# Patient Record
Sex: FEMALE | Race: WHITE | NOT HISPANIC OR LATINO | Employment: OTHER | URBAN - METROPOLITAN AREA
[De-identification: names, ages, dates, MRNs, and addresses within clinical notes are randomized per-mention and may not be internally consistent; named-entity substitution may affect disease eponyms.]

---

## 2017-01-03 ENCOUNTER — OFFICE VISIT (OUTPATIENT)
Dept: PULMONOLOGY | Facility: CLINIC | Age: 67
DRG: 176 | End: 2017-01-03
Payer: MEDICARE

## 2017-01-03 VITALS
WEIGHT: 155.19 LBS | SYSTOLIC BLOOD PRESSURE: 124 MMHG | DIASTOLIC BLOOD PRESSURE: 62 MMHG | BODY MASS INDEX: 27.5 KG/M2 | RESPIRATION RATE: 14 BRPM | HEART RATE: 65 BPM | OXYGEN SATURATION: 95 % | HEIGHT: 63 IN

## 2017-01-03 DIAGNOSIS — J47.9 ACQUIRED BRONCHIECTASIS: Primary | ICD-10-CM

## 2017-01-03 DIAGNOSIS — B96.5 PSEUDOMONAS RESPIRATORY INFECTION: ICD-10-CM

## 2017-01-03 DIAGNOSIS — I50.22 CHRONIC SYSTOLIC HEART FAILURE: ICD-10-CM

## 2017-01-03 DIAGNOSIS — M51.37 DEGENERATION OF LUMBAR OR LUMBOSACRAL INTERVERTEBRAL DISC: ICD-10-CM

## 2017-01-03 DIAGNOSIS — J98.8 PSEUDOMONAS RESPIRATORY INFECTION: ICD-10-CM

## 2017-01-03 PROCEDURE — 1125F AMNT PAIN NOTED PAIN PRSNT: CPT | Mod: S$GLB,,, | Performed by: INTERNAL MEDICINE

## 2017-01-03 PROCEDURE — 1157F ADVNC CARE PLAN IN RCRD: CPT | Mod: S$GLB,,, | Performed by: INTERNAL MEDICINE

## 2017-01-03 PROCEDURE — 1160F RVW MEDS BY RX/DR IN RCRD: CPT | Mod: S$GLB,,, | Performed by: INTERNAL MEDICINE

## 2017-01-03 PROCEDURE — 3078F DIAST BP <80 MM HG: CPT | Mod: S$GLB,,, | Performed by: INTERNAL MEDICINE

## 2017-01-03 PROCEDURE — 1159F MED LIST DOCD IN RCRD: CPT | Mod: S$GLB,,, | Performed by: INTERNAL MEDICINE

## 2017-01-03 PROCEDURE — 99214 OFFICE O/P EST MOD 30 MIN: CPT | Mod: S$GLB,,, | Performed by: INTERNAL MEDICINE

## 2017-01-03 PROCEDURE — 99999 PR PBB SHADOW E&M-EST. PATIENT-LVL III: CPT | Mod: PBBFAC,,, | Performed by: INTERNAL MEDICINE

## 2017-01-03 PROCEDURE — 3074F SYST BP LT 130 MM HG: CPT | Mod: S$GLB,,, | Performed by: INTERNAL MEDICINE

## 2017-01-03 PROCEDURE — 99499 UNLISTED E&M SERVICE: CPT | Mod: S$GLB,,, | Performed by: INTERNAL MEDICINE

## 2017-01-04 PROCEDURE — 96374 THER/PROPH/DIAG INJ IV PUSH: CPT

## 2017-01-04 PROCEDURE — 99285 EMERGENCY DEPT VISIT HI MDM: CPT | Mod: 25

## 2017-01-04 PROCEDURE — 99281 EMR DPT VST MAYX REQ PHY/QHP: CPT

## 2017-01-04 PROCEDURE — 96361 HYDRATE IV INFUSION ADD-ON: CPT

## 2017-01-04 PROCEDURE — 96375 TX/PRO/DX INJ NEW DRUG ADDON: CPT

## 2017-01-04 NOTE — PROGRESS NOTES
Subjective:       Patient ID: Yasmin Asencio is a 66 y.o. female.    Chief Complaint: Bronchiectasis    HPI HISTORY OF PRESENT ILLNESS:  Mrs. Asencio is a 66-year-old former smoker who   comes for assessment of bronchiectasis in anticipation of upcoming surgical   procedures.  She is planning to have a procedure at the end of this week for   control of chronic pain from her lumbar spine (RFTC).  She also plans to have a   surgical procedure to correct a problem with her eyelids.  This will be   performed in the next two weeks and is hopefully going to correct a problem with   her peripheral vision.    Today, Mrs. Asencio reports that her respiratory symptoms have been stable in   recent months.  She continues to have a daily cough with sputum production.    This is variable in amount and color.  She has not had any recent hemoptysis.    She has not had to take antibiotics for respiratory symptoms since the mid part   of last year.    Following her visit here in September, Mrs. Asencio began treatment with Anoro.    She feels that this has been an acceptable substitute for the previous regimen   of Foradil and Spiriva.  She also continues to use Atrovent aerosol treatments   twice daily and hypertonic saline aerosols twice daily.    DATA:  I have reviewed the images from a chest x-ray done within the last   several weeks.  There do not appear to be any acute cardiovascular   abnormalities.  There are chronic fibrocystic abnormalities, which are most   prominent in the upper portion of the left lung.  This recent chest radiograph   does not appear changed in any significant way when compared to multiple   previous films done during the past year.    A recent electrocardiogram is reported to show normal sinus rhythm and is felt   to be a normal tracing.    A recent echocardiogram reports low normal left ventricular systolic function,   left ventricular diastolic dysfunction, and mild valvular  insufficiencies.      CB/HN  dd: 01/03/2017 20:32:43 (CST)  td: 01/04/2017 05:15:17 (CST)  Doc ID   #[id number]  Job ID #[id number]    CC:       Review of Systems   Constitutional: Negative for fever and fatigue.   HENT: Negative for postnasal drip, sinus pressure, voice change and congestion.    Respiratory: Positive for cough, sputum production and dyspnea on extertion. Negative for shortness of breath and wheezing.    Cardiovascular: Negative for chest pain and leg swelling.   Genitourinary: Negative for difficulty urinating.   Musculoskeletal: Positive for arthralgias and back pain.   Skin: Negative for rash.   Gastrointestinal: Negative for abdominal pain and acid reflux.   Neurological: Negative for dizziness and weakness.   Hematological: Negative for adenopathy.       Objective:      Physical Exam   Constitutional: She is oriented to person, place, and time. She appears well-developed and well-nourished.   HENT:   Head: Normocephalic.   Nose: Nose normal.   Mouth/Throat: No oropharyngeal exudate.   Neck: Normal range of motion. No JVD present. No tracheal deviation present. No thyromegaly present.   Cardiovascular: Normal rate, regular rhythm and normal heart sounds.    No murmur heard.  Pulmonary/Chest: Symmetric chest wall expansion. No stridor. She has no wheezes. She has no rhonchi. She has rales (rales and squeaks most pronounced anteriorly on L). She exhibits no tenderness.   Abdominal: Soft. There is no tenderness.   Musculoskeletal: She exhibits no edema.   Lymphadenopathy:     She has no cervical adenopathy.   Neurological: She is alert and oriented to person, place, and time.   Skin: Skin is warm and dry. No rash noted. No erythema. Nails show no clubbing.   Psychiatric: She has a normal mood and affect.   Vitals reviewed.    Personal Diagnostic Review    No flowsheet data found.      Assessment:       1. Acquired bronchiectasis    2. Pseudomonas respiratory infection    3. Chronic systolic heart failure     4. Degeneration of lumbar or lumbosacral intervertebral disc        Outpatient Encounter Prescriptions as of 1/3/2017   Medication Sig Dispense Refill    alprazolam (XANAX) 0.25 MG tablet Take 1 tablet (0.25 mg total) by mouth nightly as needed for Anxiety. 30 tablet 2    ANORO ELLIPTA 62.5-25 mcg/actuation DsDv Inhale 1 puff into the lungs once daily. 180 each 4    desonide (DESOWEN) 0.05 % lotion AAA face bid prn redness, scaling, or itching      desoximetasone (TOPICORT) 0.25 % cream Apply topically 2 (two) times daily.        escitalopram oxalate (LEXAPRO) 20 MG tablet TAKE ONE TABLET BY MOUTH ONCE DAILY 30 tablet 5    gabapentin (NEURONTIN) 300 MG capsule Take 1 capsule (300 mg total) by mouth 3 (three) times daily. 90 capsule 2    guaifenesin (MUCINEX) 600 mg 12 hr tablet Take 1,200 mg by mouth 2 (two) times daily.      ipratropium (ATROVENT) 0.02 % nebulizer solution Take 2.5 mLs (500 mcg total) by nebulization 4 (four) times daily. 225 mL 10    LACTOBACILLUS RHAMNOSUS GG (CULTURELLE ORAL) Take by mouth.      metoprolol tartrate (LOPRESSOR) 25 MG tablet TAKE ONE TABLET BY MOUTH TWICE DAILY 60 tablet 5    omeprazole (PRILOSEC) 20 MG capsule TAKE ONE CAPSULE BY MOUTH ONCE DAILY 90 capsule 0    primidone (MYSOLINE) 50 MG Tab TAKE ONE TABLET BY MOUTH ONCE DAILY IN THE MORNING, ONE AT NOON, AND TWO AT BEDTIME 120 tablet 11    SODIUM CHLORIDE FOR INHALATION (SODIUM CHLORIDE 3%) 3 % nebulizer solution USE ONE VIAL IN NEBULIZER TWICE DAILY 240 mL 3    topiramate (TOPAMAX) 25 MG tablet Take 1 tablet (25 mg total) by mouth 2 (two) times daily. 180 tablet 3    tramadol (ULTRAM) 50 mg tablet Take 50 mg by mouth every 6 (six) hours as needed for Pain.      VITAMIN D2 50,000 unit capsule TAKE ONE CAPSULE BY MOUTH ONCE A WEEK 4 capsule 5     No facility-administered encounter medications on file as of 1/3/2017.      Orders Placed This Encounter   Procedures    Spirometry without Bronchodilator      Standing Status:   Future     Standing Expiration Date:   9/4/2017    DLCO-Carbon Monoxide Diffusing Capacity     Standing Status:   Future     Standing Expiration Date:   9/4/2017     Plan:     Continue present respiratory medications (roles reviewed at today's visit).  Increase Atrovent Aerosol treatments to 3 x daily until after eye surgery.  Clear for anesthesia and surgery from pulmonary perspective.  Return visit 4 months with Spirometry and DLCO.

## 2017-01-04 NOTE — PATIENT INSTRUCTIONS
Continue present respiratory medications (roles reviewed at today's visit).  Increase Atrovent Aerosol treatments to 3 x daily until after eye surgery.  Clear for anesthesia and surgery from pulmonary perspective.  Return visit 4 months with Spirometry and DLCO.

## 2017-01-05 ENCOUNTER — HOSPITAL ENCOUNTER (INPATIENT)
Facility: HOSPITAL | Age: 67
LOS: 3 days | Discharge: HOME | DRG: 176 | End: 2017-01-08
Attending: EMERGENCY MEDICINE | Admitting: HOSPITALIST
Payer: MEDICARE

## 2017-01-05 DIAGNOSIS — I26.99 PULMONARY EMBOLISM WITHOUT ACUTE COR PULMONALE: ICD-10-CM

## 2017-01-05 DIAGNOSIS — J47.9 ACQUIRED BRONCHIECTASIS: ICD-10-CM

## 2017-01-05 DIAGNOSIS — R09.02 HYPOXIA: ICD-10-CM

## 2017-01-05 DIAGNOSIS — J44.9 CHRONIC OBSTRUCTIVE PULMONARY DISEASE, UNSPECIFIED COPD TYPE: ICD-10-CM

## 2017-01-05 DIAGNOSIS — F32.0 MILD MAJOR DEPRESSION: ICD-10-CM

## 2017-01-05 DIAGNOSIS — I12.9 BENIGN HYPERTENSION WITH CHRONIC KIDNEY DISEASE, STAGE III: ICD-10-CM

## 2017-01-05 DIAGNOSIS — I47.19 ECTOPIC ATRIAL TACHYCARDIA: ICD-10-CM

## 2017-01-05 DIAGNOSIS — N18.30 BENIGN HYPERTENSION WITH CHRONIC KIDNEY DISEASE, STAGE III: ICD-10-CM

## 2017-01-05 DIAGNOSIS — I10 ESSENTIAL HYPERTENSION: ICD-10-CM

## 2017-01-05 DIAGNOSIS — I26.99 OTHER ACUTE PULMONARY EMBOLISM WITHOUT ACUTE COR PULMONALE: Primary | ICD-10-CM

## 2017-01-05 DIAGNOSIS — K21.9 GASTROESOPHAGEAL REFLUX DISEASE, ESOPHAGITIS PRESENCE NOT SPECIFIED: Chronic | ICD-10-CM

## 2017-01-05 DIAGNOSIS — D63.8 ANEMIA OF CHRONIC DISEASE: Chronic | ICD-10-CM

## 2017-01-05 LAB
ALBUMIN SERPL BCP-MCNC: 3.9 G/DL
ALP SERPL-CCNC: 64 U/L
ALT SERPL W/O P-5'-P-CCNC: 15 U/L
ANION GAP SERPL CALC-SCNC: 12 MMOL/L
AST SERPL-CCNC: 17 U/L
BASOPHILS # BLD AUTO: 0.03 K/UL
BASOPHILS NFR BLD: 0.4 %
BILIRUB SERPL-MCNC: 0.2 MG/DL
BILIRUB UR QL STRIP: NEGATIVE
BNP SERPL-MCNC: 129 PG/ML
BUN SERPL-MCNC: 17 MG/DL
CALCIUM SERPL-MCNC: 9.4 MG/DL
CHLORIDE SERPL-SCNC: 103 MMOL/L
CLARITY UR: CLEAR
CO2 SERPL-SCNC: 23 MMOL/L
COLOR UR: ABNORMAL
CREAT SERPL-MCNC: 1 MG/DL
DIFFERENTIAL METHOD: ABNORMAL
EOSINOPHIL # BLD AUTO: 0.2 K/UL
EOSINOPHIL NFR BLD: 1.8 %
ERYTHROCYTE [DISTWIDTH] IN BLOOD BY AUTOMATED COUNT: 12.6 %
EST. GFR  (AFRICAN AMERICAN): >60 ML/MIN/1.73 M^2
EST. GFR  (NON AFRICAN AMERICAN): 58 ML/MIN/1.73 M^2
FLUAV AG SPEC QL IA: NEGATIVE
FLUBV AG SPEC QL IA: NEGATIVE
GLUCOSE SERPL-MCNC: 94 MG/DL
GLUCOSE UR QL STRIP: NEGATIVE
HCT VFR BLD AUTO: 36.2 %
HGB BLD-MCNC: 11.7 G/DL
HGB UR QL STRIP: NEGATIVE
INR PPP: 1
KETONES UR QL STRIP: NEGATIVE
LEUKOCYTE ESTERASE UR QL STRIP: ABNORMAL
LYMPHOCYTES # BLD AUTO: 2.7 K/UL
LYMPHOCYTES NFR BLD: 33.3 %
MAGNESIUM SERPL-MCNC: 2.2 MG/DL
MCH RBC QN AUTO: 29.9 PG
MCHC RBC AUTO-ENTMCNC: 32.3 %
MCV RBC AUTO: 93 FL
MICROSCOPIC COMMENT: NORMAL
MONOCYTES # BLD AUTO: 0.9 K/UL
MONOCYTES NFR BLD: 10.9 %
NEUTROPHILS # BLD AUTO: 4.4 K/UL
NEUTROPHILS NFR BLD: 53.5 %
NITRITE UR QL STRIP: NEGATIVE
PH UR STRIP: 6 [PH] (ref 5–8)
PLATELET # BLD AUTO: 255 K/UL
PMV BLD AUTO: 9.7 FL
POTASSIUM SERPL-SCNC: 4.2 MMOL/L
PROT SERPL-MCNC: 7.7 G/DL
PROT UR QL STRIP: NEGATIVE
PROTHROMBIN TIME: 10.4 SEC
RBC # BLD AUTO: 3.91 M/UL
SODIUM SERPL-SCNC: 138 MMOL/L
SP GR UR STRIP: 1.01 (ref 1–1.03)
SPECIMEN SOURCE: NORMAL
TROPONIN I SERPL DL<=0.01 NG/ML-MCNC: <0.006 NG/ML
URN SPEC COLLECT METH UR: ABNORMAL
UROBILINOGEN UR STRIP-ACNC: NEGATIVE EU/DL
WBC # BLD AUTO: 8.16 K/UL
WBC #/AREA URNS HPF: 1 /HPF (ref 0–5)

## 2017-01-05 PROCEDURE — 84484 ASSAY OF TROPONIN QUANT: CPT

## 2017-01-05 PROCEDURE — 27000221 HC OXYGEN, UP TO 24 HOURS

## 2017-01-05 PROCEDURE — 36415 COLL VENOUS BLD VENIPUNCTURE: CPT

## 2017-01-05 PROCEDURE — 80053 COMPREHEN METABOLIC PANEL: CPT

## 2017-01-05 PROCEDURE — 21400001 HC TELEMETRY ROOM

## 2017-01-05 PROCEDURE — 25000003 PHARM REV CODE 250: Performed by: EMERGENCY MEDICINE

## 2017-01-05 PROCEDURE — 63600175 PHARM REV CODE 636 W HCPCS: Performed by: EMERGENCY MEDICINE

## 2017-01-05 PROCEDURE — 94640 AIRWAY INHALATION TREATMENT: CPT

## 2017-01-05 PROCEDURE — 83880 ASSAY OF NATRIURETIC PEPTIDE: CPT

## 2017-01-05 PROCEDURE — 85610 PROTHROMBIN TIME: CPT

## 2017-01-05 PROCEDURE — 63600175 PHARM REV CODE 636 W HCPCS: Performed by: HOSPITALIST

## 2017-01-05 PROCEDURE — 25500020 PHARM REV CODE 255: Performed by: EMERGENCY MEDICINE

## 2017-01-05 PROCEDURE — 87400 INFLUENZA A/B EACH AG IA: CPT

## 2017-01-05 PROCEDURE — 83735 ASSAY OF MAGNESIUM: CPT

## 2017-01-05 PROCEDURE — 94761 N-INVAS EAR/PLS OXIMETRY MLT: CPT

## 2017-01-05 PROCEDURE — 85025 COMPLETE CBC W/AUTO DIFF WBC: CPT

## 2017-01-05 PROCEDURE — 81000 URINALYSIS NONAUTO W/SCOPE: CPT

## 2017-01-05 RX ORDER — IPRATROPIUM BROMIDE 0.5 MG/2.5ML
500 SOLUTION RESPIRATORY (INHALATION) 4 TIMES DAILY
Status: DISCONTINUED | OUTPATIENT
Start: 2017-01-05 | End: 2017-01-08 | Stop reason: HOSPADM

## 2017-01-05 RX ORDER — SODIUM CHLORIDE 9 MG/ML
INJECTION, SOLUTION INTRAVENOUS CONTINUOUS
Status: DISCONTINUED | OUTPATIENT
Start: 2017-01-05 | End: 2017-01-05

## 2017-01-05 RX ORDER — METHYLPREDNISOLONE SODIUM SUCCINATE 125 MG/2ML
125 INJECTION INTRAMUSCULAR; INTRAVENOUS
Status: COMPLETED | OUTPATIENT
Start: 2017-01-05 | End: 2017-01-05

## 2017-01-05 RX ORDER — ACETAMINOPHEN 325 MG/1
650 TABLET ORAL EVERY 8 HOURS PRN
Status: DISCONTINUED | OUTPATIENT
Start: 2017-01-05 | End: 2017-01-08 | Stop reason: HOSPADM

## 2017-01-05 RX ORDER — PANTOPRAZOLE SODIUM 40 MG/1
40 TABLET, DELAYED RELEASE ORAL DAILY
Status: DISCONTINUED | OUTPATIENT
Start: 2017-01-05 | End: 2017-01-08 | Stop reason: HOSPADM

## 2017-01-05 RX ORDER — GABAPENTIN 300 MG/1
300 CAPSULE ORAL 3 TIMES DAILY
Status: DISCONTINUED | OUTPATIENT
Start: 2017-01-05 | End: 2017-01-08 | Stop reason: HOSPADM

## 2017-01-05 RX ORDER — TOPIRAMATE 25 MG/1
25 TABLET ORAL 2 TIMES DAILY
Status: DISCONTINUED | OUTPATIENT
Start: 2017-01-05 | End: 2017-01-08 | Stop reason: HOSPADM

## 2017-01-05 RX ORDER — GUAIFENESIN 100 MG/5ML
100 SOLUTION ORAL EVERY 6 HOURS PRN
Status: DISCONTINUED | OUTPATIENT
Start: 2017-01-05 | End: 2017-01-08 | Stop reason: HOSPADM

## 2017-01-05 RX ORDER — MORPHINE SULFATE 10 MG/ML
4 INJECTION INTRAMUSCULAR; INTRAVENOUS; SUBCUTANEOUS EVERY 4 HOURS PRN
Status: DISCONTINUED | OUTPATIENT
Start: 2017-01-05 | End: 2017-01-08 | Stop reason: HOSPADM

## 2017-01-05 RX ORDER — ENOXAPARIN SODIUM 100 MG/ML
1 INJECTION SUBCUTANEOUS
Status: DISCONTINUED | OUTPATIENT
Start: 2017-01-05 | End: 2017-01-07

## 2017-01-05 RX ORDER — ONDANSETRON 2 MG/ML
8 INJECTION INTRAMUSCULAR; INTRAVENOUS
Status: COMPLETED | OUTPATIENT
Start: 2017-01-05 | End: 2017-01-05

## 2017-01-05 RX ORDER — OXYCODONE HYDROCHLORIDE 5 MG/1
5 TABLET ORAL EVERY 4 HOURS PRN
Status: DISCONTINUED | OUTPATIENT
Start: 2017-01-05 | End: 2017-01-08 | Stop reason: HOSPADM

## 2017-01-05 RX ORDER — LABETALOL HYDROCHLORIDE 5 MG/ML
10 INJECTION, SOLUTION INTRAVENOUS
Status: COMPLETED | OUTPATIENT
Start: 2017-01-05 | End: 2017-01-05

## 2017-01-05 RX ORDER — METOPROLOL TARTRATE 25 MG/1
25 TABLET, FILM COATED ORAL 2 TIMES DAILY
Status: DISCONTINUED | OUTPATIENT
Start: 2017-01-05 | End: 2017-01-08 | Stop reason: HOSPADM

## 2017-01-05 RX ORDER — IPRATROPIUM BROMIDE 0.5 MG/2.5ML
0.5 SOLUTION RESPIRATORY (INHALATION) ONCE
Status: COMPLETED | OUTPATIENT
Start: 2017-01-05 | End: 2017-01-05

## 2017-01-05 RX ORDER — ESCITALOPRAM OXALATE 10 MG/1
20 TABLET ORAL DAILY
Status: DISCONTINUED | OUTPATIENT
Start: 2017-01-05 | End: 2017-01-08 | Stop reason: HOSPADM

## 2017-01-05 RX ORDER — SODIUM CHLORIDE FOR INHALATION 3 %
4 VIAL, NEBULIZER (ML) INHALATION 2 TIMES DAILY
Status: DISCONTINUED | OUTPATIENT
Start: 2017-01-05 | End: 2017-01-08 | Stop reason: HOSPADM

## 2017-01-05 RX ADMIN — ESCITALOPRAM OXALATE 20 MG: 10 TABLET ORAL at 08:01

## 2017-01-05 RX ADMIN — SODIUM CHLORIDE SOLN NEBU 3% 4 ML: 3 NEBU SOLN at 08:01

## 2017-01-05 RX ADMIN — OXYCODONE HYDROCHLORIDE 5 MG: 5 TABLET ORAL at 04:01

## 2017-01-05 RX ADMIN — ACETAMINOPHEN 650 MG: 325 TABLET ORAL at 05:01

## 2017-01-05 RX ADMIN — SODIUM CHLORIDE: 0.9 INJECTION, SOLUTION INTRAVENOUS at 05:01

## 2017-01-05 RX ADMIN — SODIUM CHLORIDE 1000 ML: 0.9 INJECTION, SOLUTION INTRAVENOUS at 12:01

## 2017-01-05 RX ADMIN — TOPIRAMATE 25 MG: 25 TABLET, FILM COATED ORAL at 09:01

## 2017-01-05 RX ADMIN — METOPROLOL TARTRATE 25 MG: 25 TABLET ORAL at 09:01

## 2017-01-05 RX ADMIN — ACETAMINOPHEN 650 MG: 325 TABLET ORAL at 08:01

## 2017-01-05 RX ADMIN — TOPIRAMATE 25 MG: 25 TABLET, FILM COATED ORAL at 08:01

## 2017-01-05 RX ADMIN — IPRATROPIUM BROMIDE 500 MCG: 0.5 SOLUTION RESPIRATORY (INHALATION) at 06:01

## 2017-01-05 RX ADMIN — IPRATROPIUM BROMIDE 500 MCG: 0.5 SOLUTION RESPIRATORY (INHALATION) at 07:01

## 2017-01-05 RX ADMIN — GUAIFENESIN 100 MG: 100 SOLUTION ORAL at 05:01

## 2017-01-05 RX ADMIN — GABAPENTIN 300 MG: 300 CAPSULE ORAL at 01:01

## 2017-01-05 RX ADMIN — PANTOPRAZOLE SODIUM 40 MG: 40 TABLET, DELAYED RELEASE ORAL at 08:01

## 2017-01-05 RX ADMIN — GABAPENTIN 300 MG: 300 CAPSULE ORAL at 05:01

## 2017-01-05 RX ADMIN — GABAPENTIN 300 MG: 300 CAPSULE ORAL at 09:01

## 2017-01-05 RX ADMIN — SODIUM CHLORIDE SOLN NEBU 3% 4 ML: 3 NEBU SOLN at 07:01

## 2017-01-05 RX ADMIN — IPRATROPIUM BROMIDE 500 MCG: 0.5 SOLUTION RESPIRATORY (INHALATION) at 01:01

## 2017-01-05 RX ADMIN — METOPROLOL TARTRATE 25 MG: 25 TABLET ORAL at 08:01

## 2017-01-05 RX ADMIN — ENOXAPARIN SODIUM 70 MG: 80 INJECTION SUBCUTANEOUS at 01:01

## 2017-01-05 RX ADMIN — LABETALOL HYDROCHLORIDE 10 MG: 5 INJECTION, SOLUTION INTRAVENOUS at 02:01

## 2017-01-05 RX ADMIN — IOHEXOL 70 ML: 350 INJECTION, SOLUTION INTRAVENOUS at 02:01

## 2017-01-05 RX ADMIN — METHYLPREDNISOLONE SODIUM SUCCINATE 125 MG: 125 INJECTION, POWDER, FOR SOLUTION INTRAMUSCULAR; INTRAVENOUS at 02:01

## 2017-01-05 RX ADMIN — ONDANSETRON 8 MG: 2 INJECTION INTRAMUSCULAR; INTRAVENOUS at 12:01

## 2017-01-05 RX ADMIN — IPRATROPIUM BROMIDE 0.5 MG: 0.5 SOLUTION RESPIRATORY (INHALATION) at 02:01

## 2017-01-05 RX ADMIN — GUAIFENESIN 100 MG: 100 SOLUTION ORAL at 08:01

## 2017-01-05 NOTE — IP AVS SNAPSHOT
[place] [address] [place] [address]  Phone: [phone]           Patient Discharge Instructions     Our goal is to set you up for success. This packet includes information on your condition, medications, and your home care. It will help you to care for yourself so you don't get sicker and need to go back to the hospital.     Please ask your nurse if you have any questions.        There are many details to remember when preparing to leave the hospital. Here is what you will need to do:    1. Take your medicine. If you are prescribed medications, review your Medication List in the following pages. You may have new medications to  at the pharmacy and others that you'll need to stop taking. Review the instructions for how and when to take your medications. Talk with your doctor or nurses if you are unsure of what to do.     2. Go to your follow-up appointments. Specific follow-up information is listed in the following pages. Your may be contacted by a transition nurse or clinical provider about future appointments. Be sure we have all of the phone numbers to reach you, if needed. Please contact your provider's office if you are unable to make an appointment.     3. Watch for warning signs. Your doctor or nurse will give you detailed warning signs to watch for and when to call for assistance. These instructions may also include educational information about your condition. If you experience any of warning signs to your health, call your doctor.               [facility]  Unless otherwise directed by your provider, please contact [facility], our nurse care line that is available for 24/7 assistance.     [phone] (toll-free)    Registered nurses in the [facility] provide clinical advisement, health education, appointment booking, and other advisory services.                    ** Verify the list of medication(s) below is accurate and up to date.  Carry this with you in case of emergency. If your medications have changed, please notify your healthcare provider.             Medication List      START taking these medications        Additional Info    Begin Date AM Noon PM Bedtime    apixaban 5 mg Tab   Quantity:  60 tablet   Refills:  0   Dose:  5 mg    Last time this was given:  5 mg on 1/8/2017  8:47 AM   Instructions:  Take 1 tablet (5 mg total) by mouth 2 (two) times daily.                    01/08/16             CONTINUE taking these medications        Additional Info    Begin Date AM Noon PM Bedtime    alprazolam 0.25 MG tablet   Commonly known as:  XANAX   Quantity:  30 tablet   Refills:  2   Dose:  0.25 mg    Instructions:  Take 1 tablet (0.25 mg total) by mouth nightly as needed for Anxiety.                            ANORO ELLIPTA 62.5-25 mcg/actuation Dsdv   Quantity:  180 each   Refills:  4   Dose:  1 puff   Generic drug:  umeclidinium-vilanterol    Instructions:  Inhale 1 puff into the lungs once daily.            01/09/16                   CULTURELLE ORAL   Refills:  0    Instructions:  Take by mouth.            01/09/16                   desonide 0.05 % lotion   Commonly known as:  DESOWEN   Refills:  0    Instructions:  AAA face bid prn redness, scaling, or itching                            desoximetasone 0.25 % cream   Commonly known as:  TOPICORT   Refills:  0    Instructions:  Apply topically 2 (two) times daily.                    01/08/16           escitalopram oxalate 20 MG tablet   Commonly known as:  LEXAPRO   Quantity:  30 tablet   Refills:  5    Last time this was given:  20 mg on 1/8/2017  8:47 AM   Instructions:  TAKE ONE TABLET BY MOUTH ONCE DAILY            01/09/16                   gabapentin 300 MG capsule   Commonly known as:  NEURONTIN   Quantity:  90 capsule   Refills:  2   Dose:  300 mg    Last time this was given:  300 mg on 1/8/2017  5:11 AM   Instructions:  Take 1 capsule (300 mg total) by mouth 3 (three) times daily.                     01/08/16           guaifenesin 600 mg 12 hr tablet   Commonly known as:  MUCINEX   Refills:  0   Dose:  1200 mg    Instructions:  Take 1,200 mg by mouth 2 (two) times daily.                    01/08/16           ipratropium 0.02 % nebulizer solution   Commonly known as:  ATROVENT   Quantity:  225 mL   Refills:  10   Dose:  500 mcg    Last time this was given:  500 mcg on 1/8/2017  7:29 AM   Instructions:  Take 2.5 mLs (500 mcg total) by nebulization 4 (four) times daily.                    01/08/16           metoprolol tartrate 25 MG tablet   Commonly known as:  LOPRESSOR   Quantity:  60 tablet   Refills:  5    Last time this was given:  25 mg on 1/8/2017  8:47 AM   Instructions:  TAKE ONE TABLET BY MOUTH TWICE DAILY                    01/08/16           omeprazole 20 MG capsule   Commonly known as:  PRILOSEC   Quantity:  90 capsule   Refills:  0    Instructions:  TAKE ONE CAPSULE BY MOUTH ONCE DAILY            01/09/16                   primidone 50 MG Tab   Commonly known as:  MYSOLINE   Quantity:  120 tablet   Refills:  11    Instructions:  TAKE ONE TABLET BY MOUTH ONCE DAILY IN THE MORNING, ONE AT NOON, AND TWO AT BEDTIME                            sodium chloride 3% 3 % nebulizer solution   Quantity:  240 mL   Refills:  3    Last time this was given:  4 mLs on 1/7/2017  8:46 PM   Instructions:  USE ONE VIAL IN NEBULIZER TWICE DAILY                            topiramate 25 MG tablet   Commonly known as:  TOPAMAX   Quantity:  180 tablet   Refills:  3   Dose:  25 mg    Last time this was given:  25 mg on 1/8/2017  8:47 AM   Instructions:  Take 1 tablet (25 mg total) by mouth 2 (two) times daily.                    01/08/16           tramadol 50 mg tablet   Commonly known as:  ULTRAM   Refills:  0   Dose:  50 mg    Instructions:  Take 50 mg by mouth every 6 (six) hours as needed for Pain.                            VITAMIN D2 50,000 unit Cap   Quantity:  4 capsule   Refills:  5   Generic drug:   ergocalciferol    Instructions:  TAKE ONE CAPSULE BY MOUTH ONCE A WEEK                                 Where to Get Your Medications      You can get these medications from any pharmacy     Bring a paper prescription for each of these medications     apixaban 5 mg Tab                  Please bring to all follow up appointments:    1. A copy of your discharge instructions.  2. All medicines you are currently taking in their original bottles.  3. Identification and insurance card.    Please arrive 15 minutes ahead of scheduled appointment time.    Please call 24 hours in advance if you must reschedule your appointment and/or time.        Your Scheduled Appointments     Jan 17, 2017 11:30 AM CST   Established Patient Visit with MD Alexis Francis [facility] - Infectious Diseases ([facility] )    [address]  [place] [id number] [phone]            Feb 02, 2017 10:40 AM CST   Established Patient [facility] with REGAN [facility]    [address]  [place] [address] [phone]              Follow-up Information     Follow up with Urmila Luna MD In 1 week.    Specialties:  Internal Medicine, Pediatrics    Contact information:    [address] [facility] [address] [phone]          Follow up with PRECIOUS Zuñiga MD In 2 weeks.    Specialty:  Pulmonary Disease    Contact information:    [address] [facility] [address] [phone]          Schedule an appointment as soon as possible for a visit with [facility].    Specialty:  Priority Care    Contact information:    [address] [address] [phone]        Discharge Instructions     Future Orders    Activity as tolerated     Diet general     Comments:    Low Na.    Questions:    Total calories:      Fat restriction, if any:      Protein restriction, if any:      Na restriction, if any:      Fluid restriction:       "Additional restrictions:        Discharge References/Attachments     APIXABAN (ENGLISH)    EMBOLISM, PULMONARY (ENGLISH)    PULMONARY EMBOLISM, DISCHARGE INSTRUCTIONS FOR (ENGLISH)        Primary Diagnosis     Your primary diagnosis was:  Pulmonary Embolism Without Acute Cor Pulmonale      Admission Information     Date & Time Provider Department CSN    1/5/2017 12:03 AM Barbara Hernandez MD [facility] [id number]      Care Providers     Provider Role Specialty Primary office phone    Barbara Hernandez MD Attending Provider Hospitalist [phone]      Important Medicare Message          Most Recent Value    Important Message from Medicare Regarding Discharge Appeal Rights  Given to patient/caregiver, Explained to patient/caregiver, Signed/date by patient/caregiver yes 01/08/2017 0858      Your Vitals Were     BP Pulse Temp Resp Height Weight    123/58 (BP Location: Left arm, Patient Position: Lying, BP Method: Automatic) 73 98.4 °F (36.9 °C) (Oral) 20 5' 3" (1.6 m) 68.3 kg (150 lb 9.2 oz)    Last Period SpO2 BMI          (LMP Unknown) 93% 26.67 kg/m2        Recent Lab Values        2/26/2015                           9:15 AM           A1C 5.5                       Allergies as of 1/8/2017        Reactions    Bactrim [Sulfamethoxazole-trimethoprim] Rash    Was hospitalized for rash    Albuterol Other (See Comments)    tremors    Restasis [Cyclosporine] Itching      Advance Directives     An advance directive is a document which, in the event you are no longer able to make decisions for yourself, tells your healthcare team what kind of treatment you do or do not want to receive, or who you would like to make those decisions for you.  If you do not currently have an advance directive, [facility] encourages you to create one.  For more information call:  [phone]), [phone] ([phone]),  or log on to [email or web address].        Smoking Cessation     If you would like to quit " smoking:   You may be eligible for free services if you are a [place] resident and started smoking cigarettes before September 1, 1988.  Call the Smoking Cessation Trust (SCT) toll free at (537) 231-9933 or (824) 956-5508.   Call 1-800-QUIT-NOW if you do not meet the above criteria.            Language Assistance Services     ATTENTION: Language assistance services are available, free of charge. Please call [phone].      ATENCIÓN: Si habla español, tiene a santamaria disposición servicios gratuitos de asistencia lingüística. Llame al [phone].     CHÚ Ý: N?u b?n nói Ti?ng Vi?t, có các d?ch v? h? tr? ngôn ng? mi?n phí dành cho b?n. G?i s? [phone].        Heart Failure Education       Heart Failure: Being Active  You have a condition called heart failure. Being active doesnt mean that you have to wear yourself out. Even a little movement each day helps to strengthen your heart. If you cant get out to exercise, you can do simple stretching and strengthening exercises at home. These are good ways to keep you well-conditioned and prevent you and your heart from becoming excessively weak.    Ideas to get you started  · Add a little movement to things you do now. Walk to mail letters. Park your car at the far end of the parking lot and walk to the store. Walk up a flight of stairs instead of taking the elevator.  · Choose activities you enjoy. You might walk, swim, or ride an exercise bike. Things like gardening and washing the car count, too. Other possibilities include: washing dishes, walking the dog, walking around the mall, and doing aerobic activities with friends.  · Join a group exercise program at a [facility], a senior center, or a community center. Or look into a hospital cardiac rehabilitation program. Ask your doctor if you qualify.  Tips to keep you going  · Get up and get dressed each day. Go to a coffee shop and read a newspaper or go somewhere that you'll be in the presence of other  active people. Youll feel more like being active.  · Make a plan. Choose one or more activities that you enjoy and that you can easily do. Then plan to do at least one each day. You might write your plan on a calendar.  · Go with a friend or a group if you like company. This can help you feel supported and stay motivated, too.  · Plan social events that you enjoy. This will keep you mentally engaged as well as physically motivated to do things you find pleasure in.  For your safety  · Talk with your healthcare provider before starting an exercise program.  · Exercise indoors when its too hot or too cold outside, or when the air quality is poor. Try walking at a shopping mall.  · Wear socks and sturdy shoes to maintain your balance and prevent falls.  · Start slowly. Do a few minutes several times a day at first. Increase your time and speed little by little.  · Stop and rest whenever you feel tired or get short of breath.  · Dont push yourself on days when you dont feel well.  © 7353-0340 [employer]. [address] [place] [address]. All rights reserved. This information is not intended as a substitute for professional medical care. Always follow your healthcare professional's instructions.              Heart Failure: Evaluating Your Heart  You have a condition called heart failure. To evaluate your condition, your doctor will examine you, ask questions, and do some tests. Along with looking for signs of heart failure, the doctor looks for any other health problems that may have led to heart failure. The results of your evaluation will help your doctor form a treatment plan.  Health history and physical exam  Your visit will start with a health history. Tell the doctor about any symptoms youve noticed and about all medicines you take. Then youll have a physical exam. This includes listening to your heartbeat and breathing. Youll also be checked for swelling (edema) in your legs and  neck. When you have fluid buildup or fluid in the lungs, it may be called congestive heart failure.  Diagnosing heart failure     During an echocardiogram, sound waves bounce off the heart. These are converted into a picture on the screen.   The following may be done to help your doctor form a diagnosis:  · X-rays show the size and shape of your heart. These pictures can also show fluid in your lungs.  · An electrocardiogram (ECG or EKG) shows the pattern of your heartbeat. Small pads (electrodes) are placed on your chest, arms, and legs. Wires connect the pads to the ECG machine, which records your hearts electrical signals. This can give the doctor information about heart function.  · An echocardiogram uses ultrasound waves to show the structure and movement of your heart muscle. This shows how well the heart pumps. It also shows the thickness of the heart walls, and if the heart is enlarged. It is one of the most useful, non-invasive tests as it provides information about the heart's general function. This helps your doctor make treatment decisions.  · Lab tests evaluate small amounts of blood or urine for signs of problems. A BNP lab test can help diagnose and evaluate heart failure. BNP stands for B-type natriuretic peptide. The ventricles secrete more BNP when heart failure worsens. Lab tests can also provide information about metabolic dysfunction or heart dysfunction.  Your treatment plan  Based on the results of your evaluation and tests, your doctor will develop a treatment plan. This plan is designed to relieve some of your heart failure symptoms and help make you more comfortable. Your treatment plan may include:  · Medicine to help your heart work better and improve your quality of life  · Changes in what you eat and drink to help prevent fluid from backing up in your body  · Daily monitoring of your weight and heart failure symptoms to see how well your treatment plan is working  · Exercise to help  you stay healthy  · Help with quitting smoking  · Emotional and psychological support to help adjust to the changes  · Referrals to other specialists to make sure you are being treated comprehensively  © 0027-2098 [employer]. [address] [place] [address], [place] [address]. All rights reserved. This information is not intended as a substitute for professional medical care. Always follow your healthcare professional's instructions.              Heart Failure: Making Changes to Your Diet  You have a condition called heart failure. When you have heart failure, excess fluid is more likely to build up in your body because your heart isn't working well. This makes the heart work harder to pump blood. Fluid buildup causes symptoms such as shortness of breath and swelling (edema). This is often referred to as congestive heart failure or CHF. Controlling the amount of salt (sodium) you eat may help stop fluid from building up. Your doctor may also tell you to reduce the amount of fluid you drink.  Reading food labels    Your healthcare provider will tell you how much sodium you can eat each day. Read food labels to keep track. Keep in mind that certain foods are high in salt. These include canned, frozen, and processed foods. Check the amount of sodium in each serving. Watch out for high-sodium ingredients. These include MSG (monosodium glutamate), baking soda, and sodium phosphate.   Eating less salt  Give yourself time to get used to eating less salt. It may take a little while. Here are some tips to help:  · Take the saltshaker off the table. Replace it with salt-free herb mixes and spices.  · Eat fresh or plain frozen vegetables. These have much less salt than canned vegetables.  · Choose low-sodium snacks like sodium-free pretzels, crackers, or air-popped popcorn.  · Dont add salt to your food when youre cooking. Instead, season your foods with pepper, lemon, garlic, or onion.  · When you eat out, ask that  your food be cooked without added salt.  · Avoid eating fried foods as these often have a great deal of salt.  If youre told to limit fluids  You may need to limit how much fluid you have to help prevent swelling. This includes anything that is liquid at room temperature, such as ice cream and soup. If your doctor tells you to limit fluid, try these tips:  · Measure drinks in a measuring cup before you drink them. This will help you meet daily goals.  · Chill drinks to make them more refreshing.  · Suck on frozen lemon wedges to quench thirst.  · Only drink when youre thirsty.  · Chew sugarless gum or suck on hard candy to keep your mouth moist.  · Weigh yourself daily to know if your body's fluid content is rising.  My sodium goal  Your healthcare provider may give you a sodium goal to meet each day. This includes sodium found in food as well as salt that you add. My goal is to eat no more than ___________ mg of sodium per day.     When to call your doctor  Call your doctor right away if you have any symptoms of worsening heart failure. These can include:  · Sudden weight gain  · Increased swelling of your legs or ankles  · Trouble breathing when youre resting or at night  · Increase in the number of pillows you have to sleep on  · Chest pain, pressure, discomfort, or pain in the jaw, neck, or back   © 7629-3664 [employer]. [address] [place] [address], [place] [address]. All rights reserved. This information is not intended as a substitute for professional medical care. Always follow your healthcare professional's instructions.              Heart Failure: Medicines to Help Your Heart    You have a condition called heart failure (also known as congestive heart failure, or CHF). Your doctor will likely prescribe medicines for heart failure and any underlying health problems you have. Most heart failure patients take one or more types of medicinen. Your healthcare provider will work to find the  combination of medicines that works best for you.  Heart failure medicines  Here are the most common heart failure medicines:  · ACE inhibitors lower blood pressure and decrease strain on the heart. This makes it easier for the heart to pump. Angiotensin receptor blockers have similar effects. These are prescribed for some patients instead of ACE inhibitors.  · Beta-blockers relieve stress on the heart. They also improve symptoms. They may also improve the heart's pumping action over time.  · Diuretics (also called water pills) help rid your body of excess water. This can help rid your body of swelling (edema). Having less fluid to pump means your heart doesnt have to work as hard. Some diuretics make your body lose a mineral called potassium. Your doctor will tell you if you need to take supplements or eat more foods high in potassium.  · Digoxin helps your heart pump with more strength. This helps your heart pump more blood with each beat. So, more oxygen-rich blood travels to the rest of the body.  · Aldosterone antagonists help alter hormones and decrease strain on the heart.  · Hydralazine and nitrates are two separate medicines used together to treat heart failure. They may come in one combination pill. They lower blood pressure and decrease how hard the heart has to pump.  Medicines for related conditions  Controlling other heart problems helps keep heart failure under control, too. Depending on other heart problems you have, medicines may be prescribed to:  · Lower blood pressure (antihypertensives).  · Lower cholesterol levels (statins).  · Prevent blood clots (anticoagulants or aspirin).  · Keep the heartbeat steady (antiarrhythmics).  © 0938-1468 [employer]. [address] [place] [address], [place] [address]. All rights reserved. This information is not intended as a substitute for professional medical care. Always follow your healthcare professional's instructions.              Heart Failure:  Procedures That May Help    The heart is a muscle that pumps oxygen-rich blood to all parts of the body. When you have heart failure, the heart is not able to pump as well as it should. Blood and fluid may back up into the lungs (congestive heart failure), and some parts of the body dont get enough oxygen-rich blood to work normally. These problems lead to the symptoms of heart failure.     Certain procedures may help the heart pump better in some cases of heart failure. Some procedures are done to treat health problems that may have caused the heart failure such as coronary artery disease or heart rhythm problems. For more serious heart failure, other options are available.  Treating artery and valve problems  If you have coronary artery disease or valve disease, procedures may be done to improve blood flow. This helps the heart pump better, which can improve heart failure symptoms. First, your doctor may do a cardiac catheterization to help detect clogged blood vessels or valve damage. During this procedure, a  thin tube (catheter) in inserted into a blood vessel and guided to the heart. There a dye is injected and a special type of X-ray (angiogram) is taken of the blood vessels. Procedures to open a blocked artery or fix damaged valves can also be done using catheterization.  · Angioplasty uses a balloon-tipped instrument at the end of the catheter. The balloon is inflated to widen the narrowed artery. In many cases, a stent is expanded to further support the narrowed artery. A stent is a metal mesh tube.  · Valve surgery repairs or replacement of faulty valves can also be done during catheterization so blood can flow properly through the chambers of the heart.  Bypass surgery is another option to help treat blocked arteries. It uses a healthy blood vessel from elsewhere in the body. The healthy blood vessel is attached above and below the blocked area so that blood can flow around the blocked artery.  Treating  heart rhythm problems  A device may be placed in the chest to help a weak heart maintain a healthy, heartbeat so the heart can pump more effectively:  · Pacemaker. A pacemaker is an implanted device that regulates your heartbeat electronically. It monitors your heart's rhythm and generates a painless electric impulse that helps the heart beat in a regular rhythm. A pacemaker is programmed to meet your specific heart rhythm needs.  · Biventricular pacing/cardiac resynchronization therapy. A type of pacemaker that paces both pumping chambers of the heart at the same time to coordinate contractions and to improve the heart's function. Some people with heart failure are candidates for this therapy.  · Implantable cardioverter defibrillator. A device similar to a pacemaker that senses when the heart is beating too fast and delivers an electrical shock to convert the fast rhythm to a normal rhythm. This can be a life saving device.  In severe cases  In more serious cases of heart failure when other treatments no longer work, other options may include:  · Ventricular assist devices (VADs). These are mechanical devices used to take over the pumping function for one or both of the heart's ventricles, or pumping chambers. A VAD may be necessary when heart failure progresses to the point that medicines and other treatments no longer help. In some cases, a VAD may be used as a bridge to transplant.  · Heart transplant. This is replacing the diseased heart with a healthy one from a donor. This is an option for a few people who are very sick. A heart transplant is very serious and not an option for all patients. Your doctor can tell you more.  © 4699-1942 [employer]. [address] [place] [address], [place] [address]. All rights reserved. This information is not intended as a substitute for professional medical care. Always follow your healthcare professional's instructions.              Heart Failure: Tracking Your  Weight  You have a condition called heart failure. When you have heart failure, a sudden weight gain or a steady rise in weight is a warning sign that your body is retaining too much water and salt. This could mean your heart failure is getting worse. If left untreated, it can cause problems for your lungs and result in shortness of breath. Weighing yourself each day is the best way to know if youre retaining water. If your weight goes up quickly, call your doctor. You will be given instructions on how to get rid of the excess water. You will likely need medicines and to avoid salt. This will help your heart work better.  Call your doctor if you gain more than 2 pounds in 1 day, more than 5 pounds in 1 week, or whatever weight gain you were told to report by your doctor. This is often a sign of worsening heart failure and needs to be evaluated and treated. Your doctor will tell you what to do next.   Tips for weighing yourself    · Weigh yourself at the same time each morning, wearing the same clothes. Weigh yourself after urinating and before eating.  · Use the same scale each day. Make sure the numbers are easy to read. Put the scale on a flat, hard surface -- not on a rug or carpet.  · Do not stop weighing yourself. If you forget one day, weigh again the next morning.  How to use your weight chart  · Keep your weight chart near the scale. Write your weight on the chart as soon as you get off the scale.  · Fill in the month and the start date on the chart. Then write down your weight each day. Your chart will look like this:    · If you miss a day, leave the space blank. Weigh yourself the next day and write your weight in the next space.  · Take your weight chart with you when you go to see your doctor.  © 1048-9634 [employer]. [address] [place] [address], [place] [address]. All rights reserved. This information is not intended as a substitute for professional medical care. Always follow your  healthcare professional's instructions.              Heart Failure: Warning Signs of a Flare-Up  You have a condition called heart failure. Once you have heart failure, flare-ups can happen. Below are signs that can mean your heart failure is getting worse. If you notice any of these warning signs, call your healthcare provider.  Swelling    · Your feet, ankles, or lower legs get puffier.  · You notice skin changes on your lower legs.  · Your shoes feel too tight.  · Your clothes are tighter in the waist.  · You have trouble getting rings on or off your fingers.  Shortness of breath  · You have to breathe harder even when youre doing your normal activities or when youre resting.  · You are short of breath walking up stairs or even short distances.  · You wake up at night short of breath or coughing.  · You need to use more pillows or sit up to sleep.  · You wake up tired or restless.  Other warning signs  · You feel weaker, dizzy, or more tired.  · You have chest pain or changes in your heartbeat.  · You have a cough that wont go away.  · You cant remember things or dont feel like eating.  Tracking your weight  Gaining weight is often the first warning sign that heart failure is getting worse. Gaining even a few pounds can be a sign that your body is retaining excess water and salt. Weighing yourself each day in the morning after you urinate and before you eat, is the best way to know if you're retaining water. Get a scale that is easy to read and make sure you wear the same clothes and use the same scale every time you weigh. Your healthcare provider will show you how to track your weight. Call your doctor if you gain more than 2 pounds in 1 day, 5 pounds in 1 week, or whatever weight gain you were told to report by your doctor. This is often a sign of worsening heart failure and needs to be evaluated and treated before it compromises your breathing. Your doctor will tell you what to do next.    © 7242-4388  [employer]. [address] [place] [address], [place] [address]. All rights reserved. This information is not intended as a substitute for professional medical care. Always follow your healthcare professional's instructions.              Pneumonmia Discharge Instructions                Chronic Kindey Disease Education             Eliquis Informaiton       Apixaban Oral tablet  What is this medicine?  APIXABAN (a PIX a ban) is an anticoagulant (blood thinner). It is used to lower the chance of stroke in people with a medical condition called atrial fibrillation. It is also used to treat or prevent blood clots in the lungs or in the veins.  This medicine may be used for other purposes; ask your health care provider or pharmacist if you have questions.  What should I tell my health care provider before I take this medicine?  They need to know if you have any of these conditions:  · bleeding disorders  · bleeding in the brain  · blood in your stools (black or tarry stools) or if you have blood in your vomit  · history of stomach bleeding  · kidney disease  · liver disease  · mechanical heart valve  · an unusual or allergic reaction to apixaban, other medicines, foods, dyes, or preservatives  · pregnant or trying to get pregnant  · breast-feeding  How should I use this medicine?  Take this medicine by mouth with a glass of water. Follow the directions on the prescription label. You can take it with or without food. If it upsets your stomach, take it with food. Take your medicine at regular intervals. Do not take it more often than directed. Do not stop taking except on your doctor's advice. Stopping this medicine may increase your risk of a blot clot. Be sure to refill your prescription before you run out of medicine.  Talk to your pediatrician regarding the use of this medicine in children. Special care may be needed.  Overdosage: If you think you have taken too much of this medicine contact a poison control center  or emergency room at once.  NOTE: This medicine is only for you. Do not share this medicine with others.  What if I miss a dose?  If you miss a dose, take it as soon as you can. If it is almost time for your next dose, take only that dose. Do not take double or extra doses.  What may interact with this medicine?  This medicine may interact with the following:  · aspirin and aspirin-like medicines  · certain medicines for fungal infections like ketoconazole and itraconazole  · certain medicines for seizures like carbamazepine and phenytoin  · certain medicines that treat or prevent blood clots like warfarin, enoxaparin, and dalteparin  · clarithromycin  · NSAIDs, medicines for pain and inflammation, like ibuprofen or naproxen  · rifampin  · ritonavir  · Fawn's wort  This list may not describe all possible interactions. Give your health care provider a list of all the medicines, herbs, non-prescription drugs, or dietary supplements you use. Also tell them if you smoke, drink alcohol, or use illegal drugs. Some items may interact with your medicine.  What should I watch for while using this medicine?  Notify your doctor or health care professional and seek emergency treatment if you develop breathing problems; changes in vision; chest pain; severe, sudden headache; pain, swelling, warmth in the leg; trouble speaking; sudden numbness or weakness of the face, arm, or leg. These can be signs that your condition has gotten worse.  If you are going to have surgery, tell your doctor or health care professional that you are taking this medicine.  Tell your health care professional that you use this medicine before you have a spinal or epidural procedure. Sometimes people who take this medicine have bleeding problems around the spine when they have a spinal or epidural procedure. This bleeding is very rare. If you have a spinal or epidural procedure while on this medicine, call your health care professional immediately if  you have back pain, numbness or tingling (especially in your legs and feet), muscle weakness, paralysis, or loss of bladder or bowel control.  Avoid sports and activities that might cause injury while you are using this medicine. Severe falls or injuries can cause unseen bleeding. Be careful when using sharp tools or knives. Consider using an electric razor. Take special care brushing or flossing your teeth. Report any injuries, bruising, or red spots on the skin to your doctor or health care professional.  What side effects may I notice from receiving this medicine?  Side effects that you should report to your doctor or health care professional as soon as possible:  · allergic reactions like skin rash, itching or hives, swelling of the face, lips, or tongue  · signs and symptoms of bleeding such as bloody or black, tarry stools; red or dark-brown urine; spitting up blood or brown material that looks like coffee grounds; red spots on the skin; unusual bruising or bleeding from the eye, gums, or nose  This list may not describe all possible side effects. Call your doctor for medical advice about side effects. You may report side effects to FDA at 2-093-FDA-1259.  Where should I keep my medicine?  Keep out of the reach of children.  Store at room temperature between 20 and 25 degrees C (68 and 77 degrees F). Throw away any unused medicine after the expiration date.  NOTE: This sheet is a summary. It may not cover all possible information. If you have questions about this medicine, talk to your doctor, pharmacist, or health care provider.  NOTE:This sheet is a summary. It may not cover all possible information. If you have questions about this medicine, talk to your doctor, pharmacist, or health care provider. Copyright© 2016 [employer]               [facility] complies with applicable Federal civil rights laws and does not discriminate on the basis of race, color, national origin, age,  disability, or sex.

## 2017-01-05 NOTE — ED TRIAGE NOTES
Pt present to the ed with complaints of high blood pressure following back to back breathing tx at home. Pt has hx of COPD. Skin warm and dry, respirations even and unlabored.

## 2017-01-05 NOTE — IP AVS SNAPSHOT
[address] [address]  Phone: [phone]           I have received a copy of my After Visit Summary and discharge instructions from [facility].    INSTRUCTIONS RECEIVED AND UNDERSTOOD BY:                     Patient/Patient Representative: ________________________________________________________________     Date/Time: ________________________________________________________________                     Instructions Given By: ________________________________________________________________     Date/Time: ________________________________________________________________

## 2017-01-05 NOTE — PLAN OF CARE
Problem: Patient Care Overview  Goal: Plan of Care Review  Outcome: Ongoing (interventions implemented as appropriate)    01/05/17 1422   Coping/Psychosocial   Plan Of Care Reviewed With patient;spouse       Goal: Individualization & Mutuality  Outcome: Ongoing (interventions implemented as appropriate)    01/05/17 1422   Right Care Assessment   Number of comorbid conditions (as recorded on the chart) Chronic pulmonary disease;Deficiency anemias;Depression         Problem: VTE, DVT and PE (Adult)  Goal: Signs and Symptoms of Listed Potential Problems Will be Absent, Minimized or Managed (VTE, DVT and PE)  Signs and symptoms of listed potential problems will be absent, minimized or managed by discharge/transition of care (reference VTE, DVT and PE (Adult) CPG).   Outcome: Ongoing (interventions implemented as appropriate)    01/05/17 1422   VTE, DVT and PE   Problems Assessed (VTE, DVT, PE) all   Problems Present (VTE, DVT, PE) pulmonary embolism         Problem: Fall Risk (Adult)  Goal: Identify Related Risk Factors and Signs and Symptoms  Related risk factors and signs and symptoms are identified upon initiation of Human Response Clinical Practice Guideline (CPG)   Outcome: Ongoing (interventions implemented as appropriate)    01/05/17 1422   Fall Risk   Related Risk Factors (Fall Risk) depression/anxiety;impaired vision;environment unfamiliar   Signs and Symptoms (Fall Risk) presence of risk factors       Goal: Absence of Falls  Patient will demonstrate the desired outcomes by discharge/transition of care.   Outcome: Ongoing (interventions implemented as appropriate)    01/05/17 1422   Fall Risk (Adult)   Absence of Falls making progress toward outcome

## 2017-01-05 NOTE — ASSESSMENT & PLAN NOTE
Patient is at risk for pulmonary hemorrhagia,duo to history with embolization and treatment for pneumonia,it has been done long discussion with patient and family regarding risk with bleeding after starting anticoagulation,she has stable H/H in last few month with no sign of hemoptysis,will discus case with Pulmonology,patient and family want think at this time.

## 2017-01-05 NOTE — PLAN OF CARE
Problem: Patient Care Overview  Goal: Plan of Care Review  Outcome: Ongoing (interventions implemented as appropriate)    01/05/17 0748   Coping/Psychosocial   Plan Of Care Reviewed With patient         Problem: VTE, DVT and PE (Adult)  Goal: Signs and Symptoms of Listed Potential Problems Will be Absent, Minimized or Managed (VTE, DVT and PE)  Signs and symptoms of listed potential problems will be absent, minimized or managed by discharge/transition of care (reference VTE, DVT and PE (Adult) CPG).  Outcome: Ongoing (interventions implemented as appropriate)    01/05/17 0748   VTE, DVT and PE   Problems Assessed (VTE, DVT, PE) all   Problems Present (VTE, DVT, PE) pulmonary embolism

## 2017-01-05 NOTE — ED NOTES
Hourly rounding has been done on this patient. Fall precautions maintained. Side rails up x2, bed low and locked. Pt updated on plan of care and current status. Pt pain is assessed and is 4/10. Pt skin warm and dry. Respirations even and unlabored. Toileting has been offered. Call bell within reach for patient needs.

## 2017-01-05 NOTE — PLAN OF CARE
01/05/17 1504   Discharge Assessment   Assessment Type Discharge Planning Assessment   Confirmed/corrected address and phone number on facesheet? Yes   Assessment information obtained from? Patient   Type of Healthcare Directive Received Living will  (Bouchrasrikanth does not have a copy copy of will on file. )   Prior to hospitilization cognitive status: Alert/Oriented   Prior to hospitalization functional status: Independent   Current cognitive status: Alert/Oriented   Current Functional Status: Independent   Arrived From home or self-care   Lives With spouse   Able to Return to Prior Arrangements yes   Is patient able to care for self after discharge? Yes   How many people do you have in your home that can help with your care after discharge? 1   Who are your caregiver(s) and their phone number(s)? Juancarlos pt spouse [phone]   Patient's perception of discharge disposition home or selfcare   Readmission Within The Last 30 Days no previous admission in last 30 days   Patient currently being followed by outpatient case management? No   Patient currently receives home health services? No  (Per pt she last had HH in May. Per pt she had [facility] and would like to use them again if HH is needed. )   Does the patient currently use HME? Yes   Equipment Currently Used at Home oxygen   Do you have any problems affording any of your prescribed medications? No   Is the patient taking medications as prescribed? yes   Do you have any financial concerns preventing you from receiving the healthcare you need? No   Does the patient have transportation to healthcare appointments? Yes   Transportation Available family or friend will provide   On Dialysis? No   Does the patient receive services at the [facility]? No   Are there any open cases? No   Discharge Plan A Home with family   Discharge Plan B Home with family   Patient/Family In Agreement With Plan yes     Pt demographics has been verified. Per pt her  Juancarlos helps  her at home. Per pt, Juancarlos cooks, clean and sometimes helps he bathe. According to pt its not often  has to help her around the home. Pt would like to follow-up with [facility] at discharge. JORDAN explained the role of CM and informed pt CM will continue to follow through her hospital stay. JORDAN provided pt with SW contact information placing SW name and number on pt white board.       [facility] - [place] - [address] [facility].  [facility].  [place] [address]  Phone: [phone] Fax: [phone]

## 2017-01-05 NOTE — ED PROVIDER NOTES
"Encounter Date: 1/4/2017    SCRIBE #1 NOTE: I, Yury Ramirez, am scribing for, and in the presence of,  Aby Mendoza MD. I have scribed the following portions of the note - Other sections scribed: HPI, ROS.       History     Chief Complaint   Patient presents with    Hypertension     Patient stated was taking her home nebulizer breathing treatment with Atrovent at approximately 23:00 ,took x2 back to back. She stated was feeling her heart pounding and blood pressure rising . Stated this is the first time this has happen.     Review of patient's allergies indicates:   Allergen Reactions    Bactrim [sulfamethoxazole-trimethoprim] Rash     Was hospitalized for rash    Albuterol Other (See Comments)     tremors    Restasis [cyclosporine] Itching     HPI Comments: CC: Hypertension    HPI: 66 year old female with hypertension and COPD presents to the ED complaining of palpitations described as a "pounding" in her chest that started last night at approximately 11 PM after she had 2 back-to-back Atrovent nebulizer treatments. She also reports that she has elevated blood pressure since earlier tonight. S She states she has a history of a-fib that occurred about 1.5 years ago while she was having a procedure. She states she was sent home with a monitor for 3 days but it did not occur at any other time since then. She otherwise denies fever, chills, chest pain, shortness of breath, leg pain, leg swelling, abdominal pain, nausea, vomiting, and diarrhea at this time. Symptoms are acute and have improved since onset. No prior treatment.  No history of DVT or PE    The history is provided by the patient.     Past Medical History   Diagnosis Date    Acquired bronchiectasis      due to history of TB - followed by pulmonary, Dr. Zuñiga    Allergy     Amblyopia      rt eye per pt    Anemia of other chronic disease     Anxiety     Cataract     Clotting disorder     COPD (chronic obstructive pulmonary disease)     " COPD (chronic obstructive pulmonary disease)     Depression     Diverticulosis     Essential tremor     GERD (gastroesophageal reflux disease)     Hemangioma of liver     History of tuberculosis 1978    Hypertension     Mixed anxiety and depressive disorder     Psoriasis     S/P PICC central line placement Apr. 2016 - May 2016    Skin disease      Psoriasis    Spondylosis without myelopathy 8/23/2013    Thoracic aorta atherosclerosis      noted on CT scan of chest 1/3/2011    Tuberculosis     Vaginal delivery      x2    Vitamin D deficiency      Past Medical History Pertinent Negatives   Diagnosis Date Noted    Diabetic retinopathy 6/28/2012    Diabetic retinopathy 5/13/2014    Glaucoma 6/28/2012    Glaucoma 5/13/2014    Macular degeneration 6/28/2012    Retinal detachment 6/28/2012    Strabismus 6/28/2012    Uveitis 6/28/2012     Past Surgical History   Procedure Laterality Date    Gallbladder surgery  9/2011    Cataract extraction w/  intraocular lens implant  07/24/12     od dr spain    Cataract extraction w/  intraocular lens implant  08/07/12     left eye    Eye surgery       bilateral Cataract     Family History   Problem Relation Age of Onset    Hypertension Mother     Heart attack Mother     Cancer Father      liver and bladder    Hyperlipidemia Sister     Psoriasis Sister     Cancer Brother      liver    Stroke Maternal Uncle     Cancer Maternal Aunt      stomach    Lung cancer Sister     Heart attack Brother     Heart attack Son     Amblyopia Neg Hx     Blindness Neg Hx     Cataracts Neg Hx     Glaucoma Neg Hx     Macular degeneration Neg Hx     Retinal detachment Neg Hx     Strabismus Neg Hx     Thyroid disease Neg Hx     Melanoma Neg Hx     Lupus Neg Hx     Eczema Neg Hx     COPD Neg Hx      Social History   Substance Use Topics    Smoking status: Former Smoker     Packs/day: 2.00     Years: 50.00     Types: Cigarettes     Quit date: 5/27/2009     "Smokeless tobacco: Never Used    Alcohol use No     Review of Systems   Constitutional: Negative for chills and fever.   HENT: Negative for sore throat.    Eyes: Negative for visual disturbance.   Respiratory: Negative for shortness of breath.    Cardiovascular: Positive for palpitations ("pounding" in chest). Negative for chest pain.   Gastrointestinal: Negative for abdominal pain, diarrhea, nausea and vomiting.   Genitourinary: Negative for dysuria.   Musculoskeletal: Negative for back pain.   Skin: Negative for rash.   Neurological: Negative for headaches.        (+) Paresthesias       Physical Exam   Initial Vitals   BP Pulse Resp Temp SpO2   01/04/17 2333 01/04/17 2333 01/04/17 2333 01/04/17 2333 01/04/17 2333   182/88 73 16 97.6 °F (36.4 °C) 97 %     Physical Exam    Nursing note and vitals reviewed.  Constitutional: She appears well-developed and well-nourished. She is cooperative.  Non-toxic appearance. No distress.   Non-toxic appearing female   HENT:   Head: Normocephalic and atraumatic.   Mouth/Throat: Oropharynx is clear and moist.   Eyes: Conjunctivae and EOM are normal. Pupils are equal, round, and reactive to light.   Neck: Normal range of motion and full passive range of motion without pain. Neck supple. No thyromegaly present.   No JVD.   Cardiovascular: Normal rate, regular rhythm, normal heart sounds and normal pulses.   Pulmonary/Chest: Effort normal and breath sounds normal. No respiratory distress.   No wheezes.  Diminished breath sounds at bases, mild crackles   Abdominal: Soft. Normal appearance and bowel sounds are normal. She exhibits no distension and no mass. There is no tenderness.   Musculoskeletal: Normal range of motion.   No calf tenderness.   Neurological: She is alert and oriented to person, place, and time.   Cranial nerves II-XII intact. No facial asymmetry. No Pronator drift. No dysmetria. Normal strength to the upper and lower extremities symmetrically.   Skin: Skin is warm, " dry and intact. No rash noted.   Psychiatric: She has a normal mood and affect. Her speech is normal and behavior is normal. Judgment and thought content normal.         ED Course   Procedures  Labs Reviewed   CBC W/ AUTO DIFFERENTIAL   COMPREHENSIVE METABOLIC PANEL   MAGNESIUM   TROPONIN I   URINALYSIS     EKG Readings: (Independently Interpreted)   Normal sinus rhythm, rate 72, normal axis, good R-wave progression, no STEMI          Medical Decision Making:   History:   Old Medical Records: I decided to obtain old medical records.  Initial Assessment:   Urgent evaluation of 66-year-old female with COPD/bronchiectasis, chronic systolic heart failure, chronic tremors and slow speech followed by neurology, here after episode of palpitations while receiving her Atrovent treatment at home this evening.  Patient had a pulse ox monitor on, noted heart rate 130s.  Patient did not experience any dizziness lightheadedness or chest pain during that episode.  Patient did take her blood pressure and also noted that to be elevated.  Patient has not required antibiotics respiratory symptoms within the last 6 months.  Patient has follow with Dr. Braga in the past with an episode of atrial tachycardia/A. fib in 11/15, not captured on thirty-day monitor.  On exam patient is well-appearing, no evidence of tachycardia, no respiratory distress, no evidence of fluid overload, no JVD.  Blood pressure 194/96, doubt hypertensive emergency but will o plan to evaluate for end organ injury. We'll obtain labs to evaluate for metabolic disturbances causing possible acute dysrhythmia, evaluate for infection, and reassess.  Clinical Tests:   Lab Tests: Ordered and Reviewed  Radiological Study: Ordered and Reviewed  Medical Tests: Reviewed and Ordered  ED Management:  While in the emergency Department patient notes development of increased work of breathing, on exam mild crackles at the bases.  Given resting hypoxia 94%, which patient endorses  lower than usual, and reported tachycardic episode, we'll pursue alternative etiology and rule out PE with CTA, administer additional atropine breathing treatment, steroids and reassess.    No acute anemia, troponin negative, no UTI, influenza swab/bnp in process, awaiting CTA and reassessment after receiving labetalol.    3:26 AM  Patient found to have a right upper lobe PE on CTA.  Patient unable to receive anticoagulation given high risk for hemorrhage in light of history of hemoptysis requiring pulmonary embolization in the past.  I discussed the case with tex, agrees that given patient is currently hemodynamically stable though mildly hypoxic, we'll continue to monitor on continuous pulse ox w supplemental oxygen and admit for evaluation by pulmonology.            Scribe Attestation:   Scribe #1: I performed the above scribed service and the documentation accurately describes the services I performed. I attest to the accuracy of the note.    Attending Attestation:           Physician Attestation for Scribe:  Physician Attestation Statement for Scribe #1: I, Aby Mendoza MD, reviewed documentation, as scribed by Yury Ramirez in my presence, and it is both accurate and complete.                 ED Course     Clinical Impression:       Disposition:   Disposition: Discharged  Condition: Fair     1. Other acute pulmonary embolism without acute cor pulmonale    2. Hypoxia    3. Essential hypertension           Aby Mendoza MD  01/05/17 0327       Aby Mendoza MD  01/05/17 0335

## 2017-01-05 NOTE — CONSULTS
Consult Note  Pulmonology    Consult Requested By: Lashell Altamirano MD  Reason for Consult: Pulmonary emboli    SUBJECTIVE: Palpitation     History of Present Illness:  The patient experienced severe palpitations last evening which drove her to emergency department. A CAT scan was performed and this suggested the presence of pulmonary emboli On the right.  The patient has history of hemoptysis possibly a year ago for which she required embolization; she has extensive obstructive bronchiectatic and bullous changes in both  Lungs;There is concerned about anticoagulation because of this.    Review of patient's allergies indicates:   Allergen Reactions    Bactrim [sulfamethoxazole-trimethoprim] Rash     Was hospitalized for rash    Albuterol Other (See Comments)     tremors    Restasis [cyclosporine] Itching       Past Medical History   Diagnosis Date    Acquired bronchiectasis      due to history of TB - followed by pulmonary, Dr. Zuñiga    Allergy     Amblyopia      rt eye per pt    Anemia of other chronic disease     Anxiety     Cataract     Clotting disorder     COPD (chronic obstructive pulmonary disease)     COPD (chronic obstructive pulmonary disease)     Depression     Diverticulosis     Essential tremor     GERD (gastroesophageal reflux disease)     Hemangioma of liver     History of tuberculosis 1978    Hypertension     Mixed anxiety and depressive disorder     Psoriasis     S/P PICC central line placement Apr. 2016 - May 2016    Skin disease      Psoriasis    Spondylosis without myelopathy 8/23/2013    Thoracic aorta atherosclerosis      noted on CT scan of chest 1/3/2011    Tuberculosis     Vaginal delivery      x2    Vitamin D deficiency      Past Surgical History   Procedure Laterality Date    Gallbladder surgery  9/2011    Cataract extraction w/  intraocular lens implant  07/24/12     od dr spain    Cataract extraction w/  intraocular lens implant  08/07/12      left eye    Eye surgery       bilateral Cataract     Family History   Problem Relation Age of Onset    Hypertension Mother     Heart attack Mother     Cancer Father      liver and bladder    Hyperlipidemia Sister     Psoriasis Sister     Cancer Brother      liver    Stroke Maternal Uncle     Cancer Maternal Aunt      stomach    Lung cancer Sister     Heart attack Brother     Heart attack Son     Amblyopia Neg Hx     Blindness Neg Hx     Cataracts Neg Hx     Glaucoma Neg Hx     Macular degeneration Neg Hx     Retinal detachment Neg Hx     Strabismus Neg Hx     Thyroid disease Neg Hx     Melanoma Neg Hx     Lupus Neg Hx     Eczema Neg Hx     COPD Neg Hx           Review of Systems:  No headaches  No diploplia  No dysphagia  No chest pains   No nausea or vomiting   No arthralgia      OBJECTIVE:     Vital Signs (Most Recent)  Temp: 98.2 °F (36.8 °C) (01/05/17 1609)  Pulse: 81 (01/05/17 1609)  Resp: 17 (01/05/17 1609)  BP: 128/60 (01/05/17 1609)  SpO2: (!) 91 % (01/05/17 1609)    Vital Signs Range (Last 24H):  Temp:  [97.6 °F (36.4 °C)-98.3 °F (36.8 °C)]   Pulse:  [68-92]   Resp:  [16-22]   BP: (128-194)/()   SpO2:  [89 %-98 %]     Physical Exam:  General:Comfortable  Neck: no jugular venous distention, no adenopathy, no carotid bruit and thyroid: non-palpable  Lungs: bilateral rhonchis   Heart: regular rate and rhythm and no murmur  Abdomen: soft, non-tender non-distended; bowel sounds normal  Extremities: no cyanosis or edema, or clubbing  Neurologic:sedated,not responsive    Laboratory:  Recent Results (from the past 24 hour(s))   CBC auto differential    Collection Time: 01/05/17 12:44 AM   Result Value Ref Range    WBC 8.16 3.90 - 12.70 K/uL    RBC 3.91 (L) 4.00 - 5.40 M/uL    Hemoglobin 11.7 (L) 12.0 - 16.0 g/dL    Hematocrit 36.2 (L) 37.0 - 48.5 %    MCV 93 82 - 98 fL    MCH 29.9 27.0 - 31.0 pg    MCHC 32.3 32.0 - 36.0 %    RDW 12.6 11.5 - 14.5 %    Platelets 255 150 - 350 K/uL    MPV  9.7 9.2 - 12.9 fL    Gran # 4.4 1.8 - 7.7 K/uL    Lymph # 2.7 1.0 - 4.8 K/uL    Mono # 0.9 0.3 - 1.0 K/uL    Eos # 0.2 0.0 - 0.5 K/uL    Baso # 0.03 0.00 - 0.20 K/uL    Gran% 53.5 38.0 - 73.0 %    Lymph% 33.3 18.0 - 48.0 %    Mono% 10.9 4.0 - 15.0 %    Eosinophil% 1.8 0.0 - 8.0 %    Basophil% 0.4 0.0 - 1.9 %    Differential Method Automated    Comprehensive metabolic panel    Collection Time: 01/05/17 12:44 AM   Result Value Ref Range    Sodium 138 136 - 145 mmol/L    Potassium 4.2 3.5 - 5.1 mmol/L    Chloride 103 95 - 110 mmol/L    CO2 23 23 - 29 mmol/L    Glucose 94 70 - 110 mg/dL    BUN, Bld 17 8 - 23 mg/dL    Creatinine 1.0 0.5 - 1.4 mg/dL    Calcium 9.4 8.7 - 10.5 mg/dL    Total Protein 7.7 6.0 - 8.4 g/dL    Albumin 3.9 3.5 - 5.2 g/dL    Total Bilirubin 0.2 0.1 - 1.0 mg/dL    Alkaline Phosphatase 64 55 - 135 U/L    AST 17 10 - 40 U/L    ALT 15 10 - 44 U/L    Anion Gap 12 8 - 16 mmol/L    eGFR if African American >60 >60 mL/min/1.73 m^2    eGFR if non African American 58 (A) >60 mL/min/1.73 m^2   Magnesium    Collection Time: 01/05/17 12:44 AM   Result Value Ref Range    Magnesium 2.2 1.6 - 2.6 mg/dL   Troponin I    Collection Time: 01/05/17 12:44 AM   Result Value Ref Range    Troponin I <0.006 0.000 - 0.026 ng/mL   Urinalysis    Collection Time: 01/05/17 12:44 AM   Result Value Ref Range    Specimen UA Urine, Clean Catch     Color, UA Straw Yellow, Straw, Devora    Appearance, UA Clear Clear    pH, UA 6.0 5.0 - 8.0    Specific Gravity, UA 1.010 1.005 - 1.030    Protein, UA Negative Negative    Glucose, UA Negative Negative    Ketones, UA Negative Negative    Bilirubin (UA) Negative Negative    Occult Blood UA Negative Negative    Nitrite, UA Negative Negative    Urobilinogen, UA Negative <2.0 EU/dL    Leukocytes, UA 2+ (A) Negative   Urinalysis Microscopic    Collection Time: 01/05/17 12:44 AM   Result Value Ref Range    WBC, UA 1 0 - 5 /hpf    Microscopic Comment SEE COMMENT    Brain natriuretic peptide     Collection Time: 01/05/17 12:44 AM   Result Value Ref Range     (H) 0 - 99 pg/mL   Influenza antigen Nasopharyngeal Swab    Collection Time: 01/05/17  2:24 AM   Result Value Ref Range    Influenza A Ag, EIA Negative Negative    Influenza B Ag, EIA Negative Negative    Flu A & B Source Nasopharyngeal Swab    Protime-INR    Collection Time: 01/05/17  8:28 AM   Result Value Ref Range    Prothrombin Time 10.4 9.0 - 12.5 sec    INR 1.0 0.8 - 1.2     CBC:   Recent Labs  Lab 01/05/17  0044   WBC 8.16   RBC 3.91*   HGB 11.7*   HCT 36.2*      MCV 93   MCH 29.9   MCHC 32.3     BMP:   Recent Labs  Lab 01/05/17  0044   GLU 94      K 4.2      CO2 23   BUN 17   CREATININE 1.0   CALCIUM 9.4   MG 2.2     CMP:   Recent Labs  Lab 01/05/17  0044   GLU 94   CALCIUM 9.4   ALBUMIN 3.9   PROT 7.7      K 4.2   CO2 23      BUN 17   CREATININE 1.0   ALKPHOS 64   ALT 15   AST 17   BILITOT 0.2     LFTs:   Recent Labs  Lab 01/05/17  0044   ALT 15   AST 17   ALKPHOS 64   BILITOT 0.2   PROT 7.7   ALBUMIN 3.9     Coagulation:   Recent Labs  Lab 01/05/17  0828   INR 1.0         Diagnostic Results:    Filling defect in the pulmonary artery branch to the right upper lobe, consistent with pulmonary embolism.    Stable chronic interstitial changes and extensive bronchiectasis with upper lobe predominance.  Stable mosaic attenuation of the pulmonary parenchyma.    No evidence of DVT on ultrasound    ASSESSMENT/PLAN:     1. Other acute pulmonary embolism without acute cor pulmonale    2. Hypoxia    3. Essential hypertension    4. Pulmonary embolism without acute cor pulmonale          Planagree with  placing  her on oral anticoagulation which can be discontinued in a few months hopefully without  complication. If any bleeding would discontinue and follow clinically

## 2017-01-05 NOTE — PLAN OF CARE
Problem: Fall Risk (Adult)  Goal: Identify Related Risk Factors and Signs and Symptoms  Related risk factors and signs and symptoms are identified upon initiation of Human Response Clinical Practice Guideline (CPG)   Outcome: Ongoing (interventions implemented as appropriate)    01/05/17 0757   Fall Risk   Related Risk Factors (Fall Risk) environment unfamiliar;polypharmacy   Signs and Symptoms (Fall Risk) presence of risk factors

## 2017-01-05 NOTE — PROGRESS NOTES
01/05/17 0600   Vital Signs   Temp 98.3 °F (36.8 °C)   Temp src Oral   Pulse 78   Resp 20   SpO2 97 %   BP (!) 165/72     Received patient to floor per stretcher from ED. Patient's  at bedside. Patient awake,alert and oriented x4. Admit assessment initiated. Call light placed within patient's reach, telemetry monitoring initiated, HUSSEIN hose and BLE SCD's applied. Instructed patient to call for any assistance needed.

## 2017-01-05 NOTE — SUBJECTIVE & OBJECTIVE
Past Medical History   Diagnosis Date    Acquired bronchiectasis      due to history of TB - followed by pulmonary, Dr. Zuñiga    Allergy     Amblyopia      rt eye per pt    Anemia of other chronic disease     Anxiety     Cataract     Clotting disorder     COPD (chronic obstructive pulmonary disease)     COPD (chronic obstructive pulmonary disease)     Depression     Diverticulosis     Essential tremor     GERD (gastroesophageal reflux disease)     Hemangioma of liver     History of tuberculosis 1978    Hypertension     Mixed anxiety and depressive disorder     Psoriasis     S/P PICC central line placement Apr. 2016 - May 2016    Skin disease      Psoriasis    Spondylosis without myelopathy 8/23/2013    Thoracic aorta atherosclerosis      noted on CT scan of chest 1/3/2011    Tuberculosis     Vaginal delivery      x2    Vitamin D deficiency        Past Surgical History   Procedure Laterality Date    Gallbladder surgery  9/2011    Cataract extraction w/  intraocular lens implant  07/24/12     od dr spain    Cataract extraction w/  intraocular lens implant  08/07/12     left eye    Eye surgery       bilateral Cataract       Review of patient's allergies indicates:   Allergen Reactions    Bactrim [sulfamethoxazole-trimethoprim] Rash     Was hospitalized for rash    Albuterol Other (See Comments)     tremors    Restasis [cyclosporine] Itching       No current facility-administered medications on file prior to encounter.      Current Outpatient Prescriptions on File Prior to Encounter   Medication Sig    alprazolam (XANAX) 0.25 MG tablet Take 1 tablet (0.25 mg total) by mouth nightly as needed for Anxiety.    ANORO ELLIPTA 62.5-25 mcg/actuation DsDv Inhale 1 puff into the lungs once daily.    desoximetasone (TOPICORT) 0.25 % cream Apply topically 2 (two) times daily.      escitalopram oxalate (LEXAPRO) 20 MG tablet TAKE ONE TABLET BY MOUTH ONCE DAILY    gabapentin (NEURONTIN) 300  MG capsule Take 1 capsule (300 mg total) by mouth 3 (three) times daily.    guaifenesin (MUCINEX) 600 mg 12 hr tablet Take 1,200 mg by mouth 2 (two) times daily.    ipratropium (ATROVENT) 0.02 % nebulizer solution Take 2.5 mLs (500 mcg total) by nebulization 4 (four) times daily.    LACTOBACILLUS RHAMNOSUS GG (CULTURELLE ORAL) Take by mouth.    metoprolol tartrate (LOPRESSOR) 25 MG tablet TAKE ONE TABLET BY MOUTH TWICE DAILY    omeprazole (PRILOSEC) 20 MG capsule TAKE ONE CAPSULE BY MOUTH ONCE DAILY    primidone (MYSOLINE) 50 MG Tab TAKE ONE TABLET BY MOUTH ONCE DAILY IN THE MORNING, ONE AT NOON, AND TWO AT BEDTIME    SODIUM CHLORIDE FOR INHALATION (SODIUM CHLORIDE 3%) 3 % nebulizer solution USE ONE VIAL IN NEBULIZER TWICE DAILY    topiramate (TOPAMAX) 25 MG tablet Take 1 tablet (25 mg total) by mouth 2 (two) times daily.    VITAMIN D2 50,000 unit capsule TAKE ONE CAPSULE BY MOUTH ONCE A WEEK    desonide (DESOWEN) 0.05 % lotion AAA face bid prn redness, scaling, or itching    tramadol (ULTRAM) 50 mg tablet Take 50 mg by mouth every 6 (six) hours as needed for Pain.     Family History     Problem Relation (Age of Onset)    Cancer Father, Brother, Maternal Aunt    Heart attack Mother, Brother, Son    Hyperlipidemia Sister    Hypertension Mother    Lung cancer Sister    Psoriasis Sister    Stroke Maternal Uncle        Social History Main Topics    Smoking status: Former Smoker     Packs/day: 2.00     Years: 50.00     Types: Cigarettes     Quit date: 5/27/2009    Smokeless tobacco: Never Used    Alcohol use No    Drug use: No    Sexual activity: Yes     Partners: Male     Review of Systems   Constitutional: Negative for activity change and appetite change.   HENT: Negative for congestion and dental problem.    Eyes: Negative for discharge and itching.   Respiratory: Positive for shortness of breath. Negative for apnea and wheezing.    Cardiovascular: Negative for chest pain and leg swelling.    Gastrointestinal: Positive for abdominal pain. Negative for abdominal distention.   Endocrine: Negative for cold intolerance and heat intolerance.   Genitourinary: Negative for difficulty urinating and dyspareunia.   Musculoskeletal: Negative for arthralgias and back pain.   Allergic/Immunologic: Negative for environmental allergies and food allergies.   Neurological: Negative for dizziness.   Hematological: Negative for adenopathy. Does not bruise/bleed easily.   Psychiatric/Behavioral: Negative for agitation and behavioral problems.     Objective:     Vital Signs (Most Recent):  Temp: 98.3 °F (36.8 °C) (01/05/17 0738)  Pulse: 83 (01/05/17 0820)  Resp: 18 (01/05/17 0820)  BP: (!) 150/65 (01/05/17 0738)  SpO2: 98 % (01/05/17 0820) Vital Signs (24h Range):  Temp:  [97.6 °F (36.4 °C)-98.3 °F (36.8 °C)] 98.3 °F (36.8 °C)  Pulse:  [68-92] 83  Resp:  [16-22] 18  BP: (150-194)/() 150/65     Weight: 68.5 kg (151 lb)  Body mass index is 26.75 kg/(m^2).    Physical Exam   Constitutional: She is oriented to person, place, and time. No distress.   HENT:   Head: Normocephalic and atraumatic.   Eyes: EOM are normal. Pupils are equal, round, and reactive to light.   Neck: Normal range of motion. Neck supple.   Cardiovascular: Normal rate and regular rhythm.    Pulmonary/Chest: Breath sounds normal.   Abdominal: Soft. Bowel sounds are normal.   Musculoskeletal: Normal range of motion. She exhibits no edema or deformity.   Neurological: She is oriented to person, place, and time. No cranial nerve deficit. Coordination normal.   Skin: Skin is warm and dry. She is not diaphoretic.   Psychiatric: She has a normal mood and affect. Her behavior is normal.        Significant Labs:   BMP:   Recent Labs  Lab 01/05/17 0044   GLU 94      K 4.2      CO2 23   BUN 17   CREATININE 1.0   CALCIUM 9.4   MG 2.2     CBC:   Recent Labs  Lab 01/05/17 0044   WBC 8.16   HGB 11.7*   HCT 36.2*          Significant Imaging: CTA  chest reviewed.

## 2017-01-06 LAB
ALBUMIN SERPL BCP-MCNC: 3.4 G/DL
ALP SERPL-CCNC: 51 U/L
ALT SERPL W/O P-5'-P-CCNC: 22 U/L
ANION GAP SERPL CALC-SCNC: 7 MMOL/L
AST SERPL-CCNC: 35 U/L
BASOPHILS # BLD AUTO: 0.02 K/UL
BASOPHILS NFR BLD: 0.3 %
BILIRUB SERPL-MCNC: 0.4 MG/DL
BUN SERPL-MCNC: 17 MG/DL
CALCIUM SERPL-MCNC: 8.9 MG/DL
CHLORIDE SERPL-SCNC: 104 MMOL/L
CO2 SERPL-SCNC: 28 MMOL/L
CREAT SERPL-MCNC: 1 MG/DL
DIFFERENTIAL METHOD: ABNORMAL
EOSINOPHIL # BLD AUTO: 0.1 K/UL
EOSINOPHIL NFR BLD: 1.3 %
ERYTHROCYTE [DISTWIDTH] IN BLOOD BY AUTOMATED COUNT: 12.7 %
EST. GFR  (AFRICAN AMERICAN): >60 ML/MIN/1.73 M^2
EST. GFR  (NON AFRICAN AMERICAN): 58 ML/MIN/1.73 M^2
GLUCOSE SERPL-MCNC: 90 MG/DL
HCT VFR BLD AUTO: 33 %
HGB BLD-MCNC: 10.5 G/DL
LYMPHOCYTES # BLD AUTO: 2.4 K/UL
LYMPHOCYTES NFR BLD: 37.8 %
MCH RBC QN AUTO: 29.7 PG
MCHC RBC AUTO-ENTMCNC: 31.8 %
MCV RBC AUTO: 94 FL
MONOCYTES # BLD AUTO: 0.5 K/UL
MONOCYTES NFR BLD: 7.2 %
NEUTROPHILS # BLD AUTO: 3.3 K/UL
NEUTROPHILS NFR BLD: 53.2 %
PLATELET # BLD AUTO: 217 K/UL
PMV BLD AUTO: 9.8 FL
POTASSIUM SERPL-SCNC: 3.7 MMOL/L
PROT SERPL-MCNC: 6.4 G/DL
RBC # BLD AUTO: 3.53 M/UL
SODIUM SERPL-SCNC: 139 MMOL/L
WBC # BLD AUTO: 6.22 K/UL

## 2017-01-06 PROCEDURE — 25000003 PHARM REV CODE 250: Performed by: EMERGENCY MEDICINE

## 2017-01-06 PROCEDURE — 94761 N-INVAS EAR/PLS OXIMETRY MLT: CPT

## 2017-01-06 PROCEDURE — 63600175 PHARM REV CODE 636 W HCPCS: Performed by: EMERGENCY MEDICINE

## 2017-01-06 PROCEDURE — 63600175 PHARM REV CODE 636 W HCPCS: Performed by: HOSPITALIST

## 2017-01-06 PROCEDURE — 85025 COMPLETE CBC W/AUTO DIFF WBC: CPT

## 2017-01-06 PROCEDURE — 27000221 HC OXYGEN, UP TO 24 HOURS

## 2017-01-06 PROCEDURE — 25000003 PHARM REV CODE 250: Performed by: HOSPITALIST

## 2017-01-06 PROCEDURE — 36415 COLL VENOUS BLD VENIPUNCTURE: CPT

## 2017-01-06 PROCEDURE — 21400001 HC TELEMETRY ROOM

## 2017-01-06 PROCEDURE — 80053 COMPREHEN METABOLIC PANEL: CPT

## 2017-01-06 PROCEDURE — 94640 AIRWAY INHALATION TREATMENT: CPT

## 2017-01-06 RX ORDER — POLYETHYLENE GLYCOL 3350 17 G/17G
17 POWDER, FOR SOLUTION ORAL 2 TIMES DAILY
Status: DISCONTINUED | OUTPATIENT
Start: 2017-01-06 | End: 2017-01-08 | Stop reason: HOSPADM

## 2017-01-06 RX ORDER — DOCUSATE SODIUM 100 MG/1
100 CAPSULE, LIQUID FILLED ORAL 2 TIMES DAILY
Status: DISCONTINUED | OUTPATIENT
Start: 2017-01-06 | End: 2017-01-08 | Stop reason: HOSPADM

## 2017-01-06 RX ADMIN — DOCUSATE SODIUM 100 MG: 100 CAPSULE, LIQUID FILLED ORAL at 09:01

## 2017-01-06 RX ADMIN — GABAPENTIN 300 MG: 300 CAPSULE ORAL at 05:01

## 2017-01-06 RX ADMIN — PANTOPRAZOLE SODIUM 40 MG: 40 TABLET, DELAYED RELEASE ORAL at 10:01

## 2017-01-06 RX ADMIN — POLYETHYLENE GLYCOL 3350 17 G: 17 POWDER, FOR SOLUTION ORAL at 04:01

## 2017-01-06 RX ADMIN — OXYCODONE HYDROCHLORIDE 5 MG: 5 TABLET ORAL at 01:01

## 2017-01-06 RX ADMIN — OXYCODONE HYDROCHLORIDE 5 MG: 5 TABLET ORAL at 11:01

## 2017-01-06 RX ADMIN — TOPIRAMATE 25 MG: 25 TABLET, FILM COATED ORAL at 09:01

## 2017-01-06 RX ADMIN — IPRATROPIUM BROMIDE 500 MCG: 0.5 SOLUTION RESPIRATORY (INHALATION) at 08:01

## 2017-01-06 RX ADMIN — SODIUM CHLORIDE SOLN NEBU 3% 4 ML: 3 NEBU SOLN at 08:01

## 2017-01-06 RX ADMIN — ACETAMINOPHEN 650 MG: 325 TABLET ORAL at 10:01

## 2017-01-06 RX ADMIN — GABAPENTIN 300 MG: 300 CAPSULE ORAL at 09:01

## 2017-01-06 RX ADMIN — METOPROLOL TARTRATE 25 MG: 25 TABLET ORAL at 09:01

## 2017-01-06 RX ADMIN — ENOXAPARIN SODIUM 70 MG: 80 INJECTION SUBCUTANEOUS at 11:01

## 2017-01-06 RX ADMIN — POLYETHYLENE GLYCOL 3350 17 G: 17 POWDER, FOR SOLUTION ORAL at 09:01

## 2017-01-06 RX ADMIN — DOCUSATE SODIUM 100 MG: 100 CAPSULE, LIQUID FILLED ORAL at 04:01

## 2017-01-06 RX ADMIN — METOPROLOL TARTRATE 25 MG: 25 TABLET ORAL at 10:01

## 2017-01-06 RX ADMIN — IPRATROPIUM BROMIDE 500 MCG: 0.5 SOLUTION RESPIRATORY (INHALATION) at 01:01

## 2017-01-06 RX ADMIN — GABAPENTIN 300 MG: 300 CAPSULE ORAL at 12:01

## 2017-01-06 RX ADMIN — ENOXAPARIN SODIUM 70 MG: 80 INJECTION SUBCUTANEOUS at 01:01

## 2017-01-06 RX ADMIN — TOPIRAMATE 25 MG: 25 TABLET, FILM COATED ORAL at 10:01

## 2017-01-06 RX ADMIN — IPRATROPIUM BROMIDE 500 MCG: 0.5 SOLUTION RESPIRATORY (INHALATION) at 12:01

## 2017-01-06 RX ADMIN — ENOXAPARIN SODIUM 70 MG: 80 INJECTION SUBCUTANEOUS at 12:01

## 2017-01-06 RX ADMIN — ESCITALOPRAM OXALATE 20 MG: 10 TABLET ORAL at 10:01

## 2017-01-06 NOTE — SUBJECTIVE & OBJECTIVE
Past Medical History   Diagnosis Date    Acquired bronchiectasis      due to history of TB - followed by pulmonary, Dr. Zuñiga    Allergy     Amblyopia      rt eye per pt    Anemia of other chronic disease     Anxiety     Cataract     Clotting disorder     COPD (chronic obstructive pulmonary disease)     COPD (chronic obstructive pulmonary disease)     Depression     Diverticulosis     Essential tremor     GERD (gastroesophageal reflux disease)     Hemangioma of liver     History of tuberculosis 1978    Hypertension     Mixed anxiety and depressive disorder     Psoriasis     S/P PICC central line placement Apr. 2016 - May 2016    Skin disease      Psoriasis    Spondylosis without myelopathy 8/23/2013    Thoracic aorta atherosclerosis      noted on CT scan of chest 1/3/2011    Tuberculosis     Vaginal delivery      x2    Vitamin D deficiency        Past Surgical History   Procedure Laterality Date    Gallbladder surgery  9/2011    Cataract extraction w/  intraocular lens implant  07/24/12     od dr [place]ain    Cataract extraction w/  intraocular lens implant  08/07/12     left eye    Eye surgery       bilateral Cataract       Review of patient's allergies indicates:   Allergen Reactions    Bactrim [sulfamethoxazole-trimethoprim] Rash     Was hospitalized for rash    Albuterol Other (See Comments)     tremors    Restasis [cyclosporine] Itching       No current facility-administered medications on file prior to encounter.      Current Outpatient Prescriptions on File Prior to Encounter   Medication Sig    alprazolam (XANAX) 0.25 MG tablet Take 1 tablet (0.25 mg total) by mouth nightly as needed for Anxiety.    ANORO ELLIPTA 62.5-25 mcg/actuation DsDv Inhale 1 puff into the lungs once daily.    desoximetasone (TOPICORT) 0.25 % cream Apply topically 2 (two) times daily.      escitalopram oxalate (LEXAPRO) 20 MG tablet TAKE ONE TABLET BY MOUTH ONCE DAILY    gabapentin (NEURONTIN) 300  MG capsule Take 1 capsule (300 mg total) by mouth 3 (three) times daily.    guaifenesin (MUCINEX) 600 mg 12 hr tablet Take 1,200 mg by mouth 2 (two) times daily.    ipratropium (ATROVENT) 0.02 % nebulizer solution Take 2.5 mLs (500 mcg total) by nebulization 4 (four) times daily.    LACTOBACILLUS RHAMNOSUS GG (CULTURELLE ORAL) Take by mouth.    metoprolol tartrate (LOPRESSOR) 25 MG tablet TAKE ONE TABLET BY MOUTH TWICE DAILY    omeprazole (PRILOSEC) 20 MG capsule TAKE ONE CAPSULE BY MOUTH ONCE DAILY    primidone (MYSOLINE) 50 MG Tab TAKE ONE TABLET BY MOUTH ONCE DAILY IN THE MORNING, ONE AT NOON, AND TWO AT BEDTIME    SODIUM CHLORIDE FOR INHALATION (SODIUM CHLORIDE 3%) 3 % nebulizer solution USE ONE VIAL IN NEBULIZER TWICE DAILY    topiramate (TOPAMAX) 25 MG tablet Take 1 tablet (25 mg total) by mouth 2 (two) times daily.    VITAMIN D2 50,000 unit capsule TAKE ONE CAPSULE BY MOUTH ONCE A WEEK    desonide (DESOWEN) 0.05 % lotion AAA face bid prn redness, scaling, or itching    tramadol (ULTRAM) 50 mg tablet Take 50 mg by mouth every 6 (six) hours as needed for Pain.     Family History     Problem Relation (Age of Onset)    Cancer Father, Brother, Maternal Aunt    Heart attack Mother, Brother, Son    Hyperlipidemia Sister    Hypertension Mother    Lung cancer Sister    Psoriasis Sister    Stroke Maternal Uncle        Social History Main Topics    Smoking status: Former Smoker     Packs/day: 2.00     Years: 50.00     Types: Cigarettes     Quit date: 5/27/2009    Smokeless tobacco: Never Used    Alcohol use No    Drug use: No    Sexual activity: Yes     Partners: Male     Review of Systems   Constitutional: Negative for activity change and appetite change.   HENT: Negative for congestion and dental problem.    Eyes: Negative for discharge and itching.   Respiratory: Negative for apnea, shortness of breath and wheezing.    Cardiovascular: Negative for chest pain and leg swelling.   Gastrointestinal:  Negative for abdominal distention and abdominal pain.   Endocrine: Negative for cold intolerance and heat intolerance.   Genitourinary: Negative for difficulty urinating and dyspareunia.   Musculoskeletal: Negative for arthralgias and back pain.   Allergic/Immunologic: Negative for environmental allergies and food allergies.   Neurological: Negative for dizziness.   Hematological: Negative for adenopathy. Does not bruise/bleed easily.   Psychiatric/Behavioral: Negative for agitation and behavioral problems.     Objective:     Vital Signs (Most Recent):  Temp: 97.2 °F (36.2 °C) (01/06/17 0915)  Pulse: 64 (01/06/17 1257)  Resp: 16 (01/06/17 1257)  BP: (!) 116/58 (01/06/17 0915)  SpO2: 98 % (01/06/17 1257) Vital Signs (24h Range):  Temp:  [97.2 °F (36.2 °C)-98.3 °F (36.8 °C)] 97.2 °F (36.2 °C)  Pulse:  [64-99] 64  Resp:  [16-20] 16  BP: (102-174)/(53-72) 116/58     Weight: 68.5 kg (151 lb 0.2 oz)  Body mass index is 26.75 kg/(m^2).    Physical Exam   Constitutional: She is oriented to person, place, and time. No distress.   HENT:   Head: Normocephalic and atraumatic.   Eyes: EOM are normal. Pupils are equal, round, and reactive to light.   Neck: Normal range of motion. Neck supple.   Cardiovascular: Normal rate and regular rhythm.    Pulmonary/Chest: Breath sounds normal.   Abdominal: Soft. Bowel sounds are normal.   Musculoskeletal: Normal range of motion. She exhibits no edema or deformity.   Neurological: She is oriented to person, place, and time. No cranial nerve deficit. Coordination normal.   Skin: Skin is warm and dry. She is not diaphoretic.   Psychiatric: She has a normal mood and affect. Her behavior is normal.        Significant Labs:   BMP:     Recent Labs  Lab 01/05/17 0044 01/06/17  0459   GLU 94 90    139   K 4.2 3.7    104   CO2 23 28   BUN 17 17   CREATININE 1.0 1.0   CALCIUM 9.4 8.9   MG 2.2  --      CBC:     Recent Labs  Lab 01/05/17  0044 01/06/17  0459   WBC 8.16 6.22   HGB 11.7*  10.5*   HCT 36.2* 33.0*    217       Significant Imaging: CTA chest reviewed.

## 2017-01-06 NOTE — PLAN OF CARE
Problem: Patient Care Overview  Goal: Plan of Care Review  Outcome: Ongoing (interventions implemented as appropriate)    01/06/17 1738   Coping/Psychosocial   Plan Of Care Reviewed With patient       Goal: Individualization & Mutuality  Outcome: Ongoing (interventions implemented as appropriate)    01/06/17 1738   Right Care Assessment   Number of comorbid conditions (as recorded on the chart) Chronic pulmonary disease;Deficiency anemias         Problem: VTE, DVT and PE (Adult)  Goal: Signs and Symptoms of Listed Potential Problems Will be Absent, Minimized or Managed (VTE, DVT and PE)  Signs and symptoms of listed potential problems will be absent, minimized or managed by discharge/transition of care (reference VTE, DVT and PE (Adult) CPG).   Outcome: Ongoing (interventions implemented as appropriate)    01/06/17 1738   VTE, DVT and PE   Problems Assessed (VTE, DVT, PE) all   Problems Present (VTE, DVT, PE) pulmonary embolism         Problem: Fall Risk (Adult)  Goal: Identify Related Risk Factors and Signs and Symptoms  Related risk factors and signs and symptoms are identified upon initiation of Human Response Clinical Practice Guideline (CPG)   Outcome: Ongoing (interventions implemented as appropriate)    01/06/17 1738   Fall Risk   Related Risk Factors (Fall Risk) impaired vision;environment unfamiliar       Goal: Absence of Falls  Patient will demonstrate the desired outcomes by discharge/transition of care.   Outcome: Ongoing (interventions implemented as appropriate)    01/06/17 1738   Fall Risk (Adult)   Absence of Falls making progress toward outcome

## 2017-01-06 NOTE — ASSESSMENT & PLAN NOTE
Patient is at risk for pulmonary hemorrhagia,duo to history with embolization and treatment for pneumonia,it has been done long discussion with patient and family regarding risk with bleeding after starting anticoagulation,after lisa discussion with patient and pulmonology started patient o lovenox,patient will be monitored for hemoptysis.follow daily CBC.no DVT in ower leg.

## 2017-01-06 NOTE — NURSING
Evaluated general patient appearance/condition. Patient is sitting up in bed AAOx4. Family is present at bedside. Shift assessment initiated. Left forearm IV site and Right AC IV sites are clean, dry and intact. Plan of care for the evening reviewed with patient and family. No apparent distress noted at this time.

## 2017-01-07 LAB
ALBUMIN SERPL BCP-MCNC: 3.4 G/DL
ALP SERPL-CCNC: 59 U/L
ALT SERPL W/O P-5'-P-CCNC: 56 U/L
ANION GAP SERPL CALC-SCNC: 9 MMOL/L
AST SERPL-CCNC: 42 U/L
BASOPHILS # BLD AUTO: 0.01 K/UL
BASOPHILS NFR BLD: 0.2 %
BILIRUB SERPL-MCNC: 0.4 MG/DL
BUN SERPL-MCNC: 20 MG/DL
CALCIUM SERPL-MCNC: 8.7 MG/DL
CHLORIDE SERPL-SCNC: 105 MMOL/L
CO2 SERPL-SCNC: 26 MMOL/L
CREAT SERPL-MCNC: 0.9 MG/DL
DIFFERENTIAL METHOD: ABNORMAL
EOSINOPHIL # BLD AUTO: 0.1 K/UL
EOSINOPHIL NFR BLD: 1.7 %
ERYTHROCYTE [DISTWIDTH] IN BLOOD BY AUTOMATED COUNT: 12.8 %
EST. GFR  (AFRICAN AMERICAN): >60 ML/MIN/1.73 M^2
EST. GFR  (NON AFRICAN AMERICAN): >60 ML/MIN/1.73 M^2
GLUCOSE SERPL-MCNC: 82 MG/DL
HCT VFR BLD AUTO: 32.8 %
HGB BLD-MCNC: 10.6 G/DL
LYMPHOCYTES # BLD AUTO: 2.1 K/UL
LYMPHOCYTES NFR BLD: 40.3 %
MCH RBC QN AUTO: 30.6 PG
MCHC RBC AUTO-ENTMCNC: 32.3 %
MCV RBC AUTO: 95 FL
MONOCYTES # BLD AUTO: 0.5 K/UL
MONOCYTES NFR BLD: 9.9 %
NEUTROPHILS # BLD AUTO: 2.5 K/UL
NEUTROPHILS NFR BLD: 47.9 %
PLATELET # BLD AUTO: 185 K/UL
PMV BLD AUTO: 9.7 FL
POTASSIUM SERPL-SCNC: 4.1 MMOL/L
PROT SERPL-MCNC: 6.3 G/DL
RBC # BLD AUTO: 3.46 M/UL
SODIUM SERPL-SCNC: 140 MMOL/L
WBC # BLD AUTO: 5.23 K/UL

## 2017-01-07 PROCEDURE — 85025 COMPLETE CBC W/AUTO DIFF WBC: CPT

## 2017-01-07 PROCEDURE — 25000003 PHARM REV CODE 250: Performed by: EMERGENCY MEDICINE

## 2017-01-07 PROCEDURE — 21400001 HC TELEMETRY ROOM

## 2017-01-07 PROCEDURE — 36415 COLL VENOUS BLD VENIPUNCTURE: CPT

## 2017-01-07 PROCEDURE — 63600175 PHARM REV CODE 636 W HCPCS: Performed by: EMERGENCY MEDICINE

## 2017-01-07 PROCEDURE — 94640 AIRWAY INHALATION TREATMENT: CPT

## 2017-01-07 PROCEDURE — 25000003 PHARM REV CODE 250: Performed by: HOSPITALIST

## 2017-01-07 PROCEDURE — 80053 COMPREHEN METABOLIC PANEL: CPT

## 2017-01-07 RX ADMIN — IPRATROPIUM BROMIDE 500 MCG: 0.5 SOLUTION RESPIRATORY (INHALATION) at 10:01

## 2017-01-07 RX ADMIN — ACETAMINOPHEN 650 MG: 325 TABLET ORAL at 09:01

## 2017-01-07 RX ADMIN — TOPIRAMATE 25 MG: 25 TABLET, FILM COATED ORAL at 09:01

## 2017-01-07 RX ADMIN — GABAPENTIN 300 MG: 300 CAPSULE ORAL at 01:01

## 2017-01-07 RX ADMIN — GABAPENTIN 300 MG: 300 CAPSULE ORAL at 05:01

## 2017-01-07 RX ADMIN — IPRATROPIUM BROMIDE 500 MCG: 0.5 SOLUTION RESPIRATORY (INHALATION) at 12:01

## 2017-01-07 RX ADMIN — IPRATROPIUM BROMIDE 500 MCG: 0.5 SOLUTION RESPIRATORY (INHALATION) at 03:01

## 2017-01-07 RX ADMIN — POLYETHYLENE GLYCOL 3350 17 G: 17 POWDER, FOR SOLUTION ORAL at 09:01

## 2017-01-07 RX ADMIN — GABAPENTIN 300 MG: 300 CAPSULE ORAL at 09:01

## 2017-01-07 RX ADMIN — METOPROLOL TARTRATE 25 MG: 25 TABLET ORAL at 09:01

## 2017-01-07 RX ADMIN — SODIUM CHLORIDE SOLN NEBU 3% 4 ML: 3 NEBU SOLN at 09:01

## 2017-01-07 RX ADMIN — PANTOPRAZOLE SODIUM 40 MG: 40 TABLET, DELAYED RELEASE ORAL at 09:01

## 2017-01-07 RX ADMIN — APIXABAN 5 MG: 5 TABLET, FILM COATED ORAL at 09:01

## 2017-01-07 RX ADMIN — ESCITALOPRAM OXALATE 20 MG: 10 TABLET ORAL at 09:01

## 2017-01-07 RX ADMIN — IPRATROPIUM BROMIDE 500 MCG: 0.5 SOLUTION RESPIRATORY (INHALATION) at 08:01

## 2017-01-07 RX ADMIN — SODIUM CHLORIDE SOLN NEBU 3% 4 ML: 3 NEBU SOLN at 08:01

## 2017-01-07 RX ADMIN — DOCUSATE SODIUM 100 MG: 100 CAPSULE, LIQUID FILLED ORAL at 09:01

## 2017-01-07 RX ADMIN — OXYCODONE HYDROCHLORIDE 5 MG: 5 TABLET ORAL at 10:01

## 2017-01-07 NOTE — PLAN OF CARE
Problem: VTE, DVT and PE (Adult)  Goal: Signs and Symptoms of Listed Potential Problems Will be Absent, Minimized or Managed (VTE, DVT and PE)  Signs and symptoms of listed potential problems will be absent, minimized or managed by discharge/transition of care (reference VTE, DVT and PE (Adult) CPG).   Outcome: Ongoing (interventions implemented as appropriate)    01/07/17 0352   VTE, DVT and PE   Problems Assessed (VTE, DVT, PE) all   Problems Present (VTE, DVT, PE) pulmonary embolism         Problem: Fall Risk (Adult)  Goal: Identify Related Risk Factors and Signs and Symptoms  Related risk factors and signs and symptoms are identified upon initiation of Human Response Clinical Practice Guideline (CPG)   Outcome: Ongoing (interventions implemented as appropriate)    01/07/17 0352   Fall Risk   Related Risk Factors (Fall Risk) age-related changes;fatigue/slow reaction;environment unfamiliar   Signs and Symptoms (Fall Risk) presence of risk factors

## 2017-01-07 NOTE — PLAN OF CARE
Problem: VTE, DVT and PE (Adult)  Goal: Signs and Symptoms of Listed Potential Problems Will be Absent, Minimized or Managed (VTE, DVT and PE)  Signs and symptoms of listed potential problems will be absent, minimized or managed by discharge/transition of care (reference VTE, DVT and PE (Adult) CPG).   Outcome: Ongoing (interventions implemented as appropriate)    01/07/17 1601   VTE, DVT and PE   Problems Assessed (VTE, DVT, PE) all   Problems Present (VTE, DVT, PE) pulmonary embolism         Problem: Fall Risk (Adult)  Intervention: Reduce Risk/Promote Restraint Free Environment    01/07/17 1601   Safety Interventions   Environmental Safety Modification assistive device/personal items within reach;lighting adjusted       Intervention: Patient Rounds    01/07/17 1433   Safety Interventions   Patient Rounds bed in low position;bed wheels locked;call light in reach;clutter free environment maintained;ID band on;placement of personal items at bedside;toileting offered;visualized patient       Intervention: Safety Promotion/Fall Prevention    01/07/17 1433   Safety Interventions   Safety Promotion/Fall Prevention Fall Risk reviewed with patient/family;side rails raised x 2;nonskid shoes/slippers when out of bed;bed alarm set         Goal: Identify Related Risk Factors and Signs and Symptoms  Related risk factors and signs and symptoms are identified upon initiation of Human Response Clinical Practice Guideline (CPG)   Outcome: Ongoing (interventions implemented as appropriate)    01/07/17 1601   Fall Risk   Related Risk Factors (Fall Risk) age-related changes;environment unfamiliar   Signs and Symptoms (Fall Risk) presence of risk factors       Goal: Absence of Falls  Patient will demonstrate the desired outcomes by discharge/transition of care.   Outcome: Ongoing (interventions implemented as appropriate)    01/07/17 1601   Fall Risk (Adult)   Absence of Falls making progress toward outcome

## 2017-01-08 VITALS
TEMPERATURE: 98 F | BODY MASS INDEX: 26.68 KG/M2 | RESPIRATION RATE: 20 BRPM | WEIGHT: 150.56 LBS | DIASTOLIC BLOOD PRESSURE: 58 MMHG | HEART RATE: 74 BPM | HEIGHT: 63 IN | SYSTOLIC BLOOD PRESSURE: 123 MMHG | OXYGEN SATURATION: 96 %

## 2017-01-08 LAB
ALBUMIN SERPL BCP-MCNC: 3.4 G/DL
ALP SERPL-CCNC: 60 U/L
ALT SERPL W/O P-5'-P-CCNC: 48 U/L
ANION GAP SERPL CALC-SCNC: 8 MMOL/L
AST SERPL-CCNC: 27 U/L
BASOPHILS # BLD AUTO: 0.03 K/UL
BASOPHILS NFR BLD: 0.4 %
BILIRUB SERPL-MCNC: 0.4 MG/DL
BUN SERPL-MCNC: 18 MG/DL
CALCIUM SERPL-MCNC: 8.9 MG/DL
CHLORIDE SERPL-SCNC: 105 MMOL/L
CO2 SERPL-SCNC: 27 MMOL/L
CREAT SERPL-MCNC: 0.9 MG/DL
DIFFERENTIAL METHOD: ABNORMAL
EOSINOPHIL # BLD AUTO: 0.1 K/UL
EOSINOPHIL NFR BLD: 1.2 %
ERYTHROCYTE [DISTWIDTH] IN BLOOD BY AUTOMATED COUNT: 12.6 %
EST. GFR  (AFRICAN AMERICAN): >60 ML/MIN/1.73 M^2
EST. GFR  (NON AFRICAN AMERICAN): >60 ML/MIN/1.73 M^2
GLUCOSE SERPL-MCNC: 90 MG/DL
HCT VFR BLD AUTO: 34.1 %
HGB BLD-MCNC: 10.8 G/DL
LYMPHOCYTES # BLD AUTO: 2.1 K/UL
LYMPHOCYTES NFR BLD: 29.6 %
MCH RBC QN AUTO: 29.8 PG
MCHC RBC AUTO-ENTMCNC: 31.7 %
MCV RBC AUTO: 94 FL
MONOCYTES # BLD AUTO: 1 K/UL
MONOCYTES NFR BLD: 13.1 %
NEUTROPHILS # BLD AUTO: 4 K/UL
NEUTROPHILS NFR BLD: 55.7 %
PLATELET # BLD AUTO: 190 K/UL
PMV BLD AUTO: 9.7 FL
POTASSIUM SERPL-SCNC: 4.1 MMOL/L
PROT SERPL-MCNC: 6.4 G/DL
RBC # BLD AUTO: 3.62 M/UL
SODIUM SERPL-SCNC: 140 MMOL/L
WBC # BLD AUTO: 7.23 K/UL

## 2017-01-08 PROCEDURE — 94640 AIRWAY INHALATION TREATMENT: CPT

## 2017-01-08 PROCEDURE — 25000003 PHARM REV CODE 250: Performed by: EMERGENCY MEDICINE

## 2017-01-08 PROCEDURE — 94761 N-INVAS EAR/PLS OXIMETRY MLT: CPT

## 2017-01-08 PROCEDURE — 63600175 PHARM REV CODE 636 W HCPCS: Performed by: EMERGENCY MEDICINE

## 2017-01-08 PROCEDURE — 25000003 PHARM REV CODE 250: Performed by: HOSPITALIST

## 2017-01-08 PROCEDURE — 85025 COMPLETE CBC W/AUTO DIFF WBC: CPT

## 2017-01-08 PROCEDURE — 80053 COMPREHEN METABOLIC PANEL: CPT

## 2017-01-08 PROCEDURE — 36415 COLL VENOUS BLD VENIPUNCTURE: CPT

## 2017-01-08 RX ADMIN — SODIUM CHLORIDE SOLN NEBU 3% 4 ML: 3 NEBU SOLN at 10:01

## 2017-01-08 RX ADMIN — PANTOPRAZOLE SODIUM 40 MG: 40 TABLET, DELAYED RELEASE ORAL at 08:01

## 2017-01-08 RX ADMIN — GABAPENTIN 300 MG: 300 CAPSULE ORAL at 05:01

## 2017-01-08 RX ADMIN — ESCITALOPRAM OXALATE 20 MG: 10 TABLET ORAL at 08:01

## 2017-01-08 RX ADMIN — METOPROLOL TARTRATE 25 MG: 25 TABLET ORAL at 08:01

## 2017-01-08 RX ADMIN — TOPIRAMATE 25 MG: 25 TABLET, FILM COATED ORAL at 08:01

## 2017-01-08 RX ADMIN — APIXABAN 5 MG: 5 TABLET, FILM COATED ORAL at 08:01

## 2017-01-08 RX ADMIN — IPRATROPIUM BROMIDE 500 MCG: 0.5 SOLUTION RESPIRATORY (INHALATION) at 07:01

## 2017-01-08 NOTE — ASSESSMENT & PLAN NOTE
Due to history of TB, followed by pulmonary (Dr. Zuñiga) and on home oxygen as needed (uses it mostly at night). Continue monitor.

## 2017-01-08 NOTE — PLAN OF CARE
Problem: Patient Care Overview  Goal: Plan of Care Review  Outcome: Outcome(s) achieved Date Met:  01/08/17 01/08/17 0947   Coping/Psychosocial   Plan Of Care Reviewed With patient         Problem: Fall Risk (Adult)  Goal: Identify Related Risk Factors and Signs and Symptoms  Related risk factors and signs and symptoms are identified upon initiation of Human Response Clinical Practice Guideline (CPG)   Outcome: Outcome(s) achieved Date Met:  01/08/17 01/08/17 0947   Fall Risk   Related Risk Factors (Fall Risk) age-related changes;environment unfamiliar   Signs and Symptoms (Fall Risk) presence of risk factors       Goal: Absence of Falls  Patient will demonstrate the desired outcomes by discharge/transition of care.   Outcome: Outcome(s) achieved Date Met:  01/08/17 01/08/17 0947   Fall Risk (Adult)   Absence of Falls achieves outcome

## 2017-01-08 NOTE — PLAN OF CARE
01/08/17 0858   Medicare Message   Important Message from Medicare regarding Discharge Appeal Rights Given to patient/caregiver;Explained to patient/caregiver;Signed/date by patient/caregiver   Date IMM was signed 01/08/17   Time IMM was signed 0858

## 2017-01-08 NOTE — SUBJECTIVE & OBJECTIVE
Interval History: Pt denies SOB or hemoptysis but very anxious about medications.    Review of Systems   Constitutional: Negative for chills and fever.   Eyes: Negative for visual disturbance.   Respiratory: Negative for cough and shortness of breath.    Cardiovascular: Negative for chest pain.     Objective:     Vital Signs (Most Recent):  Temp: 97.5 °F (36.4 °C) (01/07/17 1751)  Pulse: 68 (01/07/17 1751)  Resp: 18 (01/07/17 1751)  BP: (!) 110/56 (01/07/17 1751)  SpO2: 95 % (01/07/17 1751) Vital Signs (24h Range):  Temp:  [97.5 °F (36.4 °C)-98.2 °F (36.8 °C)] 97.5 °F (36.4 °C)  Pulse:  [64-72] 68  Resp:  [18-20] 18  BP: (110-114)/(56-57) 110/56     Weight: 68.5 kg (151 lb 0.2 oz)  Body mass index is 26.75 kg/(m^2).    Intake/Output Summary (Last 24 hours) at 01/07/17 2037  Last data filed at 01/07/17 0600   Gross per 24 hour   Intake                0 ml   Output              750 ml   Net             -750 ml      Physical Exam   Constitutional: She is oriented to person, place, and time. She appears well-developed and well-nourished.   HENT:   Head: Normocephalic and atraumatic.   Eyes: EOM are normal. Pupils are equal, round, and reactive to light.   Neck: Normal range of motion. Neck supple.   Cardiovascular: Normal rate and regular rhythm.    Pulmonary/Chest: Effort normal and breath sounds normal.   Abdominal: Soft. Bowel sounds are normal.   Musculoskeletal: Normal range of motion.   Neurological: She is alert and oriented to person, place, and time.   Skin: Skin is warm and dry.   Psychiatric:   Anxious.       Significant Labs: All pertinent labs within the past 24 hours have been reviewed.    Significant Imaging: I have reviewed and interpreted all pertinent imaging results/findings within the past 24 hours.

## 2017-01-08 NOTE — ASSESSMENT & PLAN NOTE
Patient is at risk for pulmonary hemorrhage due to history of hemorrhage requiring embolization and treatment for pneumonia. Long discussion made with patient and family regarding risk with bleeding after starting anticoagulation. Pt started on lovenox and closely monitored for hemoptysis. No occurrence of hemoptysis, following with daily CBC. Again, long discussion made on oral anticoagulation and they declined coumadin and was agreeable to Eliquis, will start today and monitor overnight. U/S was negative for DVT.

## 2017-01-08 NOTE — NURSING
Evaluated general patient appearance/condition. Patient is sitting up in bed AAOx4. Significant other is present at bedside. Shift assessment initiated. Left forearm IV site and Right AC IV sites are clean, dry and intact. Plan of care for the evening reviewed with patient and family. No apparent distress noted at this time.

## 2017-01-08 NOTE — PLAN OF CARE
Problem: Patient Care Overview  Goal: Plan of Care Review  Outcome: Ongoing (interventions implemented as appropriate)    01/08/17 0420   Coping/Psychosocial   Plan Of Care Reviewed With patient   Patient remained free from falls/injury. Safety maintained; call light in reach, wheels locked and bed in low position.  VTE prohalaxis interventions maintained; SCD and HUSSEIN hose are on.   Patient started on oral anticoagulant 01/07/2017 with PM medications. Subcutaneous Lovenox was discontinued.         Problem: VTE, DVT and PE (Adult)  Goal: Signs and Symptoms of Listed Potential Problems Will be Absent, Minimized or Managed (VTE, DVT and PE)  Signs and symptoms of listed potential problems will be absent, minimized or managed by discharge/transition of care (reference VTE, DVT and PE (Adult) CPG).   Outcome: Ongoing (interventions implemented as appropriate)    01/08/17 0420   VTE, DVT and PE   Problems Assessed (VTE, DVT, PE) all   Problems Present (VTE, DVT, PE) pulmonary embolism         Problem: Fall Risk (Adult)  Goal: Identify Related Risk Factors and Signs and Symptoms  Related risk factors and signs and symptoms are identified upon initiation of Human Response Clinical Practice Guideline (CPG)   Outcome: Ongoing (interventions implemented as appropriate)    01/08/17 0420   Fall Risk   Related Risk Factors (Fall Risk) age-related changes;environment unfamiliar   Signs and Symptoms (Fall Risk) presence of risk factors

## 2017-01-08 NOTE — PROGRESS NOTES
"[facility]  Progress Note    Patient Name: Yasmin Asencio  MRN: [id number]  Patient Class: IP- Inpatient   Admission Date: 1/5/2017  Length of Stay: 2 days  Expected Discharge Date:   Attending Physician: Barbara Hernandez MD  Primary Care Provider: Urmila Luna MD        Subjective:     Principal Problem:Pulmonary embolism without acute cor pulmonale    HPI:   66 year old female with PMH of pulmonary hemorrhage,S/P embolization in 11/2015 and pseudomonas pneumonia in 2016,has been treated with IV Abx, hypertension and COPD,AOCD,atrial tachycardia,depression,chronic slurred speech,has been followed by neurology,GERD,presents complaining of palpitations described as a "pounding" in her chest that started last night at approximately 11 PM after she had 2 back-to-back Atrovent nebulizer treatments. She also reports that she has elevated blood pressure since earlier tonight. She states she has a history of a-fib that occurred about 1.5 years ago while she was having a procedure. She states she was sent home with a monitor for 3 days but it did not occur at any other time since then.she had unremarkable LHC on 6/2016 by , She otherwise denies fever, chills, chest pain, , leg pain, leg swelling, abdominal pain, nausea, vomiting, and diarrhea at this time.patient has PE in right pulmonary brunch,patient denies history of PE and DVT ,no family history of clot disorder or long time recent travel,hospitalization,duo to history of hemoptysis,patient has not been yet started on anticoagulation,pulmnology has been consulted,she denies hemoptysis sine embolization and treatment of pseudomonas  pneumonia in last 7 month,CTA chest show no sign of hemorrhagia and her CBC  show chronic stable AOCD.she is stable  at this time on NC   O 2.      Hospital Course:  65 y/o female with h/o pulmonary hemorrhage, s/p embolization in 11/2015 and pseudomonas pneumonia in 2016 who was admitted " with PE in right pulmonary brunch. Given her h/o pulmonary hemorrhage, patient was evaluated by pulmonary prior to initiation with anticoagulation. Pt without signs of hemorrhagia; and after discussion with Pulmonary, patient, and family and agreement was made to start lovenox and monitor patient in hospital. Pt has not had hemoptysis but did have slight drop in H/H. Repeat labs showed stabilization and Pulmonary was agreeable for patient to start oral anticoagulation. Treatment choices were discussed with family, and did not want coumadin and was agreeable to Eliquis. They understand that Eliquis does not have a reversal agent currently at this time. Pt was very nervous to start this medication.    Interval History: Pt denies SOB or hemoptysis but very anxious about medications.    Review of Systems   Constitutional: Negative for chills and fever.   Eyes: Negative for visual disturbance.   Respiratory: Negative for cough and shortness of breath.    Cardiovascular: Negative for chest pain.     Objective:     Vital Signs (Most Recent):  Temp: 97.5 °F (36.4 °C) (01/07/17 1751)  Pulse: 68 (01/07/17 1751)  Resp: 18 (01/07/17 1751)  BP: (!) 110/56 (01/07/17 1751)  SpO2: 95 % (01/07/17 1751) Vital Signs (24h Range):  Temp:  [97.5 °F (36.4 °C)-98.2 °F (36.8 °C)] 97.5 °F (36.4 °C)  Pulse:  [64-72] 68  Resp:  [18-20] 18  BP: (110-114)/(56-57) 110/56     Weight: 68.5 kg (151 lb 0.2 oz)  Body mass index is 26.75 kg/(m^2).    Intake/Output Summary (Last 24 hours) at 01/07/17 2037  Last data filed at 01/07/17 0600   Gross per 24 hour   Intake                0 ml   Output              750 ml   Net             -750 ml      Physical Exam   Constitutional: She is oriented to person, place, and time. She appears well-developed and well-nourished.   HENT:   Head: Normocephalic and atraumatic.   Eyes: EOM are normal. Pupils are equal, round, and reactive to light.   Neck: Normal range of motion. Neck supple.   Cardiovascular: Normal  rate and regular rhythm.    Pulmonary/Chest: Effort normal and breath sounds normal.   Abdominal: Soft. Bowel sounds are normal.   Musculoskeletal: Normal range of motion.   Neurological: She is alert and oriented to person, place, and time.   Skin: Skin is warm and dry.   Psychiatric:   Anxious.       Significant Labs: All pertinent labs within the past 24 hours have been reviewed.    Significant Imaging: I have reviewed and interpreted all pertinent imaging results/findings within the past 24 hours.    Assessment/Plan:      * Pulmonary embolism without acute cor pulmonale  Patient is at risk for pulmonary hemorrhage due to history of hemorrhage requiring embolization and treatment for pneumonia. Long discussion made with patient and family regarding risk with bleeding after starting anticoagulation. Pt started on lovenox and closely monitored for hemoptysis. No occurrence of hemoptysis, following with daily CBC. Again, long discussion made on oral anticoagulation and they declined coumadin and was agreeable to Eliquis, will start today and monitor overnight. U/S was negative for DVT.      Acquired bronchiectasis  Due to history of TB, followed by pulmonary (Dr. Zuñiga) and on home oxygen as needed (uses it mostly at night). Continue monitor.      COPD (chronic obstructive pulmonary disease)  Continue with Atrovent, stable.      Ectopic atrial tachycardia  Stable, monitor on telemetry.      Benign hypertension with chronic kidney disease, stage III  Continue metoprolol.      Anemia of chronic disease  Slight drop noted yesterday but H/H stable today, no reported bleeding, contnue monitor.      Mild major depression  Continue lexapro.      GERD (gastroesophageal reflux disease)  Continue with PPI.      VTE Risk Mitigation         Ordered     High Risk of VTE  Once      01/05/17 0456     Place sequential compression device  Until discontinued      01/05/17 0456     Place HUSSEIN hose  Until discontinued      01/05/17 0456      Reason for No Pharmacological VTE Prophylaxis  Once      01/05/17 0456          Barbara Hernandez MD  Department of Hospital Medicine   [facility]

## 2017-01-08 NOTE — PLAN OF CARE
01/08/17 0900   Final Note   Assessment Type Final Discharge Note   Discharge Disposition Home   Discharge planning education complete? Yes   What phone number can be called within the next 1-3 days to see how you are doing after discharge? [phone]   Hospital Follow Up  Appt(s) scheduled? No   Discharge plans and expectations educations in teach back method with documentation complete? Yes   Offered [facility] -- Bedside Delivery? n/a   Discharge/Hospital Encounter Summary to (non-[facility]) PCP n/a   Referral to Outpatient Case Management complete? n/a   Referral to / orders for Home Health Complete? n/a   30 day supply of medicines given at discharge, if documented non-compliance / non-adherence? n/a   Any social issues identified prior to discharge? n/a   Did you assess the readiness or willingness of the family or caregiver to support self management of care? n/a   Right Care Referral Info   Post Acute Recommendation No Care

## 2017-01-09 NOTE — ASSESSMENT & PLAN NOTE
Slight drop noted on admission, overall H/H remained stable with no reported bleeding, monitored.

## 2017-01-09 NOTE — ASSESSMENT & PLAN NOTE
Patient was at risk for pulmonary hemorrhage due to history of hemorrhage requiring embolization and treatment for pneumonia. Long discussion made with patient and family regarding risk of bleeding after starting anticoagulation. Pt was started on lovenox and closely monitored for hemoptysis. No occurrence of hemoptysis, followed closely with daily CBC. Again, long discussion made on oral anticoagulation and they declined coumadin and was agreeable to Eliquis. Pt was started on this medication and observed overnight without incident. U/S was negative for DVT. Pt to f/u with PCP and Pulmonary.

## 2017-01-09 NOTE — ASSESSMENT & PLAN NOTE
Due to history of TB, followed by pulmonary (Dr. Zuñiga) and on home oxygen as needed (uses it mostly at night). Respiratory status was closely monitored, and remained stable.

## 2017-01-09 NOTE — DISCHARGE SUMMARY
"[facility] Medicine  Discharge Summary      Patient Name: Yasmin Asencoi  MRN: [id number]  Admission Date: 1/5/2017  Hospital Length of Stay: 3 days  Discharge Date: 1/8/2017   Attending Physician: Barbara Hernandez MD  Discharging Provider: Barbara Hernandez MD  Primary Care Provider: Urmila Luna MD      HPI:    66 year old female with PMH of pulmonary hemorrhage,S/P embolization in 11/2015 and pseudomonas pneumonia in 2016,has been treated with IV Abx, hypertension and COPD,AOCD,atrial tachycardia,depression,chronic slurred speech,has been followed by neurology,GERD,presents complaining of palpitations described as a "pounding" in her chest that started last night at approximately 11 PM after she had 2 back-to-back Atrovent nebulizer treatments. She also reports that she has elevated blood pressure since earlier tonight. She states she has a history of a-fib that occurred about 1.5 years ago while she was having a procedure. She states she was sent home with a monitor for 3 days but it did not occur at any other time since then.she had unremarkable LHC on 6/2016 by , She otherwise denies fever, chills, chest pain, , leg pain, leg swelling, abdominal pain, nausea, vomiting, and diarrhea at this time.patient has PE in right pulmonary brunch,patient denies history of PE and DVT ,no family history of clot disorder or long time recent travel,hospitalization,duo to history of hemoptysis,patient has not been yet started on anticoagulation,pulmnology has been consulted,she denies hemoptysis sine embolization and treatment of pseudomonas  pneumonia in last 7 month,CTA chest show no sign of hemorrhagia and her CBC  show chronic stable AOCD.she is stable  at this time on NC   O 2.      * No surgery found *      Indwelling Lines/Drains at time of discharge:   Lines/Drains/Airways          No matching active lines, drains, or airways        Hospital Course:   67 y/o female with h/o " pulmonary hemorrhage, s/p embolization in 11/2015 and pseudomonas pneumonia in 2016 who was admitted with PE in the right pulmonary brunch. Given her h/o pulmonary hemorrhage, patient was evaluated by pulmonary prior to initiation with anticoagulation. Pt did not have any signs of hemorrhage; after discussion with Pulmonary, patient, and family were agreeable to start lovenox and monitor patient in the hospital. Pt did not have any hemoptysis after initiation of anticoagulation with lovenox and her H/H remained overall stable. Again, discussion with  Pulmonary, myself and family was made prior to conversion to switch over to oral anticoagulation. Treatment choices were discussed with patient and family, and did not want coumadin but was agreeable to Eliquis. They were made aware and understand that Eliquis does not have a reversal agent currently at this time, but wished to go forward with treatment. Pt was very nervous to start this medication, therefore, patient was converted to Eliquis and was observed overnight on this medication. Pt had no hemoptysis and H/H stable. Pt was discharge on Eliquis with respiratory status that was stable. Pt is O2 dependent due to her COPD and bronchiectasis, and she was at her baseline. Pt arranged for close f/u with PCP and Pulmonary.     * Pulmonary embolism without acute cor pulmonale  Patient was at risk for pulmonary hemorrhage due to history of hemorrhage requiring embolization and treatment for pneumonia. Long discussion made with patient and family regarding risk of bleeding after starting anticoagulation. Pt was started on lovenox and closely monitored for hemoptysis. No occurrence of hemoptysis, followed closely with daily CBC. Again, long discussion made on oral anticoagulation and they declined coumadin and was agreeable to Eliquis. Pt was started on this medication and observed overnight without incident. U/S was negative for DVT. Pt to f/u with PCP and  Pulmonary.    Acquired bronchiectasis  Due to history of TB, followed by pulmonary (Dr. Zuñiga) and on home oxygen as needed (uses it mostly at night). Respiratory status was closely monitored, and remained stable.    COPD (chronic obstructive pulmonary disease)  Continued with Atrovent, stable. O2 PRN at baseline.    Ectopic atrial tachycardia  Stable, monitored on telemetry.    Benign hypertension with chronic kidney disease, stage III  Continued metoprolol.    Anemia of chronic disease  Slight drop noted on admission, overall H/H remained stable with no reported bleeding, monitored.     Mild major depression  Continued lexapro.    GERD (gastroesophageal reflux disease)  Continued with PPI.      Consults:   Consults         Status Ordering Provider     Inpatient consult to Pulmonology  Once     Provider:  Sherif Renae MD    Completed RAFA HUANG          Significant Diagnostic Studies:   CTA of chest (1/5/17):  Filling defect in the pulmonary artery branch to the right upper lobe, consistent with pulmonary embolism.  Stable chronic interstitial changes and extensive bronchiectasis with upper lobe predominance.  Stable mosaic attenuation of the pulmonary parenchyma    U/S of LE (1/5/17):  No evidence of DVT in the lower extremities.      Pending Diagnostic Studies:     None        Final Active Diagnoses:    Diagnosis Date Noted POA    PRINCIPAL PROBLEM:  Pulmonary embolism without acute cor pulmonale [I26.99] 01/05/2017 Yes    Acquired bronchiectasis [J47.9]  Yes    COPD (chronic obstructive pulmonary disease) [J44.9] 01/05/2017 Yes    Ectopic atrial tachycardia [I47.1] 10/25/2012 Yes    Benign hypertension with chronic kidney disease, stage III [I12.9, N18.3] 11/18/2015 Yes    Anemia of chronic disease [D63.8]  Yes     Chronic    Mild major depression [F32.0] 02/11/2015 Yes    GERD (gastroesophageal reflux disease) [K21.9]  Yes     Chronic      Problems Resolved During this Admission:     Diagnosis Date Noted Date Resolved POA          Discharged Condition: good    Disposition: Home or Self Care    Follow Up:  Follow-up Information     Follow up with Urmila Luna MD In 1 week.    Specialties:  Internal Medicine, Pediatrics    Contact information:    [address] [place] [address] [phone]          Follow up with PRECIOUS Zuñiga MD In 2 weeks.    Specialty:  Pulmonary Disease    Contact information:    [address]  [facility] [address] [phone]          Schedule an appointment as soon as possible for a visit with [facility].    Specialty:  Priority Care    Contact information:    [address] [address] [phone]        Patient Instructions:     Diet general   Order Comments: Low Na.     Activity as tolerated       Medications:  Reconciled Home Medications:   Discharge Medication List as of 1/8/2017  9:54 AM      START taking these medications    Details   apixaban 5 mg Tab Take 1 tablet (5 mg total) by mouth 2 (two) times daily., Starting 1/8/2017, Until Discontinued, Print         CONTINUE these medications which have NOT CHANGED    Details   alprazolam (XANAX) 0.25 MG tablet Take 1 tablet (0.25 mg total) by mouth nightly as needed for Anxiety., Starting 10/3/2016, Until Discontinued, Print      ANORO ELLIPTA 62.5-25 mcg/actuation DsDv Inhale 1 puff into the lungs once daily., Starting 11/17/2016, Until Wed 2/15/17, Print      desoximetasone (TOPICORT) 0.25 % cream Apply topically 2 (two) times daily.  , Until Discontinued, Historical Med      escitalopram oxalate (LEXAPRO) 20 MG tablet TAKE ONE TABLET BY MOUTH ONCE DAILY, Normal      gabapentin (NEURONTIN) 300 MG capsule Take 1 capsule (300 mg total) by mouth 3 (three) times daily., Starting 12/21/2016, Until Thu 12/21/17, Normal      guaifenesin (MUCINEX) 600 mg 12 hr tablet Take 1,200 mg by mouth 2 (two) times daily., Until Discontinued, Historical Med       ipratropium (ATROVENT) 0.02 % nebulizer solution Take 2.5 mLs (500 mcg total) by nebulization 4 (four) times daily., Starting 2/3/2016, Until Discontinued, Normal      LACTOBACILLUS RHAMNOSUS GG (CULTURELLE ORAL) Take by mouth., Until Discontinued, Historical Med      metoprolol tartrate (LOPRESSOR) 25 MG tablet TAKE ONE TABLET BY MOUTH TWICE DAILY, Normal      omeprazole (PRILOSEC) 20 MG capsule TAKE ONE CAPSULE BY MOUTH ONCE DAILY, Normal      primidone (MYSOLINE) 50 MG Tab TAKE ONE TABLET BY MOUTH ONCE DAILY IN THE MORNING, ONE AT NOON, AND TWO AT BEDTIME, Normal      SODIUM CHLORIDE FOR INHALATION (SODIUM CHLORIDE 3%) 3 % nebulizer solution USE ONE VIAL IN NEBULIZER TWICE DAILY, Normal      topiramate (TOPAMAX) 25 MG tablet Take 1 tablet (25 mg total) by mouth 2 (two) times daily., Starting 9/12/2016, Until Discontinued, Normal      VITAMIN D2 50,000 unit capsule TAKE ONE CAPSULE BY MOUTH ONCE A WEEK, Normal      desonide (DESOWEN) 0.05 % lotion AAA face bid prn redness, scaling, or itching, Starting 5/24/2013, Until Thu 12/22/16, Historical Med      tramadol (ULTRAM) 50 mg tablet Take 50 mg by mouth every 6 (six) hours as needed for Pain., Until Discontinued, Historical Med           Time spent on the discharge of patient: 45 minutes    Barbara Hernandez MD  Department of Hospital Medicine  [facility]

## 2017-01-10 ENCOUNTER — PATIENT OUTREACH (OUTPATIENT)
Dept: ADMINISTRATIVE | Facility: CLINIC | Age: 67
End: 2017-01-10
Payer: MEDICARE

## 2017-01-10 ENCOUNTER — TELEPHONE (OUTPATIENT)
Dept: PRIMARY CARE CLINIC | Facility: CLINIC | Age: 67
End: 2017-01-10

## 2017-01-10 NOTE — PATIENT INSTRUCTIONS
Discharge Instructions for Pulmonary Embolism  A pulmonary embolism occurs when an embolus (clot) in the bloodstream travels through the heart and into the lungs. If the embolus becomes lodged in a blood vessel in the lungs, blood flow can be blocked. Symptoms can quickly develop and cause life-threatening heart and lung problems.  Home Care  Take your medications exactly as directed. Dont skip doses.  Ask your doctor about daily aspirin therapy.  Drink 6-8 glasses of water a day, unless directed otherwise.  Learn to take your own pulse. Keep a record of your results. Ask your doctor which readings mean that you need medical attention.  Lifestyle Changes  Avoid sitting, standing, or lying down for long periods without moving your legs and feet.  When traveling by car, stop to get out and move around at least once every three hours. On long airplane, train, or bus rides, get up and move around when possible. If you cant get up, wiggle your toes and tighten your calves to keep your blood moving.  Maintain a healthy weight. Get help to lose any extra pounds.  If you are a smoker, break the smoking habit. Enroll in a stop-smoking program to improve your chances of success.  Be active. Begin an exercise program. Ask your doctor how to get started. You can benefit from simple activities such as walking or gardening.  Follow-Up  Make a follow-up appointment as directed by our staff.    Call the healthcare provider right away if you have any of the following:  Chest pain (call 911)  Trouble breathing (call 911)  Coughing up blood (call 911)  Fainting (call 911)  Skin turns blue (call 911)  Dizziness  Rapid, pounding, or unusual heartbeat  Sweating more than usual  Unusual swelling or pain in your leg   © 3807-7156 [facility], [address] [place] [address], [place] [address]. All rights reserved. This information is not intended as a substitute for professional medical care. Always follow your healthcare professional's  instructions.

## 2017-01-10 NOTE — Clinical Note
Please forward this important TCC information to your provider in order to maximize the post discharge care delivery of this patient.  C3 nurse spoke with Yasmin Asencio  for a TCC post hospital discharge follow up call. The patient has a scheduled HOSFU appointment with AMA Gunn 1/11 @ 1400. Respectfully, Angelica Billingsley, RN  Care Coordination Center C3   [email or web address]     Please do not reply to this message, as this inbox is not routinely monitored.

## 2017-01-11 ENCOUNTER — OFFICE VISIT (OUTPATIENT)
Dept: PRIMARY CARE CLINIC | Facility: CLINIC | Age: 67
End: 2017-01-11
Payer: MEDICARE

## 2017-01-11 VITALS
DIASTOLIC BLOOD PRESSURE: 63 MMHG | SYSTOLIC BLOOD PRESSURE: 145 MMHG | WEIGHT: 153.25 LBS | HEIGHT: 63 IN | HEART RATE: 72 BPM | OXYGEN SATURATION: 99 % | BODY MASS INDEX: 27.15 KG/M2

## 2017-01-11 DIAGNOSIS — F41.8 MIXED ANXIETY AND DEPRESSIVE DISORDER: ICD-10-CM

## 2017-01-11 DIAGNOSIS — J44.9 CHRONIC OBSTRUCTIVE PULMONARY DISEASE, UNSPECIFIED COPD TYPE: ICD-10-CM

## 2017-01-11 DIAGNOSIS — I12.9 BENIGN HYPERTENSION WITH CHRONIC KIDNEY DISEASE, STAGE III: ICD-10-CM

## 2017-01-11 DIAGNOSIS — F32.0 MILD MAJOR DEPRESSION: ICD-10-CM

## 2017-01-11 DIAGNOSIS — J47.9 ACQUIRED BRONCHIECTASIS: ICD-10-CM

## 2017-01-11 DIAGNOSIS — N18.30 BENIGN HYPERTENSION WITH CHRONIC KIDNEY DISEASE, STAGE III: ICD-10-CM

## 2017-01-11 DIAGNOSIS — I26.92 ACUTE SADDLE PULMONARY EMBOLISM WITHOUT ACUTE COR PULMONALE: ICD-10-CM

## 2017-01-11 DIAGNOSIS — I47.19 ECTOPIC ATRIAL TACHYCARDIA: ICD-10-CM

## 2017-01-11 DIAGNOSIS — R53.81 DEBILITY: Primary | ICD-10-CM

## 2017-01-11 PROCEDURE — 99499 UNLISTED E&M SERVICE: CPT | Mod: S$GLB,,, | Performed by: NURSE PRACTITIONER

## 2017-01-11 PROCEDURE — 99999 PR PBB SHADOW E&M-EST. PATIENT-LVL IV: CPT | Mod: PBBFAC,,,

## 2017-01-11 NOTE — PROGRESS NOTES
[facility]    HOME HEALTH ORDERS  FACE TO FACE ENCOUNTER    Patient Name: Yasmin Asencio  YOB: 1950    PCP: Urmila Luna MD   PCP Address: [address] / [address]  PCP Phone Number: [phone]  PCP Fax: [phone]    Encounter Date: 01/11/2017    Admit to Home Health    Diagnoses:  There are no hospital problems to display for this patient.      Future Appointments  Date Time Provider Department Center   1/17/2017 11:30 AM Louie Yuan MD [facility] ID [facility]   1/17/2017 4:00 PM PRECIOUS Zuñiga MD [facility] PULMSVC [facility]   1/27/2017 1:30 PM Savita Beauchamp NP [facility] RADHIKA CAR [facility]   2/2/2017 10:40 AM CHARMAINE TateP [facility] PAINMGT [facility]   2/20/2017 12:30 PM JUVENCIO Noland II, PhD [facility] NEURPSY [facility]   2/22/2017 8:40 AM Urmila Luna MD [facility]           I have seen and examined this patient face to face today. My clinical findings that support the need for the home health skilled services and home bound status are the following:  Weakness/numbness causing balance and gait disturbance due to Weakness/Debility making it taxing to leave home.    Allergies:  Review of patient's allergies indicates:   Allergen Reactions    Bactrim [sulfamethoxazole-trimethoprim] Rash     Was hospitalized for rash    Albuterol Other (See Comments)     tremors    Restasis [cyclosporine] Itching       Diet: cardiac diet    Activities: activity as tolerated    Nursing:   Nursing: SN to complete comprehensive assessment including routine vital signs. Instruct on disease process and s/s of complications to report to MD. Review/verify medication list sent home with the patient at time of discharge  and instruct patient/caregiver as needed. Nursing to educate on new medication regimen (Eliquis) and monitor for signs of bleeding.     Notify MD if SBP > 160 or < 90; DBP > 90 or < 50; HR > 120 or < 50; Temp > 101;       CONSULTS:    Physical  Therapy to evaluate and treat. Evaluate for home safety and equipment needs; Establish/upgrade home exercise program. Perform / instruct on therapeutic exercises, gait training, transfer training, and Range of Motion.  Occupational Therapy to evaluate and treat. Evaluate home environment for safety and equipment needs. Perform/Instruct on transfers, ADL training, ROM, and therapeutic exercises.    MISCELLANEOUS CARE:  Home Oxygen:  Assess oxygen saturation via pulse oximeter as needed for increase in SOB.    WOUND CARE ORDERS  n/a      Medications: Review discharge medications with patient and family and provide education.      Current Outpatient Prescriptions on File Prior to Visit   Medication Sig Dispense Refill    alprazolam (XANAX) 0.25 MG tablet Take 1 tablet (0.25 mg total) by mouth nightly as needed for Anxiety. 30 tablet 2    ANORO ELLIPTA 62.5-25 mcg/actuation DsDv Inhale 1 puff into the lungs once daily. 180 each 4    apixaban 5 mg Tab Take 1 tablet (5 mg total) by mouth 2 (two) times daily. 60 tablet 0    desoximetasone (TOPICORT) 0.25 % cream Apply topically 2 (two) times daily.        escitalopram oxalate (LEXAPRO) 20 MG tablet TAKE ONE TABLET BY MOUTH ONCE DAILY 30 tablet 5    gabapentin (NEURONTIN) 300 MG capsule Take 1 capsule (300 mg total) by mouth 3 (three) times daily. 90 capsule 2    guaifenesin (MUCINEX) 600 mg 12 hr tablet Take 1,200 mg by mouth 2 (two) times daily.      ipratropium (ATROVENT) 0.02 % nebulizer solution Take 2.5 mLs (500 mcg total) by nebulization 4 (four) times daily. 225 mL 10    LACTOBACILLUS RHAMNOSUS GG (CULTURELLE ORAL) Take by mouth once daily.       metoprolol tartrate (LOPRESSOR) 25 MG tablet TAKE ONE TABLET BY MOUTH TWICE DAILY 60 tablet 5    omeprazole (PRILOSEC) 20 MG capsule TAKE ONE CAPSULE BY MOUTH ONCE DAILY 90 capsule 0    primidone (MYSOLINE) 50 MG Tab TAKE ONE TABLET BY MOUTH ONCE DAILY IN THE MORNING, ONE AT NOON, AND TWO AT BEDTIME 120 tablet  11    SODIUM CHLORIDE FOR INHALATION (SODIUM CHLORIDE 3%) 3 % nebulizer solution USE ONE VIAL IN NEBULIZER TWICE DAILY 240 mL 3    topiramate (TOPAMAX) 25 MG tablet Take 1 tablet (25 mg total) by mouth 2 (two) times daily. 180 tablet 3    VITAMIN D2 50,000 unit capsule TAKE ONE CAPSULE BY MOUTH ONCE A WEEK 4 capsule 5    desonide (DESOWEN) 0.05 % lotion AAA face bid prn redness, scaling, or itching      tramadol (ULTRAM) 50 mg tablet Take 50 mg by mouth every 6 (six) hours as needed for Pain.       No current facility-administered medications on file prior to visit.          I certify that this patient is confined to her home and needs intermittent skilled nursing care, physical therapy and occupational therapy.

## 2017-01-11 NOTE — MR AVS SNAPSHOT
[facility]  [address]  [address]  [place] [address]  Phone: [phone]  Fax: [phone]                  Yasmin Asencio   2017 2:00 PM   Office Visit    Description:  Female : 1950   Provider:  [facility]   Department:  [facility]           Reason for Visit     Follow-up           Diagnoses this Visit        Comments    Debility    -  Primary     Benign hypertension with chronic kidney disease, stage III         Ectopic atrial tachycardia         Acute saddle pulmonary embolism without acute cor pulmonale                To Do List           Future Appointments        Provider Department Dept Phone    2017 11:30 AM MD Alexis Francis [facility] - Infectious Diseases [phone]    2017 4:00 PM MD Alexis Gavin [facility] - Pulmonary Services [phone]    2017 1:30 PM Savita Beauchamp NP [facility] [phone]    2017 10:40 AM REGAN Tate [facility] - Pain Management [phone]    2017 8:40 AM Urmila Luna MD [facility] Family Medicine [phone]      Goals (5 Years of Data)     None      [facility]     [facility] Care [facility] - 24/7 Assistance  Registered nurses in the [facility] On Call Center provide clinical advisement, health education, appointment booking, and other advisory services.  Call for this free service at [phone].             Medications           Message regarding Medications     Verify the changes and/or additions to your medication regime listed below are the same as discussed with your clinician today.  If any of these changes or additions are incorrect, please notify your healthcare provider.             Verify that the below list of medications is an accurate representation of the medications you are currently taking.  If none reported, the list may be blank. If incorrect, please contact your healthcare provider. Carry this list with you  "in case of emergency.           Current Medications     alprazolam (XANAX) 0.25 MG tablet Take 1 tablet (0.25 mg total) by mouth nightly as needed for Anxiety.    ANORO ELLIPTA 62.5-25 mcg/actuation DsDv Inhale 1 puff into the lungs once daily.    apixaban 5 mg Tab Take 1 tablet (5 mg total) by mouth 2 (two) times daily.    desoximetasone (TOPICORT) 0.25 % cream Apply topically 2 (two) times daily.      escitalopram oxalate (LEXAPRO) 20 MG tablet TAKE ONE TABLET BY MOUTH ONCE DAILY    gabapentin (NEURONTIN) 300 MG capsule Take 1 capsule (300 mg total) by mouth 3 (three) times daily.    guaifenesin (MUCINEX) 600 mg 12 hr tablet Take 1,200 mg by mouth 2 (two) times daily.    ipratropium (ATROVENT) 0.02 % nebulizer solution Take 2.5 mLs (500 mcg total) by nebulization 4 (four) times daily.    LACTOBACILLUS RHAMNOSUS GG (CULTURELLE ORAL) Take by mouth once daily.     metoprolol tartrate (LOPRESSOR) 25 MG tablet TAKE ONE TABLET BY MOUTH TWICE DAILY    omeprazole (PRILOSEC) 20 MG capsule TAKE ONE CAPSULE BY MOUTH ONCE DAILY    primidone (MYSOLINE) 50 MG Tab TAKE ONE TABLET BY MOUTH ONCE DAILY IN THE MORNING, ONE AT NOON, AND TWO AT BEDTIME    SODIUM CHLORIDE FOR INHALATION (SODIUM CHLORIDE 3%) 3 % nebulizer solution USE ONE VIAL IN NEBULIZER TWICE DAILY    topiramate (TOPAMAX) 25 MG tablet Take 1 tablet (25 mg total) by mouth 2 (two) times daily.    VITAMIN D2 50,000 unit capsule TAKE ONE CAPSULE BY MOUTH ONCE A WEEK    desonide (DESOWEN) 0.05 % lotion AAA face bid prn redness, scaling, or itching    tramadol (ULTRAM) 50 mg tablet Take 50 mg by mouth every 6 (six) hours as needed for Pain.           Clinical Reference Information           Vital Signs - Last Recorded  Most recent update: 1/11/2017  2:00 PM by Zahida Marin MA    BP Pulse Ht Wt LMP SpO2    (!) 145/63 (BP Location: Right arm, Patient Position: Sitting, BP Method: Automatic) 72 5' 3" (1.6 m) 69.5 kg (153 lb 3.5 oz) (LMP Unknown) 99%    BMI             "    27.14 kg/m2          Blood Pressure          Most Recent Value    BP  (!)  145/63      Allergies as of 1/11/2017     Bactrim [Sulfamethoxazole-trimethoprim]    Albuterol    Restasis [Cyclosporine]      Immunizations Administered on Date of Encounter - 1/11/2017     None      Orders Placed During Today's Visit      Normal Orders This Visit    Ambulatory referral to Home Health     BATH/SHOWER CHAIR FOR HOME USE

## 2017-01-11 NOTE — PROGRESS NOTES
"[facility]  New Visit Progress Note   Recent Hospital Discharge          ATTENDING PHYSICIAN:  Dr. Faith Erazo  Current Provider:  SALONI Ruiz  PRESENTING HISTORY     Chief Complaint/Reason for Visit:  Follow up Hospital Discharge   Chief Complaint   Patient presents with    Follow-up     PCP: Urmila Luna MD    History of Present Illness:HPI:    66 year old female with PMH of pulmonary hemorrhage,S/P embolization in 11/2015 and pseudomonas pneumonia in 2016,has been treated with IV Abx, hypertension and COPD,AOCD,atrial tachycardia,depression,chronic slurred speech,has been followed by neurology,GERD,presents complaining of palpitations described as a "pounding" in her chest that started last night at approximately 11 PM after she had 2 back-to-back Atrovent nebulizer treatments. She also reports that she has elevated blood pressure since earlier tonight. She states she has a history of a-fib that occurred about 1.5 years ago while she was having a procedure. She states she was sent home with a monitor for 3 days but it did not occur at any other time since then.she had unremarkable LHC on 6/2016 by , She otherwise denies fever, chills, chest pain, , leg pain, leg swelling, abdominal pain, nausea, vomiting, and diarrhea at this time.patient has PE in right pulmonary brunch,patient denies history of PE and DVT ,no family history of clot disorder or long time recent travel,hospitalization,duo to history of hemoptysis,patient has not been yet started on anticoagulation,pulmnology has been consulted,she denies hemoptysis sine embolization and treatment of pseudomonas pneumonia in last 7 month,CTA chest show no sign of hemorrhagia and her CBC show chronic stable AOCD.she is stable at this time on NC   O 2.      Hospital Course:   65 y/o female with h/o pulmonary hemorrhage, s/p embolization in 11/2015 and pseudomonas pneumonia in 2016 who was admitted with PE in the right pulmonary brunch. " Given her h/o pulmonary hemorrhage, patient was evaluated by pulmonary prior to initiation with anticoagulation. Pt did not have any signs of hemorrhage; after discussion with Pulmonary, patient, and family were agreeable to start lovenox and monitor patient in the hospital. Pt did not have any hemoptysis after initiation of anticoagulation with lovenox and her H/H remained overall stable. Again, discussion with Pulmonary, myself and family was made prior to conversion to switch over to oral anticoagulation. Treatment choices were discussed with patient and family, and did not want coumadin but was agreeable to Eliquis. They were made aware and understand that Eliquis does not have a reversal agent currently at this time, but wished to go forward with treatment. Pt was very nervous to start this medication, therefore, patient was converted to Eliquis and was observed overnight on this medication. Pt had no hemoptysis and H/H stable. Pt was discharge on Eliquis with respiratory status that was stable. Pt is O2 dependent due to her COPD and bronchiectasis, and she was at her baseline. Pt arranged for close f/u with PCP and Pulmonary.          Today: Patient presents today for follow-up evaluation post hospitalization for Pulmonary Embolism, started on Eliquis.           Review of Systems:  Review of Systems   Constitutional: Negative for chills, fever and malaise/fatigue.   HENT: Negative for congestion.    Eyes: Negative for blurred vision.   Respiratory: Positive for wheezing. Negative for shortness of breath.    Cardiovascular: Negative for chest pain, palpitations and leg swelling.   Gastrointestinal: Negative for abdominal pain, nausea and vomiting.   Genitourinary: Negative for dysuria.   Musculoskeletal: Negative.    Neurological: Positive for tremors. Negative for dizziness, weakness and headaches.   Endo/Heme/Allergies: Negative.    Psychiatric/Behavioral: Negative for depression.         PAST HISTORY:      Past Medical History   Diagnosis Date    Acquired bronchiectasis      due to history of TB - followed by pulmonary, Dr. Zuñiga    Allergy     Amblyopia      rt eye per pt    Anemia of other chronic disease     Anxiety     Cataract     Clotting disorder     COPD (chronic obstructive pulmonary disease)     COPD (chronic obstructive pulmonary disease)     Depression     Diverticulosis     Essential tremor     GERD (gastroesophageal reflux disease)     Hemangioma of liver     History of tuberculosis 1978    Hypertension     Mixed anxiety and depressive disorder     Psoriasis     S/P PICC central line placement Apr. 2016 - May 2016    Skin disease      Psoriasis    Spondylosis without myelopathy 8/23/2013    Thoracic aorta atherosclerosis      noted on CT scan of chest 1/3/2011    Tuberculosis     Vaginal delivery      x2    Vitamin D deficiency        Past Surgical History   Procedure Laterality Date    Gallbladder surgery  9/2011    Cataract extraction w/  intraocular lens implant  07/24/12     od dr spain    Cataract extraction w/  intraocular lens implant  08/07/12     left eye    Eye surgery       bilateral Cataract       Family History   Problem Relation Age of Onset    Hypertension Mother     Heart attack Mother     Cancer Father      liver and bladder    Hyperlipidemia Sister     Psoriasis Sister     Cancer Brother      liver    Stroke Maternal Uncle     Cancer Maternal Aunt      stomach    Lung cancer Sister     Heart attack Brother     Heart attack Son     Amblyopia Neg Hx     Blindness Neg Hx     Cataracts Neg Hx     Glaucoma Neg Hx     Macular degeneration Neg Hx     Retinal detachment Neg Hx     Strabismus Neg Hx     Thyroid disease Neg Hx     Melanoma Neg Hx     Lupus Neg Hx     Eczema Neg Hx     COPD Neg Hx        Social History     Social History    Marital status:      Spouse name: N/A    Number of children: 2    Years of education:  N/A     Occupational History    housewife      Social History Main Topics    Smoking status: Former Smoker     Packs/day: 2.00     Years: 50.00     Types: Cigarettes     Quit date: 2009    Smokeless tobacco: Never Used    Alcohol use No    Drug use: No    Sexual activity: Yes     Partners: Male     Other Topics Concern    Are You Pregnant Or Think You May Be? No    Breast-Feeding No     Social History Narrative    One child  of a heart attack at age 35.       MEDICATIONS & ALLERGIES:     Current Outpatient Prescriptions on File Prior to Visit   Medication Sig Dispense Refill    alprazolam (XANAX) 0.25 MG tablet Take 1 tablet (0.25 mg total) by mouth nightly as needed for Anxiety. 30 tablet 2    ANORO ELLIPTA 62.5-25 mcg/actuation DsDv Inhale 1 puff into the lungs once daily. 180 each 4    apixaban 5 mg Tab Take 1 tablet (5 mg total) by mouth 2 (two) times daily. 60 tablet 0    desoximetasone (TOPICORT) 0.25 % cream Apply topically 2 (two) times daily.        escitalopram oxalate (LEXAPRO) 20 MG tablet TAKE ONE TABLET BY MOUTH ONCE DAILY 30 tablet 5    gabapentin (NEURONTIN) 300 MG capsule Take 1 capsule (300 mg total) by mouth 3 (three) times daily. 90 capsule 2    guaifenesin (MUCINEX) 600 mg 12 hr tablet Take 1,200 mg by mouth 2 (two) times daily.      ipratropium (ATROVENT) 0.02 % nebulizer solution Take 2.5 mLs (500 mcg total) by nebulization 4 (four) times daily. 225 mL 10    LACTOBACILLUS RHAMNOSUS GG (CULTURELLE ORAL) Take by mouth once daily.       metoprolol tartrate (LOPRESSOR) 25 MG tablet TAKE ONE TABLET BY MOUTH TWICE DAILY 60 tablet 5    omeprazole (PRILOSEC) 20 MG capsule TAKE ONE CAPSULE BY MOUTH ONCE DAILY 90 capsule 0    primidone (MYSOLINE) 50 MG Tab TAKE ONE TABLET BY MOUTH ONCE DAILY IN THE MORNING, ONE AT NOON, AND TWO AT BEDTIME 120 tablet 11    SODIUM CHLORIDE FOR INHALATION (SODIUM CHLORIDE 3%) 3 % nebulizer solution USE ONE VIAL IN NEBULIZER TWICE DAILY 240 mL  3    topiramate (TOPAMAX) 25 MG tablet Take 1 tablet (25 mg total) by mouth 2 (two) times daily. 180 tablet 3    VITAMIN D2 50,000 unit capsule TAKE ONE CAPSULE BY MOUTH ONCE A WEEK 4 capsule 5    desonide (DESOWEN) 0.05 % lotion AAA face bid prn redness, scaling, or itching      tramadol (ULTRAM) 50 mg tablet Take 50 mg by mouth every 6 (six) hours as needed for Pain.       No current facility-administered medications on file prior to visit.         Review of patient's allergies indicates:   Allergen Reactions    Bactrim [sulfamethoxazole-trimethoprim] Rash     Was hospitalized for rash    Albuterol Other (See Comments)     tremors    Restasis [cyclosporine] Itching       OBJECTIVE:     Vital Signs:  Vitals:    01/11/17 1358   BP: (!) 145/63   Pulse: 72     Wt Readings from Last 1 Encounters:   01/11/17 1358 69.5 kg (153 lb 3.5 oz)     Body mass index is 27.14 kg/(m^2).     Physical Exam:  Physical Exam   Constitutional: She is oriented to person, place, and time and well-developed, well-nourished, and in no distress. No distress.   HENT:   Head: Normocephalic and atraumatic.   Eyes: Conjunctivae and EOM are normal. Pupils are equal, round, and reactive to light.   Neck: Normal range of motion. Neck supple. No JVD present. No tracheal deviation present.   Cardiovascular: Regular rhythm.    No murmur heard.  Pulmonary/Chest: No respiratory distress. She has wheezes. She has no rales. She exhibits no tenderness.   Abdominal: Soft. Bowel sounds are normal.   Neurological: She is alert and oriented to person, place, and time.   Skin: Skin is warm and dry. She is not diaphoretic.           Laboratory  Lab Results   Component Value Date    WBC 7.23 01/08/2017    HGB 10.8 (L) 01/08/2017    HCT 34.1 (L) 01/08/2017    MCV 94 01/08/2017     01/08/2017     BMP  Lab Results   Component Value Date     01/08/2017    K 4.1 01/08/2017     01/08/2017    CO2 27 01/08/2017    BUN 18 01/08/2017    CREATININE  0.9 01/08/2017    CALCIUM 8.9 01/08/2017    ANIONGAP 8 01/08/2017    ESTGFRAFRICA >60 01/08/2017    EGFRNONAA >60 01/08/2017     Lab Results   Component Value Date    ALT 48 (H) 01/08/2017    AST 27 01/08/2017    ALKPHOS 60 01/08/2017    BILITOT 0.4 01/08/2017     Lab Results   Component Value Date    INR 1.0 01/05/2017    INR 0.9 12/22/2016    INR 1.0 06/14/2016     Lab Results   Component Value Date    HGBA1C 5.5 02/26/2015     No results for input(s): POCTGLUCOSE in the last 72 hours.    Diagnostic Results:  Imaging Results     None            TRANSITION OF CARE:     [facility] On [facility] Contact Note: 01/08/2017    Family and/or Caretaker present at visit?  Yes.  Diagnostic tests reviewed/disposition: No diagnosic tests pending after this hospitalization.  Disease/illness education: Pulmonary Embolism/Eduated on bleeding risk factors  Home health/community services discussion/referrals: Patient does not have home health established from hospital visit.  They do need home health.  If needed, we will set up home health for the patient.   Establishment or re-establishment of referral orders for community resources: No other necessary community resources.   Discussion with other health care providers: No discussion with other health care providers necessary.     Medications Reconciliation:   I have reconciled the patient's home medications and discharge medications with the patient/family. I have updated all changes.  Refer to After-Visit Medication List.    ASSESSMENT & PLAN:     HIGH RISK CONDITION(S):  Patient has a condition that poses threat to life and bodily function: Pulmonary Embolism      Pulmonary embolism without acute cor pulmonale  While inpatient:  Patient was at risk for pulmonary hemorrhage due to history of hemorrhage requiring embolization and treatment for pneumonia. Long discussion made with patient and family regarding risk of bleeding after starting anticoagulation. Pt was started on lovenox and  closely monitored for hemoptysis. No occurrence of hemoptysis, followed closely with daily CBC. Again, long discussion made on oral anticoagulation and they declined coumadin and was agreeable to Eliquis. Pt was started on this medication and observed overnight without incident. U/S was negative for DVT. Pt to f/u with PCP and Pulmonary.    Today in [facility]: Started on Eliquis 5 mg BID likely 2/2 Hx of pulmonary ablations.  She reports adherence to medication regimen. Discussed in details to monitor for signs of bleeding. She is stable       Debility  -     BATH/SHOWER CHAIR FOR HOME USE  -     Ambulatory referral to Home Health    Acquired bronchiectasis  Due to history of TB, followed by pulmonary (Dr. Zuñiga) and on home oxygen as needed (uses it mostly at night). Respiratory status was closely monitored, and remained stable.     COPD (chronic obstructive pulmonary disease)  Continued with Atrovent, stable. Home O2 PRN     Ectopic atrial tachycardia  Stable, monitored on telemetry.     Benign hypertension with chronic kidney disease, stage III  Continued metoprolol.     Mild major depression  Continued lexapro.     GERD (gastroesophageal reflux disease)  Continued with PPI.      Instructions for the patient:      Scheduled Follow-up :  Future Appointments  Date Time Provider Department Center   1/17/2017 11:30 AM Louie Yuan MD [facility] ID [facility]   1/17/2017 4:00 PM PRECIOUS Zuñiga MD [facility] PULMSVC [facility]   2/2/2017 10:40 AM REGAN Tate [facility]   2/20/2017 12:30 PM JUVENCIO Noland II, PhD [facility] NEURPSY [facility]       After Visit Medication List :     Medication List          This list is accurate as of: 1/11/17  3:17 PM.  Always use your most recent med list.                     alprazolam 0.25 MG tablet   Commonly known as:  XANAX   Take 1 tablet (0.25 mg total) by mouth nightly as needed for Anxiety.       ANORO ELLIPTA 62.5-25 mcg/actuation Dsdv   Generic drug:   umeclidinium-vilanterol   Inhale 1 puff into the lungs once daily.       apixaban 5 mg Tab   Take 1 tablet (5 mg total) by mouth 2 (two) times daily.       CULTURELLE ORAL       desonide 0.05 % lotion   Commonly known as:  DESOWEN       desoximetasone 0.25 % cream   Commonly known as:  TOPICORT       escitalopram oxalate 20 MG tablet   Commonly known as:  LEXAPRO   TAKE ONE TABLET BY MOUTH ONCE DAILY       gabapentin 300 MG capsule   Commonly known as:  NEURONTIN   Take 1 capsule (300 mg total) by mouth 3 (three) times daily.       guaifenesin 600 mg 12 hr tablet   Commonly known as:  MUCINEX       ipratropium 0.02 % nebulizer solution   Commonly known as:  ATROVENT   Take 2.5 mLs (500 mcg total) by nebulization 4 (four) times daily.       metoprolol tartrate 25 MG tablet   Commonly known as:  LOPRESSOR   TAKE ONE TABLET BY MOUTH TWICE DAILY       omeprazole 20 MG capsule   Commonly known as:  PRILOSEC   TAKE ONE CAPSULE BY MOUTH ONCE DAILY       primidone 50 MG Tab   Commonly known as:  MYSOLINE   TAKE ONE TABLET BY MOUTH ONCE DAILY IN THE MORNING, ONE AT NOON, AND TWO AT BEDTIME       sodium chloride 3% 3 % nebulizer solution   USE ONE VIAL IN NEBULIZER TWICE DAILY       topiramate 25 MG tablet   Commonly known as:  TOPAMAX   Take 1 tablet (25 mg total) by mouth 2 (two) times daily.       tramadol 50 mg tablet   Commonly known as:  ULTRAM       VITAMIN D2 50,000 unit Cap   Generic drug:  ergocalciferol   TAKE ONE CAPSULE BY MOUTH ONCE A WEEK                 CAMILO Ruiz, FNP-C CCRN  Nurse Practitioner - Hospitalist  Department of Hospital Medicine  [facility]  [phone]

## 2017-01-12 ENCOUNTER — TELEPHONE (OUTPATIENT)
Dept: FAMILY MEDICINE | Facility: CLINIC | Age: 67
End: 2017-01-12

## 2017-01-12 ENCOUNTER — DOCUMENTATION ONLY (OUTPATIENT)
Dept: PRIMARY CARE CLINIC | Facility: CLINIC | Age: 67
End: 2017-01-12

## 2017-01-12 NOTE — PROGRESS NOTES
"I called [facility] and spoke with Nora. I told her I had called [email or web address] [employer] and they had received referral/order for home health and shower chair  She stated " Thanks for the heads up, I will follow up on this patient."  "

## 2017-01-12 NOTE — TELEPHONE ENCOUNTER
----- Message from Angelica Billingsley RN sent at 1/10/2017  3:03 PM CST -----  Please forward this important TCC information to your provider in order to maximize the post discharge care delivery of this patient.    C3 nurse spoke with Yasmin Asencio  for a TCC post hospital discharge follow up call. The patient has a scheduled HOSFU appointment with [facility] GARRID[facility] CLon 1/11 @ 1400.  Respectfully,  Angelica Billingsley RN    [facility]    [email or web address]       Please do not reply to this message, as this inbox is not routinely monitored.

## 2017-01-16 ENCOUNTER — OFFICE VISIT (OUTPATIENT)
Dept: FAMILY MEDICINE | Facility: CLINIC | Age: 67
End: 2017-01-16
Payer: MEDICARE

## 2017-01-16 VITALS
WEIGHT: 153.44 LBS | DIASTOLIC BLOOD PRESSURE: 80 MMHG | OXYGEN SATURATION: 96 % | TEMPERATURE: 98 F | HEIGHT: 63 IN | BODY MASS INDEX: 27.19 KG/M2 | SYSTOLIC BLOOD PRESSURE: 162 MMHG | HEART RATE: 88 BPM

## 2017-01-16 DIAGNOSIS — D63.8 ANEMIA OF CHRONIC DISEASE: Chronic | ICD-10-CM

## 2017-01-16 DIAGNOSIS — G25.0 ESSENTIAL TREMOR: ICD-10-CM

## 2017-01-16 DIAGNOSIS — J32.9 SINUSITIS, UNSPECIFIED CHRONICITY, UNSPECIFIED LOCATION: Primary | ICD-10-CM

## 2017-01-16 DIAGNOSIS — N18.30 BENIGN HYPERTENSION WITH CHRONIC KIDNEY DISEASE, STAGE III: ICD-10-CM

## 2017-01-16 DIAGNOSIS — J44.9 CHRONIC OBSTRUCTIVE PULMONARY DISEASE, UNSPECIFIED COPD TYPE: ICD-10-CM

## 2017-01-16 DIAGNOSIS — J42 CHRONIC BRONCHITIS, UNSPECIFIED CHRONIC BRONCHITIS TYPE: ICD-10-CM

## 2017-01-16 DIAGNOSIS — J47.9 ACQUIRED BRONCHIECTASIS: ICD-10-CM

## 2017-01-16 DIAGNOSIS — K21.9 GASTROESOPHAGEAL REFLUX DISEASE, ESOPHAGITIS PRESENCE NOT SPECIFIED: Chronic | ICD-10-CM

## 2017-01-16 DIAGNOSIS — I12.9 BENIGN HYPERTENSION WITH CHRONIC KIDNEY DISEASE, STAGE III: ICD-10-CM

## 2017-01-16 DIAGNOSIS — F41.8 MIXED ANXIETY AND DEPRESSIVE DISORDER: ICD-10-CM

## 2017-01-16 DIAGNOSIS — I70.0 THORACIC AORTA ATHEROSCLEROSIS: ICD-10-CM

## 2017-01-16 PROCEDURE — 1160F RVW MEDS BY RX/DR IN RCRD: CPT | Mod: S$GLB,,, | Performed by: NURSE PRACTITIONER

## 2017-01-16 PROCEDURE — 1125F AMNT PAIN NOTED PAIN PRSNT: CPT | Mod: S$GLB,,, | Performed by: NURSE PRACTITIONER

## 2017-01-16 PROCEDURE — 3077F SYST BP >= 140 MM HG: CPT | Mod: S$GLB,,, | Performed by: NURSE PRACTITIONER

## 2017-01-16 PROCEDURE — 1157F ADVNC CARE PLAN IN RCRD: CPT | Mod: S$GLB,,, | Performed by: NURSE PRACTITIONER

## 2017-01-16 PROCEDURE — 1159F MED LIST DOCD IN RCRD: CPT | Mod: S$GLB,,, | Performed by: NURSE PRACTITIONER

## 2017-01-16 PROCEDURE — 3079F DIAST BP 80-89 MM HG: CPT | Mod: S$GLB,,, | Performed by: NURSE PRACTITIONER

## 2017-01-16 PROCEDURE — 99999 PR PBB SHADOW E&M-EST. PATIENT-LVL IV: CPT | Mod: PBBFAC,,, | Performed by: NURSE PRACTITIONER

## 2017-01-16 PROCEDURE — 99499 UNLISTED E&M SERVICE: CPT | Mod: S$GLB,,, | Performed by: NURSE PRACTITIONER

## 2017-01-16 PROCEDURE — 99214 OFFICE O/P EST MOD 30 MIN: CPT | Mod: S$GLB,,, | Performed by: NURSE PRACTITIONER

## 2017-01-16 RX ORDER — AMOXICILLIN 875 MG/1
875 TABLET, FILM COATED ORAL 2 TIMES DAILY
Qty: 20 TABLET | Refills: 0 | Status: SHIPPED | OUTPATIENT
Start: 2017-01-16 | End: 2017-01-26

## 2017-01-16 NOTE — PROGRESS NOTES
Subjective:       Patient ID: Yasmin Asencio is a 66 y.o. female.    Chief Complaint: Cough; Nasal Congestion; and Headache    HPI Comments: 66-year-old female presents to the clinic today with complaint of sinus congestion,  pressure to left side of face, slight cough, chronic wheezing, chronic shortness of breath, and headaches since yesterday.  She denies any fever, chills, ear pain, abdominal pain, nausea, or vomiting.  She denies any dizziness or blurred vision.  She denies any cardiac chest pain, heart palpitations or swelling to lower extremities.  She was just recently hospitalized for a blood clot in her lungs.  She is currently Liquids.  She has not been checking her blood pressure recently. She has not taken any decongestants.     Past Medical History   Diagnosis Date    Acquired bronchiectasis      due to history of TB - followed by pulmonary, Dr. Zuñiga    Allergy     Amblyopia      rt eye per pt    Anemia of other chronic disease     Anxiety     Cataract     Clotting disorder     COPD (chronic obstructive pulmonary disease)     COPD (chronic obstructive pulmonary disease)     Depression     Diverticulosis     Essential tremor     GERD (gastroesophageal reflux disease)     Hemangioma of liver     History of tuberculosis 1978    Hypertension     Mixed anxiety and depressive disorder     Psoriasis     S/P PICC central line placement Apr. 2016 - May 2016    Skin disease      Psoriasis    Spondylosis without myelopathy 8/23/2013    Thoracic aorta atherosclerosis      noted on CT scan of chest 1/3/2011    Tuberculosis     Vaginal delivery      x2    Vitamin D deficiency      Past Surgical History   Procedure Laterality Date    Gallbladder surgery  9/2011    Cataract extraction w/  intraocular lens implant  07/24/12     od dr spain    Cataract extraction w/  intraocular lens implant  08/07/12     left eye    Eye surgery       bilateral Cataract      reports that she quit  smoking about 7 years ago. Her smoking use included Cigarettes. She has a 100.00 pack-year smoking history. She has never used smokeless tobacco. She reports that she does not drink alcohol or use illicit drugs.  Review of Systems   Constitutional: Negative for chills and fever.   HENT: Positive for congestion and sinus pressure. Negative for ear discharge, ear pain, postnasal drip, rhinorrhea and sore throat.    Eyes: Negative for discharge and itching.   Respiratory: Positive for cough, shortness of breath and wheezing.    Cardiovascular: Negative for chest pain, palpitations and leg swelling.   Gastrointestinal: Negative for abdominal pain, diarrhea, nausea and vomiting.   Musculoskeletal: Negative for gait problem.   Neurological: Positive for headaches. Negative for dizziness and light-headedness.       Objective:      Physical Exam   Constitutional: She is oriented to person, place, and time. She appears well-developed and well-nourished. No distress.   HENT:   Head: Normocephalic and atraumatic.   Right Ear: External ear normal.   Left Ear: External ear normal.   Mouth/Throat: Oropharynx is clear and moist. No oropharyngeal exudate.   Both nares red and inflamed with tenderness over left maxillary sinuses    Eyes: Conjunctivae and EOM are normal. Pupils are equal, round, and reactive to light. Right eye exhibits no discharge. Left eye exhibits no discharge. No scleral icterus.   Neck: Normal range of motion. Neck supple.   Cardiovascular: Normal rate, regular rhythm and normal heart sounds.  Exam reveals no gallop and no friction rub.    No murmur heard.  Pulmonary/Chest: She has wheezes.   Few wheezes noted which is chronic for her    Abdominal: Soft. Bowel sounds are normal. There is no tenderness.   Musculoskeletal: Normal range of motion. She exhibits no edema.   Lymphadenopathy:     She has cervical adenopathy.   Neurological: She is alert and oriented to person, place, and time.   Skin: Skin is warm and  dry. She is not diaphoretic.   Psychiatric: She has a normal mood and affect.       Assessment:       1. Sinusitis, unspecified chronicity, unspecified location    2. Anemia of chronic disease    3. Gastroesophageal reflux disease, esophagitis presence not specified    4. Acquired bronchiectasis    5. Benign hypertension with chronic kidney disease, stage III    6. Chronic bronchitis, unspecified chronic bronchitis type    7. Chronic obstructive pulmonary disease, unspecified COPD type    8. Essential tremor    9. Mixed anxiety and depressive disorder    10. Thoracic aorta atherosclerosis        Plan:         Sinusitis, unspecified chronicity, unspecified location  -     amoxicillin (AMOXIL) 875 MG tablet; Take 1 tablet (875 mg total) by mouth 2 (two) times daily.  Dispense: 20 tablet; Refill: 0  - continue Mucinex     Anemia of chronic disease  - stable continue to monitor     Gastroesophageal reflux disease, esophagitis presence not specified  - The current medical regimen is effective;  continue present plan and medications.    Acquired bronchiectasis  - The current medical regimen is effective;  continue present plan and medications.  - followed by Dr. Zuñiga    Benign hypertension with chronic kidney disease, stage III  - The current medical regimen is effective;  continue present plan and medications.    Chronic bronchitis, unspecified chronic bronchitis type  - The current medical regimen is effective;  continue present plan and medications.    Chronic obstructive pulmonary disease, unspecified COPD type  - The current medical regimen is effective;  continue present plan and medications.  - followed by Dr. Zuñiga    Essential tremor  - followed by neurology Dr. Juarez    Mixed anxiety and depressive disorder  - The current medical regimen is effective;  continue present plan and medications.    Thoracic aorta atherosclerosis  - Stable / Asymptomatic is on blood pressure  lowering medications and monitor  cholesterol closely

## 2017-01-16 NOTE — MR AVS SNAPSHOT
[facility]  [address] [facility] [address]  [place] [address]  Phone: [phone]  Fax: [phone]                  Yasmin Asencio   2017 3:40 PM   Office Visit    Description:  Female : 1950   Provider:  SALONI Huynh   Department:  [facility]           Reason for Visit     Cough     Nasal Congestion     Headache           Diagnoses this Visit        Comments    Sinusitis, unspecified chronicity, unspecified location    -  Primary     Anemia of chronic disease         Gastroesophageal reflux disease, esophagitis presence not specified         Acquired bronchiectasis         Benign hypertension with chronic kidney disease, stage III         Chronic bronchitis, unspecified chronic bronchitis type         Chronic obstructive pulmonary disease, unspecified COPD type         Essential tremor         Mixed anxiety and depressive disorder         Thoracic aorta atherosclerosis                To Do List           Future Appointments        Provider Department Dept Phone    2017 11:30 AM MD Alexis Francis - Infectious Diseases [phone]    2017 4:00 PM MD Alexis Gavin [facility] - Pulmonary Services [phone]    2017 2:00 PM SALONI Huynh [facility] [phone]    2017 1:30 PM Savita Beauchamp NP [facility] [phone]    2017 8:40 AM Urmila Luna MD [facility] [phone]      Goals (5 Years of Data)     None       These Medications        Disp Refills Start End    amoxicillin (AMOXIL) 875 MG tablet 20 tablet 0 2017    Take 1 tablet (875 mg total) by mouth 2 (two) times daily. - Oral    Pharmacy: [id number] - [address] [address]. Ph #: [phone]         [facility] On Call     [facility] On Call Nurse Care Line -  Assistance  Registered nurses in the [facility] On Call Center provide clinical advisement,  health education, appointment booking, and other advisory services.  Call for this free service at [phone].             Medications           Message regarding Medications     Verify the changes and/or additions to your medication regime listed below are the same as discussed with your clinician today.  If any of these changes or additions are incorrect, please notify your healthcare provider.        START taking these NEW medications        Refills    amoxicillin (AMOXIL) 875 MG tablet 0    Sig: Take 1 tablet (875 mg total) by mouth 2 (two) times daily.    Class: Normal    Route: Oral           Verify that the below list of medications is an accurate representation of the medications you are currently taking.  If none reported, the list may be blank. If incorrect, please contact your healthcare provider. Carry this list with you in case of emergency.           Current Medications     alprazolam (XANAX) 0.25 MG tablet Take 1 tablet (0.25 mg total) by mouth nightly as needed for Anxiety.    ANORO ELLIPTA 62.5-25 mcg/actuation DsDv Inhale 1 puff into the lungs once daily.    apixaban 5 mg Tab Take 1 tablet (5 mg total) by mouth 2 (two) times daily.    desoximetasone (TOPICORT) 0.25 % cream Apply topically 2 (two) times daily.      escitalopram oxalate (LEXAPRO) 20 MG tablet TAKE ONE TABLET BY MOUTH ONCE DAILY    gabapentin (NEURONTIN) 300 MG capsule Take 1 capsule (300 mg total) by mouth 3 (three) times daily.    guaifenesin (MUCINEX) 600 mg 12 hr tablet Take 1,200 mg by mouth 2 (two) times daily.    ipratropium (ATROVENT) 0.02 % nebulizer solution Take 2.5 mLs (500 mcg total) by nebulization 4 (four) times daily.    LACTOBACILLUS RHAMNOSUS GG (CULTURELLE ORAL) Take by mouth once daily.     metoprolol tartrate (LOPRESSOR) 25 MG tablet TAKE ONE TABLET BY MOUTH TWICE DAILY    omeprazole (PRILOSEC) 20 MG capsule TAKE ONE CAPSULE BY MOUTH ONCE DAILY    primidone (MYSOLINE) 50 MG Tab TAKE ONE TABLET BY MOUTH ONCE  "DAILY IN THE MORNING, ONE AT NOON, AND TWO AT BEDTIME    SODIUM CHLORIDE FOR INHALATION (SODIUM CHLORIDE 3%) 3 % nebulizer solution USE ONE VIAL IN NEBULIZER TWICE DAILY    topiramate (TOPAMAX) 25 MG tablet Take 1 tablet (25 mg total) by mouth 2 (two) times daily.    tramadol (ULTRAM) 50 mg tablet Take 50 mg by mouth every 6 (six) hours as needed for Pain.    VITAMIN D2 50,000 unit capsule TAKE ONE CAPSULE BY MOUTH ONCE A WEEK    amoxicillin (AMOXIL) 875 MG tablet Take 1 tablet (875 mg total) by mouth 2 (two) times daily.    desonide (DESOWEN) 0.05 % lotion AAA face bid prn redness, scaling, or itching           Clinical Reference Information           Vital Signs - Last Recorded  Most recent update: 1/16/2017  3:50 PM by Pauly Nails MA    BP Pulse Temp Ht Wt LMP    (!) 162/80 (BP Location: Left arm, Patient Position: Sitting, BP Method: Manual) 88 98.1 °F (36.7 °C) (Oral) 5' 3" (1.6 m) 69.6 kg (153 lb 7 oz) (LMP Unknown)    SpO2 BMI             96% 27.18 kg/m2         Blood Pressure          Most Recent Value    BP  (!)  162/80      Allergies as of 1/16/2017     Bactrim [Sulfamethoxazole-trimethoprim]    Albuterol    Restasis [Cyclosporine]      Immunizations Administered on Date of Encounter - 1/16/2017     None      Instructions    Continue Mucinex BID  Follow up with me next week for a blood pressure check up          "

## 2017-01-17 ENCOUNTER — OFFICE VISIT (OUTPATIENT)
Dept: INFECTIOUS DISEASES | Facility: CLINIC | Age: 67
End: 2017-01-17
Payer: MEDICARE

## 2017-01-17 ENCOUNTER — OFFICE VISIT (OUTPATIENT)
Dept: PULMONOLOGY | Facility: CLINIC | Age: 67
End: 2017-01-17
Payer: MEDICARE

## 2017-01-17 VITALS
OXYGEN SATURATION: 97 % | BODY MASS INDEX: 27.34 KG/M2 | RESPIRATION RATE: 16 BRPM | DIASTOLIC BLOOD PRESSURE: 62 MMHG | HEART RATE: 86 BPM | WEIGHT: 154.31 LBS | SYSTOLIC BLOOD PRESSURE: 118 MMHG | HEIGHT: 63 IN

## 2017-01-17 VITALS
BODY MASS INDEX: 27.31 KG/M2 | HEART RATE: 66 BPM | TEMPERATURE: 98 F | DIASTOLIC BLOOD PRESSURE: 68 MMHG | HEIGHT: 63 IN | WEIGHT: 154.13 LBS | SYSTOLIC BLOOD PRESSURE: 123 MMHG

## 2017-01-17 DIAGNOSIS — J98.4 CAVITARY LUNG DISEASE: ICD-10-CM

## 2017-01-17 DIAGNOSIS — J47.9 BRONCHIECTASIS WITHOUT COMPLICATION: Primary | ICD-10-CM

## 2017-01-17 DIAGNOSIS — J98.8 PSEUDOMONAS RESPIRATORY INFECTION: ICD-10-CM

## 2017-01-17 DIAGNOSIS — B96.5 PSEUDOMONAS RESPIRATORY INFECTION: ICD-10-CM

## 2017-01-17 DIAGNOSIS — I26.99 OTHER PULMONARY EMBOLISM WITHOUT ACUTE COR PULMONALE, UNSPECIFIED CHRONICITY: Primary | ICD-10-CM

## 2017-01-17 DIAGNOSIS — J47.9 ACQUIRED BRONCHIECTASIS: ICD-10-CM

## 2017-01-17 DIAGNOSIS — M51.37 DEGENERATION OF LUMBAR OR LUMBOSACRAL INTERVERTEBRAL DISC: ICD-10-CM

## 2017-01-17 DIAGNOSIS — I50.22 CHRONIC SYSTOLIC HEART FAILURE: ICD-10-CM

## 2017-01-17 PROCEDURE — 1159F MED LIST DOCD IN RCRD: CPT | Mod: S$GLB,,, | Performed by: INTERNAL MEDICINE

## 2017-01-17 PROCEDURE — 3078F DIAST BP <80 MM HG: CPT | Mod: S$GLB,,, | Performed by: INTERNAL MEDICINE

## 2017-01-17 PROCEDURE — 3074F SYST BP LT 130 MM HG: CPT | Mod: S$GLB,,, | Performed by: INTERNAL MEDICINE

## 2017-01-17 PROCEDURE — 99499 UNLISTED E&M SERVICE: CPT | Mod: S$GLB,,, | Performed by: INTERNAL MEDICINE

## 2017-01-17 PROCEDURE — 99999 PR PBB SHADOW E&M-EST. PATIENT-LVL II: CPT | Mod: PBBFAC,,, | Performed by: INTERNAL MEDICINE

## 2017-01-17 PROCEDURE — 99999 PR PBB SHADOW E&M-EST. PATIENT-LVL III: CPT | Mod: PBBFAC,,, | Performed by: INTERNAL MEDICINE

## 2017-01-17 PROCEDURE — 1157F ADVNC CARE PLAN IN RCRD: CPT | Mod: S$GLB,,, | Performed by: INTERNAL MEDICINE

## 2017-01-17 PROCEDURE — 1160F RVW MEDS BY RX/DR IN RCRD: CPT | Mod: S$GLB,,, | Performed by: INTERNAL MEDICINE

## 2017-01-17 PROCEDURE — 99213 OFFICE O/P EST LOW 20 MIN: CPT | Mod: S$GLB,,, | Performed by: INTERNAL MEDICINE

## 2017-01-17 PROCEDURE — 1125F AMNT PAIN NOTED PAIN PRSNT: CPT | Mod: S$GLB,,, | Performed by: INTERNAL MEDICINE

## 2017-01-17 PROCEDURE — 99214 OFFICE O/P EST MOD 30 MIN: CPT | Mod: S$GLB,,, | Performed by: INTERNAL MEDICINE

## 2017-01-17 NOTE — PROGRESS NOTES
Subjective:      Patient ID: Yasmin Asencio is a 66 y.o. female.    Chief Complaint:No chief complaint on file.      History of Present Illness     Ms. Terrazas is a 65 y/o female with a history of severe bronchiectasis, cavitary lung disease, hemoptysis and respiratory colonization with pseudomonas.  She is here today for follow up.  She saw Dr. Zuñiga 1 week ago to get clearance for an eyelid surgery.  Subsequent to this, she had an episode of tachycardia (P=133) after a breathing treatment.  She was also still having shortness of breath and her BP was elevated.  She was taken to the hospital for evaluation.  Her evaluation revealed a pulmonary embolism.  She is now on a blood thinner (eloquis).  She had a few nose bleeds while in the hospital but nothing since then.  She had some sinus pressure for the past 2 days and was given amoxicillin by her doctor yesterday.  She hasn't had any hemoptysis.  She has some concerns about being on a blood thinner with her history of hemoptysis.    Review of Systems   Constitution: Positive for fever and malaise/fatigue. Negative for chills, decreased appetite, weakness, night sweats, weight gain and weight loss.   HENT: Positive for congestion. Negative for ear pain, headaches, hearing loss, hoarse voice, sore throat and tinnitus.    Eyes: Negative for blurred vision, redness and visual disturbance.   Cardiovascular: Negative for chest pain, leg swelling and palpitations.   Respiratory: Positive for cough, shortness of breath, sputum production and wheezing. Negative for hemoptysis.    Hematologic/Lymphatic: Negative for adenopathy. Does not bruise/bleed easily.   Skin: Negative for dry skin, itching, rash and suspicious lesions.   Musculoskeletal: Positive for back pain and neck pain. Negative for joint pain and myalgias.   Gastrointestinal: Negative for abdominal pain, constipation, diarrhea, heartburn, nausea and vomiting.   Genitourinary: Negative for dysuria, flank  pain, frequency, hematuria, hesitancy and urgency.   Neurological: Negative for dizziness, numbness and paresthesias.   Psychiatric/Behavioral: Negative for depression and memory loss. The patient does not have insomnia and is not nervous/anxious.      Objective:   Physical Exam   Constitutional: She is oriented to person, place, and time. She appears well-developed and well-nourished. No distress.   HENT:   Head: Normocephalic and atraumatic.   Right Ear: External ear normal.   Left Ear: External ear normal.   Nose: Nose normal.   Mouth/Throat: Oropharynx is clear and moist. No oropharyngeal exudate.   Eyes: Conjunctivae and EOM are normal. Pupils are equal, round, and reactive to light. Right eye exhibits no discharge. Left eye exhibits no discharge. No scleral icterus.   Neck: Normal range of motion. Neck supple. No JVD present. No tracheal deviation present. No thyromegaly present.   Cardiovascular: Normal rate, regular rhythm, normal heart sounds and intact distal pulses.  Exam reveals no gallop and no friction rub.    No murmur heard.  Pulmonary/Chest: Effort normal. No stridor. No respiratory distress. She has rales. She exhibits no tenderness.   Abdominal: Soft. Bowel sounds are normal. She exhibits no distension and no mass. There is no tenderness. There is no rebound and no guarding.   Musculoskeletal: Normal range of motion. She exhibits no edema or tenderness.   Lymphadenopathy:     She has no cervical adenopathy.   Neurological: She is alert and oriented to person, place, and time. She displays normal reflexes. No cranial nerve deficit. She exhibits normal muscle tone. Coordination normal.   Skin: Skin is warm. No rash noted. She is not diaphoretic. No erythema. No pallor.   Psychiatric: She has a normal mood and affect. Her behavior is normal. Judgment and thought content normal.   Nursing note and vitals reviewed.    Assessment:       1. Bronchiectasis without complication    2. Cavitary lung disease         Ms. Asencio is a 65 y/o with history of cavitary lung disease, bronchiectasis and prior history of hemoptysis.  She has been recently diagnosed with pulmonary embolism and is now on a blood thinner.  She has some concerns about how risk of bleeding while on the blood thinner.  I informed her to notify us if she has any episodes of hemoptysis.  She will see Dr. Zuñiga later today as well.  Plan:       Bronchiectasis without complication        - Patient is to notify us of any complications or hemoptysis.    Cavitary lung disease        - Patient is to notify us of any complications or hemoptysis.

## 2017-01-18 ENCOUNTER — DOCUMENTATION ONLY (OUTPATIENT)
Dept: PRIMARY CARE CLINIC | Facility: CLINIC | Age: 67
End: 2017-01-18

## 2017-01-18 ENCOUNTER — TELEPHONE (OUTPATIENT)
Dept: FAMILY MEDICINE | Facility: CLINIC | Age: 67
End: 2017-01-18

## 2017-01-18 NOTE — TELEPHONE ENCOUNTER
I called pt and notified her that Dr. Luna received a letter stating that her bathtub seat was denied from her insurance company and can buy one from a [employer] company.Thanks

## 2017-01-18 NOTE — PATIENT INSTRUCTIONS
Continue present respiratory medications (roles reviewed at today's visit).  Anticipate 3-6 months anticoagulation therapy, then Vascular Medicine   assessment regarding long term anti-coagulation.  Return visit 3 months.

## 2017-01-18 NOTE — PROGRESS NOTES
I received a call from Daniela, from [employer]. She informed me she needed home health orders signed by the MD as well as the last clinical MD note.   I refaxed home health orders and last clinical MD note to [phone].  3:40 PM I spoke with Daniela who stated she had received above documents.

## 2017-01-18 NOTE — PROGRESS NOTES
Subjective:       Patient ID: Yasmin Asencio is a 66 y.o. female.    Chief Complaint: Bronchiectasis    HPI HISTORY OF PRESENT ILLNESS:  Mrs. Asencio is a 66-year-old former smoker, who   returns to follow up the recent diagnosis of pulmonary embolism.  She explains   that on January 5, she developed an episode of palpitations, which prompted her   to go to the Emergency Department near her home.  Her evaluation there led to   the diagnosis of pulmonary emboli.  She had not experienced any chest pain,   hemoptysis, or fever leading up to this episode.  She had been engaged in her   usual activities in days leading up to this event.    Since that admission, she has been on treatment with Apixaban.  She is concerned   about having to take this anticoagulation therapy in view of her past episodes   of hemoptysis.    Mrs. Asencio does not know of any past history for thromboembolic disease.    She believes that her sister had problems with blood clots in association with   malignancy.  Otherwise, she does not know of any family history for coagulation   disorders.    Mrs. Asencio remains on a regimen of respiratory medicines that include Anoro   once daily, and Atrovent aerosol treatments every four to six hours as needed,   3% saline aerosol treatments twice daily, and Mucinex.  She has developed a   pressure like discomfort over her maxillary sinuses and yesterday began taking   amoxicillin for suspected acute sinusitis.      CB/HN  dd: 01/17/2017 20:17:41 (CST)  td: 01/18/2017 10:37:17 (CST)  Doc ID   #[id number]  Job ID #[id number]    CC:       Review of Systems   Constitutional: Negative for fever and fatigue.   HENT: Positive for sinus pressure and congestion. Negative for postnasal drip and voice change.    Respiratory: Positive for cough, sputum production and dyspnea on extertion. Negative for shortness of breath and wheezing.    Cardiovascular: Positive for palpitations. Negative for chest pain and leg  swelling.   Genitourinary: Negative for difficulty urinating.   Musculoskeletal: Positive for back pain. Negative for arthralgias.   Skin: Negative for rash.   Gastrointestinal: Negative for abdominal pain and acid reflux.   Neurological: Negative for dizziness and weakness.   Hematological: Negative for adenopathy.       Objective:      Physical Exam   Constitutional: She is oriented to person, place, and time. She appears well-developed and well-nourished.   HENT:   Head: Normocephalic.   Nose: Nose normal.   Mouth/Throat: No oropharyngeal exudate.   Neck: Normal range of motion. No JVD present. No tracheal deviation present. No thyromegaly present.   Cardiovascular: Normal rate, regular rhythm and normal heart sounds.    No murmur heard.  Pulmonary/Chest: Symmetric chest wall expansion. No stridor. She has no wheezes. She has no rhonchi. She has rales (coarse rales taj anterior on L). She exhibits no tenderness.   Abdominal: Soft. There is no tenderness.   Musculoskeletal: She exhibits no edema.   Lymphadenopathy:     She has no cervical adenopathy.   Neurological: She is alert and oriented to person, place, and time.   Skin: Skin is warm and dry. No rash noted. No erythema. Nails show no clubbing.   Psychiatric: She has a normal mood and affect.   Vitals reviewed.    Personal Diagnostic Review    No flowsheet data found.      Assessment:       1. Other pulmonary embolism without acute cor pulmonale, unspecified chronicity    2. Acquired bronchiectasis    3. Degeneration of lumbar or lumbosacral intervertebral disc    4. Pseudomonas respiratory infection    5. Chronic systolic heart failure        Outpatient Encounter Prescriptions as of 1/17/2017   Medication Sig Dispense Refill    alprazolam (XANAX) 0.25 MG tablet Take 1 tablet (0.25 mg total) by mouth nightly as needed for Anxiety. 30 tablet 2    amoxicillin (AMOXIL) 875 MG tablet Take 1 tablet (875 mg total) by mouth 2 (two) times daily. 20 tablet 0     ANORO ELLIPTA 62.5-25 mcg/actuation DsDv Inhale 1 puff into the lungs once daily. 180 each 4    apixaban 5 mg Tab Take 1 tablet (5 mg total) by mouth 2 (two) times daily. 60 tablet 0    desonide (DESOWEN) 0.05 % lotion AAA face bid prn redness, scaling, or itching      desoximetasone (TOPICORT) 0.25 % cream Apply topically 2 (two) times daily.        escitalopram oxalate (LEXAPRO) 20 MG tablet TAKE ONE TABLET BY MOUTH ONCE DAILY 30 tablet 5    gabapentin (NEURONTIN) 300 MG capsule Take 1 capsule (300 mg total) by mouth 3 (three) times daily. 90 capsule 2    guaifenesin (MUCINEX) 600 mg 12 hr tablet Take 1,200 mg by mouth 2 (two) times daily.      ipratropium (ATROVENT) 0.02 % nebulizer solution Take 2.5 mLs (500 mcg total) by nebulization 4 (four) times daily. 225 mL 10    LACTOBACILLUS RHAMNOSUS GG (CULTURELLE ORAL) Take by mouth once daily.       metoprolol tartrate (LOPRESSOR) 25 MG tablet TAKE ONE TABLET BY MOUTH TWICE DAILY 60 tablet 5    omeprazole (PRILOSEC) 20 MG capsule TAKE ONE CAPSULE BY MOUTH ONCE DAILY 90 capsule 0    primidone (MYSOLINE) 50 MG Tab TAKE ONE TABLET BY MOUTH ONCE DAILY IN THE MORNING, ONE AT NOON, AND TWO AT BEDTIME 120 tablet 11    SODIUM CHLORIDE FOR INHALATION (SODIUM CHLORIDE 3%) 3 % nebulizer solution USE ONE VIAL IN NEBULIZER TWICE DAILY 240 mL 3    topiramate (TOPAMAX) 25 MG tablet Take 1 tablet (25 mg total) by mouth 2 (two) times daily. 180 tablet 3    tramadol (ULTRAM) 50 mg tablet Take 50 mg by mouth every 6 (six) hours as needed for Pain.      VITAMIN D2 50,000 unit capsule TAKE ONE CAPSULE BY MOUTH ONCE A WEEK 4 capsule 5     No facility-administered encounter medications on file as of 1/17/2017.      No orders of the defined types were placed in this encounter.    Plan:     Continue present respiratory medications (roles reviewed at today's visit).  Anticipate 3-6 months anticoagulation therapy, then Vascular Medicine   assessment regarding long term  anti-coagulation.  Return visit 3 months.

## 2017-01-23 ENCOUNTER — OFFICE VISIT (OUTPATIENT)
Dept: FAMILY MEDICINE | Facility: CLINIC | Age: 67
End: 2017-01-23
Payer: MEDICARE

## 2017-01-23 ENCOUNTER — HOSPITAL ENCOUNTER (OUTPATIENT)
Dept: RADIOLOGY | Facility: HOSPITAL | Age: 67
Discharge: HOME | End: 2017-01-23
Attending: NURSE PRACTITIONER
Payer: MEDICARE

## 2017-01-23 VITALS
DIASTOLIC BLOOD PRESSURE: 64 MMHG | OXYGEN SATURATION: 90 % | HEIGHT: 63 IN | WEIGHT: 152.44 LBS | SYSTOLIC BLOOD PRESSURE: 135 MMHG | HEART RATE: 72 BPM | BODY MASS INDEX: 27.01 KG/M2 | TEMPERATURE: 98 F

## 2017-01-23 DIAGNOSIS — R06.02 SOB (SHORTNESS OF BREATH): ICD-10-CM

## 2017-01-23 DIAGNOSIS — J32.9 SINUSITIS, UNSPECIFIED CHRONICITY, UNSPECIFIED LOCATION: ICD-10-CM

## 2017-01-23 DIAGNOSIS — I12.9 BENIGN HYPERTENSION WITH CHRONIC KIDNEY DISEASE, STAGE III: ICD-10-CM

## 2017-01-23 DIAGNOSIS — R06.02 SOB (SHORTNESS OF BREATH): Primary | ICD-10-CM

## 2017-01-23 DIAGNOSIS — N18.30 BENIGN HYPERTENSION WITH CHRONIC KIDNEY DISEASE, STAGE III: ICD-10-CM

## 2017-01-23 PROCEDURE — 3078F DIAST BP <80 MM HG: CPT | Mod: S$GLB,,, | Performed by: NURSE PRACTITIONER

## 2017-01-23 PROCEDURE — 1157F ADVNC CARE PLAN IN RCRD: CPT | Mod: S$GLB,,, | Performed by: NURSE PRACTITIONER

## 2017-01-23 PROCEDURE — 1126F AMNT PAIN NOTED NONE PRSNT: CPT | Mod: S$GLB,,, | Performed by: NURSE PRACTITIONER

## 2017-01-23 PROCEDURE — 99214 OFFICE O/P EST MOD 30 MIN: CPT | Mod: S$GLB,,, | Performed by: NURSE PRACTITIONER

## 2017-01-23 PROCEDURE — 3075F SYST BP GE 130 - 139MM HG: CPT | Mod: S$GLB,,, | Performed by: NURSE PRACTITIONER

## 2017-01-23 PROCEDURE — 1160F RVW MEDS BY RX/DR IN RCRD: CPT | Mod: S$GLB,,, | Performed by: NURSE PRACTITIONER

## 2017-01-23 PROCEDURE — 99499 UNLISTED E&M SERVICE: CPT | Mod: S$GLB,,, | Performed by: NURSE PRACTITIONER

## 2017-01-23 PROCEDURE — 1159F MED LIST DOCD IN RCRD: CPT | Mod: S$GLB,,, | Performed by: NURSE PRACTITIONER

## 2017-01-23 PROCEDURE — 99999 PR PBB SHADOW E&M-EST. PATIENT-LVL IV: CPT | Mod: PBBFAC,,, | Performed by: NURSE PRACTITIONER

## 2017-01-23 PROCEDURE — 71020 XR CHEST PA AND LATERAL: CPT | Mod: 26,,, | Performed by: RADIOLOGY

## 2017-01-23 PROCEDURE — 71020 XR CHEST PA AND LATERAL: CPT | Mod: TC,PO

## 2017-01-26 ENCOUNTER — TELEPHONE (OUTPATIENT)
Dept: PRIMARY CARE CLINIC | Facility: CLINIC | Age: 67
End: 2017-01-26

## 2017-01-27 ENCOUNTER — OFFICE VISIT (OUTPATIENT)
Dept: PRIMARY CARE CLINIC | Facility: CLINIC | Age: 67
End: 2017-01-27
Payer: MEDICARE

## 2017-01-27 VITALS
WEIGHT: 152.56 LBS | BODY MASS INDEX: 27.03 KG/M2 | HEIGHT: 63 IN | HEART RATE: 68 BPM | OXYGEN SATURATION: 96 % | SYSTOLIC BLOOD PRESSURE: 140 MMHG | DIASTOLIC BLOOD PRESSURE: 64 MMHG

## 2017-01-27 DIAGNOSIS — J47.9 ACQUIRED BRONCHIECTASIS: ICD-10-CM

## 2017-01-27 DIAGNOSIS — I26.99 OTHER PULMONARY EMBOLISM WITHOUT ACUTE COR PULMONALE, UNSPECIFIED CHRONICITY: Primary | ICD-10-CM

## 2017-01-27 DIAGNOSIS — D63.8 ANEMIA OF CHRONIC DISEASE: Chronic | ICD-10-CM

## 2017-01-27 DIAGNOSIS — J43.9 PULMONARY EMPHYSEMA, UNSPECIFIED EMPHYSEMA TYPE: ICD-10-CM

## 2017-01-27 DIAGNOSIS — J96.11 CHRONIC RESPIRATORY FAILURE WITH HYPOXIA: ICD-10-CM

## 2017-01-27 DIAGNOSIS — K21.9 GASTROESOPHAGEAL REFLUX DISEASE WITHOUT ESOPHAGITIS: Chronic | ICD-10-CM

## 2017-01-27 DIAGNOSIS — G25.0 ESSENTIAL TREMOR: ICD-10-CM

## 2017-01-27 PROCEDURE — 99999 PR PBB SHADOW E&M-EST. PATIENT-LVL III: CPT | Mod: PBBFAC,,, | Performed by: NURSE PRACTITIONER

## 2017-01-27 PROCEDURE — 1160F RVW MEDS BY RX/DR IN RCRD: CPT | Mod: S$GLB,,, | Performed by: NURSE PRACTITIONER

## 2017-01-27 PROCEDURE — 3078F DIAST BP <80 MM HG: CPT | Mod: S$GLB,,, | Performed by: NURSE PRACTITIONER

## 2017-01-27 PROCEDURE — 1157F ADVNC CARE PLAN IN RCRD: CPT | Mod: S$GLB,,, | Performed by: NURSE PRACTITIONER

## 2017-01-27 PROCEDURE — 1125F AMNT PAIN NOTED PAIN PRSNT: CPT | Mod: S$GLB,,, | Performed by: NURSE PRACTITIONER

## 2017-01-27 PROCEDURE — 99499 UNLISTED E&M SERVICE: CPT | Mod: S$GLB,,, | Performed by: HOSPITALIST

## 2017-01-27 PROCEDURE — 1159F MED LIST DOCD IN RCRD: CPT | Mod: S$GLB,,, | Performed by: NURSE PRACTITIONER

## 2017-01-27 PROCEDURE — 3077F SYST BP >= 140 MM HG: CPT | Mod: S$GLB,,, | Performed by: NURSE PRACTITIONER

## 2017-01-27 PROCEDURE — 99213 OFFICE O/P EST LOW 20 MIN: CPT | Mod: S$GLB,,, | Performed by: NURSE PRACTITIONER

## 2017-01-27 NOTE — PROGRESS NOTES
"[facility]  Follow-up Visit Progress Note     PRESENTING HISTORY     PCP: Urmila Luna MD  Chief Complaint/Reason for Visit:  Follow up visit from recent visit.  Last Priority Clinic Visit: 1/11/2017    Chief Complaint   Patient presents with    Follow-up       History of Present Illness:  HPI:   66 year old female with PMH of pulmonary hemorrhage,S/P embolization in 11/2015 and pseudomonas pneumonia in 2016,has been treated with IV Abx, hypertension and COPD,AOCD,atrial tachycardia,depression,chronic slurred speech,has been followed by neurology,GERD,presents complaining of palpitations described as a "pounding" in her chest that started last night at approximately 11 PM after she had 2 back-to-back Atrovent nebulizer treatments. She also reports that she has elevated blood pressure since earlier tonight. She states she has a history of a-fib that occurred about 1.5 years ago while she was having a procedure. She states she was sent home with a monitor for 3 days but it did not occur at any other time since then.she had unremarkable LHC on 6/2016 by , She otherwise denies fever, chills, chest pain, leg pain, leg swelling, abdominal pain, nausea, vomiting, or diarrhea at this time.  Patient has PE in right pulmonary branch, patient denies history of PE and DVT ,no family history of clot disorder or long time recent travel,hospitalization,due to history of hemoptysis, patient has not been yet started on anticoagulation, pulmonology has been consulted, she denies hemoptysis since embolization and treatment of pseudomonas pneumonia in last 7 month, CTA chest shows no sign of hemorrhage and her CBC shows chronic stable anemia of chronic disease.  She remains on O2 via NC.        Hospital Course:   67 y/o female with h/o pulmonary hemorrhage, s/p embolization in 11/2015 and pseudomonas pneumonia in 2016 who was admitted with PE in the right pulmonary branch. Given her h/o pulmonary hemorrhage, patient " was evaluated by pulmonary prior to initiation with anticoagulation. Pt did not have any signs of hemorrhage; after discussion with Pulmonary, patient, and family were agreeable to start lovenox and monitor patient in the hospital. Pt did not have any hemoptysis after initiation of anticoagulation with lovenox and her H/H remained overall stable. Again, discussion with Pulmonary, myself and family was made prior to conversion to switch over to oral anticoagulation. Treatment choices were discussed with patient and family, and did not want coumadin but was agreeable to Eliquis. They were made aware and understand that Eliquis does not have a reversal agent currently at this time, but wished to go forward with treatment. Pt was very nervous to start this medication, therefore, patient was converted to Eliquis and was observed overnight on this medication. Pt had no hemoptysis and H/H stable. Pt was discharge on Eliquis with respiratory status that was stable. Pt is O2 dependent due to her COPD and bronchiectasis, and she was at her baseline. Pt arranged for close f/u with PCP and Pulmonary.     Today:  The patient has no complaints of hemoptysis and is tolerating the oral anticoagulant well.  She denies chest pain, but still has some complaints of SOB.    Review of Systems:  Constitutional: no fever or chills, negative for fatigue and malaise  Respiratory: positive for intermittent dyspnea  Cardiovascular: positive for chest pressure/discomfort  Gastrointestinal: no nausea or vomiting, no abdominal pain or change in bowel habits  Genitourinary: no hematuria or dysuria  Integument/Breast: no rash or pruritis  Allergy/Immunology: recent sinus infection  Neurological: positive for tremors  Behavioral/Psych: no auditory or visual hallucinations, positive for anxiety  Endocrine: no heat or cold intolerance    PAST HISTORY:     Past Medical History   Diagnosis Date    Acquired bronchiectasis      due to history of TB -  followed by pulmonary, Dr. Zuñiga    Allergy     Amblyopia      rt eye per pt    Anemia of other chronic disease     Anxiety     Cataract     Clotting disorder     COPD (chronic obstructive pulmonary disease)     COPD (chronic obstructive pulmonary disease)     Depression     Diverticulosis     Essential tremor     GERD (gastroesophageal reflux disease)     Hemangioma of liver     History of tuberculosis 1978    Hypertension     Mixed anxiety and depressive disorder     Psoriasis     S/P PICC central line placement Apr. 2016 - May 2016    Skin disease      Psoriasis    Spondylosis without myelopathy 8/23/2013    Thoracic aorta atherosclerosis      noted on CT scan of chest 1/3/2011    Tuberculosis     Vaginal delivery      x2    Vitamin D deficiency        Past Surgical History   Procedure Laterality Date    Gallbladder surgery  9/2011    Cataract extraction w/  intraocular lens implant  07/24/12     od dr spain    Cataract extraction w/  intraocular lens implant  08/07/12     left eye    Eye surgery       bilateral Cataract       Family History   Problem Relation Age of Onset    Hypertension Mother     Heart attack Mother     Cancer Father      liver and bladder    Hyperlipidemia Sister     Psoriasis Sister     Cancer Brother      liver    Stroke Maternal Uncle     Cancer Maternal Aunt      stomach    Lung cancer Sister     Heart attack Brother     Heart attack Son     Amblyopia Neg Hx     Blindness Neg Hx     Cataracts Neg Hx     Glaucoma Neg Hx     Macular degeneration Neg Hx     Retinal detachment Neg Hx     Strabismus Neg Hx     Thyroid disease Neg Hx     Melanoma Neg Hx     Lupus Neg Hx     Eczema Neg Hx     COPD Neg Hx        Social History     Social History    Marital status:      Spouse name: N/A    Number of children: 2    Years of education: N/A     Occupational History    housewife      Social History Main Topics    Smoking status:  Former Smoker     Packs/day: 2.00     Years: 50.00     Types: Cigarettes     Quit date: 2009    Smokeless tobacco: Never Used    Alcohol use No    Drug use: No    Sexual activity: Yes     Partners: Male     Other Topics Concern    Are You Pregnant Or Think You May Be? No    Breast-Feeding No     Social History Narrative    One child  of a heart attack at age 35.       MEDICATIONS & ALLERGIES:     Current Outpatient Prescriptions on File Prior to Visit   Medication Sig Dispense Refill    alprazolam (XANAX) 0.25 MG tablet Take 1 tablet (0.25 mg total) by mouth nightly as needed for Anxiety. 30 tablet 2    ANORO ELLIPTA 62.5-25 mcg/actuation DsDv Inhale 1 puff into the lungs once daily. 180 each 4    apixaban 5 mg Tab Take 1 tablet (5 mg total) by mouth 2 (two) times daily. 60 tablet 0    desoximetasone (TOPICORT) 0.25 % cream Apply topically 2 (two) times daily.        escitalopram oxalate (LEXAPRO) 20 MG tablet TAKE ONE TABLET BY MOUTH ONCE DAILY 30 tablet 5    gabapentin (NEURONTIN) 300 MG capsule Take 1 capsule (300 mg total) by mouth 3 (three) times daily. 90 capsule 2    guaifenesin (MUCINEX) 600 mg 12 hr tablet Take 1,200 mg by mouth 2 (two) times daily.      ipratropium (ATROVENT) 0.02 % nebulizer solution Take 2.5 mLs (500 mcg total) by nebulization 4 (four) times daily. 225 mL 10    LACTOBACILLUS RHAMNOSUS GG (CULTURELLE ORAL) Take by mouth once daily.       metoprolol tartrate (LOPRESSOR) 25 MG tablet TAKE ONE TABLET BY MOUTH TWICE DAILY 60 tablet 5    omeprazole (PRILOSEC) 20 MG capsule TAKE ONE CAPSULE BY MOUTH ONCE DAILY 90 capsule 0    primidone (MYSOLINE) 50 MG Tab TAKE ONE TABLET BY MOUTH ONCE DAILY IN THE MORNING, ONE AT NOON, AND TWO AT BEDTIME 120 tablet 11    SODIUM CHLORIDE FOR INHALATION (SODIUM CHLORIDE 3%) 3 % nebulizer solution USE ONE VIAL IN NEBULIZER TWICE DAILY 240 mL 3    topiramate (TOPAMAX) 25 MG tablet Take 1 tablet (25 mg total) by mouth 2 (two) times  daily. 180 tablet 3    tramadol (ULTRAM) 50 mg tablet Take 50 mg by mouth every 6 (six) hours as needed for Pain.      VITAMIN D2 50,000 unit capsule TAKE ONE CAPSULE BY MOUTH ONCE A WEEK 4 capsule 5    [] amoxicillin (AMOXIL) 875 MG tablet Take 1 tablet (875 mg total) by mouth 2 (two) times daily. 20 tablet 0    desonide (DESOWEN) 0.05 % lotion AAA face bid prn redness, scaling, or itching       No current facility-administered medications on file prior to visit.         Review of patient's allergies indicates:   Allergen Reactions    Bactrim [sulfamethoxazole-trimethoprim] Rash     Was hospitalized for rash    Albuterol Other (See Comments)     tremors    Restasis [cyclosporine] Itching       Medications Reconciliation:   I have reconciled the patient's home medications and discharge medications with the patient/family. I have updated all changes.  Refer to After-Visit Medication List.    OBJECTIVE:     Vital Signs:  Vitals:    17 1339   BP: (!) 140/64   Pulse: 68     Wt Readings from Last 1 Encounters:   17 1339 69.2 kg (152 lb 8.9 oz)     Body mass index is 27.02 kg/(m^2).     Physical Exam:  General: no distress, Chronically ill appearing female in NAD cooperative with exam  Eyes: conjunctivae/corneas clear. PERRL..  HENT: Head:normocephalic, atraumatic. Ears:not examined. Nose: Nares normal. Septum midline. Mucosa normal. No drainage or sinus tenderness., no discharge. Throat: no throat erythema.  Neck: supple, symmetrical, trachea midline, no JVD and thyroid not enlarged, symmetric, no tenderness/mass/nodules  Lungs:  clear to auscultation bilaterally and normal respiratory effort  Cardiovascular: Heart: regular rate and rhythm, S1, S2 normal, no murmur, click, rub or gallop. Chest Wall: no tenderness. Extremities: no cyanosis or edema, or clubbing. Pulses: 2+ and symmetric.  Abdomen/Rectal: Abdomen: soft, non-tender non-distented; bowel sounds normal; no masses,  no organomegaly.  Rectal: not examined  Skin: Skin color, texture, turgor normal. No rashes or lesions  Lymph Nodes: No cervical or supraclavicular adenopathy  Psych/Behavioral:  Normal. and Alert and oriented, appropriate affect.    Laboratory  Lab Results   Component Value Date    WBC 7.23 01/08/2017    HGB 10.8 (L) 01/08/2017    HCT 34.1 (L) 01/08/2017     01/08/2017    CHOL 231 (H) 01/07/2016    TRIG 219 (H) 01/07/2016    HDL 58 01/07/2016    ALT 48 (H) 01/08/2017    AST 27 01/08/2017     01/08/2017    K 4.1 01/08/2017     01/08/2017    CREATININE 0.9 01/08/2017    BUN 18 01/08/2017    CO2 27 01/08/2017    TSH 1.801 05/24/2016    INR 1.0 01/05/2017    HGBA1C 5.5 02/26/2015         Diagnostic Results:  None further      ASSESSMENT & PLAN:     HIGH RISK CONDITION(S): Acute pulmonary embolism    Other pulmonary embolism without acute cor pulmonale, unspecified chronicity  -Continue current anti-coagulation    Anemia of chronic disease  -H/H is stable    Gastroesophageal reflux disease without esophagitis  -Continue PPI    Essential tremor  -Stable    Pulmonary emphysema, unspecified emphysema type  -Continue supplemental oxygen at bedtime    Chronic respiratory failure with hypoxia  -Continue supplemental oxygen at bedtime    Acquired bronchiectasis  -Treatment as above    Instructions for the patient: As above      Scheduled Follow-up :  Future Appointments  Date Time Provider Department Center   2/20/2017 8:30 AM JUVENCIO Noland II, PhD [facility] NEURPSY [facility]   2/22/2017 8:40 AM Urmila Luna MD [facility] MED [place]       After Visit Medication List :     Medication List          This list is accurate as of: 1/27/17  2:09 PM.  Always use your most recent med list.                     alprazolam 0.25 MG tablet   Commonly known as:  XANAX   Take 1 tablet (0.25 mg total) by mouth nightly as needed for Anxiety.       ANORO ELLIPTA 62.5-25 mcg/actuation Dsdv   Generic drug:  umeclidinium-vilanterol   Inhale 1 puff  into the lungs once daily.       apixaban 5 mg Tab   Take 1 tablet (5 mg total) by mouth 2 (two) times daily.       CULTURELLE ORAL       desonide 0.05 % lotion   Commonly known as:  DESOWEN       desoximetasone 0.25 % cream   Commonly known as:  TOPICORT       escitalopram oxalate 20 MG tablet   Commonly known as:  LEXAPRO   TAKE ONE TABLET BY MOUTH ONCE DAILY       gabapentin 300 MG capsule   Commonly known as:  NEURONTIN   Take 1 capsule (300 mg total) by mouth 3 (three) times daily.       guaifenesin 600 mg 12 hr tablet   Commonly known as:  MUCINEX       ipratropium 0.02 % nebulizer solution   Commonly known as:  ATROVENT   Take 2.5 mLs (500 mcg total) by nebulization 4 (four) times daily.       metoprolol tartrate 25 MG tablet   Commonly known as:  LOPRESSOR   TAKE ONE TABLET BY MOUTH TWICE DAILY       omeprazole 20 MG capsule   Commonly known as:  PRILOSEC   TAKE ONE CAPSULE BY MOUTH ONCE DAILY       primidone 50 MG Tab   Commonly known as:  MYSOLINE   TAKE ONE TABLET BY MOUTH ONCE DAILY IN THE MORNING, ONE AT NOON, AND TWO AT BEDTIME       sodium chloride 3% 3 % nebulizer solution   USE ONE VIAL IN NEBULIZER TWICE DAILY       topiramate 25 MG tablet   Commonly known as:  TOPAMAX   Take 1 tablet (25 mg total) by mouth 2 (two) times daily.       tramadol 50 mg tablet   Commonly known as:  ULTRAM       VITAMIN D2 50,000 unit Cap   Generic drug:  ergocalciferol   TAKE ONE CAPSULE BY MOUTH ONCE A WEEK             Signing Physician:  Faith Erazo MD

## 2017-02-02 ENCOUNTER — PATIENT MESSAGE (OUTPATIENT)
Dept: NEUROLOGY | Facility: CLINIC | Age: 67
End: 2017-02-02

## 2017-02-06 PROCEDURE — 99496 TRANSJ CARE MGMT HIGH F2F 7D: CPT | Mod: S$GLB,,, | Performed by: NURSE PRACTITIONER

## 2017-02-06 RX ORDER — OMEPRAZOLE 20 MG/1
CAPSULE, DELAYED RELEASE ORAL
Qty: 90 CAPSULE | Refills: 0 | Status: SHIPPED | OUTPATIENT
Start: 2017-02-06 | End: 2017-05-05 | Stop reason: SDUPTHER

## 2017-02-07 ENCOUNTER — TELEPHONE (OUTPATIENT)
Dept: PULMONOLOGY | Facility: CLINIC | Age: 67
End: 2017-02-07

## 2017-02-08 ENCOUNTER — OFFICE VISIT (OUTPATIENT)
Dept: CARDIOLOGY | Facility: CLINIC | Age: 67
End: 2017-02-08
Payer: MEDICARE

## 2017-02-08 VITALS
OXYGEN SATURATION: 97 % | BODY MASS INDEX: 27.15 KG/M2 | WEIGHT: 153.25 LBS | HEIGHT: 63 IN | SYSTOLIC BLOOD PRESSURE: 178 MMHG | DIASTOLIC BLOOD PRESSURE: 78 MMHG | HEART RATE: 64 BPM

## 2017-02-08 DIAGNOSIS — I47.19 ECTOPIC ATRIAL TACHYCARDIA: Primary | ICD-10-CM

## 2017-02-08 DIAGNOSIS — I26.99 OTHER PULMONARY EMBOLISM WITHOUT ACUTE COR PULMONALE, UNSPECIFIED CHRONICITY: ICD-10-CM

## 2017-02-08 DIAGNOSIS — M48.062 SPINAL STENOSIS, LUMBAR REGION, WITH NEUROGENIC CLAUDICATION: ICD-10-CM

## 2017-02-08 PROCEDURE — 99999 PR PBB SHADOW E&M-EST. PATIENT-LVL III: CPT | Mod: PBBFAC,,, | Performed by: INTERNAL MEDICINE

## 2017-02-08 PROCEDURE — 3077F SYST BP >= 140 MM HG: CPT | Mod: S$GLB,,, | Performed by: INTERNAL MEDICINE

## 2017-02-08 PROCEDURE — 1160F RVW MEDS BY RX/DR IN RCRD: CPT | Mod: S$GLB,,, | Performed by: INTERNAL MEDICINE

## 2017-02-08 PROCEDURE — 99214 OFFICE O/P EST MOD 30 MIN: CPT | Mod: S$GLB,,, | Performed by: INTERNAL MEDICINE

## 2017-02-08 PROCEDURE — 1157F ADVNC CARE PLAN IN RCRD: CPT | Mod: S$GLB,,, | Performed by: INTERNAL MEDICINE

## 2017-02-08 PROCEDURE — 1159F MED LIST DOCD IN RCRD: CPT | Mod: S$GLB,,, | Performed by: INTERNAL MEDICINE

## 2017-02-08 PROCEDURE — 99499 UNLISTED E&M SERVICE: CPT | Mod: S$GLB,,, | Performed by: INTERNAL MEDICINE

## 2017-02-08 PROCEDURE — 1126F AMNT PAIN NOTED NONE PRSNT: CPT | Mod: S$GLB,,, | Performed by: INTERNAL MEDICINE

## 2017-02-08 PROCEDURE — 3078F DIAST BP <80 MM HG: CPT | Mod: S$GLB,,, | Performed by: INTERNAL MEDICINE

## 2017-02-08 NOTE — MR AVS SNAPSHOT
[facility] - Cardiology  [address] [place] [address]  Phone: [phone]                  Yasmin Asencio   2017 2:15 PM   Office Visit    Description:  Female : 1950   Provider:  Taurus Braga MD   Department:  [facility]           Reason for Visit     Tachycardia           Diagnoses this Visit        Comments    Ectopic atrial tachycardia    -  Primary     Spinal stenosis, lumbar region, with neurogenic claudication                To Do List           Future Appointments        Provider Department Dept Phone    2017 8:40 AM Urmila Luna MD [facility] - Family Medicine [phone]    2017 2:00 PM PRECIOUS Zuñiga MD [facility] - Pulmonary Services [phone]      Goals (5 Years of Data)     None      [facility]     [facility] On Call Nurse Care Line -  Assistance  Registered nurses in the [facility] provide clinical advisement, health education, appointment booking, and other advisory services.  Call for this free service at [phone].             Medications           Message regarding Medications     Verify the changes and/or additions to your medication regime listed below are the same as discussed with your clinician today.  If any of these changes or additions are incorrect, please notify your healthcare provider.             Verify that the below list of medications is an accurate representation of the medications you are currently taking.  If none reported, the list may be blank. If incorrect, please contact your healthcare provider. Carry this list with you in case of emergency.           Current Medications     alprazolam (XANAX) 0.25 MG tablet Take 1 tablet (0.25 mg total) by mouth nightly as needed for Anxiety.    ANORO ELLIPTA 62.5-25 mcg/actuation DsDv Inhale 1 puff into the lungs once daily.    apixaban 5 mg Tab Take 1 tablet (5 mg total) by mouth 2 (two) times daily.    desonide (DESOWEN) 0.05 % lotion AAA face  "bid prn redness, scaling, or itching    desoximetasone (TOPICORT) 0.25 % cream Apply topically 2 (two) times daily.      escitalopram oxalate (LEXAPRO) 20 MG tablet TAKE ONE TABLET BY MOUTH ONCE DAILY    gabapentin (NEURONTIN) 300 MG capsule Take 1 capsule (300 mg total) by mouth 3 (three) times daily.    guaifenesin (MUCINEX) 600 mg 12 hr tablet Take 1,200 mg by mouth 2 (two) times daily.    ipratropium (ATROVENT) 0.02 % nebulizer solution Take 2.5 mLs (500 mcg total) by nebulization 4 (four) times daily.    LACTOBACILLUS RHAMNOSUS GG (CULTURELLE ORAL) Take by mouth once daily.     metoprolol tartrate (LOPRESSOR) 25 MG tablet TAKE ONE TABLET BY MOUTH TWICE DAILY    omeprazole (PRILOSEC) 20 MG capsule TAKE ONE CAPSULE BY MOUTH ONCE DAILY    primidone (MYSOLINE) 50 MG Tab TAKE ONE TABLET BY MOUTH ONCE DAILY IN THE MORNING, ONE AT NOON, AND TWO AT BEDTIME    SODIUM CHLORIDE FOR INHALATION (SODIUM CHLORIDE 3%) 3 % nebulizer solution USE ONE VIAL IN NEBULIZER TWICE DAILY    topiramate (TOPAMAX) 25 MG tablet Take 1 tablet (25 mg total) by mouth 2 (two) times daily.    tramadol (ULTRAM) 50 mg tablet Take 50 mg by mouth every 6 (six) hours as needed for Pain.    VITAMIN D2 50,000 unit capsule TAKE ONE CAPSULE BY MOUTH ONCE A WEEK           Clinical Reference Information           Your Vitals Were     BP Pulse Height Weight Last Period SpO2    178/78 (BP Location: Right arm, Patient Position: Sitting, BP Method: Automatic) 64 5' 3" (1.6 m) 69.5 kg (153 lb 3.5 oz) (LMP Unknown) 97%    BMI                27.14 kg/m2          Blood Pressure          Most Recent Value    BP  (!)  178/78      Allergies as of 2/8/2017     Bactrim [Sulfamethoxazole-trimethoprim]    Albuterol    Restasis [Cyclosporine]      Immunizations Administered on Date of Encounter - 2/8/2017     None      Language Assistance Services     ATTENTION: Language assistance services are available, free of charge. Please call [phone].      ATENCIÓN: Si habla " español, tiene a santamaria disposición servicios gratuitos de asistencia lingüística. [place] al [phone].     SLOANE Ý: N?u b?n nói Ti?ng Vi?t, có các d?ch v? h? tr? ngôn ng? mi?n phí dành cho b?n. G?i s? [phone].         [facility] complies with applicable Federal civil rights laws and does not discriminate on the basis of race, color, national origin, age, disability, or sex.

## 2017-02-08 NOTE — PROGRESS NOTES
"Subjective:    Patient ID:  Yasmin Asencio is a 66 y.o. female who presents for follow-up of Tachycardia      HPI     Recently admitted   66 year old female with PMH of pulmonary hemorrhage,S/P embolization in 11/2015 and pseudomonas pneumonia in 2016,has been treated with IV Abx, hypertension and COPD,AOCD,atrial tachycardia,depression,chronic slurred speech,has been followed by neurology,GERD,presents complaining of palpitations described as a "pounding" in her chest that started last night at approximately 11 PM after she had 2 back-to-back Atrovent nebulizer treatments. She also reports that she has elevated blood pressure since earlier tonight. She states she has a history of a-fib that occurred about 1.5 years ago while she was having a procedure. She states she was sent home with a monitor for 3 days but it did not occur at any other time since then.she had unremarkable LHC on 6/2016 by , She otherwise denies fever, chills, chest pain, , leg pain, leg swelling, abdominal pain, nausea, vomiting, and diarrhea at this time.patient has PE in right pulmonary brunch,patient denies history of PE and DVT ,no family history of clot disorder or long time recent travel,hospitalization,duo to history of hemoptysis,patient has not been yet started on anticoagulation,pulmnology has been consulted,she denies hemoptysis sine embolization and treatment of pseudomonas pneumonia in last 7 month,CTA chest show no sign of hemorrhagia and her CBC show chronic stable AOCD.she is stable at this time on NC   O 2.    Hospital Course:   65 y/o female with h/o pulmonary hemorrhage, s/p embolization in 11/2015 and pseudomonas pneumonia in 2016 who was admitted with PE in the right pulmonary brunch. Given her h/o pulmonary hemorrhage, patient was evaluated by pulmonary prior to initiation with anticoagulation. Pt did not have any signs of hemorrhage; after discussion with Pulmonary, patient, and family were agreeable to " start lovenox and monitor patient in the hospital. Pt did not have any hemoptysis after initiation of anticoagulation with lovenox and her H/H remained overall stable. Again, discussion with Pulmonary, myself and family was made prior to conversion to switch over to oral anticoagulation. Treatment choices were discussed with patient and family, and did not want coumadin but was agreeable to Eliquis. They were made aware and understand that Eliquis does not have a reversal agent currently at this time, but wished to go forward with treatment. Pt was very nervous to start this medication, therefore, patient was converted to Eliquis and was observed overnight on this medication. Pt had no hemoptysis and H/H stable. Pt was discharge on Eliquis with respiratory status that was stable. Pt is O2 dependent due to her COPD and bronchiectasis, and she was at her baseline. Pt arranged for close f/u with PCP and Pulmonary.      * Pulmonary embolism without acute cor pulmonale  Patient was at risk for pulmonary hemorrhage due to history of hemorrhage requiring embolization and treatment for pneumonia. Long discussion made with patient and family regarding risk of bleeding after starting anticoagulation. Pt was started on lovenox and closely monitored for hemoptysis. No occurrence of hemoptysis, followed closely with daily CBC. Again, long discussion made on oral anticoagulation and they declined coumadin and was agreeable to Eliquis. Pt was started on this medication and observed overnight without incident. U/S was negative for DVT. Pt to f/u with PCP and Pulmonary.     Acquired bronchiectasis  Due to history of TB, followed by pulmonary (Dr. Zuñiga) and on home oxygen as needed (uses it mostly at night). Respiratory status was closely monitored, and remained stable.     COPD (chronic obstructive pulmonary disease)  Continued with Atrovent, stable. O2 PRN at baseline.     Ectopic atrial tachycardia  Stable, monitored on  telemetry.     Benign hypertension with chronic kidney disease, stage III  Continued metoprolol.     Anemia of chronic disease  Slight drop noted on admission, overall H/H remained stable with no reported bleeding, monitored.      Mild major depression  Continued lexapro.     GERD (gastroesophageal reflux disease)  Continued with PPI.            6/16/16 [facility] EDP 18, EF 55%, normal coronaries  Medical Rx    EF does not appear depressed - ? Recent echo result     Echo 11/29/16    1 - Low normal left ventricular systolic function (EF 50-55%).     2 - Eccentric hypertrophy.     3 - Left ventricular diastolic dysfunction.     4 - Mild left atrial enlargement.     5 - Mild mitral regurgitation.     6 - Trivial tricuspid regurgitation.     Still with almost daily episodes of tachycardia with HR 130s at rest  EKG today NSR 61 otherwise ok  Tolerating eliquis so far       Review of Systems   Constitution: Negative for decreased appetite.   HENT: Negative for ear discharge.    Eyes: Negative for blurred vision.   Respiratory: Negative for hemoptysis.    Endocrine: Negative for polyphagia.   Hematologic/Lymphatic: Negative for adenopathy.   Skin: Negative for color change.   Musculoskeletal: Negative for joint swelling.   Neurological: Negative for brief paralysis.   Psychiatric/Behavioral: Negative for hallucinations.        Objective:    Physical Exam   Constitutional: She is oriented to person, place, and time. She appears well-developed and well-nourished. No distress.   HENT:   Head: Normocephalic and atraumatic.   Nose: Nose normal.   Mouth/Throat: Oropharynx is clear and moist. Mucous membranes are not pale and not dry.   Oral mucosa appears normal- no pallor or cyanosis  No thyromegaly, masses or tenderness   Eyes: Conjunctivae are normal. Right eye exhibits no discharge. Left eye exhibits no discharge. No scleral icterus.   Eye lids normal   Neck: No JVD present. Carotid bruit is not present.   Cardiovascular: Normal  rate, regular rhythm and intact distal pulses.  PMI is not displaced.  Exam reveals no gallop, no S3, no S4 and no friction rub.    Murmur heard.  Pulmonary/Chest: Effort normal. No respiratory distress. She has no wheezes. She has rales (Throughout). She exhibits no tenderness and no deformity.   Abdominal: Soft. Normal aorta and bowel sounds are normal. She exhibits no distension, no abdominal bruit and no mass. There is no hepatosplenomegaly. There is no tenderness. There is no guarding.   No indication for stool study at this time.   Musculoskeletal: She exhibits no edema.   Normal gait  Normal ROM and strength in all 4 extremities  Back does not demonstrate kyphosis or scoliosis   Neurological: She is alert and oriented to person, place, and time.   Skin: Skin is warm and dry. No ecchymosis, no lesion and no petechiae noted. She is not diaphoretic. No cyanosis. No pallor. Nails show no clubbing.   Psychiatric: She has a normal mood and affect. Her behavior is normal.   Vitals reviewed.        Assessment:       1. Ectopic atrial tachycardia    2. Spinal stenosis, lumbar region, with neurogenic claudication    3. Other pulmonary embolism without acute cor pulmonale, unspecified chronicity         Plan:       Will better define tachycardia with a 48 hour holter. If PAF consider adding propafenone or sotalol. If atrial tachycardia consider referral to EP

## 2017-02-13 ENCOUNTER — HOSPITAL ENCOUNTER (OUTPATIENT)
Dept: CARDIOLOGY | Facility: HOSPITAL | Age: 67
Discharge: HOME | End: 2017-02-13
Attending: INTERNAL MEDICINE
Payer: MEDICARE

## 2017-02-13 DIAGNOSIS — M48.062 SPINAL STENOSIS, LUMBAR REGION, WITH NEUROGENIC CLAUDICATION: ICD-10-CM

## 2017-02-13 DIAGNOSIS — I47.19 ECTOPIC ATRIAL TACHYCARDIA: ICD-10-CM

## 2017-02-13 PROCEDURE — 93227 XTRNL ECG REC<48 HR R&I: CPT | Mod: ,,, | Performed by: INTERNAL MEDICINE

## 2017-02-13 PROCEDURE — 93226 XTRNL ECG REC<48 HR SCAN A/R: CPT

## 2017-02-20 ENCOUNTER — TELEPHONE (OUTPATIENT)
Dept: PULMONOLOGY | Facility: CLINIC | Age: 67
End: 2017-02-20

## 2017-02-20 DIAGNOSIS — I26.99 OTHER PULMONARY EMBOLISM WITHOUT ACUTE COR PULMONALE, UNSPECIFIED CHRONICITY: Primary | ICD-10-CM

## 2017-02-20 NOTE — TELEPHONE ENCOUNTER
----- Message from Etelvina Nair sent at 2/20/2017  9:12 AM CST -----  Contact: Patient: [phone]  Patient called and is asking to be called back.  Patient has some ?'s about how to take the Eloiquis medicine.  Patient stated that she takes 2 pills twice a day and the script says take 1 tablet once daily.  She is asking for a new script to be called in with the proper directions.    Patient can be reached at [phone].  Thanks

## 2017-02-21 ENCOUNTER — OFFICE VISIT (OUTPATIENT)
Dept: CARDIOLOGY | Facility: CLINIC | Age: 67
End: 2017-02-21
Payer: MEDICARE

## 2017-02-21 VITALS
OXYGEN SATURATION: 95 % | SYSTOLIC BLOOD PRESSURE: 150 MMHG | HEIGHT: 63 IN | WEIGHT: 150.81 LBS | HEART RATE: 73 BPM | DIASTOLIC BLOOD PRESSURE: 66 MMHG | BODY MASS INDEX: 26.72 KG/M2

## 2017-02-21 DIAGNOSIS — I47.19 ECTOPIC ATRIAL TACHYCARDIA: Primary | ICD-10-CM

## 2017-02-21 DIAGNOSIS — J96.11 CHRONIC RESPIRATORY FAILURE WITH HYPOXIA: ICD-10-CM

## 2017-02-21 DIAGNOSIS — J43.9 PULMONARY EMPHYSEMA, UNSPECIFIED EMPHYSEMA TYPE: ICD-10-CM

## 2017-02-21 DIAGNOSIS — I26.99 OTHER PULMONARY EMBOLISM WITHOUT ACUTE COR PULMONALE, UNSPECIFIED CHRONICITY: ICD-10-CM

## 2017-02-21 PROCEDURE — 1159F MED LIST DOCD IN RCRD: CPT | Mod: S$GLB,,, | Performed by: INTERNAL MEDICINE

## 2017-02-21 PROCEDURE — 1157F ADVNC CARE PLAN IN RCRD: CPT | Mod: S$GLB,,, | Performed by: INTERNAL MEDICINE

## 2017-02-21 PROCEDURE — 3077F SYST BP >= 140 MM HG: CPT | Mod: S$GLB,,, | Performed by: INTERNAL MEDICINE

## 2017-02-21 PROCEDURE — 99214 OFFICE O/P EST MOD 30 MIN: CPT | Mod: S$GLB,,, | Performed by: INTERNAL MEDICINE

## 2017-02-21 PROCEDURE — 99499 UNLISTED E&M SERVICE: CPT | Mod: S$GLB,,, | Performed by: INTERNAL MEDICINE

## 2017-02-21 PROCEDURE — 1160F RVW MEDS BY RX/DR IN RCRD: CPT | Mod: S$GLB,,, | Performed by: INTERNAL MEDICINE

## 2017-02-21 PROCEDURE — 1126F AMNT PAIN NOTED NONE PRSNT: CPT | Mod: S$GLB,,, | Performed by: INTERNAL MEDICINE

## 2017-02-21 PROCEDURE — 3078F DIAST BP <80 MM HG: CPT | Mod: S$GLB,,, | Performed by: INTERNAL MEDICINE

## 2017-02-21 PROCEDURE — 99999 PR PBB SHADOW E&M-EST. PATIENT-LVL III: CPT | Mod: PBBFAC,,, | Performed by: INTERNAL MEDICINE

## 2017-02-21 NOTE — MR AVS SNAPSHOT
[facility] - Cardiology  [address] [facility] [place] [address]  Phone: [phone]                  Yasmin Asencio   2017 2:00 PM   Office Visit    Description:  Female : 1950   Provider:  Taurus Braga MD   Department:  [facility] Cardiology           Reason for Visit     Follow-up     Results                To Do List           Future Appointments        Provider Department Dept Phone    2017 8:40 AM Urmila Luna MD [facility] Family Medicine [phone]    3/28/2017 8:00 AM Dyana Byers, PhD [facility] - Neurology [phone]    2017 2:00 PM PRECIOUS Zuñiga MD [facility] - Pulmonary Services [phone]      Goals (5 Years of Data)     None      [facility] On Call     [facility] On Call Nurse Care Line - / Assistance  Registered nurses in the [facility] On Call Center provide clinical advisement, health education, appointment booking, and other advisory services.  Call for this free service at [phone].             Medications           Message regarding Medications     Verify the changes and/or additions to your medication regime listed below are the same as discussed with your clinician today.  If any of these changes or additions are incorrect, please notify your healthcare provider.             Verify that the below list of medications is an accurate representation of the medications you are currently taking.  If none reported, the list may be blank. If incorrect, please contact your healthcare provider. Carry this list with you in case of emergency.           Current Medications     alprazolam (XANAX) 0.25 MG tablet Take 1 tablet (0.25 mg total) by mouth nightly as needed for Anxiety.    apixaban 5 mg Tab Take 1 tablet (5 mg total) by mouth 2 (two) times daily.    desoximetasone (TOPICORT) 0.25 % cream Apply topically 2 (two) times daily.      escitalopram oxalate (LEXAPRO) 20 MG tablet TAKE ONE TABLET BY MOUTH ONCE DAILY    gabapentin (NEURONTIN) 300 MG  "capsule Take 1 capsule (300 mg total) by mouth 3 (three) times daily.    guaifenesin (MUCINEX) 600 mg 12 hr tablet Take 1,200 mg by mouth 2 (two) times daily.    ipratropium (ATROVENT) 0.02 % nebulizer solution Take 2.5 mLs (500 mcg total) by nebulization 4 (four) times daily.    LACTOBACILLUS RHAMNOSUS GG (CULTURELLE ORAL) Take by mouth once daily.     metoprolol tartrate (LOPRESSOR) 25 MG tablet TAKE ONE TABLET BY MOUTH TWICE DAILY    omeprazole (PRILOSEC) 20 MG capsule TAKE ONE CAPSULE BY MOUTH ONCE DAILY    primidone (MYSOLINE) 50 MG Tab TAKE ONE TABLET BY MOUTH ONCE DAILY IN THE MORNING, ONE AT NOON, AND TWO AT BEDTIME    SODIUM CHLORIDE FOR INHALATION (SODIUM CHLORIDE 3%) 3 % nebulizer solution USE ONE VIAL IN NEBULIZER TWICE DAILY    topiramate (TOPAMAX) 25 MG tablet Take 1 tablet (25 mg total) by mouth 2 (two) times daily.    tramadol (ULTRAM) 50 mg tablet Take 50 mg by mouth every 6 (six) hours as needed for Pain.    VITAMIN D2 50,000 unit capsule TAKE ONE CAPSULE BY MOUTH ONCE A WEEK    desonide (DESOWEN) 0.05 % lotion AAA face bid prn redness, scaling, or itching           Clinical Reference Information           Your Vitals Were     BP Pulse Height Weight Last Period SpO2    150/66 73 5' 3" (1.6 m) 68.4 kg (150 lb 12.7 oz) (LMP Unknown) 95%    BMI                26.71 kg/m2          Blood Pressure          Most Recent Value    BP  (!)  150/66      Allergies as of 2/21/2017     Bactrim [Sulfamethoxazole-trimethoprim]    Albuterol    Restasis [Cyclosporine]      Immunizations Administered on Date of Encounter - 2/21/2017     None      Language Assistance Services     ATTENTION: Language assistance services are available, free of charge. Please call [phone].      ATENCIÓN: Si yas dubois, tiene a santamaria disposición servicios gratuitos de asistencia lingüística. Llame al [phone].     CHÚ Ý: N?u b?n nói Ti?ng Vi?t, có các d?ch v? h? tr? ngôn ng? mi?n phí dành cho b?n. G?i s? [phone].      "    [facility] complies with applicable Federal civil rights laws and does not discriminate on the basis of race, color, national origin, age, disability, or sex.

## 2017-02-21 NOTE — PROGRESS NOTES
"Subjective:    Patient ID:  Yasmin Asencio is a 66 y.o. female who presents for follow-up of Follow-up and Results      HPI     Recently admitted  66 year old female with PMH of pulmonary hemorrhage,S/P embolization in 11/2015 and pseudomonas pneumonia in 2016,has been treated with IV Abx, hypertension and COPD,AOCD,atrial tachycardia,depression,chronic slurred speech,has been followed by neurology,GERD,presents complaining of palpitations described as a "pounding" in her chest that started last night at approximately 11 PM after she had 2 back-to-back Atrovent nebulizer treatments. She also reports that she has elevated blood pressure since earlier tonight. She states she has a history of a-fib that occurred about 1.5 years ago while she was having a procedure. She states she was sent home with a monitor for 3 days but it did not occur at any other time since then.she had unremarkable LHC on 6/2016 by , She otherwise denies fever, chills, chest pain, , leg pain, leg swelling, abdominal pain, nausea, vomiting, and diarrhea at this time.patient has PE in right pulmonary brunch,patient denies history of PE and DVT ,no family history of clot disorder or long time recent travel,hospitalization,duo to history of hemoptysis,patient has not been yet started on anticoagulation,pulmnology has been consulted,she denies hemoptysis sine embolization and treatment of pseudomonas pneumonia in last 7 month,CTA chest show no sign of hemorrhagia and her CBC show chronic stable AOCD.she is stable at this time on NC   O 2.     Hospital Course:   67 y/o female with h/o pulmonary hemorrhage, s/p embolization in 11/2015 and pseudomonas pneumonia in 2016 who was admitted with PE in the right pulmonary brunch. Given her h/o pulmonary hemorrhage, patient was evaluated by pulmonary prior to initiation with anticoagulation. Pt did not have any signs of hemorrhage; after discussion with Pulmonary, patient, and family were " agreeable to start lovenox and monitor patient in the hospital. Pt did not have any hemoptysis after initiation of anticoagulation with lovenox and her H/H remained overall stable. Again, discussion with Pulmonary, myself and family was made prior to conversion to switch over to oral anticoagulation. Treatment choices were discussed with patient and family, and did not want coumadin but was agreeable to Eliquis. They were made aware and understand that Eliquis does not have a reversal agent currently at this time, but wished to go forward with treatment. Pt was very nervous to start this medication, therefore, patient was converted to Eliquis and was observed overnight on this medication. Pt had no hemoptysis and H/H stable. Pt was discharge on Eliquis with respiratory status that was stable. Pt is O2 dependent due to her COPD and bronchiectasis, and she was at her baseline. Pt arranged for close f/u with PCP and Pulmonary.       * Pulmonary embolism without acute cor pulmonale  Patient was at risk for pulmonary hemorrhage due to history of hemorrhage requiring embolization and treatment for pneumonia. Long discussion made with patient and family regarding risk of bleeding after starting anticoagulation. Pt was started on lovenox and closely monitored for hemoptysis. No occurrence of hemoptysis, followed closely with daily CBC. Again, long discussion made on oral anticoagulation and they declined coumadin and was agreeable to Eliquis. Pt was started on this medication and observed overnight without incident. U/S was negative for DVT. Pt to f/u with PCP and Pulmonary.      Acquired bronchiectasis  Due to history of TB, followed by pulmonary (Dr. Zuñiga) and on home oxygen as needed (uses it mostly at night). Respiratory status was closely monitored, and remained stable.      COPD (chronic obstructive pulmonary disease)  Continued with Atrovent, stable. O2 PRN at baseline.      Ectopic atrial tachycardia  Stable,  monitored on telemetry.      Benign hypertension with chronic kidney disease, stage III  Continued metoprolol.      Anemia of chronic disease  Slight drop noted on admission, overall H/H remained stable with no reported bleeding, monitored.       Mild major depression  Continued lexapro.      GERD (gastroesophageal reflux disease)  Continued with PPI.                6/16/16 [facility] EDP 18, EF 55%, normal coronaries  Medical Rx    EF does not appear depressed - ? Recent echo result      Echo 11/29/16    1 - Low normal left ventricular systolic function (EF 50-55%).     2 - Eccentric hypertrophy.     3 - Left ventricular diastolic dysfunction.     4 - Mild left atrial enlargement.     5 - Mild mitral regurgitation.     6 - Trivial tricuspid regurgitation.     Holter 2/13/17  1. Sinus rhythm with heart rates varying between 45 and 128 bpm with an average of 72 bpm.     VENTRICULAR ARRHYTHMIAS  1. There was a single PVC recorded.     2. There were no episodes of ventricular tachycardia.    SUPRA VENTRICULAR ARRHYTHMIAS  1. There were occasional PACs totalling 406 and averaging 16 per hour.     2. There were no episodes of sustained supraventricular tachycardia.    SINUS NODE FUNCTION  1. There was no evidence of high grade SA jennifer block.     AV CONDUCTION  1. There was no evidence of high grade AV block.     Palpitations have improved    Review of Systems   Constitution: Negative for decreased appetite.   HENT: Negative for ear discharge.    Eyes: Negative for blurred vision.   Respiratory: Negative for hemoptysis.    Endocrine: Negative for polyphagia.   Hematologic/Lymphatic: Negative for adenopathy.   Skin: Negative for color change.   Musculoskeletal: Negative for joint swelling.   Neurological: Negative for brief paralysis.   Psychiatric/Behavioral: Negative for hallucinations.        Objective:    Physical Exam   Constitutional: She is oriented to person, place, and time. She appears well-developed and  well-nourished. No distress.   HENT:   Head: Normocephalic and atraumatic.   Nose: Nose normal.   Mouth/Throat: Oropharynx is clear and moist. Mucous membranes are not pale and not dry.   Oral mucosa appears normal- no pallor or cyanosis  No thyromegaly, masses or tenderness   Eyes: Conjunctivae are normal. Right eye exhibits no discharge. Left eye exhibits no discharge. No scleral icterus.   Eye lids normal   Neck: No JVD present. Carotid bruit is not present.   Cardiovascular: Normal rate, regular rhythm and intact distal pulses.  PMI is not displaced.  Exam reveals no gallop, no S3, no S4 and no friction rub.    Murmur heard.  Pulmonary/Chest: Effort normal. No respiratory distress. She has no wheezes. She has rales (Throughout). She exhibits no tenderness and no deformity.   Abdominal: Soft. Normal aorta and bowel sounds are normal. She exhibits no distension, no abdominal bruit and no mass. There is no hepatosplenomegaly. There is no tenderness. There is no guarding.   No indication for stool study at this time.   Musculoskeletal: She exhibits no edema.   Normal gait  Normal ROM and strength in all 4 extremities  Back does not demonstrate kyphosis or scoliosis   Neurological: She is alert and oriented to person, place, and time.   Skin: Skin is warm and dry. No ecchymosis, no lesion and no petechiae noted. She is not diaphoretic. No cyanosis. No pallor. Nails show no clubbing.   Psychiatric: She has a normal mood and affect. Her behavior is normal.   Vitals reviewed.        Assessment:       1. Ectopic atrial tachycardia    2. Other pulmonary embolism without acute cor pulmonale, unspecified chronicity    3. Pulmonary emphysema, unspecified emphysema type    4. Chronic respiratory failure with hypoxia         Plan:       OV 3 months

## 2017-02-22 ENCOUNTER — OFFICE VISIT (OUTPATIENT)
Dept: FAMILY MEDICINE | Facility: CLINIC | Age: 67
End: 2017-02-22
Payer: MEDICARE

## 2017-02-22 ENCOUNTER — LAB VISIT (OUTPATIENT)
Dept: LAB | Facility: HOSPITAL | Age: 67
End: 2017-02-22
Attending: INTERNAL MEDICINE
Payer: MEDICARE

## 2017-02-22 VITALS
WEIGHT: 154.13 LBS | HEIGHT: 63 IN | DIASTOLIC BLOOD PRESSURE: 76 MMHG | TEMPERATURE: 98 F | OXYGEN SATURATION: 90 % | HEART RATE: 77 BPM | BODY MASS INDEX: 27.31 KG/M2 | SYSTOLIC BLOOD PRESSURE: 128 MMHG

## 2017-02-22 DIAGNOSIS — M46.1 SACROILIITIS: ICD-10-CM

## 2017-02-22 DIAGNOSIS — I26.99 OTHER PULMONARY EMBOLISM WITHOUT ACUTE COR PULMONALE, UNSPECIFIED CHRONICITY: ICD-10-CM

## 2017-02-22 DIAGNOSIS — E66.3 OVERWEIGHT (BMI 25.0-29.9): ICD-10-CM

## 2017-02-22 DIAGNOSIS — N18.30 BENIGN HYPERTENSION WITH CHRONIC KIDNEY DISEASE, STAGE III: ICD-10-CM

## 2017-02-22 DIAGNOSIS — I50.22 CHRONIC SYSTOLIC HEART FAILURE: ICD-10-CM

## 2017-02-22 DIAGNOSIS — J47.9 ACQUIRED BRONCHIECTASIS: ICD-10-CM

## 2017-02-22 DIAGNOSIS — Z83.3 FAMILY HISTORY OF DIABETES MELLITUS: ICD-10-CM

## 2017-02-22 DIAGNOSIS — N18.30 BENIGN HYPERTENSION WITH CHRONIC KIDNEY DISEASE, STAGE III: Primary | ICD-10-CM

## 2017-02-22 DIAGNOSIS — G25.0 ESSENTIAL TREMOR: ICD-10-CM

## 2017-02-22 DIAGNOSIS — J96.11 CHRONIC RESPIRATORY FAILURE WITH HYPOXIA: ICD-10-CM

## 2017-02-22 DIAGNOSIS — K21.9 GASTROESOPHAGEAL REFLUX DISEASE WITHOUT ESOPHAGITIS: Chronic | ICD-10-CM

## 2017-02-22 DIAGNOSIS — I12.9 BENIGN HYPERTENSION WITH CHRONIC KIDNEY DISEASE, STAGE III: Primary | ICD-10-CM

## 2017-02-22 DIAGNOSIS — I12.9 BENIGN HYPERTENSION WITH CHRONIC KIDNEY DISEASE, STAGE III: ICD-10-CM

## 2017-02-22 DIAGNOSIS — I47.19 ECTOPIC ATRIAL TACHYCARDIA: ICD-10-CM

## 2017-02-22 DIAGNOSIS — I70.0 THORACIC AORTA ATHEROSCLEROSIS: ICD-10-CM

## 2017-02-22 DIAGNOSIS — R21 RASH: ICD-10-CM

## 2017-02-22 DIAGNOSIS — Z12.31 ENCOUNTER FOR SCREENING MAMMOGRAM FOR BREAST CANCER: ICD-10-CM

## 2017-02-22 LAB
CHOLEST/HDLC SERPL: 4.3 {RATIO}
HDL/CHOLESTEROL RATIO: 23.1 %
HDLC SERPL-MCNC: 251 MG/DL
HDLC SERPL-MCNC: 58 MG/DL
LDLC SERPL CALC-MCNC: 136.8 MG/DL
NONHDLC SERPL-MCNC: 193 MG/DL
TRIGL SERPL-MCNC: 281 MG/DL

## 2017-02-22 PROCEDURE — 1157F ADVNC CARE PLAN IN RCRD: CPT | Mod: S$GLB,,, | Performed by: INTERNAL MEDICINE

## 2017-02-22 PROCEDURE — 36415 COLL VENOUS BLD VENIPUNCTURE: CPT | Mod: PO

## 2017-02-22 PROCEDURE — 99499 UNLISTED E&M SERVICE: CPT | Mod: S$GLB,,, | Performed by: INTERNAL MEDICINE

## 2017-02-22 PROCEDURE — 80061 LIPID PANEL: CPT

## 2017-02-22 PROCEDURE — 3074F SYST BP LT 130 MM HG: CPT | Mod: S$GLB,,, | Performed by: INTERNAL MEDICINE

## 2017-02-22 PROCEDURE — 99999 PR PBB SHADOW E&M-EST. PATIENT-LVL III: CPT | Mod: PBBFAC,,, | Performed by: INTERNAL MEDICINE

## 2017-02-22 PROCEDURE — 1126F AMNT PAIN NOTED NONE PRSNT: CPT | Mod: S$GLB,,, | Performed by: INTERNAL MEDICINE

## 2017-02-22 PROCEDURE — 1159F MED LIST DOCD IN RCRD: CPT | Mod: S$GLB,,, | Performed by: INTERNAL MEDICINE

## 2017-02-22 PROCEDURE — 3078F DIAST BP <80 MM HG: CPT | Mod: S$GLB,,, | Performed by: INTERNAL MEDICINE

## 2017-02-22 PROCEDURE — 83036 HEMOGLOBIN GLYCOSYLATED A1C: CPT

## 2017-02-22 PROCEDURE — 99215 OFFICE O/P EST HI 40 MIN: CPT | Mod: S$GLB,,, | Performed by: INTERNAL MEDICINE

## 2017-02-22 PROCEDURE — 1160F RVW MEDS BY RX/DR IN RCRD: CPT | Mod: S$GLB,,, | Performed by: INTERNAL MEDICINE

## 2017-02-22 RX ORDER — ATORVASTATIN CALCIUM 10 MG/1
10 TABLET, FILM COATED ORAL DAILY
Qty: 90 TABLET | Refills: 1 | Status: SHIPPED | OUTPATIENT
Start: 2017-02-22 | End: 2017-03-20

## 2017-02-22 RX ORDER — DESOXIMETASONE 2.5 MG/G
CREAM TOPICAL
Qty: 60 G | Refills: 2 | Status: SHIPPED | OUTPATIENT
Start: 2017-02-22 | End: 2019-03-26 | Stop reason: SDUPTHER

## 2017-02-22 RX ORDER — UMECLIDINIUM BROMIDE AND VILANTEROL TRIFENATATE 62.5; 25 UG/1; UG/1
POWDER RESPIRATORY (INHALATION)
COMMUNITY
Start: 2017-02-17 | End: 2017-09-13 | Stop reason: SDUPTHER

## 2017-02-22 NOTE — MR AVS SNAPSHOT
[facility]  [address]  [place] [address]  Phone: [phone]  Fax: [phone]                  Yasmin Asencio   2017 8:40 AM   Office Visit    Description:  Female : 1950   Provider:  Urmila Luna MD   Department:  [facility]           Reason for Visit     Anxiety     Depression     Follow-up           Diagnoses this Visit        Comments    Benign hypertension with chronic kidney disease, stage III    -  Primary     Ectopic atrial tachycardia         Chronic systolic heart failure         Acquired bronchiectasis         Chronic respiratory failure with hypoxia         Gastroesophageal reflux disease without esophagitis         Thoracic aorta atherosclerosis         Essential tremor         Sacroiliitis         Overweight (BMI 25.0-29.9)         Family history of diabetes mellitus         Rash         Encounter for screening mammogram for breast cancer                To Do List           Future Appointments        Provider Department Dept Phone    3/28/2017 8:00 AM Dyana Byers, PhD [facility] Neurology [phone]    2017 2:00 PM PRECIOUS Zuñiga MD [facility] - Pulmonary Services [phone]    2017 2:00 PM Taurus Braga MD [facility] - Cardiology [phone]      Goals (5 Years of Data)     None      Follow-Up and Disposition     Return in about 6 months (around 2017), or if symptoms worsen or fail to improve, for annual exam.       These Medications        Disp Refills Start End    desoximetasone (TOPICORT) 0.25 % cream 60 g 2 2017     Apply as directed bid prn    Pharmacy: [facility] ALINA DYKES [address]. Ph #: [phone]       atorvastatin (LIPITOR) 10 MG tablet 90 tablet 1 2017    Take 1 tablet (10 mg total) by mouth once daily. - Oral    Pharmacy: [facility] ALINA DYKES [address]. Ph #: [phone]         [facility]      [facility] - 24/7 Assistance  Registered nurses in the [facility] provide clinical advisement, health education, appointment booking, and other advisory services.  Call for this free service at [phone].             Medications           Message regarding Medications     Verify the changes and/or additions to your medication regime listed below are the same as discussed with your clinician today.  If any of these changes or additions are incorrect, please notify your healthcare provider.        START taking these NEW medications        Refills    atorvastatin (LIPITOR) 10 MG tablet 1    Sig: Take 1 tablet (10 mg total) by mouth once daily.    Class: Normal    Route: Oral      CHANGE how you are taking these medications     Start Taking Instead of    desoximetasone (TOPICORT) 0.25 % cream desoximetasone (TOPICORT) 0.25 % cream    Dosage:  Apply as directed bid prn Dosage:  Apply topically 2 (two) times daily.      Reason for Change:  Reorder       STOP taking these medications     LACTOBACILLUS RHAMNOSUS GG (CULTURELLE ORAL) Take by mouth once daily.            Verify that the below list of medications is an accurate representation of the medications you are currently taking.  If none reported, the list may be blank. If incorrect, please contact your healthcare provider. Carry this list with you in case of emergency.           Current Medications     alprazolam (XANAX) 0.25 MG tablet Take 1 tablet (0.25 mg total) by mouth nightly as needed for Anxiety.    ANORO ELLIPTA 62.5-25 mcg/actuation DsDv     apixaban 5 mg Tab Take 1 tablet (5 mg total) by mouth 2 (two) times daily.    desoximetasone (TOPICORT) 0.25 % cream Apply as directed bid prn    escitalopram oxalate (LEXAPRO) 20 MG tablet TAKE ONE TABLET BY MOUTH ONCE DAILY    gabapentin (NEURONTIN) 300 MG capsule Take 1 capsule (300 mg total) by mouth 3 (three) times daily.    guaifenesin (MUCINEX) 600 mg 12 hr tablet Take 1,200  "mg by mouth 2 (two) times daily.    ipratropium (ATROVENT) 0.02 % nebulizer solution Take 2.5 mLs (500 mcg total) by nebulization 4 (four) times daily.    metoprolol tartrate (LOPRESSOR) 25 MG tablet TAKE ONE TABLET BY MOUTH TWICE DAILY    omeprazole (PRILOSEC) 20 MG capsule TAKE ONE CAPSULE BY MOUTH ONCE DAILY    primidone (MYSOLINE) 50 MG Tab TAKE ONE TABLET BY MOUTH ONCE DAILY IN THE MORNING, ONE AT NOON, AND TWO AT BEDTIME    SODIUM CHLORIDE FOR INHALATION (SODIUM CHLORIDE 3%) 3 % nebulizer solution USE ONE VIAL IN NEBULIZER TWICE DAILY    topiramate (TOPAMAX) 25 MG tablet Take 1 tablet (25 mg total) by mouth 2 (two) times daily.    tramadol (ULTRAM) 50 mg tablet Take 50 mg by mouth every 6 (six) hours as needed for Pain.    VITAMIN D2 50,000 unit capsule TAKE ONE CAPSULE BY MOUTH ONCE A WEEK    atorvastatin (LIPITOR) 10 MG tablet Take 1 tablet (10 mg total) by mouth once daily.    desonide (DESOWEN) 0.05 % lotion AAA face bid prn redness, scaling, or itching           Clinical Reference Information           Your Vitals Were     BP Pulse Temp Height Weight Last Period    128/76 77 97.7 °F (36.5 °C) (Oral) 5' 3" (1.6 m) 69.9 kg (154 lb 1.6 oz) (LMP Unknown)    SpO2 BMI             90% 27.3 kg/m2         Blood Pressure          Most Recent Value    BP  128/76      Allergies as of 2/22/2017     Bactrim [Sulfamethoxazole-trimethoprim]    Albuterol    Restasis [Cyclosporine]      Immunizations Administered on Date of Encounter - 2/22/2017     None      Orders Placed During Today's Visit      Normal Orders This Visit    Mammo Digital Screening Bilat with CAD     Future Labs/Procedures Expected by Expires    Hemoglobin A1c  2/22/2017 2/22/2018    Lipid panel  2/22/2017 2/22/2018    Mammo Digital Screening Bilat with CAD  2/22/2017 4/24/2018      Language Assistance Services     ATTENTION: Language assistance services are available, free of charge. Please call [phone].      ATENCIÓN: Si radha bejarano " disposición servicios gratuitos de asistencia lingüística. Manuel al [phone].     SLOANE Ý: N?u b?n nói Ti?ng Vi?t, có các d?ch v? h? tr? ngôn ng? mi?n phí dành cho b?n. G?i s? [phone].         [facility] complies with applicable Federal civil rights laws and does not discriminate on the basis of race, color, national origin, age, disability, or sex.

## 2017-02-22 NOTE — PROGRESS NOTES
HISTORY OF PRESENT ILLNESS:  Yasmin Asencio is a 66 y.o. female who presents to the clinic today for Anxiety; Depression; and Follow-up  .  The patient presents to clinic today for follow-up of her hypertension, located by chronic kidney disease stage III, reflux, and anxiety/depression.  In January of this year CT scan showed a right-sided pulmonary embolism.  She is now on Eliquis.  She is seen cardiology for ectopic atrial tachycardia causing some palpitations.  She is on metoprolol.  She has acquired bronchiectasis with chronic respiratory failure on home oxygen.  She feels like her sputum has become a little bit thicker and darker green.  She has not yet contacted her pulmonologist for further recommendations.  She denies any significant problems with heartburn or reflux at this time.  She has a benign chronic stable tremor for which she sees neurology.  She has chronic low back and hip pain for which she sees pain management.  She was supposed to have nerve ablation at the time that she developed her pulmonary embolism.  She denies cardiac chest pain.  No abdominal pain or temperature intolerance.  No unexplained changes in her weight.  She denies any hemoptysis at this time.      PAST MEDICAL HISTORY:  Past Medical History   Diagnosis Date    Acquired bronchiectasis      due to history of TB - followed by pulmonary, Dr. Zuñiga    Allergy     Amblyopia      rt eye per pt    Anemia of other chronic disease     Anxiety     Cataract     Clotting disorder     COPD (chronic obstructive pulmonary disease)     COPD (chronic obstructive pulmonary disease)     Depression     Diverticulosis     Essential tremor     GERD (gastroesophageal reflux disease)     Hemangioma of liver     History of tuberculosis 1978    Hypertension     Mixed anxiety and depressive disorder     Psoriasis     S/P PICC central line placement Apr. 2016 - May 2016    Skin disease      Psoriasis    Spondylosis without  myelopathy 2013    Thoracic aorta atherosclerosis      noted on CT scan of chest 1/3/2011    Tuberculosis     Vaginal delivery      x2    Vitamin D deficiency        PAST SURGICAL HISTORY:  Past Surgical History   Procedure Laterality Date    Gallbladder surgery  2011    Cataract extraction w/  intraocular lens implant  12     od dr spain    Cataract extraction w/  intraocular lens implant  12     left eye    Eye surgery       bilateral Cataract       SOCIAL HISTORY:  Social History     Social History    Marital status:      Spouse name: N/A    Number of children: 2    Years of education: N/A     Occupational History    housewife      Social History Main Topics    Smoking status: Former Smoker     Packs/day: 2.00     Years: 50.00     Types: Cigarettes     Quit date: 2009    Smokeless tobacco: Never Used    Alcohol use No    Drug use: No    Sexual activity: Yes     Partners: Male     Other Topics Concern    Are You Pregnant Or Think You May Be? No    Breast-Feeding No     Social History Narrative    One child  of a heart attack at age 35.       FAMILY HISTORY:  Family History   Problem Relation Age of Onset    Hypertension Mother     Heart attack Mother     Cancer Father      liver and bladder    Hyperlipidemia Sister     Psoriasis Sister     Cancer Brother      liver    Stroke Maternal Uncle     Cancer Maternal Aunt      stomach    Lung cancer Sister     Heart attack Brother     Heart attack Son     Amblyopia Neg Hx     Blindness Neg Hx     Cataracts Neg Hx     Glaucoma Neg Hx     Macular degeneration Neg Hx     Retinal detachment Neg Hx     Strabismus Neg Hx     Thyroid disease Neg Hx     Melanoma Neg Hx     Lupus Neg Hx     Eczema Neg Hx     COPD Neg Hx        ALLERGIES AND MEDICATIONS: updated and reviewed.  Review of patient's allergies indicates:   Allergen Reactions    Bactrim [sulfamethoxazole-trimethoprim] Rash     Was  hospitalized for rash    Albuterol Other (See Comments)     tremors    Restasis [cyclosporine] Itching     Medication List with Changes/Refills   New Medications    ATORVASTATIN (LIPITOR) 10 MG TABLET    Take 1 tablet (10 mg total) by mouth once daily.   Current Medications    ALPRAZOLAM (XANAX) 0.25 MG TABLET    Take 1 tablet (0.25 mg total) by mouth nightly as needed for Anxiety.    ANORO ELLIPTA 62.5-25 MCG/ACTUATION DSDV        APIXABAN 5 MG TAB    Take 1 tablet (5 mg total) by mouth 2 (two) times daily.    DESONIDE (DESOWEN) 0.05 % LOTION    AAA face bid prn redness, scaling, or itching    ESCITALOPRAM OXALATE (LEXAPRO) 20 MG TABLET    TAKE ONE TABLET BY MOUTH ONCE DAILY    GABAPENTIN (NEURONTIN) 300 MG CAPSULE    Take 1 capsule (300 mg total) by mouth 3 (three) times daily.    GUAIFENESIN (MUCINEX) 600 MG 12 HR TABLET    Take 1,200 mg by mouth 2 (two) times daily.    IPRATROPIUM (ATROVENT) 0.02 % NEBULIZER SOLUTION    Take 2.5 mLs (500 mcg total) by nebulization 4 (four) times daily.    METOPROLOL TARTRATE (LOPRESSOR) 25 MG TABLET    TAKE ONE TABLET BY MOUTH TWICE DAILY    OMEPRAZOLE (PRILOSEC) 20 MG CAPSULE    TAKE ONE CAPSULE BY MOUTH ONCE DAILY    PRIMIDONE (MYSOLINE) 50 MG TAB    TAKE ONE TABLET BY MOUTH ONCE DAILY IN THE MORNING, ONE AT NOON, AND TWO AT BEDTIME    SODIUM CHLORIDE FOR INHALATION (SODIUM CHLORIDE 3%) 3 % NEBULIZER SOLUTION    USE ONE VIAL IN NEBULIZER TWICE DAILY    TOPIRAMATE (TOPAMAX) 25 MG TABLET    Take 1 tablet (25 mg total) by mouth 2 (two) times daily.    TRAMADOL (ULTRAM) 50 MG TABLET    Take 50 mg by mouth every 6 (six) hours as needed for Pain.    VITAMIN D2 50,000 UNIT CAPSULE    TAKE ONE CAPSULE BY MOUTH ONCE A WEEK   Changed and/or Refilled Medications    Modified Medication Previous Medication    DESOXIMETASONE (TOPICORT) 0.25 % CREAM desoximetasone (TOPICORT) 0.25 % cream       Apply as directed bid prn    Apply topically 2 (two) times daily.     Discontinued Medications     "LACTOBACILLUS RHAMNOSUS GG (CULTURELLE ORAL)    Take by mouth once daily.             REVIEW OF SYSTEMS:  General ROS: negative for - chills, fever or malaise  Psychological ROS: negative for - memory difficulties, obsessive thoughts or suicidal ideation  Ophthalmic ROS: negative for - blurry vision or eye pain  ENT ROS: negative for - epistaxis, oral lesions or sore throat  Allergy and Immunology ROS: negative for - hives  Hematological and Lymphatic ROS: negative for - swollen lymph nodes  Endocrine ROS: negative for - polydipsia/polyuria or temperature intolerance  Respiratory ROS: negative for - hemoptysis  Cardiovascular ROS: negative for - chest pain or orthopnea  Gastrointestinal ROS: no abdominal pain, change in bowel habits, or black or bloody stools  Dermatological ROS: negative for hair changes, mole changes and nail changes  Musculoskeletal ROS: negative for - gait disturbance, joint swelling or muscle pain  Neurological ROS: no TIA or stroke symptoms  Genito-Urinary ROS: no dysuria, trouble voiding, or hematuria      PHYSICAL EXAM:   Vitals:    02/22/17 0840   BP: 128/76   Pulse: 77   Temp: 97.7 °F (36.5 °C)     Weight: 69.9 kg (154 lb 1.6 oz)   Height: 5' 3" (160 cm)   Body mass index is 27.3 kg/(m^2).     General appearance - alert, well appearing, and in no distress and overweight  Mental status - alert, oriented to person, place, and time, normal mood, behavior, speech, dress, motor activity, and thought processes  Eyes - pupils equal and reactive, extraocular eye movements intact, sclera anicteric  Mouth - mucous membranes moist, pharynx normal without lesions  Neck - supple, no significant adenopathy, carotids upstroke normal bilaterally, no bruits, thyroid exam: thyroid is normal in size without nodules or tenderness  Lymphatics - no palpable lymphadenopathy  Chest - rhochi throughout - baseline, no wheezes  Heart - normal rate and regular rhythm, no gallops noted  Neurological - alert, oriented, " normal speech, no movement disorder noted, cranial nerves II through XII intact, mild tremor noted  Musculoskeletal - no muscular tenderness noted, Mild-Moderate osteoarthritic changes noted to both knee joints. No joint effusions noted.   Extremities - no pedal edema noted  Skin - normal coloration and turgor, no rashes, no suspicious skin lesions noted      ASSESSMENT AND PLAN:  1. Benign hypertension with chronic kidney disease, stage III  Discussed sodium restriction, maintaining ideal body weight and regular exercise program as physiologic means to achieve blood pressure control. The patient will strive towards this. The current medical regimen is effective;  continue present plan and medications. Recommended patient to check home readings to monitor and see me for followup as scheduled or sooner as needed. Patient was educated that both decongestant and anti-inflammatory medication may raise blood pressure. Stable kidney function. Observe. Patient counseled to avoid/minimize the use of anti-inflammatory  medication.   - Lipid panel; Future    2. Ectopic atrial tachycardia/3. Chronic systolic heart failure  Currently stable. Followed by cardiology.     4. Acquired bronchiectasis/5. Chronic respiratory failure with hypoxia  The patient reports some increase in sputum production and darkening color.  I advised her to contact her pulmonologist today for further recommendations of treatment.    6. Gastroesophageal reflux disease without esophagitis  Symptoms controlled. Reflux precautions discussed (eliminate tobacco if a smoker; minimize caffeine, chocolate and red/white peppermint intake; avoid heavy and spicy meals; don't lay down within 2-3 hours after eating). Medication as needed. Patient asked to take medication breaks, if possible - discussed chronic use can limit calcium absorption, increases the risk for intestinal infections, and can cause kidney damage.      7. Thoracic aorta atherosclerosis  Patient  with Atherosclerosis of the Aorta.  Stable/asymptomatic. I will start her on low dose lipitor.   - atorvastatin (LIPITOR) 10 MG tablet; Take 1 tablet (10 mg total) by mouth once daily.  Dispense: 90 tablet; Refill: 1    8. Essential tremor  Stable. Observe.     9. Sacroiliitis  Pain medication as needed.  She will follow-up with pain management at her earliest convenience.      10. Other pulmonary embolism without acute cor pulmonale, unspecified chronicity  She is currently on Eliquis.  Observe.    11. Overweight (BMI 25.0-29.9)  The patient is asked to make an attempt to improve diet and exercise patterns to aid in medical management of this problem.     12. Family history of diabetes mellitus  Asymptomatic. I will screen for diabetes as patient is on intermittent steroids.  - Hemoglobin A1c; Future    13. Rash  Stable. Medication as needed.   - desoximetasone (TOPICORT) 0.25 % cream; Apply as directed bid prn  Dispense: 60 g; Refill: 2    14. Encounter for screening mammogram for breast cancer    - Mammo Digital Screening Bilat with CAD; Future  - Mammo Digital Screening Bilat with CAD          Return in about 6 months (around 8/22/2017), or if symptoms worsen or fail to improve, for annual exam. or sooner as needed.

## 2017-02-23 LAB
ESTIMATED AVG GLUCOSE: 105 MG/DL
HBA1C MFR BLD HPLC: 5.3 %

## 2017-03-02 ENCOUNTER — HOSPITAL ENCOUNTER (OUTPATIENT)
Dept: RADIOLOGY | Facility: HOSPITAL | Age: 67
Discharge: HOME | End: 2017-03-02
Attending: INTERNAL MEDICINE
Payer: MEDICARE

## 2017-03-02 DIAGNOSIS — Z12.31 ENCOUNTER FOR SCREENING MAMMOGRAM FOR BREAST CANCER: ICD-10-CM

## 2017-03-02 PROCEDURE — 77067 SCR MAMMO BI INCL CAD: CPT | Mod: TC

## 2017-03-02 PROCEDURE — 77067 SCR MAMMO BI INCL CAD: CPT | Mod: 26,,, | Performed by: RADIOLOGY

## 2017-03-02 PROCEDURE — 77063 BREAST TOMOSYNTHESIS BI: CPT | Mod: 26,,, | Performed by: RADIOLOGY

## 2017-03-03 DIAGNOSIS — R04.2 HEMOPTYSIS: ICD-10-CM

## 2017-03-03 DIAGNOSIS — J43.9 PULMONARY EMPHYSEMA: ICD-10-CM

## 2017-03-03 RX ORDER — IPRATROPIUM BROMIDE 0.5 MG/2.5ML
SOLUTION RESPIRATORY (INHALATION)
Qty: 225 VIAL | Refills: 3 | Status: SHIPPED | OUTPATIENT
Start: 2017-03-03 | End: 2017-07-06 | Stop reason: SDUPTHER

## 2017-03-06 ENCOUNTER — TELEPHONE (OUTPATIENT)
Dept: PULMONOLOGY | Facility: CLINIC | Age: 67
End: 2017-03-06

## 2017-03-06 ENCOUNTER — TELEPHONE (OUTPATIENT)
Dept: FAMILY MEDICINE | Facility: CLINIC | Age: 67
End: 2017-03-06

## 2017-03-06 DIAGNOSIS — J47.9 ACQUIRED BRONCHIECTASIS: Primary | ICD-10-CM

## 2017-03-06 DIAGNOSIS — R04.2 HEMOPTYSIS: ICD-10-CM

## 2017-03-06 RX ORDER — AMOXICILLIN AND CLAVULANATE POTASSIUM 875; 125 MG/1; MG/1
1 TABLET, FILM COATED ORAL 2 TIMES DAILY
Qty: 20 TABLET | Refills: 1 | Status: SHIPPED | OUTPATIENT
Start: 2017-03-06 | End: 2017-03-16

## 2017-03-06 RX ORDER — PREDNISONE 10 MG/1
TABLET ORAL
Qty: 30 TABLET | Refills: 1 | Status: SHIPPED | OUTPATIENT
Start: 2017-03-06 | End: 2017-05-24 | Stop reason: ALTCHOICE

## 2017-03-06 NOTE — TELEPHONE ENCOUNTER
Pt called to report increased cough with dark sputum.  Yesterday, she also saw blood in sputum.  She is wheezing.  No fever or thoracic pain. To begin Augmentin and Prednisone.  Hold Eloquis if hemoptysis persists.

## 2017-03-06 NOTE — TELEPHONE ENCOUNTER
----- Message from Dorys Horan sent at 3/6/2017  8:36 AM CST -----  Contact: Self  Pt states she's been having muscles aches since taking Lipitor. Pt also wants to know if she needs to stop taking Culturelle.  Pt can be reached @ [phone].

## 2017-03-06 NOTE — TELEPHONE ENCOUNTER
Ok to stop the lipitor and see if symptoms resolve. Let me know in 2 weeks how she is doing.  I am not familiar with the culturelle.

## 2017-03-08 ENCOUNTER — TELEPHONE (OUTPATIENT)
Dept: CARDIOLOGY | Facility: CLINIC | Age: 67
End: 2017-03-08

## 2017-03-08 ENCOUNTER — NURSE TRIAGE (OUTPATIENT)
Dept: ADMINISTRATIVE | Facility: CLINIC | Age: 67
End: 2017-03-08

## 2017-03-08 ENCOUNTER — HOSPITAL ENCOUNTER (EMERGENCY)
Facility: HOSPITAL | Age: 67
Discharge: HOME | End: 2017-03-08
Attending: EMERGENCY MEDICINE
Payer: MEDICARE

## 2017-03-08 VITALS
BODY MASS INDEX: 26.58 KG/M2 | TEMPERATURE: 98 F | HEIGHT: 63 IN | HEART RATE: 74 BPM | RESPIRATION RATE: 18 BRPM | DIASTOLIC BLOOD PRESSURE: 66 MMHG | SYSTOLIC BLOOD PRESSURE: 148 MMHG | WEIGHT: 150 LBS | OXYGEN SATURATION: 93 %

## 2017-03-08 DIAGNOSIS — R00.0 TACHYCARDIA: ICD-10-CM

## 2017-03-08 DIAGNOSIS — N18.30 BENIGN HYPERTENSION WITH CHRONIC KIDNEY DISEASE, STAGE III: Chronic | ICD-10-CM

## 2017-03-08 DIAGNOSIS — I12.9 BENIGN HYPERTENSION WITH CHRONIC KIDNEY DISEASE, STAGE III: Chronic | ICD-10-CM

## 2017-03-08 DIAGNOSIS — R00.2 PALPITATIONS: Primary | ICD-10-CM

## 2017-03-08 DIAGNOSIS — I47.19 ECTOPIC ATRIAL TACHYCARDIA: Primary | ICD-10-CM

## 2017-03-08 LAB
ALBUMIN SERPL BCP-MCNC: 4.3 G/DL
ALP SERPL-CCNC: 67 U/L
ALT SERPL W/O P-5'-P-CCNC: 15 U/L
ANION GAP SERPL CALC-SCNC: 9 MMOL/L
AST SERPL-CCNC: 17 U/L
BASOPHILS # BLD AUTO: 0.01 K/UL
BASOPHILS NFR BLD: 0.1 %
BILIRUB SERPL-MCNC: 0.2 MG/DL
BNP SERPL-MCNC: 257 PG/ML
BUN SERPL-MCNC: 18 MG/DL
CALCIUM SERPL-MCNC: 9.8 MG/DL
CHLORIDE SERPL-SCNC: 106 MMOL/L
CO2 SERPL-SCNC: 25 MMOL/L
CREAT SERPL-MCNC: 1 MG/DL
DIFFERENTIAL METHOD: ABNORMAL
EOSINOPHIL # BLD AUTO: 0 K/UL
EOSINOPHIL NFR BLD: 0.1 %
ERYTHROCYTE [DISTWIDTH] IN BLOOD BY AUTOMATED COUNT: 12.7 %
EST. GFR  (AFRICAN AMERICAN): >60 ML/MIN/1.73 M^2
EST. GFR  (NON AFRICAN AMERICAN): 58 ML/MIN/1.73 M^2
GLUCOSE SERPL-MCNC: 121 MG/DL
HCT VFR BLD AUTO: 36.5 %
HGB BLD-MCNC: 11.6 G/DL
INR PPP: 1
LYMPHOCYTES # BLD AUTO: 1.1 K/UL
LYMPHOCYTES NFR BLD: 14.7 %
MAGNESIUM SERPL-MCNC: 2.1 MG/DL
MCH RBC QN AUTO: 29.8 PG
MCHC RBC AUTO-ENTMCNC: 31.8 %
MCV RBC AUTO: 94 FL
MONOCYTES # BLD AUTO: 0.2 K/UL
MONOCYTES NFR BLD: 2.7 %
NEUTROPHILS # BLD AUTO: 6.1 K/UL
NEUTROPHILS NFR BLD: 81.9 %
PLATELET # BLD AUTO: 298 K/UL
PMV BLD AUTO: 10.4 FL
POTASSIUM SERPL-SCNC: 4.1 MMOL/L
PROT SERPL-MCNC: 8.7 G/DL
PROTHROMBIN TIME: 10.4 SEC
RBC # BLD AUTO: 3.89 M/UL
SODIUM SERPL-SCNC: 140 MMOL/L
TROPONIN I SERPL DL<=0.01 NG/ML-MCNC: 0.01 NG/ML
TSH SERPL DL<=0.005 MIU/L-ACNC: 1.1 UIU/ML
WBC # BLD AUTO: 7.41 K/UL

## 2017-03-08 PROCEDURE — 83880 ASSAY OF NATRIURETIC PEPTIDE: CPT

## 2017-03-08 PROCEDURE — 80053 COMPREHEN METABOLIC PANEL: CPT

## 2017-03-08 PROCEDURE — 84443 ASSAY THYROID STIM HORMONE: CPT

## 2017-03-08 PROCEDURE — 85025 COMPLETE CBC W/AUTO DIFF WBC: CPT

## 2017-03-08 PROCEDURE — 99284 EMERGENCY DEPT VISIT MOD MDM: CPT

## 2017-03-08 PROCEDURE — 85610 PROTHROMBIN TIME: CPT

## 2017-03-08 PROCEDURE — 83735 ASSAY OF MAGNESIUM: CPT

## 2017-03-08 PROCEDURE — 84484 ASSAY OF TROPONIN QUANT: CPT

## 2017-03-08 PROCEDURE — 93005 ELECTROCARDIOGRAM TRACING: CPT

## 2017-03-08 RX ORDER — METOPROLOL TARTRATE 25 MG/1
50 TABLET, FILM COATED ORAL 2 TIMES DAILY
Qty: 60 TABLET | Refills: 0 | Status: SHIPPED | OUTPATIENT
Start: 2017-03-08 | End: 2017-04-10 | Stop reason: SDUPTHER

## 2017-03-08 NOTE — TELEPHONE ENCOUNTER
Spoke w/patient, informed of recommendation. Patient states she is at [facility] now waiting to be seen for elevated heart rate 170's. States she has contacted  office to inform them as well.

## 2017-03-08 NOTE — ED TRIAGE NOTES
Pt presents to ER with c/o palpitations.  Pt reports having an episode of palpitations that last about 30 minutes today.  Palpitations have been intermittent today.  Pt also reports SOB.  Wears home O2 2L at night due to COPD and pseud. In lungs.

## 2017-03-08 NOTE — ED AVS SNAPSHOT
[facility]  [place] [address]               Yasmin Asencio   3/8/2017  5:11 PM   ED    Description:  Female : 1950   Department:  [facility]           Your Care was Coordinated By:     Provider Role From To    Nathan Raimrez MD Attending Provider 17 [phone] --      Reason for Visit     Tachycardia           Diagnoses this Visit        Comments    Palpitations    -  Primary     Tachycardia         Benign hypertension with chronic kidney disease, stage III           ED Disposition     ED Disposition Condition Comment    Discharge             To Do List           Follow-up Information     Schedule an appointment as soon as possible for a visit with Taurus Braga MD.    Specialty:  Cardiology    Contact information:    [address] [facility] [address] [phone]         These Medications        Disp Refills Start End    metoprolol tartrate (LOPRESSOR) 25 MG tablet 60 tablet 0 3/8/2017     Take 2 tablets (50 mg total) by mouth 2 (two) times daily. - Oral    Pharmacy: [facility] ALINA DYKES [address]  #: [phone]         [facility]     [facility] -  Assistance  Registered nurses in the [facility] provide clinical advisement, health education, appointment booking, and other advisory services.  Call for this free service at [phone].             Medications           Message regarding Medications     Verify the changes and/or additions to your medication regime listed below are the same as discussed with your clinician today.  If any of these changes or additions are incorrect, please notify your healthcare provider.        CHANGE how you are taking these medications     Start Taking Instead of    metoprolol tartrate (LOPRESSOR) 25 MG tablet metoprolol tartrate (LOPRESSOR) 25 MG tablet    Dosage:  Take 2 tablets (50 mg total) by mouth 2  (two) times daily. Dosage:  TAKE ONE TABLET BY MOUTH TWICE DAILY           Verify that the below list of medications is an accurate representation of the medications you are currently taking.  If none reported, the list may be blank. If incorrect, please contact your healthcare provider. Carry this list with you in case of emergency.           Current Medications     alprazolam (XANAX) 0.25 MG tablet Take 1 tablet (0.25 mg total) by mouth nightly as needed for Anxiety.    amoxicillin-clavulanate 875-125mg (AUGMENTIN) 875-125 mg per tablet Take 1 tablet by mouth 2 (two) times daily.    ANORO ELLIPTA 62.5-25 mcg/actuation DsDv     apixaban 5 mg Tab Take 1 tablet (5 mg total) by mouth 2 (two) times daily.    desonide (DESOWEN) 0.05 % lotion AAA face bid prn redness, scaling, or itching    desoximetasone (TOPICORT) 0.25 % cream Apply as directed bid prn    escitalopram oxalate (LEXAPRO) 20 MG tablet TAKE ONE TABLET BY MOUTH ONCE DAILY    gabapentin (NEURONTIN) 300 MG capsule Take 1 capsule (300 mg total) by mouth 3 (three) times daily.    guaifenesin (MUCINEX) 600 mg 12 hr tablet Take 1,200 mg by mouth 2 (two) times daily.    ipratropium (ATROVENT) 0.02 % nebulizer solution USE ONE VIAL BY NEBULIZATION 4 TIMES DAILY    omeprazole (PRILOSEC) 20 MG capsule TAKE ONE CAPSULE BY MOUTH ONCE DAILY    predniSONE (DELTASONE) 10 MG tablet Take 3 tabs per day x 5 days, then 2 tabs per day x 5 days, then 1 tab per day x 5 days, then stop. Take with food    primidone (MYSOLINE) 50 MG Tab TAKE ONE TABLET BY MOUTH ONCE DAILY IN THE MORNING, ONE AT NOON, AND TWO AT BEDTIME    SODIUM CHLORIDE FOR INHALATION (SODIUM CHLORIDE 3%) 3 % nebulizer solution USE ONE VIAL IN NEBULIZER TWICE DAILY    topiramate (TOPAMAX) 25 MG tablet Take 1 tablet (25 mg total) by mouth 2 (two) times daily.    VITAMIN D2 50,000 unit capsule TAKE ONE CAPSULE BY MOUTH ONCE A WEEK    atorvastatin (LIPITOR) 10 MG tablet Take 1 tablet (10 mg total) by mouth once  "daily.    metoprolol tartrate (LOPRESSOR) 25 MG tablet Take 2 tablets (50 mg total) by mouth 2 (two) times daily.    tramadol (ULTRAM) 50 mg tablet Take 50 mg by mouth every 6 (six) hours as needed for Pain.           Clinical Reference Information           Your Vitals Were     BP Pulse Temp Resp Height Weight    148/66 74 98.4 °F (36.9 °C) 18 5' 3" (1.6 m) 68 kg (150 lb)    Last Period SpO2 BMI          (LMP Unknown) 93% 26.57 kg/m2        Allergies as of 3/8/2017        Reactions    Bactrim [Sulfamethoxazole-trimethoprim] Rash    Was hospitalized for rash    Albuterol Other (See Comments)    tremors    Restasis [Cyclosporine] Itching      Immunizations Administered on Date of Encounter - 3/8/2017     None      ED Micro, Lab, POCT     Start Ordered       Status Ordering Provider    03/08/17 1748 03/08/17 1747  TSH  STAT      Final result     03/08/17 1722 03/08/17 1721  CBC auto differential  STAT      Final result     03/08/17 1722 03/08/17 1721  Comprehensive metabolic panel  STAT      Final result     03/08/17 1722 03/08/17 1721  Troponin I  STAT      Final result     03/08/17 1722 03/08/17 1721  Magnesium  STAT      Final result     03/08/17 1722 03/08/17 1721  Brain natriuretic peptide  STAT      Final result     03/08/17 1722 03/08/17 1721  Protime-INR  STAT      Final result       ED Imaging Orders     Start Ordered       Status Ordering Provider    03/08/17 1722 03/08/17 1721  X-Ray Chest AP Portable  1 time imaging      Final result       Discharge References/Attachments     PALPITATIONS (ENGLISH)      Your Scheduled Appointments     Mar 13, 2017 11:00 AM CDT   30 Day Event Monitor with MONITOR, ARRHYTHMIA EVENT   [facility] ([facility] )    [address]  [facility] [id number] [phone]            Mar 28, 2017  8:00 AM CDT   New Patient with Dyana Byers, PhD   [facility]    [address]  [facility] [address]   [phone]            Apr 04, " 2017  1:15 PM CDT   Spirometry with Tracing with PULMONARY FUNCTION   [facility] ([facility] )    [address]  [place] [id number] [phone]            Apr 04, 2017  1:30 PM CDT   DLCO with PULMONARY FUNCTION   [facility] ([facility] )    [address]  [place] [id number] [phone]            Apr 04, 2017  2:00 PM CDT   Established Patient Visit with PRECIOUS Zuñiga MD   [facility] - Pulmonary Services ([facility] )    [address]  [place] [id number] [phone]               [facility] complies with applicable Federal civil rights laws and does not discriminate on the basis of race, color, national origin, age, disability, or sex.        Language Assistance Services     ATTENTION: Language assistance services are available, free of charge. Please call [phone].      ATENCIÓN: Si habla español, tiene a santamaria disposición servicios gratuitos de asistencia lingüística. Llame al [phone].     CHÚ Ý: N?u b?n nói Ti?ng Vi?t, có các d?ch v? h? tr? ngôn ng? mi?n phí dành cho b?n. G?i s? [phone].

## 2017-03-08 NOTE — TELEPHONE ENCOUNTER
Reason for Disposition   Already left for the hospital/clinic.    Protocols used: ST NO CONTACT OR DUPLICATE CONTACT CALL-A-AH    Patient is at the ED already. Her heart rate is 163. She was calling to make her pcp be aware. Please contact caller with any further care advice.

## 2017-03-08 NOTE — ED PROVIDER NOTES
"Encounter Date: 3/8/2017    SCRIBE #1 NOTE: I, Rocael Damon, am scribing for, and in the presence of,  Nathan Ramirez MD. I have scribed the following portions of the note - Other sections scribed: ROS, HPI.       History     Chief Complaint   Patient presents with    Tachycardia     States her HR is running from  on a home monitor     Review of patient's allergies indicates:   Allergen Reactions    Bactrim [sulfamethoxazole-trimethoprim] Rash     Was hospitalized for rash    Albuterol Other (See Comments)     tremors    Restasis [cyclosporine] Itching     HPI Comments: CC: Palpitations    HPI: This 66 y.o. F, who has a past medical history of Acquired bronchiectasis; Allergy; Amblyopia; Anemia of other chronic disease; Anxiety; Cataract; Clotting disorder; COPD (chronic obstructive pulmonary disease); COPD (chronic obstructive pulmonary disease); Depression; Diverticulosis; Essential tremor; GERD (gastroesophageal reflux disease); Hemangioma of liver; History of tuberculosis (1978); Hypertension; Mixed anxiety and depressive disorder; Psoriasis; S/P PICC central line placement (Apr. 2016 - May 2016); Skin disease; Spondylosis without myelopathy (8/23/2013); Thoracic aorta atherosclerosis; Tuberculosis; Vaginal delivery; and Vitamin D deficiency, presents to the ED for evaluation after a 32 minute episode of palpitations preceeded by a brief visual disturbance earlier today. Her heart rate was fluctuating 112 and 170. Visual disturbance is described as "seeing waves." She notes similar episodes for the past year that have been worsening. She is followed is by Dr. Braga and, per patient, Holter monitor two weeks ago was unremarkable. She is on Eliquis. She denies fever, nausea, vomiting, chest pain, shortness of breath and headache.    The history is provided by the patient.     Past Medical History:   Diagnosis Date    Acquired bronchiectasis     due to history of TB - followed by pulmonary, Dr. Zuñiga "    Allergy     Amblyopia     rt eye per pt    Anemia of other chronic disease     Anxiety     Cataract     Clotting disorder     COPD (chronic obstructive pulmonary disease)     COPD (chronic obstructive pulmonary disease)     Depression     Diverticulosis     Essential tremor     GERD (gastroesophageal reflux disease)     Hemangioma of liver     History of tuberculosis 1978    Hypertension     Mixed anxiety and depressive disorder     Psoriasis     S/P PICC central line placement Apr. 2016 - May 2016    Skin disease     Psoriasis    Spondylosis without myelopathy 8/23/2013    Thoracic aorta atherosclerosis     noted on CT scan of chest 1/3/2011    Tuberculosis     Vaginal delivery     x2    Vitamin D deficiency      Past Surgical History:   Procedure Laterality Date    CATARACT EXTRACTION W/  INTRAOCULAR LENS IMPLANT  07/24/12    od dr sp[place]    CATARACT EXTRACTION W/  INTRAOCULAR LENS IMPLANT  08/07/12    left eye    EYE SURGERY      bilateral Cataract    GALLBLADDER SURGERY  9/2011     Family History   Problem Relation Age of Onset    Hypertension Mother     Heart attack Mother     Cancer Father      liver and bladder    Hyperlipidemia Sister     Psoriasis Sister     Cancer Brother      liver    Stroke Maternal Uncle     Cancer Maternal Aunt      stomach    Lung cancer Sister     Heart attack Brother     Heart attack Son     Amblyopia Neg Hx     Blindness Neg Hx     Cataracts Neg Hx     Glaucoma Neg Hx     Macular degeneration Neg Hx     Retinal detachment Neg Hx     Strabismus Neg Hx     Thyroid disease Neg Hx     Melanoma Neg Hx     Lupus Neg Hx     Eczema Neg Hx     COPD Neg Hx      Social History   Substance Use Topics    Smoking status: Former Smoker     Packs/day: 2.00     Years: 50.00     Types: Cigarettes     Quit date: 5/27/2009    Smokeless tobacco: Never Used    Alcohol use No     Review of Systems   Constitutional: Negative for fever.   HENT:  Negative for sore throat.    Eyes: Positive for visual disturbance.   Respiratory: Negative for shortness of breath.    Cardiovascular: Positive for palpitations. Negative for chest pain.   Gastrointestinal: Negative for abdominal pain, nausea and vomiting.   Genitourinary: Negative for dysuria.   Musculoskeletal: Negative for back pain.   Skin: Negative for rash.   Neurological: Negative for headaches.       Physical Exam   Initial Vitals   BP Pulse Resp Temp SpO2   03/08/17 1506 03/08/17 1506 03/08/17 1506 03/08/17 1506 03/08/17 1506   113/53 92 18 98 °F (36.7 °C) 95 %     Physical Exam    Nursing note and vitals reviewed.  HENT:   Head: Atraumatic.   Eyes: Conjunctivae and EOM are normal.   Neck: Normal range of motion.   Cardiovascular: Regular rhythm. Exam reveals no gallop and no friction rub.    No murmur heard.  Rapid rate   Pulmonary/Chest: Breath sounds normal. No respiratory distress. She has no wheezes. She has no rales.   Abdominal: Soft. There is no tenderness.   Musculoskeletal: Normal range of motion. She exhibits no edema.   Neurological: She is alert and oriented to person, place, and time.   Psychiatric: She has a normal mood and affect.         ED Course   Procedures  Labs Reviewed   CBC W/ AUTO DIFFERENTIAL - Abnormal; Notable for the following:        Result Value    RBC 3.89 (*)     Hemoglobin 11.6 (*)     Hematocrit 36.5 (*)     MCHC 31.8 (*)     Mono # 0.2 (*)     Gran% 81.9 (*)     Lymph% 14.7 (*)     Mono% 2.7 (*)     All other components within normal limits   COMPREHENSIVE METABOLIC PANEL - Abnormal; Notable for the following:     Glucose 121 (*)     Total Protein 8.7 (*)     eGFR if non  58 (*)     All other components within normal limits   B-TYPE NATRIURETIC PEPTIDE - Abnormal; Notable for the following:      (*)     All other components within normal limits   TROPONIN I   MAGNESIUM   PROTIME-INR   TSH             Medical Decision Making:   Initial Assessment:    66-year-old female with a history of palpitations presents with increased heart rate earlier today.  Patient is been followed by cardiology including Holter monitor which was unrevealing.  On exam she is awake, alert and oriented.  Physical exam is grossly unremarkable.  Her vital signs are normal in triage.  During my exam, she reports feeling of palpitations.  Her heart rate did seem to be increased however she is not on the monitor at the time.  She was immediately placed on the monitor which time her heart rate was noted to be in the 80s.  Differential diagnosis includes paroxysmal A. fib, SVT, metabolic disturbance, thyroid disorder.  EKG reviewed and interpreted myself shows no evidence of acute ischemia.  There is normal sinus rhythm.  Chest x-ray reviewed and interpreted myself shows no evidence of pneumonia or pneumothorax.  Labs are grossly unremarkable.  Discussed case with on-call cardiology, Dr. Oliva.  He recommended increasing metoprolol and follow-up with cardiology.  No other interventions or workup necessary at this time.  Return instructions given.  Patient verbalized understanding and agreement with plan.            Scribe Attestation:   Scribe #1: I performed the above scribed service and the documentation accurately describes the services I performed. I attest to the accuracy of the note.    Attending Attestation:           Physician Attestation for Scribe:  Physician Attestation Statement for Scribe #1: I, Nathan Ramirez MD, reviewed documentation, as scribed by Rocael Damon in my presence, and it is both accurate and complete.                 ED Course     Clinical Impression:   The primary encounter diagnosis was Palpitations. Diagnoses of Tachycardia and Benign hypertension with chronic kidney disease, stage III were also pertinent to this visit.          Nathan Ramirez MD  03/08/17 2004

## 2017-03-09 ENCOUNTER — CLINICAL SUPPORT (OUTPATIENT)
Dept: ELECTROPHYSIOLOGY | Facility: CLINIC | Age: 67
End: 2017-03-09
Payer: MEDICARE

## 2017-03-09 ENCOUNTER — HOSPITAL ENCOUNTER (EMERGENCY)
Facility: HOSPITAL | Age: 67
Discharge: HOME | End: 2017-03-09
Attending: EMERGENCY MEDICINE
Payer: MEDICARE

## 2017-03-09 VITALS
BODY MASS INDEX: 26.58 KG/M2 | HEIGHT: 63 IN | HEART RATE: 81 BPM | OXYGEN SATURATION: 95 % | TEMPERATURE: 98 F | WEIGHT: 150 LBS | RESPIRATION RATE: 18 BRPM | DIASTOLIC BLOOD PRESSURE: 67 MMHG | SYSTOLIC BLOOD PRESSURE: 147 MMHG

## 2017-03-09 DIAGNOSIS — I47.19 ECTOPIC ATRIAL TACHYCARDIA: ICD-10-CM

## 2017-03-09 DIAGNOSIS — R00.2 PALPITATIONS: Primary | ICD-10-CM

## 2017-03-09 PROCEDURE — 99284 EMERGENCY DEPT VISIT MOD MDM: CPT | Mod: ,,, | Performed by: EMERGENCY MEDICINE

## 2017-03-09 PROCEDURE — 93005 ELECTROCARDIOGRAM TRACING: CPT

## 2017-03-09 PROCEDURE — 99283 EMERGENCY DEPT VISIT LOW MDM: CPT | Mod: 25

## 2017-03-09 PROCEDURE — 93010 ELECTROCARDIOGRAM REPORT: CPT | Mod: ,,, | Performed by: INTERNAL MEDICINE

## 2017-03-09 PROCEDURE — 93268 ECG RECORD/REVIEW: CPT | Mod: S$GLB,,, | Performed by: INTERNAL MEDICINE

## 2017-03-09 NOTE — ED NOTES
Pt in NAD, resp even and unlabored, AAOx4. Pt denies c/o about chest pain. Will continue to monitor.

## 2017-03-09 NOTE — PROVIDER PROGRESS NOTES - EMERGENCY DEPT.
Encounter Date: 3/9/2017    ED Physician Progress Notes       SCRIBE NOTE: I, Ryan Goncalves, am scribing for, and in the presence of,  Olu Gupta MD.  Physician Statement: I, Olu Gupta MD, personally performed the services described in this documentation as scribed by Ryan Goncalves in my presence, and it is both accurate and complete.      EKG - STEMI Decision  Initial Reading: No STEMI present.

## 2017-03-10 NOTE — ED PROVIDER NOTES
Encounter Date: 3/9/2017       History     Chief Complaint   Patient presents with    Tachycardia     patient states that she have a holter monitor on      Review of patient's allergies indicates:   Allergen Reactions    Bactrim [sulfamethoxazole-trimethoprim] Rash     Was hospitalized for rash    Albuterol Other (See Comments)     tremors    Restasis [cyclosporine] Itching     HPI Comments: 66y F presents with intermittent palpitations symptoms. She reports that this has been going on for some time. No known exacerbating or relieving factors. She was evaluated at outside ED yesterday for similar symptoms and is currently wearing a heart monitor for her cardiologist. Today she reports that her heart rate was 140 for 1 hour. She denies chest pain or shortness of breath. No lightheadedness. Symptoms resolved at this time    The history is provided by the patient.     Past Medical History:   Diagnosis Date    Acquired bronchiectasis     due to history of TB - followed by pulmonary, Dr. Zuñiga    Allergy     Amblyopia     rt eye per pt    Anemia of other chronic disease     Anxiety     Cataract     Clotting disorder     COPD (chronic obstructive pulmonary disease)     COPD (chronic obstructive pulmonary disease)     Depression     Diverticulosis     Essential tremor     GERD (gastroesophageal reflux disease)     Hemangioma of liver     History of tuberculosis 1978    Hypertension     Mixed anxiety and depressive disorder     Psoriasis     S/P PICC central line placement Apr. 2016 - May 2016    Skin disease     Psoriasis    Spondylosis without myelopathy 8/23/2013    Thoracic aorta atherosclerosis     noted on CT scan of chest 1/3/2011    Tuberculosis     Vaginal delivery     x2    Vitamin D deficiency      Past Surgical History:   Procedure Laterality Date    CATARACT EXTRACTION W/  INTRAOCULAR LENS IMPLANT  07/24/12    od dr spani    CATARACT EXTRACTION W/  INTRAOCULAR LENS IMPLANT   08/07/12    left eye    EYE SURGERY      bilateral Cataract    GALLBLADDER SURGERY  9/2011     Family History   Problem Relation Age of Onset    Hypertension Mother     Heart attack Mother     Cancer Father      liver and bladder    Hyperlipidemia Sister     Psoriasis Sister     Cancer Brother      liver    Stroke Maternal Uncle     Cancer Maternal Aunt      stomach    Lung cancer Sister     Heart attack Brother     Heart attack Son     Amblyopia Neg Hx     Blindness Neg Hx     Cataracts Neg Hx     Glaucoma Neg Hx     Macular degeneration Neg Hx     Retinal detachment Neg Hx     Strabismus Neg Hx     Thyroid disease Neg Hx     Melanoma Neg Hx     Lupus Neg Hx     Eczema Neg Hx     COPD Neg Hx      Social History   Substance Use Topics    Smoking status: Former Smoker     Packs/day: 2.00     Years: 50.00     Types: Cigarettes     Quit date: 5/27/2009    Smokeless tobacco: Never Used    Alcohol use No     Review of Systems   Constitutional: Negative for fever.   Respiratory: Negative for shortness of breath.    Cardiovascular: Positive for palpitations. Negative for chest pain.   All other systems reviewed and are negative.      Physical Exam   Initial Vitals   BP Pulse Resp Temp SpO2   03/09/17 1648 03/09/17 1648 03/09/17 1648 03/09/17 1648 03/09/17 1648   184/79 86 18 98.3 °F (36.8 °C) 95 %     Physical Exam    Vitals reviewed.  Constitutional: Vital signs are normal. She appears well-developed and well-nourished.   HENT:   Head: Normocephalic and atraumatic.   Mouth/Throat: Oropharynx is clear and moist.   Eyes: Conjunctivae and EOM are normal. Pupils are equal, round, and reactive to light.   Neck: Trachea normal and normal range of motion. Neck supple.   Cardiovascular: Normal rate, regular rhythm, normal heart sounds and normal pulses.   Pulmonary/Chest: Breath sounds normal. No respiratory distress.   Abdominal: Soft. Normal appearance and bowel sounds are normal. There is no  tenderness.   Musculoskeletal: Normal range of motion.   Neurological: She is alert and oriented to person, place, and time.   Skin: Skin is warm and dry.         ED Course   Procedures  Labs Reviewed - No data to display  EKG Readings: (Independently Interpreted)   Initial Reading: No STEMI. Rhythm: Normal Sinus Rhythm. Heart Rate: 80. Ectopy: No Ectopy. ST Segments: Normal ST Segments.          Medical Decision Making:   History:   Old Medical Records: I decided to obtain old medical records.  Old Records Summarized: records from previous admission(s) and records from clinic visits.  Initial Assessment:   Evaluation of palpitations  Differential Diagnosis:   Paroxysmal afib, dysthymia, anxiety   Independently Interpreted Test(s):   I have ordered and independently interpreted EKG Reading(s) - see prior notes  Clinical Tests:   Medical Tests: Reviewed and Ordered  ED Management:  Patient is noted to be in sinus rhythm with normal EKG and HR. Work up from [facility] reviewed, no abnormalities. Discussed with cardiology fellow, no indication for additional work up in the emergency department or admission. Patient should follow up with cardiologist tomorrow to have holter reviewed.                    ED Course     Clinical Impression:   The encounter diagnosis was Palpitations.    Disposition:   Disposition: Discharged  Condition: Stable       Ivan Galloway MD  03/09/17 4091

## 2017-03-10 NOTE — ED NOTES
Yesterday patient went to ER on [facility] after HR was in 170s for half hour. Dr. Braga is cardiologist and put patient on heart monitor today which patient is currently wearing. Today HR was in 140s at 330pm for 50 minutes.  Patient states she called office who encouraged her to come to ER. Denies CP. Patient reports some SOB, uses oxygen at home, 2L at night.

## 2017-03-10 NOTE — DISCHARGE INSTRUCTIONS
Understanding Heart Palpitations    Heart palpitations are a symptom. Its the feeling you have when your heartbeat seems to be racing, pounding, skipping, or fluttering. Heart palpitations are most often felt in the chest. Sometimes, they may also be felt in the neck.  What causes heart palpitations?  In most cases, heart palpitations are caused by:  · Stress or anxiety  · Exercise  · Pregnancy  · Some medicines  · Caffeine  · Nicotine  · Alcohol  · Illegal drugs, such as cocaine  · Health problems, such as anemia or overactive thyroid  In some cases, heart palpitations may be caused by a problem with the heart. Abnormal heart rhythms (arrhythmias) are the main concern. They may need to be managed by you and your healthcare provider or treated right away.  How are heart palpitations treated?  Treatments for heart palpitations depend on the cause. Options may include:  · Managing the things that trigger your heart palpitations. This could mean:  ¨ Learning ways to reduce stress and anxiety  ¨ Avoiding caffeine, nicotine, alcohol, or illegal drugs  ¨ Stopping the use of certain medicines, under your doctors guidance  · Medicines, procedures, or surgery to treat an arrhythmia or other health problem that is causing your symptoms  What are the complications of heart palpitations?  Complications of heart palpitations are rare unless they are caused by a problem such as an arrhythmia. In such cases, complications can include:  · Fainting  · Heart failure. This problem occurs when the heart is so weak it no longer pumps blood well.  · Blood clots and stroke  · Sudden cardiac arrest. This problem occurs when the heart suddenly stops beating.  When should I call my healthcare provider?  Call your healthcare provider right away if you have any of these:  · Fever of 100.4°F (38°C) or higher, or as directed  · Symptoms that dont get better with treatment, or symptoms that get worse  · New symptoms, such as chest pain,  shortness of breath, dizziness, or fainting   Date Last Reviewed: 5/1/2016  © 8800-1773 [employer] [address] [place] [address], [place] [address]. All rights reserved. This information is not intended as a substitute for professional medical care. Always follow your healthcare professional's instructions.

## 2017-03-15 ENCOUNTER — OFFICE VISIT (OUTPATIENT)
Dept: CARDIOLOGY | Facility: CLINIC | Age: 67
End: 2017-03-15
Payer: MEDICARE

## 2017-03-15 ENCOUNTER — TELEPHONE (OUTPATIENT)
Dept: PULMONOLOGY | Facility: CLINIC | Age: 67
End: 2017-03-15

## 2017-03-15 VITALS
HEIGHT: 63 IN | BODY MASS INDEX: 27.31 KG/M2 | SYSTOLIC BLOOD PRESSURE: 148 MMHG | WEIGHT: 154.13 LBS | DIASTOLIC BLOOD PRESSURE: 65 MMHG | OXYGEN SATURATION: 93 % | HEART RATE: 88 BPM

## 2017-03-15 DIAGNOSIS — R07.89 NON-CARDIAC CHEST PAIN: Primary | ICD-10-CM

## 2017-03-15 DIAGNOSIS — I47.19 ECTOPIC ATRIAL TACHYCARDIA: ICD-10-CM

## 2017-03-15 DIAGNOSIS — J43.9 PULMONARY EMPHYSEMA, UNSPECIFIED EMPHYSEMA TYPE: ICD-10-CM

## 2017-03-15 DIAGNOSIS — J47.9 ACQUIRED BRONCHIECTASIS: Primary | ICD-10-CM

## 2017-03-15 PROCEDURE — 1157F ADVNC CARE PLAN IN RCRD: CPT | Mod: S$GLB,,, | Performed by: INTERNAL MEDICINE

## 2017-03-15 PROCEDURE — 3078F DIAST BP <80 MM HG: CPT | Mod: S$GLB,,, | Performed by: INTERNAL MEDICINE

## 2017-03-15 PROCEDURE — 99999 PR PBB SHADOW E&M-EST. PATIENT-LVL III: CPT | Mod: PBBFAC,,, | Performed by: INTERNAL MEDICINE

## 2017-03-15 PROCEDURE — 99499 UNLISTED E&M SERVICE: CPT | Mod: S$GLB,,, | Performed by: INTERNAL MEDICINE

## 2017-03-15 PROCEDURE — 1126F AMNT PAIN NOTED NONE PRSNT: CPT | Mod: S$GLB,,, | Performed by: INTERNAL MEDICINE

## 2017-03-15 PROCEDURE — 1160F RVW MEDS BY RX/DR IN RCRD: CPT | Mod: S$GLB,,, | Performed by: INTERNAL MEDICINE

## 2017-03-15 PROCEDURE — 99214 OFFICE O/P EST MOD 30 MIN: CPT | Mod: S$GLB,,, | Performed by: INTERNAL MEDICINE

## 2017-03-15 PROCEDURE — 3077F SYST BP >= 140 MM HG: CPT | Mod: S$GLB,,, | Performed by: INTERNAL MEDICINE

## 2017-03-15 PROCEDURE — 1159F MED LIST DOCD IN RCRD: CPT | Mod: S$GLB,,, | Performed by: INTERNAL MEDICINE

## 2017-03-15 NOTE — PROGRESS NOTES
"Subjective:    Patient ID:  Yasmin Asencio is a 66 y.o. female who presents for follow-up of Hospital Follow Up      HPI     Recently admitted  66 year old female with PMH of pulmonary hemorrhage,S/P embolization in 11/2015 and pseudomonas pneumonia in 2016,has been treated with IV Abx, hypertension and COPD,AOCD,atrial tachycardia,depression,chronic slurred speech,has been followed by neurology,GERD,presents complaining of palpitations described as a "pounding" in her chest that started last night at approximately 11 PM after she had 2 back-to-back Atrovent nebulizer treatments. She also reports that she has elevated blood pressure since earlier tonight. She states she has a history of a-fib that occurred about 1.5 years ago while she was having a procedure. She states she was sent home with a monitor for 3 days but it did not occur at any other time since then.she had unremarkable LHC on 6/2016 by , She otherwise denies fever, chills, chest pain, , leg pain, leg swelling, abdominal pain, nausea, vomiting, and diarrhea at this time.patient has PE in right pulmonary brunch,patient denies history of PE and DVT ,no family history of clot disorder or long time recent travel,hospitalization,duo to history of hemoptysis,patient has not been yet started on anticoagulation,pulmnology has been consulted,she denies hemoptysis sine embolization and treatment of pseudomonas pneumonia in last 7 month,CTA chest show no sign of hemorrhagia and her CBC show chronic stable AOCD.she is stable at this time on NC   O 2.      Hospital Course:   65 y/o female with h/o pulmonary hemorrhage, s/p embolization in 11/2015 and pseudomonas pneumonia in 2016 who was admitted with PE in the right pulmonary brunch. Given her h/o pulmonary hemorrhage, patient was evaluated by pulmonary prior to initiation with anticoagulation. Pt did not have any signs of hemorrhage; after discussion with Pulmonary, patient, and family were " agreeable to start lovenox and monitor patient in the hospital. Pt did not have any hemoptysis after initiation of anticoagulation with lovenox and her H/H remained overall stable. Again, discussion with Pulmonary, myself and family was made prior to conversion to switch over to oral anticoagulation. Treatment choices were discussed with patient and family, and did not want coumadin but was agreeable to Eliquis. They were made aware and understand that Eliquis does not have a reversal agent currently at this time, but wished to go forward with treatment. Pt was very nervous to start this medication, therefore, patient was converted to Eliquis and was observed overnight on this medication. Pt had no hemoptysis and H/H stable. Pt was discharge on Eliquis with respiratory status that was stable. Pt is O2 dependent due to her COPD and bronchiectasis, and she was at her baseline. Pt arranged for close f/u with PCP and Pulmonary.       * Pulmonary embolism without acute cor pulmonale  Patient was at risk for pulmonary hemorrhage due to history of hemorrhage requiring embolization and treatment for pneumonia. Long discussion made with patient and family regarding risk of bleeding after starting anticoagulation. Pt was started on lovenox and closely monitored for hemoptysis. No occurrence of hemoptysis, followed closely with daily CBC. Again, long discussion made on oral anticoagulation and they declined coumadin and was agreeable to Eliquis. Pt was started on this medication and observed overnight without incident. U/S was negative for DVT. Pt to f/u with PCP and Pulmonary.      Acquired bronchiectasis  Due to history of TB, followed by pulmonary (Dr. Zuñiga) and on home oxygen as needed (uses it mostly at night). Respiratory status was closely monitored, and remained stable.      COPD (chronic obstructive pulmonary disease)  Continued with Atrovent, stable. O2 PRN at baseline.      Ectopic atrial tachycardia  Stable,  monitored on telemetry.      Benign hypertension with chronic kidney disease, stage III  Continued metoprolol.      Anemia of chronic disease  Slight drop noted on admission, overall H/H remained stable with no reported bleeding, monitored.       Mild major depression  Continued lexapro.      GERD (gastroesophageal reflux disease)  Continued with PPI.                  6/16/16 [facility] EDP 18, EF 55%, normal coronaries  Medical Rx    EF does not appear depressed - ? Recent echo result      Echo 11/29/16    1 - Low normal left ventricular systolic function (EF 50-55%).     2 - Eccentric hypertrophy.     3 - Left ventricular diastolic dysfunction.     4 - Mild left atrial enlargement.     5 - Mild mitral regurgitation.     6 - Trivial tricuspid regurgitation.      Holter 2/13/17  1. Sinus rhythm with heart rates varying between 45 and 128 bpm with an average of 72 bpm.     VENTRICULAR ARRHYTHMIAS  1. There was a single PVC recorded.     2. There were no episodes of ventricular tachycardia.    SUPRA VENTRICULAR ARRHYTHMIAS  1. There were occasional PACs totalling 406 and averaging 16 per hour.     2. There were no episodes of sustained supraventricular tachycardia.    SINUS NODE FUNCTION  1. There was no evidence of high grade SA jennifer block.     AV CONDUCTION  1. There was no evidence of high grade AV block.     Palpitations recurred - an event monitor was ordered - wearing it now    Went to the ER 3/8/17  HPI: This 66 y.o. F, who has a past medical history of Acquired bronchiectasis; Allergy; Amblyopia; Anemia of other chronic disease; Anxiety; Cataract; Clotting disorder; COPD (chronic obstructive pulmonary disease); COPD (chronic obstructive pulmonary disease); Depression; Diverticulosis; Essential tremor; GERD (gastroesophageal reflux disease); Hemangioma of liver; History of tuberculosis (1978); Hypertension; Mixed anxiety and depressive disorder; Psoriasis; S/P PICC central line placement (Apr. 2016 - May 2016); Skin  "disease; Spondylosis without myelopathy (8/23/2013); Thoracic aorta atherosclerosis; Tuberculosis; Vaginal delivery; and Vitamin D deficiency, presents to the ED for evaluation after a 32 minute episode of palpitations preceeded by a brief visual disturbance earlier today. Her heart rate was fluctuating 112 and 170. Visual disturbance is described as "seeing waves." She notes similar episodes for the past year that have been worsening. She is followed is by Dr. Braga and, per patient, Holter monitor two weeks ago was unremarkable. She is on Eliquis. She denies fever, nausea, vomiting, chest pain, shortness of breath and headache.    Still having frequent episodes of tachycardia 140s - can last several hours          Review of Systems   Constitution: Negative for decreased appetite.   HENT: Negative for ear discharge.    Eyes: Negative for blurred vision.   Respiratory: Negative for hemoptysis.    Endocrine: Negative for polyphagia.   Hematologic/Lymphatic: Negative for adenopathy.   Skin: Negative for color change.   Musculoskeletal: Negative for joint swelling.   Neurological: Negative for brief paralysis.   Psychiatric/Behavioral: Negative for hallucinations.        Objective:    Physical Exam   Constitutional: She is oriented to person, place, and time. She appears well-developed and well-nourished. No distress.   HENT:   Head: Normocephalic and atraumatic.   Nose: Nose normal.   Mouth/Throat: Oropharynx is clear and moist. Mucous membranes are not pale and not dry.   Oral mucosa appears normal- no pallor or cyanosis  No thyromegaly, masses or tenderness   Eyes: Conjunctivae are normal. Right eye exhibits no discharge. Left eye exhibits no discharge. No scleral icterus.   Eye lids normal   Neck: No JVD present. Carotid bruit is not present.   Cardiovascular: Normal rate, regular rhythm and intact distal pulses.  PMI is not displaced.  Exam reveals no gallop, no S3, no S4 and no friction rub.    Murmur " heard.  Pulmonary/Chest: Effort normal. No respiratory distress. She has no wheezes. She has rales (Throughout). She exhibits no tenderness and no deformity.   Abdominal: Soft. Normal aorta and bowel sounds are normal. She exhibits no distension, no abdominal bruit and no mass. There is no hepatosplenomegaly. There is no tenderness. There is no guarding.   No indication for stool study at this time.   Musculoskeletal: She exhibits no edema.   Normal gait  Normal ROM and strength in all 4 extremities  Back does not demonstrate kyphosis or scoliosis   Neurological: She is alert and oriented to person, place, and time.   Skin: Skin is warm and dry. No ecchymosis, no lesion and no petechiae noted. She is not diaphoretic. No cyanosis. No pallor. Nails show no clubbing.   Psychiatric: She has a normal mood and affect. Her behavior is normal.   Vitals reviewed.        Assessment:       1. Non-cardiac chest pain    2. Pulmonary emphysema, unspecified emphysema type    3. Ectopic atrial tachycardia         Plan:       Will refer to EP - Dr Ballesteros for evaluation of atrial tachycardia

## 2017-03-15 NOTE — MR AVS SNAPSHOT
[facility]  [address] [facility] [place] [address]  Phone: [phone]                  Yasmin Asencio   3/15/2017 3:15 PM   Office Visit    Description:  Female : 1950   Provider:  Taurus Braga MD   Department:  [facility]           Reason for Visit     Hospital Follow Up                To Do List           Future Appointments        Provider Department Dept Phone    3/28/2017 8:00 AM Dyana Byers, PhD [facility] Neurology [phone]    2017 1:15 PM PULMONARY FUNCTION [facility] Pulmonary Lab [phone]    2017 1:30 PM PULMONARY FUNCTION [facility] Pulmonary Lab [phone]    2017 2:00 PM PRECIOUS Zuñiga MD [facility] Pulmonary Services [phone]    2017 11:00 AM Taurus Braga MD [facility] [phone]      Goals (5 Years of Data)     None      [facility] On Call     [facility] -  Assistance  Registered nurses in the [facility] provide clinical advisement, health education, appointment booking, and other advisory services.  Call for this free service at [phone].             Medications           Message regarding Medications     Verify the changes and/or additions to your medication regime listed below are the same as discussed with your clinician today.  If any of these changes or additions are incorrect, please notify your healthcare provider.             Verify that the below list of medications is an accurate representation of the medications you are currently taking.  If none reported, the list may be blank. If incorrect, please contact your healthcare provider. Carry this list with you in case of emergency.           Current Medications     alprazolam (XANAX) 0.25 MG tablet Take 1 tablet (0.25 mg total) by mouth nightly as needed for Anxiety.    amoxicillin-clavulanate 875-125mg (AUGMENTIN) 875-125 mg per tablet Take 1 tablet by mouth 2 (two) times daily.    ANORO  "ELLIPTA 62.5-25 mcg/actuation DsDv     apixaban 5 mg Tab Take 1 tablet (5 mg total) by mouth 2 (two) times daily.    atorvastatin (LIPITOR) 10 MG tablet Take 1 tablet (10 mg total) by mouth once daily.    desoximetasone (TOPICORT) 0.25 % cream Apply as directed bid prn    escitalopram oxalate (LEXAPRO) 20 MG tablet TAKE ONE TABLET BY MOUTH ONCE DAILY    gabapentin (NEURONTIN) 300 MG capsule Take 1 capsule (300 mg total) by mouth 3 (three) times daily.    guaifenesin (MUCINEX) 600 mg 12 hr tablet Take 1,200 mg by mouth 2 (two) times daily.    ipratropium (ATROVENT) 0.02 % nebulizer solution USE ONE VIAL BY NEBULIZATION 4 TIMES DAILY    metoprolol tartrate (LOPRESSOR) 25 MG tablet Take 2 tablets (50 mg total) by mouth 2 (two) times daily.    omeprazole (PRILOSEC) 20 MG capsule TAKE ONE CAPSULE BY MOUTH ONCE DAILY    predniSONE (DELTASONE) 10 MG tablet Take 3 tabs per day x 5 days, then 2 tabs per day x 5 days, then 1 tab per day x 5 days, then stop. Take with food    primidone (MYSOLINE) 50 MG Tab TAKE ONE TABLET BY MOUTH ONCE DAILY IN THE MORNING, ONE AT NOON, AND TWO AT BEDTIME    SODIUM CHLORIDE FOR INHALATION (SODIUM CHLORIDE 3%) 3 % nebulizer solution USE ONE VIAL IN NEBULIZER TWICE DAILY    topiramate (TOPAMAX) 25 MG tablet Take 1 tablet (25 mg total) by mouth 2 (two) times daily.    tramadol (ULTRAM) 50 mg tablet Take 50 mg by mouth every 6 (six) hours as needed for Pain.    VITAMIN D2 50,000 unit capsule TAKE ONE CAPSULE BY MOUTH ONCE A WEEK    desonide (DESOWEN) 0.05 % lotion AAA face bid prn redness, scaling, or itching           Clinical Reference Information           Your Vitals Were     BP Pulse Height Weight Last Period SpO2    148/65 (BP Location: Left arm, Patient Position: Sitting, BP Method: Automatic) 88 5' 3" (1.6 m) 69.9 kg (154 lb 1.6 oz) (LMP Unknown) 93%    BMI                27.3 kg/m2          Blood Pressure          Most Recent Value    BP  (!)  148/65      Allergies as of 3/15/2017     " Bactrim [Sulfamethoxazole-trimethoprim]    Albuterol    Restasis [Cyclosporine]      Immunizations Administered on Date of Encounter - 3/15/2017     None      Language Assistance Services     ATTENTION: Language assistance services are available, free of charge. Please call [phone].      ATENCIÓN: Si habla silvino, tiene a santamaria disposición servicios gratuitos de asistencia lingüística. Llame al [phone].     CHÚ Ý: N?u b?n nói Ti?ng Vi?t, có các d?ch v? h? tr? ngôn ng? mi?n phí dành cho b?n. G?i s? [phone].         [facility] complies with applicable Federal civil rights laws and does not discriminate on the basis of race, color, national origin, age, disability, or sex.

## 2017-03-15 NOTE — TELEPHONE ENCOUNTER
Past resp cultures have shown Pseudomonas which is resistant to cipro.  Will repeat culture before making change or addition to current therapy.

## 2017-03-15 NOTE — TELEPHONE ENCOUNTER
----- Message from Etelvina DARIO Nair sent at 3/15/2017  9:56 AM CDT -----  Contact: Patient: [phone]  Patient called and stated that she is coughing up a lil blood.  Patient stated that she is taking an antibiotic and prednisone and it doesn't seem to be working.  Patient is asking if she needs to be seen by [facility] or her infectious disease doctor.  Please call patient at [phone].    Thanks

## 2017-03-16 ENCOUNTER — LAB VISIT (OUTPATIENT)
Dept: LAB | Facility: HOSPITAL | Age: 67
End: 2017-03-16
Attending: INTERNAL MEDICINE
Payer: MEDICARE

## 2017-03-16 DIAGNOSIS — J47.9 ACQUIRED BRONCHIECTASIS: ICD-10-CM

## 2017-03-16 DIAGNOSIS — I47.19 ATRIAL TACHYCARDIA: Primary | ICD-10-CM

## 2017-03-16 PROCEDURE — 87077 CULTURE AEROBIC IDENTIFY: CPT

## 2017-03-16 PROCEDURE — 87186 SC STD MICRODIL/AGAR DIL: CPT

## 2017-03-16 PROCEDURE — 87070 CULTURE OTHR SPECIMN AEROBIC: CPT

## 2017-03-16 PROCEDURE — 87205 SMEAR GRAM STAIN: CPT

## 2017-03-20 ENCOUNTER — TELEPHONE (OUTPATIENT)
Dept: CARDIOLOGY | Facility: CLINIC | Age: 67
End: 2017-03-20

## 2017-03-20 ENCOUNTER — TELEPHONE (OUTPATIENT)
Dept: ELECTROPHYSIOLOGY | Facility: CLINIC | Age: 67
End: 2017-03-20

## 2017-03-20 ENCOUNTER — INITIAL CONSULT (OUTPATIENT)
Dept: ELECTROPHYSIOLOGY | Facility: CLINIC | Age: 67
End: 2017-03-20
Payer: MEDICARE

## 2017-03-20 ENCOUNTER — ANTI-COAG VISIT (OUTPATIENT)
Dept: CARDIOLOGY | Facility: CLINIC | Age: 67
End: 2017-03-20

## 2017-03-20 ENCOUNTER — HOSPITAL ENCOUNTER (OUTPATIENT)
Dept: CARDIOLOGY | Facility: CLINIC | Age: 67
Discharge: HOME | End: 2017-03-20
Payer: MEDICARE

## 2017-03-20 VITALS
HEIGHT: 63 IN | HEART RATE: 64 BPM | SYSTOLIC BLOOD PRESSURE: 136 MMHG | WEIGHT: 154 LBS | DIASTOLIC BLOOD PRESSURE: 53 MMHG | BODY MASS INDEX: 27.29 KG/M2

## 2017-03-20 DIAGNOSIS — J96.11 CHRONIC RESPIRATORY FAILURE WITH HYPOXIA: ICD-10-CM

## 2017-03-20 DIAGNOSIS — I47.10 PSVT (PAROXYSMAL SUPRAVENTRICULAR TACHYCARDIA): ICD-10-CM

## 2017-03-20 DIAGNOSIS — R00.2 PALPITATIONS: ICD-10-CM

## 2017-03-20 DIAGNOSIS — J43.8 OTHER EMPHYSEMA: ICD-10-CM

## 2017-03-20 DIAGNOSIS — Z79.01 LONG TERM (CURRENT) USE OF ANTICOAGULANTS: ICD-10-CM

## 2017-03-20 DIAGNOSIS — J43.8 OTHER EMPHYSEMA: Primary | ICD-10-CM

## 2017-03-20 DIAGNOSIS — I47.19 ATRIAL TACHYCARDIA: ICD-10-CM

## 2017-03-20 PROCEDURE — 99999 PR PBB SHADOW E&M-EST. PATIENT-LVL III: CPT | Mod: PBBFAC,,, | Performed by: INTERNAL MEDICINE

## 2017-03-20 PROCEDURE — 3075F SYST BP GE 130 - 139MM HG: CPT | Mod: S$GLB,,, | Performed by: INTERNAL MEDICINE

## 2017-03-20 PROCEDURE — 93000 ELECTROCARDIOGRAM COMPLETE: CPT | Mod: S$GLB,,, | Performed by: INTERNAL MEDICINE

## 2017-03-20 PROCEDURE — 99499 UNLISTED E&M SERVICE: CPT | Mod: S$GLB,,, | Performed by: INTERNAL MEDICINE

## 2017-03-20 PROCEDURE — 99205 OFFICE O/P NEW HI 60 MIN: CPT | Mod: S$GLB,,, | Performed by: INTERNAL MEDICINE

## 2017-03-20 PROCEDURE — 1160F RVW MEDS BY RX/DR IN RCRD: CPT | Mod: S$GLB,,, | Performed by: INTERNAL MEDICINE

## 2017-03-20 PROCEDURE — 3078F DIAST BP <80 MM HG: CPT | Mod: S$GLB,,, | Performed by: INTERNAL MEDICINE

## 2017-03-20 PROCEDURE — 1157F ADVNC CARE PLAN IN RCRD: CPT | Mod: S$GLB,,, | Performed by: INTERNAL MEDICINE

## 2017-03-20 PROCEDURE — 1159F MED LIST DOCD IN RCRD: CPT | Mod: S$GLB,,, | Performed by: INTERNAL MEDICINE

## 2017-03-20 PROCEDURE — 1126F AMNT PAIN NOTED NONE PRSNT: CPT | Mod: S$GLB,,, | Performed by: INTERNAL MEDICINE

## 2017-03-20 RX ORDER — WARFARIN SODIUM 5 MG/1
TABLET ORAL
Qty: 30 TABLET | Refills: 11 | Status: SHIPPED | OUTPATIENT
Start: 2017-03-20 | End: 2017-04-06 | Stop reason: SDUPTHER

## 2017-03-20 NOTE — LETTER
March 20, 2017      Taurus Braga MD  [address]  [place] [address]           [facility] - Arrhythm[facility]  [address]  [place] [address]  Phone: [phone]  Fax: [phone]          Patient: Yasmin Asencio   MR Number: [id number]   YOB: 1950   Date of Visit: 3/20/2017       Dear Dr. Taurus Braga:    Thank you for referring Yasmin Asencio to me for evaluation. Attached you will find relevant portions of my assessment and plan of care.    If you have questions, please do not hesitate to call me. I look forward to following Yasmin Asencio along with you.    Sincerely,    Marlon Ballesteros MD    Enclosure  CC:  No Recipients    If you would like to receive this communication electronically, please contact [email or web address] or [phone] to request more information on [employer] access.    For providers and/or their staff who would like to refer a patient to [facility], please contact us through our one-stop-shop provider referral line, [facility], at [phone].    If you feel you have received this communication in error or would no longer like to receive these types of communications, please e-mail [email or web address]

## 2017-03-20 NOTE — PROGRESS NOTES
"66 y.o. F with PMHx of Acquired bronchiectasis; Allergy; Amblyopia; Anemia of other chronic disease; Anxiety; Cataract; Clotting disorder; COPD (chronic obstructive pulmonary disease); COPD (chronic obstructive pulmonary disease); Depression; Diverticulosis; Essential tremor; GERD (gastroesophageal reflux disease); Hemangioma of liver; History of tuberculosis (1978); Hypertension; Mixed anxiety and depressive disorder; Psoriasis; S/P PICC central line placement (Apr. 2016 - May 2016); Skin disease; Spondylosis without myelopathy (8/23/2013); Thoracic aorta atherosclerosis; Tuberculosis; Pulmonary Embolus; and Vitamin D deficiency, presents to the ED for evaluation after a 32 minute episode of palpitations preceeded by a brief visual disturbance earlier today. She is followed is by Dr. Braga.  She is on Eliquis. She reports starting the Eliquis for PE in 1/2017. She will be swithced to coumadin for EPS/RFA amd then switch back to eliquis post procedure.   She reports she as told by Dr Madsen office to start the coumadin but not yet to stop the eliquis. I will message them for details on the transition.     Per response from Dr Ballesteros, "I told her to continue eliquis x 3 days then just coumadin after that   thanks "  "

## 2017-03-20 NOTE — PROGRESS NOTES
"Subjective:    Patient ID:  Yasmin Asencio is a 66 y.o. female who presents for evaluation of Tachycardia      HPI   66 y.o. F  pulm hemorrhage 11/15, s/p coiling  pseudomonas PNA 2016, s/p extended Abx  COPD, bronchiectasis (home 02 frequently)  HTN  chronic slurred speech, thought due to essential tremor per neuro  hx "AF" destini-coiling procedure (no documentation)    Recent PE. Started on eliquis. DVT neg.  Has palpitations with HR >140 bpm at random intervals for past 10 years.  WIth palps, gets blurry vision, chest palps with radiation to the neck. Her "blood runs cold" and lasts for 20 mins.    cath 6/16 neg  echo 11/16 50-55% LVEF  Holter 2/17: SR . no SVT.    Event monitor (which I read today): regular NCT with -440 ms.     My interpretation of today's ECG is NST 64 bpm. Small P wave. No delta.    Review of Systems   Constitution: Negative. Negative for weakness and malaise/fatigue.   HENT: Negative.  Negative for ear pain and tinnitus.    Eyes: Negative for blurred vision.   Cardiovascular: Positive for dyspnea on exertion and palpitations. Negative for chest pain, near-syncope and syncope.   Respiratory: Negative.  Negative for shortness of breath.    Endocrine: Negative.  Negative for polyuria.   Hematologic/Lymphatic: Does not bruise/bleed easily.   Skin: Negative.  Negative for rash.   Musculoskeletal: Negative.  Negative for joint pain and muscle weakness.   Gastrointestinal: Negative.  Negative for abdominal pain and change in bowel habit.   Genitourinary: Negative for frequency.   Neurological: Positive for tremors. Negative for dizziness.   Psychiatric/Behavioral: Negative.  Negative for depression. The patient is not nervous/anxious.    Allergic/Immunologic: Negative for environmental allergies.        Objective:    Physical Exam   Constitutional: She is oriented to person, place, and time. Vital signs are normal. She appears well-developed and well-nourished. She is active and " cooperative.   HENT:   Head: Normocephalic and atraumatic.   Eyes: Conjunctivae and EOM are normal.   Neck: Normal range of motion. Carotid bruit is not present. No tracheal deviation and no edema present. No thyroid mass and no thyromegaly present.   Cardiovascular: Normal rate, regular rhythm, normal heart sounds, intact distal pulses and normal pulses.   No extrasystoles are present. PMI is not displaced.  Exam reveals no gallop and no friction rub.    No murmur heard.  Pulmonary/Chest: Effort normal. No respiratory distress. She has no wheezes. She has rhonchi. She has no rales.   Abdominal: Soft. Normal appearance. She exhibits no distension. There is no hepatosplenomegaly.   Musculoskeletal: Normal range of motion.   Neurological: She is alert and oriented to person, place, and time. Coordination normal.   Skin: Skin is warm and dry. No rash noted.   Psychiatric: She has a normal mood and affect. Her speech is normal and behavior is normal. Thought content normal. Cognition and memory are normal.   Nursing note and vitals reviewed.        Assessment:       1. Other emphysema    2. PSVT (paroxysmal supraventricular tachycardia)    3. Chronic respiratory failure with hypoxia    4. Palpitations         Plan:       Discussed with patient basic cardiac electrophysiology and in general possible mechanisms of SVT, including AVNRT, AVRT, AT, AFL.  Discussed options: nothing, meds, EPS/RFA.  She's most interested in full Rx with EPS and RFA as possible.    I spent about a half hour discussing the nature of EP study and ablation, including possible transseptal puncture. We discused risks and benefits at length. Our discussion included, but was not limited to the risk of death, infection, bleeding, stroke, MI, cardiac perforation, embolism, cardiac tamponade, skin burns, and other organic injury including the possibility for need for surgery or pacemaker implantation.  I discussed with patient risks, indications,  benefits, and alternatives of the planned procedure. All questions were answered. Patient understands and wishes to proceed.    Anesthesia support for MAC, given significant pulmonary disease.  Change to coumadin for that procedure. Will likely change back to eliquis on f/u.    She may also benefit from ILR longterm, as surveillance for arrhythmia. Discussed that with her today; she's open to the idea.

## 2017-03-20 NOTE — TELEPHONE ENCOUNTER
----- Message from Marlon Ballesteros MD sent at 3/20/2017 12:30 PM CDT -----  Tony,  Thanks for the referral.  I'll see if I can track down this SVT and give her some relief.  See you soon I hope  Bryan

## 2017-03-20 NOTE — TELEPHONE ENCOUNTER
----- Message from Vicki Mathias sent at 3/20/2017 11:11 AM CDT -----  Contact: michelle Saini/ [facility] pharmacy  Yeny called because the pt is already on Eliquis and they received a request to fill warfarin.  She wanted to make sure that Dr. Ballesteros was aware of this.  They can be reached @ [phone] ext. [phone].  Thanks

## 2017-03-20 NOTE — TELEPHONE ENCOUNTER
Called pt's pharmacy back and explained per clinic visit note from this AM pt will be switching to coumadin for EPS/RFA and then switch back to eliquis post procedure.

## 2017-03-22 ENCOUNTER — OFFICE VISIT (OUTPATIENT)
Dept: INFECTIOUS DISEASES | Facility: CLINIC | Age: 67
End: 2017-03-22
Payer: MEDICARE

## 2017-03-22 VITALS
HEIGHT: 63 IN | TEMPERATURE: 98 F | HEART RATE: 58 BPM | WEIGHT: 156.31 LBS | BODY MASS INDEX: 27.7 KG/M2 | SYSTOLIC BLOOD PRESSURE: 140 MMHG | DIASTOLIC BLOOD PRESSURE: 69 MMHG

## 2017-03-22 DIAGNOSIS — R04.2 HEMOPTYSIS: ICD-10-CM

## 2017-03-22 DIAGNOSIS — A49.8 PSEUDOMONAS AERUGINOSA INFECTION: ICD-10-CM

## 2017-03-22 DIAGNOSIS — J47.0 BRONCHIECTASIS WITH ACUTE LOWER RESPIRATORY INFECTION: Primary | ICD-10-CM

## 2017-03-22 PROCEDURE — 99499 UNLISTED E&M SERVICE: CPT | Mod: S$GLB,,, | Performed by: INTERNAL MEDICINE

## 2017-03-22 PROCEDURE — 1160F RVW MEDS BY RX/DR IN RCRD: CPT | Mod: S$GLB,,, | Performed by: INTERNAL MEDICINE

## 2017-03-22 PROCEDURE — 1159F MED LIST DOCD IN RCRD: CPT | Mod: S$GLB,,, | Performed by: INTERNAL MEDICINE

## 2017-03-22 PROCEDURE — 3078F DIAST BP <80 MM HG: CPT | Mod: S$GLB,,, | Performed by: INTERNAL MEDICINE

## 2017-03-22 PROCEDURE — 1126F AMNT PAIN NOTED NONE PRSNT: CPT | Mod: S$GLB,,, | Performed by: INTERNAL MEDICINE

## 2017-03-22 PROCEDURE — 1157F ADVNC CARE PLAN IN RCRD: CPT | Mod: S$GLB,,, | Performed by: INTERNAL MEDICINE

## 2017-03-22 PROCEDURE — 3077F SYST BP >= 140 MM HG: CPT | Mod: S$GLB,,, | Performed by: INTERNAL MEDICINE

## 2017-03-22 PROCEDURE — 99214 OFFICE O/P EST MOD 30 MIN: CPT | Mod: 25,S$GLB,, | Performed by: INTERNAL MEDICINE

## 2017-03-22 PROCEDURE — 99999 PR PBB SHADOW E&M-EST. PATIENT-LVL III: CPT | Mod: PBBFAC,,, | Performed by: INTERNAL MEDICINE

## 2017-03-22 NOTE — PROGRESS NOTES
Subjective:      Patient ID: Yasmin Asencio is a 66 y.o. female.    Chief Complaint:No chief complaint on file.      History of Present Illness    Ms. 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  She has had scarring and bronchiectasis as a result afterwards.  She has also had recurrent episodes of hemoptysis.  Recently she started coughing blood again.  Called Dr. Zuñiga office and was given a prescription for amoxicillin and prednisone.  She continued to have hemoptysis.  Sputum cultures were then performed and were positive for pseudomonas.  She is referred to my office for this.    Review of Systems   Constitution: Positive for malaise/fatigue. Negative for chills, decreased appetite, fever, weakness, night sweats, weight gain and weight loss.   HENT: Positive for headaches. Negative for congestion, ear pain, hearing loss, hoarse voice, sore throat and tinnitus.    Eyes: Negative for blurred vision, redness and visual disturbance.   Cardiovascular: Positive for palpitations. Negative for chest pain and leg swelling.   Respiratory: Positive for cough, hemoptysis, shortness of breath, sputum production and wheezing.    Hematologic/Lymphatic: Negative for adenopathy. Does not bruise/bleed easily.   Skin: Negative for dry skin, itching, rash and suspicious lesions.   Musculoskeletal: Positive for back pain. Negative for joint pain, myalgias and neck pain.   Gastrointestinal: Negative for abdominal pain, constipation, diarrhea, heartburn, nausea and vomiting.   Genitourinary: Negative for dysuria, flank pain, frequency, hematuria, hesitancy and urgency.   Neurological: Negative for dizziness, numbness and paresthesias.   Psychiatric/Behavioral: Negative for depression and memory loss. The patient is nervous/anxious. The patient does not have insomnia.      Objective:   Physical Exam   Constitutional: She is oriented to person, place, and time. She  appears well-developed and well-nourished. No distress.   HENT:   Head: Normocephalic and atraumatic.   Right Ear: External ear normal.   Left Ear: External ear normal.   Nose: Nose normal.   Mouth/Throat: Oropharynx is clear and moist. No oropharyngeal exudate.   Eyes: Conjunctivae and EOM are normal. Pupils are equal, round, and reactive to light. Right eye exhibits no discharge. Left eye exhibits no discharge. No scleral icterus.   Neck: Normal range of motion. Neck supple. No JVD present. No tracheal deviation present. No thyromegaly present.   Cardiovascular: Normal rate, regular rhythm, normal heart sounds and intact distal pulses.  Exam reveals no gallop and no friction rub.    No murmur heard.  Pulmonary/Chest: Effort normal. No stridor. No respiratory distress. She has rales. She exhibits no tenderness.   Abdominal: Soft. Bowel sounds are normal. She exhibits no distension and no mass. There is no tenderness. There is no rebound and no guarding.   Musculoskeletal: Normal range of motion. She exhibits no edema or tenderness.   Lymphadenopathy:     She has no cervical adenopathy.   Neurological: She is alert and oriented to person, place, and time. She displays normal reflexes. No cranial nerve deficit. She exhibits normal muscle tone. Coordination normal.   Skin: Skin is warm. No rash noted. She is not diaphoretic. No erythema. No pallor.   Psychiatric: She has a normal mood and affect. Her behavior is normal. Judgment and thought content normal.   Nursing note and vitals reviewed.    Assessment:       1. Bronchiectasis with acute lower respiratory infection    2. Pseudomonas aeruginosa infection    3. Hemoptysis        Ms. Asencio is a 67 y/o female with a history of bronchiectasis and recurrent episodes of hemoptysis.  He has recently started having hemoptysis again.  Respiratory cultures are positive for pseudomonas.  Plan is to start her on a 2 week course of IV cefepime and IV tobramycin.  Plan:        Bronchiectasis with acute lower respiratory infection  -     IR PICC Line Placement (xpd); Future; Expected date: 3/22/17    Pseudomonas aeruginosa infection  -     IR PICC Line Placement (xpd); Future; Expected date: 3/22/17    Hemoptysis  -     IR PICC Line Placement (xpd); Future; Expected date: 3/22/17

## 2017-03-23 ENCOUNTER — ANTI-COAG VISIT (OUTPATIENT)
Dept: CARDIOLOGY | Facility: CLINIC | Age: 67
End: 2017-03-23
Payer: MEDICARE

## 2017-03-23 DIAGNOSIS — R00.2 PALPITATIONS: ICD-10-CM

## 2017-03-23 DIAGNOSIS — Z79.01 LONG TERM (CURRENT) USE OF ANTICOAGULANTS: Primary | ICD-10-CM

## 2017-03-23 DIAGNOSIS — I47.10 PSVT (PAROXYSMAL SUPRAVENTRICULAR TACHYCARDIA): ICD-10-CM

## 2017-03-23 LAB — INR PPP: 1.3 (ref 2–3)

## 2017-03-23 PROCEDURE — 85610 PROTHROMBIN TIME: CPT | Mod: QW,S$GLB,,

## 2017-03-23 PROCEDURE — 99211 OFF/OP EST MAY X REQ PHY/QHP: CPT | Mod: 25,S$GLB,,

## 2017-03-23 NOTE — PROGRESS NOTES
Patient presents for new patient education.  Written and verbal instructions given to the patient on the importance of follow-up monitoring, compliance issues, dietary restrictions, and potential for adverse drug reactions and interactions. All questions were answered to her and her 's satisfaction and understanding.  She frequently eats green vegetables.  She will aim for high vit k foods 4x/week and moderate vit k foods 2x/week.  She denies EtOH, cranberry, or grapefruit.  She avoids NSAIDs and takes APAP when needed; reviewed why APAP preferred due to lower bleeding risk.  She has not taken Eliquis today per Dr. Ballesteros's instructions.  She is having a PICC line placed 3/24 and will be starting tobramycin and cefepime which should not affect INR.  She reports she finished prednisone ~ 3/20 and that she has intermittent diarrhea due to gallbladder removal.  As patient is within 3 months of PE, I advised that she resume Eliquis 3/24, after PICC line placement and continue through 3/26.  I asked her to stop Eliquis after 3/26 evening dose until INR available for review 3/27.  Begin to increase warfarin dose.  She reports having [facility] which I confirmed with Yumi.  Yumi anticipates 2 visits next week then likely once a week visit on Mon or Tue.  Patient advised to contact clinic with any changes, questions, or concerns.

## 2017-03-24 ENCOUNTER — TELEPHONE (OUTPATIENT)
Dept: ELECTROPHYSIOLOGY | Facility: CLINIC | Age: 67
End: 2017-03-24

## 2017-03-24 ENCOUNTER — PATIENT MESSAGE (OUTPATIENT)
Dept: ELECTROPHYSIOLOGY | Facility: CLINIC | Age: 67
End: 2017-03-24

## 2017-03-24 ENCOUNTER — HOSPITAL ENCOUNTER (OUTPATIENT)
Dept: INTERVENTIONAL RADIOLOGY/VASCULAR | Facility: HOSPITAL | Age: 67
Discharge: HOME | End: 2017-03-24
Attending: INTERNAL MEDICINE
Payer: MEDICARE

## 2017-03-24 VITALS
HEART RATE: 59 BPM | RESPIRATION RATE: 20 BRPM | OXYGEN SATURATION: 96 % | DIASTOLIC BLOOD PRESSURE: 72 MMHG | SYSTOLIC BLOOD PRESSURE: 173 MMHG

## 2017-03-24 DIAGNOSIS — I47.10 PSVT (PAROXYSMAL SUPRAVENTRICULAR TACHYCARDIA): Primary | ICD-10-CM

## 2017-03-24 DIAGNOSIS — R04.2 HEMOPTYSIS: ICD-10-CM

## 2017-03-24 DIAGNOSIS — J47.0 BRONCHIECTASIS WITH ACUTE LOWER RESPIRATORY INFECTION: ICD-10-CM

## 2017-03-24 DIAGNOSIS — A49.8 PSEUDOMONAS AERUGINOSA INFECTION: ICD-10-CM

## 2017-03-24 PROCEDURE — 36569 INSJ PICC 5 YR+ W/O IMAGING: CPT | Mod: LT,,, | Performed by: FAMILY MEDICINE

## 2017-03-24 PROCEDURE — 77001 FLUOROGUIDE FOR VEIN DEVICE: CPT | Mod: TC

## 2017-03-24 PROCEDURE — 76937 US GUIDE VASCULAR ACCESS: CPT | Mod: TC

## 2017-03-24 PROCEDURE — 76937 US GUIDE VASCULAR ACCESS: CPT | Mod: 26,,, | Performed by: FAMILY MEDICINE

## 2017-03-24 PROCEDURE — 77001 FLUOROGUIDE FOR VEIN DEVICE: CPT | Mod: 26,,, | Performed by: FAMILY MEDICINE

## 2017-03-24 NOTE — DISCHARGE INSTRUCTIONS
Interventional Radiology [phone]  Mon-Fri  8A-4P  24hr Radiology Resident on call [phone]

## 2017-03-24 NOTE — PROGRESS NOTES
Double Lumen PICC line placed in the LUE. PICC length of 42 cm to the SVC with the use of ultrasound and Xray guidance.  PICC line flushed and ready for use.

## 2017-03-24 NOTE — PROCEDURES
"Yasmin Asencio is a 66 y.o. female patient.    Pulse: (!) 59 (03/24/17 1211)  Resp: 20 (03/24/17 1211)  BP: (!) 173/72 (03/24/17 1211)  SpO2: 96 % (03/24/17 1211)       Central Line  Date/Time: 3/24/2017 12:51 PM  Performed by: CHERISE TODD  Supervising provider: Tomás Fleming MD  Consent Done: Yes  Time out: Immediately prior to procedure a "time out" was called to verify the correct patient, procedure, equipment, support staff and site/side marked as required.  Indications: med administration  Anesthesia: local infiltration    Anesthesia:  Anesthesia: local infiltration  Local Anesthetic: lidocaine 1% without epinephrine   Preparation: skin prepped with ChloraPrep  Location details: left basilic  Number of attempts: 1  Assessment: verified by fluoroscopy  Complications: none  Post-procedure: chlorhexidine patch,  sterile dressing applied and blood return through all ports          Cherise Todd  3/24/2017  "

## 2017-03-24 NOTE — TELEPHONE ENCOUNTER
ABLATION EDUCATION CHECKLIST    4/10/17 @ 1030 AM - Lab Work   Pre-procedure labs have been ordered for you @ [facility] ([facility])  Be sure to arrive at your scheduled time for this lab work!  You do not have to fast for this lab work!    4/13/17 @ 8 AM - Ablation (arrival time)   Report to Cardiology Waiting Room on 3rd floor of the Hospital    (Do not report to clinic)  Directions for Reporting to Cardiology Waiting Area in the Hospital  If you park in the Parking Garage:  Take elevators to the 2nd floor  Walk up ramp and turn right by Gold Elevators  Take elevator to the 3rd floor  Upon exiting the elevator, turn away from the clinic areas  Walk long faith around to front of hospital to area with windows overlooking [facility]  Check in at Reception Desk  OR  If family is dropping you off:  Have them drop you off at the front of the Hospital  (Near the ER, where all the flags are hung).  Take the E elevators to the 3rd floor.  Check in at the Reception Desk in the waiting room.    Do not eat or drink anything after: 12 mn on the night before your procedure    Medications:   HOLD metoprolol tartrate (Lopressor) three (3) days prior to your procedure. YOUR LAST DOSE: Sunday, April 9, 2017.   You may take your OTHER usual morning medications with a sip of water    You will be spending the night after your procedure  You will need someone to drive you home the day after your procedure.    Your pain during your procedure will be managed by the anesthesia team.     THE ABOVE INSTRUCTIONS WERE GIVEN TO THE PATIENT VERBALLY AND THEY VERBALIZED UNDERSTANDING.  THEY DO NOT REQUIRE ANY SPECIAL NEEDS AND DO NOT HAVE ANY LEARNING BARRIERS.    Any need to reschedule or cancel procedures, or any questions regarding your procedures should be addressed directly with the Arrhythmia Department Nurses at the following phone number: [phone]

## 2017-03-24 NOTE — PROGRESS NOTES
"Per JUVENCIO Sheth "SVT RFA on 4/13 with Dr. Ballesteros. We plan on continuing Coumadin destini-procedure. Please keep INR between 2-3. Let us know if you need further information! "  "

## 2017-03-24 NOTE — H&P
Radiology History & Physical      SUBJECTIVE:     Chief Complaint: Bronchiectasis with acute lower respiratory infection     History of Present Illness:  Yasmin Asencio is a 66 y.o. female who presents for PICC placement  Past Medical History:   Diagnosis Date    Acquired bronchiectasis     due to history of TB - followed by pulmonary, Dr. Zuñiga    Allergy     Amblyopia     rt eye per pt    Anemia of other chronic disease     Anxiety     Cataract     Clotting disorder     COPD (chronic obstructive pulmonary disease)     COPD (chronic obstructive pulmonary disease)     Depression     Diverticulosis     Essential tremor     GERD (gastroesophageal reflux disease)     Hemangioma of liver     History of tuberculosis 1978    Hypertension     Mixed anxiety and depressive disorder     Psoriasis     PSVT (paroxysmal supraventricular tachycardia)     S/P PICC central line placement Apr. 2016 - May 2016    Skin disease     Psoriasis    Spondylosis without myelopathy 8/23/2013    Supraventricular tachycardia     Thoracic aorta atherosclerosis     noted on CT scan of chest 1/3/2011    Tuberculosis     Vaginal delivery     x2    Vitamin D deficiency      Past Surgical History:   Procedure Laterality Date    CATARACT EXTRACTION W/  INTRAOCULAR LENS IMPLANT  07/24/12    od dr [place]    CATARACT EXTRACTION W/  INTRAOCULAR LENS IMPLANT  08/07/12    left eye    EYE SURGERY      bilateral Cataract    GALLBLADDER SURGERY  9/2011       Home Meds:   Prior to Admission medications    Medication Sig Start Date End Date Taking? Authorizing Provider   alprazolam (XANAX) 0.25 MG tablet Take 1 tablet (0.25 mg total) by mouth nightly as needed for Anxiety. 10/3/16   Urmila Luna MD   ANORO ELLIPTA 62.5-25 mcg/actuation DsDv  2/17/17   Historical Provider, MD   desonide (DESOWEN) 0.05 % lotion AAA face bid prn redness, scaling, or itching 5/24/13 3/8/17  Pretty Gonzales MD   desoximetasone  (TOPICORT) 0.25 % cream Apply as directed bid prn 2/22/17   Urmila Luna MD   escitalopram oxalate (LEXAPRO) 20 MG tablet TAKE ONE TABLET BY MOUTH ONCE DAILY 10/4/16   SALONI Huynh   gabapentin (NEURONTIN) 300 MG capsule Take 1 capsule (300 mg total) by mouth 3 (three) times daily. 12/21/16 12/21/17  Issac Meyers MD   guaifenesin (MUCINEX) 600 mg 12 hr tablet Take 1,200 mg by mouth 2 (two) times daily.    Historical Provider, MD   ipratropium (ATROVENT) 0.02 % nebulizer solution USE ONE VIAL BY NEBULIZATION 4 TIMES DAILY 3/3/17   PRECIOUS Zuñiga MD   metoprolol tartrate (LOPRESSOR) 25 MG tablet Take 2 tablets (50 mg total) by mouth 2 (two) times daily. 3/8/17   Nathan Ramirez MD   omeprazole (PRILOSEC) 20 MG capsule TAKE ONE CAPSULE BY MOUTH ONCE DAILY 2/6/17   SALONI Huynh   predniSONE (DELTASONE) 10 MG tablet Take 3 tabs per day x 5 days, then 2 tabs per day x 5 days, then 1 tab per day x 5 days, then stop. Take with food 3/6/17   PRECIOUS Zuñiga MD   primidone (MYSOLINE) 50 MG Tab TAKE ONE TABLET BY MOUTH ONCE DAILY IN THE MORNING, ONE AT NOON, AND TWO AT BEDTIME 9/13/16   Olu Juarez MD   SODIUM CHLORIDE FOR INHALATION (SODIUM CHLORIDE 3%) 3 % nebulizer solution USE ONE VIAL IN NEBULIZER TWICE DAILY 11/28/16   PRECIOUS Zuñiga MD   topiramate (TOPAMAX) 25 MG tablet Take 1 tablet (25 mg total) by mouth 2 (two) times daily. 9/12/16   Olu Juarez MD   tramadol (ULTRAM) 50 mg tablet Take 50 mg by mouth every 6 (six) hours as needed for Pain.    Historical Provider, MD   VITAMIN D2 50,000 unit capsule TAKE ONE CAPSULE BY MOUTH ONCE A WEEK 10/4/16   SALONI Huynh   warfarin (COUMADIN) 5 MG tablet Take daily as directed by coumadin clinic. 3/20/17   Marlon Ballesteros MD     Anticoagulants/Antiplatelets: Coumadin    Allergies:   Review of patient's allergies indicates:   Allergen Reactions    Bactrim [sulfamethoxazole-trimethoprim] Rash     Was  hospitalized for rash    Albuterol Other (See Comments)     tremors    Restasis [cyclosporine] Itching     Sedation History:  no adverse reactions    Review of Systems:   Hematological: no known coagulopathies  Respiratory: no shortness of breath  Cardiovascular: no chest pain  Gastrointestinal: no abdominal pain  Genito-Urinary: no dysuria  Musculoskeletal: negative  Neurological: no TIA or stroke symptoms         OBJECTIVE:     Vital Signs (Most Recent)  Pulse: (!) 59 (03/24/17 1211)  Resp: 20 (03/24/17 1211)  BP: (!) 173/72 (03/24/17 1211)  SpO2: 96 % (03/24/17 1211)    Physical Exam:  ASA: 2  Mallampati: n/a    General: no acute distress  Mental Status: alert and oriented to person, place and time  HEENT: normocephalic, atraumatic  Chest: unlabored breathing  Heart: regular heart rate  Abdomen: nondistended  Extremity: moves all extremities    Laboratory  Lab Results   Component Value Date    INR 1.3 03/23/2017       Lab Results   Component Value Date    WBC 7.41 03/08/2017    HGB 11.6 (L) 03/08/2017    HCT 36.5 (L) 03/08/2017    MCV 94 03/08/2017     03/08/2017      Lab Results   Component Value Date     (H) 03/08/2017     03/08/2017    K 4.1 03/08/2017     03/08/2017    CO2 25 03/08/2017    BUN 18 03/08/2017    CREATININE 1.0 03/08/2017    CALCIUM 9.8 03/08/2017    MG 2.1 03/08/2017    ALT 15 03/08/2017    AST 17 03/08/2017    ALBUMIN 4.3 03/08/2017    BILITOT 0.2 03/08/2017    BILIDIR 0.1 12/07/2009       ASSESSMENT/PLAN:     Sedation Plan: local  Patient will undergo PICC placement.    CAMILO Macias, FNP  Interventional Radiology  [phone] [employer]

## 2017-03-24 NOTE — IP AVS SNAPSHOT
[facility]  [address]  [facility] [address]  Phone: [phone]           Patient Discharge Instructions     Our goal is to set you up for success. This packet includes information on your condition, medications, and your home care. It will help you to care for yourself so you don't get sicker and need to go back to the hospital.     Please ask your nurse if you have any questions.        There are many details to remember when preparing to leave the hospital. Here is what you will need to do:    1. Take your medicine. If you are prescribed medications, review your Medication List in the following pages. You may have new medications to  at the pharmacy and others that you'll need to stop taking. Review the instructions for how and when to take your medications. Talk with your doctor or nurses if you are unsure of what to do.     2. Go to your follow-up appointments. Specific follow-up information is listed in the following pages. Your may be contacted by a transition nurse or clinical provider about future appointments. Be sure we have all of the phone numbers to reach you, if needed. Please contact your provider's office if you are unable to make an appointment.     3. Watch for warning signs. Your doctor or nurse will give you detailed warning signs to watch for and when to call for assistance. These instructions may also include educational information about your condition. If you experience any of warning signs to your health, call your doctor.               [facility]  Unless otherwise directed by your provider, please contact [facility], our nurse care line that is available for 24/7 assistance.     [phone] (toll-free)    Registered nurses in the [facility] provide clinical advisement, health education, appointment booking, and other advisory services.                    ** Verify the list of medication(s) below is accurate and up  to date. Carry this with you in case of emergency. If your medications have changed, please notify your healthcare provider.             Medication List      TAKE these medications        Additional Info                      alprazolam 0.25 MG tablet   Commonly known as:  XANAX   Quantity:  30 tablet   Refills:  2   Dose:  0.25 mg    Instructions:  Take 1 tablet (0.25 mg total) by mouth nightly as needed for Anxiety.     Begin Date    AM    Noon    PM    Bedtime       ANORO ELLIPTA 62.5-25 mcg/actuation Dsdv   Refills:  0   Generic drug:  umeclidinium-vilanterol      Begin Date    AM    Noon    PM    Bedtime       desonide 0.05 % lotion   Commonly known as:  DESOWEN   Refills:  0    Instructions:  AAA face bid prn redness, scaling, or itching     Begin Date    AM    Noon    PM    Bedtime       desoximetasone 0.25 % cream   Commonly known as:  TOPICORT   Quantity:  60 g   Refills:  2    Instructions:  Apply as directed bid prn     Begin Date    AM    Noon    PM    Bedtime       escitalopram oxalate 20 MG tablet   Commonly known as:  LEXAPRO   Quantity:  30 tablet   Refills:  5    Instructions:  TAKE ONE TABLET BY MOUTH ONCE DAILY     Begin Date    AM    Noon    PM    Bedtime       gabapentin 300 MG capsule   Commonly known as:  NEURONTIN   Quantity:  90 capsule   Refills:  2   Dose:  300 mg    Instructions:  Take 1 capsule (300 mg total) by mouth 3 (three) times daily.     Begin Date    AM    Noon    PM    Bedtime       guaifenesin 600 mg 12 hr tablet   Commonly known as:  MUCINEX   Refills:  0   Dose:  1200 mg    Instructions:  Take 1,200 mg by mouth 2 (two) times daily.     Begin Date    AM    Noon    PM    Bedtime       ipratropium 0.02 % nebulizer solution   Commonly known as:  ATROVENT   Quantity:  225 vial   Refills:  3    Instructions:  USE ONE VIAL BY NEBULIZATION 4 TIMES DAILY     Begin Date    AM    Noon    PM    Bedtime       metoprolol tartrate 25 MG tablet   Commonly known as:  LOPRESSOR   Quantity:  60  tablet   Refills:  0   Dose:  50 mg    Instructions:  Take 2 tablets (50 mg total) by mouth 2 (two) times daily.     Begin Date    AM    Noon    PM    Bedtime       omeprazole 20 MG capsule   Commonly known as:  PRILOSEC   Quantity:  90 capsule   Refills:  0    Instructions:  TAKE ONE CAPSULE BY MOUTH ONCE DAILY     Begin Date    AM    Noon    PM    Bedtime       predniSONE 10 MG tablet   Commonly known as:  DELTASONE   Quantity:  30 tablet   Refills:  1    Instructions:  Take 3 tabs per day x 5 days, then 2 tabs per day x 5 days, then 1 tab per day x 5 days, then stop. Take with food     Begin Date    AM    Noon    PM    Bedtime       primidone 50 MG Tab   Commonly known as:  MYSOLINE   Quantity:  120 tablet   Refills:  11    Instructions:  TAKE ONE TABLET BY MOUTH ONCE DAILY IN THE MORNING, ONE AT NOON, AND TWO AT BEDTIME     Begin Date    AM    Noon    PM    Bedtime       sodium chloride 3% 3 % nebulizer solution   Quantity:  240 mL   Refills:  3    Instructions:  USE ONE VIAL IN NEBULIZER TWICE DAILY     Begin Date    AM    Noon    PM    Bedtime       topiramate 25 MG tablet   Commonly known as:  TOPAMAX   Quantity:  180 tablet   Refills:  3   Dose:  25 mg    Instructions:  Take 1 tablet (25 mg total) by mouth 2 (two) times daily.     Begin Date    AM    Noon    PM    Bedtime       tramadol 50 mg tablet   Commonly known as:  ULTRAM   Refills:  0   Dose:  50 mg    Instructions:  Take 50 mg by mouth every 6 (six) hours as needed for Pain.     Begin Date    AM    Noon    PM    Bedtime       VITAMIN D2 50,000 unit Cap   Quantity:  4 capsule   Refills:  5   Generic drug:  ergocalciferol    Instructions:  TAKE ONE CAPSULE BY MOUTH ONCE A WEEK     Begin Date    AM    Noon    PM    Bedtime       warfarin 5 MG tablet   Commonly known as:  COUMADIN   Quantity:  30 tablet   Refills:  11    Instructions:  Take daily as directed by coumadin clinic.     Begin Date    AM    Noon    PM    Bedtime                  Please bring  to all follow up appointments:    1. A copy of your discharge instructions.  2. All medicines you are currently taking in their original bottles.  3. Identification and insurance card.    Please arrive 15 minutes ahead of scheduled appointment time.    Please call 24 hours in advance if you must reschedule your appointment and/or time.        Your Scheduled Appointments     Mar 28, 2017  8:00 AM CDT   [facility] Patient with Dyana Byers, PhD   [facility]    [address]  [facility] [id number] [phone]            Apr 04, 2017  1:15 PM CDT   Spirometry with Tracing with PULMONARY FUNCTION   [facility] - Pulmonary Lab ([facility] )    [address]  [place] [address] [phone]            Apr 04, 2017  1:30 PM CDT   DLCO with PULMONARY FUNCTION   [facility] Pulmonary Lab ([facility] )    [address]  [place] [address]   [phone]            Apr 04, 2017  2:00 PM CDT   Established Patient Visit with PRECIOUS Zuñiga MD   [facility] - Pulmonary Services ([facility] )    [address]  [place] [address] [phone]            Apr 10, 2017 10:30 AM CDT   Non-Fasting Lab with LAB, [facility] ([facility])    [address]  [place] [id number] [phone]              Your Future Surgeries/Procedures     Apr 13, 2017   Surgery with Marlon Ballesteros MD   [facility] ([facility])    [address] [facility] [address] [phone]                  Discharge Instructions       Interventional Radiology [phone]  Mon-Fri  8A-4P  24hr Radiology Resident on call [phone]      Discharge References/Attachments     PICC, PERIPHERALLY INSERTED CENTRAL CATHETER (ENGLISH)    CARING FOR YOUR PERIPHERALLY INSERTED CENTRAL CATHETER (PICC), DISCHARGE INSTRUCTIONS FOR  (ENGLISH)        Admission Information     Date & Time Provider Department CSN     3/24/2017 10:30 AM Louie Yuan MD [facility] [id number]      Care Providers     Provider Role Specialty Primary office phone    Louie Yuan MD Attending Provider Infectious Diseases [phone]      Your Vitals Were     BP Pulse Resp Last Period SpO2       173/72 (BP Location: Left arm, Patient Position: Lying, BP Method: Automatic) 59 20 (LMP Unknown) 96%       Recent Lab Values        2/26/2015 2/22/2017                        9:15 AM  9:40 AM          A1C 5.5 5.3          Comment for A1C at  9:40 AM on 2/22/2017:  According to ADA guidelines, hemoglobin A1C <7.0% represents  optimal control in non-pregnant diabetic patients.  Different  metrics may apply to specific populations.   Standards of Medical Care in Diabetes - 2016.  For the purpose of screening for the presence of diabetes:  <5.7%     Consistent with the absence of diabetes  5.7-6.4%  Consistent with increasing risk for diabetes   (prediabetes)  >or=6.5%  Consistent with diabetes  Currently no consensus exists for use of hemoglobin A1C  for diagnosis of diabetes for children.        Allergies as of 3/24/2017        Reactions    Bactrim [Sulfamethoxazole-trimethoprim] Rash    Was hospitalized for rash    Albuterol Other (See Comments)    tremors    Restasis [Cyclosporine] Itching      Advance Directives     An advance directive is a document which, in the event you are no longer able to make decisions for yourself, tells your healthcare team what kind of treatment you do or do not want to receive, or who you would like to make those decisions for you.  If you do not currently have an advance directive, [facility] encourages you to create one.  For more information call:  [phone]), [phone] ([phone]),  or log on to [email or web address].        Smoking Cessation     If you would like to quit smoking:   You may be eligible for free services if you are a [place] resident and started smoking  cigarettes before September 1, 1988.  Call the Smoking Cessation Trust (SCT) toll free at (167) 503-6451 or (982) 782-5704.   Call 1-800-QUIT-NOW if you do not meet the above criteria.            Language Assistance Services     ATTENTION: Language assistance services are available, free of charge. Please call [phone].      ATENCIÓN: Si habla silvino, tiene a santamaria disposición servicios gratuitos de asistencia lingüística. Llame al [phone].     CHÚ Ý: N?u b?n nói Ti?ng Vi?t, có các d?ch v? h? tr? ngôn ng? mi?n phí dành cho b?n. G?i s? [phone].        Heart Failure Education       Heart Failure: Being Active  You have a condition called heart failure. Being active doesnt mean that you have to wear yourself out. Even a little movement each day helps to strengthen your heart. If you cant get out to exercise, you can do simple stretching and strengthening exercises at home. These are good ways to keep you well-conditioned and prevent you and your heart from becoming excessively weak.    Ideas to get you started  · Add a little movement to things you do now. Walk to mail letters. Park your car at the far end of the parking lot and walk to the store. Walk up a flight of stairs instead of taking the elevator.  · Choose activities you enjoy. You might walk, swim, or ride an exercise bike. Things like gardening and washing the car count, too. Other possibilities include: washing dishes, walking the dog, walking around the mall, and doing aerobic activities with friends.  · Join a group exercise program at a [facility] or [facility], a senior center, or a community center. Or look into a hospital cardiac rehabilitation program. Ask your doctor if you qualify.  Tips to keep you going  · Get up and get dressed each day. Go to a coffee shop and read a newspaper or go somewhere that you'll be in the presence of other active people. Youll feel more like being active.  · Make a plan. Choose one or more activities that you  enjoy and that you can easily do. Then plan to do at least one each day. You might write your plan on a calendar.  · Go with a friend or a group if you like company. This can help you feel supported and stay motivated, too.  · Plan social events that you enjoy. This will keep you mentally engaged as well as physically motivated to do things you find pleasure in.  For your safety  · Talk with your healthcare provider before starting an exercise program.  · Exercise indoors when its too hot or too cold outside, or when the air quality is poor. Try walking at a shopping mall.  · Wear socks and sturdy shoes to maintain your balance and prevent falls.  · Start slowly. Do a few minutes several times a day at first. Increase your time and speed little by little.  · Stop and rest whenever you feel tired or get short of breath.  · Dont push yourself on days when you dont feel well.  Date Last Reviewed: 3/20/2016  © 3110-8988 [employer]. [address] [place] [address]. All rights reserved. This information is not intended as a substitute for professional medical care. Always follow your healthcare professional's instructions.              Heart Failure: Evaluating Your Heart  You have a condition called heart failure. To evaluate your condition, your doctor will examine you, ask questions, and do some tests. Along with looking for signs of heart failure, the doctor looks for any other health problems that may have led to heart failure. The results of your evaluation will help your doctor form a treatment plan.  Health history and physical exam  Your visit will start with a health history. Tell the doctor about any symptoms youve noticed and about all medicines you take. Then youll have a physical exam. This includes listening to your heartbeat and breathing. Youll also be checked for swelling (edema) in your legs and neck. When you have fluid buildup or fluid in the lungs, it may be called  congestive heart failure.  Diagnosing heart failure     During an echocardiogram, sound waves bounce off the heart. These are converted into a picture on the screen.   The following may be done to help your doctor form a diagnosis:  · X-rays show the size and shape of your heart. These pictures can also show fluid in your lungs.  · An electrocardiogram (ECG or EKG) shows the pattern of your heartbeat. Small pads (electrodes) are placed on your chest, arms, and legs. Wires connect the pads to the ECG machine, which records your hearts electrical signals. This can give the doctor information about heart function.  · An echocardiogram uses ultrasound waves to show the structure and movement of your heart muscle. This shows how well the heart pumps. It also shows the thickness of the heart walls, and if the heart is enlarged. It is one of the most useful, non-invasive tests as it provides information about the heart's general function. This helps your doctor make treatment decisions.  · Lab tests evaluate small amounts of blood or urine for signs of problems. A BNP lab test can help diagnose and evaluate heart failure. BNP stands for B-type natriuretic peptide. The ventricles secrete more BNP when heart failure worsens. Lab tests can also provide information about metabolic dysfunction or heart dysfunction.  Your treatment plan  Based on the results of your evaluation and tests, your doctor will develop a treatment plan. This plan is designed to relieve some of your heart failure symptoms and help make you more comfortable. Your treatment plan may include:  · Medicine to help your heart work better and improve your quality of life  · Changes in what you eat and drink to help prevent fluid from backing up in your body  · Daily monitoring of your weight and heart failure symptoms to see how well your treatment plan is working  · Exercise to help you stay healthy  · Help with quitting smoking  · Emotional and  psychological support to help adjust to the changes  · Referrals to other specialists to make sure you are being treated comprehensively  Date Last Reviewed: 3/21/2016  © 4749-3981 [employer]. [address] [place] [address], [place] [address]. All rights reserved. This information is not intended as a substitute for professional medical care. Always follow your healthcare professional's instructions.              Heart Failure: Making Changes to Your Diet  You have a condition called heart failure. When you have heart failure, excess fluid is more likely to build up in your body because your heart isn't working well. This makes the heart work harder to pump blood. Fluid buildup causes symptoms such as shortness of breath and swelling (edema). This is often referred to as congestive heart failure or CHF. Controlling the amount of salt (sodium) you eat may help stop fluid from building up. Your doctor may also tell you to reduce the amount of fluid you drink.  Reading food labels    Your healthcare provider will tell you how much sodium you can eat each day. Read food labels to keep track. Keep in mind that certain foods are high in salt. These include canned, frozen, and processed foods. Check the amount of sodium in each serving. Watch out for high-sodium ingredients. These include MSG (monosodium glutamate), baking soda, and sodium phosphate.   Eating less salt  Give yourself time to get used to eating less salt. It may take a little while. Here are some tips to help:  · Take the saltshaker off the table. Replace it with salt-free herb mixes and spices.  · Eat fresh or plain frozen vegetables. These have much less salt than canned vegetables.  · Choose low-sodium snacks like sodium-free pretzels, crackers, or air-popped popcorn.  · Dont add salt to your food when youre cooking. Instead, season your foods with pepper, lemon, garlic, or onion.  · When you eat out, ask that your food be cooked without added  salt.  · Avoid eating fried foods as these often have a great deal of salt.  If youre told to limit fluids  You may need to limit how much fluid you have to help prevent swelling. This includes anything that is liquid at room temperature, such as ice cream and soup. If your doctor tells you to limit fluid, try these tips:  · Measure drinks in a measuring cup before you drink them. This will help you meet daily goals.  · Chill drinks to make them more refreshing.  · Suck on frozen lemon wedges to quench thirst.  · Only drink when youre thirsty.  · Chew sugarless gum or suck on hard candy to keep your mouth moist.  · Weigh yourself daily to know if your body's fluid content is rising.  My sodium goal  Your healthcare provider may give you a sodium goal to meet each day. This includes sodium found in food as well as salt that you add. My goal is to eat no more than ___________ mg of sodium per day.     When to call your doctor  Call your doctor right away if you have any symptoms of worsening heart failure. These can include:  · Sudden weight gain  · Increased swelling of your legs or ankles  · Trouble breathing when youre resting or at night  · Increase in the number of pillows you have to sleep on  · Chest pain, pressure, discomfort, or pain in the jaw, neck, or back   Date Last Reviewed: 3/21/2016  © 0581-7912 "[employer]. [address] [place] [address], [place] [address]. All rights reserved. This information is not intended as a substitute for professional medical care. Always follow your healthcare professional's instructions.              Heart Failure: Medicines to Help Your Heart    You have a condition called heart failure (also known as congestive heart failure, or CHF). Your doctor will likely prescribe medicines for heart failure and any underlying health problems you have. Most heart failure patients take one or more types of medicinen. Your healthcare provider will work to find the combination  of medicines that works best for you.  Heart failure medicines  Here are the most common heart failure medicines:  · ACE inhibitors lower blood pressure and decrease strain on the heart. This makes it easier for the heart to pump. Angiotensin receptor blockers have similar effects. These are prescribed for some patients instead of ACE inhibitors.  · Beta-blockers relieve stress on the heart. They also improve symptoms. They may also improve the heart's pumping action over time.  · Diuretics (also called water pills) help rid your body of excess water. This can help rid your body of swelling (edema). Having less fluid to pump means your heart doesnt have to work as hard. Some diuretics make your body lose a mineral called potassium. Your doctor will tell you if you need to take supplements or eat more foods high in potassium.  · Digoxin helps your heart pump with more strength. This helps your heart pump more blood with each beat. So, more oxygen-rich blood travels to the rest of the body.  · Aldosterone antagonists help alter hormones and decrease strain on the heart.  · Hydralazine and nitrates are two separate medicines used together to treat heart failure. They may come in one combination pill. They lower blood pressure and decrease how hard the heart has to pump.  Medicines for related conditions  Controlling other heart problems helps keep heart failure under control, too. Depending on other heart problems you have, medicines may be prescribed to:  · Lower blood pressure (antihypertensives).  · Lower cholesterol levels (statins).  · Prevent blood clots (anticoagulants or aspirin).  · Keep the heartbeat steady (antiarrhythmics).  Date Last Reviewed: 3/5/2016  © 9409-5102 [employer]. [address] [place] [address], [place] [address]. All rights reserved. This information is not intended as a substitute for professional medical care. Always follow your healthcare professional's instructions.               Heart Failure: Procedures That May Help    The heart is a muscle that pumps oxygen-rich blood to all parts of the body. When you have heart failure, the heart is not able to pump as well as it should. Blood and fluid may back up into the lungs (congestive heart failure), and some parts of the body dont get enough oxygen-rich blood to work normally. These problems lead to the symptoms of heart failure.     Certain procedures may help the heart pump better in some cases of heart failure. Some procedures are done to treat health problems that may have caused the heart failure such as coronary artery disease or heart rhythm problems. For more serious heart failure, other options are available.  Treating artery and valve problems  If you have coronary artery disease or valve disease, procedures may be done to improve blood flow. This helps the heart pump better, which can improve heart failure symptoms. First, your doctor may do a cardiac catheterization to help detect clogged blood vessels or valve damage. During this procedure, a  thin tube (catheter) in inserted into a blood vessel and guided to the heart. There a dye is injected and a special type of X-ray (angiogram) is taken of the blood vessels. Procedures to open a blocked artery or fix damaged valves can also be done using catheterization.  · Angioplasty uses a balloon-tipped instrument at the end of the catheter. The balloon is inflated to widen the narrowed artery. In many cases, a stent is expanded to further support the narrowed artery. A stent is a metal mesh tube.  · Valve surgery repairs or replacement of faulty valves can also be done during catheterization so blood can flow properly through the chambers of the heart.  Bypass surgery is another option to help treat blocked arteries. It uses a healthy blood vessel from elsewhere in the body. The healthy blood vessel is attached above and below the blocked area so that blood can flow around the blocked  artery.  Treating heart rhythm problems  A device may be placed in the chest to help a weak heart maintain a healthy, heartbeat so the heart can pump more effectively:  · Pacemaker. A pacemaker is an implanted device that regulates your heartbeat electronically. It monitors your heart's rhythm and generates a painless electric impulse that helps the heart beat in a regular rhythm. A pacemaker is programmed to meet your specific heart rhythm needs.  · Biventricular pacing/cardiac resynchronization therapy. A type of pacemaker that paces both pumping chambers of the heart at the same time to coordinate contractions and to improve the heart's function. Some people with heart failure are candidates for this therapy.  · Implantable cardioverter defibrillator. A device similar to a pacemaker that senses when the heart is beating too fast and delivers an electrical shock to convert the fast rhythm to a normal rhythm. This can be a life saving device.  In severe cases  In more serious cases of heart failure when other treatments no longer work, other options may include:  · Ventricular assist devices (VADs). These are mechanical devices used to take over the pumping function for one or both of the heart's ventricles, or pumping chambers. A VAD may be necessary when heart failure progresses to the point that medicines and other treatments no longer help. In some cases, a VAD may be used as a bridge to transplant.  · Heart transplant. This is replacing the diseased heart with a healthy one from a donor. This is an option for a few people who are very sick. A heart transplant is very serious and not an option for all patients. Your doctor can tell you more.  Date Last Reviewed: 3/20/2016  © 9845-4297 [employer]. [address] [place] [address], [place] [address]. All rights reserved. This information is not intended as a substitute for professional medical care. Always follow your healthcare professional's  instructions.              Heart Failure: Tracking Your Weight  You have a condition called heart failure. When you have heart failure, a sudden weight gain or a steady rise in weight is a warning sign that your body is retaining too much water and salt. This could mean your heart failure is getting worse. If left untreated, it can cause problems for your lungs and result in shortness of breath. Weighing yourself each day is the best way to know if youre retaining water. If your weight goes up quickly, call your doctor. You will be given instructions on how to get rid of the excess water. You will likely need medicines and to avoid salt. This will help your heart work better.  Call your doctor if you gain more than 2 pounds in 1 day, more than 5 pounds in 1 week, or whatever weight gain you were told to report by your doctor. This is often a sign of worsening heart failure and needs to be evaluated and treated. Your doctor will tell you what to do next.   Tips for weighing yourself    · Weigh yourself at the same time each morning, wearing the same clothes. Weigh yourself after urinating and before eating.  · Use the same scale each day. Make sure the numbers are easy to read. Put the scale on a flat, hard surface -- not on a rug or carpet.  · Do not stop weighing yourself. If you forget one day, weigh again the next morning.  How to use your weight chart  · Keep your weight chart near the scale. Write your weight on the chart as soon as you get off the scale.  · Fill in the month and the start date on the chart. Then write down your weight each day. Your chart will look like this:    · If you miss a day, leave the space blank. Weigh yourself the next day and write your weight in the next space.  · Take your weight chart with you when you go to see your doctor.  Date Last Reviewed: 3/20/2016  © 7321-8423 [employer]. [address] [place] [address], [place] [address]. All rights reserved. This information is  not intended as a substitute for professional medical care. Always follow your healthcare professional's instructions.              Heart Failure: Warning Signs of a Flare-Up  You have a condition called heart failure. Once you have heart failure, flare-ups can happen. Below are signs that can mean your heart failure is getting worse. If you notice any of these warning signs, call your healthcare provider.  Swelling    · Your feet, ankles, or lower legs get puffier.  · You notice skin changes on your lower legs.  · Your shoes feel too tight.  · Your clothes are tighter in the waist.  · You have trouble getting rings on or off your fingers.  Shortness of breath  · You have to breathe harder even when youre doing your normal activities or when youre resting.  · You are short of breath walking up stairs or even short distances.  · You wake up at night short of breath or coughing.  · You need to use more pillows or sit up to sleep.  · You wake up tired or restless.  Other warning signs  · You feel weaker, dizzy, or more tired.  · You have chest pain or changes in your heartbeat.  · You have a cough that wont go away.  · You cant remember things or dont feel like eating.  Tracking your weight  Gaining weight is often the first warning sign that heart failure is getting worse. Gaining even a few pounds can be a sign that your body is retaining excess water and salt. Weighing yourself each day in the morning after you urinate and before you eat, is the best way to know if you're retaining water. Get a scale that is easy to read and make sure you wear the same clothes and use the same scale every time you weigh. Your healthcare provider will show you how to track your weight. Call your doctor if you gain more than 2 pounds in 1 day, 5 pounds in 1 week, or whatever weight gain you were told to report by your doctor. This is often a sign of worsening heart failure and needs to be evaluated and treated before it  compromises your breathing. Your doctor will tell you what to do next.    Date Last Reviewed: 3/15/2016  © 7823-3779 [employer]. [address] [place] [address], [place] [address]. All rights reserved. This information is not intended as a substitute for professional medical care. Always follow your healthcare professional's instructions.              Pneumonmia Discharge Instructions                Coumadin Discharge Instructions                         Chronic Kindey Disease Education              [facility] complies with applicable Federal civil rights laws and does not discriminate on the basis of race, color, national origin, age, disability, or sex.

## 2017-03-27 ENCOUNTER — TELEPHONE (OUTPATIENT)
Dept: NEUROLOGY | Facility: CLINIC | Age: 67
End: 2017-03-27

## 2017-03-27 ENCOUNTER — ANTI-COAG VISIT (OUTPATIENT)
Dept: CARDIOLOGY | Facility: CLINIC | Age: 67
End: 2017-03-27

## 2017-03-27 DIAGNOSIS — I47.10 PSVT (PAROXYSMAL SUPRAVENTRICULAR TACHYCARDIA): ICD-10-CM

## 2017-03-27 DIAGNOSIS — Z79.01 LONG TERM (CURRENT) USE OF ANTICOAGULANTS: ICD-10-CM

## 2017-03-27 DIAGNOSIS — R00.2 PALPITATIONS: ICD-10-CM

## 2017-03-27 LAB — INR PPP: 1.7

## 2017-03-27 NOTE — TELEPHONE ENCOUNTER
----- Message from Taisha Babb sent at 3/27/2017  9:25 AM CDT -----  Contact: Self  x  1st Request  _  2nd Request  _  3rd Request        Who: VITO PERKINS [[id number]]    Why: Pt states she has some questions prior to her appt on tomorrow at 03/28. Please call, thanks!    What Number to Call Back: [phone]    When to Expect a call back: (Before the end of the day)   -- if the call is after 12:00, the call back will be tomorrow.

## 2017-03-29 ENCOUNTER — TELEPHONE (OUTPATIENT)
Dept: INFECTIOUS DISEASES | Facility: CLINIC | Age: 67
End: 2017-03-29

## 2017-03-29 ENCOUNTER — PATIENT MESSAGE (OUTPATIENT)
Dept: ELECTROPHYSIOLOGY | Facility: CLINIC | Age: 67
End: 2017-03-29

## 2017-03-29 DIAGNOSIS — B37.31 CANDIDA VAGINITIS: Primary | ICD-10-CM

## 2017-03-29 RX ORDER — FLUCONAZOLE 200 MG/1
200 TABLET ORAL DAILY
Qty: 3 TABLET | Refills: 1 | Status: SHIPPED | OUTPATIENT
Start: 2017-03-29 | End: 2017-04-01

## 2017-03-29 NOTE — TELEPHONE ENCOUNTER
Back and legs are hurting more and she is having shortness of breath.  She feels symptoms started after IV antibiotics were initiated.  She is also reporting a possible yeast infection.    Assessment  1.  Yeast vaginitis  2.  Drug reaction    Plan  1.  Fluconazole for the vaginitis  2.  Will check labs.  May have to stop the antibiotic if she continues to feel bad.

## 2017-03-29 NOTE — TELEPHONE ENCOUNTER
----- Message from Leslye Canales MA sent at 3/29/2017  9:41 AM CDT -----  Patient reports multiple problems since getting her picc line placed. Fatigue, weakness, stated her sciatic never is bothering her and having difficulty walking. Pulse ox reading 95, wants to speak to you.

## 2017-03-31 NOTE — PROGRESS NOTES
HH called stating the INR from yesterday wasn't able to be processed at [facility]. She will take a new dose until follow-up Monday.

## 2017-04-01 DIAGNOSIS — J47.9 ACQUIRED BRONCHIECTASIS: ICD-10-CM

## 2017-04-03 ENCOUNTER — ANTI-COAG VISIT (OUTPATIENT)
Dept: CARDIOLOGY | Facility: CLINIC | Age: 67
End: 2017-04-03

## 2017-04-03 DIAGNOSIS — R00.2 PALPITATIONS: ICD-10-CM

## 2017-04-03 DIAGNOSIS — Z79.01 LONG TERM (CURRENT) USE OF ANTICOAGULANTS: Primary | ICD-10-CM

## 2017-04-03 DIAGNOSIS — I47.10 PSVT (PAROXYSMAL SUPRAVENTRICULAR TACHYCARDIA): ICD-10-CM

## 2017-04-03 LAB — INR PPP: 1.9

## 2017-04-03 RX ORDER — SODIUM CHLORIDE FOR INHALATION 3 %
VIAL, NEBULIZER (ML) INHALATION
Qty: 240 ML | Refills: 6 | Status: SHIPPED | OUTPATIENT
Start: 2017-04-03 | End: 2017-11-10 | Stop reason: SDUPTHER

## 2017-04-04 ENCOUNTER — OFFICE VISIT (OUTPATIENT)
Dept: PULMONOLOGY | Facility: CLINIC | Age: 67
End: 2017-04-04
Payer: MEDICARE

## 2017-04-04 ENCOUNTER — HOSPITAL ENCOUNTER (OUTPATIENT)
Dept: PULMONOLOGY | Facility: CLINIC | Age: 67
Discharge: HOME | End: 2017-04-04
Payer: MEDICARE

## 2017-04-04 ENCOUNTER — TELEPHONE (OUTPATIENT)
Dept: INFECTIOUS DISEASES | Facility: CLINIC | Age: 67
End: 2017-04-04

## 2017-04-04 VITALS
SYSTOLIC BLOOD PRESSURE: 140 MMHG | WEIGHT: 154.75 LBS | BODY MASS INDEX: 28.48 KG/M2 | OXYGEN SATURATION: 97 % | DIASTOLIC BLOOD PRESSURE: 70 MMHG | RESPIRATION RATE: 16 BRPM | HEART RATE: 74 BPM | HEIGHT: 62 IN

## 2017-04-04 DIAGNOSIS — R04.2 HEMOPTYSIS: ICD-10-CM

## 2017-04-04 DIAGNOSIS — J47.9 ACQUIRED BRONCHIECTASIS: ICD-10-CM

## 2017-04-04 DIAGNOSIS — B96.5 PSEUDOMONAS RESPIRATORY INFECTION: ICD-10-CM

## 2017-04-04 DIAGNOSIS — J98.8 PSEUDOMONAS RESPIRATORY INFECTION: ICD-10-CM

## 2017-04-04 DIAGNOSIS — I47.10 PSVT (PAROXYSMAL SUPRAVENTRICULAR TACHYCARDIA): ICD-10-CM

## 2017-04-04 DIAGNOSIS — J47.9 ACQUIRED BRONCHIECTASIS: Primary | ICD-10-CM

## 2017-04-04 DIAGNOSIS — I26.99 OTHER PULMONARY EMBOLISM WITHOUT ACUTE COR PULMONALE, UNSPECIFIED CHRONICITY: ICD-10-CM

## 2017-04-04 LAB
PRE FEV1 FVC: 74
PRE FEV1: 1.24
PRE FVC: 1.67
PREDICTED FEV1 FVC: 80
PREDICTED FEV1: 2.12
PREDICTED FVC: 2.69

## 2017-04-04 PROCEDURE — 3078F DIAST BP <80 MM HG: CPT | Mod: S$GLB,,, | Performed by: INTERNAL MEDICINE

## 2017-04-04 PROCEDURE — 3077F SYST BP >= 140 MM HG: CPT | Mod: S$GLB,,, | Performed by: INTERNAL MEDICINE

## 2017-04-04 PROCEDURE — 99499 UNLISTED E&M SERVICE: CPT | Mod: S$GLB,,, | Performed by: INTERNAL MEDICINE

## 2017-04-04 PROCEDURE — 1159F MED LIST DOCD IN RCRD: CPT | Mod: S$GLB,,, | Performed by: INTERNAL MEDICINE

## 2017-04-04 PROCEDURE — 1125F AMNT PAIN NOTED PAIN PRSNT: CPT | Mod: S$GLB,,, | Performed by: INTERNAL MEDICINE

## 2017-04-04 PROCEDURE — 94729 DIFFUSING CAPACITY: CPT | Mod: S$GLB,,, | Performed by: INTERNAL MEDICINE

## 2017-04-04 PROCEDURE — 1157F ADVNC CARE PLAN IN RCRD: CPT | Mod: S$GLB,,, | Performed by: INTERNAL MEDICINE

## 2017-04-04 PROCEDURE — 99214 OFFICE O/P EST MOD 30 MIN: CPT | Mod: 25,S$GLB,, | Performed by: INTERNAL MEDICINE

## 2017-04-04 PROCEDURE — 94010 BREATHING CAPACITY TEST: CPT | Mod: S$GLB,,, | Performed by: INTERNAL MEDICINE

## 2017-04-04 PROCEDURE — 99999 PR PBB SHADOW E&M-EST. PATIENT-LVL III: CPT | Mod: PBBFAC,,, | Performed by: INTERNAL MEDICINE

## 2017-04-04 PROCEDURE — 1160F RVW MEDS BY RX/DR IN RCRD: CPT | Mod: S$GLB,,, | Performed by: INTERNAL MEDICINE

## 2017-04-05 ENCOUNTER — OFFICE VISIT (OUTPATIENT)
Dept: OPTOMETRY | Facility: CLINIC | Age: 67
End: 2017-04-05
Payer: MEDICARE

## 2017-04-05 DIAGNOSIS — H52.03 HYPEROPIA WITH ASTIGMATISM, BILATERAL: ICD-10-CM

## 2017-04-05 DIAGNOSIS — H52.203 HYPEROPIA WITH ASTIGMATISM, BILATERAL: ICD-10-CM

## 2017-04-05 DIAGNOSIS — H18.529 ANTERIOR BASEMENT MEMBRANE DYSTROPHY: ICD-10-CM

## 2017-04-05 DIAGNOSIS — H16.223 KERATOCONJUNCTIVITIS SICCA, NOT SPECIFIED AS SJOGREN'S, BILATERAL: ICD-10-CM

## 2017-04-05 DIAGNOSIS — Z96.1 PSEUDOPHAKIA OF BOTH EYES: ICD-10-CM

## 2017-04-05 DIAGNOSIS — R51.9 HEADACHE AROUND THE EYES: Primary | ICD-10-CM

## 2017-04-05 DIAGNOSIS — Z13.5 GLAUCOMA SCREENING: ICD-10-CM

## 2017-04-05 PROCEDURE — 92015 DETERMINE REFRACTIVE STATE: CPT | Mod: S$GLB,,, | Performed by: OPTOMETRIST

## 2017-04-05 PROCEDURE — 99999 PR PBB SHADOW E&M-EST. PATIENT-LVL II: CPT | Mod: PBBFAC,,, | Performed by: OPTOMETRIST

## 2017-04-05 PROCEDURE — 92014 COMPRE OPH EXAM EST PT 1/>: CPT | Mod: S$GLB,,, | Performed by: OPTOMETRIST

## 2017-04-05 RX ORDER — APIXABAN 5 MG/1
TABLET, FILM COATED ORAL
COMMUNITY
Start: 2017-03-28 | End: 2017-04-11

## 2017-04-05 NOTE — TELEPHONE ENCOUNTER
Edi martinez/ [facility] [employer] advised to stop pt's iv abx on tomorrow 4/6/17 and remove picc line

## 2017-04-05 NOTE — PROGRESS NOTES
Subjective:       Patient ID: Yasmin Asencio is a 66 y.o. female.    Chief Complaint: Bronchiectasis and Hemoptysis    HPI HISTORY OF PRESENT ILLNESS:  Ms. Asencio is a 66-year-old former smoker, who   returns for an interval assessment of bronchiectasis.  She is currently undergoing a   2-week course of therapy with IV antibiotics directed at Pseudomonas.  This is   the predominant organism, which appeared in her recent sputum culture.  She has   completed approximately one week of that therapy.  She has not recognized any   major improvement in her thoracic symptoms so far.  She continues to feel some   discomfort in her chest on the left side.  Her description of this discomfort is   somewhat suggestive of a pleuritic pain.  She also has an ongoing cough and is   having a difficult time expectorating sputum.  This may be in part the result of   her recently stopping the use of hypertonic saline aerosols and her Acapella   device.  She has been reluctant to do these maneuvers due to recent   hemoptysis and soreness in her mouth.  Fortunately, the hemoptysis has subsided   significantly.  She has not had any recent fever.  She remains on Coumadin as   anticoagulation treatment due to a previous pulmonary embolism.  She also is   currently on anticoagulation in anticipation of having cardioversion for   treatment of supraventricular tachycardia.    DATA:  I have reviewed the images from a chest x-ray done 3-4 weeks ago.  This   is a portable film, which shows chronic scarring in the upper lung zone on the   left.  There are more subtle abnormalities within the upper lung zone on the   right.  Overall, this recent radiograph appears unchanged in comparison to a   previous film from January.    Pulmonary function studies done today show the forced vital capacity is 1.67 L,   which is 63% of predicted.  The FEV1 is 1.24 L, or 59% of predicted.  The ratio   of these values is 74%.  The diffusion capacity is 8.6.   This value will be   adjusted upward modestly due to her mild anemia.  When today's study results are   compared to a previous study from January 2016, there does not appear to have   been any significant interval change.  Current values are also probably not   significantly changed from the results seen in August 2014.      CB/HN  dd: 04/04/2017 19:50:36 (CDT)  td: 04/05/2017 11:19:04 (CDT)  Doc ID   #[id number]  Job ID #[id number]    CC:       Review of Systems   Constitutional: Positive for fatigue. Negative for fever.   HENT: Negative for postnasal drip, sinus pressure, voice change and congestion.    Respiratory: Positive for cough, hemoptysis, sputum production and chest tightness. Negative for shortness of breath, wheezing and dyspnea on extertion.    Cardiovascular: Positive for palpitations. Negative for chest pain and leg swelling.   Genitourinary: Negative for difficulty urinating.   Musculoskeletal: Negative for arthralgias and back pain.   Skin: Negative for rash.   Gastrointestinal: Negative for abdominal pain and acid reflux.   Neurological: Negative for dizziness and weakness.   Hematological: Negative for adenopathy.   Psychiatric/Behavioral: The patient is nervous/anxious.        Objective:      Physical Exam   Constitutional: She is oriented to person, place, and time. She appears well-developed and well-nourished.   HENT:   Head: Normocephalic.   Nose: Nose normal.   Mouth/Throat: No oropharyngeal exudate.   Neck: Normal range of motion. No JVD present. No tracheal deviation present. No thyromegaly present.   Cardiovascular: Normal rate, regular rhythm and normal heart sounds.    No murmur heard.  Pulmonary/Chest: Symmetric chest wall expansion. No stridor. She has no wheezes. She has no rhonchi. She has rales (coarse rales L anterior chest predominantly). She exhibits no tenderness.   Abdominal: Soft. There is no tenderness.   Musculoskeletal: She exhibits no edema.   Lymphadenopathy:     She has no  cervical adenopathy.   Neurological: She is alert and oriented to person, place, and time.   Skin: Skin is warm and dry. No rash noted. No erythema. Nails show no clubbing.   Psychiatric: She has a normal mood and affect.   Vitals reviewed.    Personal Diagnostic Review    No flowsheet data found.      Assessment:       1. Acquired bronchiectasis    2. Hemoptysis    3. Other pulmonary embolism without acute cor pulmonale, unspecified chronicity    4. Pseudomonas respiratory infection    5. PSVT (paroxysmal supraventricular tachycardia)        Outpatient Encounter Prescriptions as of 2017   Medication Sig Dispense Refill    alprazolam (XANAX) 0.25 MG tablet Take 1 tablet (0.25 mg total) by mouth nightly as needed for Anxiety. 30 tablet 2    [] ANORO ELLIPTA 62.5-25 mcg/actuation DsDv Inhale 1 puff into the lungs once daily. 180 each 4    ANORO ELLIPTA 62.5-25 mcg/actuation DsDv       desonide (DESOWEN) 0.05 % lotion AAA face bid prn redness, scaling, or itching      desoximetasone (TOPICORT) 0.25 % cream Apply as directed bid prn 60 g 2    escitalopram oxalate (LEXAPRO) 20 MG tablet TAKE ONE TABLET BY MOUTH ONCE DAILY 30 tablet 5    gabapentin (NEURONTIN) 300 MG capsule TAKE ONE CAPSULE BY MOUTH THREE TIMES DAILY 90 capsule 0    guaifenesin (MUCINEX) 600 mg 12 hr tablet Take 1,200 mg by mouth 2 (two) times daily.      ipratropium (ATROVENT) 0.02 % nebulizer solution USE ONE VIAL BY NEBULIZATION 4 TIMES DAILY 225 vial 3    metoprolol tartrate (LOPRESSOR) 25 MG tablet Take 2 tablets (50 mg total) by mouth 2 (two) times daily. 60 tablet 0    omeprazole (PRILOSEC) 20 MG capsule TAKE ONE CAPSULE BY MOUTH ONCE DAILY 90 capsule 0    predniSONE (DELTASONE) 10 MG tablet Take 3 tabs per day x 5 days, then 2 tabs per day x 5 days, then 1 tab per day x 5 days, then stop. Take with food 30 tablet 1    primidone (MYSOLINE) 50 MG Tab TAKE ONE TABLET BY MOUTH ONCE DAILY IN THE MORNING, ONE AT NOON, AND TWO AT  BEDTIME 120 tablet 11    SODIUM CHLORIDE FOR INHALATION (SODIUM CHLORIDE 3%) 3 % nebulizer solution USE ONE VIAL IN NEBULIZER TWICE DAILY 240 mL 6    topiramate (TOPAMAX) 25 MG tablet Take 1 tablet (25 mg total) by mouth 2 (two) times daily. 180 tablet 3    tramadol (ULTRAM) 50 mg tablet Take 50 mg by mouth every 6 (six) hours as needed for Pain.      VITAMIN D2 50,000 unit capsule TAKE ONE CAPSULE BY MOUTH ONCE A WEEK 4 capsule 5    warfarin (COUMADIN) 5 MG tablet Take daily as directed by coumadin clinic. 30 tablet 11    [DISCONTINUED] apixaban 5 mg Tab Take 1 tablet (5 mg total) by mouth 2 (two) times daily. 60 tablet 0    [DISCONTINUED] desoximetasone (TOPICORT) 0.25 % cream Apply topically 2 (two) times daily.        [DISCONTINUED] gabapentin (NEURONTIN) 300 MG capsule Take 1 capsule (300 mg total) by mouth 3 (three) times daily. 90 capsule 2    [DISCONTINUED] ipratropium (ATROVENT) 0.02 % nebulizer solution Take 2.5 mLs (500 mcg total) by nebulization 4 (four) times daily. 225 mL 10    [DISCONTINUED] LACTOBACILLUS RHAMNOSUS GG (CULTURELLE ORAL) Take by mouth once daily.       [DISCONTINUED] metoprolol tartrate (LOPRESSOR) 25 MG tablet TAKE ONE TABLET BY MOUTH TWICE DAILY 60 tablet 5    [DISCONTINUED] SODIUM CHLORIDE FOR INHALATION (SODIUM CHLORIDE 3%) 3 % nebulizer solution USE ONE VIAL IN NEBULIZER TWICE DAILY 240 mL 3     No facility-administered encounter medications on file as of 4/4/2017.      No orders of the defined types were placed in this encounter.    Plan:     Urged restarting Acapella.  Continue aerosol treatments.  Complete IV antibiotics as directed by ID.  Return as needed.

## 2017-04-05 NOTE — PATIENT INSTRUCTIONS
Urged restarting Acapella.  Continue aerosol treatments.  Complete IV antibiotics as directed by ID.  Return as needed.

## 2017-04-05 NOTE — MR AVS SNAPSHOT
[facility]  [address] [place] [address]  Phone: [phone]  Fax: [phone]                  Yasmin Asencio   2017 1:30 PM   Office Visit    Description:  Female : 1950   Provider:  Ad Domingo OD   Department:  [facility]           Reason for Visit     Blurred Vision           Diagnoses this Visit        Comments    Headache around the eyes    -  Primary     Anterior basement membrane dystrophy         Glaucoma screening         Pseudophakia of both eyes         Hyperopia with astigmatism, bilateral                To Do List           Future Appointments        Provider Department Dept Phone    4/10/2017 10:30 AM LAB, [facility] [phone]    2017 8:15 AM 3PREP, [address] [facility] [phone]    2017 11:00 AM Taurus Braga MD [facility] [phone]    2017 11:30 AM Dyana Byers, PhD [facility] [phone]    2017 2:00 PM Taurus Braga MD [facility] [phone]      Your Future Surgeries/Procedures     2017   Surgery with Marlon Ballesteros MD   [facility]    [address] [facility] [address] [phone]              Goals (5 Years of Data)     None      Follow-Up and Disposition     Return in about 1 year (around 2018).      [facility]     [facility] Care Line -  Assistance  Unless otherwise directed by your provider, please contact [facility], our nurse care line that is available for  assistance.     Registered nurses in the [facility] provide: appointment scheduling, clinical advisement, health education, and other advisory services.  Call: [phone] (toll free)               Medications           Message regarding Medications     Verify the changes and/or additions to your medication regime listed below are the same  as discussed with your clinician today.  If any of these changes or additions are incorrect, please notify your healthcare provider.             Verify that the below list of medications is an accurate representation of the medications you are currently taking.  If none reported, the list may be blank. If incorrect, please contact your healthcare provider. Carry this list with you in case of emergency.           Current Medications     alprazolam (XANAX) 0.25 MG tablet Take 1 tablet (0.25 mg total) by mouth nightly as needed for Anxiety.    ANORO ELLIPTA 62.5-25 mcg/actuation DsDv     desoximetasone (TOPICORT) 0.25 % cream Apply as directed bid prn    escitalopram oxalate (LEXAPRO) 20 MG tablet TAKE ONE TABLET BY MOUTH ONCE DAILY    gabapentin (NEURONTIN) 300 MG capsule TAKE ONE CAPSULE BY MOUTH THREE TIMES DAILY    guaifenesin (MUCINEX) 600 mg 12 hr tablet Take 1,200 mg by mouth 2 (two) times daily.    ipratropium (ATROVENT) 0.02 % nebulizer solution USE ONE VIAL BY NEBULIZATION 4 TIMES DAILY    metoprolol tartrate (LOPRESSOR) 25 MG tablet Take 2 tablets (50 mg total) by mouth 2 (two) times daily.    omeprazole (PRILOSEC) 20 MG capsule TAKE ONE CAPSULE BY MOUTH ONCE DAILY    predniSONE (DELTASONE) 10 MG tablet Take 3 tabs per day x 5 days, then 2 tabs per day x 5 days, then 1 tab per day x 5 days, then stop. Take with food    primidone (MYSOLINE) 50 MG Tab TAKE ONE TABLET BY MOUTH ONCE DAILY IN THE MORNING, ONE AT NOON, AND TWO AT BEDTIME    SODIUM CHLORIDE FOR INHALATION (SODIUM CHLORIDE 3%) 3 % nebulizer solution USE ONE VIAL IN NEBULIZER TWICE DAILY    topiramate (TOPAMAX) 25 MG tablet Take 1 tablet (25 mg total) by mouth 2 (two) times daily.    tramadol (ULTRAM) 50 mg tablet Take 50 mg by mouth every 6 (six) hours as needed for Pain.    VITAMIN D2 50,000 unit capsule TAKE ONE CAPSULE BY MOUTH ONCE A WEEK    warfarin (COUMADIN) 5 MG tablet Take daily as directed by coumadin clinic.    desonide (DESOWEN) 0.05 %  lotion AAA face bid prn redness, scaling, or itching    ELIQUIS 5 mg Tab            Clinical Reference Information           Your Vitals Were     Last Period                   (LMP Unknown)           Allergies as of 4/5/2017     Bactrim [Sulfamethoxazole-trimethoprim]    Albuterol    Restasis [Cyclosporine]      Immunizations Administered on Date of Encounter - 4/5/2017     None      Language Assistance Services     ATTENTION: Language assistance services are available, free of charge. Please call [phone].      ATENCIÓN: Si habla tajañol, tiene a santamaria disposición servicios gratuitos de asistencia lingüística. Llame al [phone].     CHÚ Ý: N?u b?n nói Ti?ng Vi?t, có các d?ch v? h? tr? ngôn ng? mi?n phí dành cho b?n. G?i s? [phone].         [facility] complies with applicable Federal civil rights laws and does not discriminate on the basis of race, color, national origin, age, disability, or sex.

## 2017-04-05 NOTE — TELEPHONE ENCOUNTER
----- Message from Erika Watkins MA sent at 4/5/2017 10:04 AM CDT -----  Per [employer] end date is tomorrow? Please call and advise.    Thanks

## 2017-04-06 RX ORDER — ESCITALOPRAM OXALATE 20 MG/1
20 TABLET ORAL DAILY
Qty: 30 TABLET | Refills: 4 | Status: SHIPPED | OUTPATIENT
Start: 2017-04-06 | End: 2017-09-02 | Stop reason: SDUPTHER

## 2017-04-06 RX ORDER — WARFARIN SODIUM 5 MG/1
TABLET ORAL
Qty: 40 TABLET | Refills: 2 | Status: SHIPPED | OUTPATIENT
Start: 2017-04-06 | End: 2017-05-03 | Stop reason: SDUPTHER

## 2017-04-07 ENCOUNTER — TELEPHONE (OUTPATIENT)
Dept: FAMILY MEDICINE | Facility: CLINIC | Age: 67
End: 2017-04-07

## 2017-04-07 ENCOUNTER — TELEPHONE (OUTPATIENT)
Dept: ELECTROPHYSIOLOGY | Facility: CLINIC | Age: 67
End: 2017-04-07

## 2017-04-07 NOTE — TELEPHONE ENCOUNTER
Pts pharmacy sent a fax over stating that the patient needs a new rx for her metoprolol 25mg #180 she takes 2 po bid it was changed when she was in the hospital Thanks

## 2017-04-07 NOTE — TELEPHONE ENCOUNTER
Nurse spoke to Ms Asencio's daughter regarding her mother having picc line still. Nurse advised that will not be a problem for our procedure next week.  ----- Message from Nohemy Prakash RN sent at 4/7/2017 11:29 AM CDT -----  Contact: garfield(Daughter)      ----- Message -----     From: Taya Jang     Sent: 4/7/2017  11:26 AM       To: [facility] Arrhythmia Rn Staff    Garfield(Daughter) called, states she will like to discuss pt's procedure.Ph [phone]. Thank you

## 2017-04-10 ENCOUNTER — LAB VISIT (OUTPATIENT)
Dept: LAB | Facility: HOSPITAL | Age: 67
End: 2017-04-10
Attending: INTERNAL MEDICINE
Payer: MEDICARE

## 2017-04-10 ENCOUNTER — ANTI-COAG VISIT (OUTPATIENT)
Dept: CARDIOLOGY | Facility: CLINIC | Age: 67
End: 2017-04-10

## 2017-04-10 DIAGNOSIS — R00.2 PALPITATIONS: ICD-10-CM

## 2017-04-10 DIAGNOSIS — N18.30 BENIGN HYPERTENSION WITH CHRONIC KIDNEY DISEASE, STAGE III: Chronic | ICD-10-CM

## 2017-04-10 DIAGNOSIS — I47.10 PSVT (PAROXYSMAL SUPRAVENTRICULAR TACHYCARDIA): ICD-10-CM

## 2017-04-10 DIAGNOSIS — I12.9 BENIGN HYPERTENSION WITH CHRONIC KIDNEY DISEASE, STAGE III: Chronic | ICD-10-CM

## 2017-04-10 DIAGNOSIS — Z79.01 LONG TERM (CURRENT) USE OF ANTICOAGULANTS: ICD-10-CM

## 2017-04-10 LAB
ANION GAP SERPL CALC-SCNC: 10 MMOL/L
APTT BLDCRRT: 30.1 SEC
BASOPHILS # BLD AUTO: 0.03 K/UL
BASOPHILS NFR BLD: 0.5 %
BUN SERPL-MCNC: 17 MG/DL
CALCIUM SERPL-MCNC: 9.1 MG/DL
CHLORIDE SERPL-SCNC: 104 MMOL/L
CO2 SERPL-SCNC: 26 MMOL/L
CREAT SERPL-MCNC: 1 MG/DL
DIFFERENTIAL METHOD: ABNORMAL
EOSINOPHIL # BLD AUTO: 0.2 K/UL
EOSINOPHIL NFR BLD: 2.4 %
ERYTHROCYTE [DISTWIDTH] IN BLOOD BY AUTOMATED COUNT: 12.9 %
EST. GFR  (AFRICAN AMERICAN): >60 ML/MIN/1.73 M^2
EST. GFR  (NON AFRICAN AMERICAN): 58.4 ML/MIN/1.73 M^2
GLUCOSE SERPL-MCNC: 87 MG/DL
HCT VFR BLD AUTO: 33.2 %
HGB BLD-MCNC: 10.8 G/DL
INR PPP: 1.7
INR PPP: 1.7
LYMPHOCYTES # BLD AUTO: 2 K/UL
LYMPHOCYTES NFR BLD: 29.8 %
MCH RBC QN AUTO: 30.1 PG
MCHC RBC AUTO-ENTMCNC: 32.5 %
MCV RBC AUTO: 93 FL
MONOCYTES # BLD AUTO: 0.6 K/UL
MONOCYTES NFR BLD: 9.3 %
NEUTROPHILS # BLD AUTO: 3.8 K/UL
NEUTROPHILS NFR BLD: 57.7 %
PLATELET # BLD AUTO: 212 K/UL
PMV BLD AUTO: 9.9 FL
POTASSIUM SERPL-SCNC: 3.9 MMOL/L
PROTHROMBIN TIME: 17.3 SEC
PROTHROMBIN TIME: 17.3 SEC
RBC # BLD AUTO: 3.59 M/UL
SODIUM SERPL-SCNC: 140 MMOL/L
WBC # BLD AUTO: 6.58 K/UL

## 2017-04-10 PROCEDURE — 85025 COMPLETE CBC W/AUTO DIFF WBC: CPT

## 2017-04-10 PROCEDURE — 85730 THROMBOPLASTIN TIME PARTIAL: CPT

## 2017-04-10 PROCEDURE — 36415 COLL VENOUS BLD VENIPUNCTURE: CPT | Mod: PO

## 2017-04-10 PROCEDURE — 80048 BASIC METABOLIC PNL TOTAL CA: CPT

## 2017-04-10 PROCEDURE — 85610 PROTHROMBIN TIME: CPT

## 2017-04-10 RX ORDER — METOPROLOL TARTRATE 50 MG/1
50 TABLET ORAL 2 TIMES DAILY
Qty: 180 TABLET | Refills: 3 | Status: ON HOLD | OUTPATIENT
Start: 2017-04-10 | End: 2017-07-25 | Stop reason: HOSPADM

## 2017-04-11 ENCOUNTER — TELEPHONE (OUTPATIENT)
Dept: ELECTROPHYSIOLOGY | Facility: CLINIC | Age: 67
End: 2017-04-11

## 2017-04-11 ENCOUNTER — TELEPHONE (OUTPATIENT)
Dept: FAMILY MEDICINE | Facility: CLINIC | Age: 67
End: 2017-04-11

## 2017-04-11 DIAGNOSIS — D64.9 ANEMIA, UNSPECIFIED TYPE: Primary | ICD-10-CM

## 2017-04-11 RX ORDER — PRIMIDONE 50 MG/1
TABLET ORAL
Qty: 120 TABLET | Refills: 11 | Status: SHIPPED | OUTPATIENT
Start: 2017-04-11 | End: 2017-05-17 | Stop reason: SDUPTHER

## 2017-04-11 NOTE — TELEPHONE ENCOUNTER
----- Message from Marlon Ballesteros MD sent at 4/11/2017  8:54 AM CDT -----  Contact: [facility]  the PICC isn't a problem... but if her respiratory status is a problem, that's a problem.    ----- Message -----     From: Yaa Walker RN     Sent: 4/11/2017   8:31 AM       To: Marlon Ballesteros MD    You're right, it's this Thursday. H/H looks decent. She is concerned about having RFA with PICC line in. I will try to discuss with her   ----- Message -----     From: Marlon Ballesteros MD     Sent: 4/10/2017   5:06 PM       To: Yaa Walker RN    ok thanks for the heads up  I'm not here next week though; check your dates.    ----- Message -----     From: Yaa Walekr RN     Sent: 4/10/2017   3:59 PM       To: Marlon Ballesteros MD    Pt scheduled for SVT RFA next week. Pt has wheezing, productive cough, still small amt yellow sputum. Just finished tobramycin and cefepime antibiotics. Keeping PICC one more week.  nurse said H/H low last Friday--awaiting fax on those results. Had our labs drawn today-will have results in system tomorrow. May need to reschedule RFA.     ----- Message -----     From: Nohemy Prakash RN     Sent: 4/10/2017   2:19 PM       To: Yaa Walker RN     fyi- pt scheduled for SVT RFA next week and looks like she is having issues.   ----- Message -----     From: Taya Jang     Sent: 4/10/2017   1:57 PM       To: [facility]    [facility] called, states pt has had a drop in hemoglobin. Pt is still having wheezing and congestion. He states pt has finished antibiotic. He will like to speak with you at ph [phone]. He states he is faxing lab work to [phone]. Thank you

## 2017-04-11 NOTE — TELEPHONE ENCOUNTER
Pt still c/os respiratory issues, and wishes to postpone RFA for now. Advised pt we can proceed with RFA while PICC line is in place, and to let us know when respiratory status improves. Pt verbalized understanding.

## 2017-04-11 NOTE — TELEPHONE ENCOUNTER
Spoke to chief Pharmacist with [employer] [facility], states patient was suppose to have ablation procedure but it was cancelled due to drop in CBC. Informed to repeat CBC STAT per PCP. States he will notify HH and inform to draw lab.states he wiil have lab result forwarded to office.

## 2017-04-12 ENCOUNTER — LAB VISIT (OUTPATIENT)
Dept: LAB | Facility: HOSPITAL | Age: 67
End: 2017-04-12
Attending: INTERNAL MEDICINE
Payer: MEDICARE

## 2017-04-12 ENCOUNTER — TELEPHONE (OUTPATIENT)
Dept: FAMILY MEDICINE | Facility: CLINIC | Age: 67
End: 2017-04-12

## 2017-04-12 DIAGNOSIS — D64.9 ANEMIA, UNSPECIFIED TYPE: ICD-10-CM

## 2017-04-12 LAB
BASOPHILS # BLD AUTO: 0.04 K/UL
BASOPHILS NFR BLD: 0.7 %
DIFFERENTIAL METHOD: ABNORMAL
EOSINOPHIL # BLD AUTO: 0.1 K/UL
EOSINOPHIL NFR BLD: 2.4 %
ERYTHROCYTE [DISTWIDTH] IN BLOOD BY AUTOMATED COUNT: 12.5 %
HCT VFR BLD AUTO: 33.2 %
HGB BLD-MCNC: 10.5 G/DL
LYMPHOCYTES # BLD AUTO: 2.1 K/UL
LYMPHOCYTES NFR BLD: 37.2 %
MCH RBC QN AUTO: 29.4 PG
MCHC RBC AUTO-ENTMCNC: 31.6 %
MCV RBC AUTO: 93 FL
MONOCYTES # BLD AUTO: 0.8 K/UL
MONOCYTES NFR BLD: 13.6 %
NEUTROPHILS # BLD AUTO: 2.6 K/UL
NEUTROPHILS NFR BLD: 46.1 %
PLATELET # BLD AUTO: 234 K/UL
PMV BLD AUTO: 10.4 FL
RBC # BLD AUTO: 3.57 M/UL
WBC # BLD AUTO: 5.73 K/UL

## 2017-04-12 PROCEDURE — 85025 COMPLETE CBC W/AUTO DIFF WBC: CPT | Mod: PO

## 2017-04-12 PROCEDURE — 36415 COLL VENOUS BLD VENIPUNCTURE: CPT | Mod: PO

## 2017-04-12 NOTE — TELEPHONE ENCOUNTER
Hct/blood count ok and stable from our previous results. Not sure why the other result that was sent to us was so low. She needs to call her pulmonologist regarding the hemoptysis.

## 2017-04-12 NOTE — TELEPHONE ENCOUNTER
Spoke w/patient, requesting advice. States she had the repeat CBC blood drawn today and about 2:30pm today she began coughing up dark red blood. No complaints of SOB, C/P. [patient states she called the coumadin clinic to inform them and was instructed to take 1/2 of the 5mg tab. States she has not taken anything yet until  informs her of her lab result. States she also has a call out to her cardiologist.please advise.

## 2017-04-12 NOTE — PROGRESS NOTES
Returned patient's phone call.  She reports coughing up small amounts of blood starting ~ 2:30 pm this afternoon (4/12).  She denies bleeding from any other source or other symptoms.  She has placed a call to ID.  She confirms ablation for 4/13 cancelled.  Advised pt to lower warfarin dose 4/12 and to continue to monitor.  Encouraged her to f/u with ID.  If she develops larger amounts of bleeding or other sx, I advised her to go to ER.  She verbalized understanding.

## 2017-04-12 NOTE — TELEPHONE ENCOUNTER
----- Message from Tatyana Ramirez sent at 4/12/2017  4:22 PM CDT -----  Contact: Self  Pt calling regarding lab appointment. Please call [phone].

## 2017-04-13 ENCOUNTER — TELEPHONE (OUTPATIENT)
Dept: PULMONOLOGY | Facility: CLINIC | Age: 67
End: 2017-04-13

## 2017-04-13 NOTE — TELEPHONE ENCOUNTER
----- Message from Taty Felix sent at 4/13/2017  8:05 AM CDT -----  Contact: Self [phone]  Pt is requesting a call at [phone]. Mrs. Asencio was coughing up blood on yesterday - today it is not as much.

## 2017-04-17 ENCOUNTER — TELEPHONE (OUTPATIENT)
Dept: INFECTIOUS DISEASES | Facility: CLINIC | Age: 67
End: 2017-04-17

## 2017-04-18 ENCOUNTER — ANTI-COAG VISIT (OUTPATIENT)
Dept: CARDIOLOGY | Facility: CLINIC | Age: 67
End: 2017-04-18

## 2017-04-18 DIAGNOSIS — I47.10 PSVT (PAROXYSMAL SUPRAVENTRICULAR TACHYCARDIA): ICD-10-CM

## 2017-04-18 DIAGNOSIS — Z79.01 LONG TERM (CURRENT) USE OF ANTICOAGULANTS: ICD-10-CM

## 2017-04-18 DIAGNOSIS — R00.2 PALPITATIONS: ICD-10-CM

## 2017-04-18 LAB — INR PPP: 1.7

## 2017-04-19 RX ORDER — ERGOCALCIFEROL 1.25 MG/1
50000 CAPSULE ORAL
Qty: 4 CAPSULE | Refills: 4 | Status: SHIPPED | OUTPATIENT
Start: 2017-04-19 | End: 2017-09-14 | Stop reason: SDUPTHER

## 2017-04-24 LAB — INR PPP: 1.6

## 2017-04-25 ENCOUNTER — ANTI-COAG VISIT (OUTPATIENT)
Dept: CARDIOLOGY | Facility: CLINIC | Age: 67
End: 2017-04-25

## 2017-04-25 ENCOUNTER — PATIENT MESSAGE (OUTPATIENT)
Dept: PAIN MEDICINE | Facility: CLINIC | Age: 67
End: 2017-04-25

## 2017-04-25 DIAGNOSIS — R00.2 PALPITATIONS: ICD-10-CM

## 2017-04-25 DIAGNOSIS — Z79.01 LONG TERM (CURRENT) USE OF ANTICOAGULANTS: ICD-10-CM

## 2017-04-25 DIAGNOSIS — I47.10 PSVT (PAROXYSMAL SUPRAVENTRICULAR TACHYCARDIA): ICD-10-CM

## 2017-05-01 ENCOUNTER — TELEPHONE (OUTPATIENT)
Dept: PULMONOLOGY | Facility: CLINIC | Age: 67
End: 2017-05-01

## 2017-05-01 ENCOUNTER — ANTI-COAG VISIT (OUTPATIENT)
Dept: CARDIOLOGY | Facility: CLINIC | Age: 67
End: 2017-05-01

## 2017-05-01 DIAGNOSIS — R00.2 PALPITATIONS: ICD-10-CM

## 2017-05-01 DIAGNOSIS — Z79.01 LONG TERM (CURRENT) USE OF ANTICOAGULANTS: ICD-10-CM

## 2017-05-01 DIAGNOSIS — I47.10 PSVT (PAROXYSMAL SUPRAVENTRICULAR TACHYCARDIA): ICD-10-CM

## 2017-05-01 LAB — INR PPP: 2.1

## 2017-05-01 NOTE — TELEPHONE ENCOUNTER
----- Message from Etelvina DARIO Joanie sent at 5/1/2017  9:13 AM CDT -----  Contact: [facility]: [phone]  Home health called and stated that she is hearing fluid throughout her entire lungs  and coughing up blood started on yesterday and pt has been SOB more than usual.  Home health can be reached at [phone].  Please call.    Thanks

## 2017-05-02 ENCOUNTER — OFFICE VISIT (OUTPATIENT)
Dept: NEUROLOGY | Facility: CLINIC | Age: 67
End: 2017-05-02
Payer: MEDICARE

## 2017-05-02 DIAGNOSIS — F33.0 MILD RECURRENT MAJOR DEPRESSION: ICD-10-CM

## 2017-05-02 DIAGNOSIS — F06.70 MILD NEUROCOGNITIVE DISORDER DUE TO MULTIPLE ETIOLOGIES: ICD-10-CM

## 2017-05-02 PROCEDURE — 96118 PR NEUROPSYCH TESTING BY PSYCH/PHYS: CPT | Mod: S$GLB,,, | Performed by: PSYCHIATRY & NEUROLOGY

## 2017-05-02 PROCEDURE — 99499 UNLISTED E&M SERVICE: CPT | Mod: S$GLB,,, | Performed by: PSYCHIATRY & NEUROLOGY

## 2017-05-02 PROCEDURE — 96119 PR NEUROPSYCH TESTING BY TECHNICIAN: CPT | Mod: 59,S$GLB,, | Performed by: PSYCHIATRY & NEUROLOGY

## 2017-05-02 PROCEDURE — 90791 PSYCH DIAGNOSTIC EVALUATION: CPT | Mod: S$GLB,,, | Performed by: PSYCHIATRY & NEUROLOGY

## 2017-05-02 NOTE — PROGRESS NOTES
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           Visual Reproduction Copy  Raw Cumulative Percentage       43 >75%    Clock Drawing    Raw Cumulative Percentage   Request    4/5 40.1%   Copy    5/5 100%   Sum    9/10 45.8%  ________________________________________________________________________  MOTOR             Grooved Pegboard Test   Raw  T Percentile     Dominant hand   89, 0 drops 40 16th    Nondominant hand  130, 3 drops 36L 8th   ________________________________________________________________________  MEMORY     WMS-IV     Raw AgeSS Percentile    Logical Memory I    31 10 50th     Logical Memory II  18 10 50th     Logical Memory Recog  19 - 51-75% (cumulative percentage)   Visual Reproduction I  24 6 9th     Visual Reproduction II  19 10 50th     Visual Reproduction Recog  3 - 17-25% (cumulative percentage)    HVLT-R    Raw T Percentile    Total (5,6,8)   19 41 18th      Delayed Recall   3 29 2nd    Retention %   37.5% 25 1st    Recognition Hits   10/12 - -   False Positives   1/12 - -   Discrimination Index  9 42 21st   ________________________________________________________________________  EMOTIONAL       Raw Score Interpretation  GDS     13  Mild  ________________________________________________________________________

## 2017-05-02 NOTE — LETTER
May 4, 2017      Julisa Hough MD  [address]  [facility] [address]           [facility]  [address]  [facility] [address]  Phone: [phone]  Fax: [phone]          Patient: Yasmin Asencio   MR Number: [id number]   YOB: 1950   Date of Visit: 5/2/2017       Dear Dr. Julisa Hough:    Thank you for referring Yasmin Asencio to me for evaluation. Attached you will find relevant portions of my assessment and plan of care.    If you have questions, please do not hesitate to call me. I look forward to following Yasmin Asencio along with you.    Sincerely,    Dyana Byers, PhD    Enclosure  CC:  No Recipients    If you would like to receive this communication electronically, please contact [email or web address] or [phone] to request more information on [facility] [employer] access.    For providers and/or their staff who would like to refer a patient to [facility], please contact us through our one-stop-shop provider referral line, [facility], at [phone].    If you feel you have received this communication in error or would no longer like to receive these types of communications, please e-mail [email or web address]

## 2017-05-03 ENCOUNTER — HOSPITAL ENCOUNTER (OUTPATIENT)
Dept: RADIOLOGY | Facility: HOSPITAL | Age: 67
Discharge: HOME | End: 2017-05-03
Attending: INTERNAL MEDICINE
Payer: MEDICARE

## 2017-05-03 ENCOUNTER — TELEPHONE (OUTPATIENT)
Dept: PULMONOLOGY | Facility: CLINIC | Age: 67
End: 2017-05-03

## 2017-05-03 DIAGNOSIS — R04.2 HEMOPTYSIS: ICD-10-CM

## 2017-05-03 DIAGNOSIS — Z87.09 H/O BRONCHIECTASIS: Primary | ICD-10-CM

## 2017-05-03 PROCEDURE — 71020 XR CHEST PA AND LATERAL: CPT | Mod: TC,PO

## 2017-05-03 PROCEDURE — 71020 XR CHEST PA AND LATERAL: CPT | Mod: 26,,, | Performed by: RADIOLOGY

## 2017-05-03 RX ORDER — WARFARIN SODIUM 5 MG/1
TABLET ORAL
Qty: 135 TABLET | Refills: 1 | Status: SHIPPED | OUTPATIENT
Start: 2017-05-03 | End: 2017-07-12 | Stop reason: SDUPTHER

## 2017-05-03 NOTE — TELEPHONE ENCOUNTER
----- Message from Taty Felix sent at 5/2/2017  8:58 AM CDT -----  Contact: Self [phone]  Pt is requesting a call at [phone]. pls see previous message.

## 2017-05-03 NOTE — TELEPHONE ENCOUNTER
CXR today looks unchanged.  I am asking her to f/u with ID to discuss whether there would be a benefit from additional antibiotic therapy.

## 2017-05-04 DIAGNOSIS — M54.40 ACUTE LEFT-SIDED LOW BACK PAIN WITH SCIATICA, SCIATICA LATERALITY UNSPECIFIED: ICD-10-CM

## 2017-05-05 ENCOUNTER — TELEPHONE (OUTPATIENT)
Dept: INFECTIOUS DISEASES | Facility: CLINIC | Age: 67
End: 2017-05-05

## 2017-05-05 DIAGNOSIS — J13 PNEUMONIA OF BOTH UPPER LOBES DUE TO STREPTOCOCCUS PNEUMONIAE: Primary | ICD-10-CM

## 2017-05-05 RX ORDER — GABAPENTIN 300 MG/1
300 CAPSULE ORAL 3 TIMES DAILY
Qty: 90 CAPSULE | Refills: 0 | Status: SHIPPED | OUTPATIENT
Start: 2017-05-05 | End: 2017-06-05 | Stop reason: SDUPTHER

## 2017-05-05 RX ORDER — OMEPRAZOLE 20 MG/1
20 CAPSULE, DELAYED RELEASE ORAL DAILY PRN
Qty: 90 CAPSULE | Refills: 0 | Status: SHIPPED | OUTPATIENT
Start: 2017-05-05 | End: 2017-08-21 | Stop reason: SDUPTHER

## 2017-05-05 RX ORDER — AZITHROMYCIN 250 MG/1
TABLET, FILM COATED ORAL
Qty: 6 TABLET | Refills: 0 | Status: SHIPPED | OUTPATIENT
Start: 2017-05-05 | End: 2017-05-17

## 2017-05-05 NOTE — PROGRESS NOTES
The pt will be starting a z-haile today.  This antibiotic does not always interact with warfarin.  She is currently doing weekly INRs for a RFA and I do not want her INR to drop.  Therefore, I am not recommending warfarin dosing changes at the current time and we will continue to monitor closely with an INR on Monday, as scheduled.

## 2017-05-05 NOTE — TELEPHONE ENCOUNTER
Ms. Asencio is a patient with a history of chronic cough and occasional hemoptysis.  Will plan to manage her with azithromycin for 5 days.  I will notify her coumadin clinic providers.

## 2017-05-08 LAB — INR PPP: 1.7

## 2017-05-09 ENCOUNTER — ANTI-COAG VISIT (OUTPATIENT)
Dept: CARDIOLOGY | Facility: CLINIC | Age: 67
End: 2017-05-09

## 2017-05-09 DIAGNOSIS — I47.10 PSVT (PAROXYSMAL SUPRAVENTRICULAR TACHYCARDIA): ICD-10-CM

## 2017-05-09 DIAGNOSIS — Z79.01 LONG TERM (CURRENT) USE OF ANTICOAGULANTS: ICD-10-CM

## 2017-05-09 DIAGNOSIS — R00.2 PALPITATIONS: ICD-10-CM

## 2017-05-09 NOTE — PROGRESS NOTES
Despite a boosted dose and starting a new antibiotic last week, the pt's INR has dropped.  I am increasing her weekly dose at this time.

## 2017-05-09 NOTE — PROGRESS NOTES
Verbal result taken from ___Cindy______. PT/INR ___20.6 / 1.7____ Date drawn___5/8/17_____ Hardcopy to be faxed.

## 2017-05-11 ENCOUNTER — DOCUMENTATION ONLY (OUTPATIENT)
Dept: NEUROLOGY | Facility: CLINIC | Age: 67
End: 2017-05-11

## 2017-05-11 NOTE — PROGRESS NOTES
NEUROPSYCHOLOGICAL EVALUATION FEEDBACK    Yasmin Asencio attended a feedback session today, accompanied by her .  We discussed the results of the neuropsychological evaluation.  I provided them with a copy of the evaluation report and gave time to discuss questions and concerns.    Dyana Byers, Ph.D., ABPP  Board Certified in Clinical Neuropsychology  [facility] - Department of Neurology

## 2017-05-12 ENCOUNTER — TELEPHONE (OUTPATIENT)
Dept: NEUROLOGY | Facility: CLINIC | Age: 67
End: 2017-05-12

## 2017-05-12 NOTE — TELEPHONE ENCOUNTER
----- Message from Julisa Huogh MD sent at 5/9/2017 12:34 PM CDT -----  Inge Parada,          Mrs. Asencio is in need of a follow up appointment with Dr. Juarez on his next available, can you please get her scheduled?     Thanks,    Dr. Hough

## 2017-05-15 ENCOUNTER — ANTI-COAG VISIT (OUTPATIENT)
Dept: CARDIOLOGY | Facility: CLINIC | Age: 67
End: 2017-05-15

## 2017-05-15 ENCOUNTER — TELEPHONE (OUTPATIENT)
Dept: INFECTIOUS DISEASES | Facility: CLINIC | Age: 67
End: 2017-05-15

## 2017-05-15 DIAGNOSIS — R00.2 PALPITATIONS: ICD-10-CM

## 2017-05-15 DIAGNOSIS — I47.10 PSVT (PAROXYSMAL SUPRAVENTRICULAR TACHYCARDIA): ICD-10-CM

## 2017-05-15 DIAGNOSIS — R05.9 COUGH: Primary | ICD-10-CM

## 2017-05-15 DIAGNOSIS — J47.9 BRONCHIECTASIS WITHOUT COMPLICATION: ICD-10-CM

## 2017-05-15 DIAGNOSIS — Z79.01 LONG TERM (CURRENT) USE OF ANTICOAGULANTS: ICD-10-CM

## 2017-05-15 LAB — INR PPP: 2.2

## 2017-05-15 NOTE — TELEPHONE ENCOUNTER
----- Message from Etelvina Nair sent at 5/15/2017  9:29 AM CDT -----  Contact: Caitlyn: [phone]  Caitlyn (home health nurse) is calling in regards to the pt.  Stated that the antibiotics that the pt was placed on is not helping and was advised to call in and let the staff know.      Fluid on lungs and pain on left lung as well.  Ciatlyn can be reached at [phone].  Please call.    Thanks

## 2017-05-17 ENCOUNTER — OFFICE VISIT (OUTPATIENT)
Dept: INFECTIOUS DISEASES | Facility: CLINIC | Age: 67
End: 2017-05-17
Payer: MEDICARE

## 2017-05-17 VITALS
TEMPERATURE: 98 F | BODY MASS INDEX: 27.65 KG/M2 | WEIGHT: 156.06 LBS | DIASTOLIC BLOOD PRESSURE: 71 MMHG | HEIGHT: 63 IN | SYSTOLIC BLOOD PRESSURE: 148 MMHG | HEART RATE: 77 BPM

## 2017-05-17 DIAGNOSIS — B96.5 PSEUDOMONAS RESPIRATORY INFECTION: Primary | ICD-10-CM

## 2017-05-17 DIAGNOSIS — J98.8 PSEUDOMONAS RESPIRATORY INFECTION: Primary | ICD-10-CM

## 2017-05-17 PROCEDURE — 3078F DIAST BP <80 MM HG: CPT | Mod: S$GLB,,, | Performed by: INTERNAL MEDICINE

## 2017-05-17 PROCEDURE — 3077F SYST BP >= 140 MM HG: CPT | Mod: S$GLB,,, | Performed by: INTERNAL MEDICINE

## 2017-05-17 PROCEDURE — 1125F AMNT PAIN NOTED PAIN PRSNT: CPT | Mod: S$GLB,,, | Performed by: INTERNAL MEDICINE

## 2017-05-17 PROCEDURE — 1157F ADVNC CARE PLAN IN RCRD: CPT | Mod: S$GLB,,, | Performed by: INTERNAL MEDICINE

## 2017-05-17 PROCEDURE — 99214 OFFICE O/P EST MOD 30 MIN: CPT | Mod: S$GLB,,, | Performed by: INTERNAL MEDICINE

## 2017-05-17 PROCEDURE — 99999 PR PBB SHADOW E&M-EST. PATIENT-LVL IV: CPT | Mod: PBBFAC,,, | Performed by: INTERNAL MEDICINE

## 2017-05-17 PROCEDURE — 99499 UNLISTED E&M SERVICE: CPT | Mod: S$GLB,,, | Performed by: INTERNAL MEDICINE

## 2017-05-17 PROCEDURE — 1160F RVW MEDS BY RX/DR IN RCRD: CPT | Mod: S$GLB,,, | Performed by: INTERNAL MEDICINE

## 2017-05-17 PROCEDURE — 1159F MED LIST DOCD IN RCRD: CPT | Mod: S$GLB,,, | Performed by: INTERNAL MEDICINE

## 2017-05-17 NOTE — PROGRESS NOTES
Infectious Diseases Clinic Note    Subjective:       Patient ID: Yasmin Asencio is a 66 y.o. female.    Chief Complaint: Bronchiectasis with acute lower respiratory infection    HPI    66 F h/o pAFB on coumadin, chronic NTM long ago c/b scarring and bronchiectasis has grown pseudomonas in past from sputum treated with IV cefepime/tobramycin for over 2 weeks in early April Was coughing up blood on and off 5/2017  and after taking short course of azithromycin no longer coughing up blood but continues to cough and bringing up yellowish greenish sputum, no fevers, mild SOB      Past Medical History:   Diagnosis Date    Acquired bronchiectasis     due to history of TB - followed by pulmonary, Dr. Zuñiga    Allergy     Amblyopia     rt eye per pt    Anemia of other chronic disease     Anxiety     Cataract     Clotting disorder     COPD (chronic obstructive pulmonary disease)     COPD (chronic obstructive pulmonary disease)     Depression     Diverticulosis     Essential tremor     GERD (gastroesophageal reflux disease)     Hemangioma of liver     History of tuberculosis 1978    Hypertension     Mixed anxiety and depressive disorder     Psoriasis     PSVT (paroxysmal supraventricular tachycardia)     S/P PICC central line placement Apr. 2016 - May 2016    Skin disease     Psoriasis    Spondylosis without myelopathy 8/23/2013    Supraventricular tachycardia     Thoracic aorta atherosclerosis     noted on CT scan of chest 1/3/2011    Tuberculosis     Vaginal delivery     x2    Vitamin D deficiency        Social History     Social History    Marital status:      Spouse name: N/A    Number of children: 2    Years of education: N/A     Occupational History    housewife      Social History Main Topics    Smoking status: Former Smoker     Packs/day: 2.00     Years: 50.00     Types: Cigarettes     Quit date: 5/27/2009    Smokeless tobacco: Never Used    Alcohol use No    Drug use: No     Sexual activity: Yes     Partners: Male     Other Topics Concern    Are You Pregnant Or Think You May Be? No    Breast-Feeding No     Social History Narrative    One child  of a heart attack at age 34.         Current Outpatient Prescriptions:     ANORO ELLIPTA 62.5-25 mcg/actuation DsDv, , Disp: , Rfl:     desoximetasone (TOPICORT) 0.25 % cream, Apply as directed bid prn, Disp: 60 g, Rfl: 2    ergocalciferol (VITAMIN D2) 50,000 unit Cap, Take 1 capsule (50,000 Units total) by mouth every 7 days., Disp: 4 capsule, Rfl: 4    escitalopram oxalate (LEXAPRO) 20 MG tablet, Take 1 tablet (20 mg total) by mouth once daily., Disp: 30 tablet, Rfl: 4    gabapentin (NEURONTIN) 300 MG capsule, Take 1 capsule (300 mg total) by mouth 3 (three) times daily., Disp: 90 capsule, Rfl: 0    guaifenesin (MUCINEX) 600 mg 12 hr tablet, Take 1,200 mg by mouth 2 (two) times daily., Disp: , Rfl:     ipratropium (ATROVENT) 0.02 % nebulizer solution, USE ONE VIAL BY NEBULIZATION 4 TIMES DAILY, Disp: 225 vial, Rfl: 3    metoprolol tartrate (LOPRESSOR) 50 MG tablet, Take 1 tablet (50 mg total) by mouth 2 (two) times daily. Do not take for two days prior to ablation procedure., Disp: 180 tablet, Rfl: 3    omeprazole (PRILOSEC) 20 MG capsule, Take 1 capsule (20 mg total) by mouth daily as needed (heartburn)., Disp: 90 capsule, Rfl: 0    predniSONE (DELTASONE) 10 MG tablet, Take 3 tabs per day x 5 days, then 2 tabs per day x 5 days, then 1 tab per day x 5 days, then stop. Take with food, Disp: 30 tablet, Rfl: 1    primidone (MYSOLINE) 50 MG Tab, TAKE ONE TABLET BY MOUTH ONCE DAILY IN THE MORNING, ONE AT NOON, AND TWO AT BEDTIME, Disp: 120 tablet, Rfl: 11    SODIUM CHLORIDE FOR INHALATION (SODIUM CHLORIDE 3%) 3 % nebulizer solution, USE ONE VIAL IN NEBULIZER TWICE DAILY, Disp: 240 mL, Rfl: 6    topiramate (TOPAMAX) 25 MG tablet, Take 1 tablet (25 mg total) by mouth 2 (two) times daily., Disp: 180 tablet, Rfl: 3    tramadol  (ULTRAM) 50 mg tablet, Take 50 mg by mouth every 6 (six) hours as needed for Pain., Disp: , Rfl:     warfarin (COUMADIN) 5 MG tablet, Take 1 & 1/2 tablets daily or as directed by Coumadin Clinic., Disp: 135 tablet, Rfl: 1    alprazolam (XANAX) 0.25 MG tablet, Take 1 tablet (0.25 mg total) by mouth nightly as needed for Anxiety., Disp: 30 tablet, Rfl: 2    desonide (DESOWEN) 0.05 % lotion, AAA face bid prn redness, scaling, or itching, Disp: , Rfl:     Review of Systems   Constitutional: Negative for activity change, chills and fever.   HENT: Negative for congestion, mouth sores, rhinorrhea, sinus pressure and sore throat.    Eyes: Negative for photophobia, pain and redness.   Respiratory: Positive for cough. Negative for chest tightness, shortness of breath and wheezing.    Cardiovascular: Negative for chest pain and leg swelling.   Gastrointestinal: Negative for abdominal distention, abdominal pain, diarrhea, nausea and vomiting.   Endocrine: Negative for polyuria.   Genitourinary: Negative for decreased urine volume, dysuria and flank pain.   Musculoskeletal: Negative for joint swelling and neck pain.   Skin: Negative for color change.   Allergic/Immunologic: Negative for food allergies.   Neurological: Negative for dizziness, weakness and headaches.   Hematological: Negative for adenopathy.   Psychiatric/Behavioral: Negative for agitation and confusion. The patient is not nervous/anxious.            Objective:      Vitals:    05/17/17 0902   BP: (!) 148/71   Pulse: 77   Temp: 97.8 °F (36.6 °C)     Physical Exam   Constitutional: She is oriented to person, place, and time. She appears well-developed and well-nourished.   HENT:   Head: Normocephalic and atraumatic.   Eyes: Pupils are equal, round, and reactive to light.   Neck: Normal range of motion. Neck supple.   Cardiovascular: Normal rate.    Pulmonary/Chest: Effort normal and breath sounds normal.   Abdominal: Soft. Bowel sounds are normal.    Musculoskeletal: She exhibits no edema or tenderness.   Neurological: She is alert and oriented to person, place, and time.   Skin: Skin is warm and dry.   Psychiatric: She has a normal mood and affect.           Assessment/Plan:       No diagnosis found.    66 F h/o pAFB on coumadin, chronic NTM long ago c/b scarring and bronchiectasis has grown pseudomonas in past from sputum treated with IV cefepime/tobramycin for over 2 weeks in early April Was coughing up blood on and off 5/2017  and after taking short course of azithromycin no longer coughing up blood but continues to cough and bringing up yellowish greenish sputum, no fevers, mild SOB  - check sputum culture today and will discuss with Dr. Yuan if patient needs further abx, but no acute indication for further abx therapy at this time

## 2017-05-19 ENCOUNTER — LAB VISIT (OUTPATIENT)
Dept: LAB | Facility: HOSPITAL | Age: 67
End: 2017-05-19
Attending: INTERNAL MEDICINE
Payer: MEDICARE

## 2017-05-19 DIAGNOSIS — J47.0 BRONCHIECTASIS WITH ACUTE LOWER RESPIRATORY INFECTION: ICD-10-CM

## 2017-05-19 PROCEDURE — 87186 SC STD MICRODIL/AGAR DIL: CPT

## 2017-05-19 PROCEDURE — 87205 SMEAR GRAM STAIN: CPT

## 2017-05-19 PROCEDURE — 87070 CULTURE OTHR SPECIMN AEROBIC: CPT

## 2017-05-19 PROCEDURE — 87077 CULTURE AEROBIC IDENTIFY: CPT

## 2017-05-22 NOTE — TELEPHONE ENCOUNTER
Called Ms. Dorcas.  She is still reporting some coughing and congestion.  Her breathing is like it has been.  Hasn't gotten any better.  She submitted a culture last week Friday.  Will follow up on results.

## 2017-05-23 LAB
BACTERIA SPEC AEROBE CULT: NORMAL
GRAM STN SPEC: NORMAL

## 2017-05-23 RX ORDER — BENZONATATE 100 MG/1
100 CAPSULE ORAL 3 TIMES DAILY PRN
Qty: 60 CAPSULE | Refills: 1 | Status: SHIPPED | OUTPATIENT
Start: 2017-05-23 | End: 2017-06-22

## 2017-05-23 NOTE — TELEPHONE ENCOUNTER
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  Unclear if this is a true pathogen.  Speaking to her more, her breathing is at her baseline.  She is having slightly more coughing than usual.    Plan  1.  No antibiotics for now.  2.  Symptomatic therapy with tessalon perles.

## 2017-05-24 ENCOUNTER — ANTI-COAG VISIT (OUTPATIENT)
Dept: CARDIOLOGY | Facility: CLINIC | Age: 67
End: 2017-05-24
Payer: MEDICARE

## 2017-05-24 DIAGNOSIS — R00.2 PALPITATIONS: ICD-10-CM

## 2017-05-24 DIAGNOSIS — I47.10 PSVT (PAROXYSMAL SUPRAVENTRICULAR TACHYCARDIA): ICD-10-CM

## 2017-05-24 DIAGNOSIS — Z79.01 LONG TERM (CURRENT) USE OF ANTICOAGULANTS: Primary | ICD-10-CM

## 2017-05-24 LAB — INR PPP: 2.4 (ref 2–3)

## 2017-05-24 PROCEDURE — 99211 OFF/OP EST MAY X REQ PHY/QHP: CPT | Mod: 25,S$GLB,,

## 2017-05-24 PROCEDURE — 85610 PROTHROMBIN TIME: CPT | Mod: QW,S$GLB,,

## 2017-05-24 NOTE — PROGRESS NOTES
Patient here for close follow up of recent low INRs and dose change. Furthermore, she is on weekly INR for procedure plans. Description box was full of old info. It had patient eating greens 4x/week. Patient denies and has not been eating any high or moderate vit K foods for at least a month. She reports RFA was cancelled due to her respiratory status which has not changed. She has completed antibiotics for infection. She is being monitored for Burkholderia infection. She denies any sign/symptoms of bleeding. INR is good, so we will continue on current dose and continue weekly INR for now. She inquired if she can resume Eliquis. She also inquired if she can have a procedure on her back that sounds like an ADRIAN. I explained that I would message Dr. Ballesteros of her inquiries. They will need to make a plan on how things will go because it does not seem her respiratory function is changing.       Per response on 5/29, Dr. Ballesteros will check into it. 6/8, I followed up and reviewed recent notes. Patient now has a PICC and has started IV abx. Respiratory function is still an issue and RFA is postponed for now. I have sent a follow up message to Dr. Ballesteros and copied Yaa to see if the patient can switch back to Eliquis for now per pt request.

## 2017-05-25 ENCOUNTER — OFFICE VISIT (OUTPATIENT)
Dept: FAMILY MEDICINE | Facility: CLINIC | Age: 67
End: 2017-05-25
Payer: MEDICARE

## 2017-05-25 VITALS
BODY MASS INDEX: 27.39 KG/M2 | HEART RATE: 72 BPM | DIASTOLIC BLOOD PRESSURE: 56 MMHG | HEIGHT: 63 IN | SYSTOLIC BLOOD PRESSURE: 106 MMHG | TEMPERATURE: 98 F | WEIGHT: 154.56 LBS | OXYGEN SATURATION: 93 %

## 2017-05-25 DIAGNOSIS — G89.4 CHRONIC PAIN SYNDROME: ICD-10-CM

## 2017-05-25 DIAGNOSIS — Z79.01 LONG TERM (CURRENT) USE OF ANTICOAGULANTS: ICD-10-CM

## 2017-05-25 DIAGNOSIS — J96.11 CHRONIC RESPIRATORY FAILURE WITH HYPOXIA: ICD-10-CM

## 2017-05-25 DIAGNOSIS — J02.9 SORE THROAT: Primary | ICD-10-CM

## 2017-05-25 DIAGNOSIS — J02.9 VIRAL PHARYNGITIS: ICD-10-CM

## 2017-05-25 DIAGNOSIS — N18.30 BENIGN HYPERTENSION WITH CHRONIC KIDNEY DISEASE, STAGE III: ICD-10-CM

## 2017-05-25 DIAGNOSIS — I12.9 BENIGN HYPERTENSION WITH CHRONIC KIDNEY DISEASE, STAGE III: ICD-10-CM

## 2017-05-25 DIAGNOSIS — J47.9 ACQUIRED BRONCHIECTASIS: ICD-10-CM

## 2017-05-25 DIAGNOSIS — J43.8 OTHER EMPHYSEMA: ICD-10-CM

## 2017-05-25 LAB — DEPRECATED S PYO AG THROAT QL EIA: NEGATIVE

## 2017-05-25 PROCEDURE — 99214 OFFICE O/P EST MOD 30 MIN: CPT | Mod: S$GLB,,, | Performed by: NURSE PRACTITIONER

## 2017-05-25 PROCEDURE — 87880 STREP A ASSAY W/OPTIC: CPT | Mod: PO

## 2017-05-25 PROCEDURE — 1159F MED LIST DOCD IN RCRD: CPT | Mod: S$GLB,,, | Performed by: NURSE PRACTITIONER

## 2017-05-25 PROCEDURE — 87081 CULTURE SCREEN ONLY: CPT

## 2017-05-25 PROCEDURE — 1125F AMNT PAIN NOTED PAIN PRSNT: CPT | Mod: S$GLB,,, | Performed by: NURSE PRACTITIONER

## 2017-05-25 PROCEDURE — 99999 PR PBB SHADOW E&M-EST. PATIENT-LVL V: CPT | Mod: PBBFAC,,, | Performed by: NURSE PRACTITIONER

## 2017-05-25 PROCEDURE — 99499 UNLISTED E&M SERVICE: CPT | Mod: S$GLB,,, | Performed by: NURSE PRACTITIONER

## 2017-05-25 PROCEDURE — 1157F ADVNC CARE PLAN IN RCRD: CPT | Mod: S$GLB,,, | Performed by: NURSE PRACTITIONER

## 2017-05-25 NOTE — PROGRESS NOTES
Subjective:       Patient ID: Yasmin Asencio is a 66 y.o. female.    Chief Complaint: Sore Throat (3 days); Otalgia (Bilateral); and Chest Congestion    66-year-old female presents to the clinic today with complaint of sore throat, bilateral ear pain, chest congestion, and coughing for the past two days.  She has chronic wheezing, and chronic shortness of breath.  She uses nebulizer treatment twice a day on a regular basis.  She uses oxygen on a regular basis.  She denies any fever, chills, abdominal pain, nausea, vomiting, or diarrhea.  She denies any cardiac chest pain, heart palpitations, or swelling to lower extremities.  She is followed by pulmonary and infectious disease.  She recently had a sputum culture that was positive for Burkholderia Cepacia which was only sensitive to 1 by mouth medication which was Bactrim.  However, she is ALLERGIC to sulfur.  At this time they decided to not treat her.  She is supposed to call infectious disease issues develops any coughing with blood.  She's been taking mucus and Tylenol.      Past Medical History:   Diagnosis Date    Acquired bronchiectasis     due to history of TB - followed by pulmonary, Dr. Zuñiga    Allergy     Amblyopia     rt eye per pt    Anemia of other chronic disease     Anxiety     Cataract     Clotting disorder     COPD (chronic obstructive pulmonary disease)     COPD (chronic obstructive pulmonary disease)     Depression     Diverticulosis     Essential tremor     GERD (gastroesophageal reflux disease)     Hemangioma of liver     History of tuberculosis 1978    Hypertension     Mixed anxiety and depressive disorder     Psoriasis     PSVT (paroxysmal supraventricular tachycardia)     S/P PICC central line placement Apr. 2016 - May 2016    Skin disease     Psoriasis    Spondylosis without myelopathy 8/23/2013    Supraventricular tachycardia     Thoracic aorta atherosclerosis     noted on CT scan of chest 1/3/2011     Tuberculosis     Vaginal delivery     x2    Vitamin D deficiency      Past Surgical History:   Procedure Laterality Date    CATARACT EXTRACTION W/  INTRAOCULAR LENS IMPLANT  07/24/12    od dr spain    CATARACT EXTRACTION W/  INTRAOCULAR LENS IMPLANT  08/07/12    left eye    EYE SURGERY      bilateral Cataract    GALLBLADDER SURGERY  9/2011      reports that she quit smoking about 8 years ago. Her smoking use included Cigarettes. She has a 100.00 pack-year smoking history. She has never used smokeless tobacco. She reports that she does not drink alcohol or use drugs.  Review of Systems   Constitutional: Negative for chills and fatigue.   HENT: Positive for congestion and sore throat. Negative for ear pain, postnasal drip, rhinorrhea and sinus pressure.    Respiratory: Positive for cough, shortness of breath and wheezing.    Cardiovascular: Negative for chest pain, palpitations and leg swelling.   Gastrointestinal: Negative for abdominal pain, diarrhea, nausea and vomiting.   Musculoskeletal: Negative for gait problem.   Neurological: Negative for dizziness, light-headedness and headaches.       Objective:      Physical Exam   Constitutional: She is oriented to person, place, and time. She appears well-developed and well-nourished. No distress.   HENT:   Head: Normocephalic and atraumatic.   Right Ear: External ear normal.   Left Ear: External ear normal.   Nose: Nose normal.   Very small amount of redness to her pharynx no exudate   Eyes: Conjunctivae and EOM are normal. Pupils are equal, round, and reactive to light. Right eye exhibits no discharge. Left eye exhibits no discharge. No scleral icterus.   Neck: Normal range of motion. Neck supple.   Cardiovascular: Normal rate, regular rhythm and normal heart sounds.  Exam reveals no gallop and no friction rub.    No murmur heard.  Pulmonary/Chest: Effort normal. No respiratory distress. She has wheezes. She has no rales.   Small amount of wheezing noted     Abdominal: Soft. Bowel sounds are normal. There is no tenderness.   Musculoskeletal: Normal range of motion. She exhibits no edema.   Lymphadenopathy:     She has cervical adenopathy.   Neurological: She is alert and oriented to person, place, and time.   Skin: Skin is warm. Rash: that he is not. She is not diaphoretic.   Psychiatric: She has a normal mood and affect.       Assessment:       1. Sore throat    2. Acquired bronchiectasis    3. Benign hypertension with chronic kidney disease, stage III    4. Chronic pain syndrome    5. Chronic respiratory failure with hypoxia    6. Other emphysema    7. Long term (current) use of anticoagulants        Plan:         Sore throat  -     Throat Screen, Rapid  - I spoke with the patient and explained that her strep screen was negative    Acquired bronchiectasis  - followed by pulmonary   - The current medical regimen is effective;  continue present plan and medications.    Benign hypertension with chronic kidney disease, stage III  - The current medical regimen is effective;  continue present plan and medications.    Chronic pain syndrome  - The current medical regimen is effective;  continue present plan and medications.    Chronic respiratory failure with hypoxia  - continue breathing treatments and oxygen     Other emphysema  - followed by pulmonary     Long term (current) use of anticoagulants  - on Coumadin     Viral pharyngitis  - rest  - tylenol as needed    Other orders  -     Strep A culture, throat    I stressed the importance of if her wheezing or SOB got worse she needed to call Infectious Disease.

## 2017-05-27 LAB — BACTERIA THROAT CULT: NORMAL

## 2017-05-30 ENCOUNTER — TELEPHONE (OUTPATIENT)
Dept: INFECTIOUS DISEASES | Facility: CLINIC | Age: 67
End: 2017-05-30

## 2017-05-30 DIAGNOSIS — J47.1 BRONCHIECTASIS WITH ACUTE EXACERBATION: Primary | ICD-10-CM

## 2017-05-30 LAB
BACTERIA SPEC AEROBE CULT: NORMAL
GRAM STN SPEC: NORMAL

## 2017-05-30 NOTE — TELEPHONE ENCOUNTER
----- Message from Erika Watkins MA sent at 5/30/2017  8:54 AM CDT -----  Contact: Yasmin    tel:   [phone]         ----- Message -----  From: Charlene Domingo  Sent: 5/30/2017   8:39 AM  To: Erika Watkins MA    Pt. Says she talked to  You recently,    Has been running fever all week, since Friday night , having congestion in chest, feels miserable.   Asking if you can see her today.    Pls call.

## 2017-05-30 NOTE — TELEPHONE ENCOUNTER
I spoke to Ms. Asencio by phone.  She continues to cough, and continues to complain of some chest discomfort on taking a deep breath.  She is also now complaining of a slight sore throat and low grade fevers.  Respiratory cultures have been positive for pseudomonas and burkhholderia cepacia.      Plan  1.  Place a picc line in.    2.  Start on ceftazidime/avibactam (avycaz) 2.5 grams IV q 8 hours for 2 weeks.    3.  Check cbc, cmp, once a week and fax to ID clinic at [phone]  4.  Arrange follow up in 2-3 weeks.

## 2017-05-31 ENCOUNTER — ANTI-COAG VISIT (OUTPATIENT)
Dept: CARDIOLOGY | Facility: CLINIC | Age: 67
End: 2017-05-31

## 2017-05-31 ENCOUNTER — LAB VISIT (OUTPATIENT)
Dept: LAB | Facility: HOSPITAL | Age: 67
End: 2017-05-31
Attending: INTERNAL MEDICINE
Payer: MEDICARE

## 2017-05-31 DIAGNOSIS — Z79.01 LONG TERM (CURRENT) USE OF ANTICOAGULANTS: ICD-10-CM

## 2017-05-31 DIAGNOSIS — I47.10 PSVT (PAROXYSMAL SUPRAVENTRICULAR TACHYCARDIA): ICD-10-CM

## 2017-05-31 DIAGNOSIS — R00.2 PALPITATIONS: ICD-10-CM

## 2017-05-31 LAB
INR PPP: 2.9
PROTHROMBIN TIME: 29.1 SEC

## 2017-05-31 PROCEDURE — 36415 COLL VENOUS BLD VENIPUNCTURE: CPT

## 2017-05-31 PROCEDURE — 85610 PROTHROMBIN TIME: CPT

## 2017-05-31 NOTE — PROGRESS NOTES
The pt will need to hold coumadin x 2 days for the placement of a PICC line on 6/2.  She is right at ~6 months post PE and I am not recommending lovenox while she holds x 2 days.  She will resume anti-coagulation promptly after her procedure on 6/2 - see calendar.

## 2017-06-02 ENCOUNTER — HOSPITAL ENCOUNTER (OUTPATIENT)
Dept: INTERVENTIONAL RADIOLOGY/VASCULAR | Facility: HOSPITAL | Age: 67
Discharge: HOME | End: 2017-06-02
Attending: INTERNAL MEDICINE
Payer: MEDICARE

## 2017-06-02 VITALS
SYSTOLIC BLOOD PRESSURE: 158 MMHG | DIASTOLIC BLOOD PRESSURE: 76 MMHG | HEART RATE: 65 BPM | OXYGEN SATURATION: 95 % | RESPIRATION RATE: 20 BRPM

## 2017-06-02 DIAGNOSIS — J47.1 BRONCHIECTASIS WITH ACUTE EXACERBATION: ICD-10-CM

## 2017-06-02 PROCEDURE — 76937 US GUIDE VASCULAR ACCESS: CPT | Mod: 26,,, | Performed by: RADIOLOGY

## 2017-06-02 PROCEDURE — 77001 FLUOROGUIDE FOR VEIN DEVICE: CPT | Mod: 26,,, | Performed by: RADIOLOGY

## 2017-06-02 PROCEDURE — 76937 US GUIDE VASCULAR ACCESS: CPT | Mod: TC

## 2017-06-02 PROCEDURE — 36569 INSJ PICC 5 YR+ W/O IMAGING: CPT | Mod: ,,, | Performed by: RADIOLOGY

## 2017-06-02 PROCEDURE — 77001 FLUOROGUIDE FOR VEIN DEVICE: CPT | Mod: TC

## 2017-06-02 NOTE — DISCHARGE INSTRUCTIONS
For scheduling: Call Maddi at [phone]    For questions or concerns call: RAJANI MON-FRI 8 AM- 5PM [phone]. Radiology resident on call [phone].    For immediate concerns that are not emergent, you may call our radiology clinic at: [phone]

## 2017-06-02 NOTE — H&P
Radiology History & Physical      SUBJECTIVE:     Chief Complaint: Bronchiectasis with acute exacerbation; need for long term antibiotics     History of Present Illness:  Yasmin Asencio is a 66 y.o. female who presents for PICC line placement   Past Medical History:   Diagnosis Date    Acquired bronchiectasis     due to history of TB - followed by pulmonary, Dr. Zuñiga    Allergy     Amblyopia     rt eye per pt    Anemia of other chronic disease     Anxiety     Cataract     Clotting disorder     COPD (chronic obstructive pulmonary disease)     COPD (chronic obstructive pulmonary disease)     Depression     Diverticulosis     Essential tremor     GERD (gastroesophageal reflux disease)     Hemangioma of liver     History of tuberculosis 1978    Hypertension     Mixed anxiety and depressive disorder     Psoriasis     PSVT (paroxysmal supraventricular tachycardia)     S/P PICC central line placement Apr. 2016 - May 2016    Skin disease     Psoriasis    Spondylosis without myelopathy 8/23/2013    Supraventricular tachycardia     Thoracic aorta atherosclerosis     noted on CT scan of chest 1/3/2011    Tuberculosis     Vaginal delivery     x2    Vitamin D deficiency      Past Surgical History:   Procedure Laterality Date    CATARACT EXTRACTION W/  INTRAOCULAR LENS IMPLANT  07/24/12    od dr [place]    CATARACT EXTRACTION W/  INTRAOCULAR LENS IMPLANT  08/07/12    left eye    EYE SURGERY      bilateral Cataract    GALLBLADDER SURGERY  9/2011       Home Meds:   Prior to Admission medications    Medication Sig Start Date End Date Taking? Authorizing Provider   alprazolam (XANAX) 0.25 MG tablet Take 1 tablet (0.25 mg total) by mouth nightly as needed for Anxiety. 10/3/16   Urmila Luna MD   ANORO ELLIPTA 62.5-25 mcg/actuation DsDv  2/17/17   Historical Provider, MD   benzonatate (TESSALON PERLES) 100 MG capsule Take 1 capsule (100 mg total) by mouth 3 (three) times daily as needed for  Cough. 5/23/17 6/22/17  Louie Yuan MD   desonide (DESOWEN) 0.05 % lotion AAA face bid prn redness, scaling, or itching 5/24/13 3/8/17  Pretty Gonzales MD   desoximetasone (TOPICORT) 0.25 % cream Apply as directed bid prn 2/22/17   Urmila Luna MD   ergocalciferol (VITAMIN D2) 50,000 unit Cap Take 1 capsule (50,000 Units total) by mouth every 7 days. 4/19/17   Urmila Luna MD   escitalopram oxalate (LEXAPRO) 20 MG tablet Take 1 tablet (20 mg total) by mouth once daily. 4/6/17   Urmila Luna MD   gabapentin (NEURONTIN) 300 MG capsule Take 1 capsule (300 mg total) by mouth 3 (three) times daily. 5/5/17   Cherise Jeffery, REGAN   guaifenesin (MUCINEX) 600 mg 12 hr tablet Take 1,200 mg by mouth 2 (two) times daily.    Coy Mckenna MD   ipratropium (ATROVENT) 0.02 % nebulizer solution USE ONE VIAL BY NEBULIZATION 4 TIMES DAILY 3/3/17   PRECIOUS Zuñiga MD   metoprolol tartrate (LOPRESSOR) 50 MG tablet Take 1 tablet (50 mg total) by mouth 2 (two) times daily. Do not take for two days prior to ablation procedure. 4/10/17   Marlon Ballesteros MD   omeprazole (PRILOSEC) 20 MG capsule Take 1 capsule (20 mg total) by mouth daily as needed (heartburn). 5/5/17   Urmila Luna MD   primidone (MYSOLINE) 50 MG Tab TAKE ONE TABLET BY MOUTH ONCE DAILY IN THE MORNING, ONE AT NOON, AND TWO AT BEDTIME 9/13/16   Olu Juarez MD   SODIUM CHLORIDE FOR INHALATION (SODIUM CHLORIDE 3%) 3 % nebulizer solution USE ONE VIAL IN NEBULIZER TWICE DAILY 4/3/17   PRECIOUS Zuñiga MD   topiramate (TOPAMAX) 25 MG tablet Take 1 tablet (25 mg total) by mouth 2 (two) times daily. 9/12/16   Olu Juarez MD   tramadol (ULTRAM) 50 mg tablet Take 50 mg by mouth every 6 (six) hours as needed for Pain.    Historical Provider, MD   warfarin (COUMADIN) 5 MG tablet Take 1 & 1/2 tablets daily or as directed by [facility]. 5/3/17   Marlon Ballesteros MD     Anticoagulants/Antiplatelets: Coumadin last dose 2 days  ago     Allergies:   Review of patient's allergies indicates:   Allergen Reactions    Bactrim [sulfamethoxazole-trimethoprim] Rash     Was hospitalized for rash    Albuterol Other (See Comments)     tremors    Restasis [cyclosporine] Itching     Sedation History:  no adverse reactions    Review of Systems:   Hematological: no known coagulopathies  Respiratory: no shortness of breath  Cardiovascular: no chest pain  Gastrointestinal: no abdominal pain  Genito-Urinary: no dysuria  Musculoskeletal: negative  Neurological: no TIA or stroke symptoms         OBJECTIVE:     Vital Signs (Most Recent)       Physical Exam:  ASA: 3  Mallampati: na    General: no acute distress  Mental Status: alert and oriented to person, place and time  HEENT: normocephalic, atraumatic  Chest: unlabored breathing  Heart: regular heart rate  Abdomen: nondistended  Extremity: moves all extremities    Laboratory  Lab Results   Component Value Date    INR 2.9 (H) 05/31/2017       Lab Results   Component Value Date    WBC 5.73 04/12/2017    HGB 10.5 (L) 04/12/2017    HCT 33.2 (L) 04/12/2017    MCV 93 04/12/2017     04/12/2017      Lab Results   Component Value Date    GLU 87 04/10/2017     04/10/2017    K 3.9 04/10/2017     04/10/2017    CO2 26 04/10/2017    BUN 17 04/10/2017    CREATININE 1.0 04/10/2017    CALCIUM 9.1 04/10/2017    MG 2.1 03/08/2017    ALT 15 03/08/2017    AST 17 03/08/2017    ALBUMIN 4.3 03/08/2017    BILITOT 0.2 03/08/2017    BILIDIR 0.1 12/07/2009       ASSESSMENT/PLAN:     Sedation Plan: local  Patient will undergo PICC line placement.    CAMILO Wesley, FNP  Interventional Radiology  [phone] [employer]

## 2017-06-02 NOTE — PROCEDURES
"Radiology Post-Procedure Note    Pre Op Diagnosis: bronchiectasis    Post Op Diagnosis: Same    Procedure: PICC placement    Procedure performed by: Amos Collazo MD and Tu Payne MD    Written Informed Consent Obtained: Yes    Specimen Removed: No    Estimated Blood Loss: Minimal    Findings:   Using realtime U/S guidance a 5 Fr PICC catheter was placed into the right Basilic Vein with tip of the catheter in the SVC.    PICC is ready for use.     Tu Payne MD ([facility])  Radiology PGY-3  [phone]      "

## 2017-06-02 NOTE — DISCHARGE SUMMARY
"Radiology Discharge Summary      Hospital Course: No complications    Admit Date: 6/2/2017  Discharge Date: 06/02/2017     Instructions Given to Patient: Yes  Diet: Resume prior diet  Activity: activity as tolerated    Description of Condition on Discharge: Stable  Vital Signs (Most Recent): Pulse: 65 (06/02/17 1134)  Resp: 20 (06/02/17 1134)  BP: (!) 158/76 (06/02/17 1134)  SpO2: 95 % (06/02/17 1134)    Discharge Disposition: Home    Discharge Diagnosis: bronchiectasis    Follow-up: with pulmonary medicine    Tu Payne MD ([facility])  Radiology PGY-3  [phone]    "

## 2017-06-02 NOTE — PROGRESS NOTES
PICC Line placement complete. Pt tolerated well. Discharge instructions and handouts provided. Pt verbalized understanding. Pt refused wheelchair, ambulated out of facility. Gait steady.

## 2017-06-05 ENCOUNTER — TELEPHONE (OUTPATIENT)
Dept: INFECTIOUS DISEASES | Facility: CLINIC | Age: 67
End: 2017-06-05

## 2017-06-05 DIAGNOSIS — M54.40 ACUTE LEFT-SIDED LOW BACK PAIN WITH SCIATICA, SCIATICA LATERALITY UNSPECIFIED: ICD-10-CM

## 2017-06-05 RX ORDER — GABAPENTIN 300 MG/1
300 CAPSULE ORAL 3 TIMES DAILY
Qty: 90 CAPSULE | Refills: 2 | Status: SHIPPED | OUTPATIENT
Start: 2017-06-05 | End: 2017-07-28 | Stop reason: SDUPTHER

## 2017-06-05 NOTE — PROGRESS NOTES
The pt was in [facility] on 6/2 for the placement of a PICC line.  We already provided the pt with coumadin instructions and home health has been faxed for an INR on 6/8.

## 2017-06-05 NOTE — TELEPHONE ENCOUNTER
----- Message from Erika Watkins MA sent at 6/5/2017 12:57 PM CDT -----  Contact: Caitlyn martinez/ [facility] tel:  [phone]       ----- Message -----  From: Charlene Domingo  Sent: 6/5/2017  12:07 PM  To: BRYANT Vega Dr[facility]s pt./ pt. Missed two doses of her iv meds. Because she had problems w/ the machine, but did not call.    She missed the 11pm and 7am dose.    7am dose was given at 10am today.   Caller says ;   Pt. Has been extremely nauseated and is taking Zofran and it is not helping.   Pharmacy:  [facility] in [place].     Pls call.

## 2017-06-05 NOTE — PROGRESS NOTES
Yumi/[facility] called 6/5/17 to inform us that patient was discharged from ([facility]) on 6/2/17. Home Health admitted patient on 6/3/17. Patient went home on warfarin (7.5mg daily ,[id number] Tues/Friday. Please advise.

## 2017-06-05 NOTE — TELEPHONE ENCOUNTER
Felt a lot of nausea over the weekend.  Feels better today.  Advised her to continue on the zofran which seems to be helping today.

## 2017-06-06 ENCOUNTER — TELEPHONE (OUTPATIENT)
Dept: ELECTROPHYSIOLOGY | Facility: CLINIC | Age: 67
End: 2017-06-06

## 2017-06-06 NOTE — TELEPHONE ENCOUNTER
Spoke with pt last week, and notified Dr. Ballesteros that pt has recurrence of respiratory infection, and is back on antibiotics. Will continue to hold off on scheduling ablation for now.

## 2017-06-06 NOTE — TELEPHONE ENCOUNTER
----- Message from Marlon Ballesteros MD sent at 5/29/2017  4:30 PM CDT -----  Regarding: FW: new plan  Yaa  What's the status of this now?  Bryan    ----- Message -----  From: Ena Felix, VidhiD  Sent: 5/24/2017   4:21 PM  To: Marlon Ballesteros MD  Subject: new plan                                         I saw Mrs. Asencio in clinic today for the first time. There are a few things that need to be cleared up.      Initially, she was going to have an RFA in April. It seems to have been cancelled due to her respiratory status. Her respiratory status does not seem to be changing. We still have her on weekly INR for RFA. Additionally, she was changed from Eliquis to Coumadin in prep for the RFA. She asked if she could resume Eliquis. She is also wanting to plan what sounds like a nerve ablation for her back. She is kind of in limbo with all of this. Can you let us or the patient know how to proceed? Thanks!!

## 2017-06-08 ENCOUNTER — DOCUMENTATION ONLY (OUTPATIENT)
Dept: INFECTIOUS DISEASES | Facility: CLINIC | Age: 67
End: 2017-06-08

## 2017-06-08 ENCOUNTER — ANTI-COAG VISIT (OUTPATIENT)
Dept: CARDIOLOGY | Facility: CLINIC | Age: 67
End: 2017-06-08

## 2017-06-08 DIAGNOSIS — Z79.01 LONG TERM (CURRENT) USE OF ANTICOAGULANTS: ICD-10-CM

## 2017-06-08 LAB — INR PPP: 1.8

## 2017-06-09 NOTE — PROGRESS NOTES
Savana with [facility] called to reschedule 6/14/17 lab draw to 6/13/17 due to patient having other labs needed on 6/13, appointment was rescheduled, new order was faxed to [facility]

## 2017-06-13 ENCOUNTER — ANTI-COAG VISIT (OUTPATIENT)
Dept: CARDIOLOGY | Facility: CLINIC | Age: 67
End: 2017-06-13

## 2017-06-13 DIAGNOSIS — Z79.01 LONG TERM (CURRENT) USE OF ANTICOAGULANTS: ICD-10-CM

## 2017-06-13 LAB — INR PPP: 1.8

## 2017-06-13 PROCEDURE — 93010 ELECTROCARDIOGRAM REPORT: CPT | Mod: S$GLB,,, | Performed by: INTERNAL MEDICINE

## 2017-06-13 PROCEDURE — 99284 EMERGENCY DEPT VISIT MOD MDM: CPT | Mod: 25

## 2017-06-13 PROCEDURE — 99285 EMERGENCY DEPT VISIT HI MDM: CPT | Mod: ,,, | Performed by: PHYSICIAN ASSISTANT

## 2017-06-13 PROCEDURE — 93005 ELECTROCARDIOGRAM TRACING: CPT

## 2017-06-14 ENCOUNTER — TELEPHONE (OUTPATIENT)
Dept: INFECTIOUS DISEASES | Facility: CLINIC | Age: 67
End: 2017-06-14

## 2017-06-14 ENCOUNTER — HOSPITAL ENCOUNTER (EMERGENCY)
Facility: HOSPITAL | Age: 67
Discharge: HOME | End: 2017-06-14
Attending: EMERGENCY MEDICINE | Admitting: EMERGENCY MEDICINE
Payer: MEDICARE

## 2017-06-14 VITALS
BODY MASS INDEX: 28.34 KG/M2 | WEIGHT: 160 LBS | HEART RATE: 75 BPM | RESPIRATION RATE: 20 BRPM | DIASTOLIC BLOOD PRESSURE: 67 MMHG | TEMPERATURE: 97 F | SYSTOLIC BLOOD PRESSURE: 149 MMHG | OXYGEN SATURATION: 96 %

## 2017-06-14 DIAGNOSIS — R00.2 PALPITATIONS: ICD-10-CM

## 2017-06-14 DIAGNOSIS — R00.0 TACHYCARDIA: ICD-10-CM

## 2017-06-14 LAB
ANION GAP SERPL CALC-SCNC: 9 MMOL/L
BASOPHILS # BLD AUTO: 0.03 K/UL
BASOPHILS NFR BLD: 0.5 %
BNP SERPL-MCNC: 283 PG/ML
BUN SERPL-MCNC: 13 MG/DL
CALCIUM SERPL-MCNC: 8.8 MG/DL
CHLORIDE SERPL-SCNC: 103 MMOL/L
CO2 SERPL-SCNC: 25 MMOL/L
CREAT SERPL-MCNC: 1 MG/DL
DIFFERENTIAL METHOD: ABNORMAL
EOSINOPHIL # BLD AUTO: 0.1 K/UL
EOSINOPHIL NFR BLD: 2 %
ERYTHROCYTE [DISTWIDTH] IN BLOOD BY AUTOMATED COUNT: 12.9 %
EST. GFR  (AFRICAN AMERICAN): >60 ML/MIN/1.73 M^2
EST. GFR  (NON AFRICAN AMERICAN): 58.4 ML/MIN/1.73 M^2
GLUCOSE SERPL-MCNC: 95 MG/DL
HCT VFR BLD AUTO: 31 %
HGB BLD-MCNC: 10 G/DL
LYMPHOCYTES # BLD AUTO: 2.3 K/UL
LYMPHOCYTES NFR BLD: 35.5 %
MCH RBC QN AUTO: 29.5 PG
MCHC RBC AUTO-ENTMCNC: 32.3 %
MCV RBC AUTO: 91 FL
MONOCYTES # BLD AUTO: 0.4 K/UL
MONOCYTES NFR BLD: 6.1 %
NEUTROPHILS # BLD AUTO: 3.7 K/UL
NEUTROPHILS NFR BLD: 55.7 %
PLATELET # BLD AUTO: 234 K/UL
PMV BLD AUTO: 9.5 FL
POTASSIUM SERPL-SCNC: 4 MMOL/L
RBC # BLD AUTO: 3.39 M/UL
SODIUM SERPL-SCNC: 137 MMOL/L
WBC # BLD AUTO: 6.54 K/UL

## 2017-06-14 PROCEDURE — 85025 COMPLETE CBC W/AUTO DIFF WBC: CPT

## 2017-06-14 PROCEDURE — 83880 ASSAY OF NATRIURETIC PEPTIDE: CPT

## 2017-06-14 PROCEDURE — 80048 BASIC METABOLIC PNL TOTAL CA: CPT

## 2017-06-14 NOTE — ED TRIAGE NOTES
"Pt c/o palpitations, and hr at home 130-145 around 2100 tonight that lasted about 30 min.  Pt reports she does get palpitations often but usually does not last this long and get that high. Last time pt reports she has similar symptoms she was dx with lung infection. Pt is currently receiving antibiotics thru her RUE PICC line. At this time pt c/o chest tightness, states, "my lungs are hurting, but they hurt on and off."   "

## 2017-06-14 NOTE — ED PROVIDER NOTES
Encounter Date: 6/13/2017    SCRIBE #1 NOTE: I, Naty Hoffman, am scribing for, and in the presence of,  Dr. Shankar. I have scribed the following portions of the note - the APC attestation and the EKG reading. Other sections scribed: imaging.       History     Chief Complaint   Patient presents with    Palpitations     Review of patient's allergies indicates:   Allergen Reactions    Bactrim [sulfamethoxazole-trimethoprim] Rash     Was hospitalized for rash    Albuterol Other (See Comments)     tremors    Restasis [cyclosporine] Itching     66-year-old female presents to the emergency department for evaluation of palpitations.  Patient has visited the ER multiple times for similar complaints in the recent history.  Patient currently being treated with IV antibiotics for a lung infection.  There is a plan to do an ablation for further control of her SVT and palpitations this is not going to be completed until she clears her infection.  She reports symptoms of palpitations were acute onset.  Heart rate was in the 140s at home but improved with Valsalva.  Symptoms lasted for approximately 30 minutes.  Patient was asymptomatic on arrival with normal heart rate.  Has no chest pain or shortness.  She denies any fever or chills.          Past Medical History:   Diagnosis Date    Acquired bronchiectasis     due to history of TB - followed by pulmonary, Dr. Zuñiga    Allergy     Amblyopia     rt eye per pt    Anemia of other chronic disease     Anxiety     Cataract     Clotting disorder     COPD (chronic obstructive pulmonary disease)     COPD (chronic obstructive pulmonary disease)     Depression     Diverticulosis     Essential tremor     GERD (gastroesophageal reflux disease)     Hemangioma of liver     History of tuberculosis 1978    Hypertension     Mixed anxiety and depressive disorder     Psoriasis     PSVT (paroxysmal supraventricular tachycardia)     Pulmonary embolism     S/P PICC central line  placement Apr. 2016 - May 2016    Skin disease     Psoriasis    Spondylosis without myelopathy 8/23/2013    Supraventricular tachycardia     Thoracic aorta atherosclerosis     noted on CT scan of chest 1/3/2011    Tuberculosis     Vaginal delivery     x2    Vitamin D deficiency      Past Surgical History:   Procedure Laterality Date    CATARACT EXTRACTION W/  INTRAOCULAR LENS IMPLANT  07/24/12    od dr [place]    CATARACT EXTRACTION W/  INTRAOCULAR LENS IMPLANT  08/07/12    left eye    EYE SURGERY      bilateral Cataract    GALLBLADDER SURGERY  9/2011     Family History   Problem Relation Age of Onset    Hypertension Mother     Heart attack Mother     Cancer Father      liver and bladder    Hyperlipidemia Sister     Psoriasis Sister     Cancer Brother      liver    Stroke Maternal Uncle     Cancer Maternal Aunt      stomach    Lung cancer Sister     Heart attack Brother     Heart attack Son     Amblyopia Neg Hx     Blindness Neg Hx     Cataracts Neg Hx     Glaucoma Neg Hx     Macular degeneration Neg Hx     Retinal detachment Neg Hx     Strabismus Neg Hx     Thyroid disease Neg Hx     Melanoma Neg Hx     Lupus Neg Hx     Eczema Neg Hx     COPD Neg Hx      Social History   Substance Use Topics    Smoking status: Former Smoker     Packs/day: 2.00     Years: 50.00     Types: Cigarettes     Quit date: 5/27/2009    Smokeless tobacco: Never Used    Alcohol use No     Review of Systems   Constitutional: Negative for fever.   HENT: Negative for sore throat.    Respiratory: Negative for shortness of breath.    Cardiovascular: Positive for palpitations. Negative for chest pain.   Gastrointestinal: Negative for nausea.   Genitourinary: Negative for dysuria.   Musculoskeletal: Negative for back pain.   Skin: Negative for rash.   Neurological: Negative for weakness.   Hematological: Does not bruise/bleed easily.       Physical Exam     Initial Vitals   BP Pulse Resp Temp SpO2   06/13/17  2214 06/13/17 2214 06/13/17 2214 06/13/17 2214 06/14/17 0121   (!) 170/90 80 18 98.2 °F (36.8 °C) 95 %     Physical Exam    Constitutional: Vital signs are normal. She appears well-developed and well-nourished.   HENT:   Head: Normocephalic and atraumatic.   Eyes: Conjunctivae are normal.   Cardiovascular: Normal rate and regular rhythm. Exam reveals no gallop and no friction rub.    No murmur heard.  Pulmonary/Chest: No respiratory distress. She has no wheezes. She has no rhonchi. She has no rales. She exhibits no tenderness.   Abdominal: Soft. Normal appearance, normal aorta and bowel sounds are normal.   Musculoskeletal: Normal range of motion.   Neurological: She is alert and oriented to person, place, and time.   Nonfocal neuro exam with no red flags   Skin: Skin is warm and intact.   Psychiatric: She has a normal mood and affect. Her speech is normal and behavior is normal. Cognition and memory are normal.         ED Course   Procedures  Labs Reviewed   CBC W/ AUTO DIFFERENTIAL - Abnormal; Notable for the following:        Result Value    RBC 3.39 (*)     Hemoglobin 10.0 (*)     Hematocrit 31.0 (*)     All other components within normal limits   BASIC METABOLIC PANEL - Abnormal; Notable for the following:     eGFR if non  58.4 (*)     All other components within normal limits   B-TYPE NATRIURETIC PEPTIDE - Abnormal; Notable for the following:      (*)     All other components within normal limits     EKG Readings: (Independently Interpreted)   Normal sinus rhythm. No ST elevations or depressions.          X-Rays:   Independently Interpreted Readings:   Chest X-Ray: No acute process.       Medical Decision Making:   History:   Old Medical Records: I decided to obtain old medical records.  Independently Interpreted Test(s):   I have ordered and independently interpreted X-rays - see prior notes.  I have ordered and independently interpreted EKG Reading(s) - see prior notes  Clinical Tests:    Lab Tests: Reviewed and Ordered  Radiological Study: Ordered  Medical Tests: Ordered  ED Management:  66-year-old female with palpitations.  Patient's evaluation in the ER has been unremarkable.  Her laboratory evaluation and imaging did not reveal any acute findings.  She did experience a 15 second run of SVT that resolved spontaneously. she has not expressed any chest pain or shortness of breath or numbness time.  The plan is for the patient undergone ablation following clearing of her lung infection.  Patient will be discharged home in stable condition and advised follow-up with her PCP.  Returns instructions provided.             Scribe Attestation:   Scribe #1: I performed the above scribed service and the documentation accurately describes the services I performed. I attest to the accuracy of the note.    Attending Attestation:     Physician Attestation Statement for NP/PA:   I discussed this assessment and plan of this patient with the NP/PA, but I did not personally examine the patient. The face to face encounter was performed by the NP/PA.    Other NP/PA Attestation Additions:      Medical Decision Making: SVT       Physician Attestation for Scribe:  Physician Attestation Statement for Scribe #1: I, Dr. Shankar, reviewed documentation, as scribed by Naty Hoffman in my presence, and it is both accurate and complete.                 ED Course     Clinical Impression:   Diagnoses of Palpitations and Tachycardia were pertinent to this visit.    Disposition:   Disposition: Discharged  Condition: Stable       Sami Lynch PA-C  06/14/17 0436       Darvin Shankar III, MD  06/21/17 0963

## 2017-06-14 NOTE — PROVIDER PROGRESS NOTES - EMERGENCY DEPT.
Encounter Date: 6/13/2017    ED Physician Progress Notes       SCRIBE NOTE: I, Naty Hoffman, am scribing for, and in the presence of,  Dr. Shankar.  Physician Statement: I, Dr. Shankar, personally performed the services described in this documentation as scribed by Naty Hoffman in my presence, and it is both accurate and complete.      EKG - STEMI Decision  Initial Reading: No STEMI present.

## 2017-06-14 NOTE — TELEPHONE ENCOUNTER
----- Message from Erika Watkins MA sent at 6/14/2017 10:30 AM CDT -----  Pt is ending on the 16th. Is that ok? She has a fu on 6/22.    Thanks

## 2017-06-14 NOTE — TELEPHONE ENCOUNTER
Spoke to Abel and advised that today is the stop date and also advised pt to stop and continue to flush until she is seen on 6/22. Pt said she missed a dose so she will continue until Sat.

## 2017-06-14 NOTE — TELEPHONE ENCOUNTER
Yes.  That is okay.  Leave the picc line in until she sees me.  She will need to continue flushing it.

## 2017-06-14 NOTE — MEDICAL/APP STUDENT
History     Chief Complaint   Patient presents with    Palpitations     HPI       Past Medical History:   Diagnosis Date    Acquired bronchiectasis     due to history of TB - followed by pulmonary, Dr. Zuñiga    Allergy     Amblyopia     rt eye per pt    Anemia of other chronic disease     Anxiety     Cataract     Clotting disorder     COPD (chronic obstructive pulmonary disease)     COPD (chronic obstructive pulmonary disease)     Depression     Diverticulosis     Essential tremor     GERD (gastroesophageal reflux disease)     Hemangioma of liver     History of tuberculosis 1978    Hypertension     Mixed anxiety and depressive disorder     Psoriasis     PSVT (paroxysmal supraventricular tachycardia)     S/P PICC central line placement Apr. 2016 - May 2016    Skin disease     Psoriasis    Spondylosis without myelopathy 8/23/2013    Supraventricular tachycardia     Thoracic aorta atherosclerosis     noted on CT scan of chest 1/3/2011    Tuberculosis     Vaginal delivery     x2    Vitamin D deficiency        Past Surgical History:   Procedure Laterality Date    CATARACT EXTRACTION W/  INTRAOCULAR LENS IMPLANT  07/24/12    od dr [place]    CATARACT EXTRACTION W/  INTRAOCULAR LENS IMPLANT  08/07/12    left eye    EYE SURGERY      bilateral Cataract    GALLBLADDER SURGERY  9/2011       Family History   Problem Relation Age of Onset    Hypertension Mother     Heart attack Mother     Cancer Father      liver and bladder    Hyperlipidemia Sister     Psoriasis Sister     Cancer Brother      liver    Stroke Maternal Uncle     Cancer Maternal Aunt      stomach    Lung cancer Sister     Heart attack Brother     Heart attack Son     Amblyopia Neg Hx     Blindness Neg Hx     Cataracts Neg Hx     Glaucoma Neg Hx     Macular degeneration Neg Hx     Retinal detachment Neg Hx     Strabismus Neg Hx     Thyroid disease Neg Hx     Melanoma Neg Hx     Lupus Neg Hx     Eczema Neg  Hx     COPD Neg Hx        Social History   Substance Use Topics    Smoking status: Former Smoker     Packs/day: 2.00     Years: 50.00     Types: Cigarettes     Quit date: 5/27/2009    Smokeless tobacco: Never Used    Alcohol use No       Review of Systems    Physical Exam     Vitals:    06/13/17 2214   BP: (!) 170/90   Pulse: 80   Resp: 18   Temp: 98.2 °F (36.8 °C)         Physical Exam    ED Course

## 2017-06-16 ENCOUNTER — ANTI-COAG VISIT (OUTPATIENT)
Dept: CARDIOLOGY | Facility: CLINIC | Age: 67
End: 2017-06-16

## 2017-06-16 DIAGNOSIS — Z79.01 LONG TERM (CURRENT) USE OF ANTICOAGULANTS: ICD-10-CM

## 2017-06-16 LAB — INR PPP: 1.7

## 2017-06-20 ENCOUNTER — ANTI-COAG VISIT (OUTPATIENT)
Dept: CARDIOLOGY | Facility: CLINIC | Age: 67
End: 2017-06-20

## 2017-06-20 ENCOUNTER — OFFICE VISIT (OUTPATIENT)
Dept: CARDIOLOGY | Facility: CLINIC | Age: 67
End: 2017-06-20
Payer: MEDICARE

## 2017-06-20 VITALS
SYSTOLIC BLOOD PRESSURE: 137 MMHG | HEART RATE: 85 BPM | HEIGHT: 63 IN | BODY MASS INDEX: 27.7 KG/M2 | WEIGHT: 156.31 LBS | DIASTOLIC BLOOD PRESSURE: 63 MMHG | OXYGEN SATURATION: 96 %

## 2017-06-20 DIAGNOSIS — R00.2 PALPITATIONS: ICD-10-CM

## 2017-06-20 DIAGNOSIS — R07.89 NON-CARDIAC CHEST PAIN: Primary | ICD-10-CM

## 2017-06-20 DIAGNOSIS — J43.8 OTHER EMPHYSEMA: ICD-10-CM

## 2017-06-20 DIAGNOSIS — I47.19 ECTOPIC ATRIAL TACHYCARDIA: ICD-10-CM

## 2017-06-20 DIAGNOSIS — Z79.01 LONG TERM (CURRENT) USE OF ANTICOAGULANTS: ICD-10-CM

## 2017-06-20 DIAGNOSIS — I47.10 PSVT (PAROXYSMAL SUPRAVENTRICULAR TACHYCARDIA): ICD-10-CM

## 2017-06-20 LAB — INR PPP: 1.6

## 2017-06-20 PROCEDURE — 1157F ADVNC CARE PLAN IN RCRD: CPT | Mod: S$GLB,,, | Performed by: INTERNAL MEDICINE

## 2017-06-20 PROCEDURE — 99499 UNLISTED E&M SERVICE: CPT | Mod: S$GLB,,, | Performed by: INTERNAL MEDICINE

## 2017-06-20 PROCEDURE — 1159F MED LIST DOCD IN RCRD: CPT | Mod: S$GLB,,, | Performed by: INTERNAL MEDICINE

## 2017-06-20 PROCEDURE — 99214 OFFICE O/P EST MOD 30 MIN: CPT | Mod: S$GLB,,, | Performed by: INTERNAL MEDICINE

## 2017-06-20 PROCEDURE — 99999 PR PBB SHADOW E&M-EST. PATIENT-LVL III: CPT | Mod: PBBFAC,,, | Performed by: INTERNAL MEDICINE

## 2017-06-20 NOTE — PROGRESS NOTES
"Subjective:    Patient ID:  Yasmin Asencio is a 66 y.o. female who presents for follow-up of Palpitations      HPI     PMH of pulmonary hemorrhage,S/P embolization in 11/2015 and pseudomonas pneumonia in 2016,has been treated with IV Abx, hypertension and COPD,AOCD,atrial tachycardia,depression,chronic slurred speech    6/16/16 [facility] EDP 18, EF 55%, normal coronaries  Medical Rx    EF does not appear depressed - ? Recent echo result     Echo 11/29/16    1 - Low normal left ventricular systolic function (EF 50-55%).     2 - Eccentric hypertrophy.     3 - Left ventricular diastolic dysfunction.     4 - Mild left atrial enlargement.     5 - Mild mitral regurgitation.     6 - Trivial tricuspid regurgitation.      Holter 2/13/17  1. Sinus rhythm with heart rates varying between 45 and 128 bpm with an average of 72 bpm.     VENTRICULAR ARRHYTHMIAS  1. There was a single PVC recorded.     2. There were no episodes of ventricular tachycardia.    SUPRA VENTRICULAR ARRHYTHMIAS  1. There were occasional PACs totalling 406 and averaging 16 per hour.     2. There were no episodes of sustained supraventricular tachycardia.    SINUS NODE FUNCTION  1. There was no evidence of high grade SA jennifer block.     AV CONDUCTION  1. There was no evidence of high grade AV block.     Saw Dr Ballesteros recently for f/u of SVT  66 y.o. F  pulm hemorrhage 11/15, s/p coiling  pseudomonas PNA 2016, s/p extended Abx  COPD, bronchiectasis (home 02 frequently)  HTN  chronic slurred speech, thought due to essential tremor per neuro  hx "AF" destini-coiling procedure (no documentation)     Recent PE. Started on eliquis. DVT neg.  Has palpitations with HR >140 bpm at random intervals for past 10 years.  WIth palps, gets blurry vision, chest palps with radiation to the neck. Her "blood runs cold" and lasts for 20 mins.     cath 6/16 neg  echo 11/16 50-55% LVEF  Holter 2/17: SR . no SVT.     Event monitor (which I read today): regular NCT with TCL " 400-440 ms.      My interpretation of today's ECG is NST 64 bpm. Small P wave. No delta.     She's most interested in full Rx with EPS and RFA as possible.    Has not yet had EPS/ablation - waiting for pulmonary status to stabilize       Review of Systems   Constitution: Negative for decreased appetite.   HENT: Negative for ear discharge.    Eyes: Negative for blurred vision.   Respiratory: Negative for hemoptysis.    Endocrine: Negative for polyphagia.   Hematologic/Lymphatic: Negative for adenopathy.   Skin: Negative for color change.   Musculoskeletal: Negative for joint swelling.   Neurological: Negative for brief paralysis.   Psychiatric/Behavioral: Negative for hallucinations.        Objective:    Physical Exam   Constitutional: She is oriented to person, place, and time. Vital signs are normal. She appears well-developed and well-nourished. She is active and cooperative.   HENT:   Head: Normocephalic and atraumatic.   Eyes: Conjunctivae and EOM are normal.   Neck: Normal range of motion. Carotid bruit is not present. No tracheal deviation and no edema present. No thyroid mass and no thyromegaly present.   Cardiovascular: Normal rate, regular rhythm, normal heart sounds, intact distal pulses and normal pulses.   No extrasystoles are present. PMI is not displaced.  Exam reveals no gallop and no friction rub.    No murmur heard.  Pulmonary/Chest: Effort normal. No respiratory distress. She has no wheezes. She has rhonchi. She has no rales.   Abdominal: Soft. Normal appearance. She exhibits no distension. There is no hepatosplenomegaly.   Musculoskeletal: Normal range of motion.   Neurological: She is alert and oriented to person, place, and time. Coordination normal.   Skin: Skin is warm and dry. No rash noted.   Psychiatric: She has a normal mood and affect. Her speech is normal and behavior is normal. Thought content normal. Cognition and memory are normal.   Nursing note and vitals reviewed.         Assessment:       1. Non-cardiac chest pain    2. Other emphysema    3. Ectopic atrial tachycardia    4. PSVT (paroxysmal supraventricular tachycardia)    5. Palpitations         Plan:       Continue Rx  OV 3 months

## 2017-06-21 ENCOUNTER — DOCUMENTATION ONLY (OUTPATIENT)
Dept: INFECTIOUS DISEASES | Facility: CLINIC | Age: 67
End: 2017-06-21

## 2017-06-21 NOTE — PROGRESS NOTES
67 y/o on ceftazidime/avibactam for bronchiectasis.  Labs reviewed and are attached below.

## 2017-06-22 ENCOUNTER — INFUSION (OUTPATIENT)
Dept: INFECTIOUS DISEASES | Facility: HOSPITAL | Age: 67
End: 2017-06-22
Attending: INTERNAL MEDICINE
Payer: MEDICARE

## 2017-06-22 ENCOUNTER — TELEPHONE (OUTPATIENT)
Dept: INFECTIOUS DISEASES | Facility: CLINIC | Age: 67
End: 2017-06-22

## 2017-06-22 ENCOUNTER — OFFICE VISIT (OUTPATIENT)
Dept: INFECTIOUS DISEASES | Facility: CLINIC | Age: 67
End: 2017-06-22
Payer: MEDICARE

## 2017-06-22 VITALS
BODY MASS INDEX: 27.29 KG/M2 | WEIGHT: 154 LBS | HEIGHT: 63 IN | SYSTOLIC BLOOD PRESSURE: 141 MMHG | DIASTOLIC BLOOD PRESSURE: 72 MMHG | HEART RATE: 72 BPM

## 2017-06-22 DIAGNOSIS — J98.4 CAVITARY LUNG DISEASE: ICD-10-CM

## 2017-06-22 DIAGNOSIS — J47.1 BRONCHIECTASIS WITH ACUTE EXACERBATION: Primary | ICD-10-CM

## 2017-06-22 PROCEDURE — 99999 PR PBB SHADOW E&M-EST. PATIENT-LVL III: CPT | Mod: PBBFAC,,, | Performed by: INTERNAL MEDICINE

## 2017-06-22 PROCEDURE — 99214 OFFICE O/P EST MOD 30 MIN: CPT | Mod: S$GLB,,, | Performed by: INTERNAL MEDICINE

## 2017-06-22 PROCEDURE — 1159F MED LIST DOCD IN RCRD: CPT | Mod: S$GLB,,, | Performed by: INTERNAL MEDICINE

## 2017-06-22 PROCEDURE — 99499 UNLISTED E&M SERVICE: CPT | Mod: S$GLB,,, | Performed by: INTERNAL MEDICINE

## 2017-06-22 PROCEDURE — 1125F AMNT PAIN NOTED PAIN PRSNT: CPT | Mod: S$GLB,,, | Performed by: INTERNAL MEDICINE

## 2017-06-22 PROCEDURE — 1157F ADVNC CARE PLAN IN RCRD: CPT | Mod: S$GLB,,, | Performed by: INTERNAL MEDICINE

## 2017-06-22 NOTE — PROGRESS NOTES
Subjective:      Patient ID: Yasmin Asencio is a 66 y.o. female.    Chief Complaint:Follow-up      History of Present Illness    Ms. Asencio is a 67 y/o female with a history of bronchiectasis, lung scarring with cavitary disease and hemoptysis.  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  She completed a 2 weeks course of ceftazidime/avibactam for the burkholderia and pseudomonas.  She presenting today for follow up.  She is still complaining of left lung pain.  She feels like pleurisy.  The pain is affected by her position.  Deep breaths affect the pain sometimes depending on her positioning.  She still feels like she is coughing a lot of congestion.    Review of Systems   Constitution: Positive for malaise/fatigue. Negative for chills, decreased appetite, fever, weakness, night sweats, weight gain and weight loss.   HENT: Negative for congestion, ear pain, headaches, hearing loss, hoarse voice, sore throat and tinnitus.    Eyes: Positive for blurred vision and visual disturbance. Negative for redness.   Cardiovascular: Positive for palpitations. Negative for chest pain and leg swelling.   Respiratory: Positive for cough, shortness of breath, sputum production and wheezing. Negative for hemoptysis.    Hematologic/Lymphatic: Negative for adenopathy. Bruises/bleeds easily.   Skin: Negative for dry skin, itching, rash and suspicious lesions.   Musculoskeletal: Positive for back pain, joint pain and neck pain. Negative for myalgias.   Gastrointestinal: Positive for diarrhea and nausea. Negative for abdominal pain, constipation, heartburn and vomiting.   Genitourinary: Negative for dysuria, flank pain, frequency, hematuria, hesitancy and urgency.   Neurological: Negative for dizziness, numbness and paresthesias.   Psychiatric/Behavioral: Positive for depression and memory loss. The patient has insomnia and is  nervous/anxious.      Objective:   Physical Exam   Constitutional: She is oriented to person, place, and time. She appears well-developed and well-nourished. No distress.   HENT:   Head: Normocephalic and atraumatic.   Right Ear: External ear normal.   Left Ear: External ear normal.   Nose: Nose normal.   Mouth/Throat: Oropharynx is clear and moist. No oropharyngeal exudate.   Eyes: Conjunctivae and EOM are normal. Pupils are equal, round, and reactive to light. Right eye exhibits no discharge. Left eye exhibits no discharge. No scleral icterus.   Neck: Normal range of motion. Neck supple. No JVD present. No tracheal deviation present. No thyromegaly present.   Cardiovascular: Normal rate, regular rhythm and intact distal pulses.  Exam reveals no gallop and no friction rub.    No murmur heard.  Pulmonary/Chest: Effort normal. No stridor. No respiratory distress. She has wheezes. She has no rales. She exhibits no tenderness.   Abdominal: Soft. Bowel sounds are normal. She exhibits no distension and no mass. There is no tenderness. There is no rebound and no guarding.   Musculoskeletal: Normal range of motion. She exhibits no edema or tenderness.   Lymphadenopathy:     She has no cervical adenopathy.   Neurological: She is alert and oriented to person, place, and time. She displays normal reflexes. No cranial nerve deficit. She exhibits normal muscle tone. Coordination normal.   Skin: Skin is warm. No rash noted. She is not diaphoretic. No erythema. No pallor.   Psychiatric: She has a normal mood and affect. Her behavior is normal. Judgment and thought content normal.   Nursing note and vitals reviewed.    Assessment:       1. Bronchiectasis with acute exacerbation    2. Cavitary lung disease        67 y/o with chronic bronchiectasis, severe lung scarring and colonization with pseudomonas.  Recent respiratory cultures also grew Burkholderia cepacia.  She just completed 2 weeks of IV ceftazidime/avibactam.  She reports  little to no improvement in her coughing or breathing.  She still sounds a bit tight on respiratory exam today.  I don't think further antibiotics will improve this problem.  I have asked her to contact Dr. Zuñiga (pulmunology) to see if there are other things that can be done to optimize her breathing.  Will discontinue her antibiotics today.    From my standpoint, there are no infectious contraindications to her proceeding with the planned ablation procedure for her arrhythmia.  Plan:       Bronchiectasis with acute exacerbation        - IV antibiotics completed.  Will remove picc line        - Patient advised to contact pulmonology clinic    Cavitary lung disease

## 2017-06-22 NOTE — TELEPHONE ENCOUNTER
Spoke to Darell at [facility] and advised that PICC was pulled already.

## 2017-06-23 ENCOUNTER — ANTI-COAG VISIT (OUTPATIENT)
Dept: CARDIOLOGY | Facility: CLINIC | Age: 67
End: 2017-06-23

## 2017-06-23 ENCOUNTER — OFFICE VISIT (OUTPATIENT)
Dept: ELECTROPHYSIOLOGY | Facility: CLINIC | Age: 67
End: 2017-06-23
Payer: MEDICARE

## 2017-06-23 ENCOUNTER — HOSPITAL ENCOUNTER (OUTPATIENT)
Dept: CARDIOLOGY | Facility: CLINIC | Age: 67
Discharge: HOME | End: 2017-06-23
Payer: MEDICARE

## 2017-06-23 VITALS
DIASTOLIC BLOOD PRESSURE: 61 MMHG | HEIGHT: 63 IN | WEIGHT: 152 LBS | BODY MASS INDEX: 26.93 KG/M2 | HEART RATE: 74 BPM | SYSTOLIC BLOOD PRESSURE: 140 MMHG

## 2017-06-23 DIAGNOSIS — I26.99 OTHER PULMONARY EMBOLISM WITHOUT ACUTE COR PULMONALE, UNSPECIFIED CHRONICITY: ICD-10-CM

## 2017-06-23 DIAGNOSIS — J43.8 OTHER EMPHYSEMA: Primary | ICD-10-CM

## 2017-06-23 DIAGNOSIS — I47.19 ATRIAL TACHYCARDIA: ICD-10-CM

## 2017-06-23 DIAGNOSIS — R00.2 PALPITATIONS: ICD-10-CM

## 2017-06-23 DIAGNOSIS — Z79.01 LONG TERM (CURRENT) USE OF ANTICOAGULANTS: ICD-10-CM

## 2017-06-23 DIAGNOSIS — I47.10 PSVT (PAROXYSMAL SUPRAVENTRICULAR TACHYCARDIA): ICD-10-CM

## 2017-06-23 DIAGNOSIS — I47.19 ECTOPIC ATRIAL TACHYCARDIA: ICD-10-CM

## 2017-06-23 PROCEDURE — 99499 UNLISTED E&M SERVICE: CPT | Mod: S$GLB,,, | Performed by: INTERNAL MEDICINE

## 2017-06-23 PROCEDURE — 99215 OFFICE O/P EST HI 40 MIN: CPT | Mod: S$GLB,,, | Performed by: INTERNAL MEDICINE

## 2017-06-23 PROCEDURE — 1159F MED LIST DOCD IN RCRD: CPT | Mod: S$GLB,,, | Performed by: INTERNAL MEDICINE

## 2017-06-23 PROCEDURE — 93000 ELECTROCARDIOGRAM COMPLETE: CPT | Mod: S$GLB,,, | Performed by: INTERNAL MEDICINE

## 2017-06-23 PROCEDURE — 1126F AMNT PAIN NOTED NONE PRSNT: CPT | Mod: S$GLB,,, | Performed by: INTERNAL MEDICINE

## 2017-06-23 PROCEDURE — 1157F ADVNC CARE PLAN IN RCRD: CPT | Mod: S$GLB,,, | Performed by: INTERNAL MEDICINE

## 2017-06-23 PROCEDURE — 99999 PR PBB SHADOW E&M-EST. PATIENT-LVL III: CPT | Mod: PBBFAC,,, | Performed by: INTERNAL MEDICINE

## 2017-06-23 NOTE — PROGRESS NOTES
"Subjective:    Patient ID:  Yasmin Asencio is a 66 y.o. female who presents for evaluation of PSVT      HPI   66 y.o. F  pulm hemorrhage 11/15, s/p coiling  pseudomonas PNA 2016, s/p extended Abx  COPD, bronchiectasis (home O2 frequently)  HTN on meds  chronic slurred speech, thought due to essential tremor per neuro  hx "AF" destini-lung-coiling procedure (no documentation)    Has palpitations with HR >140 bpm at random intervals for past 10 years. Recently about 1x/month.   WIth palps, gets blurry vision, chest palps with radiation to the neck. Her "blood runs cold" and lasts for 20-35 mins.    Has been "cleared" by ID re: further ABx rx. Looks well today.    cath 6/16 neg  echo 11/16 50-55% LVEF  Holter 2/17: SR . no SVT.  Event monitor (which I read today): regular NCT with -440 ms.     My interpretation of today's ECG is NST 64 bpm. Small P wave. No delta.    Review of Systems   Constitution: Negative. Negative for weakness and malaise/fatigue.   HENT: Negative.  Negative for ear pain and tinnitus.    Eyes: Negative for blurred vision.   Cardiovascular: Positive for dyspnea on exertion and palpitations. Negative for chest pain, near-syncope and syncope.   Respiratory: Negative.  Negative for shortness of breath.    Endocrine: Negative.  Negative for polyuria.   Hematologic/Lymphatic: Does not bruise/bleed easily.   Skin: Negative.  Negative for rash.   Musculoskeletal: Negative.  Negative for joint pain and muscle weakness.   Gastrointestinal: Negative.  Negative for abdominal pain and change in bowel habit.   Genitourinary: Negative for frequency.   Neurological: Positive for tremors. Negative for dizziness.   Psychiatric/Behavioral: Negative.  Negative for depression. The patient is not nervous/anxious.    Allergic/Immunologic: Negative for environmental allergies.        Objective:    Physical Exam   Constitutional: She is oriented to person, place, and time. Vital signs are normal. She " appears well-developed and well-nourished. She is active and cooperative.   HENT:   Head: Normocephalic and atraumatic.   Eyes: Conjunctivae and EOM are normal.   Neck: Normal range of motion. Carotid bruit is not present. No tracheal deviation and no edema present. No thyroid mass and no thyromegaly present.   Cardiovascular: Normal rate, regular rhythm, normal heart sounds, intact distal pulses and normal pulses.   No extrasystoles are present. PMI is not displaced.  Exam reveals no gallop and no friction rub.    No murmur heard.  Pulmonary/Chest: Effort normal. No respiratory distress. She has no wheezes. She has rhonchi in the right middle field, the right lower field, the left middle field and the left lower field. She has no rales.   Abdominal: Soft. Normal appearance. She exhibits no distension. There is no hepatosplenomegaly.   Musculoskeletal: Normal range of motion.   Neurological: She is alert and oriented to person, place, and time. Coordination normal.   Skin: Skin is warm and dry. No rash noted.   Psychiatric: She has a normal mood and affect. Her speech is normal and behavior is normal. Thought content normal. Cognition and memory are normal.   Nursing note and vitals reviewed.        Assessment:       1. Other emphysema    2. Other pulmonary embolism without acute cor pulmonale, unspecified chronicity    3. PSVT (paroxysmal supraventricular tachycardia)    4. Ectopic atrial tachycardia    5. Palpitations         Plan:       Discussed with patient basic cardiac electrophysiology and in general possible mechanisms of SVT, including AVNRT, AVRT, AT, AFL.  Discussed options: nothing, meds, EPS/RFA.  She's most interested in full Rx with EPS and RFA as possible.    I spent about a half hour discussing the nature of EP study and ablation, including possible transseptal puncture. We discused risks and benefits at length. Our discussion included, but was not limited to the risk of death, infection, bleeding,  stroke, MI, cardiac perforation, embolism, cardiac tamponade, skin burns, and other organic injury including the possibility for need for surgery or pacemaker implantation.  I discussed with patient risks, indications, benefits, and alternatives of the planned procedure. All questions were answered. Patient understands and wishes to proceed.    Anesthesia support for MAC, given significant pulmonary disease.  Continue coumadin for that procedure. Will likely change back to eliquis on f/u.    She may also benefit from ILR longterm, as surveillance for arrhythmia. Discussed that with her today; she's open to the idea.

## 2017-06-26 ENCOUNTER — PATIENT MESSAGE (OUTPATIENT)
Dept: ELECTROPHYSIOLOGY | Facility: CLINIC | Age: 67
End: 2017-06-26

## 2017-06-26 DIAGNOSIS — I47.10 PSVT (PAROXYSMAL SUPRAVENTRICULAR TACHYCARDIA): Primary | ICD-10-CM

## 2017-06-26 NOTE — PROGRESS NOTES
ABLATION EDUCATION CHECKLIST    7/20/17- Labs  PRE - PROCEDURE LABS HAVE BEEN ORDERED FOR YOU @ [facility]  BE SURE TO ARRIVE AT YOUR SCHEDULED TIME FOR THIS LAB WORK!  (YOU DO NOT HAVE TO FAST FOR THIS LABWORK!!!!)    7/24/17 @ 6:30 AM -Ablation  Report to Cardiology Waiting Room on 3rd floor of the Hospital    (Do not report to clinic)  Directions for Reporting to Cardiology Waiting Area in the Hospital  If you park in the Parking Garage:  Take elevators to the 2nd floor  Walk up ramp and turn right by Gold Elevators  Take elevator to the 3rd floor  Upon exiting the elevator, turn away from the clinic areas  Walk long faith around to front of hospital to area with windows overlooking [facility]  Check in at Reception Desk  OR  If family is dropping you off:  Have them drop you off at the front of the Hospital  (Near the ER, where all the flags are hung).  Take the E elevators to the 3rd floor.  Check in at the Reception Desk in the waiting room.    Do not eat or drink anything after: 12 mn on the night before your procedure    Medications:   HOLD your metoprolol tartrate (Lopressor) 3 days prior to your procedure. Last dose: Thursday, July 20,2017.  You may take your usual morning medications with a sip of water    You will be spending the night after your procedure  You will need someone to drive you home the day after your procedure.    Your pain during your procedure will be managed by the anesthesia team.     THE ABOVE INSTRUCTIONS WERE GIVEN TO THE PATIENT VERBALLY AND THEY VERBALIZED UNDERSTANDING.  THEY DO NOT REQUIRE ANY SPECIAL NEEDS AND DO NOT HAVE ANY LEARNING BARRIERS.    Any need to reschedule or cancel procedures, or any questions regarding your procedures should be addressed directly with the Arrhythmia Department Nurses at the following phone number: [phone]

## 2017-06-27 ENCOUNTER — ANTI-COAG VISIT (OUTPATIENT)
Dept: CARDIOLOGY | Facility: CLINIC | Age: 67
End: 2017-06-27

## 2017-06-27 DIAGNOSIS — Z79.01 LONG TERM (CURRENT) USE OF ANTICOAGULANTS: ICD-10-CM

## 2017-06-27 LAB — INR PPP: 2.4

## 2017-06-30 NOTE — PROGRESS NOTES
Spoke with maude from [facility] whom stated patient was confused about INR due date.  Patient rescheduled missed appointment from 6/29  to 7/5 at [facility].

## 2017-07-05 ENCOUNTER — LAB VISIT (OUTPATIENT)
Dept: LAB | Facility: HOSPITAL | Age: 67
End: 2017-07-05
Attending: INTERNAL MEDICINE
Payer: MEDICARE

## 2017-07-05 ENCOUNTER — ANTI-COAG VISIT (OUTPATIENT)
Dept: CARDIOLOGY | Facility: CLINIC | Age: 67
End: 2017-07-05

## 2017-07-05 DIAGNOSIS — Z79.01 LONG TERM (CURRENT) USE OF ANTICOAGULANTS: ICD-10-CM

## 2017-07-05 DIAGNOSIS — R00.2 PALPITATIONS: ICD-10-CM

## 2017-07-05 DIAGNOSIS — I47.10 PSVT (PAROXYSMAL SUPRAVENTRICULAR TACHYCARDIA): ICD-10-CM

## 2017-07-05 LAB
INR PPP: 2.6
PROTHROMBIN TIME: 26.4 SEC

## 2017-07-05 PROCEDURE — 36415 COLL VENOUS BLD VENIPUNCTURE: CPT | Mod: PO

## 2017-07-05 PROCEDURE — 85610 PROTHROMBIN TIME: CPT

## 2017-07-06 ENCOUNTER — OFFICE VISIT (OUTPATIENT)
Dept: PULMONOLOGY | Facility: CLINIC | Age: 67
End: 2017-07-06
Payer: MEDICARE

## 2017-07-06 VITALS
HEART RATE: 72 BPM | HEIGHT: 62 IN | OXYGEN SATURATION: 94 % | WEIGHT: 153 LBS | RESPIRATION RATE: 14 BRPM | SYSTOLIC BLOOD PRESSURE: 156 MMHG | DIASTOLIC BLOOD PRESSURE: 64 MMHG | BODY MASS INDEX: 28.16 KG/M2

## 2017-07-06 DIAGNOSIS — I26.99 OTHER PULMONARY EMBOLISM WITHOUT ACUTE COR PULMONALE, UNSPECIFIED CHRONICITY: ICD-10-CM

## 2017-07-06 DIAGNOSIS — B96.5 PSEUDOMONAS RESPIRATORY INFECTION: ICD-10-CM

## 2017-07-06 DIAGNOSIS — J98.8 PSEUDOMONAS RESPIRATORY INFECTION: ICD-10-CM

## 2017-07-06 DIAGNOSIS — J43.9 PULMONARY EMPHYSEMA: ICD-10-CM

## 2017-07-06 DIAGNOSIS — R04.2 HEMOPTYSIS: ICD-10-CM

## 2017-07-06 DIAGNOSIS — Z79.01 LONG TERM (CURRENT) USE OF ANTICOAGULANTS: ICD-10-CM

## 2017-07-06 DIAGNOSIS — J47.9 ACQUIRED BRONCHIECTASIS: Primary | ICD-10-CM

## 2017-07-06 DIAGNOSIS — I47.10 PSVT (PAROXYSMAL SUPRAVENTRICULAR TACHYCARDIA): ICD-10-CM

## 2017-07-06 PROCEDURE — 1159F MED LIST DOCD IN RCRD: CPT | Mod: S$GLB,,, | Performed by: INTERNAL MEDICINE

## 2017-07-06 PROCEDURE — 99499 UNLISTED E&M SERVICE: CPT | Mod: S$GLB,,, | Performed by: INTERNAL MEDICINE

## 2017-07-06 PROCEDURE — 1126F AMNT PAIN NOTED NONE PRSNT: CPT | Mod: S$GLB,,, | Performed by: INTERNAL MEDICINE

## 2017-07-06 PROCEDURE — 99214 OFFICE O/P EST MOD 30 MIN: CPT | Mod: S$GLB,,, | Performed by: INTERNAL MEDICINE

## 2017-07-06 PROCEDURE — 99999 PR PBB SHADOW E&M-EST. PATIENT-LVL III: CPT | Mod: PBBFAC,,, | Performed by: INTERNAL MEDICINE

## 2017-07-06 PROCEDURE — 1157F ADVNC CARE PLAN IN RCRD: CPT | Mod: S$GLB,,, | Performed by: INTERNAL MEDICINE

## 2017-07-06 RX ORDER — IPRATROPIUM BROMIDE 0.5 MG/2.5ML
SOLUTION RESPIRATORY (INHALATION)
Qty: 225 VIAL | Refills: 3 | Status: SHIPPED | OUTPATIENT
Start: 2017-07-06 | End: 2017-12-11 | Stop reason: SDUPTHER

## 2017-07-06 NOTE — PROGRESS NOTES
Subjective:       Patient ID: Yasmin Asencio is a 66 y.o. female.    Chief Complaint: Bronchiectasis    HPI Ms. Asencio is a 66-year-old former smoker who comes for an interval   assessment of bronchiectasis and other chronic medical problems.  She recently   completed a 2-week course of IV antibiotics for treatment of a chronic   Pseudomonas lung infection.  Despite this, she has continued to have daily   sputum production; however, her hemoptysis has subsided.  She remains on   long-term anticoagulation with Coumadin due to a previous pulmonary embolism and   also because of paroxysmal supraventricular tachycardia.  She anticipates   undergoing a cardiac ablation procedure for later this month to control her   dysrhythmias (see recent note from Dr. Ballesteros).    Ms. Asencio is currently using respiratory medications that include an Anoro   one dose daily.  She is doing aerosol treatments with Atrovent and hypertonic   saline followed by the use of an Acapella device to promote clearing bronchial   secretions.  She is not having any prominent sinus or upper GI symptoms.    DATA:  I have reviewed the images from the chest x-ray done last month.  The   cardiac silhouette is not enlarged.  There are fibrocystic abnormalities within   the left lung (upper zone predominance) and to a lesser extent within the right   lung.  There does not appear to be any change in these abnormalities when this   recent x-ray is compared to previous x-rays dating back to May of last year.    There is mild apical pleural thickening associated with the parenchymal   abnormalities.  There is no significant pleural effusion.    Finalizing this dictation was delayed by the recent disruption in transcription services.    For expediency, it is being signed without review.      CB/IN  dd: 07/16/2017 09:57:37 (CDT)  td: 07/17/2017 01:36:06 (CDT)  Doc ID   #[id number]  Job ID #[id number]    CC:       Review of Systems   Constitutional: Positive  for fatigue. Negative for fever.   HENT: Negative for postnasal drip, sinus pressure, voice change and congestion.    Respiratory: Positive for cough, sputum production and dyspnea on extertion. Negative for shortness of breath and wheezing.    Cardiovascular: Positive for chest pain. Negative for leg swelling.   Genitourinary: Negative for difficulty urinating.   Musculoskeletal: Negative for arthralgias and back pain.   Skin: Negative for rash.   Gastrointestinal: Negative for abdominal pain and acid reflux.   Neurological: Negative for dizziness and weakness.   Hematological: Negative for adenopathy.   Psychiatric/Behavioral: The patient is nervous/anxious.        Objective:      Physical Exam   Constitutional: She is oriented to person, place, and time. She appears well-developed and well-nourished.   HENT:   Head: Normocephalic.   Nose: Nose normal.   Mouth/Throat: No oropharyngeal exudate.   Neck: Normal range of motion. No JVD present. No tracheal deviation present. No thyromegaly present.   Cardiovascular: Normal rate, regular rhythm and normal heart sounds.    No murmur heard.  Pulmonary/Chest: Symmetric chest wall expansion. No stridor. She has no wheezes. She has no rhonchi. She has rales (insp squeaks and rales most prominent over anterior portion of chest on L). She exhibits no tenderness.   Abdominal: Soft. There is no tenderness.   Musculoskeletal: She exhibits no edema.   Lymphadenopathy:     She has no cervical adenopathy.   Neurological: She is alert and oriented to person, place, and time.   Skin: Skin is warm and dry. No rash noted. No erythema. Nails show no clubbing.   Psychiatric: She has a normal mood and affect.   Vitals reviewed.    Personal Diagnostic Review    No flowsheet data found.      Assessment:       1. Acquired bronchiectasis    2. Pseudomonas respiratory infection    3. PSVT (paroxysmal supraventricular tachycardia)    4. Other pulmonary embolism without acute cor pulmonale,  unspecified chronicity    5. Long term (current) use of anticoagulants        Outpatient Encounter Prescriptions as of 7/6/2017   Medication Sig Dispense Refill    alprazolam (XANAX) 0.25 MG tablet Take 1 tablet (0.25 mg total) by mouth nightly as needed for Anxiety. 30 tablet 2    ANORO ELLIPTA 62.5-25 mcg/actuation DsDv       desonide (DESOWEN) 0.05 % lotion AAA face bid prn redness, scaling, or itching      desoximetasone (TOPICORT) 0.25 % cream Apply as directed bid prn 60 g 2    ergocalciferol (VITAMIN D2) 50,000 unit Cap Take 1 capsule (50,000 Units total) by mouth every 7 days. 4 capsule 4    escitalopram oxalate (LEXAPRO) 20 MG tablet Take 1 tablet (20 mg total) by mouth once daily. 30 tablet 4    gabapentin (NEURONTIN) 300 MG capsule Take 1 capsule (300 mg total) by mouth 3 (three) times daily. 90 capsule 2    guaifenesin (MUCINEX) 600 mg 12 hr tablet Take 1,200 mg by mouth 2 (two) times daily.      ipratropium (ATROVENT) 0.02 % nebulizer solution USE ONE VIAL BY NEBULIZATION 4 TIMES DAILY 225 vial 3    metoprolol tartrate (LOPRESSOR) 50 MG tablet Take 1 tablet (50 mg total) by mouth 2 (two) times daily. Do not take for two days prior to ablation procedure. 180 tablet 3    omeprazole (PRILOSEC) 20 MG capsule Take 1 capsule (20 mg total) by mouth daily as needed (heartburn). 90 capsule 0    primidone (MYSOLINE) 50 MG Tab TAKE ONE TABLET BY MOUTH ONCE DAILY IN THE MORNING, ONE AT NOON, AND TWO AT BEDTIME 120 tablet 11    SODIUM CHLORIDE FOR INHALATION (SODIUM CHLORIDE 3%) 3 % nebulizer solution USE ONE VIAL IN NEBULIZER TWICE DAILY 240 mL 6    topiramate (TOPAMAX) 25 MG tablet Take 1 tablet (25 mg total) by mouth 2 (two) times daily. 180 tablet 3    tramadol (ULTRAM) 50 mg tablet Take 50 mg by mouth every 6 (six) hours as needed for Pain.      warfarin (COUMADIN) 5 MG tablet Take 1 & 1/2 tablets daily or as directed by Coumadin Clinic. 135 tablet 1     No facility-administered encounter  medications on file as of 7/6/2017.      No orders of the defined types were placed in this encounter.    Plan:     Increase aerosol treatments to 3 x daily in conjunction with Acapella/CPT and postural drainage to PEDRO (instructed).  Increase Mucinex dose to 1,200 mg twice daily.  Continue Anoro daily.  Return visit 3 months.    NOTE:  Patient clear for anesthesia and cardiology procedure from pulmonary perspective.

## 2017-07-12 ENCOUNTER — ANTI-COAG VISIT (OUTPATIENT)
Dept: CARDIOLOGY | Facility: CLINIC | Age: 67
End: 2017-07-12
Payer: MEDICARE

## 2017-07-12 DIAGNOSIS — I26.99 OTHER PULMONARY EMBOLISM WITHOUT ACUTE COR PULMONALE, UNSPECIFIED CHRONICITY: ICD-10-CM

## 2017-07-12 DIAGNOSIS — R00.2 PALPITATIONS: ICD-10-CM

## 2017-07-12 DIAGNOSIS — I47.10 PSVT (PAROXYSMAL SUPRAVENTRICULAR TACHYCARDIA): ICD-10-CM

## 2017-07-12 DIAGNOSIS — Z79.01 LONG TERM (CURRENT) USE OF ANTICOAGULANTS: Primary | ICD-10-CM

## 2017-07-12 LAB — INR PPP: 3 (ref 2–3)

## 2017-07-12 PROCEDURE — 85610 PROTHROMBIN TIME: CPT | Mod: QW,S$GLB,,

## 2017-07-12 RX ORDER — WARFARIN SODIUM 5 MG/1
10 TABLET ORAL DAILY
Qty: 60 TABLET | Refills: 3 | Status: SHIPPED | OUTPATIENT
Start: 2017-07-12 | End: 2017-08-25 | Stop reason: ALTCHOICE

## 2017-07-12 NOTE — PROGRESS NOTES
INR good. Patient denies changes. INR is increasing but we are also planning for ablation. Therefore, we will not make any dose changes today. Reevaluate dose next week with preop labs.

## 2017-07-14 ENCOUNTER — HOSPITAL ENCOUNTER (EMERGENCY)
Facility: HOSPITAL | Age: 67
Discharge: HOME | End: 2017-07-14
Attending: EMERGENCY MEDICINE
Payer: MEDICARE

## 2017-07-14 VITALS
RESPIRATION RATE: 16 BRPM | BODY MASS INDEX: 27.97 KG/M2 | TEMPERATURE: 99 F | DIASTOLIC BLOOD PRESSURE: 67 MMHG | HEIGHT: 62 IN | SYSTOLIC BLOOD PRESSURE: 149 MMHG | HEART RATE: 71 BPM | WEIGHT: 152 LBS | OXYGEN SATURATION: 100 %

## 2017-07-14 DIAGNOSIS — S60.10XA SUBUNGUAL HEMATOMA OF DIGIT OF HAND, INITIAL ENCOUNTER: Primary | ICD-10-CM

## 2017-07-14 PROCEDURE — 99283 EMERGENCY DEPT VISIT LOW MDM: CPT | Mod: 25

## 2017-07-14 PROCEDURE — 11740 EVACUATION SUBUNGUAL HMTMA: CPT | Mod: F7

## 2017-07-15 NOTE — ED PROVIDER NOTES
"Encounter Date: 7/14/2017    SCRIBE #1 NOTE: I, Tenzin Tinoco, am scribing for, and in the presence of,  Edmundo King PA-C. I have scribed the following portions of the note - Other sections scribed: HPI, ROS.       History     Chief Complaint   Patient presents with    Hand Pain     " I smashed my middle right finger in the car window 2 days ago." Pt reports concern because she is on coumadin. Pt can move finger but reports numbness to nail. Bruising to nail noted.      CC: Finger Pain    65 y/o female with acquired bronchiectasis, anxiety, COPD, diverticulitis, clotting disorder, GERD, HTN, PSVT, Spondylosis without myelopathy, Supraventricular tachycardia, Thoracic aorta atherosclerosis, and PMHx of TB presents to the ED c/o acute onset pain to distal aspect of third digit of R hand after it was smashed in a car window 2 days ago. The pain is moderate (4/10) and exacerbates with palpation. The patient is taking coumadin. The patient's PCP is Dr. Luna. The patient denies elbow pain, wrist pain, or fever. No attempted treatment reported. No alleviating factors or other symptoms reported. No radiculopathy or paresthesia.      The history is provided by the patient. No  was used.     Review of patient's allergies indicates:   Allergen Reactions    Bactrim [sulfamethoxazole-trimethoprim] Rash     Was hospitalized for rash    Albuterol Other (See Comments)     tremors    Restasis [cyclosporine] Itching     Past Medical History:   Diagnosis Date    Acquired bronchiectasis     due to history of TB - followed by pulmonary, Dr. Zuñiga    Allergy     Amblyopia     rt eye per pt    Anemia of other chronic disease     Anxiety     Cataract     Clotting disorder     COPD (chronic obstructive pulmonary disease)     COPD (chronic obstructive pulmonary disease)     Depression     Diverticulosis     Essential tremor     GERD (gastroesophageal reflux disease)     Hemangioma of liver  "    History of tuberculosis 1978    Hypertension     Mixed anxiety and depressive disorder     Psoriasis     PSVT (paroxysmal supraventricular tachycardia)     Pulmonary embolism     S/P PICC central line placement Apr. 2016 - May 2016    Skin disease     Psoriasis    Spondylosis without myelopathy 8/23/2013    Supraventricular tachycardia     Thoracic aorta atherosclerosis     noted on CT scan of chest 1/3/2011    Tuberculosis     Vaginal delivery     x2    Vitamin D deficiency      Past Surgical History:   Procedure Laterality Date    CATARACT EXTRACTION W/  INTRAOCULAR LENS IMPLANT  07/24/12    od dr [place]    CATARACT EXTRACTION W/  INTRAOCULAR LENS IMPLANT  08/07/12    left eye    EYE SURGERY      bilateral Cataract    GALLBLADDER SURGERY  9/2011     Family History   Problem Relation Age of Onset    Hypertension Mother     Heart attack Mother     Cancer Father      liver and bladder    Hyperlipidemia Sister     Psoriasis Sister     Cancer Brother      liver    Stroke Maternal Uncle     Cancer Maternal Aunt      stomach    Lung cancer Sister     Heart attack Brother     Heart attack Son     Amblyopia Neg Hx     Blindness Neg Hx     Cataracts Neg Hx     Glaucoma Neg Hx     Macular degeneration Neg Hx     Retinal detachment Neg Hx     Strabismus Neg Hx     Thyroid disease Neg Hx     Melanoma Neg Hx     Lupus Neg Hx     Eczema Neg Hx     COPD Neg Hx      Social History   Substance Use Topics    Smoking status: Former Smoker     Packs/day: 2.00     Years: 50.00     Types: Cigarettes     Quit date: 5/27/2009    Smokeless tobacco: Never Used    Alcohol use No     Review of Systems   Constitutional: Negative for chills and fever.   HENT: Negative for rhinorrhea.    Eyes: Negative for redness.   Respiratory: Negative for cough and shortness of breath.    Cardiovascular: Negative for chest pain.   Gastrointestinal: Negative for abdominal pain, diarrhea, nausea and vomiting.    Genitourinary: Negative for difficulty urinating and dysuria.   Musculoskeletal: Negative for arthralgias, back pain and myalgias.        (+) pain to distal aspect to third digit of R hand   Skin: Negative for rash.   Neurological: Negative for headaches.       Physical Exam     Initial Vitals [07/14/17 1817]   BP Pulse Resp Temp SpO2   (!) 176/75 72 16 98.4 °F (36.9 °C) 98 %      MAP       108.67         Physical Exam    Nursing note and vitals reviewed.  Constitutional: She appears well-developed and well-nourished. She is not diaphoretic. No distress.   HENT:   Head: Normocephalic and atraumatic.   Eyes: Conjunctivae and EOM are normal. Pupils are equal, round, and reactive to light.   Neck: Normal range of motion. Neck supple. No tracheal deviation present.   Pulmonary/Chest: No stridor.   Musculoskeletal:   R hand middle finger with subungual hematoma, no significant erythema or soft tissue swelling. TTP distal phalanx, with full ROM entirety of all digits. Cap refill WNL. No obvious bony abnormality.   Lymphadenopathy:     She has no cervical adenopathy.   Neurological: She is alert and oriented to person, place, and time.   Skin: Skin is warm and dry. Capillary refill takes less than 2 seconds.   Psychiatric: She has a normal mood and affect. Her behavior is normal. Judgment and thought content normal.         ED Course   I & D - Incision and Drainage  Date/Time: 7/15/2017 10:34 AM  Performed by: NAYELY GARAY  Authorized by: CINDI PICKENS   Type: subungual hematoma  Patient sedated: no  Incision type: electrocautery; single, superficial nail burrow.  Complexity: simple  Drainage: bloody  Drainage amount: scant  Wound treatment: wound left open  Patient tolerance: Patient tolerated the procedure well with no immediate complications        Labs Reviewed - No data to display          Medical Decision Making:   Initial Assessment:   67yo f with chief complaint R middle finger pain x 3  days.  Differential Diagnosis:   Subungual hematoma, paronychia, fracture  Clinical Tests:   Radiological Study: Ordered and Reviewed  ED Management:  Patient overall well-appearing, in no acute distress, afebrile, vitals within normal limits.    Pt does exhibit obvious subungual hematoma to affected digit. No significant bony abnormality. I do not suspect fracture, however will get xray to evaluate for possibility.     Pt recently had INR checked on , which was 3.0. This is elevated in comparison to her norm as pt is about to undergo atrial ablation. Pt denies any other injuries, displays no other areas of ecchymosis or blood loss, denies lightheadedness/dizziness. I do feel she is safe for hematoma drainage with electrocautery. She does understand and agree.    Pt tolerated hematoma drainage without significant complication. I've asked her to f/u with her PCP on Monday for reevaluation. Bleeding controlled. I do feel she is safe for d/c without further intervention.             Scribe Attestation:   Scribe #1: I performed the above scribed service and the documentation accurately describes the services I performed. I attest to the accuracy of the note.    Attending Attestation:     Physician Attestation Statement for NP/PA:   I discussed this assessment and plan of this patient with the NP/PA, but I did not personally examine the patient. The face to face encounter was performed by the NP/PA.    Other NP/PA Attestation Additions:      Medical Decision Makin y.o. female presents with subungual hematoma to right middle finger.  Subungual hematoma drained per procedure note.  She is on Coumadin.  INR 3 two days ago. Plan to refer to PCP. I have discussed this patient with mid-level provider and agree with physical exam, assessment and plan.m        Physician Attestation for Scribe:  Physician Attestation Statement for Scribe #1: I, Edmundo King PA-C, reviewed documentation, as scribed by Tenzin Tinoco  in my presence, and it is both accurate and complete.                 ED Course     Clinical Impression:   The encounter diagnosis was Subungual hematoma of digit of hand, initial encounter.    Disposition:   Disposition: Discharged  Condition: Stable                        Edmundo Ruiz PA-C  07/15/17 0080       Eva Zhu MD  07/20/17 1879

## 2017-07-15 NOTE — DISCHARGE INSTRUCTIONS
Follow-up with primary care physician early next week for reevaluation of finger.  Return to this ED if bleeding controlled, or if any other competitions occur.

## 2017-07-19 ENCOUNTER — OFFICE VISIT (OUTPATIENT)
Dept: FAMILY MEDICINE | Facility: CLINIC | Age: 67
End: 2017-07-19
Payer: MEDICARE

## 2017-07-19 VITALS
OXYGEN SATURATION: 97 % | WEIGHT: 152.75 LBS | DIASTOLIC BLOOD PRESSURE: 60 MMHG | HEART RATE: 68 BPM | BODY MASS INDEX: 28.11 KG/M2 | TEMPERATURE: 98 F | HEIGHT: 62 IN | SYSTOLIC BLOOD PRESSURE: 116 MMHG

## 2017-07-19 DIAGNOSIS — I70.0 THORACIC AORTA ATHEROSCLEROSIS: ICD-10-CM

## 2017-07-19 DIAGNOSIS — Z79.01 LONG TERM (CURRENT) USE OF ANTICOAGULANTS: ICD-10-CM

## 2017-07-19 DIAGNOSIS — S60.10XD SUBUNGUAL HEMATOMA OF DIGIT OF HAND, SUBSEQUENT ENCOUNTER: Primary | ICD-10-CM

## 2017-07-19 DIAGNOSIS — R20.0 NUMBNESS OF FINGER: ICD-10-CM

## 2017-07-19 DIAGNOSIS — N18.30 BENIGN HYPERTENSION WITH CHRONIC KIDNEY DISEASE, STAGE III: ICD-10-CM

## 2017-07-19 DIAGNOSIS — J47.9 ACQUIRED BRONCHIECTASIS: ICD-10-CM

## 2017-07-19 DIAGNOSIS — I12.9 BENIGN HYPERTENSION WITH CHRONIC KIDNEY DISEASE, STAGE III: ICD-10-CM

## 2017-07-19 PROCEDURE — 1159F MED LIST DOCD IN RCRD: CPT | Mod: S$GLB,,, | Performed by: NURSE PRACTITIONER

## 2017-07-19 PROCEDURE — 99214 OFFICE O/P EST MOD 30 MIN: CPT | Mod: S$GLB,,, | Performed by: NURSE PRACTITIONER

## 2017-07-19 PROCEDURE — 1157F ADVNC CARE PLAN IN RCRD: CPT | Mod: S$GLB,,, | Performed by: NURSE PRACTITIONER

## 2017-07-19 PROCEDURE — 1126F AMNT PAIN NOTED NONE PRSNT: CPT | Mod: S$GLB,,, | Performed by: NURSE PRACTITIONER

## 2017-07-19 PROCEDURE — 99499 UNLISTED E&M SERVICE: CPT | Mod: S$GLB,,, | Performed by: NURSE PRACTITIONER

## 2017-07-19 PROCEDURE — 99999 PR PBB SHADOW E&M-EST. PATIENT-LVL V: CPT | Mod: PBBFAC,,, | Performed by: NURSE PRACTITIONER

## 2017-07-19 NOTE — PATIENT INSTRUCTIONS
I explained to her that the numbness to her right middle finger was normal after smashing it in a car window. It should resolve on its own.

## 2017-07-19 NOTE — PROGRESS NOTES
Subjective:       Patient ID: Yasmin Asencio is a 66 y.o. female.    Chief Complaint: Finger Injury (Numbness  Right  1 week)    66-year-old female presents to the clinic today for ER follow-up.  She seen in the emergency room on 7/14/2017.  Her  accidentally rolled  the car window on her middle finger of her right hand.  She had a subungual hematoma which electrocautery superficial nail burrow was preformed.  She says that the pain is now resolved.  However, she is still having some numbness to the tip of her right middle finger.  She is scheduled to have ablation on 7/24/2017 for PSVT. She is up to date on her health maintenance.  She has chronic shortness of breath and wheezing due to bronchiectasis.  She wears oxygen at night.  She is followed closely by pulmonary Dr. Zuñiga.  She also has been seeing infectious disease due to chronic lung infections. Presently she is on Coumadin.       Past Medical History:   Diagnosis Date    Acquired bronchiectasis     due to history of TB - followed by pulmonary, Dr. Zuñiga    Allergy     Amblyopia     rt eye per pt    Anemia of other chronic disease     Anxiety     Cataract     Clotting disorder     COPD (chronic obstructive pulmonary disease)     COPD (chronic obstructive pulmonary disease)     Depression     Diverticulosis     Essential tremor     GERD (gastroesophageal reflux disease)     Hemangioma of liver     History of tuberculosis 1978    Hypertension     Mixed anxiety and depressive disorder     Psoriasis     PSVT (paroxysmal supraventricular tachycardia)     Pulmonary embolism     S/P PICC central line placement Apr. 2016 - May 2016    Skin disease     Psoriasis    Spondylosis without myelopathy 8/23/2013    Supraventricular tachycardia     Thoracic aorta atherosclerosis     noted on CT scan of chest 1/3/2011    Tuberculosis     Vaginal delivery     x2    Vitamin D deficiency      Past Surgical History:   Procedure Laterality  Date    CATARACT EXTRACTION W/  INTRAOCULAR LENS IMPLANT  07/24/12    od dr [place]    CATARACT EXTRACTION W/  INTRAOCULAR LENS IMPLANT  08/07/12    left eye    EYE SURGERY      bilateral Cataract    GALLBLADDER SURGERY  9/2011      reports that she quit smoking about 8 years ago. Her smoking use included Cigarettes. She has a 100.00 pack-year smoking history. She has never used smokeless tobacco. She reports that she does not drink alcohol or use drugs.  Review of Systems   Constitutional: Negative for activity change and unexpected weight change.   HENT: Negative for hearing loss, rhinorrhea and trouble swallowing.    Eyes: Negative for discharge and visual disturbance.   Respiratory: Positive for chest tightness and wheezing.    Cardiovascular: Positive for palpitations. Negative for chest pain.   Gastrointestinal: Negative for blood in stool, constipation, diarrhea and vomiting.   Endocrine: Negative for polydipsia and polyuria.   Genitourinary: Negative for difficulty urinating, dysuria, hematuria and menstrual problem.   Musculoskeletal: Negative for arthralgias, joint swelling and neck pain.   Neurological: Negative for weakness and headaches.   Psychiatric/Behavioral: Negative for confusion and dysphoric mood.       Objective:      Physical Exam   Constitutional: She is oriented to person, place, and time. She appears well-developed and well-nourished. No distress.   Eyes: Conjunctivae and EOM are normal. Pupils are equal, round, and reactive to light. Left eye exhibits no discharge. No scleral icterus.   Cardiovascular: Normal rate, regular rhythm and normal heart sounds.  Exam reveals no gallop and no friction rub.    No murmur heard.  Pulmonary/Chest: She has wheezes.   Slight wheezing scattered thur out all lung fields which is chronic for her    Abdominal: Soft. Bowel sounds are normal. There is no tenderness.   Musculoskeletal: Normal range of motion. She exhibits no edema.   Right middle finger  small amount of blood noted under nail bed finger non-tender to palpation bends finger without any difficulty    Neurological: She is alert and oriented to person, place, and time.   Skin: She is not diaphoretic.   Psychiatric: She has a normal mood and affect.       Assessment:       1. Subungual hematoma of digit of hand, subsequent encounter    2. Numbness of finger    3. Acquired bronchiectasis    4. Benign hypertension with chronic kidney disease, stage III    5. Long term (current) use of anticoagulants    6. Thoracic aorta atherosclerosis        Plan:         Subungual hematoma of digit of hand, subsequent encounter    Numbness of finger    Acquired bronchiectasis  - The current medical regimen is effective;  continue present plan and medications.    Benign hypertension with chronic kidney disease, stage III  - The current medical regimen is effective;  continue present plan and medications.    Long term (current) use of anticoagulants  - The current medical regimen is effective;  continue present plan and medications.    Thoracic aorta atherosclerosis  - Stable / Asymptomatic is on blood pressure lowering medications

## 2017-07-20 ENCOUNTER — ANTI-COAG VISIT (OUTPATIENT)
Dept: CARDIOLOGY | Facility: CLINIC | Age: 67
End: 2017-07-20

## 2017-07-20 ENCOUNTER — LAB VISIT (OUTPATIENT)
Dept: LAB | Facility: HOSPITAL | Age: 67
End: 2017-07-20
Attending: INTERNAL MEDICINE
Payer: MEDICARE

## 2017-07-20 DIAGNOSIS — Z79.01 LONG TERM (CURRENT) USE OF ANTICOAGULANTS: ICD-10-CM

## 2017-07-20 DIAGNOSIS — R00.2 PALPITATIONS: ICD-10-CM

## 2017-07-20 DIAGNOSIS — I47.10 PSVT (PAROXYSMAL SUPRAVENTRICULAR TACHYCARDIA): ICD-10-CM

## 2017-07-20 LAB
ANION GAP SERPL CALC-SCNC: 8 MMOL/L
APTT BLDCRRT: 43.7 SEC
BASOPHILS # BLD AUTO: 0.03 K/UL
BASOPHILS NFR BLD: 0.4 %
BUN SERPL-MCNC: 19 MG/DL
CALCIUM SERPL-MCNC: 9 MG/DL
CHLORIDE SERPL-SCNC: 102 MMOL/L
CO2 SERPL-SCNC: 25 MMOL/L
CREAT SERPL-MCNC: 1 MG/DL
DIFFERENTIAL METHOD: ABNORMAL
EOSINOPHIL # BLD AUTO: 0.1 K/UL
EOSINOPHIL NFR BLD: 1.5 %
ERYTHROCYTE [DISTWIDTH] IN BLOOD BY AUTOMATED COUNT: 12.9 %
EST. GFR  (AFRICAN AMERICAN): >60 ML/MIN/1.73 M^2
EST. GFR  (NON AFRICAN AMERICAN): 58.4 ML/MIN/1.73 M^2
GLUCOSE SERPL-MCNC: 111 MG/DL
HCT VFR BLD AUTO: 33.2 %
HGB BLD-MCNC: 10.4 G/DL
INR PPP: 3.1
LYMPHOCYTES # BLD AUTO: 2.4 K/UL
LYMPHOCYTES NFR BLD: 29.9 %
MCH RBC QN AUTO: 28.7 PG
MCHC RBC AUTO-ENTMCNC: 31.3 G/DL
MCV RBC AUTO: 92 FL
MONOCYTES # BLD AUTO: 0.9 K/UL
MONOCYTES NFR BLD: 11.7 %
NEUTROPHILS # BLD AUTO: 4.6 K/UL
NEUTROPHILS NFR BLD: 56.5 %
PLATELET # BLD AUTO: 271 K/UL
PMV BLD AUTO: 9.6 FL
POTASSIUM SERPL-SCNC: 3.9 MMOL/L
PROTHROMBIN TIME: 31 SEC
RBC # BLD AUTO: 3.63 M/UL
SODIUM SERPL-SCNC: 135 MMOL/L
WBC # BLD AUTO: 8.04 K/UL

## 2017-07-20 PROCEDURE — 85025 COMPLETE CBC W/AUTO DIFF WBC: CPT | Mod: PO

## 2017-07-20 PROCEDURE — 85730 THROMBOPLASTIN TIME PARTIAL: CPT

## 2017-07-20 PROCEDURE — 80048 BASIC METABOLIC PNL TOTAL CA: CPT

## 2017-07-20 PROCEDURE — 85610 PROTHROMBIN TIME: CPT

## 2017-07-20 PROCEDURE — 36415 COLL VENOUS BLD VENIPUNCTURE: CPT | Mod: PO

## 2017-07-20 NOTE — PROGRESS NOTES
The pt reports having two nosebleeds on 7/19.  I am lowering her warfarin dose for tonight, as her INR is slightly supratherapeutic.  However, as she is scheduled for an ablation next week, I do not want her INR to become subtherapeutic.

## 2017-07-21 NOTE — PROGRESS NOTES
Spoke with patient who reports nosebleed resolved within about 5 minutes and denies other signs/symptoms of bleeding.  She confirms taking reduced dose of warfarin last night as advised.  I do not recommend further adjustments at this time due to scheduled ablation 7/24.  Patient advised to call us with any further concerns today and on nosebleed precautions.  She verbalized understanding.

## 2017-07-24 ENCOUNTER — ANESTHESIA EVENT (OUTPATIENT)
Dept: MEDSURG UNIT | Facility: HOSPITAL | Age: 67
End: 2017-07-24
Payer: MEDICARE

## 2017-07-24 ENCOUNTER — HOSPITAL ENCOUNTER (OUTPATIENT)
Facility: HOSPITAL | Age: 67
Discharge: HOME | End: 2017-07-25
Attending: INTERNAL MEDICINE | Admitting: INTERNAL MEDICINE
Payer: MEDICARE

## 2017-07-24 ENCOUNTER — ANESTHESIA (OUTPATIENT)
Dept: MEDSURG UNIT | Facility: HOSPITAL | Age: 67
End: 2017-07-24
Payer: MEDICARE

## 2017-07-24 DIAGNOSIS — I47.10 PSVT (PAROXYSMAL SUPRAVENTRICULAR TACHYCARDIA): ICD-10-CM

## 2017-07-24 DIAGNOSIS — I47.10 SVT (SUPRAVENTRICULAR TACHYCARDIA): Primary | ICD-10-CM

## 2017-07-24 LAB
ABO + RH BLD: NORMAL
BLD GP AB SCN CELLS X3 SERPL QL: NORMAL
INR PPP: 2.3
PROTHROMBIN TIME: 23.3 SEC

## 2017-07-24 PROCEDURE — 86900 BLOOD TYPING SEROLOGIC ABO: CPT

## 2017-07-24 PROCEDURE — 63600175 PHARM REV CODE 636 W HCPCS: Performed by: NURSE ANESTHETIST, CERTIFIED REGISTERED

## 2017-07-24 PROCEDURE — 93005 ELECTROCARDIOGRAM TRACING: CPT

## 2017-07-24 PROCEDURE — 94640 AIRWAY INHALATION TREATMENT: CPT

## 2017-07-24 PROCEDURE — 25000003 PHARM REV CODE 250: Performed by: NURSE PRACTITIONER

## 2017-07-24 PROCEDURE — 25000003 PHARM REV CODE 250: Performed by: NURSE ANESTHETIST, CERTIFIED REGISTERED

## 2017-07-24 PROCEDURE — 93010 ELECTROCARDIOGRAM REPORT: CPT | Mod: ,,, | Performed by: INTERNAL MEDICINE

## 2017-07-24 PROCEDURE — A4216 STERILE WATER/SALINE, 10 ML: HCPCS | Performed by: NURSE ANESTHETIST, CERTIFIED REGISTERED

## 2017-07-24 PROCEDURE — 25000003 PHARM REV CODE 250

## 2017-07-24 PROCEDURE — 25000242 PHARM REV CODE 250 ALT 637 W/ HCPCS: Performed by: NURSE PRACTITIONER

## 2017-07-24 PROCEDURE — 37000009 HC ANESTHESIA EA ADD 15 MINS: Performed by: INTERNAL MEDICINE

## 2017-07-24 PROCEDURE — 93613 INTRACARDIAC EPHYS 3D MAPG: CPT | Mod: ,,, | Performed by: INTERNAL MEDICINE

## 2017-07-24 PROCEDURE — 93623 PRGRMD STIMJ&PACG IV RX NFS: CPT

## 2017-07-24 PROCEDURE — 93621 COMP EP EVL L PAC&REC C SINS: CPT | Mod: 26,,, | Performed by: INTERNAL MEDICINE

## 2017-07-24 PROCEDURE — 93653 COMPRE EP EVAL TX SVT: CPT | Mod: ,,, | Performed by: INTERNAL MEDICINE

## 2017-07-24 PROCEDURE — 93623 PRGRMD STIMJ&PACG IV RX NFS: CPT | Mod: 26,,, | Performed by: INTERNAL MEDICINE

## 2017-07-24 PROCEDURE — C1730 CATH, EP, 19 OR FEW ELECT: HCPCS

## 2017-07-24 PROCEDURE — D9220A PRA ANESTHESIA: Mod: CRNA,,, | Performed by: NURSE ANESTHETIST, CERTIFIED REGISTERED

## 2017-07-24 PROCEDURE — 86850 RBC ANTIBODY SCREEN: CPT

## 2017-07-24 PROCEDURE — 93010 ELECTROCARDIOGRAM REPORT: CPT | Mod: 76,,, | Performed by: INTERNAL MEDICINE

## 2017-07-24 PROCEDURE — D9220A PRA ANESTHESIA: Mod: ANES,,, | Performed by: ANESTHESIOLOGY

## 2017-07-24 PROCEDURE — 37000008 HC ANESTHESIA 1ST 15 MINUTES: Performed by: INTERNAL MEDICINE

## 2017-07-24 PROCEDURE — 85610 PROTHROMBIN TIME: CPT

## 2017-07-24 RX ORDER — SODIUM CHLORIDE FOR INHALATION 3 %
4 VIAL, NEBULIZER (ML) INHALATION 2 TIMES DAILY
Status: DISCONTINUED | OUTPATIENT
Start: 2017-07-24 | End: 2017-07-25 | Stop reason: HOSPADM

## 2017-07-24 RX ORDER — MIDAZOLAM HYDROCHLORIDE 1 MG/ML
INJECTION, SOLUTION INTRAMUSCULAR; INTRAVENOUS
Status: DISCONTINUED | OUTPATIENT
Start: 2017-07-24 | End: 2017-07-24

## 2017-07-24 RX ORDER — IPRATROPIUM BROMIDE 0.5 MG/2.5ML
0.5 SOLUTION RESPIRATORY (INHALATION) EVERY 4 HOURS
Status: DISCONTINUED | OUTPATIENT
Start: 2017-07-24 | End: 2017-07-25 | Stop reason: HOSPADM

## 2017-07-24 RX ORDER — PRIMIDONE 50 MG/1
50 TABLET ORAL DAILY
Status: DISCONTINUED | OUTPATIENT
Start: 2017-07-25 | End: 2017-07-25 | Stop reason: HOSPADM

## 2017-07-24 RX ORDER — VERAPAMIL HYDROCHLORIDE 180 MG/1
180 TABLET, FILM COATED, EXTENDED RELEASE ORAL DAILY
Status: DISCONTINUED | OUTPATIENT
Start: 2017-07-24 | End: 2017-07-25 | Stop reason: HOSPADM

## 2017-07-24 RX ORDER — TRAMADOL HYDROCHLORIDE 50 MG/1
50 TABLET ORAL EVERY 6 HOURS PRN
Status: DISCONTINUED | OUTPATIENT
Start: 2017-07-24 | End: 2017-07-25 | Stop reason: HOSPADM

## 2017-07-24 RX ORDER — DEXMEDETOMIDINE HYDROCHLORIDE 100 UG/ML
INJECTION, SOLUTION INTRAVENOUS
Status: DISCONTINUED | OUTPATIENT
Start: 2017-07-24 | End: 2017-07-24

## 2017-07-24 RX ORDER — GLYCOPYRROLATE 0.2 MG/ML
INJECTION INTRAMUSCULAR; INTRAVENOUS
Status: DISCONTINUED | OUTPATIENT
Start: 2017-07-24 | End: 2017-07-24

## 2017-07-24 RX ORDER — PROPOFOL 10 MG/ML
VIAL (ML) INTRAVENOUS
Status: DISCONTINUED | OUTPATIENT
Start: 2017-07-24 | End: 2017-07-24

## 2017-07-24 RX ORDER — PHENYLEPHRINE HYDROCHLORIDE 10 MG/ML
INJECTION INTRAVENOUS
Status: DISCONTINUED | OUTPATIENT
Start: 2017-07-24 | End: 2017-07-24

## 2017-07-24 RX ORDER — ESCITALOPRAM OXALATE 10 MG/1
20 TABLET ORAL DAILY
Status: DISCONTINUED | OUTPATIENT
Start: 2017-07-25 | End: 2017-07-25 | Stop reason: HOSPADM

## 2017-07-24 RX ORDER — PRIMIDONE 50 MG/1
100 TABLET ORAL NIGHTLY
Status: DISCONTINUED | OUTPATIENT
Start: 2017-07-24 | End: 2017-07-25 | Stop reason: HOSPADM

## 2017-07-24 RX ORDER — WARFARIN SODIUM 5 MG/1
10 TABLET ORAL DAILY
Status: DISCONTINUED | OUTPATIENT
Start: 2017-07-24 | End: 2017-07-25 | Stop reason: HOSPADM

## 2017-07-24 RX ORDER — GABAPENTIN 300 MG/1
300 CAPSULE ORAL 3 TIMES DAILY
Status: DISCONTINUED | OUTPATIENT
Start: 2017-07-24 | End: 2017-07-25 | Stop reason: HOSPADM

## 2017-07-24 RX ORDER — PANTOPRAZOLE SODIUM 40 MG/1
40 TABLET, DELAYED RELEASE ORAL DAILY
Status: DISCONTINUED | OUTPATIENT
Start: 2017-07-24 | End: 2017-07-25 | Stop reason: HOSPADM

## 2017-07-24 RX ORDER — TOPIRAMATE 25 MG/1
25 TABLET ORAL 2 TIMES DAILY
Status: DISCONTINUED | OUTPATIENT
Start: 2017-07-24 | End: 2017-07-25 | Stop reason: HOSPADM

## 2017-07-24 RX ORDER — SODIUM CHLORIDE 0.9 % (FLUSH) 0.9 %
3 SYRINGE (ML) INJECTION
Status: DISCONTINUED | OUTPATIENT
Start: 2017-07-24 | End: 2017-07-24 | Stop reason: HOSPADM

## 2017-07-24 RX ORDER — SODIUM CHLORIDE 9 MG/ML
INJECTION, SOLUTION INTRAVENOUS CONTINUOUS
Status: DISCONTINUED | OUTPATIENT
Start: 2017-07-24 | End: 2017-07-24

## 2017-07-24 RX ADMIN — PROPOFOL 30 MG: 10 INJECTION, EMULSION INTRAVENOUS at 09:07

## 2017-07-24 RX ADMIN — SODIUM CHLORIDE SOLN NEBU 3% 4 ML: 3 NEBU SOLN at 10:07

## 2017-07-24 RX ADMIN — GABAPENTIN 300 MG: 300 CAPSULE ORAL at 10:07

## 2017-07-24 RX ADMIN — TRAMADOL HYDROCHLORIDE 50 MG: 50 TABLET, COATED ORAL at 10:07

## 2017-07-24 RX ADMIN — DEXMEDETOMIDINE HYDROCHLORIDE 24 MCG: 100 INJECTION, SOLUTION, CONCENTRATE INTRAVENOUS at 09:07

## 2017-07-24 RX ADMIN — IPRATROPIUM BROMIDE 0.5 MG: 0.5 SOLUTION RESPIRATORY (INHALATION) at 10:07

## 2017-07-24 RX ADMIN — SODIUM CHLORIDE: 0.9 INJECTION, SOLUTION INTRAVENOUS at 09:07

## 2017-07-24 RX ADMIN — GLYCOPYRROLATE 0.1 MG: 0.2 INJECTION, SOLUTION INTRAMUSCULAR; INTRAVENOUS at 09:07

## 2017-07-24 RX ADMIN — GABAPENTIN 300 MG: 300 CAPSULE ORAL at 04:07

## 2017-07-24 RX ADMIN — PHENYLEPHRINE HYDROCHLORIDE 100 MCG: 10 INJECTION INTRAVENOUS at 11:07

## 2017-07-24 RX ADMIN — PROPOFOL 20 MG: 10 INJECTION, EMULSION INTRAVENOUS at 09:07

## 2017-07-24 RX ADMIN — DEXMEDETOMIDINE HYDROCHLORIDE 0.5 MCG: 100 INJECTION, SOLUTION, CONCENTRATE INTRAVENOUS at 09:07

## 2017-07-24 RX ADMIN — ISOPROTERENOL HYDROCHLORIDE 1 MCG/MIN: 0.2 INJECTION, SOLUTION INTRACARDIAC; INTRAMUSCULAR; INTRAVENOUS; SUBCUTANEOUS at 10:07

## 2017-07-24 RX ADMIN — PANTOPRAZOLE SODIUM 40 MG: 40 TABLET, DELAYED RELEASE ORAL at 01:07

## 2017-07-24 RX ADMIN — MIDAZOLAM 2 MG: 1 INJECTION INTRAMUSCULAR; INTRAVENOUS at 09:07

## 2017-07-24 RX ADMIN — PHENYLEPHRINE HYDROCHLORIDE 150 MCG: 10 INJECTION INTRAVENOUS at 11:07

## 2017-07-24 RX ADMIN — TOPIRAMATE 25 MG: 25 TABLET, FILM COATED ORAL at 10:07

## 2017-07-24 RX ADMIN — VERAPAMIL HYDROCHLORIDE 180 MG: 180 TABLET, FILM COATED, EXTENDED RELEASE ORAL at 04:07

## 2017-07-24 RX ADMIN — PHENYLEPHRINE HYDROCHLORIDE 150 MCG: 10 INJECTION INTRAVENOUS at 10:07

## 2017-07-24 RX ADMIN — WARFARIN SODIUM 10 MG: 10 TABLET ORAL at 04:07

## 2017-07-24 RX ADMIN — PRIMIDONE 100 MG: 50 TABLET ORAL at 10:07

## 2017-07-24 NOTE — ANESTHESIA RELEASE NOTE
"Anesthesia Release from PACU Note    Patient: Yasmin Asencio    Procedure(s) Performed: Procedure(s) (LRB):  ABLATION (N/A)    Anesthesia type: general    Post pain: Adequate analgesia    Post assessment: no apparent anesthetic complications    Last Vitals:   Visit Vitals  BP (!) 140/65   Pulse 76   Temp 36.4 °C (97.5 °F) (Oral)   Resp 18   Ht 5' 2.5" (1.588 m)   Wt 68.9 kg (152 lb)   LMP  (LMP Unknown)   SpO2 99%   Breastfeeding? No   BMI 27.36 kg/m²       Post vital signs: stable    Level of consciousness: awake    Nausea/Vomiting: no nausea/no vomiting    Complications: none    Airway Patency: patent    Respiratory: unassisted    Cardiovascular: stable and blood pressure at baseline    Hydration: euvolemic  "

## 2017-07-24 NOTE — ANESTHESIA PREPROCEDURE EVALUATION
07/24/2017  Yasmin sAencio is a 66 y.o., female.    Anesthesia Evaluation    I have reviewed the Patient Summary Reports.     I have reviewed the Medications.     Review of Systems  Anesthesia Hx:  History of prior surgery of interest to airway management or planning:  Denies Personal Hx of Anesthesia complications.   Social:  Former Smoker    Cardiovascular:   Hypertension Dysrhythmias    Pulmonary:   COPD    Renal/:   Chronic Renal Disease, CRI    Hepatic/GI:   GERD Liver Disease,    Musculoskeletal:   Arthritis   Spine Disorders: lumbar    Psych:   Psychiatric History anxiety          Physical Exam  General:  Well nourished    Airway/Jaw/Neck:  Airway Findings: Mouth Opening: Normal Tongue: Normal  General Airway Assessment: Adult  Mallampati: III  Improves to II with phonation.  TM Distance: Normal, at least 6 cm  Jaw/Neck Findings:  Neck ROM: Normal ROM      Dental:  Dental Findings: Upper Dentures, Lower Dentures   Chest/Lungs:  Chest/Lungs Findings: Normal Respiratory Rate     Heart/Vascular:  Heart Findings: Rate: Normal        Mental Status:  Mental Status Findings:  Alert and Oriented         Anesthesia Plan  Type of Anesthesia, risks & benefits discussed:  Anesthesia Type:  general  Patient's Preference: Moderate to deep sedation with Dexmedetomidine (for back pain)  Intra-op Monitoring Plan: standard ASA monitors  Intra-op Monitoring Plan Comments:   Post Op Pain Control Plan: IV/PO Opioids PRN  Post Op Pain Control Plan Comments:   Induction:   IV  Beta Blocker:  Patient is not currently on a Beta-Blocker (No further documentation required).       Informed Consent: Patient understands risks and agrees with Anesthesia plan.  Questions answered. Anesthesia consent signed with patient.  ASA Score: 4     Day of Surgery Review of History & Physical:    H&P update referred to the surgeon.      Anesthesia Plan Notes: Some hemoptysis yesterday, resolved now (mild on the spectrum of what she gets).  Breathing feels as good as it gets to patient today. I discussed with patient and  that she is very high risk based upon respiratory status.  Plan natural airway initially with dexmedetomidine sedation, may convert to ETT if needed for patient motion/cough.  Patient requesting armband (in memory of son who  10yrs ago) be kept on. I told her that if we had to remove,  Would tape to shoulder.          Ready For Surgery From Anesthesia Perspective.

## 2017-07-24 NOTE — ANESTHESIA POSTPROCEDURE EVALUATION
"Anesthesia Post Evaluation    Patient: Yasmin Asencio    Procedure(s) Performed: Procedure(s) (LRB):  ABLATION (N/A)    Final Anesthesia Type: general  Patient location during evaluation: PACU  Patient participation: Yes- Able to Participate  Level of consciousness: awake and alert  Post-procedure vital signs: reviewed and stable  Pain management: adequate  Airway patency: patent  PONV status at discharge: No PONV  Anesthetic complications: no      Cardiovascular status: blood pressure returned to baseline  Respiratory status: unassisted  Hydration status: euvolemic  Follow-up not needed.        Visit Vitals  BP (!) 140/65   Pulse 76   Temp 36.4 °C (97.5 °F) (Oral)   Resp 18   Ht 5' 2.5" (1.588 m)   Wt 68.9 kg (152 lb)   LMP  (LMP Unknown)   SpO2 99%   Breastfeeding? No   BMI 27.36 kg/m²       Pain/Agustín Score: Pain Assessment Performed: Yes (7/24/2017  3:30 PM)  Presence of Pain: denies (7/24/2017  3:30 PM)  Agustín Score: 10 (7/24/2017  2:30 PM)      "

## 2017-07-24 NOTE — H&P
"[facility]  Cardiology Electrophysiology  History and Physical     Patient Name: Yasmin Asencio  MRN: [id number]  Admission Date: 7/24/2017  Attending Provider: Marlon Ballesteros MD  Principal Problem: SVT (supraventricular tachycardia)    Patient information was obtained from patient     Subjective:     Chief Complaint:  Palpitations /SVT     HPI: 66 year old female, COPD, bronchiectasis (home O2 frequently), HTN, hx "AF", pulmonary  hemorrhage 11/15, s/p coiling, pseudomonas PNA 2016, s/p extended Abx ( currently on no Abx>followed by ID),  with symptoms of  Palpitations, associated with shortness of breath  for many years.   Metoprolol was held prior to procedure> last dose was on 7/20/17. Pt on coumadin >took 10 mg last night.     Past Medical History:   Diagnosis Date    Acquired bronchiectasis     due to history of TB - followed by pulmonary, Dr. Zuñiga    Allergy     Amblyopia     rt eye per pt    Anemia of other chronic disease     Anticoagulant long-term use     Anxiety     Cataract     Clotting disorder     COPD (chronic obstructive pulmonary disease)     COPD (chronic obstructive pulmonary disease)     Depression     Diverticulosis     Essential tremor     GERD (gastroesophageal reflux disease)     Hemangioma of liver     History of tuberculosis 1978    Hypertension     Mixed anxiety and depressive disorder     Psoriasis     PSVT (paroxysmal supraventricular tachycardia)     Pulmonary embolism     S/P PICC central line placement Apr. 2016 - May 2016    Skin disease     Psoriasis    Spondylosis without myelopathy 8/23/2013    Supraventricular tachycardia     Thoracic aorta atherosclerosis     noted on CT scan of chest 1/3/2011    Tuberculosis     Vaginal delivery     x2    Vitamin D deficiency        Past Surgical History:   Procedure Laterality Date    CATARACT EXTRACTION W/  INTRAOCULAR LENS IMPLANT  07/24/12    od dr [place]    CATARACT EXTRACTION " W/  INTRAOCULAR LENS IMPLANT  08/07/12    left eye    EYE SURGERY      bilateral Cataract    GALLBLADDER SURGERY  9/2011          Review of patient's allergies indicates:   Allergen Reactions    Bactrim [sulfamethoxazole-trimethoprim] Rash     Was hospitalized for rash    Albuterol Other (See Comments)     tremors    Restasis [cyclosporine] Itching        No current facility-administered medications on file prior to encounter.      Current Outpatient Prescriptions on File Prior to Encounter   Medication Sig Dispense Refill    ANORO ELLIPTA 62.5-25 mcg/actuation DsDv       desoximetasone (TOPICORT) 0.25 % cream Apply as directed bid prn 60 g 2    escitalopram oxalate (LEXAPRO) 20 MG tablet Take 1 tablet (20 mg total) by mouth once daily. 30 tablet 4    gabapentin (NEURONTIN) 300 MG capsule Take 1 capsule (300 mg total) by mouth 3 (three) times daily. 90 capsule 2    guaifenesin (MUCINEX) 600 mg 12 hr tablet Take 1,200 mg by mouth 2 (two) times daily.      omeprazole (PRILOSEC) 20 MG capsule Take 1 capsule (20 mg total) by mouth daily as needed (heartburn). 90 capsule 0    primidone (MYSOLINE) 50 MG Tab TAKE ONE TABLET BY MOUTH ONCE DAILY IN THE MORNING, ONE AT NOON, AND TWO AT BEDTIME 120 tablet 11    SODIUM CHLORIDE FOR INHALATION (SODIUM CHLORIDE 3%) 3 % nebulizer solution USE ONE VIAL IN NEBULIZER TWICE DAILY 240 mL 6    topiramate (TOPAMAX) 25 MG tablet Take 1 tablet (25 mg total) by mouth 2 (two) times daily. 180 tablet 3    alprazolam (XANAX) 0.25 MG tablet Take 1 tablet (0.25 mg total) by mouth nightly as needed for Anxiety. 30 tablet 2    desonide (DESOWEN) 0.05 % lotion AAA face bid prn redness, scaling, or itching      ergocalciferol (VITAMIN D2) 50,000 unit Cap Take 1 capsule (50,000 Units total) by mouth every 7 days. 4 capsule 4    metoprolol tartrate (LOPRESSOR) 50 MG tablet Take 1 tablet (50 mg total) by mouth 2 (two) times daily. Do not take for two days prior to ablation procedure.  180 tablet 3    tramadol (ULTRAM) 50 mg tablet Take 50 mg by mouth every 6 (six) hours as needed for Pain.         Family History   Problem Relation Age of Onset    Hypertension Mother     Heart attack Mother     Cancer Father      liver and bladder    Hyperlipidemia Sister     Psoriasis Sister     Cancer Brother      liver    Stroke Maternal Uncle     Cancer Maternal Aunt      stomach    Lung cancer Sister     Heart attack Brother     Heart attack Son     Amblyopia Neg Hx     Blindness Neg Hx     Cataracts Neg Hx     Glaucoma Neg Hx     Macular degeneration Neg Hx     Retinal detachment Neg Hx     Strabismus Neg Hx     Thyroid disease Neg Hx     Melanoma Neg Hx     Lupus Neg Hx     Eczema Neg Hx     COPD Neg Hx        Social History   Substance Use Topics    Smoking status: Former Smoker     Packs/day: 2.00     Years: 50.00     Types: Cigarettes     Quit date: 5/27/2009    Smokeless tobacco: Never Used    Alcohol use No         Review of Systems   Constitution: Negative for fevers/chills.   Positive for easily gets    weak and has malaise/fatigue.   HENT: Negative for ear pain and tinnitus.   Eyes: Negative for blurred vision.   Cardiovascular: Positive for palpitations. Negative for chest pain,  near-syncope, and syncope.   Respiratory:  Positive for shortness of breath on exertion   Endocrine:  Negative for polyuria.   Hematologic/Lymphatic: Negative for bruise/bleed easily.   Skin:  Negative for rash.   Musculoskeletal: Negative for joint pain and muscle weakness.   Extremity: Negative for swelling in the lower extremities.   Gastrointestinal:  Negative for abdominal pain and change in bowel habit.   Genitourinary: Negative for frequency.   Neurological:  Negative for dizziness.   Psychiatric/Behavioral:  Negative for depression, anxiety     Objective:     Temp: 98.5 °F (36.9 °C) (07/24/17 0702)  Pulse: 77 (07/24/17 0702)  Resp: 18 (07/24/17 0702)  BP: (!) 167/72 (07/24/17 0725)  SpO2:  95 % (17 0702)    Vital Signs (24h Range):  Temp:  [98.5 °F (36.9 °C)]   Pulse:  [77]   Resp:  [18]   BP: (167-179)/(72-78)   SpO2:  [95 %]     PE:   General: Well developed, well nourished, No distress on room air   HEENT: Head is normocephalic, atraumatic;  Lungs: Clear to auscultation bilaterally.  No wheezing. Normal respiratory effort.  Cardiovascular:  S1 S2 Normal rate, regular rhythm and normal heart sounds.    PMI is not displaced  Extremities: No LE edema. Pulses 2+ and symmetric.   Abdomen: Abdomen is soft, non-tender non-distended with normal bowel sounds.  Skin: Skin color, texture, turgor normal. No rashes.  Musculoskeletal: normal range of motion   Neurologic: Normal strength and tone. No focal numbness or weakness.   Psychiatric: normal mood and affect.  behavior is normal. Alert and oriented X 3.      Significant Labs:   Lab Results   Component Value Date    WBC 8.04 2017    HGB 10.4 (L) 2017    HCT 33.2 (L) 2017    MCV 92 2017     2017     BMP  Lab Results   Component Value Date     (L) 2017    K 3.9 2017     2017    CO2 25 2017    BUN 19 2017    CREATININE 1.0 2017    CALCIUM 9.0 2017    ANIONGAP 8 2017    ESTGFRAFRICA >60.0 2017    EGFRNONAA 58.4 (A) 2017      Lab Results   Component Value Date    INR 3.1 (H) 2017    INR 3.0 2017    INR 2.6 (H) 2017       Significant Imaging: reviewed     CONCLUSIONS     1 - Low normal left ventricular systolic function (EF 50-55%).     2 - Eccentric hypertrophy.     3 - Left ventricular diastolic dysfunction.     4 - Mild left atrial enlargement.     5 - Mild mitral regurgitation.     6 - Trivial tricuspid regurgitation.       This document has been electronically    SIGNED BY: Taurus Braga MD On: 2016 12:45    EK2017 NSR at 76 BPM     Assessment and Plan:     66 year old female, COPD, bronchiectasis (home  "O2 frequently), HTN, hx "AF", pulmonary  hemorrhage 11/15, s/p coiling, pseudomonas PNA 2016, s/p extended Abx,  with symptoms of  palpitations for many years.   Metoprolol was held prior to procedure> last dose was on 7/20/17     EPS/RFA of SVT  Sedation per anesthesia.    Prior to procedure, extensive discussion with patient regarding risks and benefits of  Ablation by staff MD Ballesteros, and patient  would like to proceed.  Risks of procedure include but are not limited to the risk of infection, bleeding, stroke, paralysis,  MI, death,  perforation requiring emergency draining or surgery, AV block, possibility for need for  pacemaker implantation.  The patient and spouse voices understanding and all questions have been answered. No further questions/concerns voiced at this time.      Signed:  MARSHA Watts-BC  Cardiology Electrophysiology  NP   [facility]    Attending: MD Lesli Mason     "

## 2017-07-24 NOTE — NURSING TRANSFER
Nursing Transfer Note      7/24/2017     Transfer To: 315    Transfer via stretcher    Transfer with cardiac monitoring and 2 liters nc    Transported by rn    Medicines sent: none    Chart send with patient: Yes    Notified: nurse    Patient reassessed at: see epic (date, time)

## 2017-07-24 NOTE — TRANSFER OF CARE
"Anesthesia Transfer of Care Note    Patient: Yasmin Asencio    Procedure(s) Performed: Procedure(s) (LRB):  ABLATION (N/A)    Patient location: PACU    Anesthesia Type: general    Transport from OR: Transported from OR on 6-10 L/min O2 by face mask with adequate spontaneous ventilation    Post pain: adequate analgesia    Post assessment: no apparent anesthetic complications    Post vital signs: stable    Level of consciousness: awake, alert and oriented    Nausea/Vomiting: no nausea/vomiting    Complications: none    Transfer of care protocol was followed      Last vitals:   Visit Vitals  BP (!) 167/72 (BP Location: Left arm, Patient Position: Lying, BP Method: Automatic)   Pulse 77   Temp 36.9 °C (98.5 °F) (Oral)   Resp 18   Ht 5' 2.5" (1.588 m)   Wt 68.9 kg (152 lb)   LMP  (LMP Unknown)   SpO2 95%   Breastfeeding? No   BMI 27.36 kg/m²     "

## 2017-07-24 NOTE — PLAN OF CARE
Received report from Jarett Canchola. Patient s/p ablation, AAOx3. VSS, no c/o pain or discomfort at this time, resp even and unlabored. Gauze/tegaderm dressing to meir groin is CDI. No active bleeding. No hematoma noted. Post procedure protocol reviewed with patient and patient's family. Understanding verbalized. Family members at bedside. Nurse call bell within reach. Will continue to monitor per post procedure protocol.

## 2017-07-24 NOTE — PROGRESS NOTES
Patient admitted to recovery see [facility] for complete assessment pacu bcg's maintained safety measures verified patient instructed on pain scale and patient verbalized understanding also called for patient's ekg and called patient's  and updated on patient location with the permission of patient.

## 2017-07-24 NOTE — PROGRESS NOTES
Pt is AAOx4 and complains of 3 out of 10 pain to chest that she states is chronic due to COPD.  She states that she coughed up blood tinged mucus yesterday; Dr. Perdomo with anesthesia aware.  Course crackles noted to bilateral lung bases posteriorly.  Pt states she wears oxygen at night.  Admit questions completed.   at bedside.  Will continue to monitor.

## 2017-07-25 VITALS
BODY MASS INDEX: 26.93 KG/M2 | HEIGHT: 63 IN | RESPIRATION RATE: 18 BRPM | SYSTOLIC BLOOD PRESSURE: 121 MMHG | OXYGEN SATURATION: 96 % | HEART RATE: 73 BPM | TEMPERATURE: 98 F | WEIGHT: 152 LBS | DIASTOLIC BLOOD PRESSURE: 59 MMHG

## 2017-07-25 PROCEDURE — 94640 AIRWAY INHALATION TREATMENT: CPT

## 2017-07-25 PROCEDURE — 25000003 PHARM REV CODE 250: Performed by: NURSE PRACTITIONER

## 2017-07-25 PROCEDURE — 25000242 PHARM REV CODE 250 ALT 637 W/ HCPCS: Performed by: NURSE PRACTITIONER

## 2017-07-25 RX ORDER — VERAPAMIL HYDROCHLORIDE 180 MG/1
180 TABLET, FILM COATED, EXTENDED RELEASE ORAL DAILY
Qty: 30 TABLET | Refills: 7 | Status: SHIPPED | OUTPATIENT
Start: 2017-07-25 | End: 2017-07-26

## 2017-07-25 RX ADMIN — GABAPENTIN 300 MG: 300 CAPSULE ORAL at 06:07

## 2017-07-25 RX ADMIN — ESCITALOPRAM OXALATE 20 MG: 10 TABLET ORAL at 09:07

## 2017-07-25 RX ADMIN — TOPIRAMATE 25 MG: 25 TABLET, FILM COATED ORAL at 09:07

## 2017-07-25 RX ADMIN — IPRATROPIUM BROMIDE 0.5 MG: 0.5 SOLUTION RESPIRATORY (INHALATION) at 04:07

## 2017-07-25 RX ADMIN — PRIMIDONE 50 MG: 50 TABLET ORAL at 06:07

## 2017-07-25 RX ADMIN — SODIUM CHLORIDE SOLN NEBU 3% 4 ML: 3 NEBU SOLN at 08:07

## 2017-07-25 RX ADMIN — PANTOPRAZOLE SODIUM 40 MG: 40 TABLET, DELAYED RELEASE ORAL at 09:07

## 2017-07-25 NOTE — PLAN OF CARE
Problem: Patient Care Overview  Goal: Plan of Care Review  Outcome: Outcome(s) achieved Date Met: 07/25/17  Plan of care reviewed with pt and understanding verbalized. Fall reduction measures in place, bed in lowest position, wheels locked, upper side rails raised. Cardiac rhythm being monitored. Pt denies pain or discomfort.

## 2017-07-25 NOTE — NURSING
Pt discharged home. VSS. AVS reviewed and understanding verbalized by patient. IV removed, tip intact. Cardiac monitor removed. Pt waiting for escort to arrive.

## 2017-07-25 NOTE — DISCHARGE SUMMARY
"[facility]  Cardiac Electrophysiology  Discharge Summary      Patient Name: Yasmin Asencio  MRN: [id number]  Admission Date: 7/24/2017  Hospital Length of Stay: 0 days  Discharge Date and Time:  07/25/2017 8:29 AM  Attending Physician: Marlon Ballesteros MD    Discharging Provider: Brigitte Moses NP  Primary Care Physician: Urmila Luna MD    HPI: 66 year old female, COPD, bronchiectasis (home O2 frequently), HTN, hx "AF", pulmonary  hemorrhage 11/15, s/p coiling, pseudomonas PNA 2016, s/p extended Abx ( currently on no Abx>followed by ID),  with symptoms of  Palpitations, associated with shortness of breath  for many years, presented for planned outpatient EPS/SVT RFA     Procedure(s) (LRB):  ABLATION (N/A)     Hospital Course: Pt underwent RFA SVT ( see procedure note for details),  tolerated procedure with no acute complications. Monitored overnight, Bilateral groins with no bleeding or hematoma > ambulating  with no issues . Pt to switch from metoprolol to long acting verapamil 180 mg daily. Pt to follow up with MD Lesli Mason  In 6  weeks.   Discharge plans/instructions discussed with patient and spouse who verbalized understanding  and agreement of plans of care.     Consults : anesthesia       Final Active Diagnoses:    Diagnosis Date Noted POA    PRINCIPAL PROBLEM:  SVT (supraventricular tachycardia) [I47.1] 07/24/2017 Yes      Problems Resolved During this Admission:    Diagnosis Date Noted Date Resolved POA       Discharged Condition: good    Disposition: Home or Self Care    Follow Up:  Follow-up Information     Marlon Ballesteros MD In 6 weeks.    Specialties:  Cardiology, Electrophysiology  Contact information:  [address] [place] [address] [id number] [phone]                 Patient Instructions:     Diet general     Lifting restrictions   Scheduling Instructions: No lifting more than 5- 10 pounds in 1 week     Call MD for:  temperature >100.4     Call MD for:  persistent " nausea and vomiting or diarrhea     Call MD for:  severe uncontrolled pain     Call MD for:  difficulty breathing or increased cough     Call MD for:  severe persistent headache     Call MD for:  worsening rash     Call MD for:  persistent dizziness, light-headedness, or visual disturbances     Call MD for:  increased confusion or weakness     Call MD for:   Scheduling Instructions: For any concerning medical symptoms     No dressing needed       Medications:  Reconciled Home Medications:   Current Discharge Medication List      START taking these medications    Details   verapamil (CALAN-SR) 180 MG CR tablet Take 1 tablet (180 mg total) by mouth once daily.  Qty: 30 tablet, Refills: 7         CONTINUE these medications which have NOT CHANGED    Details   ANORO ELLIPTA 62.5-25 mcg/actuation DsDv       desoximetasone (TOPICORT) 0.25 % cream Apply as directed bid prn  Qty: 60 g, Refills: 2    Associated Diagnoses: Rash      escitalopram oxalate (LEXAPRO) 20 MG tablet Take 1 tablet (20 mg total) by mouth once daily.  Qty: 30 tablet, Refills: 4      gabapentin (NEURONTIN) 300 MG capsule Take 1 capsule (300 mg total) by mouth 3 (three) times daily.  Qty: 90 capsule, Refills: 2    Associated Diagnoses: Acute left-sided low back pain with sciatica, sciatica laterality unspecified      guaifenesin (MUCINEX) 600 mg 12 hr tablet Take 1,200 mg by mouth 2 (two) times daily.      ipratropium (ATROVENT) 0.02 % nebulizer solution USE ONE VIAL IN NEBULIZER 4 TIMES DAILY  Qty: 225 vial, Refills: 3    Associated Diagnoses: Pulmonary emphysema; Hemoptysis      omeprazole (PRILOSEC) 20 MG capsule Take 1 capsule (20 mg total) by mouth daily as needed (heartburn).  Qty: 90 capsule, Refills: 0      primidone (MYSOLINE) 50 MG Tab TAKE ONE TABLET BY MOUTH ONCE DAILY IN THE MORNING, ONE AT NOON, AND TWO AT BEDTIME  Qty: 120 tablet, Refills: 11      SODIUM CHLORIDE FOR INHALATION (SODIUM CHLORIDE 3%) 3 % nebulizer solution USE ONE VIAL IN  NEBULIZER TWICE DAILY  Qty: 240 mL, Refills: 6    Associated Diagnoses: Acquired bronchiectasis      topiramate (TOPAMAX) 25 MG tablet Take 1 tablet (25 mg total) by mouth 2 (two) times daily.  Qty: 180 tablet, Refills: 3      warfarin (COUMADIN) 5 MG tablet Take 2 tablets (10 mg total) by mouth Daily. as directed by [facility].  Qty: 60 tablet, Refills: 3    Comments: Dose adjustment. Needs bigger quantity.  Associated Diagnoses: PSVT (paroxysmal supraventricular tachycardia); Long term (current) use of anticoagulants; Palpitations      alprazolam (XANAX) 0.25 MG tablet Take 1 tablet (0.25 mg total) by mouth nightly as needed for Anxiety.  Qty: 30 tablet, Refills: 2    Associated Diagnoses: Mixed anxiety and depressive disorder      desonide (DESOWEN) 0.05 % lotion AAA face bid prn redness, scaling, or itching      ergocalciferol (VITAMIN D2) 50,000 unit Cap Take 1 capsule (50,000 Units total) by mouth every 7 days.  Qty: 4 capsule, Refills: 4      tramadol (ULTRAM) 50 mg tablet Take 50 mg by mouth every 6 (six) hours as needed for Pain.         STOP taking these medications       metoprolol tartrate (LOPRESSOR) 50 MG tablet Comments:   Reason for Stopping:               MARSHA Watts-BC  Cardiology Electrophysiology  NP   [facility]    Attending: MD Marlon Ballesteros

## 2017-07-26 ENCOUNTER — ANTI-COAG VISIT (OUTPATIENT)
Dept: CARDIOLOGY | Facility: CLINIC | Age: 67
End: 2017-07-26

## 2017-07-26 DIAGNOSIS — I47.10 SVT (SUPRAVENTRICULAR TACHYCARDIA): Primary | ICD-10-CM

## 2017-07-26 DIAGNOSIS — Z79.01 LONG TERM (CURRENT) USE OF ANTICOAGULANTS: ICD-10-CM

## 2017-07-26 RX ORDER — VERAPAMIL HYDROCHLORIDE 120 MG/1
120 CAPSULE, EXTENDED RELEASE ORAL DAILY
Qty: 90 CAPSULE | Refills: 3 | Status: SHIPPED | OUTPATIENT
Start: 2017-07-26 | End: 2017-11-03 | Stop reason: SDUPTHER

## 2017-07-26 NOTE — PROGRESS NOTES
Admitted 7/24 [facility] regarding Ablation SVT (supraventricular tachycardia) , than discharged 7/25.  Patient has started  verapamil (CALAN-SR) 180 MG one tablet daily.

## 2017-07-26 NOTE — PROGRESS NOTES
The pt was recently admitted from 7/24 through 7/25 for planned outpatient EPS/SVT RFA.  See calendar for recent INRs and warfarin dosing.  She was discharged with verapamil.

## 2017-07-27 ENCOUNTER — TELEPHONE (OUTPATIENT)
Dept: ELECTROPHYSIOLOGY | Facility: CLINIC | Age: 67
End: 2017-07-27

## 2017-07-27 DIAGNOSIS — I47.10 SVT (SUPRAVENTRICULAR TACHYCARDIA): Primary | ICD-10-CM

## 2017-07-27 NOTE — TELEPHONE ENCOUNTER
Called Pt and updated her on Dr Ballesteros's recommendations. Advised the Pt that someone would be calling to schedule the test and follow up appt.Pt voiced understanding.

## 2017-07-27 NOTE — TELEPHONE ENCOUNTER
----- Message from Marlon Ballesteros MD sent at 7/27/2017 12:37 PM CDT -----  Contact: pt   no verapamil, and please bring her in for an ECG and to see either me or Joyce VELOZ    ----- Message -----  From: Marie Gaines RN  Sent: 7/27/2017  11:08 AM  To: Marlon Ballesteros MD     Pt called back this morning. She states she just doesn't feel right. She is extremely SOB with min exertion. She states I wasn't like that before the procedure. She states my whole body just shakes more than usual and I am so fatigued.  Her B/P is higher than yesterday at 141/54  After being off verapamil for 2 days now. But her HR is still 43. The pharmacy will have her verapamil 120 mg in today. Should she start her verapamil??  ----- Message -----  From: Vicki Mathias  Sent: 7/27/2017   9:50 AM  To: Marie Gaines RN    PT called because she had an ablation Monday and she was prescribed a new medication.  She tried to have it filled at the pharmacy but they did not have it and had to order it.  She also has been not feeling well.  She is complaining of being shaky and she has had a low heart rate (43).  She took her blood pressure and it was 141/54 also some shortness of breath even though her oxygen level is fine. She can be reached @ [phone].  Thanks!!

## 2017-07-28 ENCOUNTER — OFFICE VISIT (OUTPATIENT)
Dept: NEUROLOGY | Facility: CLINIC | Age: 67
End: 2017-07-28
Payer: MEDICARE

## 2017-07-28 VITALS
DIASTOLIC BLOOD PRESSURE: 68 MMHG | HEIGHT: 63 IN | WEIGHT: 154.13 LBS | BODY MASS INDEX: 27.31 KG/M2 | SYSTOLIC BLOOD PRESSURE: 124 MMHG | HEART RATE: 84 BPM

## 2017-07-28 DIAGNOSIS — G25.0 ESSENTIAL TREMOR: Primary | ICD-10-CM

## 2017-07-28 DIAGNOSIS — M54.40 ACUTE LEFT-SIDED LOW BACK PAIN WITH SCIATICA, SCIATICA LATERALITY UNSPECIFIED: ICD-10-CM

## 2017-07-28 DIAGNOSIS — G20.C PARKINSONISM, UNSPECIFIED PARKINSONISM TYPE: ICD-10-CM

## 2017-07-28 PROCEDURE — 1126F AMNT PAIN NOTED NONE PRSNT: CPT | Mod: S$GLB,,, | Performed by: PSYCHIATRY & NEUROLOGY

## 2017-07-28 PROCEDURE — 99999 PR PBB SHADOW E&M-EST. PATIENT-LVL III: CPT | Mod: PBBFAC,,, | Performed by: PSYCHIATRY & NEUROLOGY

## 2017-07-28 PROCEDURE — 1157F ADVNC CARE PLAN IN RCRD: CPT | Mod: S$GLB,,, | Performed by: PSYCHIATRY & NEUROLOGY

## 2017-07-28 PROCEDURE — 99214 OFFICE O/P EST MOD 30 MIN: CPT | Mod: S$GLB,,, | Performed by: PSYCHIATRY & NEUROLOGY

## 2017-07-28 PROCEDURE — 1159F MED LIST DOCD IN RCRD: CPT | Mod: S$GLB,,, | Performed by: PSYCHIATRY & NEUROLOGY

## 2017-07-28 RX ORDER — CARBIDOPA AND LEVODOPA 25; 100 MG/1; MG/1
1 TABLET ORAL 3 TIMES DAILY
Qty: 90 TABLET | Refills: 5 | Status: SHIPPED | OUTPATIENT
Start: 2017-07-28 | End: 2017-09-12 | Stop reason: SINTOL

## 2017-07-28 RX ORDER — GABAPENTIN 300 MG/1
300 CAPSULE ORAL 3 TIMES DAILY
Qty: 270 CAPSULE | Refills: 3 | Status: SHIPPED | OUTPATIENT
Start: 2017-07-28 | End: 2018-05-03 | Stop reason: SDUPTHER

## 2017-07-28 NOTE — LETTER
July 28, 2017      Urmila Luna MD  [address]  [place] [address]           [facility]  [address]  [place] [address]  Phone: [phone]  Fax: [phone]          Patient: Yasmin Asencio   MR Number: [id number]   YOB: 1950   Date of Visit: 7/28/2017       Dear Dr. Urmila Luna:    Thank you for referring Yasmin Asencio to me for evaluation. Attached you will find relevant portions of my assessment and plan of care.    If you have questions, please do not hesitate to call me. I look forward to following Yasmin Asencio along with you.    Sincerely,    Olu Juarez MD    Enclosure  CC:  No Recipients    If you would like to receive this communication electronically, please contact [email or web address] or [phone] to request more information on [employer] access.    For providers and/or their staff who would like to refer a patient to [facility], please contact us through our one-stop-shop provider referral line, [facility] [place], at [phone].    If you feel you have received this communication in error or would no longer like to receive these types of communications, please e-mail [email or web address]

## 2017-07-28 NOTE — PATIENT INSTRUCTIONS
Carbidopa/levodopa 25/100 titration    Week 1: Take 1/2 tab in AM and 1/2 tab at noon and 1/2 tab in PM    Week 2: Take 1 tab in AM and 1/2 tab at noon and 1/2 tab in PM    Week 3: Take 1 tab in AM and 1 tab at noon and 1/2 tab in PM    Week 4: Take 1 tab in AM and 1 tab at noon and 1 tab in PM    If adequately improved, can stop and maintain at any level of the titration.  Call me with an update anytime.    Olu Juarez MD, MPH  Division of Movement and Memory Disorders  [facility]

## 2017-07-28 NOTE — PROGRESS NOTES
"Name: Yasmin Asencio  MRN: [id number]   CSN: [id number]      Date: 2017    Referring physician:  Urmila Luna MD  [address] [facility]  [place] [address]    Chief Complaint / Interval History:  Tremor    Interval History:  - has limb tremor a little worse, some resting component of the R foot  - feels more internal tremor now, even when her hands are still  - thinks she doesn't need lexapro - reportedly for anxiety, which she denies now  - hands are a bit slower    From Dec 2016:  - Accompanied by  today.   - Overall she feels like she is doing okay  - Speech and memory may be getting worse   - Would like to know if she needs a functional evaluation   - still having trouble naming and word finding difficulties  - balance is good  - Tremor is well controlled   - Feels sleepy all the time    History of Present Illness (HPI):  65 you primary complaint of tremors with activity, gives examples of poor writing.  First developed over the last 2-3 years  Also says her speech is "drawn" out and can here the breaks in her voice.  Mother  at age 53 (cards), she had sister (50s) and brother ( 70s).  Father  at age 66.  Two brothers ( 61, 58 with tremors, two sisters 62 and 58)    Past Medical History: The patient  has a past medical history of Acquired bronchiectasis; Allergy; Amblyopia; Anemia of other chronic disease; Anticoagulant long-term use; Anxiety; Cataract; Clotting disorder; COPD (chronic obstructive pulmonary disease); COPD (chronic obstructive pulmonary disease); Depression; Diverticulosis; Essential tremor; GERD (gastroesophageal reflux disease); Hemangioma of liver; History of tuberculosis (); Hypertension; Mixed anxiety and depressive disorder; Psoriasis; PSVT (paroxysmal supraventricular tachycardia); Pulmonary embolism; S/P PICC central line placement (2016 - May 2016); Skin disease; Spondylosis without myelopathy (2013); Supraventricular tachycardia; Thoracic " aorta atherosclerosis; Tuberculosis; Vaginal delivery; and Vitamin D deficiency.    Social History: The patient  reports that she quit smoking about 8 years ago. Her smoking use included Cigarettes. She has a 100.00 pack-year smoking history. She has never used smokeless tobacco. She reports that she does not drink alcohol or use drugs.    Family History: Their family history includes Cancer in her brother, father, and maternal aunt; Heart attack in her brother, mother, and son; Hyperlipidemia in her sister; Hypertension in her mother; Lung cancer in her sister; Psoriasis in her sister; Stroke in her maternal uncle.    Allergies: Bactrim [sulfamethoxazole-trimethoprim]; Albuterol; and Restasis [cyclosporine]     Meds:   Current Outpatient Prescriptions on File Prior to Visit   Medication Sig Dispense Refill    alprazolam (XANAX) 0.25 MG tablet Take 1 tablet (0.25 mg total) by mouth nightly as needed for Anxiety. 30 tablet 2    ANORO ELLIPTA 62.5-25 mcg/actuation DsDv       desoximetasone (TOPICORT) 0.25 % cream Apply as directed bid prn 60 g 2    ergocalciferol (VITAMIN D2) 50,000 unit Cap Take 1 capsule (50,000 Units total) by mouth every 7 days. 4 capsule 4    escitalopram oxalate (LEXAPRO) 20 MG tablet Take 1 tablet (20 mg total) by mouth once daily. 30 tablet 4    gabapentin (NEURONTIN) 300 MG capsule Take 1 capsule (300 mg total) by mouth 3 (three) times daily. 90 capsule 2    guaifenesin (MUCINEX) 600 mg 12 hr tablet Take 1,200 mg by mouth 2 (two) times daily.      ipratropium (ATROVENT) 0.02 % nebulizer solution USE ONE VIAL IN NEBULIZER 4 TIMES DAILY 225 vial 3    omeprazole (PRILOSEC) 20 MG capsule Take 1 capsule (20 mg total) by mouth daily as needed (heartburn). 90 capsule 0    primidone (MYSOLINE) 50 MG Tab TAKE ONE TABLET BY MOUTH ONCE DAILY IN THE MORNING, ONE AT NOON, AND TWO AT BEDTIME 120 tablet 11    SODIUM CHLORIDE FOR INHALATION (SODIUM CHLORIDE 3%) 3 % nebulizer solution USE ONE VIAL  "IN NEBULIZER TWICE DAILY 240 mL 6    topiramate (TOPAMAX) 25 MG tablet Take 1 tablet (25 mg total) by mouth 2 (two) times daily. 180 tablet 3    tramadol (ULTRAM) 50 mg tablet Take 50 mg by mouth every 6 (six) hours as needed for Pain.      warfarin (COUMADIN) 5 MG tablet Take 2 tablets (10 mg total) by mouth Daily. as directed by Coumadin Clinic. 60 tablet 3    desonide (DESOWEN) 0.05 % lotion AAA face bid prn redness, scaling, or itching      verapamil (VERELAN) 120 MG C24P Take 1 capsule (120 mg total) by mouth once daily. 90 capsule 3     No current facility-administered medications on file prior to visit.       Primidone 50mg, one tab in the morning, one at noon and two at bedtime  Topamax 25mg TWICE DAILY  Gabapentin 300mg TID     Exam:  /68   Pulse 84   Ht 5' 2.5" (1.588 m)   Wt 69.9 kg (154 lb 1.6 oz)   LMP  (LMP Unknown)   BMI 27.74 kg/m²     * Specialized movement exam  No hypophonic speech.    No facial masking.   No cogwheel rigidity.     Mild-mod bradykinesia.   Slight High freq low amp tremor of hands with posture  and kinesis.  Has voice tremor too.     R>L postural hand tremor.   No tremor with rest intention.    No other dystonia, chorea, athetosis, myoclonus, or tics.   No motor impersistence.   Normal-based gait.   No shortened stride length.   Sl dim abnormal arm swing.     No postural instability.          Lab Results   Component Value Date    TSH 1.096 2017         Diagnoses:          1) Essential tremor affeciting hands, voice and jaw.  A bit worse.  Combined with more imbalance now.  2) Memory impairment is mild and stable - affirmed by NP testing.  3) Mild parkinsonism?  trialing ldopa to help tremor.  4) SSRI - wants to get off.     Medical Decision Makin) Carbidopa/levodopa 25/100 titration    Week 1: Take 1/2 tab in AM and 1/2 tab at noon and 1/2 tab in PM  Week 2: Take 1 tab in AM and 1/2 tab at noon and 1/2 tab in PM  Week 3: Take 1 tab in AM and 1 tab at noon " and 1/2 tab in PM  Week 4: Take 1 tab in AM and 1 tab at noon and 1 tab in PM    If adequately improved, can stop and maintain at any level of the titration.  Call me with an update anytime.    2) Gabapentin refill  3) Follow-up Kristi in 6 weeks..  Might consider lexapro wean at that time.      Olu Juarez MD, MPH  Division of Movement and Memory Disorders  [facility]

## 2017-08-01 ENCOUNTER — HOSPITAL ENCOUNTER (OUTPATIENT)
Dept: CARDIOLOGY | Facility: CLINIC | Age: 67
Discharge: HOME | End: 2017-08-01
Payer: MEDICARE

## 2017-08-01 ENCOUNTER — OFFICE VISIT (OUTPATIENT)
Dept: ELECTROPHYSIOLOGY | Facility: CLINIC | Age: 67
End: 2017-08-01
Payer: MEDICARE

## 2017-08-01 VITALS
HEART RATE: 74 BPM | HEIGHT: 62 IN | BODY MASS INDEX: 28.07 KG/M2 | WEIGHT: 152.56 LBS | DIASTOLIC BLOOD PRESSURE: 66 MMHG | SYSTOLIC BLOOD PRESSURE: 140 MMHG

## 2017-08-01 DIAGNOSIS — K21.9 GASTROESOPHAGEAL REFLUX DISEASE WITHOUT ESOPHAGITIS: Chronic | ICD-10-CM

## 2017-08-01 DIAGNOSIS — N18.30 BENIGN HYPERTENSION WITH CHRONIC KIDNEY DISEASE, STAGE III: ICD-10-CM

## 2017-08-01 DIAGNOSIS — Z86.79 STATUS POST CATHETER ABLATION OF SLOW PATHWAY: ICD-10-CM

## 2017-08-01 DIAGNOSIS — J43.8 OTHER EMPHYSEMA: ICD-10-CM

## 2017-08-01 DIAGNOSIS — Z98.890 STATUS POST CATHETER ABLATION OF SLOW PATHWAY: ICD-10-CM

## 2017-08-01 DIAGNOSIS — I12.9 BENIGN HYPERTENSION WITH CHRONIC KIDNEY DISEASE, STAGE III: ICD-10-CM

## 2017-08-01 DIAGNOSIS — I47.10 SVT (SUPRAVENTRICULAR TACHYCARDIA): ICD-10-CM

## 2017-08-01 DIAGNOSIS — I47.19 ATRIAL TACHYCARDIA: ICD-10-CM

## 2017-08-01 DIAGNOSIS — I47.10 PSVT (PAROXYSMAL SUPRAVENTRICULAR TACHYCARDIA): Primary | ICD-10-CM

## 2017-08-01 DIAGNOSIS — Z79.01 CURRENT USE OF LONG TERM ANTICOAGULATION: ICD-10-CM

## 2017-08-01 DIAGNOSIS — I50.22 CHRONIC SYSTOLIC HEART FAILURE: ICD-10-CM

## 2017-08-01 DIAGNOSIS — I47.19 ECTOPIC ATRIAL TACHYCARDIA: ICD-10-CM

## 2017-08-01 LAB
DIASTOLIC DYSFUNCTION: NO
ESTIMATED PA SYSTOLIC PRESSURE: 26.43
RETIRED EF AND QEF - SEE NOTES: 55 (ref 55–65)
TRICUSPID VALVE REGURGITATION: NORMAL

## 2017-08-01 PROCEDURE — 99999 PR PBB SHADOW E&M-EST. PATIENT-LVL III: CPT | Mod: PBBFAC,,, | Performed by: NURSE PRACTITIONER

## 2017-08-01 PROCEDURE — 1157F ADVNC CARE PLAN IN RCRD: CPT | Mod: S$GLB,,, | Performed by: NURSE PRACTITIONER

## 2017-08-01 PROCEDURE — 93306 TTE W/DOPPLER COMPLETE: CPT | Mod: S$GLB,,, | Performed by: INTERNAL MEDICINE

## 2017-08-01 PROCEDURE — 93000 ELECTROCARDIOGRAM COMPLETE: CPT | Mod: S$GLB,,, | Performed by: INTERNAL MEDICINE

## 2017-08-01 PROCEDURE — 99214 OFFICE O/P EST MOD 30 MIN: CPT | Mod: S$GLB,,, | Performed by: NURSE PRACTITIONER

## 2017-08-01 PROCEDURE — 99499 UNLISTED E&M SERVICE: CPT | Mod: S$GLB,,, | Performed by: NURSE PRACTITIONER

## 2017-08-01 PROCEDURE — 1125F AMNT PAIN NOTED PAIN PRSNT: CPT | Mod: S$GLB,,, | Performed by: NURSE PRACTITIONER

## 2017-08-01 PROCEDURE — 1159F MED LIST DOCD IN RCRD: CPT | Mod: S$GLB,,, | Performed by: NURSE PRACTITIONER

## 2017-08-01 NOTE — PROGRESS NOTES
"Subjective:    Patient ID:  Yasmin Asencio is a 66 y.o. female who presents for follow-up of pSVT.     Yasmin Asencio is a patient of Dr. Ballesteros.    HPI     Ms. Asencio is a 66 y.o. female with pSVT s/p recent slow-pathway RFA, HFpEF (EF 50-55%), HTN, COPD (frequent home O2 use), a hx of pulmonary hemorrhage s/p coiling, GERD, and CKD III. Ms. Asencio has chronic slurred speech, which is thought to be 2/2 an essential tremor (per Neurology). She has a hx of "AF" destini-lung-coiling procedure (no documentation). Ms. Asencio has a long-standing hx of intermittent palpitations (with HRs >140 bpm), dating back 10+ years, typically occurring 1 x/month. During episodes, Ms. Asencio has typically noted associated blurred vision and chest palpitations w/radiation to the neck; in the past, she has stated that her "blood runs cold," with the episode lasting 20-35 minutes in duration.    Ms. Asencio underwent a successful slow-pathway RFA (07/24/17); EPS revealed the clinical SVT and dual AV jennifer pathway physiology s/p successful elimination of the slow-pathway via ablation. A second tachycardia, most consistent with a focal AT (arising from somewhere near the coronary sinus os) was noted; not specifically mapped or ablated during the procedure 2/2 the fact that it was not repeatedly inducible.    Since the procedure, Ms. Asencio reports feeling well for the most part. Post-procedure, she had taken Verapamil 180 mg as prescribed, and noted her BP to be in the 100s/40s; she held the Verapamil (as directed) and an Echo was ordered. Since being off the CCB, Ms. Asencio reports experiencing occasional "flutters," lasing seconds in duration, occurring throughout the day; BPs 130s-150s/60s-70s. She denies typical chest pain, SOB/CH, dizziness, or syncope.     Recent cardiac studies:  LHC (06/2016) negative for evidence of ischemia.   Echo (11/29/16) revealed an EF of 50-55%.             (08/01/17) revealed " an EF of 55-60%, normal left ventricular diastolic function, normal right ventricular systolic function, and a PASP of 26 mmHg.   48-Hour Holter (02/13/17) revealed SR w/HRs varying between  BPM; no SVT.  Event monitor (03/09/17) revealed regular NCT (-440 ms).     I reviewed today's ECG which demonstrated NSR at 74 bpm; , QRS 78, and QTc 415.    Review of Systems   Constitution: Negative for diaphoresis and malaise/fatigue.   HENT: Negative for headaches and nosebleeds.    Eyes: Negative for double vision.   Cardiovascular: Positive for palpitations. Negative for chest pain, dyspnea on exertion, irregular heartbeat, near-syncope and syncope.   Respiratory: Negative for shortness of breath.    Skin: Negative.    Musculoskeletal: Positive for stiffness.   Gastrointestinal: Negative for abdominal pain, hematemesis and hematochezia.   Genitourinary: Negative for hematuria.   Neurological: Negative for dizziness and light-headedness.   Psychiatric/Behavioral: Negative for altered mental status.        Objective:    Physical Exam   Constitutional: She is oriented to person, place, and time. She appears well-developed and well-nourished.   HENT:   Head: Normocephalic and atraumatic.   Eyes: Pupils are equal, round, and reactive to light.   Cardiovascular: Normal rate, regular rhythm, normal heart sounds and intact distal pulses.    Pulmonary/Chest: Effort normal and breath sounds normal.   Neurological: She is alert and oriented to person, place, and time.   Skin: She is not diaphoretic.   Vitals reviewed.        Assessment:       1. PSVT (paroxysmal supraventricular tachycardia)    2. Ectopic atrial tachycardia    3. Status post catheter ablation of slow pathway    4. Chronic systolic heart failure    5. Benign hypertension with chronic kidney disease, stage III    6. Other emphysema    7. Gastroesophageal reflux disease without esophagitis    8. Current use of long term anticoagulation         Plan:        In summary, Ms. Asencio is a 66 y.o. female with pSVT s/p recent slow-pathway RFA, HFpEF (EF 50-55%), HTN, COPD, a hx of pulmonary hemorrhage s/p coiling, GERD, and CKD III. Ms. Asencio's recent Echo revealed normal heart structure and function.     Re-start Verapamil 120 mg PO QD (Rx already at pharmacy).   48-Hour Holter in 2 days to evaluate rate and rhythm.   Follow up in EP clinic in 1 week, sooner as needed.     Joyce Cronin, MN, APRN, FNP-C         Case discussed with Dr. Ballesteros who agrees with the aforementioned plan.

## 2017-08-02 ENCOUNTER — ANTI-COAG VISIT (OUTPATIENT)
Dept: CARDIOLOGY | Facility: CLINIC | Age: 67
End: 2017-08-02
Payer: MEDICARE

## 2017-08-02 DIAGNOSIS — Z79.01 CURRENT USE OF LONG TERM ANTICOAGULATION: ICD-10-CM

## 2017-08-02 LAB — INR PPP: 3.7 (ref 2–3)

## 2017-08-02 PROCEDURE — 99211 OFF/OP EST MAY X REQ PHY/QHP: CPT | Mod: 25,S$GLB,,

## 2017-08-02 PROCEDURE — 85610 PROTHROMBIN TIME: CPT | Mod: QW,S$GLB,,

## 2017-08-02 NOTE — PROGRESS NOTES
INR increased. Patient is now on verapamil and Sinemet. No interactions expected. She reports increased anxiety. No other changes. No signs or symptoms of bleeding. We will decrease dose and repeat INR next week.

## 2017-08-04 ENCOUNTER — CLINICAL SUPPORT (OUTPATIENT)
Dept: CARDIOLOGY | Facility: CLINIC | Age: 67
End: 2017-08-04
Payer: MEDICARE

## 2017-08-04 DIAGNOSIS — Z98.890 STATUS POST CATHETER ABLATION OF SLOW PATHWAY: ICD-10-CM

## 2017-08-04 DIAGNOSIS — I47.19 ECTOPIC ATRIAL TACHYCARDIA: ICD-10-CM

## 2017-08-04 DIAGNOSIS — I47.10 PSVT (PAROXYSMAL SUPRAVENTRICULAR TACHYCARDIA): ICD-10-CM

## 2017-08-04 DIAGNOSIS — Z86.79 STATUS POST CATHETER ABLATION OF SLOW PATHWAY: ICD-10-CM

## 2017-08-04 PROCEDURE — 93224 XTRNL ECG REC UP TO 48 HRS: CPT | Mod: S$GLB,,, | Performed by: INTERNAL MEDICINE

## 2017-08-04 NOTE — PROGRESS NOTES
Verified pt name and .  Pt signed consent stated that the monitor will be returned to 8th floor Cardiology at the [facility].  Pt prepped and EKG leads placed.  Pt to be on holter monitor for 48 hours.  Instructions, diary and extra EKG pads provided.

## 2017-08-09 ENCOUNTER — ANTI-COAG VISIT (OUTPATIENT)
Dept: CARDIOLOGY | Facility: CLINIC | Age: 67
End: 2017-08-09
Payer: MEDICARE

## 2017-08-09 DIAGNOSIS — Z79.01 CURRENT USE OF LONG TERM ANTICOAGULATION: ICD-10-CM

## 2017-08-09 LAB — INR PPP: 3.7 (ref 2–3)

## 2017-08-09 PROCEDURE — 85610 PROTHROMBIN TIME: CPT | Mod: QW,S$GLB,,

## 2017-08-09 PROCEDURE — 99211 OFF/OP EST MAY X REQ PHY/QHP: CPT | Mod: 25,S$GLB,,

## 2017-08-09 NOTE — PROGRESS NOTES
INR still trending high despite dose change. Patient denies significant changes. Only change is that she feels a little chest congestion starting. We will decrease dose further and repeat INR next week.

## 2017-08-16 ENCOUNTER — ANTI-COAG VISIT (OUTPATIENT)
Dept: CARDIOLOGY | Facility: CLINIC | Age: 67
End: 2017-08-16
Payer: MEDICARE

## 2017-08-16 DIAGNOSIS — Z79.01 CURRENT USE OF LONG TERM ANTICOAGULATION: ICD-10-CM

## 2017-08-16 LAB — INR PPP: 3.4 (ref 2–3)

## 2017-08-16 PROCEDURE — 99211 OFF/OP EST MAY X REQ PHY/QHP: CPT | Mod: 25,S$GLB,,

## 2017-08-16 PROCEDURE — 85610 PROTHROMBIN TIME: CPT | Mod: QW,S$GLB,,

## 2017-08-16 NOTE — PROGRESS NOTES
Patient here for close monitoring due to recent ablation and high INRs. INR still a little high despite dose change. Patient denies changes in meds, health, or diet. No signs or symptoms of bleeding. We will decrease dose further. Reevaluate next week. Patient is thinking she will able to switch to Eliquis soon and is seeing EP tomorrow. Patient advised to let us know if so.

## 2017-08-17 ENCOUNTER — OFFICE VISIT (OUTPATIENT)
Dept: ELECTROPHYSIOLOGY | Facility: CLINIC | Age: 67
End: 2017-08-17
Payer: MEDICARE

## 2017-08-17 ENCOUNTER — HOSPITAL ENCOUNTER (OUTPATIENT)
Dept: CARDIOLOGY | Facility: CLINIC | Age: 67
Discharge: HOME | End: 2017-08-17
Payer: MEDICARE

## 2017-08-17 VITALS
DIASTOLIC BLOOD PRESSURE: 78 MMHG | BODY MASS INDEX: 28.56 KG/M2 | HEIGHT: 62 IN | SYSTOLIC BLOOD PRESSURE: 120 MMHG | HEART RATE: 81 BPM | WEIGHT: 155.19 LBS

## 2017-08-17 DIAGNOSIS — Z86.79 STATUS POST CATHETER ABLATION OF SLOW PATHWAY: ICD-10-CM

## 2017-08-17 DIAGNOSIS — I70.0 THORACIC AORTA ATHEROSCLEROSIS: ICD-10-CM

## 2017-08-17 DIAGNOSIS — I47.10 PSVT (PAROXYSMAL SUPRAVENTRICULAR TACHYCARDIA): Primary | ICD-10-CM

## 2017-08-17 DIAGNOSIS — J43.8 OTHER EMPHYSEMA: ICD-10-CM

## 2017-08-17 DIAGNOSIS — I47.19 ECTOPIC ATRIAL TACHYCARDIA: ICD-10-CM

## 2017-08-17 DIAGNOSIS — I50.22 CHRONIC SYSTOLIC HEART FAILURE: ICD-10-CM

## 2017-08-17 DIAGNOSIS — R00.2 PALPITATIONS: ICD-10-CM

## 2017-08-17 DIAGNOSIS — I47.10 SVT (SUPRAVENTRICULAR TACHYCARDIA): ICD-10-CM

## 2017-08-17 DIAGNOSIS — Z79.01 CURRENT USE OF LONG TERM ANTICOAGULATION: ICD-10-CM

## 2017-08-17 DIAGNOSIS — I26.99 OTHER PULMONARY EMBOLISM WITHOUT ACUTE COR PULMONALE, UNSPECIFIED CHRONICITY: ICD-10-CM

## 2017-08-17 DIAGNOSIS — Z98.890 STATUS POST CATHETER ABLATION OF SLOW PATHWAY: ICD-10-CM

## 2017-08-17 PROCEDURE — 99214 OFFICE O/P EST MOD 30 MIN: CPT | Mod: S$GLB,,, | Performed by: NURSE PRACTITIONER

## 2017-08-17 PROCEDURE — 1159F MED LIST DOCD IN RCRD: CPT | Mod: S$GLB,,, | Performed by: NURSE PRACTITIONER

## 2017-08-17 PROCEDURE — 93000 ELECTROCARDIOGRAM COMPLETE: CPT | Mod: S$GLB,,, | Performed by: INTERNAL MEDICINE

## 2017-08-17 PROCEDURE — 3008F BODY MASS INDEX DOCD: CPT | Mod: S$GLB,,, | Performed by: NURSE PRACTITIONER

## 2017-08-17 PROCEDURE — 3074F SYST BP LT 130 MM HG: CPT | Mod: S$GLB,,, | Performed by: NURSE PRACTITIONER

## 2017-08-17 PROCEDURE — 1157F ADVNC CARE PLAN IN RCRD: CPT | Mod: S$GLB,,, | Performed by: NURSE PRACTITIONER

## 2017-08-17 PROCEDURE — 99999 PR PBB SHADOW E&M-EST. PATIENT-LVL III: CPT | Mod: PBBFAC,,, | Performed by: NURSE PRACTITIONER

## 2017-08-17 PROCEDURE — 1126F AMNT PAIN NOTED NONE PRSNT: CPT | Mod: S$GLB,,, | Performed by: NURSE PRACTITIONER

## 2017-08-17 PROCEDURE — 3078F DIAST BP <80 MM HG: CPT | Mod: S$GLB,,, | Performed by: NURSE PRACTITIONER

## 2017-08-17 PROCEDURE — 99499 UNLISTED E&M SERVICE: CPT | Mod: S$GLB,,, | Performed by: NURSE PRACTITIONER

## 2017-08-17 RX ORDER — ONDANSETRON 4 MG/1
4 TABLET, FILM COATED ORAL EVERY 8 HOURS PRN
COMMUNITY
End: 2017-10-26 | Stop reason: SDUPTHER

## 2017-08-17 NOTE — PROGRESS NOTES
"Subjective:    Patient ID:  Yasmin Asencio is a 67 y.o. female who presents for follow-up of pSVT.     Yasmin Asencio is a patient of Dr. Ballesteros.    HPI     Ms. Asencio is a 66 y.o. female with pSVT s/p recent slow-pathway RFA, HFpEF (EF 50-55%), HTN, COPD (frequent home O2 use), a hx of pulmonary hemorrhage s/p coiling, GERD, and CKD III. Ms. Asencio has chronic slurred speech, which is thought to be 2/2 an essential tremor (per Neurology). She has a hx of "AF" destini-lung-coiling procedure (no documentation). Ms. Asencio has a long-standing hx of intermittent palpitations (with HRs >140 bpm), dating back 10+ years, typically occurring 1 x/month. During episodes, Ms. Asencio has typically noted associated blurred vision and chest palpitations w/radiation to the neck; in the past, she has stated that her "blood runs cold," with the episode lasting 20-35 minutes in duration.    Ms. Asencio underwent a successful slow-pathway RFA (07/24/17); EPS revealed the clinical SVT and dual AV jennifer pathway physiology s/p successful elimination of the slow-pathway via ablation. A second tachycardia, most consistent with a focal AT (arising from somewhere near the coronary sinus os) was noted; not specifically mapped or ablated during the procedure 2/2 the fact that it was not repeatedly inducible.    Following the procedure, she had taken Verapamil 180 mg as prescribed, and noted her BP to be in the 100s/40s; she held the Verapamil (as directed) and an Echo was ordered. She was seen in clinic 08/01/17, and stated that off the CCB, she began to experience occasional "flutters," lasing seconds in duration, occurring throughout the day; BPs 130s-150s/60s-70s. Verapamil 120 mg PO QD was restarted, and a Holter was ordered.     Since her last office visit, ms. Asencio reports that her BPs and HRs have been well-controlled on Verapamil but she continues to experience intermittent "flutters" throughout the day, " "with episodes lasting, for the most part, only seconds in duration (she had 1 episode that lasted ~5 minutes; no recurrence of that duration since); she states that these are not really bothersome, and that they are "nothing like before" the RFA. She is wondering when she can switch back to Eliquis from warfarin.     Recent cardiac studies:  LHC (06/2016) negative for evidence of ischemia.   Echo (08/01/17) revealed an EF of 55-60%, normal left ventricular diastolic function, normal right ventricular systolic function, and a PASP of 26 mmHg.   48-Hour Holter (02/13/17) revealed SR w/HRs varying between  BPM; no SVT.                           (08/04/17) revealed SR w/HRs varying between  bpm; average 82 bpm. SVT x 2 noted (22 second duration [-380 ms], and 15                                           second duration [-400 ms] respectively). There was 1 episode of  "palpitations" corresponding with SR at 80 bpm.   Event monitor (03/09/17) revealed regular NCT (-440 ms).     I reviewed today's ECG which demonstrated SR at 81 bpm; , QRS 76, and QTc 401.    Review of Systems   Constitution: Negative for diaphoresis and malaise/fatigue.   HENT: Negative for headaches and nosebleeds.    Eyes: Negative for double vision.   Cardiovascular: Positive for palpitations. Negative for chest pain, dyspnea on exertion, irregular heartbeat, near-syncope and syncope.   Respiratory: Negative for shortness of breath.    Skin: Negative.    Musculoskeletal: Positive for stiffness.   Gastrointestinal: Negative for abdominal pain, hematemesis and hematochezia.   Genitourinary: Negative for hematuria.   Neurological: Negative for dizziness and light-headedness.   Psychiatric/Behavioral: Negative for altered mental status.        Objective:    Physical Exam   Constitutional: She is oriented to person, place, and time. She appears well-developed and well-nourished.   HENT:   Head: Normocephalic and " "atraumatic.   Eyes: Pupils are equal, round, and reactive to light.   Cardiovascular: Normal rate, regular rhythm, normal heart sounds and intact distal pulses.    Pulmonary/Chest: Effort normal and breath sounds normal.   Neurological: She is alert and oriented to person, place, and time.   Skin: She is not diaphoretic.   Vitals reviewed.        Assessment:       1. PSVT (paroxysmal supraventricular tachycardia)    2. Status post catheter ablation of slow pathway    3. Ectopic atrial tachycardia    4. Palpitations    5. Chronic systolic heart failure    6. Thoracic aorta atherosclerosis    7. Other emphysema    8. Other pulmonary embolism without acute cor pulmonale, unspecified chronicity    9. Current use of long term anticoagulation         Plan:       In summary, Ms. Asencio is a 67 y.o. female with pSVT s/p recent slow-pathway RFA, HFpEF (EF 50-55%), HTN, COPD, a hx of pulmonary hemorrhage s/p coiling, PE (on Eliquis; switched to coumadin for RFA) GERD, and CKD III. Ms. Asencio is doing well overall with only very short-lived, intermittent "flutters;" recent Holter revealed 2 short runs of non-sustained SVT (max episode duration 22 seconds) - symptom episode correlated with SR. She remains on Verapamil and warfarin.     Continue current medication regimen. Will discuss with Dr. Ballesteros switching back to Eliquis.   Follow up in EP clinic in 3 months with Dr. Ballesteros in 3 months (per pt request) sooner as needed.     Joyce Cronin, MN, APRN, FNP-C       (A copy of today's note was sent to Dr. Ballesteros.)                  "

## 2017-08-18 ENCOUNTER — TELEPHONE (OUTPATIENT)
Dept: ELECTROPHYSIOLOGY | Facility: CLINIC | Age: 67
End: 2017-08-18

## 2017-08-18 ENCOUNTER — PATIENT MESSAGE (OUTPATIENT)
Dept: ELECTROPHYSIOLOGY | Facility: CLINIC | Age: 67
End: 2017-08-18

## 2017-08-18 NOTE — TELEPHONE ENCOUNTER
"----- Message from Taya Jang sent at 8/18/2017  8:27 AM CDT -----  Contact: Pt called  Pt called, and is wondering if she may need a Holter monitor placed. She states her Hr went to 158 and stayed the same for a hour. She states her jaw begin to hurt and BP reading was 161/93. She states she is "shakey".Ph for pt is [phone].Thank you  "

## 2017-08-21 ENCOUNTER — TELEPHONE (OUTPATIENT)
Dept: ELECTROPHYSIOLOGY | Facility: CLINIC | Age: 67
End: 2017-08-21

## 2017-08-21 DIAGNOSIS — Z98.890 STATUS POST CATHETER ABLATION OF SLOW PATHWAY: ICD-10-CM

## 2017-08-21 DIAGNOSIS — Z86.79 STATUS POST CATHETER ABLATION OF SLOW PATHWAY: ICD-10-CM

## 2017-08-21 DIAGNOSIS — I47.10 PSVT (PAROXYSMAL SUPRAVENTRICULAR TACHYCARDIA): Primary | ICD-10-CM

## 2017-08-21 RX ORDER — OMEPRAZOLE 20 MG/1
CAPSULE, DELAYED RELEASE ORAL
Qty: 90 CAPSULE | Refills: 0 | Status: SHIPPED | OUTPATIENT
Start: 2017-08-21 | End: 2017-09-22 | Stop reason: SDUPTHER

## 2017-08-21 NOTE — TELEPHONE ENCOUNTER
"----- Message from Taya Jang sent at 8/18/2017  8:27 AM CDT -----  Contact: Pt called  Pt called, and is wondering if she may need a Holter monitor placed. She states her Hr went to 158 and stayed the same for a hour. She states her jaw begin to hurt and BP reading was 161/93. She states she is "shakey".Ph for pt is [phone].Thank you  "

## 2017-08-21 NOTE — TELEPHONE ENCOUNTER
"I returned Ms. Asencio's call and she reported experiencing another symptomatic episode of palpitations (believed by her to be SVT; elevated HR noted), lasting >1 hour (08/17/17), which kept her up that night. She also reported experiencing short-lived, intermittent "fluttering" in her chest. Since last Thursday's episode, she denies recurrence of a sustained episode; she states that her longer episodes occur ~1/week, if that. She is however, very concerned about these episodes.     30-Day Event Monitor to further evaluate occasional symptomatic, sustained palpitations and elevated HR.    Joyce Cronin, MN, APRN, FNP-C    "

## 2017-08-22 ENCOUNTER — CLINICAL SUPPORT (OUTPATIENT)
Dept: ELECTROPHYSIOLOGY | Facility: CLINIC | Age: 67
End: 2017-08-22
Payer: MEDICARE

## 2017-08-22 DIAGNOSIS — Z86.79 STATUS POST CATHETER ABLATION OF SLOW PATHWAY: ICD-10-CM

## 2017-08-22 DIAGNOSIS — Z98.890 STATUS POST CATHETER ABLATION OF SLOW PATHWAY: ICD-10-CM

## 2017-08-22 DIAGNOSIS — I47.10 PSVT (PAROXYSMAL SUPRAVENTRICULAR TACHYCARDIA): ICD-10-CM

## 2017-08-22 PROCEDURE — 93268 ECG RECORD/REVIEW: CPT | Mod: S$GLB,,, | Performed by: INTERNAL MEDICINE

## 2017-08-23 ENCOUNTER — ANTI-COAG VISIT (OUTPATIENT)
Dept: CARDIOLOGY | Facility: CLINIC | Age: 67
End: 2017-08-23
Payer: MEDICARE

## 2017-08-23 ENCOUNTER — TELEPHONE (OUTPATIENT)
Dept: ELECTROPHYSIOLOGY | Facility: CLINIC | Age: 67
End: 2017-08-23

## 2017-08-23 DIAGNOSIS — I26.99 OTHER PULMONARY EMBOLISM WITHOUT ACUTE COR PULMONALE, UNSPECIFIED CHRONICITY: ICD-10-CM

## 2017-08-23 DIAGNOSIS — Z79.01 CURRENT USE OF LONG TERM ANTICOAGULATION: Primary | ICD-10-CM

## 2017-08-23 LAB — INR PPP: 2.1 (ref 2–3)

## 2017-08-23 PROCEDURE — 85610 PROTHROMBIN TIME: CPT | Mod: QW,S$GLB,,

## 2017-08-23 PROCEDURE — 99211 OFF/OP EST MAY X REQ PHY/QHP: CPT | Mod: 25,S$GLB,,

## 2017-08-23 NOTE — TELEPHONE ENCOUNTER
----- Message from REGAN Corrales sent at 8/23/2017 10:26 AM CDT -----  Regarding: Switching OAC  Marie,     Can you please call Ms. Asencio and let her know that Dr. Ballesteros says that she can stop coumadin today.     - Please schedule her for an INR Monday (08/23/17).  - If her INR is greater than 2, she will stay off OAC and we will re-check an INR that Wednesday (08/23/17).   - She can start back on Eliquis 5 mg PO BID once her INR is less than 2.     Thank you!    CM   ----- Message -----  From: Marlon Ballesteros MD  Sent: 8/20/2017   9:56 AM  To: REGAN Corrales    Thanks  She could switch back to eliquis now; please arrange for that.  thanks  DPM    ----- Message -----  From: REGAN Corrales  Sent: 8/17/2017   5:00 PM  To: Marlon Ballesteros MD

## 2017-08-23 NOTE — PROGRESS NOTES
Patient here for close monitoring due to recent high INR. Patient reports arrhythmia episode and is now on a 30 day monitor. She is feeling fine today. She ate 4 small sping rolls today that had some cabbage in it. No other changes. She discussed switching back to Eliquis with EP but that has not been determined yet. She will continue on coumadin for now. We will adjust dose based on INR trend and to help with the cabbage she ate today. Repeat INR next week.

## 2017-08-23 NOTE — TELEPHONE ENCOUNTER
Called the Pt. Pt had INR checked today. INR 2.1.  Instructed Pt to stop coumadin today and have INR checked on Friday. If her INR is <2.0 she can start Eliquis 5mg BID. Pt voiced understanding. Coumadin Clinic notified.

## 2017-08-24 NOTE — PROGRESS NOTES
"Message sent per ZBIGNIEW Gaines on 8/23/17: "Pt will stop coumadin today. INR rechecked on Friday the 25th. If INR <2.0 begin Eliquis 5mg BID. Thanks."  "

## 2017-08-25 ENCOUNTER — ANTI-COAG VISIT (OUTPATIENT)
Dept: CARDIOLOGY | Facility: CLINIC | Age: 67
End: 2017-08-25

## 2017-08-25 ENCOUNTER — LAB VISIT (OUTPATIENT)
Dept: LAB | Facility: HOSPITAL | Age: 67
End: 2017-08-25
Attending: INTERNAL MEDICINE
Payer: MEDICARE

## 2017-08-25 DIAGNOSIS — I26.99 OTHER PULMONARY EMBOLISM WITHOUT ACUTE COR PULMONALE, UNSPECIFIED CHRONICITY: ICD-10-CM

## 2017-08-25 DIAGNOSIS — Z79.01 CURRENT USE OF LONG TERM ANTICOAGULATION: ICD-10-CM

## 2017-08-25 LAB
INR PPP: 1.1
PROTHROMBIN TIME: 11.9 SEC

## 2017-08-25 PROCEDURE — 36415 COLL VENOUS BLD VENIPUNCTURE: CPT | Mod: PO

## 2017-08-25 PROCEDURE — 85610 PROTHROMBIN TIME: CPT

## 2017-08-25 NOTE — PROGRESS NOTES
INR low. She stopped coumadin 2 days ago. She will start eliquis today. She had Rx at home and confirmed instructions to take it q12h. Will update medlist and d/c from clinic.

## 2017-08-28 ENCOUNTER — TELEPHONE (OUTPATIENT)
Dept: ELECTROPHYSIOLOGY | Facility: CLINIC | Age: 67
End: 2017-08-28

## 2017-08-28 NOTE — TELEPHONE ENCOUNTER
Received call from [facility]. Pt had , history of AT. [facility] unable to contact patient. Called pt and she is asymptomatic. She was unaware of elevated heart rate, states she was at the grocery store. Currently feeling fine. Requested pt send follow up transmission to make heart rate WNL.

## 2017-09-05 RX ORDER — ESCITALOPRAM OXALATE 20 MG/1
TABLET ORAL
Qty: 30 TABLET | Refills: 4 | Status: SHIPPED | OUTPATIENT
Start: 2017-09-05 | End: 2017-10-26

## 2017-09-12 ENCOUNTER — OFFICE VISIT (OUTPATIENT)
Dept: NEUROLOGY | Facility: CLINIC | Age: 67
End: 2017-09-12
Payer: MEDICARE

## 2017-09-12 VITALS
WEIGHT: 154.75 LBS | HEART RATE: 86 BPM | BODY MASS INDEX: 28.48 KG/M2 | SYSTOLIC BLOOD PRESSURE: 144 MMHG | DIASTOLIC BLOOD PRESSURE: 74 MMHG | HEIGHT: 62 IN

## 2017-09-12 DIAGNOSIS — G25.0 ESSENTIAL TREMOR: Primary | ICD-10-CM

## 2017-09-12 PROCEDURE — 1126F AMNT PAIN NOTED NONE PRSNT: CPT | Mod: S$GLB,,, | Performed by: NURSE PRACTITIONER

## 2017-09-12 PROCEDURE — 99499 UNLISTED E&M SERVICE: CPT | Mod: S$GLB,,, | Performed by: NURSE PRACTITIONER

## 2017-09-12 PROCEDURE — 1159F MED LIST DOCD IN RCRD: CPT | Mod: S$GLB,,, | Performed by: NURSE PRACTITIONER

## 2017-09-12 PROCEDURE — 99215 OFFICE O/P EST HI 40 MIN: CPT | Mod: S$GLB,,, | Performed by: NURSE PRACTITIONER

## 2017-09-12 PROCEDURE — 3008F BODY MASS INDEX DOCD: CPT | Mod: S$GLB,,, | Performed by: NURSE PRACTITIONER

## 2017-09-12 PROCEDURE — 99999 PR PBB SHADOW E&M-EST. PATIENT-LVL III: CPT | Mod: PBBFAC,,, | Performed by: NURSE PRACTITIONER

## 2017-09-12 PROCEDURE — 3077F SYST BP >= 140 MM HG: CPT | Mod: S$GLB,,, | Performed by: NURSE PRACTITIONER

## 2017-09-12 PROCEDURE — 1157F ADVNC CARE PLAN IN RCRD: CPT | Mod: S$GLB,,, | Performed by: NURSE PRACTITIONER

## 2017-09-12 PROCEDURE — 3078F DIAST BP <80 MM HG: CPT | Mod: S$GLB,,, | Performed by: NURSE PRACTITIONER

## 2017-09-12 NOTE — PATIENT INSTRUCTIONS
For the next 3 days, reduce carbidopa / levodopa to half tablet three times daily, then stop.    Next Monday, take 1/2 tablet of Lexapro for two weeks then stop.       Continue gabapentin, topiramate, and primidone without change.     Call for problems or questions.

## 2017-09-12 NOTE — PROGRESS NOTES
Name: Yasmin Asencio  MRN: [id number]   CSN: [id number]      Date: 9-12-17      Referring physician:  Olu Juarez MD  [address] [place] [address]    Subjective:      Chief Complaint: follow up since beginning cd/ld    History of Present Illness (HPI):    Yasmin Asencio is a 67 y.o.  female who presents today for a follow-up (new to me) evaluation of ET, mild memory impairment, and ? mild pdism and is accompanied by spouse.     She was last seen by [facility] 7-28-17. At that time, she was started on cd/ld to see if it would help tremor. She returns for evaluation. Since she started this, her head feels fuzzy and she is nauseated. She is now taking Zofran for nausea as a result. She thinks the nausea started as soon as she started cd/ld- even at half tabs. At the same time she started verapamil. She  the dose times and figured out that it was cd/ld. It has not helped tremor at all. She says it did not help voice tremor either. Currently taking cd/ld 25/100- 1 tab with meals (TID).     Recently has noted an occasion with diff swallowing lettuce. Just this week, she has noted trouble swallowing hand full of pills at a time.      Review of Systems   Constitutional: Positive for fatigue.   HENT: Positive for trouble swallowing. Negative for drooling.    Respiratory: Positive for shortness of breath.    Cardiovascular: Negative for leg swelling.   Gastrointestinal: Positive for diarrhea and nausea. Negative for constipation.   Genitourinary: Positive for urgency. Negative for frequency.   Musculoskeletal: Negative for gait problem.   Skin: Negative for rash.   Neurological: Positive for dizziness and tremors.       Past Medical History: The patient  has a past medical history of Acquired bronchiectasis; Allergy; Amblyopia; Anemia of other chronic disease; Anticoagulant long-term use; Anxiety; Cataract; Clotting disorder; COPD (chronic obstructive pulmonary disease); COPD (chronic obstructive  pulmonary disease); Depression; Diverticulosis; Essential tremor; GERD (gastroesophageal reflux disease); Hemangioma of liver; History of tuberculosis (1978); Hypertension; Mixed anxiety and depressive disorder; Psoriasis; PSVT (paroxysmal supraventricular tachycardia); Pulmonary embolism; S/P PICC central line placement (Apr. 2016 - May 2016); Skin disease; Spondylosis without myelopathy (8/23/2013); Supraventricular tachycardia; Thoracic aorta atherosclerosis; Tuberculosis; Vaginal delivery; and Vitamin D deficiency.    Social History: The patient  reports that she quit smoking about 8 years ago. Her smoking use included Cigarettes. She has a 100.00 pack-year smoking history. She has never used smokeless tobacco. She reports that she does not drink alcohol or use drugs.    Family History: Their family history includes Cancer in her brother, father, and maternal aunt; Heart attack in her brother, mother, and son; Hyperlipidemia in her sister; Hypertension in her mother; Lung cancer in her sister; Psoriasis in her sister; Stroke in her maternal uncle.    Allergies: Adhesive; Bactrim [sulfamethoxazole-trimethoprim]; Albuterol; and Restasis [cyclosporine]     Meds:   Current Outpatient Prescriptions on File Prior to Visit   Medication Sig Dispense Refill    alprazolam (XANAX) 0.25 MG tablet Take 1 tablet (0.25 mg total) by mouth nightly as needed for Anxiety. 30 tablet 2    ANORO ELLIPTA 62.5-25 mcg/actuation DsDv       apixaban 5 mg Tab Take 5 mg by mouth 2 (two) times daily.      ergocalciferol (VITAMIN D2) 50,000 unit Cap Take 1 capsule (50,000 Units total) by mouth every 7 days. 4 capsule 4    escitalopram oxalate (LEXAPRO) 20 MG tablet TAKE ONE TABLET BY MOUTH ONCE DAILY 30 tablet 4    gabapentin (NEURONTIN) 300 MG capsule Take 1 capsule (300 mg total) by mouth 3 (three) times daily. 270 capsule 3    guaifenesin (MUCINEX) 600 mg 12 hr tablet Take 1,200 mg by mouth 2 (two) times daily.      ipratropium  "(ATROVENT) 0.02 % nebulizer solution USE ONE VIAL IN NEBULIZER 4 TIMES DAILY 225 vial 3    omeprazole (PRILOSEC) 20 MG capsule TAKE ONE CAPSULE BY MOUTH ONCE DAILY AS NEEDED (HEARTBURN) 90 capsule 0    primidone (MYSOLINE) 50 MG Tab TAKE ONE TABLET BY MOUTH ONCE DAILY IN THE MORNING, ONE AT NOON, AND TWO AT BEDTIME 120 tablet 11    SODIUM CHLORIDE FOR INHALATION (SODIUM CHLORIDE 3%) 3 % nebulizer solution USE ONE VIAL IN NEBULIZER TWICE DAILY 240 mL 6    topiramate (TOPAMAX) 25 MG tablet Take 1 tablet (25 mg total) by mouth 2 (two) times daily. 180 tablet 3    verapamil (VERELAN) 120 MG C24P Take 1 capsule (120 mg total) by mouth once daily. 90 capsule 3    [DISCONTINUED] carbidopa-levodopa  mg (SINEMET)  mg per tablet Take 1 tablet by mouth 3 (three) times daily. 90 tablet 5    desoximetasone (TOPICORT) 0.25 % cream Apply as directed bid prn 60 g 2    ondansetron (ZOFRAN) 4 MG tablet Take 4 mg by mouth every 8 (eight) hours as needed for Nausea.      tramadol (ULTRAM) 50 mg tablet Take 50 mg by mouth every 6 (six) hours as needed for Pain.       No current facility-administered medications on file prior to visit.        Objective:     Physical Exam:    Vitals:    09/12/17 1321   BP: (!) 144/74   Pulse: 86   Weight: 70.2 kg (154 lb 12.2 oz)   Height: 5' 2" (1.575 m)     Body mass index is 28.31 kg/m².    Constitutional  Well-developed, well-nourished, appears stated age   Cardiovascular  Radial pulses 2+ and symmetric, no LE edema bilaterally     ..  * Specialized movement exam  No hypophonic speech.   Tremulous voice, easy to understand.      No facial masking.     No cogwheel rigidity.       With LH and L foot, slight bradykinesia with no decrementing movement. RH and R foot normal.      Very fine rest tremor in L 5th digit on diversion. WIth arms outstretched, no tremor RH but subtle in LH. When pulled to core, slt tremor RH and nearing mild LH. Near mild intention tremor BH.  No tremor with " kinesis.   Subtle jaw tremor, intermittent.     No other dystonia, chorea, athetosis, myoclonus, or tics.       No motor impersistence.   Normal-based gait. Little unsteady and will occ put arms out to steady.    No shortened stride length.             Laboratory Results:  Lab Visit on 08/25/2017   Component Date Value Ref Range Status    Prothrombin Time 08/25/2017 11.9  9.0 - 12.5 sec Final    INR 08/25/2017 1.1  0.8 - 1.2 Final   Anti-coag visit on 08/23/2017   Component Date Value Ref Range Status    INR 08/23/2017 2.1   Final   Anti-coag visit on 08/16/2017   Component Date Value Ref Range Status    INR 08/16/2017 3.4   Final   Anti-coag visit on 08/09/2017   Component Date Value Ref Range Status    INR 08/09/2017 3.7   Final   Anti-coag visit on 08/02/2017   Component Date Value Ref Range Status    INR 08/02/2017 3.7   Final   Hospital Outpatient Visit on 08/01/2017   Component Date Value Ref Range Status    EF 08/01/2017 55  55 - 65 Final    Diastolic Dysfunction 08/01/2017 No   Final    Est. PA Systolic Pressure 08/01/2017 26.43   Final    Tricuspid Valve Regurgitation 08/01/2017 MILD   Final   Admission on 07/24/2017, Discharged on 07/25/2017   Component Date Value Ref Range Status    Prothrombin Time 07/24/2017 23.3* 9.0 - 12.5 sec Final    INR 07/24/2017 2.3* 0.8 - 1.2 Final    Group & Rh 07/24/2017 AB POS   Final    Indirect Ezequiel 07/24/2017 NEG   Final   Lab Visit on 07/20/2017   Component Date Value Ref Range Status    Prothrombin Time 07/20/2017 31.0* 9.0 - 12.5 sec Final    INR 07/20/2017 3.1* 0.8 - 1.2 Final    aPTT 07/20/2017 43.7* 21.0 - 32.0 sec Final    Sodium 07/20/2017 135* 136 - 145 mmol/L Final    Potassium 07/20/2017 3.9  3.5 - 5.1 mmol/L Final    Chloride 07/20/2017 102  95 - 110 mmol/L Final    CO2 07/20/2017 25  23 - 29 mmol/L Final    Glucose 07/20/2017 111* 70 - 110 mg/dL Final    BUN, Bld 07/20/2017 19  8 - 23 mg/dL Final    Creatinine 07/20/2017 1.0  0.5 -  1.4 mg/dL Final    Calcium 07/20/2017 9.0  8.7 - 10.5 mg/dL Final    Anion Gap 07/20/2017 8  8 - 16 mmol/L Final    eGFR if African American 07/20/2017 >60.0  >60 mL/min/1.73 m^2 Final    eGFR if non African American 07/20/2017 58.4* >60 mL/min/1.73 m^2 Final    WBC 07/20/2017 8.04  3.90 - 12.70 K/uL Final    RBC 07/20/2017 3.63* 4.00 - 5.40 M/uL Final    Hemoglobin 07/20/2017 10.4* 12.0 - 16.0 g/dL Final    Hematocrit 07/20/2017 33.2* 37.0 - 48.5 % Final    MCV 07/20/2017 92  82 - 98 fL Final    MCH 07/20/2017 28.7  27.0 - 31.0 pg Final    MCHC 07/20/2017 31.3* 32.0 - 36.0 g/dL Final    RDW 07/20/2017 12.9  11.5 - 14.5 % Final    Platelets 07/20/2017 271  150 - 350 K/uL Final    MPV 07/20/2017 9.6  9.2 - 12.9 fL Final    Gran # 07/20/2017 4.6  1.8 - 7.7 K/uL Final    Lymph # 07/20/2017 2.4  1.0 - 4.8 K/uL Final    Mono # 07/20/2017 0.9  0.3 - 1.0 K/uL Final    Eos # 07/20/2017 0.1  0.0 - 0.5 K/uL Final    Baso # 07/20/2017 0.03  0.00 - 0.20 K/uL Final    Gran% 07/20/2017 56.5  38.0 - 73.0 % Final    Lymph% 07/20/2017 29.9  18.0 - 48.0 % Final    Mono% 07/20/2017 11.7  4.0 - 15.0 % Final    Eosinophil% 07/20/2017 1.5  0.0 - 8.0 % Final    Basophil% 07/20/2017 0.4  0.0 - 1.9 % Final    Differential Method 07/20/2017 Automated   Final   Anti-coag visit on 07/12/2017   Component Date Value Ref Range Status    INR 07/12/2017 3.0   Final   Lab Visit on 07/05/2017   Component Date Value Ref Range Status    Prothrombin Time 07/05/2017 26.4* 9.0 - 12.5 sec Final    INR 07/05/2017 2.6* 0.8 - 1.2 Final   There may be more visits with results that are not included.           Assessment and Plan     Essential tremor     -affecting hand, voice and jaw + imbalance    Memory Impairment- mild and stable, has had NP eval    ? Mild parkinsonism per [facility] last note (with mild-mod bradykinesia)   -bradykinesia noted today but mild LH only, does not decrement- not sure if due to L-dopa but not had in 6 hours  "           Medical Decision Making:    Describes nausea and "foggy" feeling in brain since staring cd/ld. Has been taking Zofran daily to combat nausea. Does not appreciate any benefit at all. Reduce to 1/2 tab TID for the next 3 days then stop.     She wants to wean off Lexapro. She started this many years ago after her son passed away. She does not believe it helps anymore and is not certain she still needs. Next Monday, she will reduce Lexapro to 10 mg daily for 2 weeks then stop.   Wonder if this is contributing to tremor at all.    She will continue gabapentin, primidone, and TPX. She prev tried increase TPX but caused diff with word find.   Does not recall increased primidone.  Not been on propranolol, has COPD.    She currently has an event monitor. Will be removed in 9 days.     Follow up 3 months.     I spent 45 minutes face-to-face with the patient and  with >50% of the time spent with counseling and education regarding:  - results of data, diagnosis, and recommendations stated above  - the prognosis of tremor  - risks and benefits of cd/ld  - importance of diet and exercise    Kristi Torres, BRIA, NP-C  Division of Movement and Memory Disorders  [facility]  [phone]      "

## 2017-09-12 NOTE — LETTER
September 12, 2017      Olu Juarez MD  [address] [address]           [facility]  [address]  [facility] [address]  Phone: [phone]  Fax: [phone]          Patient: Yasmin Asencio   MR Number: [id number]   YOB: 1950   Date of Visit: 9/12/2017       Dear Dr. Olu Juarez:    Thank you for referring Yasmin Asencio to me for evaluation. Attached you will find relevant portions of my assessment and plan of care.    If you have questions, please do not hesitate to call me. I look forward to following Yasmin Asencio along with you.    Sincerely,    Kristi Torres, HAYDEE    Enclosure  CC:  No Recipients    If you would like to receive this communication electronically, please contact [email or web address] or [phone] to request more information on [employer] Link access.    For providers and/or their staff who would like to refer a patient to [facility], please contact us through our one-stop-shop provider referral line, [facility], at [phone].    If you feel you have received this communication in error or would no longer like to receive these types of communications, please e-mail [email or web address]

## 2017-09-13 DIAGNOSIS — J47.9 BRONCHIECTASIS WITHOUT COMPLICATION: Primary | ICD-10-CM

## 2017-09-13 RX ORDER — UMECLIDINIUM BROMIDE AND VILANTEROL TRIFENATATE 62.5; 25 UG/1; UG/1
1 POWDER RESPIRATORY (INHALATION) DAILY
Qty: 180 EACH | Refills: 4 | Status: SHIPPED | OUTPATIENT
Start: 2017-09-13 | End: 2018-04-20 | Stop reason: SDUPTHER

## 2017-09-13 RX ORDER — UMECLIDINIUM BROMIDE AND VILANTEROL TRIFENATATE 62.5; 25 UG/1; UG/1
1 POWDER RESPIRATORY (INHALATION) DAILY
Qty: 60 EACH | Refills: 0 | Status: SHIPPED | OUTPATIENT
Start: 2017-09-13 | End: 2017-09-22 | Stop reason: SDUPTHER

## 2017-09-14 RX ORDER — ERGOCALCIFEROL 1.25 MG/1
CAPSULE ORAL
Qty: 4 CAPSULE | Refills: 4 | Status: SHIPPED | OUTPATIENT
Start: 2017-09-14 | End: 2018-02-08 | Stop reason: SDUPTHER

## 2017-09-14 RX ORDER — TOPIRAMATE 25 MG/1
TABLET ORAL
Qty: 180 TABLET | Refills: 3 | Status: SHIPPED | OUTPATIENT
Start: 2017-09-14 | End: 2018-01-12 | Stop reason: SINTOL

## 2017-09-21 RX ORDER — UMECLIDINIUM BROMIDE AND VILANTEROL TRIFENATATE 62.5; 25 UG/1; UG/1
POWDER RESPIRATORY (INHALATION)
Qty: 60 EACH | Refills: 12 | Status: SHIPPED | OUTPATIENT
Start: 2017-09-21 | End: 2017-09-22 | Stop reason: SDUPTHER

## 2017-09-22 ENCOUNTER — OFFICE VISIT (OUTPATIENT)
Dept: FAMILY MEDICINE | Facility: CLINIC | Age: 67
End: 2017-09-22
Payer: MEDICARE

## 2017-09-22 VITALS
HEIGHT: 62 IN | BODY MASS INDEX: 27.87 KG/M2 | OXYGEN SATURATION: 94 % | HEART RATE: 85 BPM | WEIGHT: 151.44 LBS | TEMPERATURE: 98 F | SYSTOLIC BLOOD PRESSURE: 126 MMHG | DIASTOLIC BLOOD PRESSURE: 80 MMHG

## 2017-09-22 DIAGNOSIS — Z00.00 ROUTINE MEDICAL EXAM: Primary | ICD-10-CM

## 2017-09-22 DIAGNOSIS — I47.19 ECTOPIC ATRIAL TACHYCARDIA: ICD-10-CM

## 2017-09-22 DIAGNOSIS — Z23 FLU VACCINE NEED: ICD-10-CM

## 2017-09-22 DIAGNOSIS — J43.8 OTHER EMPHYSEMA: ICD-10-CM

## 2017-09-22 DIAGNOSIS — K21.9 GASTROESOPHAGEAL REFLUX DISEASE WITHOUT ESOPHAGITIS: Chronic | ICD-10-CM

## 2017-09-22 DIAGNOSIS — E66.3 OVERWEIGHT (BMI 25.0-29.9): ICD-10-CM

## 2017-09-22 DIAGNOSIS — Z78.9 STATIN INTOLERANCE: ICD-10-CM

## 2017-09-22 DIAGNOSIS — N18.30 BENIGN HYPERTENSION WITH CHRONIC KIDNEY DISEASE, STAGE III: ICD-10-CM

## 2017-09-22 DIAGNOSIS — I12.9 BENIGN HYPERTENSION WITH CHRONIC KIDNEY DISEASE, STAGE III: ICD-10-CM

## 2017-09-22 DIAGNOSIS — I26.99 OTHER PULMONARY EMBOLISM WITHOUT ACUTE COR PULMONALE, UNSPECIFIED CHRONICITY: ICD-10-CM

## 2017-09-22 DIAGNOSIS — I50.22 CHRONIC SYSTOLIC HEART FAILURE: ICD-10-CM

## 2017-09-22 DIAGNOSIS — J47.9 ACQUIRED BRONCHIECTASIS: ICD-10-CM

## 2017-09-22 DIAGNOSIS — G25.0 ESSENTIAL TREMOR: ICD-10-CM

## 2017-09-22 DIAGNOSIS — I70.0 THORACIC AORTA ATHEROSCLEROSIS: ICD-10-CM

## 2017-09-22 DIAGNOSIS — R33.9 URINARY RETENTION: ICD-10-CM

## 2017-09-22 DIAGNOSIS — J96.11 CHRONIC RESPIRATORY FAILURE WITH HYPOXIA: ICD-10-CM

## 2017-09-22 DIAGNOSIS — F41.8 MIXED ANXIETY AND DEPRESSIVE DISORDER: ICD-10-CM

## 2017-09-22 DIAGNOSIS — M48.061 SPINAL STENOSIS OF LUMBAR REGION WITHOUT NEUROGENIC CLAUDICATION: ICD-10-CM

## 2017-09-22 PROCEDURE — 99397 PER PM REEVAL EST PAT 65+ YR: CPT | Mod: S$GLB,,, | Performed by: INTERNAL MEDICINE

## 2017-09-22 PROCEDURE — 99999 PR PBB SHADOW E&M-EST. PATIENT-LVL III: CPT | Mod: PBBFAC,,, | Performed by: INTERNAL MEDICINE

## 2017-09-22 PROCEDURE — 99499 UNLISTED E&M SERVICE: CPT | Mod: S$GLB,,, | Performed by: INTERNAL MEDICINE

## 2017-09-22 RX ORDER — ALPRAZOLAM 0.25 MG/1
0.25 TABLET ORAL NIGHTLY PRN
Qty: 30 TABLET | Refills: 0 | Status: SHIPPED | OUTPATIENT
Start: 2017-09-22 | End: 2019-08-21 | Stop reason: SDUPTHER

## 2017-09-22 RX ORDER — OMEPRAZOLE 20 MG/1
20 CAPSULE, DELAYED RELEASE ORAL DAILY PRN
Qty: 90 CAPSULE | Refills: 0 | Status: SHIPPED | OUTPATIENT
Start: 2017-09-22 | End: 2018-02-20 | Stop reason: SDUPTHER

## 2017-09-22 NOTE — Clinical Note
Dr. Zuñiga, our mutual patient saw me today and reports a 2 day hx of respiratory symptoms. She is coughing up thick, dark green mucous. No fever. Not sure which antibiotic we should treat her with. I would appreciate your input. Thanks! Urmila

## 2017-09-22 NOTE — PROGRESS NOTES
HISTORY OF PRESENT ILLNESS:  Yasmin Asencio is a 67 y.o. female who presents to the clinic today for a routine medical physical exam. Her last physical exam was approximately 1 years(s) ago.        PAST MEDICAL HISTORY:  Past Medical History:   Diagnosis Date    Acquired bronchiectasis     due to history of TB - followed by pulmonary, Dr. Zuñiga    Allergy     Amblyopia     rt eye per pt    Anemia of other chronic disease     Anticoagulant long-term use     Anxiety     Cataract     Clotting disorder     COPD (chronic obstructive pulmonary disease)     COPD (chronic obstructive pulmonary disease)     Depression     Diverticulosis     Essential tremor     GERD (gastroesophageal reflux disease)     Hemangioma of liver     History of tuberculosis 1978    Hypertension     Mixed anxiety and depressive disorder     Psoriasis     PSVT (paroxysmal supraventricular tachycardia)     Pulmonary embolism     S/P PICC central line placement Apr. 2016 - May 2016    Skin disease     Psoriasis    Spondylosis without myelopathy 8/23/2013    Supraventricular tachycardia     Thoracic aorta atherosclerosis     noted on CT scan of chest 1/3/2011    Tuberculosis     Vaginal delivery     x2    Vitamin D deficiency        PAST SURGICAL HISTORY:  Past Surgical History:   Procedure Laterality Date    CATARACT EXTRACTION W/  INTRAOCULAR LENS IMPLANT  07/24/12    od dr [place]    CATARACT EXTRACTION W/  INTRAOCULAR LENS IMPLANT  08/07/12    left eye    EYE SURGERY      bilateral Cataract    GALLBLADDER SURGERY  9/2011       SOCIAL HISTORY:  Social History     Social History    Marital status:      Spouse name: N/A    Number of children: 2    Years of education: N/A     Occupational History    housewife      Social History Main Topics    Smoking status: Former Smoker     Packs/day: 2.00     Years: 50.00     Types: Cigarettes     Quit date: 5/27/2009    Smokeless tobacco: Never Used    Alcohol  use No    Drug use: No    Sexual activity: Yes     Partners: Male     Other Topics Concern    Are You Pregnant Or Think You May Be? No    Breast-Feeding No     Social History Narrative    One child  of a heart attack at age 35.       FAMILY HISTORY:  Family History   Problem Relation Age of Onset    Hypertension Mother     Heart attack Mother     Cancer Father      liver and bladder    Hyperlipidemia Sister     Psoriasis Sister     Cancer Brother      liver    Stroke Maternal Uncle     Cancer Maternal Aunt      stomach    Lung cancer Sister     Heart attack Brother     Heart attack Son     Amblyopia Neg Hx     Blindness Neg Hx     Cataracts Neg Hx     Glaucoma Neg Hx     Macular degeneration Neg Hx     Retinal detachment Neg Hx     Strabismus Neg Hx     Thyroid disease Neg Hx     Melanoma Neg Hx     Lupus Neg Hx     Eczema Neg Hx     COPD Neg Hx        ALLERGIES AND MEDICATIONS: updated and reviewed.  Review of patient's allergies indicates:   Allergen Reactions    Adhesive Other (See Comments)     Tears up the skin and makes it itch    Bactrim [sulfamethoxazole-trimethoprim] Rash     Was hospitalized for rash    Lipitor [atorvastatin] Other (See Comments)     Muscle aches    Albuterol Other (See Comments)     tremors    Restasis [cyclosporine] Itching     Medication List with Changes/Refills   Current Medications    ANORO ELLIPTA 62.5-25 MCG/ACTUATION DSDV    Inhale 1 puff into the lungs once daily.    APIXABAN 5 MG TAB    Take 5 mg by mouth 2 (two) times daily.    DESOXIMETASONE (TOPICORT) 0.25 % CREAM    Apply as directed bid prn    ESCITALOPRAM OXALATE (LEXAPRO) 20 MG TABLET    TAKE ONE TABLET BY MOUTH ONCE DAILY    GABAPENTIN (NEURONTIN) 300 MG CAPSULE    Take 1 capsule (300 mg total) by mouth 3 (three) times daily.    GUAIFENESIN (MUCINEX) 600 MG 12 HR TABLET    Take 1,200 mg by mouth 2 (two) times daily.    IPRATROPIUM (ATROVENT) 0.02 % NEBULIZER SOLUTION    USE ONE VIAL IN  NEBULIZER 4 TIMES DAILY    ONDANSETRON (ZOFRAN) 4 MG TABLET    Take 4 mg by mouth every 8 (eight) hours as needed for Nausea.    PRIMIDONE (MYSOLINE) 50 MG TAB    TAKE ONE TABLET BY MOUTH ONCE DAILY IN THE MORNING, ONE AT NOON, AND TWO AT BEDTIME    SODIUM CHLORIDE FOR INHALATION (SODIUM CHLORIDE 3%) 3 % NEBULIZER SOLUTION    USE ONE VIAL IN NEBULIZER TWICE DAILY    TOPIRAMATE (TOPAMAX) 25 MG TABLET    TAKE ONE TABLET BY MOUTH TWICE DAILY    TRAMADOL (ULTRAM) 50 MG TABLET    Take 50 mg by mouth every 6 (six) hours as needed for Pain.    VERAPAMIL (VERELAN) 120 MG C24P    Take 1 capsule (120 mg total) by mouth once daily.    VITAMIN D2 50,000 UNIT CAPSULE    TAKE ONE CAPSULE BY MOUTH ONCE A WEEK   Changed and/or Refilled Medications    Modified Medication Previous Medication    ALPRAZOLAM (XANAX) 0.25 MG TABLET alprazolam (XANAX) 0.25 MG tablet       Take 1 tablet (0.25 mg total) by mouth nightly as needed for Anxiety.    Take 1 tablet (0.25 mg total) by mouth nightly as needed for Anxiety.    OMEPRAZOLE (PRILOSEC) 20 MG CAPSULE omeprazole (PRILOSEC) 20 MG capsule       Take 1 capsule (20 mg total) by mouth daily as needed.    TAKE ONE CAPSULE BY MOUTH ONCE DAILY AS NEEDED (HEARTBURN)   Discontinued Medications    ALPRAZOLAM (XANAX) 0.25 MG TABLET    Take 1 tablet (0.25 mg total) by mouth 3 (three) times daily as needed for Anxiety.    ANORO ELLIPTA 62.5-25 MCG/ACTUATION DSDV    Inhale 1 puff into the lungs once daily.    ANORO ELLIPTA 62.5-25 MCG/ACTUATION DSDV    INHALE ONE PUFF INTO LUNGS ONCE DAILY             SCREENING HISTORY:  Health Maintenance       Date Due Completion Date    Influenza Vaccine 08/01/2017 10/3/2016    Override on 10/5/2015: Done (done at [facility])    Override on 12/11/2013: Done    DEXA SCAN 02/26/2018 2/26/2015    TETANUS VACCINE 01/19/2019 1/19/2009    Override on 1/19/2009: Done    Mammogram 03/02/2019 3/2/2017    Lipid Panel 02/22/2022 2/22/2017    Colonoscopy 04/23/2023  "4/23/2013            REVIEW OF SYSTEMS:   The patient reports fair dietary habits.  The patient does not exercise regularly.  General ROS: positive for  - fatigue  negative for - chills, fever or malaise  Psychological ROS: negative for - anxiety, depression or suicidal ideation  Ophthalmic ROS: negative for - blurry vision or eye pain  ENT ROS: negative for - epistaxis, oral lesions or sore throat  Allergy and Immunology ROS: negative for - hives  Hematological and Lymphatic ROS: negative for - swollen lymph nodes  Endocrine ROS: negative for - polydipsia/polyuria or temperature intolerance  Respiratory ROS: positive for - cough, wheezing - worse in the last few days as she has a respiratory illness with mild nasal congestion  negative for - hemoptysis  Cardiovascular ROS: negative for - cardiac chest pain - does report some chest pain with coughing  Gastrointestinal ROS: no abdominal pain, change in bowel habits, or black or bloody stools  Genito-Urinary ROS: no dysuria or hematuria; positive for - intermittent feeling of incomplete bladder emptying and occasional stress/urge incontinence  Breast ROS: negative for breast lumps  Musculoskeletal ROS: positive for - chronic low back pain  negative for - gait disturbance or muscle pain  Neurological ROS: no TIA or stroke symptoms; neurology is weaning her off her Lexapro as she has been on it for a long time as it may be the cause of her tremor  Dermatological ROS: negative    Physical Examination:   Vitals:    09/22/17 1341   BP: 126/80   Pulse: 85   Temp: 98.3 °F (36.8 °C)     Weight: 68.7 kg (151 lb 7.3 oz)   Height: 5' 2.01" (157.5 cm)   Body mass index is 27.69 kg/m².    General appearance - alert, well appearing, and in no distress and overweight  Mental status - alert, oriented to person, place, and time, normal mood, behavior, speech, dress, motor activity, and thought processes  Eyes - pupils equal and reactive, extraocular eye movements intact, sclera " anicteric  Mouth - mucous membranes moist, pharynx normal without lesions  Neck - supple, no significant adenopathy, carotids upstroke normal bilaterally, no bruits, thyroid exam: thyroid is normal in size without nodules or tenderness  Lymphatics - no palpable lymphadenopathy  Chest - clear to auscultation, no wheezes, rales or rhonchi, symmetric air entry  Heart - normal rate and regular rhythm, no gallops noted  Abdomen - soft, nontender, nondistended, no masses or organomegaly  Breasts - not examined  Back exam - limited range of motion, mild pain with motion noted during exam  Neurological - alert, oriented, normal speech, no focal findings noted, cranial nerves II through XII intact; mild tremor  Musculoskeletal - no muscular tenderness noted, Mild-Moderate osteoarthritic changes noted to both knee joints. No joint effusions noted.   Extremities - no pedal edema noted  Skin - normal coloration and turgor, no rashes, no suspicious skin lesions noted      ASSESSMENT AND PLAN:  1. Routine medical exam  Counseled on age appropriate medical preventative services including age appropriate cancer screenings, age appropriate eye and dental exams, over all nutritional health, need for a consistent exercise regimen, and an over all push towards maintaining a vigorous and active lifestyle.  Counseled on age appropriate vaccines and discussed upcoming health care needs based on age/gender. Discussed good sleep hygiene and stress management.     2. Benign hypertension with chronic kidney disease, stage III/3. Chronic systolic heart failure/4. Ectopic atrial tachycardia  BP controlled - continue current regimen.  Currently seeing cardiology for her arrhythmia.  She just completed a monitor yesterday.  She will follow-up with cardiology for results in the near future.    5. Gastroesophageal reflux disease without esophagitis  Symptoms controlled. Reflux precautions discussed (eliminate tobacco if a smoker; minimize caffeine,  chocolate and red/white peppermint intake; avoid heavy and spicy meals; don't lay down within 2-3 hours after eating). Medication as needed. Patient asked to take medication breaks, if possible - discussed chronic use can limit calcium absorption, increases the risk for intestinal infections, and can cause kidney damage.    - omeprazole (PRILOSEC) 20 MG capsule; Take 1 capsule (20 mg total) by mouth daily as needed.  Dispense: 90 capsule; Refill: 0    6. Acquired bronchiectasis/7. Other emphysema/10. Chronic respiratory failure with hypoxia  The patient reports an acute worsening of her respiratory symptoms in the last 2 days.  She reports subjective fever last night.  I will contact her pulmonologist to see which antibiotics they would like us to use.    8. Thoracic aorta atherosclerosis/9. Statin intolerance  Patient with Atherosclerosis of the Aorta.  Stable/asymptomatic. Currently stable on lipid and b/p monitoring.  She cannot tolerate statin medication.    11. Other pulmonary embolism without acute cor pulmonale, unspecified chronicity  Continue eliquis.  She reports that she is eligible for the medication assistance program.  She will drop off paperwork for me to complete.    12. Essential tremor  Neurology is weaning her off her Lexapro.  They think this may be contributing to the tremor.  She will let me know she develops any problems with mood or anxiety.  Otherwise, she will follow-up with neurology as per their recommendations.    13. Spinal stenosis of lumbar region without neurogenic claudication  Stable. Medication as needed. Consider pain referral once cardiac workup/treatment completed.    14. Mixed anxiety and depressive disorder  Stable. Medication as needed.   - alprazolam (XANAX) 0.25 MG tablet; Take 1 tablet (0.25 mg total) by mouth nightly as needed for Anxiety.  Dispense: 30 tablet; Refill: 0    15. Urinary retention  The patient reports some mild problems with urinary retention.  She also  has some mild stress and urge incontinence.  I recommended an appointment with urology.  She states she will call me when she is ready    16. Overweight (BMI 25.0-29.9)  The patient is asked to make an attempt to improve diet and exercise patterns to aid in medical management of this problem.     17. Flu vaccine need  Patient will call back once she is feeling better.          Return in about 6 months (around 3/22/2018), or if symptoms worsen or fail to improve, for follow up chronic medical conditions.. or sooner as needed.

## 2017-09-27 ENCOUNTER — OFFICE VISIT (OUTPATIENT)
Dept: CARDIOLOGY | Facility: CLINIC | Age: 67
End: 2017-09-27
Payer: MEDICARE

## 2017-09-27 VITALS
SYSTOLIC BLOOD PRESSURE: 125 MMHG | OXYGEN SATURATION: 95 % | WEIGHT: 149.25 LBS | HEART RATE: 81 BPM | DIASTOLIC BLOOD PRESSURE: 60 MMHG | HEIGHT: 62 IN | BODY MASS INDEX: 27.47 KG/M2

## 2017-09-27 DIAGNOSIS — Z98.890 STATUS POST CATHETER ABLATION OF SLOW PATHWAY: ICD-10-CM

## 2017-09-27 DIAGNOSIS — N18.30 BENIGN HYPERTENSION WITH CHRONIC KIDNEY DISEASE, STAGE III: ICD-10-CM

## 2017-09-27 DIAGNOSIS — I12.9 BENIGN HYPERTENSION WITH CHRONIC KIDNEY DISEASE, STAGE III: ICD-10-CM

## 2017-09-27 DIAGNOSIS — Z86.79 STATUS POST CATHETER ABLATION OF SLOW PATHWAY: ICD-10-CM

## 2017-09-27 DIAGNOSIS — R07.89 NON-CARDIAC CHEST PAIN: ICD-10-CM

## 2017-09-27 DIAGNOSIS — J43.8 OTHER EMPHYSEMA: Primary | ICD-10-CM

## 2017-09-27 DIAGNOSIS — I47.10 PSVT (PAROXYSMAL SUPRAVENTRICULAR TACHYCARDIA): ICD-10-CM

## 2017-09-27 DIAGNOSIS — I47.19 ECTOPIC ATRIAL TACHYCARDIA: ICD-10-CM

## 2017-09-27 DIAGNOSIS — I26.99 OTHER PULMONARY EMBOLISM WITHOUT ACUTE COR PULMONALE, UNSPECIFIED CHRONICITY: ICD-10-CM

## 2017-09-27 PROCEDURE — 3074F SYST BP LT 130 MM HG: CPT | Mod: S$GLB,,, | Performed by: INTERNAL MEDICINE

## 2017-09-27 PROCEDURE — 99499 UNLISTED E&M SERVICE: CPT | Mod: S$GLB,,, | Performed by: INTERNAL MEDICINE

## 2017-09-27 PROCEDURE — 1126F AMNT PAIN NOTED NONE PRSNT: CPT | Mod: S$GLB,,, | Performed by: INTERNAL MEDICINE

## 2017-09-27 PROCEDURE — 3078F DIAST BP <80 MM HG: CPT | Mod: S$GLB,,, | Performed by: INTERNAL MEDICINE

## 2017-09-27 PROCEDURE — 99999 PR PBB SHADOW E&M-EST. PATIENT-LVL III: CPT | Mod: PBBFAC,,, | Performed by: INTERNAL MEDICINE

## 2017-09-27 PROCEDURE — 99214 OFFICE O/P EST MOD 30 MIN: CPT | Mod: S$GLB,,, | Performed by: INTERNAL MEDICINE

## 2017-09-27 PROCEDURE — 3008F BODY MASS INDEX DOCD: CPT | Mod: S$GLB,,, | Performed by: INTERNAL MEDICINE

## 2017-09-27 PROCEDURE — 1157F ADVNC CARE PLAN IN RCRD: CPT | Mod: S$GLB,,, | Performed by: INTERNAL MEDICINE

## 2017-09-27 PROCEDURE — 1159F MED LIST DOCD IN RCRD: CPT | Mod: S$GLB,,, | Performed by: INTERNAL MEDICINE

## 2017-09-27 NOTE — PROGRESS NOTES
"Subjective:    Patient ID:  Yasmin Asencio is a 67 y.o. female who presents for follow-up of Chest Pain      HPI        PMH of pulmonary hemorrhage,S/P embolization in 11/2015 and pseudomonas pneumonia in 2016,has been treated with IV Abx, hypertension and COPD,AOCD,atrial tachycardia,depression,chronic slurred speech    SVT ablation 7/24/17 6/16/16 [facility] EDP 18, EF 55%, normal coronaries  Medical Rx    EF does not appear depressed - ? Recent echo result     Echo 8/1/17    1 - Normal left ventricular systolic function (EF 55-60%).     2 - Normal left ventricular diastolic function.     3 - Normal right ventricular systolic function .     4 - The estimated PA systolic pressure is 26 mmHg.      Holter 2/13/17  1. Sinus rhythm with heart rates varying between 45 and 128 bpm with an average of 72 bpm.     VENTRICULAR ARRHYTHMIAS  1. There was a single PVC recorded.     2. There were no episodes of ventricular tachycardia.    SUPRA VENTRICULAR ARRHYTHMIAS  1. There were occasional PACs totalling 406 and averaging 16 per hour.     2. There were no episodes of sustained supraventricular tachycardia.    SINUS NODE FUNCTION  1. There was no evidence of high grade SA jennifer block.     AV CONDUCTION  1. There was no evidence of high grade AV block.      Saw EP - NP after ablation   In summary, Ms. Asencio is a 67 y.o. female with pSVT s/p recent slow-pathway RFA, HFpEF (EF 50-55%), HTN, COPD, a hx of pulmonary hemorrhage s/p coiling, PE (on Eliquis; switched to coumadin for RFA) GERD, and CKD III. Ms. Asencio is doing well overall with only very short-lived, intermittent "flutters;" recent Holter revealed 2 short runs of non-sustained SVT (max episode duration 22 seconds) - symptom episode correlated with SR. She remains on Verapamil and warfarin.      Continue current medication regimen. Will discuss with Dr. Ballesteros switching back to Eliquis.   Follow up in EP clinic in 3 months with Dr. Ballesteros in 3 months (per pt " request) sooner as needed.     An event monitor was recently done but results are not yet available    SOB worse recently with an URI  Denies CP  Still with episodes of heart racing and palpitations    Review of Systems   Constitution: Negative for decreased appetite.   HENT: Negative for ear discharge.    Eyes: Negative for blurred vision.   Respiratory: Negative for hemoptysis.    Endocrine: Negative for polyphagia.   Hematologic/Lymphatic: Negative for adenopathy.   Skin: Negative for color change.   Musculoskeletal: Negative for joint swelling.   Neurological: Negative for brief paralysis.   Psychiatric/Behavioral: Negative for hallucinations.        Objective:    Physical Exam   Constitutional: She is oriented to person, place, and time. She appears well-developed and well-nourished.   HENT:   Head: Normocephalic and atraumatic.   Eyes: Pupils are equal, round, and reactive to light.   Cardiovascular: Normal rate, regular rhythm, normal heart sounds and intact distal pulses.    Pulmonary/Chest: Effort normal and breath sounds normal.   Neurological: She is alert and oriented to person, place, and time.   Skin: She is not diaphoretic.   Vitals reviewed.        Assessment:       1. Other emphysema    2. PSVT (paroxysmal supraventricular tachycardia)    3. Ectopic atrial tachycardia    4. Status post catheter ablation of slow pathway    5. Benign hypertension with chronic kidney disease, stage III    6. Other pulmonary embolism without acute cor pulmonale, unspecified chronicity    7. Non-cardiac chest pain         Plan:       F/U with EP as planned  OV here 6 months

## 2017-09-28 ENCOUNTER — TELEPHONE (OUTPATIENT)
Dept: PULMONOLOGY | Facility: CLINIC | Age: 67
End: 2017-09-28

## 2017-09-28 DIAGNOSIS — J47.9 ACQUIRED BRONCHIECTASIS: Primary | ICD-10-CM

## 2017-09-29 ENCOUNTER — LAB VISIT (OUTPATIENT)
Dept: LAB | Facility: HOSPITAL | Age: 67
End: 2017-09-29
Attending: INTERNAL MEDICINE
Payer: MEDICARE

## 2017-09-29 DIAGNOSIS — J47.9 ACQUIRED BRONCHIECTASIS: ICD-10-CM

## 2017-09-29 PROCEDURE — 87077 CULTURE AEROBIC IDENTIFY: CPT

## 2017-09-29 PROCEDURE — 87205 SMEAR GRAM STAIN: CPT

## 2017-09-29 PROCEDURE — 87186 SC STD MICRODIL/AGAR DIL: CPT

## 2017-09-29 PROCEDURE — 87070 CULTURE OTHR SPECIMN AEROBIC: CPT

## 2017-09-29 NOTE — TELEPHONE ENCOUNTER
----- Message from Dorys Horan sent at 9/29/2017  8:49 AM CDT -----  Contact: [employer] [facility] Payment [employer]  States pt's application is on hold for assistance with the [employer] b/c info for Rx is missing.  Ghislaine can be reached @ [phone] and fax # [phone].

## 2017-10-04 ENCOUNTER — TELEPHONE (OUTPATIENT)
Dept: FAMILY MEDICINE | Facility: CLINIC | Age: 67
End: 2017-10-04

## 2017-10-04 ENCOUNTER — TELEPHONE (OUTPATIENT)
Dept: PULMONOLOGY | Facility: CLINIC | Age: 67
End: 2017-10-04

## 2017-10-04 DIAGNOSIS — J98.8 PSEUDOMONAS RESPIRATORY INFECTION: Primary | ICD-10-CM

## 2017-10-04 DIAGNOSIS — B96.5 PSEUDOMONAS RESPIRATORY INFECTION: Primary | ICD-10-CM

## 2017-10-04 LAB
BACTERIA SPEC AEROBE CULT: NORMAL
GRAM STN SPEC: NORMAL

## 2017-10-04 RX ORDER — AMOXICILLIN AND CLAVULANATE POTASSIUM 875; 125 MG/1; MG/1
1 TABLET, FILM COATED ORAL 2 TIMES DAILY
Qty: 20 TABLET | Refills: 1 | Status: SHIPPED | OUTPATIENT
Start: 2017-10-04 | End: 2017-10-14

## 2017-10-04 NOTE — TELEPHONE ENCOUNTER
----- Message from Cherise Sams sent at 10/4/2017  3:18 PM CDT -----  Contact: Dameon @ [facility] [employer] [phone] fax number [phone]  Please send the pt prescription for the eliquis Thanks !

## 2017-10-04 NOTE — TELEPHONE ENCOUNTER
Pt informed the recent sputum culture has grown Pseudomonas.  Treatment options appear limited except for IV agents.  She is still having a great deal of purulent sputum and malaise.  She will begin Augmentin until she has appt next week in [place].

## 2017-10-05 ENCOUNTER — TELEPHONE (OUTPATIENT)
Dept: FAMILY MEDICINE | Facility: CLINIC | Age: 67
End: 2017-10-05

## 2017-10-05 NOTE — TELEPHONE ENCOUNTER
----- Message from Tatyana Ramirez sent at 10/5/2017  2:55 PM CDT -----  Contact: cheryl Oliveira calling to let Doctor know that pt was approved for the ELIQUIS. Please call [phone]

## 2017-10-09 ENCOUNTER — OFFICE VISIT (OUTPATIENT)
Dept: INFECTIOUS DISEASES | Facility: CLINIC | Age: 67
End: 2017-10-09
Payer: MEDICARE

## 2017-10-09 VITALS
DIASTOLIC BLOOD PRESSURE: 72 MMHG | WEIGHT: 150.81 LBS | TEMPERATURE: 98 F | HEART RATE: 103 BPM | HEIGHT: 62 IN | SYSTOLIC BLOOD PRESSURE: 135 MMHG | BODY MASS INDEX: 27.75 KG/M2

## 2017-10-09 DIAGNOSIS — B96.5 PSEUDOMONAS RESPIRATORY INFECTION: ICD-10-CM

## 2017-10-09 DIAGNOSIS — J98.8 PSEUDOMONAS RESPIRATORY INFECTION: ICD-10-CM

## 2017-10-09 DIAGNOSIS — J47.1 BRONCHIECTASIS WITH ACUTE EXACERBATION: Primary | ICD-10-CM

## 2017-10-09 PROCEDURE — 99213 OFFICE O/P EST LOW 20 MIN: CPT | Mod: S$GLB,,, | Performed by: INTERNAL MEDICINE

## 2017-10-09 PROCEDURE — G0008 ADMIN INFLUENZA VIRUS VAC: HCPCS | Mod: S$GLB,,, | Performed by: INTERNAL MEDICINE

## 2017-10-09 PROCEDURE — 99999 PR PBB SHADOW E&M-EST. PATIENT-LVL III: CPT | Mod: PBBFAC,,, | Performed by: INTERNAL MEDICINE

## 2017-10-09 PROCEDURE — 99499 UNLISTED E&M SERVICE: CPT | Mod: S$GLB,,, | Performed by: INTERNAL MEDICINE

## 2017-10-09 PROCEDURE — 90662 IIV NO PRSV INCREASED AG IM: CPT | Mod: S$GLB,,, | Performed by: INTERNAL MEDICINE

## 2017-10-09 NOTE — PROGRESS NOTES
Pt received the high dose influenza vaccination. Pt tolerated injection well. Pt observed for 15 minutes. Pt left the unit in NAD.

## 2017-10-09 NOTE — PROGRESS NOTES
Subjective:      Patient ID: Yasmin Asencio is a 67 y.o. female.    Chief Complaint:Follow-up (3 month)      History of Present Illness  HPI     Follow-up    Additional comments: 3 month       Last edited by Jessy Jimenes MA on 10/9/2017  1:25 PM. (History)        Ms. Asencio is a 67 y/o female with a history of bronchiectasis, lung scarring with cavitary disease and hemoptysis.  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  She had a chest cold at the time that she saw her PCP last month.  She says that her lungs were hurting.  She was coughing and had sinus problems.  She wasn't coughing up any blood.  She didn't have any fevers.  She felt like her 'lungs hurt'.  She then saw Dr. Zuñiga and a sputum culture was performed that showed pseudomonas.  She was prescribed amoxillin and started it on October 4 at night.  She feels that she is better.  Her oxygen is doing 'good'.  She has been able to be active.    Review of Systems   Constitution: Positive for malaise/fatigue and night sweats. Negative for chills, decreased appetite, fever, weakness, weight gain and weight loss.   HENT: Negative for congestion, ear pain, hearing loss, hoarse voice, sore throat and tinnitus.    Eyes: Positive for blurred vision. Negative for redness and visual disturbance.   Cardiovascular: Positive for palpitations. Negative for chest pain and leg swelling.   Respiratory: Positive for cough, shortness of breath, sputum production and wheezing. Negative for hemoptysis.    Hematologic/Lymphatic: Negative for adenopathy. Bruises/bleeds easily.   Skin: Negative for dry skin, itching, rash and suspicious lesions.   Musculoskeletal: Positive for back pain and neck pain. Negative for joint pain and myalgias.   Gastrointestinal: Positive for diarrhea. Negative for abdominal pain, constipation, heartburn, nausea and vomiting.   Genitourinary: Positive for hesitancy. Negative for dysuria, flank pain, frequency, hematuria and  urgency.   Neurological: Positive for headaches. Negative for dizziness, numbness and paresthesias.   Psychiatric/Behavioral: Positive for memory loss. Negative for depression. The patient is nervous/anxious. The patient does not have insomnia.      Objective:   Physical Exam   Constitutional: She is oriented to person, place, and time. She appears well-developed and well-nourished. No distress.   HENT:   Head: Normocephalic and atraumatic.   Right Ear: External ear normal.   Left Ear: External ear normal.   Nose: Nose normal.   Mouth/Throat: Oropharynx is clear and moist. No oropharyngeal exudate.   Eyes: Conjunctivae and EOM are normal. Pupils are equal, round, and reactive to light. Right eye exhibits no discharge. Left eye exhibits no discharge. No scleral icterus.   Neck: Normal range of motion. Neck supple. No JVD present. No tracheal deviation present. No thyromegaly present.   Cardiovascular: Normal rate, regular rhythm and intact distal pulses.  Exam reveals no gallop and no friction rub.    No murmur heard.  Pulmonary/Chest: Effort normal. No stridor. No respiratory distress. She has wheezes. She has rales (Left lower lobe). She exhibits no tenderness.   Abdominal: Soft. Bowel sounds are normal. She exhibits no distension and no mass. There is no tenderness. There is no rebound and no guarding.   Musculoskeletal: Normal range of motion. She exhibits no edema or tenderness.   Lymphadenopathy:     She has no cervical adenopathy.   Neurological: She is alert and oriented to person, place, and time. She displays normal reflexes. No cranial nerve deficit. She exhibits normal muscle tone. Coordination normal.   Skin: Skin is warm. No rash noted. She is not diaphoretic. No erythema. No pallor.   Psychiatric: She has a normal mood and affect. Her behavior is normal. Judgment and thought content normal.   Nursing note and vitals reviewed.    Assessment:       1. Bronchiectasis with acute exacerbation    2.  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  She had a recent exacerbation of cough type symptoms and was given a course of amoxicillin.  She feels better today.  She looks better today than she did on her last visit with me.  No additional antibiotics are planned from my standpoint.  I will give her the flu shot.  She is to follow up as needed.  Plan:       Bronchiectasis with acute exacerbation  -     Influenza - High Dose (65+) (PF) (IM); Future; Expected date: 10/09/2017    Pseudomonas respiratory infection  -     Influenza - High Dose (65+) (PF) (IM); Future; Expected date: 10/09/2017

## 2017-10-20 ENCOUNTER — TELEPHONE (OUTPATIENT)
Dept: INFECTIOUS DISEASES | Facility: CLINIC | Age: 67
End: 2017-10-20

## 2017-10-20 DIAGNOSIS — J47.1 BRONCHIECTASIS WITH ACUTE EXACERBATION: Primary | ICD-10-CM

## 2017-10-20 RX ORDER — AMOXICILLIN AND CLAVULANATE POTASSIUM 500; 125 MG/1; MG/1
1 TABLET, FILM COATED ORAL 3 TIMES DAILY
Qty: 21 TABLET | Refills: 0 | Status: SHIPPED | OUTPATIENT
Start: 2017-10-20 | End: 2017-10-27

## 2017-10-20 RX ORDER — AZITHROMYCIN 250 MG/1
TABLET, FILM COATED ORAL
Qty: 6 TABLET | Refills: 0 | Status: SHIPPED | OUTPATIENT
Start: 2017-10-20 | End: 2017-11-03

## 2017-10-20 NOTE — TELEPHONE ENCOUNTER
Probable flair up of her bronchiectasis.  Her lungs are chronically colonized with a cipro resistant strain of pseudomonas.      Plan  1.  Will give 7 day course of azithromycin and augmentin to see if there is any improvement in her lung function.

## 2017-10-20 NOTE — TELEPHONE ENCOUNTER
----- Message from Juanita Alejandre MA sent at 10/20/2017  4:44 PM CDT -----  Contact: Ernestine    [phone]         ----- Message -----  From: Charlene Domingo  Sent: 10/20/2017  10:52 AM  To: Kwabena Palma Staff    Dr. Yuan's pt./    Pt.says you told her to call you if the medication is not helping ,   Pt.says she has called and left msgs.  And has not heard from you.   Asking if you would see her today or asap.    Pls call.

## 2017-10-23 ENCOUNTER — TELEPHONE (OUTPATIENT)
Dept: INFECTIOUS DISEASES | Facility: CLINIC | Age: 67
End: 2017-10-23

## 2017-10-23 NOTE — TELEPHONE ENCOUNTER
----- Message from Jessy Jimenes MA sent at 10/23/2017 11:36 AM CDT -----  Contact: Dr Yuan pt   Can this pt been seen Thursday during your administration hours?   ----- Message -----  From: Jesika Kaiser  Sent: 10/19/2017  10:07 AM  To: Jessy Jimenes MA    Pt needs a work in  -      Dr Yuan told pt if oral med didn't work to come back in    Pt finished med   Sat 10/14    Not feeling any better   May have to get Pic line     Please call with work in    [phone]    Thanks

## 2017-10-26 ENCOUNTER — OFFICE VISIT (OUTPATIENT)
Dept: INFECTIOUS DISEASES | Facility: CLINIC | Age: 67
End: 2017-10-26
Payer: MEDICARE

## 2017-10-26 ENCOUNTER — HOSPITAL ENCOUNTER (OUTPATIENT)
Dept: RADIOLOGY | Facility: HOSPITAL | Age: 67
Discharge: HOME | End: 2017-10-26
Attending: INTERNAL MEDICINE
Payer: MEDICARE

## 2017-10-26 VITALS
HEART RATE: 75 BPM | DIASTOLIC BLOOD PRESSURE: 63 MMHG | BODY MASS INDEX: 27.55 KG/M2 | HEIGHT: 62 IN | TEMPERATURE: 97 F | SYSTOLIC BLOOD PRESSURE: 136 MMHG | WEIGHT: 149.69 LBS

## 2017-10-26 DIAGNOSIS — J98.4 CAVITARY LUNG DISEASE: ICD-10-CM

## 2017-10-26 DIAGNOSIS — J47.1 BRONCHIECTASIS WITH ACUTE EXACERBATION: ICD-10-CM

## 2017-10-26 DIAGNOSIS — J47.1 BRONCHIECTASIS WITH ACUTE EXACERBATION: Primary | ICD-10-CM

## 2017-10-26 PROCEDURE — 99214 OFFICE O/P EST MOD 30 MIN: CPT | Mod: S$GLB,,, | Performed by: INTERNAL MEDICINE

## 2017-10-26 PROCEDURE — 71020 XR CHEST PA AND LATERAL: CPT | Mod: TC

## 2017-10-26 PROCEDURE — 99999 PR PBB SHADOW E&M-EST. PATIENT-LVL IV: CPT | Mod: PBBFAC,,, | Performed by: INTERNAL MEDICINE

## 2017-10-26 PROCEDURE — 71020 XR CHEST PA AND LATERAL: CPT | Mod: 26,,, | Performed by: RADIOLOGY

## 2017-10-26 PROCEDURE — 99499 UNLISTED E&M SERVICE: CPT | Mod: S$GLB,,, | Performed by: INTERNAL MEDICINE

## 2017-10-26 RX ORDER — ONDANSETRON 4 MG/1
4 TABLET, FILM COATED ORAL EVERY 8 HOURS PRN
Qty: 45 TABLET | Refills: 0 | Status: SHIPPED | OUTPATIENT
Start: 2017-10-26 | End: 2019-07-25

## 2017-10-26 RX ORDER — TRAMADOL HYDROCHLORIDE 50 MG/1
50 TABLET ORAL EVERY 6 HOURS PRN
Qty: 45 TABLET | Refills: 0 | Status: SHIPPED | OUTPATIENT
Start: 2017-10-26 | End: 2019-11-22

## 2017-10-26 NOTE — PROGRESS NOTES
Subjective:      Patient ID: Yasmin Asencio is a 67 y.o. female.    Chief Complaint:Follow-up    History of Present Illness    Her breathing is okay.  She feels that the mucus is not green and 'nasty' looking like it has been.  She feels like she has more sputum. She says the coughing is worse.  Her pulse oximetry has been okay.  Has not been using her pulse oximetry at night.  She has been having a lot of mucus.  It comes up more when she is laying down.  She feels that the phlegm makes her nauseated.    She feels like her 'lungs are hurting'.  She feels that this is worse.  She feels like it is down at the base of the lung.  She had some tramadols at home from before and it was helping.  Right now the pain is between a 4 to 5.    Review of Systems   Constitution: Positive for malaise/fatigue.   Eyes: Positive for blurred vision.   Cardiovascular: Positive for palpitations.   Respiratory: Positive for cough, shortness of breath, sputum production and wheezing.    Musculoskeletal: Positive for back pain, joint pain and neck pain.   Gastrointestinal: Positive for nausea.   Neurological: Positive for headaches.   Psychiatric/Behavioral: Positive for memory loss. The patient has insomnia and is nervous/anxious.      Objective:   Physical Exam   Constitutional: She is oriented to person, place, and time. She appears well-developed and well-nourished. No distress.   HENT:   Head: Normocephalic and atraumatic.   Right Ear: External ear normal.   Left Ear: External ear normal.   Nose: Nose normal.   Mouth/Throat: Oropharynx is clear and moist. No oropharyngeal exudate.   Eyes: Conjunctivae and EOM are normal. Pupils are equal, round, and reactive to light. Right eye exhibits no discharge. Left eye exhibits no discharge. No scleral icterus.   Neck: Normal range of motion. Neck supple. No JVD present. No tracheal deviation present. No thyromegaly present.   Cardiovascular: Normal rate, regular rhythm, normal heart  sounds and intact distal pulses.  Exam reveals no gallop and no friction rub.    No murmur heard.  Pulmonary/Chest: Effort normal. No stridor. No respiratory distress. She has wheezes. She has rales. She exhibits no tenderness.   Abdominal: Soft. Bowel sounds are normal. She exhibits no distension and no mass. There is no tenderness. There is no rebound and no guarding.   Musculoskeletal: Normal range of motion. She exhibits no edema or tenderness.   Lymphadenopathy:     She has no cervical adenopathy.   Neurological: She is alert and oriented to person, place, and time. She displays normal reflexes. No cranial nerve deficit. She exhibits normal muscle tone. Coordination normal.   Skin: Skin is warm. No rash noted. She is not diaphoretic. No erythema. No pallor.   Psychiatric: She has a normal mood and affect. Her behavior is normal. Judgment and thought content normal.   Nursing note and vitals reviewed.    Assessment:       1. Bronchiectasis with acute exacerbation :  66 y/o with chronic and bronchiectatic lung disease presenting with what appears to be an exacerbation.  In the past she has grown pseudomonas from respiratory cultures. I will order new respiratory cultures.  I will check labs.  She may need to be started on IV antibiotics again.    2. Cavitary lung disease : Plan as outlined above.         Plan:       Bronchiectasis with acute exacerbation  -     AFB Culture & Smear; Future; Expected date: 11/09/2017  -     Culture, Respiratory with Gram Stain; Future; Expected date: 11/09/2017  -     X-Ray Chest PA And Lateral; Future; Expected date: 10/26/2017  -     Comprehensive metabolic panel; Future; Expected date: 11/09/2017    Cavitary lung disease  -     AFB Culture & Smear; Future; Expected date: 11/09/2017  -     Culture, Respiratory with Gram Stain; Future; Expected date: 11/09/2017  -     X-Ray Chest PA And Lateral; Future; Expected date: 10/26/2017  -     Comprehensive metabolic panel; Future;  Expected date: 11/09/2017    Other orders  -     ondansetron (ZOFRAN) 4 MG tablet; Take 1 tablet (4 mg total) by mouth every 8 (eight) hours as needed for Nausea.  Dispense: 45 tablet; Refill: 0  -     traMADol (ULTRAM) 50 mg tablet; Take 1 tablet (50 mg total) by mouth every 6 (six) hours as needed for Pain.  Dispense: 45 tablet; Refill: 0

## 2017-10-27 ENCOUNTER — LAB VISIT (OUTPATIENT)
Dept: LAB | Facility: HOSPITAL | Age: 67
End: 2017-10-27
Attending: INTERNAL MEDICINE
Payer: MEDICARE

## 2017-10-27 DIAGNOSIS — J47.0 BRONCHIECTASIS WITH ACUTE LOWER RESPIRATORY INFECTION: ICD-10-CM

## 2017-10-27 PROCEDURE — 87116 MYCOBACTERIA CULTURE: CPT

## 2017-10-27 PROCEDURE — 87015 SPECIMEN INFECT AGNT CONCNTJ: CPT

## 2017-11-03 ENCOUNTER — OFFICE VISIT (OUTPATIENT)
Dept: ELECTROPHYSIOLOGY | Facility: CLINIC | Age: 67
End: 2017-11-03
Payer: MEDICARE

## 2017-11-03 ENCOUNTER — HOSPITAL ENCOUNTER (OUTPATIENT)
Dept: CARDIOLOGY | Facility: CLINIC | Age: 67
Discharge: HOME | End: 2017-11-03
Payer: MEDICARE

## 2017-11-03 VITALS
WEIGHT: 145 LBS | HEART RATE: 80 BPM | HEIGHT: 62 IN | SYSTOLIC BLOOD PRESSURE: 152 MMHG | DIASTOLIC BLOOD PRESSURE: 69 MMHG | BODY MASS INDEX: 26.68 KG/M2

## 2017-11-03 DIAGNOSIS — I12.9 BENIGN HYPERTENSION WITH CHRONIC KIDNEY DISEASE, STAGE III: ICD-10-CM

## 2017-11-03 DIAGNOSIS — N18.30 BENIGN HYPERTENSION WITH CHRONIC KIDNEY DISEASE, STAGE III: ICD-10-CM

## 2017-11-03 DIAGNOSIS — J43.8 OTHER EMPHYSEMA: Primary | ICD-10-CM

## 2017-11-03 DIAGNOSIS — I50.22 CHRONIC SYSTOLIC HEART FAILURE: ICD-10-CM

## 2017-11-03 DIAGNOSIS — I47.10 PSVT (PAROXYSMAL SUPRAVENTRICULAR TACHYCARDIA): ICD-10-CM

## 2017-11-03 DIAGNOSIS — E66.3 OVERWEIGHT (BMI 25.0-29.9): ICD-10-CM

## 2017-11-03 DIAGNOSIS — I47.10 SVT (SUPRAVENTRICULAR TACHYCARDIA): ICD-10-CM

## 2017-11-03 PROCEDURE — 93000 ELECTROCARDIOGRAM COMPLETE: CPT | Mod: S$GLB,,, | Performed by: INTERNAL MEDICINE

## 2017-11-03 PROCEDURE — 99999 PR PBB SHADOW E&M-EST. PATIENT-LVL IV: CPT | Mod: PBBFAC,,, | Performed by: INTERNAL MEDICINE

## 2017-11-03 PROCEDURE — 99499 UNLISTED E&M SERVICE: CPT | Mod: S$GLB,,, | Performed by: INTERNAL MEDICINE

## 2017-11-03 PROCEDURE — 99214 OFFICE O/P EST MOD 30 MIN: CPT | Mod: S$GLB,,, | Performed by: INTERNAL MEDICINE

## 2017-11-03 RX ORDER — VERAPAMIL HYDROCHLORIDE 180 MG/1
180 CAPSULE, EXTENDED RELEASE ORAL DAILY
Qty: 90 CAPSULE | Refills: 3 | Status: ON HOLD | OUTPATIENT
Start: 2017-11-03 | End: 2017-12-01 | Stop reason: HOSPADM

## 2017-11-03 NOTE — PROGRESS NOTES
"Subjective:    Patient ID:  Yasmin Asencio is a 67 y.o. female who presents for evaluation of PSVT      HPI   67 y.o. F  pulm hemorrhage 11/15, s/p coiling  pseudomonas PNA 2016, s/p extended Abx  COPD, bronchiectasis (home O2 frequently)  HTN on meds  chronic slurred speech, thought due to essential tremor per neuro  hx "AF" destini-lung-coiling procedure (no documentation)  pSVT (s/p AVRNT ablation; likely focal AT remains)    Had palpitations with HR >140 bpm at random intervals for past 10 years. Recently about 1x/month.   WIth palps, gets blurry vision, chest palps with radiation to the neck. Her "blood runs cold" and lasts for 20-35 mins.    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 We also saw a likely focal AT at that procedure also; not ablated due to not able to reinduce.  Since, feeling much better. Does get short palpitations. Event monitor showed short NSAT and one sustained SVT c/w AT. These episodes don't bother her nearly as much as the AVNRT did.    cath 6/16 neg  echo 11/16 50-55% LVEF  Holter 2/17: SR . no SVT.  Event monitor (which I read today): regular NCT with -440 ms.     My interpretation of today's ECG is NST 64 bpm. Small P wave. No delta.    Review of Systems   Constitution: Negative. Negative for weakness and malaise/fatigue.   HENT: Negative.  Negative for ear pain and tinnitus.    Eyes: Negative for blurred vision.   Cardiovascular: Positive for dyspnea on exertion and palpitations. Negative for chest pain, near-syncope and syncope.   Respiratory: Negative.  Negative for shortness of breath.    Endocrine: Negative.  Negative for polyuria.   Hematologic/Lymphatic: Does not bruise/bleed easily.   Skin: Negative.  Negative for rash.   Musculoskeletal: Negative.  Negative for joint pain and muscle weakness.   Gastrointestinal: Negative.  Negative for abdominal pain and change in bowel habit.   Genitourinary: Negative for frequency.   Neurological: Positive for tremors. Negative " for dizziness.   Psychiatric/Behavioral: Negative.  Negative for depression. The patient is not nervous/anxious.    Allergic/Immunologic: Negative for environmental allergies.        Objective:    Physical Exam   Constitutional: She is oriented to person, place, and time. Vital signs are normal. She appears well-developed and well-nourished. She is active and cooperative.   HENT:   Head: Normocephalic and atraumatic.   Eyes: Conjunctivae and EOM are normal.   Neck: Normal range of motion. Carotid bruit is not present. No tracheal deviation and no edema present. No thyroid mass and no thyromegaly present.   Cardiovascular: Normal rate, regular rhythm, normal heart sounds, intact distal pulses and normal pulses.   No extrasystoles are present. PMI is not displaced.  Exam reveals no gallop and no friction rub.    No murmur heard.  Pulmonary/Chest: Effort normal. No respiratory distress. She has no wheezes. She has rhonchi in the right middle field, the right lower field, the left middle field and the left lower field. She has no rales.   Abdominal: Soft. Normal appearance. She exhibits no distension. There is no hepatosplenomegaly.   Musculoskeletal: Normal range of motion.   Neurological: She is alert and oriented to person, place, and time. Coordination normal.   Skin: Skin is warm and dry. No rash noted.   Psychiatric: She has a normal mood and affect. Her speech is normal and behavior is normal. Thought content normal. Cognition and memory are normal.   Nursing note and vitals reviewed.        Assessment:       1. Other emphysema    2. SVT (supraventricular tachycardia)    3. Chronic systolic heart failure    4. PSVT (paroxysmal supraventricular tachycardia)    5. Benign hypertension with chronic kidney disease, stage III    6. Overweight (BMI 25.0-29.9)         Plan:       Likely having (mostly short) AT still. Much better than before; likely due to elimination of AVNRT.    Increase verapamil 120 -> 180. Pt will  take at midday due to chronic AM relative hypotension.    Return in 1 year with echo, or earlier prn.  I discussed with patient risks, indications, benefits, and alternatives of the planned procedure. All questions were answered. Patient understands and wishes to proceed.

## 2017-11-07 ENCOUNTER — TELEPHONE (OUTPATIENT)
Dept: INFECTIOUS DISEASES | Facility: CLINIC | Age: 67
End: 2017-11-07

## 2017-11-07 DIAGNOSIS — J47.1 BRONCHIECTASIS WITH ACUTE EXACERBATION: Primary | ICD-10-CM

## 2017-11-07 NOTE — TELEPHONE ENCOUNTER
----- Message from Taty Felix sent at 11/7/2017 10:17 AM CST -----  Contact: Self [phone]  PT called to check to the status of test results.

## 2017-11-07 NOTE — TELEPHONE ENCOUNTER
Spoke with Mrs. Asencio concerning her AFB culture. I explained to her that the culture is still in process and that she would be notified when the results are finalized. Pt expressed verbal understanding, but explained to me that she still has a lot of phlegm that is green colored. LINDSAY

## 2017-11-10 DIAGNOSIS — J47.9 ACQUIRED BRONCHIECTASIS: ICD-10-CM

## 2017-11-10 NOTE — TELEPHONE ENCOUNTER
The respiratory cultures are still saying in process which means it wasn't done by the lab.  I have ordered this again and spoke to Ms. Asencio.  Please call her and arrange for her to submit a sputum sample tomorrow.  Thanks.

## 2017-11-11 ENCOUNTER — PATIENT MESSAGE (OUTPATIENT)
Dept: INFECTIOUS DISEASES | Facility: CLINIC | Age: 67
End: 2017-11-11

## 2017-11-13 ENCOUNTER — TELEPHONE (OUTPATIENT)
Dept: INFECTIOUS DISEASES | Facility: CLINIC | Age: 67
End: 2017-11-13

## 2017-11-13 RX ORDER — SODIUM CHLORIDE FOR INHALATION 3 %
VIAL, NEBULIZER (ML) INHALATION
Qty: 240 ML | Refills: 6 | Status: SHIPPED | OUTPATIENT
Start: 2017-11-13 | End: 2018-08-16 | Stop reason: SDUPTHER

## 2017-11-13 NOTE — TELEPHONE ENCOUNTER
Called patient to inform her that we need her to do another sputum sample because of a lab error. Pt was told to  a sterile cup and orders in I.D. Pt expressed verbal understanding and said that her  will stop by today and pick everything up. LINDSAY

## 2017-11-13 NOTE — TELEPHONE ENCOUNTER
Spoke with patient and explained to her that she would have to redo her sputum culture because of lab error. Pt was told that she could  a sterile cup and orders from ID clinic. Pt expressed verbal understanding and stated that her  would stop by to collect everything. LINDSAY

## 2017-11-14 ENCOUNTER — LAB VISIT (OUTPATIENT)
Dept: LAB | Facility: HOSPITAL | Age: 67
End: 2017-11-14
Attending: INTERNAL MEDICINE
Payer: MEDICARE

## 2017-11-14 DIAGNOSIS — J47.1 BRONCHIECTASIS WITH ACUTE EXACERBATION: ICD-10-CM

## 2017-11-14 PROCEDURE — 87077 CULTURE AEROBIC IDENTIFY: CPT

## 2017-11-14 PROCEDURE — 87205 SMEAR GRAM STAIN: CPT

## 2017-11-14 PROCEDURE — 87186 SC STD MICRODIL/AGAR DIL: CPT

## 2017-11-14 PROCEDURE — 87070 CULTURE OTHR SPECIMN AEROBIC: CPT

## 2017-11-15 ENCOUNTER — OFFICE VISIT (OUTPATIENT)
Dept: INFECTIOUS DISEASES | Facility: CLINIC | Age: 67
End: 2017-11-15
Payer: MEDICARE

## 2017-11-15 VITALS
BODY MASS INDEX: 27.1 KG/M2 | TEMPERATURE: 99 F | SYSTOLIC BLOOD PRESSURE: 148 MMHG | DIASTOLIC BLOOD PRESSURE: 78 MMHG | HEIGHT: 62 IN | WEIGHT: 147.25 LBS | HEART RATE: 87 BPM

## 2017-11-15 DIAGNOSIS — J98.4 CAVITARY LUNG DISEASE: ICD-10-CM

## 2017-11-15 DIAGNOSIS — J47.1 BRONCHIECTASIS WITH ACUTE EXACERBATION: Primary | ICD-10-CM

## 2017-11-15 PROCEDURE — 99499 UNLISTED E&M SERVICE: CPT | Mod: S$GLB,,, | Performed by: INTERNAL MEDICINE

## 2017-11-15 PROCEDURE — 99215 OFFICE O/P EST HI 40 MIN: CPT | Mod: S$GLB,,, | Performed by: INTERNAL MEDICINE

## 2017-11-15 PROCEDURE — 99999 PR PBB SHADOW E&M-EST. PATIENT-LVL III: CPT | Mod: PBBFAC,,, | Performed by: INTERNAL MEDICINE

## 2017-11-15 NOTE — PROGRESS NOTES
Subjective:      Patient ID: Yasmin Asencio is a 67 y.o. female.    Chief Complaint:Follow-up    History of Present Illness    Ms. Asencio is a 67 y/o female with a history of bronchiectasis, lung scarring with cavitary disease and hemoptysis.  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 She has had numerous courses of IV antibiotics in the past.  She presents today with a productive cough greenish mucus production and no hemoptysis.  Feels generally ill.  She feels light headed.  She states that her lungs 'hurt'.  She has had some tachycardia and palpitations.  She follows up with Dr. Ballesteros for her heart palpitations.    Review of Systems   Constitution: Negative for chills, decreased appetite, fever, weakness, malaise/fatigue, night sweats, weight gain and weight loss.   HENT: Negative for congestion, ear pain, hearing loss, hoarse voice, sore throat and tinnitus.    Eyes: Negative for blurred vision, redness and visual disturbance.   Cardiovascular: Negative for chest pain, leg swelling and palpitations.   Respiratory: Negative for cough, hemoptysis, shortness of breath and sputum production.    Hematologic/Lymphatic: Negative for adenopathy. Does not bruise/bleed easily.   Skin: Negative for dry skin, itching, rash and suspicious lesions.   Musculoskeletal: Negative for back pain, joint pain, myalgias and neck pain.   Gastrointestinal: Negative for abdominal pain, constipation, diarrhea, heartburn, nausea and vomiting.   Genitourinary: Negative for dysuria, flank pain, frequency, hematuria, hesitancy and urgency.   Neurological: Negative for dizziness, headaches, numbness and paresthesias.   Psychiatric/Behavioral: Negative for depression and memory loss. The patient does not have insomnia and is not nervous/anxious.      Objective:   Physical Exam   Constitutional: She is oriented to person, place, and time. She appears well-developed and well-nourished. No distress.   HENT:   Head:  Normocephalic and atraumatic.   Right Ear: External ear normal.   Left Ear: External ear normal.   Nose: Nose normal.   Mouth/Throat: Oropharynx is clear and moist. No oropharyngeal exudate.   Eyes: Conjunctivae and EOM are normal. Pupils are equal, round, and reactive to light. Right eye exhibits no discharge. Left eye exhibits no discharge. No scleral icterus.   Neck: Normal range of motion. Neck supple. No JVD present. No tracheal deviation present. No thyromegaly present.   Cardiovascular: Normal rate, regular rhythm, normal heart sounds and intact distal pulses.  Exam reveals no gallop and no friction rub.    No murmur heard.  Pulmonary/Chest: Effort normal. No stridor. No respiratory distress. She has wheezes. She has rales. She exhibits no tenderness.   Abdominal: Soft. Bowel sounds are normal. She exhibits no distension and no mass. There is no tenderness. There is no rebound and no guarding.   Musculoskeletal: Normal range of motion. She exhibits no edema or tenderness.   Lymphadenopathy:     She has no cervical adenopathy.   Neurological: She is alert and oriented to person, place, and time. She displays normal reflexes. No cranial nerve deficit. She exhibits normal muscle tone. Coordination normal.   Skin: Skin is warm. No rash noted. She is not diaphoretic. No erythema. No pallor.   Psychiatric: She has a normal mood and affect. Her behavior is normal. Judgment and thought content normal.   Nursing note and vitals reviewed.    Assessment:       1. Bronchiectasis with acute exacerbation    2. Cavitary lung disease        68 y/o with bronchiectasis with infection with known history of pseudomonas infection.  Respiratory cultures were ordered and are pending.  I will order a picc line and plan to start IV cefepime and IV tobramycin.  Plan:       Bronchiectasis with acute exacerbation  -     IR PICC Line Placement (xpd); Future; Expected date: 11/15/2017    Cavitary lung disease  -     IR PICC Line Placement  (xpd); Future; Expected date: 11/15/2017

## 2017-11-20 ENCOUNTER — HOSPITAL ENCOUNTER (OUTPATIENT)
Dept: INTERVENTIONAL RADIOLOGY/VASCULAR | Facility: HOSPITAL | Age: 67
Discharge: HOME | End: 2017-11-20
Attending: INTERNAL MEDICINE
Payer: MEDICARE

## 2017-11-20 VITALS
DIASTOLIC BLOOD PRESSURE: 70 MMHG | HEART RATE: 81 BPM | SYSTOLIC BLOOD PRESSURE: 160 MMHG | RESPIRATION RATE: 18 BRPM | OXYGEN SATURATION: 100 %

## 2017-11-20 DIAGNOSIS — J47.1 BRONCHIECTASIS WITH ACUTE EXACERBATION: ICD-10-CM

## 2017-11-20 DIAGNOSIS — J98.4 CAVITARY LUNG DISEASE: ICD-10-CM

## 2017-11-20 LAB
BACTERIA SPEC AEROBE CULT: NORMAL
GRAM STN SPEC: NORMAL

## 2017-11-20 PROCEDURE — 76937 US GUIDE VASCULAR ACCESS: CPT | Mod: TC

## 2017-11-20 PROCEDURE — 77001 FLUOROGUIDE FOR VEIN DEVICE: CPT | Mod: TC

## 2017-11-20 PROCEDURE — 76937 US GUIDE VASCULAR ACCESS: CPT | Mod: 26,,, | Performed by: RADIOLOGY

## 2017-11-20 PROCEDURE — 77001 FLUOROGUIDE FOR VEIN DEVICE: CPT | Mod: 26,,, | Performed by: RADIOLOGY

## 2017-11-20 PROCEDURE — 36569 INSJ PICC 5 YR+ W/O IMAGING: CPT | Mod: LT,,, | Performed by: RADIOLOGY

## 2017-11-20 NOTE — DISCHARGE SUMMARY
Radiology Discharge Summary      Hospital Course: No complications    Admit Date: 11/20/2017  Discharge Date: 11/20/2017     Instructions Given to Patient: Yes  Diet: Resume prior diet  Activity: activity as tolerated    Description of Condition on Discharge: Stable  Vital Signs (Most Recent): Pulse: 81 (11/20/17 0910)  Resp: 18 (11/20/17 0910)  BP: (!) 160/70 (11/20/17 0910)  SpO2: 100 % (11/20/17 0910)    Discharge Disposition: Home    Discharge Diagnosis: Needs long term access     Follow-up: with Primary    .IPhong M.D. personally reviewed and agree with the above dictated note.

## 2017-11-20 NOTE — PROGRESS NOTES
Left upper arm PICC line placement completed, pt tolerated well. No apparent distress noted. Dressing applied CDI. Discharge instructions reviewed and acknowledged. Pt discharged via ambulation, steady gait observed and private vehicle.

## 2017-11-20 NOTE — DISCHARGE INSTRUCTIONS
[facility] [phone] (MON-FRI 8 AM- 5PM). Radiology Resident on call [phone].

## 2017-11-20 NOTE — PROCEDURES
Radiology Post Procedure Note:     Procedure: Left Brachial PICC Placement    (s): Margoth    Blood Loss: Minimal    Specimen: None    Findings:   Patient came to IR and had a left brachial 35 cm 5 F PICC placed with tip terminating at cavoatrial junction.     Line ready to use.     Phong ENNIS M.D. personally reviewed and agree with the above dictated note.

## 2017-11-22 ENCOUNTER — TELEPHONE (OUTPATIENT)
Dept: INFECTIOUS DISEASES | Facility: CLINIC | Age: 67
End: 2017-11-22

## 2017-11-22 NOTE — TELEPHONE ENCOUNTER
----- Message from Cleopatra James sent at 11/21/2017  8:32 AM CST -----  Contact: Vicki  [phone] Press [address]   [facility] /  Nurse calling to verify orders for on the pt  That was sent over , call back to verify

## 2017-11-29 ENCOUNTER — HOSPITAL ENCOUNTER (INPATIENT)
Facility: HOSPITAL | Age: 67
LOS: 2 days | Discharge: HOME | DRG: 191 | End: 2017-12-01
Attending: HOSPITALIST | Admitting: INTERNAL MEDICINE
Payer: MEDICARE

## 2017-11-29 ENCOUNTER — OFFICE VISIT (OUTPATIENT)
Dept: INFECTIOUS DISEASES | Facility: CLINIC | Age: 67
DRG: 191 | End: 2017-11-29
Payer: MEDICARE

## 2017-11-29 VITALS
TEMPERATURE: 98 F | HEART RATE: 89 BPM | BODY MASS INDEX: 27.05 KG/M2 | HEIGHT: 62 IN | SYSTOLIC BLOOD PRESSURE: 137 MMHG | DIASTOLIC BLOOD PRESSURE: 68 MMHG | WEIGHT: 147 LBS

## 2017-11-29 DIAGNOSIS — J47.9 BRONCHIECTASIS: ICD-10-CM

## 2017-11-29 DIAGNOSIS — J98.4 CAVITARY LUNG DISEASE: Primary | ICD-10-CM

## 2017-11-29 DIAGNOSIS — R07.9 CHEST PAIN: ICD-10-CM

## 2017-11-29 DIAGNOSIS — R06.03 RESPIRATORY DISTRESS: ICD-10-CM

## 2017-11-29 DIAGNOSIS — R06.00 DYSPNEA: Primary | ICD-10-CM

## 2017-11-29 LAB
ALBUMIN SERPL BCP-MCNC: 3.7 G/DL
ALP SERPL-CCNC: 80 U/L
ALT SERPL W/O P-5'-P-CCNC: 16 U/L
ANION GAP SERPL CALC-SCNC: 9 MMOL/L
AST SERPL-CCNC: 17 U/L
BASOPHILS # BLD AUTO: 0.03 K/UL
BASOPHILS NFR BLD: 0.4 %
BILIRUB SERPL-MCNC: 0.2 MG/DL
BUN SERPL-MCNC: 16 MG/DL
CALCIUM SERPL-MCNC: 9.2 MG/DL
CHLORIDE SERPL-SCNC: 105 MMOL/L
CO2 SERPL-SCNC: 25 MMOL/L
CREAT SERPL-MCNC: 1 MG/DL
DIFFERENTIAL METHOD: ABNORMAL
EOSINOPHIL # BLD AUTO: 0.1 K/UL
EOSINOPHIL NFR BLD: 1.8 %
ERYTHROCYTE [DISTWIDTH] IN BLOOD BY AUTOMATED COUNT: 13.2 %
EST. GFR  (AFRICAN AMERICAN): >60 ML/MIN/1.73 M^2
EST. GFR  (NON AFRICAN AMERICAN): 58 ML/MIN/1.73 M^2
GLUCOSE SERPL-MCNC: 70 MG/DL
HCT VFR BLD AUTO: 29.1 %
HGB BLD-MCNC: 9.5 G/DL
IMM GRANULOCYTES # BLD AUTO: 0.01 K/UL
IMM GRANULOCYTES NFR BLD AUTO: 0.1 %
LYMPHOCYTES # BLD AUTO: 1.7 K/UL
LYMPHOCYTES NFR BLD: 25 %
MAGNESIUM SERPL-MCNC: 1.7 MG/DL
MCH RBC QN AUTO: 28.6 PG
MCHC RBC AUTO-ENTMCNC: 32.6 G/DL
MCV RBC AUTO: 88 FL
MONOCYTES # BLD AUTO: 0.8 K/UL
MONOCYTES NFR BLD: 11.6 %
NEUTROPHILS # BLD AUTO: 4.2 K/UL
NEUTROPHILS NFR BLD: 61.1 %
NRBC BLD-RTO: 0 /100 WBC
PHOSPHATE SERPL-MCNC: 2.9 MG/DL
PLATELET # BLD AUTO: 201 K/UL
PMV BLD AUTO: 10.1 FL
POTASSIUM SERPL-SCNC: 3.4 MMOL/L
PROCALCITONIN SERPL IA-MCNC: 0.07 NG/ML
PROT SERPL-MCNC: 8 G/DL
RBC # BLD AUTO: 3.32 M/UL
SODIUM SERPL-SCNC: 139 MMOL/L
TROPONIN I SERPL DL<=0.01 NG/ML-MCNC: <0.006 NG/ML
WBC # BLD AUTO: 6.81 K/UL

## 2017-11-29 PROCEDURE — 83735 ASSAY OF MAGNESIUM: CPT

## 2017-11-29 PROCEDURE — 85025 COMPLETE CBC W/AUTO DIFF WBC: CPT

## 2017-11-29 PROCEDURE — 94761 N-INVAS EAR/PLS OXIMETRY MLT: CPT

## 2017-11-29 PROCEDURE — 25000003 PHARM REV CODE 250: Performed by: INTERNAL MEDICINE

## 2017-11-29 PROCEDURE — 25000242 PHARM REV CODE 250 ALT 637 W/ HCPCS: Performed by: INTERNAL MEDICINE

## 2017-11-29 PROCEDURE — 11000001 HC ACUTE MED/SURG PRIVATE ROOM

## 2017-11-29 PROCEDURE — 99223 1ST HOSP IP/OBS HIGH 75: CPT | Mod: AI,GC,, | Performed by: INTERNAL MEDICINE

## 2017-11-29 PROCEDURE — 84484 ASSAY OF TROPONIN QUANT: CPT

## 2017-11-29 PROCEDURE — 99215 OFFICE O/P EST HI 40 MIN: CPT | Mod: S$GLB,,, | Performed by: INTERNAL MEDICINE

## 2017-11-29 PROCEDURE — 25000003 PHARM REV CODE 250

## 2017-11-29 PROCEDURE — 36415 COLL VENOUS BLD VENIPUNCTURE: CPT

## 2017-11-29 PROCEDURE — 84100 ASSAY OF PHOSPHORUS: CPT

## 2017-11-29 PROCEDURE — 99499 UNLISTED E&M SERVICE: CPT | Mod: S$GLB,,, | Performed by: INTERNAL MEDICINE

## 2017-11-29 PROCEDURE — 93010 ELECTROCARDIOGRAM REPORT: CPT | Mod: ,,, | Performed by: INTERNAL MEDICINE

## 2017-11-29 PROCEDURE — 93005 ELECTROCARDIOGRAM TRACING: CPT

## 2017-11-29 PROCEDURE — 25000242 PHARM REV CODE 250 ALT 637 W/ HCPCS

## 2017-11-29 PROCEDURE — 80053 COMPREHEN METABOLIC PANEL: CPT

## 2017-11-29 PROCEDURE — 94640 AIRWAY INHALATION TREATMENT: CPT

## 2017-11-29 PROCEDURE — 87040 BLOOD CULTURE FOR BACTERIA: CPT | Mod: 59

## 2017-11-29 PROCEDURE — 84145 PROCALCITONIN (PCT): CPT

## 2017-11-29 PROCEDURE — 99999 PR PBB SHADOW E&M-EST. PATIENT-LVL III: CPT | Mod: PBBFAC,,, | Performed by: INTERNAL MEDICINE

## 2017-11-29 RX ORDER — IPRATROPIUM BROMIDE 0.5 MG/2.5ML
0.5 SOLUTION RESPIRATORY (INHALATION) 4 TIMES DAILY
Status: DISCONTINUED | OUTPATIENT
Start: 2017-11-29 | End: 2017-12-01 | Stop reason: HOSPADM

## 2017-11-29 RX ORDER — PRIMIDONE 50 MG/1
100 TABLET ORAL NIGHTLY
Status: DISCONTINUED | OUTPATIENT
Start: 2017-11-29 | End: 2017-12-01 | Stop reason: HOSPADM

## 2017-11-29 RX ORDER — PANTOPRAZOLE SODIUM 40 MG/1
40 TABLET, DELAYED RELEASE ORAL DAILY
Status: DISCONTINUED | OUTPATIENT
Start: 2017-11-30 | End: 2017-12-01 | Stop reason: HOSPADM

## 2017-11-29 RX ORDER — ONDANSETRON 4 MG/1
4 TABLET, FILM COATED ORAL EVERY 8 HOURS PRN
Status: DISCONTINUED | OUTPATIENT
Start: 2017-11-29 | End: 2017-12-01 | Stop reason: HOSPADM

## 2017-11-29 RX ORDER — GUAIFENESIN 600 MG/1
600 TABLET, EXTENDED RELEASE ORAL 2 TIMES DAILY
Status: DISCONTINUED | OUTPATIENT
Start: 2017-11-29 | End: 2017-12-01 | Stop reason: HOSPADM

## 2017-11-29 RX ORDER — ESCITALOPRAM OXALATE 10 MG/1
10 TABLET ORAL DAILY
Status: DISCONTINUED | OUTPATIENT
Start: 2017-11-30 | End: 2017-12-01 | Stop reason: HOSPADM

## 2017-11-29 RX ORDER — PRIMIDONE 50 MG/1
50 TABLET ORAL DAILY
Status: DISCONTINUED | OUTPATIENT
Start: 2017-11-30 | End: 2017-12-01 | Stop reason: HOSPADM

## 2017-11-29 RX ORDER — GABAPENTIN 300 MG/1
300 CAPSULE ORAL 3 TIMES DAILY
Status: DISCONTINUED | OUTPATIENT
Start: 2017-11-29 | End: 2017-12-01 | Stop reason: HOSPADM

## 2017-11-29 RX ORDER — SODIUM CHLORIDE FOR INHALATION 3 %
4 VIAL, NEBULIZER (ML) INHALATION 2 TIMES DAILY
Status: DISCONTINUED | OUTPATIENT
Start: 2017-11-29 | End: 2017-12-01 | Stop reason: HOSPADM

## 2017-11-29 RX ORDER — TOPIRAMATE 25 MG/1
25 TABLET ORAL 2 TIMES DAILY
Status: DISCONTINUED | OUTPATIENT
Start: 2017-11-29 | End: 2017-12-01 | Stop reason: HOSPADM

## 2017-11-29 RX ORDER — TRAMADOL HYDROCHLORIDE 50 MG/1
50 TABLET ORAL EVERY 6 HOURS PRN
Status: DISCONTINUED | OUTPATIENT
Start: 2017-11-29 | End: 2017-12-01 | Stop reason: HOSPADM

## 2017-11-29 RX ORDER — SODIUM CHLORIDE 0.9 % (FLUSH) 0.9 %
3 SYRINGE (ML) INJECTION
Status: DISCONTINUED | OUTPATIENT
Start: 2017-11-29 | End: 2017-12-01 | Stop reason: HOSPADM

## 2017-11-29 RX ORDER — IPRATROPIUM BROMIDE 0.5 MG/2.5ML
0.5 SOLUTION RESPIRATORY (INHALATION) 4 TIMES DAILY
Status: DISCONTINUED | OUTPATIENT
Start: 2017-11-30 | End: 2017-11-29

## 2017-11-29 RX ORDER — ALPRAZOLAM 0.25 MG/1
0.25 TABLET ORAL NIGHTLY PRN
Status: DISCONTINUED | OUTPATIENT
Start: 2017-11-29 | End: 2017-12-01 | Stop reason: HOSPADM

## 2017-11-29 RX ORDER — VERAPAMIL HYDROCHLORIDE 180 MG/1
180 TABLET, FILM COATED, EXTENDED RELEASE ORAL DAILY
Status: DISCONTINUED | OUTPATIENT
Start: 2017-11-30 | End: 2017-11-30

## 2017-11-29 RX ADMIN — ONDANSETRON 4 MG: 4 TABLET, FILM COATED ORAL at 09:11

## 2017-11-29 RX ADMIN — GUAIFENESIN 600 MG: 600 TABLET, EXTENDED RELEASE ORAL at 09:11

## 2017-11-29 RX ADMIN — PRIMIDONE 100 MG: 50 TABLET ORAL at 09:11

## 2017-11-29 RX ADMIN — TRAMADOL HYDROCHLORIDE 50 MG: 50 TABLET, COATED ORAL at 09:11

## 2017-11-29 RX ADMIN — TOPIRAMATE 25 MG: 25 TABLET, FILM COATED ORAL at 09:11

## 2017-11-29 RX ADMIN — APIXABAN 5 MG: 5 TABLET, FILM COATED ORAL at 09:11

## 2017-11-29 RX ADMIN — ALPRAZOLAM 0.25 MG: 0.25 TABLET ORAL at 09:11

## 2017-11-29 RX ADMIN — IPRATROPIUM BROMIDE 0.5 MG: 0.5 SOLUTION RESPIRATORY (INHALATION) at 09:11

## 2017-11-29 RX ADMIN — SODIUM CHLORIDE SOLN NEBU 3% 4 ML: 3 NEBU SOLN at 09:11

## 2017-11-29 RX ADMIN — GABAPENTIN 300 MG: 300 CAPSULE ORAL at 09:11

## 2017-11-29 NOTE — PROGRESS NOTES
Subjective:      Patient ID: Yasmin Asencio is a 67 y.o. female.    Chief Complaint:No chief complaint on file.    History of Present Illness    Ms. Asencio is a 66 y/o female with a history of bronchiectasis, cavitary lung disease, recurrent hemoptysis, pulmonary embolism in January in 2017.  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  She has had recurrent flares of her bronchiectasis requiring antibiotics.  Over the past 2 months, she has complained of her lungs hurting, increased congestion, coughing (no hemoptysis) and fatigue.  I initially tried to manage this with oral medications to address her symptoms.  She also received some oral antibiotics from Dr. Zuñiga office.  These did not help.  I obtained a respiratory culture that was positive for pseudomonas again.  On November 20, a picc line was placed and she was started on IV cefepime and IV tobramycin at home..  She has been on these IV medications since.  She presented to my clinic today without a schedule appointment because she continues to feel bad.  She is teary and emotional when talking about how she feels.  She feels the antibiotics are not working this time.   She continues to have symptoms and is now using her oxygen more.  She also reports orthopnea.    Review of Systems   Constitution: Positive for weakness. Negative for chills, decreased appetite, malaise/fatigue, night sweats, weight gain and weight loss.   HENT: Negative for congestion, ear pain, hearing loss, hoarse voice, sore throat and tinnitus.    Eyes: Negative for blurred vision, redness and visual disturbance.   Cardiovascular: Negative for chest pain, leg swelling and palpitations.   Respiratory: Negative for cough, hemoptysis, shortness of breath and sputum production.    Hematologic/Lymphatic: Negative for adenopathy. Does not bruise/bleed easily.   Skin: Negative for dry skin, itching, rash and suspicious lesions.   Musculoskeletal: Negative for back pain, joint  pain, myalgias and neck pain.   Gastrointestinal: Negative for abdominal pain, constipation, diarrhea, heartburn, nausea and vomiting.   Genitourinary: Negative for dysuria, flank pain, frequency, hematuria, hesitancy and urgency.   Neurological: Negative for dizziness, headaches, numbness and paresthesias.   Psychiatric/Behavioral: Negative for depression and memory loss. The patient does not have insomnia and is not nervous/anxious.      Objective:   Physical Exam   Constitutional: She is oriented to person, place, and time. She appears well-developed and well-nourished. No distress.   HENT:   Head: Normocephalic and atraumatic.   Right Ear: External ear normal.   Left Ear: External ear normal.   Nose: Nose normal.   Mouth/Throat: Oropharynx is clear and moist. No oropharyngeal exudate.   Eyes: Conjunctivae and EOM are normal. Pupils are equal, round, and reactive to light. Right eye exhibits no discharge. Left eye exhibits no discharge. No scleral icterus.   Neck: Normal range of motion. Neck supple. No JVD present. No tracheal deviation present. No thyromegaly present.   Cardiovascular: Normal rate, regular rhythm and intact distal pulses.  Exam reveals no gallop and no friction rub.    No murmur heard.  Pulmonary/Chest: Effort normal. No stridor. No respiratory distress. She has wheezes. She has rales. She exhibits tenderness.   Abdominal: Soft. Bowel sounds are normal. She exhibits no distension and no mass. There is no tenderness. There is no rebound and no guarding.   Musculoskeletal: Normal range of motion. She exhibits no edema or tenderness.   Lymphadenopathy:     She has no cervical adenopathy.   Neurological: She is alert and oriented to person, place, and time. She displays normal reflexes. No cranial nerve deficit. She exhibits normal muscle tone. Coordination normal.   Skin: Skin is warm. No rash noted. She is not diaphoretic. No erythema. No pallor.   Psychiatric: She has a normal mood and affect.  Her behavior is normal. Judgment and thought content normal.   Nursing note and vitals reviewed.    Assessment:       1. Cavitary lung disease    2. Respiratory distress        66 y/o with known history of cavitary lung disease, bronchiectasis, chronic colonization of her lungs with pseudomonas, pulmonary embolism for which she is on anticoagulant therapy presenting with respiratory decline, chest pains, palpitations and fatigue.  She is on day #10 of a planned 4 day course of combination IV cefepime and IV tobramycin.  She is unfortunately not feeling any better.      I am suspicious her symptoms may not be due to an infectious process.  I have suggested that she be admitted to the hospital.  She should have an trans thoracic echo to evaluate her heart function and her pulmonary artery pressures.  I would also recommend a CTA of chest to look for evidence of pulmonary embolism.  I would suggest her IV antibiotics be discontinued at this time and that we hold off on additional antibiotics.  Plan:       Cavitary lung disease        - Refer for inpatient admission    Respiratory distress        - Refer for inpatient admission

## 2017-11-29 NOTE — PLAN OF CARE
Direct Admit from Clinic Acceptance Note    Clinic Physician or Mid-Level provider/Clinic giving report: Dr. Louie Yuan from ID clinic    Accepting Physician for admission to hospital: Chaya Kumar     Date of acceptance: 11/29/2017     Reason for direct admission from clinic or home: Bronchiectasis    Report from Clinic Physician/Mid-Level Provider: 66 y/o female with a history of bronchiectasis, cavitary lung disease, recurrent hemoptysis, pulmonary embolism in January in 2017.  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  She has had recurrent flares of her bronchiectasis requiring antibiotics.  Over the past 2 months, she has complained of her lungs hurting, increased congestion, coughing (no hemoptysis) and fatigue.  I initially tried to manage this with oral medications to address her symptoms.  She also received some oral antibiotics from Dr. Zuñiga office.  These did not help.  I obtained a respiratory culture that was positive for pseudomonas again.  On November 20, a picc line was placed and she was started on IV cefepime and IV tobramycin at home..  She has been on these IV medications since.  She presented to my clinic today without a schedule appointment because she continues to feel bad.  She is teary and emotional when talking about how she feels.  She feels the antibiotics are not working this time.   She continues to have symptoms and is now using her oxygen more.  She also reports orthopnea. I am suspicious her symptoms may not be due to an infectious process.  I have suggested that she be admitted to the hospital.  She should have an trans thoracic echo to evaluate her heart function and her pulmonary artery pressures.  I would also recommend a CTA of chest to look for evidence of pulmonary embolism.  I would suggest her IV antibiotics be discontinued at this time and that we hold off on additional antibiotics. (per Dr Yuan)    To Do List upon arrival: Echo and CTA - workup of  SOB    Please call extension [phone] upon patient arrival to floor for Hospital Medicine admit team assignment and for additional admit orders for the patient.

## 2017-11-30 LAB
ALBUMIN SERPL BCP-MCNC: 3.2 G/DL
ALP SERPL-CCNC: 72 U/L
ALT SERPL W/O P-5'-P-CCNC: 16 U/L
ANION GAP SERPL CALC-SCNC: 7 MMOL/L
AST SERPL-CCNC: 17 U/L
BASOPHILS # BLD AUTO: 0.03 K/UL
BASOPHILS NFR BLD: 0.5 %
BILIRUB SERPL-MCNC: 0.1 MG/DL
BUN SERPL-MCNC: 18 MG/DL
CALCIUM SERPL-MCNC: 8.7 MG/DL
CHLORIDE SERPL-SCNC: 107 MMOL/L
CO2 SERPL-SCNC: 26 MMOL/L
CREAT SERPL-MCNC: 1 MG/DL
DIASTOLIC DYSFUNCTION: NO
DIFFERENTIAL METHOD: ABNORMAL
EOSINOPHIL # BLD AUTO: 0.1 K/UL
EOSINOPHIL NFR BLD: 2.2 %
ERYTHROCYTE [DISTWIDTH] IN BLOOD BY AUTOMATED COUNT: 13.1 %
EST. GFR  (AFRICAN AMERICAN): >60 ML/MIN/1.73 M^2
EST. GFR  (NON AFRICAN AMERICAN): 58 ML/MIN/1.73 M^2
GLUCOSE SERPL-MCNC: 89 MG/DL
HCT VFR BLD AUTO: 28.2 %
HGB BLD-MCNC: 9 G/DL
IMM GRANULOCYTES # BLD AUTO: 0.01 K/UL
IMM GRANULOCYTES NFR BLD AUTO: 0.2 %
LYMPHOCYTES # BLD AUTO: 1.7 K/UL
LYMPHOCYTES NFR BLD: 30.4 %
MAGNESIUM SERPL-MCNC: 1.7 MG/DL
MCH RBC QN AUTO: 28.3 PG
MCHC RBC AUTO-ENTMCNC: 31.9 G/DL
MCV RBC AUTO: 89 FL
MONOCYTES # BLD AUTO: 0.7 K/UL
MONOCYTES NFR BLD: 13.6 %
NEUTROPHILS # BLD AUTO: 2.9 K/UL
NEUTROPHILS NFR BLD: 53.1 %
NRBC BLD-RTO: 0 /100 WBC
PHOSPHATE SERPL-MCNC: 4 MG/DL
PLATELET # BLD AUTO: 185 K/UL
PMV BLD AUTO: 10.4 FL
POTASSIUM SERPL-SCNC: 3.7 MMOL/L
PROT SERPL-MCNC: 6.9 G/DL
RBC # BLD AUTO: 3.18 M/UL
RETIRED EF AND QEF - SEE NOTES: 55 (ref 55–65)
SODIUM SERPL-SCNC: 140 MMOL/L
WBC # BLD AUTO: 5.46 K/UL

## 2017-11-30 PROCEDURE — 94667 MNPJ CHEST WALL 1ST: CPT

## 2017-11-30 PROCEDURE — 80053 COMPREHEN METABOLIC PANEL: CPT

## 2017-11-30 PROCEDURE — 93306 TTE W/DOPPLER COMPLETE: CPT

## 2017-11-30 PROCEDURE — 97165 OT EVAL LOW COMPLEX 30 MIN: CPT

## 2017-11-30 PROCEDURE — 36415 COLL VENOUS BLD VENIPUNCTURE: CPT

## 2017-11-30 PROCEDURE — 99233 SBSQ HOSP IP/OBS HIGH 50: CPT | Mod: ,,, | Performed by: INTERNAL MEDICINE

## 2017-11-30 PROCEDURE — 84100 ASSAY OF PHOSPHORUS: CPT

## 2017-11-30 PROCEDURE — 25000003 PHARM REV CODE 250

## 2017-11-30 PROCEDURE — 97161 PT EVAL LOW COMPLEX 20 MIN: CPT

## 2017-11-30 PROCEDURE — 94761 N-INVAS EAR/PLS OXIMETRY MLT: CPT

## 2017-11-30 PROCEDURE — 85025 COMPLETE CBC W/AUTO DIFF WBC: CPT

## 2017-11-30 PROCEDURE — 25000242 PHARM REV CODE 250 ALT 637 W/ HCPCS

## 2017-11-30 PROCEDURE — 94640 AIRWAY INHALATION TREATMENT: CPT

## 2017-11-30 PROCEDURE — 83735 ASSAY OF MAGNESIUM: CPT

## 2017-11-30 PROCEDURE — 25000242 PHARM REV CODE 250 ALT 637 W/ HCPCS: Performed by: INTERNAL MEDICINE

## 2017-11-30 PROCEDURE — 93306 TTE W/DOPPLER COMPLETE: CPT | Mod: 26,,, | Performed by: INTERNAL MEDICINE

## 2017-11-30 PROCEDURE — 25000003 PHARM REV CODE 250: Performed by: INTERNAL MEDICINE

## 2017-11-30 PROCEDURE — 94668 MNPJ CHEST WALL SBSQ: CPT

## 2017-11-30 PROCEDURE — 11000001 HC ACUTE MED/SURG PRIVATE ROOM

## 2017-11-30 RX ORDER — VERAPAMIL HYDROCHLORIDE 120 MG/1
240 TABLET, FILM COATED, EXTENDED RELEASE ORAL DAILY
Status: DISCONTINUED | OUTPATIENT
Start: 2017-12-01 | End: 2017-12-01 | Stop reason: HOSPADM

## 2017-11-30 RX ORDER — ACETAMINOPHEN 500 MG
1000 TABLET ORAL DAILY
COMMUNITY
End: 2019-10-25 | Stop reason: SDUPTHER

## 2017-11-30 RX ADMIN — ONDANSETRON 4 MG: 4 TABLET, FILM COATED ORAL at 10:11

## 2017-11-30 RX ADMIN — SODIUM CHLORIDE SOLN NEBU 3% 4 ML: 3 NEBU SOLN at 08:11

## 2017-11-30 RX ADMIN — GABAPENTIN 300 MG: 300 CAPSULE ORAL at 02:11

## 2017-11-30 RX ADMIN — PANTOPRAZOLE SODIUM 40 MG: 40 TABLET, DELAYED RELEASE ORAL at 08:11

## 2017-11-30 RX ADMIN — GABAPENTIN 300 MG: 300 CAPSULE ORAL at 08:11

## 2017-11-30 RX ADMIN — VERAPAMIL HYDROCHLORIDE 180 MG: 180 TABLET, FILM COATED, EXTENDED RELEASE ORAL at 12:11

## 2017-11-30 RX ADMIN — IPRATROPIUM BROMIDE 0.5 MG: 0.5 SOLUTION RESPIRATORY (INHALATION) at 11:11

## 2017-11-30 RX ADMIN — SODIUM CHLORIDE SOLN NEBU 3% 4 ML: 3 NEBU SOLN at 09:11

## 2017-11-30 RX ADMIN — ALPRAZOLAM 0.25 MG: 0.25 TABLET ORAL at 11:11

## 2017-11-30 RX ADMIN — TRAMADOL HYDROCHLORIDE 50 MG: 50 TABLET, COATED ORAL at 11:11

## 2017-11-30 RX ADMIN — GUAIFENESIN 600 MG: 600 TABLET, EXTENDED RELEASE ORAL at 08:11

## 2017-11-30 RX ADMIN — PRIMIDONE 50 MG: 50 TABLET ORAL at 12:11

## 2017-11-30 RX ADMIN — APIXABAN 5 MG: 5 TABLET, FILM COATED ORAL at 08:11

## 2017-11-30 RX ADMIN — TOPIRAMATE 25 MG: 25 TABLET, FILM COATED ORAL at 08:11

## 2017-11-30 RX ADMIN — GABAPENTIN 300 MG: 300 CAPSULE ORAL at 05:11

## 2017-11-30 RX ADMIN — PRIMIDONE 100 MG: 50 TABLET ORAL at 08:11

## 2017-11-30 RX ADMIN — IPRATROPIUM BROMIDE 0.5 MG: 0.5 SOLUTION RESPIRATORY (INHALATION) at 09:11

## 2017-11-30 RX ADMIN — IPRATROPIUM BROMIDE 0.5 MG: 0.5 SOLUTION RESPIRATORY (INHALATION) at 06:11

## 2017-11-30 RX ADMIN — PRIMIDONE 50 MG: 50 TABLET ORAL at 08:11

## 2017-11-30 NOTE — ASSESSMENT & PLAN NOTE
"Exacerbation of chronic bronchiectasis/COPD vs HF exacerbation vs emotional distress vs PE  - Low suspicion for PE in this patient   - Though patient has history of PE, is currently on therapeutic anticoagulation with apixaban   - Patient is oxygenating well on room air (97%) reports no increased O2 needs at home   - Well's score 1.5  - Per patient, her symptoms are consistent with previous exacerbations; however they typically respond to abx faster   - ID feels that infection less likely to be contributing, will hold therapy for now  - CHF exacerbation   - Patient has history of HFrEF documented with echo 1 year ago showing EF of 30%; however most recent echo 4 months ago completely WNL   - Denies history of MI   - Though chest pain reported on admission, it is non-cardiac (not affected by exertion/rest, not substernal)   - Repeat echo with no new abnormalities  - Emotional distress/anxiety possibly playing a role in this patient's dyspnea   - Previously treated with lexapro for 10 years for anxiety and depression, which patient discontinued herself in the last couple of months to "see how I feel without it"   - Day of admission is 10 year anniversary of the death of her son, which patient brought up during exam and became tearful while discussin   - Restarted lexapro   - Episodes of dyspnea coincide with episodes of palpitations, which are captured on tele as v tach   - Patient recently evaluated by Dr. Ballesteros in EP for this (11/03), home verapamil increased from 120 to 180   - EP consulted to determine whether increase in verapamil dose or event monitor is warranted  "

## 2017-11-30 NOTE — SUBJECTIVE & OBJECTIVE
Past Medical History:   Diagnosis Date    Acquired bronchiectasis     due to history of TB - followed by pulmonary, Dr. Zuñiga    Allergy     Amblyopia     rt eye per pt    Anemia of other chronic disease     Anticoagulant long-term use     Anxiety     Cataract     Clotting disorder     COPD (chronic obstructive pulmonary disease)     COPD (chronic obstructive pulmonary disease)     Depression     Diverticulosis     Essential tremor     GERD (gastroesophageal reflux disease)     Hemangioma of liver     History of tuberculosis 1978    Hypertension     Mixed anxiety and depressive disorder     Psoriasis     PSVT (paroxysmal supraventricular tachycardia)     Pulmonary embolism     S/P PICC central line placement Apr. 2016 - May 2016    Skin disease     Psoriasis    Spondylosis without myelopathy 8/23/2013    Supraventricular tachycardia     Thoracic aorta atherosclerosis     noted on CT scan of chest 1/3/2011    Tuberculosis     Vaginal delivery     x2    Vitamin D deficiency        Past Surgical History:   Procedure Laterality Date    CATARACT EXTRACTION W/  INTRAOCULAR LENS IMPLANT  07/24/12    od dr [place]ain    CATARACT EXTRACTION W/  INTRAOCULAR LENS IMPLANT  08/07/12    left eye    EYE SURGERY      bilateral Cataract    GALLBLADDER SURGERY  9/2011       Review of patient's allergies indicates:   Allergen Reactions    Adhesive Other (See Comments)     Tears up the skin and makes it itch    Bactrim [sulfamethoxazole-trimethoprim] Rash     Was hospitalized for rash    Lipitor [atorvastatin] Other (See Comments)     Muscle aches    Albuterol Other (See Comments)     tremors    Restasis [cyclosporine] Itching       No current facility-administered medications on file prior to encounter.      Current Outpatient Prescriptions on File Prior to Encounter   Medication Sig    alprazolam (XANAX) 0.25 MG tablet Take 1 tablet (0.25 mg total) by mouth nightly as needed for Anxiety.     ANORO ELLIPTA 62.5-25 mcg/actuation DsDv Inhale 1 puff into the lungs once daily.    apixaban 5 mg Tab Take 1 tablet (5 mg total) by mouth 2 (two) times daily.    desoximetasone (TOPICORT) 0.25 % cream Apply as directed bid prn    gabapentin (NEURONTIN) 300 MG capsule Take 1 capsule (300 mg total) by mouth 3 (three) times daily.    guaifenesin (MUCINEX) 600 mg 12 hr tablet Take 1,200 mg by mouth 2 (two) times daily.    ipratropium (ATROVENT) 0.02 % nebulizer solution USE ONE VIAL IN NEBULIZER 4 TIMES DAILY    omeprazole (PRILOSEC) 20 MG capsule Take 1 capsule (20 mg total) by mouth daily as needed.    ondansetron (ZOFRAN) 4 MG tablet Take 1 tablet (4 mg total) by mouth every 8 (eight) hours as needed for Nausea.    primidone (MYSOLINE) 50 MG Tab TAKE ONE TABLET BY MOUTH ONCE DAILY IN THE MORNING, ONE AT NOON, AND TWO AT BEDTIME    sodium chloride 3% 3 % nebulizer solution USE ONE VIAL IN NEBULIZER TWICE DAILY    topiramate (TOPAMAX) 25 MG tablet TAKE ONE TABLET BY MOUTH TWICE DAILY    traMADol (ULTRAM) 50 mg tablet Take 1 tablet (50 mg total) by mouth every 6 (six) hours as needed for Pain.    verapamil (VERELAN) 180 MG C24P Take 1 capsule (180 mg total) by mouth once daily.    VITAMIN D2 50,000 unit capsule TAKE ONE CAPSULE BY MOUTH ONCE A WEEK     Family History     Problem Relation (Age of Onset)    Cancer Father, Brother, Maternal Aunt    Heart attack Mother, Brother, Son    Hyperlipidemia Sister    Hypertension Mother    Lung cancer Sister    Psoriasis Sister    Stroke Maternal Uncle        Social History Main Topics    Smoking status: Former Smoker     Packs/day: 2.00     Years: 50.00     Types: Cigarettes     Quit date: 5/27/2009    Smokeless tobacco: Never Used    Alcohol use No    Drug use: No    Sexual activity: Yes     Partners: Male     Review of Systems   Constitutional: Positive for activity change and appetite change. Negative for chills, diaphoresis and fatigue.   HENT: Positive for  nosebleeds. Negative for congestion, postnasal drip, rhinorrhea, sinus pain, sinus pressure, sore throat and trouble swallowing.    Eyes: Negative for visual disturbance.   Respiratory: Positive for cough, chest tightness and wheezing. Negative for shortness of breath.    Cardiovascular: Positive for chest pain and palpitations. Negative for leg swelling.   Gastrointestinal: Positive for nausea. Negative for abdominal distention, constipation, diarrhea and vomiting.   Genitourinary: Positive for difficulty urinating. Negative for dysuria.   Musculoskeletal: Negative for arthralgias and back pain.   Skin: Positive for rash.   Neurological: Negative for light-headedness and headaches.   Psychiatric/Behavioral: Positive for dysphoric mood. The patient is nervous/anxious.      Objective:     Vital Signs (Most Recent):  Temp: 98.1 °F (36.7 °C) (11/29/17 1727)  Pulse: 81 (11/29/17 1727)  Resp: 20 (11/29/17 1727)  BP: 132/62 (11/29/17 1727)  SpO2: 97 % (11/29/17 1727) Vital Signs (24h Range):  Temp:  [98.1 °F (36.7 °C)-98.3 °F (36.8 °C)] 98.1 °F (36.7 °C)  Pulse:  [81-89] 81  Resp:  [20] 20  SpO2:  [97 %] 97 %  BP: (132-137)/(62-68) 132/62     Weight: 66.6 kg (146 lb 13.2 oz)  Body mass index is 26.85 kg/m².    Physical Exam   Constitutional: No distress.   Thin, chronically ill appearing woman.   HENT:   Head: Normocephalic and atraumatic.   Mouth/Throat: Oropharynx is clear and moist. No oropharyngeal exudate.   Eyes: Conjunctivae are normal. No scleral icterus.   Neck: Normal range of motion. Neck supple. No JVD present.   Cardiovascular: Normal rate, regular rhythm, normal heart sounds and intact distal pulses.    Pulmonary/Chest: Effort normal. No respiratory distress.   Bilateral pleural rubs.  Mild expiratory wheezes greater at the bases.  Diministed breath sounds bilaterally, worse on right.   Abdominal: Soft. She exhibits no distension. There is no tenderness.   Musculoskeletal: Normal range of motion. She  exhibits no edema, tenderness or deformity.   LE equal in size, no erythema/edema/tenderness   Neurological: She is alert.   Skin: Skin is warm and dry.   Psychiatric:   Became tearful on exam. Today is the 10 year anniversary of her son's death. States her mood is depressed and she feels anxious.        Significant Labs: All pertinent labs within the past 24 hours have been reviewed.    Significant Imaging: None.

## 2017-11-30 NOTE — SUBJECTIVE & OBJECTIVE
Past Medical History:   Diagnosis Date    Acquired bronchiectasis     due to history of TB - followed by pulmonary, Dr. Zuñiga    Allergy     Amblyopia     rt eye per pt    Anemia of other chronic disease     Anticoagulant long-term use     Anxiety     Cataract     Clotting disorder     COPD (chronic obstructive pulmonary disease)     COPD (chronic obstructive pulmonary disease)     Depression     Diverticulosis     Essential tremor     GERD (gastroesophageal reflux disease)     Hemangioma of liver     History of tuberculosis 1978    Hypertension     Mixed anxiety and depressive disorder     Psoriasis     PSVT (paroxysmal supraventricular tachycardia)     Pulmonary embolism     S/P PICC central line placement Apr. 2016 - May 2016    Skin disease     Psoriasis    Spondylosis without myelopathy 8/23/2013    Supraventricular tachycardia     Thoracic aorta atherosclerosis     noted on CT scan of chest 1/3/2011    Tuberculosis     Vaginal delivery     x2    Vitamin D deficiency        Past Surgical History:   Procedure Laterality Date    CATARACT EXTRACTION W/  INTRAOCULAR LENS IMPLANT  07/24/12    od dr [place]    CATARACT EXTRACTION W/  INTRAOCULAR LENS IMPLANT  08/07/12    left eye    EYE SURGERY      bilateral Cataract    GALLBLADDER SURGERY  9/2011       Review of patient's allergies indicates:   Allergen Reactions    Adhesive Other (See Comments)     Tears up the skin and makes it itch    Bactrim [sulfamethoxazole-trimethoprim] Rash     Was hospitalized for rash    Lipitor [atorvastatin] Other (See Comments)     Muscle aches    Albuterol Other (See Comments)     tremors    Restasis [cyclosporine] Itching       No current facility-administered medications on file prior to encounter.      Current Outpatient Prescriptions on File Prior to Encounter   Medication Sig    alprazolam (XANAX) 0.25 MG tablet Take 1 tablet (0.25 mg total) by mouth nightly as needed for Anxiety.     ANORO ELLIPTA 62.5-25 mcg/actuation DsDv Inhale 1 puff into the lungs once daily.    apixaban 5 mg Tab Take 1 tablet (5 mg total) by mouth 2 (two) times daily.    desoximetasone (TOPICORT) 0.25 % cream Apply as directed bid prn (Patient taking differently: Apply as directed twice daily as needed for psoriasis)    gabapentin (NEURONTIN) 300 MG capsule Take 1 capsule (300 mg total) by mouth 3 (three) times daily.    guaifenesin (MUCINEX) 600 mg 12 hr tablet Take 1,200 mg by mouth 2 (two) times daily.    ipratropium (ATROVENT) 0.02 % nebulizer solution USE ONE VIAL IN NEBULIZER 4 TIMES DAILY    omeprazole (PRILOSEC) 20 MG capsule Take 1 capsule (20 mg total) by mouth daily as needed. (Patient taking differently: Take 20 mg by mouth once daily. )    ondansetron (ZOFRAN) 4 MG tablet Take 1 tablet (4 mg total) by mouth every 8 (eight) hours as needed for Nausea.    primidone (MYSOLINE) 50 MG Tab TAKE ONE TABLET BY MOUTH ONCE DAILY IN THE MORNING, ONE AT NOON, AND TWO AT BEDTIME    sodium chloride 3% 3 % nebulizer solution USE ONE VIAL IN NEBULIZER TWICE DAILY    topiramate (TOPAMAX) 25 MG tablet TAKE ONE TABLET BY MOUTH TWICE DAILY    traMADol (ULTRAM) 50 mg tablet Take 1 tablet (50 mg total) by mouth every 6 (six) hours as needed for Pain.    verapamil (VERELAN) 180 MG C24P Take 1 capsule (180 mg total) by mouth once daily.    VITAMIN D2 50,000 unit capsule TAKE ONE CAPSULE BY MOUTH ONCE A WEEK (Patient taking differently: TAKE ONE CAPSULE BY MOUTH EVERY FRI)     Family History     Problem Relation (Age of Onset)    Cancer Father, Brother, Maternal Aunt    Heart attack Mother, Brother, Son    Hyperlipidemia Sister    Hypertension Mother    Lung cancer Sister    Psoriasis Sister    Stroke Maternal Uncle        Social History Main Topics    Smoking status: Former Smoker     Packs/day: 2.00     Years: 50.00     Types: Cigarettes     Quit date: 5/27/2009    Smokeless tobacco: Never Used    Alcohol use No     Drug use: No    Sexual activity: Yes     Partners: Male     Review of Systems   Constitution: Negative for chills and fever.   HENT: Negative for ear pain.    Cardiovascular: Positive for chest pain (pleuritic ). Negative for leg swelling, palpitations and syncope.   Respiratory: Positive for cough and shortness of breath.    Gastrointestinal: Negative for abdominal pain.   Genitourinary: Negative for frequency.   Neurological: Positive for tremors. Negative for focal weakness and loss of balance.   Psychiatric/Behavioral: Negative for altered mental status.     Objective:     Vital Signs (Most Recent):  Temp: 97.9 °F (36.6 °C) (11/30/17 1500)  Pulse: 86 (11/30/17 1500)  Resp: 20 (11/30/17 1500)  BP: (!) 144/65 (11/30/17 1500)  SpO2: (!) 93 % (11/30/17 1500) Vital Signs (24h Range):  Temp:  [97.1 °F (36.2 °C)-97.9 °F (36.6 °C)] 97.9 °F (36.6 °C)  Pulse:  [71-90] 86  Resp:  [16-20] 20  SpO2:  [90 %-97 %] 93 %  BP: (110-146)/(54-65) 144/65     Weight: 66.6 kg (146 lb 13.2 oz)  Body mass index is 26.85 kg/m².    SpO2: (!) 93 %  O2 Device (Oxygen Therapy): room air    Physical Exam   Constitutional: She is oriented to person, place, and time. She appears well-developed and well-nourished.   HENT:   Head: Normocephalic and atraumatic.   Eyes: EOM are normal. Pupils are equal, round, and reactive to light.   Neck: Normal range of motion. Neck supple. No JVD present.   Cardiovascular: Normal rate, regular rhythm, normal heart sounds and intact distal pulses.    No murmur heard.  Pulmonary/Chest: Effort normal and breath sounds normal. She has no wheezes.   Abdominal: Soft. Bowel sounds are normal. There is no tenderness.   Neurological: She is alert and oriented to person, place, and time. She has normal reflexes.   Vitals reviewed.      Significant Labs:   EP:   Recent Labs  Lab 11/29/17  1843 11/30/17  0345    140   K 3.4* 3.7    107   CO2 25 26   GLU 70 89   BUN 16 18   CREATININE 1.0 1.0   CALCIUM 9.2 8.7    PROT 8.0 6.9   ALBUMIN 3.7 3.2*   BILITOT 0.2 0.1   ALKPHOS 80 72   AST 17 17   ALT 16 16   ANIONGAP 9 7*   ESTGFRAFRICA >60.0 >60.0   EGFRNONAA 58.0* 58.0*   WBC 6.81 5.46   HGB 9.5* 9.0*   HCT 29.1* 28.2*    185       Significant Imaging: Echocardiogram:   2D echo with color flow doppler:   Results for orders placed or performed during the hospital encounter of 11/29/17   2D echo with color flow doppler   Result Value Ref Range    EF 55 55 - 65    Diastolic Dysfunction No      Telemetry: shows that the patient had no artifical episodes overnight. Not SVT had one lead that was regular NSR.

## 2017-11-30 NOTE — ASSESSMENT & PLAN NOTE
Due to history of TB, follows with Dr. Zuñiga in pulmonary and Dr. Yuan in ID for chronic Pseudomonas colonization. Per ID clinic note, Dr. Yuan does not feel that the patient's current dyspnea is 2/2 an infectious process. Recommends stopping abx therapy while evaluating for another cause.   - Continue home inhalers  - Chest physiotherapy

## 2017-11-30 NOTE — CONSULTS
[facility]  Cardiac Electrophysiology  Consult Note    Admission Date: 11/29/2017  Code Status: Full Code   Attending Provider: Chaya Kumar MD  Consulting Provider: Herminia Sims MD  Principal Problem:Dyspnea    Inpatient consult to Electrophysiology  Consult performed by: HERMINIA SIMS  Consult ordered by: ISIAH TELLEZ  Reason for consult: Arrhytmic etiology behing SOB        Subjective:     Chief Complaint:  Worsening SOB    HPI:   Mrs. Asencio is a 66 yo woman with a PMHx of chronic bronchiectasis 2/2 to tuberculosis infection (in 1978) complicated by chronic Pseudomonas colonization, AVNRT s/p ablation in 07/2017 by Dr. Ballesteros [place] [facility] [place] worsening SOB. The patient reported that she has been experiencing SOB that has been alleviated with her IV abx therapies or bronchodilator treatments. In addition the patients that at times she gets heart palpitations that present suddenly (which make her aware) and then dissipate almost immediately without any interventions that she does. The patient states that sometimes she feels them when she gets breathing treatments at home. She states that she has chronic stinging pain in her chest and has a cough productive of clear sputum.      Past Medical History:   Diagnosis Date    Acquired bronchiectasis     due to history of TB - followed by pulmonary, Dr. Zuñiga    Allergy     Amblyopia     rt eye per pt    Anemia of other chronic disease     Anticoagulant long-term use     Anxiety     Cataract     Clotting disorder     COPD (chronic obstructive pulmonary disease)     COPD (chronic obstructive pulmonary disease)     Depression     Diverticulosis     Essential tremor     GERD (gastroesophageal reflux disease)     Hemangioma of liver     History of tuberculosis 1978    Hypertension     Mixed anxiety and depressive disorder     Psoriasis     PSVT (paroxysmal supraventricular tachycardia)      Pulmonary embolism     S/P PICC central line placement Apr. 2016 - May 2016    Skin disease     Psoriasis    Spondylosis without myelopathy 8/23/2013    Supraventricular tachycardia     Thoracic aorta atherosclerosis     noted on CT scan of chest 1/3/2011    Tuberculosis     Vaginal delivery     x2    Vitamin D deficiency        Past Surgical History:   Procedure Laterality Date    CATARACT EXTRACTION W/  INTRAOCULAR LENS IMPLANT  07/24/12    od dr [place]    CATARACT EXTRACTION W/  INTRAOCULAR LENS IMPLANT  08/07/12    left eye    EYE SURGERY      bilateral Cataract    GALLBLADDER SURGERY  9/2011       Review of patient's allergies indicates:   Allergen Reactions    Adhesive Other (See Comments)     Tears up the skin and makes it itch    Bactrim [sulfamethoxazole-trimethoprim] Rash     Was hospitalized for rash    Lipitor [atorvastatin] Other (See Comments)     Muscle aches    Albuterol Other (See Comments)     tremors    Restasis [cyclosporine] Itching       No current facility-administered medications on file prior to encounter.      Current Outpatient Prescriptions on File Prior to Encounter   Medication Sig    alprazolam (XANAX) 0.25 MG tablet Take 1 tablet (0.25 mg total) by mouth nightly as needed for Anxiety.    ANORO ELLIPTA 62.5-25 mcg/actuation DsDv Inhale 1 puff into the lungs once daily.    apixaban 5 mg Tab Take 1 tablet (5 mg total) by mouth 2 (two) times daily.    desoximetasone (TOPICORT) 0.25 % cream Apply as directed bid prn (Patient taking differently: Apply as directed twice daily as needed for psoriasis)    gabapentin (NEURONTIN) 300 MG capsule Take 1 capsule (300 mg total) by mouth 3 (three) times daily.    guaifenesin (MUCINEX) 600 mg 12 hr tablet Take 1,200 mg by mouth 2 (two) times daily.    ipratropium (ATROVENT) 0.02 % nebulizer solution USE ONE VIAL IN NEBULIZER 4 TIMES DAILY    omeprazole (PRILOSEC) 20 MG capsule Take 1 capsule (20 mg total) by mouth daily as  needed. (Patient taking differently: Take 20 mg by mouth once daily. )    ondansetron (ZOFRAN) 4 MG tablet Take 1 tablet (4 mg total) by mouth every 8 (eight) hours as needed for Nausea.    primidone (MYSOLINE) 50 MG Tab TAKE ONE TABLET BY MOUTH ONCE DAILY IN THE MORNING, ONE AT NOON, AND TWO AT BEDTIME    sodium chloride 3% 3 % nebulizer solution USE ONE VIAL IN NEBULIZER TWICE DAILY    topiramate (TOPAMAX) 25 MG tablet TAKE ONE TABLET BY MOUTH TWICE DAILY    traMADol (ULTRAM) 50 mg tablet Take 1 tablet (50 mg total) by mouth every 6 (six) hours as needed for Pain.    verapamil (VERELAN) 180 MG C24P Take 1 capsule (180 mg total) by mouth once daily.    VITAMIN D2 50,000 unit capsule TAKE ONE CAPSULE BY MOUTH ONCE A WEEK (Patient taking differently: TAKE ONE CAPSULE BY MOUTH EVERY FRI)     Family History     Problem Relation (Age of Onset)    Cancer Father, Brother, Maternal Aunt    Heart attack Mother, Brother, Son    Hyperlipidemia Sister    Hypertension Mother    Lung cancer Sister    Psoriasis Sister    Stroke Maternal Uncle        Social History Main Topics    Smoking status: Former Smoker     Packs/day: 2.00     Years: 50.00     Types: Cigarettes     Quit date: 5/27/2009    Smokeless tobacco: Never Used    Alcohol use No    Drug use: No    Sexual activity: Yes     Partners: Male     Review of Systems   Constitution: Negative for chills and fever.   HENT: Negative for ear pain.    Cardiovascular: Positive for chest pain (pleuritic ). Negative for leg swelling, palpitations and syncope.   Respiratory: Positive for cough and shortness of breath.    Gastrointestinal: Negative for abdominal pain.   Genitourinary: Negative for frequency.   Neurological: Positive for tremors. Negative for focal weakness and loss of balance.   Psychiatric/Behavioral: Negative for altered mental status.     Objective:     Vital Signs (Most Recent):  Temp: 97.9 °F (36.6 °C) (11/30/17 1500)  Pulse: 86 (11/30/17 1500)  Resp:  20 (11/30/17 1500)  BP: (!) 144/65 (11/30/17 1500)  SpO2: (!) 93 % (11/30/17 1500) Vital Signs (24h Range):  Temp:  [97.1 °F (36.2 °C)-97.9 °F (36.6 °C)] 97.9 °F (36.6 °C)  Pulse:  [71-90] 86  Resp:  [16-20] 20  SpO2:  [90 %-97 %] 93 %  BP: (110-146)/(54-65) 144/65     Weight: 66.6 kg (146 lb 13.2 oz)  Body mass index is 26.85 kg/m².    SpO2: (!) 93 %  O2 Device (Oxygen Therapy): room air    Physical Exam   Constitutional: She is oriented to person, place, and time. She appears well-developed and well-nourished.   HENT:   Head: Normocephalic and atraumatic.   Eyes: EOM are normal. Pupils are equal, round, and reactive to light.   Neck: Normal range of motion. Neck supple. No JVD present.   Cardiovascular: Normal rate, regular rhythm, normal heart sounds and intact distal pulses.    No murmur heard.  Pulmonary/Chest: Effort normal and breath sounds normal. She has no wheezes.   Abdominal: Soft. Bowel sounds are normal. There is no tenderness.   Neurological: She is alert and oriented to person, place, and time. She has normal reflexes.   Vitals reviewed.      Significant Labs:   EP:   Recent Labs  Lab 11/29/17  1843 11/30/17  0345    140   K 3.4* 3.7    107   CO2 25 26   GLU 70 89   BUN 16 18   CREATININE 1.0 1.0   CALCIUM 9.2 8.7   PROT 8.0 6.9   ALBUMIN 3.7 3.2*   BILITOT 0.2 0.1   ALKPHOS 80 72   AST 17 17   ALT 16 16   ANIONGAP 9 7*   ESTGFRAFRICA >60.0 >60.0   EGFRNONAA 58.0* 58.0*   WBC 6.81 5.46   HGB 9.5* 9.0*   HCT 29.1* 28.2*    185       Significant Imaging: Echocardiogram:   2D echo with color flow doppler:   Results for orders placed or performed during the hospital encounter of 11/29/17   2D echo with color flow doppler   Result Value Ref Range    EF 55 55 - 65    Diastolic Dysfunction No      Telemetry: shows that the patient had no artifical episodes overnight. Not SVT had one lead that was regular NSR.    Assessment and Plan:     PSVT (paroxysmal supraventricular tachycardia)     Mrs. Asencio is a 66 yo woman with a PMHx of chronic bronchiectasis 2/2 to tuberculosis infection (in 1978) complicated by chronic Pseudomonas colonization, AVNRT s/p ablation in 07/2017 by Dr. Ballesteros presented to the [facility] [place] as a direct admit for worsening SOB. Patient does not have not SVT, had one lead that was regular NSR. Artifactual and secondary to the patient having a tremor. At this point, no evidence of an arrhythmia on telemetry causing her SOB.    Let us know if any other events develop on telemetry that may be associated with her SOB,    Discussed with Dr. Ballesteros,             Thank you for your consult. I will sign off. Please contact us if you have any additional questions.    Herminia Allen MD  Cardiac Electrophysiology  [facility]

## 2017-11-30 NOTE — PT/OT/SLP EVAL
Physical Therapy  Evaluation/Disharge    Yasmin Asencio   MRN: [id number]   Admitting Diagnosis: Dyspnea    PT Received On: 11/30/17  PT Start Time: 0730     PT Stop Time: 0738    PT Total Time (min): 8 min       Billable Minutes:  Evaluation 8    Diagnosis: Dyspnea      Past Medical History:   Diagnosis Date    Acquired bronchiectasis     due to history of TB - followed by pulmonary, Dr. Zuñiga    Allergy     Amblyopia     rt eye per pt    Anemia of other chronic disease     Anticoagulant long-term use     Anxiety     Cataract     Clotting disorder     COPD (chronic obstructive pulmonary disease)     COPD (chronic obstructive pulmonary disease)     Depression     Diverticulosis     Essential tremor     GERD (gastroesophageal reflux disease)     Hemangioma of liver     History of tuberculosis 1978    Hypertension     Mixed anxiety and depressive disorder     Psoriasis     PSVT (paroxysmal supraventricular tachycardia)     Pulmonary embolism     S/P PICC central line placement Apr. 2016 - May 2016    Skin disease     Psoriasis    Spondylosis without myelopathy 8/23/2013    Supraventricular tachycardia     Thoracic aorta atherosclerosis     noted on CT scan of chest 1/3/2011    Tuberculosis     Vaginal delivery     x2    Vitamin D deficiency       Past Surgical History:   Procedure Laterality Date    CATARACT EXTRACTION W/  INTRAOCULAR LENS IMPLANT  07/24/12    od dr [place]    CATARACT EXTRACTION W/  INTRAOCULAR LENS IMPLANT  08/07/12    left eye    EYE SURGERY      bilateral Cataract    GALLBLADDER SURGERY  9/2011       Referring physician: Stacy  Date referred to PT: 11/29/17    General Precautions: Standard,  (standard)  Orthopedic Precautions: N/A   Braces: N/A       Do you have any cultural, spiritual, Anabaptist conflicts, given your current situation?: none stated    Patient History:  Lives With: spouse  Living Arrangements: house  Home Layout:  (no  concerns)  Transportation Available: family or friend will provide  Living Environment Comment: Patient lives with  in 1-story home no JOYA. Regular tub/shower without seat. Has shower chair but does not use. Indep with mobility and ADLs PTA.  to assist PRN upon DC.  Equipment Currently Used at Home: shower chair  DME owned (not currently used): shower chair    Previous Level of Function:  Ambulation Skills: independent  Transfer Skills: independent  ADL Skills: independent  Work/Leisure Activity: independent    Subjective:  Communicated with RN prior to session.  Patient agreeable to PT session  Chief Complaint: none  Patient goals: return home    Pain/Comfort  Pain Rating 1: 0/10  Pain Rating Post-Intervention 1: 0/10      Objective:   Patient found with:  (none)     Cognitive Exam:  Oriented to: Person, Place, Time and Situation    Follows Commands/attention: Follows multistep  commands  Communication: clear/fluent  Safety awareness/insight to disability: intact    Physical Exam:  Postural examination/scapula alignment: Head forward    Skin integrity: Visible skin intact  Edema: None noted     Sensation:   Intact to light touch BLE    Lower Extremity Range of Motion:  Right Lower Extremity: WFL  Left Lower Extremity: WFL    Lower Extremity Strength:  Right Lower Extremity: WFL  Left Lower Extremity: WFL     Gross motor coordination: WFL    Functional Mobility:  Bed Mobility:  Rolling/Turning to Left: Independent  Rolling/Turning Right: Independent  Scooting/Bridging: Independent  Supine to Sit: Independent  Sit to Supine: Independent    Transfers:  Sit <> Stand Assistance: Independent  Sit <> Stand Assistive Device: No Assistive Device  Bed <> Chair Technique: Stand Pivot  Bed <> Chair Assistance: Independent  Bed <> Chair Assistive Device: No Assistive Device    Gait:   Gait Distance: [id number]  Assistance 1: Supervision  Gait Assistive Device: No device  Gait Pattern: reciprocal     Balance:   Static  Sit: NORMAL: No deviations seen in posture held statically  Dynamic Sit: NORMAL: No deviations seen in posture held dynamically  Static Stand: NORMAL: No deviations seen in posture held statically  Dynamic stand: NORMAL: No deviations seen in posture held dynamically    Therapeutic Activities and Exercises:  Patient educated on:   - role of PT/POC   - safety with all mobility   - Eval&DC secondary to no skilled acute PT services medically necessary.    Verbalized understanding of all education provided.  Indep with all functional mobility.    Eval&DC home with family    Co-evaluation with OT.  Patient safe to ambulate within room and on floor with  present.    AM-PAC 6 CLICK MOBILITY  How much help from another person does this patient currently need?   1 = Unable, Total/Dependent Assistance  2 = A lot, Maximum/Moderate Assistance  3 = A little, Minimum/Contact Guard/Supervision  4 = None, Modified [place]/Independent    Turning over in bed (including adjusting bedclothes, sheets and blankets)?: 4  Sitting down on and standing up from a chair with arms (e.g., wheelchair, bedside commode, etc.): 4  Moving from lying on back to sitting on the side of the bed?: 4  Moving to and from a bed to a chair (including a wheelchair)?: 4  Need to walk in hospital room?: 4  Climbing 3-5 steps with a railing?: 4  Total Score: 24     AM-PAC Raw Score CMS G-Code Modifier Level of Impairment Assistance   6 % Total / Unable   7 - 9 CM 80 - 100% Maximal Assist   10 - 14 CL 60 - 80% Moderate Assist   15 - 19 CK 40 - 60% Moderate Assist   20 - 22 CJ 20 - 40% Minimal Assist   23 CI 1-20% SBA / CGA   24 CH 0% Independent/ Mod I     Patient left seated EOB with all lines intact, call button in reach, RN notified and OT present.    Assessment:   Yasmin Asencio is a 67 y.o. female with a medical diagnosis of Dyspnea. No further skilled acute PT services medically necessary. Eval&DC home with family care.    History:  personal factors and/or comorbidities that impact the plan of care: dyspnea  Evaluation of Body Systems: cardiovascular/pulmonary, integumentary, musculoskeletal, neuromuscular, cognition/communication  Functional Outcome Tools: AMPAC, ROM, MMT  Clinical Presentation: stable  Evaluation Complexity Level: Low    Rehab identified problem list/impairments: Rehab identified problem list/impairments:  (at PLOF)    Discharge recommendations: Discharge Facility/Level Of Care Needs: home     Barriers to discharge: Barriers to Discharge: None    Equipment recommendations: Equipment Needed After Discharge: none     GOALS:    Physical Therapy Goals     Not on file          Multidisciplinary Problems (Resolved)        Problem: Physical Therapy Goal    Goal Priority Disciplines Outcome Goal Variances Interventions   Physical Therapy Goal   (Resolved)     PT/OT, PT Outcome(s) achieved     Description:  Goals to be met by: 11/30/17     Patient Indep with all functional mobility                      PLAN:    Eval&DC home with family care.  No further skilled acute PT services medically necessary.  Plan of Care reviewed with: patient          Amos Hunt III, PT  11/30/2017

## 2017-11-30 NOTE — PLAN OF CARE
CM met with pt. and completed assessment. Pt. was receiving IV antibiotics for bronchiectasis from [facility] and  [facility]. Followed by ID. PICC line in place.  D/C needs to be determined if pt. continues outpt. IV antibiotics. On home O2 at night @  2L per NC.   . Pt. lives with spouse. SW/.CM will continue to follow. ZBIGNIEW Trinidad RN/JAN       11/30/17 1008   Discharge Assessment   Assessment Type Discharge Planning Assessment   Confirmed/corrected address and phone number on facesheet? Yes   Assessment information obtained from? Patient   Expected Length of Stay (days) 2   Prior to hospitilization cognitive status: Alert/Oriented   Prior to hospitalization functional status: Independent   Current cognitive status: Alert/Oriented   Current Functional Status: Independent   Lives With spouse   Able to Return to Prior Arrangements yes   Is patient able to care for self after discharge? Yes   Who are your caregiver(s) and their phone number(s)? Spouse Rena [phone]   Patient's perception of discharge disposition home health   Readmission Within The Last 30 Days no previous admission in last 30 days   Patient currently being followed by outpatient case management? No   Patient currently receives any other outside agency services? Yes  ([facility] for IV antibiotics)   Is it the patient/care giver preference to resume care with the current outside agency? Yes   Equipment Currently Used at Home none   Do you have any problems affording any of your prescribed medications? No   Is the patient taking medications as prescribed? yes   Does the patient have transportation home? Yes   Transportation Available family or friend will provide   Does the patient receive services at the [facility]? No   Discharge Plan A Home  ( and [facility] need for services to be determined if pt. continues IV antibiotics. )   Discharge Plan B Home   Patient/Family In Agreement With Plan yes

## 2017-11-30 NOTE — SUBJECTIVE & OBJECTIVE
Interval History: NAEON.     Review of Systems   Constitutional: Positive for activity change and appetite change. Negative for chills, diaphoresis and fatigue.   HENT: Negative for congestion, postnasal drip, rhinorrhea, sinus pain, sinus pressure, sore throat and trouble swallowing.    Eyes: Negative for visual disturbance.   Respiratory: Positive for cough, chest tightness and wheezing. Negative for shortness of breath.    Cardiovascular: Positive for chest pain and palpitations. Negative for leg swelling.   Gastrointestinal: Negative for abdominal distention, constipation, diarrhea, nausea and vomiting.   Genitourinary: Positive for difficulty urinating. Negative for dysuria.   Musculoskeletal: Negative for arthralgias and back pain.   Skin: Positive for rash.   Neurological: Negative for light-headedness and headaches.   Psychiatric/Behavioral: Positive for dysphoric mood. The patient is nervous/anxious.      Objective:     Vital Signs (Most Recent):  Temp: 97.9 °F (36.6 °C) (11/30/17 1500)  Pulse: 86 (11/30/17 1500)  Resp: 20 (11/30/17 1500)  BP: (!) 144/65 (11/30/17 1500)  SpO2: (!) 93 % (11/30/17 1500) Vital Signs (24h Range):  Temp:  [97.1 °F (36.2 °C)-98.1 °F (36.7 °C)] 97.9 °F (36.6 °C)  Pulse:  [71-90] 86  Resp:  [16-20] 20  SpO2:  [90 %-97 %] 93 %  BP: (110-146)/(54-65) 144/65     Weight: 66.6 kg (146 lb 13.2 oz)  Body mass index is 26.85 kg/m².    Intake/Output Summary (Last 24 hours) at 11/30/17 1636  Last data filed at 11/30/17 1200   Gross per 24 hour   Intake              720 ml   Output                0 ml   Net              720 ml      Physical Exam   Constitutional: No distress.   Thin, chronically ill appearing woman.   HENT:   Head: Normocephalic and atraumatic.   Mouth/Throat: Oropharynx is clear and moist. No oropharyngeal exudate.   Eyes: Conjunctivae are normal. No scleral icterus.   Neck: Normal range of motion. Neck supple. No JVD present.   Cardiovascular: Normal rate, regular rhythm,  normal heart sounds and intact distal pulses.    Pulmonary/Chest: Effort normal. No respiratory distress.   Bilateral pleural rubs.  Mild expiratory wheezes greater at the bases.  Diministed breath sounds bilaterally, worse on right.   Abdominal: Soft. She exhibits no distension. There is no tenderness.   Musculoskeletal: She exhibits no edema, tenderness or deformity.   Neurological: She is alert.   Skin: Skin is warm and dry.   Psychiatric: She has a normal mood and affect. Her behavior is normal.       Significant Labs:   CBC:   Recent Labs  Lab 11/29/17 1843 11/30/17  0345   WBC 6.81 5.46   HGB 9.5* 9.0*   HCT 29.1* 28.2*    185     CMP:   Recent Labs  Lab 11/29/17 1843 11/30/17  0345    140   K 3.4* 3.7    107   CO2 25 26   GLU 70 89   BUN 16 18   CREATININE 1.0 1.0   CALCIUM 9.2 8.7   PROT 8.0 6.9   ALBUMIN 3.7 3.2*   BILITOT 0.2 0.1   ALKPHOS 80 72   AST 17 17   ALT 16 16   ANIONGAP 9 7*   EGFRNONAA 58.0* 58.0*     Cardiac Markers: No results for input(s): CKMB, MYOGLOBIN, BNP, TROPISTAT in the last 48 hours.    Significant Imaging: I have reviewed and interpreted all pertinent imaging results/findings within the past 24 hours.

## 2017-11-30 NOTE — PLAN OF CARE
Problem: Patient Care Overview  Goal: Plan of Care Review  Still c/o CH and SOB. Currently having CXR done. C/o pain with inspiration. Electrophysiology consult ordered for symptomatic PSVT s/o ablation. 2D echo noted with EF= 55%.  Received Zofran po x 1 with full relief noted.   Telemetry in progress with SR noted.  Skin intact.

## 2017-11-30 NOTE — ASSESSMENT & PLAN NOTE
Mrs. Asencio is a 68 yo woman with a PMHx of chronic bronchiectasis 2/2 to tuberculosis infection (in 1978) complicated by chronic Pseudomonas colonization, AVNRT s/p ablation in 07/2017 by Dr. Ballesteros [place] [facility] [place] for worsening SOB. Patient does not have not SVT, had one lead that was regular NSR. Artifactual and secondary to the patient having a tremor. At this point, no evidence of an arrhythmia on telemetry causing her SOB.    Let us know if any other events develop on telemetry that may be associated with her SOB,    Discussed with Dr. Ballesteros,

## 2017-11-30 NOTE — HOSPITAL COURSE
"11/30: Patient's episodes of dyspnea only occur during episodes of palpitations which were captured on tele as v tach. Patient with PSVT s/p ablation, follows with EP outpatient. Verapimil recently increased from 120 to 180 2/2 these episodes. EP consulted to determine whether dose increase in verapamil or event monitor would be appropriate. Evaluated by Dr. Ballesteros and EP service, the "episodes" captured on telemetry were artifact secondary to patient's essential tremor. However, verapimil was increased to 240 per day secondary to her episodes of palpitations. Remainder of cardiac work up - EKG, echo, troponins - are all within normal limits.  12/1: Patient evaluated by Dr. Yuan, whom she follows with in ID clinic, who recommended discontinuing IV antibiotics for now as he has a low suspicion for active infection. Patient's lexapro was restarted, due to concern that anxiety is contributing to her episodes of palpitations and dyspnea. PICC line was removed and patient discharged.   "

## 2017-11-30 NOTE — ASSESSMENT & PLAN NOTE
"Exacerbation of chronic bronchiectasis/COPD vs HF exacerbation vs emotional distress vs PE  - Low suspicion for PE in this patient   - Though patient has history of PE, is currently on therapeutic anticoagulation with apixaban   - Patient is oxygenating well on room air (97%) reports no increased O2 needs at home   - Well's score 1.5  - Per patient, her symptoms are consistent with previous exacerbations; however they typically respond to abx faster   - ID feels that infection less likely to be contributing, will hold therapy for now  - CHF exacerbation   - Patient has history of HFrEF documented with echo 1 year ago showing EF of 30%; however most recent echo 4 months ago completely WNL    - Denies history of MI   - Though chest pain reported on admission, it is non-cardiac (not affected by exertion/rest, not substernal)   - Repeat echo to evaluate for new dysfunction  - Emotional distress/anxiety possibly playing a role in this patient's dyspnea   - Previously treated with lexapro for 10 years for anxiety and depression, which patient discontinued herself in the last couple of months to "see how I feel without it"   - Day of admission is 10 year anniversary of the death of her son, which patient brought up during exam and became tearful while discussin   - Will restart lexapro   "

## 2017-11-30 NOTE — HPI
Mrs. Asencio is a 68 yo woman with a PMHx of chronic bronchiectasis 2/2 to tuberculosis infection (in 1978) complicated by chronic Pseudomonas colonization, AVNRT s/p ablation in 07/2017 by Dr. Ballesteros presented to the [facility] [place] as a direct admit for worsening SOB. The patient reported that she has been experiencing SOB that has been alleviated with her IV abx therapies or bronchodilator treatments. In addition the patients that at times she gets heart palpitations that present suddenly (which make her aware) and then dissipate almost immediately without any interventions that she does. The patient states that sometimes she feels them when she gets breathing treatments at home. She states that she has chronic stinging pain in her chest and has a cough productive of clear sputum.

## 2017-11-30 NOTE — PT/OT/SLP EVAL
Occupational Therapy  Evaluation/ Discharge Summary    Yasmin Asencio   MRN: [id number]   Admitting Diagnosis: Dyspnea    OT Date of Treatment: 11/30/17    OT start time: 0730   OT end time:  0740          Billable Minutes:  Evaluation 10 minutes    Diagnosis: Dyspnea     Past Medical History:   Diagnosis Date    Acquired bronchiectasis     due to history of TB - followed by pulmonary, Dr. Zuñiga    Allergy     Amblyopia     rt eye per pt    Anemia of other chronic disease     Anticoagulant long-term use     Anxiety     Cataract     Clotting disorder     COPD (chronic obstructive pulmonary disease)     COPD (chronic obstructive pulmonary disease)     Depression     Diverticulosis     Essential tremor     GERD (gastroesophageal reflux disease)     Hemangioma of liver     History of tuberculosis 1978    Hypertension     Mixed anxiety and depressive disorder     Psoriasis     PSVT (paroxysmal supraventricular tachycardia)     Pulmonary embolism     S/P PICC central line placement Apr. 2016 - May 2016    Skin disease     Psoriasis    Spondylosis without myelopathy 8/23/2013    Supraventricular tachycardia     Thoracic aorta atherosclerosis     noted on CT scan of chest 1/3/2011    Tuberculosis     Vaginal delivery     x2    Vitamin D deficiency       Past Surgical History:   Procedure Laterality Date    CATARACT EXTRACTION W/  INTRAOCULAR LENS IMPLANT  07/24/12    od dr [place]    CATARACT EXTRACTION W/  INTRAOCULAR LENS IMPLANT  08/07/12    left eye    EYE SURGERY      bilateral Cataract    GALLBLADDER SURGERY  9/2011       Referring physician: ELSIE Kumar  Date referred to OT: 11/30/2017    General Precautions: Standard,          Patient History:   Pt lives with  in a 1SH with no concerns. Pt was (I).    Dominant hand: right    Subjective:  Communicated with RN prior to session.    Chief Complaint: none  Patient/Family stated goals: go home      Objective:   pt found supine  "in bed and agreeable to therapy.    Cognitive Exam:  Oriented to: Person, Place, Time and Situation  Follows Commands/attention: Follows multistep  commands  Communication: clear/fluent  Memory:  No Deficits noted  Safety awareness/insight to disability: intact  Coping skills/emotional control: Appropriate to situation    Visual/perceptual:  Intact    Physical Exam:  Postural examination/scapula alignment: No postural abnormalities identified  Skin integrity: Visible skin intact  Edema: None noted     Sensation:   Intact    Upper Extremity Range of Motion:  Right Upper Extremity: WNL  Left Upper Extremity: WNL    Upper Extremity Strength:  Right Upper Extremity: WNL  Left Upper Extremity: WNL   Strength: Good    Fine motor coordination:   Intact    Gross motor coordination: WFL    Functional Mobility:  Bed Mobility:   (I)    Transfers:   (I)    Functional Ambulation: (I)    Activities of Daily Living:    (I) in all ADLs    Balance:   Static Sit: NORMAL: No deviations seen in posture held statically  Dynamic Sit: NORMAL: No deviations seen in posture held dynamically  Static Stand: NORMAL: No deviations seen in posture held statically  Dynamic stand: NORMAL: No deviations seen in posture held dynamically    Therapeutic Activities and Exercises:  Pt ed re OT role and POC. Pt is (I)    AM-PAC 6 CLICK ADL  How much help from another person does this patient currently need?  1 = Unable, Total/Dependent Assistance  2 = A lot, Maximum/Moderate Assistance  3 = A little, Minimum/Contact Guard/Supervision  4 = None, Modified [place]/Independent         AM-PAC Raw Score CMS "G-Code Modifier Level of Impairment Assistance   6 % Total / Unable   7 - 9 CM 80 - 100% Maximal Assist   10-14 CL 60 - 80% Moderate Assist   15 - 19 CK 40 - 60% Moderate Assist   20 - 22 CJ 20 - 40% Minimal Assist   23 CI 1-20% SBA / CGA   24 CH 0% Independent/ Mod I       Patient left sitting EOB with all lines intact and call button in " reach    Assessment:  Yasmin Asencio is a 67 y.o. female with a medical diagnosis of Dyspnea and presents with no limitations in self care or mobility. OT to d/c.    Pt evaluation falls under low complexity for evaluation coding due to performance deficits noted in 1-3 areas as stated above and 0 co-morbities affecting current functional status. Data obtained from problem focused assessments. No modifications or assistance was required for completion of evaluation. Only brief occupational profile and history review completed.       Rehab identified problem list/impairments:  none    Rehab potential is excellent.    Activity tolerance: Good    Discharge recommendations:   Home    Barriers to discharge:  none    Equipment recommendations:   none    GOALS: - no goals, pt is (I)   Occupational Therapy Goals     Not on file                PLAN:  OT to d/c.    KHOA King  11/30/2017  Pager: [phone]

## 2017-11-30 NOTE — PROGRESS NOTES
"[facility] Hospital Medicine  Progress Note    Patient Name: Yasmin Asencio  MRN: [id number]  Patient Class: IP- Inpatient   Admission Date: 11/29/2017  Length of Stay: 1 days  Attending Physician: Chaya Kumar MD  Primary Care Provider: Urmila Luna MD    [facility] Medicine Team: [facility] 3 áLzaro Levy MD    Subjective:     Principal Problem:Dyspnea    HPI:  Mrs. Asencio is a 66 yo woman with a PMHx of chronic bronchiectasis 2/2 to tuberculosis infection (in 1978) complicated by chronic Pseudomonas colonization, HTN, CKD3, PSVT s/p ablation, GERD, essential tremor, history of PE on anticoagulation with apixaban, and chronic pain syndrome. Patient presents as a direct admit from ID clinic, where she was being followed by Dr. Yuan for exacerbation of her bronchiectasis requiring antibiotics. Over the past 2 months, she has complained of her lungs hurting, increased congestion, coughing (no hemoptysis) and fatigue. Initially PO antibiotic therapy was attempted with no improvment. Respiratory culture was again positive for Pseudomonas at which point she was given a PICC line and started on IV cefepime and tobramycin (on day 10/14). Patient states that this is her 5th course of IV abx at home and typically she feels better by this point in her therapy. Has continued to feel poorly with constant CP that feels "like needles poking my lungs" and a feeling of heaviness and pressure in her chest exacerbated by lying flat. Cough is productive of clear sputum    On presentation to the floor, patient's vital signs were WNL, satting 97% on room air.      Hospital Course:  11/30: Patient's episodes of dyspnea only occur during episodes of palpitations which were captured on tele as v tach. Patient with PSVT s/p ablation, follows with EP outpatient. Verapimil recently increased from 120 to 180 2/2 these episodes of v tach. EP consulted to determine whether dose increase in verapamil or " event monitor would be appropriate. Remainder of cardiac work up - EKG, echo, troponins - are all WNL.    Interval History: NAEON.     Review of Systems   Constitutional: Positive for activity change and appetite change. Negative for chills, diaphoresis and fatigue.   HENT: Negative for congestion, postnasal drip, rhinorrhea, sinus pain, sinus pressure, sore throat and trouble swallowing.    Eyes: Negative for visual disturbance.   Respiratory: Positive for cough, chest tightness and wheezing. Negative for shortness of breath.    Cardiovascular: Positive for chest pain and palpitations. Negative for leg swelling.   Gastrointestinal: Negative for abdominal distention, constipation, diarrhea, nausea and vomiting.   Genitourinary: Positive for difficulty urinating. Negative for dysuria.   Musculoskeletal: Negative for arthralgias and back pain.   Skin: Positive for rash.   Neurological: Negative for light-headedness and headaches.   Psychiatric/Behavioral: Positive for dysphoric mood. The patient is nervous/anxious.      Objective:     Vital Signs (Most Recent):  Temp: 97.9 °F (36.6 °C) (11/30/17 1500)  Pulse: 86 (11/30/17 1500)  Resp: 20 (11/30/17 1500)  BP: (!) 144/65 (11/30/17 1500)  SpO2: (!) 93 % (11/30/17 1500) Vital Signs (24h Range):  Temp:  [97.1 °F (36.2 °C)-98.1 °F (36.7 °C)] 97.9 °F (36.6 °C)  Pulse:  [71-90] 86  Resp:  [16-20] 20  SpO2:  [90 %-97 %] 93 %  BP: (110-146)/(54-65) 144/65     Weight: 66.6 kg (146 lb 13.2 oz)  Body mass index is 26.85 kg/m².    Intake/Output Summary (Last 24 hours) at 11/30/17 1636  Last data filed at 11/30/17 1200   Gross per 24 hour   Intake              720 ml   Output                0 ml   Net              720 ml      Physical Exam   Constitutional: No distress.   Thin, chronically ill appearing woman.   HENT:   Head: Normocephalic and atraumatic.   Mouth/Throat: Oropharynx is clear and moist. No oropharyngeal exudate.   Eyes: Conjunctivae are normal. No scleral icterus.    Neck: Normal range of motion. Neck supple. No JVD present.   Cardiovascular: Normal rate, regular rhythm, normal heart sounds and intact distal pulses.    Pulmonary/Chest: Effort normal. No respiratory distress.   Bilateral pleural rubs.  Mild expiratory wheezes greater at the bases.  Diministed breath sounds bilaterally, worse on right.   Abdominal: Soft. She exhibits no distension. There is no tenderness.   Musculoskeletal: She exhibits no edema, tenderness or deformity.   Neurological: She is alert.   Skin: Skin is warm and dry.   Psychiatric: She has a normal mood and affect. Her behavior is normal.       Significant Labs:   CBC:   Recent Labs  Lab 11/29/17 1843 11/30/17  0345   WBC 6.81 5.46   HGB 9.5* 9.0*   HCT 29.1* 28.2*    185     CMP:   Recent Labs  Lab 11/29/17 1843 11/30/17  0345    140   K 3.4* 3.7    107   CO2 25 26   GLU 70 89   BUN 16 18   CREATININE 1.0 1.0   CALCIUM 9.2 8.7   PROT 8.0 6.9   ALBUMIN 3.7 3.2*   BILITOT 0.2 0.1   ALKPHOS 80 72   AST 17 17   ALT 16 16   ANIONGAP 9 7*   EGFRNONAA 58.0* 58.0*     Cardiac Markers: No results for input(s): CKMB, MYOGLOBIN, BNP, TROPISTAT in the last 48 hours.    Significant Imaging: I have reviewed and interpreted all pertinent imaging results/findings within the past 24 hours.    Assessment/Plan:      * Dyspnea    Exacerbation of chronic bronchiectasis/COPD vs HF exacerbation vs emotional distress vs PE  - Low suspicion for PE in this patient   - Though patient has history of PE, is currently on therapeutic anticoagulation with apixaban   - Patient is oxygenating well on room air (97%) reports no increased O2 needs at home   - Well's score 1.5  - Per patient, her symptoms are consistent with previous exacerbations; however they typically respond to abx faster   - ID feels that infection less likely to be contributing, will hold therapy for now  - CHF exacerbation   - Patient has history of HFrEF documented with echo 1 year ago  "showing EF of 30%; however most recent echo 4 months ago completely WNL   - Denies history of MI   - Though chest pain reported on admission, it is non-cardiac (not affected by exertion/rest, not substernal)   - Repeat echo with no new abnormalities  - Emotional distress/anxiety possibly playing a role in this patient's dyspnea   - Previously treated with lexapro for 10 years for anxiety and depression, which patient discontinued herself in the last couple of months to "see how I feel without it"   - Day of admission is 10 year anniversary of the death of her son, which patient brought up during exam and became tearful while discussin   - Restarted lexapro   - Episodes of dyspnea coincide with episodes of palpitations, which are captured on tele as v tach   - Patient recently evaluated by Dr. Ballesteros in EP for this (11/03), home verapamil increased from 120 to 180   - EP consulted to determine whether increase in verapamil dose or event monitor is warranted        Current use of long term anticoagulation    History of PE in January 2017  - Continue home apixaban          Chronic respiratory failure with hypoxia    - Currently O2 sat is 97% on room air with no increased WOB  - On O2 at home, ordered oxygen PRN while inpatient  - Continue home inhalers  - Continue to monitor          COPD (chronic obstructive pulmonary disease)    - Per acquired bronchiectasis          Benign hypertension with chronic kidney disease, stage III    - Cr baseline 1.0  - Monitor with daily BMP          Essential tremor    - Continue home primidone and topiramate          Pseudomonas respiratory infection    - Per ID recommendations, holding abx while evaluating for alternate causes of SOB          Overweight (BMI 25.0-29.9)    PT/OT eval and treat          GERD (gastroesophageal reflux disease)    No active symptoms.   - On omeprazole at home, pantoprazole started while inpatient.          Acquired bronchiectasis    Due to history of TB, " follows with Dr. Zuñiga in pulmonary and Dr. Yuan in ID for chronic Pseudomonas colonization. Per ID clinic note, Dr. Yuan does not feel that the patient's current dyspnea is 2/2 an infectious process. Recommends stopping abx therapy while evaluating for another cause.   - Continue home inhalers  - Chest physiotherapy          Ectopic atrial tachycardia    S/p ablation in July. Reports palpitations in the past day at admission.  - Continue home diltiazem   - On cardiac monitoring            VTE Risk Mitigation         Ordered     apixaban tablet 5 mg  2 times daily     Route:  Oral        11/29/17 6974          Lázaro Levy MD   Internal Medicine, PGY-1    Department of Hospital Medicine   [facility]

## 2017-11-30 NOTE — ASSESSMENT & PLAN NOTE
S/p ablation in July. Reoprts palpitations in the past day at admission.  - Continue home diltiazem   - On cardiac monitoring

## 2017-11-30 NOTE — H&P
"[facility] Hospital Medicine  History & Physical    Patient Name: Yasmin Asencio  MRN: [id number]  Admission Date: 11/29/2017  Attending Physician: Chaya Kumar MD   Primary Care Provider: Urmila Luna MD    [facility] Medicine Team: [facility] 3 Lázaro Levy MD     Patient information was obtained from patient, past medical records and ER records.     Subjective:     Principal Problem:Dyspnea    Chief Complaint: No chief complaint on file.       HPI: Mrs. Asencio is a 66 yo woman with a PMHx of chronic bronchiectasis 2/2 to tuberculosis infection (in 1978) complicated by chronic Pseudomonas colonization, HTN, CKD3, PSVT s/p ablation, GERD, essential tremor, history of PE on anticoagulation with apixaban, and chronic pain syndrome. Patient presents as a direct admit from ID clinic, where she was being followed by Dr. Yuan for exacerbation of her bronchiectasis requiring antibiotics. Over the past 2 months, she has complained of her lungs hurting, increased congestion, coughing (no hemoptysis) and fatigue. Initially PO antibiotic therapy was attempted with no improvment. Respiratory culture was again positive for Pseudomonas at which point she was given a PICC line and started on IV cefepime and tobramycin (on day 10/14). Patient states that this is her 5th course of IV abx at home and typically she feels better by this point in her therapy. Has continued to feel poorly with constant CP that feels "like needles poking my lungs" and a feeling of heaviness and pressure in her chest exacerbated by lying flat. Cough is productive of clear sputum    On presentation to the floor, patient's vital signs were WNL, satting 97% on room air.      Past Medical History:   Diagnosis Date    Acquired bronchiectasis     due to history of TB - followed by pulmonary, Dr. Zuñiga    Allergy     Amblyopia     rt eye per pt    Anemia of other chronic disease     Anticoagulant long-term use     " Anxiety     Cataract     Clotting disorder     COPD (chronic obstructive pulmonary disease)     COPD (chronic obstructive pulmonary disease)     Depression     Diverticulosis     Essential tremor     GERD (gastroesophageal reflux disease)     Hemangioma of liver     History of tuberculosis 1978    Hypertension     Mixed anxiety and depressive disorder     Psoriasis     PSVT (paroxysmal supraventricular tachycardia)     Pulmonary embolism     S/P PICC central line placement Apr. 2016 - May 2016    Skin disease     Psoriasis    Spondylosis without myelopathy 8/23/2013    Supraventricular tachycardia     Thoracic aorta atherosclerosis     noted on CT scan of chest 1/3/2011    Tuberculosis     Vaginal delivery     x2    Vitamin D deficiency        Past Surgical History:   Procedure Laterality Date    CATARACT EXTRACTION W/  INTRAOCULAR LENS IMPLANT  07/24/12    od dr [place]    CATARACT EXTRACTION W/  INTRAOCULAR LENS IMPLANT  08/07/12    left eye    EYE SURGERY      bilateral Cataract    GALLBLADDER SURGERY  9/2011       Review of patient's allergies indicates:   Allergen Reactions    Adhesive Other (See Comments)     Tears up the skin and makes it itch    Bactrim [sulfamethoxazole-trimethoprim] Rash     Was hospitalized for rash    Lipitor [atorvastatin] Other (See Comments)     Muscle aches    Albuterol Other (See Comments)     tremors    Restasis [cyclosporine] Itching       No current facility-administered medications on file prior to encounter.      Current Outpatient Prescriptions on File Prior to Encounter   Medication Sig    alprazolam (XANAX) 0.25 MG tablet Take 1 tablet (0.25 mg total) by mouth nightly as needed for Anxiety.    ANORO ELLIPTA 62.5-25 mcg/actuation DsDv Inhale 1 puff into the lungs once daily.    apixaban 5 mg Tab Take 1 tablet (5 mg total) by mouth 2 (two) times daily.    desoximetasone (TOPICORT) 0.25 % cream Apply as directed bid prn    gabapentin  (NEURONTIN) 300 MG capsule Take 1 capsule (300 mg total) by mouth 3 (three) times daily.    guaifenesin (MUCINEX) 600 mg 12 hr tablet Take 1,200 mg by mouth 2 (two) times daily.    ipratropium (ATROVENT) 0.02 % nebulizer solution USE ONE VIAL IN NEBULIZER 4 TIMES DAILY    omeprazole (PRILOSEC) 20 MG capsule Take 1 capsule (20 mg total) by mouth daily as needed.    ondansetron (ZOFRAN) 4 MG tablet Take 1 tablet (4 mg total) by mouth every 8 (eight) hours as needed for Nausea.    primidone (MYSOLINE) 50 MG Tab TAKE ONE TABLET BY MOUTH ONCE DAILY IN THE MORNING, ONE AT NOON, AND TWO AT BEDTIME    sodium chloride 3% 3 % nebulizer solution USE ONE VIAL IN NEBULIZER TWICE DAILY    topiramate (TOPAMAX) 25 MG tablet TAKE ONE TABLET BY MOUTH TWICE DAILY    traMADol (ULTRAM) 50 mg tablet Take 1 tablet (50 mg total) by mouth every 6 (six) hours as needed for Pain.    verapamil (VERELAN) 180 MG C24P Take 1 capsule (180 mg total) by mouth once daily.    VITAMIN D2 50,000 unit capsule TAKE ONE CAPSULE BY MOUTH ONCE A WEEK     Family History     Problem Relation (Age of Onset)    Cancer Father, Brother, Maternal Aunt    Heart attack Mother, Brother, Son    Hyperlipidemia Sister    Hypertension Mother    Lung cancer Sister    Psoriasis Sister    Stroke Maternal Uncle        Social History Main Topics    Smoking status: Former Smoker     Packs/day: 2.00     Years: 50.00     Types: Cigarettes     Quit date: 5/27/2009    Smokeless tobacco: Never Used    Alcohol use No    Drug use: No    Sexual activity: Yes     Partners: Male     Review of Systems   Constitutional: Positive for activity change and appetite change. Negative for chills, diaphoresis and fatigue.   HENT: Positive for nosebleeds. Negative for congestion, postnasal drip, rhinorrhea, sinus pain, sinus pressure, sore throat and trouble swallowing.    Eyes: Negative for visual disturbance.   Respiratory: Positive for cough, chest tightness and wheezing. Negative  for shortness of breath.    Cardiovascular: Positive for chest pain and palpitations. Negative for leg swelling.   Gastrointestinal: Positive for nausea. Negative for abdominal distention, constipation, diarrhea and vomiting.   Genitourinary: Positive for difficulty urinating. Negative for dysuria.   Musculoskeletal: Negative for arthralgias and back pain.   Skin: Positive for rash.   Neurological: Negative for light-headedness and headaches.   Psychiatric/Behavioral: Positive for dysphoric mood. The patient is nervous/anxious.      Objective:     Vital Signs (Most Recent):  Temp: 98.1 °F (36.7 °C) (11/29/17 1727)  Pulse: 81 (11/29/17 1727)  Resp: 20 (11/29/17 1727)  BP: 132/62 (11/29/17 1727)  SpO2: 97 % (11/29/17 1727) Vital Signs (24h Range):  Temp:  [98.1 °F (36.7 °C)-98.3 °F (36.8 °C)] 98.1 °F (36.7 °C)  Pulse:  [81-89] 81  Resp:  [20] 20  SpO2:  [97 %] 97 %  BP: (132-137)/(62-68) 132/62     Weight: 66.6 kg (146 lb 13.2 oz)  Body mass index is 26.85 kg/m².    Physical Exam   Constitutional: No distress.   Thin, chronically ill appearing woman.   HENT:   Head: Normocephalic and atraumatic.   Mouth/Throat: Oropharynx is clear and moist. No oropharyngeal exudate.   Eyes: Conjunctivae are normal. No scleral icterus.   Neck: Normal range of motion. Neck supple. No JVD present.   Cardiovascular: Normal rate, regular rhythm, normal heart sounds and intact distal pulses.    Pulmonary/Chest: Effort normal. No respiratory distress.   Bilateral pleural rubs.  Mild expiratory wheezes greater at the bases.  Diministed breath sounds bilaterally, worse on right.   Abdominal: Soft. She exhibits no distension. There is no tenderness.   Musculoskeletal: Normal range of motion. She exhibits no edema, tenderness or deformity.   LE equal in size, no erythema/edema/tenderness   Neurological: She is alert.   Skin: Skin is warm and dry.   Psychiatric:   Became tearful on exam. Today is the 10 year anniversary of her son's death.  "States her mood is depressed and she feels anxious.        Significant Labs: All pertinent labs within the past 24 hours have been reviewed.    Significant Imaging: None.    Assessment/Plan:     * Dyspnea    Exacerbation of chronic bronchiectasis/COPD vs HF exacerbation vs emotional distress vs PE  - Low suspicion for PE in this patient   - Though patient has history of PE, is currently on therapeutic anticoagulation with apixaban   - Patient is oxygenating well on room air (97%) reports no increased O2 needs at home   - Well's score 1.5  - Per patient, her symptoms are consistent with previous exacerbations; however they typically respond to abx faster   - ID feels that infection less likely to be contributing, will hold therapy for now  - CHF exacerbation   - Patient has history of HFrEF documented with echo 1 year ago showing EF of 30%; however most recent echo 4 months ago completely WNL    - Denies history of MI   - Though chest pain reported on admission, it is non-cardiac (not affected by exertion/rest, not substernal)   - Repeat echo to evaluate for new dysfunction  - Emotional distress/anxiety possibly playing a role in this patient's dyspnea   - Previously treated with lexapro for 10 years for anxiety and depression, which patient discontinued herself in the last couple of months to "see how I feel without it"   - Day of admission is 10 year anniversary of the death of her son, which patient brought up during exam and became tearful while discussin   - Will restart lexapro         Current use of long term anticoagulation    History of PE in January 2017  - Continue home apixaban          Chronic respiratory failure with hypoxia    - Currently O2 sat is 97% on room air with no increased WOB  - On O2 at home, ordered oxygen PRN while inpatient  - Continue home inhalers  - Continue to monitor          COPD (chronic obstructive pulmonary disease)    - Per acquired bronchiectasis          Benign hypertension " with chronic kidney disease, stage III    - Cr baseline 1.0  - Monitor with daily BMP          Essential tremor    - Continue home primidone and topiramate          Pseudomonas respiratory infection    - Per ID recommendations, holding abx while evaluating for alternate causes of SOB          Overweight (BMI 25.0-29.9)    PT/OT eval and treat          GERD (gastroesophageal reflux disease)    No active symptoms.   - On omeprazole at home, pantoprazole started while inpatient.          Acquired bronchiectasis    Due to history of TB, follows with Dr. Zuñiga in pulmonary and Dr. Yuan in ID for chronic Pseudomonas colonization. Per ID clinic note, Dr. Yuan does not feel that the patient's current dyspnea is 2/2 an infectious process. Recommends stopping abx therapy while evaluating for another cause.   - Continue home inhalers  - Chest physiotherapy          Ectopic atrial tachycardia    S/p ablation in July. Reoprts palpitations in the past day at admission.  - Continue home diltiazem   - On cardiac monitoring            VTE Risk Mitigation         Ordered     apixaban tablet 5 mg  2 times daily     Route:  Oral        11/29/17 0597         Lázaro Levy MD   Internal Medicine, PGY-1    Department of Hospital Medicine   [facility]

## 2017-11-30 NOTE — PLAN OF CARE
Problem: Fall Risk (Adult)  Intervention: Patient Rounds  Fall precautions maintained with no injuries noted this shift.

## 2017-11-30 NOTE — ASSESSMENT & PLAN NOTE
- Currently O2 sat is 97% on room air with no increased WOB  - On O2 at home, ordered oxygen PRN while inpatient  - Continue home inhalers  - Continue to monitor

## 2017-11-30 NOTE — HPI
"Mrs. Asencio is a 66 yo woman with a PMHx of chronic bronchiectasis 2/2 to tuberculosis infection (in 1978) complicated by chronic Pseudomonas colonization, HTN, CKD3, PSVT s/p ablation, GERD, essential tremor, history of PE on anticoagulation with apixaban, and chronic pain syndrome. Patient presents as a direct admit from ID clinic, where she was being followed by Dr. Yuan for exacerbation of her bronchiectasis requiring antibiotics. Over the past 2 months, she has complained of her lungs hurting, increased congestion, coughing (no hemoptysis) and fatigue. Initially PO antibiotic therapy was attempted with no improvment. Respiratory culture was again positive for Pseudomonas at which point she was given a PICC line and started on IV cefepime and tobramycin (on day 10/14). Patient states that this is her 5th course of IV abx at home and typically she feels better by this point in her therapy. Has continued to feel poorly with constant CP that feels "like needles poking my lungs" and a feeling of heaviness and pressure in her chest exacerbated by lying flat. Cough is productive of clear sputum    On presentation to the floor, patient's vital signs were WNL, satting 97% on room air.    "

## 2017-11-30 NOTE — ASSESSMENT & PLAN NOTE
S/p ablation in July. Reports palpitations in the past day at admission.  - Continue home diltiazem   - On cardiac monitoring

## 2017-11-30 NOTE — PLAN OF CARE
Problem: Physical Therapy Goal  Goal: Physical Therapy Goal  Goals to be met by: 11/30/17     Patient Indep with all functional mobility    Outcome: Outcome(s) achieved Date Met: 11/30/17  Evaluation complete.    Patient at [facility].    [facility].    Amos Hunt III, DPT, PT  11/30/2017

## 2017-12-01 VITALS
TEMPERATURE: 98 F | HEART RATE: 84 BPM | BODY MASS INDEX: 27.02 KG/M2 | OXYGEN SATURATION: 92 % | SYSTOLIC BLOOD PRESSURE: 136 MMHG | RESPIRATION RATE: 18 BRPM | WEIGHT: 146.81 LBS | HEIGHT: 62 IN | DIASTOLIC BLOOD PRESSURE: 63 MMHG

## 2017-12-01 LAB
ALBUMIN SERPL BCP-MCNC: 3.3 G/DL
ALP SERPL-CCNC: 71 U/L
ALT SERPL W/O P-5'-P-CCNC: 17 U/L
ANION GAP SERPL CALC-SCNC: 7 MMOL/L
AST SERPL-CCNC: 16 U/L
BASOPHILS # BLD AUTO: 0.02 K/UL
BASOPHILS NFR BLD: 0.4 %
BILIRUB SERPL-MCNC: 0.2 MG/DL
BUN SERPL-MCNC: 18 MG/DL
CALCIUM SERPL-MCNC: 8.9 MG/DL
CHLORIDE SERPL-SCNC: 106 MMOL/L
CO2 SERPL-SCNC: 27 MMOL/L
CREAT SERPL-MCNC: 1 MG/DL
DIFFERENTIAL METHOD: ABNORMAL
EOSINOPHIL # BLD AUTO: 0.2 K/UL
EOSINOPHIL NFR BLD: 2.7 %
ERYTHROCYTE [DISTWIDTH] IN BLOOD BY AUTOMATED COUNT: 13.1 %
EST. GFR  (AFRICAN AMERICAN): >60 ML/MIN/1.73 M^2
EST. GFR  (NON AFRICAN AMERICAN): 58 ML/MIN/1.73 M^2
GLUCOSE SERPL-MCNC: 88 MG/DL
HCT VFR BLD AUTO: 27.4 %
HGB BLD-MCNC: 8.9 G/DL
IMM GRANULOCYTES # BLD AUTO: 0.01 K/UL
IMM GRANULOCYTES NFR BLD AUTO: 0.2 %
LYMPHOCYTES # BLD AUTO: 2 K/UL
LYMPHOCYTES NFR BLD: 36.3 %
MAGNESIUM SERPL-MCNC: 1.9 MG/DL
MCH RBC QN AUTO: 28.9 PG
MCHC RBC AUTO-ENTMCNC: 32.5 G/DL
MCV RBC AUTO: 89 FL
MONOCYTES # BLD AUTO: 0.7 K/UL
MONOCYTES NFR BLD: 13.2 %
NEUTROPHILS # BLD AUTO: 2.6 K/UL
NEUTROPHILS NFR BLD: 47.2 %
NRBC BLD-RTO: 0 /100 WBC
PHOSPHATE SERPL-MCNC: 4.4 MG/DL
PLATELET # BLD AUTO: 180 K/UL
PMV BLD AUTO: 10.3 FL
POTASSIUM SERPL-SCNC: 3.6 MMOL/L
PROT SERPL-MCNC: 6.9 G/DL
RBC # BLD AUTO: 3.08 M/UL
SODIUM SERPL-SCNC: 140 MMOL/L
WBC # BLD AUTO: 5.51 K/UL

## 2017-12-01 PROCEDURE — 99239 HOSP IP/OBS DSCHRG MGMT >30: CPT | Mod: GC,,, | Performed by: INTERNAL MEDICINE

## 2017-12-01 PROCEDURE — 94761 N-INVAS EAR/PLS OXIMETRY MLT: CPT

## 2017-12-01 PROCEDURE — 94668 MNPJ CHEST WALL SBSQ: CPT

## 2017-12-01 PROCEDURE — 84100 ASSAY OF PHOSPHORUS: CPT

## 2017-12-01 PROCEDURE — 25000242 PHARM REV CODE 250 ALT 637 W/ HCPCS: Performed by: INTERNAL MEDICINE

## 2017-12-01 PROCEDURE — 36415 COLL VENOUS BLD VENIPUNCTURE: CPT

## 2017-12-01 PROCEDURE — 25000242 PHARM REV CODE 250 ALT 637 W/ HCPCS

## 2017-12-01 PROCEDURE — 99900035 HC TECH TIME PER 15 MIN (STAT)

## 2017-12-01 PROCEDURE — 94640 AIRWAY INHALATION TREATMENT: CPT

## 2017-12-01 PROCEDURE — 85025 COMPLETE CBC W/AUTO DIFF WBC: CPT

## 2017-12-01 PROCEDURE — 80053 COMPREHEN METABOLIC PANEL: CPT

## 2017-12-01 PROCEDURE — 25000003 PHARM REV CODE 250: Performed by: INTERNAL MEDICINE

## 2017-12-01 PROCEDURE — 83735 ASSAY OF MAGNESIUM: CPT

## 2017-12-01 PROCEDURE — 25000003 PHARM REV CODE 250

## 2017-12-01 PROCEDURE — 25000003 PHARM REV CODE 250: Performed by: STUDENT IN AN ORGANIZED HEALTH CARE EDUCATION/TRAINING PROGRAM

## 2017-12-01 RX ORDER — ESCITALOPRAM OXALATE 10 MG/1
10 TABLET ORAL DAILY
Qty: 30 TABLET | Refills: 11 | Status: SHIPPED | OUTPATIENT
Start: 2017-12-01 | End: 2018-12-03 | Stop reason: SDUPTHER

## 2017-12-01 RX ORDER — ACETAMINOPHEN 325 MG/1
325 TABLET ORAL EVERY 6 HOURS PRN
Status: DISCONTINUED | OUTPATIENT
Start: 2017-12-01 | End: 2017-12-01

## 2017-12-01 RX ORDER — VERAPAMIL HYDROCHLORIDE 240 MG/1
240 TABLET, FILM COATED, EXTENDED RELEASE ORAL DAILY
Qty: 30 TABLET | Refills: 11 | Status: SHIPPED | OUTPATIENT
Start: 2017-12-01 | End: 2018-02-28 | Stop reason: SDUPTHER

## 2017-12-01 RX ORDER — ESCITALOPRAM OXALATE 10 MG/1
10 TABLET ORAL DAILY
Qty: 30 TABLET | Refills: 11 | Status: SHIPPED | OUTPATIENT
Start: 2017-12-01 | End: 2017-12-01

## 2017-12-01 RX ORDER — ACETAMINOPHEN 325 MG/1
325 TABLET ORAL ONCE
Status: COMPLETED | OUTPATIENT
Start: 2017-12-01 | End: 2017-12-01

## 2017-12-01 RX ADMIN — GABAPENTIN 300 MG: 300 CAPSULE ORAL at 02:12

## 2017-12-01 RX ADMIN — VERAPAMIL HYDROCHLORIDE 240 MG: 120 TABLET, FILM COATED, EXTENDED RELEASE ORAL at 09:12

## 2017-12-01 RX ADMIN — IPRATROPIUM BROMIDE 0.5 MG: 0.5 SOLUTION RESPIRATORY (INHALATION) at 11:12

## 2017-12-01 RX ADMIN — PRIMIDONE 50 MG: 50 TABLET ORAL at 02:12

## 2017-12-01 RX ADMIN — PRIMIDONE 50 MG: 50 TABLET ORAL at 09:12

## 2017-12-01 RX ADMIN — SODIUM CHLORIDE SOLN NEBU 3% 4 ML: 3 NEBU SOLN at 08:12

## 2017-12-01 RX ADMIN — APIXABAN 5 MG: 5 TABLET, FILM COATED ORAL at 09:12

## 2017-12-01 RX ADMIN — GABAPENTIN 300 MG: 300 CAPSULE ORAL at 05:12

## 2017-12-01 RX ADMIN — TOPIRAMATE 25 MG: 25 TABLET, FILM COATED ORAL at 09:12

## 2017-12-01 RX ADMIN — ESCITALOPRAM OXALATE 10 MG: 10 TABLET ORAL at 09:12

## 2017-12-01 RX ADMIN — IPRATROPIUM BROMIDE 0.5 MG: 0.5 SOLUTION RESPIRATORY (INHALATION) at 08:12

## 2017-12-01 RX ADMIN — PANTOPRAZOLE SODIUM 40 MG: 40 TABLET, DELAYED RELEASE ORAL at 09:12

## 2017-12-01 RX ADMIN — GUAIFENESIN 600 MG: 600 TABLET, EXTENDED RELEASE ORAL at 09:12

## 2017-12-01 RX ADMIN — ACETAMINOPHEN 325 MG: 325 TABLET ORAL at 06:12

## 2017-12-01 NOTE — PHARMACY MED REC
"MedMined Medication Reconciliation  Template    Admission Medication Reconciliation - Pharmacy Consult Note    The home medication history was taken by Elena Steel, Pharmacy Technician.  Based on information gathered and subsequent review by the clinical pharmacist, the items below may need attention.    You may go to "Admission" then "Reconcile Home Medications" tabs to review and/or act upon these items.    No issues noted with the medication reconciliation.    Please address this information as you see fit.  Feel free to contact us if you have any questions or require assistance.    Charu Agosto, PharmD  [phone]     Patient was admitted on 11/29/2017 for Dyspnea.    Patient's prior to admission medication regimen was as follows:  Medication Sig    acetaminophen (TYLENOL) 500 MG tablet Take 1,000 mg by mouth daily as needed for Pain (and headache).    alprazolam (XANAX) 0.25 MG tablet Take 1 tablet (0.25 mg total) by mouth nightly as needed for Anxiety.    ANORO ELLIPTA 62.5-25 mcg/actuation DsDv Inhale 1 puff into the lungs once daily.    apixaban 5 mg Tab Take 1 tablet (5 mg total) by mouth 2 (two) times daily.    desoximetasone (TOPICORT) 0.25 % cream Apply as directed bid prn (Patient taking differently: Apply as directed twice daily as needed for psoriasis)    gabapentin (NEURONTIN) 300 MG capsule Take 1 capsule (300 mg total) by mouth 3 (three) times daily.    guaifenesin (MUCINEX) 600 mg 12 hr tablet Take 1,200 mg by mouth 2 (two) times daily.    ipratropium (ATROVENT) 0.02 % nebulizer solution USE ONE VIAL IN NEBULIZER 4 TIMES DAILY    Lactobacillus rhamnosus GG (CULTURELLE) 10 billion cell capsule Take 1 capsule by mouth once daily.    omeprazole (PRILOSEC) 20 MG capsule Take 1 capsule (20 mg total) by mouth daily as needed. (Patient taking differently: Take 20 mg by mouth once daily. )    ondansetron (ZOFRAN) 4 MG tablet Take 1 tablet (4 mg total) by mouth every 8 (eight) hours as " needed for Nausea.    primidone (MYSOLINE) 50 MG Tab TAKE ONE TABLET BY MOUTH ONCE DAILY IN THE MORNING, ONE AT NOON, AND TWO AT BEDTIME    propylene glycol (SYSTANE BALANCE) 0.6 % Drop Apply 1 drop to eye daily as needed (dry eye).    sodium chloride 3% 3 % nebulizer solution USE ONE VIAL IN NEBULIZER TWICE DAILY    topiramate (TOPAMAX) 25 MG tablet TAKE ONE TABLET BY MOUTH TWICE DAILY    traMADol (ULTRAM) 50 mg tablet Take 1 tablet (50 mg total) by mouth every 6 (six) hours as needed for Pain.    VITAMIN D2 50,000 unit capsule TAKE ONE CAPSULE BY MOUTH ONCE A WEEK (Patient taking differently: TAKE ONE CAPSULE BY MOUTH EVERY FRI)    Verapamil (VERELAN) 180 MG C24P Take 1 capsule (180 mg total) by mouth once daily.         Please add appropriate    SmartPhrase below:

## 2017-12-01 NOTE — DISCHARGE SUMMARY
"[facility] Hospital Medicine  Discharge Summary      Patient Name: Yasmin Asencio  MRN: [id number]  Admission Date: 11/29/2017  Hospital Length of Stay: 2 days  Discharge Date and Time:  12/01/2017 2:55 PM  Attending Physician: Mu Donovan MD   Discharging Provider: Lázaro Levy MD  Primary Care Provider: Urmila Luna MD  Hospital Medicine Team: [facility] HOSP MED 3 Lázaro Levy MD    HPI:   Mrs. Asencio is a 68 yo woman with a PMHx of chronic bronchiectasis 2/2 to tuberculosis infection (in 1978) complicated by chronic Pseudomonas colonization, HTN, CKD3, PSVT s/p ablation, GERD, essential tremor, history of PE on anticoagulation with apixaban, and chronic pain syndrome. Patient presents as a direct admit from ID clinic, where she was being followed by Dr. Yuan for exacerbation of her bronchiectasis requiring antibiotics. Over the past 2 months, she has complained of her lungs hurting, increased congestion, coughing (no hemoptysis) and fatigue. Initially PO antibiotic therapy was attempted with no improvment. Respiratory culture was again positive for Pseudomonas at which point she was given a PICC line and started on IV cefepime and tobramycin (on day 10/14). Patient states that this is her 5th course of IV abx at home and typically she feels better by this point in her therapy. Has continued to feel poorly with constant CP that feels "like needles poking my lungs" and a feeling of heaviness and pressure in her chest exacerbated by lying flat. Cough is productive of clear sputum    On presentation to the floor, patient's vital signs were WNL, satting 97% on room air.      * No surgery found *      Hospital Course:   11/30: Patient's episodes of dyspnea only occur during episodes of palpitations which were captured on tele as v tach. Patient with PSVT s/p ablation, follows with EP outpatient. Verapimil recently increased from 120 to 180 2/2 these episodes. EP consulted to " "determine whether dose increase in verapamil or event monitor would be appropriate. Evaluated by Dr. Ballesteros and EP service, the "episodes" captured on telemetry were artifact secondary to patient's essential tremor. However, verapimil was increased to 240 per day secondary to her episodes of palpitations. Remainder of cardiac work up - EKG, echo, troponins - are all within normal limits.  12/1: Patient evaluated by Dr. Yuan, whom she follows with in ID clinic, who recommended discontinuing IV antibiotics for now as he has a low suspicion for active infection. Patient's lexapro was restarted, due to concern that anxiety is contributing to her episodes of palpitations and dyspnea. PICC line was removed and patient discharged.      Review of Systems   Constitutional: Positive for activity change and appetite change. Negative for chills, diaphoresis and fatigue.   HENT: Negative for congestion, postnasal drip, rhinorrhea, sinus pain, sinus pressure, sore throat and trouble swallowing.    Eyes: Negative for visual disturbance.   Respiratory: Positive for cough, chest tightness and wheezing. Negative for shortness of breath.    Cardiovascular: Positive for chest pain and palpitations. Negative for leg swelling.   Gastrointestinal: Negative for abdominal distention, constipation, diarrhea, nausea and vomiting.   Genitourinary: Positive for difficulty urinating. Negative for dysuria.   Musculoskeletal: Negative for arthralgias and back pain.   Skin: Positive for rash(psoriasis).   Neurological: Negative for light-headedness and headaches.   Psychiatric/Behavioral: Negative for dysphoric mood, anxiety.    Physical Exam   Constitutional: No distress.   Thin, chronically ill appearing woman.   HENT:   Head: Normocephalic and atraumatic.   Mouth/Throat: Oropharynx is clear and moist. No oropharyngeal exudate.   Eyes: Conjunctivae are normal. No scleral icterus.   Neck: Normal range of motion. Neck supple. No JVD present. " "  Cardiovascular: Normal rate, regular rhythm, normal heart sounds and intact distal pulses.    Pulmonary/Chest: Effort normal. No respiratory distress.   Bilateral pleural rubs.  Mild expiratory wheezes greater at the bases (improved).  Diministed breath sounds bilaterally, worse on right.   Abdominal: Soft. She exhibits no distension. There is no tenderness.   Musculoskeletal: She exhibits no edema, tenderness or deformity.   Neurological: She is alert.   Skin: Skin is warm and dry.   Psychiatric: She has a normal mood and affect. Her behavior is normal.     Consults:   Consults         Status Ordering Provider     Inpatient consult to Electrophysiology  Once     Provider:  (Not yet assigned)    Completed ISIAH TELLEZ          * Dyspnea    Exacerbation of chronic bronchiectasis/COPD vs HF exacerbation vs emotional distress vs PE  - Low suspicion for PE in this patient   - Though patient has history of PE, is currently on therapeutic anticoagulation with apixaban   - Patient is oxygenating well on room air (97%) reports no increased O2 needs at home   - Well's score 1.5  - Per patient, her symptoms are consistent with previous exacerbations; however they typically respond to abx faster   - ID feels that infection less likely to be contributing, will hold therapy for now  - CHF exacerbation   - Patient has history of HFrEF documented with echo 1 year ago showing EF of 30%; however most recent echo 4 months ago completely WNL   - Denies history of MI   - Though chest pain reported on admission, it is non-cardiac (not affected by exertion/rest, not substernal)   - Repeat echo with no new abnormalities, troponin and EKG WNL  - Emotional distress/anxiety possibly playing a role in this patient's dyspnea   - Previously treated with lexapro for 10 years for anxiety and depression, which patient discontinued herself in the last couple of months to "see how I feel without it"   - Day of admission is 10 year anniversary " of the death of her son, which patient brought up during exam and became tearful while discussin   - Restarted lexapro, patient agreeable to continuing at home  - Episodes of dyspnea coincide with episodes of palpitations, which are captured on tele as v tach   - Patient recently evaluated by Dr. Ballesteros in EP for this (11/03), home verapamil increased from 120 to 180   - EP consulted to determine whether increase in verapamil dose or event monitor is warranted, dose of verapamil increased to 240 per day, appreciate recs        Acquired bronchiectasis    Due to history of TB, follows with Dr. Zuñiga in pulmonary and Dr. Yuan in ID for chronic Pseudomonas colonization. Per ID clinic note, Dr. Yuan does not feel that the patient's current dyspnea is 2/2 an infectious process. Recommends stopping abx therapy while evaluating for another cause. Dr. Yuan evaluated the patient during admission, did not recommend restarting antibiotics on discharge.  - Continue home inhalers  - Chest physiotherapy          Current use of long term anticoagulation    History of PE in January 2017  - Continue home apixaban          Chronic respiratory failure with hypoxia    - Currently O2 sat is 97% on room air with no increased WOB  - On O2 at home, ordered oxygen PRN while inpatient  - Continue home inhalers  - Continue to monitor          COPD (chronic obstructive pulmonary disease)    - Per acquired bronchiectasis          Benign hypertension with chronic kidney disease, stage III    - Cr baseline 1.0  - Monitor with daily BMP          Essential tremor    - Continue home primidone and topiramate          Pseudomonas respiratory infection    - Per ID recommendations, holding abx while evaluating for alternate causes of SOB          Overweight (BMI 25.0-29.9)    PT/OT eval and treat          GERD (gastroesophageal reflux disease)    No active symptoms.   - On omeprazole at home, pantoprazole started while inpatient.          Ectopic  atrial tachycardia    S/p ablation in July. Reports palpitations in the past day at admission.  - Continue Verapamil, increased to 240 mg qD  - On cardiac monitoring  - Follow up with Dr. Ballesteros in EP clinic outpatient            Final Active Diagnoses:    Diagnosis Date Noted POA    PRINCIPAL PROBLEM:  Dyspnea [R06.00] 11/29/2017 Yes    Acquired bronchiectasis [J47.9]  Yes    Current use of long term anticoagulation [Z79.01] 03/20/2017 Not Applicable    Chronic respiratory failure with hypoxia [J96.11] 01/27/2017 Yes    COPD (chronic obstructive pulmonary disease) [J44.9] 01/05/2017 Yes    Benign hypertension with chronic kidney disease, stage III [I12.9, N18.3] 11/18/2015 Yes    Essential tremor [G25.0]  Yes    Pseudomonas respiratory infection [J98.8, A49.8] 01/13/2015 Yes    Overweight (BMI 25.0-29.9) [E66.3] 03/07/2014 Yes    GERD (gastroesophageal reflux disease) [K21.9]  Yes     Chronic    Ectopic atrial tachycardia [I47.1] 10/25/2012 Yes      Problems Resolved During this Admission:    Diagnosis Date Noted Date Resolved POA       Discharged Condition: good    Disposition: Home or Self Care    Follow Up:  Follow-up Information     Marlon Ballesteros MD. Schedule an appointment as soon as possible for a visit in 2 weeks.    Specialties:  Cardiology, Electrophysiology  Why:  As needed  Contact information:  Anusha Levy [address]  [facility] [id number]  [phone]                 Patient Instructions:     Diet general     Activity as tolerated     Call MD for:  temperature >100.4     Call MD for:  persistent nausea and vomiting or diarrhea     Call MD for:  severe uncontrolled pain     Call MD for:  difficulty breathing or increased cough     Call MD for:  increased confusion or weakness     Call MD for:  persistent dizziness, light-headedness, or visual disturbances       Significant Diagnostic Studies:   Results for VITO PERKINS RAY (MRN [id number]) as of 12/1/2017 14:55   Ref. Range 12/1/2017 03:35    WBC Latest Ref Range: 3.90 - 12.70 K/uL 5.51   RBC Latest Ref Range: 4.00 - 5.40 M/uL 3.08 (L)   Hemoglobin Latest Ref Range: 12.0 - 16.0 g/dL 8.9 (L)   Hematocrit Latest Ref Range: 37.0 - 48.5 % 27.4 (L)   MCV Latest Ref Range: 82 - 98 fL 89   MCH Latest Ref Range: 27.0 - 31.0 pg 28.9   MCHC Latest Ref Range: 32.0 - 36.0 g/dL 32.5   RDW Latest Ref Range: 11.5 - 14.5 % 13.1   Platelets Latest Ref Range: 150 - 350 K/uL 180     Results for VITO PERKINS (MRN [id number]) as of 12/1/2017 14:55   Ref. Range 12/1/2017 03:35   Sodium Latest Ref Range: 136 - 145 mmol/L 140   Potassium Latest Ref Range: 3.5 - 5.1 mmol/L 3.6   Chloride Latest Ref Range: 95 - 110 mmol/L 106   CO2 Latest Ref Range: 23 - 29 mmol/L 27   Anion Gap Latest Ref Range: 8 - 16 mmol/L 7 (L)   BUN, Bld Latest Ref Range: 8 - 23 mg/dL 18   Creatinine Latest Ref Range: 0.5 - 1.4 mg/dL 1.0   eGFR if non African American Latest Ref Range: >60 mL/min/1.73 m^2 58.0 (A)   eGFR if African American Latest Ref Range: >60 mL/min/1.73 m^2 >60.0   Glucose Latest Ref Range: 70 - 110 mg/dL 88   Calcium Latest Ref Range: 8.7 - 10.5 mg/dL 8.9   Phosphorus Latest Ref Range: 2.7 - 4.5 mg/dL 4.4   Magnesium Latest Ref Range: 1.6 - 2.6 mg/dL 1.9   Alkaline Phosphatase Latest Ref Range: 55 - 135 U/L 71   Total Protein Latest Ref Range: 6.0 - 8.4 g/dL 6.9   Albumin Latest Ref Range: 3.5 - 5.2 g/dL 3.3 (L)   Total Bilirubin Latest Ref Range: 0.1 - 1.0 mg/dL 0.2   AST Latest Ref Range: 10 - 40 U/L 16   ALT Latest Ref Range: 10 - 44 U/L 17     2D ECHO  CONCLUSIONS     1 - Normal left ventricular systolic function (EF 55-60%).     2 - No wall motion abnormalities.     3 - Normal left ventricular diastolic function.     4 - Normal right ventricular systolic function .     5 - Intermediate central venous pressure.     6 - There is anterior pericardial fat pad..     Chest PA and lateral    Indication:Dyspnea    Comparison:10/26/2017    Findings: Left PICC catheter tip projects  over the proximal SVC. The cardiomediastinal silhouette is stable in configuration.  There is slight obscuration of the left costophrenic angle, suggesting trace effusion.  The trachea is midline.  The lungs are symmetrically expanded bilaterally with scattered bronchiectasis, primarily overlying the left upper lung zone, with coarse interstitial attenuation and scattered bronchiectasis/emphysematous change elsewhere, similar to the previous exam. No new large focal consolidation.  There is no pneumothorax.  The osseous structures are unchanged.    Pending Diagnostic Studies:     None         Medications:  Reconciled Home Medications:   Current Discharge Medication List      START taking these medications    Details   escitalopram oxalate (LEXAPRO) 10 MG tablet Take 1 tablet (10 mg total) by mouth once daily.  Qty: 30 tablet, Refills: 11      verapamil (CALAN-SR) 240 MG CR tablet Take 1 tablet (240 mg total) by mouth once daily.  Qty: 30 tablet, Refills: 11         CONTINUE these medications which have NOT CHANGED    Details   acetaminophen (TYLENOL) 500 MG tablet Take 1,000 mg by mouth daily as needed for Pain (and headache).      alprazolam (XANAX) 0.25 MG tablet Take 1 tablet (0.25 mg total) by mouth nightly as needed for Anxiety.  Qty: 30 tablet, Refills: 0    Associated Diagnoses: Mixed anxiety and depressive disorder      ANORO ELLIPTA 62.5-25 mcg/actuation DsDv Inhale 1 puff into the lungs once daily.  Qty: 180 each, Refills: 4    Associated Diagnoses: Bronchiectasis without complication      apixaban 5 mg Tab Take 1 tablet (5 mg total) by mouth 2 (two) times daily.  Qty: 180 tablet, Refills: 3      desoximetasone (TOPICORT) 0.25 % cream Apply as directed bid prn  Qty: 60 g, Refills: 2    Associated Diagnoses: Rash      gabapentin (NEURONTIN) 300 MG capsule Take 1 capsule (300 mg total) by mouth 3 (three) times daily.  Qty: 270 capsule, Refills: 3    Associated Diagnoses: Acute left-sided low back pain with  sciatica, sciatica laterality unspecified      guaifenesin (MUCINEX) 600 mg 12 hr tablet Take 1,200 mg by mouth 2 (two) times daily.      ipratropium (ATROVENT) 0.02 % nebulizer solution USE ONE VIAL IN NEBULIZER 4 TIMES DAILY  Qty: 225 vial, Refills: 3    Associated Diagnoses: Pulmonary emphysema; Hemoptysis      Lactobacillus rhamnosus GG (CULTURELLE) 10 billion cell capsule Take 1 capsule by mouth once daily.      omeprazole (PRILOSEC) 20 MG capsule Take 1 capsule (20 mg total) by mouth daily as needed.  Qty: 90 capsule, Refills: 0    Associated Diagnoses: Gastroesophageal reflux disease without esophagitis      ondansetron (ZOFRAN) 4 MG tablet Take 1 tablet (4 mg total) by mouth every 8 (eight) hours as needed for Nausea.  Qty: 45 tablet, Refills: 0      primidone (MYSOLINE) 50 MG Tab TAKE ONE TABLET BY MOUTH ONCE DAILY IN THE MORNING, ONE AT NOON, AND TWO AT BEDTIME  Qty: 120 tablet, Refills: 11      propylene glycol (SYSTANE BALANCE) 0.6 % Drop Apply 1 drop to eye daily as needed (dry eye).      sodium chloride 3% 3 % nebulizer solution USE ONE VIAL IN NEBULIZER TWICE DAILY  Qty: 240 mL, Refills: 6    Comments: Please consider 90 day supplies to promote better adherence  Associated Diagnoses: Acquired bronchiectasis      topiramate (TOPAMAX) 25 MG tablet TAKE ONE TABLET BY MOUTH TWICE DAILY  Qty: 180 tablet, Refills: 3      traMADol (ULTRAM) 50 mg tablet Take 1 tablet (50 mg total) by mouth every 6 (six) hours as needed for Pain.  Qty: 45 tablet, Refills: 0      VITAMIN D2 50,000 unit capsule TAKE ONE CAPSULE BY MOUTH ONCE A WEEK  Qty: 4 capsule, Refills: 4         STOP taking these medications       verapamil (VERELAN) 180 MG C24P Comments:   Reason for Stopping:               Indwelling Lines/Drains at time of discharge:   Lines/Drains/Airways     Peripherally Inserted Central Catheter Line                 PICC Double Lumen 11/20/17 0909 11 days          Airway                 Airway - Non-Surgical 07/24/17  0914 Mask 130 days         Oral Airway 07/24/17 0930 Sal 90 130 days                Time spent on the discharge of patient: 30 minutes  Patient was seen and examined on the date of discharge and determined to be suitable for discharge.       Lázaro Levy MD   Internal Medicine, PGY-1    Department of Hospital Medicine  [facility]

## 2017-12-01 NOTE — ASSESSMENT & PLAN NOTE
Due to history of TB, follows with Dr. Zuñiga in pulmonary and Dr. Yuan in ID for chronic Pseudomonas colonization. Per ID clinic note, Dr. Yuan does not feel that the patient's current dyspnea is 2/2 an infectious process. Recommends stopping abx therapy while evaluating for another cause. Dr. Yuan evaluated the patient during admission, did not recommend restarting antibiotics on discharge.  - Continue home inhalers  - Chest physiotherapy

## 2017-12-01 NOTE — PLAN OF CARE
D/C orders noted. Pt. Will not nee to continu IV antibiotics that she was on per ID before admission. Home with spouse.     Future Appointments  Date Time Provider Department Center   12/5/2017 2:00 PM Louie Yuan MD [facility] ID [facility]   12/14/2017 2:15 PM Kristi Torres NP [facility] NEURO [facility]          12/01/17 1230   Discharge Reassessment   Assessment Type Final Discharge Note

## 2017-12-01 NOTE — ASSESSMENT & PLAN NOTE
"Exacerbation of chronic bronchiectasis/COPD vs HF exacerbation vs emotional distress vs PE  - Low suspicion for PE in this patient   - Though patient has history of PE, is currently on therapeutic anticoagulation with apixaban   - Patient is oxygenating well on room air (97%) reports no increased O2 needs at home   - Well's score 1.5  - Per patient, her symptoms are consistent with previous exacerbations; however they typically respond to abx faster   - ID feels that infection less likely to be contributing, will hold therapy for now  - CHF exacerbation   - Patient has history of HFrEF documented with echo 1 year ago showing EF of 30%; however most recent echo 4 months ago completely WNL   - Denies history of MI   - Though chest pain reported on admission, it is non-cardiac (not affected by exertion/rest, not substernal)   - Repeat echo with no new abnormalities, troponin and EKG WNL  - Emotional distress/anxiety possibly playing a role in this patient's dyspnea   - Previously treated with lexapro for 10 years for anxiety and depression, which patient discontinued herself in the last couple of months to "see how I feel without it"   - Day of admission is 10 year anniversary of the death of her son, which patient brought up during exam and became tearful while discussin   - Restarted lexapro, patient agreeable to continuing at home  - Episodes of dyspnea coincide with episodes of palpitations, which are captured on tele as v tach   - Patient recently evaluated by Dr. Ballesteros in EP for this (11/03), home verapamil increased from 120 to 180   - EP consulted to determine whether increase in verapamil dose or event monitor is warranted, dose of verapamil increased to 240 per day, appreciate recs  "

## 2017-12-01 NOTE — ASSESSMENT & PLAN NOTE
S/p ablation in July. Reports palpitations in the past day at admission.  - Continue Verapamil, increased to 240 mg qD  - On cardiac monitoring  - Follow up with Dr. Ballesteros in EP clinic outpatient

## 2017-12-02 NOTE — PLAN OF CARE
Problem: Patient Care Overview  Goal: Plan of Care Review  Given discharge instructions per MD.  Verbalized understanding of instructions.   at bedside and agrees with f/u care instructions.  Awaiting transportation.

## 2017-12-04 ENCOUNTER — PATIENT OUTREACH (OUTPATIENT)
Dept: ADMINISTRATIVE | Facility: CLINIC | Age: 67
End: 2017-12-04

## 2017-12-04 ENCOUNTER — TELEPHONE (OUTPATIENT)
Dept: ELECTROPHYSIOLOGY | Facility: CLINIC | Age: 67
End: 2017-12-04

## 2017-12-04 LAB
BACTERIA BLD CULT: NORMAL
BACTERIA BLD CULT: NORMAL

## 2017-12-04 NOTE — PROGRESS NOTES
"[facility] Post-Discharge call completed with patient today. C3 nurse noted the following concerns:  · Pt. C/o "bad night" last night d/t increased SOB, productive cough,chest soreness, feeling "shakey" and continuous nausea unrelieved by Zofran. Slept sitting in a chair with O2 on d/t dyspnea.   · Admits c/o nausea present ever since she was started on home IVAB, but that these were stopped in the hosp.& her PICC line removed. No changes in sputum color, consistency, amount, denies fevers.  · Determined she only uses O2 for HS, may take Ultram @ bed & is not taking tylenol for chest discomfort either. Uses home inhalers & nebulizer tx as prescribed & staying hydrated.  · Pt. instructed to wear O2 during daytime also, to take her Tylenol or Ultram  as ordered during day rather than just HS, cont to stay hydrated, call her PCP in event cl fails to call her back this afternoon.She verbalized understanding and requested note be routed to providers for new nausea medication and call re: her HOSFU , ID & CARDIOLOGY CL appts.    · Prog. note routed to all providers & clinic staff attention.  "

## 2017-12-04 NOTE — PATIENT INSTRUCTIONS
Discharge Instructions for Bronchiectasis  You have been diagnosed with bronchiectasis. This occurs when your bronchial tubes become blocked with mucus. Your bronchial tubes are the tubes that move air from the windpipe to the lungs. When this happens, infection can occur. You are most likely to get bronchiectasis if you have cystic fibrosis or another lung disease. Smoking, breathing in foreign objects such as food, or lung infections that keep coming back can also cause it. Heres what you can do to feel better.  Break the smoking habit  · Enroll in a stop-smoking program to increase your chances of success.  · Ask your healthcare provider about medicines or other methods to help you quit.  · Ask family members to quit smoking as well.  · Dont allow smoking in your home or around you.  Other home care  · Learn percussion and postural drainage. Do it 2 to 3 times a day. Ask your healthcare provider for instructions.  · Learn to do controlled breathing. Ask your healthcare provider for instructions.  · Take your medicine exactly as directed. Dont skip doses or take double doses.  · Eat a balanced diet.  · Unless told otherwise, drink at least 8 glasses of fluid every day to thin mucus and secretions.  · Maintain a healthy weight. Get help to lose any extra pounds.  · Get a flu shot every year. Ask your healthcare provider about pneumonia vaccinations.  · Avoid allergens, pollution, and dust.  · Get immediate medical treatment for infections.  · Consider installing a home air-conditioning system with a filter and humidity control.  Follow-up care  Make a follow-up appointment with your healthcare provider, as advised.  Call 911  Call 911 right away if you have  · Chest pain  · Shortness of breath that doesn't get better after taking medicine  When to seek medical attention  Call your healthcare provider right away if you have;  · Fever of 100.4°F (38°C) or higher, or as directed by your healthcare  provider  · Yellow, green, bloody, or smelly mucus  · More than normal mucus production   Date Last Reviewed: 2/1/2017 © 2000-2017 [employer]. [address] [place] [address], [place] [address]. All rights reserved. This information is not intended as a substitute for professional medical care. Always follow your healthcare professional's instructions.        Coping with Shortness of Breath: Controlling Stress  When you have lung problems, it may be hard for you to breathe. Stress can make it worse. Learn to relax and control stress to prevent shortness of breath and avoid panic.  When anxiety takes over  When you feel short of breath, your neck, shoulder, and chest muscles tense. You become anxious and start to breathe faster. Your breathing muscles tire and trap air in your lungs. Your chest may feel tight. Anxiety increases and you may start to panic.  Stop panic before it starts    The key to controlling panic is to break the cycle before it starts. When you are becoming short of breath do the following:  · Sit, relax your arms and shoulders.  · Lean forward, resting your upper body on your forearms.  · Breathe in slowly through your nose while counting to 2.  · Hold your lips together as if you are trying to whistle or blow out a candle.  · Breathe out slowly and gently through your pursed lips while counting to 4.  · Repeat these steps as needed.  Learn to relax  Control stress by avoiding things that trigger stress and by relaxing when you start feeling tense. Find a quiet place and sit or lie in a comfortable position. Close your eyes and try the following:  · Picture yourself in an ideal place, doing something you enjoy. Stay in that place until you feel relaxed.  · Slowly tense, then relax, each part of your body. Start with your toes and work up to your scalp. As you breathe in, tighten the muscles. Keep them tight for several seconds. Then relax as you breathe out.  · You can also relax by doing  rick chi or yoga, praying, meditating, or listening to music or relaxation tapes.  Talk with your healthcare provider about how you are feeling. It's important for your provider to understand what is going on and how it is affecting your life.  Date Last Reviewed: 1/1/2017  © 3892-0293 [employer]. [address] [place] [address], [place] [address]. All rights reserved. This information is not intended as a substitute for professional medical care. Always follow your healthcare professional's instructions.

## 2017-12-07 ENCOUNTER — OFFICE VISIT (OUTPATIENT)
Dept: INFECTIOUS DISEASES | Facility: CLINIC | Age: 67
End: 2017-12-07
Payer: MEDICARE

## 2017-12-07 VITALS
SYSTOLIC BLOOD PRESSURE: 169 MMHG | DIASTOLIC BLOOD PRESSURE: 71 MMHG | BODY MASS INDEX: 26.74 KG/M2 | TEMPERATURE: 98 F | WEIGHT: 145.31 LBS | HEART RATE: 79 BPM | HEIGHT: 62 IN

## 2017-12-07 DIAGNOSIS — J98.4 CAVITARY LUNG DISEASE: Primary | ICD-10-CM

## 2017-12-07 DIAGNOSIS — R11.0 NAUSEA: ICD-10-CM

## 2017-12-07 DIAGNOSIS — J47.9 BRONCHIECTASIS WITHOUT COMPLICATION: ICD-10-CM

## 2017-12-07 PROCEDURE — 99499 UNLISTED E&M SERVICE: CPT | Mod: S$GLB,,, | Performed by: INTERNAL MEDICINE

## 2017-12-07 PROCEDURE — 99213 OFFICE O/P EST LOW 20 MIN: CPT | Mod: S$GLB,,, | Performed by: INTERNAL MEDICINE

## 2017-12-07 PROCEDURE — 99999 PR PBB SHADOW E&M-EST. PATIENT-LVL III: CPT | Mod: PBBFAC,,, | Performed by: INTERNAL MEDICINE

## 2017-12-07 NOTE — PROGRESS NOTES
Subjective:      Patient ID: Yasmin Asencio is a 67 y.o. female.    Chief Complaint:Follow-up    History of Present Illness    Ms. Asencio is a 68 y/o female with a history of bronchiectasis, cavitary lung disease, recurrent hemoptysis, pulmonary embolism in January of 2017.  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  She has had recurrent flares of her bronchiectasis requiring antibiotics.  Over the past 2 months, she was complaining of her lungs hurting, increased congestion, coughing (no hemoptysis) and fatigue.  I initially tried to manage this with oral medications to address her symptoms.  She also received some oral antibiotics from Dr. Zuñiga office.  These did not help.  I obtained a respiratory culture that was positive for pseudomonas again.  On November 20, a picc line was placed and she was started on IV cefepime and IV tobramycin at home.    She returned to my office after 9 days of IV antibiotics complaining that she felt worse.  I admitted her to the hospital.  In the hospital, she underwent cardiac evaluation that was deemed to be okay.  Her antibiotics were discontinued.  Her symptoms were attributed to anxiety and episodic tachycardia.  Her lexapro was re-started and her verapamil dose was increased.  She was discharged and is here today for follow up.    She reports constant nausea since she left the hospital.  She doesn't want to eat.  Lost 2 lbs.  No energy.  She is seeing her PCP next week.  No diarrhea.  She states she is constipated.    She feels that her lungs feel better.  She is not needing as much oxygen now.  Cough is clear.    Review of Systems   Constitution: Positive for decreased appetite, weakness and malaise/fatigue. Negative for chills, fever, night sweats, weight gain and weight loss.   HENT: Negative for congestion, ear pain, hearing loss, hoarse voice, sore throat and tinnitus.    Eyes: Positive for blurred vision. Negative for redness and visual  disturbance.   Cardiovascular: Positive for palpitations. Negative for chest pain and leg swelling.   Respiratory: Positive for cough, shortness of breath, sputum production and wheezing. Negative for hemoptysis.    Hematologic/Lymphatic: Negative for adenopathy. Does not bruise/bleed easily.   Skin: Negative for dry skin, itching, rash and suspicious lesions.   Musculoskeletal: Positive for back pain and neck pain. Negative for joint pain and myalgias.   Gastrointestinal: Positive for nausea. Negative for abdominal pain, constipation, diarrhea, heartburn and vomiting.   Genitourinary: Negative for dysuria, flank pain, frequency, hematuria, hesitancy and urgency.   Neurological: Positive for dizziness. Negative for headaches, numbness and paresthesias.   Psychiatric/Behavioral: Negative for depression and memory loss. The patient does not have insomnia and is not nervous/anxious.      Objective:   Physical Exam   Constitutional: She is oriented to person, place, and time. She appears well-developed and well-nourished. No distress.   HENT:   Head: Normocephalic and atraumatic.   Right Ear: External ear normal.   Left Ear: External ear normal.   Nose: Nose normal.   Mouth/Throat: Oropharynx is clear and moist. No oropharyngeal exudate.   Eyes: Conjunctivae and EOM are normal. Pupils are equal, round, and reactive to light. Right eye exhibits no discharge. Left eye exhibits no discharge. No scleral icterus.   Neck: Normal range of motion. Neck supple. No JVD present. No tracheal deviation present. No thyromegaly present.   Cardiovascular: Normal rate, regular rhythm and intact distal pulses.  Exam reveals no gallop and no friction rub.    No murmur heard.  Pulmonary/Chest: Effort normal. No stridor. No respiratory distress. She has no wheezes. She has rales. She exhibits no tenderness.   Abdominal: Soft. Bowel sounds are normal. She exhibits no distension and no mass. There is no tenderness. There is no rebound and no  guarding.   Musculoskeletal: Normal range of motion. She exhibits no edema or tenderness.   Lymphadenopathy:     She has no cervical adenopathy.   Neurological: She is alert and oriented to person, place, and time. She displays normal reflexes. No cranial nerve deficit. She exhibits normal muscle tone. Coordination normal.   Skin: Skin is warm. No rash noted. She is not diaphoretic. No erythema. No pallor.   Psychiatric: She has a normal mood and affect. Her behavior is normal. Judgment and thought content normal.   Nursing note and vitals reviewed.    Assessment:       1. Cavitary lung disease :  68 y/o who history of bronchiectasis and cavitary lung disease recently s/p 9 days of IV cefepime and IV tobramycin.  She is also s/p hospitalization.  Her respiratory function feels better today.  I have advised her to check in with Dr. Zuñiga her pulmonologist for follow up.   2. Bronchiectasis without complication :  Plan as noted above.   3. Nausea :  May be related to recent IV antibiotic use.  Alternatively may be related to lexapro that was re-started while she was inpatient.  Advised her that she could take up to 8 mg of the zofran at a time.         Plan:       Cavitary lung disease         - No further IV antibiotics    Bronchiectasis without complication         - No further IV antibiotics    Nausea         - Trial of higher dose of zofran

## 2017-12-11 DIAGNOSIS — J43.9 PULMONARY EMPHYSEMA: ICD-10-CM

## 2017-12-11 DIAGNOSIS — R04.2 HEMOPTYSIS: ICD-10-CM

## 2017-12-11 RX ORDER — IPRATROPIUM BROMIDE 0.5 MG/2.5ML
SOLUTION RESPIRATORY (INHALATION)
Qty: 225 VIAL | Refills: 3 | Status: SHIPPED | OUTPATIENT
Start: 2017-12-11 | End: 2018-04-05 | Stop reason: SDUPTHER

## 2017-12-12 ENCOUNTER — TELEPHONE (OUTPATIENT)
Dept: FAMILY MEDICINE | Facility: CLINIC | Age: 67
End: 2017-12-12

## 2017-12-12 ENCOUNTER — OFFICE VISIT (OUTPATIENT)
Dept: FAMILY MEDICINE | Facility: CLINIC | Age: 67
End: 2017-12-12
Payer: MEDICARE

## 2017-12-12 VITALS
HEART RATE: 75 BPM | BODY MASS INDEX: 26.8 KG/M2 | SYSTOLIC BLOOD PRESSURE: 140 MMHG | WEIGHT: 145.63 LBS | TEMPERATURE: 98 F | HEIGHT: 62 IN | DIASTOLIC BLOOD PRESSURE: 62 MMHG | OXYGEN SATURATION: 96 %

## 2017-12-12 DIAGNOSIS — J47.1 BRONCHIECTASIS WITH ACUTE EXACERBATION: ICD-10-CM

## 2017-12-12 DIAGNOSIS — J47.9 ACQUIRED BRONCHIECTASIS: ICD-10-CM

## 2017-12-12 DIAGNOSIS — I12.9 BENIGN HYPERTENSION WITH CHRONIC KIDNEY DISEASE, STAGE III: ICD-10-CM

## 2017-12-12 DIAGNOSIS — N18.30 BENIGN HYPERTENSION WITH CHRONIC KIDNEY DISEASE, STAGE III: ICD-10-CM

## 2017-12-12 DIAGNOSIS — Z79.01 CURRENT USE OF LONG TERM ANTICOAGULATION: ICD-10-CM

## 2017-12-12 DIAGNOSIS — Z09 HOSPITAL DISCHARGE FOLLOW-UP: Primary | ICD-10-CM

## 2017-12-12 DIAGNOSIS — J96.11 CHRONIC RESPIRATORY FAILURE WITH HYPOXIA: ICD-10-CM

## 2017-12-12 PROCEDURE — 99499 UNLISTED E&M SERVICE: CPT | Mod: S$GLB,,, | Performed by: NURSE PRACTITIONER

## 2017-12-12 PROCEDURE — 99214 OFFICE O/P EST MOD 30 MIN: CPT | Mod: S$GLB,,, | Performed by: NURSE PRACTITIONER

## 2017-12-12 PROCEDURE — 99999 PR PBB SHADOW E&M-EST. PATIENT-LVL V: CPT | Mod: PBBFAC,,, | Performed by: NURSE PRACTITIONER

## 2017-12-12 RX ORDER — GUAIFENESIN 600 MG/1
1200 TABLET, EXTENDED RELEASE ORAL 2 TIMES DAILY
Status: ON HOLD | COMMUNITY
Start: 2017-12-12 | End: 2021-12-17 | Stop reason: HOSPADM

## 2017-12-12 NOTE — TELEPHONE ENCOUNTER
----- Message from Lexis Redmond sent at 12/12/2017 12:10 PM CST -----  Contact: self  Pt states she is returning a call. Please call [phone]

## 2017-12-12 NOTE — PROGRESS NOTES
Transitional Care Note  Subjective:       Patient ID: Yasmin Asencio is a 67 y.o. female.  Chief Complaint: Transitional Care    Family and/or Caretaker present at visit?  Yes.  Diagnostic tests reviewed/disposition: No diagnosic tests pending after this hospitalization.  Disease/illness education: none  Home health/community services discussion/referrals: Patient does not have home health established from hospital visit.  They do not need home health.  If needed, we will set up home health for the patient.   Establishment or re-establishment of referral orders for community resources: No other necessary community resources.   Discussion with other health care providers: No discussion with other health care providers necessary.   67-year-old female presents to the clinic today with her  for a  hospital follow-up. She was admitted to [facility] form 11/29/2017 thur 12/01/2017. She had presented to her infectious disease doctor and just didn't feel good so she was directly admitted. She was being followed by  for exacerbation of her bronchiectasis requiring antibiotics. Over the past 2 months, she complained of her lungs hurting, increased congestion, coughing, and fatigue.  Initially by mouth antibiotics therapy was attemtped with no improvement.  Respiratory culture was again positive for Pseudomonas at which point she was given a PICC line and started on IV Cefepime and tobramycin and she received a total of 9 days out of 14.  Patient states that this is her fifth course of antibiotics at home and typically she feels better by this point in her therapy.  She continued to feel poorly with and a feeling of heaviness and pressure in her chest exacerbated by lying flat. Cough is productive of clear sputum. Patient's episodes of dyspnea only occur during episodes of palpitations where captured on telemetry as V. Tach.  Patient with PSVT s/p ablation follows by EP outpatient.   Verapamil was recently increased from 120 to 180.   EP consulted to due to her determine whether increase in Verapamil or event monitor would be appropriate.  She was evaluated by Dr. Ballesteros and EP service the episodes captured on Telemetry were artifact secondary to the patient's essential tremor.  However, Verapamil was increased to 240 mg per day secondary to her episodes of palpitations.  Remainder of Cardiac Workup EKG Echo and Troponins are all within normal limits.  Patient was evaluated by Dr. Yuan whom she follows in the infectious disease clinic who recommended discontinuing IV antibiotics for now as he has a low suspicion for active infection.  Patient's Lexapro was restarted due to her episodes of palpitations and dyspnea.  PICC line was removed and patient was discharge.  Today, she states she's feeling much better.  She has some mild shortness of breath at times.  She is using her nebulizer machine 3 times a day.  She has no fever or chills.  She is not coughing up any blood.  She states her sputum is clear.  She's eating and drinking without any difficulty.        Review of Systems   Respiratory: Positive for cough, shortness of breath and wheezing.    Cardiovascular: Negative for chest pain, palpitations and leg swelling.   Gastrointestinal: Negative for abdominal pain, blood in stool, constipation, diarrhea, nausea and vomiting.   Musculoskeletal: Negative for gait problem.   Skin: Negative for rash.   Neurological: Negative for dizziness, light-headedness and headaches.       Objective:      Physical Exam   Constitutional: She is oriented to person, place, and time. She appears well-developed and well-nourished. No distress.   Eyes: Conjunctivae and EOM are normal. Pupils are equal, round, and reactive to light. Right eye exhibits no discharge. Left eye exhibits no discharge. No scleral icterus.   Neck: Normal range of motion. Neck supple. No JVD present.   Cardiovascular: Normal rate, regular  rhythm and normal heart sounds.    No murmur heard.  Pulmonary/Chest: Effort normal. No respiratory distress. She has wheezes. She has no rales.   Small amount of wheezing noted which is chronic for her   Abdominal: Soft. Bowel sounds are normal. There is no tenderness.   Musculoskeletal: Normal range of motion. She exhibits no edema.   Neurological: She is alert and oriented to person, place, and time.   Skin: Skin is warm and dry. She is not diaphoretic.   Psychiatric: She has a normal mood and affect.       Assessment:       1. Hospital discharge follow-up    2. Acquired bronchiectasis    3. Benign hypertension with chronic kidney disease, stage III    4. Bronchiectasis with acute exacerbation    5. Chronic respiratory failure with hypoxia    6. Current use of long term anticoagulation        Plan:         Hospital discharge follow-up    Acquired bronchiectasis  - The current medical regimen is effective;  continue present plan and medications.  - followed by Dr. Zuñiga     Benign hypertension with chronic kidney disease, stage III  - The current medical regimen is effective;  continue present plan and medications.  - avoid all anti-inflammatories     Bronchiectasis with acute exacerbation  - followed by Dr. Yuan    Chronic respiratory failure with hypoxia  - on 2 liters per oxygen at night only    Current use of long term anticoagulation  - The current medical regimen is effective;  continue present plan and medications.

## 2017-12-12 NOTE — PROGRESS NOTES
Subjective:       Patient ID: Yasmin Asencio is a 67 y.o. female.    Chief Complaint: No chief complaint on file.    HPI  Past Medical History:   Diagnosis Date    Acquired bronchiectasis     due to history of TB - followed by pulmonary, Dr. Zuñiga    Allergy     Amblyopia     rt eye per pt    Anemia of other chronic disease     Anticoagulant long-term use     Anxiety     Cataract     Clotting disorder     COPD (chronic obstructive pulmonary disease)     COPD (chronic obstructive pulmonary disease)     Depression     Diverticulosis     Essential tremor     GERD (gastroesophageal reflux disease)     Hemangioma of liver     History of tuberculosis 1978    Hypertension     Mixed anxiety and depressive disorder     Psoriasis     PSVT (paroxysmal supraventricular tachycardia)     Pulmonary embolism     S/P PICC central line placement Apr. 2016 - May 2016    Skin disease     Psoriasis    Spondylosis without myelopathy 8/23/2013    Supraventricular tachycardia     Thoracic aorta atherosclerosis     noted on CT scan of chest 1/3/2011    Tuberculosis     Vaginal delivery     x2    Vitamin D deficiency      Past Surgical History:   Procedure Laterality Date    CATARACT EXTRACTION W/  INTRAOCULAR LENS IMPLANT  07/24/12    od dr [place]    CATARACT EXTRACTION W/  INTRAOCULAR LENS IMPLANT  08/07/12    left eye    EYE SURGERY      bilateral Cataract    GALLBLADDER SURGERY  9/2011      reports that she quit smoking about 8 years ago. Her smoking use included Cigarettes. She has a 100.00 pack-year smoking history. She has never used smokeless tobacco. She reports that she does not drink alcohol or use drugs.  Review of Systems    Objective:      Physical Exam    Assessment:       No diagnosis found.    Plan:       ***    No Follow-up on file.

## 2017-12-12 NOTE — PATIENT INSTRUCTIONS
Follow up with Dr. Zuñiga  Follow up with Dr. Yuan in 3 months  Follow up with Dr. Luna in 3 months   Continue all other current medications

## 2017-12-18 DIAGNOSIS — Z79.01 CURRENT USE OF LONG TERM ANTICOAGULATION: Primary | ICD-10-CM

## 2017-12-18 NOTE — TELEPHONE ENCOUNTER
----- Message from Vicki Bob sent at 12/18/2017  4:40 PM CST -----  Contact: Conchita with [facility]  Need a Rx for Eliquis. Conchita can be reached at [phone].      Thanks,

## 2017-12-20 ENCOUNTER — HOSPITAL ENCOUNTER (OUTPATIENT)
Dept: CARDIOLOGY | Facility: CLINIC | Age: 67
Discharge: HOME | End: 2017-12-20
Payer: MEDICARE

## 2017-12-20 ENCOUNTER — OFFICE VISIT (OUTPATIENT)
Dept: ELECTROPHYSIOLOGY | Facility: CLINIC | Age: 67
End: 2017-12-20
Payer: MEDICARE

## 2017-12-20 VITALS
HEIGHT: 62 IN | WEIGHT: 141 LBS | DIASTOLIC BLOOD PRESSURE: 62 MMHG | SYSTOLIC BLOOD PRESSURE: 142 MMHG | HEART RATE: 81 BPM | BODY MASS INDEX: 25.95 KG/M2

## 2017-12-20 DIAGNOSIS — I26.99 OTHER PULMONARY EMBOLISM WITHOUT ACUTE COR PULMONALE, UNSPECIFIED CHRONICITY: ICD-10-CM

## 2017-12-20 DIAGNOSIS — I47.10 SVT (SUPRAVENTRICULAR TACHYCARDIA): ICD-10-CM

## 2017-12-20 DIAGNOSIS — J98.8 PSEUDOMONAS RESPIRATORY INFECTION: ICD-10-CM

## 2017-12-20 DIAGNOSIS — G25.0 ESSENTIAL TREMOR: ICD-10-CM

## 2017-12-20 DIAGNOSIS — J47.9 ACQUIRED BRONCHIECTASIS: ICD-10-CM

## 2017-12-20 DIAGNOSIS — I47.19 ECTOPIC ATRIAL TACHYCARDIA: ICD-10-CM

## 2017-12-20 DIAGNOSIS — Z79.01 CURRENT USE OF LONG TERM ANTICOAGULATION: ICD-10-CM

## 2017-12-20 DIAGNOSIS — J43.8 OTHER EMPHYSEMA: ICD-10-CM

## 2017-12-20 DIAGNOSIS — I47.10 PSVT (PAROXYSMAL SUPRAVENTRICULAR TACHYCARDIA): Primary | ICD-10-CM

## 2017-12-20 DIAGNOSIS — B96.5 PSEUDOMONAS RESPIRATORY INFECTION: ICD-10-CM

## 2017-12-20 PROCEDURE — 93000 ELECTROCARDIOGRAM COMPLETE: CPT | Mod: S$GLB,,, | Performed by: INTERNAL MEDICINE

## 2017-12-20 PROCEDURE — 99214 OFFICE O/P EST MOD 30 MIN: CPT | Mod: S$GLB,,, | Performed by: INTERNAL MEDICINE

## 2017-12-20 PROCEDURE — 99999 PR PBB SHADOW E&M-EST. PATIENT-LVL III: CPT | Mod: PBBFAC,,, | Performed by: INTERNAL MEDICINE

## 2017-12-20 PROCEDURE — 99499 UNLISTED E&M SERVICE: CPT | Mod: S$GLB,,, | Performed by: INTERNAL MEDICINE

## 2017-12-26 ENCOUNTER — TELEPHONE (OUTPATIENT)
Dept: NEUROLOGY | Facility: CLINIC | Age: 67
End: 2017-12-26

## 2017-12-26 NOTE — TELEPHONE ENCOUNTER
----- Message from Zack Perez sent at 12/26/2017  2:10 PM CST -----  Contact: Patient [phone]  Patient is calling to schedule a f/u appt with Dr. Juarez, pls call

## 2017-12-29 LAB
ACID FAST MOD KINY STN SPEC: NORMAL
MYCOBACTERIUM SPEC QL CULT: NORMAL

## 2018-01-12 ENCOUNTER — OFFICE VISIT (OUTPATIENT)
Dept: NEUROLOGY | Facility: CLINIC | Age: 68
End: 2018-01-12
Payer: MEDICARE

## 2018-01-12 ENCOUNTER — OFFICE VISIT (OUTPATIENT)
Dept: FAMILY MEDICINE | Facility: CLINIC | Age: 68
End: 2018-01-12
Payer: MEDICARE

## 2018-01-12 VITALS
DIASTOLIC BLOOD PRESSURE: 60 MMHG | OXYGEN SATURATION: 97 % | SYSTOLIC BLOOD PRESSURE: 126 MMHG | BODY MASS INDEX: 25.78 KG/M2 | HEIGHT: 63 IN | HEART RATE: 72 BPM | TEMPERATURE: 98 F | WEIGHT: 145.5 LBS

## 2018-01-12 VITALS
HEIGHT: 62 IN | DIASTOLIC BLOOD PRESSURE: 70 MMHG | HEART RATE: 73 BPM | WEIGHT: 145 LBS | SYSTOLIC BLOOD PRESSURE: 156 MMHG | BODY MASS INDEX: 26.68 KG/M2

## 2018-01-12 DIAGNOSIS — N30.91 CYSTITIS WITH HEMATURIA: Primary | ICD-10-CM

## 2018-01-12 DIAGNOSIS — G25.0 ESSENTIAL TREMOR: Primary | ICD-10-CM

## 2018-01-12 LAB
BILIRUB SERPL-MCNC: NORMAL MG/DL
BLOOD URINE, POC: NORMAL
COLOR, POC UA: NORMAL
GLUCOSE UR QL STRIP: NORMAL
KETONES UR QL STRIP: NORMAL
LEUKOCYTE ESTERASE URINE, POC: NORMAL
NITRITE, POC UA: NORMAL
PH, POC UA: 6
PROTEIN, POC: NORMAL
SPECIFIC GRAVITY, POC UA: 1.01
UROBILINOGEN, POC UA: NORMAL

## 2018-01-12 PROCEDURE — 99214 OFFICE O/P EST MOD 30 MIN: CPT | Mod: S$GLB,,, | Performed by: NURSE PRACTITIONER

## 2018-01-12 PROCEDURE — 87088 URINE BACTERIA CULTURE: CPT

## 2018-01-12 PROCEDURE — 87077 CULTURE AEROBIC IDENTIFY: CPT

## 2018-01-12 PROCEDURE — 87086 URINE CULTURE/COLONY COUNT: CPT

## 2018-01-12 PROCEDURE — 99499 UNLISTED E&M SERVICE: CPT | Mod: S$GLB,,, | Performed by: NURSE PRACTITIONER

## 2018-01-12 PROCEDURE — 87186 SC STD MICRODIL/AGAR DIL: CPT

## 2018-01-12 PROCEDURE — 99999 PR PBB SHADOW E&M-EST. PATIENT-LVL III: CPT | Mod: PBBFAC,,, | Performed by: NURSE PRACTITIONER

## 2018-01-12 PROCEDURE — 99214 OFFICE O/P EST MOD 30 MIN: CPT | Mod: 25,S$GLB,, | Performed by: NURSE PRACTITIONER

## 2018-01-12 PROCEDURE — 81002 URINALYSIS NONAUTO W/O SCOPE: CPT | Mod: S$GLB,,, | Performed by: NURSE PRACTITIONER

## 2018-01-12 PROCEDURE — 99999 PR PBB SHADOW E&M-EST. PATIENT-LVL V: CPT | Mod: PBBFAC,,, | Performed by: NURSE PRACTITIONER

## 2018-01-12 RX ORDER — CIPROFLOXACIN 500 MG/1
500 TABLET ORAL 2 TIMES DAILY
Qty: 10 TABLET | Refills: 0 | Status: SHIPPED | OUTPATIENT
Start: 2018-01-12 | End: 2018-01-12 | Stop reason: SDUPTHER

## 2018-01-12 RX ORDER — CIPROFLOXACIN 500 MG/1
500 TABLET ORAL 2 TIMES DAILY
Qty: 10 TABLET | Refills: 0 | Status: SHIPPED | OUTPATIENT
Start: 2018-01-12 | End: 2018-01-17

## 2018-01-12 NOTE — PATIENT INSTRUCTIONS
Let's wean Topamax (topiramate) 25mg to see if this helps the foggy feelings and memory.  Week 1- take 1 tablet at bedtime only  Week 2- stop topiramate    Continue primidone without change.    Recommend seeing your eye doctor.

## 2018-01-12 NOTE — PROGRESS NOTES
Name: Yasmin Asencio  MRN: [id number]   CSN: [id number]      Date: 1-12-18      Referring physician:  Tosha Walls  No address on file    Subjective:      Chief Complaint: tremor    History of Present Illness (HPI):    Yasmin Asencio is a 67 y.o. right-handed female who presents today for a follow-up evaluation of ET, mild memory memory impairment, ? mild pdism and is accompanied by spouse.   Last seen in office 9-12-17. She described nausea and foggy feeling with cd/ld, which did not help tremor. Had her wean off cd/ld.   She also wanted to wean off Lexapro- instructions given (wonder if this contributed to tremor). She is back on Lexapro due to anxiety.   She continuing to have foggy feelings - even in vision. Trouble with names.     She is taking tpx 25 mg BID and primidone 50 mg- 1-1-2.     She is having more trouble with tremor, taj in the LH. She does have tremor in RH as well- LH>RH. Tremor is not constant but is activated with action. Has noted that her speech is more tremulous.     She was hospitalized 2 days in November and had PICC line for 9 days. She had some type of infection. Did not note that tremor was more active when sick.     Previously tried..  TPX  Cd/ld    NOT TRIED propranolol due to COPD.     Review of Systems    Past Medical History: The patient  has a past medical history of Acquired bronchiectasis; Allergy; Amblyopia; Anemia of other chronic disease; Anticoagulant long-term use; Anxiety; Cataract; Clotting disorder; COPD (chronic obstructive pulmonary disease); COPD (chronic obstructive pulmonary disease); Depression; Diverticulosis; Essential tremor; GERD (gastroesophageal reflux disease); Hemangioma of liver; History of tuberculosis (1978); Hypertension; Mixed anxiety and depressive disorder; Psoriasis; PSVT (paroxysmal supraventricular tachycardia); Pulmonary embolism; S/P PICC central line placement (Apr. 2016 - May 2016); Skin disease; Spondylosis without myelopathy  (8/23/2013); Supraventricular tachycardia; Thoracic aorta atherosclerosis; Tuberculosis; Vaginal delivery; and Vitamin D deficiency.    Social History: The patient  reports that she quit smoking about 8 years ago. Her smoking use included Cigarettes. She has a 100.00 pack-year smoking history. She has never used smokeless tobacco. She reports that she does not drink alcohol or use drugs.    Family History: Their family history includes Cancer in her brother, father, and maternal aunt; Heart attack in her brother, mother, and son; Hyperlipidemia in her sister; Hypertension in her mother; Lung cancer in her sister; Psoriasis in her sister; Stroke in her maternal uncle.    Allergies: Adhesive; Bactrim [sulfamethoxazole-trimethoprim]; Lipitor [atorvastatin]; Albuterol; and Restasis [cyclosporine]     Meds:   Current Outpatient Prescriptions on File Prior to Visit   Medication Sig Dispense Refill    acetaminophen (TYLENOL) 500 MG tablet Take 1,000 mg by mouth daily as needed for Pain (and headache).      alprazolam (XANAX) 0.25 MG tablet Take 1 tablet (0.25 mg total) by mouth nightly as needed for Anxiety. 30 tablet 0    ANORO ELLIPTA 62.5-25 mcg/actuation DsDv Inhale 1 puff into the lungs once daily. 180 each 4    apixaban 5 mg Tab Take 1 tablet (5 mg total) by mouth 2 (two) times daily. 180 tablet 1    desoximetasone (TOPICORT) 0.25 % cream Apply as directed bid prn (Patient taking differently: Apply as directed twice daily as needed for psoriasis) 60 g 2    escitalopram oxalate (LEXAPRO) 10 MG tablet Take 1 tablet (10 mg total) by mouth once daily. 30 tablet 11    gabapentin (NEURONTIN) 300 MG capsule Take 1 capsule (300 mg total) by mouth 3 (three) times daily. 270 capsule 3    guaiFENesin (MUCINEX) 600 mg 12 hr tablet Take 1,200 mg by mouth 2 (two) times daily.      ipratropium (ATROVENT) 0.02 % nebulizer solution INHALE ONE VIAL IN NEBULIZER 4 TIMES DAILY 225 vial 3    Lactobacillus rhamnosus GG  "(CULTURELLE) 10 billion cell capsule Take 1 capsule by mouth once daily.      ondansetron (ZOFRAN) 4 MG tablet Take 1 tablet (4 mg total) by mouth every 8 (eight) hours as needed for Nausea. 45 tablet 0    primidone (MYSOLINE) 50 MG Tab TAKE ONE TABLET BY MOUTH ONCE DAILY IN THE MORNING, ONE AT NOON, AND TWO AT BEDTIME 120 tablet 11    propylene glycol (SYSTANE BALANCE) 0.6 % Drop Apply 1 drop to eye daily as needed (dry eye).      verapamil (CALAN-SR) 240 MG CR tablet Take 1 tablet (240 mg total) by mouth once daily. 30 tablet 11    VITAMIN D2 50,000 unit capsule TAKE ONE CAPSULE BY MOUTH ONCE A WEEK (Patient taking differently: TAKE ONE CAPSULE BY MOUTH EVERY FRI) 4 capsule 4    [DISCONTINUED] topiramate (TOPAMAX) 25 MG tablet TAKE ONE TABLET BY MOUTH TWICE DAILY 180 tablet 3    omeprazole (PRILOSEC) 20 MG capsule Take 1 capsule (20 mg total) by mouth daily as needed. (Patient taking differently: Take 20 mg by mouth once daily. ) 90 capsule 0    sodium chloride 3% 3 % nebulizer solution USE ONE VIAL IN NEBULIZER TWICE DAILY 240 mL 6    traMADol (ULTRAM) 50 mg tablet Take 1 tablet (50 mg total) by mouth every 6 (six) hours as needed for Pain. 45 tablet 0     No current facility-administered medications on file prior to visit.        Objective:     Physical Exam:    Vitals:    01/12/18 1112   BP: (!) 156/70   Pulse: 73   Weight: 65.8 kg (145 lb)   Height: 5' 2" (1.575 m)     Body mass index is 26.52 kg/m².    Constitutional  Well-developed, well-nourished, appears stated age     ..  * Specialized movement exam  No hypophonic speech but is tremulous.    No facial masking.     No cogwheel rigidity.      Near mild bradykinesia with finger taps, otherwise, ess normal. LTT is a bit clumsy.    Subtle rest tremor RH.  Slight postural tremor RH and even more subtle LH.   Slt tremor with intention BH. No kinetic tremor.      SEE HANDWRITING SAMPLE               Laboratory Results:  Admission on 11/29/2017, " Discharged on 12/01/2017   Component Date Value Ref Range Status    WBC 11/29/2017 6.81  3.90 - 12.70 K/uL Final    RBC 11/29/2017 3.32* 4.00 - 5.40 M/uL Final    Hemoglobin 11/29/2017 9.5* 12.0 - 16.0 g/dL Final    Hematocrit 11/29/2017 29.1* 37.0 - 48.5 % Final    MCV 11/29/2017 88  82 - 98 fL Final    MCH 11/29/2017 28.6  27.0 - 31.0 pg Final    MCHC 11/29/2017 32.6  32.0 - 36.0 g/dL Final    RDW 11/29/2017 13.2  11.5 - 14.5 % Final    Platelets 11/29/2017 201  150 - 350 K/uL Final    MPV 11/29/2017 10.1  9.2 - 12.9 fL Final    Immature Granulocytes 11/29/2017 0.1  0.0 - 0.5 % Final    Gran # 11/29/2017 4.2  1.8 - 7.7 K/uL Final    Immature Grans (Abs) 11/29/2017 0.01  0.00 - 0.04 K/uL Final    Lymph # 11/29/2017 1.7  1.0 - 4.8 K/uL Final    Mono # 11/29/2017 0.8  0.3 - 1.0 K/uL Final    Eos # 11/29/2017 0.1  0.0 - 0.5 K/uL Final    Baso # 11/29/2017 0.03  0.00 - 0.20 K/uL Final    nRBC 11/29/2017 0  0 /100 WBC Final    Gran% 11/29/2017 61.1  38.0 - 73.0 % Final    Lymph% 11/29/2017 25.0  18.0 - 48.0 % Final    Mono% 11/29/2017 11.6  4.0 - 15.0 % Final    Eosinophil% 11/29/2017 1.8  0.0 - 8.0 % Final    Basophil% 11/29/2017 0.4  0.0 - 1.9 % Final    Differential Method 11/29/2017 Automated   Final    Sodium 11/29/2017 139  136 - 145 mmol/L Final    Potassium 11/29/2017 3.4* 3.5 - 5.1 mmol/L Final    Chloride 11/29/2017 105  95 - 110 mmol/L Final    CO2 11/29/2017 25  23 - 29 mmol/L Final    Glucose 11/29/2017 70  70 - 110 mg/dL Final    BUN, Bld 11/29/2017 16  8 - 23 mg/dL Final    Creatinine 11/29/2017 1.0  0.5 - 1.4 mg/dL Final    Calcium 11/29/2017 9.2  8.7 - 10.5 mg/dL Final    Total Protein 11/29/2017 8.0  6.0 - 8.4 g/dL Final    Albumin 11/29/2017 3.7  3.5 - 5.2 g/dL Final    Total Bilirubin 11/29/2017 0.2  0.1 - 1.0 mg/dL Final    Alkaline Phosphatase 11/29/2017 80  55 - 135 U/L Final    AST 11/29/2017 17  10 - 40 U/L Final    ALT 11/29/2017 16  10 - 44 U/L Final     Anion Gap 11/29/2017 9  8 - 16 mmol/L Final    eGFR if African American 11/29/2017 >60.0  >60 mL/min/1.73 m^2 Final    eGFR if non African American 11/29/2017 58.0* >60 mL/min/1.73 m^2 Final    Magnesium 11/29/2017 1.7  1.6 - 2.6 mg/dL Final    Phosphorus 11/29/2017 2.9  2.7 - 4.5 mg/dL Final    Blood Culture, Routine 11/29/2017 No growth after 5 days.   Final    Blood Culture, Routine 11/29/2017 No growth after 5 days.   Final    EF 11/30/2017 55  55 - 65 Final    Diastolic Dysfunction 11/30/2017 No   Final    Procalcitonin 11/29/2017 0.07  <0.25 ng/mL Final    Troponin I 11/29/2017 <0.006  0.000 - 0.026 ng/mL Final    WBC 11/30/2017 5.46  3.90 - 12.70 K/uL Final    RBC 11/30/2017 3.18* 4.00 - 5.40 M/uL Final    Hemoglobin 11/30/2017 9.0* 12.0 - 16.0 g/dL Final    Hematocrit 11/30/2017 28.2* 37.0 - 48.5 % Final    MCV 11/30/2017 89  82 - 98 fL Final    MCH 11/30/2017 28.3  27.0 - 31.0 pg Final    MCHC 11/30/2017 31.9* 32.0 - 36.0 g/dL Final    RDW 11/30/2017 13.1  11.5 - 14.5 % Final    Platelets 11/30/2017 185  150 - 350 K/uL Final    MPV 11/30/2017 10.4  9.2 - 12.9 fL Final    Immature Granulocytes 11/30/2017 0.2  0.0 - 0.5 % Final    Gran # 11/30/2017 2.9  1.8 - 7.7 K/uL Final    Immature Grans (Abs) 11/30/2017 0.01  0.00 - 0.04 K/uL Final    Lymph # 11/30/2017 1.7  1.0 - 4.8 K/uL Final    Mono # 11/30/2017 0.7  0.3 - 1.0 K/uL Final    Eos # 11/30/2017 0.1  0.0 - 0.5 K/uL Final    Baso # 11/30/2017 0.03  0.00 - 0.20 K/uL Final    nRBC 11/30/2017 0  0 /100 WBC Final    Gran% 11/30/2017 53.1  38.0 - 73.0 % Final    Lymph% 11/30/2017 30.4  18.0 - 48.0 % Final    Mono% 11/30/2017 13.6  4.0 - 15.0 % Final    Eosinophil% 11/30/2017 2.2  0.0 - 8.0 % Final    Basophil% 11/30/2017 0.5  0.0 - 1.9 % Final    Differential Method 11/30/2017 Automated   Final    Sodium 11/30/2017 140  136 - 145 mmol/L Final    Potassium 11/30/2017 3.7  3.5 - 5.1 mmol/L Final    Chloride 11/30/2017 107  95  - 110 mmol/L Final    CO2 11/30/2017 26  23 - 29 mmol/L Final    Glucose 11/30/2017 89  70 - 110 mg/dL Final    BUN, Bld 11/30/2017 18  8 - 23 mg/dL Final    Creatinine 11/30/2017 1.0  0.5 - 1.4 mg/dL Final    Calcium 11/30/2017 8.7  8.7 - 10.5 mg/dL Final    Total Protein 11/30/2017 6.9  6.0 - 8.4 g/dL Final    Albumin 11/30/2017 3.2* 3.5 - 5.2 g/dL Final    Total Bilirubin 11/30/2017 0.1  0.1 - 1.0 mg/dL Final    Alkaline Phosphatase 11/30/2017 72  55 - 135 U/L Final    AST 11/30/2017 17  10 - 40 U/L Final    ALT 11/30/2017 16  10 - 44 U/L Final    Anion Gap 11/30/2017 7* 8 - 16 mmol/L Final    eGFR if African American 11/30/2017 >60.0  >60 mL/min/1.73 m^2 Final    eGFR if non African American 11/30/2017 58.0* >60 mL/min/1.73 m^2 Final    Magnesium 11/30/2017 1.7  1.6 - 2.6 mg/dL Final    Phosphorus 11/30/2017 4.0  2.7 - 4.5 mg/dL Final    WBC 12/01/2017 5.51  3.90 - 12.70 K/uL Final    RBC 12/01/2017 3.08* 4.00 - 5.40 M/uL Final    Hemoglobin 12/01/2017 8.9* 12.0 - 16.0 g/dL Final    Hematocrit 12/01/2017 27.4* 37.0 - 48.5 % Final    MCV 12/01/2017 89  82 - 98 fL Final    MCH 12/01/2017 28.9  27.0 - 31.0 pg Final    MCHC 12/01/2017 32.5  32.0 - 36.0 g/dL Final    RDW 12/01/2017 13.1  11.5 - 14.5 % Final    Platelets 12/01/2017 180  150 - 350 K/uL Final    MPV 12/01/2017 10.3  9.2 - 12.9 fL Final    Immature Granulocytes 12/01/2017 0.2  0.0 - 0.5 % Final    Gran # 12/01/2017 2.6  1.8 - 7.7 K/uL Final    Immature Grans (Abs) 12/01/2017 0.01  0.00 - 0.04 K/uL Final    Lymph # 12/01/2017 2.0  1.0 - 4.8 K/uL Final    Mono # 12/01/2017 0.7  0.3 - 1.0 K/uL Final    Eos # 12/01/2017 0.2  0.0 - 0.5 K/uL Final    Baso # 12/01/2017 0.02  0.00 - 0.20 K/uL Final    nRBC 12/01/2017 0  0 /100 WBC Final    Gran% 12/01/2017 47.2  38.0 - 73.0 % Final    Lymph% 12/01/2017 36.3  18.0 - 48.0 % Final    Mono% 12/01/2017 13.2  4.0 - 15.0 % Final    Eosinophil% 12/01/2017 2.7  0.0 - 8.0 % Final     Basophil% 12/01/2017 0.4  0.0 - 1.9 % Final    Differential Method 12/01/2017 Automated   Final    Sodium 12/01/2017 140  136 - 145 mmol/L Final    Potassium 12/01/2017 3.6  3.5 - 5.1 mmol/L Final    Chloride 12/01/2017 106  95 - 110 mmol/L Final    CO2 12/01/2017 27  23 - 29 mmol/L Final    Glucose 12/01/2017 88  70 - 110 mg/dL Final    BUN, Bld 12/01/2017 18  8 - 23 mg/dL Final    Creatinine 12/01/2017 1.0  0.5 - 1.4 mg/dL Final    Calcium 12/01/2017 8.9  8.7 - 10.5 mg/dL Final    Total Protein 12/01/2017 6.9  6.0 - 8.4 g/dL Final    Albumin 12/01/2017 3.3* 3.5 - 5.2 g/dL Final    Total Bilirubin 12/01/2017 0.2  0.1 - 1.0 mg/dL Final    Alkaline Phosphatase 12/01/2017 71  55 - 135 U/L Final    AST 12/01/2017 16  10 - 40 U/L Final    ALT 12/01/2017 17  10 - 44 U/L Final    Anion Gap 12/01/2017 7* 8 - 16 mmol/L Final    eGFR if African American 12/01/2017 >60.0  >60 mL/min/1.73 m^2 Final    eGFR if non African American 12/01/2017 58.0* >60 mL/min/1.73 m^2 Final    Magnesium 12/01/2017 1.9  1.6 - 2.6 mg/dL Final    Phosphorus 12/01/2017 4.4  2.7 - 4.5 mg/dL Final   Lab Visit on 11/14/2017   Component Date Value Ref Range Status    Respiratory Culture 11/14/2017    Final                    Value:PSEUDOMONAS AERUGINOSA  Many  Normal respiratory larry also present      Gram Stain (Respiratory) 11/14/2017 <10 epithelial cells per low power field.   Final    Gram Stain (Respiratory) 11/14/2017 Many WBC's   Final    Gram Stain (Respiratory) 11/14/2017 Many Gram negative rods   Final   Lab Visit on 10/27/2017   Component Date Value Ref Range Status    AFB Culture & Smear 10/27/2017 No growth after 8 weeks.    Final    AFB CULTURE STAIN 10/27/2017 No acid fast bacilli seen.   Final   Lab Visit on 10/26/2017   Component Date Value Ref Range Status    Sodium 10/26/2017 138  136 - 145 mmol/L Final    Potassium 10/26/2017 3.8  3.5 - 5.1 mmol/L Final    Chloride 10/26/2017 105  95 - 110 mmol/L Final     CO2 10/26/2017 26  23 - 29 mmol/L Final    Glucose 10/26/2017 95  70 - 110 mg/dL Final    BUN, Bld 10/26/2017 15  8 - 23 mg/dL Final    Creatinine 10/26/2017 0.8  0.5 - 1.4 mg/dL Final    Calcium 10/26/2017 9.4  8.7 - 10.5 mg/dL Final    Total Protein 10/26/2017 7.8  6.0 - 8.4 g/dL Final    Albumin 10/26/2017 3.6  3.5 - 5.2 g/dL Final    Total Bilirubin 10/26/2017 0.2  0.1 - 1.0 mg/dL Final    Alkaline Phosphatase 10/26/2017 84  55 - 135 U/L Final    AST 10/26/2017 12  10 - 40 U/L Final    ALT 10/26/2017 11  10 - 44 U/L Final    Anion Gap 10/26/2017 7* 8 - 16 mmol/L Final    eGFR if African American 10/26/2017 >60.0  >60 mL/min/1.73 m^2 Final    eGFR if non African American 10/26/2017 >60.0  >60 mL/min/1.73 m^2 Final         Assessment and Plan     Essential tremor       Memory Impairment- has had NP eval- wonder if TPX is playing a role       Medical Decision Making:    Wean off TPX over the next week. Let's see if this helps fogginess and memory.   Continue primidone and gabapentin without change.       I spent 25 minutes face-to-face with the patient with >50% of the time spent with counseling and education regarding:  - results of data, diagnosis, and recommendations stated above  - the prognosis of ET  - risks and benefits of tpx  - importance of diet and exercise    Kristi Torres, BRIA, NP-C  Division of Movement and Memory Disorders  [facility]  [phone]

## 2018-01-13 NOTE — PATIENT INSTRUCTIONS
Understanding Urinary Tract Infections (UTIs)  Most UTIs are caused by bacteria, although they may also be caused by viruses or fungi. Bacteria from the bowel are the most common source of infection. The infection may start because of any of the following:  · Sexual activity. During sex, bacteria can travel from the penis, vagina, or rectum into the urethra.   · Bacteria on the skin outside the rectum may travel into the urethra. This is more common in women since the rectum and urethra are closer to each other than in men. Wiping from front to back after using the toilet and keeping the area clean can help prevent germs from getting to the urethra.  · Blockage of urine flow through the urinary tract. If urine sits too long, germs may start to grow out of control.      Parts of the urinary tract  The infection can occur in any part of the urinary tract.  · The kidneys collect and store urine.  · The ureters carry urine from the kidneys to the bladder.  · The bladder holds urine until you are ready to let it out.  · The urethra carries urine from the bladder out of the body. It is shorter in women, so bacteria can move through it more easily. The urethra is longer in men, so a UTI is less likely to reach the bladder or kidneys in men.  Date Last Reviewed: 1/1/2017  © 6237-5563 [employer]. [address] [place] [address], [place] [address]. All rights reserved. This information is not intended as a substitute for professional medical care. Always follow your healthcare professional's instructions.

## 2018-01-13 NOTE — PROGRESS NOTES
Patient Name: Yasmin Asencio    : 1950  MRN: [id number]    Subjective:  Yasmin is a 67 y.o. female who presents today for     1. Urinary frequency, urgency and pain with urination started today. No treatment yet. She has been increasing fluid intake. She tends to hold her urine    Past Medical History  Past Medical History:   Diagnosis Date    Acquired bronchiectasis     due to history of TB - followed by pulmonary, Dr. Zuñiga    Allergy     Amblyopia     rt eye per pt    Anemia of other chronic disease     Anticoagulant long-term use     Anxiety     Cataract     Clotting disorder     COPD (chronic obstructive pulmonary disease)     COPD (chronic obstructive pulmonary disease)     Depression     Diverticulosis     Essential tremor     GERD (gastroesophageal reflux disease)     Hemangioma of liver     History of tuberculosis     Hypertension     Mixed anxiety and depressive disorder     Psoriasis     PSVT (paroxysmal supraventricular tachycardia)     Pulmonary embolism     S/P PICC central line placement 2016 - May 2016    Skin disease     Psoriasis    Spondylosis without myelopathy 2013    Supraventricular tachycardia     Thoracic aorta atherosclerosis     noted on CT scan of chest 1/3/2011    Tuberculosis     Vaginal delivery     x2    Vitamin D deficiency        Past Surgical History  Past Surgical History:   Procedure Laterality Date    CATARACT EXTRACTION W/  INTRAOCULAR LENS IMPLANT  12    od dr spain    CATARACT EXTRACTION W/  INTRAOCULAR LENS IMPLANT  12    left eye    EYE SURGERY      bilateral Cataract    GALLBLADDER SURGERY  2011       Family History  Family History   Problem Relation Age of Onset    Hypertension Mother     Heart attack Mother     Cancer Father      liver and bladder    Hyperlipidemia Sister     Psoriasis Sister     Cancer Brother      liver    Stroke Maternal Uncle     Cancer Maternal Aunt      stomach     Lung cancer Sister     Heart attack Brother     Heart attack Son     Amblyopia Neg Hx     Blindness Neg Hx     Cataracts Neg Hx     Glaucoma Neg Hx     Macular degeneration Neg Hx     Retinal detachment Neg Hx     Strabismus Neg Hx     Thyroid disease Neg Hx     Melanoma Neg Hx     Lupus Neg Hx     Eczema Neg Hx     COPD Neg Hx        Social History  Social History     Social History    Marital status:      Spouse name: N/A    Number of children: 2    Years of education: N/A     Occupational History    housewife      Social History Main Topics    Smoking status: Former Smoker     Packs/day: 2.00     Years: 50.00     Types: Cigarettes     Quit date: 2009    Smokeless tobacco: Never Used    Alcohol use No    Drug use: No    Sexual activity: Yes     Partners: Male     Other Topics Concern    Are You Pregnant Or Think You May Be? No    Breast-Feeding No     Social History Narrative    One child  of a heart attack at age 34.       Allergies  Review of patient's allergies indicates:   Allergen Reactions    Adhesive Other (See Comments)     Tears up the skin and makes it itch    Bactrim [sulfamethoxazole-trimethoprim] Rash     Was hospitalized for rash    Lipitor [atorvastatin] Other (See Comments)     Muscle aches    Albuterol Other (See Comments)     tremors    Restasis [cyclosporine] Itching    -reviewed and updated      Medications  Reviewed and updated.   Current Outpatient Prescriptions   Medication Sig Dispense Refill    acetaminophen (TYLENOL) 500 MG tablet Take 1,000 mg by mouth daily as needed for Pain (and headache).      alprazolam (XANAX) 0.25 MG tablet Take 1 tablet (0.25 mg total) by mouth nightly as needed for Anxiety. 30 tablet 0    ANORO ELLIPTA 62.5-25 mcg/actuation DsDv Inhale 1 puff into the lungs once daily. 180 each 4    apixaban 5 mg Tab Take 1 tablet (5 mg total) by mouth 2 (two) times daily. 180 tablet 1    desoximetasone (TOPICORT) 0.25 %  cream Apply as directed bid prn (Patient taking differently: Apply as directed twice daily as needed for psoriasis) 60 g 2    escitalopram oxalate (LEXAPRO) 10 MG tablet Take 1 tablet (10 mg total) by mouth once daily. 30 tablet 11    gabapentin (NEURONTIN) 300 MG capsule Take 1 capsule (300 mg total) by mouth 3 (three) times daily. 270 capsule 3    guaiFENesin (MUCINEX) 600 mg 12 hr tablet Take 1,200 mg by mouth 2 (two) times daily.      ipratropium (ATROVENT) 0.02 % nebulizer solution INHALE ONE VIAL IN NEBULIZER 4 TIMES DAILY 225 vial 3    Lactobacillus rhamnosus GG (CULTURELLE) 10 billion cell capsule Take 1 capsule by mouth once daily.      ondansetron (ZOFRAN) 4 MG tablet Take 1 tablet (4 mg total) by mouth every 8 (eight) hours as needed for Nausea. 45 tablet 0    primidone (MYSOLINE) 50 MG Tab TAKE ONE TABLET BY MOUTH ONCE DAILY IN THE MORNING, ONE AT NOON, AND TWO AT BEDTIME 120 tablet 11    propylene glycol (SYSTANE BALANCE) 0.6 % Drop Apply 1 drop to eye daily as needed (dry eye).      sodium chloride 3% 3 % nebulizer solution USE ONE VIAL IN NEBULIZER TWICE DAILY 240 mL 6    traMADol (ULTRAM) 50 mg tablet Take 1 tablet (50 mg total) by mouth every 6 (six) hours as needed for Pain. 45 tablet 0    verapamil (CALAN-SR) 240 MG CR tablet Take 1 tablet (240 mg total) by mouth once daily. 30 tablet 11    VITAMIN D2 50,000 unit capsule TAKE ONE CAPSULE BY MOUTH ONCE A WEEK (Patient taking differently: TAKE ONE CAPSULE BY MOUTH EVERY FRI) 4 capsule 4    ciprofloxacin HCl (CIPRO) 500 MG tablet Take 1 tablet (500 mg total) by mouth 2 (two) times daily. 10 tablet 0    omeprazole (PRILOSEC) 20 MG capsule Take 1 capsule (20 mg total) by mouth daily as needed. (Patient taking differently: Take 20 mg by mouth once daily. ) 90 capsule 0     No current facility-administered medications for this visit.          Review of Systems   Constitutional: Negative for chills and fever.   Respiratory: Negative for  "shortness of breath.    Cardiovascular: Negative for chest pain.   Gastrointestinal: Negative for abdominal pain.   Genitourinary: Positive for dysuria, frequency and urgency.         Physical Exam  /60   Pulse 72   Temp 97.6 °F (36.4 °C) (Oral)   Ht 5' 2.5" (1.588 m)   Wt 66 kg (145 lb 8.1 oz)   LMP  (LMP Unknown)   SpO2 97%   BMI 26.19 kg/m²   Physical Exam   Constitutional: She is oriented to person, place, and time. She appears well-developed. No distress.   Cardiovascular: Normal rate, regular rhythm and normal heart sounds.    Pulmonary/Chest: Effort normal and breath sounds normal.   Abdominal: Soft. There is no tenderness.   Neurological: She is alert and oriented to person, place, and time.   Skin: She is not diaphoretic.     Results for orders placed or performed in visit on 01/12/18   POCT URINE DIPSTICK WITHOUT MICROSCOPE   Result Value Ref Range    Color, UA stew     Spec Grav UA 1.010     pH, UA 6     WBC, UA ++     Nitrite, UA neg     Protein neg     Glucose, UA neg     Ketones, UA neg     Urobilinogen, UA neg     Bilirubin neg     Blood, UA +++        Assessment/Plan:  Yasmin Asencio is a 67 y.o. female who presents today for :    Cystitis with hematuria  Start abx empirically, culture sent to check for sensitivity, handout given on uti and preventive measure  -     POCT URINE DIPSTICK WITHOUT MICROSCOPE  -     Urine culture  -     ciprofloxacin HCl (CIPRO) 500 MG tablet; Take 1 tablet (500 mg total) by mouth 2 (two) times daily.  Dispense: 10 tablet; Refill: 0        Follow-up if symptoms worsen or fail to improve.      "

## 2018-01-16 LAB — BACTERIA UR CULT: NORMAL

## 2018-02-08 ENCOUNTER — OFFICE VISIT (OUTPATIENT)
Dept: SPINE | Facility: CLINIC | Age: 68
End: 2018-02-08
Payer: MEDICARE

## 2018-02-08 VITALS
WEIGHT: 145 LBS | HEIGHT: 62 IN | SYSTOLIC BLOOD PRESSURE: 145 MMHG | HEART RATE: 95 BPM | BODY MASS INDEX: 26.68 KG/M2 | DIASTOLIC BLOOD PRESSURE: 65 MMHG

## 2018-02-08 DIAGNOSIS — M47.819 ARTHROPATHY OF FACET JOINTS AT MULTIPLE LEVELS: ICD-10-CM

## 2018-02-08 DIAGNOSIS — M41.9 ACQUIRED SCOLIOSIS: ICD-10-CM

## 2018-02-08 DIAGNOSIS — M47.819 SPONDYLOSIS WITHOUT MYELOPATHY: Primary | ICD-10-CM

## 2018-02-08 DIAGNOSIS — M51.36 DDD (DEGENERATIVE DISC DISEASE), LUMBAR: ICD-10-CM

## 2018-02-08 PROCEDURE — 1159F MED LIST DOCD IN RCRD: CPT | Mod: S$GLB,,, | Performed by: NURSE PRACTITIONER

## 2018-02-08 PROCEDURE — 99213 OFFICE O/P EST LOW 20 MIN: CPT | Mod: S$GLB,,, | Performed by: NURSE PRACTITIONER

## 2018-02-08 PROCEDURE — 99499 UNLISTED E&M SERVICE: CPT | Mod: S$GLB,,, | Performed by: NURSE PRACTITIONER

## 2018-02-08 PROCEDURE — 1125F AMNT PAIN NOTED PAIN PRSNT: CPT | Mod: S$GLB,,, | Performed by: NURSE PRACTITIONER

## 2018-02-08 PROCEDURE — 3008F BODY MASS INDEX DOCD: CPT | Mod: S$GLB,,, | Performed by: NURSE PRACTITIONER

## 2018-02-08 PROCEDURE — 99999 PR PBB SHADOW E&M-EST. PATIENT-LVL III: CPT | Mod: PBBFAC,,, | Performed by: NURSE PRACTITIONER

## 2018-02-08 RX ORDER — ERGOCALCIFEROL 1.25 MG/1
CAPSULE ORAL
Qty: 4 CAPSULE | Refills: 4 | Status: SHIPPED | OUTPATIENT
Start: 2018-02-08 | End: 2018-06-25 | Stop reason: SDUPTHER

## 2018-02-08 NOTE — PROGRESS NOTES
Chronic patient Established Note (Follow up visit)      SUBJECTIVE:    Yasmin Asencio presents to the clinic for a follow-up appointment for lower back pain.  She is here today to discuss repeat lumbar RFAs.  She previously had significant benefit with MBBs.  She had right L2,3,4,5 RFA in December 2016 with 80% relief until recently.  She did not have the right side done because she suffered a PE.  She is currently on Eliquis from cardiology.  Her pain is mainly across the lower back.  She has intermittent radiation down the back of her left leg.  Since the last visit, Yasmin Asencio states the pain has been worsening.  Current pain intensity is 7/10.    She has an extensive PMH including clotting disorder; COPD, h/o PE in 2016, Depression; Diverticulosis; Essential tremor; GERD; Hemangioma of liver; History of tuberculosis (1978); Hypertension; Mixed anxiety and depressive disorder; and Psoriasis.  She has frequent lung infections and is followed by pulmonology and infectious disease.    Pain Disability Index Review:  Last 3 PDI Scores 2/8/2018 11/11/2016 9/19/2016   Pain Disability Index (PDI) 34 19 10       Pain Medications:  Tramadol PRN and Gabapentin 300 mg TID    Opioid Contract: no     report:  Reviewed and consistent with medication use as prescribed.    Pain Procedures:   10/26/15 IR Epidural Transforaminal Inj 1ST Vert Lumbar Bilat   1/22/16 Facet Injection L2-L5   8/26/16 Bilaeral Lumbar MBB at L2-5  10/14/16 Bilateral L2,3,4,5 MBB- 100% relief for 5 days  12/16/16 Right L2,3,4,5 RFA- 80% relief    Physical Therapy/Home Exercise: yes    Imaging:     Lumbar XRAYs 10/13/15  Narrative   Lumbar spine    AP and lateral flexion and extension    There are 5 non-rib bearing lumbar vertebral bodies.  There is left convex scoliosis of the lumbar spine.  There is degenerative disk disease of L1/L2, L2/L3 and L5/S1.  Vertebral body height is maintained.  There is osteopenia.    The patient is status  post cholecystectomy and there is atherosclerosis.   Impression    There are degenerative changes throughout the lumbar spine.          Lumbar 10/22/15  Narrative   Comparison: Lumbar spine 11/15/13    Technique: Multiplanar multi-sequence MR images of the lumbar spine without contrast    Findings: The vertebral body heights and alignment are maintained.  No diffuse marrow replacement process is identified.  Mild levoscoliosis is noted.  The abdominal structures show a possible right hepatic cyst and mild ectasia of the abdominal aorta.    The spinal cord terminates at the L1 level and exhibits normal signal.  Hemangioma noted in the T12 vertebral body.  There are decreased intravertebral disk height at all lumbar levels, with associated endplate changes at L2-3.  Two small Tarlov cyst again noted in the sacral canal posterior to S2.  Annular fissure is noted at L3-4 and L4-5. Level by level findings are detailed below:    L1-2: Circumferential disk bulge and facet hypertrophy causing mild bilateral foraminal stenoses without spinal canal stenosis.  L2-3: Circumferential disk bulge and facet hypertrophy causing mild bilateral foraminal stenoses without spinal canal stenosis.  Small focus of subarticular signal abnormality on the right could represent a focus of fat or a cyst and may be abutting the exiting right L2 nerve root, unchanged from prior.  L3-4: Circumferential disk bulge and facet hypertrophy causing mild spinal canal, moderate right foraminal, and mild left foraminal stenoses.  L4-5: Circumferential disk bulge and facet and ligamentum flavum hypertrophy causing mild right and moderate left foraminal stenosis as well as mild spinal canal stenosis.  L5-S1: Facet hypertrophy causing mild left foraminal stenosis.  No right foraminal or spinal canal stenosis.   Impression       Multilevel degenerative changes of the lumbar spine related to facet and disk disease as detailed above, overall similar to the prior  examination.    Focus of signal abnormality abutting the exiting right L2 nerve root is favored to represent a focus of epidural fat, but could also represent a small cyst and is similar to prior.         Allergies:   Allergies   Allergen Reactions    Adhesive Other (See Comments)     Tears up the skin and makes it itch    Bactrim [Sulfamethoxazole-Trimethoprim] Rash     Was hospitalized for rash    Lipitor [Atorvastatin] Other (See Comments)     Muscle aches    Albuterol Other (See Comments)     tremors    Restasis [Cyclosporine] Itching       Current Medications:   Current Outpatient Prescriptions   Medication Sig Dispense Refill    acetaminophen (TYLENOL) 500 MG tablet Take 1,000 mg by mouth daily as needed for Pain (and headache).      alprazolam (XANAX) 0.25 MG tablet Take 1 tablet (0.25 mg total) by mouth nightly as needed for Anxiety. 30 tablet 0    ANORO ELLIPTA 62.5-25 mcg/actuation DsDv Inhale 1 puff into the lungs once daily. 180 each 4    apixaban 5 mg Tab Take 1 tablet (5 mg total) by mouth 2 (two) times daily. 180 tablet 1    desoximetasone (TOPICORT) 0.25 % cream Apply as directed bid prn (Patient taking differently: Apply as directed twice daily as needed for psoriasis) 60 g 2    escitalopram oxalate (LEXAPRO) 10 MG tablet Take 1 tablet (10 mg total) by mouth once daily. 30 tablet 11    gabapentin (NEURONTIN) 300 MG capsule Take 1 capsule (300 mg total) by mouth 3 (three) times daily. 270 capsule 3    guaiFENesin (MUCINEX) 600 mg 12 hr tablet Take 1,200 mg by mouth 2 (two) times daily.      ipratropium (ATROVENT) 0.02 % nebulizer solution INHALE ONE VIAL IN NEBULIZER 4 TIMES DAILY 225 vial 3    Lactobacillus rhamnosus GG (CULTURELLE) 10 billion cell capsule Take 1 capsule by mouth once daily.      omeprazole (PRILOSEC) 20 MG capsule Take 1 capsule (20 mg total) by mouth daily as needed. (Patient taking differently: Take 20 mg by mouth once daily. ) 90 capsule 0    ondansetron  (ZOFRAN) 4 MG tablet Take 1 tablet (4 mg total) by mouth every 8 (eight) hours as needed for Nausea. 45 tablet 0    primidone (MYSOLINE) 50 MG Tab TAKE ONE TABLET BY MOUTH ONCE DAILY IN THE MORNING, ONE AT NOON, AND TWO AT BEDTIME 120 tablet 11    propylene glycol (SYSTANE BALANCE) 0.6 % Drop Apply 1 drop to eye daily as needed (dry eye).      sodium chloride 3% 3 % nebulizer solution USE ONE VIAL IN NEBULIZER TWICE DAILY 240 mL 6    traMADol (ULTRAM) 50 mg tablet Take 1 tablet (50 mg total) by mouth every 6 (six) hours as needed for Pain. 45 tablet 0    verapamil (CALAN-SR) 240 MG CR tablet Take 1 tablet (240 mg total) by mouth once daily. 30 tablet 11    VITAMIN D2 50,000 unit capsule TAKE ONE CAPSULE BY MOUTH ONCE A WEEK 4 capsule 4     No current facility-administered medications for this visit.        REVIEW OF SYSTEMS:    GENERAL:  No weight loss, malaise or fevers.  HEENT:  Negative for frequent or significant headaches.  NECK:  Negative for lumps, goiter, pain and significant neck swelling.  RESPIRATORY:  Negative for cough, wheezing or shortness of breath. H/O TB and recurrent lung infections.  CARDIOVASCULAR:  Negative for chest pain, leg swelling or palpitations.  GI:  Negative for abdominal discomfort, blood in stools or black stools or change in bowel habits. GERD.  MUSCULOSKELETAL:  See HPI.  SKIN:  Negative for lesions, rash, and itching.  PSYCH:  Negative for sleep disturbance, mood disorder and recent psychosocial stressors.  HEMATOLOGY/LYMPHOLOGY:  Negative for prolonged bleeding, bruising easily or swollen nodes.  NEURO:   No history of headaches, syncope, paralysis, seizures or tremors.  All other reviewed and negative other than HPI.    Past Medical History:  Past Medical History:   Diagnosis Date    Acquired bronchiectasis     due to history of TB - followed by pulmonary, Dr. Zuñiga    Allergy     Amblyopia     rt eye per pt    Anemia of other chronic disease     Anticoagulant  long-term use     Anxiety     Cataract     Clotting disorder     COPD (chronic obstructive pulmonary disease)     COPD (chronic obstructive pulmonary disease)     Depression     Diverticulosis     Essential tremor     GERD (gastroesophageal reflux disease)     Hemangioma of liver     History of tuberculosis 1978    Hypertension     Mixed anxiety and depressive disorder     Psoriasis     PSVT (paroxysmal supraventricular tachycardia)     Pulmonary embolism     S/P PICC central line placement Apr. 2016 - May 2016    Skin disease     Psoriasis    Spondylosis without myelopathy 8/23/2013    Supraventricular tachycardia     Thoracic aorta atherosclerosis     noted on CT scan of chest 1/3/2011    Tuberculosis     Vaginal delivery     x2    Vitamin D deficiency        Past Surgical History:  Past Surgical History:   Procedure Laterality Date    CATARACT EXTRACTION W/  INTRAOCULAR LENS IMPLANT  07/24/12    od dr spain    CATARACT EXTRACTION W/  INTRAOCULAR LENS IMPLANT  08/07/12    left eye    EYE SURGERY      bilateral Cataract    GALLBLADDER SURGERY  9/2011       Family History:  Family History   Problem Relation Age of Onset    Hypertension Mother     Heart attack Mother     Cancer Father      liver and bladder    Hyperlipidemia Sister     Psoriasis Sister     Cancer Brother      liver    Stroke Maternal Uncle     Cancer Maternal Aunt      stomach    Lung cancer Sister     Heart attack Brother     Heart attack Son     Amblyopia Neg Hx     Blindness Neg Hx     Cataracts Neg Hx     Glaucoma Neg Hx     Macular degeneration Neg Hx     Retinal detachment Neg Hx     Strabismus Neg Hx     Thyroid disease Neg Hx     Melanoma Neg Hx     Lupus Neg Hx     Eczema Neg Hx     COPD Neg Hx        Social History:  Social History     Social History    Marital status:      Spouse name: N/A    Number of children: 2    Years of education: N/A     Occupational History     "housewife      Social History Main Topics    Smoking status: Former Smoker     Packs/day: 2.00     Years: 50.00     Types: Cigarettes     Quit date: 2009    Smokeless tobacco: Never Used    Alcohol use No    Drug use: No    Sexual activity: Yes     Partners: Male     Other Topics Concern    Are You Pregnant Or Think You May Be? No    Breast-Feeding No     Social History Narrative    One child  of a heart attack at age 35.       OBJECTIVE:    BP (!) 145/65   Pulse 95   Ht 5' 2" (1.575 m)   Wt 65.8 kg (145 lb)   LMP  (LMP Unknown)   BMI 26.52 kg/m²     PHYSICAL EXAMINATION:    General appearance: Well appearing, in no acute distress, alert and oriented x3.  Psych:  Mood and affect appropriate.    Skin: Skin color, texture, turgor normal, no rashes or lesions, in both upper and lower body.  Head/face:  Atraumatic, normocephalic. No palpable lymph nodes  Cor: RRR  Pulm: CTA  GI: Abdomen soft and non-tender.  Back: Straight leg raising in the sitting and supine positions is negative to radicular pain. There is pain with palpation to bilateral lumbar facet joints.  Limited extension with pain.  Bilateral facet loading, L>R.  Extremities: Peripheral joint ROM is full and pain free without obvious instability or laxity in all four extremities. No deformities, edema, or skin discoloration. Good capillary refill.  Musculoskeletal:  Bilateral upper and lower extremity strength is normal and symmetric.  No atrophy or tone abnormalities are noted.  Neuro: Bilateral upper and lower extremity coordination and muscle stretch reflexes are physiologic and symmetric.  Plantar response are downgoing. No loss of sensation is noted.  Gait: Antalgic.    ASSESSMENT: 67 y.o. year old female with lower back pain, consistent with the following diagnoses:     1. Spondylosis without myelopathy     2. Arthropathy of facet joints at multiple levels     3. Acquired scoliosis     4. DDD (degenerative disc disease), lumbar   "         PLAN:     - Previous imaging was reviewed and discussed with the patient today.,    - Schedule left then right L2,3,4,5 RFA, 2 weeks apart.  The procedure, risks, benefits and options were discussed with patient. There are no contraindications to the procedure. The patient expressed understanding and agreed to proceed.  Consent obtained today.    - We will need to obtain permission from cardiology to hold Eliquis prior to procedures.    - Will consider repeat TF ADRIAN if leg pain persists.    - The patient will continue a home exercise routine to help with pain and strengthening.      - RTC 4 weeks after procedure.    - Counseled patient regarding the importance of constant sleeping habits and physical therapy.      The above plan and management options were discussed at length with patient. Patient is in agreement with the above and verbalized understanding.    Cherise Jeffery  02/08/2018

## 2018-02-12 ENCOUNTER — TELEPHONE (OUTPATIENT)
Dept: ELECTROPHYSIOLOGY | Facility: CLINIC | Age: 68
End: 2018-02-12

## 2018-02-12 DIAGNOSIS — I47.10 SVT (SUPRAVENTRICULAR TACHYCARDIA): Primary | ICD-10-CM

## 2018-02-12 NOTE — TELEPHONE ENCOUNTER
"Returned Pt's call. She wants to know what she should do because she has reported 9 different episodes where her HR is 150-170's in the last 2 weeks. She states she doesn't feel really bad but it totally drains her and she has to lie down and nap after. She is on verapamil 240 mg/qd. Advised I would speak with Dr Ballesteros and get back with her.    Updated Dr Ballesteros on Pt. He orders a 48 holter and ov to follow.    Returned call to Pt and advised.      ----- Message from Lidia Mckeon sent at 2/12/2018 10:08 AM CST -----  Contact: pt  Pls call pt at [phone].  She says that she has had 9 episodes of her heart "racing" since January and she had 3 episodes yesterday and her HR was 172.  Please advise.    Thank you    "

## 2018-02-19 ENCOUNTER — CLINICAL SUPPORT (OUTPATIENT)
Dept: ELECTROPHYSIOLOGY | Facility: CLINIC | Age: 68
End: 2018-02-19
Payer: MEDICARE

## 2018-02-19 DIAGNOSIS — I47.10 SVT (SUPRAVENTRICULAR TACHYCARDIA): ICD-10-CM

## 2018-02-19 PROCEDURE — 93224 XTRNL ECG REC UP TO 48 HRS: CPT | Mod: S$GLB,,, | Performed by: INTERNAL MEDICINE

## 2018-02-20 DIAGNOSIS — K21.9 GASTROESOPHAGEAL REFLUX DISEASE WITHOUT ESOPHAGITIS: Chronic | ICD-10-CM

## 2018-02-20 RX ORDER — OMEPRAZOLE 20 MG/1
CAPSULE, DELAYED RELEASE ORAL
Qty: 90 CAPSULE | Refills: 0 | Status: SHIPPED | OUTPATIENT
Start: 2018-02-20 | End: 2018-05-21 | Stop reason: SDUPTHER

## 2018-02-28 ENCOUNTER — HOSPITAL ENCOUNTER (OUTPATIENT)
Dept: CARDIOLOGY | Facility: CLINIC | Age: 68
Discharge: HOME | End: 2018-02-28
Payer: MEDICARE

## 2018-02-28 ENCOUNTER — OFFICE VISIT (OUTPATIENT)
Dept: ELECTROPHYSIOLOGY | Facility: CLINIC | Age: 68
End: 2018-02-28
Payer: MEDICARE

## 2018-02-28 VITALS
BODY MASS INDEX: 25.34 KG/M2 | HEART RATE: 75 BPM | HEIGHT: 63 IN | WEIGHT: 143 LBS | DIASTOLIC BLOOD PRESSURE: 70 MMHG | SYSTOLIC BLOOD PRESSURE: 150 MMHG

## 2018-02-28 DIAGNOSIS — Z79.01 CURRENT USE OF LONG TERM ANTICOAGULATION: ICD-10-CM

## 2018-02-28 DIAGNOSIS — I47.10 SVT (SUPRAVENTRICULAR TACHYCARDIA): ICD-10-CM

## 2018-02-28 DIAGNOSIS — I47.19 ECTOPIC ATRIAL TACHYCARDIA: ICD-10-CM

## 2018-02-28 DIAGNOSIS — Z98.890 STATUS POST CATHETER ABLATION OF SLOW PATHWAY: ICD-10-CM

## 2018-02-28 DIAGNOSIS — Z86.79 STATUS POST CATHETER ABLATION OF SLOW PATHWAY: ICD-10-CM

## 2018-02-28 DIAGNOSIS — J47.1 BRONCHIECTASIS WITH ACUTE EXACERBATION: ICD-10-CM

## 2018-02-28 DIAGNOSIS — J43.8 OTHER EMPHYSEMA: Primary | ICD-10-CM

## 2018-02-28 DIAGNOSIS — I47.10 PSVT (PAROXYSMAL SUPRAVENTRICULAR TACHYCARDIA): ICD-10-CM

## 2018-02-28 DIAGNOSIS — J96.11 CHRONIC RESPIRATORY FAILURE WITH HYPOXIA: ICD-10-CM

## 2018-02-28 PROCEDURE — 99214 OFFICE O/P EST MOD 30 MIN: CPT | Mod: S$GLB,,, | Performed by: INTERNAL MEDICINE

## 2018-02-28 PROCEDURE — 99999 PR PBB SHADOW E&M-EST. PATIENT-LVL III: CPT | Mod: PBBFAC,,, | Performed by: INTERNAL MEDICINE

## 2018-02-28 PROCEDURE — 93000 ELECTROCARDIOGRAM COMPLETE: CPT | Mod: S$GLB,,, | Performed by: INTERNAL MEDICINE

## 2018-02-28 RX ORDER — VERAPAMIL HYDROCHLORIDE 240 MG/1
240 TABLET, FILM COATED, EXTENDED RELEASE ORAL 2 TIMES DAILY
Qty: 180 TABLET | Refills: 3 | Status: SHIPPED | OUTPATIENT
Start: 2018-02-28 | End: 2018-03-01

## 2018-02-28 NOTE — PROGRESS NOTES
"Subjective:    Patient ID:  Yasmin Asencio is a 67 y.o. female who presents for evaluation of PSVT      HPI   67 y.o. F  pulm hemorrhage 11/15, s/p coiling  pseudomonas PNA 2016, s/p extended Abx  COPD, bronchiectasis (home O2 frequently)  HTN on meds  chronic slurred speech, thought due to essential tremor per neuro  hx "AF" destini-lung-coiling procedure (no documentation)  pSVT (s/p AVRNT ablation; likely focal AT remains)    Had palpitations with HR >140 bpm at random intervals for past 10 years. Recently about 1x/month.   WIth palps, gets blurry vision, chest palps with radiation to the neck. Her "blood runs cold" and lasts for 20-35 mins.  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 We also saw a likely focal AT at that procedure also; not ablated due to not able to reinduce.  Since, feeling much better. Does get short palpitations. Event monitor showed short NSAT and one sustained SVT c/w AT. These episodes don't bother her nearly as much as the AVNRT did.    She did well on verapamil for a bit, but developed palps again. HR to 150s.  Holter: 2 episodes of sustained PSVT, most c/w AT.    cath 6/16 neg  echo 11/16 50-55% LVEF -> 11/2017 55-60%.  Holter 2/17: SR . no SVT.    My interpretation of today's ECG is NSR 75 bpm. Small P wave.     Review of Systems   Constitution: Negative. Negative for weakness and malaise/fatigue.   HENT: Negative.  Negative for ear pain and tinnitus.    Eyes: Negative for blurred vision.   Cardiovascular: Positive for dyspnea on exertion and palpitations. Negative for chest pain, near-syncope and syncope.   Respiratory: Negative.  Negative for shortness of breath.    Endocrine: Negative.  Negative for polyuria.   Hematologic/Lymphatic: Does not bruise/bleed easily.   Skin: Negative.  Negative for rash.   Musculoskeletal: Negative.  Negative for joint pain and muscle weakness.   Gastrointestinal: Positive for constipation. Negative for abdominal pain and change in bowel habit. "   Genitourinary: Negative for frequency.   Neurological: Positive for tremors. Negative for dizziness.   Psychiatric/Behavioral: Negative.  Negative for depression. The patient is not nervous/anxious.    Allergic/Immunologic: Negative for environmental allergies.        Objective:    Physical Exam   Constitutional: She is oriented to person, place, and time. Vital signs are normal. She appears well-developed and well-nourished. She is active and cooperative.   HENT:   Head: Normocephalic and atraumatic.   Eyes: Conjunctivae and EOM are normal.   Neck: Normal range of motion. Carotid bruit is not present. No tracheal deviation and no edema present. No thyroid mass and no thyromegaly present.   Cardiovascular: Normal rate, regular rhythm, normal heart sounds, intact distal pulses and normal pulses.   No extrasystoles are present. PMI is not displaced.  Exam reveals no gallop and no friction rub.    No murmur heard.  Pulmonary/Chest: Effort normal. No respiratory distress. She has no wheezes. She has rhonchi in the right middle field, the right lower field, the left middle field and the left lower field. She has no rales.   Abdominal: Soft. Normal appearance. She exhibits no distension. There is no hepatosplenomegaly.   Musculoskeletal: Normal range of motion.   Neurological: She is alert and oriented to person, place, and time. Coordination normal.   Skin: Skin is warm and dry. No rash noted.   Psychiatric: She has a normal mood and affect. Her speech is normal and behavior is normal. Thought content normal. Cognition and memory are normal.   Nursing note and vitals reviewed.        Assessment:       1. Other emphysema    2. Chronic respiratory failure with hypoxia    3. Bronchiectasis with acute exacerbation    4. Ectopic atrial tachycardia    5. PSVT (paroxysmal supraventricular tachycardia)    6. SVT (supraventricular tachycardia)    7. Status post catheter ablation of slow pathway    8. Current use of long term  anticoagulation         Plan:       Increase verapamil 240 qd -> 180 bid: for HTN as well as AT suppression.  Reassured pt of low danger to this rhythm... will try to suppress with meds (may need to try other meds if verap unsuccessful, or return to lab for repeat attempt at ablation [thwarted in past due to noninducibility in lab]).  Holter in 1 mo, with f/u thereafter.

## 2018-03-01 ENCOUNTER — TELEPHONE (OUTPATIENT)
Dept: ELECTROPHYSIOLOGY | Facility: CLINIC | Age: 68
End: 2018-03-01

## 2018-03-01 RX ORDER — VERAPAMIL HYDROCHLORIDE 180 MG/1
180 CAPSULE, EXTENDED RELEASE ORAL 2 TIMES DAILY
Qty: 180 CAPSULE | Refills: 3 | Status: CANCELLED
Start: 2018-03-01

## 2018-03-01 NOTE — TELEPHONE ENCOUNTER
Spoke with Dr Ballesteros. Cardizem should be increased to 180mg BID. Relayed to Pt. Pt voiced understanding.      ----- Message from Jassi Emerson sent at 3/1/2018  9:09 AM CST -----  Contact: patient  Please call pt at [phone]. Patient needs updated instructions for Verapamil 240 mg. Dr Ballesteros pt    Thank you

## 2018-03-01 NOTE — TELEPHONE ENCOUNTER
Called Pt to let her know the Rx was called in to [facility];'s and ready for .    ----- Message from Jassi Emerson sent at 3/1/2018  3:10 PM CST -----  Contact: patient  Please call pt at [phone] after calling pharmacy. Patient is still waiting on the Verapamil 180 mg to be called into [facility] pharmacy at [phone].  Out of this medication. Dr Ballesteros pt    Thank you

## 2018-03-02 ENCOUNTER — TELEPHONE (OUTPATIENT)
Dept: NEUROLOGY | Facility: CLINIC | Age: 68
End: 2018-03-02

## 2018-03-02 NOTE — TELEPHONE ENCOUNTER
Spoke to patient and offered appointment on Tuesday at 10 am. Patient accepted 3/6 @ 10 am and needs 60  Minutes.

## 2018-03-06 ENCOUNTER — OFFICE VISIT (OUTPATIENT)
Dept: NEUROLOGY | Facility: CLINIC | Age: 68
End: 2018-03-06
Payer: MEDICARE

## 2018-03-06 VITALS
HEART RATE: 72 BPM | DIASTOLIC BLOOD PRESSURE: 66 MMHG | HEIGHT: 63 IN | SYSTOLIC BLOOD PRESSURE: 134 MMHG | BODY MASS INDEX: 25.39 KG/M2 | WEIGHT: 143.31 LBS

## 2018-03-06 DIAGNOSIS — R41.3 MEMORY IMPAIRMENT: ICD-10-CM

## 2018-03-06 DIAGNOSIS — G25.0 ESSENTIAL TREMOR: Primary | ICD-10-CM

## 2018-03-06 PROCEDURE — 3075F SYST BP GE 130 - 139MM HG: CPT | Mod: S$GLB,,, | Performed by: NURSE PRACTITIONER

## 2018-03-06 PROCEDURE — 3078F DIAST BP <80 MM HG: CPT | Mod: S$GLB,,, | Performed by: NURSE PRACTITIONER

## 2018-03-06 PROCEDURE — 99999 PR PBB SHADOW E&M-EST. PATIENT-LVL III: CPT | Mod: PBBFAC,,, | Performed by: NURSE PRACTITIONER

## 2018-03-06 PROCEDURE — 99499 UNLISTED E&M SERVICE: CPT | Mod: S$GLB,,, | Performed by: NURSE PRACTITIONER

## 2018-03-06 PROCEDURE — 99214 OFFICE O/P EST MOD 30 MIN: CPT | Mod: S$GLB,,, | Performed by: NURSE PRACTITIONER

## 2018-03-06 RX ORDER — VERAPAMIL HYDROCHLORIDE 180 MG/1
TABLET, FILM COATED, EXTENDED RELEASE ORAL 2 TIMES DAILY
COMMUNITY
Start: 2018-03-01 | End: 2019-02-11 | Stop reason: SDUPTHER

## 2018-03-06 NOTE — LETTER
March 6, 2018      Urmila Luna MD  [address]  [place] [address]           [facility]  [address]  [place] [address]  Phone: [phone]  Fax: [phone]          Patient: Yasmin Asencio   MR Number: [id number]   YOB: 1950   Date of Visit: 3/6/2018       Dear Dr. Urmila Luna:    Thank you for referring Yasmin Asencio to me for evaluation. Attached you will find relevant portions of my assessment and plan of care.    If you have questions, please do not hesitate to call me. I look forward to following Yasmin Asencio along with you.    Sincerely,    Kristi Torres, HAYDEE    Enclosure  CC:  No Recipients    If you would like to receive this communication electronically, please contact [email or web address] or [phone] to request more information on [employer] access.    For providers and/or their staff who would like to refer a patient to [facility], please contact us through our one-stop-shop provider referral line, [facility], at [phone].    If you feel you have received this communication in error or would no longer like to receive these types of communications, please e-mail [email or web address]

## 2018-03-07 ENCOUNTER — HOSPITAL ENCOUNTER (OUTPATIENT)
Dept: RADIOLOGY | Facility: HOSPITAL | Age: 68
Discharge: HOME | End: 2018-03-07
Attending: INTERNAL MEDICINE
Payer: MEDICARE

## 2018-03-07 ENCOUNTER — OFFICE VISIT (OUTPATIENT)
Dept: INFECTIOUS DISEASES | Facility: CLINIC | Age: 68
End: 2018-03-07
Payer: MEDICARE

## 2018-03-07 VITALS
WEIGHT: 145.06 LBS | BODY MASS INDEX: 25.7 KG/M2 | HEART RATE: 96 BPM | SYSTOLIC BLOOD PRESSURE: 133 MMHG | HEIGHT: 63 IN | TEMPERATURE: 98 F | DIASTOLIC BLOOD PRESSURE: 66 MMHG

## 2018-03-07 DIAGNOSIS — J98.4 CAVITARY LUNG DISEASE: ICD-10-CM

## 2018-03-07 DIAGNOSIS — J47.9 BRONCHIECTASIS WITHOUT COMPLICATION: Primary | ICD-10-CM

## 2018-03-07 DIAGNOSIS — J47.9 BRONCHIECTASIS WITHOUT COMPLICATION: ICD-10-CM

## 2018-03-07 DIAGNOSIS — R29.898 ARM WEAKNESS: ICD-10-CM

## 2018-03-07 PROCEDURE — 71046 X-RAY EXAM CHEST 2 VIEWS: CPT | Mod: TC

## 2018-03-07 PROCEDURE — 99213 OFFICE O/P EST LOW 20 MIN: CPT | Mod: S$GLB,,, | Performed by: INTERNAL MEDICINE

## 2018-03-07 PROCEDURE — 99999 PR PBB SHADOW E&M-EST. PATIENT-LVL III: CPT | Mod: PBBFAC,,, | Performed by: INTERNAL MEDICINE

## 2018-03-07 PROCEDURE — 3078F DIAST BP <80 MM HG: CPT | Mod: CPTII,S$GLB,, | Performed by: INTERNAL MEDICINE

## 2018-03-07 PROCEDURE — 3075F SYST BP GE 130 - 139MM HG: CPT | Mod: CPTII,S$GLB,, | Performed by: INTERNAL MEDICINE

## 2018-03-07 PROCEDURE — 71046 X-RAY EXAM CHEST 2 VIEWS: CPT | Mod: 26,,, | Performed by: RADIOLOGY

## 2018-03-07 NOTE — PROGRESS NOTES
Subjective:      Patient ID: Yasmin Asencio is a 67 y.o. female.    Chief Complaint:Follow-up    History of Present Illness    Ms. Asencio is a 66 y/o female with a history of bronchiectasis, cavitary lung disease, recurrent hemoptysis, pulmonary embolism in January of 2017.  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  She is here today for follow up.  She is doing okay with her breathing.  She states that her coughing is okay and her breathing is okay.  Both arms go to sleep when she sleeps at night.  States that this happens every night.  Ongoing since the last picc line.      Review of Systems   Constitution: Positive for malaise/fatigue. Negative for chills, decreased appetite, fever, weakness, night sweats, weight gain and weight loss.   HENT: Negative for congestion, ear pain, hearing loss, hoarse voice, sore throat and tinnitus.    Eyes: Positive for blurred vision. Negative for redness and visual disturbance.   Cardiovascular: Positive for palpitations. Negative for chest pain and leg swelling.   Respiratory: Positive for cough, shortness of breath, sputum production and wheezing. Negative for hemoptysis.    Hematologic/Lymphatic: Negative for adenopathy. Does not bruise/bleed easily.   Skin: Positive for rash. Negative for dry skin, itching and suspicious lesions.   Musculoskeletal: Positive for back pain. Negative for joint pain, myalgias and neck pain.   Gastrointestinal: Negative for abdominal pain, constipation, diarrhea, heartburn, nausea and vomiting.   Genitourinary: Negative for dysuria, flank pain, frequency, hematuria, hesitancy and urgency.   Neurological: Positive for numbness. Negative for dizziness, headaches and paresthesias.   Psychiatric/Behavioral: Negative for depression and memory loss. The patient is nervous/anxious. The patient does not have insomnia.      Objective:   Physical Exam   Constitutional: She is oriented to person, place, and time. She appears  well-developed and well-nourished. No distress.   HENT:   Head: Normocephalic and atraumatic.   Right Ear: External ear normal.   Left Ear: External ear normal.   Nose: Nose normal.   Mouth/Throat: Oropharynx is clear and moist. No oropharyngeal exudate.   Eyes: Conjunctivae and EOM are normal. Pupils are equal, round, and reactive to light. Right eye exhibits no discharge. Left eye exhibits no discharge. No scleral icterus.   Neck: Normal range of motion. Neck supple. No JVD present. No tracheal deviation present. No thyromegaly present.   Cardiovascular: Normal rate, regular rhythm and intact distal pulses.  Exam reveals no gallop and no friction rub.    No murmur heard.  Pulmonary/Chest: Effort normal. No stridor. No respiratory distress. She has no wheezes. She has rales. She exhibits no tenderness.   Abdominal: Soft. Bowel sounds are normal. She exhibits no distension and no mass. There is no tenderness. There is no rebound and no guarding.   Musculoskeletal: Normal range of motion. She exhibits no edema or tenderness.   Lymphadenopathy:     She has no cervical adenopathy.   Neurological: She is alert and oriented to person, place, and time. She displays normal reflexes. No cranial nerve deficit. She exhibits normal muscle tone. Coordination normal.   Skin: Skin is warm. No rash noted. She is not diaphoretic. No erythema. No pallor.   Psychiatric: She has a normal mood and affect. Her behavior is normal. Judgment and thought content normal.   Nursing note and vitals reviewed.    Assessment:       1. Bronchiectasis without complication :  Labs and studies reviewed.  Lungs examined.  She feels okay today.  No antibiotics are needed.  Will check chest x-ray.   2. Cavitary lung disease :  Plan as above.   3. ilArm weakness :  Reports that her arms fall asleep every night when she wakes up.  Will order ultrasound of bilateral UE.  If negative, then will refer back to PCP for this.         Plan:       Bronchiectasis  without complication  -     X-Ray Chest PA And Lateral; Future; Expected date: 03/07/2018    Cavitary lung disease  -     X-Ray Chest PA And Lateral; Future; Expected date: 03/07/2018    Arm weakness  -     US Upper Extremity Veins Bilateral; Future; Expected date: 03/07/2018

## 2018-03-16 ENCOUNTER — HOSPITAL ENCOUNTER (OUTPATIENT)
Facility: OTHER | Age: 68
Discharge: HOME | End: 2018-03-16
Attending: ANESTHESIOLOGY | Admitting: ANESTHESIOLOGY
Payer: MEDICARE

## 2018-03-16 ENCOUNTER — SURGERY (OUTPATIENT)
Age: 68
End: 2018-03-16

## 2018-03-16 VITALS
RESPIRATION RATE: 18 BRPM | OXYGEN SATURATION: 100 % | HEART RATE: 74 BPM | DIASTOLIC BLOOD PRESSURE: 71 MMHG | WEIGHT: 142 LBS | HEIGHT: 62 IN | TEMPERATURE: 98 F | SYSTOLIC BLOOD PRESSURE: 160 MMHG | BODY MASS INDEX: 26.13 KG/M2

## 2018-03-16 DIAGNOSIS — M47.9 OSTEOARTHRITIS OF SPINE, UNSPECIFIED SPINAL OSTEOARTHRITIS COMPLICATION STATUS, UNSPECIFIED SPINAL REGION: Primary | ICD-10-CM

## 2018-03-16 DIAGNOSIS — G89.29 CHRONIC PAIN: ICD-10-CM

## 2018-03-16 PROCEDURE — 64635 DESTROY LUMB/SAC FACET JNT: CPT | Mod: LT,,, | Performed by: ANESTHESIOLOGY

## 2018-03-16 PROCEDURE — 64636 DESTROY L/S FACET JNT ADDL: CPT | Performed by: ANESTHESIOLOGY

## 2018-03-16 PROCEDURE — 64635 DESTROY LUMB/SAC FACET JNT: CPT | Performed by: ANESTHESIOLOGY

## 2018-03-16 PROCEDURE — 63600175 PHARM REV CODE 636 W HCPCS: Performed by: ANESTHESIOLOGY

## 2018-03-16 PROCEDURE — 64636 DESTROY L/S FACET JNT ADDL: CPT | Mod: LT,,, | Performed by: ANESTHESIOLOGY

## 2018-03-16 PROCEDURE — 25000003 PHARM REV CODE 250: Performed by: ANESTHESIOLOGY

## 2018-03-16 PROCEDURE — 99152 MOD SED SAME PHYS/QHP 5/>YRS: CPT | Mod: ,,, | Performed by: ANESTHESIOLOGY

## 2018-03-16 RX ORDER — DEXAMETHASONE SODIUM PHOSPHATE 4 MG/ML
INJECTION, SOLUTION INTRA-ARTICULAR; INTRALESIONAL; INTRAMUSCULAR; INTRAVENOUS; SOFT TISSUE
Status: DISCONTINUED | OUTPATIENT
Start: 2018-03-16 | End: 2018-03-16 | Stop reason: HOSPADM

## 2018-03-16 RX ORDER — MIDAZOLAM HYDROCHLORIDE 1 MG/ML
INJECTION INTRAMUSCULAR; INTRAVENOUS
Status: DISCONTINUED | OUTPATIENT
Start: 2018-03-16 | End: 2018-03-16 | Stop reason: HOSPADM

## 2018-03-16 RX ORDER — FENTANYL CITRATE 50 UG/ML
INJECTION, SOLUTION INTRAMUSCULAR; INTRAVENOUS
Status: DISCONTINUED | OUTPATIENT
Start: 2018-03-16 | End: 2018-03-16 | Stop reason: HOSPADM

## 2018-03-16 RX ORDER — SODIUM CHLORIDE 9 MG/ML
500 INJECTION, SOLUTION INTRAVENOUS CONTINUOUS
Status: DISCONTINUED | OUTPATIENT
Start: 2018-03-16 | End: 2018-03-16 | Stop reason: HOSPADM

## 2018-03-16 RX ORDER — BUPIVACAINE HYDROCHLORIDE 2.5 MG/ML
INJECTION, SOLUTION EPIDURAL; INFILTRATION; INTRACAUDAL
Status: DISCONTINUED | OUTPATIENT
Start: 2018-03-16 | End: 2018-03-16 | Stop reason: HOSPADM

## 2018-03-16 RX ORDER — LIDOCAINE HYDROCHLORIDE 10 MG/ML
INJECTION INFILTRATION; PERINEURAL
Status: DISCONTINUED | OUTPATIENT
Start: 2018-03-16 | End: 2018-03-16 | Stop reason: HOSPADM

## 2018-03-16 RX ADMIN — LIDOCAINE HYDROCHLORIDE 10 ML: 10 INJECTION, SOLUTION INFILTRATION; PERINEURAL at 12:03

## 2018-03-16 RX ADMIN — FENTANYL CITRATE 50 MCG: 50 INJECTION, SOLUTION INTRAMUSCULAR; INTRAVENOUS at 12:03

## 2018-03-16 RX ADMIN — BUPIVACAINE HYDROCHLORIDE 10 ML: 2.5 INJECTION, SOLUTION EPIDURAL; INFILTRATION; INTRACAUDAL; PERINEURAL at 12:03

## 2018-03-16 RX ADMIN — MIDAZOLAM HYDROCHLORIDE 1 MG: 1 INJECTION, SOLUTION INTRAMUSCULAR; INTRAVENOUS at 12:03

## 2018-03-16 RX ADMIN — SODIUM CHLORIDE 500 ML: 900 INJECTION, SOLUTION INTRAVENOUS at 11:03

## 2018-03-16 RX ADMIN — DEXAMETHASONE SODIUM PHOSPHATE 10 MG: 4 INJECTION, SOLUTION INTRAMUSCULAR; INTRAVENOUS at 12:03

## 2018-03-16 NOTE — DISCHARGE SUMMARY
Discharge Note  Short Stay      SUMMARY     Admit Date: 3/16/2018    Attending Physician: Issac Meyers      Discharge Physician: Issac Meyers      Discharge Date: 3/16/2018 12:31 PM    Procedure(s) (LRB):  RADIOFREQUENCY THERMOCOAGULATION (RFTC)-NERVE-MEDIAN BRANCH-LUMBAR (Left)    Final Diagnosis: Lumbar spondylosis [M47.816]    Disposition: Home or self care    Patient Instructions:   Current Discharge Medication List      CONTINUE these medications which have NOT CHANGED    Details   acetaminophen (TYLENOL) 500 MG tablet Take 1,000 mg by mouth daily as needed for Pain (and headache).      alprazolam (XANAX) 0.25 MG tablet Take 1 tablet (0.25 mg total) by mouth nightly as needed for Anxiety.  Qty: 30 tablet, Refills: 0    Associated Diagnoses: Mixed anxiety and depressive disorder      ANORO ELLIPTA 62.5-25 mcg/actuation DsDv Inhale 1 puff into the lungs once daily.  Qty: 180 each, Refills: 4    Associated Diagnoses: Bronchiectasis without complication      apixaban 5 mg Tab Take 1 tablet (5 mg total) by mouth 2 (two) times daily.  Qty: 180 tablet, Refills: 1    Associated Diagnoses: Current use of long term anticoagulation      desoximetasone (TOPICORT) 0.25 % cream Apply as directed bid prn  Qty: 60 g, Refills: 2    Associated Diagnoses: Rash      escitalopram oxalate (LEXAPRO) 10 MG tablet Take 1 tablet (10 mg total) by mouth once daily.  Qty: 30 tablet, Refills: 11      gabapentin (NEURONTIN) 300 MG capsule Take 1 capsule (300 mg total) by mouth 3 (three) times daily.  Qty: 270 capsule, Refills: 3    Associated Diagnoses: Acute left-sided low back pain with sciatica, sciatica laterality unspecified      guaiFENesin (MUCINEX) 600 mg 12 hr tablet Take 1,200 mg by mouth 2 (two) times daily.      ipratropium (ATROVENT) 0.02 % nebulizer solution INHALE ONE VIAL IN NEBULIZER 4 TIMES DAILY  Qty: 225 vial, Refills: 3    Comments: Please consider 90 day supplies to promote better adherence  Associated  Diagnoses: Pulmonary emphysema; Hemoptysis      Lactobacillus rhamnosus GG (CULTURELLE) 10 billion cell capsule Take 1 capsule by mouth once daily.      omeprazole (PRILOSEC) 20 MG capsule TAKE ONE CAPSULE BY MOUTH ONCE DAILY AS NEEDED  Qty: 90 capsule, Refills: 0    Associated Diagnoses: Gastroesophageal reflux disease without esophagitis      ondansetron (ZOFRAN) 4 MG tablet Take 1 tablet (4 mg total) by mouth every 8 (eight) hours as needed for Nausea.  Qty: 45 tablet, Refills: 0      primidone (MYSOLINE) 50 MG Tab TAKE ONE TABLET BY MOUTH ONCE DAILY IN THE MORNING, ONE AT NOON, AND TWO AT BEDTIME  Qty: 120 tablet, Refills: 11      propylene glycol (SYSTANE BALANCE) 0.6 % Drop Apply 1 drop to eye daily as needed (dry eye).      sodium chloride 3% 3 % nebulizer solution USE ONE VIAL IN NEBULIZER TWICE DAILY  Qty: 240 mL, Refills: 6    Comments: Please consider 90 day supplies to promote better adherence  Associated Diagnoses: Acquired bronchiectasis      traMADol (ULTRAM) 50 mg tablet Take 1 tablet (50 mg total) by mouth every 6 (six) hours as needed for Pain.  Qty: 45 tablet, Refills: 0      verapamil (CALAN-SR) 180 MG CR tablet 2 (two) times daily.       VITAMIN D2 50,000 unit capsule TAKE ONE CAPSULE BY MOUTH ONCE A WEEK  Qty: 4 capsule, Refills: 4    Comments: Please consider 90 day supplies to promote better adherence                 Discharge Diagnosis: Lumbar spondylosis [M47.816]  Condition on Discharge: Stable.  Diet on Discharge: Same as before.  Activity: as per instruction sheet.  Discharge to: Home with a responsible adult.  Follow up: as per Discharge instructions.

## 2018-03-16 NOTE — H&P
"HPI  The pt is a 66 yo F who presents for left L2, L3, L4 and L5 MB RFA. Apixaban last administered on Monday 3/12/18.     PMHx, PSHx, Allergies, Medications reviewed in epic    ROS negative except pain complaints in HPI    OBJECTIVE:    /69   Pulse 81   Temp 98.1 °F (36.7 °C) (Oral)   Resp 18   Ht 5' 2" (1.575 m)   Wt 64.4 kg (142 lb)   LMP  (LMP Unknown)   SpO2 98%   Breastfeeding? No   BMI 25.97 kg/m²     PHYSICAL EXAMINATION:    GENERAL: Well appearing, in no acute distress, alert and oriented x3.  PSYCH:  Mood and affect appropriate.  SKIN: Skin color, texture, turgor normal, no rashes or lesions.  CV: RRR with palpation of the radial artery.  PULM: No evidence of respiratory difficulty, symmetric chest rise. Clear to auscultation.  NEURO: Cranial nerves grossly intact.    Plan:    Proceed with procedure as planned    Neil Barnes  03/16/2018    "

## 2018-03-16 NOTE — DISCHARGE INSTRUCTIONS
Thank you for allowing us to care for you today. You may receive a survey about the care we provided. Your feedback is valuable and helps us provide excellent care throughout the community. Adult Procedural Sedation Instructions    Recovery After Procedural Sedation (Adult)  You have been given medicine by vein to make you sleep during your surgery. This may have included both a pain medicine and sleeping medicine. Most of the effects have worn off. But you may still have some drowsiness for the next 6 to 8 hours.  Home care  Follow these guidelines when you get home:  · For the next 8 hours, you should be watched by a responsible adult. This person should make sure your condition is not getting worse.  · Don't drink any alcohol for the next 24 hours.  · Don't drive, operate dangerous machinery, or make important business or personal decisions during the next 24 hours.  Note: Your healthcare provider may tell you not to take any medicine by mouth for pain or sleep in the next 4 hours. These medicines may react with the medicines you were given in the hospital. This could cause a much stronger response than usual.  Follow-up care  Follow up with your healthcare provider if you are not alert and back to your usual level of activity within 12 hours.  When to seek medical advice  Call your healthcare provider right away if any of these occur:  · Drowsiness gets worse  · Weakness or dizziness gets worse  · Repeated vomiting  · You can't be awakened   Date Last Reviewed: 10/18/2016  © 8260-3227 [employer]. [address] [place] [address]. All rights reserved. This information is not intended as a substitute for professional medical care. Always follow your healthcare professional's instructions.Home Care Instructions Pain Management:    1. DIET:   You may resume your normal diet today.   2. BATHING:   You may shower with luke warm water. No soaking in tub.  3. DRESSING:   You may remove your  bandage today.   4. ACTIVITY LEVEL:   You may resume your normal activities 24 hrs after your procedure.  5. MEDICATIONS:   You may resume your normal medications today.   6. SPECIAL INSTRUCTIONS:   No heat to the injection site for 24 hrs including, bath or shower, heating pad, moist heat, or hot tubs.    Use ice pack to injection site for any pain or discomfort.  Apply ice packs for 20 minute intervals as needed.   If you have received any sedatives by mouth today you may not drive for 12 hours.    If you have received any sedation through your IV, you may not drive for 24 hrs.     PLEASE CALL YOUR DOCTOR IF:  1. Redness or swelling around the injection site.  2. Fever of 101 degrees  3. Drainage (pus) from the injection site.  4. For any continuous bleeding (some dried blood over the incision is normal.)  5. For severe headache that is relieved when lying flat.    FOR EMERGENCIES:   If any unusual problems or difficulties occur during clinic hours, call [phone] or [phone].

## 2018-03-16 NOTE — OP NOTE
Patient Name: Yasmin Asencio  MRN: [id number]    INFORMED CONSENT: The procedure, risks, benefits and options were discussed with patient. There are no contraindications to the procedure. The patient expressed understanding and agreed to proceed. The personnel performing the procedure was discussed. I verify that I personally obtained Yasmin's consent prior to the start of the procedure and the signed consent can be found on the patient's chart.    Procedure Date: 03/16/2018    Anesthesia: Topical    Pre Procedure diagnosis: Lumbar spondylosis [M47.816]  1. Osteoarthritis of spine, unspecified spinal osteoarthritis complication status, unspecified spinal region    2. Chronic pain      Post-Procedure diagnosis: SAME      Moderate Sedation: Yes - Fentanyl 50 mcg and Midazolam 2 mg    PROCEDURE: left L2-3-4-5 FACET MEDIAL BRANCH NERVE RADIOFREQUENCY NEUROTOMY (lumbar)          DESCRIPTION OF PROCEDURE: The patient was brought to the procedure room.  After performing time out IV access was obtained prior to the procedure. The patient was positioned prone on the fluoroscopy table. Continuous hemodynamic monitoring was initiated including blood pressure and pulse oximetry. IV sedation was administered incrementally to allow the patient to remain comfortable and conversant throughout the procedure. The area of the lumbar spine was prepped chlorhexidine three times and draped into a sterile field.  Fluoroscopy was used to identify the location of the LEFT side L2, L3, L4, and L5 medial branch nerves at the junctions of the superior articular process and the transverse processes of L3, L4, L5, and the sacral ala respectively.  Skin anesthesia was achieved using 3 cc of Lidocaine 1% over the injection sites. A 22 gauge, 100mm (10mm active tip) curved RF needle was slowly inserted at each level using AP, lateral and oblique fluoroscopic imaging. Negative aspiration for blood or CSF was confirmed.  Sensory stimulation at  50Hz below 0.5V was achieved at every level. Motor stimulation at 2Hz up to 1.5V did not cause any radicular symptoms at any level. Each level was anesthetized with 1.5 cc of lidocaine 1%.  Radiofrequency lesioning was performed for 90 seconds at 80 degrees in two different positions at each level.  Total of 4 cc of bupivacaine 0.25% and 10 mg of Decadron was injected was injected at all levels.. The needles were removed and bleeding was nil.  A sterile dressing was applied. Yasmin was taken back to the recovery room for further observation.     Stimulation Results:  L2 = Sensory positive @ 0.2, Motor negative @ 1.5  L3 = Sensory positive @ 0.4, Motor negative @ 1.5  L4 = Sensory positive @ 0.2, Motor negative @ 1.5  L5 = Sensory positive @ 0.8, Motor negative @ 1.5    Blood Loss: Nill  Specimen: None    Issac Meyers MD

## 2018-03-21 ENCOUNTER — OFFICE VISIT (OUTPATIENT)
Dept: FAMILY MEDICINE | Facility: CLINIC | Age: 68
End: 2018-03-21
Payer: MEDICARE

## 2018-03-21 ENCOUNTER — TELEPHONE (OUTPATIENT)
Dept: PAIN MEDICINE | Facility: CLINIC | Age: 68
End: 2018-03-21

## 2018-03-21 VITALS
DIASTOLIC BLOOD PRESSURE: 70 MMHG | TEMPERATURE: 98 F | HEIGHT: 63 IN | SYSTOLIC BLOOD PRESSURE: 130 MMHG | WEIGHT: 144.31 LBS | HEART RATE: 72 BPM | OXYGEN SATURATION: 97 % | BODY MASS INDEX: 25.57 KG/M2

## 2018-03-21 DIAGNOSIS — I70.0 THORACIC AORTA ATHEROSCLEROSIS: ICD-10-CM

## 2018-03-21 DIAGNOSIS — Z86.711 HISTORY OF PULMONARY EMBOLISM: ICD-10-CM

## 2018-03-21 DIAGNOSIS — M48.061 SPINAL STENOSIS OF LUMBAR REGION WITHOUT NEUROGENIC CLAUDICATION: ICD-10-CM

## 2018-03-21 DIAGNOSIS — H53.9 VISUAL CHANGES: ICD-10-CM

## 2018-03-21 DIAGNOSIS — J96.11 CHRONIC RESPIRATORY FAILURE WITH HYPOXIA: ICD-10-CM

## 2018-03-21 DIAGNOSIS — J43.8 OTHER EMPHYSEMA: ICD-10-CM

## 2018-03-21 DIAGNOSIS — M94.9 DISORDER OF BONE AND CARTILAGE: ICD-10-CM

## 2018-03-21 DIAGNOSIS — L40.9 PSORIASIS: ICD-10-CM

## 2018-03-21 DIAGNOSIS — M89.9 DISORDER OF BONE AND CARTILAGE: ICD-10-CM

## 2018-03-21 DIAGNOSIS — I50.22 CHRONIC SYSTOLIC HEART FAILURE: ICD-10-CM

## 2018-03-21 DIAGNOSIS — I12.9 BENIGN HYPERTENSION WITH CHRONIC KIDNEY DISEASE, STAGE III: Primary | ICD-10-CM

## 2018-03-21 DIAGNOSIS — K21.9 GASTROESOPHAGEAL REFLUX DISEASE WITHOUT ESOPHAGITIS: Chronic | ICD-10-CM

## 2018-03-21 DIAGNOSIS — N18.30 BENIGN HYPERTENSION WITH CHRONIC KIDNEY DISEASE, STAGE III: Primary | ICD-10-CM

## 2018-03-21 DIAGNOSIS — J47.1 BRONCHIECTASIS WITH ACUTE EXACERBATION: ICD-10-CM

## 2018-03-21 DIAGNOSIS — G25.0 ESSENTIAL TREMOR: ICD-10-CM

## 2018-03-21 DIAGNOSIS — F33.0 MILD RECURRENT MAJOR DEPRESSION: ICD-10-CM

## 2018-03-21 PROBLEM — I47.10 SVT (SUPRAVENTRICULAR TACHYCARDIA): Status: RESOLVED | Noted: 2017-07-24 | Resolved: 2018-03-21

## 2018-03-21 PROBLEM — J47.9 BRONCHIECTASIS: Status: RESOLVED | Noted: 2017-11-29 | Resolved: 2018-03-21

## 2018-03-21 PROBLEM — R06.00 DYSPNEA: Status: RESOLVED | Noted: 2017-11-29 | Resolved: 2018-03-21

## 2018-03-21 PROCEDURE — 99215 OFFICE O/P EST HI 40 MIN: CPT | Mod: S$GLB,,, | Performed by: INTERNAL MEDICINE

## 2018-03-21 PROCEDURE — 99999 PR PBB SHADOW E&M-EST. PATIENT-LVL IV: CPT | Mod: PBBFAC,,, | Performed by: INTERNAL MEDICINE

## 2018-03-21 PROCEDURE — 99499 UNLISTED E&M SERVICE: CPT | Mod: S$GLB,,, | Performed by: INTERNAL MEDICINE

## 2018-03-21 PROCEDURE — 3075F SYST BP GE 130 - 139MM HG: CPT | Mod: CPTII,S$GLB,, | Performed by: INTERNAL MEDICINE

## 2018-03-21 PROCEDURE — 3078F DIAST BP <80 MM HG: CPT | Mod: CPTII,S$GLB,, | Performed by: INTERNAL MEDICINE

## 2018-03-21 RX ORDER — FLUOCINOLONE ACETONIDE 0.11 MG/ML
OIL TOPICAL DAILY
Qty: 1 BOTTLE | Refills: 5 | Status: SHIPPED | OUTPATIENT
Start: 2018-03-21 | End: 2019-12-09 | Stop reason: SDUPTHER

## 2018-03-21 RX ORDER — KETOCONAZOLE 20 MG/ML
SHAMPOO, SUSPENSION TOPICAL DAILY
Qty: 120 ML | Refills: 5 | Status: ON HOLD | OUTPATIENT
Start: 2018-03-21 | End: 2021-12-17 | Stop reason: HOSPADM

## 2018-03-21 RX ORDER — FLUOCINONIDE TOPICAL SOLUTION USP, 0.05% 0.5 MG/ML
SOLUTION TOPICAL DAILY
Qty: 60 ML | Refills: 5 | Status: SHIPPED | OUTPATIENT
Start: 2018-03-21 | End: 2020-02-21 | Stop reason: SDUPTHER

## 2018-03-21 NOTE — PROGRESS NOTES
"HISTORY OF PRESENT ILLNESS:  Yasmin Asencio is a 67 y.o. female who presents to the clinic today for Hypertension; foggy vision; and buttock intching since back injection  .  She presents to clinic today for follow-up of her hypertension complicated by chronic kidney disease stage III, tremor, and reflux.  Blood pressures at home have been well controlled.  She is seeing pulmonary for her emphysema and chronic bronchiectasis.  She is also followed by infectious disease.  They tried to wean her off Lexapro for her depression, but they placed her back on it when in the hospital because she was admitted with some anxiety and respiratory distress.  Neurology had thought perhaps her tremor was due to Lexapro.  She denies any significant amounts with heartburn or reflux at this time.  She feels her mood is doing well.  She is concerned about a "foggy vision".  This apparently can be a side effect from her pulmonary medication.  She has an appointment with eye doctor in the near future.  She had a nerve ablation done last Friday for her spinal stenosis/chronic back pain.  She states she has been feeling itching in her tailbone since then.  No rash.  She is applied some topical Benadryl ointments, but this is not helping much.  She cannot take any oral antihistamines due to her abnormal heart rhythms.  She is requesting refill of her topical psoriasis medication.  She continues on Xarelto for her history of pulmonary embolism.  Not sure how long she is supposed to stay on the blood thinner.      PAST MEDICAL HISTORY:  Past Medical History:   Diagnosis Date    Acquired bronchiectasis     due to history of TB - followed by Dr. Zara rahman    Allergy     Amblyopia     rt eye per pt    Anemia of other chronic disease     Anticoagulant long-term use     Anxiety     Cataract     Clotting disorder     COPD (chronic obstructive pulmonary disease)     COPD (chronic obstructive pulmonary disease)     Depression  "    Diverticulosis     Essential tremor     GERD (gastroesophageal reflux disease)     Hemangioma of liver     History of tuberculosis     Hypertension     Mixed anxiety and depressive disorder     Psoriasis     PSVT (paroxysmal supraventricular tachycardia)     Pulmonary embolism     S/P PICC central line placement 2016 - May 2016    Skin disease     Psoriasis    Spondylosis without myelopathy 2013    Supraventricular tachycardia     Thoracic aorta atherosclerosis     noted on CT scan of chest 1/3/2011    Tuberculosis     Vaginal delivery     x2    Vitamin D deficiency        PAST SURGICAL HISTORY:  Past Surgical History:   Procedure Laterality Date    CATARACT EXTRACTION W/  INTRAOCULAR LENS IMPLANT  12    od dr [place]    CATARACT EXTRACTION W/  INTRAOCULAR LENS IMPLANT  12    left eye    GALLBLADDER SURGERY  2011       SOCIAL HISTORY:  Social History     Social History    Marital status:      Spouse name: N/A    Number of children: 2    Years of education: N/A     Occupational History    housewife      Social History Main Topics    Smoking status: Former Smoker     Packs/day: 2.00     Years: 50.00     Types: Cigarettes     Quit date: 2009    Smokeless tobacco: Never Used    Alcohol use No    Drug use: No    Sexual activity: Yes     Partners: Male     Other Topics Concern    Are You Pregnant Or Think You May Be? No    Breast-Feeding No     Social History Narrative    One child  of a heart attack at age 35.       FAMILY HISTORY:  Family History   Problem Relation Age of Onset    Hypertension Mother     Heart attack Mother     Cancer Father      liver and bladder    Hyperlipidemia Sister     Psoriasis Sister     Cancer Brother      liver    Stroke Maternal Uncle     Cancer Maternal Aunt      stomach    Lung cancer Sister     Heart attack Brother     Heart attack Son     Amblyopia Neg Hx     Blindness Neg Hx     Cataracts Neg  Hx     Glaucoma Neg Hx     Macular degeneration Neg Hx     Retinal detachment Neg Hx     Strabismus Neg Hx     Thyroid disease Neg Hx     Melanoma Neg Hx     Lupus Neg Hx     Eczema Neg Hx     COPD Neg Hx        ALLERGIES AND MEDICATIONS: updated and reviewed.  Review of patient's allergies indicates:   Allergen Reactions    Adhesive Other (See Comments)     Tears up the skin and makes it itch    Bactrim [sulfamethoxazole-trimethoprim] Rash     Was hospitalized for rash    Lipitor [atorvastatin] Other (See Comments)     Muscle aches    Albuterol Other (See Comments)     tremors    Restasis [cyclosporine] Itching     Medication List with Changes/Refills   New Medications    FLUOCINOLONE (DERMA-SMOOTHE) 0.01 % EXTERNAL OIL    Apply topically once daily.    FLUOCINONIDE (LIDEX) 0.05 % EXTERNAL SOLUTION    Apply topically once daily. - apply after shampooing    KETOCONAZOLE (NIZORAL) 2 % SHAMPOO    Apply topically once daily.   Current Medications    ACETAMINOPHEN (TYLENOL) 500 MG TABLET    Take 1,000 mg by mouth daily as needed for Pain (and headache).    ALPRAZOLAM (XANAX) 0.25 MG TABLET    Take 1 tablet (0.25 mg total) by mouth nightly as needed for Anxiety.    ANORO ELLIPTA 62.5-25 MCG/ACTUATION DSDV    Inhale 1 puff into the lungs once daily.    APIXABAN 5 MG TAB    Take 1 tablet (5 mg total) by mouth 2 (two) times daily.    DESOXIMETASONE (TOPICORT) 0.25 % CREAM    Apply as directed bid prn    ESCITALOPRAM OXALATE (LEXAPRO) 10 MG TABLET    Take 1 tablet (10 mg total) by mouth once daily.    GABAPENTIN (NEURONTIN) 300 MG CAPSULE    Take 1 capsule (300 mg total) by mouth 3 (three) times daily.    GUAIFENESIN (MUCINEX) 600 MG 12 HR TABLET    Take 1,200 mg by mouth 2 (two) times daily.    IPRATROPIUM (ATROVENT) 0.02 % NEBULIZER SOLUTION    INHALE ONE VIAL IN NEBULIZER 4 TIMES DAILY    LACTOBACILLUS RHAMNOSUS GG (CULTURELLE) 10 BILLION CELL CAPSULE    Take 1 capsule by mouth once daily.    OMEPRAZOLE  (PRILOSEC) 20 MG CAPSULE    TAKE ONE CAPSULE BY MOUTH ONCE DAILY AS NEEDED    ONDANSETRON (ZOFRAN) 4 MG TABLET    Take 1 tablet (4 mg total) by mouth every 8 (eight) hours as needed for Nausea.    PRIMIDONE (MYSOLINE) 50 MG TAB    TAKE ONE TABLET BY MOUTH ONCE DAILY IN THE MORNING, ONE AT NOON, AND TWO AT BEDTIME    PROPYLENE GLYCOL (SYSTANE BALANCE) 0.6 % DROP    Apply 1 drop to eye daily as needed (dry eye).    SODIUM CHLORIDE 3% 3 % NEBULIZER SOLUTION    USE ONE VIAL IN NEBULIZER TWICE DAILY    TRAMADOL (ULTRAM) 50 MG TABLET    Take 1 tablet (50 mg total) by mouth every 6 (six) hours as needed for Pain.    VERAPAMIL (CALAN-SR) 180 MG CR TABLET    2 (two) times daily.     VITAMIN D2 50,000 UNIT CAPSULE    TAKE ONE CAPSULE BY MOUTH ONCE A WEEK          CARE TEAM:  Patient Care Team:  Urmila Luna MD as PCP - General (Internal Medicine)  Taurus Braga MD as Consulting Physician (Cardiology)  PRECIOUS Zuñiga MD as Consulting Physician (Pulmonary Disease)  Louie Yuan MD as Consulting Physician (Infectious Diseases)  Issac Meyers MD as Consulting Physician (Pain Medicine)         REVIEW OF SYSTEMS:  Review of Systems   Constitutional: Negative for activity change, chills, fatigue, fever and unexpected weight change.   HENT: Negative for congestion, ear discharge, ear pain, hearing loss, postnasal drip, rhinorrhea and trouble swallowing.    Eyes: Positive for visual disturbance. Negative for photophobia, pain and discharge.   Respiratory: Positive for wheezing. Negative for cough, chest tightness and shortness of breath.    Cardiovascular: Positive for palpitations (- had HR briefly last night to 140). Negative for chest pain and leg swelling.   Gastrointestinal: Negative for abdominal pain, blood in stool, constipation, diarrhea, nausea and vomiting.   Endocrine: Negative for polydipsia and polyuria.   Genitourinary: Negative for difficulty urinating, dysuria, frequency, hematuria, menstrual  "problem, urgency and vaginal discharge.   Musculoskeletal: Positive for arthralgias and back pain (- had burning of nerve last Friday - has some itching on the tailbone now). Negative for joint swelling, neck pain and neck stiffness.   Skin: Negative for rash.   Neurological: Positive for weakness and headaches.   Psychiatric/Behavioral: Negative for confusion, dysphoric mood and sleep disturbance. The patient is not nervous/anxious.          PHYSICAL EXAM:   Vitals:    03/21/18 1301   BP: 130/70   Pulse: 72   Temp: 98 °F (36.7 °C)     Weight: 65.5 kg (144 lb 4.7 oz)   Height: 5' 2.5" (158.8 cm)   Body mass index is 25.97 kg/m².     General appearance - alert, well appearing, and in no distress and overweight  Mental status - alert, oriented to person, place, and time, normal mood, behavior, speech, dress, motor activity, and thought processes  Eyes - pupils equal and reactive, extraocular eye movements intact, sclera anicteric  Mouth - mucous membranes moist, pharynx normal without lesions  Neck - supple, no significant adenopathy, carotids upstroke normal bilaterally, no bruits, thyroid exam: thyroid is normal in size without nodules or tenderness  Lymphatics - no palpable lymphadenopathy  Chest - no tachypnea, retractions or cyanosis, inspiratory and expiratory wheeze/crackles noted bilaterally (stable/baseline)  Heart - normal rate and regular rhythm  Back exam - not examined  Neurological - alert, oriented, normal speech, no focal findings or movement disorder noted, cranial nerves II through XII intact  Musculoskeletal - no muscular tenderness noted  Extremities - no pedal edema noted  Skin - normal coloration and turgor, no rashes, no suspicious skin lesions noted      ASSESSMENT AND PLAN:  1. Benign hypertension with chronic kidney disease, stage III/2. Chronic systolic heart failure  Discussed sodium restriction, maintaining ideal body weight and regular exercise program as physiologic means to achieve " blood pressure control. The patient will strive towards this. The current medical regimen is effective;  continue present plan and medications. Recommended patient to check home readings to monitor and see me for followup as scheduled or sooner as needed. Patient was educated that both decongestant and anti-inflammatory medication may raise blood pressure. Stable kidney function. Observe. Patient counseled to avoid/minimize the use of anti-inflammatory  Medication. Discussed to stay well hydrated. Also discussed with patient that good control of blood pressure or diabetes, if present, will help to prevent progression.     3. Other emphysema/4. Chronic respiratory failure with hypoxia/5. Bronchiectasis with acute exacerbation  The current medical regimen is effective;  continue present plan and medications. Followed by ID. She will follow up with pulmonary at her earliest convenience.    6. Essential tremor  Stable. Observe.    7. Gastroesophageal reflux disease without esophagitis  Symptoms controlled. Reflux precautions discussed (eliminate tobacco if a smoker; minimize caffeine, chocolate and red/white peppermint intake; avoid heavy and spicy meals; don't lay down within 2-3 hours after eating). Medication as needed. Patient asked to take medication breaks, if possible - discussed chronic use can limit calcium absorption, increases the risk for intestinal infections, and can cause kidney damage. There are also some newer studies that show possible increased risk of mortality.     8. Mild recurrent major depression  The current medical regimen is effective;  continue present plan and medications.     9. Thoracic aorta atherosclerosis  Patient with Atherosclerosis of the Aorta.  Stable/asymptomatic. Currently stable on lipid lowering medication and b/p monitoring.     10. Disorder of bone and cartilage    - DXA Bone Density Spine And Hip; Future    11. Visual changes  Possible side effect of medication. She has an  appointment for eye exam in the near future.    12. Hx of PE  Continue anticoagulation for now.  I will consult hematology to assess length of need of treatment.    13. Spinal stenosis  She is status post recent nerve ablation.  She is experiencing some itching in the tailbone area.  She will monitor for now.  She will call the pain specialist if symptoms worsen or persist.    14. Psoriasis   Stable. Medication as needed. Refill given on topical medication.        Follow-up in about 6 months (around 9/21/2018), or if symptoms worsen or fail to improve, for annual exam. or sooner as needed.

## 2018-03-21 NOTE — TELEPHONE ENCOUNTER
Contacted and spoke with pt regarding relief from procedure on 3/16/18. Pt reports 70% of relief from RFA on 3/16/18.

## 2018-03-22 ENCOUNTER — LAB VISIT (OUTPATIENT)
Dept: LAB | Facility: HOSPITAL | Age: 68
End: 2018-03-22
Attending: INTERNAL MEDICINE
Payer: MEDICARE

## 2018-03-22 DIAGNOSIS — N18.30 BENIGN HYPERTENSION WITH CHRONIC KIDNEY DISEASE, STAGE III: ICD-10-CM

## 2018-03-22 DIAGNOSIS — I12.9 BENIGN HYPERTENSION WITH CHRONIC KIDNEY DISEASE, STAGE III: ICD-10-CM

## 2018-03-22 LAB
CHOLEST SERPL-MCNC: 204 MG/DL
CHOLEST/HDLC SERPL: 3 {RATIO}
HDLC SERPL-MCNC: 68 MG/DL
HDLC SERPL: 33.3 %
LDLC SERPL CALC-MCNC: 108.2 MG/DL
NONHDLC SERPL-MCNC: 136 MG/DL
PTH-INTACT SERPL-MCNC: 51 PG/ML
TRIGL SERPL-MCNC: 139 MG/DL

## 2018-03-22 PROCEDURE — 83970 ASSAY OF PARATHORMONE: CPT

## 2018-03-22 PROCEDURE — 80061 LIPID PANEL: CPT

## 2018-03-22 PROCEDURE — 36415 COLL VENOUS BLD VENIPUNCTURE: CPT | Mod: PO

## 2018-03-26 ENCOUNTER — OFFICE VISIT (OUTPATIENT)
Dept: CARDIOLOGY | Facility: CLINIC | Age: 68
End: 2018-03-26
Payer: MEDICARE

## 2018-03-26 ENCOUNTER — HOSPITAL ENCOUNTER (OUTPATIENT)
Dept: RADIOLOGY | Facility: HOSPITAL | Age: 68
Discharge: HOME | End: 2018-03-26
Attending: INTERNAL MEDICINE
Payer: MEDICARE

## 2018-03-26 VITALS
DIASTOLIC BLOOD PRESSURE: 63 MMHG | HEIGHT: 60 IN | WEIGHT: 143.5 LBS | BODY MASS INDEX: 28.17 KG/M2 | OXYGEN SATURATION: 93 % | HEART RATE: 80 BPM | SYSTOLIC BLOOD PRESSURE: 133 MMHG

## 2018-03-26 DIAGNOSIS — Z86.79 STATUS POST CATHETER ABLATION OF SLOW PATHWAY: ICD-10-CM

## 2018-03-26 DIAGNOSIS — I47.10 PSVT (PAROXYSMAL SUPRAVENTRICULAR TACHYCARDIA): ICD-10-CM

## 2018-03-26 DIAGNOSIS — I47.19 ECTOPIC ATRIAL TACHYCARDIA: ICD-10-CM

## 2018-03-26 DIAGNOSIS — I10 HTN (HYPERTENSION): ICD-10-CM

## 2018-03-26 DIAGNOSIS — J42 CHRONIC BRONCHITIS, UNSPECIFIED CHRONIC BRONCHITIS TYPE: ICD-10-CM

## 2018-03-26 DIAGNOSIS — J47.9 ACQUIRED BRONCHIECTASIS: Primary | ICD-10-CM

## 2018-03-26 DIAGNOSIS — J96.11 CHRONIC RESPIRATORY FAILURE WITH HYPOXIA: ICD-10-CM

## 2018-03-26 DIAGNOSIS — Z98.890 STATUS POST CATHETER ABLATION OF SLOW PATHWAY: ICD-10-CM

## 2018-03-26 DIAGNOSIS — R29.898 ARM WEAKNESS: ICD-10-CM

## 2018-03-26 PROCEDURE — 99214 OFFICE O/P EST MOD 30 MIN: CPT | Mod: S$GLB,,, | Performed by: INTERNAL MEDICINE

## 2018-03-26 PROCEDURE — 93970 EXTREMITY STUDY: CPT | Mod: 26,,, | Performed by: RADIOLOGY

## 2018-03-26 PROCEDURE — 99999 PR PBB SHADOW E&M-EST. PATIENT-LVL IV: CPT | Mod: PBBFAC,,, | Performed by: INTERNAL MEDICINE

## 2018-03-26 PROCEDURE — 3078F DIAST BP <80 MM HG: CPT | Mod: CPTII,S$GLB,, | Performed by: INTERNAL MEDICINE

## 2018-03-26 PROCEDURE — 93010 ELECTROCARDIOGRAM REPORT: CPT | Mod: S$GLB,,, | Performed by: INTERNAL MEDICINE

## 2018-03-26 PROCEDURE — 93970 EXTREMITY STUDY: CPT | Mod: TC

## 2018-03-26 PROCEDURE — 3075F SYST BP GE 130 - 139MM HG: CPT | Mod: CPTII,S$GLB,, | Performed by: INTERNAL MEDICINE

## 2018-03-26 NOTE — PROGRESS NOTES
"Subjective:    Patient ID:  Yasmin Asencio is a 67 y.o. female who presents for follow-up of Shortness of Breath      HPI     PMH of pulmonary hemorrhage,S/P embolization in 11/2015 and pseudomonas pneumonia in 2016,has been treated with IV Abx, hypertension and COPD,AOCD,atrial tachycardia,depression,chronic slurred speech     SVT ablation 7/24/17 6/16/16 [facility] EDP 18, EF 55%, normal coronaries  Medical Rx    EF does not appear depressed - ? Recent echo result     Echo 8/1/17    1 - Normal left ventricular systolic function (EF 55-60%).     2 - Normal left ventricular diastolic function.     3 - Normal right ventricular systolic function .     4 - The estimated PA systolic pressure is 26 mmHg.      Holter 2/13/17  1. Sinus rhythm with heart rates varying between 45 and 128 bpm with an average of 72 bpm.     VENTRICULAR ARRHYTHMIAS  1. There was a single PVC recorded.     2. There were no episodes of ventricular tachycardia.    SUPRA VENTRICULAR ARRHYTHMIAS  1. There were occasional PACs totalling 406 and averaging 16 per hour.     2. There were no episodes of sustained supraventricular tachycardia.    SINUS NODE FUNCTION  1. There was no evidence of high grade SA jennifer block.     AV CONDUCTION  1. There was no evidence of high grade AV block.     Saw Dr Ballesteros 2/28/18  pulm hemorrhage 11/15, s/p coiling  pseudomonas PNA 2016, s/p extended Abx  COPD, bronchiectasis (home O2 frequently)  HTN on meds  chronic slurred speech, thought due to essential tremor per neuro  hx "AF" destini-lung-coiling procedure (no documentation)  pSVT (s/p AVRNT ablation; likely focal AT remains)     Had palpitations with HR >140 bpm at random intervals for past 10 years. Recently about 1x/month.   WIth palps, gets blurry vision, chest palps with radiation to the neck. Her "blood runs cold" and lasts for 20-35 mins.  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 We also saw a likely focal AT at that procedure also; not ablated due to not able to " reinduce.  Since, feeling much better. Does get short palpitations. Event monitor showed short NSAT and one sustained SVT c/w AT. These episodes don't bother her nearly as much as the AVNRT did.     She did well on verapamil for a bit, but developed palps again. HR to 150s.  Holter: 2 episodes of sustained PSVT, most c/w AT.     cath 6/16 neg  echo 11/16 50-55% LVEF -> 11/2017 55-60%.  Holter 2/17: SR . no SVT.    Increase verapamil 240 qd -> 180 bid: for HTN as well as AT suppression.  Reassured pt of low danger to this rhythm... will try to suppress with meds (may need to try other meds if verap unsuccessful, or return to lab for repeat attempt at ablation [thwarted in past due to noninducibility in lab]).  Holter in 1 mo, with f/u thereafter.    EKG today NSR - ok  Denies Cp  Occasional mild heart racing  Stable CH       Review of Systems   Constitution: Negative for decreased appetite.   HENT: Negative for ear discharge.    Eyes: Negative for blurred vision.   Respiratory: Negative for hemoptysis.    Endocrine: Negative for polyphagia.   Hematologic/Lymphatic: Negative for adenopathy.   Skin: Negative for color change.   Musculoskeletal: Negative for joint swelling.   Neurological: Negative for brief paralysis.   Psychiatric/Behavioral: Negative for hallucinations.        Objective:    Physical Exam   Constitutional: She is oriented to person, place, and time. Vital signs are normal. She appears well-developed and well-nourished. She is active and cooperative.   HENT:   Head: Normocephalic and atraumatic.   Eyes: Conjunctivae and EOM are normal.   Neck: Normal range of motion. Carotid bruit is not present. No tracheal deviation and no edema present. No thyroid mass and no thyromegaly present.   Cardiovascular: Normal rate, regular rhythm, normal heart sounds, intact distal pulses and normal pulses.   No extrasystoles are present. PMI is not displaced.  Exam reveals no gallop and no friction rub.    No  murmur heard.  Pulmonary/Chest: Effort normal. No respiratory distress. She has no wheezes. She has rhonchi in the right middle field, the right lower field, the left middle field and the left lower field. She has no rales.   Abdominal: Soft. Normal appearance. She exhibits no distension. There is no hepatosplenomegaly.   Musculoskeletal: Normal range of motion.   Neurological: She is alert and oriented to person, place, and time. Coordination normal.   Skin: Skin is warm and dry. No rash noted.   Psychiatric: She has a normal mood and affect. Her speech is normal and behavior is normal. Thought content normal. Cognition and memory are normal.   Nursing note and vitals reviewed.        Assessment:       1. Acquired bronchiectasis    2. Chronic bronchitis, unspecified chronic bronchitis type    3. Chronic respiratory failure with hypoxia    4. Ectopic atrial tachycardia    5. PSVT (paroxysmal supraventricular tachycardia)    6. Status post catheter ablation of slow pathway         Plan:       Cardiac stable  OV 6 months

## 2018-03-28 ENCOUNTER — CLINICAL SUPPORT (OUTPATIENT)
Dept: ELECTROPHYSIOLOGY | Facility: CLINIC | Age: 68
End: 2018-03-28
Attending: INTERNAL MEDICINE
Payer: MEDICARE

## 2018-03-28 DIAGNOSIS — J47.1 BRONCHIECTASIS WITH ACUTE EXACERBATION: ICD-10-CM

## 2018-03-28 DIAGNOSIS — I47.10 PSVT (PAROXYSMAL SUPRAVENTRICULAR TACHYCARDIA): ICD-10-CM

## 2018-03-28 DIAGNOSIS — I47.10 SVT (SUPRAVENTRICULAR TACHYCARDIA): ICD-10-CM

## 2018-03-28 DIAGNOSIS — J43.8 OTHER EMPHYSEMA: ICD-10-CM

## 2018-03-28 DIAGNOSIS — I47.19 ECTOPIC ATRIAL TACHYCARDIA: ICD-10-CM

## 2018-03-28 DIAGNOSIS — J96.11 CHRONIC RESPIRATORY FAILURE WITH HYPOXIA: ICD-10-CM

## 2018-03-28 PROCEDURE — 93224 XTRNL ECG REC UP TO 48 HRS: CPT | Mod: S$GLB,,, | Performed by: INTERNAL MEDICINE

## 2018-04-04 ENCOUNTER — HOSPITAL ENCOUNTER (OUTPATIENT)
Facility: OTHER | Age: 68
Discharge: HOME | End: 2018-04-04
Attending: ANESTHESIOLOGY | Admitting: ANESTHESIOLOGY
Payer: MEDICARE

## 2018-04-04 ENCOUNTER — SURGERY (OUTPATIENT)
Age: 68
End: 2018-04-04

## 2018-04-04 VITALS
WEIGHT: 142 LBS | OXYGEN SATURATION: 94 % | TEMPERATURE: 98 F | RESPIRATION RATE: 16 BRPM | BODY MASS INDEX: 26.13 KG/M2 | SYSTOLIC BLOOD PRESSURE: 137 MMHG | HEART RATE: 94 BPM | HEIGHT: 62 IN | DIASTOLIC BLOOD PRESSURE: 65 MMHG

## 2018-04-04 DIAGNOSIS — M47.9 OSTEOARTHRITIS OF SPINE, UNSPECIFIED SPINAL OSTEOARTHRITIS COMPLICATION STATUS, UNSPECIFIED SPINAL REGION: Primary | ICD-10-CM

## 2018-04-04 DIAGNOSIS — G89.29 CHRONIC PAIN: ICD-10-CM

## 2018-04-04 PROCEDURE — 64636 DESTROY L/S FACET JNT ADDL: CPT | Performed by: ANESTHESIOLOGY

## 2018-04-04 PROCEDURE — 64636 DESTROY L/S FACET JNT ADDL: CPT | Mod: RT,,, | Performed by: ANESTHESIOLOGY

## 2018-04-04 PROCEDURE — 63600175 PHARM REV CODE 636 W HCPCS: Performed by: ANESTHESIOLOGY

## 2018-04-04 PROCEDURE — 99152 MOD SED SAME PHYS/QHP 5/>YRS: CPT | Mod: ,,, | Performed by: ANESTHESIOLOGY

## 2018-04-04 PROCEDURE — 25000003 PHARM REV CODE 250: Performed by: ANESTHESIOLOGY

## 2018-04-04 PROCEDURE — 64635 DESTROY LUMB/SAC FACET JNT: CPT | Performed by: ANESTHESIOLOGY

## 2018-04-04 PROCEDURE — 64635 DESTROY LUMB/SAC FACET JNT: CPT | Mod: RT,,, | Performed by: ANESTHESIOLOGY

## 2018-04-04 RX ORDER — LIDOCAINE HYDROCHLORIDE 10 MG/ML
INJECTION INFILTRATION; PERINEURAL
Status: DISCONTINUED | OUTPATIENT
Start: 2018-04-04 | End: 2018-04-04 | Stop reason: HOSPADM

## 2018-04-04 RX ORDER — SODIUM CHLORIDE 9 MG/ML
500 INJECTION, SOLUTION INTRAVENOUS CONTINUOUS
Status: DISCONTINUED | OUTPATIENT
Start: 2018-04-04 | End: 2018-04-04 | Stop reason: HOSPADM

## 2018-04-04 RX ORDER — MIDAZOLAM HYDROCHLORIDE 1 MG/ML
INJECTION INTRAMUSCULAR; INTRAVENOUS
Status: DISCONTINUED | OUTPATIENT
Start: 2018-04-04 | End: 2018-04-04 | Stop reason: HOSPADM

## 2018-04-04 RX ORDER — FENTANYL CITRATE 50 UG/ML
INJECTION, SOLUTION INTRAMUSCULAR; INTRAVENOUS
Status: DISCONTINUED | OUTPATIENT
Start: 2018-04-04 | End: 2018-04-04 | Stop reason: HOSPADM

## 2018-04-04 RX ORDER — BUPIVACAINE HYDROCHLORIDE 2.5 MG/ML
INJECTION, SOLUTION EPIDURAL; INFILTRATION; INTRACAUDAL
Status: DISCONTINUED | OUTPATIENT
Start: 2018-04-04 | End: 2018-04-04 | Stop reason: HOSPADM

## 2018-04-04 RX ORDER — DEXAMETHASONE SODIUM PHOSPHATE 10 MG/ML
INJECTION INTRAMUSCULAR; INTRAVENOUS
Status: DISCONTINUED | OUTPATIENT
Start: 2018-04-04 | End: 2018-04-04 | Stop reason: HOSPADM

## 2018-04-04 RX ADMIN — BUPIVACAINE HYDROCHLORIDE 10 ML: 2.5 INJECTION, SOLUTION EPIDURAL; INFILTRATION; INTRACAUDAL; PERINEURAL at 08:04

## 2018-04-04 RX ADMIN — SODIUM CHLORIDE 500 ML: 0.9 INJECTION, SOLUTION INTRAVENOUS at 07:04

## 2018-04-04 RX ADMIN — LIDOCAINE HYDROCHLORIDE 10 ML: 10 INJECTION, SOLUTION INFILTRATION; PERINEURAL at 08:04

## 2018-04-04 RX ADMIN — FENTANYL CITRATE 50 MCG: 50 INJECTION, SOLUTION INTRAMUSCULAR; INTRAVENOUS at 09:04

## 2018-04-04 RX ADMIN — DEXAMETHASONE SODIUM PHOSPHATE 10 MG: 10 INJECTION, SOLUTION INTRAMUSCULAR; INTRAVENOUS at 08:04

## 2018-04-04 RX ADMIN — MIDAZOLAM HYDROCHLORIDE 1 MG: 1 INJECTION, SOLUTION INTRAMUSCULAR; INTRAVENOUS at 09:04

## 2018-04-04 NOTE — DISCHARGE INSTRUCTIONS
Thank you for allowing us to care for you today. You may receive a survey about the care we provided. Your feedback is valuable and helps us provide excellent care throughout the community. Adult Procedural Sedation Instructions    Recovery After Procedural Sedation (Adult)  You have been given medicine by vein to make you sleep during your surgery. This may have included both a pain medicine and sleeping medicine. Most of the effects have worn off. But you may still have some drowsiness for the next 6 to 8 hours.  Home care  Follow these guidelines when you get home:  · For the next 8 hours, you should be watched by a responsible adult. This person should make sure your condition is not getting worse.  · Don't drink any alcohol for the next 24 hours.  · Don't drive, operate dangerous machinery, or make important business or personal decisions during the next 24 hours.  Note: Your healthcare provider may tell you not to take any medicine by mouth for pain or sleep in the next 4 hours. These medicines may react with the medicines you were given in the hospital. This could cause a much stronger response than usual.  Follow-up care  Follow up with your healthcare provider if you are not alert and back to your usual level of activity within 12 hours.  When to seek medical advice  Call your healthcare provider right away if any of these occur:  · Drowsiness gets worse  · Weakness or dizziness gets worse  · Repeated vomiting  · You can't be awakened   Date Last Reviewed: 10/18/2016  © 7561-6512 [employer]. [address] [place] [address]. All rights reserved. This information is not intended as a substitute for professional medical care. Always follow your healthcare professional's instructions.    Home Care Instructions Pain Management:    1. DIET:   You may resume your normal diet today.   2. BATHING:   You may shower with luke warm water.  3. DRESSING:   You may remove your bandage today.    4. ACTIVITY LEVEL:   You may resume your normal activities 24 hrs after your procedure.  5. MEDICATIONS:   You may resume your normal medications today.   6. SPECIAL INSTRUCTIONS:   No heat to the injection site for 24 hrs including, bath or shower, heating pad, moist heat, or hot tubs.    Use ice pack to injection site for any pain or discomfort.  Apply ice packs for 20 minute intervals as needed.   If you have received any sedatives by mouth today you may not drive for 12 hours.    If you have received any sedation through your IV, you may not drive for 24 hrs.     PLEASE CALL YOUR DOCTOR IF:  1. Redness or swelling around the injection site.  2. Fever of 101 degrees  3. Drainage (pus) from the injection site.  4. For any continuous bleeding (some dried blood over the incision is normal.)    FOR EMERGENCIES:   If any unusual problems or difficulties occur during clinic hours, call [phone] or [phone].

## 2018-04-04 NOTE — H&P
"HPI    68 yo female with known facet arthropathy responded to left MBB previously, here for right sided MB RFA.      PMHx, PSHx, Allergies, Medications reviewed in epic    ROS negative except pain complaints in HPI    OBJECTIVE:    BP (!) 148/64   Pulse 77   Temp 98 °F (36.7 °C) (Oral)   Resp 18   Ht 5' 2" (1.575 m)   Wt 64.4 kg (142 lb)   LMP  (LMP Unknown)   SpO2 (!) 92%   BMI 25.97 kg/m²     PHYSICAL EXAMINATION:    GENERAL: Well appearing, in no acute distress, alert and oriented x3.  PSYCH:  Mood and affect appropriate.  SKIN: Skin color, texture, turgor normal, no rashes or lesions.  CV: RRR with palpation of the radial artery.  PULM: No evidence of respiratory difficulty, symmetric chest rise. Clear to auscultation.  NEURO: Cranial nerves grossly intact.    Plan:    Proceed with procedure as planned    Kelvin Pete  04/04/2018    "

## 2018-04-04 NOTE — OP NOTE
Patient Name: Yasmin Asencio  MRN: [id number]    INFORMED CONSENT: The procedure, risks, benefits and options were discussed with patient. There are no contraindications to the procedure. The patient expressed understanding and agreed to proceed. The personnel performing the procedure was discussed. I verify that I personally obtained Yasmin's consent prior to the start of the procedure and the signed consent can be found on the patient's chart.    Procedure Date: 04/04/2018    Anesthesia: Topical    Pre Procedure diagnosis: Lumbar spondylosis [M47.816]  1. Osteoarthritis of spine, unspecified spinal osteoarthritis complication status, unspecified spinal region    2. Chronic pain      Post-Procedure diagnosis: SAME      Moderate Sedation: Yes - Fentanyl 100 mcg and Midazolam 2 mg    PROCEDURE: RIGHT L2-3-4-5 FACET MEDIAL BRANCH NERVE RADIOFREQUENCY NEUROTOMY (lumbar)          DESCRIPTION OF PROCEDURE: The patient was brought to the procedure room.  After performing time out IV access was obtained prior to the procedure. The patient was positioned prone on the fluoroscopy table. Continuous hemodynamic monitoring was initiated including blood pressure and pulse oximetry. IV sedation was administered incrementally to allow the patient to remain comfortable and conversant throughout the procedure. The area of the lumbar spine was prepped chlorhexidine three times and draped into a sterile field.  Fluoroscopy was used to identify the location of the RT side L2, L3, L4, and L5 medial branch nerves at the junctions of the superior articular process and the transverse processes of L3, L4, L5, and the sacral ala respectively.  Skin anesthesia was achieved using 3 cc of Lidocaine 1% over the injection sites. A 22 gauge, 100mm (10mm active tip) curved RF needle was slowly inserted at each level using AP, lateral and oblique fluoroscopic imaging. Negative aspiration for blood or CSF was confirmed.  Sensory stimulation at  50Hz below 0.5V was achieved at every level. Motor stimulation at 2Hz up to 1.5V did not cause any radicular symptoms at any level. Each level was anesthetized with 1.5 cc of lidocaine 1%.  Radiofrequency lesioning was performed for 90 seconds at 80 degrees in two different positions at each level.  Total of 4 cc of bupivacaine 0.25% and 10 mg of Decadron was injected was injected at all levels.. The needles were removed and bleeding was nil.  A sterile dressing was applied. Yasmin was taken back to the recovery room for further observation.     Stimulation Results:  L2 = Sensory positive @ 0.7, Motor negative @ 1.5  L3 = Sensory positive @ 0.5, Motor negative @ 1.5  L4 = Sensory positive @ 0.8, Motor negative @ 1.5  L5 = Sensory positive @ 0.6, Motor negative @ 1.5    Blood Loss: Nill  Specimen: None    Issac Meyers MD

## 2018-04-04 NOTE — DISCHARGE SUMMARY
Discharge Note  Short Stay      SUMMARY     Admit Date: 4/4/2018    Attending Physician: Issac Meyers      Discharge Physician: Issac Meyers      Discharge Date: 4/4/2018 9:22 AM    Procedure(s) (LRB):  RADIOFREQUENCY THERMOCOAGULATION (RFTC)-NERVE-MEDIAN BRANCH-LUMBAR (Right)    Final Diagnosis: Lumbar spondylosis [M47.816]    Disposition: Home or self care    Patient Instructions:   Current Discharge Medication List      CONTINUE these medications which have NOT CHANGED    Details   acetaminophen (TYLENOL) 500 MG tablet Take 1,000 mg by mouth daily as needed for Pain (and headache).      alprazolam (XANAX) 0.25 MG tablet Take 1 tablet (0.25 mg total) by mouth nightly as needed for Anxiety.  Qty: 30 tablet, Refills: 0    Associated Diagnoses: Mixed anxiety and depressive disorder      ANORO ELLIPTA 62.5-25 mcg/actuation DsDv Inhale 1 puff into the lungs once daily.  Qty: 180 each, Refills: 4    Associated Diagnoses: Bronchiectasis without complication      desoximetasone (TOPICORT) 0.25 % cream Apply as directed bid prn  Qty: 60 g, Refills: 2    Associated Diagnoses: Rash      escitalopram oxalate (LEXAPRO) 10 MG tablet Take 1 tablet (10 mg total) by mouth once daily.  Qty: 30 tablet, Refills: 11      gabapentin (NEURONTIN) 300 MG capsule Take 1 capsule (300 mg total) by mouth 3 (three) times daily.  Qty: 270 capsule, Refills: 3    Associated Diagnoses: Acute left-sided low back pain with sciatica, sciatica laterality unspecified      guaiFENesin (MUCINEX) 600 mg 12 hr tablet Take 1,200 mg by mouth 2 (two) times daily.      ipratropium (ATROVENT) 0.02 % nebulizer solution INHALE ONE VIAL IN NEBULIZER 4 TIMES DAILY  Qty: 225 vial, Refills: 3    Comments: Please consider 90 day supplies to promote better adherence  Associated Diagnoses: Pulmonary emphysema; Hemoptysis      ketoconazole (NIZORAL) 2 % shampoo Apply topically once daily.  Qty: 120 mL, Refills: 5    Associated Diagnoses: Psoriasis       Lactobacillus rhamnosus GG (CULTURELLE) 10 billion cell capsule Take 1 capsule by mouth once daily.      omeprazole (PRILOSEC) 20 MG capsule TAKE ONE CAPSULE BY MOUTH ONCE DAILY AS NEEDED  Qty: 90 capsule, Refills: 0    Associated Diagnoses: Gastroesophageal reflux disease without esophagitis      ondansetron (ZOFRAN) 4 MG tablet Take 1 tablet (4 mg total) by mouth every 8 (eight) hours as needed for Nausea.  Qty: 45 tablet, Refills: 0      primidone (MYSOLINE) 50 MG Tab TAKE ONE TABLET BY MOUTH ONCE DAILY IN THE MORNING, ONE AT NOON, AND TWO AT BEDTIME  Qty: 120 tablet, Refills: 11      propylene glycol (SYSTANE BALANCE) 0.6 % Drop Apply 1 drop to eye daily as needed (dry eye).      sodium chloride 3% 3 % nebulizer solution USE ONE VIAL IN NEBULIZER TWICE DAILY  Qty: 240 mL, Refills: 6    Comments: Please consider 90 day supplies to promote better adherence  Associated Diagnoses: Acquired bronchiectasis      traMADol (ULTRAM) 50 mg tablet Take 1 tablet (50 mg total) by mouth every 6 (six) hours as needed for Pain.  Qty: 45 tablet, Refills: 0      verapamil (CALAN-SR) 180 MG CR tablet 2 (two) times daily.       VITAMIN D2 50,000 unit capsule TAKE ONE CAPSULE BY MOUTH ONCE A WEEK  Qty: 4 capsule, Refills: 4    Comments: Please consider 90 day supplies to promote better adherence      apixaban 5 mg Tab Take 1 tablet (5 mg total) by mouth 2 (two) times daily.  Qty: 180 tablet, Refills: 1    Associated Diagnoses: Current use of long term anticoagulation      fluocinolone (DERMA-SMOOTHE) 0.01 % external oil Apply topically once daily.  Qty: 1 Bottle, Refills: 5    Associated Diagnoses: Psoriasis      fluocinonide (LIDEX) 0.05 % external solution Apply topically once daily. - apply after shampooing  Qty: 60 mL, Refills: 5    Associated Diagnoses: Psoriasis                 Discharge Diagnosis: Lumbar spondylosis [M47.816]  Condition on Discharge: Stable.  Diet on Discharge: Same as before.  Activity: as per instruction  sheet.  Discharge to: Home with a responsible adult.  Follow up: as per Discharge instructions.

## 2018-04-05 DIAGNOSIS — R04.2 HEMOPTYSIS: ICD-10-CM

## 2018-04-05 DIAGNOSIS — J43.9 PULMONARY EMPHYSEMA: ICD-10-CM

## 2018-04-05 RX ORDER — IPRATROPIUM BROMIDE 0.5 MG/2.5ML
SOLUTION RESPIRATORY (INHALATION)
Qty: 225 VIAL | Refills: 3 | Status: SHIPPED | OUTPATIENT
Start: 2018-04-05 | End: 2018-08-16 | Stop reason: SDUPTHER

## 2018-04-06 ENCOUNTER — HOSPITAL ENCOUNTER (OUTPATIENT)
Dept: CARDIOLOGY | Facility: CLINIC | Age: 68
Discharge: HOME | End: 2018-04-06
Payer: MEDICARE

## 2018-04-06 ENCOUNTER — OFFICE VISIT (OUTPATIENT)
Dept: ELECTROPHYSIOLOGY | Facility: CLINIC | Age: 68
End: 2018-04-06
Payer: MEDICARE

## 2018-04-06 VITALS
HEART RATE: 81 BPM | SYSTOLIC BLOOD PRESSURE: 156 MMHG | HEIGHT: 63 IN | BODY MASS INDEX: 25.48 KG/M2 | OXYGEN SATURATION: 96 % | WEIGHT: 143.81 LBS | DIASTOLIC BLOOD PRESSURE: 66 MMHG

## 2018-04-06 DIAGNOSIS — I12.9 BENIGN HYPERTENSION WITH CHRONIC KIDNEY DISEASE, STAGE III: ICD-10-CM

## 2018-04-06 DIAGNOSIS — N18.30 BENIGN HYPERTENSION WITH CHRONIC KIDNEY DISEASE, STAGE III: ICD-10-CM

## 2018-04-06 DIAGNOSIS — I47.10 SVT (SUPRAVENTRICULAR TACHYCARDIA): ICD-10-CM

## 2018-04-06 DIAGNOSIS — I47.10 PSVT (PAROXYSMAL SUPRAVENTRICULAR TACHYCARDIA): Primary | ICD-10-CM

## 2018-04-06 DIAGNOSIS — Z86.711 HISTORY OF PULMONARY EMBOLISM: ICD-10-CM

## 2018-04-06 DIAGNOSIS — Z98.890 STATUS POST CATHETER ABLATION OF SLOW PATHWAY: ICD-10-CM

## 2018-04-06 DIAGNOSIS — Z86.79 STATUS POST CATHETER ABLATION OF SLOW PATHWAY: ICD-10-CM

## 2018-04-06 DIAGNOSIS — Z79.01 CURRENT USE OF LONG TERM ANTICOAGULATION: ICD-10-CM

## 2018-04-06 PROCEDURE — 3078F DIAST BP <80 MM HG: CPT | Mod: CPTII,S$GLB,, | Performed by: INTERNAL MEDICINE

## 2018-04-06 PROCEDURE — 99999 PR PBB SHADOW E&M-EST. PATIENT-LVL III: CPT | Mod: PBBFAC,,, | Performed by: INTERNAL MEDICINE

## 2018-04-06 PROCEDURE — 93000 ELECTROCARDIOGRAM COMPLETE: CPT | Mod: S$GLB,,, | Performed by: INTERNAL MEDICINE

## 2018-04-06 PROCEDURE — 3077F SYST BP >= 140 MM HG: CPT | Mod: CPTII,S$GLB,, | Performed by: INTERNAL MEDICINE

## 2018-04-06 PROCEDURE — 99214 OFFICE O/P EST MOD 30 MIN: CPT | Mod: S$GLB,,, | Performed by: INTERNAL MEDICINE

## 2018-04-06 RX ORDER — CHLORTHALIDONE 25 MG/1
25 TABLET ORAL DAILY
Qty: 90 TABLET | Refills: 3 | Status: ON HOLD | OUTPATIENT
Start: 2018-04-06 | End: 2018-09-14

## 2018-04-06 NOTE — PROGRESS NOTES
"Subjective:    Patient ID:  Yasmin Asencio is a 67 y.o. female who presents for evaluation of No chief complaint on file.      HPI   67 y.o. F  pulm hemorrhage 11/15, s/p coiling  pseudomonas PNA 2016, s/p extended Abx  COPD, bronchiectasis (home O2 frequently)  HTN on meds  chronic slurred speech, thought due to essential tremor per neuro  hx "AF" destini-lung-coiling procedure (no documentation)  pSVT (s/p AVRNT ablation; likely focal AT remains)    Had palpitations with HR >140 bpm at random intervals for past 10 years. Recently about 1x/month.   WIth palps, gets blurry vision, chest palps with radiation to the neck. Her "blood runs cold" and lasts for 20-35 mins.  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 We also saw a likely focal AT at that procedure also; not ablated due to not able to reinduce.  Since, feeling much better. Does get short palpitations. Event monitor showed short NSAT and one sustained SVT c/w AT. These episodes don't bother her nearly as much as the AVNRT did.    She did well on verapamil for a bit, but developed palps again. HR to 150s. Sx abated after increasing verapamil to 180 bid.  Holter: NSR. no SVT.    cath 6/16 neg  echo 11/16 50-55% LVEF -> 11/2017 55-60%.  Holter 2/17: SR . no SVT.    My interpretation of today's ECG is NSR 78 bpm. Small P wave.     Review of Systems   Constitution: Negative. Negative for weakness and malaise/fatigue.   HENT: Negative.  Negative for ear pain and tinnitus.    Eyes: Negative for blurred vision.   Cardiovascular: Positive for dyspnea on exertion and palpitations. Negative for chest pain, near-syncope and syncope.   Respiratory: Negative.  Negative for shortness of breath.    Endocrine: Negative.  Negative for polyuria.   Hematologic/Lymphatic: Does not bruise/bleed easily.   Skin: Negative.  Negative for rash.   Musculoskeletal: Negative.  Negative for joint pain and muscle weakness.   Gastrointestinal: Positive for constipation. Negative for " abdominal pain and change in bowel habit.   Genitourinary: Negative for frequency.   Neurological: Positive for tremors. Negative for dizziness.   Psychiatric/Behavioral: Negative.  Negative for depression. The patient is not nervous/anxious.    Allergic/Immunologic: Negative for environmental allergies.        Objective:    Physical Exam   Constitutional: She is oriented to person, place, and time. Vital signs are normal. She appears well-developed and well-nourished. She is active and cooperative.   HENT:   Head: Normocephalic and atraumatic.   Eyes: Conjunctivae and EOM are normal.   Neck: Normal range of motion. Carotid bruit is not present. No tracheal deviation and no edema present. No thyroid mass and no thyromegaly present.   Cardiovascular: Normal rate, regular rhythm, normal heart sounds, intact distal pulses and normal pulses.   No extrasystoles are present. PMI is not displaced.  Exam reveals no gallop and no friction rub.    No murmur heard.  Pulmonary/Chest: Effort normal. No respiratory distress. She has no wheezes. She has rhonchi in the right middle field, the right lower field, the left middle field and the left lower field. She has no rales.   Abdominal: Soft. Normal appearance. She exhibits no distension. There is no hepatosplenomegaly.   Musculoskeletal: Normal range of motion.   Neurological: She is alert and oriented to person, place, and time. Coordination normal.   Skin: Skin is warm and dry. No rash noted.   Psychiatric: She has a normal mood and affect. Her speech is normal and behavior is normal. Thought content normal. Cognition and memory are normal.   Nursing note and vitals reviewed.        Assessment:       1. PSVT (paroxysmal supraventricular tachycardia)    2. Status post catheter ablation of slow pathway    3. Benign hypertension with chronic kidney disease, stage III    4. Current use of long term anticoagulation    5. History of pulmonary embolism         Plan:       continue  verapamil for HTN and AT suppression.  add chlorthalidone for HTN  Return in 1 year with echo, or earlier prn.

## 2018-04-10 ENCOUNTER — HOSPITAL ENCOUNTER (OUTPATIENT)
Dept: RADIOLOGY | Facility: CLINIC | Age: 68
Discharge: HOME | End: 2018-04-10
Payer: MEDICARE

## 2018-04-10 DIAGNOSIS — M94.9 DISORDER OF BONE AND CARTILAGE: ICD-10-CM

## 2018-04-10 DIAGNOSIS — M89.9 DISORDER OF BONE AND CARTILAGE: ICD-10-CM

## 2018-04-10 PROCEDURE — 77080 DXA BONE DENSITY AXIAL: CPT | Mod: TC,PO

## 2018-04-10 PROCEDURE — 77080 DXA BONE DENSITY AXIAL: CPT | Mod: 26,,, | Performed by: INTERNAL MEDICINE

## 2018-04-11 ENCOUNTER — PATIENT MESSAGE (OUTPATIENT)
Dept: PAIN MEDICINE | Facility: CLINIC | Age: 68
End: 2018-04-11

## 2018-04-16 ENCOUNTER — LAB VISIT (OUTPATIENT)
Dept: LAB | Facility: HOSPITAL | Age: 68
End: 2018-04-16
Attending: INTERNAL MEDICINE
Payer: MEDICARE

## 2018-04-16 ENCOUNTER — INITIAL CONSULT (OUTPATIENT)
Dept: HEMATOLOGY/ONCOLOGY | Facility: CLINIC | Age: 68
End: 2018-04-16
Payer: MEDICARE

## 2018-04-16 VITALS
WEIGHT: 145.06 LBS | HEART RATE: 80 BPM | TEMPERATURE: 98 F | HEIGHT: 62 IN | SYSTOLIC BLOOD PRESSURE: 142 MMHG | BODY MASS INDEX: 26.69 KG/M2 | DIASTOLIC BLOOD PRESSURE: 66 MMHG | OXYGEN SATURATION: 99 %

## 2018-04-16 DIAGNOSIS — Z86.711 PERSONAL HISTORY OF PULMONARY EMBOLISM: Primary | ICD-10-CM

## 2018-04-16 DIAGNOSIS — Z86.711 PERSONAL HISTORY OF PULMONARY EMBOLISM: ICD-10-CM

## 2018-04-16 DIAGNOSIS — Z86.711 HISTORY OF PULMONARY EMBOLISM: ICD-10-CM

## 2018-04-16 PROBLEM — M85.89 OSTEOPENIA OF MULTIPLE SITES: Status: ACTIVE | Noted: 2018-04-16

## 2018-04-16 LAB — FIBRINOGEN PPP-MCNC: 435 MG/DL

## 2018-04-16 PROCEDURE — 81240 F2 GENE: CPT

## 2018-04-16 PROCEDURE — 36415 COLL VENOUS BLD VENIPUNCTURE: CPT

## 2018-04-16 PROCEDURE — 99999 PR PBB SHADOW E&M-EST. PATIENT-LVL III: CPT | Mod: PBBFAC,,, | Performed by: INTERNAL MEDICINE

## 2018-04-16 PROCEDURE — 85300 ANTITHROMBIN III ACTIVITY: CPT

## 2018-04-16 PROCEDURE — 85305 CLOT INHIBIT PROT S TOTAL: CPT

## 2018-04-16 PROCEDURE — 85598 HEXAGNAL PHOSPH PLTLT NEUTRL: CPT

## 2018-04-16 PROCEDURE — 99499 UNLISTED E&M SERVICE: CPT | Mod: S$GLB,,, | Performed by: INTERNAL MEDICINE

## 2018-04-16 PROCEDURE — 99205 OFFICE O/P NEW HI 60 MIN: CPT | Mod: S$GLB,,, | Performed by: INTERNAL MEDICINE

## 2018-04-16 PROCEDURE — 3078F DIAST BP <80 MM HG: CPT | Mod: CPTII,S$GLB,, | Performed by: INTERNAL MEDICINE

## 2018-04-16 PROCEDURE — 85303 CLOT INHIBIT PROT C ACTIVITY: CPT

## 2018-04-16 PROCEDURE — 86147 CARDIOLIPIN ANTIBODY EA IG: CPT | Mod: 59

## 2018-04-16 PROCEDURE — 85613 RUSSELL VIPER VENOM DILUTED: CPT

## 2018-04-16 PROCEDURE — 3077F SYST BP >= 140 MM HG: CPT | Mod: CPTII,S$GLB,, | Performed by: INTERNAL MEDICINE

## 2018-04-16 PROCEDURE — 85384 FIBRINOGEN ACTIVITY: CPT

## 2018-04-16 PROCEDURE — 83090 ASSAY OF HOMOCYSTEINE: CPT

## 2018-04-16 PROCEDURE — 81241 F5 GENE: CPT

## 2018-04-16 RX ORDER — PRIMIDONE 50 MG/1
TABLET ORAL
Qty: 120 TABLET | Refills: 11 | Status: SHIPPED | OUTPATIENT
Start: 2018-04-16 | End: 2018-05-09 | Stop reason: SDUPTHER

## 2018-04-16 NOTE — PROGRESS NOTES
Chief Complaint :   Hx of Pulmonary Embolism.    Hx of Present illness :  Patient is a 67 y.o. year old female who presents to the clinic today for  Oncology evaluation. Hx of Puylmonary embolism in 2017.. Has been on  eliquis since then. There was no precceding trauma, surgery or inactivity. No Hx of long travel by car or Air.  Appetite fair. Hx of constipation.  Incomplete sense of voiding. No chest or abdominal pain. Has chronic back pain.  Feels light headed; occasional nausea.  Has tingling of feet. Chronic cough; has CH related to COPD. Occasional nosebleeds on Left side.  Losing weight      Allergies :    Review of patient's allergies indicates:   Allergen Reactions    Adhesive Other (See Comments)     Tears up the skin and makes it itch    Bactrim [sulfamethoxazole-trimethoprim] Rash     Was hospitalized for rash    Lipitor [atorvastatin] Other (See Comments)     Muscle aches    Albuterol Other (See Comments)     tremors    Topamax [topiramate]      - made her feel 'bad in the head'    Restasis [cyclosporine] Itching       Occupation :  Homemaker    Transfusion :  No     Menstrual & obstetric Hx :   4, Para 3.  Age of menarche: 12  Age of first pregnancy: 17  Lactation history: No   Age of menopause:  Late 40's  HRT:  For about five years.    Present Meds :   Medication List with Changes/Refills   Current Medications    ACETAMINOPHEN (TYLENOL) 500 MG TABLET    Take 1,000 mg by mouth daily as needed for Pain (and headache).    ALPRAZOLAM (XANAX) 0.25 MG TABLET    Take 1 tablet (0.25 mg total) by mouth nightly as needed for Anxiety.    ANORO ELLIPTA 62.5-25 MCG/ACTUATION DSDV    Inhale 1 puff into the lungs once daily.    APIXABAN 5 MG TAB    Take 1 tablet (5 mg total) by mouth 2 (two) times daily.    CHLORTHALIDONE (HYGROTEN) 25 MG TAB    Take 1 tablet (25 mg total) by mouth once daily.    DESOXIMETASONE (TOPICORT) 0.25 % CREAM    Apply as directed bid prn    ESCITALOPRAM OXALATE (LEXAPRO) 10 MG  TABLET    Take 1 tablet (10 mg total) by mouth once daily.    FLUOCINOLONE (DERMA-SMOOTHE) 0.01 % EXTERNAL OIL    Apply topically once daily.    FLUOCINONIDE (LIDEX) 0.05 % EXTERNAL SOLUTION    Apply topically once daily. - apply after shampooing    GABAPENTIN (NEURONTIN) 300 MG CAPSULE    Take 1 capsule (300 mg total) by mouth 3 (three) times daily.    GUAIFENESIN (MUCINEX) 600 MG 12 HR TABLET    Take 1,200 mg by mouth 2 (two) times daily.    IPRATROPIUM (ATROVENT) 0.02 % NEBULIZER SOLUTION    INHALE ONE VIAL IN NEBULIZER 4 TIMES DAILY    KETOCONAZOLE (NIZORAL) 2 % SHAMPOO    Apply topically once daily.    LACTOBACILLUS RHAMNOSUS GG (CULTURELLE) 10 BILLION CELL CAPSULE    Take 1 capsule by mouth once daily.    OMEPRAZOLE (PRILOSEC) 20 MG CAPSULE    TAKE ONE CAPSULE BY MOUTH ONCE DAILY AS NEEDED    ONDANSETRON (ZOFRAN) 4 MG TABLET    Take 1 tablet (4 mg total) by mouth every 8 (eight) hours as needed for Nausea.    PRIMIDONE (MYSOLINE) 50 MG TAB    TAKE ONE TABLET BY MOUTH ONCE DAILY IN THE MORNING, ONE AT NOON, AND TWO AT BEDTIME    PROPYLENE GLYCOL (SYSTANE BALANCE) 0.6 % DROP    Apply 1 drop to eye daily as needed (dry eye).    SODIUM CHLORIDE 3% 3 % NEBULIZER SOLUTION    USE ONE VIAL IN NEBULIZER TWICE DAILY    TRAMADOL (ULTRAM) 50 MG TABLET    Take 1 tablet (50 mg total) by mouth every 6 (six) hours as needed for Pain.    VERAPAMIL (CALAN-SR) 180 MG CR TABLET    2 (two) times daily.     VITAMIN D2 50,000 UNIT CAPSULE    TAKE ONE CAPSULE BY MOUTH ONCE A WEEK       Past Medical Hx :  Pulmonary Embolism; COPD: Hypertension; GERD; Diverticulosis; Essential tremor. Past Hx of tuberculosis; anxiety. Hemangioma of Liver; Paroxysmal supraventricular Tacycardia, Psoriasis. No Hx of DM,, Lupus or Rheumatoid arthritis. No cancer, chemotherapy or radiation therapy. Had ablation for Atrial tachycardia in  July 2017. Embolisation for bleeding in Lungs in 2015    Past Medical Hx :  Past Medical History:   Diagnosis Date     Acquired bronchiectasis     due to history of TB - followed by pulmonary, Dr. Zuñiga    Allergy     Amblyopia     rt eye per pt    Anemia of other chronic disease     Anticoagulant long-term use     Anxiety     Cataract     Clotting disorder     COPD (chronic obstructive pulmonary disease)     COPD (chronic obstructive pulmonary disease)     Depression     Diverticulosis     Essential tremor     GERD (gastroesophageal reflux disease)     Hemangioma of liver     History of tuberculosis 1978    Hypertension     Mixed anxiety and depressive disorder     Psoriasis     PSVT (paroxysmal supraventricular tachycardia)     Pulmonary embolism     S/P PICC central line placement Apr. 2016 - May 2016    Skin disease     Psoriasis    Spondylosis without myelopathy 8/23/2013    Supraventricular tachycardia     Thoracic aorta atherosclerosis     noted on CT scan of chest 1/3/2011    Tuberculosis     Vaginal delivery     x2    Vitamin D deficiency        Travel Hx :  N/A    Immunization :  Immunization History   Administered Date(s) Administered    Influenza 10/06/2009, 10/27/2010, 09/21/2011, 01/07/2013, 12/11/2013, 10/06/2014    Influenza - High Dose 10/03/2016, 10/09/2017    Influenza - Trivalent (ADULT) 10/06/2014    Influenza A (H1N1) 2009 Monovalent - IM 01/12/2010    Influenza Split 01/07/2013, 12/11/2013    PPD Test 07/24/2015    Pneumococcal Conjugate 11/03/2008    Pneumococcal Conjugate - 13 Valent 11/03/2008, 10/19/2015    Pneumococcal Polysaccharide - 23 Valent 02/11/2015    Tdap 01/19/2009       Family Hx :  Family History   Problem Relation Age of Onset    Hypertension Mother     Heart attack Mother     Cancer Father      liver and bladder    Hyperlipidemia Sister     Psoriasis Sister     Cancer Brother      liver    Stroke Maternal Uncle     Cancer Maternal Aunt      stomach    Lung cancer Sister     Heart attack Brother     Heart attack Son     Amblyopia Neg Hx      Blindness Neg Hx     Cataracts Neg Hx     Glaucoma Neg Hx     Macular degeneration Neg Hx     Retinal detachment Neg Hx     Strabismus Neg Hx     Thyroid disease Neg Hx     Melanoma Neg Hx     Lupus Neg Hx     Eczema Neg Hx     COPD Neg Hx        Social Hx :  Social History     Social History    Marital status:      Spouse name: N/A    Number of children: 2    Years of education: N/A     Occupational History    housewife      Social History Main Topics    Smoking status: Former Smoker     Packs/day: 2.00     Years: 50.00     Types: Cigarettes     Quit date: 2009    Smokeless tobacco: Never Used    Alcohol use No    Drug use: No    Sexual activity: Yes     Partners: Male     Other Topics Concern    Are You Pregnant Or Think You May Be? No    Breast-Feeding No     Social History Narrative    One child  of a heart attack at age 35.       Surgery :  Cataract surgery; Cholecystectomy. D&C;     Symptoms :    Review of Systems   Constitutional: Negative for chills, diaphoresis, fever, malaise/fatigue and weight loss.   HENT: Negative for congestion, ear discharge, ear pain, hearing loss, nosebleeds, sinus pain, sore throat and tinnitus.    Eyes: Negative for blurred vision, double vision, photophobia, pain, discharge and redness.   Respiratory: Positive for shortness of breath. Negative for cough, hemoptysis, sputum production, wheezing and stridor.    Cardiovascular: Negative for chest pain, palpitations, orthopnea, claudication, leg swelling and PND.   Gastrointestinal: Positive for constipation and nausea. Negative for abdominal pain, blood in stool, diarrhea, heartburn, melena and vomiting.   Genitourinary: Positive for frequency. Negative for dysuria, flank pain, hematuria and urgency.   Musculoskeletal: Negative for back pain, falls, joint pain, myalgias and neck pain.   Skin: Positive for rash (Psoriasis).   Neurological: Positive for dizziness, tingling and tremors. Negative for  sensory change, speech change, focal weakness, seizures, loss of consciousness, weakness and headaches.   Endo/Heme/Allergies: Negative for environmental allergies and polydipsia. Does not bruise/bleed easily.   Psychiatric/Behavioral: Positive for memory loss. Negative for depression, hallucinations, substance abuse and suicidal ideas. The patient is nervous/anxious and has insomnia.        Physical Exam :   Physical Exam   Constitutional: She is oriented to person, place, and time and well-developed, well-nourished, and in no distress. No distress.   HENT:   Head: Normocephalic and atraumatic.   Right Ear: External ear normal.   Left Ear: External ear normal.   Nose: Nose normal.   Mouth/Throat: Oropharynx is clear and moist. No oropharyngeal exudate.   Eyes: Conjunctivae, EOM and lids are normal. Lids are everted and swept, no foreign bodies found. Right eye exhibits no discharge. Left eye exhibits no discharge. No scleral icterus.       Neck: Trachea normal, normal range of motion, full passive range of motion without pain and phonation normal. Neck supple. Normal carotid pulses, no hepatojugular reflux and no JVD present. No tracheal tenderness present. Carotid bruit is not present. No tracheal deviation present. No thyroid mass and no thyromegaly present.   Cardiovascular: Normal rate, regular rhythm, normal heart sounds, intact distal pulses and normal pulses.  PMI is not displaced.  Exam reveals no decreased pulses.    Pulmonary/Chest: Effort normal and breath sounds normal. No stridor. No apnea. No respiratory distress. She has no wheezes. She has no rales. She exhibits no tenderness.   Abdominal: Soft. Normal appearance, normal aorta and bowel sounds are normal. She exhibits no distension, no ascites and no mass. There is no hepatosplenomegaly, splenomegaly or hepatomegaly. There is no tenderness. There is no rebound, no guarding and no CVA tenderness. No hernia.   Genitourinary:   Genitourinary Comments:  Not Examined   Musculoskeletal: Normal range of motion. She exhibits no edema, tenderness or deformity.   Lymphadenopathy:        Head (right side): No submental, no submandibular, no tonsillar, no preauricular, no posterior auricular and no occipital adenopathy present.        Head (left side): No submental, no submandibular, no tonsillar, no preauricular, no posterior auricular and no occipital adenopathy present.     She has no cervical adenopathy.     She has no axillary adenopathy.        Right: No inguinal, no supraclavicular and no epitrochlear adenopathy present.        Left: No inguinal, no supraclavicular and no epitrochlear adenopathy present.   Neurological: She is alert and oriented to person, place, and time. She has normal motor skills, normal sensation, normal strength and normal reflexes. She displays normal reflexes. No cranial nerve deficit. She exhibits normal muscle tone. Gait normal. Coordination normal. GCS score is 15.   Skin: Skin is warm, dry and intact. No rash noted. She is not diaphoretic. No cyanosis or erythema. No pallor. Nails show no clubbing.   Psychiatric: Mood, memory, affect and judgment normal.   Nursing note and vitals reviewed.        Labs & Imaging :  CT Angiogram In January 2017, showed filling defects in pulmonary artery to Right upper lobe. Venous Doppler of Both upper extremities did not reveal any DVT in March 2018        Dx :  Remote History of Pulmonary Embolism right Upper Lobe.      Assessment & Plan:  Reviewed with Patient and spouse. Pulmonary Embolus probably related to Atrial tachycardia. Coagulation Labs ordered. Re evaluate with test results. Patient understands and verbalised.

## 2018-04-16 NOTE — LETTER
April 16, 2018      Urimla Luna MD  [address]  [place] [address]           [facility]  [address] [facility] [address] [place] [address]  Phone: [phone]          Patient: Yasmin Asencio   MR Number: [id number]   YOB: 1950   Date of Visit: 4/16/2018       Dear Dr. Urmila Luna:    Thank you for referring Yasmin Asencio to me for evaluation. Attached you will find relevant portions of my assessment and plan of care.    If you have questions, please do not hesitate to call me. I look forward to following Yasmin Asencio along with you.    Sincerely,    Craig Montana MD    Enclosure  CC:  No Recipients    If you would like to receive this communication electronically, please contact [email or web address] or [phone] to request more information on [employer] access.    For providers and/or their staff who would like to refer a patient to [facility], please contact us through our one-stop-shop provider referral line, [facility], at [phone].    If you feel you have received this communication in error or would no longer like to receive these types of communications, please e-mail [email or web address]

## 2018-04-17 LAB
CARDIOLIPIN IGG SER IA-ACNC: <9.4 GPL
CARDIOLIPIN IGM SER IA-ACNC: <9.4 MPL
HCYS SERPL-SCNC: 18.2 UMOL/L

## 2018-04-18 LAB
F2 GENE MUT ANL BLD/T: NORMAL
F5 P.R506Q BLD/T QL: NORMAL

## 2018-04-19 LAB
APTT PROTEIN C ACTIVATOR+FV DP/APTT PPP: 151 %
AT III ACT/NOR PPP CHRO: >131 %
PROT S ACT/NOR PPP: >130 %

## 2018-04-20 ENCOUNTER — OFFICE VISIT (OUTPATIENT)
Dept: PULMONOLOGY | Facility: CLINIC | Age: 68
End: 2018-04-20
Payer: MEDICARE

## 2018-04-20 VITALS
DIASTOLIC BLOOD PRESSURE: 60 MMHG | WEIGHT: 144.81 LBS | SYSTOLIC BLOOD PRESSURE: 140 MMHG | RESPIRATION RATE: 16 BRPM | HEIGHT: 62 IN | OXYGEN SATURATION: 95 % | HEART RATE: 79 BPM | BODY MASS INDEX: 26.65 KG/M2

## 2018-04-20 DIAGNOSIS — I47.10 PSVT (PAROXYSMAL SUPRAVENTRICULAR TACHYCARDIA): ICD-10-CM

## 2018-04-20 DIAGNOSIS — J98.8 PSEUDOMONAS RESPIRATORY INFECTION: ICD-10-CM

## 2018-04-20 DIAGNOSIS — Z79.01 CURRENT USE OF LONG TERM ANTICOAGULATION: ICD-10-CM

## 2018-04-20 DIAGNOSIS — J47.9 ACQUIRED BRONCHIECTASIS: Primary | ICD-10-CM

## 2018-04-20 DIAGNOSIS — J47.9 BRONCHIECTASIS WITHOUT COMPLICATION: ICD-10-CM

## 2018-04-20 DIAGNOSIS — Z86.711 HISTORY OF PULMONARY EMBOLISM: ICD-10-CM

## 2018-04-20 DIAGNOSIS — B96.5 PSEUDOMONAS RESPIRATORY INFECTION: ICD-10-CM

## 2018-04-20 PROCEDURE — 99999 PR PBB SHADOW E&M-EST. PATIENT-LVL III: CPT | Mod: PBBFAC,,, | Performed by: INTERNAL MEDICINE

## 2018-04-20 PROCEDURE — 3078F DIAST BP <80 MM HG: CPT | Mod: CPTII,S$GLB,, | Performed by: INTERNAL MEDICINE

## 2018-04-20 PROCEDURE — 99214 OFFICE O/P EST MOD 30 MIN: CPT | Mod: S$GLB,,, | Performed by: INTERNAL MEDICINE

## 2018-04-20 PROCEDURE — 3077F SYST BP >= 140 MM HG: CPT | Mod: CPTII,S$GLB,, | Performed by: INTERNAL MEDICINE

## 2018-04-20 RX ORDER — AMOXICILLIN AND CLAVULANATE POTASSIUM 875; 125 MG/1; MG/1
1 TABLET, FILM COATED ORAL 2 TIMES DAILY
Qty: 20 TABLET | Refills: 0 | Status: SHIPPED | OUTPATIENT
Start: 2018-04-20 | End: 2018-04-30

## 2018-04-20 RX ORDER — UMECLIDINIUM BROMIDE AND VILANTEROL TRIFENATATE 62.5; 25 UG/1; UG/1
1 POWDER RESPIRATORY (INHALATION) DAILY
Qty: 30 EACH | Refills: 6 | Status: SHIPPED | OUTPATIENT
Start: 2018-04-20 | End: 2018-08-03 | Stop reason: SDUPTHER

## 2018-04-20 NOTE — PATIENT INSTRUCTIONS
Continue present respiratory medications (roles reviewed at today's visit).  Continue Chest PT 2-3 x daily to promote mobilizations of secretions.  Begin Augmentin twice daily x 10 days.  Call if congestion remains unimproved; otherwise return visit 6 months.

## 2018-04-20 NOTE — PROGRESS NOTES
Subjective:       Patient ID: Yasmin Asencio is a 67 y.o. female.    Chief Complaint: Bronchiectasis    HPI Mrs. Asencio is a 67-year-old former smoker who comes for interval assessment   of bronchiectasis.  Her most recent previous visit here was in July of last   year.  She has had a fairly stable respiratory status during these past several   months.  She does report an ongoing cough with daily sputum production.  Her   sputum has become more discolored in recent days.  She has not had any fever,   chest pain or hemoptysis.  She believes that her most recent course of   antibiotics for respiratory symptoms was prior to the end of last year.    Mrs. Asencio is currently using the maintenance medication Anoro.  She uses   Atrovent aerosol treatments and Mucinex.  She does chest percussion therapy   twice daily in addition to the aerosol treatments to improve mobilization of   bronchial secretions.    Mrs. Asencio is status post a cardiac ablation procedure, which was done last   year to control paroxysmal supraventricular tachycardia.  This procedure was   apparently partially successful.  Her dysrhythmia has been controlled with the   use of medications over the last several months.  Mrs. Asencio remains on   anticoagulation therapy due to a previous pulmonary embolism.  She recently had   an evaluation in [facility]ology [facility] to assess her risk for future thrombo-embolic   Events, and the need for indefinite anticoagulation.      CB/IN  dd: 04/20/2018 18:42:16 (CDT)  td: 04/21/2018 08:49:17 (CDT)  Doc ID   #[id number]  Job ID #[id number]    CC:       Review of Systems   Constitutional: Negative for fever and fatigue.   HENT: Positive for hearing loss. Negative for postnasal drip, sinus pressure, voice change and congestion.    Respiratory: Positive for cough, sputum production and dyspnea on extertion. Negative for shortness of breath and wheezing.    Cardiovascular: Negative for chest pain and leg swelling.    Genitourinary: Negative for difficulty urinating.   Musculoskeletal: Negative for arthralgias and back pain.   Skin: Negative for rash.   Gastrointestinal: Negative for abdominal pain and acid reflux.   Neurological: Negative for dizziness and weakness.   Hematological: Negative for adenopathy.       Objective:      Physical Exam   Constitutional: She is oriented to person, place, and time. She appears well-developed and well-nourished.   HENT:   Head: Normocephalic.   Nose: Nose normal.   Mouth/Throat: No oropharyngeal exudate.   Neck: Normal range of motion. No JVD present. No tracheal deviation present. No thyromegaly present.   Cardiovascular: Normal rate, regular rhythm and normal heart sounds.    No murmur heard.  Pulmonary/Chest: Symmetric chest wall expansion. No stridor. She has no wheezes. She has no rhonchi. She has rales (coarse rales bilaterally, taj anteriorly on L). She exhibits no tenderness.   Abdominal: Soft. There is no tenderness.   Musculoskeletal: She exhibits no edema.   Lymphadenopathy:     She has no cervical adenopathy.   Neurological: She is alert and oriented to person, place, and time.   Skin: Skin is warm and dry. No rash noted. No erythema. Nails show no clubbing.   Psychiatric: She has a normal mood and affect.   Vitals reviewed.    Personal Diagnostic Review    No flowsheet data found.      Assessment:       1. Acquired bronchiectasis    2. Current use of long term anticoagulation    3. History of pulmonary embolism    4. Pseudomonas respiratory infection    5. PSVT (paroxysmal supraventricular tachycardia)    6. Bronchiectasis without complication        Outpatient Encounter Prescriptions as of 4/20/2018   Medication Sig Dispense Refill    acetaminophen (TYLENOL) 500 MG tablet Take 1,000 mg by mouth daily as needed for Pain (and headache).      alprazolam (XANAX) 0.25 MG tablet Take 1 tablet (0.25 mg total) by mouth nightly as needed for Anxiety. 30 tablet 0     amoxicillin-clavulanate 875-125mg (AUGMENTIN) 875-125 mg per tablet Take 1 tablet by mouth 2 (two) times daily. 20 tablet 0    ANORO ELLIPTA 62.5-25 mcg/actuation DsDv Inhale 1 puff into the lungs once daily. 30 each 6    apixaban 5 mg Tab Take 1 tablet (5 mg total) by mouth 2 (two) times daily. 180 tablet 1    chlorthalidone (HYGROTEN) 25 MG Tab Take 1 tablet (25 mg total) by mouth once daily. 90 tablet 3    desoximetasone (TOPICORT) 0.25 % cream Apply as directed bid prn (Patient taking differently: Apply as directed twice daily as needed for psoriasis) 60 g 2    escitalopram oxalate (LEXAPRO) 10 MG tablet Take 1 tablet (10 mg total) by mouth once daily. 30 tablet 11    fluocinolone (DERMA-SMOOTHE) 0.01 % external oil Apply topically once daily. 1 Bottle 5    fluocinonide (LIDEX) 0.05 % external solution Apply topically once daily. - apply after shampooing 60 mL 5    gabapentin (NEURONTIN) 300 MG capsule Take 1 capsule (300 mg total) by mouth 3 (three) times daily. 270 capsule 3    guaiFENesin (MUCINEX) 600 mg 12 hr tablet Take 1,200 mg by mouth 2 (two) times daily.      ipratropium (ATROVENT) 0.02 % nebulizer solution INHALE ONE VIAL IN NEBULIZER 4 TIMES DAILY 225 vial 3    ketoconazole (NIZORAL) 2 % shampoo Apply topically once daily. 120 mL 5    Lactobacillus rhamnosus GG (CULTURELLE) 10 billion cell capsule Take 1 capsule by mouth once daily.      omeprazole (PRILOSEC) 20 MG capsule TAKE ONE CAPSULE BY MOUTH ONCE DAILY AS NEEDED 90 capsule 0    ondansetron (ZOFRAN) 4 MG tablet Take 1 tablet (4 mg total) by mouth every 8 (eight) hours as needed for Nausea. 45 tablet 0    primidone (MYSOLINE) 50 MG Tab TAKE ONE TABLET BY MOUTH ONCE DAILY IN THE MORNING, ONE AT NOON, AND TWO AT BEDTIME 120 tablet 11    primidone (MYSOLINE) 50 MG Tab TAKE ONE TABLET BY MOUTH ONCE DAILY IN THE MORNING AND ONE AT NOON AND TWO AT BEDTIME 120 tablet 11    propylene glycol (SYSTANE BALANCE) 0.6 % Drop Apply 1 drop to  eye daily as needed (dry eye).      sodium chloride 3% 3 % nebulizer solution USE ONE VIAL IN NEBULIZER TWICE DAILY 240 mL 6    traMADol (ULTRAM) 50 mg tablet Take 1 tablet (50 mg total) by mouth every 6 (six) hours as needed for Pain. 45 tablet 0    verapamil (CALAN-SR) 180 MG CR tablet 2 (two) times daily.       VITAMIN D2 50,000 unit capsule TAKE ONE CAPSULE BY MOUTH ONCE A WEEK 4 capsule 4    [DISCONTINUED] ANORO ELLIPTA 62.5-25 mcg/actuation DsDv Inhale 1 puff into the lungs once daily. 180 each 4     No facility-administered encounter medications on file as of 4/20/2018.      No orders of the defined types were placed in this encounter.    Plan:     Continue present respiratory medications (roles reviewed at today's visit).  Continue Chest PT 2-3 x daily to promote mobilizations of secretions.  Begin Augmentin twice daily x 10 days.  Call if congestion remains unimproved; otherwise return visit 6 months.

## 2018-04-23 LAB — APTT HEX PL PPP: POSITIVE S

## 2018-04-24 LAB — LA PPP-IMP: NORMAL

## 2018-04-25 ENCOUNTER — OFFICE VISIT (OUTPATIENT)
Dept: OPTOMETRY | Facility: CLINIC | Age: 68
End: 2018-04-25
Payer: MEDICARE

## 2018-04-25 DIAGNOSIS — H52.03 HYPEROPIA WITH ASTIGMATISM, BILATERAL: ICD-10-CM

## 2018-04-25 DIAGNOSIS — H53.2 MONOCULAR DIPLOPIA OF LEFT EYE: ICD-10-CM

## 2018-04-25 DIAGNOSIS — H18.529 ANTERIOR BASEMENT MEMBRANE DYSTROPHY: Primary | ICD-10-CM

## 2018-04-25 DIAGNOSIS — H52.203 HYPEROPIA WITH ASTIGMATISM, BILATERAL: ICD-10-CM

## 2018-04-25 PROCEDURE — 92014 COMPRE OPH EXAM EST PT 1/>: CPT | Mod: S$GLB,,, | Performed by: OPTOMETRIST

## 2018-04-25 PROCEDURE — 99999 PR PBB SHADOW E&M-EST. PATIENT-LVL III: CPT | Mod: PBBFAC,,, | Performed by: OPTOMETRIST

## 2018-04-25 PROCEDURE — 92015 DETERMINE REFRACTIVE STATE: CPT | Mod: S$GLB,,, | Performed by: OPTOMETRIST

## 2018-04-25 NOTE — PROGRESS NOTES
HPI     Yasmin Asencio is a/an 67 y.o. Female who returns  for continued eye   care  Double vision since a couple of month vertical and constant noticed that   in her intermedia distance (TV). Pseudo aphakic OU  Blurry vision especially up close. She wonders if that is connected to the   eyelid sx she was supposed to have.  (+)blurred vision  (+)Headaches morning  (+)diplopia  (--)flashes  (+)floaters stable since years  (--)pain  (--)Itching  (--)tearing  (--)burning  (+)Dryness  (+) OTC Drops Systane  (--)Photophobia    Last edited by Jaskaran James on 4/25/2018  9:27 AM.   (History)            Assessment /Plan     For exam results, see Encounter Report.    Anterior basement membrane dystrophy  -reviewed impact on VA, possible source of monocular diplopia  -Radha 128 BID+  -Systane BID+    Monocular diplopia of left eye  -Above vs uncorrected astigmatism.  Doesn't seem to be muscle/binocular based    Hyperopia with astigmatism, bilateral  Eyeglass Final Rx     Eyeglass Final Rx       Sphere Cylinder Axis Dist VA Add    Right +1.00 Sphere  20/40 +2.50    Left +0.25 +1.50 165 20/30 +2.50    Type:  Bifocal    Expiration Date:  4/26/2019                  RTC 1 yr

## 2018-05-01 ENCOUNTER — OFFICE VISIT (OUTPATIENT)
Dept: HEMATOLOGY/ONCOLOGY | Facility: CLINIC | Age: 68
End: 2018-05-01
Payer: MEDICARE

## 2018-05-01 ENCOUNTER — LAB VISIT (OUTPATIENT)
Dept: LAB | Facility: HOSPITAL | Age: 68
End: 2018-05-01
Attending: INTERNAL MEDICINE
Payer: MEDICARE

## 2018-05-01 VITALS
HEART RATE: 77 BPM | TEMPERATURE: 98 F | BODY MASS INDEX: 26.49 KG/M2 | WEIGHT: 144.81 LBS | OXYGEN SATURATION: 97 % | DIASTOLIC BLOOD PRESSURE: 61 MMHG | SYSTOLIC BLOOD PRESSURE: 130 MMHG

## 2018-05-01 DIAGNOSIS — D64.9 ANEMIA, UNSPECIFIED TYPE: ICD-10-CM

## 2018-05-01 DIAGNOSIS — Z86.711 PERSONAL HISTORY OF PULMONARY EMBOLISM: ICD-10-CM

## 2018-05-01 DIAGNOSIS — Z79.01 CURRENT USE OF LONG TERM ANTICOAGULATION: ICD-10-CM

## 2018-05-01 DIAGNOSIS — R79.89 ELEVATED SERUM HOMOCYSTEINE LEVEL: Primary | ICD-10-CM

## 2018-05-01 LAB
BASOPHILS # BLD AUTO: 0.03 K/UL
BASOPHILS NFR BLD: 0.4 %
DIFFERENTIAL METHOD: ABNORMAL
EOSINOPHIL # BLD AUTO: 0.1 K/UL
EOSINOPHIL NFR BLD: 0.9 %
ERYTHROCYTE [DISTWIDTH] IN BLOOD BY AUTOMATED COUNT: 12.8 %
HCT VFR BLD AUTO: 31.5 %
HGB BLD-MCNC: 10.6 G/DL
IRON SERPL-MCNC: 46 UG/DL
LYMPHOCYTES # BLD AUTO: 2 K/UL
LYMPHOCYTES NFR BLD: 28.9 %
MCH RBC QN AUTO: 28.8 PG
MCHC RBC AUTO-ENTMCNC: 33.7 G/DL
MCV RBC AUTO: 86 FL
MONOCYTES # BLD AUTO: 0.6 K/UL
MONOCYTES NFR BLD: 9.4 %
NEUTROPHILS # BLD AUTO: 4.1 K/UL
NEUTROPHILS NFR BLD: 60.3 %
PLATELET # BLD AUTO: 270 K/UL
PMV BLD AUTO: 9.3 FL
RBC # BLD AUTO: 3.68 M/UL
SATURATED IRON: 10 %
TOTAL IRON BINDING CAPACITY: 448 UG/DL
TRANSFERRIN SERPL-MCNC: 303 MG/DL
WBC # BLD AUTO: 6.81 K/UL

## 2018-05-01 PROCEDURE — 85025 COMPLETE CBC W/AUTO DIFF WBC: CPT

## 2018-05-01 PROCEDURE — 83540 ASSAY OF IRON: CPT

## 2018-05-01 PROCEDURE — 99999 PR PBB SHADOW E&M-EST. PATIENT-LVL III: CPT | Mod: PBBFAC,,, | Performed by: INTERNAL MEDICINE

## 2018-05-01 PROCEDURE — 3075F SYST BP GE 130 - 139MM HG: CPT | Mod: CPTII,S$GLB,, | Performed by: INTERNAL MEDICINE

## 2018-05-01 PROCEDURE — 99499 UNLISTED E&M SERVICE: CPT | Mod: S$GLB,,, | Performed by: INTERNAL MEDICINE

## 2018-05-01 PROCEDURE — 36415 COLL VENOUS BLD VENIPUNCTURE: CPT

## 2018-05-01 PROCEDURE — 3078F DIAST BP <80 MM HG: CPT | Mod: CPTII,S$GLB,, | Performed by: INTERNAL MEDICINE

## 2018-05-01 PROCEDURE — 99213 OFFICE O/P EST LOW 20 MIN: CPT | Mod: S$GLB,,, | Performed by: INTERNAL MEDICINE

## 2018-05-01 RX ORDER — FOLIC ACID 1 MG/1
1 TABLET ORAL DAILY
Qty: 100 TABLET | Refills: 2 | Status: SHIPPED | OUTPATIENT
Start: 2018-05-01 | End: 2019-02-21 | Stop reason: SDUPTHER

## 2018-05-01 NOTE — PROGRESS NOTES
Chief Complaint :   Hx of Pulmonary Embolism.    Hx of Present illness :  Patient is a 67 y.o. year old female who presents to the clinic today for  Oncology evaluation. Hx of Puylmonary embolism in 2017.. Has been on  eliquis since then. There was no precceding trauma, surgery or inactivity. No Hx of long travel by car or Air.  Appetite fair. Hx of constipation.  Incomplete sense of voiding. No chest or abdominal pain. Has chronic back pain.  Feels light headed; occasional nausea.  Has tingling of feet. Chronic cough; has CH related to COPD. Occasional nosebleeds on Left side.  Losing weight  Here to review test results.    Allergies :    Review of patient's allergies indicates:   Allergen Reactions    Adhesive Other (See Comments)     Tears up the skin and makes it itch    Bactrim [sulfamethoxazole-trimethoprim] Rash     Was hospitalized for rash    Lipitor [atorvastatin] Other (See Comments)     Muscle aches    Albuterol Other (See Comments)     tremors    Topamax [topiramate]      - made her feel 'bad in the head'    Restasis [cyclosporine] Itching       Occupation :  Homemaker    Transfusion :  No     Menstrual & obstetric Hx :   4, Para 3.  Age of menarche: 12  Age of first pregnancy: 17  Lactation history: No   Age of menopause:  Late 40's  HRT:  For about five years.    Present Meds :   Medication List with Changes/Refills   Current Medications    ACETAMINOPHEN (TYLENOL) 500 MG TABLET    Take 1,000 mg by mouth daily as needed for Pain (and headache).    ALPRAZOLAM (XANAX) 0.25 MG TABLET    Take 1 tablet (0.25 mg total) by mouth nightly as needed for Anxiety.    ANORO ELLIPTA 62.5-25 MCG/ACTUATION DSDV    Inhale 1 puff into the lungs once daily.    APIXABAN 5 MG TAB    Take 1 tablet (5 mg total) by mouth 2 (two) times daily.    CHLORTHALIDONE (HYGROTEN) 25 MG TAB    Take 1 tablet (25 mg total) by mouth once daily.    DESOXIMETASONE (TOPICORT) 0.25 % CREAM    Apply as directed bid prn     ESCITALOPRAM OXALATE (LEXAPRO) 10 MG TABLET    Take 1 tablet (10 mg total) by mouth once daily.    FLUOCINOLONE (DERMA-SMOOTHE) 0.01 % EXTERNAL OIL    Apply topically once daily.    FLUOCINONIDE (LIDEX) 0.05 % EXTERNAL SOLUTION    Apply topically once daily. - apply after shampooing    GABAPENTIN (NEURONTIN) 300 MG CAPSULE    Take 1 capsule (300 mg total) by mouth 3 (three) times daily.    GUAIFENESIN (MUCINEX) 600 MG 12 HR TABLET    Take 1,200 mg by mouth 2 (two) times daily.    IPRATROPIUM (ATROVENT) 0.02 % NEBULIZER SOLUTION    INHALE ONE VIAL IN NEBULIZER 4 TIMES DAILY    KETOCONAZOLE (NIZORAL) 2 % SHAMPOO    Apply topically once daily.    LACTOBACILLUS RHAMNOSUS GG (CULTURELLE) 10 BILLION CELL CAPSULE    Take 1 capsule by mouth once daily.    OMEPRAZOLE (PRILOSEC) 20 MG CAPSULE    TAKE ONE CAPSULE BY MOUTH ONCE DAILY AS NEEDED    ONDANSETRON (ZOFRAN) 4 MG TABLET    Take 1 tablet (4 mg total) by mouth every 8 (eight) hours as needed for Nausea.    PRIMIDONE (MYSOLINE) 50 MG TAB    TAKE ONE TABLET BY MOUTH ONCE DAILY IN THE MORNING, ONE AT NOON, AND TWO AT BEDTIME    PRIMIDONE (MYSOLINE) 50 MG TAB    TAKE ONE TABLET BY MOUTH ONCE DAILY IN THE MORNING AND ONE AT NOON AND TWO AT BEDTIME    PROPYLENE GLYCOL (SYSTANE BALANCE) 0.6 % DROP    Apply 1 drop to eye daily as needed (dry eye).    SODIUM CHLORIDE 3% 3 % NEBULIZER SOLUTION    USE ONE VIAL IN NEBULIZER TWICE DAILY    TRAMADOL (ULTRAM) 50 MG TABLET    Take 1 tablet (50 mg total) by mouth every 6 (six) hours as needed for Pain.    VERAPAMIL (CALAN-SR) 180 MG CR TABLET    2 (two) times daily.     VITAMIN D2 50,000 UNIT CAPSULE    TAKE ONE CAPSULE BY MOUTH ONCE A WEEK       Past Medical Hx :  Pulmonary Embolism; COPD: Hypertension; GERD; Diverticulosis; Essential tremor. Past Hx of tuberculosis; anxiety. Hemangioma of Liver; Paroxysmal supraventricular Tacycardia, Psoriasis. No Hx of DM,, Lupus or Rheumatoid arthritis. No cancer, chemotherapy or radiation therapy.  Had ablation for Atrial tachycardia in  July 2017. Embolisation for bleeding in Lungs in 2015    Past Medical Hx :  Past Medical History:   Diagnosis Date    Acquired bronchiectasis     due to history of TB - followed by pulmonary, Dr. Zuñiga    Allergy     Amblyopia     rt eye per pt    Anemia of other chronic disease     Anticoagulant long-term use     Anxiety     Cataract     Clotting disorder     COPD (chronic obstructive pulmonary disease)     COPD (chronic obstructive pulmonary disease)     Depression     Diverticulosis     Essential tremor     GERD (gastroesophageal reflux disease)     Hemangioma of liver     History of tuberculosis 1978    Hypertension     Mixed anxiety and depressive disorder     Psoriasis     PSVT (paroxysmal supraventricular tachycardia)     Pulmonary embolism     S/P PICC central line placement Apr. 2016 - May 2016    Skin disease     Psoriasis    Spondylosis without myelopathy 8/23/2013    Supraventricular tachycardia     Thoracic aorta atherosclerosis     noted on CT scan of chest 1/3/2011    Tuberculosis     Vaginal delivery     x2    Vitamin D deficiency        Travel Hx :  N/A    Immunization :  Immunization History   Administered Date(s) Administered    Influenza 10/06/2009, 10/27/2010, 09/21/2011, 01/07/2013, 12/11/2013, 10/06/2014    Influenza - High Dose 10/03/2016, 10/09/2017    Influenza - Trivalent (ADULT) 10/06/2014    Influenza A (H1N1) 2009 Monovalent - IM 01/12/2010    Influenza Split 01/07/2013, 12/11/2013    PPD Test 07/24/2015    Pneumococcal Conjugate 11/03/2008    Pneumococcal Conjugate - 13 Valent 11/03/2008, 10/19/2015    Pneumococcal Polysaccharide - 23 Valent 02/11/2015    Tdap 01/19/2009       Family Hx :  Family History   Problem Relation Age of Onset    Hypertension Mother     Heart attack Mother     Cancer Father      liver and bladder    Hyperlipidemia Sister     Psoriasis Sister     Cancer Brother      liver     Stroke Maternal Uncle     Cancer Maternal Aunt      stomach    Lung cancer Sister     Heart attack Brother     Heart attack Son     Amblyopia Neg Hx     Blindness Neg Hx     Cataracts Neg Hx     Glaucoma Neg Hx     Macular degeneration Neg Hx     Retinal detachment Neg Hx     Strabismus Neg Hx     Thyroid disease Neg Hx     Melanoma Neg Hx     Lupus Neg Hx     Eczema Neg Hx     COPD Neg Hx        Social Hx :  Social History     Social History    Marital status:      Spouse name: N/A    Number of children: 2    Years of education: N/A     Occupational History    housewife      Social History Main Topics    Smoking status: Former Smoker     Packs/day: 2.00     Years: 50.00     Types: Cigarettes     Quit date: 2009    Smokeless tobacco: Never Used    Alcohol use No    Drug use: No    Sexual activity: Yes     Partners: Male     Other Topics Concern    Are You Pregnant Or Think You May Be? No    Breast-Feeding No     Social History Narrative    One child  of a heart attack at age 35.       Surgery :  Cataract surgery; Cholecystectomy. D&C;     Symptoms :   No new Sx    Review of Systems   Constitutional: Negative for chills, diaphoresis, fever, malaise/fatigue and weight loss.   HENT: Negative for congestion, ear discharge, ear pain, hearing loss, nosebleeds, sinus pain, sore throat and tinnitus.    Eyes: Negative for blurred vision, double vision, photophobia, pain, discharge and redness.   Respiratory: Positive for shortness of breath. Negative for cough, hemoptysis, sputum production, wheezing and stridor.    Cardiovascular: Negative for chest pain, palpitations, orthopnea, claudication, leg swelling and PND.   Gastrointestinal: Positive for constipation and nausea. Negative for abdominal pain, blood in stool, diarrhea, heartburn, melena and vomiting.   Genitourinary: Positive for frequency. Negative for dysuria, flank pain, hematuria and urgency.   Musculoskeletal: Negative  for back pain, falls, joint pain, myalgias and neck pain.   Skin: Positive for rash (Psoriasis).   Neurological: Positive for dizziness, tingling and tremors. Negative for sensory change, speech change, focal weakness, seizures, loss of consciousness, weakness and headaches.   Endo/Heme/Allergies: Negative for environmental allergies and polydipsia. Does not bruise/bleed easily.   Psychiatric/Behavioral: Positive for memory loss. Negative for depression, hallucinations, substance abuse and suicidal ideas. The patient is nervous/anxious and has insomnia.        Physical Exam :   Physical Exam   Constitutional: She is oriented to person, place, and time and well-developed, well-nourished, and in no distress. No distress.   HENT:   Head: Normocephalic and atraumatic.   Right Ear: External ear normal.   Left Ear: External ear normal.   Nose: Nose normal.   Mouth/Throat: Oropharynx is clear and moist. No oropharyngeal exudate.   Eyes: Conjunctivae, EOM and lids are normal. Lids are everted and swept, no foreign bodies found. Right eye exhibits no discharge. Left eye exhibits no discharge. No scleral icterus.       Neck: Trachea normal, normal range of motion, full passive range of motion without pain and phonation normal. Neck supple. Normal carotid pulses, no hepatojugular reflux and no JVD present. No tracheal tenderness present. Carotid bruit is not present. No tracheal deviation present. No thyroid mass and no thyromegaly present.   Cardiovascular: Normal rate, regular rhythm, normal heart sounds, intact distal pulses and normal pulses.  PMI is not displaced.    Pulmonary/Chest: Effort normal and breath sounds normal. No stridor. No apnea. No respiratory distress. She has no wheezes. She has no rales. She exhibits no tenderness.   Abdominal: Soft. Normal appearance, normal aorta and bowel sounds are normal. She exhibits no distension, no ascites and no mass. There is no hepatosplenomegaly, splenomegaly or  hepatomegaly. There is no tenderness. There is no rebound, no guarding and no CVA tenderness. No hernia.   Genitourinary:   Genitourinary Comments: Not Examined   Musculoskeletal: Normal range of motion. She exhibits no edema, tenderness or deformity.   Lymphadenopathy:        Head (right side): No submental, no submandibular, no tonsillar, no preauricular, no posterior auricular and no occipital adenopathy present.        Head (left side): No submental, no submandibular, no tonsillar, no preauricular, no posterior auricular and no occipital adenopathy present.     She has no cervical adenopathy.     She has no axillary adenopathy.        Right: No inguinal, no supraclavicular and no epitrochlear adenopathy present.        Left: No inguinal, no supraclavicular and no epitrochlear adenopathy present.   Neurological: She is alert and oriented to person, place, and time. She has normal motor skills, normal sensation, normal strength and normal reflexes. No cranial nerve deficit. She exhibits normal muscle tone. Gait normal. Coordination normal. GCS score is 15.   Skin: Skin is warm, dry and intact. No rash noted. She is not diaphoretic. No cyanosis or erythema. No pallor. Nails show no clubbing.   Psychiatric: Mood, memory, affect and judgment normal.   Nursing note and vitals reviewed.  No New Physical finding      Labs & Imaging :  CT Angiogram In January 2017, showed filling defects in pulmonary artery to Right upper lobe. Venous Doppler of Both upper extremities did not reveal any DVT in March 2018 04/16/18 : Prothrombin 2 Normal phenotype. Leiden factor 5 normal phenotype. Homocysteine 18.2; Lupus Anticoagulant Negative; Hexagonal Phospholipid antibody neutralization weakly positive. Protein C and protein S activity normal. AT 3 normal.  Cardiolipin antibody normal. In December 2017, Patient had Hgb 9.0      Dx :  Remote History of Pulmonary Embolism right Upper Lobe.      Assessment  & Plan:  Reviewed with Patient and spouse. Pulmonary Embolus probably related to Atrial tachycardia. Spoke with Cardiologist  Last week.  Will start Folic acid 1 Mg daily. Continue eliquis 5 Mg bid. Repeat lupus anticoagulant in 3 months.  CBC, Retic, Iron panel in view of past anemia. Continue cardiology followup. RTC 3 months. Patient understands and verbalised

## 2018-05-01 NOTE — TELEPHONE ENCOUNTER
[facility] is requesting refill on apixaban 5 mg Tab. Please advise.

## 2018-05-02 ENCOUNTER — TELEPHONE (OUTPATIENT)
Dept: FAMILY MEDICINE | Facility: CLINIC | Age: 68
End: 2018-05-02

## 2018-05-02 NOTE — TELEPHONE ENCOUNTER
----- Message from Jelly Bai sent at 5/2/2018 11:58 AM CDT -----  Contact: Caitlyn/[facility]/[phone]  Caitlyn called to inform the staff that there's a drug interaction with apixaban 5 mg Tab and primidone (MYSOLINE) 50 MG Tab. She would like to speak to the staff regarding this matter. Thank you.

## 2018-05-02 NOTE — TELEPHONE ENCOUNTER
----- Message from Jelly Bai sent at 5/2/2018 11:58 AM CDT -----  Contact: Caitlyn/[facility]/[phone]  Caitlyn called to inform the staff that there's a drug interaction with apixaban 5 mg Tab and primidone (MYSOLINE) 50 MG Tab. She would like to speak to the staff regarding this matter. Thank you.

## 2018-05-02 NOTE — TELEPHONE ENCOUNTER
I spoke with the patient and she had been getting the Apixaban free but has been taking it along with the Primidone for several years. The pharmacy informed me that the Primidone can cause a decrease in the blood concentrations. I told April that it was ok to fill both of them since she has been taking both and has a history of a Pulmonary Embolus and needs it. April verbalized understanding of above.

## 2018-05-03 ENCOUNTER — OFFICE VISIT (OUTPATIENT)
Dept: PAIN MEDICINE | Facility: CLINIC | Age: 68
End: 2018-05-03
Payer: MEDICARE

## 2018-05-03 ENCOUNTER — TELEPHONE (OUTPATIENT)
Dept: ELECTROPHYSIOLOGY | Facility: CLINIC | Age: 68
End: 2018-05-03

## 2018-05-03 VITALS
HEART RATE: 83 BPM | DIASTOLIC BLOOD PRESSURE: 58 MMHG | TEMPERATURE: 98 F | RESPIRATION RATE: 18 BRPM | BODY MASS INDEX: 26.37 KG/M2 | WEIGHT: 143.31 LBS | SYSTOLIC BLOOD PRESSURE: 135 MMHG | HEIGHT: 62 IN

## 2018-05-03 DIAGNOSIS — M47.9 OSTEOARTHRITIS OF SPINE, UNSPECIFIED SPINAL OSTEOARTHRITIS COMPLICATION STATUS, UNSPECIFIED SPINAL REGION: ICD-10-CM

## 2018-05-03 DIAGNOSIS — M54.40 ACUTE LEFT-SIDED LOW BACK PAIN WITH SCIATICA, SCIATICA LATERALITY UNSPECIFIED: ICD-10-CM

## 2018-05-03 DIAGNOSIS — M47.816 FACET HYPERTROPHY OF LUMBAR REGION: ICD-10-CM

## 2018-05-03 DIAGNOSIS — M54.17 LUMBOSACRAL RADICULOPATHY: Primary | ICD-10-CM

## 2018-05-03 DIAGNOSIS — G89.4 CHRONIC PAIN SYNDROME: ICD-10-CM

## 2018-05-03 PROCEDURE — 99999 PR PBB SHADOW E&M-EST. PATIENT-LVL III: CPT | Mod: PBBFAC,,, | Performed by: NURSE PRACTITIONER

## 2018-05-03 PROCEDURE — 99213 OFFICE O/P EST LOW 20 MIN: CPT | Mod: S$GLB,,, | Performed by: NURSE PRACTITIONER

## 2018-05-03 PROCEDURE — 3078F DIAST BP <80 MM HG: CPT | Mod: CPTII,S$GLB,, | Performed by: NURSE PRACTITIONER

## 2018-05-03 PROCEDURE — 3075F SYST BP GE 130 - 139MM HG: CPT | Mod: CPTII,S$GLB,, | Performed by: NURSE PRACTITIONER

## 2018-05-03 RX ORDER — GABAPENTIN 300 MG/1
600 CAPSULE ORAL 3 TIMES DAILY
Qty: 540 CAPSULE | Refills: 0 | Status: SHIPPED | OUTPATIENT
Start: 2018-05-03 | End: 2018-10-18 | Stop reason: SDUPTHER

## 2018-05-03 NOTE — PROGRESS NOTES
Chronic patient Established Note (Follow up visit)      SUBJECTIVE:    Yasmin Asencio presents to the clinic for a follow-up appointment for lower back pain.  She is s/p left then right L2,3,4,5 RFA completed on 4/4/18 with about 50% relief so far.  She feels as though she is still getting more benefit.  She is having intermittent radicular pain down the back of both legs with numbness.  She is taking Gabapentin 900 mg daily but is unsure if this is helping.  She is not having any side effects.  Since the last visit, Yasmin Asencio states the pain has been improving.  Current pain intensity is 4/10.    She has an extensive PMH including clotting disorder; COPD, h/o PE in 2016, Depression; Diverticulosis; Essential tremor; GERD; Hemangioma of liver; History of tuberculosis (1978); Hypertension; Mixed anxiety and depressive disorder; and Psoriasis.  She has frequent lung infections and is followed by pulmonology and infectious disease.    Pain Disability Index Review:  Last 3 PDI Scores 2/8/2018 11/11/2016 9/19/2016   Pain Disability Index (PDI) 34 19 10       Pain Medications:  Tramadol PRN and Gabapentin 300 mg TID    Opioid Contract: no     report:  Reviewed and consistent with medication use as prescribed.    Pain Procedures:   10/26/15 IR Epidural Transforaminal Inj 1ST Vert Lumbar Bilat   1/22/16 Facet Injection L2-L5   8/26/16 Bilaeral Lumbar MBB at L2-5  10/14/16 Bilateral L2,3,4,5 MBB- 100% relief for 5 days  12/16/16 Right L2,3,4,5 RFA- 80% relief    Physical Therapy/Home Exercise: yes per self    Imaging:     Lumbar XRAYs 10/13/15  Narrative   Lumbar spine    AP and lateral flexion and extension    There are 5 non-rib bearing lumbar vertebral bodies.  There is left convex scoliosis of the lumbar spine.  There is degenerative disk disease of L1/L2, L2/L3 and L5/S1.  Vertebral body height is maintained.  There is osteopenia.    The patient is status post cholecystectomy and there is  atherosclerosis.   Impression    There are degenerative changes throughout the lumbar spine.          Lumbar 10/22/15  Narrative   Comparison: Lumbar spine 11/15/13    Technique: Multiplanar multi-sequence MR images of the lumbar spine without contrast    Findings: The vertebral body heights and alignment are maintained.  No diffuse marrow replacement process is identified.  Mild levoscoliosis is noted.  The abdominal structures show a possible right hepatic cyst and mild ectasia of the abdominal aorta.    The spinal cord terminates at the L1 level and exhibits normal signal.  Hemangioma noted in the T12 vertebral body.  There are decreased intravertebral disk height at all lumbar levels, with associated endplate changes at L2-3.  Two small Tarlov cyst again noted in the sacral canal posterior to S2.  Annular fissure is noted at L3-4 and L4-5. Level by level findings are detailed below:    L1-2: Circumferential disk bulge and facet hypertrophy causing mild bilateral foraminal stenoses without spinal canal stenosis.  L2-3: Circumferential disk bulge and facet hypertrophy causing mild bilateral foraminal stenoses without spinal canal stenosis.  Small focus of subarticular signal abnormality on the right could represent a focus of fat or a cyst and may be abutting the exiting right L2 nerve root, unchanged from prior.  L3-4: Circumferential disk bulge and facet hypertrophy causing mild spinal canal, moderate right foraminal, and mild left foraminal stenoses.  L4-5: Circumferential disk bulge and facet and ligamentum flavum hypertrophy causing mild right and moderate left foraminal stenosis as well as mild spinal canal stenosis.  L5-S1: Facet hypertrophy causing mild left foraminal stenosis.  No right foraminal or spinal canal stenosis.   Impression       Multilevel degenerative changes of the lumbar spine related to facet and disk disease as detailed above, overall similar to the prior examination.    Focus of signal  abnormality abutting the exiting right L2 nerve root is favored to represent a focus of epidural fat, but could also represent a small cyst and is similar to prior.         Allergies:   Allergies   Allergen Reactions    Adhesive Other (See Comments)     Tears up the skin and makes it itch    Bactrim [Sulfamethoxazole-Trimethoprim] Rash     Was hospitalized for rash    Lipitor [Atorvastatin] Other (See Comments)     Muscle aches    Albuterol Other (See Comments)     tremors    Restasis [Cyclosporine] Itching       Current Medications:   Current Outpatient Prescriptions   Medication Sig Dispense Refill    acetaminophen (TYLENOL) 500 MG tablet Take 1,000 mg by mouth daily as needed for Pain (and headache).      alprazolam (XANAX) 0.25 MG tablet Take 1 tablet (0.25 mg total) by mouth nightly as needed for Anxiety. 30 tablet 0    ANORO ELLIPTA 62.5-25 mcg/actuation DsDv Inhale 1 puff into the lungs once daily. 30 each 6    apixaban 5 mg Tab Take 1 tablet (5 mg total) by mouth 2 (two) times daily. 180 tablet 1    chlorthalidone (HYGROTEN) 25 MG Tab Take 1 tablet (25 mg total) by mouth once daily. 90 tablet 3    desoximetasone (TOPICORT) 0.25 % cream Apply as directed bid prn (Patient taking differently: Apply as directed twice daily as needed for psoriasis) 60 g 2    escitalopram oxalate (LEXAPRO) 10 MG tablet Take 1 tablet (10 mg total) by mouth once daily. 30 tablet 11    fluocinolone (DERMA-SMOOTHE) 0.01 % external oil Apply topically once daily. 1 Bottle 5    fluocinonide (LIDEX) 0.05 % external solution Apply topically once daily. - apply after shampooing 60 mL 5    folic acid (FOLVITE) 1 MG tablet Take 1 tablet (1 mg total) by mouth once daily. 100 tablet 2    gabapentin (NEURONTIN) 300 MG capsule Take 1 capsule (300 mg total) by mouth 3 (three) times daily. 270 capsule 3    guaiFENesin (MUCINEX) 600 mg 12 hr tablet Take 1,200 mg by mouth 2 (two) times daily.      ipratropium (ATROVENT) 0.02 %  nebulizer solution INHALE ONE VIAL IN NEBULIZER 4 TIMES DAILY 225 vial 3    ketoconazole (NIZORAL) 2 % shampoo Apply topically once daily. 120 mL 5    Lactobacillus rhamnosus GG (CULTURELLE) 10 billion cell capsule Take 1 capsule by mouth once daily.      omeprazole (PRILOSEC) 20 MG capsule TAKE ONE CAPSULE BY MOUTH ONCE DAILY AS NEEDED 90 capsule 0    ondansetron (ZOFRAN) 4 MG tablet Take 1 tablet (4 mg total) by mouth every 8 (eight) hours as needed for Nausea. 45 tablet 0    primidone (MYSOLINE) 50 MG Tab TAKE ONE TABLET BY MOUTH ONCE DAILY IN THE MORNING, ONE AT NOON, AND TWO AT BEDTIME 120 tablet 11    primidone (MYSOLINE) 50 MG Tab TAKE ONE TABLET BY MOUTH ONCE DAILY IN THE MORNING AND ONE AT NOON AND TWO AT BEDTIME 120 tablet 11    propylene glycol (SYSTANE BALANCE) 0.6 % Drop Apply 1 drop to eye daily as needed (dry eye).      sodium chloride 3% 3 % nebulizer solution USE ONE VIAL IN NEBULIZER TWICE DAILY 240 mL 6    traMADol (ULTRAM) 50 mg tablet Take 1 tablet (50 mg total) by mouth every 6 (six) hours as needed for Pain. 45 tablet 0    verapamil (CALAN-SR) 180 MG CR tablet 2 (two) times daily.       VITAMIN D2 50,000 unit capsule TAKE ONE CAPSULE BY MOUTH ONCE A WEEK 4 capsule 4     No current facility-administered medications for this visit.        REVIEW OF SYSTEMS:    GENERAL:  No weight loss, malaise or fevers.  HEENT:  Negative for frequent or significant headaches.  NECK:  Negative for lumps, goiter, pain and significant neck swelling.  RESPIRATORY:  Negative for cough, wheezing or shortness of breath. H/O TB and recurrent lung infections.  CARDIOVASCULAR:  Negative for chest pain, leg swelling or palpitations.  GI:  Negative for abdominal discomfort, blood in stools or black stools or change in bowel habits. GERD.  MUSCULOSKELETAL:  See HPI.  SKIN:  Negative for lesions, rash, and itching.  PSYCH:  Negative for sleep disturbance, mood disorder and recent psychosocial  stressors.  HEMATOLOGY/LYMPHOLOGY:  Negative for prolonged bleeding, bruising easily or swollen nodes.  NEURO:   No history of headaches, syncope, paralysis, seizures or tremors.  All other reviewed and negative other than HPI.    Past Medical History:  Past Medical History:   Diagnosis Date    Acquired bronchiectasis     due to history of TB - followed by pulmonary, Dr. Zuñiga    Allergy     Amblyopia     rt eye per pt    Anemia of other chronic disease     Anticoagulant long-term use     Anxiety     Cataract     Clotting disorder     COPD (chronic obstructive pulmonary disease)     COPD (chronic obstructive pulmonary disease)     Depression     Diverticulosis     Essential tremor     GERD (gastroesophageal reflux disease)     Hemangioma of liver     History of tuberculosis 1978    Hypertension     Mixed anxiety and depressive disorder     Psoriasis     PSVT (paroxysmal supraventricular tachycardia)     Pulmonary embolism     S/P PICC central line placement Apr. 2016 - May 2016    Skin disease     Psoriasis    Spondylosis without myelopathy 8/23/2013    Supraventricular tachycardia     Thoracic aorta atherosclerosis     noted on CT scan of chest 1/3/2011    Tuberculosis     Vaginal delivery     x2    Vitamin D deficiency        Past Surgical History:  Past Surgical History:   Procedure Laterality Date    CATARACT EXTRACTION W/  INTRAOCULAR LENS IMPLANT  07/24/12    od dr [place]    CATARACT EXTRACTION W/  INTRAOCULAR LENS IMPLANT  08/07/12    left eye    GALLBLADDER SURGERY  9/2011       Family History:  Family History   Problem Relation Age of Onset    Hypertension Mother     Heart attack Mother     Cancer Father      liver and bladder    Hyperlipidemia Sister     Psoriasis Sister     Cancer Brother      liver    Stroke Maternal Uncle     Cancer Maternal Aunt      stomach    Lung cancer Sister     Heart attack Brother     Heart attack Son     Amblyopia Neg Hx      "Blindness Neg Hx     Cataracts Neg Hx     Glaucoma Neg Hx     Macular degeneration Neg Hx     Retinal detachment Neg Hx     Strabismus Neg Hx     Thyroid disease Neg Hx     Melanoma Neg Hx     Lupus Neg Hx     Eczema Neg Hx     COPD Neg Hx        Social History:  Social History     Social History    Marital status:      Spouse name: N/A    Number of children: 2    Years of education: N/A     Occupational History    housewife      Social History Main Topics    Smoking status: Former Smoker     Packs/day: 2.00     Years: 50.00     Types: Cigarettes     Quit date: 2009    Smokeless tobacco: Never Used    Alcohol use No    Drug use: No    Sexual activity: Yes     Partners: Male     Other Topics Concern    Are You Pregnant Or Think You May Be? No    Breast-Feeding No     Social History Narrative    One child  of a heart attack at age 35.       OBJECTIVE:    BP (!) 135/58   Pulse 83   Temp 98.1 °F (36.7 °C) (Oral)   Resp 18   Ht 5' 2" (1.575 m)   Wt 65 kg (143 lb 4.8 oz)   LMP  (LMP Unknown)   BMI 26.21 kg/m²     PHYSICAL EXAMINATION:    General appearance: Well appearing, in no acute distress, alert and oriented x3.  Psych:  Mood and affect appropriate.    Skin: Skin color, texture, turgor normal, no rashes or lesions, in both upper and lower body.  Head/face:  Atraumatic, normocephalic. No palpable lymph nodes  Cor: RRR  Pulm: CTA  GI: Abdomen soft and non-tender.  Back: Straight leg raising in the sitting and supine positions is negative to radicular pain. There is no pain with palpation to bilateral lumbar facet joints.  Limited flexion and extension with pain.  Mild facet loading bilaterally.  Extremities: Peripheral joint ROM is full and pain free without obvious instability or laxity in all four extremities. No deformities, edema, or skin discoloration. Good capillary refill.  Musculoskeletal:  Bilateral upper and lower extremity strength is normal and symmetric.  No " atrophy or tone abnormalities are noted.  Neuro: Bilateral upper and lower extremity coordination and muscle stretch reflexes are physiologic and symmetric.  Plantar response are downgoing. Decreased sensation to BLE.  Gait: Antalgic- ambulating without assistance.    ASSESSMENT: 67 y.o. year old female with lower back pain, consistent with the following diagnoses:     1. Lumbosacral radiculopathy     2. Osteoarthritis of spine, unspecified spinal osteoarthritis complication status, unspecified spinal region     3. Acute left-sided low back pain with sciatica, sciatica laterality unspecified  gabapentin (NEURONTIN) 300 MG capsule   4. Facet hypertrophy of lumbar region     5. Chronic pain syndrome           PLAN:     - Previous imaging was reviewed and discussed with the patient today.,    - She is s/p left then right L2,3,4,5 RFA with benefit. She is still obtaining more benefit as time goes on.    - Will consider ADRIAN if radicular symptoms worsen.    - Increase Gabapentin to 300 mg 2 pills TID.  I provided her with titration directions today.    - The patient will continue a home exercise routine to help with pain and strengthening.      - RTC as needed.    - Counseled patient regarding the importance of constant sleeping habits and physical therapy.      The above plan and management options were discussed at length with patient. Patient is in agreement with the above and verbalized understanding.    Cherise Jeffery  05/03/2018

## 2018-05-03 NOTE — PATIENT INSTRUCTIONS
Increase Gabapentin to 1 in the morning, 1 in the afternoon and 2 at night for a week  Then, increase to 2 in the morning, 1 in the afternoon and 2 at night for a week  Then, increase to 2 pills three times per day

## 2018-05-03 NOTE — TELEPHONE ENCOUNTER
Returned call to Pt. She states her B/P has been running low. This morning 100/51 and HR 83. She states she takes her medication after and she gets dizzy, and lethargic. She states she takes her B/P in the afternoon at its 103/61 and she takes verapamil.  Last OV with Dr Ballesteros he added chlorthalidone 25 mg qd. Advised I would discuss with Dr Ballesteros and get back with her.    Updated Dr Ballesteros on Pt. He recommended that the Pt cut the dose of chlorthalidone in half. (12.5 mg ).     Called Pt to advise. Pt voiced understanding.    ----- Message from Emily Evans MA sent at 4/30/2018  2:51 PM CDT -----  Contact: pt  Sunday message  ----- Message -----  From: Nohemy Prakash RN  Sent: 4/28/2018  12:21 AM  To: Emily Evans MA        ----- Message -----  From: Keli Bermudez  Sent: 4/27/2018  10:54 AM  To: [facility] Staff    Please call pt in ref to her blood pressure meds. She says she feels very tired and her pressure this morning at 930 was 100/52 before meds and 103/61 after meds at 1030.    Thanks

## 2018-05-09 ENCOUNTER — OFFICE VISIT (OUTPATIENT)
Dept: FAMILY MEDICINE | Facility: CLINIC | Age: 68
End: 2018-05-09
Payer: MEDICARE

## 2018-05-09 VITALS
BODY MASS INDEX: 26.96 KG/M2 | DIASTOLIC BLOOD PRESSURE: 52 MMHG | SYSTOLIC BLOOD PRESSURE: 118 MMHG | WEIGHT: 146.5 LBS | OXYGEN SATURATION: 95 % | HEART RATE: 76 BPM | TEMPERATURE: 98 F | HEIGHT: 62 IN

## 2018-05-09 DIAGNOSIS — J47.9 ACQUIRED BRONCHIECTASIS: ICD-10-CM

## 2018-05-09 DIAGNOSIS — K21.9 GASTROESOPHAGEAL REFLUX DISEASE WITHOUT ESOPHAGITIS: Chronic | ICD-10-CM

## 2018-05-09 DIAGNOSIS — Z79.01 CURRENT USE OF LONG TERM ANTICOAGULATION: ICD-10-CM

## 2018-05-09 DIAGNOSIS — I47.19 ECTOPIC ATRIAL TACHYCARDIA: ICD-10-CM

## 2018-05-09 DIAGNOSIS — I50.22 CHRONIC SYSTOLIC HEART FAILURE: ICD-10-CM

## 2018-05-09 DIAGNOSIS — I12.9 BENIGN HYPERTENSION WITH CHRONIC KIDNEY DISEASE, STAGE III: Primary | ICD-10-CM

## 2018-05-09 DIAGNOSIS — R79.89 ELEVATED SERUM HOMOCYSTEINE LEVEL: ICD-10-CM

## 2018-05-09 DIAGNOSIS — Z86.711 HISTORY OF PULMONARY EMBOLISM: ICD-10-CM

## 2018-05-09 DIAGNOSIS — J43.8 OTHER EMPHYSEMA: ICD-10-CM

## 2018-05-09 DIAGNOSIS — D64.9 ANEMIA, UNSPECIFIED TYPE: ICD-10-CM

## 2018-05-09 DIAGNOSIS — Z78.9 STATIN INTOLERANCE: ICD-10-CM

## 2018-05-09 DIAGNOSIS — N18.30 BENIGN HYPERTENSION WITH CHRONIC KIDNEY DISEASE, STAGE III: Primary | ICD-10-CM

## 2018-05-09 DIAGNOSIS — I70.0 THORACIC AORTA ATHEROSCLEROSIS: ICD-10-CM

## 2018-05-09 DIAGNOSIS — J96.11 CHRONIC RESPIRATORY FAILURE WITH HYPOXIA: ICD-10-CM

## 2018-05-09 DIAGNOSIS — M85.89 OSTEOPENIA OF MULTIPLE SITES: ICD-10-CM

## 2018-05-09 DIAGNOSIS — M48.061 SPINAL STENOSIS OF LUMBAR REGION WITHOUT NEUROGENIC CLAUDICATION: ICD-10-CM

## 2018-05-09 PROCEDURE — 99215 OFFICE O/P EST HI 40 MIN: CPT | Mod: S$GLB,,, | Performed by: INTERNAL MEDICINE

## 2018-05-09 PROCEDURE — 3078F DIAST BP <80 MM HG: CPT | Mod: CPTII,S$GLB,, | Performed by: INTERNAL MEDICINE

## 2018-05-09 PROCEDURE — 99999 PR PBB SHADOW E&M-EST. PATIENT-LVL III: CPT | Mod: PBBFAC,,, | Performed by: INTERNAL MEDICINE

## 2018-05-09 PROCEDURE — 3074F SYST BP LT 130 MM HG: CPT | Mod: CPTII,S$GLB,, | Performed by: INTERNAL MEDICINE

## 2018-05-09 NOTE — PROGRESS NOTES
HISTORY OF PRESENT ILLNESS:  Yasmin Asencio is a 67 y.o. female who presents to the clinic today for Hypertension and COPD  .  The patient presents to clinic today for follow-up of her hypertension complicated by chronic kidney disease stage 3, CHF, atrial tachycardia, bronchiectasis, and history of PE.  She has seen Hematology.  All her specialists recommend she stay on anticoagulation for history of PE.  She is also on primidone which could decrease the effectiveness of Eliquis.  This needs to be addressed.  She has some osteopenia, but no need for prescription treatment at this time.  She denies any significant problems with heartburn or reflux at this time.  She has chronic mild low back pain. She also has some chronic anemia and has been noted to have an elevated serum homocystine level.  She has been started on folic acid.  She took a course of antibiotics about a month ago.  She states today she is doing okay.  She has chronic wheezing but does not feel short of breath or has increased cough with sputum production.  She denies abdominal pain, temperature intolerance, or unexplained changes in her weight.  She denies cardiac chest pain.      PAST MEDICAL HISTORY:  Past Medical History:   Diagnosis Date    Acquired bronchiectasis     due to history of TB - followed by pulmonary, Dr. Zuñiga    Allergy     Amblyopia     rt eye per pt    Anemia of other chronic disease     Anticoagulant long-term use     Anxiety     Cataract     Clotting disorder     COPD (chronic obstructive pulmonary disease)     COPD (chronic obstructive pulmonary disease)     Depression     Diverticulosis     Essential tremor     GERD (gastroesophageal reflux disease)     Hemangioma of liver     History of tuberculosis 1978    Hypertension     Mixed anxiety and depressive disorder     Psoriasis     PSVT (paroxysmal supraventricular tachycardia)     Pulmonary embolism     S/P PICC central line placement Apr. 2016 -  May 2016    Skin disease     Psoriasis    Spondylosis without myelopathy 2013    Supraventricular tachycardia     Thoracic aorta atherosclerosis     noted on CT scan of chest 1/3/2011    Tuberculosis     Vaginal delivery     x2    Vitamin D deficiency        PAST SURGICAL HISTORY:  Past Surgical History:   Procedure Laterality Date    CATARACT EXTRACTION W/  INTRAOCULAR LENS IMPLANT  12    od dr spain    CATARACT EXTRACTION W/  INTRAOCULAR LENS IMPLANT  12    left eye    GALLBLADDER SURGERY  2011       SOCIAL HISTORY:  Social History     Social History    Marital status:      Spouse name: N/A    Number of children: 2    Years of education: N/A     Occupational History    housewife      Social History Main Topics    Smoking status: Former Smoker     Packs/day: 2.00     Years: 50.00     Types: Cigarettes     Quit date: 2009    Smokeless tobacco: Never Used    Alcohol use No    Drug use: No    Sexual activity: Yes     Partners: Male     Other Topics Concern    Are You Pregnant Or Think You May Be? No    Breast-Feeding No     Social History Narrative    One child  of a heart attack at age 35.       FAMILY HISTORY:  Family History   Problem Relation Age of Onset    Hypertension Mother     Heart attack Mother     Cancer Father         liver and bladder    Hyperlipidemia Sister     Psoriasis Sister     Cancer Brother         liver    Stroke Maternal Uncle     Cancer Maternal Aunt         stomach    Lung cancer Sister     Heart attack Brother     Heart attack Son     Amblyopia Neg Hx     Blindness Neg Hx     Cataracts Neg Hx     Glaucoma Neg Hx     Macular degeneration Neg Hx     Retinal detachment Neg Hx     Strabismus Neg Hx     Thyroid disease Neg Hx     Melanoma Neg Hx     Lupus Neg Hx     Eczema Neg Hx     COPD Neg Hx        ALLERGIES AND MEDICATIONS: updated and reviewed.  Review of patient's allergies indicates:   Allergen Reactions     Adhesive Other (See Comments)     Tears up the skin and makes it itch    Bactrim [sulfamethoxazole-trimethoprim] Rash     Was hospitalized for rash    Lipitor [atorvastatin] Other (See Comments)     Muscle aches    Albuterol Other (See Comments)     tremors    Topamax [topiramate]      - made her feel 'bad in the head'    Restasis [cyclosporine] Itching     Medication List with Changes/Refills   Current Medications    ACETAMINOPHEN (TYLENOL) 500 MG TABLET    Take 1,000 mg by mouth daily as needed for Pain (and headache).    ALPRAZOLAM (XANAX) 0.25 MG TABLET    Take 1 tablet (0.25 mg total) by mouth nightly as needed for Anxiety.    ANORO ELLIPTA 62.5-25 MCG/ACTUATION DSDV    Inhale 1 puff into the lungs once daily.    APIXABAN 5 MG TAB    Take 1 tablet (5 mg total) by mouth 2 (two) times daily.    CHLORTHALIDONE (HYGROTEN) 25 MG TAB    Take 1 tablet (25 mg total) by mouth once daily.    DESOXIMETASONE (TOPICORT) 0.25 % CREAM    Apply as directed bid prn    ESCITALOPRAM OXALATE (LEXAPRO) 10 MG TABLET    Take 1 tablet (10 mg total) by mouth once daily.    FLUOCINOLONE (DERMA-SMOOTHE) 0.01 % EXTERNAL OIL    Apply topically once daily.    FLUOCINONIDE (LIDEX) 0.05 % EXTERNAL SOLUTION    Apply topically once daily. - apply after shampooing    FOLIC ACID (FOLVITE) 1 MG TABLET    Take 1 tablet (1 mg total) by mouth once daily.    GABAPENTIN (NEURONTIN) 300 MG CAPSULE    Take 2 capsules (600 mg total) by mouth 3 (three) times daily.    GUAIFENESIN (MUCINEX) 600 MG 12 HR TABLET    Take 1,200 mg by mouth 2 (two) times daily.    IPRATROPIUM (ATROVENT) 0.02 % NEBULIZER SOLUTION    INHALE ONE VIAL IN NEBULIZER 4 TIMES DAILY    KETOCONAZOLE (NIZORAL) 2 % SHAMPOO    Apply topically once daily.    LACTOBACILLUS RHAMNOSUS GG (CULTURELLE) 10 BILLION CELL CAPSULE    Take 1 capsule by mouth once daily.    OMEPRAZOLE (PRILOSEC) 20 MG CAPSULE    TAKE ONE CAPSULE BY MOUTH ONCE DAILY AS NEEDED    ONDANSETRON (ZOFRAN) 4 MG TABLET     Take 1 tablet (4 mg total) by mouth every 8 (eight) hours as needed for Nausea.    PRIMIDONE (MYSOLINE) 50 MG TAB    TAKE ONE TABLET BY MOUTH ONCE DAILY IN THE MORNING, ONE AT NOON, AND TWO AT BEDTIME    PROPYLENE GLYCOL (SYSTANE BALANCE) 0.6 % DROP    Apply 1 drop to eye daily as needed (dry eye).    SODIUM CHLORIDE 3% 3 % NEBULIZER SOLUTION    USE ONE VIAL IN NEBULIZER TWICE DAILY    TRAMADOL (ULTRAM) 50 MG TABLET    Take 1 tablet (50 mg total) by mouth every 6 (six) hours as needed for Pain.    VERAPAMIL (CALAN-SR) 180 MG CR TABLET    2 (two) times daily.     VITAMIN D2 50,000 UNIT CAPSULE    TAKE ONE CAPSULE BY MOUTH ONCE A WEEK   Discontinued Medications    PRIMIDONE (MYSOLINE) 50 MG TAB    TAKE ONE TABLET BY MOUTH ONCE DAILY IN THE MORNING AND ONE AT NOON AND TWO AT BEDTIME          CARE TEAM:  Patient Care Team:  Urmila Luna MD as PCP - General (Internal Medicine)  Taurus Braga MD as Consulting Physician (Cardiology)  PRECIOUS Zuñiga MD as Consulting Physician (Pulmonary Disease)  Louie Yuan MD as Consulting Physician (Infectious Diseases)  Issac Meyers MD as Consulting Physician (Pain Medicine)         REVIEW OF SYSTEMS:  Review of Systems   Constitutional: Negative for activity change, chills, fatigue, fever and unexpected weight change.   HENT: Negative for congestion, ear discharge, ear pain, hearing loss, postnasal drip, rhinorrhea and trouble swallowing.    Eyes: Positive for visual disturbance (- improved with recent glasses). Negative for photophobia, pain and discharge.   Respiratory: Positive for wheezing. Negative for cough, chest tightness and shortness of breath.    Cardiovascular: Positive for chest pain (- related to her bronchiectasis). Negative for palpitations and leg swelling.   Gastrointestinal: Negative for abdominal pain, blood in stool, constipation, diarrhea, nausea and vomiting.   Endocrine: Negative for polydipsia and polyuria.   Genitourinary: Positive for  "difficulty urinating (- mild/chronic; has declined referral to urology). Negative for dysuria, frequency, hematuria, menstrual problem, urgency and vaginal discharge.   Musculoskeletal: Positive for arthralgias and neck pain. Negative for back pain, joint swelling and neck stiffness.   Skin: Negative for rash.   Neurological: Positive for headaches (- mild/chronic). Negative for weakness.   Psychiatric/Behavioral: Negative for confusion, dysphoric mood and sleep disturbance. The patient is not nervous/anxious.          PHYSICAL EXAM:   Vitals:    05/09/18 1454   BP: (!) 118/52   Pulse: 76   Temp: 97.9 °F (36.6 °C)     Weight: 66.4 kg (146 lb 7.9 oz)   Height: 5' 2" (157.5 cm)   Body mass index is 26.79 kg/m².     General appearance - alert, well appearing, and in no distress and overweight  Mental status - alert, oriented to person, place, and time, normal mood, behavior, speech, dress, motor activity, and thought processes  Eyes - pupils equal and reactive, extraocular eye movements intact, sclera anicteric  Mouth - mucous membranes moist, pharynx normal without lesions  Neck - supple, no significant adenopathy, carotids upstroke normal bilaterally, no bruits, thyroid exam: thyroid is normal in size without nodules or tenderness  Lymphatics - no palpable lymphadenopathy  Chest - no tachypnea, retractions or cyanosis, wheezing noted - diffusely on both inspiration and expiration (baseline)  Heart - normal rate and regular rhythm, no gallops noted  Back exam - mild limited range of motion, no significant pain with motion noted during exam  Neurological - alert, oriented, normal speech, no focal findings or movement disorder noted, cranial nerves II through XII intact  Musculoskeletal - no muscular tenderness noted, Mild-Moderate osteoarthritic changes noted to both knee joints. No joint effusions noted.   Extremities - no pedal edema noted  Skin - normal coloration and turgor, no rashes, no suspicious skin lesions " noted      ASSESSMENT AND PLAN:  1. Benign hypertension with chronic kidney disease, stage III  Discussed sodium restriction, maintaining ideal body weight and regular exercise program as physiologic means to achieve blood pressure control. The patient will strive towards this. The current medical regimen is effective;  continue present plan and medications. Recommended patient to check home readings to monitor and see me for followup as scheduled or sooner as needed. Patient was educated that both decongestant and anti-inflammatory medication may raise blood pressure. Stable kidney function. Observe. Patient counseled to avoid/minimize the use of anti-inflammatory  Medication. Discussed to stay well hydrated. Also discussed with patient that good control of blood pressure or diabetes, if present, will help to prevent progression.     2. Ectopic atrial tachycardia/3. Chronic systolic heart failure  Stable. Followed by cardiology.    4. Acquired bronchiectasis/7. Other emphysema/8. Chronic respiratory failure with hypoxia  Stable. Continue current inhalers. Followed by pulmonary.    5. Thoracic aorta atherosclerosis/6. Statin intolerance  Patient with Atherosclerosis of the Aorta.  Stable/asymptomatic. Currently stable on lipid and b/p monitoring. Statin intolerant.    9. History of pulmonary embolism/10. Current use of long term anticoagulation  Continue eliquis for now. Will discuss with hematology the interaction between primidone and eliquis.    11. Gastroesophageal reflux disease without esophagitis  Symptoms controlled. Reflux precautions discussed (eliminate tobacco if a smoker; minimize caffeine, chocolate and red/white peppermint intake; avoid heavy and spicy meals; don't lay down within 2-3 hours after eating). Medication as needed. Patient asked to take medication breaks, if possible - discussed chronic use can limit calcium absorption, increases the risk for intestinal infections, and can cause kidney  damage. There are also some newer studies that show possible increased risk of mortality.     12. Osteopenia of multiple sites  We discussed adequate calcium and vitamin D supplementation. We discussed fall precautions. She is up to date on her BMD. No need for Rx tx at this time.     13. Spinal stenosis of lumbar region without neurogenic claudication  Stable. Medication as needed.     14. Anemia, unspecified type  Stable. Will discuss with hematology if she needs iron supplementation.    15. Elevated serum homocysteine level  Started on folic acid per hematology. Observe.           Follow-up in about 6 months (around 11/9/2018), or if symptoms worsen or fail to improve, for annual exam. or sooner as needed.

## 2018-05-09 NOTE — Clinical Note
Dr. Foreman - the patient is wondering, based on recent CBC and iron studies, if she should take any iron supplementation. Also, she is on both primidone and eliquis (it is reported that the primidone will make the eliquis less effective) - is eliquis the best medication for her to stay on to prevent another PE?. Thanks for the help! Urmila

## 2018-05-15 ENCOUNTER — PATIENT MESSAGE (OUTPATIENT)
Dept: PULMONOLOGY | Facility: CLINIC | Age: 68
End: 2018-05-15

## 2018-05-15 ENCOUNTER — PATIENT MESSAGE (OUTPATIENT)
Dept: FAMILY MEDICINE | Facility: CLINIC | Age: 68
End: 2018-05-15

## 2018-05-15 ENCOUNTER — PATIENT MESSAGE (OUTPATIENT)
Dept: NEUROLOGY | Facility: CLINIC | Age: 68
End: 2018-05-15

## 2018-05-15 ENCOUNTER — TELEPHONE (OUTPATIENT)
Dept: PULMONOLOGY | Facility: CLINIC | Age: 68
End: 2018-05-15

## 2018-05-15 NOTE — TELEPHONE ENCOUNTER
Patient is going to call her Hem- Onc Dr as recommended by Dr Zuñiga. She will also call her PCP Dr Luna to keep her up to date of what medications she is taking. She said she will most likely start the Xarelto but will talk with the other Dr's first. Nubia Ferrera MA, II .

## 2018-05-16 ENCOUNTER — PATIENT MESSAGE (OUTPATIENT)
Dept: HEMATOLOGY/ONCOLOGY | Facility: CLINIC | Age: 68
End: 2018-05-16

## 2018-05-16 NOTE — TELEPHONE ENCOUNTER
Spoke to pt. She states they are now talking about switching the Eliquis so she does not have to come off primidone. She will clarify tomorrow and let me know.

## 2018-05-17 ENCOUNTER — OFFICE VISIT (OUTPATIENT)
Dept: NEUROLOGY | Facility: CLINIC | Age: 68
End: 2018-05-17
Payer: MEDICARE

## 2018-05-17 ENCOUNTER — TELEPHONE (OUTPATIENT)
Dept: FAMILY MEDICINE | Facility: CLINIC | Age: 68
End: 2018-05-17

## 2018-05-17 VITALS
DIASTOLIC BLOOD PRESSURE: 67 MMHG | WEIGHT: 145.75 LBS | SYSTOLIC BLOOD PRESSURE: 126 MMHG | BODY MASS INDEX: 26.82 KG/M2 | HEIGHT: 62 IN | HEART RATE: 76 BPM

## 2018-05-17 DIAGNOSIS — G25.0 ESSENTIAL TREMOR: Primary | ICD-10-CM

## 2018-05-17 DIAGNOSIS — Z86.711 HISTORY OF PULMONARY EMBOLISM: Primary | ICD-10-CM

## 2018-05-17 PROCEDURE — 3074F SYST BP LT 130 MM HG: CPT | Mod: CPTII,S$GLB,, | Performed by: NURSE PRACTITIONER

## 2018-05-17 PROCEDURE — 99999 PR PBB SHADOW E&M-EST. PATIENT-LVL III: CPT | Mod: PBBFAC,,, | Performed by: NURSE PRACTITIONER

## 2018-05-17 PROCEDURE — 3078F DIAST BP <80 MM HG: CPT | Mod: CPTII,S$GLB,, | Performed by: NURSE PRACTITIONER

## 2018-05-17 PROCEDURE — 99499 UNLISTED E&M SERVICE: CPT | Mod: S$GLB,,, | Performed by: NURSE PRACTITIONER

## 2018-05-17 PROCEDURE — 99213 OFFICE O/P EST LOW 20 MIN: CPT | Mod: S$GLB,,, | Performed by: NURSE PRACTITIONER

## 2018-05-17 NOTE — TELEPHONE ENCOUNTER
----- Message from Chika Scott sent at 5/17/2018 10:31 AM CDT -----  Contact: Yasmin [phone]  Pt would like to speak to Ms. Padminikadeem in regards to medication interaction. Please call at your earliest convenience.

## 2018-05-17 NOTE — PROGRESS NOTES
Name: Yasmin Asencio  MRN: [id number]   [facility]: [id number]      Date: 5-17-18      Referring physician:  No referring provider defined for this encounter.    Subjective:      Chief Complaint:     History of Present Illness (HPI):    Yasmin Asencio is a 67 y.o. right-handed female who presents today for a follow-up evaluation of ET and mild memory impairment and is alone. Last seen in office 3-6-18. At that time, she was continued on primidone without change. She was due to come back in September. We spoke by phone yesterday. She has been on Eliquis and primidone for a year now but her pharmacy recenlty flagged it stating they should not be taken together.Our system never flagged this when ordering. Her hematologist rec Xarelto but she got the same warning (could lower the levels of eliquis or xarelto). She is on Eliquis as had PE.           Meds tried   TPX   Cd/ld  gabapentin  Primidone  NOT TRIED PROPRANOLOL DUE TO COPD      Review of Systems   Musculoskeletal: Positive for back pain.   Neurological: Positive for tremors.   Psychiatric/Behavioral: Negative for sleep disturbance.       Past Medical History: The patient  has a past medical history of Acquired bronchiectasis; Allergy; Amblyopia; Anemia of other chronic disease; Anticoagulant long-term use; Anxiety; Cataract; Clotting disorder; COPD (chronic obstructive pulmonary disease); COPD (chronic obstructive pulmonary disease); Depression; Diverticulosis; Essential tremor; GERD (gastroesophageal reflux disease); Hemangioma of liver; History of tuberculosis (1978); Hypertension; Mixed anxiety and depressive disorder; Psoriasis; PSVT (paroxysmal supraventricular tachycardia); Pulmonary embolism; S/P PICC central line placement (Apr. 2016 - May 2016); Skin disease; Spondylosis without myelopathy (8/23/2013); Supraventricular tachycardia; Thoracic aorta atherosclerosis; Tuberculosis; Vaginal delivery; and Vitamin D deficiency.    Social History: The patient   reports that she quit smoking about 8 years ago. Her smoking use included Cigarettes. She has a 100.00 pack-year smoking history. She has never used smokeless tobacco. She reports that she does not drink alcohol or use drugs.    Family History: Their family history includes Cancer in her brother, father, and maternal aunt; Heart attack in her brother, mother, and son; Hyperlipidemia in her sister; Hypertension in her mother; Lung cancer in her sister; Psoriasis in her sister; Stroke in her maternal uncle.    Allergies: Adhesive; Bactrim [sulfamethoxazole-trimethoprim]; Lipitor [atorvastatin]; Albuterol; Topamax [topiramate]; and Restasis [cyclosporine]     Meds:   Current Outpatient Prescriptions on File Prior to Visit   Medication Sig Dispense Refill    acetaminophen (TYLENOL) 500 MG tablet Take 1,000 mg by mouth daily as needed for Pain (and headache).      alprazolam (XANAX) 0.25 MG tablet Take 1 tablet (0.25 mg total) by mouth nightly as needed for Anxiety. 30 tablet 0    ANORO ELLIPTA 62.5-25 mcg/actuation DsDv Inhale 1 puff into the lungs once daily. 30 each 6    apixaban 5 mg Tab Take 1 tablet (5 mg total) by mouth 2 (two) times daily. 180 tablet 1    chlorthalidone (HYGROTEN) 25 MG Tab Take 1 tablet (25 mg total) by mouth once daily. 90 tablet 3    desoximetasone (TOPICORT) 0.25 % cream Apply as directed bid prn (Patient taking differently: Apply as directed twice daily as needed for psoriasis) 60 g 2    escitalopram oxalate (LEXAPRO) 10 MG tablet Take 1 tablet (10 mg total) by mouth once daily. 30 tablet 11    folic acid (FOLVITE) 1 MG tablet Take 1 tablet (1 mg total) by mouth once daily. 100 tablet 2    gabapentin (NEURONTIN) 300 MG capsule Take 2 capsules (600 mg total) by mouth 3 (three) times daily. 540 capsule 0    ipratropium (ATROVENT) 0.02 % nebulizer solution INHALE ONE VIAL IN NEBULIZER 4 TIMES DAILY 225 vial 3    ketoconazole (NIZORAL) 2 % shampoo Apply topically once daily. 120 mL 5  "   Lactobacillus rhamnosus GG (CULTURELLE) 10 billion cell capsule Take 1 capsule by mouth once daily.      omeprazole (PRILOSEC) 20 MG capsule TAKE ONE CAPSULE BY MOUTH ONCE DAILY AS NEEDED 90 capsule 0    ondansetron (ZOFRAN) 4 MG tablet Take 1 tablet (4 mg total) by mouth every 8 (eight) hours as needed for Nausea. 45 tablet 0    primidone (MYSOLINE) 50 MG Tab TAKE ONE TABLET BY MOUTH ONCE DAILY IN THE MORNING, ONE AT NOON, AND TWO AT BEDTIME 120 tablet 11    propylene glycol (SYSTANE BALANCE) 0.6 % Drop Apply 1 drop to eye daily as needed (dry eye).      rivaroxaban (XARELTO) 20 mg Tab Take 1 tablet (20 mg total) by mouth daily with dinner or evening meal. 30 tablet 5    sodium chloride 3% 3 % nebulizer solution USE ONE VIAL IN NEBULIZER TWICE DAILY 240 mL 6    traMADol (ULTRAM) 50 mg tablet Take 1 tablet (50 mg total) by mouth every 6 (six) hours as needed for Pain. 45 tablet 0    verapamil (CALAN-SR) 180 MG CR tablet 2 (two) times daily.       VITAMIN D2 50,000 unit capsule TAKE ONE CAPSULE BY MOUTH ONCE A WEEK 4 capsule 4    fluocinolone (DERMA-SMOOTHE) 0.01 % external oil Apply topically once daily. 1 Bottle 5    fluocinonide (LIDEX) 0.05 % external solution Apply topically once daily. - apply after shampooing 60 mL 5    guaiFENesin (MUCINEX) 600 mg 12 hr tablet Take 1,200 mg by mouth 2 (two) times daily.       No current facility-administered medications on file prior to visit.        Objective:     Physical Exam:    Vitals:    05/17/18 1516   BP: 126/67   Pulse: 76   Weight: 66.1 kg (145 lb 11.6 oz)   Height: 5' 2" (1.575 m)     Body mass index is 26.65 kg/m².    Constitutional  Well-developed, well-nourished, appears stated age     ..  * Specialized movement exam  No hypophonic speech.    No facial masking.     No cogwheel rigidity.       Voice tremor.   No tremor with rest.   Subtle postural tremor RH and near mild LH.   No kinetic tremor.   Mild intention tremor   Mild intention tremor BH.  "         Laboratory Results:  Lab Visit on 05/01/2018   Component Date Value Ref Range Status    WBC 05/01/2018 6.81  3.90 - 12.70 K/uL Final    RBC 05/01/2018 3.68* 4.00 - 5.40 M/uL Final    Hemoglobin 05/01/2018 10.6* 12.0 - 16.0 g/dL Final    Hematocrit 05/01/2018 31.5* 37.0 - 48.5 % Final    MCV 05/01/2018 86  82 - 98 fL Final    MCH 05/01/2018 28.8  27.0 - 31.0 pg Final    MCHC 05/01/2018 33.7  32.0 - 36.0 g/dL Final    RDW 05/01/2018 12.8  11.5 - 14.5 % Final    Platelets 05/01/2018 270  150 - 350 K/uL Final    MPV 05/01/2018 9.3  9.2 - 12.9 fL Final    Gran # (ANC) 05/01/2018 4.1  1.8 - 7.7 K/uL Final    Lymph # 05/01/2018 2.0  1.0 - 4.8 K/uL Final    Mono # 05/01/2018 0.6  0.3 - 1.0 K/uL Final    Eos # 05/01/2018 0.1  0.0 - 0.5 K/uL Final    Baso # 05/01/2018 0.03  0.00 - 0.20 K/uL Final    Gran% 05/01/2018 60.3  38.0 - 73.0 % Final    Lymph% 05/01/2018 28.9  18.0 - 48.0 % Final    Mono% 05/01/2018 9.4  4.0 - 15.0 % Final    Eosinophil% 05/01/2018 0.9  0.0 - 8.0 % Final    Basophil% 05/01/2018 0.4  0.0 - 1.9 % Final    Differential Method 05/01/2018 Automated   Final    Iron 05/01/2018 46  30 - 160 ug/dL Final    Transferrin 05/01/2018 303  200 - 375 mg/dL Final    TIBC 05/01/2018 448  250 - 450 ug/dL Final    Saturated Iron 05/01/2018 10* 20 - 50 % Final   Lab Visit on 04/16/2018   Component Date Value Ref Range Status    Fibrinogen 04/16/2018 435* 182 - 366 mg/dL Final    Antithrombin III 04/16/2018 >131* 83 - 118 % Final    Protein C Activity 04/16/2018 151* 70 - 140 % Final    Protein S Activity 04/16/2018 >130  70 - 140 % Final    APA Isotype IgG 04/16/2018 <9.40  0.00 - 14.99 GPL Final    APA Isotype IgM 04/16/2018 <9.40  0.00 - 12.49 MPL Final    DRVVT, Lupus Anticoagulant 04/16/2018 SEE COMMENT  Negative Final    Homocysteine 04/16/2018 18.2* 4.0 - 15.5 umol/L Final    Factor V Leiden 04/16/2018 See Below   Final    Prothrombin Mutation 04/16/2018 See Below   Final     Hex Phosph Neut Test 04/16/2018 Positive* Negative Final   Lab Visit on 03/22/2018   Component Date Value Ref Range Status    Cholesterol 03/22/2018 204* 120 - 199 mg/dL Final    Triglycerides 03/22/2018 139  30 - 150 mg/dL Final    HDL 03/22/2018 68  40 - 75 mg/dL Final    LDL Cholesterol 03/22/2018 108.2  63.0 - 159.0 mg/dL Final    HDL/Chol Ratio 03/22/2018 33.3  20.0 - 50.0 % Final    Total Cholesterol/HDL Ratio 03/22/2018 3.0  2.0 - 5.0 Final    Non-HDL Cholesterol 03/22/2018 136  mg/dL Final    PTH, Intact 03/22/2018 51.0  9.0 - 77.0 pg/mL Final     NP testing 5-2-17  Consult Dx:  1. Mild neurocognitive disorder due to multiple etiologies  2. Depressive disorder, unspecified        Assessment and Plan     Essential tremor        Medical Decision Making:    We discussed that tremor can be problematic it is not life threatening. After discussion, she would like to come off primidone.   I have given her a weaning schedule.   She understands that her tremor will likely get worse.   She has already tried multiple medications and is on gabapentin for back.  Follow up September.   Call for problems.       I spent 20 minutes face-to-face with the patient with >50% of the time spent with counseling and education regarding:  - results of data, diagnosis, and recommendations stated above  - the prognosis of tremor  - risks and benefits of primidone   - importance of diet and exercise    Kristi Torres, DNP, NP-C  Division of Movement and Memory Disorders  [facility]  [phone]

## 2018-05-17 NOTE — PATIENT INSTRUCTIONS
Wean primidone 50 mg tablets as follows:    Week 1-  Take 1 tablet morning and afternoon and 1. 5 tablets at bedtime    Week 2- take 1 tablet morning, afternoon, and bedtime    Week 3- take 1 tablet morning, 1/2 tablet in afternoon, and 1 tablet at bedtime    Week 4- take 1/2 tablet morning and afternoon and 1 tablet at bedtime    Week 5- take 1/2 tablet morning, afternoon, and bedtime    Week 6- take 1/2 tablet morning and bedtime    Week 7- take 1/2 tablet at bedtime    Week 8 and after- stop primidone

## 2018-05-17 NOTE — TELEPHONE ENCOUNTER
Patient was told by her Hematologist there is a drug interaction between primidone and apixaban. She would like to know if she should stop the primidone or try weaning off the apixaban and start on xeralto because it has a lesser effect or contact her neurologist please advise.

## 2018-05-17 NOTE — TELEPHONE ENCOUNTER
There is no weaning necessary. For now she should stay on the apixaban. I will send a prescription for the xarelto to the [facility] to see if we can get assistance to pay for it. Once she gets the Xarelto she can just stop the apixaban and start the xarelto.

## 2018-05-18 ENCOUNTER — TELEPHONE (OUTPATIENT)
Dept: FAMILY MEDICINE | Facility: CLINIC | Age: 68
End: 2018-05-18

## 2018-05-18 NOTE — TELEPHONE ENCOUNTER
----- Message from Kate Koo sent at 5/17/2018  4:35 PM CDT -----  Contact: self  Pt is asking for a call back to discuss her --- Aj---   [phone].

## 2018-05-18 NOTE — TELEPHONE ENCOUNTER
Spoke w/patient, states the Neurologist will be weaning her off Primidone if it is ok with . States Primidone interacts with eliquis ( don't get the full effect of eliquis). States a Rx for Xarelto was sent to the pharmacist, but  since she is on eliquis, she would like to stay on it and just deal with the tremors instead of having blood clots. Please advise.

## 2018-05-21 DIAGNOSIS — K21.9 GASTROESOPHAGEAL REFLUX DISEASE WITHOUT ESOPHAGITIS: Chronic | ICD-10-CM

## 2018-05-21 RX ORDER — OMEPRAZOLE 20 MG/1
20 CAPSULE, DELAYED RELEASE ORAL DAILY PRN
Qty: 90 CAPSULE | Refills: 0 | Status: SHIPPED | OUTPATIENT
Start: 2018-05-21 | End: 2018-08-20 | Stop reason: SDUPTHER

## 2018-05-23 ENCOUNTER — TELEPHONE (OUTPATIENT)
Dept: INFECTIOUS DISEASES | Facility: CLINIC | Age: 68
End: 2018-05-23

## 2018-05-23 NOTE — TELEPHONE ENCOUNTER
Patient states that she has been having lots of congestion and her mucus is a dark green color. Pt is also very fatigued and has been having palpitations. Dr. Zuñiga prescribed amoxicillin 1 month ago, but the patient says it was not effective. She is concerned about pseudomonas. Pt is scheduled to see Dr. Yuan Tomorrow at 2:30, but wants to know if she should just submit a sputum sample instead. LINDSAY

## 2018-05-23 NOTE — TELEPHONE ENCOUNTER
----- Message from Charlene Domingo sent at 5/23/2018 12:01 PM CDT -----  Contact: Yasmin      [phone]   Pt says her pseudomonas is returning.    Will need to go to [place] and do a culture asap, feels like the infection is returning.   Scheduled to see you on 5/31th at 3pm.   Would like to see you this week or asap.    Pls call ref. This.

## 2018-05-24 NOTE — TELEPHONE ENCOUNTER
Felt bad yesterday and prior. Lungs were hurting.  Felt congestion.  She was having anxiety.  The anxiety feeling has subsided.  She is feeling better today.    Plan  1.  She will notify me if she starts feeling worse.

## 2018-06-18 ENCOUNTER — TELEPHONE (OUTPATIENT)
Dept: FAMILY MEDICINE | Facility: CLINIC | Age: 68
End: 2018-06-18

## 2018-06-18 DIAGNOSIS — D50.9 IRON DEFICIENCY ANEMIA, UNSPECIFIED IRON DEFICIENCY ANEMIA TYPE: Primary | ICD-10-CM

## 2018-06-18 NOTE — TELEPHONE ENCOUNTER
----- Message from Urmila Luna MD sent at 5/15/2018 11:20 AM CDT -----  Please call patient to recheck CBC and iron studies (asked to start daily iron supplement).

## 2018-06-18 NOTE — TELEPHONE ENCOUNTER
Spoke with patient and advised of below.     Lab appointment scheduled 6-19-18 at [facility]

## 2018-06-19 ENCOUNTER — LAB VISIT (OUTPATIENT)
Dept: LAB | Facility: HOSPITAL | Age: 68
End: 2018-06-19
Attending: INTERNAL MEDICINE
Payer: MEDICARE

## 2018-06-19 DIAGNOSIS — D50.9 IRON DEFICIENCY ANEMIA, UNSPECIFIED IRON DEFICIENCY ANEMIA TYPE: ICD-10-CM

## 2018-06-19 LAB
BASOPHILS # BLD AUTO: 0.03 K/UL
BASOPHILS NFR BLD: 0.5 %
DIFFERENTIAL METHOD: ABNORMAL
EOSINOPHIL # BLD AUTO: 0.1 K/UL
EOSINOPHIL NFR BLD: 0.8 %
ERYTHROCYTE [DISTWIDTH] IN BLOOD BY AUTOMATED COUNT: 13.8 %
FERRITIN SERPL-MCNC: 33 NG/ML
HCT VFR BLD AUTO: 35 %
HGB BLD-MCNC: 11.7 G/DL
IRON SERPL-MCNC: 226 UG/DL
LYMPHOCYTES # BLD AUTO: 1.8 K/UL
LYMPHOCYTES NFR BLD: 27.9 %
MCH RBC QN AUTO: 30 PG
MCHC RBC AUTO-ENTMCNC: 33.4 G/DL
MCV RBC AUTO: 90 FL
MONOCYTES # BLD AUTO: 0.7 K/UL
MONOCYTES NFR BLD: 10.9 %
NEUTROPHILS # BLD AUTO: 3.8 K/UL
NEUTROPHILS NFR BLD: 59.9 %
PLATELET # BLD AUTO: 213 K/UL
PMV BLD AUTO: 9.3 FL
RBC # BLD AUTO: 3.9 M/UL
SATURATED IRON: 58 %
TOTAL IRON BINDING CAPACITY: 389 UG/DL
TRANSFERRIN SERPL-MCNC: 263 MG/DL
WBC # BLD AUTO: 6.35 K/UL

## 2018-06-19 PROCEDURE — 82728 ASSAY OF FERRITIN: CPT

## 2018-06-19 PROCEDURE — 83540 ASSAY OF IRON: CPT

## 2018-06-19 PROCEDURE — 36415 COLL VENOUS BLD VENIPUNCTURE: CPT

## 2018-06-19 PROCEDURE — 85025 COMPLETE CBC W/AUTO DIFF WBC: CPT

## 2018-06-25 RX ORDER — ERGOCALCIFEROL 1.25 MG/1
CAPSULE ORAL
Qty: 4 CAPSULE | Refills: 4 | Status: ON HOLD | OUTPATIENT
Start: 2018-06-25 | End: 2018-09-19 | Stop reason: HOSPADM

## 2018-07-22 ENCOUNTER — PATIENT MESSAGE (OUTPATIENT)
Dept: PULMONOLOGY | Facility: CLINIC | Age: 68
End: 2018-07-22

## 2018-07-25 ENCOUNTER — LAB VISIT (OUTPATIENT)
Dept: LAB | Facility: HOSPITAL | Age: 68
End: 2018-07-25
Attending: INTERNAL MEDICINE
Payer: MEDICARE

## 2018-07-25 DIAGNOSIS — Z86.711 PERSONAL HISTORY OF PULMONARY EMBOLISM: ICD-10-CM

## 2018-07-25 PROCEDURE — 85613 RUSSELL VIPER VENOM DILUTED: CPT

## 2018-07-25 PROCEDURE — 36415 COLL VENOUS BLD VENIPUNCTURE: CPT

## 2018-07-25 PROCEDURE — 85598 HEXAGNAL PHOSPH PLTLT NEUTRL: CPT

## 2018-07-30 LAB — APTT HEX PL PPP: NEGATIVE S

## 2018-07-31 ENCOUNTER — INITIAL CONSULT (OUTPATIENT)
Dept: OPHTHALMOLOGY | Facility: CLINIC | Age: 68
End: 2018-07-31
Payer: MEDICARE

## 2018-07-31 ENCOUNTER — TELEPHONE (OUTPATIENT)
Dept: CARDIOLOGY | Facility: CLINIC | Age: 68
End: 2018-07-31

## 2018-07-31 DIAGNOSIS — H02.132 SENILE ECTROPION OF BOTH LOWER EYELIDS: Primary | ICD-10-CM

## 2018-07-31 DIAGNOSIS — H57.819 BROW PTOSIS: ICD-10-CM

## 2018-07-31 DIAGNOSIS — H02.135 SENILE ECTROPION OF BOTH LOWER EYELIDS: Primary | ICD-10-CM

## 2018-07-31 DIAGNOSIS — H02.413 MECHANICAL PTOSIS OF BILATERAL EYELIDS: ICD-10-CM

## 2018-07-31 LAB — LA PPP-IMP: NEGATIVE

## 2018-07-31 PROCEDURE — 99999 PR PBB SHADOW E&M-EST. PATIENT-LVL II: CPT | Mod: PBBFAC,,, | Performed by: OPHTHALMOLOGY

## 2018-07-31 PROCEDURE — 68840 EXPLORE/IRRIGATE TEAR DUCTS: CPT | Mod: 50,S$GLB,, | Performed by: OPHTHALMOLOGY

## 2018-07-31 PROCEDURE — 92082 INTERMEDIATE VISUAL FIELD XM: CPT | Mod: S$GLB,,, | Performed by: OPHTHALMOLOGY

## 2018-07-31 PROCEDURE — 92285 EXTERNAL OCULAR PHOTOGRAPHY: CPT | Mod: S$GLB,,, | Performed by: OPHTHALMOLOGY

## 2018-07-31 PROCEDURE — 92014 COMPRE OPH EXAM EST PT 1/>: CPT | Mod: 25,S$GLB,, | Performed by: OPHTHALMOLOGY

## 2018-07-31 NOTE — LETTER
July 31, 2018      Ad Domingo, OD  [address]  [place] [address]           [facility]  [address]  [place] [address]  Phone: [phone]  Fax: [phone]          Patient: Yasmin Asencio   MR Number: [id number]   YOB: 1950   Date of Visit: 7/31/2018       Dear Dr. Ad Domingo:    Thank you for referring Yasmin Asencio to me for evaluation. Attached you will find relevant portions of my assessment and plan of care.    If you have questions, please do not hesitate to call me. I look forward to following Yasmin Asencio along with you.    Sincerely,    Alyson Mc MD    Enclosure  CC:  No Recipients    If you would like to receive this communication electronically, please contact [email or web address] or [phone] to request more information on [facility] [employer] [facility] access.    For providers and/or their staff who would like to refer a patient to [facility], please contact us through our one-stop-shop provider referral line, [facility], at [phone].    If you feel you have received this communication in error or would no longer like to receive these types of communications, please e-mail [email or web address]

## 2018-07-31 NOTE — PROGRESS NOTES
HPI     Ref. By Dr Domingo for ptosis eval.  Pt states upper lids drooping blocking vision  Surgery was an'd over a year ago, but she had to cx due to illness. Dr Arreola  Occasional diplopia  Pseudophakia  Dry eyes uses systane tid                            marychuy 128 tid os    Last edited by Carlie Valencia on 7/31/2018  8:46 AM. (History)            Assessment /Plan     For exam results, see Encounter Report.    Senile ectropion of both lower eyelids  -     External/Slit Lamp Photography    Brow ptosis  -     Goldmann Perimetry - OU - Intermediate - Both Eyes    Mechanical ptosis of bilateral eyelids  -     Goldmann Perimetry - OU - Intermediate - Both Eyes      Pt. With significant improvement of superior visual fields with lids taped vs. Untaped. Patient also with chronic irritation and tearing from bilateral lower eyelid ectropion.     Irrigation:  OD:patent lower punctum, canaliculus, and nld with 100 % flow to the nose  OS:patent lower punctum, canaliculus, and nld with 100% flow to the nose     - Plan for mechanical ptosis repair of bilateral upper eyelids and direct temporal brow ptosis repair OU and bilateral lower eyelid ectropion repair.     Informed consent obtained after extensive risks/benefits/alternatives were discussed with the patient including but not limited to pain, bleeding, infection, loss of vision, loss of the eye, asymmetry, need for revision in future, scarring.  Alternatives such as waiting were discussed.  All questions were answered.      Hold ASA, NSAIDS, and fish oil 5 to 7 days prior to procedure.    I have reviewed and concur with the resident's history, physical, assessment, and plan.  I have personally interviewed and examined the patient.

## 2018-07-31 NOTE — PROGRESS NOTES
Chief Complaint :   Hx of Pulmonary Embolism.    Hx of Present illness :  Patient is a 67 y.o. year old female who presents to the clinic today for  Oncology evaluation. Hx of Puylmonary embolism in 2017.. Has been on  eliquis since then. There was no precceding trauma, surgery or inactivity. No Hx of long travel by car or Air.  Appetite fair. Hx of constipation.  Incomplete sense of voiding. No chest or abdominal pain. Has chronic back pain.  Feels light headed; occasional nausea.  Has tingling of feet. Chronic cough; has CH related to COPD. Occasional nosebleeds on Left side.  Losing weight  Here to review test results. From 2018. Primidone has been D/C'd.  She has stopped Iron pills.    Allergies :    Review of patient's allergies indicates:   Allergen Reactions    Adhesive Other (See Comments)     Tears up the skin and makes it itch    Bactrim [sulfamethoxazole-trimethoprim] Rash     Was hospitalized for rash    Lipitor [atorvastatin] Other (See Comments)     Muscle aches    Albuterol Other (See Comments)     tremors    Topamax [topiramate]      - made her feel 'bad in the head'    Restasis [cyclosporine] Itching       Occupation :  Homemaker    Transfusion :  No     Menstrual & obstetric Hx :   4, Para 3.  Age of menarche: 12  Age of first pregnancy: 17  Lactation history: No   Age of menopause:  Late 40's  HRT:  For about five years.    Present Meds :   Medication List with Changes/Refills   Current Medications    ACETAMINOPHEN (TYLENOL) 500 MG TABLET    Take 1,000 mg by mouth daily as needed for Pain (and headache).    ALPRAZOLAM (XANAX) 0.25 MG TABLET    Take 1 tablet (0.25 mg total) by mouth nightly as needed for Anxiety.    ANORO ELLIPTA 62.5-25 MCG/ACTUATION DSDV    Inhale 1 puff into the lungs once daily.    APIXABAN 5 MG TAB    Take 1 tablet (5 mg total) by mouth 2 (two) times daily.    CHLORTHALIDONE (HYGROTEN) 25 MG TAB    Take 1 tablet (25 mg total) by mouth once daily.     DESOXIMETASONE (TOPICORT) 0.25 % CREAM    Apply as directed bid prn    ESCITALOPRAM OXALATE (LEXAPRO) 10 MG TABLET    Take 1 tablet (10 mg total) by mouth once daily.    FLUOCINOLONE (DERMA-SMOOTHE) 0.01 % EXTERNAL OIL    Apply topically once daily.    FLUOCINONIDE (LIDEX) 0.05 % EXTERNAL SOLUTION    Apply topically once daily. - apply after shampooing    FOLIC ACID (FOLVITE) 1 MG TABLET    Take 1 tablet (1 mg total) by mouth once daily.    GABAPENTIN (NEURONTIN) 300 MG CAPSULE    Take 2 capsules (600 mg total) by mouth 3 (three) times daily.    GUAIFENESIN (MUCINEX) 600 MG 12 HR TABLET    Take 1,200 mg by mouth 2 (two) times daily.    IPRATROPIUM (ATROVENT) 0.02 % NEBULIZER SOLUTION    INHALE ONE VIAL IN NEBULIZER 4 TIMES DAILY    KETOCONAZOLE (NIZORAL) 2 % SHAMPOO    Apply topically once daily.    LACTOBACILLUS RHAMNOSUS GG (CULTURELLE) 10 BILLION CELL CAPSULE    Take 1 capsule by mouth once daily.    OMEPRAZOLE (PRILOSEC) 20 MG CAPSULE    Take 1 capsule (20 mg total) by mouth daily as needed (heartburn). Take only as needed.    ONDANSETRON (ZOFRAN) 4 MG TABLET    Take 1 tablet (4 mg total) by mouth every 8 (eight) hours as needed for Nausea.    PROPYLENE GLYCOL (SYSTANE BALANCE) 0.6 % DROP    Apply 1 drop to eye daily as needed (dry eye).    RIVAROXABAN (XARELTO) 20 MG TAB    Take 1 tablet (20 mg total) by mouth daily with dinner or evening meal.    SODIUM CHLORIDE 3% 3 % NEBULIZER SOLUTION    USE ONE VIAL IN NEBULIZER TWICE DAILY    TRAMADOL (ULTRAM) 50 MG TABLET    Take 1 tablet (50 mg total) by mouth every 6 (six) hours as needed for Pain.    VERAPAMIL (CALAN-SR) 180 MG CR TABLET    2 (two) times daily.     VITAMIN D2 50,000 UNIT CAPSULE    TAKE 1 CAPSULE BY MOUTH ONCE A WEEK       Past Medical Hx :  Pulmonary Embolism; COPD: Hypertension; GERD; Diverticulosis; Essential tremor. Past Hx of tuberculosis; anxiety. Hemangioma of Liver; Paroxysmal supraventricular Tacycardia, Psoriasis. No Hx of DM,, Lupus or  Rheumatoid arthritis. No cancer, chemotherapy or radiation therapy. Had ablation for Atrial tachycardia in  July 2017. Embolisation for bleeding in Lungs in 2015    Past Medical Hx :  Past Medical History:   Diagnosis Date    Acquired bronchiectasis     due to history of TB - followed by pulmonary, Dr. Zuñiga    Allergy     Amblyopia     rt eye per pt    Anemia of other chronic disease     Anticoagulant long-term use     Anxiety     Cataract     Clotting disorder     COPD (chronic obstructive pulmonary disease)     COPD (chronic obstructive pulmonary disease)     Depression     Diverticulosis     Essential tremor     GERD (gastroesophageal reflux disease)     Hemangioma of liver     History of tuberculosis 1978    Hypertension     Mixed anxiety and depressive disorder     Psoriasis     PSVT (paroxysmal supraventricular tachycardia)     Pulmonary embolism     S/P PICC central line placement Apr. 2016 - May 2016    Skin disease     Psoriasis    Spondylosis without myelopathy 8/23/2013    Supraventricular tachycardia     Thoracic aorta atherosclerosis     noted on CT scan of chest 1/3/2011    Tuberculosis     Vaginal delivery     x2    Vitamin D deficiency        Travel Hx :  N/A    Immunization :  Immunization History   Administered Date(s) Administered    Influenza 10/06/2009, 10/27/2010, 09/21/2011, 01/07/2013, 12/11/2013, 10/06/2014    Influenza - High Dose 10/03/2016, 10/09/2017    Influenza - Trivalent (ADULT) 10/06/2014    Influenza A (H1N1) 2009 Monovalent - IM 01/12/2010    Influenza Split 01/07/2013, 12/11/2013    PPD Test 07/24/2015    Pneumococcal Conjugate 11/03/2008    Pneumococcal Conjugate - 13 Valent 11/03/2008, 10/19/2015    Pneumococcal Polysaccharide - 23 Valent 02/11/2015    Tdap 01/19/2009       Family Hx :  Family History   Problem Relation Age of Onset    Hypertension Mother     Heart attack Mother     Cancer Father         liver and bladder     Hyperlipidemia Sister     Psoriasis Sister     Cancer Brother         liver    Stroke Maternal Uncle     Cancer Maternal Aunt         stomach    Lung cancer Sister     Heart attack Brother     Heart attack Son     Amblyopia Neg Hx     Blindness Neg Hx     Cataracts Neg Hx     Glaucoma Neg Hx     Macular degeneration Neg Hx     Retinal detachment Neg Hx     Strabismus Neg Hx     Thyroid disease Neg Hx     Melanoma Neg Hx     Lupus Neg Hx     Eczema Neg Hx     COPD Neg Hx        Social Hx :  Social History     Social History    Marital status:      Spouse name: N/A    Number of children: 2    Years of education: N/A     Occupational History    housewife      Social History Main Topics    Smoking status: Former Smoker     Packs/day: 2.00     Years: 50.00     Types: Cigarettes     Quit date: 2009    Smokeless tobacco: Never Used    Alcohol use No    Drug use: No    Sexual activity: Yes     Partners: Male     Other Topics Concern    Are You Pregnant Or Think You May Be? No    Breast-Feeding No     Social History Narrative    One child  of a heart attack at age 35.       Surgery :  Cataract surgery; Cholecystectomy. D&C;     Symptoms :   No new Sx    Review of Systems   Constitutional: Negative for chills, diaphoresis, fever, malaise/fatigue and weight loss.   HENT: Positive for nosebleeds (Occasional.). Negative for congestion, ear discharge, ear pain, hearing loss, sinus pain, sore throat and tinnitus.    Eyes: Negative for blurred vision, double vision, photophobia, pain, discharge and redness.   Respiratory: Positive for shortness of breath. Negative for cough, hemoptysis, sputum production, wheezing and stridor.    Cardiovascular: Negative for chest pain, palpitations, orthopnea, claudication, leg swelling and PND.   Gastrointestinal: Positive for constipation and nausea. Negative for abdominal pain, blood in stool, diarrhea, heartburn, melena and vomiting.    Genitourinary: Positive for frequency. Negative for dysuria, flank pain, hematuria and urgency.   Musculoskeletal: Negative for back pain, falls, joint pain, myalgias and neck pain.   Skin: Positive for rash (Psoriasis).   Neurological: Positive for dizziness, tingling and tremors. Negative for sensory change, speech change, focal weakness, seizures, loss of consciousness, weakness and headaches.   Endo/Heme/Allergies: Negative for environmental allergies and polydipsia. Does not bruise/bleed easily.   Psychiatric/Behavioral: Positive for memory loss. Negative for depression, hallucinations, substance abuse and suicidal ideas. The patient is nervous/anxious and has insomnia.        Physical Exam :   Physical Exam   Constitutional: She is oriented to person, place, and time and well-developed, well-nourished, and in no distress. No distress.   HENT:   Head: Normocephalic and atraumatic.   Right Ear: External ear normal.   Left Ear: External ear normal.   Nose: Nose normal.   Mouth/Throat: Oropharynx is clear and moist. No oropharyngeal exudate.   Eyes: Conjunctivae, EOM and lids are normal. Lids are everted and swept, no foreign bodies found. Right eye exhibits no discharge. Left eye exhibits no discharge. No scleral icterus.       Neck: Trachea normal, normal range of motion, full passive range of motion without pain and phonation normal. Neck supple. Normal carotid pulses, no hepatojugular reflux and no JVD present. No tracheal tenderness present. Carotid bruit is not present. No tracheal deviation present. No thyroid mass and no thyromegaly present.   Cardiovascular: Normal rate, regular rhythm, normal heart sounds, intact distal pulses and normal pulses.  PMI is not displaced.    Pulmonary/Chest: Effort normal and breath sounds normal. No stridor. No apnea. No respiratory distress. She has no wheezes. She has no rales. She exhibits no tenderness.   Abdominal: Soft. Normal appearance, normal aorta and bowel  sounds are normal. She exhibits no distension, no ascites and no mass. There is no hepatosplenomegaly, splenomegaly or hepatomegaly. There is no tenderness. There is no rebound, no guarding and no CVA tenderness. No hernia.   Genitourinary:   Genitourinary Comments: Not Examined   Musculoskeletal: Normal range of motion. She exhibits no edema, tenderness or deformity.   Lymphadenopathy:        Head (right side): No submental, no submandibular, no tonsillar, no preauricular, no posterior auricular and no occipital adenopathy present.        Head (left side): No submental, no submandibular, no tonsillar, no preauricular, no posterior auricular and no occipital adenopathy present.     She has no cervical adenopathy.     She has no axillary adenopathy.        Right: No inguinal, no supraclavicular and no epitrochlear adenopathy present.        Left: No inguinal, no supraclavicular and no epitrochlear adenopathy present.   Neurological: She is alert and oriented to person, place, and time. She has normal motor skills, normal sensation, normal strength and normal reflexes. No cranial nerve deficit. She exhibits normal muscle tone. Gait normal. Coordination normal. GCS score is 15.   Skin: Skin is warm, dry and intact. No rash noted. She is not diaphoretic. No cyanosis or erythema. No pallor. Nails show no clubbing.   Psychiatric: Mood, memory, affect and judgment normal.   Nursing note and vitals reviewed.  No New Physical finding      Labs & Imaging :  CT Angiogram In January 2017, showed filling defects in pulmonary artery to Right upper lobe. Venous Doppler of Both upper extremities did not reveal any DVT in March 2018 04/16/18 : Prothrombin 2 Normal phenotype. Leiden factor 5 normal phenotype. Homocysteine 18.2; Lupus Anticoagulant Negative; Hexagonal Phospholipid antibody neutralization weakly positive. Protein C and protein S activity normal. AT 3 normal.  Cardiolipin antibody normal.  In December 2017, Patient had Hgb 9.      07/25/18 : Hexagonal Phospholipid Antibody Neutrlisation Neg. Lupus anticoagulant Neg;  06/19/18 : Hgb 11.7.  Hct 35; Plts 213,000 ANC 3,200    Dx :  Remote History of Pulmonary Embolism right Upper Lobe.  Elevated Homocysteine.       Assessment & Plan:  Reviewed with Patient and spouse. Pulmonary Embolus probably related to Atrial tachycardia. Hemoglobin almost normal.  Cardiologist wants her to stay on Eliquis. Continue Folic acid. Oncology followup as needed.Patient understands and verbalised

## 2018-08-01 ENCOUNTER — OFFICE VISIT (OUTPATIENT)
Dept: HEMATOLOGY/ONCOLOGY | Facility: CLINIC | Age: 68
End: 2018-08-01
Payer: MEDICARE

## 2018-08-01 ENCOUNTER — PATIENT MESSAGE (OUTPATIENT)
Dept: PULMONOLOGY | Facility: CLINIC | Age: 68
End: 2018-08-01

## 2018-08-01 VITALS
BODY MASS INDEX: 27.3 KG/M2 | TEMPERATURE: 98 F | OXYGEN SATURATION: 98 % | DIASTOLIC BLOOD PRESSURE: 56 MMHG | SYSTOLIC BLOOD PRESSURE: 114 MMHG | WEIGHT: 149.25 LBS | HEART RATE: 80 BPM

## 2018-08-01 DIAGNOSIS — R79.89 ELEVATED SERUM HOMOCYSTEINE LEVEL: ICD-10-CM

## 2018-08-01 DIAGNOSIS — D64.9 ANEMIA, UNSPECIFIED TYPE: ICD-10-CM

## 2018-08-01 DIAGNOSIS — Z86.711 PERSONAL HISTORY OF PULMONARY EMBOLISM: Primary | ICD-10-CM

## 2018-08-01 PROCEDURE — 99999 PR PBB SHADOW E&M-EST. PATIENT-LVL III: CPT | Mod: PBBFAC,,, | Performed by: INTERNAL MEDICINE

## 2018-08-01 PROCEDURE — 3074F SYST BP LT 130 MM HG: CPT | Mod: CPTII,S$GLB,, | Performed by: INTERNAL MEDICINE

## 2018-08-01 PROCEDURE — 3078F DIAST BP <80 MM HG: CPT | Mod: CPTII,S$GLB,, | Performed by: INTERNAL MEDICINE

## 2018-08-01 PROCEDURE — 99213 OFFICE O/P EST LOW 20 MIN: CPT | Mod: S$GLB,,, | Performed by: INTERNAL MEDICINE

## 2018-08-02 ENCOUNTER — PATIENT MESSAGE (OUTPATIENT)
Dept: PULMONOLOGY | Facility: CLINIC | Age: 68
End: 2018-08-02

## 2018-08-03 DIAGNOSIS — J47.9 BRONCHIECTASIS WITHOUT COMPLICATION: ICD-10-CM

## 2018-08-03 RX ORDER — UMECLIDINIUM BROMIDE AND VILANTEROL TRIFENATATE 62.5; 25 UG/1; UG/1
1 POWDER RESPIRATORY (INHALATION) DAILY
Qty: 180 EACH | Refills: 4 | Status: SHIPPED | OUTPATIENT
Start: 2018-08-03 | End: 2018-08-16 | Stop reason: SDUPTHER

## 2018-08-14 ENCOUNTER — TELEPHONE (OUTPATIENT)
Dept: INFECTIOUS DISEASES | Facility: CLINIC | Age: 68
End: 2018-08-14

## 2018-08-14 DIAGNOSIS — R04.2 HEMOPTYSIS: Primary | ICD-10-CM

## 2018-08-14 NOTE — TELEPHONE ENCOUNTER
----- Message from Zoë Hutson MA sent at 8/13/2018 10:32 AM CDT -----  Contact: Self    Please advise, reference message below  ----- Message -----  From: Zakia Azevedo  Sent: 8/13/2018   9:41 AM  To: [facility] Palma Staff    .Needs Advice    Reason for call:   Pt had bleeding into her lungs over the weekend asking if needs to be seen or prescribe her something   Communication Preference: [phone]  Additional Information: Please call

## 2018-08-14 NOTE — TELEPHONE ENCOUNTER
Had an episode of bleeding on Sunday (8/12/18).  Bleeding has resolved.  She still feels her chest is congested. Her oxygen level dropped to 89 but this has since normalized.  She continues on a blood thinner.    Assessment  Hemoptysis in a patient with know bronchiectasis due to pseudomonas infection    Plan  Will obtain respiratory samples.  Will ask my assistant to arrange for it to be submitted on the same day as her appointment with Dr. Zuñiga.  Will decide on need for antibiotics based on patient's clinical progression.  If this is a viral syndrome then she may get better on her own.

## 2018-08-16 ENCOUNTER — OFFICE VISIT (OUTPATIENT)
Dept: PULMONOLOGY | Facility: CLINIC | Age: 68
End: 2018-08-16
Payer: MEDICARE

## 2018-08-16 ENCOUNTER — LAB VISIT (OUTPATIENT)
Dept: LAB | Facility: HOSPITAL | Age: 68
End: 2018-08-16
Attending: INTERNAL MEDICINE
Payer: MEDICARE

## 2018-08-16 VITALS
RESPIRATION RATE: 16 BRPM | WEIGHT: 149.25 LBS | DIASTOLIC BLOOD PRESSURE: 80 MMHG | HEART RATE: 87 BPM | BODY MASS INDEX: 26.45 KG/M2 | SYSTOLIC BLOOD PRESSURE: 120 MMHG | HEIGHT: 63 IN | OXYGEN SATURATION: 96 %

## 2018-08-16 DIAGNOSIS — I47.10 PSVT (PAROXYSMAL SUPRAVENTRICULAR TACHYCARDIA): ICD-10-CM

## 2018-08-16 DIAGNOSIS — Z79.01 CURRENT USE OF LONG TERM ANTICOAGULATION: ICD-10-CM

## 2018-08-16 DIAGNOSIS — B96.5 PSEUDOMONAS RESPIRATORY INFECTION: ICD-10-CM

## 2018-08-16 DIAGNOSIS — R04.2 HEMOPTYSIS: ICD-10-CM

## 2018-08-16 DIAGNOSIS — J47.9 ACQUIRED BRONCHIECTASIS: Primary | ICD-10-CM

## 2018-08-16 DIAGNOSIS — J98.8 PSEUDOMONAS RESPIRATORY INFECTION: ICD-10-CM

## 2018-08-16 DIAGNOSIS — Z86.711 HISTORY OF PULMONARY EMBOLISM: ICD-10-CM

## 2018-08-16 DIAGNOSIS — J47.9 BRONCHIECTASIS WITHOUT COMPLICATION: ICD-10-CM

## 2018-08-16 PROCEDURE — 87116 MYCOBACTERIA CULTURE: CPT

## 2018-08-16 PROCEDURE — 3079F DIAST BP 80-89 MM HG: CPT | Mod: CPTII,S$GLB,, | Performed by: INTERNAL MEDICINE

## 2018-08-16 PROCEDURE — 99214 OFFICE O/P EST MOD 30 MIN: CPT | Mod: S$GLB,,, | Performed by: INTERNAL MEDICINE

## 2018-08-16 PROCEDURE — 87205 SMEAR GRAM STAIN: CPT

## 2018-08-16 PROCEDURE — 87077 CULTURE AEROBIC IDENTIFY: CPT

## 2018-08-16 PROCEDURE — 87015 SPECIMEN INFECT AGNT CONCNTJ: CPT

## 2018-08-16 PROCEDURE — 3074F SYST BP LT 130 MM HG: CPT | Mod: CPTII,S$GLB,, | Performed by: INTERNAL MEDICINE

## 2018-08-16 PROCEDURE — 99999 PR PBB SHADOW E&M-EST. PATIENT-LVL III: CPT | Mod: PBBFAC,,, | Performed by: INTERNAL MEDICINE

## 2018-08-16 PROCEDURE — 87186 SC STD MICRODIL/AGAR DIL: CPT

## 2018-08-16 PROCEDURE — 87070 CULTURE OTHR SPECIMN AEROBIC: CPT

## 2018-08-16 PROCEDURE — 87206 SMEAR FLUORESCENT/ACID STAI: CPT

## 2018-08-16 RX ORDER — CIPROFLOXACIN 750 MG/1
750 TABLET, FILM COATED ORAL 2 TIMES DAILY
Qty: 20 TABLET | Refills: 0 | Status: SHIPPED | OUTPATIENT
Start: 2018-08-16 | End: 2018-08-26

## 2018-08-16 RX ORDER — SODIUM CHLORIDE FOR INHALATION 3 %
4 VIAL, NEBULIZER (ML) INHALATION 2 TIMES DAILY
Qty: 720 ML | Refills: 3 | Status: SHIPPED | OUTPATIENT
Start: 2018-08-16 | End: 2019-07-05 | Stop reason: SDUPTHER

## 2018-08-16 RX ORDER — UMECLIDINIUM BROMIDE AND VILANTEROL TRIFENATATE 62.5; 25 UG/1; UG/1
1 POWDER RESPIRATORY (INHALATION) DAILY
Qty: 30 EACH | Refills: 3 | Status: SHIPPED | OUTPATIENT
Start: 2018-08-16 | End: 2018-09-15

## 2018-08-16 RX ORDER — IPRATROPIUM BROMIDE 0.5 MG/2.5ML
SOLUTION RESPIRATORY (INHALATION)
Qty: 270 VIAL | Refills: 3 | Status: ON HOLD | OUTPATIENT
Start: 2018-08-16 | End: 2018-09-14

## 2018-08-17 NOTE — PATIENT INSTRUCTIONS
Begin Cipro 750 twice daily x 10 days.  (Patient aware treatment may need to be altered based on results of sputum culture being submitted today.)  Continue present respiratory medications (roles reviewed at today's visit).  Return visit here 6 months (call sooner if resp symptoms fail to improve).

## 2018-08-17 NOTE — PROGRESS NOTES
Subjective:       Patient ID: Yasmin Asencio is a 68 y.o. female.    Chief Complaint: Follow-up and Bronchiectasis    HPI HISTORY OF PRESENT ILLNESS:  Mrs. Asencio is a 68-year-old former smoker, who   returns for assessment of bronchiectasis.  She is currently having an increased   cough with sputum, which has become more purulent in the last two to three   weeks.  She also has noticed a small amount of blood in her sputum in the last   day or two.  She has not had fever or pleuritic pain.    Mrs. Asencio is currently using Anoro and Atrovent aerosol treatments for   bronchodilator therapy.  She also has used 3% saline aerosol treatments and   chest percussion along with Mucinex to help promote mobilization of bronchial   secretions.    Mrs. Asencio took Augmentin in April due to a flare of increased chest   congestion.  She had a favorable response to this antibiotic.    Past micro studies have shown evidence for Pseudomonas.  The organism has been   sensitive to all antibiotics tested.  She is submitting a sputum sample today   for routine culture.  This study was previously ordered by Infectious Disease.    Finally, Mrs. Asencio remains on Xarelto due to the history of a previous   pulmonary embolism.  She has been followed in the Cardiology Clinic for   management of paroxysmal supraventricular tachycardia.      CB/HN  dd: 08/16/2018 21:28:47 (CDT)  td: 08/17/2018 02:18:10 (CDT)  Doc ID   #[id number]  Job ID #[id number]    CC:       Review of Systems   Constitutional: Negative for fever and fatigue.   HENT: Negative for postnasal drip, sinus pressure, voice change and congestion.    Respiratory: Positive for cough, hemoptysis, sputum production and dyspnea on extertion. Negative for shortness of breath and wheezing.    Cardiovascular: Negative for chest pain and leg swelling.   Genitourinary: Negative for difficulty urinating.   Musculoskeletal: Negative for arthralgias and back pain.   Skin: Negative  for rash.   Gastrointestinal: Negative for abdominal pain and acid reflux.   Neurological: Negative for dizziness and weakness.   Hematological: Negative for adenopathy.       Objective:      Physical Exam   Constitutional: She is oriented to person, place, and time. She appears well-developed and well-nourished.   HENT:   Head: Normocephalic.   Nose: Nose normal.   Mouth/Throat: No oropharyngeal exudate.   Neck: Normal range of motion. No JVD present. No tracheal deviation present. No thyromegaly present.   Cardiovascular: Normal rate, regular rhythm and normal heart sounds.   No murmur heard.  Pulmonary/Chest: Symmetric chest wall expansion. No stridor. She has no wheezes. She has no rhonchi. She has rales (coarse rales and squeaks (L>R)). She exhibits no tenderness.   Abdominal: Soft. There is no tenderness.   Musculoskeletal: She exhibits no edema.   Lymphadenopathy:     She has no cervical adenopathy.   Neurological: She is alert and oriented to person, place, and time.   Skin: Skin is warm and dry. No rash noted. No erythema. Nails show no clubbing.   Psychiatric: She has a normal mood and affect.   Vitals reviewed.    Personal Diagnostic Review    No flowsheet data found.      Assessment:       1. Acquired bronchiectasis    2. Pulmonary emphysema    3. Hemoptysis    4. Current use of long term anticoagulation    5. History of pulmonary embolism    6. Pseudomonas respiratory infection    7. Bronchiectasis without complication    8. PSVT (paroxysmal supraventricular tachycardia)        Outpatient Encounter Medications as of 8/16/2018   Medication Sig Dispense Refill    acetaminophen (TYLENOL) 500 MG tablet Take 1,000 mg by mouth daily as needed for Pain (and headache).      alprazolam (XANAX) 0.25 MG tablet Take 1 tablet (0.25 mg total) by mouth nightly as needed for Anxiety. 30 tablet 0    ANORO ELLIPTA 62.5-25 mcg/actuation DsDv Inhale 1 puff into the lungs once daily. 30 each 3    chlorthalidone  (HYGROTEN) 25 MG Tab Take 1 tablet (25 mg total) by mouth once daily. 90 tablet 3    desoximetasone (TOPICORT) 0.25 % cream Apply as directed bid prn (Patient taking differently: Apply as directed twice daily as needed for psoriasis) 60 g 2    escitalopram oxalate (LEXAPRO) 10 MG tablet Take 1 tablet (10 mg total) by mouth once daily. 30 tablet 11    folic acid (FOLVITE) 1 MG tablet Take 1 tablet (1 mg total) by mouth once daily. 100 tablet 2    guaiFENesin (MUCINEX) 600 mg 12 hr tablet Take 1,200 mg by mouth 2 (two) times daily.      ipratropium (ATROVENT) 0.02 % nebulizer solution INHALE ONE VIAL IN NEBULIZER 3 TIMES DAILY 270 vial 3    ketoconazole (NIZORAL) 2 % shampoo Apply topically once daily. 120 mL 5    Lactobacillus rhamnosus GG (CULTURELLE) 10 billion cell capsule Take 1 capsule by mouth once daily.      omeprazole (PRILOSEC) 20 MG capsule Take 1 capsule (20 mg total) by mouth daily as needed (heartburn). Take only as needed. 90 capsule 0    ondansetron (ZOFRAN) 4 MG tablet Take 1 tablet (4 mg total) by mouth every 8 (eight) hours as needed for Nausea. 45 tablet 0    propylene glycol (SYSTANE BALANCE) 0.6 % Drop Apply 1 drop to eye daily as needed (dry eye).      rivaroxaban (XARELTO) 20 mg Tab Take 1 tablet (20 mg total) by mouth daily with dinner or evening meal. 30 tablet 5    sodium chloride 3% 3 % nebulizer solution Take 4 mLs by nebulization 2 (two) times daily. 720 mL 3    traMADol (ULTRAM) 50 mg tablet Take 1 tablet (50 mg total) by mouth every 6 (six) hours as needed for Pain. 45 tablet 0    verapamil (CALAN-SR) 180 MG CR tablet 2 (two) times daily.       VITAMIN D2 50,000 unit capsule TAKE 1 CAPSULE BY MOUTH ONCE A WEEK 4 capsule 4    [DISCONTINUED] ANORO ELLIPTA 62.5-25 mcg/actuation DsDv Inhale 1 puff into the lungs once daily. 180 each 4    [DISCONTINUED] apixaban 5 mg Tab Take 1 tablet (5 mg total) by mouth 2 (two) times daily. 180 tablet 1    [DISCONTINUED] ipratropium  (ATROVENT) 0.02 % nebulizer solution INHALE ONE VIAL IN NEBULIZER 4 TIMES DAILY 225 vial 3    [DISCONTINUED] sodium chloride 3% 3 % nebulizer solution USE ONE VIAL IN NEBULIZER TWICE DAILY 240 mL 6    ciprofloxacin HCl (CIPRO) 750 MG tablet Take 1 tablet (750 mg total) by mouth 2 (two) times daily. for 10 days 20 tablet 0    fluocinolone (DERMA-SMOOTHE) 0.01 % external oil Apply topically once daily. 1 Bottle 5    fluocinonide (LIDEX) 0.05 % external solution Apply topically once daily. - apply after shampooing 60 mL 5    gabapentin (NEURONTIN) 300 MG capsule Take 2 capsules (600 mg total) by mouth 3 (three) times daily. 540 capsule 0     No facility-administered encounter medications on file as of 8/16/2018.      No orders of the defined types were placed in this encounter.    Plan:     Begin Cipro 750 twice daily x 10 days.  (Patient aware treatment may need to be altered based on results of sputum culture being submitted today.)  Continue present respiratory medications (roles reviewed at today's visit).  Return visit here 6 months (call sooner if resp symptoms fail to improve).

## 2018-08-20 DIAGNOSIS — K21.9 GASTROESOPHAGEAL REFLUX DISEASE WITHOUT ESOPHAGITIS: Chronic | ICD-10-CM

## 2018-08-20 LAB
BACTERIA SPEC AEROBE CULT: NORMAL
GRAM STN SPEC: NORMAL

## 2018-08-20 RX ORDER — OMEPRAZOLE 20 MG/1
CAPSULE, DELAYED RELEASE ORAL
Qty: 90 CAPSULE | Refills: 0 | Status: SHIPPED | OUTPATIENT
Start: 2018-08-20 | End: 2019-05-23 | Stop reason: SDUPTHER

## 2018-08-21 ENCOUNTER — TELEPHONE (OUTPATIENT)
Dept: FAMILY MEDICINE | Facility: CLINIC | Age: 68
End: 2018-08-21

## 2018-08-21 NOTE — TELEPHONE ENCOUNTER
Spoke with patient - informed her we have not yet received her medication. Will check again in am.   Patient advises us if we do not receive it then we can fax a letter to [facility] and they can send the medication directly to the patient.

## 2018-08-21 NOTE — TELEPHONE ENCOUNTER
----- Message from Diana Judge sent at 8/21/2018  3:06 PM CDT -----  Contact: Self   Patient says she is waiting for the office to give her a call and let her know that you all have her medication. She says her Eliquis was sent out to the office. Please call at [phone].    Asked pt for clarification, patient said her medication was shipped out to the office. She couldn't remember the nurses name who she spoke with.

## 2018-08-22 ENCOUNTER — TELEPHONE (OUTPATIENT)
Dept: FAMILY MEDICINE | Facility: CLINIC | Age: 68
End: 2018-08-22

## 2018-08-22 ENCOUNTER — OFFICE VISIT (OUTPATIENT)
Dept: INFECTIOUS DISEASES | Facility: CLINIC | Age: 68
End: 2018-08-22
Payer: MEDICARE

## 2018-08-22 ENCOUNTER — HOSPITAL ENCOUNTER (OUTPATIENT)
Dept: RADIOLOGY | Facility: HOSPITAL | Age: 68
Discharge: HOME | End: 2018-08-22
Attending: INTERNAL MEDICINE
Payer: MEDICARE

## 2018-08-22 VITALS
BODY MASS INDEX: 26.72 KG/M2 | HEART RATE: 77 BPM | WEIGHT: 150.81 LBS | SYSTOLIC BLOOD PRESSURE: 111 MMHG | DIASTOLIC BLOOD PRESSURE: 58 MMHG | HEIGHT: 63 IN | TEMPERATURE: 98 F

## 2018-08-22 DIAGNOSIS — R04.2 HEMOPTYSIS: ICD-10-CM

## 2018-08-22 DIAGNOSIS — J47.1 BRONCHIECTASIS WITH ACUTE EXACERBATION: ICD-10-CM

## 2018-08-22 DIAGNOSIS — R04.2 HEMOPTYSIS: Primary | ICD-10-CM

## 2018-08-22 PROCEDURE — 71046 X-RAY EXAM CHEST 2 VIEWS: CPT | Mod: TC

## 2018-08-22 PROCEDURE — 99215 OFFICE O/P EST HI 40 MIN: CPT | Mod: S$GLB,,, | Performed by: INTERNAL MEDICINE

## 2018-08-22 PROCEDURE — 3078F DIAST BP <80 MM HG: CPT | Mod: CPTII,S$GLB,, | Performed by: INTERNAL MEDICINE

## 2018-08-22 PROCEDURE — 99999 PR PBB SHADOW E&M-EST. PATIENT-LVL III: CPT | Mod: PBBFAC,,, | Performed by: INTERNAL MEDICINE

## 2018-08-22 PROCEDURE — 3074F SYST BP LT 130 MM HG: CPT | Mod: CPTII,S$GLB,, | Performed by: INTERNAL MEDICINE

## 2018-08-22 PROCEDURE — 71046 X-RAY EXAM CHEST 2 VIEWS: CPT | Mod: 26,,, | Performed by: RADIOLOGY

## 2018-08-22 NOTE — LETTER
August 22, 2018    Re:Yasmin Asencio  [address] [address]             [address] [address]  Phone: [phone]  Fax: [phone] To Whom It May Concern:    Yasmin Asencio is a patient of mine at the [facility]. She has been approved for Eliquis through the medication assistance program. We have not yet received the medication in the office. It is ok to mail the medication to the patient's home  Thanks you for your assistance in this matter.      If you have any questions or concerns, please don't hesitate to call.    Sincerely,         Urmila Luna MD

## 2018-08-22 NOTE — TELEPHONE ENCOUNTER
----- Message from Leslye Fish sent at 8/22/2018 10:47 AM CDT -----  Contact: Self/ [phone]  Pt returning call to office. Thank you.

## 2018-08-22 NOTE — TELEPHONE ENCOUNTER
Spoke with pt states a letter must be faxed from PCP stating that our office has yet to receive medication and its ok to ship to pts home. Please advise.

## 2018-08-22 NOTE — PROGRESS NOTES
Subjective:      Patient ID: Yasmin Asencio is a 68 y.o. female.    Chief Complaint:Follow-up    History of Present Illness    Ms Asencio is a 67 y/o female with a remote history of treated tuberculosis, bronchiectasis with colonization by pseudomonas and recurrent bouts of hemoptysis for which she has had to undergo embolization.  She had been doing well for several months.  However last week she started having hemoptysis.  She also had a couple of days of increased oxygen requirement.  She has had pain on deep inspiration.  She saw Dr. Zuñiga and was prescribed cipro.  She is on day # 6 of cipro but her symptoms haven't improved.  She denies fevers.      Review of Systems   Constitution: Positive for malaise/fatigue. Negative for chills, decreased appetite, fever, weakness, night sweats, weight gain and weight loss.   HENT: Negative for congestion, ear pain, hearing loss, hoarse voice, sore throat and tinnitus.    Eyes: Positive for blurred vision and visual disturbance. Negative for redness.   Cardiovascular: Negative for chest pain, leg swelling and palpitations.   Respiratory: Positive for cough, hemoptysis, shortness of breath, sputum production and wheezing.    Hematologic/Lymphatic: Negative for adenopathy. Does not bruise/bleed easily.   Skin: Negative for dry skin, itching, rash and suspicious lesions.   Musculoskeletal: Positive for back pain, joint pain and neck pain. Negative for myalgias.   Gastrointestinal: Positive for nausea. Negative for abdominal pain, constipation, diarrhea, heartburn and vomiting.   Genitourinary: Negative for dysuria, flank pain, frequency, hematuria, hesitancy and urgency.   Neurological: Negative for dizziness, headaches, numbness and paresthesias.   Psychiatric/Behavioral: Negative for depression and memory loss. The patient does not have insomnia and is not nervous/anxious.      Objective:   Physical Exam   Constitutional: She is oriented to person, place, and time.  She appears well-developed and well-nourished. No distress.   HENT:   Head: Normocephalic and atraumatic.   Right Ear: External ear normal.   Left Ear: External ear normal.   Nose: Nose normal.   Mouth/Throat: Oropharynx is clear and moist. No oropharyngeal exudate.   Eyes: Conjunctivae and EOM are normal. Pupils are equal, round, and reactive to light. Right eye exhibits no discharge. Left eye exhibits no discharge. No scleral icterus.   Neck: Normal range of motion. Neck supple. No JVD present. No tracheal deviation present. No thyromegaly present.   Cardiovascular: Normal rate, regular rhythm and intact distal pulses. Exam reveals no gallop and no friction rub.   No murmur heard.  Pulmonary/Chest: Effort normal. No stridor. No respiratory distress. She has no wheezes. She has rales. She exhibits no tenderness.   Abdominal: Soft. Bowel sounds are normal. She exhibits no distension and no mass. There is no tenderness. There is no rebound and no guarding.   Musculoskeletal: Normal range of motion. She exhibits no edema or tenderness.   Lymphadenopathy:     She has no cervical adenopathy.   Neurological: She is alert and oriented to person, place, and time. She displays normal reflexes. No cranial nerve deficit. She exhibits normal muscle tone. Coordination normal.   Skin: Skin is warm. No rash noted. She is not diaphoretic. No erythema. No pallor.   Psychiatric: She has a normal mood and affect. Her behavior is normal. Judgment and thought content normal.   Nursing note and vitals reviewed.    Assessment:       1. Hemoptysis    2. Bronchiectasis with acute exacerbation        69 y/o with acute exacerbation of her bronchiectasis.  Respiratory cultures were positive for pseudomonas.  She hasn't improved on oral ciprofloxacin.  Will order a picc line.  Will start the patient on IV cefepime 2 grams q 12 hours for a 10 day course.  Check cbc, cmp once a week while on therapy.  Will order chest x-ray today.  Plan:        Hemoptysis  -     X-Ray Chest PA And Lateral; Future; Expected date: 08/22/2018  -     IR PICC Line Placement (xpd); Future; Expected date: 08/22/2018    Bronchiectasis with acute exacerbation  -     X-Ray Chest PA And Lateral; Future; Expected date: 08/22/2018  -     IR PICC Line Placement (xpd); Future; Expected date: 08/22/2018

## 2018-08-23 DIAGNOSIS — R04.2 HEMOPTYSIS: Primary | ICD-10-CM

## 2018-08-24 ENCOUNTER — TELEPHONE (OUTPATIENT)
Dept: INTERVENTIONAL RADIOLOGY/VASCULAR | Facility: CLINIC | Age: 68
End: 2018-08-24

## 2018-08-24 ENCOUNTER — PATIENT MESSAGE (OUTPATIENT)
Dept: FAMILY MEDICINE | Facility: CLINIC | Age: 68
End: 2018-08-24

## 2018-08-24 ENCOUNTER — HOSPITAL ENCOUNTER (OUTPATIENT)
Dept: INTERVENTIONAL RADIOLOGY/VASCULAR | Facility: HOSPITAL | Age: 68
Discharge: HOME | End: 2018-08-24
Attending: INTERNAL MEDICINE
Payer: MEDICARE

## 2018-08-24 ENCOUNTER — TELEPHONE (OUTPATIENT)
Dept: INFECTIOUS DISEASES | Facility: CLINIC | Age: 68
End: 2018-08-24

## 2018-08-24 VITALS
SYSTOLIC BLOOD PRESSURE: 139 MMHG | DIASTOLIC BLOOD PRESSURE: 62 MMHG | OXYGEN SATURATION: 95 % | HEART RATE: 89 BPM | RESPIRATION RATE: 16 BRPM

## 2018-08-24 DIAGNOSIS — R04.2 HEMOPTYSIS: ICD-10-CM

## 2018-08-24 DIAGNOSIS — J47.1 BRONCHIECTASIS WITH ACUTE EXACERBATION: ICD-10-CM

## 2018-08-24 PROCEDURE — 76937 US GUIDE VASCULAR ACCESS: CPT | Mod: TC

## 2018-08-24 PROCEDURE — 77001 FLUOROGUIDE FOR VEIN DEVICE: CPT | Mod: 26,,, | Performed by: RADIOLOGY

## 2018-08-24 PROCEDURE — C1751 CATH, INF, PER/CENT/MIDLINE: HCPCS

## 2018-08-24 PROCEDURE — 36569 INSJ PICC 5 YR+ W/O IMAGING: CPT | Mod: LT,,, | Performed by: RADIOLOGY

## 2018-08-24 PROCEDURE — 76937 US GUIDE VASCULAR ACCESS: CPT | Mod: 26,,, | Performed by: RADIOLOGY

## 2018-08-24 PROCEDURE — 77001 FLUOROGUIDE FOR VEIN DEVICE: CPT | Mod: TC

## 2018-08-24 NOTE — PROGRESS NOTES
PICC placement complete, pt tolerates well.  PICC okay to use at this time.  D/c instructions reviewed, pt verbalizes understanding.  Amb to lobby with .

## 2018-08-24 NOTE — DISCHARGE INSTRUCTIONS
For scheduling: Call Maddi at [phone]    For questions or concerns call: RAJANI MON-FRI 8 AM- 5PM [phone]. Radiology resident on call [phone].    For immediate concerns that are not emergent, you may call our radiology clinic at: [phone]

## 2018-08-24 NOTE — TELEPHONE ENCOUNTER
Left VM regarding abnormal labs, and to please f/u with PCP. Call with any questions: [phone]

## 2018-08-24 NOTE — PROCEDURES
"Radiology Post-Procedure Note    Pre Op Diagnosis: Pseudomonas infection    Post Op Diagnosis: Same    Procedure: PICC placement    Procedure performed by: Sumeet    Written Informed Consent Obtained: Yes    Specimen Removed: No    Estimated Blood Loss: Minimal    Findings:   Using realtime U/S guidance a 5 Fr PICC catheter was placed into the left Brachial Vein with tip of the catheter in the SVC.    PICC is ready for use.     Tu Payne MD ([facility])  Radiology PGY-5  [phone]      "

## 2018-08-24 NOTE — H&P
Radiology History & Physical      SUBJECTIVE:     Chief Complaint: pseudomonas pneumonia    History of Present Illness:  Yasmin Asencio is a 68 y.o. female who presents for left arm picc placement    Past Medical History:   Diagnosis Date    Acquired bronchiectasis     due to history of TB - followed by pulmonary, Dr. Zuñiga    Allergy     Amblyopia     rt eye per pt    Anemia of other chronic disease     Anticoagulant long-term use     Anxiety     Cataract     Clotting disorder     COPD (chronic obstructive pulmonary disease)     COPD (chronic obstructive pulmonary disease)     Depression     Diverticulosis     Essential tremor     GERD (gastroesophageal reflux disease)     Hemangioma of liver     History of tuberculosis 1978    Hypertension     Mixed anxiety and depressive disorder     Psoriasis     PSVT (paroxysmal supraventricular tachycardia)     Pulmonary embolism     S/P PICC central line placement Apr. 2016 - May 2016    Skin disease     Psoriasis    Spondylosis without myelopathy 8/23/2013    Supraventricular tachycardia     Thoracic aorta atherosclerosis     noted on CT scan of chest 1/3/2011    Tuberculosis     Vaginal delivery     x2    Vitamin D deficiency        Past Surgical History:   Procedure Laterality Date    CATARACT EXTRACTION W/  INTRAOCULAR LENS IMPLANT  07/24/12    od dr spain    CATARACT EXTRACTION W/  INTRAOCULAR LENS IMPLANT  08/07/12    left eye    GALLBLADDER SURGERY  9/2011       Home Meds:   Prior to Admission medications    Medication Sig Start Date End Date Taking? Authorizing Provider   acetaminophen (TYLENOL) 500 MG tablet Take 1,000 mg by mouth daily as needed for Pain (and headache).    Historical Provider, MD   alprazolam (XANAX) 0.25 MG tablet Take 1 tablet (0.25 mg total) by mouth nightly as needed for Anxiety. 9/22/17   Urmila Luna MD   ANORO ELLIPTA 62.5-25 mcg/actuation DsDv Inhale 1 puff into the lungs once daily. 8/16/18  9/15/18  PRECIOUS Zuñiga MD   apixaban 5 mg Tab Take 5 mg by mouth 2 (two) times daily.    Historical Provider, MD   chlorthalidone (HYGROTEN) 25 MG Tab Take 1 tablet (25 mg total) by mouth once daily. 4/6/18   Marlon Ballesteros MD   ciprofloxacin HCl (CIPRO) 750 MG tablet Take 1 tablet (750 mg total) by mouth 2 (two) times daily. for 10 days 8/16/18 8/26/18  PRECIOUS Zuñiga MD   desoximetasone (TOPICORT) 0.25 % cream Apply as directed bid prn  Patient taking differently: Apply as directed twice daily as needed for psoriasis 2/22/17   Urmila Luna MD   escitalopram oxalate (LEXAPRO) 10 MG tablet Take 1 tablet (10 mg total) by mouth once daily. 12/1/17 12/1/18  Lázaro Levy MD   fluocinolone (DERMA-SMOOTHE) 0.01 % external oil Apply topically once daily. 3/21/18 8/1/18  Urmila Luna MD   fluocinonide (LIDEX) 0.05 % external solution Apply topically once daily. - apply after shampooing 3/21/18 8/1/18  Urmila Luna MD   folic acid (FOLVITE) 1 MG tablet Take 1 tablet (1 mg total) by mouth once daily. 5/1/18 5/1/19  Craig Montana MD   gabapentin (NEURONTIN) 300 MG capsule Take 2 capsules (600 mg total) by mouth 3 (three) times daily. 5/3/18 8/1/18  REGAN Tate   guaiFENesin (MUCINEX) 600 mg 12 hr tablet Take 1,200 mg by mouth 2 (two) times daily. 12/12/17   Historical Provider, MD   ipratropium (ATROVENT) 0.02 % nebulizer solution INHALE ONE VIAL IN NEBULIZER 3 TIMES DAILY 8/16/18   PRECIOUS Zuñiga MD   ketoconazole (NIZORAL) 2 % shampoo Apply topically once daily. 3/21/18   Urmila Luna MD   Lactobacillus rhamnosus GG (CULTURELLE) 10 billion cell capsule Take 1 capsule by mouth once daily.    Historical Provider, MD   omeprazole (PRILOSEC) 20 MG capsule TAKE 1 CAPSULE BY MOUTH ONCE DAILY AS NEEDED FOR HEARTBURN (TAKE ONLY AS NEEDED) 8/20/18   Urmila Luna MD   ondansetron (ZOFRAN) 4 MG tablet Take 1 tablet (4 mg total) by mouth every 8 (eight) hours as  needed for Nausea. 10/26/17   Louie Yuan MD   propylene glycol (SYSTANE BALANCE) 0.6 % Drop Apply 1 drop to eye daily as needed (dry eye).    Historical Provider, MD   sodium chloride 3% 3 % nebulizer solution Take 4 mLs by nebulization 2 (two) times daily. 8/16/18   PRECIOUS Zuñiga MD   traMADol (ULTRAM) 50 mg tablet Take 1 tablet (50 mg total) by mouth every 6 (six) hours as needed for Pain. 10/26/17   Louie Yuan MD   verapamil (CALAN-SR) 180 MG CR tablet 2 (two) times daily.  3/1/18   Historical Provider, MD   VITAMIN D2 50,000 unit capsule TAKE 1 CAPSULE BY MOUTH ONCE A WEEK 6/25/18   Urmila Luna MD       Anticoagulants/Antiplatelets: no anticoagulation    Allergies:   Review of patient's allergies indicates:   Allergen Reactions    Adhesive Other (See Comments)     Tears up the skin and makes it itch    Bactrim [sulfamethoxazole-trimethoprim] Rash     Was hospitalized for rash    Lipitor [atorvastatin] Other (See Comments)     Muscle aches    Albuterol Other (See Comments)     tremors    Topamax [topiramate]      - made her feel 'bad in the head'    Restasis [cyclosporine] Itching       Sedation History:  no adverse reactions    Review of Systems:   As documented in primary provider H&P.      OBJECTIVE:     Vital Signs (Most Recent)  Pulse: 89 (08/24/18 0746)  Resp: 16 (08/24/18 0746)  BP: 139/62 (08/24/18 0746)  SpO2: 95 % (08/24/18 0746)    Physical Exam:  ASA: 3  Mallampati: 1      Laboratory  Lab Results   Component Value Date    INR 1.1 08/25/2017       Lab Results   Component Value Date    WBC 6.35 06/19/2018    HGB 11.7 (L) 06/19/2018    HCT 35.0 (L) 06/19/2018    MCV 90 06/19/2018     06/19/2018      Lab Results   Component Value Date    GLU 88 12/01/2017     12/01/2017    K 3.6 12/01/2017     12/01/2017    CO2 27 12/01/2017    BUN 18 12/01/2017    CREATININE 1.0 12/01/2017    CALCIUM 8.9 12/01/2017    MG 1.9 12/01/2017    ALT 17 12/01/2017    AST 16  12/01/2017    ALBUMIN 3.3 (L) 12/01/2017    BILITOT 0.2 12/01/2017    BILIDIR 0.1 12/07/2009       ASSESSMENT/PLAN:     Sedation Plan: local only  Patient will undergo left arm picc placement    Amos Wells MD  Department of Radiology  Pager: [phone]

## 2018-08-24 NOTE — TELEPHONE ENCOUNTER
Spoke with pt by phone about questions she had. We are communicating about any concerns she nay have.

## 2018-08-24 NOTE — TELEPHONE ENCOUNTER
----- Message from Charlene Domingo sent at 8/24/2018 10:26 AM CDT -----  Contact: Yasmin   tel:    [phone]  Needs Advice    Reason for call:     Got the Picc line put in today, has some questions she would like to discuss w/ the nurse this a.m..      Pls call this a.m. To discuss them.     Communication Preference:  Phone   Additional Information:  Did you send all the orders to   Caitlyn w/ [facility]  .

## 2018-08-27 ENCOUNTER — TELEPHONE (OUTPATIENT)
Dept: INFECTIOUS DISEASES | Facility: CLINIC | Age: 68
End: 2018-08-27

## 2018-08-27 DIAGNOSIS — Z79.2 LONG TERM (CURRENT) USE OF ANTIBIOTICS: Primary | ICD-10-CM

## 2018-08-27 NOTE — TELEPHONE ENCOUNTER
----- Message from Zoë Hutson MA sent at 8/27/2018  9:32 AM CDT -----  Contact: Yasmin     tel:   [phone]   Please see message below  ----- Message -----  From: Charlene Chayo  Sent: 8/27/2018   9:16 AM  To: [facility] Staff    Needs Advice    Reason for call:       Needs to hear from you today asap, ref . Orders for the nurse.   Communication Preference:  Phone   Additional Information:       Had the picc line put in on Friday. The Home Health nurse says they called  and pt. Says she called also on Friday asking for orders for the medication and picc line and nothing was received.    Asking  You to please call this a.m. asap w/ some orders.    [facility] tel:  [phone] .     The Nurse said no orders were received all weekend.    Waiting to go out to the patients' home.

## 2018-08-27 NOTE — TELEPHONE ENCOUNTER
Dressing changes and labs have been scheduled for pt. Spoke with pt she has been made aware of herb appointments.

## 2018-09-04 ENCOUNTER — TELEPHONE (OUTPATIENT)
Dept: INFECTIOUS DISEASES | Facility: CLINIC | Age: 68
End: 2018-09-04

## 2018-09-04 NOTE — TELEPHONE ENCOUNTER
----- Message from Rosy Sutton MA sent at 9/4/2018  9:27 AM CDT -----  Contact: self [phone]      ----- Message -----  From: Jane Shannon  Sent: 9/4/2018   8:53 AM  To: [facility] Staff    .Needs Advice    Reason for call:      Communication Preference: phone   Additional Information:pt states she's scheudle to go get her pic-line removed at 3;00pm today she states she took her medication already and dr usually test to see if the infection is gone she's very concerned

## 2018-09-04 NOTE — TELEPHONE ENCOUNTER
Spoke to Ms. Elif.  Explained that I don't think additional sputum testing was necessary.  She expressed understanding.  She will have picc line removed at bioscripts today.

## 2018-09-05 ENCOUNTER — TELEPHONE (OUTPATIENT)
Dept: OPTOMETRY | Facility: CLINIC | Age: 68
End: 2018-09-05

## 2018-09-05 DIAGNOSIS — H02.135 SENILE ECTROPION OF BOTH LOWER EYELIDS: Primary | ICD-10-CM

## 2018-09-05 DIAGNOSIS — H02.132 SENILE ECTROPION OF BOTH LOWER EYELIDS: Primary | ICD-10-CM

## 2018-09-05 DIAGNOSIS — H02.413 MECHANICAL PTOSIS OF BILATERAL EYELIDS: ICD-10-CM

## 2018-09-05 DIAGNOSIS — H57.819 BROW PTOSIS: ICD-10-CM

## 2018-09-06 ENCOUNTER — TELEPHONE (OUTPATIENT)
Dept: ADMINISTRATIVE | Facility: CLINIC | Age: 68
End: 2018-09-06

## 2018-09-06 ENCOUNTER — HOSPITAL ENCOUNTER (OUTPATIENT)
Dept: RADIOLOGY | Facility: HOSPITAL | Age: 68
Discharge: HOME | End: 2018-09-06
Attending: NURSE PRACTITIONER
Payer: MEDICARE

## 2018-09-06 ENCOUNTER — OFFICE VISIT (OUTPATIENT)
Dept: FAMILY MEDICINE | Facility: CLINIC | Age: 68
End: 2018-09-06
Payer: MEDICARE

## 2018-09-06 VITALS
SYSTOLIC BLOOD PRESSURE: 136 MMHG | TEMPERATURE: 98 F | OXYGEN SATURATION: 96 % | HEIGHT: 63 IN | DIASTOLIC BLOOD PRESSURE: 60 MMHG | WEIGHT: 150.81 LBS | HEART RATE: 87 BPM | BODY MASS INDEX: 26.72 KG/M2

## 2018-09-06 DIAGNOSIS — R06.2 WHEEZING: ICD-10-CM

## 2018-09-06 DIAGNOSIS — J44.1 COPD EXACERBATION: ICD-10-CM

## 2018-09-06 DIAGNOSIS — R06.2 WHEEZING: Primary | ICD-10-CM

## 2018-09-06 PROCEDURE — 71046 X-RAY EXAM CHEST 2 VIEWS: CPT | Mod: TC,FY,PO

## 2018-09-06 PROCEDURE — 71046 X-RAY EXAM CHEST 2 VIEWS: CPT | Mod: 26,,, | Performed by: RADIOLOGY

## 2018-09-06 PROCEDURE — 94640 AIRWAY INHALATION TREATMENT: CPT | Mod: PBBFAC,PO | Performed by: NURSE PRACTITIONER

## 2018-09-06 PROCEDURE — 99215 OFFICE O/P EST HI 40 MIN: CPT | Mod: PBBFAC,25,PO | Performed by: NURSE PRACTITIONER

## 2018-09-06 PROCEDURE — 99999 PR PBB SHADOW E&M-EST. PATIENT-LVL V: CPT | Mod: PBBFAC,,, | Performed by: NURSE PRACTITIONER

## 2018-09-06 RX ORDER — IPRATROPIUM BROMIDE 0.5 MG/2.5ML
0.5 SOLUTION RESPIRATORY (INHALATION)
Status: COMPLETED | OUTPATIENT
Start: 2018-09-06 | End: 2018-09-06

## 2018-09-06 RX ORDER — PREDNISONE 20 MG/1
20 TABLET ORAL 2 TIMES DAILY
Qty: 10 TABLET | Refills: 0 | Status: SHIPPED | OUTPATIENT
Start: 2018-09-06 | End: 2018-09-11

## 2018-09-06 RX ADMIN — IPRATROPIUM BROMIDE 0.5 MG: 0.5 SOLUTION RESPIRATORY (INHALATION) at 05:09

## 2018-09-06 RX ADMIN — IPRATROPIUM BROMIDE 0.5 MG: 0.5 SOLUTION RESPIRATORY (INHALATION) at 04:09

## 2018-09-06 NOTE — PROGRESS NOTES
Subjective:       Patient ID: Yasmin Asencio is a 68 y.o. female.    Chief Complaint: Wheezing    68-year-old female presents to the clinic today with complaint of increased wheezing over the last 2 days.  She had her PICC line removed on Tuesday this week.  She completed IV antibiotics through her PICC line on Sunday. She was being treated with Cefipine  for Pseudomonas for 10 days.  She denies any fever or chills.  She has a productive cough which she states is clear sputum.  She has been taking Mucinex twice a day.  She has been using her nebulizer machine 3 to 4 times a day.  She uses her oxygen on a p.r.n. Basis.  She states she has been having increased shortness of breath on exertion.  She denies any sinus congestion, ear pain, sore throat, abdominal pain, nausea,  Vomiting, diarrhea, headaches, dizziness, or blurred vision.  She denies any cardiac chest pain, heart palpitations, or swelling to lower extremities.       Past Medical History:   Diagnosis Date    Acquired bronchiectasis     due to history of TB - followed by pulmonary, Dr. Zuñiga    Allergy     Amblyopia     rt eye per pt    Anemia of other chronic disease     Anticoagulant long-term use     Anxiety     Cataract     Clotting disorder     COPD (chronic obstructive pulmonary disease)     COPD (chronic obstructive pulmonary disease)     Depression     Diverticulosis     Essential tremor     GERD (gastroesophageal reflux disease)     Hemangioma of liver     History of tuberculosis 1978    Hypertension     Mixed anxiety and depressive disorder     Psoriasis     PSVT (paroxysmal supraventricular tachycardia)     Pulmonary embolism     S/P PICC central line placement Apr. 2016 - May 2016    Skin disease     Psoriasis    Spondylosis without myelopathy 8/23/2013    Supraventricular tachycardia     Thoracic aorta atherosclerosis     noted on CT scan of chest 1/3/2011    Tuberculosis     Vaginal delivery     x2    Vitamin  D deficiency      Past Surgical History:   Procedure Laterality Date    CATARACT EXTRACTION W/  INTRAOCULAR LENS IMPLANT  07/24/12    od dr [place]    CATARACT EXTRACTION W/  INTRAOCULAR LENS IMPLANT  08/07/12    left eye    GALLBLADDER SURGERY  9/2011      reports that she quit smoking about 9 years ago. Her smoking use included cigarettes. She has a 100.00 pack-year smoking history. she has never used smokeless tobacco. She reports that she does not drink alcohol or use drugs.  Review of Systems   Constitutional: Negative for chills and fever.   HENT: Negative for congestion, ear discharge, ear pain, postnasal drip, rhinorrhea, sinus pressure, sneezing and sore throat.    Eyes: Negative for pain, discharge and itching.   Respiratory: Positive for cough, shortness of breath and wheezing.    Cardiovascular: Negative for chest pain, palpitations and leg swelling.   Gastrointestinal: Negative for abdominal pain, diarrhea, nausea and vomiting.   Musculoskeletal: Negative for back pain, neck pain and neck stiffness.   Skin: Negative for rash.   Neurological: Negative for dizziness, light-headedness and headaches.   Hematological: Negative for adenopathy.       Objective:      Physical Exam   Constitutional: She is oriented to person, place, and time. She appears well-developed and well-nourished. No distress.   HENT:   Head: Normocephalic and atraumatic.   Right Ear: External ear normal.   Left Ear: External ear normal.   Nose: Nose normal.   Mouth/Throat: Oropharynx is clear and moist. No oropharyngeal exudate.   Eyes: Conjunctivae and EOM are normal. Pupils are equal, round, and reactive to light. Right eye exhibits no discharge. Left eye exhibits no discharge. No scleral icterus.   Neck: Normal range of motion. Neck supple.   Cardiovascular: Normal rate and regular rhythm. Exam reveals no gallop and no friction rub.   No murmur heard.  Pulmonary/Chest: Effort normal. No respiratory distress. She has wheezes.  She has no rales.   Abdominal: Soft. Bowel sounds are normal. There is no tenderness.   Musculoskeletal: Normal range of motion. She exhibits no edema.   Lymphadenopathy:     She has no cervical adenopathy.   Neurological: She is alert and oriented to person, place, and time.   Skin: Skin is warm and dry. No rash noted. She is not diaphoretic.   Psychiatric: She has a normal mood and affect.       Assessment:       1. Wheezing    2. COPD exacerbation        Plan:         Wheezing  -     ipratropium 0.02 % nebulizer solution 0.5 mg; Take 2.5 mLs (0.5 mg total) by nebulization one time.  -     X-Ray Chest PA And Lateral; Future; Expected date: 09/06/2018  -     ipratropium 0.02 % nebulizer solution 0.5 mg; Take 2.5 mLs (0.5 mg total) by nebulization one time.    COPD exacerbation  -     predniSONE (DELTASONE) 20 MG tablet; Take 1 tablet (20 mg total) by mouth 2 (two) times daily. for 5 days  Dispense: 10 tablet; Refill: 0      After 2 breathing treatments lungs much improved only a few wheezes noted chest x-ray results still pending will hold off on prescribing antibiotics since just completed 10 days of Cefipine

## 2018-09-06 NOTE — PATIENT INSTRUCTIONS
Prednisone 20 mg one bid for the next 5 days  Use neb treatments every 4 hours as needed  Continue Mucinex every 12 hours as needed

## 2018-09-07 ENCOUNTER — TELEPHONE (OUTPATIENT)
Dept: INFECTIOUS DISEASES | Facility: CLINIC | Age: 68
End: 2018-09-07

## 2018-09-07 ENCOUNTER — TELEPHONE (OUTPATIENT)
Dept: FAMILY MEDICINE | Facility: CLINIC | Age: 68
End: 2018-09-07

## 2018-09-07 DIAGNOSIS — J81.0 ACUTE PULMONARY EDEMA: Primary | ICD-10-CM

## 2018-09-07 RX ORDER — POTASSIUM CHLORIDE 20 MEQ/1
20 TABLET, EXTENDED RELEASE ORAL DAILY
Qty: 7 TABLET | Refills: 0 | Status: ON HOLD | OUTPATIENT
Start: 2018-09-07 | End: 2018-09-19 | Stop reason: HOSPADM

## 2018-09-07 RX ORDER — FUROSEMIDE 20 MG/1
20 TABLET ORAL DAILY
Qty: 7 TABLET | Refills: 0 | Status: ON HOLD | OUTPATIENT
Start: 2018-09-07 | End: 2018-09-19 | Stop reason: HOSPADM

## 2018-09-07 NOTE — TELEPHONE ENCOUNTER
Chest x-ray on my review appears more congested.  She just completed a course of IV cefepime.      Assessment  1.  Pulmonary edema    Plan  1.  7 day course of lasix  2.  Potassium supplementation as she has a history of hypokalemia.

## 2018-09-07 NOTE — TELEPHONE ENCOUNTER
----- Message from Zoë Hutson MA sent at 9/7/2018 10:10 AM CDT -----  Contact: Yasmin     [phone]   Please see message below  ----- Message -----  From: Charlene Domingo  Sent: 9/7/2018   9:56 AM  To: Kwabena Palma Staff    Needs Advice    Reason for call:    Pt. Says she has been having problems breathing, recently had a chest xray and they were looking for pneumonia.  Pt. Is asking if you can find out the results and call her about this.   Home Health nurse told her to check w/you since she cannot have another antibiotic since she just got off of some.     Pt.says she is very concerned and is asking if you will pls call her ref. This.     Communication Preference:  Phone   Additional Information:  Pls call today.

## 2018-09-07 NOTE — TELEPHONE ENCOUNTER
I spoke with the patient and explained that her explained was mostly unchanged but infiltrates could not be excluded. She said she felt a little bit better today. I told her to call Dr. Mills on Monday if she still felt like she was not getting any better.

## 2018-09-10 DIAGNOSIS — R04.2 HEMOPTYSIS: ICD-10-CM

## 2018-09-10 DIAGNOSIS — J43.9 PULMONARY EMPHYSEMA: ICD-10-CM

## 2018-09-12 ENCOUNTER — TELEPHONE (OUTPATIENT)
Dept: FAMILY MEDICINE | Facility: CLINIC | Age: 68
End: 2018-09-12

## 2018-09-12 NOTE — TELEPHONE ENCOUNTER
I spoke with the patient she said her blood pressure was 93/58 and 99/59 today so she did not take her blood pressure medication. She said she spoke with Dr. Stacy and called in Lasix and potassium for her on 9/7 and she has 2 more days left. She said her home health nurse told her that she thought she has fluid on her lungs this am. She said she has been using her oxygen and feels a little more sob than normal but really does not want to go to the hospital. She see her new pulmonary doctor Dr. Garcia in November. I told her to see if she could see Dr. Stacy next week and get him to evaluate her. If she get worse in any way she need to go to the ER for further evaluation. Patient verbalized understanding of above.

## 2018-09-13 ENCOUNTER — TELEPHONE (OUTPATIENT)
Dept: INFECTIOUS DISEASES | Facility: CLINIC | Age: 68
End: 2018-09-13

## 2018-09-13 ENCOUNTER — HOSPITAL ENCOUNTER (INPATIENT)
Facility: HOSPITAL | Age: 68
LOS: 6 days | Discharge: HOME HEALTH | DRG: 177 | End: 2018-09-19
Attending: EMERGENCY MEDICINE | Admitting: STUDENT IN AN ORGANIZED HEALTH CARE EDUCATION/TRAINING PROGRAM
Payer: MEDICARE

## 2018-09-13 DIAGNOSIS — J15.1 PNEUMONIA OF LEFT LOWER LOBE DUE TO PSEUDOMONAS SPECIES: Primary | ICD-10-CM

## 2018-09-13 DIAGNOSIS — J18.9 PNEUMONIA: ICD-10-CM

## 2018-09-13 DIAGNOSIS — R06.00 DYSPNEA AND RESPIRATORY ABNORMALITIES: ICD-10-CM

## 2018-09-13 DIAGNOSIS — J47.9 BRONCHIECTASIS WITHOUT COMPLICATION: ICD-10-CM

## 2018-09-13 DIAGNOSIS — R06.89 DYSPNEA AND RESPIRATORY ABNORMALITIES: ICD-10-CM

## 2018-09-13 DIAGNOSIS — R06.02 SHORTNESS OF BREATH: ICD-10-CM

## 2018-09-13 DIAGNOSIS — Z79.01 CURRENT USE OF LONG TERM ANTICOAGULATION: ICD-10-CM

## 2018-09-13 PROBLEM — I26.99 PULMONARY EMBOLISM WITHOUT ACUTE COR PULMONALE: Status: ACTIVE | Noted: 2018-09-13

## 2018-09-13 LAB
ALBUMIN SERPL BCP-MCNC: 3.9 G/DL
ALP SERPL-CCNC: 67 U/L
ALT SERPL W/O P-5'-P-CCNC: 20 U/L
ANION GAP SERPL CALC-SCNC: 13 MMOL/L
AST SERPL-CCNC: 12 U/L
BASOPHILS # BLD AUTO: 0.05 K/UL
BASOPHILS NFR BLD: 0.3 %
BILIRUB SERPL-MCNC: 0.6 MG/DL
BNP SERPL-MCNC: 61 PG/ML
BUN SERPL-MCNC: 33 MG/DL
CALCIUM SERPL-MCNC: 9.3 MG/DL
CHLORIDE SERPL-SCNC: 89 MMOL/L
CO2 SERPL-SCNC: 29 MMOL/L
CREAT SERPL-MCNC: 1.1 MG/DL
DIFFERENTIAL METHOD: ABNORMAL
EOSINOPHIL # BLD AUTO: 0.1 K/UL
EOSINOPHIL NFR BLD: 0.6 %
ERYTHROCYTE [DISTWIDTH] IN BLOOD BY AUTOMATED COUNT: 12 %
EST. GFR  (AFRICAN AMERICAN): 59.2 ML/MIN/1.73 M^2
EST. GFR  (NON AFRICAN AMERICAN): 51.3 ML/MIN/1.73 M^2
ESTIMATED AVG GLUCOSE: 103 MG/DL
GLUCOSE SERPL-MCNC: 102 MG/DL
HBA1C MFR BLD HPLC: 5.2 %
HCT VFR BLD AUTO: 36.4 %
HGB BLD-MCNC: 12.1 G/DL
IMM GRANULOCYTES # BLD AUTO: 0.08 K/UL
IMM GRANULOCYTES NFR BLD AUTO: 0.5 %
LACTATE SERPL-SCNC: 1.1 MMOL/L
LYMPHOCYTES # BLD AUTO: 2.5 K/UL
LYMPHOCYTES NFR BLD: 15.5 %
MCH RBC QN AUTO: 29.4 PG
MCHC RBC AUTO-ENTMCNC: 33.2 G/DL
MCV RBC AUTO: 88 FL
MONOCYTES # BLD AUTO: 1.8 K/UL
MONOCYTES NFR BLD: 11.5 %
NEUTROPHILS # BLD AUTO: 11.3 K/UL
NEUTROPHILS NFR BLD: 71.6 %
NRBC BLD-RTO: 0 /100 WBC
PLATELET # BLD AUTO: 367 K/UL
PMV BLD AUTO: 9.3 FL
POTASSIUM SERPL-SCNC: 3.3 MMOL/L
PROCALCITONIN SERPL IA-MCNC: 0.08 NG/ML
PROCALCITONIN SERPL IA-MCNC: 0.08 NG/ML
PROT SERPL-MCNC: 7.7 G/DL
RBC # BLD AUTO: 4.12 M/UL
SODIUM SERPL-SCNC: 131 MMOL/L
TROPONIN I SERPL DL<=0.01 NG/ML-MCNC: <0.006 NG/ML
WBC # BLD AUTO: 15.76 K/UL

## 2018-09-13 PROCEDURE — 93005 ELECTROCARDIOGRAM TRACING: CPT

## 2018-09-13 PROCEDURE — 63600175 PHARM REV CODE 636 W HCPCS: Performed by: STUDENT IN AN ORGANIZED HEALTH CARE EDUCATION/TRAINING PROGRAM

## 2018-09-13 PROCEDURE — 83605 ASSAY OF LACTIC ACID: CPT

## 2018-09-13 PROCEDURE — 84484 ASSAY OF TROPONIN QUANT: CPT

## 2018-09-13 PROCEDURE — 25500020 PHARM REV CODE 255: Performed by: EMERGENCY MEDICINE

## 2018-09-13 PROCEDURE — 84145 PROCALCITONIN (PCT): CPT | Mod: 91

## 2018-09-13 PROCEDURE — 93010 ELECTROCARDIOGRAM REPORT: CPT | Mod: ,,, | Performed by: INTERNAL MEDICINE

## 2018-09-13 PROCEDURE — 83880 ASSAY OF NATRIURETIC PEPTIDE: CPT

## 2018-09-13 PROCEDURE — 25000003 PHARM REV CODE 250: Performed by: STUDENT IN AN ORGANIZED HEALTH CARE EDUCATION/TRAINING PROGRAM

## 2018-09-13 PROCEDURE — 85025 COMPLETE CBC W/AUTO DIFF WBC: CPT

## 2018-09-13 PROCEDURE — 27000221 HC OXYGEN, UP TO 24 HOURS

## 2018-09-13 PROCEDURE — 83036 HEMOGLOBIN GLYCOSYLATED A1C: CPT

## 2018-09-13 PROCEDURE — 11000001 HC ACUTE MED/SURG PRIVATE ROOM

## 2018-09-13 PROCEDURE — 96374 THER/PROPH/DIAG INJ IV PUSH: CPT

## 2018-09-13 PROCEDURE — 99285 EMERGENCY DEPT VISIT HI MDM: CPT | Mod: 25

## 2018-09-13 PROCEDURE — 84145 PROCALCITONIN (PCT): CPT

## 2018-09-13 PROCEDURE — 99285 EMERGENCY DEPT VISIT HI MDM: CPT | Mod: ,,, | Performed by: PHYSICIAN ASSISTANT

## 2018-09-13 PROCEDURE — 80053 COMPREHEN METABOLIC PANEL: CPT

## 2018-09-13 PROCEDURE — 87040 BLOOD CULTURE FOR BACTERIA: CPT | Mod: 59

## 2018-09-13 RX ORDER — RAMELTEON 8 MG/1
8 TABLET ORAL NIGHTLY PRN
Status: DISCONTINUED | OUTPATIENT
Start: 2018-09-13 | End: 2018-09-19 | Stop reason: HOSPADM

## 2018-09-13 RX ORDER — IPRATROPIUM BROMIDE AND ALBUTEROL SULFATE 2.5; .5 MG/3ML; MG/3ML
3 SOLUTION RESPIRATORY (INHALATION) EVERY 4 HOURS PRN
Status: DISCONTINUED | OUTPATIENT
Start: 2018-09-13 | End: 2018-09-14

## 2018-09-13 RX ORDER — CIPROFLOXACIN 500 MG/1
500 TABLET ORAL EVERY 8 HOURS
Status: DISCONTINUED | OUTPATIENT
Start: 2018-09-14 | End: 2018-09-14

## 2018-09-13 RX ORDER — SODIUM CHLORIDE 0.9 % (FLUSH) 0.9 %
5 SYRINGE (ML) INJECTION
Status: DISCONTINUED | OUTPATIENT
Start: 2018-09-13 | End: 2018-09-19 | Stop reason: HOSPADM

## 2018-09-13 RX ORDER — PANTOPRAZOLE SODIUM 40 MG/1
40 TABLET, DELAYED RELEASE ORAL DAILY
Status: DISCONTINUED | OUTPATIENT
Start: 2018-09-14 | End: 2018-09-19 | Stop reason: HOSPADM

## 2018-09-13 RX ORDER — ESCITALOPRAM OXALATE 10 MG/1
10 TABLET ORAL DAILY
Status: DISCONTINUED | OUTPATIENT
Start: 2018-09-14 | End: 2018-09-19 | Stop reason: HOSPADM

## 2018-09-13 RX ORDER — ERGOCALCIFEROL 1.25 MG/1
50000 CAPSULE ORAL
Status: DISCONTINUED | OUTPATIENT
Start: 2018-09-14 | End: 2018-09-17

## 2018-09-13 RX ORDER — VERAPAMIL HYDROCHLORIDE 180 MG/1
180 TABLET, FILM COATED, EXTENDED RELEASE ORAL 2 TIMES DAILY
Status: DISCONTINUED | OUTPATIENT
Start: 2018-09-13 | End: 2018-09-14

## 2018-09-13 RX ORDER — GABAPENTIN 300 MG/1
600 CAPSULE ORAL 3 TIMES DAILY
Status: DISCONTINUED | OUTPATIENT
Start: 2018-09-14 | End: 2018-09-19 | Stop reason: HOSPADM

## 2018-09-13 RX ORDER — CHLORTHALIDONE 25 MG/1
25 TABLET ORAL DAILY
Status: DISCONTINUED | OUTPATIENT
Start: 2018-09-14 | End: 2018-09-14

## 2018-09-13 RX ORDER — ACETAMINOPHEN 500 MG
1000 TABLET ORAL EVERY 6 HOURS PRN
Status: DISCONTINUED | OUTPATIENT
Start: 2018-09-13 | End: 2018-09-19 | Stop reason: HOSPADM

## 2018-09-13 RX ORDER — ONDANSETRON 4 MG/1
4 TABLET, FILM COATED ORAL EVERY 8 HOURS PRN
Status: DISCONTINUED | OUTPATIENT
Start: 2018-09-13 | End: 2018-09-19 | Stop reason: HOSPADM

## 2018-09-13 RX ORDER — GUAIFENESIN 600 MG/1
1200 TABLET, EXTENDED RELEASE ORAL 2 TIMES DAILY
Status: DISCONTINUED | OUTPATIENT
Start: 2018-09-13 | End: 2018-09-19 | Stop reason: HOSPADM

## 2018-09-13 RX ORDER — POTASSIUM CHLORIDE 20 MEQ/15ML
40 SOLUTION ORAL ONCE
Status: DISCONTINUED | OUTPATIENT
Start: 2018-09-13 | End: 2018-09-16

## 2018-09-13 RX ORDER — FOLIC ACID 1 MG/1
1 TABLET ORAL DAILY
Status: DISCONTINUED | OUTPATIENT
Start: 2018-09-14 | End: 2018-09-19 | Stop reason: HOSPADM

## 2018-09-13 RX ORDER — FUROSEMIDE 10 MG/ML
20 INJECTION INTRAMUSCULAR; INTRAVENOUS 2 TIMES DAILY
Status: DISCONTINUED | OUTPATIENT
Start: 2018-09-14 | End: 2018-09-13

## 2018-09-13 RX ORDER — FUROSEMIDE 10 MG/ML
20 INJECTION INTRAMUSCULAR; INTRAVENOUS 2 TIMES DAILY
Status: DISCONTINUED | OUTPATIENT
Start: 2018-09-13 | End: 2018-09-14

## 2018-09-13 RX ORDER — IPRATROPIUM BROMIDE 0.5 MG/2.5ML
0.5 SOLUTION RESPIRATORY (INHALATION) EVERY 6 HOURS
Status: DISCONTINUED | OUTPATIENT
Start: 2018-09-14 | End: 2018-09-14

## 2018-09-13 RX ADMIN — VERAPAMIL HYDROCHLORIDE 180 MG: 180 TABLET, FILM COATED, EXTENDED RELEASE ORAL at 11:09

## 2018-09-13 RX ADMIN — RAMELTEON 8 MG: 8 TABLET, FILM COATED ORAL at 11:09

## 2018-09-13 RX ADMIN — ACETAMINOPHEN 1000 MG: 500 TABLET ORAL at 11:09

## 2018-09-13 RX ADMIN — PIPERACILLIN AND TAZOBACTAM 4.5 G: 4; .5 INJECTION, POWDER, LYOPHILIZED, FOR SOLUTION INTRAVENOUS; PARENTERAL at 11:09

## 2018-09-13 RX ADMIN — PIPERACILLIN AND TAZOBACTAM 4.5 G: 4; .5 INJECTION, POWDER, LYOPHILIZED, FOR SOLUTION INTRAVENOUS; PARENTERAL at 09:09

## 2018-09-13 RX ADMIN — FUROSEMIDE 20 MG: 10 INJECTION, SOLUTION INTRAMUSCULAR; INTRAVENOUS at 11:09

## 2018-09-13 RX ADMIN — APIXABAN 5 MG: 5 TABLET, FILM COATED ORAL at 11:09

## 2018-09-13 RX ADMIN — GUAIFENESIN 1200 MG: 600 TABLET, EXTENDED RELEASE ORAL at 11:09

## 2018-09-13 RX ADMIN — IOHEXOL 75 ML: 350 INJECTION, SOLUTION INTRAVENOUS at 07:09

## 2018-09-13 NOTE — ED PROVIDER NOTES
Encounter Date: 9/13/2018       History     Chief Complaint   Patient presents with    Trouble Breathing     picc line removed tuesday. having trouble breathing. pseudomonas and copd.      67 y/o WF with history of HTN, COPD, PE on Eliquis presents to the ED c/o shortness of breath and cough. She has been on ciprofloxacin, IV cefepime, prednisone, and lasix for this. Completed cefepime last Tuesday. She reports cough and SOB has been progressively worsening for the past week. She is currently on a 7 day course of lasix for edema noted on repeat CXR, last dose tomorrow. She also recently completed prednisone. She reports a cough productive of green sputum, denies hemoptysis. She has some R lower chest pain with cough and deep breath, headache, lightheadedness, R flank pain. She is on 2L oxygen at home - she normally only uses it at night but has been using it continuously for the past week. She denies fever, chills, abdominal pain, nausea, diarrhea, LE edema. She quit smoking 10 years ago.       The history is provided by the patient.     Review of patient's allergies indicates:   Allergen Reactions    Adhesive Other (See Comments)     Tears up the skin and makes it itch    Bactrim [sulfamethoxazole-trimethoprim] Rash     Was hospitalized for rash    Lipitor [atorvastatin] Other (See Comments)     Muscle aches    Albuterol Other (See Comments)     tremors    Topamax [topiramate]      - made her feel 'bad in the head'    Restasis [cyclosporine] Itching     Past Medical History:   Diagnosis Date    Acquired bronchiectasis     due to history of TB - followed by pulmonary, Dr. Zuñiga    Allergy     Amblyopia     rt eye per pt    Anemia of other chronic disease     Anticoagulant long-term use     Anxiety     Cataract     Clotting disorder     COPD (chronic obstructive pulmonary disease)     COPD (chronic obstructive pulmonary disease)     Depression     Diverticulosis     Essential tremor     GERD  (gastroesophageal reflux disease)     Hemangioma of liver     History of tuberculosis 1978    Hypertension     Mixed anxiety and depressive disorder     Psoriasis     PSVT (paroxysmal supraventricular tachycardia)     Pulmonary embolism     S/P PICC central line placement Apr. 2016 - May 2016    Skin disease     Psoriasis    Spondylosis without myelopathy 8/23/2013    Supraventricular tachycardia     Thoracic aorta atherosclerosis     noted on CT scan of chest 1/3/2011    Tuberculosis     Vaginal delivery     x2    Vitamin D deficiency      Past Surgical History:   Procedure Laterality Date    ABLATION N/A 7/24/2017    Performed by [facility]esteros [facility] at [facility] CATH LAB    BLOCK-NERVE-MEDIAL BRANCH-LUMBAR Bilateral 10/14/2016    Performed by Issac Meyers [facility] at [facility]    BLOCK-NERVE-MEDIAL BRANCH-LUMBAR Bilateral 8/26/2016    [facility] at [facility]    CATARACT EXTRACTION W/  INTRAOCULAR LENS IMPLANT  07/24/12    od dr [place]    CATARACT EXTRACTION W/  INTRAOCULAR LENS IMPLANT  08/07/12    left eye    COLONOSCOPY N/A 4/23/2013    Performed by Simeon Graves MD at [facility] ENDO (4TH FLR)    EGD (ESOPHAGOGASTRODUODENOSCOPY) N/A 4/23/2013    Performed by Simeon Graves MD at [facility] ENDO (4TH FLR)    GALLBLADDER SURGERY  9/2011    HEART CATH-LEFT Left 6/16/2016    [facility]    INJECTION-FACET Bilateral 1/22/2016    [facility] at [facility]    RADIOFREQUENCY THERMOCOAGULATION (RFTC)-NERVE-MEDIAN BRANCH-LUMBAR Right 4/4/2018    [facility]    RADIOFREQUENCY THERMOCOAGULATION (RFTC)-NERVE-MEDIAN BRANCH-LUMBAR Left 3/16/2018    [facility] at [facility]    RADIOFREQUENCY THERMOCOAGULATION (RFTC)-NERVE-MEDIAN BRANCH-LUMBAR Right 12/16/2016    [facility] at [facility]     Family History    Problem Relation Age of Onset    Hypertension Mother     Heart attack Mother     Cancer Father         liver and bladder    Hyperlipidemia Sister     Psoriasis Sister     Cancer Brother         liver    Stroke Maternal Uncle     Cancer Maternal Aunt         stomach    Lung cancer Sister     Heart attack Brother     Heart attack Son     Amblyopia Neg Hx     Blindness Neg Hx     Cataracts Neg Hx     Glaucoma Neg Hx     Macular degeneration Neg Hx     Retinal detachment Neg Hx     Strabismus Neg Hx     Thyroid disease Neg Hx     Melanoma Neg Hx     Lupus Neg Hx     Eczema Neg Hx     COPD Neg Hx      Social History     Tobacco Use    Smoking status: Former Smoker     Packs/day: 2.00     Years: 50.00     Pack years: 100.00     Types: Cigarettes     Last attempt to quit: 2009     Years since quittin.3    Smokeless tobacco: Never Used   Substance Use Topics    Alcohol use: No     Alcohol/week: 0.0 oz    Drug use: No     Review of Systems   Constitutional: Negative for chills and fever.   HENT: Negative for congestion, rhinorrhea and sore throat.    Respiratory: Positive for cough and shortness of breath.    Cardiovascular: Positive for chest pain. Negative for palpitations and leg swelling.   Gastrointestinal: Negative for abdominal pain, constipation, diarrhea, nausea and vomiting.   Genitourinary: Positive for flank pain. Negative for dysuria, hematuria, vaginal bleeding and vaginal discharge.   Musculoskeletal: Negative for back pain, neck pain and neck stiffness.   Skin: Negative for rash and wound.   Neurological: Positive for light-headedness and headaches. Negative for dizziness, syncope, weakness and numbness.   Psychiatric/Behavioral: Negative for confusion.       Physical Exam     Initial Vitals [18 1522]   BP Pulse Resp Temp SpO2   (!) 119/59 99 (!) 22 97.8 °F (36.6 °C) 96 %      MAP       --         Physical Exam    Nursing note and vitals reviewed.  Constitutional:  She appears well-developed and well-nourished. She is not diaphoretic. No distress.   HENT:   Head: Normocephalic and atraumatic.   Neck: Normal range of motion. Neck supple.   Cardiovascular: Normal rate, regular rhythm and normal heart sounds. Exam reveals no gallop and no friction rub.    No murmur heard.  No LE edema   Pulmonary/Chest: No respiratory distress. She has no wheezes. She has no rhonchi. She has no rales.   Crackles noted to lungs. On 2L oxygen via nasal cannula   Abdominal: Soft. Bowel sounds are normal. There is no tenderness. There is no rebound and no guarding.   Musculoskeletal: Normal range of motion.   Neurological: She is alert and oriented to person, place, and time.   Skin: Skin is warm and dry. No rash noted. No erythema.   Psychiatric: She has a normal mood and affect.         ED Course   Procedures  Labs Reviewed   CULTURE, BLOOD   CULTURE, BLOOD   CBC W/ AUTO DIFFERENTIAL   COMPREHENSIVE METABOLIC PANEL   B-TYPE NATRIURETIC PEPTIDE   TROPONIN I   LACTIC ACID, PLASMA          Imaging Results          X-Ray Chest PA And Lateral (In process)                  Medical Decision Making:   History:   Old Medical Records: I decided to obtain old medical records.  Old Records Summarized: records from clinic visits.       <> Summary of Records: Followed by ID - Dr Yuan. Had course of ciprofloxacin, no improvement of symptoms. PICC line placed and given IV cefepime q12 x 10 days. Sputum culture grew Pseudomonas. Symptoms not resolved - started on prednisone. CXR showed pulmonary edema, started on Lasix x 7 days (last dose tomorrow).   Clinical Tests:   Lab Tests: Reviewed and Ordered  Radiological Study: Ordered and Reviewed  Medical Tests: Ordered and Reviewed       APC / Resident Notes:   67 y/o WF with history of HTN, COPD, PE on Eliquis presents to the ED c/o shortness of breath and cough. VSS. On 2L oxygen via nasal cannula. Crackles noted in lungs. No LE edema. Abdomen soft and nontender.  DDx includes but is not limited to pneumonia, COPD exacerbation, PE, ACS, new onset CHF, sepsis. Will get labs, CXR, CTA chest.     Leukocytosis noted with WBC 15.76. CMP shows mild hypokalemia and hyponatremia. BNP and troponin normal. Lactic acid normal. Blood culture x 2 pending.    CXR unchanged from prior.    Patient signed out to resident pending CTA.    CTA shows chronic PE, stable fibrotic changes in lungs, small consolidation within the L lower lobe.     Patient was admitted to hospital medicine for further management.                 Clinical Impression:   Diagnoses of Shortness of breath and Dyspnea and respiratory abnormalities were pertinent to this visit.      Disposition:   Disposition: Admitted                        Lena Wells PA-C  09/14/18 [id number]

## 2018-09-13 NOTE — ED NOTES
Pt's first and last name and birthday confirmed.   LOC: The patient is awake, alert and aware of environment with an appropriate affect, the patient is oriented x 3 and speaking appropriately.  APPEARANCE: Patient resting comfortably and in no acute distress, patient is clean and well groomed.  SKIN: The skin is warm and dry, patient has normal skin turgor and moist mucus membranes, skin intact, no breakdown or brusing noted.  MUSKULOSKELETAL: Patient moving all extremities well, no obvious swelling or deformities noted.  RESPIRATORY: Airway is open and patent, respirations are spontaneous, patient has an increased effort and normal rate. Breath sounds are coarse bilaterally in all lung fields.   CARDIAC: Normal heart sounds. No peripheral edema.Peripheral pulses are strong.   ABDOMEN: Soft and non tender to palpation, no distention noted. Bowel sounds present.   NEURO: No neuro deficits, hand grasp equal, no drift noted, no facial droop noted. Speech is clear.

## 2018-09-13 NOTE — ED TRIAGE NOTES
Pt was recently treated for pseudomonas lung infection with IV antibiotics. Pt states she is having right sided chest pain and worsening SOB today.

## 2018-09-13 NOTE — PROVIDER PROGRESS NOTES - EMERGENCY DEPT.
Encounter Date: 9/13/2018    ED Physician Progress Notes       SCRIBE NOTE: I, Sonja Tinoco, am scribing for, and in the presence of,  Dr. Broussard .  Physician Statement: I, Dr. Broussard , personally performed the services described in this documentation as scribed by Sonja Tinoco in my presence, and it is both accurate and complete.      EKG - STEMI Decision  Initial Reading: No STEMI present.

## 2018-09-13 NOTE — TELEPHONE ENCOUNTER
----- Message from Charlene Domingo sent at 9/13/2018 10:07 AM CDT -----  Contact: Yasmin     tel:   [phone]  Dr. Yuan's pt./   pcp told her to call your office and for you to see  Her as soon as you return from vacation.         Pls call w/an appt.  Time to come in. Would like late morning or afternoon if at all possible.   Pt.says thank you very much.

## 2018-09-14 ENCOUNTER — RESEARCH ENCOUNTER (OUTPATIENT)
Dept: RESEARCH | Facility: HOSPITAL | Age: 68
End: 2018-09-14

## 2018-09-14 PROBLEM — J96.01 ACUTE RESPIRATORY FAILURE WITH HYPOXIA: Status: ACTIVE | Noted: 2018-09-14

## 2018-09-14 PROBLEM — E87.6 HYPOKALEMIA: Status: ACTIVE | Noted: 2018-09-14

## 2018-09-14 PROBLEM — E87.1 HYPONATREMIA: Status: ACTIVE | Noted: 2018-09-14

## 2018-09-14 PROBLEM — J44.1 CHRONIC OBSTRUCTIVE PULMONARY DISEASE WITH ACUTE EXACERBATION: Status: ACTIVE | Noted: 2017-01-05

## 2018-09-14 PROBLEM — J96.21 ACUTE ON CHRONIC RESPIRATORY FAILURE WITH HYPOXIA: Status: ACTIVE | Noted: 2018-09-14

## 2018-09-14 LAB
25(OH)D3+25(OH)D2 SERPL-MCNC: 35 NG/ML
ANION GAP SERPL CALC-SCNC: 13 MMOL/L
BASOPHILS # BLD AUTO: 0.04 K/UL
BASOPHILS NFR BLD: 0.4 %
BILIRUB UR QL STRIP: NEGATIVE
BUN SERPL-MCNC: 26 MG/DL
CALCIUM SERPL-MCNC: 9 MG/DL
CHLORIDE SERPL-SCNC: 90 MMOL/L
CHLORIDE UR-SCNC: 116 MMOL/L
CLARITY UR REFRACT.AUTO: CLEAR
CO2 SERPL-SCNC: 28 MMOL/L
COLOR UR AUTO: NORMAL
CREAT SERPL-MCNC: 1 MG/DL
DIASTOLIC DYSFUNCTION: NO
DIFFERENTIAL METHOD: ABNORMAL
EOSINOPHIL # BLD AUTO: 0.2 K/UL
EOSINOPHIL NFR BLD: 2.1 %
ERYTHROCYTE [DISTWIDTH] IN BLOOD BY AUTOMATED COUNT: 12 %
EST. GFR  (AFRICAN AMERICAN): >60 ML/MIN/1.73 M^2
EST. GFR  (NON AFRICAN AMERICAN): 57.6 ML/MIN/1.73 M^2
GLUCOSE SERPL-MCNC: 96 MG/DL
GLUCOSE UR QL STRIP: NEGATIVE
HCT VFR BLD AUTO: 35.4 %
HGB BLD-MCNC: 11.9 G/DL
HGB UR QL STRIP: NEGATIVE
IMM GRANULOCYTES # BLD AUTO: 0.06 K/UL
IMM GRANULOCYTES NFR BLD AUTO: 0.6 %
KETONES UR QL STRIP: NEGATIVE
LEUKOCYTE ESTERASE UR QL STRIP: NEGATIVE
LYMPHOCYTES # BLD AUTO: 2.3 K/UL
LYMPHOCYTES NFR BLD: 22.6 %
MAGNESIUM SERPL-MCNC: 1.9 MG/DL
MCH RBC QN AUTO: 29.5 PG
MCHC RBC AUTO-ENTMCNC: 33.6 G/DL
MCV RBC AUTO: 88 FL
MONOCYTES # BLD AUTO: 1.4 K/UL
MONOCYTES NFR BLD: 13.9 %
NEUTROPHILS # BLD AUTO: 6.3 K/UL
NEUTROPHILS NFR BLD: 60.4 %
NITRITE UR QL STRIP: NEGATIVE
NRBC BLD-RTO: 0 /100 WBC
OSMOLALITY SERPL: 279 MOSM/KG
PH UR STRIP: 6 [PH] (ref 5–8)
PHOSPHATE SERPL-MCNC: 3.6 MG/DL
PLATELET # BLD AUTO: 334 K/UL
PMV BLD AUTO: 9.5 FL
POTASSIUM SERPL-SCNC: 2.8 MMOL/L
POTASSIUM SERPL-SCNC: 3.5 MMOL/L
PROT UR QL STRIP: NEGATIVE
RBC # BLD AUTO: 4.04 M/UL
RETIRED EF AND QEF - SEE NOTES: 55 (ref 55–65)
SODIUM SERPL-SCNC: 131 MMOL/L
SODIUM UR-SCNC: 88 MMOL/L
SP GR UR STRIP: 1.01 (ref 1–1.03)
URN SPEC COLLECT METH UR: NORMAL
UROBILINOGEN UR STRIP-ACNC: NEGATIVE EU/DL
WBC # BLD AUTO: 10.35 K/UL

## 2018-09-14 PROCEDURE — 25000003 PHARM REV CODE 250: Performed by: STUDENT IN AN ORGANIZED HEALTH CARE EDUCATION/TRAINING PROGRAM

## 2018-09-14 PROCEDURE — 87070 CULTURE OTHR SPECIMN AEROBIC: CPT

## 2018-09-14 PROCEDURE — 36415 COLL VENOUS BLD VENIPUNCTURE: CPT

## 2018-09-14 PROCEDURE — 93306 TTE W/DOPPLER COMPLETE: CPT | Mod: 26,,, | Performed by: INTERNAL MEDICINE

## 2018-09-14 PROCEDURE — 25000242 PHARM REV CODE 250 ALT 637 W/ HCPCS: Performed by: STUDENT IN AN ORGANIZED HEALTH CARE EDUCATION/TRAINING PROGRAM

## 2018-09-14 PROCEDURE — 93306 TTE W/DOPPLER COMPLETE: CPT

## 2018-09-14 PROCEDURE — 87205 SMEAR GRAM STAIN: CPT

## 2018-09-14 PROCEDURE — 84132 ASSAY OF SERUM POTASSIUM: CPT

## 2018-09-14 PROCEDURE — 11000001 HC ACUTE MED/SURG PRIVATE ROOM

## 2018-09-14 PROCEDURE — 87449 NOS EACH ORGANISM AG IA: CPT

## 2018-09-14 PROCEDURE — 83930 ASSAY OF BLOOD OSMOLALITY: CPT

## 2018-09-14 PROCEDURE — 94640 AIRWAY INHALATION TREATMENT: CPT

## 2018-09-14 PROCEDURE — 99223 1ST HOSP IP/OBS HIGH 75: CPT | Mod: AI,GC,, | Performed by: HOSPITALIST

## 2018-09-14 PROCEDURE — 94761 N-INVAS EAR/PLS OXIMETRY MLT: CPT

## 2018-09-14 PROCEDURE — 87077 CULTURE AEROBIC IDENTIFY: CPT

## 2018-09-14 PROCEDURE — 85025 COMPLETE CBC W/AUTO DIFF WBC: CPT

## 2018-09-14 PROCEDURE — 80048 BASIC METABOLIC PNL TOTAL CA: CPT

## 2018-09-14 PROCEDURE — 84300 ASSAY OF URINE SODIUM: CPT

## 2018-09-14 PROCEDURE — 63600175 PHARM REV CODE 636 W HCPCS: Performed by: STUDENT IN AN ORGANIZED HEALTH CARE EDUCATION/TRAINING PROGRAM

## 2018-09-14 PROCEDURE — 99222 1ST HOSP IP/OBS MODERATE 55: CPT | Mod: GC,,, | Performed by: INTERNAL MEDICINE

## 2018-09-14 PROCEDURE — 87186 SC STD MICRODIL/AGAR DIL: CPT

## 2018-09-14 PROCEDURE — 83735 ASSAY OF MAGNESIUM: CPT

## 2018-09-14 PROCEDURE — 84100 ASSAY OF PHOSPHORUS: CPT

## 2018-09-14 PROCEDURE — 82436 ASSAY OF URINE CHLORIDE: CPT

## 2018-09-14 PROCEDURE — 81003 URINALYSIS AUTO W/O SCOPE: CPT

## 2018-09-14 PROCEDURE — 82306 VITAMIN D 25 HYDROXY: CPT

## 2018-09-14 RX ORDER — IPRATROPIUM BROMIDE 0.5 MG/2.5ML
SOLUTION RESPIRATORY (INHALATION)
COMMUNITY
End: 2019-10-24 | Stop reason: SDUPTHER

## 2018-09-14 RX ORDER — IPRATROPIUM BROMIDE AND ALBUTEROL SULFATE 2.5; .5 MG/3ML; MG/3ML
3 SOLUTION RESPIRATORY (INHALATION) EVERY 6 HOURS
Status: DISCONTINUED | OUTPATIENT
Start: 2018-09-14 | End: 2018-09-16

## 2018-09-14 RX ORDER — POTASSIUM CHLORIDE 20 MEQ/1
40 TABLET, EXTENDED RELEASE ORAL
Status: COMPLETED | OUTPATIENT
Start: 2018-09-14 | End: 2018-09-14

## 2018-09-14 RX ORDER — MULTIVITAMIN
1 TABLET ORAL DAILY
Status: ON HOLD | COMMUNITY
End: 2021-12-17 | Stop reason: HOSPADM

## 2018-09-14 RX ORDER — ACETAMINOPHEN 500 MG
1000 TABLET ORAL DAILY
Status: ON HOLD | COMMUNITY
End: 2018-09-19 | Stop reason: HOSPADM

## 2018-09-14 RX ORDER — IPRATROPIUM BROMIDE 0.5 MG/2.5ML
SOLUTION RESPIRATORY (INHALATION)
Qty: 225 VIAL | Refills: 3 | Status: SHIPPED | OUTPATIENT
Start: 2018-09-14 | End: 2018-09-14

## 2018-09-14 RX ORDER — POLYETHYLENE GLYCOL 3350 17 G/17G
17 POWDER, FOR SOLUTION ORAL DAILY PRN
COMMUNITY
End: 2018-09-27

## 2018-09-14 RX ADMIN — POTASSIUM CHLORIDE 40 MEQ: 1500 TABLET, EXTENDED RELEASE ORAL at 11:09

## 2018-09-14 RX ADMIN — PIPERACILLIN AND TAZOBACTAM 4.5 G: 4; .5 INJECTION, POWDER, LYOPHILIZED, FOR SOLUTION INTRAVENOUS; PARENTERAL at 04:09

## 2018-09-14 RX ADMIN — IPRATROPIUM BROMIDE 0.5 MG: 0.5 SOLUTION RESPIRATORY (INHALATION) at 01:09

## 2018-09-14 RX ADMIN — FOLIC ACID 1 MG: 1 TABLET ORAL at 09:09

## 2018-09-14 RX ADMIN — APIXABAN 5 MG: 5 TABLET, FILM COATED ORAL at 09:09

## 2018-09-14 RX ADMIN — GABAPENTIN 600 MG: 300 CAPSULE ORAL at 09:09

## 2018-09-14 RX ADMIN — IPRATROPIUM BROMIDE AND ALBUTEROL SULFATE 3 ML: .5; 3 SOLUTION RESPIRATORY (INHALATION) at 02:09

## 2018-09-14 RX ADMIN — ERGOCALCIFEROL 50000 UNITS: 1.25 CAPSULE ORAL at 09:09

## 2018-09-14 RX ADMIN — PIPERACILLIN AND TAZOBACTAM 4.5 G: 4; .5 INJECTION, POWDER, LYOPHILIZED, FOR SOLUTION INTRAVENOUS; PARENTERAL at 08:09

## 2018-09-14 RX ADMIN — FUROSEMIDE 20 MG: 10 INJECTION, SOLUTION INTRAMUSCULAR; INTRAVENOUS at 09:09

## 2018-09-14 RX ADMIN — PANTOPRAZOLE SODIUM 40 MG: 40 TABLET, DELAYED RELEASE ORAL at 09:09

## 2018-09-14 RX ADMIN — GUAIFENESIN 1200 MG: 600 TABLET, EXTENDED RELEASE ORAL at 09:09

## 2018-09-14 RX ADMIN — GABAPENTIN 600 MG: 300 CAPSULE ORAL at 04:09

## 2018-09-14 RX ADMIN — PIPERACILLIN AND TAZOBACTAM 4.5 G: 4; .5 INJECTION, POWDER, LYOPHILIZED, FOR SOLUTION INTRAVENOUS; PARENTERAL at 11:09

## 2018-09-14 RX ADMIN — POTASSIUM CHLORIDE 40 MEQ: 1500 TABLET, EXTENDED RELEASE ORAL at 08:09

## 2018-09-14 RX ADMIN — ONDANSETRON 4 MG: 4 TABLET, FILM COATED ORAL at 09:09

## 2018-09-14 RX ADMIN — IPRATROPIUM BROMIDE AND ALBUTEROL SULFATE 3 ML: .5; 3 SOLUTION RESPIRATORY (INHALATION) at 07:09

## 2018-09-14 RX ADMIN — POTASSIUM CHLORIDE 40 MEQ: 1500 TABLET, EXTENDED RELEASE ORAL at 09:09

## 2018-09-14 RX ADMIN — ACETAMINOPHEN 1000 MG: 500 TABLET ORAL at 09:09

## 2018-09-14 RX ADMIN — RAMELTEON 8 MG: 8 TABLET, FILM COATED ORAL at 09:09

## 2018-09-14 RX ADMIN — ESCITALOPRAM OXALATE 10 MG: 10 TABLET ORAL at 09:09

## 2018-09-14 RX ADMIN — IPRATROPIUM BROMIDE 0.5 MG: 0.5 SOLUTION RESPIRATORY (INHALATION) at 08:09

## 2018-09-14 NOTE — ASSESSMENT & PLAN NOTE
- Likely secondary to medication side effect. Will continue to monitor and work up if necessary.

## 2018-09-14 NOTE — ASSESSMENT & PLAN NOTE
Patient presenting with worsening SOB with a remote history of pseudomonal pneumonia on therapy. Ddx includes pneumonia vs CHF exacerbation vs PE vs COPD exacerbation. Patient treated outpatient with cefepime with minimal improvement. PE diagnosed over 1 year ago managed with eliquis. CTA performed in the ED confirming chronic PE and small area of focal nodular consolidation within the left lower lobe. Elevated JVD on exam.     Plan:   - Will continue lasix IV 40mg BID.   - Failed outpatient cefepime. WIll start zosyn q8 and ciprofloxacin 500mg q8.   - Previous culture showed susceptibility to zosyn but resistance to ciprofloxacin. However, guidelines recommend zosyn with a flouroquinolone.  - Duo-nebs PRN. Continue home nebs.   - Guaifenesin 1,200mg PO.

## 2018-09-14 NOTE — HPI
Mrs. Yasmin Asencio is a 67 yo female with a PMx of chronic right upper lobe PE on eliquis, HTN, COPD, bronchioectasis and Ectopic atrial tachycardia s/p ablation presents for a 1 week history of worsening SOB. Patient was recently diagnosed with pseudomonal pneumonia 1 month ago confirmed with sputum culture. She was sent home with a PICC line managed with cefepime for 10 days . She reports over the past week, she has had worsening SOB, productive cough with green sputum and chest tightness. She had been taking Mucinex with her home nebulizer with mild relief. She normally uses oxygen 2L at night but report she has had to increase her oxygen requirements over the past few days and use it during the day. She went to see her     She was seen by her PCP on 9/7 who has been following outpatient management of her Pseudomonal pneumonia. However yesterday the patient reports having a blood pressure in the 90s with symptomatic dizziness. She was told to report to the ED if her symptoms did not resolve. She denies any fevers, chills, radiating chest pain, n/v, abdominal pain, dizziness, vision changes or congestion.

## 2018-09-14 NOTE — ASSESSMENT & PLAN NOTE
Patient presenting with worsening SOB with a remote history of pseudomonal pneumonia on therapy. Ddx includes pneumonia vs CHF exacerbation vs PE vs COPD exacerbation. Patient treated outpatient with cefepime with minimal improvement. PE diagnosed over 1 year ago managed with eliquis. CTA performed in the ED confirming chronic PE and small area of focal nodular consolidation within the left lower lobe. Elevated JVD on exam.     Plan:   - Appear euvolemic, will stop lasix IV    - Failed outpatient cefepime. WIll start zosyn q8   - Previous culture showed susceptibility to zosyn but resistance to ciprofloxacin.  - Consult placed to ID  - Duo-nebs scheduled  - Guaifenesin 1,200mg PO.

## 2018-09-14 NOTE — PROGRESS NOTES
FDA ICON CABP/HABP - Protocol #3771/0001  IRB #2017.359 / PI: Serina    Sponsor-FDA    Study Title/IRB Number: CABP-HABP- [id number]    Principle Investigator: Dr. Smalls    Date of visit 9/14/2018    Prior to the Informed Consent (IC) being signed, or any study protocol required data collection, testing, procedure, or intervention being performed, the following was done and/or discussed:     Patient was given a copy of the IC for review    Purpose of the study and qualifications to participate    Study design, Follow up schedule, and tests or procedures done at each visit   Confidentiality and HIPAA Authorization for Release of Medical Records for the research trial/ subject's rights/research related injury   Risk, Benefits, Alternative Treatments, Compensation and Costs   Participation in the research trial is voluntary and patient may withdraw at anytime   Contact information for study related questions    Patient verbalizes understanding of the above: Yes    Contact information for CRC and PI given to patient: Yes      Pt signed the informed consent form for the [facility]-HABP research study with an IRB approved consent, and was given a signed copy.  Each page of the consent form was reviewed with  Dina Dominguez and all questions answered satisfactorily.    Subject meets all inclusion criteria and no exclusion criteria.    Per study per protocol pt was given diary device #([id number]) and instructed on how to use. Pt will have follow up visit at approximately 14 days. Pt also given number of 24/7 help desk for device.

## 2018-09-14 NOTE — SUBJECTIVE & OBJECTIVE
Interval History:   No acute events overnight. Patient reports feeling better this morning. She complains of a heaviness in her chest, but no pain and some labored breathing; although on appearance she does not appear to be struggling and her O2 sats are about 95%. She expresses interest in seeing a pulmonologist.     Review of Systems   Constitutional: Positive for fatigue. Negative for chills and fever.   Respiratory: Positive for cough. Negative for chest tightness and shortness of breath.    Cardiovascular: Negative for chest pain (Heavyness, but no pain), palpitations and leg swelling.   Gastrointestinal: Positive for constipation and nausea. Negative for abdominal pain and diarrhea.   Musculoskeletal: Positive for back pain. Negative for arthralgias and myalgias.   Skin: Negative for color change, pallor and rash.   Neurological: Positive for headaches (occasional). Negative for dizziness and weakness.     Objective:     Vital Signs (Most Recent):  Temp: 97.9 °F (36.6 °C) (09/14/18 0349)  Pulse: 84 (09/14/18 0806)  Resp: 18 (09/14/18 0806)  BP: (!) 108/44 (09/14/18 0349)  SpO2: 97 % (09/14/18 0806) Vital Signs (24h Range):  Temp:  [97.6 °F (36.4 °C)-98.1 °F (36.7 °C)] 97.9 °F (36.6 °C)  Pulse:  [71-99] 84  Resp:  [16-22] 18  SpO2:  [94 %-100 %] 97 %  BP: (108-143)/(44-63) 108/44     Weight: 68.8 kg (151 lb 9.6 oz)  Body mass index is 27.73 kg/m².  No intake or output data in the 24 hours ending 09/14/18 0827   Physical Exam   Constitutional: She is oriented to person, place, and time. She appears well-developed and well-nourished. No distress.   HENT:   Head: Normocephalic and atraumatic.   Cardiovascular: Normal rate, regular rhythm, normal heart sounds and intact distal pulses.   Pulmonary/Chest: Effort normal. No respiratory distress. She has wheezes. She has rales (lung base bilaterally).   Abdominal: Soft. Bowel sounds are normal.   Musculoskeletal: Normal range of motion. She exhibits tenderness (right  side of lumbar back). She exhibits no edema or deformity.   Neurological: She is alert and oriented to person, place, and time.   Skin: Skin is warm and dry.   Psychiatric: She has a normal mood and affect. Her behavior is normal.   Vitals reviewed.      Significant Labs:   A1C:   Recent Labs   Lab  09/13/18   2224   HGBA1C  5.2     BMP:   Recent Labs   Lab  09/14/18   0404   GLU  96   NA  131*   K  2.8*   CL  90*   CO2  28   BUN  26*   CREATININE  1.0   CALCIUM  9.0   MG  1.9     CBC:   Recent Labs   Lab  09/13/18   1747  09/14/18   0404   WBC  15.76*  10.35   HGB  12.1  11.9*   HCT  36.4*  35.4*   PLT  367*  334     Lactic Acid:   Recent Labs   Lab  09/13/18   1747   LACTATE  1.1     Magnesium:   Recent Labs   Lab  09/14/18   0404   MG  1.9     Troponin:   Recent Labs   Lab  09/13/18   1747   TROPONINI  <0.006     All pertinent labs within the past 24 hours have been reviewed.    Significant Imaging:   X-ray chest PA and lateral:   Grossly stable abnormal radiographic appearance of the chest as above without acute process seen, when compared to 09/06/2018.    CT chest:   1. Chronic filling defects within the right upper lobe pulmonary artery branch consistent with chronic pulmonary embolism.  No new acute pulmonary thromboembolism identified.  2. Stable fibrotic changes throughout the lung apices, left greater than right, with bronchiectasis, interstitial prominence, and mosaic attenuation of the lungs, similar to prior CTA 01/05/2017.  3. Small area of focal nodular consolidation within the left lower lobe, not seen on prior studies which, may be infectious or inflammatory in etiology, possibly even contiguous from the left apical fibrotic changes.  Suggest follow-up CT scan 1 month after antimicrobial therapy to exclude neoplastic nodule.  4. Stable 4 mm nodule within the right middle lobe.

## 2018-09-14 NOTE — ED PROVIDER NOTES
ED Physician Hand-off Note:    ED Course: I assumed care of patient from off-going ED physician team. Briefly, Patient is a 67 yo F w/ PMH significant for COPD, TB (childhood), HTN, PE (on Eliquis) who presents w/ increase O2 requirement at home and productive cough. Recent sputum culture positive for pseudomonas treated w/ cefepime.    At the time of signout plan was pending CTA PE study and re-evaluation.    Medications given in the ED:    Medications   acetaminophen tablet 1,000 mg (1,000 mg Oral Given 9/13/18 2316)   apixaban tablet 5 mg (5 mg Oral Given 9/13/18 2306)   chlorthalidone tablet 25 mg (not administered)   escitalopram oxalate tablet 10 mg (not administered)   folic acid tablet 1 mg (not administered)   gabapentin capsule 600 mg (not administered)   guaiFENesin 12 hr tablet 1,200 mg (1,200 mg Oral Given 9/13/18 2306)   ipratropium 0.02 % nebulizer solution 0.5 mg (0.5 mg Nebulization Given 9/14/18 0111)   pantoprazole EC tablet 40 mg (not administered)   ondansetron tablet 4 mg (not administered)   artificial tears 0.5 % ophthalmic solution 1 drop (not administered)   verapamil CR tablet 180 mg (180 mg Oral Given 9/13/18 2306)   ergocalciferol capsule 50,000 Units (not administered)   sodium chloride 0.9% flush 5 mL (not administered)   albuterol-ipratropium 2.5 mg-0.5 mg/3 mL nebulizer solution 3 mL (not administered)   ramelteon tablet 8 mg (8 mg Oral Given 9/13/18 2317)   piperacillin-tazobactam 4.5 g in sodium chloride 0.9% 100 mL IVPB (ready to mix system) (4.5 g Intravenous New Bag 9/13/18 2305)   furosemide injection 20 mg (20 mg Intravenous Given 9/13/18 2306)   ciprofloxacin HCl tablet 500 mg (not administered)   potassium chloride 10% oral solution 40 mEq (40 mEq Oral Not Given 9/13/18 2345)   omnipaque 350 iohexol 75 mL (75 mLs Intravenous Given 9/13/18 1954)   piperacillin-tazobactam 4.5 g in sodium chloride 0.9% 100 mL IVPB (ready to mix system) (4.5 g Intravenous New Bag 9/13/18 1566)        Disposition: CTA revealed chronic PE, lung nodule, and LLL consolidation.     Patient comfortable with plan to admit. Patient counseled regarding exam, results, diagnosis, treatment, and plan.    Impression: stable           Mahsa Murphy MD  Resident  09/14/18 2660

## 2018-09-14 NOTE — SUBJECTIVE & OBJECTIVE
Past Medical History:   Diagnosis Date    Acquired bronchiectasis     due to history of TB - followed by pulmonary, Dr. Zuñiga    Allergy     Amblyopia     rt eye per pt    Anemia of other chronic disease     Anticoagulant long-term use     Anxiety     Cataract     Clotting disorder     COPD (chronic obstructive pulmonary disease)     COPD (chronic obstructive pulmonary disease)     Depression     Diverticulosis     Essential tremor     GERD (gastroesophageal reflux disease)     Hemangioma of liver     History of tuberculosis 1978    Hypertension     Mixed anxiety and depressive disorder     Psoriasis     PSVT (paroxysmal supraventricular tachycardia)     Pulmonary embolism     S/P PICC central line placement Apr. 2016 - May 2016    Skin disease     Psoriasis    Spondylosis without myelopathy 8/23/2013    Supraventricular tachycardia     Thoracic aorta atherosclerosis     noted on CT scan of chest 1/3/2011    Tuberculosis     Vaginal delivery     x2    Vitamin D deficiency        Past Surgical History:   Procedure Laterality Date    ABLATION N/A 7/24/2017    Performed by Marlon [facility]esteros [facility] at [facility] CATH LAB    BLOCK-NERVE-MEDIAL BRANCH-LUMBAR Bilateral 10/14/2016    Performed [facility]ers [facility] at [facility] MGT    BLOCK-NERVE-MEDIAL BRANCH-LUMBAR Bilateral 8/26/2016    [facility] at [facility]    CATARACT EXTRACTION W/  INTRAOCULAR LENS IMPLANT  07/24/12    od dr [place]    CATARACT EXTRACTION W/  INTRAOCULAR LENS IMPLANT  08/07/12    left eye    COLONOSCOPY N/A 4/23/2013    Performed by Simeon Gravse MD at [facility] ENDO (4TH FLR)    EGD (ESOPHAGOGASTRODUODENOSCOPY) N/A 4/23/2013    Performed by Simeon Graves MD at [facility] ENDO (4TH FLR)    GALLBLADDER SURGERY  9/2011    HEART CATH-LEFT Left 6/16/2016    Performed by [facility]aga [facility] at [facility] CATH LAB    INJECTION-FACET Bilateral 1/22/2016    Per[facility] at  [facility]    RADIOFREQUENCY THERMOCOAGULATION (RFTC)-NERVE-MEDIAN BRANCH-LUMBAR Right 4/4/2018    Performed by Issac Meyers MD at [facility]    RADIOFREQUENCY THERMOCOAGULATION (RFTC)-NERVE-MEDIAN BRANCH-LUMBAR Left 3/16/2018    Performed by Issac Meyers MD at [facility]    RADIOFREQUENCY THERMOCOAGULATION (RFTC)-NERVE-MEDIAN BRANCH-LUMBAR Right 12/16/2016    Performed by [facility]ac Meyers MD at [facility]       Review of patient's allergies indicates:   Allergen Reactions    Adhesive Other (See Comments)     Tears up the skin and makes it itch    Bactrim [sulfamethoxazole-trimethoprim] Rash     Was hospitalized for rash    Lipitor [atorvastatin] Other (See Comments)     Muscle aches    Albuterol Other (See Comments)     tremors    Topamax [topiramate]      - made her feel 'bad in the head'    Restasis [cyclosporine] Itching       Medications:  Medications Prior to Admission   Medication Sig    acetaminophen (TYLENOL) 500 MG tablet Take 1,000 mg by mouth daily as needed for Pain (and headache).    alprazolam (XANAX) 0.25 MG tablet Take 1 tablet (0.25 mg total) by mouth nightly as needed for Anxiety.    ANORO ELLIPTA 62.5-25 mcg/actuation DsDv Inhale 1 puff into the lungs once daily.    apixaban 5 mg Tab Take 5 mg by mouth 2 (two) times daily.    chlorthalidone (HYGROTEN) 25 MG Tab Take 1 tablet (25 mg total) by mouth once daily.    desoximetasone (TOPICORT) 0.25 % cream Apply as directed bid prn (Patient taking differently: Apply as directed twice daily as needed for psoriasis)    escitalopram oxalate (LEXAPRO) 10 MG tablet Take 1 tablet (10 mg total) by mouth once daily.    fluocinolone (DERMA-SMOOTHE) 0.01 % external oil Apply topically once daily.    fluocinonide (LIDEX) 0.05 % external solution Apply topically once daily. - apply after shampooing    folic acid (FOLVITE) 1 MG tablet Take 1 tablet (1 mg total) by mouth once daily.    furosemide (LASIX) 20 MG tablet  Take 1 tablet (20 mg total) by mouth once daily. for 7 days    gabapentin (NEURONTIN) 300 MG capsule Take 2 capsules (600 mg total) by mouth 3 (three) times daily.    guaiFENesin (MUCINEX) 600 mg 12 hr tablet Take 1,200 mg by mouth 2 (two) times daily.    ipratropium (ATROVENT) 0.02 % nebulizer solution INHALE ONE VIAL IN NEBULIZER 3 TIMES DAILY    ketoconazole (NIZORAL) 2 % shampoo Apply topically once daily.    Lactobacillus rhamnosus GG (CULTURELLE) 10 billion cell capsule Take 1 capsule by mouth once daily.    omeprazole (PRILOSEC) 20 MG capsule TAKE 1 CAPSULE BY MOUTH ONCE DAILY AS NEEDED FOR HEARTBURN (TAKE ONLY AS NEEDED)    ondansetron (ZOFRAN) 4 MG tablet Take 1 tablet (4 mg total) by mouth every 8 (eight) hours as needed for Nausea.    potassium chloride SA (K-DUR,KLOR-CON) 20 MEQ tablet Take 1 tablet (20 mEq total) by mouth once daily. for 7 days    propylene glycol (SYSTANE BALANCE) 0.6 % Drop Apply 1 drop to eye daily as needed (dry eye).    sodium chloride 3% 3 % nebulizer solution Take 4 mLs by nebulization 2 (two) times daily.    traMADol (ULTRAM) 50 mg tablet Take 1 tablet (50 mg total) by mouth every 6 (six) hours as needed for Pain.    verapamil (CALAN-SR) 180 MG CR tablet 2 (two) times daily.     VITAMIN D2 50,000 unit capsule TAKE 1 CAPSULE BY MOUTH ONCE A WEEK     Antibiotics (From admission, onward)    Start     Stop Route Frequency Ordered    09/13/18 2300  piperacillin-tazobactam 4.5 g in sodium chloride 0.9% 100 mL IVPB (ready to mix system)      -- IV Every 8 hours (non-standard times) 09/13/18 6936        Antifungals (From admission, onward)    None        Antivirals (From admission, onward)    None           Immunization History   Administered Date(s) Administered    Influenza 10/06/2009, 10/27/2010, 09/21/2011, 01/07/2013, 12/11/2013, 10/06/2014    Influenza - High Dose 10/03/2016, 10/09/2017    Influenza - Trivalent (ADULT) 10/06/2014    Influenza A (H1N1) 2009  Monovalent - IM 2010    Influenza Split 2013, 2013    PPD Test 2015    Pneumococcal Conjugate 2008    Pneumococcal Conjugate - 13 Valent 2008, 10/19/2015    Pneumococcal Polysaccharide - 23 Valent 2015    Tdap 2009       Family History     Problem Relation (Age of Onset)    Cancer Father, Brother, Maternal Aunt    Heart attack Mother, Brother, Son    Hyperlipidemia Sister    Hypertension Mother    Lung cancer Sister    Psoriasis Sister    Stroke Maternal Uncle        Social History     Socioeconomic History    Marital status:      Spouse name: None    Number of children: 2    Years of education: None    Highest education level: None   Social Needs    Financial resource strain: None    Food insecurity - worry: None    Food insecurity - inability: None    Transportation needs - medical: None    Transportation needs - non-medical: None   Occupational History    Occupation: housewife   Tobacco Use    Smoking status: Former Smoker     Packs/day: 2.00     Years: 50.00     Pack years: 100.00     Types: Cigarettes     Last attempt to quit: 2009     Years since quittin.3    Smokeless tobacco: Never Used   Substance and Sexual Activity    Alcohol use: No     Alcohol/week: 0.0 oz    Drug use: No    Sexual activity: Yes     Partners: Male   Other Topics Concern    Are you pregnant or think you may be? No    Breast-feeding No   Social History Narrative    One child  of a heart attack at age 35.     Review of Systems   Constitutional: Negative for chills and fever.   HENT: Negative for ear pain, hearing loss, mouth sores and sore throat.    Eyes: Negative for photophobia, pain and visual disturbance.   Respiratory: Positive for cough, chest tightness and shortness of breath. Negative for apnea.    Cardiovascular: Negative for chest pain and palpitations.   Gastrointestinal: Negative for abdominal distention, abdominal pain, anal bleeding,  blood in stool, constipation, diarrhea, nausea, rectal pain and vomiting.   Endocrine: Negative for cold intolerance, heat intolerance, polydipsia and polyuria.   Genitourinary: Negative for dysuria and hematuria.   Musculoskeletal: Negative for arthralgias and myalgias.   Skin: Negative for rash and wound.   Neurological: Negative for dizziness, syncope and numbness.     Objective:     Vital Signs (Most Recent):  Temp: 98.4 °F (36.9 °C) (09/14/18 1135)  Pulse: 72 (09/14/18 1405)  Resp: 19 (09/14/18 1405)  BP: (!) 110/59 (09/14/18 1135)  SpO2: (!) 94 % (09/14/18 1405) Vital Signs (24h Range):  Temp:  [97.6 °F (36.4 °C)-98.4 °F (36.9 °C)] 98.4 °F (36.9 °C)  Pulse:  [71-88] 72  Resp:  [16-20] 19  SpO2:  [94 %-100 %] 94 %  BP: (108-143)/(44-63) 110/59     Weight: 68.8 kg (151 lb 9.6 oz)  Body mass index is 27.73 kg/m².    Estimated Creatinine Clearance: 48.3 mL/min (based on SCr of 1 mg/dL).    Physical Exam   Constitutional: She is oriented to person, place, and time. No distress.   Cardiovascular: Normal rate, regular rhythm and intact distal pulses. Exam reveals no gallop and no friction rub.   No murmur heard.  Pulmonary/Chest: No tachypnea. No respiratory distress. She has no decreased breath sounds. She has no wheezes. She has rhonchi in the right middle field, the right lower field, the left middle field and the left lower field. She has rales in the right lower field, the left upper field, the left middle field and the left lower field.   Abdominal: Soft. Bowel sounds are normal. She exhibits no distension. There is no tenderness.   Neurological: She is alert and oriented to person, place, and time.       Significant Labs:   Blood Culture:   Recent Labs   Lab  09/13/18   1747  09/13/18   1755   LABBLOO  No Growth to date  No Growth to date      Pseudomonas aeruginosa     CULTURE, RESPIRATORY     Amikacin <=16  Sensitive     Cefepime <=8  Sensitive     Ciprofloxacin >2  Resistant     Gentamicin <=4  Sensitive      Meropenem <=4  Sensitive     Piperacillin/Tazo <=16  Sensitive     Tobramycin <=4  Sensitive      Significant Imaging: I have reviewed all pertinent imaging results/findings within the past 24 hours.

## 2018-09-14 NOTE — PROGRESS NOTES
[facility] Hospital Medicine  Progress Note    Patient Name: Yasmin Asencio  MRN: [id number]  Patient Class: IP- Inpatient   Admission Date: 9/13/2018  Length of Stay: 1 days  Attending Physician: Sarah Phoenix MD  Primary Care Provider: Urmila Luna MD    [facility] Medicine Team: [facility] HOSP MED 5 Norberto Keenan MD    Subjective:     Principal Problem:Pneumonia of left lower lobe due to Pseudomonas species    HPI:  Mrs. Yasmin Asencio is a 67 yo female with a PMx of chronic right upper lobe PE on eliquis, HTN, COPD, bronchioectasis and Ectopic atrial tachycardia s/p ablation presents for a 1 week history of worsening SOB. Patient was recently diagnosed with pseudomonal pneumonia 1 month ago confirmed with sputum culture. She was sent home with a PICC line managed with cefepime for 10 days . She reports over the past week, she has had worsening SOB, productive cough with green sputum and chest tightness. She had been taking Mucinex with her home nebulizer with mild relief. She normally uses oxygen 2L at night but report she has had to increase her oxygen requirements over the past few days and use it during the day. She went to see her     She was seen by her PCP on 9/7 who has been following outpatient management of her Pseudomonal pneumonia. However yesterday the patient reports having a blood pressure in the 90s with symptomatic dizziness. She was told to report to the ED if her symptoms did not resolve. She denies any fevers, chills, radiating chest pain, n/v, abdominal pain, dizziness, vision changes or congestion.       Hospital Course:  9/14: No acute events overnight. Patient feels that she is much better than when she was admitted to hospital. Her breathing treatments are helping. Patient had pseudomonal pneumonia that she was being treated for previously however current exacerbation of lung disease appears to involve her COPD.     Interval History:   No acute events  overnight. Patient reports feeling better this morning. She complains of a heaviness in her chest, but no pain and some labored breathing; although on appearance she does not appear to be struggling and her O2 sats are about 95%. She expresses interest in seeing a pulmonologist.     Review of Systems   Constitutional: Positive for fatigue. Negative for chills and fever.   Respiratory: Positive for cough. Negative for chest tightness and shortness of breath.    Cardiovascular: Negative for chest pain (Heavyness, but no pain), palpitations and leg swelling.   Gastrointestinal: Positive for constipation and nausea. Negative for abdominal pain and diarrhea.   Musculoskeletal: Positive for back pain. Negative for arthralgias and myalgias.   Skin: Negative for color change, pallor and rash.   Neurological: Positive for headaches (occasional). Negative for dizziness and weakness.     Objective:     Vital Signs (Most Recent):  Temp: 97.9 °F (36.6 °C) (09/14/18 0349)  Pulse: 84 (09/14/18 0806)  Resp: 18 (09/14/18 0806)  BP: (!) 108/44 (09/14/18 0349)  SpO2: 97 % (09/14/18 0806) Vital Signs (24h Range):  Temp:  [97.6 °F (36.4 °C)-98.1 °F (36.7 °C)] 97.9 °F (36.6 °C)  Pulse:  [71-99] 84  Resp:  [16-22] 18  SpO2:  [94 %-100 %] 97 %  BP: (108-143)/(44-63) 108/44     Weight: 68.8 kg (151 lb 9.6 oz)  Body mass index is 27.73 kg/m².  No intake or output data in the 24 hours ending 09/14/18 0827   Physical Exam   Constitutional: She is oriented to person, place, and time. She appears well-developed and well-nourished. No distress.   HENT:   Head: Normocephalic and atraumatic.   Cardiovascular: Normal rate, regular rhythm, normal heart sounds and intact distal pulses.   Pulmonary/Chest: Effort normal. No respiratory distress. She has wheezes. She has rales (lung base bilaterally).   Abdominal: Soft. Bowel sounds are normal.   Musculoskeletal: Normal range of motion. She exhibits tenderness (right side of lumbar back). She exhibits no  edema or deformity.   Neurological: She is alert and oriented to person, place, and time.   Skin: Skin is warm and dry.   Psychiatric: She has a normal mood and affect. Her behavior is normal.   Vitals reviewed.      Significant Labs:   A1C:   Recent Labs   Lab  09/13/18   2224   HGBA1C  5.2     BMP:   Recent Labs   Lab  09/14/18   0404   GLU  96   NA  131*   K  2.8*   CL  90*   CO2  28   BUN  26*   CREATININE  1.0   CALCIUM  9.0   MG  1.9     CBC:   Recent Labs   Lab  09/13/18   1747  09/14/18   0404   WBC  15.76*  10.35   HGB  12.1  11.9*   HCT  36.4*  35.4*   PLT  367*  334     Lactic Acid:   Recent Labs   Lab  09/13/18   1747   LACTATE  1.1     Magnesium:   Recent Labs   Lab  09/14/18   0404   MG  1.9     Troponin:   Recent Labs   Lab  09/13/18   1747   TROPONINI  <0.006     All pertinent labs within the past 24 hours have been reviewed.    Significant Imaging:   X-ray chest PA and lateral:   Grossly stable abnormal radiographic appearance of the chest as above without acute process seen, when compared to 09/06/2018.    CT chest:   1. Chronic filling defects within the right upper lobe pulmonary artery branch consistent with chronic pulmonary embolism.  No new acute pulmonary thromboembolism identified.  2. Stable fibrotic changes throughout the lung apices, left greater than right, with bronchiectasis, interstitial prominence, and mosaic attenuation of the lungs, similar to prior CTA 01/05/2017.  3. Small area of focal nodular consolidation within the left lower lobe, not seen on prior studies which, may be infectious or inflammatory in etiology, possibly even contiguous from the left apical fibrotic changes.  Suggest follow-up CT scan 1 month after antimicrobial therapy to exclude neoplastic nodule.  4. Stable 4 mm nodule within the right middle lobe.    Assessment/Plan:      * Pneumonia of left lower lobe due to Pseudomonas species    Patient presenting with worsening SOB with a remote history of pseudomonal  pneumonia on therapy. Ddx includes pneumonia vs CHF exacerbation vs PE vs COPD exacerbation. Patient treated outpatient with cefepime with minimal improvement. PE diagnosed over 1 year ago managed with eliquis. CTA performed in the ED confirming chronic PE and small area of focal nodular consolidation within the left lower lobe. Elevated JVD on exam.     Plan:   - Appear euvolemic, will stop lasix IV    - Failed outpatient cefepime. WIll start zosyn q8   - Previous culture showed susceptibility to zosyn but resistance to ciprofloxacin.  - Consult placed to ID  - Duo-nebs scheduled  - Guaifenesin 1,200mg PO.           Hypokalemia    - Potassium was 2.8 today. Patient remains asymptomatic.   - Replenished potassium, will follow BMP.          Hyponatremia    - Likely secondary to medication side effect. Will continue to monitor and work up if necessary.          Acute on chronic respiratory failure with hypoxia              Pulmonary embolism without acute cor pulmonale    - Chronic PE see on CTA   - Will continue home Eliquis.        Chronic obstructive pulmonary disease with acute exacerbation    - Duo-nebs PRN q4.   - Continue home ipratropium nebs.           Benign hypertension with chronic kidney disease, stage III    - BP stable in the /58.   - Will continue home Chlorthalidone 25mg.           Vitamin D deficiency    - Continue home Vitamin D supplementation.           Anemia of chronic disease    Hemoglobin above baseline in the ED  - Will continue to monitor.         GERD (gastroesophageal reflux disease)    - Pantoprazole 40mg.           Mild recurrent major depression    - Continue home Escitalopram 10mg           VTE Risk Mitigation (From admission, onward)        Ordered     apixaban tablet 5 mg  2 times daily      09/13/18 3892              Norberto Keenan MD  Department of Hospital Medicine   [facility]

## 2018-09-14 NOTE — HPI
Ms. 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 Zuñiga), & pseudomonas PNA last month treated with 10d course of cefepime via PICC who presents with CH.  For the last week she has had progressive dyspnea on exertion associated with increased productive cough for green sputum that turned pinkish since her admission.  ID was consulted for recurrent pseudomonal PNA/

## 2018-09-14 NOTE — ED NOTES
Telemetry Verification   Patient placed on Telemetry Box  Verified with War Room  Box # [id number]   Monitor Tech Luz   Rate 81   Rhythm NSR

## 2018-09-14 NOTE — PLAN OF CARE
FAMILY AND ECHO AT PT'S BS. WILL ATTEMPT LATER TO COMPLETE D/C ASSESSMENT        09/14/18 1755   Discharge Assessment   Assessment Type Discharge Planning Assessment   Assessment information obtained from? Medical Record

## 2018-09-14 NOTE — CONSULTS
[facility]  Infectious Disease  Consult Note    Patient Name: Yasmin Asencio  MRN: [id number]  Admission Date: 9/13/2018  Hospital Length of Stay: 1 days  Attending Physician: Sarah Phoenix MD  Primary Care Provider: Urmila Luna MD     Isolation Status: No active isolations    Patient information was obtained from patient, past medical records and ER records.      Inpatient consult to Infectious Diseases  Consult performed by: Daniel Juarez MD  Consult ordered by: Kelvin Willson MD  Reason for consult: Recurrent Pseudomonas PNA        Assessment/Plan:     Acute on chronic respiratory failure with hypoxia    -Agree with Zosyn 4.5 q8h  -Please get induced sputum culture            Thank you for your consult. I will follow-up with patient. Please contact us if you have any additional questions.    Daniel Juarez MD  Infectious Disease  [facility]    Subjective:     Principal Problem: Pneumonia of left lower lobe due to Pseudomonas species    HPI: Ms. Asencio is a pleasant 67 yo lady w/ idiopathic DVT on lifelong Eliquis, COPD, bronchiectasis 2/2 to previously treated mycobacterial infection (was following with Dr. Zuñiga), & pseudomonas PNA last month treated with 10d course of cefepime via PICC who presents with CH.  For the last week she has had progressive dyspnea on exertion associated with increased productive cough for green sputum that turned pinkish since her admission.  ID was consulted for recurrent pseudomonal PNA/    Past Medical History:   Diagnosis Date    Acquired bronchiectasis     due to history of TB - followed by Dr. Zara rahman    Allergy     Amblyopia     rt eye per pt    Anemia of other chronic disease     Anticoagulant long-term use     Anxiety     Cataract     Clotting disorder     COPD (chronic obstructive pulmonary disease)     COPD (chronic obstructive pulmonary disease)     Depression     Diverticulosis      Essential tremor     GERD (gastroesophageal reflux disease)     Hemangioma of liver     History of tuberculosis 1978    Hypertension     Mixed anxiety and depressive disorder     Psoriasis     PSVT (paroxysmal supraventricular tachycardia)     Pulmonary embolism     S/P PICC central line placement Apr. 2016 - May 2016    Skin disease     Psoriasis    Spondylosis without myelopathy 8/23/2013    Supraventricular tachycardia     Thoracic aorta atherosclerosis     noted on CT scan of chest 1/3/2011    Tuberculosis     Vaginal delivery     x2    Vitamin D deficiency        Past Surgical History:   Procedure Laterality Date    ABLATION N/A 7/24/2017    Performed by [facility] at [facility] CATH LAB    BLOCK-NERVE-MEDIAL BRANCH-LUMBAR Bilateral 10/14/2016    Performed by [facility] at [facility]    BLOCK-NERVE-MEDIAL BRANCH-LUMBAR Bilateral 8/26/2016    Performed by [facility] at [facility]    CATARACT EXTRACTION W/  INTRAOCULAR LENS IMPLANT  07/24/12    od dr [place]ain    CATARACT EXTRACTION W/  INTRAOCULAR LENS IMPLANT  08/07/12    left eye    COLONOSCOPY N/A 4/23/2013    Performed by Simeon Graves MD at [facility] ENDO (4TH FLR)    EGD (ESOPHAGOGASTRODUODENOSCOPY) N/A 4/23/2013    Performed by Simeon Graves MD at [facility] ENDO (4TH FLR)    GALLBLADDER SURGERY  9/2011    HEART CATH-LEFT Left 6/16/2016    Performed by [facility] at [facility] CATH LAB    INJECTION-FACET Bilateral 1/22/2016    Performed by [facility]ers [facility] at [facility]    RADIOFREQUENCY THERMOCOAGULATION (RFTC)-NERVE-MEDIAN BRANCH-LUMBAR Right 4/4/2018    Performed by [facility] at [facility]    RADIOFREQUENCY THERMOCOAGULATION (RFTC)-NERVE-MEDIAN BRANCH-LUMBAR Left 3/16/2018    Performed by [facility] at [facility]    RADIOFREQUENCY THERMOCOAGULATION (RFTC)-NERVE-MEDIAN BRANCH-LUMBAR Right 12/16/2016    Performed by [facility] at [facility]  PAIN MGT       Review of patient's allergies indicates:   Allergen Reactions    Adhesive Other (See Comments)     Tears up the skin and makes it itch    Bactrim [sulfamethoxazole-trimethoprim] Rash     Was hospitalized for rash    Lipitor [atorvastatin] Other (See Comments)     Muscle aches    Albuterol Other (See Comments)     tremors    Topamax [topiramate]      - made her feel 'bad in the head'    Restasis [cyclosporine] Itching       Medications:  Medications Prior to Admission   Medication Sig    acetaminophen (TYLENOL) 500 MG tablet Take 1,000 mg by mouth daily as needed for Pain (and headache).    alprazolam (XANAX) 0.25 MG tablet Take 1 tablet (0.25 mg total) by mouth nightly as needed for Anxiety.    ANORO ELLIPTA 62.5-25 mcg/actuation DsDv Inhale 1 puff into the lungs once daily.    apixaban 5 mg Tab Take 5 mg by mouth 2 (two) times daily.    chlorthalidone (HYGROTEN) 25 MG Tab Take 1 tablet (25 mg total) by mouth once daily.    desoximetasone (TOPICORT) 0.25 % cream Apply as directed bid prn (Patient taking differently: Apply as directed twice daily as needed for psoriasis)    escitalopram oxalate (LEXAPRO) 10 MG tablet Take 1 tablet (10 mg total) by mouth once daily.    fluocinolone (DERMA-SMOOTHE) 0.01 % external oil Apply topically once daily.    fluocinonide (LIDEX) 0.05 % external solution Apply topically once daily. - apply after shampooing    folic acid (FOLVITE) 1 MG tablet Take 1 tablet (1 mg total) by mouth once daily.    furosemide (LASIX) 20 MG tablet Take 1 tablet (20 mg total) by mouth once daily. for 7 days    gabapentin (NEURONTIN) 300 MG capsule Take 2 capsules (600 mg total) by mouth 3 (three) times daily.    guaiFENesin (MUCINEX) 600 mg 12 hr tablet Take 1,200 mg by mouth 2 (two) times daily.    ipratropium (ATROVENT) 0.02 % nebulizer solution INHALE ONE VIAL IN NEBULIZER 3 TIMES DAILY    ketoconazole (NIZORAL) 2 % shampoo Apply topically once daily.     Lactobacillus rhamnosus GG (CULTURELLE) 10 billion cell capsule Take 1 capsule by mouth once daily.    omeprazole (PRILOSEC) 20 MG capsule TAKE 1 CAPSULE BY MOUTH ONCE DAILY AS NEEDED FOR HEARTBURN (TAKE ONLY AS NEEDED)    ondansetron (ZOFRAN) 4 MG tablet Take 1 tablet (4 mg total) by mouth every 8 (eight) hours as needed for Nausea.    potassium chloride SA (K-DUR,KLOR-CON) 20 MEQ tablet Take 1 tablet (20 mEq total) by mouth once daily. for 7 days    propylene glycol (SYSTANE BALANCE) 0.6 % Drop Apply 1 drop to eye daily as needed (dry eye).    sodium chloride 3% 3 % nebulizer solution Take 4 mLs by nebulization 2 (two) times daily.    traMADol (ULTRAM) 50 mg tablet Take 1 tablet (50 mg total) by mouth every 6 (six) hours as needed for Pain.    verapamil (CALAN-SR) 180 MG CR tablet 2 (two) times daily.     VITAMIN D2 50,000 unit capsule TAKE 1 CAPSULE BY MOUTH ONCE A WEEK     Antibiotics (From admission, onward)    Start     Stop Route Frequency Ordered    09/13/18 2300  piperacillin-tazobactam 4.5 g in sodium chloride 0.9% 100 mL IVPB (ready to mix system)      -- IV Every 8 hours (non-standard times) 09/13/18 9658        Antifungals (From admission, onward)    None        Antivirals (From admission, onward)    None           Immunization History   Administered Date(s) Administered    Influenza 10/06/2009, 10/27/2010, 09/21/2011, 01/07/2013, 12/11/2013, 10/06/2014    Influenza - High Dose 10/03/2016, 10/09/2017    Influenza - Trivalent (ADULT) 10/06/2014    Influenza A (H1N1) 2009 Monovalent - IM 01/12/2010    Influenza Split 01/07/2013, 12/11/2013    PPD Test 07/24/2015    Pneumococcal Conjugate 11/03/2008    Pneumococcal Conjugate - 13 Valent 11/03/2008, 10/19/2015    Pneumococcal Polysaccharide - 23 Valent 02/11/2015    Tdap 01/19/2009       Family History     Problem Relation (Age of Onset)    Cancer Father, Brother, Maternal Aunt    Heart attack Mother, Brother, Son    Hyperlipidemia Sister     Hypertension Mother    Lung cancer Sister    Psoriasis Sister    Stroke Maternal Uncle        Social History     Socioeconomic History    Marital status:      Spouse name: None    Number of children: 2    Years of education: None    Highest education level: None   Social Needs    Financial resource strain: None    Food insecurity - worry: None    Food insecurity - inability: None    Transportation needs - medical: None    Transportation needs - non-medical: None   Occupational History    Occupation: housewife   Tobacco Use    Smoking status: Former Smoker     Packs/day: 2.00     Years: 50.00     Pack years: 100.00     Types: Cigarettes     Last attempt to quit: 2009     Years since quittin.3    Smokeless tobacco: Never Used   Substance and Sexual Activity    Alcohol use: No     Alcohol/week: 0.0 oz    Drug use: No    Sexual activity: Yes     Partners: Male   Other Topics Concern    Are you pregnant or think you may be? No    Breast-feeding No   Social History Narrative    One child  of a heart attack at age 35.     Review of Systems   Constitutional: Negative for chills and fever.   HENT: Negative for ear pain, hearing loss, mouth sores and sore throat.    Eyes: Negative for photophobia, pain and visual disturbance.   Respiratory: Positive for cough, chest tightness and shortness of breath. Negative for apnea.    Cardiovascular: Negative for chest pain and palpitations.   Gastrointestinal: Negative for abdominal distention, abdominal pain, anal bleeding, blood in stool, constipation, diarrhea, nausea, rectal pain and vomiting.   Endocrine: Negative for cold intolerance, heat intolerance, polydipsia and polyuria.   Genitourinary: Negative for dysuria and hematuria.   Musculoskeletal: Negative for arthralgias and myalgias.   Skin: Negative for rash and wound.   Neurological: Negative for dizziness, syncope and numbness.     Objective:     Vital Signs (Most Recent):  Temp: 98.4  °F (36.9 °C) (09/14/18 1135)  Pulse: 72 (09/14/18 1405)  Resp: 19 (09/14/18 1405)  BP: (!) 110/59 (09/14/18 1135)  SpO2: (!) 94 % (09/14/18 1405) Vital Signs (24h Range):  Temp:  [97.6 °F (36.4 °C)-98.4 °F (36.9 °C)] 98.4 °F (36.9 °C)  Pulse:  [71-88] 72  Resp:  [16-20] 19  SpO2:  [94 %-100 %] 94 %  BP: (108-143)/(44-63) 110/59     Weight: 68.8 kg (151 lb 9.6 oz)  Body mass index is 27.73 kg/m².    Estimated Creatinine Clearance: 48.3 mL/min (based on SCr of 1 mg/dL).    Physical Exam   Constitutional: She is oriented to person, place, and time. No distress.   Cardiovascular: Normal rate, regular rhythm and intact distal pulses. Exam reveals no gallop and no friction rub.   No murmur heard.  Pulmonary/Chest: No tachypnea. No respiratory distress. She has no decreased breath sounds. She has no wheezes. She has rhonchi in the right middle field, the right lower field, the left middle field and the left lower field. She has rales in the right lower field, the left upper field, the left middle field and the left lower field.   Abdominal: Soft. Bowel sounds are normal. She exhibits no distension. There is no tenderness.   Neurological: She is alert and oriented to person, place, and time.       Significant Labs:   Blood Culture:   Recent Labs   Lab  09/13/18   1747  09/13/18   1755   LABBLOO  No Growth to date  No Growth to date      Pseudomonas aeruginosa     CULTURE, RESPIRATORY     Amikacin <=16  Sensitive     Cefepime <=8  Sensitive     Ciprofloxacin >2  Resistant     Gentamicin <=4  Sensitive     Meropenem <=4  Sensitive     Piperacillin/Tazo <=16  Sensitive     Tobramycin <=4  Sensitive      Significant Imaging: I have reviewed all pertinent imaging results/findings within the past 24 hours.

## 2018-09-14 NOTE — ASSESSMENT & PLAN NOTE
- Potassium was 2.8 today. Patient remains asymptomatic.   - Replenished potassium, will follow BMP.

## 2018-09-14 NOTE — SUBJECTIVE & OBJECTIVE
Past Medical History:   Diagnosis Date    Acquired bronchiectasis     due to history of TB - followed by pulmonary, Dr. Zuñiga    Allergy     Amblyopia     rt eye per pt    Anemia of other chronic disease     Anticoagulant long-term use     Anxiety     Cataract     Clotting disorder     COPD (chronic obstructive pulmonary disease)     COPD (chronic obstructive pulmonary disease)     Depression     Diverticulosis     Essential tremor     GERD (gastroesophageal reflux disease)     Hemangioma of liver     History of tuberculosis 1978    Hypertension     Mixed anxiety and depressive disorder     Psoriasis     PSVT (paroxysmal supraventricular tachycardia)     Pulmonary embolism     S/P PICC central line placement Apr. 2016 - May 2016    Skin disease     Psoriasis    Spondylosis without myelopathy 8/23/2013    Supraventricular tachycardia     Thoracic aorta atherosclerosis     noted on CT scan of chest 1/3/2011    Tuberculosis     Vaginal delivery     x2    Vitamin D deficiency        Past Surgical History:   Procedure Laterality Date    ABLATION N/A 7/24/2017    Performed by Marlon [facility]esteros MD at [facility] CATH LAB    BLOCK-NERVE-MEDIAL BRANCH-LUMBAR Bilateral 10/14/2016    [facility] MGT    BLOCK-NERVE-MEDIAL BRANCH-LUMBAR Bilateral 8/26/2016    [facility]    CATARACT EXTRACTION W/  INTRAOCULAR LENS IMPLANT  07/24/12    od dr [place]    CATARACT EXTRACTION W/  INTRAOCULAR LENS IMPLANT  08/07/12    left eye    COLONOSCOPY N/A 4/23/2013    Performed by Simeon Graves MD at [facility] ENDO (4TH FLR)    EGD (ESOPHAGOGASTRODUODENOSCOPY) N/A 4/23/2013    Performed by Simeon Graves MD at [facility] ENDO (4TH FLR)    GALLBLADDER SURGERY  9/2011    HEART CATH-LEFT Left 6/16/2016    [facility] CATH LAB    INJECTION-FACET Bilateral 1/22/2016    [facility] at  [facility]    RADIOFREQUENCY THERMOCOAGULATION (RFTC)-NERVE-MEDIAN BRANCH-LUMBAR Right 4/4/2018    Per[facility] by [facility] at [facility]    RADIOFREQUENCY THERMOCOAGULATION (RFTC)-NERVE-MEDIAN BRANCH-LUMBAR Left 3/16/2018    Performed by Issac Meyers MD at [facility]    RADIOFREQUENCY THERMOCOAGULATION (RFTC)-NERVE-MEDIAN BRANCH-LUMBAR Right 12/16/2016    Performed by [facility]ac [facility]ers MD at [facility]       Review of patient's allergies indicates:   Allergen Reactions    Adhesive Other (See Comments)     Tears up the skin and makes it itch    Bactrim [sulfamethoxazole-trimethoprim] Rash     Was hospitalized for rash    Lipitor [atorvastatin] Other (See Comments)     Muscle aches    Albuterol Other (See Comments)     tremors    Topamax [topiramate]      - made her feel 'bad in the head'    Restasis [cyclosporine] Itching       No current facility-administered medications on file prior to encounter.      Current Outpatient Medications on File Prior to Encounter   Medication Sig    acetaminophen (TYLENOL) 500 MG tablet Take 1,000 mg by mouth daily as needed for Pain (and headache).    alprazolam (XANAX) 0.25 MG tablet Take 1 tablet (0.25 mg total) by mouth nightly as needed for Anxiety.    ANORO ELLIPTA 62.5-25 mcg/actuation DsDv Inhale 1 puff into the lungs once daily.    apixaban 5 mg Tab Take 5 mg by mouth 2 (two) times daily.    chlorthalidone (HYGROTEN) 25 MG Tab Take 1 tablet (25 mg total) by mouth once daily.    desoximetasone (TOPICORT) 0.25 % cream Apply as directed bid prn (Patient taking differently: Apply as directed twice daily as needed for psoriasis)    escitalopram oxalate (LEXAPRO) 10 MG tablet Take 1 tablet (10 mg total) by mouth once daily.    fluocinolone (DERMA-SMOOTHE) 0.01 % external oil Apply topically once daily.    fluocinonide (LIDEX) 0.05 % external solution Apply topically once daily. - apply after shampooing    folic acid (FOLVITE) 1 MG  tablet Take 1 tablet (1 mg total) by mouth once daily.    furosemide (LASIX) 20 MG tablet Take 1 tablet (20 mg total) by mouth once daily. for 7 days    gabapentin (NEURONTIN) 300 MG capsule Take 2 capsules (600 mg total) by mouth 3 (three) times daily.    guaiFENesin (MUCINEX) 600 mg 12 hr tablet Take 1,200 mg by mouth 2 (two) times daily.    ipratropium (ATROVENT) 0.02 % nebulizer solution INHALE ONE VIAL IN NEBULIZER 3 TIMES DAILY    ketoconazole (NIZORAL) 2 % shampoo Apply topically once daily.    Lactobacillus rhamnosus GG (CULTURELLE) 10 billion cell capsule Take 1 capsule by mouth once daily.    omeprazole (PRILOSEC) 20 MG capsule TAKE 1 CAPSULE BY MOUTH ONCE DAILY AS NEEDED FOR HEARTBURN (TAKE ONLY AS NEEDED)    ondansetron (ZOFRAN) 4 MG tablet Take 1 tablet (4 mg total) by mouth every 8 (eight) hours as needed for Nausea.    potassium chloride SA (K-DUR,KLOR-CON) 20 MEQ tablet Take 1 tablet (20 mEq total) by mouth once daily. for 7 days    propylene glycol (SYSTANE BALANCE) 0.6 % Drop Apply 1 drop to eye daily as needed (dry eye).    sodium chloride 3% 3 % nebulizer solution Take 4 mLs by nebulization 2 (two) times daily.    traMADol (ULTRAM) 50 mg tablet Take 1 tablet (50 mg total) by mouth every 6 (six) hours as needed for Pain.    verapamil (CALAN-SR) 180 MG CR tablet 2 (two) times daily.     VITAMIN D2 50,000 unit capsule TAKE 1 CAPSULE BY MOUTH ONCE A WEEK     Family History     Problem Relation (Age of Onset)    Cancer Father, Brother, Maternal Aunt    Heart attack Mother, Brother, Son    Hyperlipidemia Sister    Hypertension Mother    Lung cancer Sister    Psoriasis Sister    Stroke Maternal Uncle        Tobacco Use    Smoking status: Former Smoker     Packs/day: 2.00     Years: 50.00     Pack years: 100.00     Types: Cigarettes     Last attempt to quit: 2009     Years since quittin.3    Smokeless tobacco: Never Used   Substance and Sexual Activity    Alcohol use: No      Alcohol/week: 0.0 oz    Drug use: No    Sexual activity: Yes     Partners: Male     Review of Systems   Constitutional: Negative for activity change, chills and fever.   HENT: Negative for congestion, postnasal drip, rhinorrhea and sinus pressure.    Eyes: Negative for discharge, redness and visual disturbance.   Respiratory: Positive for cough, chest tightness, shortness of breath and wheezing. Negative for choking.    Cardiovascular: Negative for chest pain and palpitations.   Gastrointestinal: Negative for abdominal distention, abdominal pain, constipation, diarrhea, nausea and vomiting.   Genitourinary: Negative for dysuria.   Musculoskeletal: Negative for arthralgias and back pain.   Skin: Negative for color change and pallor.   Neurological: Negative for dizziness and headaches.     Objective:     Vital Signs (Most Recent):  Temp: 97.8 °F (36.6 °C) (09/13/18 1522)  Pulse: 82 (09/13/18 2101)  Resp: 19 (09/13/18 1801)  BP: (!) 125/58 (09/13/18 2101)  SpO2: 100 % (09/13/18 2101) Vital Signs (24h Range):  Temp:  [97.8 °F (36.6 °C)] 97.8 °F (36.6 °C)  Pulse:  [79-99] 82  Resp:  [19-22] 19  SpO2:  [96 %-100 %] 100 %  BP: (112-125)/(57-61) 125/58     Weight: 67.1 kg (148 lb)  Body mass index is 27.07 kg/m².    Physical Exam   Constitutional: She is oriented to person, place, and time. She appears well-developed and well-nourished.   Eyes: EOM are normal. Pupils are equal, round, and reactive to light.   Neck: Normal range of motion. Neck supple.   Cardiovascular: Normal rate, regular rhythm and normal heart sounds.   No murmur heard.  There is elevated JVD.    Pulmonary/Chest: No stridor. No respiratory distress. She has no wheezes. She has rales (There is significant congestion and rales in the entire upper and lower left lobes. ). She exhibits tenderness (right chest wall).   Abdominal: Soft. Bowel sounds are normal. She exhibits no distension. There is no tenderness. There is no guarding.   Musculoskeletal:  Normal range of motion. She exhibits no edema or deformity.   Neurological: She is alert and oriented to person, place, and time. Coordination normal.   Skin: Skin is warm and dry. Capillary refill takes less than 2 seconds. No erythema.         CRANIAL NERVES     CN III, IV, VI   Pupils are equal, round, and reactive to light.  Extraocular motions are normal.      Significant Labs:   BMP:   Recent Labs   Lab  09/13/18   1747   GLU  102   NA  131*   K  3.3*   CL  89*   CO2  29   BUN  33*   CREATININE  1.1   CALCIUM  9.3     CBC:   Recent Labs   Lab  09/13/18   1747   WBC  15.76*   HGB  12.1   HCT  36.4*   PLT  367*     Significant Imaging: CT: I have reviewed all pertinent results/findings within the past 24 hours and my personal findings are:  Chronic PE on the right lobe and new small effusion on the left.   CXR: I have reviewed all pertinent results/findings within the past 24 hours and my personal findings are:  no acute changes

## 2018-09-14 NOTE — H&P
[facility] Medicine  History & Physical    Patient Name: Yasmin Asencio  MRN: [id number]  Admission Date: 9/13/2018  Attending Physician: Jaya Reid MD   Primary Care Provider: Urmila Luna MD    [facility] Medicine Team: [facility] MED 5 Kelvin Willson MD     Patient information was obtained from patient, past medical records and ER records.     Subjective:     Principal Problem:Pneumonia of left lower lobe due to Pseudomonas species    Chief Complaint:   Chief Complaint   Patient presents with    Trouble Breathing     picc line removed tuesday. having trouble breathing. pseudomonas and copd.         HPI: Mrs. Yasmin Asencio is a 67 yo female with a PMx of chronic right upper lobe PE on eliquis, HTN, COPD, bronchioectasis and Ectopic atrial tachycardia s/p ablation presents for a 1 week history of worsening SOB. Patient was recently diagnosed with pseudomonal pneumonia 1 month ago confirmed with sputum culture. She was sent home with a PICC line managed with cefepime for 10 days . She reports over the past week, she has had worsening SOB, productive cough with green sputum and chest tightness. She had been taking Mucinex with her home nebulizer with mild relief. She normally uses oxygen 2L at night but report she has had to increase her oxygen requirements over the past few days and use it during the day. She went to see her     She was seen by her PCP on 9/7 who has been following outpatient management of her Pseudomonal pneumonia. However yesterday the patient reports having a blood pressure in the 90s with symptomatic dizziness. She was told to report to the ED if her symptoms did not resolve. She denies any fevers, chills, radiating chest pain, n/v, abdominal pain, dizziness, vision changes or congestion.       Past Medical History:   Diagnosis Date    Acquired bronchiectasis     due to history of TB - followed by pulmonary, Dr. Zuñiga    Allergy     Amblyopia      rt eye per pt    Anemia of other chronic disease     Anticoagulant long-term use     Anxiety     Cataract     Clotting disorder     COPD (chronic obstructive pulmonary disease)     COPD (chronic obstructive pulmonary disease)     Depression     Diverticulosis     Essential tremor     GERD (gastroesophageal reflux disease)     Hemangioma of liver     History of tuberculosis 1978    Hypertension     Mixed anxiety and depressive disorder     Psoriasis     PSVT (paroxysmal supraventricular tachycardia)     Pulmonary embolism     S/P PICC central line placement Apr. 2016 - May 2016    Skin disease     Psoriasis    Spondylosis without myelopathy 8/23/2013    Supraventricular tachycardia     Thoracic aorta atherosclerosis     noted on CT scan of chest 1/3/2011    Tuberculosis     Vaginal delivery     x2    Vitamin D deficiency        Past Surgical History:   Procedure Laterality Date    ABLATION N/A 7/24/2017    Performed by Marlon [facility]esteros MD at [facility] CATH LAB    BLOCK-NERVE-MEDIAL BRANCH-LUMBAR Bilateral 10/14/2016    Performed by Issac Meyers [facility] at [facility]    BLOCK-NERVE-MEDIAL BRANCH-LUMBAR Bilateral 8/26/2016    Performed by [facility]ac [facility]ers [facility] at [facility]    CATARACT EXTRACTION W/  INTRAOCULAR LENS IMPLANT  07/24/12    od dr [place]    CATARACT EXTRACTION W/  INTRAOCULAR LENS IMPLANT  08/07/12    left eye    COLONOSCOPY N/A 4/23/2013    Performed by Simeon Graves MD at [facility] ENDO (4TH FLR)    EGD (ESOPHAGOGASTRODUODENOSCOPY) N/A 4/23/2013    Per[facility] at [facility] (4[facility] FLR)    GALLBLADDER SURGERY  9/2011    HEART CATH-LEFT Left 6/16/2016    Performed by Taurus Braga MD at [facility] CATH LAB    INJECTION-FACET Bilateral 1/22/2016    Performed by Issac Meyers MD at [facility]    RADIOFREQUENCY THERMOCOAGULATION (RFTC)-NERVE-MEDIAN BRANCH-LUMBAR Right 4/4/2018    Performed by [facility]ac [facility]ers [facility] at [facility]     RADIOFREQUENCY THERMOCOAGULATION (RFTC)-NERVE-MEDIAN BRANCH-LUMBAR Left 3/16/2018    [facility]    RADIOFREQUENCY THERMOCOAGULATION (RFTC)-NERVE-MEDIAN BRANCH-LUMBAR Right 12/16/2016    Performed by [facility]ac [facility]ers [facility] at [facility] PAIN T       Review of patient's allergies indicates:   Allergen Reactions    Adhesive Other (See Comments)     Tears up the skin and makes it itch    Bactrim [sulfamethoxazole-trimethoprim] Rash     Was hospitalized for rash    Lipitor [atorvastatin] Other (See Comments)     Muscle aches    Albuterol Other (See Comments)     tremors    Topamax [topiramate]      - made her feel 'bad in the head'    Restasis [cyclosporine] Itching       No current facility-administered medications on file prior to encounter.      Current Outpatient Medications on File Prior to Encounter   Medication Sig    acetaminophen (TYLENOL) 500 MG tablet Take 1,000 mg by mouth daily as needed for Pain (and headache).    alprazolam (XANAX) 0.25 MG tablet Take 1 tablet (0.25 mg total) by mouth nightly as needed for Anxiety.    ANORO ELLIPTA 62.5-25 mcg/actuation DsDv Inhale 1 puff into the lungs once daily.    apixaban 5 mg Tab Take 5 mg by mouth 2 (two) times daily.    chlorthalidone (HYGROTEN) 25 MG Tab Take 1 tablet (25 mg total) by mouth once daily.    desoximetasone (TOPICORT) 0.25 % cream Apply as directed bid prn (Patient taking differently: Apply as directed twice daily as needed for psoriasis)    escitalopram oxalate (LEXAPRO) 10 MG tablet Take 1 tablet (10 mg total) by mouth once daily.    fluocinolone (DERMA-SMOOTHE) 0.01 % external oil Apply topically once daily.    fluocinonide (LIDEX) 0.05 % external solution Apply topically once daily. - apply after shampooing    folic acid (FOLVITE) 1 MG tablet Take 1 tablet (1 mg total) by mouth once daily.    furosemide (LASIX) 20 MG tablet Take 1 tablet (20 mg total) by mouth once daily. for 7 days     gabapentin (NEURONTIN) 300 MG capsule Take 2 capsules (600 mg total) by mouth 3 (three) times daily.    guaiFENesin (MUCINEX) 600 mg 12 hr tablet Take 1,200 mg by mouth 2 (two) times daily.    ipratropium (ATROVENT) 0.02 % nebulizer solution INHALE ONE VIAL IN NEBULIZER 3 TIMES DAILY    ketoconazole (NIZORAL) 2 % shampoo Apply topically once daily.    Lactobacillus rhamnosus GG (CULTURELLE) 10 billion cell capsule Take 1 capsule by mouth once daily.    omeprazole (PRILOSEC) 20 MG capsule TAKE 1 CAPSULE BY MOUTH ONCE DAILY AS NEEDED FOR HEARTBURN (TAKE ONLY AS NEEDED)    ondansetron (ZOFRAN) 4 MG tablet Take 1 tablet (4 mg total) by mouth every 8 (eight) hours as needed for Nausea.    potassium chloride SA (K-DUR,KLOR-CON) 20 MEQ tablet Take 1 tablet (20 mEq total) by mouth once daily. for 7 days    propylene glycol (SYSTANE BALANCE) 0.6 % Drop Apply 1 drop to eye daily as needed (dry eye).    sodium chloride 3% 3 % nebulizer solution Take 4 mLs by nebulization 2 (two) times daily.    traMADol (ULTRAM) 50 mg tablet Take 1 tablet (50 mg total) by mouth every 6 (six) hours as needed for Pain.    verapamil (CALAN-SR) 180 MG CR tablet 2 (two) times daily.     VITAMIN D2 50,000 unit capsule TAKE 1 CAPSULE BY MOUTH ONCE A WEEK     Family History     Problem Relation (Age of Onset)    Cancer Father, Brother, Maternal Aunt    Heart attack Mother, Brother, Son    Hyperlipidemia Sister    Hypertension Mother    Lung cancer Sister    Psoriasis Sister    Stroke Maternal Uncle        Tobacco Use    Smoking status: Former Smoker     Packs/day: 2.00     Years: 50.00     Pack years: 100.00     Types: Cigarettes     Last attempt to quit: 2009     Years since quittin.3    Smokeless tobacco: Never Used   Substance and Sexual Activity    Alcohol use: No     Alcohol/week: 0.0 oz    Drug use: No    Sexual activity: Yes     Partners: Male     Review of Systems   Constitutional: Negative for activity change, chills  and fever.   HENT: Negative for congestion, postnasal drip, rhinorrhea and sinus pressure.    Eyes: Negative for discharge, redness and visual disturbance.   Respiratory: Positive for cough, chest tightness, shortness of breath and wheezing. Negative for choking.    Cardiovascular: Negative for chest pain and palpitations.   Gastrointestinal: Negative for abdominal distention, abdominal pain, constipation, diarrhea, nausea and vomiting.   Genitourinary: Negative for dysuria.   Musculoskeletal: Negative for arthralgias and back pain.   Skin: Negative for color change and pallor.   Neurological: Negative for dizziness and headaches.     Objective:     Vital Signs (Most Recent):  Temp: 97.8 °F (36.6 °C) (09/13/18 1522)  Pulse: 82 (09/13/18 2101)  Resp: 19 (09/13/18 1801)  BP: (!) 125/58 (09/13/18 2101)  SpO2: 100 % (09/13/18 2101) Vital Signs (24h Range):  Temp:  [97.8 °F (36.6 °C)] 97.8 °F (36.6 °C)  Pulse:  [79-99] 82  Resp:  [19-22] 19  SpO2:  [96 %-100 %] 100 %  BP: (112-125)/(57-61) 125/58     Weight: 67.1 kg (148 lb)  Body mass index is 27.07 kg/m².    Physical Exam   Constitutional: She is oriented to person, place, and time. She appears well-developed and well-nourished.   Eyes: EOM are normal. Pupils are equal, round, and reactive to light.   Neck: Normal range of motion. Neck supple.   Cardiovascular: Normal rate, regular rhythm and normal heart sounds.   No murmur heard.  There is elevated JVD.    Pulmonary/Chest: No stridor. No respiratory distress. She has no wheezes. She has rales (There is significant congestion and rales in the entire upper and lower left lobes. ). She exhibits tenderness (right chest wall).   Abdominal: Soft. Bowel sounds are normal. She exhibits no distension. There is no tenderness. There is no guarding.   Musculoskeletal: Normal range of motion. She exhibits no edema or deformity.   Neurological: She is alert and oriented to person, place, and time. Coordination normal.   Skin: Skin  is warm and dry. Capillary refill takes less than 2 seconds. No erythema.         CRANIAL NERVES     CN III, IV, VI   Pupils are equal, round, and reactive to light.  Extraocular motions are normal.      Significant Labs:   BMP:   Recent Labs   Lab  09/13/18   1747   GLU  102   NA  131*   K  3.3*   CL  89*   CO2  29   BUN  33*   CREATININE  1.1   CALCIUM  9.3     CBC:   Recent Labs   Lab  09/13/18   1747   WBC  15.76*   HGB  12.1   HCT  36.4*   PLT  367*     Significant Imaging: CT: I have reviewed all pertinent results/findings within the past 24 hours and my personal findings are:  Chronic PE on the right lobe and new small effusion on the left.   CXR: I have reviewed all pertinent results/findings within the past 24 hours and my personal findings are:  no acute changes    Assessment/Plan:     * Pneumonia of left lower lobe due to Pseudomonas species    Patient presenting with worsening SOB with a remote history of pseudomonal pneumonia on therapy. Ddx includes pneumonia vs CHF exacerbation vs PE vs COPD exacerbation. Patient treated outpatient with cefepime with minimal improvement. PE diagnosed over 1 year ago managed with eliquis. CTA performed in the ED confirming chronic PE and small area of focal nodular consolidation within the left lower lobe. Elevated JVD on exam.     Plan:   - Will continue lasix IV 40mg BID.   - Failed outpatient cefepime. WIll start zosyn q8 and ciprofloxacin 500mg q8.   - Previous culture showed susceptibility to zosyn but resistance to ciprofloxacin. However, guidelines recommend zosyn with a flouroquinolone.  - Duo-nebs PRN. Continue home nebs.   - Guaifenesin 1,200mg PO.           Pulmonary embolism without acute cor pulmonale    - Chronic PE see on CTA   - Will continue home Eliquis.        COPD (chronic obstructive pulmonary disease)    - Duo-nebs PRN q4.   - Continue home ipratropium nebs.           Benign hypertension with chronic kidney disease, stage III    - BP stable in the  /58.   - Will continue home Chlorthalidone 25mg.           Vitamin D deficiency    - Continue home Vitamin D supplementation.           Anemia of chronic disease    Hemoglobin above baseline in the ED  - Will continue to monitor.         GERD (gastroesophageal reflux disease)    - Pantoprazole 40mg.           Mild recurrent major depression    - Continue home Escitalopram 10mg           VTE Risk Mitigation (From admission, onward)        Ordered     apixaban tablet 5 mg  2 times daily      09/13/18 9743        Kelvin Willson MD  Department of Hospital Medicine   [facility]

## 2018-09-14 NOTE — HOSPITAL COURSE
9/14: No acute events overnight. Patient feels that she is much better than when she was admitted to hospital. Her breathing treatments are helping. Patient had pseudomonal pneumonia that she was being treated for previously however current exacerbation of lung disease appears to involve her COPD.   9/15: Pt hemodynamically stable.  9/16: No acute events overnight, patient is feeling better than when she presented to hospital. Has minor complaints of SOB and pain on her lower lumbar back, unchanged from presentation; no dysuria or CVA tenderness. Will obtain sputum culture today per infectious disease recommendations.   9/17: Patient has new complaints of burning on urination today, will do urinalysis and treat accordingly. In terms of patient's pneumonia, waiting for identification of organisms on 9/14 sputum cultures. ID will make discharge recommendations when this is done.    9/18: No complaints of burning on urination today, urinalysis was negative for UTI. Sensitivities are available, waiting on official ID recs for discharge. Patient has new onset diarrhea today (4-5 bowel movements since this morning). Will order C diff panel. She also had a bout of tachycardia that was very similar to to previous episodes of tachycardia, will restart verapamil.

## 2018-09-14 NOTE — ED NOTES
Inflitrated IV site to right forearm wrapped with coban and PT given Ice and instructed to keep elevated when possible. PT denies acute pain and appears to not be in any distress at this time.

## 2018-09-15 LAB
ANION GAP SERPL CALC-SCNC: 11 MMOL/L
BASOPHILS # BLD AUTO: 0.04 K/UL
BASOPHILS NFR BLD: 0.5 %
BUN SERPL-MCNC: 20 MG/DL
CALCIUM SERPL-MCNC: 8.6 MG/DL
CHLORIDE SERPL-SCNC: 96 MMOL/L
CO2 SERPL-SCNC: 27 MMOL/L
CREAT SERPL-MCNC: 1.1 MG/DL
DIFFERENTIAL METHOD: ABNORMAL
EOSINOPHIL # BLD AUTO: 0.2 K/UL
EOSINOPHIL NFR BLD: 2.7 %
ERYTHROCYTE [DISTWIDTH] IN BLOOD BY AUTOMATED COUNT: 12.1 %
EST. GFR  (AFRICAN AMERICAN): 59.2 ML/MIN/1.73 M^2
EST. GFR  (NON AFRICAN AMERICAN): 51.3 ML/MIN/1.73 M^2
GLUCOSE SERPL-MCNC: 93 MG/DL
HCT VFR BLD AUTO: 30.5 %
HGB BLD-MCNC: 10.1 G/DL
IMM GRANULOCYTES # BLD AUTO: 0.03 K/UL
IMM GRANULOCYTES NFR BLD AUTO: 0.4 %
LYMPHOCYTES # BLD AUTO: 2.6 K/UL
LYMPHOCYTES NFR BLD: 30.3 %
MAGNESIUM SERPL-MCNC: 1.8 MG/DL
MCH RBC QN AUTO: 29.1 PG
MCHC RBC AUTO-ENTMCNC: 33.1 G/DL
MCV RBC AUTO: 88 FL
MONOCYTES # BLD AUTO: 1.2 K/UL
MONOCYTES NFR BLD: 14.3 %
NEUTROPHILS # BLD AUTO: 4.4 K/UL
NEUTROPHILS NFR BLD: 51.8 %
NRBC BLD-RTO: 0 /100 WBC
PHOSPHATE SERPL-MCNC: 2.8 MG/DL
PLATELET # BLD AUTO: 269 K/UL
PMV BLD AUTO: 9.2 FL
POTASSIUM SERPL-SCNC: 3.3 MMOL/L
RBC # BLD AUTO: 3.47 M/UL
SODIUM SERPL-SCNC: 134 MMOL/L
WBC # BLD AUTO: 8.55 K/UL

## 2018-09-15 PROCEDURE — 63600175 PHARM REV CODE 636 W HCPCS: Performed by: STUDENT IN AN ORGANIZED HEALTH CARE EDUCATION/TRAINING PROGRAM

## 2018-09-15 PROCEDURE — 25000242 PHARM REV CODE 250 ALT 637 W/ HCPCS: Performed by: HOSPITALIST

## 2018-09-15 PROCEDURE — 11000001 HC ACUTE MED/SURG PRIVATE ROOM

## 2018-09-15 PROCEDURE — 25000003 PHARM REV CODE 250: Performed by: STUDENT IN AN ORGANIZED HEALTH CARE EDUCATION/TRAINING PROGRAM

## 2018-09-15 PROCEDURE — 83735 ASSAY OF MAGNESIUM: CPT

## 2018-09-15 PROCEDURE — G8988 SELF CARE GOAL STATUS: HCPCS | Mod: CH

## 2018-09-15 PROCEDURE — G8987 SELF CARE CURRENT STATUS: HCPCS | Mod: CH

## 2018-09-15 PROCEDURE — 94761 N-INVAS EAR/PLS OXIMETRY MLT: CPT

## 2018-09-15 PROCEDURE — 97165 OT EVAL LOW COMPLEX 30 MIN: CPT

## 2018-09-15 PROCEDURE — 85025 COMPLETE CBC W/AUTO DIFF WBC: CPT

## 2018-09-15 PROCEDURE — 94640 AIRWAY INHALATION TREATMENT: CPT

## 2018-09-15 PROCEDURE — G8989 SELF CARE D/C STATUS: HCPCS | Mod: CH

## 2018-09-15 PROCEDURE — 84100 ASSAY OF PHOSPHORUS: CPT

## 2018-09-15 PROCEDURE — 36415 COLL VENOUS BLD VENIPUNCTURE: CPT

## 2018-09-15 PROCEDURE — 80048 BASIC METABOLIC PNL TOTAL CA: CPT

## 2018-09-15 PROCEDURE — 25000242 PHARM REV CODE 250 ALT 637 W/ HCPCS: Performed by: STUDENT IN AN ORGANIZED HEALTH CARE EDUCATION/TRAINING PROGRAM

## 2018-09-15 RX ADMIN — PIPERACILLIN AND TAZOBACTAM 4.5 G: 4; .5 INJECTION, POWDER, LYOPHILIZED, FOR SOLUTION INTRAVENOUS; PARENTERAL at 04:09

## 2018-09-15 RX ADMIN — RAMELTEON 8 MG: 8 TABLET, FILM COATED ORAL at 11:09

## 2018-09-15 RX ADMIN — FOLIC ACID 1 MG: 1 TABLET ORAL at 08:09

## 2018-09-15 RX ADMIN — GUAIFENESIN 1200 MG: 600 TABLET, EXTENDED RELEASE ORAL at 08:09

## 2018-09-15 RX ADMIN — UMECLIDINIUM BROMIDE AND VILANTEROL TRIFENATATE 1 PUFF: 62.5; 25 POWDER RESPIRATORY (INHALATION) at 11:09

## 2018-09-15 RX ADMIN — GUAIFENESIN 1200 MG: 600 TABLET, EXTENDED RELEASE ORAL at 09:09

## 2018-09-15 RX ADMIN — APIXABAN 5 MG: 5 TABLET, FILM COATED ORAL at 08:09

## 2018-09-15 RX ADMIN — PANTOPRAZOLE SODIUM 40 MG: 40 TABLET, DELAYED RELEASE ORAL at 08:09

## 2018-09-15 RX ADMIN — IPRATROPIUM BROMIDE AND ALBUTEROL SULFATE 3 ML: .5; 3 SOLUTION RESPIRATORY (INHALATION) at 07:09

## 2018-09-15 RX ADMIN — PIPERACILLIN AND TAZOBACTAM 4.5 G: 4; .5 INJECTION, POWDER, LYOPHILIZED, FOR SOLUTION INTRAVENOUS; PARENTERAL at 08:09

## 2018-09-15 RX ADMIN — GABAPENTIN 600 MG: 300 CAPSULE ORAL at 02:09

## 2018-09-15 RX ADMIN — ACETAMINOPHEN 1000 MG: 500 TABLET ORAL at 02:09

## 2018-09-15 RX ADMIN — APIXABAN 5 MG: 5 TABLET, FILM COATED ORAL at 09:09

## 2018-09-15 RX ADMIN — PIPERACILLIN AND TAZOBACTAM 4.5 G: 4; .5 INJECTION, POWDER, LYOPHILIZED, FOR SOLUTION INTRAVENOUS; PARENTERAL at 11:09

## 2018-09-15 RX ADMIN — GABAPENTIN 600 MG: 300 CAPSULE ORAL at 08:09

## 2018-09-15 RX ADMIN — ESCITALOPRAM OXALATE 10 MG: 10 TABLET ORAL at 08:09

## 2018-09-15 RX ADMIN — IPRATROPIUM BROMIDE AND ALBUTEROL SULFATE 3 ML: .5; 3 SOLUTION RESPIRATORY (INHALATION) at 12:09

## 2018-09-15 RX ADMIN — GABAPENTIN 600 MG: 300 CAPSULE ORAL at 09:09

## 2018-09-15 NOTE — ASSESSMENT & PLAN NOTE
Patient presenting with worsening SOB with a remote history of pseudomonal pneumonia on therapy. Ddx includes pneumonia vs CHF exacerbation vs PE vs COPD exacerbation. Patient treated outpatient with cefepime with minimal improvement. PE diagnosed over 1 year ago managed with eliquis. CTA performed in the ED confirming chronic PE and small area of focal nodular consolidation within the left lower lobe. Elevated JVD on exam.     Plan:   - Continue Zosyn q8, failed outpatient cefepime.   - Previous culture showed susceptibility to zosyn but resistance to ciprofloxacin.  -  ID  reccs - continue Zosyn  - Duo-nebs scheduled  - Guaifenesin 1,200mg PO.   -F/u CT in one month  -F/u with pulmonology out patient

## 2018-09-15 NOTE — PROGRESS NOTES
[facility] Hospital Medicine  Progress Note    Patient Name: Yasmin Asencio  MRN: [id number]  Patient Class: IP- Inpatient   Admission Date: 9/13/2018  Length of Stay: 2 days  Attending Physician: Isabelle Paiz MD  Primary Care Provider: Urmila Luna MD    [facility] Medicine Team: [facility] HOSP MED 5 Sheila Echols DO    Subjective:     Principal Problem:Pneumonia of left lower lobe due to Pseudomonas species    HPI:  Mrs. Yasmin Asencio is a 69 yo female with a PMx of chronic right upper lobe PE on eliquis, HTN, COPD, bronchioectasis and Ectopic atrial tachycardia s/p ablation presents for a 1 week history of worsening SOB. Patient was recently diagnosed with pseudomonal pneumonia 1 month ago confirmed with sputum culture. She was sent home with a PICC line managed with cefepime for 10 days . She reports over the past week, she has had worsening SOB, productive cough with green sputum and chest tightness. She had been taking Mucinex with her home nebulizer with mild relief. She normally uses oxygen 2L at night but report she has had to increase her oxygen requirements over the past few days and use it during the day. She went to see her     She was seen by her PCP on 9/7 who has been following outpatient management of her Pseudomonal pneumonia. However yesterday the patient reports having a blood pressure in the 90s with symptomatic dizziness. She was told to report to the ED if her symptoms did not resolve. She denies any fevers, chills, radiating chest pain, n/v, abdominal pain, dizziness, vision changes or congestion.       Hospital Course:  9/14: No acute events overnight. Patient feels that she is much better than when she was admitted to hospital. Her breathing treatments are helping. Patient had pseudomonal pneumonia that she was being treated for previously however current exacerbation of lung disease appears to involve her COPD.   9/15: Pt hemodynamically stable.    Interval  History: No acute events overnight.    Review of Systems   Constitutional: Negative for activity change and fever.   HENT: Negative for congestion.    Eyes: Negative for photophobia and visual disturbance.   Respiratory: Positive for cough. Negative for chest tightness and shortness of breath.    Cardiovascular: Negative for chest pain and palpitations.   Gastrointestinal: Negative for abdominal pain, diarrhea, nausea and vomiting.   Genitourinary: Negative for difficulty urinating and dysuria.   Musculoskeletal: Negative for arthralgias and gait problem.   Neurological: Negative for dizziness and light-headedness.   Psychiatric/Behavioral: Negative for confusion. The patient is not nervous/anxious.      Objective:     Vital Signs (Most Recent):  Temp: 97.5 °F (36.4 °C) (09/15/18 1532)  Pulse: 101 (09/15/18 1532)  Resp: 15 (09/15/18 1230)  BP: 106/66 (09/15/18 1532)  SpO2: (!) 93 % (09/15/18 1532) Vital Signs (24h Range):  Temp:  [97.5 °F (36.4 °C)-98.7 °F (37.1 °C)] 97.5 °F (36.4 °C)  Pulse:  [] 101  Resp:  [14-20] 15  SpO2:  [93 %-96 %] 93 %  BP: (106-127)/(52-69) 106/66     Weight: 68.8 kg (151 lb 9.6 oz)  Body mass index is 27.73 kg/m².    Intake/Output Summary (Last 24 hours) at 9/15/2018 1603  Last data filed at 9/14/2018 1800  Gross per 24 hour   Intake 200 ml   Output 200 ml   Net 0 ml      Physical Exam   Constitutional: She is oriented to person, place, and time. She appears well-developed and well-nourished.   HENT:   Head: Normocephalic and atraumatic.   Eyes: Conjunctivae and EOM are normal.   Neck: Neck supple.   Cardiovascular: Normal rate, regular rhythm, normal heart sounds and intact distal pulses.   Pulmonary/Chest: Effort normal. No respiratory distress.   Abdominal: Soft. There is no tenderness. There is no guarding.   Musculoskeletal: She exhibits no edema or tenderness.   Neurological: She is alert and oriented to person, place, and time.   Skin: Skin is warm.   Psychiatric: She has a  normal mood and affect. Her behavior is normal. Thought content normal.       Significant Labs:   BMP:   Recent Labs   Lab  09/15/18   0405   GLU  93   NA  134*   K  3.3*   CL  96   CO2  27   BUN  20   CREATININE  1.1   CALCIUM  8.6*   MG  1.8     CBC:   Recent Labs   Lab  09/13/18   1747  09/14/18   0404  09/15/18   0404   WBC  15.76*  10.35  8.55   HGB  12.1  11.9*  10.1*   HCT  36.4*  35.4*  30.5*   PLT  367*  334  269       Significant Imaging: I have reviewed all pertinent imaging results/findings within the past 24 hours.    Assessment/Plan:      * Pneumonia of left lower lobe due to Pseudomonas species    Patient presenting with worsening SOB with a remote history of pseudomonal pneumonia on therapy. Ddx includes pneumonia vs CHF exacerbation vs PE vs COPD exacerbation. Patient treated outpatient with cefepime with minimal improvement. PE diagnosed over 1 year ago managed with eliquis. CTA performed in the ED confirming chronic PE and small area of focal nodular consolidation within the left lower lobe. Elevated JVD on exam.     Plan:   - Continue Zosyn q8, failed outpatient cefepime.   - Previous culture showed susceptibility to zosyn but resistance to ciprofloxacin.  -  ID  reccs - continue Zosyn  - Duo-nebs scheduled  - Guaifenesin 1,200mg PO.   -F/u CT in one month  -F/u with pulmonology out patient            Hypokalemia    - Potassium was 3.3 today. Patient remains asymptomatic.   - Will replace as needed.  - Continue to monitor BMP        Hyponatremia    - Likely secondary to medication side effect. Will continue to monitor and work up if necessary.          Acute on chronic respiratory failure with hypoxia              Pulmonary embolism without acute cor pulmonale    - Chronic PE see on CTA   - Will continue home Eliquis.        Chronic obstructive pulmonary disease with acute exacerbation    - Duo-nebs PRN q4.   - Continue home ipratropium nebs.           Benign hypertension with chronic kidney  disease, stage III    - BP stable   - Will resume home Chlorthalidone 25 mg after acute infection            Vitamin D deficiency    - Continue home Vitamin D supplementation.           Anemia of chronic disease    Hemoglobin above baseline in the ED  - Will continue to monitor.         GERD (gastroesophageal reflux disease)    - Pantoprazole 40mg.           Mild recurrent major depression    - Continue home Escitalopram 10mg           VTE Risk Mitigation (From admission, onward)        Ordered     apixaban tablet 5 mg  2 times daily      09/13/18 9341              Sheila Echols DO  Department of Hospital Medicine   [facility]

## 2018-09-15 NOTE — PT/OT/SLP EVAL
Occupational Therapy   Evaluation and Discharge Note    Name: Yasmin Asencio  MRN: [id number]  Admitting Diagnosis:  Pneumonia of left lower lobe due to Pseudomonas species     Recommendations:     Discharge Recommendations: home  Discharge Equipment Recommendations:  none  Barriers to discharge:  None    History:     Occupational Profile:  Living Environment: Pt lives with  in a 1SH with no JOYA, has a tub/shower combo  Previous level of function: (I)  Equipment Owned:  none  Assistance upon Discharge: available as needed    Past Medical History:   Diagnosis Date    Acquired bronchiectasis     due to history of TB - followed by pulmonary, Dr. Zñuiga    Allergy     Amblyopia     rt eye per pt    Anemia of other chronic disease     Anticoagulant long-term use     Anxiety     Cataract     Clotting disorder     COPD (chronic obstructive pulmonary disease)     COPD (chronic obstructive pulmonary disease)     Depression     Diverticulosis     Essential tremor     GERD (gastroesophageal reflux disease)     Hemangioma of liver     History of tuberculosis 1978    Hypertension     Mixed anxiety and depressive disorder     Psoriasis     PSVT (paroxysmal supraventricular tachycardia)     Pulmonary embolism     S/P PICC central line placement Apr. 2016 - May 2016    Skin disease     Psoriasis    Spondylosis without myelopathy 8/23/2013    Supraventricular tachycardia     Thoracic aorta atherosclerosis     noted on CT scan of chest 1/3/2011    Tuberculosis     Vaginal delivery     x2    Vitamin D deficiency        Past Surgical History:   Procedure Laterality Date    ABLATION N/A 7/24/2017    Per[facility]    BLOCK-NERVE-MEDIAL BRANCH-LUMBAR Bilateral 10/14/2016    Performed by Issac Meyers [facility] at [facility] PAIN MGT    BLOCK-NERVE-MEDIAL BRANCH-LUMBAR Bilateral 8/26/2016    Performed by [facility]ac Meyers [facility] at [facility] PA[facility]    CATARACT EXTRACTION W/   INTRAOCULAR LENS IMPLANT  07/24/12    od dr [place]    CATARACT EXTRACTION W/  INTRAOCULAR LENS IMPLANT  08/07/12    left eye    COLONOSCOPY N/A 4/23/2013    Performed by Simeon Graves MD at [facility] ENDO (4TH FLR)    EGD (ESOPHAGOGASTRODUODENOSCOPY) N/A 4/23/2013    Performed by Simeon Graves MD at [facility] ENDO (4TH FLR)    GALLBLADDER SURGERY  9/2011    HEART CATH-LEFT Left 6/16/2016    Performed [facility]aga [facility] at [facility] CATH LAB    INJECTION-FACET Bilateral 1/22/2016    Performed by [facility]ers MD at [facility]    RADIOFREQUENCY THERMOCOAGULATION (RFTC)-NERVE-MEDIAN BRANCH-LUMBAR Right 4/4/2018    Performed by [facility]ac Meyers MD at [facility]    RADIOFREQUENCY THERMOCOAGULATION (RFTC)-NERVE-MEDIAN BRANCH-LUMBAR Left 3/16/2018    Performed by [facility]ers [facility] at [facility]    RADIOFREQUENCY THERMOCOAGULATION (RFTC)-NERVE-MEDIAN BRANCH-LUMBAR Right 12/16/2016    Performed by Issac Meyers MD at [facility]       Subjective     Chief Complaint: fatigue  Patient/Family stated goals: go home  Communicated with: RN prior to session.  Pain/Comfort:  · Pain Rating 1: 0/10  · Pain Rating Post-Intervention 1: 0/10    Patients cultural, spiritual, Jehovah's witness conflicts given the current situation: none    Objective:     Patient found with: (none)    General Precautions: Standard, fall   Orthopedic Precautions:N/A   Braces: N/A     Occupational Performance:    Bed Mobility:    · NT, found and left UIC    Functional Mobility/Transfers:  · Patient completed Sit <> Stand Transfer with independence  with  no assistive device   · Patient completed Toilet Transfer Stand Pivot technique with independence with  no AD  · Patient completed Tub Transfer Stand Pivot technique with modified independence with grab bars  · Functional Mobility: (I) with no AD    Activities of Daily Living:  · Upper Body Dressing: independence gown as robe  · Lower Body Dressing: independence socks  "[facility]    Cognitive/Visual Perceptual:  Cognitive/Psychosocial Skills:     -       Oriented to: Person, Place, Time and Situation   -       Follows Commands/attention:Follows multistep  commands  -       Communication: clear/fluent  -       Memory: No Deficits noted  -       Safety awareness/insight to disability: intact   -       Mood/Affect/Coping skills/emotional control: Appropriate to situation  Visual/Perceptual:      -Intact      Physical Exam:  Balance:    -       Good  Postural examination/scapula alignment:    -       No postural abnormalities identified  Sensation:    -       Intact but does endorse occasional tingling in B feet  Dominant hand:    -       R  Upper Extremity Range of Motion:     -       Right Upper Extremity: WNL    -       Left Upper Extremity: WNL      Upper Extremity Strength:    -       Right Upper Extremity: WNL  -       Left Upper Extremity: WFL; endorses minimal tightness/stretching sensation with end ranges, has had frozen shoulder in the past     Strength:    -       Right Upper Extremity: WNL    -       Left Upper Extremity: WNL    Fine Motor Coordination:    -       Intact  Gross motor coordination:   WFL    Patient left up in chair with all lines intact and call button in reach    [facility] 6 Click:  [facility] Total Score: 24    Treatment & Education:  Pt ed re OT role and POC. Pt ed re safety.  Education:    Assessment:     Yasmin Asencio is a 68 y.o. female with a medical diagnosis of Pneumonia of left lower lobe due to Pseudomonas species. At this time, patient is functioning at their prior level of function and does not require further acute OT services.     Clinical Decision Makin.  OT Low:  "Pt evaluation falls under low complexity for evaluation coding due to performance deficits noted in 1-3 areas as stated above and 0 co-morbities affecting current functional status. Data obtained from problem focused assessments. No modifications or assistance was required for " "completion of evaluation. Only brief occupational profile and history review completed."     Plan:     During this hospitalization, patient does not require further acute OT services.  Please re-consult if situation changes.    · Plan of Care Reviewed with: patient    This Plan of care has been discussed with the patient who was involved in its development and understands and is in agreement with the identified goals and treatment plan    GOALS:   Multidisciplinary Problems     Occupational Therapy Goals     Not on file          Multidisciplinary Problems (Resolved)        Problem: Occupational Therapy Goal    Goal Priority Disciplines Outcome Interventions   Occupational Therapy Goal   (Resolved)     OT, PT/OT Outcome(s) achieved                    Time Tracking:     OT Date of Treatment: 09/15/18  OT Start Time: 0936  OT Stop Time: 0946  OT Total Time (min): 10 min    Billable Minutes:Evaluation 10 minutes    KHOA King  9/15/2018  Pager: [phone]    "

## 2018-09-15 NOTE — SUBJECTIVE & OBJECTIVE
Interval History: No acute events overnight.    Review of Systems   Constitutional: Negative for activity change and fever.   HENT: Negative for congestion.    Eyes: Negative for photophobia and visual disturbance.   Respiratory: Positive for cough. Negative for chest tightness and shortness of breath.    Cardiovascular: Negative for chest pain and palpitations.   Gastrointestinal: Negative for abdominal pain, diarrhea, nausea and vomiting.   Genitourinary: Negative for difficulty urinating and dysuria.   Musculoskeletal: Negative for arthralgias and gait problem.   Neurological: Negative for dizziness and light-headedness.   Psychiatric/Behavioral: Negative for confusion. The patient is not nervous/anxious.      Objective:     Vital Signs (Most Recent):  Temp: 97.5 °F (36.4 °C) (09/15/18 1532)  Pulse: 101 (09/15/18 1532)  Resp: 15 (09/15/18 1230)  BP: 106/66 (09/15/18 1532)  SpO2: (!) 93 % (09/15/18 1532) Vital Signs (24h Range):  Temp:  [97.5 °F (36.4 °C)-98.7 °F (37.1 °C)] 97.5 °F (36.4 °C)  Pulse:  [] 101  Resp:  [14-20] 15  SpO2:  [93 %-96 %] 93 %  BP: (106-127)/(52-69) 106/66     Weight: 68.8 kg (151 lb 9.6 oz)  Body mass index is 27.73 kg/m².    Intake/Output Summary (Last 24 hours) at 9/15/2018 1603  Last data filed at 9/14/2018 1800  Gross per 24 hour   Intake 200 ml   Output 200 ml   Net 0 ml      Physical Exam   Constitutional: She is oriented to person, place, and time. She appears well-developed and well-nourished.   HENT:   Head: Normocephalic and atraumatic.   Eyes: Conjunctivae and EOM are normal.   Neck: Neck supple.   Cardiovascular: Normal rate, regular rhythm, normal heart sounds and intact distal pulses.   Pulmonary/Chest: Effort normal. No respiratory distress.   Abdominal: Soft. There is no tenderness. There is no guarding.   Musculoskeletal: She exhibits no edema or tenderness.   Neurological: She is alert and oriented to person, place, and time.   Skin: Skin is warm.   Psychiatric: She  has a normal mood and affect. Her behavior is normal. Thought content normal.       Significant Labs:   BMP:   Recent Labs   Lab  09/15/18   0405   GLU  93   NA  134*   K  3.3*   CL  96   CO2  27   BUN  20   CREATININE  1.1   CALCIUM  8.6*   MG  1.8     CBC:   Recent Labs   Lab  09/13/18   1747  09/14/18   0404  09/15/18   0404   WBC  15.76*  10.35  8.55   HGB  12.1  11.9*  10.1*   HCT  36.4*  35.4*  30.5*   PLT  367*  334  269       Significant Imaging: I have reviewed all pertinent imaging results/findings within the past 24 hours.

## 2018-09-15 NOTE — PLAN OF CARE
Problem: Occupational Therapy Goal  Goal: Occupational Therapy Goal  Outcome: Outcome(s) achieved Date Met: 09/15/18  Pt is (I). OT to d/c.    KHOA King  9/15/2018  Pager: [phone]

## 2018-09-15 NOTE — ASSESSMENT & PLAN NOTE
- Potassium was 3.3 today. Patient remains asymptomatic.   - Will replace as needed.  - Continue to monitor BMP

## 2018-09-15 NOTE — PLAN OF CARE
Problem: Patient Care Overview  Goal: Plan of Care Review  Outcome: Ongoing (interventions implemented as appropriate)   09/14/18 1955   Coping/Psychosocial   Plan Of Care Reviewed With patient;spouse   Pt is A,A,O x 4 . Stable V/S . No C/O pain during the day , pt C/O nausea and PRN antemetic medication given as needed . Pt is able to turn in bed and ambulate in the room independently. Pt id free of falls and injuries , fall precautions maintained and family at bedside . Urine sample and  sputum sample collected and sent to lab today . 2 D Echo done at the bedside . Tele D/C today . Pt still on IV antibiotics .

## 2018-09-16 LAB
ANION GAP SERPL CALC-SCNC: 10 MMOL/L
BASOPHILS # BLD AUTO: 0.03 K/UL
BASOPHILS NFR BLD: 0.4 %
BUN SERPL-MCNC: 14 MG/DL
CALCIUM SERPL-MCNC: 8.8 MG/DL
CHLORIDE SERPL-SCNC: 99 MMOL/L
CO2 SERPL-SCNC: 28 MMOL/L
CREAT SERPL-MCNC: 0.8 MG/DL
DIFFERENTIAL METHOD: ABNORMAL
EOSINOPHIL # BLD AUTO: 0.3 K/UL
EOSINOPHIL NFR BLD: 3.2 %
ERYTHROCYTE [DISTWIDTH] IN BLOOD BY AUTOMATED COUNT: 12.2 %
EST. GFR  (AFRICAN AMERICAN): >60 ML/MIN/1.73 M^2
EST. GFR  (NON AFRICAN AMERICAN): >60 ML/MIN/1.73 M^2
GLUCOSE SERPL-MCNC: 100 MG/DL
HCT VFR BLD AUTO: 29.4 %
HGB BLD-MCNC: 9.6 G/DL
IMM GRANULOCYTES # BLD AUTO: 0.03 K/UL
IMM GRANULOCYTES NFR BLD AUTO: 0.4 %
LYMPHOCYTES # BLD AUTO: 2.4 K/UL
LYMPHOCYTES NFR BLD: 31.2 %
MAGNESIUM SERPL-MCNC: 1.9 MG/DL
MCH RBC QN AUTO: 29.1 PG
MCHC RBC AUTO-ENTMCNC: 32.7 G/DL
MCV RBC AUTO: 89 FL
MONOCYTES # BLD AUTO: 1.1 K/UL
MONOCYTES NFR BLD: 13.8 %
NEUTROPHILS # BLD AUTO: 4 K/UL
NEUTROPHILS NFR BLD: 51 %
NRBC BLD-RTO: 0 /100 WBC
PHOSPHATE SERPL-MCNC: 3.4 MG/DL
PLATELET # BLD AUTO: 246 K/UL
PMV BLD AUTO: 9.3 FL
POTASSIUM SERPL-SCNC: 3.3 MMOL/L
RBC # BLD AUTO: 3.3 M/UL
SODIUM SERPL-SCNC: 137 MMOL/L
WBC # BLD AUTO: 7.75 K/UL

## 2018-09-16 PROCEDURE — 87205 SMEAR GRAM STAIN: CPT

## 2018-09-16 PROCEDURE — 83735 ASSAY OF MAGNESIUM: CPT

## 2018-09-16 PROCEDURE — 94761 N-INVAS EAR/PLS OXIMETRY MLT: CPT

## 2018-09-16 PROCEDURE — 11000001 HC ACUTE MED/SURG PRIVATE ROOM

## 2018-09-16 PROCEDURE — 87186 SC STD MICRODIL/AGAR DIL: CPT

## 2018-09-16 PROCEDURE — 25000003 PHARM REV CODE 250: Performed by: STUDENT IN AN ORGANIZED HEALTH CARE EDUCATION/TRAINING PROGRAM

## 2018-09-16 PROCEDURE — 36415 COLL VENOUS BLD VENIPUNCTURE: CPT

## 2018-09-16 PROCEDURE — 87077 CULTURE AEROBIC IDENTIFY: CPT

## 2018-09-16 PROCEDURE — 80048 BASIC METABOLIC PNL TOTAL CA: CPT

## 2018-09-16 PROCEDURE — 84100 ASSAY OF PHOSPHORUS: CPT

## 2018-09-16 PROCEDURE — 87070 CULTURE OTHR SPECIMN AEROBIC: CPT

## 2018-09-16 PROCEDURE — 25000242 PHARM REV CODE 250 ALT 637 W/ HCPCS: Performed by: PEDIATRICS

## 2018-09-16 PROCEDURE — 25000242 PHARM REV CODE 250 ALT 637 W/ HCPCS: Performed by: STUDENT IN AN ORGANIZED HEALTH CARE EDUCATION/TRAINING PROGRAM

## 2018-09-16 PROCEDURE — 85025 COMPLETE CBC W/AUTO DIFF WBC: CPT

## 2018-09-16 PROCEDURE — 63600175 PHARM REV CODE 636 W HCPCS: Performed by: STUDENT IN AN ORGANIZED HEALTH CARE EDUCATION/TRAINING PROGRAM

## 2018-09-16 PROCEDURE — 94640 AIRWAY INHALATION TREATMENT: CPT

## 2018-09-16 RX ORDER — IPRATROPIUM BROMIDE 0.5 MG/2.5ML
0.5 SOLUTION RESPIRATORY (INHALATION) EVERY 6 HOURS
Status: DISCONTINUED | OUTPATIENT
Start: 2018-09-16 | End: 2018-09-17

## 2018-09-16 RX ORDER — POTASSIUM CHLORIDE 20 MEQ/1
60 TABLET, EXTENDED RELEASE ORAL ONCE
Status: COMPLETED | OUTPATIENT
Start: 2018-09-16 | End: 2018-09-16

## 2018-09-16 RX ADMIN — UMECLIDINIUM BROMIDE AND VILANTEROL TRIFENATATE 1 PUFF: 62.5; 25 POWDER RESPIRATORY (INHALATION) at 08:09

## 2018-09-16 RX ADMIN — IPRATROPIUM BROMIDE AND ALBUTEROL SULFATE 3 ML: .5; 3 SOLUTION RESPIRATORY (INHALATION) at 07:09

## 2018-09-16 RX ADMIN — IPRATROPIUM BROMIDE AND ALBUTEROL SULFATE 3 ML: .5; 3 SOLUTION RESPIRATORY (INHALATION) at 12:09

## 2018-09-16 RX ADMIN — PIPERACILLIN AND TAZOBACTAM 4.5 G: 4; .5 INJECTION, POWDER, LYOPHILIZED, FOR SOLUTION INTRAVENOUS; PARENTERAL at 04:09

## 2018-09-16 RX ADMIN — APIXABAN 5 MG: 5 TABLET, FILM COATED ORAL at 08:09

## 2018-09-16 RX ADMIN — ESCITALOPRAM OXALATE 10 MG: 10 TABLET ORAL at 08:09

## 2018-09-16 RX ADMIN — GUAIFENESIN 1200 MG: 600 TABLET, EXTENDED RELEASE ORAL at 08:09

## 2018-09-16 RX ADMIN — IPRATROPIUM BROMIDE 0.5 MG: 0.5 SOLUTION RESPIRATORY (INHALATION) at 08:09

## 2018-09-16 RX ADMIN — GABAPENTIN 600 MG: 300 CAPSULE ORAL at 08:09

## 2018-09-16 RX ADMIN — POTASSIUM CHLORIDE 60 MEQ: 1500 TABLET, EXTENDED RELEASE ORAL at 01:09

## 2018-09-16 RX ADMIN — FOLIC ACID 1 MG: 1 TABLET ORAL at 08:09

## 2018-09-16 RX ADMIN — PIPERACILLIN AND TAZOBACTAM 4.5 G: 4; .5 INJECTION, POWDER, LYOPHILIZED, FOR SOLUTION INTRAVENOUS; PARENTERAL at 07:09

## 2018-09-16 RX ADMIN — GABAPENTIN 600 MG: 300 CAPSULE ORAL at 01:09

## 2018-09-16 RX ADMIN — ACETAMINOPHEN 1000 MG: 500 TABLET ORAL at 01:09

## 2018-09-16 RX ADMIN — PANTOPRAZOLE SODIUM 40 MG: 40 TABLET, DELAYED RELEASE ORAL at 08:09

## 2018-09-16 NOTE — PROGRESS NOTES
[facility] Hospital Medicine  Progress Note    Patient Name: Yasmin Asencio  MRN: [id number]  Patient Class: IP- Inpatient   Admission Date: 9/13/2018  Length of Stay: 3 days  Attending Physician: Isabelle Paiz MD  Primary Care Provider: Urmila Luna MD    [facility] Medicine Team: [facility] 5 Norberto Keenan MD    Subjective:     Principal Problem:Pneumonia of left lower lobe due to Pseudomonas species    HPI:  Mrs. Yasmin Asencio is a 67 yo female with a PMx of chronic right upper lobe PE on eliquis, HTN, COPD, bronchioectasis and Ectopic atrial tachycardia s/p ablation presents for a 1 week history of worsening SOB. Patient was recently diagnosed with pseudomonal pneumonia 1 month ago confirmed with sputum culture. She was sent home with a PICC line managed with cefepime for 10 days . She reports over the past week, she has had worsening SOB, productive cough with green sputum and chest tightness. She had been taking Mucinex with her home nebulizer with mild relief. She normally uses oxygen 2L at night but report she has had to increase her oxygen requirements over the past few days and use it during the day. She went to see her     She was seen by her PCP on 9/7 who has been following outpatient management of her Pseudomonal pneumonia. However yesterday the patient reports having a blood pressure in the 90s with symptomatic dizziness. She was told to report to the ED if her symptoms did not resolve. She denies any fevers, chills, radiating chest pain, n/v, abdominal pain, dizziness, vision changes or congestion.       Hospital Course:  9/14: No acute events overnight. Patient feels that she is much better than when she was admitted to hospital. Her breathing treatments are helping. Patient had pseudomonal pneumonia that she was being treated for previously however current exacerbation of lung disease appears to involve her COPD.   9/15: Pt hemodynamically stable.  9/16: No  acute events overnight, patient is feeling better than when she presented to hospital. Has minor complaints of SOB and pain on her lower lumbar back, unchanged from presentation; no dysuria or CVA tenderness. Will obtain sputum culture today per infectious disease recommendations.     Interval History:   No acute events overnight, patient reports feeling well overall. She has some complaints of back pain, similar to presentation 2 days ago and one instance of feeling like she needs to urinate but being unable to. Will investigate further if this becomes a persistent problem.     Review of Systems   Constitutional: Negative for chills, fatigue and fever.   Respiratory: Positive for cough. Negative for chest tightness and shortness of breath.    Cardiovascular: Negative for chest pain, palpitations and leg swelling.   Gastrointestinal: Negative for abdominal pain, constipation, diarrhea and nausea.   Musculoskeletal: Positive for back pain. Negative for arthralgias and myalgias.   Skin: Negative for color change, pallor and rash.   Neurological: Negative for dizziness, weakness and headaches.     Objective:     Vital Signs (Most Recent):  Temp: 98.4 °F (36.9 °C) (09/16/18 0424)  Pulse: 89 (09/16/18 0424)  Resp: 16 (09/16/18 0040)  BP: (!) 109/53 (09/16/18 0424)  SpO2: (!) 94 % (09/16/18 0424) Vital Signs (24h Range):  Temp:  [97.5 °F (36.4 °C)-98.4 °F (36.9 °C)] 98.4 °F (36.9 °C)  Pulse:  [] 89  Resp:  [14-16] 16  SpO2:  [93 %-96 %] 94 %  BP: (106-127)/(53-69) 109/53     Weight: 68.8 kg (151 lb 9.6 oz)  Body mass index is 27.73 kg/m².    Intake/Output Summary (Last 24 hours) at 9/16/2018 0659  Last data filed at 9/16/2018 0600  Gross per 24 hour   Intake 250 ml   Output --   Net 250 ml      Physical Exam   Constitutional: She is oriented to person, place, and time. She appears well-developed and well-nourished. No distress.   HENT:   Head: Normocephalic and atraumatic.   Cardiovascular: Normal rate, regular  rhythm, normal heart sounds and intact distal pulses.   Pulmonary/Chest: Effort normal. She has wheezes (bilaterally). She has rales (minor in right lower lobes).   Abdominal: Soft. Bowel sounds are normal.   Musculoskeletal: Normal range of motion. She exhibits no edema or deformity.   Neurological: She is alert and oriented to person, place, and time.   Skin: Skin is warm and dry.   Psychiatric: She has a normal mood and affect. Her behavior is normal.   Vitals reviewed.      Significant Labs:   CBC:   Recent Labs   Lab  09/15/18   0404  09/16/18   0447   WBC  8.55  7.75   HGB  10.1*  9.6*   HCT  30.5*  29.4*   PLT  269  246     CMP:   Recent Labs   Lab  09/14/18   1245  09/15/18   0405  09/16/18   0447   NA   --   134*  137   K  3.5  3.3*  3.3*   CL   --   96  99   CO2   --   27  28   GLU   --   93  100   BUN   --   20  14   CREATININE   --   1.1  0.8   CALCIUM   --   8.6*  8.8   ANIONGAP   --   11  10   EGFRNONAA   --   51.3*  >60.0     Magnesium:   Recent Labs   Lab  09/15/18   0405  09/16/18   0447   MG  1.8  1.9     Phosphorus: 3.4    Significant Imaging: I have reviewed all pertinent imaging results/findings within the past 24 hours.    Assessment/Plan:      * Pneumonia of left lower lobe due to Pseudomonas species    Patient presenting with worsening SOB with a remote history of pseudomonal pneumonia on therapy. Ddx includes pneumonia vs CHF exacerbation vs PE vs COPD exacerbation. Patient treated outpatient with cefepime with minimal improvement. PE diagnosed over 1 year ago managed with eliquis. CTA performed in the ED confirming chronic PE and small area of focal nodular consolidation within the left lower lobe. Elevated JVD on exam.     Plan:   - Continue Zosyn q8, failed outpatient cefepime.   - Previous culture showed susceptibility to zosyn but resistance to ciprofloxacin.  -  ID  reccs - continue Zosyn, will get induced sputum culture today.  - Duo-nebs scheduled  - Guaifenesin 1,200mg PO.   -F/u CT  in one month  -F/u with pulmonology out patient            Hypokalemia    - Potassium was 3.3 today. Patient remains asymptomatic.   - Will replace as needed.  - Continue to monitor BMP        Hyponatremia    - Likely secondary to medication side effect. Will continue to monitor and work up if necessary.          Acute on chronic respiratory failure with hypoxia              Pulmonary embolism without acute cor pulmonale    - Chronic PE see on CTA   - Will continue home Eliquis.        Chronic obstructive pulmonary disease with acute exacerbation    - Duo-nebs PRN q4.   - Continue home ipratropium nebs.           Benign hypertension with chronic kidney disease, stage III    - BP stable   - Will resume home Chlorthalidone 25 mg after acute infection            Vitamin D deficiency    - Continue home Vitamin D supplementation.           Anemia of chronic disease    Hemoglobin above baseline in the ED  - Will continue to monitor.         GERD (gastroesophageal reflux disease)    - Pantoprazole 40mg.           Mild recurrent major depression    - Continue home Escitalopram 10mg           VTE Risk Mitigation (From admission, onward)        Ordered     apixaban tablet 5 mg  2 times daily      09/13/18 6453              Norberto Keenan MD  Department of Hospital Medicine   [facility]

## 2018-09-16 NOTE — NURSING
Patient does have productive cough.  Instructed on need for sputum specimen.  Sterile specimen container at bedside.

## 2018-09-16 NOTE — PLAN OF CARE
Patient had no falls and no respiratory issues. She remained AAOx4. She received tylenol once for a headache. She had no other complaints. She received he medication and nursing care per MD order.

## 2018-09-16 NOTE — SUBJECTIVE & OBJECTIVE
Interval History:   No acute events overnight, patient reports feeling well overall. She has some complaints of back pain, similar to presentation 2 days ago and one instance of feeling like she needs to urinate but being unable to. Will investigate further if this becomes a persistent problem.     Review of Systems   Constitutional: Negative for chills, fatigue and fever.   Respiratory: Positive for cough. Negative for chest tightness and shortness of breath.    Cardiovascular: Negative for chest pain, palpitations and leg swelling.   Gastrointestinal: Negative for abdominal pain, constipation, diarrhea and nausea.   Musculoskeletal: Positive for back pain. Negative for arthralgias and myalgias.   Skin: Negative for color change, pallor and rash.   Neurological: Negative for dizziness, weakness and headaches.     Objective:     Vital Signs (Most Recent):  Temp: 98.4 °F (36.9 °C) (09/16/18 0424)  Pulse: 89 (09/16/18 0424)  Resp: 16 (09/16/18 0040)  BP: (!) 109/53 (09/16/18 0424)  SpO2: (!) 94 % (09/16/18 0424) Vital Signs (24h Range):  Temp:  [97.5 °F (36.4 °C)-98.4 °F (36.9 °C)] 98.4 °F (36.9 °C)  Pulse:  [] 89  Resp:  [14-16] 16  SpO2:  [93 %-96 %] 94 %  BP: (106-127)/(53-69) 109/53     Weight: 68.8 kg (151 lb 9.6 oz)  Body mass index is 27.73 kg/m².    Intake/Output Summary (Last 24 hours) at 9/16/2018 0659  Last data filed at 9/16/2018 0600  Gross per 24 hour   Intake 250 ml   Output --   Net 250 ml      Physical Exam   Constitutional: She is oriented to person, place, and time. She appears well-developed and well-nourished. No distress.   HENT:   Head: Normocephalic and atraumatic.   Cardiovascular: Normal rate, regular rhythm, normal heart sounds and intact distal pulses.   Pulmonary/Chest: Effort normal. She has wheezes (bilaterally). She has rales (minor in right lower lobes).   Abdominal: Soft. Bowel sounds are normal.   Musculoskeletal: Normal range of motion. She exhibits no edema or deformity.    Neurological: She is alert and oriented to person, place, and time.   Skin: Skin is warm and dry.   Psychiatric: She has a normal mood and affect. Her behavior is normal.   Vitals reviewed.      Significant Labs:   CBC:   Recent Labs   Lab  09/15/18   0404  09/16/18 0447   WBC  8.55  7.75   HGB  10.1*  9.6*   HCT  30.5*  29.4*   PLT  269  246     CMP:   Recent Labs   Lab  09/14/18   1245  09/15/18   0405  09/16/18 0447   NA   --   134*  137   K  3.5  3.3*  3.3*   CL   --   96  99   CO2   --   27  28   GLU   --   93  100   BUN   --   20  14   CREATININE   --   1.1  0.8   CALCIUM   --   8.6*  8.8   ANIONGAP   --   11  10   EGFRNONAA   --   51.3*  >60.0     Magnesium:   Recent Labs   Lab  09/15/18   0405  09/16/18   0447   MG  1.8  1.9     Phosphorus: 3.4    Significant Imaging: I have reviewed all pertinent imaging results/findings within the past 24 hours.

## 2018-09-16 NOTE — ASSESSMENT & PLAN NOTE
Patient presenting with worsening SOB with a remote history of pseudomonal pneumonia on therapy. Ddx includes pneumonia vs CHF exacerbation vs PE vs COPD exacerbation. Patient treated outpatient with cefepime with minimal improvement. PE diagnosed over 1 year ago managed with eliquis. CTA performed in the ED confirming chronic PE and small area of focal nodular consolidation within the left lower lobe. Elevated JVD on exam.     Plan:   - Continue Zosyn q8, failed outpatient cefepime.   - Previous culture showed susceptibility to zosyn but resistance to ciprofloxacin.  -  ID  reccs - continue Zosyn, will get induced sputum culture today.  - Duo-nebs scheduled  - Guaifenesin 1,200mg PO.   -F/u CT in one month  -F/u with pulmonology out patient

## 2018-09-17 LAB
ANION GAP SERPL CALC-SCNC: 9 MMOL/L
BASOPHILS # BLD AUTO: 0.03 K/UL
BASOPHILS NFR BLD: 0.4 %
BILIRUB UR QL STRIP: NEGATIVE
BUN SERPL-MCNC: 12 MG/DL
CALCIUM SERPL-MCNC: 9.1 MG/DL
CHLORIDE SERPL-SCNC: 104 MMOL/L
CLARITY UR REFRACT.AUTO: CLEAR
CO2 SERPL-SCNC: 26 MMOL/L
COLOR UR AUTO: NORMAL
CREAT SERPL-MCNC: 0.8 MG/DL
DIFFERENTIAL METHOD: ABNORMAL
EOSINOPHIL # BLD AUTO: 0.3 K/UL
EOSINOPHIL NFR BLD: 4.2 %
ERYTHROCYTE [DISTWIDTH] IN BLOOD BY AUTOMATED COUNT: 12.5 %
EST. GFR  (AFRICAN AMERICAN): >60 ML/MIN/1.73 M^2
EST. GFR  (NON AFRICAN AMERICAN): >60 ML/MIN/1.73 M^2
GLUCOSE SERPL-MCNC: 82 MG/DL
GLUCOSE UR QL STRIP: NEGATIVE
HCT VFR BLD AUTO: 31 %
HGB BLD-MCNC: 9.7 G/DL
HGB UR QL STRIP: NEGATIVE
IMM GRANULOCYTES # BLD AUTO: 0.03 K/UL
IMM GRANULOCYTES NFR BLD AUTO: 0.4 %
KETONES UR QL STRIP: NEGATIVE
L PNEUMO AG UR QL IA: NOT DETECTED
LEUKOCYTE ESTERASE UR QL STRIP: NEGATIVE
LYMPHOCYTES # BLD AUTO: 2.4 K/UL
LYMPHOCYTES NFR BLD: 32.5 %
MAGNESIUM SERPL-MCNC: 1.9 MG/DL
MCH RBC QN AUTO: 29.1 PG
MCHC RBC AUTO-ENTMCNC: 31.3 G/DL
MCV RBC AUTO: 93 FL
MONOCYTES # BLD AUTO: 1 K/UL
MONOCYTES NFR BLD: 13.6 %
NEUTROPHILS # BLD AUTO: 3.6 K/UL
NEUTROPHILS NFR BLD: 48.9 %
NITRITE UR QL STRIP: NEGATIVE
NRBC BLD-RTO: 0 /100 WBC
PH UR STRIP: 6 [PH] (ref 5–8)
PHOSPHATE SERPL-MCNC: 3.5 MG/DL
PLATELET # BLD AUTO: 280 K/UL
PMV BLD AUTO: 9.3 FL
POTASSIUM SERPL-SCNC: 4.1 MMOL/L
PROT UR QL STRIP: NEGATIVE
RBC # BLD AUTO: 3.33 M/UL
SODIUM SERPL-SCNC: 139 MMOL/L
SP GR UR STRIP: 1.01 (ref 1–1.03)
URN SPEC COLLECT METH UR: NORMAL
UROBILINOGEN UR STRIP-ACNC: NEGATIVE EU/DL
WBC # BLD AUTO: 7.3 K/UL

## 2018-09-17 PROCEDURE — 85025 COMPLETE CBC W/AUTO DIFF WBC: CPT

## 2018-09-17 PROCEDURE — 97161 PT EVAL LOW COMPLEX 20 MIN: CPT

## 2018-09-17 PROCEDURE — 25000003 PHARM REV CODE 250: Performed by: STUDENT IN AN ORGANIZED HEALTH CARE EDUCATION/TRAINING PROGRAM

## 2018-09-17 PROCEDURE — 80048 BASIC METABOLIC PNL TOTAL CA: CPT

## 2018-09-17 PROCEDURE — 99233 SBSQ HOSP IP/OBS HIGH 50: CPT | Mod: ,,, | Performed by: INTERNAL MEDICINE

## 2018-09-17 PROCEDURE — 63600175 PHARM REV CODE 636 W HCPCS: Performed by: STUDENT IN AN ORGANIZED HEALTH CARE EDUCATION/TRAINING PROGRAM

## 2018-09-17 PROCEDURE — 81003 URINALYSIS AUTO W/O SCOPE: CPT

## 2018-09-17 PROCEDURE — 94640 AIRWAY INHALATION TREATMENT: CPT

## 2018-09-17 PROCEDURE — 83735 ASSAY OF MAGNESIUM: CPT

## 2018-09-17 PROCEDURE — 11000001 HC ACUTE MED/SURG PRIVATE ROOM

## 2018-09-17 PROCEDURE — 84100 ASSAY OF PHOSPHORUS: CPT

## 2018-09-17 PROCEDURE — 36415 COLL VENOUS BLD VENIPUNCTURE: CPT

## 2018-09-17 PROCEDURE — 94761 N-INVAS EAR/PLS OXIMETRY MLT: CPT

## 2018-09-17 PROCEDURE — 25000242 PHARM REV CODE 250 ALT 637 W/ HCPCS: Performed by: PEDIATRICS

## 2018-09-17 PROCEDURE — 25000242 PHARM REV CODE 250 ALT 637 W/ HCPCS: Performed by: STUDENT IN AN ORGANIZED HEALTH CARE EDUCATION/TRAINING PROGRAM

## 2018-09-17 RX ORDER — CHOLECALCIFEROL (VITAMIN D3) 25 MCG
1000 TABLET ORAL DAILY
Status: DISCONTINUED | OUTPATIENT
Start: 2018-09-17 | End: 2018-09-19 | Stop reason: HOSPADM

## 2018-09-17 RX ORDER — IPRATROPIUM BROMIDE 0.5 MG/2.5ML
0.5 SOLUTION RESPIRATORY (INHALATION) EVERY 6 HOURS PRN
Status: DISCONTINUED | OUTPATIENT
Start: 2018-09-17 | End: 2018-09-19 | Stop reason: HOSPADM

## 2018-09-17 RX ADMIN — APIXABAN 5 MG: 5 TABLET, FILM COATED ORAL at 08:09

## 2018-09-17 RX ADMIN — PIPERACILLIN AND TAZOBACTAM 4.5 G: 4; .5 INJECTION, POWDER, LYOPHILIZED, FOR SOLUTION INTRAVENOUS; PARENTERAL at 06:09

## 2018-09-17 RX ADMIN — UMECLIDINIUM BROMIDE AND VILANTEROL TRIFENATATE 1 PUFF: 62.5; 25 POWDER RESPIRATORY (INHALATION) at 08:09

## 2018-09-17 RX ADMIN — VITAMIN D, TAB 1000IU (100/BT) 1000 UNITS: 25 TAB at 09:09

## 2018-09-17 RX ADMIN — GABAPENTIN 600 MG: 300 CAPSULE ORAL at 03:09

## 2018-09-17 RX ADMIN — ACETAMINOPHEN 1000 MG: 500 TABLET ORAL at 10:09

## 2018-09-17 RX ADMIN — PANTOPRAZOLE SODIUM 40 MG: 40 TABLET, DELAYED RELEASE ORAL at 08:09

## 2018-09-17 RX ADMIN — ACETAMINOPHEN 1000 MG: 500 TABLET ORAL at 12:09

## 2018-09-17 RX ADMIN — GABAPENTIN 600 MG: 300 CAPSULE ORAL at 09:09

## 2018-09-17 RX ADMIN — IPRATROPIUM BROMIDE 0.5 MG: 0.5 SOLUTION RESPIRATORY (INHALATION) at 01:09

## 2018-09-17 RX ADMIN — GABAPENTIN 600 MG: 300 CAPSULE ORAL at 08:09

## 2018-09-17 RX ADMIN — PIPERACILLIN AND TAZOBACTAM 4.5 G: 4; .5 INJECTION, POWDER, LYOPHILIZED, FOR SOLUTION INTRAVENOUS; PARENTERAL at 12:09

## 2018-09-17 RX ADMIN — APIXABAN 5 MG: 5 TABLET, FILM COATED ORAL at 09:09

## 2018-09-17 RX ADMIN — IPRATROPIUM BROMIDE 0.5 MG: 0.5 SOLUTION RESPIRATORY (INHALATION) at 07:09

## 2018-09-17 RX ADMIN — IPRATROPIUM BROMIDE 0.5 MG: 0.5 SOLUTION RESPIRATORY (INHALATION) at 08:09

## 2018-09-17 RX ADMIN — RAMELTEON 8 MG: 8 TABLET, FILM COATED ORAL at 10:09

## 2018-09-17 RX ADMIN — PIPERACILLIN AND TAZOBACTAM 4.5 G: 4; .5 INJECTION, POWDER, LYOPHILIZED, FOR SOLUTION INTRAVENOUS; PARENTERAL at 10:09

## 2018-09-17 RX ADMIN — ESCITALOPRAM OXALATE 10 MG: 10 TABLET ORAL at 08:09

## 2018-09-17 RX ADMIN — GUAIFENESIN 1200 MG: 600 TABLET, EXTENDED RELEASE ORAL at 08:09

## 2018-09-17 RX ADMIN — FOLIC ACID 1 MG: 1 TABLET ORAL at 08:09

## 2018-09-17 RX ADMIN — GUAIFENESIN 1200 MG: 600 TABLET, EXTENDED RELEASE ORAL at 09:09

## 2018-09-17 NOTE — ASSESSMENT & PLAN NOTE
Patient presenting with worsening SOB with a remote history of pseudomonal pneumonia on therapy. Ddx includes pneumonia vs CHF exacerbation vs PE vs COPD exacerbation. Patient treated outpatient with cefepime with minimal improvement. PE diagnosed over 1 year ago managed with eliquis. CTA performed in the ED confirming chronic PE and small area of focal nodular consolidation within the left lower lobe. Elevated JVD on exam.     Plan:   - Continue Zosyn q8, failed outpatient cefepime.   - Previous culture showed susceptibility to zosyn but resistance to ciprofloxacin.  -  ID  reccs - continue Zosyn, new sputum culture yesterday, waiting on 9/14 culture results before ID make discharge recommendations.   - Duo-nebs prn  - Guaifenesin 1,200mg PO.   -F/u CT in one month  -F/u with pulmonology out patient

## 2018-09-17 NOTE — PHARMACY MED REC
"Admission Medication Reconciliation - Pharmacy Consult Note    The home medication history was taken by Lcuina Anderson, Pharmacy Technician.  Based on information gathered and subsequent review by the clinical pharmacist, the items below may need attention.     You may go to "Admission" then "Reconcile Home Medications" tabs to review and/or act upon these items.     Potentially problematic discrepancies with current MAR  o Patient IS taking the following which was not ordered upon admit  o Chlorathalidone 12.5 mg PO daily (held upon admission due to hypotension)  o Verapamil  mg PO BID (held upon admission due to hypotension)    Please address this information as you see fit.  Feel free to contact us if you have any questions or require assistance.    Mari Thibodeaux, PharmD  [id number]                    .    .            "

## 2018-09-17 NOTE — ASSESSMENT & PLAN NOTE
- Likely secondary to medication side effect. Will continue to monitor and work up if necessary.  - Resolved

## 2018-09-17 NOTE — SUBJECTIVE & OBJECTIVE
Interval History:   No acute events overnight. Patient reports being restless last night, feels it is the beta blocker she is getting that is causing this. She also reports burning on urination. This along with previous complaints of urinary urgency are concerning for UTI.     Review of Systems   Constitutional: Positive for fatigue. Negative for chills and fever.   Respiratory: Positive for cough. Negative for chest tightness and shortness of breath.    Cardiovascular: Positive for chest pain (Does not appear to be cardiac in nature). Negative for palpitations and leg swelling.   Gastrointestinal: Negative for abdominal pain, constipation, diarrhea and nausea.   Genitourinary: Positive for dysuria.   Musculoskeletal: Positive for back pain. Negative for arthralgias and myalgias.   Skin: Negative for color change, pallor and rash.   Neurological: Negative for dizziness, weakness and headaches.     Objective:     Vital Signs (Most Recent):  Temp: 98.5 °F (36.9 °C) (09/17/18 0330)  Pulse: 88 (09/17/18 0706)  Resp: 20 (09/17/18 0706)  BP: 134/60 (09/17/18 0330)  SpO2: 100 % (09/17/18 0706) Vital Signs (24h Range):  Temp:  [97.4 °F (36.3 °C)-98.5 °F (36.9 °C)] 98.5 °F (36.9 °C)  Pulse:  [86-99] 88  Resp:  [16-22] 20  SpO2:  [90 %-100 %] 100 %  BP: (126-154)/(60-96) 134/60     Weight: 68.8 kg (151 lb 9.6 oz)  Body mass index is 27.73 kg/m².    Intake/Output Summary (Last 24 hours) at 9/17/2018 0722  Last data filed at 9/17/2018 0400  Gross per 24 hour   Intake 540 ml   Output --   Net 540 ml      Physical Exam   Constitutional: She is oriented to person, place, and time. She appears well-developed and well-nourished. No distress.   HENT:   Head: Normocephalic and atraumatic.   Cardiovascular: Regular rhythm, normal heart sounds and intact distal pulses.   tachycardiac   Pulmonary/Chest: Effort normal. She has wheezes (left lobes). She has rales (left lung base).   Abdominal: Soft. Bowel sounds are normal.    Musculoskeletal: Normal range of motion. She exhibits no edema or deformity.   Neurological: She is alert and oriented to person, place, and time.   Skin: Skin is warm and dry.   Psychiatric: She has a normal mood and affect. Her behavior is normal.   Vitals reviewed.      Significant Labs:   BMP:   Recent Labs   Lab  09/17/18   0551   GLU  82   NA  139   K  4.1   CL  104   CO2  26   BUN  12   CREATININE  0.8   CALCIUM  9.1   MG  1.9     CMP:   Recent Labs   Lab  09/16/18 0447 09/17/18   0551   NA  137  139   K  3.3*  4.1   CL  99  104   CO2  28  26   GLU  100  82   BUN  14  12   CREATININE  0.8  0.8   CALCIUM  8.8  9.1   ANIONGAP  10  9   EGFRNONAA  >60.0  >60.0     Magnesium:   Recent Labs   Lab  09/16/18 0447 09/17/18   0551   MG  1.9  1.9     Phosphorus: 3.5    Significant Imaging: I have reviewed all pertinent imaging results/findings within the past 24 hours.

## 2018-09-17 NOTE — PROGRESS NOTES
[facility] Hospital Medicine  Progress Note    Patient Name: Yasmin Asencio  MRN: [id number]  Patient Class: IP- Inpatient   Admission Date: 9/13/2018  Length of Stay: 4 days  Attending Physician: Isabelle Paiz MD  Primary Care Provider: Urmila Luna MD    [facility] Medicine Team: [facility] 5 Norberto Keenan MD    Subjective:     Principal Problem:Pneumonia of left lower lobe due to Pseudomonas species    HPI:  Mrs. Yasmin Asencio is a 67 yo female with a PMx of chronic right upper lobe PE on eliquis, HTN, COPD, bronchioectasis and Ectopic atrial tachycardia s/p ablation presents for a 1 week history of worsening SOB. Patient was recently diagnosed with pseudomonal pneumonia 1 month ago confirmed with sputum culture. She was sent home with a PICC line managed with cefepime for 10 days . She reports over the past week, she has had worsening SOB, productive cough with green sputum and chest tightness. She had been taking Mucinex with her home nebulizer with mild relief. She normally uses oxygen 2L at night but report she has had to increase her oxygen requirements over the past few days and use it during the day. She went to see her     She was seen by her PCP on 9/7 who has been following outpatient management of her Pseudomonal pneumonia. However yesterday the patient reports having a blood pressure in the 90s with symptomatic dizziness. She was told to report to the ED if her symptoms did not resolve. She denies any fevers, chills, radiating chest pain, n/v, abdominal pain, dizziness, vision changes or congestion.       Hospital Course:  9/14: No acute events overnight. Patient feels that she is much better than when she was admitted to hospital. Her breathing treatments are helping. Patient had pseudomonal pneumonia that she was being treated for previously however current exacerbation of lung disease appears to involve her COPD.   9/15: Pt hemodynamically stable.  9/16: No  acute events overnight, patient is feeling better than when she presented to hospital. Has minor complaints of SOB and pain on her lower lumbar back, unchanged from presentation; no dysuria or CVA tenderness. Will obtain sputum culture today per infectious disease recommendations.   9/17: Patient has new complaints of burning on urination today, will do urinalysis and treat accordingly. In terms of patient's pneumonia, waiting for identification of organisms on 9/14 sputum cultures. ID will make discharge recommendations when this is done.      Interval History:   No acute events overnight. Patient reports being restless last night, feels it is the beta blocker she is getting that is causing this. She also reports burning on urination. This along with previous complaints of urinary urgency are concerning for UTI.     Review of Systems   Constitutional: Positive for fatigue. Negative for chills and fever.   Respiratory: Positive for cough. Negative for chest tightness and shortness of breath.    Cardiovascular: Positive for chest pain (Does not appear to be cardiac in nature). Negative for palpitations and leg swelling.   Gastrointestinal: Negative for abdominal pain, constipation, diarrhea and nausea.   Genitourinary: Positive for dysuria.   Musculoskeletal: Positive for back pain. Negative for arthralgias and myalgias.   Skin: Negative for color change, pallor and rash.   Neurological: Negative for dizziness, weakness and headaches.     Objective:     Vital Signs (Most Recent):  Temp: 98.5 °F (36.9 °C) (09/17/18 0330)  Pulse: 88 (09/17/18 0706)  Resp: 20 (09/17/18 0706)  BP: 134/60 (09/17/18 0330)  SpO2: 100 % (09/17/18 0706) Vital Signs (24h Range):  Temp:  [97.4 °F (36.3 °C)-98.5 °F (36.9 °C)] 98.5 °F (36.9 °C)  Pulse:  [86-99] 88  Resp:  [16-22] 20  SpO2:  [90 %-100 %] 100 %  BP: (126-154)/(60-96) 134/60     Weight: 68.8 kg (151 lb 9.6 oz)  Body mass index is 27.73 kg/m².    Intake/Output Summary (Last 24 hours)  at 9/17/2018 0722  Last data filed at 9/17/2018 0400  Gross per 24 hour   Intake 540 ml   Output --   Net 540 ml      Physical Exam   Constitutional: She is oriented to person, place, and time. She appears well-developed and well-nourished. No distress.   HENT:   Head: Normocephalic and atraumatic.   Cardiovascular: Regular rhythm, normal heart sounds and intact distal pulses.   tachycardiac   Pulmonary/Chest: Effort normal. She has wheezes (left lobes). She has rales (left lung base).   Abdominal: Soft. Bowel sounds are normal.   Musculoskeletal: Normal range of motion. She exhibits no edema or deformity.   Neurological: She is alert and oriented to person, place, and time.   Skin: Skin is warm and dry.   Psychiatric: She has a normal mood and affect. Her behavior is normal.   Vitals reviewed.      Significant Labs:   BMP:   Recent Labs   Lab  09/17/18   0551   GLU  82   NA  139   K  4.1   CL  104   CO2  26   BUN  12   CREATININE  0.8   CALCIUM  9.1   MG  1.9     CMP:   Recent Labs   Lab  09/16/18   0447  09/17/18   0551   NA  137  139   K  3.3*  4.1   CL  99  104   CO2  28  26   GLU  100  82   BUN  14  12   CREATININE  0.8  0.8   CALCIUM  8.8  9.1   ANIONGAP  10  9   EGFRNONAA  >60.0  >60.0     Magnesium:   Recent Labs   Lab  09/16/18   0447  09/17/18   0551   MG  1.9  1.9     Phosphorus: 3.5    Significant Imaging: I have reviewed all pertinent imaging results/findings within the past 24 hours.    Assessment/Plan:      * Pneumonia of left lower lobe due to Pseudomonas species    Patient presenting with worsening SOB with a remote history of pseudomonal pneumonia on therapy. Ddx includes pneumonia vs CHF exacerbation vs PE vs COPD exacerbation. Patient treated outpatient with cefepime with minimal improvement. PE diagnosed over 1 year ago managed with eliquis. CTA performed in the ED confirming chronic PE and small area of focal nodular consolidation within the left lower lobe. Elevated JVD on exam.     Plan:   -  Continue Zosyn q8, failed outpatient cefepime.   - Previous culture showed susceptibility to zosyn but resistance to ciprofloxacin.  -  ID  reccs - continue Zosyn, new sputum culture yesterday, waiting on 9/14 culture results before ID make discharge recommendations.   - Duo-nebs prn  - Guaifenesin 1,200mg PO.   -F/u CT in one month  -F/u with pulmonology out patient            Hypokalemia    - Potassium was 4.1 today.   - Will replace as needed.  - Continue to monitor BMP        Hyponatremia    - Likely secondary to medication side effect. Will continue to monitor and work up if necessary.  - Resolved          Acute on chronic respiratory failure with hypoxia              Pulmonary embolism without acute cor pulmonale    - Chronic PE see on CTA   - Will continue home Eliquis.        Chronic obstructive pulmonary disease with acute exacerbation    - Duo-nebs PRN q4.   - Continue home ipratropium nebs.           Benign hypertension with chronic kidney disease, stage III    - BP stable   - Will resume home Chlorthalidone 25 mg after acute infection            Vitamin D deficiency    - Continue home Vitamin D supplementation.           Anemia of chronic disease    Hemoglobin above baseline in the ED  - Will continue to monitor.         GERD (gastroesophageal reflux disease)    - Pantoprazole 40mg.           Mild recurrent major depression    - Continue home Escitalopram 10mg           VTE Risk Mitigation (From admission, onward)        Ordered     apixaban tablet 5 mg  2 times daily      09/13/18 5728              Norberto Keenan MD  Department of Hospital Medicine   [facility]

## 2018-09-17 NOTE — PLAN OF CARE
Problem: Physical Therapy Goal  Goal: Physical Therapy Goal  Outcome: Outcome(s) achieved Date Met: 09/17/18  Pt evaluation complete. Pt safe to d/c home from a mobility standpoint without needs from skilled PT at this time.    MALLORIE MUNSON, PT  9/17/2018

## 2018-09-17 NOTE — PT/OT/SLP EVAL
Physical Therapy Evaluation and Discharge Note    Patient Name:  Yasmin Asencio   MRN:  [id number]    Recommendations:     Discharge Recommendations:  home   Discharge Equipment Recommendations: none   Barriers to discharge: None    Assessment:     Yasmin Asencio is a 68 y.o. female admitted with a medical diagnosis of Pneumonia of left lower lobe due to Pseudomonas species. At this time, patient is functioning at their prior level of function and does not require further acute PT services.     Recent Surgery: * No surgery found *      Plan:     During this hospitalization, patient does not require further acute PT services.  Please re-consult if situation changes.      Subjective     Chief Complaint: NA  Patient/Family Comments/goals: return home  Pain/Comfort:  · Pain Rating 1: 0/10  · Pain Rating Post-Intervention 1: 0/10    Patients cultural, spiritual, [employer] conflicts given the current situation:      Living Environment:  Pt lives with  in 1-story house without JYOA and tub/shower. Pt reports (I) with ADLs and amb. Pt reports her  is able to provide assist if needed.   Prior to admission, patients level of function was (I).  Equipment used at home: none.  DME owned (not currently used): none.  Upon discharge, patient will have assistance from .    Objective:     Communicated with RN prior to session.  Patient found supine in bed upon PT entry to room found with: telemetry     General Precautions: Standard, fall   Orthopedic Precautions:N/A   Braces: N/A     Exams:  · Cognitive Exam:  Patient is oriented to Person, Place, Time and Situation  · Gross Motor Coordination:  WFL  · Postural Exam:  Patient presented with the following abnormalities:    · -       No postural abnormalities identified  · Sensation:    · -       Intact  light/touch B LE  · Skin Integrity/Edema:      · -       Skin integrity: Visible skin intact  · RLE ROM: WFL  · RLE Strength: WFL  · LLE ROM: WFL  · LLE  Strength: WFL    Functional Mobility:  Bed Mobility:     · Supine to Sit: independence    Transfers:     · Sit to Stand:  independence with no AD    Gait: ~200ft S without AD; no LOB or SOB    AM-PAC 6 CLICK MOBILITY  Total Score:22       Therapeutic Activities and Exercises:  Pt sat EOB with S.   Pt educated on:  -role of PT/POC  -safety with mobility  -importance of OOB activity  -up in chair for majority of the day  Pt safe to amb in hallway with RN staff or family.   Pt reports no further questions or concerns from a mobility standpoint.     AM-PAC 6 CLICK MOBILITY  Total Score:22     Patient left up in chair with all lines intact, call button in reach and RN notified.    GOALS:   Multidisciplinary Problems     Physical Therapy Goals     Not on file          Multidisciplinary Problems (Resolved)        Problem: Physical Therapy Goal    Goal Priority Disciplines Outcome Goal Variances Interventions   Physical Therapy Goal   (Resolved)     PT, PT/OT Outcome(s) achieved                     History:     Past Medical History:   Diagnosis Date    Acquired bronchiectasis     due to history of TB - followed by pulmonary, Dr. Zuñiga    Allergy     Amblyopia     rt eye per pt    Anemia of other chronic disease     Anticoagulant long-term use     Anxiety     Cataract     Chronic obstructive pulmonary disease with acute exacerbation     Clotting disorder     COPD (chronic obstructive pulmonary disease)     COPD (chronic obstructive pulmonary disease)     Depression     Diverticulosis     Essential tremor     GERD (gastroesophageal reflux disease)     Hemangioma of liver     History of tuberculosis 1978    Hypertension     Mixed anxiety and depressive disorder     Psoriasis     PSVT (paroxysmal supraventricular tachycardia)     Pulmonary embolism     S/P PICC central line placement Apr. 2016 - May 2016    Skin disease     Psoriasis    Spondylosis without myelopathy 8/23/2013    Supraventricular  tachycardia     Thoracic aorta atherosclerosis     noted on CT scan of chest 1/3/2011    Tuberculosis     Vaginal delivery     x2    Vitamin D deficiency        Past Surgical History:   Procedure Laterality Date    ABLATION N/A 7/24/2017    Performed [facility]os [facility] CATH LAB    BLOCK-NERVE-MEDIAL BRANCH-LUMBAR Bilateral 10/14/2016    [facility]    BLOCK-NERVE-MEDIAL BRANCH-LUMBAR Bilateral 8/26/2016    Performed by [facility] at [facility]    CATARACT EXTRACTION W/  INTRAOCULAR LENS IMPLANT  07/24/12    od dr [place]    CATARACT EXTRACTION W/  INTRAOCULAR LENS IMPLANT  08/07/12    left eye    COLONOSCOPY N/A 4/23/2013    Performed by Simeon Graves MD at [facility] ENDO (4TH FLR)    EGD (ESOPHAGOGASTRODUODENOSCOPY) N/A 4/23/2013    Performed by Simeon Graves MD at [facility] ENDO (4TH FLR)    GALLBLADDER SURGERY  9/2011    HEART CATH-LEFT Left 6/16/2016    Performed by T[facility] Braga [facility] at [facility] CATH LAB    INJECTION-FACET Bilateral 1/22/2016    Performed by [facility] at [facility]    RADIOFREQUENCY THERMOCOAGULATION (RFTC)-NERVE-MEDIAN BRANCH-LUMBAR Right 4/4/2018    Performed by [facility]ers [facility] at [facility]    RADIOFREQUENCY THERMOCOAGULATION (RFTC)-NERVE-MEDIAN BRANCH-LUMBAR Left 3/16/2018    Performed by [facility]    RADIOFREQUENCY THERMOCOAGULATION (RFTC)-NERVE-MEDIAN BRANCH-LUMBAR Right 12/16/2016    Performed by [facility]ac [facility]ers [facility] at [facility]       Clinical Decision Making:     Decision Making/ Complexity Score   On examination of body system using standardized tests and measures patient presents with 1-2 elements from any of the following: body structures and functions, activity limitations, and/or participation restrictions.  Leading to a clinical presentation that is considered stable and/or uncomplicated                              Clinical Decision  Making  (Eval Complexity):  Low- 91556     Time Tracking:     PT Received On: 09/17/18  PT Start Time: 1005     PT Stop Time: 1015  PT Total Time (min): 10 min     Billable Minutes: Evaluation 10      MALLORIE MUNSON, PT  09/17/2018

## 2018-09-17 NOTE — PLAN OF CARE
Problem: Fall Risk (Adult)  Goal: Absence of Falls  Patient will demonstrate the desired outcomes by discharge/transition of care.  Outcome: Ongoing (interventions implemented as appropriate)   09/17/18 1654   Fall Risk (Adult)   Absence of Falls making progress toward outcome   Pt is free of falls and injuries per shift , fall precautions and hourly rounds maintained . Family at bedside .     Problem: Patient Care Overview  Goal: Plan of Care Review  Outcome: Ongoing (interventions implemented as appropriate)   09/17/18 1654   Coping/Psychosocial   Plan Of Care Reviewed With patient;spouse   Pt is A,A,O x 4 . Stable V/S . No C/o pain during the day . Pt is able to ambulate in the room and to bathroom independently . Medications given as scheduled , pt still on IV antibiotics . Pt walked in the hallway with her  today .

## 2018-09-18 ENCOUNTER — TELEPHONE (OUTPATIENT)
Dept: OPHTHALMOLOGY | Facility: CLINIC | Age: 68
End: 2018-09-18

## 2018-09-18 LAB
ANION GAP SERPL CALC-SCNC: 7 MMOL/L
BACTERIA BLD CULT: NORMAL
BACTERIA BLD CULT: NORMAL
BACTERIA SPEC AEROBE CULT: NORMAL
BASOPHILS # BLD AUTO: 0.04 K/UL
BASOPHILS NFR BLD: 0.5 %
BUN SERPL-MCNC: 12 MG/DL
CALCIUM SERPL-MCNC: 9.1 MG/DL
CHLORIDE SERPL-SCNC: 104 MMOL/L
CO2 SERPL-SCNC: 27 MMOL/L
CREAT SERPL-MCNC: 0.9 MG/DL
DIFFERENTIAL METHOD: ABNORMAL
EOSINOPHIL # BLD AUTO: 0.3 K/UL
EOSINOPHIL NFR BLD: 4.6 %
ERYTHROCYTE [DISTWIDTH] IN BLOOD BY AUTOMATED COUNT: 12.5 %
EST. GFR  (AFRICAN AMERICAN): >60 ML/MIN/1.73 M^2
EST. GFR  (NON AFRICAN AMERICAN): >60 ML/MIN/1.73 M^2
GLUCOSE SERPL-MCNC: 87 MG/DL
GRAM STN SPEC: NORMAL
HCT VFR BLD AUTO: 32.1 %
HGB BLD-MCNC: 10.3 G/DL
IMM GRANULOCYTES # BLD AUTO: 0.04 K/UL
IMM GRANULOCYTES NFR BLD AUTO: 0.5 %
LYMPHOCYTES # BLD AUTO: 2.5 K/UL
LYMPHOCYTES NFR BLD: 34.1 %
MAGNESIUM SERPL-MCNC: 1.9 MG/DL
MCH RBC QN AUTO: 29.4 PG
MCHC RBC AUTO-ENTMCNC: 32.1 G/DL
MCV RBC AUTO: 92 FL
MONOCYTES # BLD AUTO: 1.1 K/UL
MONOCYTES NFR BLD: 14.9 %
NEUTROPHILS # BLD AUTO: 3.3 K/UL
NEUTROPHILS NFR BLD: 45.4 %
NRBC BLD-RTO: 0 /100 WBC
PHOSPHATE SERPL-MCNC: 3.9 MG/DL
PLATELET # BLD AUTO: 261 K/UL
PMV BLD AUTO: 9.3 FL
POTASSIUM SERPL-SCNC: 3.9 MMOL/L
RBC # BLD AUTO: 3.5 M/UL
SODIUM SERPL-SCNC: 138 MMOL/L
WBC # BLD AUTO: 7.34 K/UL

## 2018-09-18 PROCEDURE — 94761 N-INVAS EAR/PLS OXIMETRY MLT: CPT

## 2018-09-18 PROCEDURE — 84100 ASSAY OF PHOSPHORUS: CPT

## 2018-09-18 PROCEDURE — 93005 ELECTROCARDIOGRAM TRACING: CPT

## 2018-09-18 PROCEDURE — 85025 COMPLETE CBC W/AUTO DIFF WBC: CPT

## 2018-09-18 PROCEDURE — 83735 ASSAY OF MAGNESIUM: CPT

## 2018-09-18 PROCEDURE — 36415 COLL VENOUS BLD VENIPUNCTURE: CPT

## 2018-09-18 PROCEDURE — 93010 ELECTROCARDIOGRAM REPORT: CPT | Mod: ,,, | Performed by: INTERNAL MEDICINE

## 2018-09-18 PROCEDURE — 99232 SBSQ HOSP IP/OBS MODERATE 35: CPT | Mod: ,,, | Performed by: INTERNAL MEDICINE

## 2018-09-18 PROCEDURE — 25000003 PHARM REV CODE 250: Performed by: STUDENT IN AN ORGANIZED HEALTH CARE EDUCATION/TRAINING PROGRAM

## 2018-09-18 PROCEDURE — 63600175 PHARM REV CODE 636 W HCPCS: Performed by: STUDENT IN AN ORGANIZED HEALTH CARE EDUCATION/TRAINING PROGRAM

## 2018-09-18 PROCEDURE — 87449 NOS EACH ORGANISM AG IA: CPT

## 2018-09-18 PROCEDURE — 80048 BASIC METABOLIC PNL TOTAL CA: CPT

## 2018-09-18 PROCEDURE — 11000001 HC ACUTE MED/SURG PRIVATE ROOM

## 2018-09-18 RX ORDER — VERAPAMIL HYDROCHLORIDE 180 MG/1
180 TABLET, FILM COATED, EXTENDED RELEASE ORAL 2 TIMES DAILY
Status: DISCONTINUED | OUTPATIENT
Start: 2018-09-18 | End: 2018-09-19 | Stop reason: HOSPADM

## 2018-09-18 RX ORDER — VERAPAMIL HYDROCHLORIDE 180 MG/1
180 TABLET, FILM COATED, EXTENDED RELEASE ORAL 2 TIMES DAILY
Status: DISCONTINUED | OUTPATIENT
Start: 2018-09-18 | End: 2018-09-18

## 2018-09-18 RX ORDER — VERAPAMIL HYDROCHLORIDE 40 MG/1
180 TABLET ORAL 2 TIMES DAILY
Status: DISCONTINUED | OUTPATIENT
Start: 2018-09-18 | End: 2018-09-18

## 2018-09-18 RX ADMIN — VERAPAMIL HYDROCHLORIDE 180 MG: 180 TABLET, FILM COATED, EXTENDED RELEASE ORAL at 10:09

## 2018-09-18 RX ADMIN — PIPERACILLIN AND TAZOBACTAM 4.5 G: 4; .5 INJECTION, POWDER, LYOPHILIZED, FOR SOLUTION INTRAVENOUS; PARENTERAL at 06:09

## 2018-09-18 RX ADMIN — ESCITALOPRAM OXALATE 10 MG: 10 TABLET ORAL at 09:09

## 2018-09-18 RX ADMIN — ACETAMINOPHEN 1000 MG: 500 TABLET ORAL at 02:09

## 2018-09-18 RX ADMIN — GABAPENTIN 600 MG: 300 CAPSULE ORAL at 03:09

## 2018-09-18 RX ADMIN — GUAIFENESIN 1200 MG: 600 TABLET, EXTENDED RELEASE ORAL at 09:09

## 2018-09-18 RX ADMIN — PIPERACILLIN AND TAZOBACTAM 4.5 G: 4; .5 INJECTION, POWDER, LYOPHILIZED, FOR SOLUTION INTRAVENOUS; PARENTERAL at 02:09

## 2018-09-18 RX ADMIN — GABAPENTIN 600 MG: 300 CAPSULE ORAL at 09:09

## 2018-09-18 RX ADMIN — ONDANSETRON 4 MG: 4 TABLET, FILM COATED ORAL at 12:09

## 2018-09-18 RX ADMIN — GUAIFENESIN 1200 MG: 600 TABLET, EXTENDED RELEASE ORAL at 10:09

## 2018-09-18 RX ADMIN — UMECLIDINIUM BROMIDE AND VILANTEROL TRIFENATATE 1 PUFF: 62.5; 25 POWDER RESPIRATORY (INHALATION) at 09:09

## 2018-09-18 RX ADMIN — FOLIC ACID 1 MG: 1 TABLET ORAL at 09:09

## 2018-09-18 RX ADMIN — PIPERACILLIN AND TAZOBACTAM 4.5 G: 4; .5 INJECTION, POWDER, LYOPHILIZED, FOR SOLUTION INTRAVENOUS; PARENTERAL at 09:09

## 2018-09-18 RX ADMIN — APIXABAN 5 MG: 5 TABLET, FILM COATED ORAL at 09:09

## 2018-09-18 RX ADMIN — GABAPENTIN 600 MG: 300 CAPSULE ORAL at 10:09

## 2018-09-18 RX ADMIN — VITAMIN D, TAB 1000IU (100/BT) 1000 UNITS: 25 TAB at 09:09

## 2018-09-18 RX ADMIN — VERAPAMIL HYDROCHLORIDE 180 MG: 180 TABLET, FILM COATED, EXTENDED RELEASE ORAL at 02:09

## 2018-09-18 RX ADMIN — PANTOPRAZOLE SODIUM 40 MG: 40 TABLET, DELAYED RELEASE ORAL at 09:09

## 2018-09-18 RX ADMIN — APIXABAN 5 MG: 5 TABLET, FILM COATED ORAL at 10:09

## 2018-09-18 NOTE — PROGRESS NOTES
[facility] Hospital Medicine  Progress Note    Patient Name: Yasmin Asencio  MRN: [id number]  Patient Class: IP- Inpatient   Admission Date: 9/13/2018  Length of Stay: 5 days  Attending Physician: Isabelle Paiz MD  Primary Care Provider: Urmila Luna MD    [facility] Medicine Team: [facility] HOSP MED 5 Norberto Keenan MD    Subjective:     Principal Problem:Pneumonia of left lower lobe due to Pseudomonas species    HPI:  Mrs. Yasmin Asencio is a 67 yo female with a PMx of chronic right upper lobe PE on eliquis, HTN, COPD, bronchioectasis and Ectopic atrial tachycardia s/p ablation presents for a 1 week history of worsening SOB. Patient was recently diagnosed with pseudomonal pneumonia 1 month ago confirmed with sputum culture. She was sent home with a PICC line managed with cefepime for 10 days . She reports over the past week, she has had worsening SOB, productive cough with green sputum and chest tightness. She had been taking Mucinex with her home nebulizer with mild relief. She normally uses oxygen 2L at night but report she has had to increase her oxygen requirements over the past few days and use it during the day. She went to see her     She was seen by her PCP on 9/7 who has been following outpatient management of her Pseudomonal pneumonia. However yesterday the patient reports having a blood pressure in the 90s with symptomatic dizziness. She was told to report to the ED if her symptoms did not resolve. She denies any fevers, chills, radiating chest pain, n/v, abdominal pain, dizziness, vision changes or congestion.       Hospital Course:  9/14: No acute events overnight. Patient feels that she is much better than when she was admitted to hospital. Her breathing treatments are helping. Patient had pseudomonal pneumonia that she was being treated for previously however current exacerbation of lung disease appears to involve her COPD.   9/15: Pt hemodynamically stable.  9/16: No  acute events overnight, patient is feeling better than when she presented to hospital. Has minor complaints of SOB and pain on her lower lumbar back, unchanged from presentation; no dysuria or CVA tenderness. Will obtain sputum culture today per infectious disease recommendations.   9/17: Patient has new complaints of burning on urination today, will do urinalysis and treat accordingly. In terms of patient's pneumonia, waiting for identification of organisms on 9/14 sputum cultures. ID will make discharge recommendations when this is done.    9/18: No complaints of burning on urination today, urinalysis was negative for UTI. Still waiting on sensitivities and ID recommendations on out patient antibiotics. Will attempt to contact micro lab to see if sensitivities are available and work towards discharge.     Interval History:   No acute events overnight, patient sleeping comfortably. Urinalysis showed no UTI, patient has no new complaints. Waiting on susceptibilities from 9/14 sputum culture for antibiotic recommendations prior to discharge.     Review of Systems   Constitutional: Negative for chills, fatigue and fever.   Respiratory: Negative for cough, chest tightness and shortness of breath.    Cardiovascular: Positive for chest pain (right sided, costochondritis ). Negative for palpitations and leg swelling.   Gastrointestinal: Negative for abdominal pain, constipation, diarrhea and nausea.   Musculoskeletal: Negative for arthralgias, back pain and myalgias.   Skin: Negative for color change, pallor and rash.   Neurological: Negative for dizziness, weakness and headaches.     Objective:     Vital Signs (Most Recent):  Temp: 96.8 °F (36 °C) (09/18/18 0356)  Pulse: 72 (09/18/18 0356)  Resp: 18 (09/18/18 0356)  BP: (!) 115/59 (09/18/18 0356)  SpO2: (!) 94 % (09/18/18 0356) Vital Signs (24h Range):  Temp:  [96.8 °F (36 °C)-98.6 °F (37 °C)] 96.8 °F (36 °C)  Pulse:  [72-90] 72  Resp:  [14-20] 18  SpO2:  [93 %-100 %] 94  %  BP: (115-138)/(59-64) 115/59     Weight: 68.8 kg (151 lb 9.6 oz)  Body mass index is 27.73 kg/m².    Intake/Output Summary (Last 24 hours) at 9/18/2018 0657  Last data filed at 9/18/2018 0600  Gross per 24 hour   Intake 1170 ml   Output 1000 ml   Net 170 ml      Physical Exam   Constitutional: She is oriented to person, place, and time. She appears well-developed and well-nourished. No distress.   HENT:   Head: Normocephalic and atraumatic.   Cardiovascular: Normal rate, regular rhythm, normal heart sounds and intact distal pulses.   Pulmonary/Chest: Effort normal. She has wheezes (minor left lung). She has rales (minor, left lung base).   Abdominal: Soft. Bowel sounds are normal.   Musculoskeletal: Normal range of motion. She exhibits no edema or deformity.   Neurological: She is alert and oriented to person, place, and time.   Skin: Skin is warm and dry.   Psychiatric: She has a normal mood and affect. Her behavior is normal.   Vitals reviewed.      Significant Labs:   BMP:   Recent Labs   Lab  09/18/18   0510   GLU  87   NA  138   K  3.9   CL  104   CO2  27   BUN  12   CREATININE  0.9   CALCIUM  9.1   MG  1.9     CBC:   Recent Labs   Lab  09/17/18   0551  09/18/18   0510   WBC  7.30  7.34   HGB  9.7*  10.3*   HCT  31.0*  32.1*   PLT  280  261     Magnesium:   Recent Labs   Lab  09/17/18   0551  09/18/18   0510   MG  1.9  1.9       Significant Imaging: I have reviewed all pertinent imaging results/findings within the past 24 hours.    Assessment/Plan:      * Pneumonia of left lower lobe due to Pseudomonas species    Patient presenting with worsening SOB with a remote history of pseudomonal pneumonia on therapy. Ddx includes pneumonia vs CHF exacerbation vs PE vs COPD exacerbation. Patient treated outpatient with cefepime with minimal improvement. PE diagnosed over 1 year ago managed with eliquis. CTA performed in the ED confirming chronic PE and small area of focal nodular consolidation within the left lower  lobe. Elevated JVD on exam.     Plan:   - Continue Zosyn q8, failed outpatient cefepime.   - Previous culture showed susceptibility to zosyn but resistance to ciprofloxacin.  -  ID  reccs - continue Zosyn, new sputum culture yesterday, waiting on 9/14 culture results before ID make discharge recommendations.   - Duo-nebs prn  - Guaifenesin 1,200mg PO.   -F/u CT in one month  -F/u with pulmonology out patient            Hypokalemia    - Resolved  - Will replace as needed.  - Continue to monitor BMP        Hyponatremia    - Likely secondary to medication side effect. Will continue to monitor and work up if necessary.  - Resolved          Acute on chronic respiratory failure with hypoxia              Pulmonary embolism without acute cor pulmonale    - Chronic PE see on CTA   - Will continue home Eliquis.        Chronic obstructive pulmonary disease with acute exacerbation    - Duo-nebs PRN q4.   - Continue home ipratropium nebs.           Benign hypertension with chronic kidney disease, stage III    - BP stable   - Will resume home Chlorthalidone 25 mg after acute infection            Vitamin D deficiency    - Continue home Vitamin D supplementation.           Anemia of chronic disease    Hemoglobin above baseline in the ED  - Will continue to monitor.         GERD (gastroesophageal reflux disease)    - Pantoprazole 40mg.           Mild recurrent major depression    - Continue home Escitalopram 10mg           VTE Risk Mitigation (From admission, onward)        Ordered     apixaban tablet 5 mg  2 times daily      09/13/18 2512              Norberto Keenan MD  Department of Hospital Medicine   [facility]

## 2018-09-18 NOTE — PROGRESS NOTES
V/S rechecked and documented in Epic , HR 88 , /72 , pt C/O nausea . Denies chest pain and SOB , pt reported she has  3 watery Bm since this morning . Spoke with IM 5 Dr. Keenan and notified him and he gave verbal order on the phone to do stat EKG and put the pt on cardiac monitoring.  Orders placed in Epic and tele room informed pt need tele box . Will keep monitoring .

## 2018-09-18 NOTE — PLAN OF CARE
Patient had no falls and no acute events over night. She received her medication and nursing care per MD order.

## 2018-09-18 NOTE — ASSESSMENT & PLAN NOTE
Patient had 4-5 loose bowel movements on 9/18.   - Will test for C diff  - Treatment pending results

## 2018-09-18 NOTE — SUBJECTIVE & OBJECTIVE
Interval History:   No acute events overnight, patient sleeping comfortably. Urinalysis showed no UTI, patient has no new complaints. Waiting on susceptibilities from 9/14 sputum culture for antibiotic recommendations prior to discharge.     Review of Systems   Constitutional: Negative for chills, fatigue and fever.   Respiratory: Negative for cough, chest tightness and shortness of breath.    Cardiovascular: Positive for chest pain (right sided, costochondritis ). Negative for palpitations and leg swelling.   Gastrointestinal: Negative for abdominal pain, constipation, diarrhea and nausea.   Musculoskeletal: Negative for arthralgias, back pain and myalgias.   Skin: Negative for color change, pallor and rash.   Neurological: Negative for dizziness, weakness and headaches.     Objective:     Vital Signs (Most Recent):  Temp: 96.8 °F (36 °C) (09/18/18 0356)  Pulse: 72 (09/18/18 0356)  Resp: 18 (09/18/18 0356)  BP: (!) 115/59 (09/18/18 0356)  SpO2: (!) 94 % (09/18/18 0356) Vital Signs (24h Range):  Temp:  [96.8 °F (36 °C)-98.6 °F (37 °C)] 96.8 °F (36 °C)  Pulse:  [72-90] 72  Resp:  [14-20] 18  SpO2:  [93 %-100 %] 94 %  BP: (115-138)/(59-64) 115/59     Weight: 68.8 kg (151 lb 9.6 oz)  Body mass index is 27.73 kg/m².    Intake/Output Summary (Last 24 hours) at 9/18/2018 0657  Last data filed at 9/18/2018 0600  Gross per 24 hour   Intake 1170 ml   Output 1000 ml   Net 170 ml      Physical Exam   Constitutional: She is oriented to person, place, and time. She appears well-developed and well-nourished. No distress.   HENT:   Head: Normocephalic and atraumatic.   Cardiovascular: Normal rate, regular rhythm, normal heart sounds and intact distal pulses.   Pulmonary/Chest: Effort normal. She has wheezes (minor left lung). She has rales (minor, left lung base).   Abdominal: Soft. Bowel sounds are normal.   Musculoskeletal: Normal range of motion. She exhibits no edema or deformity.   Neurological: She is alert and oriented to  person, place, and time.   Skin: Skin is warm and dry.   Psychiatric: She has a normal mood and affect. Her behavior is normal.   Vitals reviewed.      Significant Labs:   BMP:   Recent Labs   Lab  09/18/18   0510   GLU  87   NA  138   K  3.9   CL  104   CO2  27   BUN  12   CREATININE  0.9   CALCIUM  9.1   MG  1.9     CBC:   Recent Labs   Lab  09/17/18   0551  09/18/18   0510   WBC  7.30  7.34   HGB  9.7*  10.3*   HCT  31.0*  32.1*   PLT  280  261     Magnesium:   Recent Labs   Lab  09/17/18   0551  09/18/18   0510   MG  1.9  1.9       Significant Imaging: I have reviewed all pertinent imaging results/findings within the past 24 hours.

## 2018-09-18 NOTE — ASSESSMENT & PLAN NOTE
Patient had bout of tachycardia that went to 172 bpm. She has not had an episode like this in over a year. Returned to baseline after doing vagal exercises as she was taught.  - Will resume 180 mg verapamil BID.

## 2018-09-18 NOTE — CARE UPDATE
Rapid Response Follow-up Note    Followed up with patient for proactive rounding.   No acute issues at this time. Vital signs within normal limits  Reviewed plan of care with primary RN.   Please call Rapid Response RN with any questions or concerns at  [phone]

## 2018-09-18 NOTE — PROGRESS NOTES
Pt's  , pt reported she feels palpitation in her chest , pt denies chest pain or SOB . Pt is resting in her chair . V/S checked /80 , O2 sat 99% . While at the bedside spoke with IM% Dr. Keenan and notified him of the pt's situation, while talking with the Dr. Pt's HR comes down to 88 ,pt denies chest pain and SOB she C/O little headache , MD notified on the phone . MD order to repeat the V/S within 1 hr and if anything getting worse to call him back , will follow orders and keep monitoring .

## 2018-09-18 NOTE — PROGRESS NOTES
[facility] Hospital Medicine  Progress Note    Patient Name: Yasmin Asencio  MRN: [id number]  Patient Class: IP- Inpatient   Admission Date: 9/13/2018  Length of Stay: 5 days  Attending Physician: Isabelle Paiz MD  Primary Care Provider: Urmila Luna MD    [facility] Medicine Team: [facility] MED 5 Norberto Keenan MD    Subjective:     Principal Problem:Pneumonia of left lower lobe due to Pseudomonas species    HPI:  Mrs. Yasmin Asencio is a 67 yo female with a PMx of chronic right upper lobe PE on eliquis, HTN, COPD, bronchioectasis and Ectopic atrial tachycardia s/p ablation presents for a 1 week history of worsening SOB. Patient was recently diagnosed with pseudomonal pneumonia 1 month ago confirmed with sputum culture. She was sent home with a PICC line managed with cefepime for 10 days . She reports over the past week, she has had worsening SOB, productive cough with green sputum and chest tightness. She had been taking Mucinex with her home nebulizer with mild relief. She normally uses oxygen 2L at night but report she has had to increase her oxygen requirements over the past few days and use it during the day. She went to see her     She was seen by her PCP on 9/7 who has been following outpatient management of her Pseudomonal pneumonia. However yesterday the patient reports having a blood pressure in the 90s with symptomatic dizziness. She was told to report to the ED if her symptoms did not resolve. She denies any fevers, chills, radiating chest pain, n/v, abdominal pain, dizziness, vision changes or congestion.       Hospital Course:  9/14: No acute events overnight. Patient feels that she is much better than when she was admitted to hospital. Her breathing treatments are helping. Patient had pseudomonal pneumonia that she was being treated for previously however current exacerbation of lung disease appears to involve her COPD.   9/15: Pt hemodynamically stable.  9/16: No  acute events overnight, patient is feeling better than when she presented to hospital. Has minor complaints of SOB and pain on her lower lumbar back, unchanged from presentation; no dysuria or CVA tenderness. Will obtain sputum culture today per infectious disease recommendations.   9/17: Patient has new complaints of burning on urination today, will do urinalysis and treat accordingly. In terms of patient's pneumonia, waiting for identification of organisms on 9/14 sputum cultures. ID will make discharge recommendations when this is done.    9/18: No complaints of burning on urination today, urinalysis was negative for UTI. Sensitivities are available, waiting on official ID recs for discharge. Patient has new onset diarrhea today (4-5 bowel movements since this morning). Will order C diff panel. She also had a bout of tachycardia that was very similar to to previous episodes of tachycardia, will restart verapamil.      No new subjective & objective note has been filed under this hospital service since the last note was generated.    Assessment/Plan:      * Pneumonia of left lower lobe due to Pseudomonas species    Patient presenting with worsening SOB with a remote history of pseudomonal pneumonia on therapy. Ddx includes pneumonia vs CHF exacerbation vs PE vs COPD exacerbation. Patient treated outpatient with cefepime with minimal improvement. PE diagnosed over 1 year ago managed with eliquis. CTA performed in the ED confirming chronic PE and small area of focal nodular consolidation within the left lower lobe. Elevated JVD on exam.     Plan:   - Continue Zosyn q8, failed outpatient cefepime.   - Previous culture showed susceptibility to zosyn but resistance to ciprofloxacin.  -  ID  reccs - continue Zosyn, new sputum culture yesterday, waiting on 9/14 culture results before ID make discharge recommendations.   - Duo-nebs prn  - Guaifenesin 1,200mg PO.   -F/u CT in one month  -F/u with pulmonology out patient             Hypokalemia    - Potassium was 4.1 today.   - Will replace as needed.  - Continue to monitor BMP        Hyponatremia    - Likely secondary to medication side effect. Will continue to monitor and work up if necessary.  - Resolved          Acute on chronic respiratory failure with hypoxia              Pulmonary embolism without acute cor pulmonale    - Chronic PE see on CTA   - Will continue home Eliquis.        Chronic obstructive pulmonary disease with acute exacerbation    - Duo-nebs PRN q4.   - Continue home ipratropium nebs.           Diarrhea    Patient had 4-5 loose bowel movements on 9/18.   - Will test for C diff  - Treatment pending results          Benign hypertension with chronic kidney disease, stage III    - BP stable   - Will resume home Chlorthalidone 25 mg after acute infection            Vitamin D deficiency    - Continue home Vitamin D supplementation.           Anemia of chronic disease    Hemoglobin above baseline in the ED  - Will continue to monitor.         GERD (gastroesophageal reflux disease)    - Pantoprazole 40mg.           Mild recurrent major depression    - Continue home Escitalopram 10mg         Ectopic atrial tachycardia    Patient had bout of tachycardia that went to 172 bpm. She has not had an episode like this in over a year. Returned to baseline after doing vagal exercises as she was taught.  - Will resume 180 mg verapamil BID.            VTE Risk Mitigation (From admission, onward)        Ordered     apixaban tablet 5 mg  2 times daily      09/13/18 3511              Norberto Keenan MD  Department of Hospital Medicine   [facility]

## 2018-09-18 NOTE — PLAN OF CARE
Pneumonia caused by Pseudomonas on pip/tazo. Obtained sensitivities recommend to continue pip/tazo to complete 2 weeks. End date 9/27/18    Recommendations  - Send weekly CBC, BMP to ID clinic fax   - Will need ID follow up in 1 weeks, please schedule  - Thanks for the consult will sign off please call with questions or new developments

## 2018-09-18 NOTE — CARE UPDATE
RN Proactive Rounding Note  Time of Visit: 1330    Admit Date: 2018  LOS: 5  Code Status: Full Code   Date of Visit: 2018  : 1950  Age: 68 y.o.  Sex: female  Bed: [address] [place] [address] A:   MRN: [id number]  Was the patient discharged from an ICU this admission? no   Was the patient discharged from a PACU within last 24 hours? no  Did the patient receive conscious sedation/general anesthesia in last 24 hours? no  Was the patient in the ED within the past 24 hours? no  Was the patient started on NIPPV within the past 24 hours? no  Attending Physician: Isabelle Paiz MD  Primary Service: [facility] HOSP MED 5      ASSESSMENT:     Abnormal Vital Signs: VSS  Clinical Issues: Received call from bedside RN stating pts HR elevated 170s-180s but returned baseline at this time. Upon arrival to the room pt resting in bed and states she had her home pulse ox on and noticed HR elevated. Pt does state she takes home verapamil for afib/flutter and has had ablation last year.   NADN, VS remain stable and HR normal at this time.     INTERVENTIONS/ RECOMMENDATIONS:     Place pt on continuous telemetry monitoring and discuss with primary team about restarting home verapamil.    Discussed plan of care with bedside RN    PHYSICIAN ESCALATION:     Yes/No no     Disposition: remain on unit    FOLLOW-UP/CONTINGENCY:       Call back the Rapid Response Nurse at [phone]  for additional questions or concerns

## 2018-09-19 VITALS
SYSTOLIC BLOOD PRESSURE: 130 MMHG | DIASTOLIC BLOOD PRESSURE: 63 MMHG | WEIGHT: 151.63 LBS | BODY MASS INDEX: 27.9 KG/M2 | HEIGHT: 62 IN | OXYGEN SATURATION: 93 % | HEART RATE: 79 BPM | TEMPERATURE: 98 F | RESPIRATION RATE: 20 BRPM

## 2018-09-19 LAB
ANION GAP SERPL CALC-SCNC: 8 MMOL/L
BASOPHILS # BLD AUTO: 0.03 K/UL
BASOPHILS NFR BLD: 0.3 %
BUN SERPL-MCNC: 12 MG/DL
C DIFF GDH STL QL: NEGATIVE
C DIFF TOX A+B STL QL IA: NEGATIVE
CALCIUM SERPL-MCNC: 9 MG/DL
CHLORIDE SERPL-SCNC: 103 MMOL/L
CO2 SERPL-SCNC: 27 MMOL/L
CREAT SERPL-MCNC: 0.9 MG/DL
DIFFERENTIAL METHOD: ABNORMAL
EOSINOPHIL # BLD AUTO: 0.3 K/UL
EOSINOPHIL NFR BLD: 3.7 %
ERYTHROCYTE [DISTWIDTH] IN BLOOD BY AUTOMATED COUNT: 12.6 %
EST. GFR  (AFRICAN AMERICAN): >60 ML/MIN/1.73 M^2
EST. GFR  (NON AFRICAN AMERICAN): >60 ML/MIN/1.73 M^2
GLUCOSE SERPL-MCNC: 89 MG/DL
HCT VFR BLD AUTO: 31.7 %
HGB BLD-MCNC: 10.2 G/DL
IMM GRANULOCYTES # BLD AUTO: 0.04 K/UL
IMM GRANULOCYTES NFR BLD AUTO: 0.5 %
LYMPHOCYTES # BLD AUTO: 2.3 K/UL
LYMPHOCYTES NFR BLD: 26.2 %
MAGNESIUM SERPL-MCNC: 1.8 MG/DL
MCH RBC QN AUTO: 29.9 PG
MCHC RBC AUTO-ENTMCNC: 32.2 G/DL
MCV RBC AUTO: 93 FL
MONOCYTES # BLD AUTO: 1.1 K/UL
MONOCYTES NFR BLD: 12.5 %
NEUTROPHILS # BLD AUTO: 5 K/UL
NEUTROPHILS NFR BLD: 56.8 %
NRBC BLD-RTO: 0 /100 WBC
PHOSPHATE SERPL-MCNC: 4.2 MG/DL
PLATELET # BLD AUTO: 270 K/UL
PMV BLD AUTO: 9 FL
POTASSIUM SERPL-SCNC: 3.4 MMOL/L
RBC # BLD AUTO: 3.41 M/UL
SODIUM SERPL-SCNC: 138 MMOL/L
WBC # BLD AUTO: 8.85 K/UL

## 2018-09-19 PROCEDURE — 25000003 PHARM REV CODE 250: Performed by: STUDENT IN AN ORGANIZED HEALTH CARE EDUCATION/TRAINING PROGRAM

## 2018-09-19 PROCEDURE — 36415 COLL VENOUS BLD VENIPUNCTURE: CPT

## 2018-09-19 PROCEDURE — 85025 COMPLETE CBC W/AUTO DIFF WBC: CPT

## 2018-09-19 PROCEDURE — 80048 BASIC METABOLIC PNL TOTAL CA: CPT

## 2018-09-19 PROCEDURE — 36569 INSJ PICC 5 YR+ W/O IMAGING: CPT

## 2018-09-19 PROCEDURE — 76937 US GUIDE VASCULAR ACCESS: CPT

## 2018-09-19 PROCEDURE — 83735 ASSAY OF MAGNESIUM: CPT

## 2018-09-19 PROCEDURE — 84100 ASSAY OF PHOSPHORUS: CPT

## 2018-09-19 PROCEDURE — 99238 HOSP IP/OBS DSCHRG MGMT 30/<: CPT | Mod: ,,, | Performed by: INTERNAL MEDICINE

## 2018-09-19 PROCEDURE — C1751 CATH, INF, PER/CENT/MIDLINE: HCPCS

## 2018-09-19 PROCEDURE — 63600175 PHARM REV CODE 636 W HCPCS: Performed by: STUDENT IN AN ORGANIZED HEALTH CARE EDUCATION/TRAINING PROGRAM

## 2018-09-19 RX ORDER — CHOLECALCIFEROL (VITAMIN D3) 25 MCG
1000 TABLET ORAL DAILY
COMMUNITY
Start: 2018-09-20 | End: 2018-09-27

## 2018-09-19 RX ORDER — POTASSIUM CHLORIDE 20 MEQ/1
20 TABLET, EXTENDED RELEASE ORAL ONCE
Status: COMPLETED | OUTPATIENT
Start: 2018-09-19 | End: 2018-09-19

## 2018-09-19 RX ADMIN — GABAPENTIN 600 MG: 300 CAPSULE ORAL at 08:09

## 2018-09-19 RX ADMIN — UMECLIDINIUM BROMIDE AND VILANTEROL TRIFENATATE 1 PUFF: 62.5; 25 POWDER RESPIRATORY (INHALATION) at 08:09

## 2018-09-19 RX ADMIN — ESCITALOPRAM OXALATE 10 MG: 10 TABLET ORAL at 08:09

## 2018-09-19 RX ADMIN — PANTOPRAZOLE SODIUM 40 MG: 40 TABLET, DELAYED RELEASE ORAL at 08:09

## 2018-09-19 RX ADMIN — PIPERACILLIN AND TAZOBACTAM 4.5 G: 4; .5 INJECTION, POWDER, LYOPHILIZED, FOR SOLUTION INTRAVENOUS; PARENTERAL at 10:09

## 2018-09-19 RX ADMIN — FOLIC ACID 1 MG: 1 TABLET ORAL at 08:09

## 2018-09-19 RX ADMIN — APIXABAN 5 MG: 5 TABLET, FILM COATED ORAL at 08:09

## 2018-09-19 RX ADMIN — GUAIFENESIN 1200 MG: 600 TABLET, EXTENDED RELEASE ORAL at 08:09

## 2018-09-19 RX ADMIN — GABAPENTIN 600 MG: 300 CAPSULE ORAL at 03:09

## 2018-09-19 RX ADMIN — PIPERACILLIN AND TAZOBACTAM 4.5 G: 4; .5 INJECTION, POWDER, LYOPHILIZED, FOR SOLUTION INTRAVENOUS; PARENTERAL at 03:09

## 2018-09-19 RX ADMIN — VITAMIN D, TAB 1000IU (100/BT) 1000 UNITS: 25 TAB at 08:09

## 2018-09-19 RX ADMIN — ACETAMINOPHEN 1000 MG: 500 TABLET ORAL at 04:09

## 2018-09-19 RX ADMIN — VERAPAMIL HYDROCHLORIDE 180 MG: 180 TABLET, FILM COATED, EXTENDED RELEASE ORAL at 08:09

## 2018-09-19 RX ADMIN — POTASSIUM CHLORIDE 20 MEQ: 1500 TABLET, EXTENDED RELEASE ORAL at 08:09

## 2018-09-19 NOTE — PLAN OF CARE
As of 3pm, still waiting on auth from [facility] for home infusion. SW contacted [facility], auth sent but not received by CPP. Auth resent, CPP mixing and will connect pt prior to d/c. Rafael with CPP said teaching has been completed with pt.    Pt CANNOT d/c until [facility] returns to bedside to connect pt to home pump. SW notified pts nurse.    Crys Khanna, [facility] [phone]

## 2018-09-19 NOTE — PLAN OF CARE
Problem: Patient Care Overview  Goal: Plan of Care Review  Outcome: Ongoing (interventions implemented as appropriate)  Pt remains free of falls and injury. Pt provided with acetaminophen this AM for headache. Pt up ad macey. No liquid BM overnight. Bed low and locked, Call light within reach.

## 2018-09-19 NOTE — PLAN OF CARE
Problem: Fall Risk (Adult)  Goal: Absence of Falls  Patient will demonstrate the desired outcomes by discharge/transition of care.  Outcome: Outcome(s) achieved Date Met: 09/19/18 09/19/18 1024   Fall Risk (Adult)   Absence of Falls achieves outcome       Problem: Patient Care Overview  Goal: Plan of Care Review  Outcome: Outcome(s) achieved Date Met: 09/19/18  Questions answered and concerns addressed.   09/19/18 1024   Coping/Psychosocial   Plan Of Care Reviewed With patient;spouse       Problem: Infection, Risk/Actual (Adult)  Goal: Infection Prevention/Resolution  Patient will demonstrate the desired outcomes by discharge/transition of care.  Outcome: Ongoing (interventions implemented as appropriate)  Patient to be discharged on IV antibiotics.   09/19/18 1024   Infection, Risk/Actual (Adult)   Infection Prevention/Resolution making progress toward outcome      09/19/18 1024   Infection, Risk/Actual (Adult)   Infection Prevention/Resolution making progress toward outcome       Problem: Breathing Pattern Ineffective (Adult)  Goal: Effective Oxygenation/Ventilation  Patient will demonstrate the desired outcomes by discharge/transition of care.  Outcome: Outcome(s) achieved Date Met: 09/19/18 09/19/18 1024   Breathing Pattern Ineffective (Adult)   Effective Oxygenation/Ventilation achieves outcome

## 2018-09-19 NOTE — PLAN OF CARE
[facility]    HOME HEALTH ORDERS  FACE TO FACE ENCOUNTER    Patient Name: Yasmin Asencio  YOB: 1950    PCP: Urmila Luna MD   PCP Address: [address] [place] [address] / [place] [address]  PCP Phone Number: [phone]  PCP Fax: [phone]    Encounter Date: 09/19/2018    Admit to Home Health    Diagnoses:  Active Hospital Problems    Diagnosis  POA    *Pneumonia of left lower lobe due to Pseudomonas species [J15.1]  Yes    Acute on chronic respiratory failure with hypoxia [J96.21]  Yes    Hyponatremia [E87.1]  Yes    Hypokalemia [E87.6]  Yes    Pulmonary embolism without acute cor pulmonale [I26.99]  Yes    Current use of long term anticoagulation [Z79.01]  Not Applicable    Chronic obstructive pulmonary disease with acute exacerbation [J44.1]  Yes    Diarrhea [R19.7]  No    Benign hypertension with chronic kidney disease, stage III [I12.9, N18.3]  Yes    Vitamin D deficiency [E55.9]  Yes    GERD (gastroesophageal reflux disease) [K21.9]  Yes    Anemia of chronic disease [D63.8]  Yes    Mild recurrent major depression [F33.0]  Yes    Ectopic atrial tachycardia [I47.1]  Yes      Resolved Hospital Problems   No resolved problems to display.       Future Appointments   Date Time Provider Department Center   9/24/2018  9:00 AM Taurus Braga MD [facility] CARDIO [facility]   9/27/2018  3:00 PM Louie Yuan MD [facility] ID [facility]   9/27/2018  3:45 PM RESEARCH, INF DISEASE-OP ADMN RESEARC [facility]   10/1/2018  4:15 PM Kristi Torres NP [facility] NEURO [facility]   10/22/2018  8:40 AM Urmila Luna MD [facility]   11/14/2018  9:00 AM Declan Mills MD [facility] PULMSVC [facility]           I have seen and examined this patient face to face today. My clinical findings that support the need for the home health skilled services and home bound status are the following:  Weakness/numbness causing balance and gait disturbance due to Infection making it taxing to leave  home.    Allergies:  Review of patient's allergies indicates:   Allergen Reactions    Adhesive Other (See Comments)     Tears up the skin and makes it itch    Bactrim [sulfamethoxazole-trimethoprim] Rash     Was hospitalized for rash    Lipitor [atorvastatin] Other (See Comments)     Muscle aches    Albuterol Other (See Comments)     tremors    Topamax [topiramate]      - made her feel 'bad in the head'    Restasis [cyclosporine] Itching       Diet: regular diet    Activities: activity as tolerated    Nursing:   SN to complete comprehensive assessment including routine vital signs. Instruct on disease process and s/s of complications to report to MD. Review/verify medication list sent home with the patient at time of discharge  and instruct patient/caregiver as needed. Frequency may be adjusted depending on start of care date.    Notify MD if SBP > 160 or < 90; DBP > 90 or < 50; HR > 120 or < 50; Temp > 101;     LABS: Weekly CBC, BMP. Please fax to ID clinic at       MISCELLANEOUS CARE:  Home Infusion Therapy:   SN to perform Infusion Therapy/Central Line Care.  Review Central Line Care & Central Line Flush with patient.    Administer (drug and dose):  piperacillin sodium/tazobactam (PIPERACILLIN-TAZOBACTAM 4.5G/100ML SODIUM CHLORIDE 0.9%-READY TO MIX)   Administer 4.5g into vein every 8 hours    Last dose given: 9/19/18 - 10:30AM                        Home dose due:  6:30pm  STOP DATE:  09/28/2018  Scrub the Hub: Prior to accessing the line, always perform a 30 second alcohol scrub  Each lumen of the central line is to be flushed at least daily with 10 mL Normal Saline and 3 mL Heparin flush (10 units/mL)  Skilled Nurse (SN) may draw blood from IV access  Blood Draw Procedure:   - Aspirate at least 5 mL of blood   - Discard   - Obtain specimen   - Change injection cap   - Flush with 20 mL Normal Saline followed by a                 3-5 mL Heparin flush (10 units/mL)  Central :   -  Sterile dressing changes are done weekly and as needed.   - Use chlor-hexadine scrub to cleanse site, apply Biopatch to insertion site,       apply securement device dressing   - Injection caps are changed weekly and after EVERY lab draw.   - If sterile gauze is under dressing to control oozing,                 dressing change must be performed every 24 hours until gauze is not needed.    WOUND CARE ORDERS  n/a      Medications: Review discharge medications with patient and family and provide education.      Current Discharge Medication List      START taking these medications    Details   piperacillin sodium/tazobactam (PIPERACILLIN-TAZOBACTAM 4.5G/100ML SODIUM CHLORIDE 0.9%-READY TO MIX) Inject 100 mLs (4.5 g total) into the vein every 8 (eight) hours. for 9 days  Qty: 2700 mL, Refills: 0      vitamin D (VITAMIN D3) 1000 units Tab Take 1 tablet (1,000 Units total) by mouth once daily.         CONTINUE these medications which have NOT CHANGED    Details   acetaminophen (TYLENOL) 500 MG tablet Take 1,000 mg by mouth once daily. sleep      alprazolam (XANAX) 0.25 MG tablet Take 1 tablet (0.25 mg total) by mouth nightly as needed for Anxiety.  Qty: 30 tablet, Refills: 0    Associated Diagnoses: Mixed anxiety and depressive disorder      apixaban 5 mg Tab Take 5 mg by mouth 2 (two) times daily.      desoximetasone (TOPICORT) 0.25 % cream Apply as directed bid prn  Qty: 60 g, Refills: 2    Associated Diagnoses: Rash      escitalopram oxalate (LEXAPRO) 10 MG tablet Take 1 tablet (10 mg total) by mouth once daily.  Qty: 30 tablet, Refills: 11      fluocinolone (DERMA-SMOOTHE) 0.01 % external oil Apply topically once daily.  Qty: 1 Bottle, Refills: 5    Associated Diagnoses: Psoriasis      fluocinonide (LIDEX) 0.05 % external solution Apply topically once daily. - apply after shampooing  Qty: 60 mL, Refills: 5    Associated Diagnoses: Psoriasis      folic acid (FOLVITE) 1 MG tablet Take 1 tablet (1 mg total) by mouth once  daily.  Qty: 100 tablet, Refills: 2    Associated Diagnoses: Personal history of pulmonary embolism; Elevated serum homocysteine level      gabapentin (NEURONTIN) 300 MG capsule Take 2 capsules (600 mg total) by mouth 3 (three) times daily.  Qty: 540 capsule, Refills: 0    Associated Diagnoses: Acute left-sided low back pain with sciatica, sciatica laterality unspecified      guaiFENesin (MUCINEX) 600 mg 12 hr tablet Take 1,200 mg by mouth 2 (two) times daily.      ipratropium (ATROVENT) 0.02 % nebulizer solution Inhale 1 vial in nebulizer 3 to 4 times daily      ketoconazole (NIZORAL) 2 % shampoo Apply topically once daily.  Qty: 120 mL, Refills: 5    Associated Diagnoses: Psoriasis      Lactobacillus rhamnosus GG (CULTURELLE) 10 billion cell capsule Take 1 capsule by mouth once daily.      multivitamin (THERAGRAN) per tablet Take 1 tablet by mouth once daily.      ondansetron (ZOFRAN) 4 MG tablet Take 1 tablet (4 mg total) by mouth every 8 (eight) hours as needed for Nausea.  Qty: 45 tablet, Refills: 0      polyethylene glycol (GLYCOLAX) 17 gram/dose powder Take 17 g by mouth daily as needed.       propylene glycol (SYSTANE BALANCE) 0.6 % Drop Apply 1 drop to eye daily as needed (dry eye).      sodium chloride 2% (XIOMARA 128) 2 % ophthalmic solution Place 1 drop into both eyes 3 (three) times daily as needed.      sodium chloride 3% 3 % nebulizer solution Take 4 mLs by nebulization 2 (two) times daily.  Qty: 720 mL, Refills: 3    Comments: Please consider 90 day supplies to promote better adherence  Associated Diagnoses: Acquired bronchiectasis      traMADol (ULTRAM) 50 mg tablet Take 1 tablet (50 mg total) by mouth every 6 (six) hours as needed for Pain.  Qty: 45 tablet, Refills: 0      verapamil (CALAN-SR) 180 MG CR tablet 2 (two) times daily.       omeprazole (PRILOSEC) 20 MG capsule TAKE 1 CAPSULE BY MOUTH ONCE DAILY AS NEEDED FOR HEARTBURN (TAKE ONLY AS NEEDED)  Qty: 90 capsule, Refills: 0    Associated  Diagnoses: Gastroesophageal reflux disease without esophagitis         STOP taking these medications       ANORO ELLIPTA 62.5-25 mcg/actuation DsDv Comments:   Reason for Stopping:         furosemide (LASIX) 20 MG tablet Comments:   Reason for Stopping:         potassium chloride SA (K-DUR,KLOR-CON) 20 MEQ tablet Comments:   Reason for Stopping:         VITAMIN D2 50,000 unit capsule Comments:   Reason for Stopping:               I certify that this patient is confined to her home and needs intermittent skilled nursing care.

## 2018-09-19 NOTE — PROGRESS NOTES
Special contact isolation precautions initiated today @ 1500 per MD order , waiting for stool sample to send to lab for C-diff .

## 2018-09-19 NOTE — PLAN OF CARE
PCP- DR. ALETHA LOVE    PT HAS A RIDE HOME AND FAMILY SUPPORT WITH  AND ADULT DAUGHTER     PT CURRENT WITH [facility]. Pt requesting [facility] for home IV ABX. Awaiting PHN auth#       09/19/18 1353   Discharge Assessment   Assessment Type Discharge Planning Assessment   Confirmed/corrected address and phone number on facesheet? Yes   Assessment information obtained from? Patient   Expected Length of Stay (days) 3   Communicated expected length of stay with patient/caregiver yes   Prior to hospitilization cognitive status: Alert/Oriented   Prior to hospitalization functional status: Independent   Current cognitive status: Alert/Oriented   Current Functional Status: Independent   Lives With spouse   Able to Return to Prior Arrangements yes   Is patient able to care for self after discharge? Yes   Patient's perception of discharge disposition home health   Readmission Within The Last 30 Days no previous admission in last 30 days   Patient currently being followed by outpatient case management? No   Patient currently receives any other outside agency services? No   Equipment Currently Used at Home oxygen   Do you have any problems affording any of your prescribed medications? No   Is the patient taking medications as prescribed? yes   Does the patient have transportation home? Yes   Transportation Available car;family or friend will provide   Does the patient receive services at the [facility]? No   Discharge Plan A Home with family;Home Health   Discharge Plan B Home with family;Home Health   Patient/Family In Agreement With Plan yes

## 2018-09-19 NOTE — PLAN OF CARE
CM informed laura pt will d/c today with HH and resumption of IV antibiotics ([facility] and [facility]PP). SW sent referral to [facility], [facility] and CPP. SW added CPP to [facility] and informed liaison of referral.    Crys Khanna, MERCEDES [id number]

## 2018-09-19 NOTE — DISCHARGE SUMMARY
[facility]  Discharge Summary     Patient ID:  Yasmin Asencio  [id number]  68 y.o.  1950    Admit date: 9/13/2018    Discharge Date and Time:  09/19/2018 4:41 PM    Admitting Physician: Jaya Reid MD     Discharge Provider: Sheila Echols    Reason for Admission: Shortness of breath [R06.02]  Dyspnea and respiratory abnormalities [R06.00, R06.89]    Admission Condition: fair    Procedures Performed: * No surgery found *    Hospital Course:     HPI: Mrs. Yasmin Asencio is a 67 yo female with a PMx of chronic right upper lobe PE on eliquis, HTN, COPD, bronchioectasis and Ectopic atrial tachycardia s/p ablation presents for a 1 week history of worsening SOB. Patient was recently diagnosed with pseudomonal pneumonia 1 month ago confirmed with sputum culture. She was sent home with a PICC line managed with cefepime for 10 days . She reports over the past week, she has had worsening SOB, productive cough with green sputum and chest tightness. She had been taking Mucinex with her home nebulizer with mild relief. She normally uses oxygen 2L at night but report she has had to increase her oxygen requirements over the past few days and use it during the day. She went to see her. She was seen by her PCP on 9/7 who has been following outpatient management of her Pseudomonal pneumonia. However yesterday the patient reports having a blood pressure in the 90s with symptomatic dizziness. She was told to report to the ED if her symptoms did not resolve. She denies any fevers, chills, radiating chest pain, n/v, abdominal pain, dizziness, vision changes or congestion.     Hospital Course    Patient was admitted to hospital medicine and started on Zosyn after failure of outpatient Cefepime. She was also given breathing treatments for COPD exacerbation. Infectious disease was consulted for evaluation and recommendations for treatment. Patients condition improved consistently with treatment. Sputum  cultures and sensitivities were obtained, culture was positive for Pseudomonas Aeruginosa which was sensitive to Zosyn. Per Infectious disease recommendations patient is to continue on Zosyn for 2 weeks total treatment. She was discharged with a midline for the antibiotics and home health care. Patient is to follow up outpatient with infectious disease  one week from discharge.    On 9/17, patient complained of dyuria, urinalysis was negative for UTI. Patient had a 4-5 episodes of diarrhea on 9/18, there was concern for C.diff infection but testing came back negative. Diarrhea and dysuria also resolved.        Consults: Infectious Disease    Significant Diagnostic Studies: labs: Results for VITO PERKINS (MRN [id number]) as of 9/19/2018 17:40   Ref. Range 9/19/2018 04:10   WBC Latest Ref Range: 3.90 - 12.70 K/uL 8.85   RBC Latest Ref Range: 4.00 - 5.40 M/uL 3.41 (L)   Hemoglobin Latest Ref Range: 12.0 - 16.0 g/dL 10.2 (L)   Hematocrit Latest Ref Range: 37.0 - 48.5 % 31.7 (L)   MCV Latest Ref Range: 82 - 98 fL 93   MCH Latest Ref Range: 27.0 - 31.0 pg 29.9   MCHC Latest Ref Range: 32.0 - 36.0 g/dL 32.2   RDW Latest Ref Range: 11.5 - 14.5 % 12.6   Platelets Latest Ref Range: 150 - 350 K/uL 270   and microbiology:   Sputum Culture   Lab Results   Component Value Date    GSRESP <10 epithelial cells per low power field. 09/16/2018    GSRESP Many WBC's 09/16/2018    GSRESP Few Gram positive cocci 09/16/2018    RESPIRATORYC  09/16/2018     PSEUDOMONAS AERUGINOSA  Moderate  Susceptibility pending  Normal respiratory larry also present         Final Diagnoses:    Principal Problem: Pneumonia of left lower lobe due to Pseudomonas species   Secondary Diagnoses:   Active Hospital Problems    Diagnosis  POA    *Pneumonia of left lower lobe due to Pseudomonas species [J15.1]  Yes    Acute on chronic respiratory failure with hypoxia [J96.21]  Yes    Hyponatremia [E87.1]  Yes    Hypokalemia [E87.6]  Yes    Pulmonary  embolism without acute cor pulmonale [I26.99]  Yes    Current use of long term anticoagulation [Z79.01]  Not Applicable    Chronic obstructive pulmonary disease with acute exacerbation [J44.1]  Yes    Diarrhea [R19.7]  No    Benign hypertension with chronic kidney disease, stage III [I12.9, N18.3]  Yes    Vitamin D deficiency [E55.9]  Yes    GERD (gastroesophageal reflux disease) [K21.9]  Yes    Anemia of chronic disease [D63.8]  Yes    Mild recurrent major depression [F33.0]  Yes    Ectopic atrial tachycardia [I47.1]  Yes      Resolved Hospital Problems   No resolved problems to display.       Discharged Condition: good    Discharge Exam:  Constitutional: She is oriented to person, place, and time. She appears well-developed and well-nourished. No distress.   HENT:   Head: Normocephalic and atraumatic.   Cardiovascular: Regular rhythm, normal heart sounds and intact distal pulses.   tachycardiac   Pulmonary/Chest: Effort normal. She has wheezes (b/l lower lobes).   Abdominal: Soft. Bowel sounds are normal.   Musculoskeletal: Normal range of motion. She exhibits no edema or deformity.   Neurological: She is alert and oriented to person, place, and time.   Skin: Skin is warm and dry.   Psychiatric: She has a normal mood and affect. Her behavior is normal.       Disposition: Home or Self Care & Home Health    Follow Up/Patient Instructions:     Medications:  Reconciled Home Medications:      Medication List      START taking these medications    PIPERACILLIN-TAZOBACTAM 4.5G/100ML SODIUM CHLORIDE 0.9%-READY TO MIX  Inject 100 mLs (4.5 g total) into the vein every 8 (eight) hours. for 9 days     vitamin D 1000 units Tab  Commonly known as:  VITAMIN D3  Take 1 tablet (1,000 Units total) by mouth once daily.        CHANGE how you take these medications    acetaminophen 500 MG tablet  Commonly known as:  TYLENOL  Take 1,000 mg by mouth once daily. sleep  What changed:  Another medication with the same name was  removed. Continue taking this medication, and follow the directions you see here.     desoximetasone 0.25 % cream  Commonly known as:  TOPICORT  Apply as directed bid prn  What changed:  additional instructions     fluocinolone 0.01 % external oil  Commonly known as:  DERMA-SMOOTHE  Apply topically once daily.  What changed:    · when to take this  · reasons to take this     fluocinonide 0.05 % external solution  Commonly known as:  LIDEX  Apply topically once daily. - apply after shampooing  What changed:  additional instructions        CONTINUE taking these medications    ALPRAZolam 0.25 MG tablet  Commonly known as:  XANAX  Take 1 tablet (0.25 mg total) by mouth nightly as needed for Anxiety.     apixaban 5 mg Tab  Commonly known as:  ELIQUIS  Take 5 mg by mouth 2 (two) times daily.     escitalopram oxalate 10 MG tablet  Commonly known as:  LEXAPRO  Take 1 tablet (10 mg total) by mouth once daily.     folic acid 1 MG tablet  Commonly known as:  FOLVITE  Take 1 tablet (1 mg total) by mouth once daily.     gabapentin 300 MG capsule  Commonly known as:  NEURONTIN  Take 2 capsules (600 mg total) by mouth 3 (three) times daily.     guaiFENesin 600 mg 12 hr tablet  Commonly known as:  MUCINEX  Take 1,200 mg by mouth 2 (two) times daily.     ipratropium 0.02 % nebulizer solution  Commonly known as:  ATROVENT  Inhale 1 vial in nebulizer 3 to 4 times daily     ketoconazole 2 % shampoo  Commonly known as:  NIZORAL  Apply topically once daily.     Lactobacillus rhamnosus GG 10 billion cell capsule  Commonly known as:  CULTURELLE  Take 1 capsule by mouth once daily.     multivitamin per tablet  Commonly known as:  THERAGRAN  Take 1 tablet by mouth once daily.     omeprazole 20 MG capsule  Commonly known as:  PRILOSEC  TAKE 1 CAPSULE BY MOUTH ONCE DAILY AS NEEDED FOR HEARTBURN (TAKE ONLY AS NEEDED)     ondansetron 4 MG tablet  Commonly known as:  ZOFRAN  Take 1 tablet (4 mg total) by mouth every 8 (eight) hours as needed for  Nausea.     polyethylene glycol 17 gram/dose powder  Commonly known as:  GLYCOLAX  Take 17 g by mouth daily as needed.     sodium chloride 2% 2 % ophthalmic solution  Commonly known as:  XIOMARA 128  Place 1 drop into both eyes 3 (three) times daily as needed.     sodium chloride 3% 3 % nebulizer solution  Take 4 mLs by nebulization 2 (two) times daily.     SYSTANE BALANCE 0.6 % Drop  Generic drug:  propylene glycol  Apply 1 drop to eye daily as needed (dry eye).     traMADol 50 mg tablet  Commonly known as:  ULTRAM  Take 1 tablet (50 mg total) by mouth every 6 (six) hours as needed for Pain.     verapamil 180 MG CR tablet  Commonly known as:  CALAN-SR  2 (two) times daily.        STOP taking these medications    furosemide 20 MG tablet  Commonly known as:  LASIX     potassium chloride SA 20 MEQ tablet  Commonly known as:  K-DUR,KLOR-CON     VITAMIN D2 50,000 unit Cap  Generic drug:  ergocalciferol        ASK your doctor about these medications    ANORO ELLIPTA 62.5-25 mcg/actuation Dsdv  Generic drug:  umeclidinium-vilanterol  Inhale 1 puff into the lungs once daily.  Ask about: Should I take this medication?          Discharge Procedure Orders   Basic metabolic panel   Standing Status: Future Standing Exp. Date: 11/18/19     CBC Without Differential   Standing Status: Future Standing Exp. Date: 11/18/19     Ambulatory Referral to Infectious Disease   Referral Priority: Routine Referral Type: Consultation   Referral Reason: Specialty Services Required   Requested Specialty: Infectious Diseases   Number of Visits Requested: 1     Notify your health care provider if you experience any of the following:  temperature >100.4     Notify your health care provider if you experience any of the following:  persistent nausea and vomiting or diarrhea     Notify your health care provider if you experience any of the following:  severe uncontrolled pain     Notify your health care provider if you experience any of the following:   redness, tenderness, or signs of infection (pain, swelling, redness, odor or green/yellow discharge around incision site)     Notify your health care provider if you experience any of the following:  difficulty breathing or increased cough     Notify your health care provider if you experience any of the following:  severe persistent headache     Notify your health care provider if you experience any of the following:  worsening rash     Notify your health care provider if you experience any of the following:  persistent dizziness, light-headedness, or visual disturbances     Notify your health care provider if you experience any of the following:  increased confusion or weakness     Notify your health care provider if you experience any of the following:     Activity as tolerated     Follow-up Information     Urmila Luna MD In 1 week.    Specialties:  Internal Medicine, Pediatrics  Contact information:  [address] [facility] [address] [phone]             Louie Yuan MD In 1 week.    Specialty:  Infectious Diseases  Contact information:  [address] [facility] [address]  [phone]                 Activity: activity as tolerated  Diet: regular diet  Wound Care:    Follow-up with Infectious disease and PCP in 1 week.    Signed:  Sheila Echols  9/19/2018  10:41 AM

## 2018-09-19 NOTE — PLAN OF CARE
Problem: Fall Risk (Adult)  Goal: Absence of Falls  Patient will demonstrate the desired outcomes by discharge/transition of care.  Outcome: Ongoing (interventions implemented as appropriate)   09/18/18 1947   Fall Risk (Adult)   Absence of Falls making progress toward outcome   Pt is free of falls and injuries per shift , fall precautions maintained and family at bedside .     Problem: Patient Care Overview  Goal: Plan of Care Review  Outcome: Ongoing (interventions implemented as appropriate)   09/18/18 1947   Coping/Psychosocial   Plan Of Care Reviewed With patient;spouse   Pt is A,A,O x 4 . Stable V/S . Pt C/O nausea and headache during the day and PRN medications given as  needed and pt reported full relief of headache and nausea . Pt has one episode today where her HR was 170s for 2 min and comes back down to 80s , pt denies chest pain and SOB ,MD notified , pt placed on tele and EKG done , MD  started the pt on her home medication verapamil po . At the end of the shift pt reported she feels better .

## 2018-09-20 LAB
BACTERIA SPEC AEROBE CULT: NORMAL
GRAM STN SPEC: NORMAL

## 2018-09-21 ENCOUNTER — PATIENT OUTREACH (OUTPATIENT)
Dept: ADMINISTRATIVE | Facility: CLINIC | Age: 68
End: 2018-09-21

## 2018-09-21 RX ORDER — CHLORTHALIDONE 25 MG/1
12.5 TABLET ORAL DAILY
COMMUNITY
End: 2019-11-22 | Stop reason: SDUPTHER

## 2018-09-21 NOTE — PATIENT INSTRUCTIONS
Treating Pneumonia  Pneumonia is an infection of one or both of the lungs. Pneumonia:  · Is usually caused by either a virus or a bacteria  · Can be very serious, especially in infants, young children, and older adults. Its also serious for those with other long-term health problems or weakened immune systems.  · Is sometimes treated at home and sometimes in the hospital    Antibiotic medicines  Antibiotics may be prescribed for pneumonia caused by bacteria. They may be pills (oral medicines), or shots (injections). Or they may be given by IV (intravenously) into a vein. If you are taking oral medicines at home:  · Fill your prescription and start taking your medicine as soon as you can.  · You will likely start to feel better in a day or 2, but dont stop taking the antibiotic.  · Use a pill organizer to help you remember to take your medicine.  · Let your healthcare provider know if you have side effects.  · Take your medicine exactly as directed on the label. Talk to your provider or pharmacist if you have any questions.  Antiviral medicines  Antiviral medicine may be prescribed for pneumonia caused by a virus. For example, antiviral medicine may be prescribed for pneumonia caused by the flu virus. Antibiotics do not work against viruses. If you are taking antiviral medicine at home:  · Fill your prescription and start taking your medicine as soon as you can.  · Talk with your provider or pharmacist about possible side effects.  · Take the medicine exactly as instructed.  To relieve symptoms  There are many medicines that can help relieve symptoms of pneumonia. Some are prescription and some are over-the-counter.  Your healthcare provider may recommend:  · Acetaminophen or ibuprofen to lower your fever and to lessen headache or other pain  · Cough medicine to loosen mucus or to reduce coughing  Make sure you check with your healthcare provider or pharmacist before taking any over-the-counter  medicines.  Special treatments  If you are hospitalized for pneumonia, you may have other therapies, including:  · Inhaled medicines to help with breathing or chest congestion  · Supplemental oxygen to increase low oxygen levels  Drink fluids and eat healthy  You should eat healthy to help your body fight the infection. Drinking a lot of fluids helps to replace fluids lost from fever and to loosen mucus in your chest.  · Diet. Make healthy food choices, including fruits and vegetables, lean meats and other proteins, 100% whole grain and low- or no-fat dairy products.  · Fluids. Drink at least 6 to 8 tall glasses a day. Water and 100% fruit or vegetable juice are best.  Get plenty of rest and sleep  You may be more tired than usual for a while. It is important to get enough sleep at night. Its also important to rest during the day. Talk with your healthcare provider if coughing or other symptoms are interfering with your sleep.  Preventing the spread of germs  The best thing you can do to prevent spreading germs is to wash your hands often. You should:  · Rub your hand with soap and water for 20 to 30 seconds.  · Clean in between your fingers, the backs of your hands, and around your nails.  · Dry your hands on a separate towel or use paper towels.  You should also:  · Keep alcohol-based hand  nearby.  · Make sure you also clean surfaces that you touch. Use a product that kills all types of germs.  · Stay away from others until you are feeling better.  When to call your healthcare provider  Call your healthcare provider if you have any of the following:  · Symptoms get worse  · Fever continues  · Shortness of breath gets worse  · Increased mucus or mucus that is darker in color  · Coughing gets worse  · Lips or fingers are bluish in color  · Side effects from your medicine   Date Last Reviewed: 12/1/2016  © 0814-2088 [employer]. [address] [place] [address], [place] [address]. All rights reserved.  This information is not intended as a substitute for professional medical care. Always follow your healthcare professional's instructions.

## 2018-09-24 ENCOUNTER — OFFICE VISIT (OUTPATIENT)
Dept: CARDIOLOGY | Facility: CLINIC | Age: 68
End: 2018-09-24
Payer: MEDICARE

## 2018-09-24 ENCOUNTER — TELEPHONE (OUTPATIENT)
Dept: OPHTHALMOLOGY | Facility: CLINIC | Age: 68
End: 2018-09-24

## 2018-09-24 VITALS
HEIGHT: 62 IN | OXYGEN SATURATION: 95 % | SYSTOLIC BLOOD PRESSURE: 139 MMHG | HEART RATE: 86 BPM | DIASTOLIC BLOOD PRESSURE: 69 MMHG | WEIGHT: 148.38 LBS | BODY MASS INDEX: 27.3 KG/M2

## 2018-09-24 DIAGNOSIS — R07.89 NON-CARDIAC CHEST PAIN: ICD-10-CM

## 2018-09-24 DIAGNOSIS — I47.10 PSVT (PAROXYSMAL SUPRAVENTRICULAR TACHYCARDIA): ICD-10-CM

## 2018-09-24 DIAGNOSIS — I47.19 ECTOPIC ATRIAL TACHYCARDIA: ICD-10-CM

## 2018-09-24 DIAGNOSIS — Z86.711 HISTORY OF PULMONARY EMBOLISM: ICD-10-CM

## 2018-09-24 DIAGNOSIS — R06.02 SHORTNESS OF BREATH: Primary | ICD-10-CM

## 2018-09-24 PROCEDURE — 99215 OFFICE O/P EST HI 40 MIN: CPT | Mod: PBBFAC | Performed by: INTERNAL MEDICINE

## 2018-09-24 PROCEDURE — 1101F PT FALLS ASSESS-DOCD LE1/YR: CPT | Mod: CPTII,,, | Performed by: INTERNAL MEDICINE

## 2018-09-24 PROCEDURE — 99999 PR PBB SHADOW E&M-EST. PATIENT-LVL V: CPT | Mod: PBBFAC,,, | Performed by: INTERNAL MEDICINE

## 2018-09-24 PROCEDURE — 3078F DIAST BP <80 MM HG: CPT | Mod: CPTII,,, | Performed by: INTERNAL MEDICINE

## 2018-09-24 PROCEDURE — 99214 OFFICE O/P EST MOD 30 MIN: CPT | Mod: S$PBB,,, | Performed by: INTERNAL MEDICINE

## 2018-09-24 PROCEDURE — 3075F SYST BP GE 130 - 139MM HG: CPT | Mod: CPTII,,, | Performed by: INTERNAL MEDICINE

## 2018-09-24 PROCEDURE — 99499 UNLISTED E&M SERVICE: CPT | Mod: S$GLB,,, | Performed by: INTERNAL MEDICINE

## 2018-09-24 NOTE — PROGRESS NOTES
"Subjective:    Patient ID:  Yasmin Asencio is a 68 y.o. female who presents for follow-up of Shortness of Breath      HPI     PMH of pulmonary hemorrhage,S/P embolization in 11/2015 and pseudomonas pneumonia in 2016,has been treated with IV Abx, hypertension and COPD,AOCD,atrial tachycardia,depression,chronic slurred speech     SVT ablation 7/24/17 6/16/16 [facility] EDP 18, EF 55%, normal coronaries  Medical Rx    EF does not appear depressed - ? Recent echo result     Echo 8/1/17    1 - Normal left ventricular systolic function (EF 55-60%).     2 - Normal left ventricular diastolic function.     3 - Normal right ventricular systolic function .     4 - The estimated PA systolic pressure is 26 mmHg.      Holter 2/13/17  1. Sinus rhythm with heart rates varying between 45 and 128 bpm with an average of 72 bpm.     VENTRICULAR ARRHYTHMIAS  1. There was a single PVC recorded.     2. There were no episodes of ventricular tachycardia.    SUPRA VENTRICULAR ARRHYTHMIAS  1. There were occasional PACs totalling 406 and averaging 16 per hour.     2. There were no episodes of sustained supraventricular tachycardia.    SINUS NODE FUNCTION  1. There was no evidence of high grade SA jennifer block.     AV CONDUCTION  1. There was no evidence of high grade AV block.     Saw Dr Ballesteros 4/6/18  67 y.o. F  pulm hemorrhage 11/15, s/p coiling  pseudomonas PNA 2016, s/p extended Abx  COPD, bronchiectasis (home O2 frequently)  HTN on meds  chronic slurred speech, thought due to essential tremor per neuro  hx "AF" destini-lung-coiling procedure (no documentation)  pSVT (s/p AVRNT ablation; likely focal AT remains)     Had palpitations with HR >140 bpm at random intervals for past 10 years. Recently about 1x/month.   WIth palps, gets blurry vision, chest palps with radiation to the neck. Her "blood runs cold" and lasts for 20-35 mins.  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 We also saw a likely focal AT at that procedure also; not ablated due to " not able to reinduce.  Since, feeling much better. Does get short palpitations. Event monitor showed short NSAT and one sustained SVT c/w AT. These episodes don't bother her nearly as much as the AVNRT did.     She did well on verapamil for a bit, but developed palps again. HR to 150s. Sx abated after increasing verapamil to 180 bid.  Holter: NSR. no SVT.     cath 6/16 neg  echo 11/16 50-55% LVEF -> 11/2017 55-60%.  Holter 2/17: SR . no SVT.    Admitted 9/13/18  HPI: Mrs. Yasmin Asencio is a 67 yo female with a PMx of chronic right upper lobe PE on eliquis, HTN, COPD, bronchioectasis and Ectopic atrial tachycardia s/p ablation presents for a 1 week history of worsening SOB. Patient was recently diagnosed with pseudomonal pneumonia 1 month ago confirmed with sputum culture. She was sent home with a PICC line managed with cefepime for 10 days . She reports over the past week, she has had worsening SOB, productive cough with green sputum and chest tightness. She had been taking Mucinex with her home nebulizer with mild relief. She normally uses oxygen 2L at night but report she has had to increase her oxygen requirements over the past few days and use it during the day. She went to see her. She was seen by her PCP on 9/7 who has been following outpatient management of her Pseudomonal pneumonia. However yesterday the patient reports having a blood pressure in the 90s with symptomatic dizziness. She was told to report to the ED if her symptoms did not resolve. She denies any fevers, chills, radiating chest pain, n/v, abdominal pain, dizziness, vision changes or congestion.      Hospital Course     Patient was admitted to hospital medicine and started on Zosyn after failure of outpatient Cefepime. She was also given breathing treatments for COPD exacerbation. Infectious disease was consulted for evaluation and recommendations for treatment. Patients condition improved consistently with treatment. Sputum cultures  and sensitivities were obtained, culture was positive for Pseudomonas Aeruginosa which was sensitive to Zosyn. Per Infectious disease recommendations patient is to continue on Zosyn for 2 weeks total treatment. She was discharged with a midline for the antibiotics and home health care. Patient is to follow up outpatient with infectious disease  one week from discharge.     On 9/17, patient complained of dyuria, urinalysis was negative for UTI. Patient had a 4-5 episodes of diarrhea on 9/18, there was concern for C.diff infection but testing came back negative. Diarrhea and dysuria also resolved.     Still with home Abx  Denies heart racing or palpitations  Less SOB  Having some nosebleeds since discharge      Review of Systems   Constitution: Negative for decreased appetite.   HENT: Negative for ear discharge.    Eyes: Negative for blurred vision.   Respiratory: Negative for hemoptysis.    Endocrine: Negative for polyphagia.   Hematologic/Lymphatic: Negative for adenopathy.   Skin: Negative for color change.   Musculoskeletal: Negative for joint swelling.   Neurological: Negative for brief paralysis.   Psychiatric/Behavioral: Negative for hallucinations.        Objective:    Physical Exam   Constitutional: She is oriented to person, place, and time. Vital signs are normal. She appears well-developed and well-nourished. She is active and cooperative.   HENT:   Head: Normocephalic and atraumatic.   Eyes: Conjunctivae and EOM are normal.   Neck: Normal range of motion. Carotid bruit is not present. No tracheal deviation and no edema present. No thyroid mass and no thyromegaly present.   Cardiovascular: Normal rate, regular rhythm, normal heart sounds, intact distal pulses and normal pulses.  No extrasystoles are present. PMI is not displaced. Exam reveals no gallop and no friction rub.   No murmur heard.  Pulmonary/Chest: Effort normal. No respiratory distress. She has no wheezes. She has rhonchi in the right middle  field, the right lower field, the left middle field and the left lower field. She has no rales.   Abdominal: Soft. Normal appearance. She exhibits no distension. There is no hepatosplenomegaly.   Musculoskeletal: Normal range of motion.   Neurological: She is alert and oriented to person, place, and time. Coordination normal.   Skin: Skin is warm and dry. No rash noted.   Psychiatric: She has a normal mood and affect. Her speech is normal and behavior is normal. Thought content normal. Cognition and memory are normal.   Nursing note and vitals reviewed.        Assessment:       1. Shortness of breath    2. Non-cardiac chest pain    3. History of pulmonary embolism    4. PSVT (paroxysmal supraventricular tachycardia)    5. Ectopic atrial tachycardia         Plan:       Cardiac stable  OV 6 months

## 2018-09-25 ENCOUNTER — OFFICE VISIT (OUTPATIENT)
Dept: FAMILY MEDICINE | Facility: CLINIC | Age: 68
End: 2018-09-25
Payer: MEDICARE

## 2018-09-25 ENCOUNTER — TELEPHONE (OUTPATIENT)
Dept: RESEARCH | Facility: HOSPITAL | Age: 68
End: 2018-09-25

## 2018-09-25 ENCOUNTER — TELEPHONE (OUTPATIENT)
Dept: PULMONOLOGY | Facility: CLINIC | Age: 68
End: 2018-09-25

## 2018-09-25 VITALS
TEMPERATURE: 98 F | DIASTOLIC BLOOD PRESSURE: 56 MMHG | WEIGHT: 148.5 LBS | RESPIRATION RATE: 16 BRPM | HEART RATE: 90 BPM | HEIGHT: 62 IN | OXYGEN SATURATION: 93 % | BODY MASS INDEX: 27.33 KG/M2 | SYSTOLIC BLOOD PRESSURE: 122 MMHG

## 2018-09-25 DIAGNOSIS — Z09 HOSPITAL DISCHARGE FOLLOW-UP: Primary | ICD-10-CM

## 2018-09-25 DIAGNOSIS — Z23 NEED FOR IMMUNIZATION AGAINST INFLUENZA: ICD-10-CM

## 2018-09-25 DIAGNOSIS — I47.10 PSVT (PAROXYSMAL SUPRAVENTRICULAR TACHYCARDIA): ICD-10-CM

## 2018-09-25 DIAGNOSIS — Z23 NEEDS FLU SHOT: ICD-10-CM

## 2018-09-25 DIAGNOSIS — J47.9 ACQUIRED BRONCHIECTASIS: ICD-10-CM

## 2018-09-25 DIAGNOSIS — J15.1 PNEUMONIA OF LEFT LOWER LOBE DUE TO PSEUDOMONAS SPECIES: ICD-10-CM

## 2018-09-25 DIAGNOSIS — Z79.01 CURRENT USE OF LONG TERM ANTICOAGULATION: ICD-10-CM

## 2018-09-25 DIAGNOSIS — J96.11 CHRONIC RESPIRATORY FAILURE WITH HYPOXIA: ICD-10-CM

## 2018-09-25 PROCEDURE — 99215 OFFICE O/P EST HI 40 MIN: CPT | Mod: PBBFAC,PO | Performed by: NURSE PRACTITIONER

## 2018-09-25 PROCEDURE — 99495 TRANSJ CARE MGMT MOD F2F 14D: CPT | Mod: PBBFAC,PO | Performed by: NURSE PRACTITIONER

## 2018-09-25 PROCEDURE — 99999 PR PBB SHADOW E&M-EST. PATIENT-LVL V: CPT | Mod: PBBFAC,,, | Performed by: NURSE PRACTITIONER

## 2018-09-25 PROCEDURE — 99499 UNLISTED E&M SERVICE: CPT | Mod: S$GLB,,, | Performed by: NURSE PRACTITIONER

## 2018-09-25 PROCEDURE — 99495 TRANSJ CARE MGMT MOD F2F 14D: CPT | Mod: S$PBB,,, | Performed by: NURSE PRACTITIONER

## 2018-09-25 PROCEDURE — 90662 IIV NO PRSV INCREASED AG IM: CPT | Mod: PBBFAC,PO | Performed by: NURSE PRACTITIONER

## 2018-09-25 NOTE — TELEPHONE ENCOUNTER
FDA ICON CABP/HABP - Protocol #3771/[id number]  IRB #[id number] / PI: Serina     Dina Dominguez, called and spoke to pt to remind her of final study visit on 9/27.

## 2018-09-25 NOTE — TELEPHONE ENCOUNTER
----- Message from Jaison Dorsey sent at 9/25/2018 12:45 PM CDT -----  Contact: Pt  Pt is requesting an earlier appt due to having ammonia.    Pt can be reached at [phone].    Thank You.

## 2018-09-25 NOTE — PROGRESS NOTES
Transitional Care Note  Subjective:       Patient ID: Yasmin Asencio is a 68 y.o. female.  Chief Complaint: Hospital Follow Up    Family and/or Caretaker present at visit?  Yes.  Diagnostic tests reviewed/disposition: No diagnosic tests pending after this hospitalization.  Disease/illness education: yes  Home health/community services discussion/referrals: Patient has home health established at [facility].   Establishment or re-establishment of referral orders for community resources: No other necessary community resources.   Discussion with other health care providers: No discussion with other health care providers necessary.   68 year female presents to the clinic for a hospital follow up. She was hospitalized at [facility] from 9/13/2018 thur 9/19/2018. She was admitted to hospital medicine and started on Zosyn after failure of outpatient Cefepime. She was also given breathing treatments for COPD exacerbation. ID was consulted for evaluation and recommendations for treatment. Patient's condition improved consistently with treatment. Sputum cultures and sensitivities were obtained, culture was positive for Pseudomonas Aeruginosa which was sensitive to Zosyn. Per ID recommendations patient is to continue on Zosyn for 2 weeks total treatment. She was discharged with a  Midline in her right arm  for the antibiotics and home health care. Patient is to follow up outpatient with ID one week from discharge. On 9/17/2018, patient complained of dysuria, urinalysis was negative for UTI. Patient had a 4-5 episodes of diarrhea on 9/18/2018, there was concern for C. Diff infection but testing came back negative. Diarrhea and dysuria also resolved. She states she feels much better. She is afebrile. She has not been having to use her oxygen. She been using her breathing treatments once a day. She takes Mucinex as directed. She stays her stools are soft now. She completes her Zosyn on Friday. She completes a  14 study for pneumonia on Thursday this week.  She sees ID on Thursday this week. She sees pulmonary on 11/14/2018. She would like her flu shot today.       Review of Systems   Constitutional: Negative for chills and fever.   Respiratory: Positive for cough and wheezing. Negative for shortness of breath.    Cardiovascular: Negative for chest pain, palpitations and leg swelling.   Gastrointestinal: Negative for abdominal pain, diarrhea, nausea and vomiting.   Genitourinary: Negative for dysuria.   Musculoskeletal: Negative for gait problem.   Neurological: Positive for tremors. Negative for dizziness, weakness, numbness and headaches.       Objective:      Physical Exam   Constitutional: She is oriented to person, place, and time. She appears well-developed and well-nourished. No distress.   Eyes: Conjunctivae and EOM are normal. Pupils are equal, round, and reactive to light. Right eye exhibits no discharge. Left eye exhibits no discharge. No scleral icterus.   Cardiovascular: Normal rate, regular rhythm and normal heart sounds. Exam reveals no gallop and no friction rub.   No murmur heard.  Pulmonary/Chest: Effort normal. No respiratory distress. She has no wheezes.   Few rhonchi noted left lower lobe no wheezing noted    Abdominal: Soft. Bowel sounds are normal. There is no tenderness.   Musculoskeletal: Normal range of motion. She exhibits no edema.   Midline intact right arm    Neurological: She is alert and oriented to person, place, and time.   Skin: Skin is warm and dry. She is not diaphoretic.   Psychiatric: She has a normal mood and affect.       Assessment:       1. Hospital discharge follow-up    2. Pneumonia of left lower lobe due to Pseudomonas species    3. Need for immunization against influenza    4. Acquired bronchiectasis    5. Chronic respiratory failure with hypoxia    6. Current use of long term anticoagulation    7. PSVT (paroxysmal supraventricular tachycardia)    8. Needs flu shot         Plan:     Hospital discharge follow-up    Pneumonia of left lower lobe due to Pseudomonas species  - complete Zosyn  - Follow up with ID and Pulmonary as scheduled     Need for immunization against influenza  -     Influenza - High Dose (65+) (PF) (IM)    Acquired bronchiectasis  -     Ambulatory referral to Palliative Care  - The current medical regimen is effective;  continue present plan and medications.    Chronic respiratory failure with hypoxia  -     Ambulatory referral to Palliative Care  - uses oxygen as needed    Current use of long term anticoagulation  -     Ambulatory referral to Palliative Care  - The current medical regimen is effective;  continue present plan and medications.    PSVT (paroxysmal supraventricular tachycardia)  -     Ambulatory referral to Palliative Care  - The current medical regimen is effective;  continue present plan and medications.    Needs flu shot

## 2018-09-25 NOTE — PATIENT INSTRUCTIONS
Continue Home Health   Complete Antibiotics on Friday  Flu shot today  Follow up with ID and Pulmonary as scheduled  Follow up Dr. Luna as scheduled

## 2018-09-25 NOTE — PROGRESS NOTES
Subjective:       Patient ID: Yasmin Asencio is a 68 y.o. female.    Chief Complaint: Hospital Follow Up    HPI  Past Medical History:   Diagnosis Date    Acquired bronchiectasis     due to history of TB - followed by pulmonary, Dr. Zuñiga    Allergy     Amblyopia     rt eye per pt    Anemia of other chronic disease     Anticoagulant long-term use     Anxiety     Cataract     Chronic obstructive pulmonary disease with acute exacerbation     Clotting disorder     COPD (chronic obstructive pulmonary disease)     COPD (chronic obstructive pulmonary disease)     Depression     Diverticulosis     Essential tremor     GERD (gastroesophageal reflux disease)     Hemangioma of liver     History of tuberculosis 1978    Hypertension     Mixed anxiety and depressive disorder     Psoriasis     PSVT (paroxysmal supraventricular tachycardia)     Pulmonary embolism     S/P PICC central line placement Apr. 2016 - May 2016    Skin disease     Psoriasis    Spondylosis without myelopathy 8/23/2013    Supraventricular tachycardia     Thoracic aorta atherosclerosis     noted on CT scan of chest 1/3/2011    Tuberculosis     Vaginal delivery     x2    Vitamin D deficiency      Past Surgical History:   Procedure Laterality Date    ABLATION N/A 7/24/2017    Performed by Marlon [facility]esteros MD at [facility] CATH LAB    BLOCK-NERVE-MEDIAL BRANCH-LUMBAR Bilateral 10/14/2016    Performed by [facility] at [facility] PAIN MGT    BLOCK-NERVE-MEDIAL BRANCH-LUMBAR Bilateral 8/26/2016    Performed by [facility] at [facility] PAIN MGT    CATARACT EXTRACTION W/  INTRAOCULAR LENS IMPLANT  07/24/12    od dr [place]    CATARACT EXTRACTION W/  INTRAOCULAR LENS IMPLANT  08/07/12    left eye    COLONOSCOPY N/A 4/23/2013    Performed by Simeon Graves MD at [facility] ENDO (4TH FLR)    EGD (ESOPHAGOGASTRODUODENOSCOPY) N/A 4/23/2013    Performed by Simeon Graves MD at [facility] ENDO (4TH FLR)    GALLBLADDER  SURGERY  9/2011    HEART CATH-LEFT Left 6/16/2016    Performed [facility]aga [facility] at [facility] CATH LAB    INJECTION-FACET Bilateral 1/22/2016    Performed by [facility] at [facility]    RADIOFREQUENCY THERMOCOAGULATION (RFTC)-NERVE-MEDIAN BRANCH-LUMBAR Right 4/4/2018    Performed by [facility] at [facility]    RADIOFREQUENCY THERMOCOAGULATION (RFTC)-NERVE-MEDIAN BRANCH-LUMBAR Left 3/16/2018    Performed by [facility]ers [facility] at [facility]    RADIOFREQUENCY THERMOCOAGULATION (RFTC)-NERVE-MEDIAN BRANCH-LUMBAR Right 12/16/2016    Performed by Issac Meyers MD at [facility]      reports that she quit smoking about 9 years ago. Her smoking use included cigarettes. She has a 100.00 pack-year smoking history. she has never used smokeless tobacco. She reports that she does not drink alcohol or use drugs.  Review of Systems    Objective:      Physical Exam    Assessment:       No diagnosis found.    Plan:       ***    No Follow-up on file.

## 2018-09-26 ENCOUNTER — TELEPHONE (OUTPATIENT)
Dept: INFECTIOUS DISEASES | Facility: CLINIC | Age: 68
End: 2018-09-26

## 2018-09-26 NOTE — TELEPHONE ENCOUNTER
Spoke to patient and her daughter.  Answered her questions.  Informed her that her potassium was 2.9.  Encouraged her to eat bananas tonight.  She has an appointment tomorrow.

## 2018-09-27 ENCOUNTER — INFUSION (OUTPATIENT)
Dept: INFECTIOUS DISEASES | Facility: HOSPITAL | Age: 68
End: 2018-09-27
Attending: INTERNAL MEDICINE
Payer: MEDICARE

## 2018-09-27 ENCOUNTER — OFFICE VISIT (OUTPATIENT)
Dept: INFECTIOUS DISEASES | Facility: CLINIC | Age: 68
End: 2018-09-27
Payer: MEDICARE

## 2018-09-27 ENCOUNTER — RESEARCH ENCOUNTER (OUTPATIENT)
Dept: RESEARCH | Facility: HOSPITAL | Age: 68
End: 2018-09-27

## 2018-09-27 VITALS
HEIGHT: 62 IN | WEIGHT: 149.06 LBS | BODY MASS INDEX: 27.43 KG/M2 | TEMPERATURE: 98 F | DIASTOLIC BLOOD PRESSURE: 73 MMHG | HEART RATE: 87 BPM | SYSTOLIC BLOOD PRESSURE: 134 MMHG

## 2018-09-27 DIAGNOSIS — J47.1 BRONCHIECTASIS WITH ACUTE EXACERBATION: Primary | ICD-10-CM

## 2018-09-27 DIAGNOSIS — E87.6 HYPOKALEMIA: ICD-10-CM

## 2018-09-27 PROCEDURE — 99999 PR PBB SHADOW E&M-EST. PATIENT-LVL III: CPT | Mod: PBBFAC,,, | Performed by: INTERNAL MEDICINE

## 2018-09-27 PROCEDURE — 99214 OFFICE O/P EST MOD 30 MIN: CPT | Mod: S$PBB,,, | Performed by: INTERNAL MEDICINE

## 2018-09-27 PROCEDURE — 1101F PT FALLS ASSESS-DOCD LE1/YR: CPT | Mod: CPTII,,, | Performed by: INTERNAL MEDICINE

## 2018-09-27 PROCEDURE — 99213 OFFICE O/P EST LOW 20 MIN: CPT | Mod: PBBFAC | Performed by: INTERNAL MEDICINE

## 2018-09-27 PROCEDURE — 3078F DIAST BP <80 MM HG: CPT | Mod: CPTII,,, | Performed by: INTERNAL MEDICINE

## 2018-09-27 PROCEDURE — 3075F SYST BP GE 130 - 139MM HG: CPT | Mod: CPTII,,, | Performed by: INTERNAL MEDICINE

## 2018-09-27 RX ORDER — POTASSIUM CHLORIDE 20 MEQ/1
20 TABLET, EXTENDED RELEASE ORAL DAILY
Qty: 7 TABLET | Refills: 0 | Status: SHIPPED | OUTPATIENT
Start: 2018-09-27 | End: 2018-10-04

## 2018-09-27 NOTE — PROGRESS NOTES
Subjective:      Patient ID: Yasmin Asencio is a 68 y.o. female.    Chief Complaint:Follow-up    History of Present Illness    67 y/o female with a history of chronic bronchiectasis and colonization with pseudomonas presents today for post hospital follow up.  She started having an exacerbation of her bronchiectasis about 1 -2 months ago.  She completed a course of cefepime without much improvement.  She ultimately was admitted to the hospital and was placed on zosyn.  She is now doing much better.  She has now completed 14 days of therapy and is here today for follow up.    Review of Systems   Constitution: Negative for chills, decreased appetite, fever, weakness, malaise/fatigue, night sweats, weight gain and weight loss.   HENT: Negative for congestion, ear pain, hearing loss, hoarse voice, sore throat and tinnitus.    Eyes: Positive for blurred vision. Negative for redness and visual disturbance.   Cardiovascular: Positive for palpitations. Negative for chest pain and leg swelling.   Respiratory: Positive for cough, sputum production and wheezing. Negative for hemoptysis and shortness of breath.    Hematologic/Lymphatic: Negative for adenopathy. Does not bruise/bleed easily.   Skin: Positive for dry skin. Negative for itching, rash and suspicious lesions.   Musculoskeletal: Positive for back pain and neck pain. Negative for joint pain and myalgias.   Gastrointestinal: Positive for diarrhea. Negative for abdominal pain, constipation, heartburn, nausea and vomiting.   Genitourinary: Negative for dysuria, flank pain, frequency, hematuria, hesitancy and urgency.   Neurological: Positive for headaches. Negative for dizziness, numbness and paresthesias.   Psychiatric/Behavioral: Positive for memory loss. Negative for depression. The patient is nervous/anxious. The patient does not have insomnia.      Objective:   Physical Exam   Constitutional: She is oriented to person, place, and time. She appears  well-developed and well-nourished. No distress.   HENT:   Head: Normocephalic and atraumatic.   Right Ear: External ear normal.   Left Ear: External ear normal.   Nose: Nose normal.   Mouth/Throat: Oropharynx is clear and moist. No oropharyngeal exudate.   Eyes: Conjunctivae and EOM are normal. Pupils are equal, round, and reactive to light. Right eye exhibits no discharge. Left eye exhibits no discharge. No scleral icterus.   Neck: Normal range of motion. Neck supple. No JVD present. No tracheal deviation present. No thyromegaly present.   Cardiovascular: Normal rate, regular rhythm and intact distal pulses. Exam reveals no gallop and no friction rub.   No murmur heard.  Pulmonary/Chest: Effort normal. No stridor. No respiratory distress. She has no wheezes. She has rales. She exhibits no tenderness.   Abdominal: Soft. Bowel sounds are normal. She exhibits no distension and no mass. There is no tenderness. There is no rebound and no guarding.   Musculoskeletal: Normal range of motion. She exhibits no edema or tenderness.   Lymphadenopathy:     She has no cervical adenopathy.   Neurological: She is alert and oriented to person, place, and time. She displays normal reflexes. No cranial nerve deficit. She exhibits normal muscle tone. Coordination normal.   Skin: Skin is warm. No rash noted. She is not diaphoretic. No erythema. No pallor.   Psychiatric: She has a normal mood and affect. Her behavior is normal. Judgment and thought content normal.   Nursing note and vitals reviewed.    Assessment:       1. Bronchiectasis with acute exacerbation :  Patient doing much better.  She has completed 14 days of IV zosyn.  I will removed the mid line today.   2. Hypokalemia :  Labs reviewed.  She has hypokalemia.  Will prescribe oral potassium.         Plan:       Bronchiectasis with acute exacerbation  -     Nursing communication; Future; Expected date: 09/27/2018    Hypokalemia  -     potassium chloride  (K-DUR,KLOR-CON) 20  MEQ tablet; Take 1 tablet (20 mEq total) by mouth once daily. for 7 days  Dispense: 7 tablet; Refill: 0  -     Basic metabolic panel; Future; Expected date: 09/27/2018

## 2018-09-27 NOTE — PROGRESS NOTES
Date of visit: 9/27/2018    Sponsor-FDA    FDA ICON CABP/HABP - Protocol #3771/0001  IRB #2017.359 / PI: Serina    , Dina Dominguez saw pt in clinic for final study visit.  Sub-I NP Pretty Santiago accessed pt for clinical global impression of change, as per protocol. Pt returned device. Thanked pt and gave pt [employer] compensation for study participation, as per protocol.

## 2018-09-28 ENCOUNTER — TELEPHONE (OUTPATIENT)
Dept: FAMILY MEDICINE | Facility: CLINIC | Age: 68
End: 2018-09-28

## 2018-09-28 RX ORDER — FLUCONAZOLE 150 MG/1
150 TABLET ORAL ONCE
Qty: 1 TABLET | Refills: 0 | Status: SHIPPED | OUTPATIENT
Start: 2018-09-28 | End: 2018-09-28

## 2018-09-28 NOTE — TELEPHONE ENCOUNTER
----- Message from Kate Koo sent at 9/28/2018 11:25 AM CDT -----  Contact: self - [phone]  Pt is asking for a prescription for a yeast infection to be called in that has resulted from IV antibiotics she was on for 2 weeks.       ..  [facility] [address] [place] - [address] [facility].  [facility] [place] [address]  Phone: [phone] Fax: [phone]

## 2018-10-01 ENCOUNTER — OFFICE VISIT (OUTPATIENT)
Dept: NEUROLOGY | Facility: CLINIC | Age: 68
End: 2018-10-01
Payer: MEDICARE

## 2018-10-01 ENCOUNTER — TELEPHONE (OUTPATIENT)
Dept: INFECTIOUS DISEASES | Facility: CLINIC | Age: 68
End: 2018-10-01

## 2018-10-01 ENCOUNTER — TELEPHONE (OUTPATIENT)
Dept: PULMONOLOGY | Facility: CLINIC | Age: 68
End: 2018-10-01

## 2018-10-01 ENCOUNTER — CLINICAL SUPPORT (OUTPATIENT)
Dept: INFECTIOUS DISEASES | Facility: CLINIC | Age: 68
End: 2018-10-01
Payer: MEDICARE

## 2018-10-01 VITALS
DIASTOLIC BLOOD PRESSURE: 61 MMHG | WEIGHT: 149 LBS | BODY MASS INDEX: 27.42 KG/M2 | HEIGHT: 62 IN | SYSTOLIC BLOOD PRESSURE: 133 MMHG | HEART RATE: 83 BPM

## 2018-10-01 DIAGNOSIS — J47.9 BRONCHIECTASIS WITHOUT COMPLICATION: Primary | ICD-10-CM

## 2018-10-01 DIAGNOSIS — G25.0 ESSENTIAL TREMOR: Primary | ICD-10-CM

## 2018-10-01 PROCEDURE — 99213 OFFICE O/P EST LOW 20 MIN: CPT | Mod: PBBFAC | Performed by: NURSE PRACTITIONER

## 2018-10-01 PROCEDURE — 99999 PR PBB SHADOW E&M-EST. PATIENT-LVL III: CPT | Mod: PBBFAC,,, | Performed by: NURSE PRACTITIONER

## 2018-10-01 PROCEDURE — 99214 OFFICE O/P EST MOD 30 MIN: CPT | Mod: S$PBB,,, | Performed by: NURSE PRACTITIONER

## 2018-10-01 PROCEDURE — 87070 CULTURE OTHR SPECIMN AEROBIC: CPT

## 2018-10-01 PROCEDURE — 87205 SMEAR GRAM STAIN: CPT

## 2018-10-01 PROCEDURE — 1101F PT FALLS ASSESS-DOCD LE1/YR: CPT | Mod: CPTII,,, | Performed by: NURSE PRACTITIONER

## 2018-10-01 PROCEDURE — 87186 SC STD MICRODIL/AGAR DIL: CPT

## 2018-10-01 PROCEDURE — 99499 UNLISTED E&M SERVICE: CPT | Mod: S$GLB,,, | Performed by: NURSE PRACTITIONER

## 2018-10-01 PROCEDURE — 3075F SYST BP GE 130 - 139MM HG: CPT | Mod: CPTII,,, | Performed by: NURSE PRACTITIONER

## 2018-10-01 PROCEDURE — 87077 CULTURE AEROBIC IDENTIFY: CPT

## 2018-10-01 PROCEDURE — 3078F DIAST BP <80 MM HG: CPT | Mod: CPTII,,, | Performed by: NURSE PRACTITIONER

## 2018-10-01 NOTE — TELEPHONE ENCOUNTER
I spoke to Mrs Asencio about her recent hospital stay, she states she requested twice Pulmonary be consulted and was told only Infectious Disease was needed. She said she saw Dr Alcaraz last week and her PCP Dr Luna. Today she has expectorated phelgm with dark red blood and is anxious to get in with Dr Landis or Dr Delaney. I let her know she is on the wait list and I will watch for a opening for her.She was appreciative. Cherise Hays LPN

## 2018-10-01 NOTE — PATIENT INSTRUCTIONS
Please talk to your PCP about perhaps adding mirtazapine for tremor. It may also help with anxiety.   While mirtazapine is not a medication that we typically use to treat tremor, some people do report benefit.   Let me know if you would like to try this, after you discuss with your PCP.

## 2018-10-01 NOTE — TELEPHONE ENCOUNTER
----- Message from Taty Felix sent at 10/1/2018 11:51 AM CDT -----  Contact: Self [phone]  PT states her Phlegm is dark and has blood in it. She can be reached at [phone].

## 2018-10-01 NOTE — PROGRESS NOTES
Name: Yasmin Asencio  MRN: [id number]   CSN: [id number]      Date: 10-1-18      Referring physician:  Urmila Luna MD  [address] [facility] [facility]  [place] [address]    Subjective:      Chief Complaint: tremor    History of Present Illness (HPI):    Yasmin Asencio is a 68 y.o. right-handed female who presents today for a follow-up evaluation of ET and mild memory impairment and is accompanied by . Last seen in office 5-17-18. At that time, she was weaned off primidone due to Eliquis. She had been on this combo for a year but her pharmacist flagged stating should not be taken together. Hematology rec Xarelto but rec'd same warning (primidone could lower levels of Xarelto or Eliquis). She has hx of PE.     Coughed up blood this morning. Dropped off specimen to [facility] today. Was admitted mid-Sept for PNA.    Tremor is present. When she first weaned off primidone she did not appreciate a big difference. In the past 2-3 mos, she is having more tremor. She notes all the time, even her voice.   She remains independent with her ADLs but does have trouble with hooks (like on necklace). She sometimes is aware of tremor when she eats.   LH>RH.     Handwriting is not good.         Anxiety is also bad. She is on Lexapro 10 mg daily at present. She was prev on 20 mg daily.     Meds tried   TPX   Cd/ld  gabapentin  Primidone  NOT TRIED PROPRANOLOL DUE TO COPD    Review of Systems    Past Medical History: The patient  has a past medical history of Acquired bronchiectasis, Allergy, Amblyopia, Anemia of other chronic disease, Anticoagulant long-term use, Anxiety, Cataract, Chronic obstructive pulmonary disease with acute exacerbation, Clotting disorder, COPD (chronic obstructive pulmonary disease), COPD (chronic obstructive pulmonary disease), Depression, Diverticulosis, Essential tremor, GERD (gastroesophageal reflux disease), Hemangioma of liver, History of tuberculosis (1978), Hypertension, Mixed anxiety and depressive  disorder, Psoriasis, PSVT (paroxysmal supraventricular tachycardia), Pulmonary embolism, S/P PICC central line placement (Apr. 2016 - May 2016), Skin disease, Spondylosis without myelopathy (8/23/2013), Supraventricular tachycardia, Thoracic aorta atherosclerosis, Tuberculosis, Vaginal delivery, and Vitamin D deficiency.    Social History: The patient  reports that she quit smoking about 9 years ago. Her smoking use included cigarettes. She has a 100.00 pack-year smoking history. she has never used smokeless tobacco. She reports that she does not drink alcohol or use drugs.    Family History: Their family history includes Cancer in her brother, father, and maternal aunt; Heart attack in her brother, mother, and son; Hyperlipidemia in her sister; Hypertension in her mother; Lung cancer in her sister; Psoriasis in her sister; Stroke in her maternal uncle.    Allergies: Adhesive; Bactrim [sulfamethoxazole-trimethoprim]; Lipitor [atorvastatin]; Albuterol; Topamax [topiramate]; and Restasis [cyclosporine]     Meds:   Current Outpatient Medications on File Prior to Visit   Medication Sig Dispense Refill    alprazolam (XANAX) 0.25 MG tablet Take 1 tablet (0.25 mg total) by mouth nightly as needed for Anxiety. 30 tablet 0    apixaban 5 mg Tab Take 5 mg by mouth 2 (two) times daily.      chlorthalidone (HYGROTEN) 25 MG Tab Take 12.5 mg by mouth once daily.      desoximetasone (TOPICORT) 0.25 % cream Apply as directed bid prn (Patient taking differently: Apply as directed twice daily as needed for psoriasis) 60 g 2    escitalopram oxalate (LEXAPRO) 10 MG tablet Take 1 tablet (10 mg total) by mouth once daily. 30 tablet 11    fluocinolone (DERMA-SMOOTHE) 0.01 % external oil Apply topically once daily. (Patient taking differently: Apply topically daily as needed. ) 1 Bottle 5    folic acid (FOLVITE) 1 MG tablet Take 1 tablet (1 mg total) by mouth once daily. 100 tablet 2    gabapentin (NEURONTIN) 300 MG capsule Take 2  "capsules (600 mg total) by mouth 3 (three) times daily. 540 capsule 0    guaiFENesin (MUCINEX) 600 mg 12 hr tablet Take 1,200 mg by mouth 2 (two) times daily.      ipratropium (ATROVENT) 0.02 % nebulizer solution Inhale 1 vial in nebulizer 3 to 4 times daily      ketoconazole (NIZORAL) 2 % shampoo Apply topically once daily. 120 mL 5    Lactobacillus rhamnosus GG (CULTURELLE) 10 billion cell capsule Take 1 capsule by mouth once daily.      multivitamin (THERAGRAN) per tablet Take 1 tablet by mouth once daily.      omeprazole (PRILOSEC) 20 MG capsule TAKE 1 CAPSULE BY MOUTH ONCE DAILY AS NEEDED FOR HEARTBURN (TAKE ONLY AS NEEDED) 90 capsule 0    potassium chloride SA (K-DUR,KLOR-CON) 20 MEQ tablet Take 1 tablet (20 mEq total) by mouth once daily. for 7 days 7 tablet 0    propylene glycol (SYSTANE BALANCE) 0.6 % Drop Apply 1 drop to eye daily as needed (dry eye).      sodium chloride 2% (XIOMARA 128) 2 % ophthalmic solution Place 1 drop into both eyes 3 (three) times daily as needed.      traMADol (ULTRAM) 50 mg tablet Take 1 tablet (50 mg total) by mouth every 6 (six) hours as needed for Pain. 45 tablet 0    verapamil (CALAN-SR) 180 MG CR tablet 2 (two) times daily.       acetaminophen (TYLENOL) 500 MG tablet Take 1,000 mg by mouth once daily. sleep      fluocinonide (LIDEX) 0.05 % external solution Apply topically once daily. - apply after shampooing (Patient taking differently: Apply topically once daily. - apply after shampooing as needed) 60 mL 5    ondansetron (ZOFRAN) 4 MG tablet Take 1 tablet (4 mg total) by mouth every 8 (eight) hours as needed for Nausea. 45 tablet 0    sodium chloride 3% 3 % nebulizer solution Take 4 mLs by nebulization 2 (two) times daily. 720 mL 3     No current facility-administered medications on file prior to visit.        Objective:     Physical Exam:    Vitals:    10/01/18 1617   BP: 133/61   Pulse: 83   Weight: 67.6 kg (149 lb)   Height: 5' 2" (1.575 m)     Body mass " index is 27.25 kg/m².    Constitutional  Well-developed, well-nourished, appears stated age   Cardiovascular  Radial pulses 2+ and symmetric, no LE edema bilaterally     ..  * Specialized movement exam  No hypophonic speech.    Vocal tremor noted but easy to understand.   No facial masking.   No cogwheel rigidity.     No bradykinesia.   No tremor with rest.   Subtle postural tremor Rh and near mild LH.   Near mild with kinesis and intention tremor RH and mild in LH.    No other dystonia, chorea, athetosis, myoclonus, or tics.     No motor impersistence.   Normal-based gait.   No shortened stride length.  No abnormal arm swing.            Laboratory Results:  Admission on 09/13/2018, Discharged on 09/19/2018   No results displayed because visit has over 200 results.      Lab Visit on 08/24/2018   Component Date Value Ref Range Status    Sodium 08/24/2018 136  136 - 145 mmol/L Final    Potassium 08/24/2018 3.3* 3.5 - 5.1 mmol/L Final    Chloride 08/24/2018 96  95 - 110 mmol/L Final    CO2 08/24/2018 29  23 - 29 mmol/L Final    Glucose 08/24/2018 114* 70 - 110 mg/dL Final    BUN, Bld 08/24/2018 16  8 - 23 mg/dL Final    Creatinine 08/24/2018 1.0  0.5 - 1.4 mg/dL Final    Calcium 08/24/2018 9.7  8.7 - 10.5 mg/dL Final    Anion Gap 08/24/2018 11  8 - 16 mmol/L Final    eGFR if African American 08/24/2018 >60.0  >60 mL/min/1.73 m^2 Final    eGFR if non African American 08/24/2018 57.6* >60 mL/min/1.73 m^2 Final   Lab Visit on 08/16/2018   Component Date Value Ref Range Status    AFB Culture & Smear 08/16/2018 Culture in progress   Preliminary    AFB Culture & Smear 08/16/2018 Culture in progress   Preliminary    AFB CULTURE STAIN 08/16/2018 No acid fast bacilli seen.   Final    Respiratory Culture 08/16/2018    Final                    Value:PSEUDOMONAS AERUGINOSA  Rare  Normal respiratory larry also present      Gram Stain (Respiratory) 08/16/2018 <10 epithelial cells per low power field.   Final    Gram  Stain (Respiratory) 08/16/2018 Few WBC's   Final    Gram Stain (Respiratory) 08/16/2018 Many Gram negative rods   Final    Gram Stain (Respiratory) 08/16/2018 Moderate Gram positive cocci   Final   Lab Visit on 07/25/2018   Component Date Value Ref Range Status    DRVVT, Lupus Anticoagulant 07/25/2018 Negative  Negative Final    Hex Phosph Neut Test 07/25/2018 Negative  Negative Final           Assessment and Plan     Essential tremor        Medical Decision Making:  Again discussed the contributing factors of tremor (i.e. stress, illness, meds (prednisone), anxiety, excitement, poor sleep, tired).  Could consider mirtazapine for tremor.   She is currently taking Lexapro 10 mg daily for anxiety. Anticipates this will be increased to 20 mg when she sees PCP later this month. She will discuss mirtazapine with PCP- perhaps could use it instead of Lexapro or perhaps in addition to at low doses to see if this would give her some improvement in tremor.   She will let me know.  Follow up 6 months unless she does begin mirtazapine. If starts this, will see back in 3 months.     I spent 30 minutes face-to-face with the patient and  with >50% of the time spent with counseling and education regarding:  - results of data, diagnosis, and recommendations stated above  - the prognosis of ET  - risks and benefits of mirtazapine       Kristi Torres, BRIA, NP-C  Division of Movement and Memory Disorders  [facility]  [phone]

## 2018-10-01 NOTE — LETTER
October 1, 2018      Urmila Luna MD  [address]  [place] [address]           [facility]  [address]  [place] [address]  Phone: [phone]  Fax: [phone]          Patient: Yasmin Asencio   MR Number: [id number]   YOB: 1950   Date of Visit: 10/1/2018       Dear Dr. Urmila Luna:    Thank you for referring Yasmin Asencio to me for evaluation. Attached you will find relevant portions of my assessment and plan of care.    If you have questions, please do not hesitate to call me. I look forward to following Yasmin Asencio along with you.    Sincerely,    Kristi Torres, HAYDEE    Enclosure  CC:  No Recipients    If you would like to receive this communication electronically, please contact [email or web address] or [phone] to request more information on [employer] access.    For providers and/or their staff who would like to refer a patient to [facility], please contact us through our one-stop-shop provider referral line, [facility], at [phone].    If you feel you have received this communication in error or would no longer like to receive these types of communications, please e-mail [email or web address]

## 2018-10-04 ENCOUNTER — LAB VISIT (OUTPATIENT)
Dept: LAB | Facility: HOSPITAL | Age: 68
End: 2018-10-04
Attending: INTERNAL MEDICINE
Payer: MEDICARE

## 2018-10-04 DIAGNOSIS — E87.6 HYPOKALEMIA: ICD-10-CM

## 2018-10-04 LAB
ANION GAP SERPL CALC-SCNC: 9 MMOL/L
BUN SERPL-MCNC: 13 MG/DL
CALCIUM SERPL-MCNC: 10.2 MG/DL
CHLORIDE SERPL-SCNC: 96 MMOL/L
CO2 SERPL-SCNC: 30 MMOL/L
CREAT SERPL-MCNC: 1.1 MG/DL
EST. GFR  (AFRICAN AMERICAN): 59 ML/MIN/1.73 M^2
EST. GFR  (NON AFRICAN AMERICAN): 51 ML/MIN/1.73 M^2
GLUCOSE SERPL-MCNC: 85 MG/DL
POTASSIUM SERPL-SCNC: 3.9 MMOL/L
SODIUM SERPL-SCNC: 135 MMOL/L

## 2018-10-04 PROCEDURE — 36415 COLL VENOUS BLD VENIPUNCTURE: CPT

## 2018-10-04 PROCEDURE — 80048 BASIC METABOLIC PNL TOTAL CA: CPT

## 2018-10-05 LAB
BACTERIA SPEC AEROBE CULT: NORMAL
GRAM STN SPEC: NORMAL

## 2018-10-18 DIAGNOSIS — M54.40 ACUTE LEFT-SIDED LOW BACK PAIN WITH SCIATICA, SCIATICA LATERALITY UNSPECIFIED: ICD-10-CM

## 2018-10-18 LAB
ACID FAST MOD KINY STN SPEC: NORMAL
MYCOBACTERIUM SPEC QL CULT: NORMAL

## 2018-10-18 RX ORDER — GABAPENTIN 300 MG/1
600 CAPSULE ORAL 3 TIMES DAILY
Qty: 540 CAPSULE | Refills: 0 | Status: SHIPPED | OUTPATIENT
Start: 2018-10-18 | End: 2018-12-17 | Stop reason: SDUPTHER

## 2018-10-18 NOTE — TELEPHONE ENCOUNTER
----- Message from Ziggy Guerra sent at 10/18/2018  1:13 PM CDT -----  Contact: Yasmin Painting  Can the clinic reply in [facility]: No      Please refill the medication(s) listed below. The patient can be reached at this phone number [phone] once it is called into the pharmacy.      Medication #1    gabapentin (NEURONTIN) 300 MG capsule 540 capsule 0 5/3/2018 10/1/2018 No  Sig - Route: Take 2 capsules (600 mg total) by mouth 3 (three) times daily. - Oral  Sent to pharmacy as: gabapentin (NEURONTIN) 300 MG capsule  Class: Normal  Order: [id number]  Date/Time Signed: 5/3/2018 10:38      E-Prescribing Status: Receipt confirmed by pharmacy (5/3/2018 10:38 AM CDT)          Preferred Pharmacy:    [facility] [address]. [phone] (Phone)  [phone] (Fax)

## 2018-10-22 ENCOUNTER — OFFICE VISIT (OUTPATIENT)
Dept: FAMILY MEDICINE | Facility: CLINIC | Age: 68
End: 2018-10-22
Payer: MEDICARE

## 2018-10-22 VITALS
HEART RATE: 74 BPM | HEIGHT: 62 IN | TEMPERATURE: 98 F | SYSTOLIC BLOOD PRESSURE: 110 MMHG | WEIGHT: 152 LBS | DIASTOLIC BLOOD PRESSURE: 56 MMHG | BODY MASS INDEX: 27.97 KG/M2 | OXYGEN SATURATION: 97 %

## 2018-10-22 DIAGNOSIS — G72.0 STATIN MYOPATHY: ICD-10-CM

## 2018-10-22 DIAGNOSIS — I70.0 THORACIC AORTA ATHEROSCLEROSIS: ICD-10-CM

## 2018-10-22 DIAGNOSIS — M85.89 OSTEOPENIA OF MULTIPLE SITES: ICD-10-CM

## 2018-10-22 DIAGNOSIS — M48.061 SPINAL STENOSIS OF LUMBAR REGION WITHOUT NEUROGENIC CLAUDICATION: ICD-10-CM

## 2018-10-22 DIAGNOSIS — M79.674 PAIN IN TOES OF BOTH FEET: ICD-10-CM

## 2018-10-22 DIAGNOSIS — M79.675 PAIN IN TOES OF BOTH FEET: ICD-10-CM

## 2018-10-22 DIAGNOSIS — Z00.00 ROUTINE MEDICAL EXAM: Primary | ICD-10-CM

## 2018-10-22 DIAGNOSIS — J96.11 CHRONIC RESPIRATORY FAILURE WITH HYPOXIA: ICD-10-CM

## 2018-10-22 DIAGNOSIS — N64.4 BREAST PAIN, LEFT: ICD-10-CM

## 2018-10-22 DIAGNOSIS — K21.9 GASTROESOPHAGEAL REFLUX DISEASE WITHOUT ESOPHAGITIS: ICD-10-CM

## 2018-10-22 DIAGNOSIS — E66.3 OVERWEIGHT (BMI 25.0-29.9): ICD-10-CM

## 2018-10-22 DIAGNOSIS — I12.9 BENIGN HYPERTENSION WITH CHRONIC KIDNEY DISEASE, STAGE III: ICD-10-CM

## 2018-10-22 DIAGNOSIS — M46.1 SACROILIITIS: ICD-10-CM

## 2018-10-22 DIAGNOSIS — T46.6X5A STATIN MYOPATHY: ICD-10-CM

## 2018-10-22 DIAGNOSIS — N18.30 BENIGN HYPERTENSION WITH CHRONIC KIDNEY DISEASE, STAGE III: ICD-10-CM

## 2018-10-22 DIAGNOSIS — G25.0 ESSENTIAL TREMOR: ICD-10-CM

## 2018-10-22 DIAGNOSIS — I27.82 OTHER CHRONIC PULMONARY EMBOLISM WITHOUT ACUTE COR PULMONALE: ICD-10-CM

## 2018-10-22 PROBLEM — J15.1 PNEUMONIA OF LEFT LOWER LOBE DUE TO PSEUDOMONAS SPECIES: Status: RESOLVED | Noted: 2018-09-13 | Resolved: 2018-10-22

## 2018-10-22 PROBLEM — J44.1 CHRONIC OBSTRUCTIVE PULMONARY DISEASE WITH ACUTE EXACERBATION: Status: RESOLVED | Noted: 2017-01-05 | Resolved: 2018-10-22

## 2018-10-22 PROBLEM — E87.1 HYPONATREMIA: Status: RESOLVED | Noted: 2018-09-14 | Resolved: 2018-10-22

## 2018-10-22 PROBLEM — J96.21 ACUTE ON CHRONIC RESPIRATORY FAILURE WITH HYPOXIA: Status: RESOLVED | Noted: 2018-09-14 | Resolved: 2018-10-22

## 2018-10-22 PROBLEM — E87.6 HYPOKALEMIA: Status: RESOLVED | Noted: 2018-09-14 | Resolved: 2018-10-22

## 2018-10-22 PROBLEM — R06.02 SHORTNESS OF BREATH: Status: RESOLVED | Noted: 2018-09-13 | Resolved: 2018-10-22

## 2018-10-22 PROCEDURE — 99213 OFFICE O/P EST LOW 20 MIN: CPT | Mod: PBBFAC,PO | Performed by: INTERNAL MEDICINE

## 2018-10-22 PROCEDURE — 99999 PR PBB SHADOW E&M-EST. PATIENT-LVL III: CPT | Mod: PBBFAC,,, | Performed by: INTERNAL MEDICINE

## 2018-10-22 PROCEDURE — 3074F SYST BP LT 130 MM HG: CPT | Mod: CPTII,,, | Performed by: INTERNAL MEDICINE

## 2018-10-22 PROCEDURE — 99499 UNLISTED E&M SERVICE: CPT | Mod: S$GLB,,, | Performed by: INTERNAL MEDICINE

## 2018-10-22 PROCEDURE — 3078F DIAST BP <80 MM HG: CPT | Mod: CPTII,,, | Performed by: INTERNAL MEDICINE

## 2018-10-22 PROCEDURE — 99214 OFFICE O/P EST MOD 30 MIN: CPT | Mod: S$PBB,,, | Performed by: INTERNAL MEDICINE

## 2018-10-22 RX ORDER — MIRTAZAPINE 7.5 MG/1
7.5 TABLET, FILM COATED ORAL NIGHTLY
Qty: 90 TABLET | Refills: 1 | Status: SHIPPED | OUTPATIENT
Start: 2018-10-22 | End: 2019-04-16 | Stop reason: SDUPTHER

## 2018-10-22 NOTE — PROGRESS NOTES
HISTORY OF PRESENT ILLNESS:  Yasmin Asencio is a 68 y.o. female who presents to the clinic today for a routine medical physical exam. Her last physical exam was approximately 1 years(s) ago.        PAST MEDICAL HISTORY:  Past Medical History:   Diagnosis Date    Acquired bronchiectasis     due to history of TB - followed by pulmonary, Dr. Zuñiga    Allergy     Amblyopia     rt eye per pt    Anemia of other chronic disease     Anticoagulant long-term use     Anxiety     Cataract     Chronic obstructive pulmonary disease with acute exacerbation     Clotting disorder     COPD (chronic obstructive pulmonary disease)     COPD (chronic obstructive pulmonary disease)     Depression     Diverticulosis     Essential tremor     GERD (gastroesophageal reflux disease)     Hemangioma of liver     History of tuberculosis 1978    Hypertension     Mixed anxiety and depressive disorder     Psoriasis     PSVT (paroxysmal supraventricular tachycardia)     Pulmonary embolism     S/P PICC central line placement Apr. 2016 - May 2016    Skin disease     Psoriasis    Spondylosis without myelopathy 8/23/2013    Supraventricular tachycardia     Thoracic aorta atherosclerosis     noted on CT scan of chest 1/3/2011    Tuberculosis     Vaginal delivery     x2    Vitamin D deficiency        PAST SURGICAL HISTORY:  Past Surgical History:   Procedure Laterality Date    ABLATION N/A 7/24/2017    Performed by Marlon [facility]esteros [facility] at [facility] CATH LAB    BLOCK-NERVE-MEDIAL BRANCH-LUMBAR Bilateral 10/14/2016    Performed [facility]ers [facility] at [facility] PAIN MGT    BLOCK-NERVE-MEDIAL BRANCH-LUMBAR Bilateral 8/26/2016    [facility] at [facility] PAIN MGT    CATARACT EXTRACTION W/  INTRAOCULAR LENS IMPLANT  07/24/12    od dr [place]    CATARACT EXTRACTION W/  INTRAOCULAR LENS IMPLANT  08/07/12    left eye    COLONOSCOPY N/A 4/23/2013    Performed by Simeon Graves MD at [facility] ENDO (4TH FLR)     EGD (ESOPHAGOGASTRODUODENOSCOPY) N/A 2013    [facility] ENDO (4TH FLR)    GALLBLADDER SURGERY  2011    HEART CATH-LEFT Left 2016    Per[facility] at [facility] CATH LAB    INJECTION-FACET Bilateral 2016    [facility]    RADIOFREQUENCY THERMOCOAGULATION (RFTC)-NERVE-MEDIAN BRANCH-LUMBAR Right 2018    [facility]    RADIOFREQUENCY THERMOCOAGULATION (RFTC)-NERVE-MEDIAN BRANCH-LUMBAR Left 3/16/2018    [facility]    RADIOFREQUENCY THERMOCOAGULATION (RFTC)-NERVE-MEDIAN BRANCH-LUMBAR Right 2016    Per[facility] by [facility] at [facility]       SOCIAL HISTORY:  Social History     Socioeconomic History    Marital status:      Spouse name: Not on file    Number of children: 2    Years of education: Not on file    Highest education level: Not on file   Social Needs    Financial resource strain: Not on file    Food insecurity - worry: Not on file    Food insecurity - inability: Not on file    Transportation needs - medical: Not on file    Transportation needs - non-medical: Not on file   Occupational History    Occupation: housewife   Tobacco Use    Smoking status: Former Smoker     Packs/day: 2.00     Years: 50.00     Pack years: 100.00     Types: Cigarettes     Last attempt to quit: 2009     Years since quittin.4    Smokeless tobacco: Never Used   Substance and Sexual Activity    Alcohol use: No     Alcohol/week: 0.0 oz    Drug use: No    Sexual activity: Yes     Partners: Male   Other Topics Concern    Are you pregnant or think you may be? No    Breast-feeding No   Social History Narrative    One child  of a heart attack at age 35.       FAMILY HISTORY:  Family History   Problem Relation Age of Onset    Hypertension Mother     Heart attack Mother     Cancer Father          liver and bladder    Hyperlipidemia Sister     Psoriasis Sister     Cancer Brother         liver    Stroke Maternal Uncle     Cancer Maternal Aunt         stomach    Lung cancer Sister     Heart attack Brother     Heart attack Son     Amblyopia Neg Hx     Blindness Neg Hx     Cataracts Neg Hx     Glaucoma Neg Hx     Macular degeneration Neg Hx     Retinal detachment Neg Hx     Strabismus Neg Hx     Thyroid disease Neg Hx     Melanoma Neg Hx     Lupus Neg Hx     Eczema Neg Hx     COPD Neg Hx        ALLERGIES AND MEDICATIONS: updated and reviewed.  Review of patient's allergies indicates:   Allergen Reactions    Adhesive Other (See Comments)     Tears up the skin and makes it itch    Bactrim [sulfamethoxazole-trimethoprim] Rash     Was hospitalized for rash    Lipitor [atorvastatin] Other (See Comments)     Muscle aches    Albuterol Other (See Comments)     tremors    Topamax [topiramate]      - made her feel 'bad in the head'    Restasis [cyclosporine] Itching        Medication List           Accurate as of 10/22/18  9:46 AM. If you have any questions, ask your nurse or doctor.               START taking these medications    mirtazapine 7.5 MG Tab  Commonly known as:  REMERON  Take 1 tablet (7.5 mg total) by mouth every evening.  Started by:  Urmila Luna MD        CHANGE how you take these medications    desoximetasone 0.25 % cream  Commonly known as:  TOPICORT  Apply as directed bid prn  What changed:  additional instructions     fluocinolone 0.01 % external oil  Commonly known as:  DERMA-SMOOTHE  Apply topically once daily.  What changed:    · when to take this  · reasons to take this     fluocinonide 0.05 % external solution  Commonly known as:  LIDEX  Apply topically once daily. - apply after shampooing  What changed:  additional instructions        CONTINUE taking these medications    acetaminophen 500 MG tablet  Commonly known as:  TYLENOL     ALPRAZolam 0.25 MG tablet  Commonly  known as:  XANAX  Take 1 tablet (0.25 mg total) by mouth nightly as needed for Anxiety.     apixaban 5 mg Tab  Commonly known as:  ELIQUIS     chlorthalidone 25 MG Tab  Commonly known as:  HYGROTEN     escitalopram oxalate 10 MG tablet  Commonly known as:  LEXAPRO  Take 1 tablet (10 mg total) by mouth once daily.     folic acid 1 MG tablet  Commonly known as:  FOLVITE  Take 1 tablet (1 mg total) by mouth once daily.     gabapentin 300 MG capsule  Commonly known as:  NEURONTIN  Take 2 capsules (600 mg total) by mouth 3 (three) times daily.     guaiFENesin 600 mg 12 hr tablet  Commonly known as:  MUCINEX     ipratropium 0.02 % nebulizer solution  Commonly known as:  ATROVENT     ketoconazole 2 % shampoo  Commonly known as:  NIZORAL  Apply topically once daily.     Lactobacillus rhamnosus GG 10 billion cell capsule  Commonly known as:  CULTURELLE     multivitamin per tablet  Commonly known as:  THERAGRAN     omeprazole 20 MG capsule  Commonly known as:  PRILOSEC  TAKE 1 CAPSULE BY MOUTH ONCE DAILY AS NEEDED FOR HEARTBURN (TAKE ONLY AS NEEDED)     ondansetron 4 MG tablet  Commonly known as:  ZOFRAN  Take 1 tablet (4 mg total) by mouth every 8 (eight) hours as needed for Nausea.     sodium chloride 2% 2 % ophthalmic solution  Commonly known as:  XIOMARA 128     sodium chloride 3% 3 % nebulizer solution  Take 4 mLs by nebulization 2 (two) times daily.     SYSTANE BALANCE 0.6 % Drop  Generic drug:  propylene glycol     traMADol 50 mg tablet  Commonly known as:  ULTRAM  Take 1 tablet (50 mg total) by mouth every 6 (six) hours as needed for Pain.     verapamil 180 MG CR tablet  Commonly known as:  CALAN-SR           Where to Get Your Medications      These medications were sent to [id number] [address] [address] [address] [facility]  [address] [place] [address]    Phone:  [phone]   · mirtazapine 7.5 MG Tab           CARE TEAM:  Patient Care Team:  Urmila Luna MD as PCP - General (Internal  Medicine)  Taurus Braga MD as Consulting Physician (Cardiology)  PRECIOUS Zuñiga MD as Consulting Physician (Pulmonary Disease)  Louie Yuan MD as Consulting Physician (Infectious Diseases)  Issac Meyers MD as Consulting Physician (Pain Medicine)           SCREENING HISTORY:  Health Maintenance       Date Due Completion Date    TETANUS VACCINE 01/19/2019 1/19/2009    Override on 1/19/2009: Done    Mammogram 03/02/2019 3/2/2017    DEXA SCAN 04/10/2021 4/10/2018    Lipid Panel 03/22/2023 3/22/2018    Colonoscopy 04/23/2023 4/23/2013            REVIEW OF SYSTEMS:   The patient reports: good dietary habits.  The patient reports : that they do not exercise, but tries to stay active as her lung condition allows.  Review of Systems   Constitutional: Positive for activity change. Negative for unexpected weight change.   HENT: Negative for hearing loss, rhinorrhea and trouble swallowing.    Eyes: Positive for visual disturbance (- is scared to do eye surgery). Negative for discharge.   Respiratory: Positive for cough and shortness of breath (- chronic; doing ok at this time; recently hospitalized and has completed antibiotics). Negative for chest tightness and wheezing.    Cardiovascular: Positive for palpitations. Negative for chest pain.   Gastrointestinal: Negative for blood in stool, constipation, diarrhea and vomiting.   Endocrine: Negative for polydipsia and polyuria.   Genitourinary: Negative for difficulty urinating, dysuria, hematuria and menstrual problem.   Musculoskeletal: Positive for arthralgias (- hopes to get back/neck injection soon). Negative for joint swelling and neck pain.   Skin:        - she reports some pain in the bilateral big toes at the edges of the nails.  Nails are cut quite rounded.  She has had problems ever since she got some pedicures last year.   Neurological: Positive for tremors (- followed by neurology) and weakness. Negative for headaches.   Psychiatric/Behavioral:  "Negative for confusion and dysphoric mood.     Breast ROS: negative for breast lumps  positive for - left breast pain x 1 month             Physical Examination:   Vitals:    10/22/18 0841   BP: (!) 110/56   Pulse: 74   Temp: 97.9 °F (36.6 °C)     Weight: 68.9 kg (152 lb 0.1 oz)   Height: 5' 2" (157.5 cm)   Body mass index is 27.8 kg/m².      Patient did not require to have a chaperone present during the exam today.  General appearance - alert, well appearing, and in no distress and overweight  Mental status - alert, oriented to person, place, and time, normal mood, behavior, speech, dress, motor activity, and thought processes  Eyes - pupils equal and reactive, extraocular eye movements intact  Mouth - mucous membranes moist, pharynx normal without lesions  Neck - supple, no significant adenopathy, carotids upstroke normal bilaterally, no bruits, thyroid exam: thyroid is normal in size without nodules or tenderness  Lymphatics - no palpable lymphadenopathy  Chest - no tachypnea, retractions or cyanosis, rales/rhonchi/wheezing noted throughout, worse on the left (baseline)  Heart - normal rate and regular rhythm  Abdomen - soft, nontender, nondistended, no masses or organomegaly  Breasts - not examined  Back exam - not examined  Neurological - alert, oriented, normal speech, no focal findings noted, cranial nerves II through XII intact, tremor noted  Musculoskeletal - no muscular tenderness noted  Extremities - no pedal edema noted  Skin - normal coloration and turgor, no rashes, no suspicious skin lesions noted; no redness or swelling noted around the toenails      ASSESSMENT AND PLAN:  1. Routine medical exam  Counseled on age appropriate medical preventative services including age appropriate cancer screenings, age appropriate eye and dental exams, over all nutritional health, need for a consistent exercise regimen, and an over all push towards maintaining a vigorous and active lifestyle.  Counseled on age " appropriate vaccines and discussed upcoming health care needs based on age/gender. Discussed good sleep hygiene and stress management.     2. Benign hypertension with chronic kidney disease, stage III  Discussed sodium restriction, maintaining ideal body weight and regular exercise program as physiologic means to achieve blood pressure control. The patient will strive towards this. The current medical regimen is effective;  continue present plan and medications. Recommended patient to check home readings to monitor and see me for followup as scheduled or sooner as needed. Patient was educated that both decongestant and anti-inflammatory medication may raise blood pressure. Stable decreased kidney function. Observe. Patient counseled to avoid/minimize the use of anti-inflammatory  Medication. Discussed to stay well hydrated. Also discussed with patient that good control of blood pressure and/or diabetes, if present, will help to prevent progression.     3. Chronic respiratory failure with hypoxia  Stable.  Continue current inhalers.  She has an appointment to establish with a new pulmonologist in November (her previous pulmonologist has retired).    4. Thoracic aorta atherosclerosis/10. Statin myopathy  Patient with Atherosclerosis of the Aorta.  Stable/asymptomatic. Currently stable on lipid and b/p monitoring. Statin intolerant.    5. Other chronic pulmonary embolism without acute cor pulmonale  On blood thinners. Followed by hematology.    6. Gastroesophageal reflux disease without esophagitis  Symptoms controlled: yes. Reflux precautions discussed (eliminate tobacco if a smoker; minimize caffeine, chocolate and red/white peppermint intake; avoid heavy and spicy meals; don't lay down within 2-3 hours after eating; minimize the intake of NSAIDs). Medication as needed. Patient asked to take medication breaks, if possible - discussed chronic use can limit calcium absorption (which can lead to  osteopenia/osteoporosis), increases the risk for intestinal infections, and can cause kidney damage. There are also some newer studies that show possible increased risk of mortality.     7. Spinal stenosis of lumbar region without neurogenic claudication/9. Sacroiliitis  Stable.  Pain medication as needed.  She hopes to get some back injections in the near future.    8. Osteopenia of multiple sites  We discussed adequate calcium and vitamin D supplementation. We discussed fall precautions. She is up to date on her BMD. No need for prescription medication at this time     11. Essential tremor  She has seen Neurology.  We will try low-dose mirtazapine which will hopefully also help some with her anxiety.  She will let me know in 1 month how she is doing.  She will also follow up with Neurology as per their recommendations.  - mirtazapine (REMERON) 7.5 MG Tab; Take 1 tablet (7.5 mg total) by mouth every evening.  Dispense: 90 tablet; Refill: 1    12. Overweight (BMI 25.0-29.9)  The patient is asked to make an attempt to improve diet and exercise patterns to aid in medical management of this problem.     13. Pain in toes of both feet  No signs of infection.  We discussed the need to let her nails grow out and cut them straight across and not rounded.  Consider consultation with Podiatry if symptoms worsen or persist.    14. Breast pain, left  Patient is up-to-date on her mammogram.  I will refer her to the breast specialist for further evaluation.  She will minimize chocolate and caffeine intake for now.  - Ambulatory Referral to Breast Surgery          Follow-up if symptoms worsen or fail to improve, for follow up chronic medical conditions.. or sooner as needed.

## 2018-10-23 ENCOUNTER — OFFICE VISIT (OUTPATIENT)
Dept: PAIN MEDICINE | Facility: CLINIC | Age: 68
End: 2018-10-23
Payer: MEDICARE

## 2018-10-23 VITALS
BODY MASS INDEX: 27.58 KG/M2 | DIASTOLIC BLOOD PRESSURE: 61 MMHG | TEMPERATURE: 98 F | HEIGHT: 62 IN | RESPIRATION RATE: 18 BRPM | HEART RATE: 82 BPM | WEIGHT: 149.88 LBS | SYSTOLIC BLOOD PRESSURE: 119 MMHG

## 2018-10-23 DIAGNOSIS — M47.816 LUMBAR SPONDYLOSIS: Primary | ICD-10-CM

## 2018-10-23 DIAGNOSIS — M47.9 OSTEOARTHRITIS OF SPINE, UNSPECIFIED SPINAL OSTEOARTHRITIS COMPLICATION STATUS, UNSPECIFIED SPINAL REGION: ICD-10-CM

## 2018-10-23 DIAGNOSIS — M54.17 LUMBOSACRAL RADICULOPATHY: ICD-10-CM

## 2018-10-23 PROCEDURE — 99999 PR PBB SHADOW E&M-EST. PATIENT-LVL III: CPT | Mod: PBBFAC,,, | Performed by: NURSE PRACTITIONER

## 2018-10-23 PROCEDURE — 3078F DIAST BP <80 MM HG: CPT | Mod: CPTII,,, | Performed by: NURSE PRACTITIONER

## 2018-10-23 PROCEDURE — 99214 OFFICE O/P EST MOD 30 MIN: CPT | Mod: S$PBB,,, | Performed by: NURSE PRACTITIONER

## 2018-10-23 PROCEDURE — 3074F SYST BP LT 130 MM HG: CPT | Mod: CPTII,,, | Performed by: NURSE PRACTITIONER

## 2018-10-23 PROCEDURE — 1101F PT FALLS ASSESS-DOCD LE1/YR: CPT | Mod: CPTII,,, | Performed by: NURSE PRACTITIONER

## 2018-10-23 PROCEDURE — 99213 OFFICE O/P EST LOW 20 MIN: CPT | Mod: PBBFAC | Performed by: NURSE PRACTITIONER

## 2018-10-23 PROCEDURE — 99499 UNLISTED E&M SERVICE: CPT | Mod: S$GLB,,, | Performed by: NURSE PRACTITIONER

## 2018-10-23 NOTE — PROGRESS NOTES
Chronic patient Established Note (Follow up visit)      SUBJECTIVE:    Yasmin Asencio presents to the clinic for a follow-up appointment for lower back pain.  We have not seen the patient since May.  She reports doing well until recently.  She is having worsening back pain at this time.  She has intermittent radiation into the legs but the back pain is her greatest complaint.  She had significant pain with RFAs earlier this year and would like to repeat this.  She has been taking Gabapentin 1800 mg daily with benefit of radicular symptoms.   Since the last visit, Yasmin Asencio states the pain has been worsening.  Current pain intensity is 7/10.    She has an extensive PMH including clotting disorder; COPD, h/o PE in 2016, Depression; Diverticulosis; Essential tremor; GERD; Hemangioma of liver; History of tuberculosis (1978); Hypertension; Mixed anxiety and depressive disorder; and Psoriasis.  She has frequent lung infections and is followed by pulmonology and infectious disease.  She was admitted most recently on 9/13/18 and treated with zosyn with improvement.     Pain Disability Index Review:  Last 3 PDI Scores 10/23/2018 2/8/2018 11/11/2016   Pain Disability Index (PDI) [phone]       Pain Medications:  Tramadol PRN and Gabapentin 600 mg TID    Opioid Contract: no     report:  Reviewed and consistent with medication use as prescribed.    Pain Procedures:   10/26/15 IR Epidural Transforaminal Inj 1ST Vert Lumbar Bilat   1/22/16 Facet Injection L2-L5   8/26/16 Bilaeral Lumbar MBB at L2-5  10/14/16 Bilateral L2,3,4,5 MBB- 100% relief for 5 days  12/16/16 Right L2,3,4,5 RFA- 80% relief  3/16/18 Left L2,3,4,5 RFA- 80% relief  4/4/18 Right L2,3,4,5 RFA- 80% relief    Physical Therapy/Home Exercise: yes per self    Imaging:     Lumbar XRAYs 10/13/15  Narrative   Lumbar spine    AP and lateral flexion and extension    There are 5 non-rib bearing lumbar vertebral bodies.  There is left convex scoliosis of  the lumbar spine.  There is degenerative disk disease of L1/L2, L2/L3 and L5/S1.  Vertebral body height is maintained.  There is osteopenia.    The patient is status post cholecystectomy and there is atherosclerosis.   Impression    There are degenerative changes throughout the lumbar spine.          Lumbar 10/22/15  Narrative   Comparison: Lumbar spine 11/15/13    Technique: Multiplanar multi-sequence MR images of the lumbar spine without contrast    Findings: The vertebral body heights and alignment are maintained.  No diffuse marrow replacement process is identified.  Mild levoscoliosis is noted.  The abdominal structures show a possible right hepatic cyst and mild ectasia of the abdominal aorta.    The spinal cord terminates at the L1 level and exhibits normal signal.  Hemangioma noted in the T12 vertebral body.  There are decreased intravertebral disk height at all lumbar levels, with associated endplate changes at L2-3.  Two small Tarlov cyst again noted in the sacral canal posterior to S2.  Annular fissure is noted at L3-4 and L4-5. Level by level findings are detailed below:    L1-2: Circumferential disk bulge and facet hypertrophy causing mild bilateral foraminal stenoses without spinal canal stenosis.  L2-3: Circumferential disk bulge and facet hypertrophy causing mild bilateral foraminal stenoses without spinal canal stenosis.  Small focus of subarticular signal abnormality on the right could represent a focus of fat or a cyst and may be abutting the exiting right L2 nerve root, unchanged from prior.  L3-4: Circumferential disk bulge and facet hypertrophy causing mild spinal canal, moderate right foraminal, and mild left foraminal stenoses.  L4-5: Circumferential disk bulge and facet and ligamentum flavum hypertrophy causing mild right and moderate left foraminal stenosis as well as mild spinal canal stenosis.  L5-S1: Facet hypertrophy causing mild left foraminal stenosis.  No right foraminal or spinal  canal stenosis.   Impression       Multilevel degenerative changes of the lumbar spine related to facet and disk disease as detailed above, overall similar to the prior examination.    Focus of signal abnormality abutting the exiting right L2 nerve root is favored to represent a focus of epidural fat, but could also represent a small cyst and is similar to prior.     Narrative     EXAMINATION:  CTA CHEST NON CORONARY    CLINICAL HISTORY:  shortness of breath, R lower chest pain;    TECHNIQUE:  Low dose axial images, sagittal and coronal reformations were obtained from the thoracic inlet to the lung bases following the IV administration of 75 mL of Omnipaque 350.  Contrast timing was optimized to evaluate the pulmonary arteries.  MIP images were performed.    COMPARISON:  Radiograph 03/07/2018; CTA 01/05/2017    FINDINGS:  The vascular and soft tissues structures at the base of the neck are unremarkable.    There is a left-sided aortic arch with 3 branch vessels. The aorta is normal in caliber, contour, and course with mild calcific atherosclerosis.    There is no cardiac enlargement or pericardial fluid.  There are aortic valve calcifications.    There is no axillary, hilar, or mediastinal lymphadenopathy.    The pulmonary arteries distribute normally.  This study is adequate for the evaluation of pulmonary thromboembolism. There are filling defects within the right upper lobe segmental branch, similar to prior CTA 01/05/2017 consistent with chronic thrombus.  No new acute pulmonary embolism identified.    The trachea is midline and the proximal airways are patent.  Extensive fibrotic changes are again identified throughout the lung apices, most pronounced within the left lung.  There are stable areas of bronchiectasis with interstitial thickening and mosaic attenuation throughout the lungs, similar to prior CTA.  A more focal area of nodular consolidation is seen within the left lower lobe, not present on prior  study.  Stable right lobe nodule measuring 4 mm is again identified.    The esophagus is normal in caliber and contour.  The imaged intra-abdominal structures demonstrate postsurgical changes of cholecystectomy.  There are calcifications in the spleen.    The osseous structures demonstrate mild degenerative changes without acute fracture or bony destructive process.      Impression       1. Chronic filling defects within the right upper lobe pulmonary artery branch consistent with chronic pulmonary embolism.  No new acute pulmonary thromboembolism identified.  2. Stable fibrotic changes throughout the lung apices, left greater than right, with bronchiectasis, interstitial prominence, and mosaic attenuation of the lungs, similar to prior CTA 01/05/2017.  3. Small area of focal nodular consolidation within the left lower lobe, not seen on prior studies which, may be infectious or inflammatory in etiology, possibly even contiguous from the left apical fibrotic changes.  Suggest follow-up CT scan 1 month after antimicrobial therapy to exclude neoplastic nodule.  4. Stable 4 mm nodule within the right middle lobe.  5. Additional findings as detailed above.  This report was flagged in Epic as abnormal.     BMP  Lab Results   Component Value Date     (L) 10/04/2018    K 3.9 10/04/2018    CL 96 10/04/2018    CO2 30 (H) 10/04/2018    BUN 13 10/04/2018    CREATININE 1.1 10/04/2018    CALCIUM 10.2 10/04/2018    ANIONGAP 9 10/04/2018    ESTGFRAFRICA 59 (A) 10/04/2018    EGFRNONAA 51 (A) 10/04/2018       Lab Results   Component Value Date    WBC 8.85 09/19/2018    HGB 10.2 (L) 09/19/2018    HCT 31.7 (L) 09/19/2018    MCV 93 09/19/2018     09/19/2018         Allergies:   Allergies   Allergen Reactions    Adhesive Other (See Comments)     Tears up the skin and makes it itch    Bactrim [Sulfamethoxazole-Trimethoprim] Rash     Was hospitalized for rash    Lipitor [Atorvastatin] Other (See Comments)     Muscle aches     Albuterol Other (See Comments)     tremors    Restasis [Cyclosporine] Itching       Current Medications:   Current Outpatient Medications   Medication Sig Dispense Refill    acetaminophen (TYLENOL) 500 MG tablet Take 1,000 mg by mouth once daily. sleep      apixaban 5 mg Tab Take 5 mg by mouth 2 (two) times daily.      chlorthalidone (HYGROTEN) 25 MG Tab Take 12.5 mg by mouth once daily.      desoximetasone (TOPICORT) 0.25 % cream Apply as directed bid prn (Patient taking differently: Apply as directed twice daily as needed for psoriasis) 60 g 2    escitalopram oxalate (LEXAPRO) 10 MG tablet Take 1 tablet (10 mg total) by mouth once daily. 30 tablet 11    folic acid (FOLVITE) 1 MG tablet Take 1 tablet (1 mg total) by mouth once daily. 100 tablet 2    gabapentin (NEURONTIN) 300 MG capsule Take 2 capsules (600 mg total) by mouth 3 (three) times daily. 540 capsule 0    guaiFENesin (MUCINEX) 600 mg 12 hr tablet Take 1,200 mg by mouth 2 (two) times daily.      ipratropium (ATROVENT) 0.02 % nebulizer solution Inhale 1 vial in nebulizer 3 to 4 times daily      ketoconazole (NIZORAL) 2 % shampoo Apply topically once daily. 120 mL 5    multivitamin (THERAGRAN) per tablet Take 1 tablet by mouth once daily.      omeprazole (PRILOSEC) 20 MG capsule TAKE 1 CAPSULE BY MOUTH ONCE DAILY AS NEEDED FOR HEARTBURN (TAKE ONLY AS NEEDED) 90 capsule 0    ondansetron (ZOFRAN) 4 MG tablet Take 1 tablet (4 mg total) by mouth every 8 (eight) hours as needed for Nausea. 45 tablet 0    propylene glycol (SYSTANE BALANCE) 0.6 % Drop Apply 1 drop to eye daily as needed (dry eye).      sodium chloride 2% (XIOMARA 128) 2 % ophthalmic solution Place 1 drop into both eyes 3 (three) times daily as needed.      sodium chloride 3% 3 % nebulizer solution Take 4 mLs by nebulization 2 (two) times daily. 720 mL 3    verapamil (CALAN-SR) 180 MG CR tablet 2 (two) times daily.       alprazolam (XANAX) 0.25 MG tablet Take 1 tablet (0.25 mg  total) by mouth nightly as needed for Anxiety. 30 tablet 0    fluocinolone (DERMA-SMOOTHE) 0.01 % external oil Apply topically once daily. (Patient taking differently: Apply topically daily as needed. ) 1 Bottle 5    fluocinonide (LIDEX) 0.05 % external solution Apply topically once daily. - apply after shampooing (Patient taking differently: Apply topically once daily. - apply after shampooing as needed) 60 mL 5    Lactobacillus rhamnosus GG (CULTURELLE) 10 billion cell capsule Take 1 capsule by mouth once daily.      mirtazapine (REMERON) 7.5 MG Tab Take 1 tablet (7.5 mg total) by mouth every evening. 90 tablet 1    traMADol (ULTRAM) 50 mg tablet Take 1 tablet (50 mg total) by mouth every 6 (six) hours as needed for Pain. 45 tablet 0     No current facility-administered medications for this visit.        REVIEW OF SYSTEMS:    GENERAL:  No weight loss, malaise or fevers.  HEENT:  Negative for frequent or significant headaches.  NECK:  Negative for lumps, goiter, pain and significant neck swelling.  RESPIRATORY:  Negative for cough, wheezing or shortness of breath. H/O TB and recurrent lung infections.  CARDIOVASCULAR:  Negative for chest pain, leg swelling or palpitations.  GI:  Negative for abdominal discomfort, blood in stools or black stools or change in bowel habits. GERD.  MUSCULOSKELETAL:  See HPI.  SKIN:  Negative for lesions, rash, and itching.  PSYCH:  Negative for sleep disturbance, mood disorder and recent psychosocial stressors.  HEMATOLOGY/LYMPHOLOGY:  Negative for prolonged bleeding, bruising easily or swollen nodes.  NEURO:   No history of headaches, syncope, paralysis, seizures or tremors.  All other reviewed and negative other than HPI.    Past Medical History:  Past Medical History:   Diagnosis Date    Acquired bronchiectasis     due to history of TB - followed by pulmonary, Dr. Zuñiga    Allergy     Amblyopia     rt eye per pt    Anemia of other chronic disease     Anticoagulant  long-term use     Anxiety     Cataract     Chronic obstructive pulmonary disease with acute exacerbation     Clotting disorder     COPD (chronic obstructive pulmonary disease)     COPD (chronic obstructive pulmonary disease)     Depression     Diverticulosis     Essential tremor     GERD (gastroesophageal reflux disease)     Hemangioma of liver     History of tuberculosis 1978    Hypertension     Mixed anxiety and depressive disorder     Psoriasis     PSVT (paroxysmal supraventricular tachycardia)     Pulmonary embolism     S/P PICC central line placement Apr. 2016 - May 2016    Skin disease     Psoriasis    Spondylosis without myelopathy 8/23/2013    Supraventricular tachycardia     Thoracic aorta atherosclerosis     noted on CT scan of chest 1/3/2011    Tuberculosis     Vaginal delivery     x2    Vitamin D deficiency        Past Surgical History:  Past Surgical History:   Procedure Laterality Date    ABLATION N/A 7/24/2017    [facility] CATH LAB    BLOCK-NERVE-MEDIAL BRANCH-LUMBAR Bilateral 10/14/2016    [facility]    BLOCK-NERVE-MEDIAL BRANCH-LUMBAR Bilateral 8/26/2016    [facility] at [facility]    CATARACT EXTRACTION W/  INTRAOCULAR LENS IMPLANT  07/24/12    od dr [place]    CATARACT EXTRACTION W/  INTRAOCULAR LENS IMPLANT  08/07/12    left eye    COLONOSCOPY N/A 4/23/2013    Performed by Simeon Graves [facility] at [facility] ENDO (4TH FLR)    EGD (ESOPHAGOGASTRODUODENOSCOPY) N/A 4/23/2013    [facility] ([facility] F[facility])    GALLBLADDER SURGERY  9/2011    HEART CATH-LEFT Left 6/16/2016    [facility] CATH LAB    INJECTION-FACET Bilateral 1/22/2016    [facility] at [facility]    RADIOFREQUENCY THERMOCOAGULATION (RFTC)-NERVE-MEDIAN BRANCH-LUMBAR Right 4/4/2018    Performed by [facility] at [facility]    RADIOFREQUENCY THERMOCOAGULATION (RFTC)-NERVE-MEDIAN BRANCH-LUMBAR Left 3/16/2018    [facility] at [facility]    RADIOFREQUENCY THERMOCOAGULATION (RFTC)-NERVE-MEDIAN BRANCH-LUMBAR Right 2016    Per[facility] by [facility] at [facility]       Family History:  Family History   Problem Relation Age of Onset    Hypertension Mother     Heart attack Mother     Cancer Father         liver and bladder    Hyperlipidemia Sister     Psoriasis Sister     Cancer Brother         liver    Stroke Maternal Uncle     Cancer Maternal Aunt         stomach    Lung cancer Sister     Heart attack Brother     Heart attack Son     Amblyopia Neg Hx     Blindness Neg Hx     Cataracts Neg Hx     Glaucoma Neg Hx     Macular degeneration Neg Hx     Retinal detachment Neg Hx     Strabismus Neg Hx     Thyroid disease Neg Hx     Melanoma Neg Hx     Lupus Neg Hx     Eczema Neg Hx     COPD Neg Hx        Social History:  Social History     Socioeconomic History    Marital status:      Spouse name: None    Number of children: 2    Years of education: None    Highest education level: None   Social Needs    Financial resource strain: None    Food insecurity - worry: None    Food insecurity - inability: None    Transportation needs - medical: None    Transportation needs - non-medical: None   Occupational History    Occupation: housewife   Tobacco Use    Smoking status: Former Smoker     Packs/day: 2.00     Years: 50.00     Pack years: 100.00     Types: Cigarettes     Last attempt to quit: 2009     Years since quittin.4    Smokeless tobacco: Never Used   Substance and Sexual Activity    Alcohol use: No     Alcohol/week: 0.0 oz    Drug use: No    Sexual activity: Yes     Partners: Male   Other Topics Concern    Are you pregnant or think you may be? No    Breast-feeding No   Social History Narrative    One child  of a heart attack  "at age 35.       OBJECTIVE:    /61   Pulse 82   Temp 97.8 °F (36.6 °C) (Oral)   Resp 18   Ht 5' 2" (1.575 m)   Wt 68 kg (149 lb 14.4 oz)   LMP  (LMP Unknown)   BMI 27.42 kg/m²     PHYSICAL EXAMINATION:    General appearance: Well appearing, in no acute distress, alert and oriented x3.  Psych:  Mood and affect appropriate.    Skin: Skin color, texture, turgor normal, no rashes or lesions, in both upper and lower body.  Head/face:  Atraumatic, normocephalic. No palpable lymph nodes  Cor: RRR  Pulm: CTA  GI: Abdomen soft and non-tender.  Back: Straight leg raising in the sitting and supine positions is negative to radicular pain. There is no pain with palpation to bilateral lumbar facet joints.  Limited flexion and extension with pain.  Positive facet loading bilaterally, R>L.  Painful palpation to SI joints.  LIZZIE is negative.  Extremities: Peripheral joint ROM is full and pain free without obvious instability or laxity in all four extremities. No deformities, edema, or skin discoloration. Good capillary refill.  Musculoskeletal:  Bilateral upper and lower extremity strength is normal and symmetric.  No atrophy or tone abnormalities are noted.  Neuro: Bilateral upper and lower extremity coordination and muscle stretch reflexes are physiologic and symmetric.  Plantar response are downgoing. Decreased sensation to BLE.  Gait: Antalgic.    ASSESSMENT: 68 y.o. year old female with lower back pain, consistent with the following diagnoses:     1. Lumbar spondylosis     2. Lumbosacral radiculopathy     3. Osteoarthritis of spine, unspecified spinal osteoarthritis complication status, unspecified spinal region           PLAN:     - Previous imaging was reviewed and discussed with the patient today.    - Schedule for right then L2,3,4,5 RFAs, 2 weeks aparty.  Previous provided significant benefit for 6 months.  She will let us know of any new respiratory infection.    - Will consider ADRIAN if radicular symptoms " worsen.    - Continue Gabapentin 300 mg 2 pills TID.     - The patient will continue a home exercise routine to help with pain and strengthening.      - RTC 4 weeks after completion of procedures.    - Counseled patient regarding the importance of constant sleeping habits and physical therapy.      The above plan and management options were discussed at length with patient. Patient is in agreement with the above and verbalized understanding.    Cherise Jeffery  10/23/2018

## 2018-10-24 ENCOUNTER — TELEPHONE (OUTPATIENT)
Dept: CARDIOLOGY | Facility: HOSPITAL | Age: 68
End: 2018-10-24

## 2018-10-24 ENCOUNTER — OFFICE VISIT (OUTPATIENT)
Dept: HEMATOLOGY/ONCOLOGY | Facility: CLINIC | Age: 68
End: 2018-10-24
Payer: MEDICARE

## 2018-10-24 VITALS
HEART RATE: 83 BPM | OXYGEN SATURATION: 95 % | WEIGHT: 150.38 LBS | BODY MASS INDEX: 26.64 KG/M2 | DIASTOLIC BLOOD PRESSURE: 62 MMHG | SYSTOLIC BLOOD PRESSURE: 135 MMHG | HEIGHT: 63 IN | TEMPERATURE: 98 F

## 2018-10-24 DIAGNOSIS — Z71.89 ADVANCE CARE PLANNING: Primary | ICD-10-CM

## 2018-10-24 DIAGNOSIS — Z51.5 PALLIATIVE CARE ENCOUNTER: ICD-10-CM

## 2018-10-24 PROCEDURE — 99215 OFFICE O/P EST HI 40 MIN: CPT | Mod: PBBFAC | Performed by: NURSE PRACTITIONER

## 2018-10-24 PROCEDURE — 3075F SYST BP GE 130 - 139MM HG: CPT | Mod: CPTII,,, | Performed by: NURSE PRACTITIONER

## 2018-10-24 PROCEDURE — 99999 PR PBB SHADOW E&M-EST. PATIENT-LVL V: CPT | Mod: PBBFAC,,, | Performed by: NURSE PRACTITIONER

## 2018-10-24 PROCEDURE — 99205 OFFICE O/P NEW HI 60 MIN: CPT | Mod: S$PBB,,, | Performed by: NURSE PRACTITIONER

## 2018-10-24 PROCEDURE — 1101F PT FALLS ASSESS-DOCD LE1/YR: CPT | Mod: CPTII,,, | Performed by: NURSE PRACTITIONER

## 2018-10-24 PROCEDURE — 3078F DIAST BP <80 MM HG: CPT | Mod: CPTII,,, | Performed by: NURSE PRACTITIONER

## 2018-10-24 NOTE — LETTER
October 30, 2018      Willi Chaidez, FNP-C  [address]  [facility] [address]           [facility]  [address]  [facility] [address]  Phone: [phone]          Patient: Yasmin Asencio   MR Number: [id number]   YOB: 1950   Date of Visit: 10/24/2018       Dear Willi Chaidez:    Thank you for referring Yasmin Asencio to me for evaluation. Attached you will find relevant portions of my assessment and plan of care.    If you have questions, please do not hesitate to call me. I look forward to following Yasmin Asencio along with you.    Sincerely,    Leslye العلي, HAYDEE    Enclosure  CC:  No Recipients    If you would like to receive this communication electronically, please contact [email or web address] or [phone] to request more information on [email or web address] Link access.    For providers and/or their staff who would like to refer a patient to [facility], please contact us through our one-stop-shop provider referral line, [facility], at [phone].    If you feel you have received this communication in error or would no longer like to receive these types of communications, please e-mail [email or web address]

## 2018-10-24 NOTE — TELEPHONE ENCOUNTER
----- Message from Oxana Slater MA sent at 10/24/2018  9:51 AM CDT -----      ----- Message -----  From: Adri Nielson  Sent: 10/24/2018   9:16 AM  To: [facility] Staff    Requesting clearance of Eliquis for 3 days prior.  Patient needs to be scheduled with  for a Left, then Right, Lumbar RFA @ L2,3,4,5, procedures will be two weeks apart.    Patient needs to hold medication 3 days prior to each procedure.    Please advise if patient may hold medication.

## 2018-10-25 ENCOUNTER — TELEPHONE (OUTPATIENT)
Dept: FAMILY MEDICINE | Facility: CLINIC | Age: 68
End: 2018-10-25

## 2018-10-25 NOTE — PROGRESS NOTES
Clinic Consult Note  Palliative Care      Consult Requested By: Adelina Chaidez NP  Reason for Consult: GOC/AD            SUBJECTIVE:     History of Present Illness:  Yasmin Asencio is a 68 y.o. female  who presents to the clinic today         Past Medical History:  Past Medical History:   Diagnosis Date    Acquired bronchiectasis     due to history of TB - followed by pulmonary, Dr. Zuñiga    Allergy     Amblyopia     rt eye per pt    Anemia of other chronic disease     Anticoagulant long-term use     Anxiety     Cataract     Chronic obstructive pulmonary disease with acute exacerbation     Clotting disorder     COPD (chronic obstructive pulmonary disease)     COPD (chronic obstructive pulmonary disease)     Depression     Diverticulosis     Essential tremor     GERD (gastroesophageal reflux disease)     Hemangioma of liver     History of tuberculosis 1978    Hypertension     Mixed anxiety and depressive disorder     Psoriasis     PSVT (paroxysmal supraventricular tachycardia)     Pulmonary embolism     S/P PICC central line placement Apr. 2016 - May 2016    Skin disease     Psoriasis    Spondylosis without myelopathy 8/23/2013    Supraventricular tachycardia     Thoracic aorta atherosclerosis     noted on CT scan of chest 1/3/2011    Tuberculosis     Vaginal delivery     x2    Vitamin D deficiency        Past Surgical History:  Past Surgical History:   Procedure Laterality Date    ABLATION N/A 7/24/2017    Performed by [facility]lon [facility]esteros [facility] at [facility] CATH LAB    BLOCK-NERVE-MEDIAL BRANCH-LUMBAR Bilateral 10/14/2016    [facility] at [facility] PAIN MGT    BLOCK-NERVE-MEDIAL BRANCH-LUMBAR Bilateral 8/26/2016    [facility] at [facility] PAIN MGT    CATARACT EXTRACTION W/  INTRAOCULAR LENS IMPLANT  07/24/12    od dr [place]    CATARACT EXTRACTION W/  INTRAOCULAR LENS IMPLANT  08/07/12    left eye    COLONOSCOPY N/A 4/23/2013    Performed by  Simeon Graves [facility] ENDO (4TH FLR)    EGD (ESOPHAGOGASTRODUODENOSCOPY) N/A 2013    Performed by Simeon Graves [facility] at [facility] ENDO (4TH FLR)    GALLBLADDER SURGERY  2011    HEART CATH-LEFT Left 2016    [facility] at [facility] CATH LAB    INJECTION-FACET Bilateral 2016    [facility]    RADIOFREQUENCY THERMOCOAGULATION (RFTC)-NERVE-MEDIAN BRANCH-LUMBAR Right 2018    [facility]    RADIOFREQUENCY THERMOCOAGULATION (RFTC)-NERVE-MEDIAN BRANCH-LUMBAR Left 3/16/2018    [facility]    RADIOFREQUENCY THERMOCOAGULATION (RFTC)-NERVE-MEDIAN BRANCH-LUMBAR Right 2016    Performed by [facility]ac Meyers [facility] at [facility]       Social History:  Social History     Socioeconomic History    Marital status:      Spouse name: Not on file    Number of children: 2    Years of education: Not on file    Highest education level: Not on file   Social Needs    Financial resource strain: Not on file    Food insecurity - worry: Not on file    Food insecurity - inability: Not on file    Transportation needs - medical: Not on file    Transportation needs - non-medical: Not on file   Occupational History    Occupation: housewife   Tobacco Use    Smoking status: Former Smoker     Packs/day: 2.00     Years: 50.00     Pack years: 100.00     Types: Cigarettes     Last attempt to quit: 2009     Years since quittin.4    Smokeless tobacco: Never Used   Substance and Sexual Activity    Alcohol use: No     Alcohol/week: 0.0 oz    Drug use: No    Sexual activity: Yes     Partners: Male   Other Topics Concern    Are you pregnant or think you may be? No    Breast-feeding No   Social History Narrative    One child  of a heart attack at age 35.       Family History:  Family History   Problem Relation Age of Onset    Hypertension Mother      Heart attack Mother     Cancer Father         liver and bladder    Hyperlipidemia Sister     Psoriasis Sister     Cancer Brother         liver    Stroke Maternal Uncle     Cancer Maternal Aunt         stomach    Lung cancer Sister     Heart attack Brother     Heart attack Son     Amblyopia Neg Hx     Blindness Neg Hx     Cataracts Neg Hx     Glaucoma Neg Hx     Macular degeneration Neg Hx     Retinal detachment Neg Hx     Strabismus Neg Hx     Thyroid disease Neg Hx     Melanoma Neg Hx     Lupus Neg Hx     Eczema Neg Hx     COPD Neg Hx        Allergies and Medications (updated and reviewed):  Review of patient's allergies indicates:   Allergen Reactions    Adhesive Other (See Comments)     Tears up the skin and makes it itch    Bactrim [sulfamethoxazole-trimethoprim] Rash     Was hospitalized for rash    Lipitor [atorvastatin] Other (See Comments)     Muscle aches    Albuterol Other (See Comments)     tremors    Topamax [topiramate]      - made her feel 'bad in the head'    Restasis [cyclosporine] Itching        Medication List           Accurate as of 10/24/18 11:59 PM. If you have any questions, ask your nurse or doctor.               CHANGE how you take these medications    desoximetasone 0.25 % cream  Commonly known as:  TOPICORT  Apply as directed bid prn  What changed:  additional instructions     fluocinolone 0.01 % external oil  Commonly known as:  DERMA-SMOOTHE  Apply topically once daily.  What changed:    · when to take this  · reasons to take this     fluocinonide 0.05 % external solution  Commonly known as:  LIDEX  Apply topically once daily. - apply after shampooing  What changed:  additional instructions        CONTINUE taking these medications    acetaminophen 500 MG tablet  Commonly known as:  TYLENOL     ALPRAZolam 0.25 MG tablet  Commonly known as:  XANAX  Take 1 tablet (0.25 mg total) by mouth nightly as needed for Anxiety.     apixaban 5 mg Tab  Commonly known as:   ELIQUIS     chlorthalidone 25 MG Tab  Commonly known as:  HYGROTEN     escitalopram oxalate 10 MG tablet  Commonly known as:  LEXAPRO  Take 1 tablet (10 mg total) by mouth once daily.     folic acid 1 MG tablet  Commonly known as:  FOLVITE  Take 1 tablet (1 mg total) by mouth once daily.     gabapentin 300 MG capsule  Commonly known as:  NEURONTIN  Take 2 capsules (600 mg total) by mouth 3 (three) times daily.     guaiFENesin 600 mg 12 hr tablet  Commonly known as:  MUCINEX     ipratropium 0.02 % nebulizer solution  Commonly known as:  ATROVENT     ketoconazole 2 % shampoo  Commonly known as:  NIZORAL  Apply topically once daily.     Lactobacillus rhamnosus GG 10 billion cell capsule  Commonly known as:  CULTURELLE     mirtazapine 7.5 MG Tab  Commonly known as:  REMERON  Take 1 tablet (7.5 mg total) by mouth every evening.     multivitamin per tablet  Commonly known as:  THERAGRAN     omeprazole 20 MG capsule  Commonly known as:  PRILOSEC  TAKE 1 CAPSULE BY MOUTH ONCE DAILY AS NEEDED FOR HEARTBURN (TAKE ONLY AS NEEDED)     ondansetron 4 MG tablet  Commonly known as:  ZOFRAN  Take 1 tablet (4 mg total) by mouth every 8 (eight) hours as needed for Nausea.     sodium chloride 2% 2 % ophthalmic solution  Commonly known as:  XIOMARA 128     sodium chloride 3% 3 % nebulizer solution  Take 4 mLs by nebulization 2 (two) times daily.     SYSTANE BALANCE 0.6 % Drop  Generic drug:  propylene glycol     traMADol 50 mg tablet  Commonly known as:  ULTRAM  Take 1 tablet (50 mg total) by mouth every 6 (six) hours as needed for Pain.     verapamil 180 MG CR tablet  Commonly known as:  CALAN-SR                    OBJECTIVE:   Symptom Assessment (ESAS 0-10 scale)     ESAS 0 1 2 3 4 5 6 7 8 9 10   Pain x             Dyspnea    x          Anxiety x             Nausea x             Depression  x             Anorexia x             Fatigue x             Insomnia x             Restlessness  x             Agitation x                   Physical  Exam:  Vitals:    10/24/18 1352   BP: 135/62   Pulse: 83   Temp: 98.2 °F (36.8 °C)       Body mass index is 27.06 kg/m².      Physical Exam :    Constitutional: well developed,well nourished female, NAD   Cardiac: RRR, no murmur noted  Respiratory: clear breath sounds, no wheezes, no rales      Decision-Making Capacity:  Patient answered questions, Family answered questions    Advanced Directives:  Living Will: YES  La[place]: No  Medical Power of : No. Spouse by default     Living Arrangements: Lives with spouse    ASSESSMENT/PLAN:    Palliative encounter:    Patient here in clinic today. She c/o some SOB with walking but does not wear oxygen in day and only at night. She has no c/o any pain and is to see her pain management MD in a couple of weeks for RFA which she feels has greatly benefited her.    Discussion with patient and her spouse on pt's current clinical status, goals of care, treatment options, code status, long term expected outcomes and poor prognosis. She is very knowledgeable of all of her medical problems. We discussed QOL and what is acceptable to her. She has an AD/Living Will but more specific she feels she currently has good QOL and states if her heart should stop she would want to do CPR at least once to see if she is able to recover to her current or close to her current baseline. She understands that with all of her health problems she most likely would not be able to make a full recovery and in this case she would not want to be on long term life support or life sustaining measures. Her spouse is in agreement with her wishes and states he will do best to honor them he too states he has an AD/Living will and feels the same.   After a lengthy discussion concerning the pt's values and wishes the pt's family verbalize excellent understanding and insight pertaining to the pt's clinical status the following goals of care were established:Her goals at this time are to continue her current plan  and f/u with her MD appt. She asked that we schedule another appt to have continued discussions as she understands her condition can change at any time.             Plan/Recommendations:  -no new recommendations in patient's current treatment plan  -continue ongoing discussions of GOC with disease/illness advancement   -patient has an AD/Living Will and HPOA  -f/u with Palliative Care in 3 months      > 50% of 60 min visit spent in chart review, face to face discussion of goals of care,  symptom assessment, coordination of care and emotional support.

## 2018-10-29 ENCOUNTER — TELEPHONE (OUTPATIENT)
Dept: PULMONOLOGY | Facility: CLINIC | Age: 68
End: 2018-10-29

## 2018-10-29 DIAGNOSIS — B96.5 PSEUDOMONAS RESPIRATORY INFECTION: Primary | ICD-10-CM

## 2018-10-29 DIAGNOSIS — J98.8 PSEUDOMONAS RESPIRATORY INFECTION: Primary | ICD-10-CM

## 2018-10-30 ENCOUNTER — OFFICE VISIT (OUTPATIENT)
Dept: INFECTIOUS DISEASES | Facility: CLINIC | Age: 68
End: 2018-10-30
Payer: MEDICARE

## 2018-10-30 VITALS
SYSTOLIC BLOOD PRESSURE: 113 MMHG | TEMPERATURE: 99 F | HEART RATE: 92 BPM | BODY MASS INDEX: 27.27 KG/M2 | WEIGHT: 153.88 LBS | HEIGHT: 63 IN | DIASTOLIC BLOOD PRESSURE: 65 MMHG

## 2018-10-30 DIAGNOSIS — J47.9 BRONCHIECTASIS WITHOUT COMPLICATION: Primary | ICD-10-CM

## 2018-10-30 PROCEDURE — 99999 PR PBB SHADOW E&M-EST. PATIENT-LVL III: CPT | Mod: PBBFAC,,, | Performed by: INTERNAL MEDICINE

## 2018-10-30 PROCEDURE — 99213 OFFICE O/P EST LOW 20 MIN: CPT | Mod: S$GLB,,, | Performed by: INTERNAL MEDICINE

## 2018-10-30 PROCEDURE — 1101F PT FALLS ASSESS-DOCD LE1/YR: CPT | Mod: CPTII,S$GLB,, | Performed by: INTERNAL MEDICINE

## 2018-10-30 NOTE — PROGRESS NOTES
Subjective:      Patient ID: Yasmin Asencio is a 68 y.o. female.    Chief Complaint:Follow-up    History of Present Illness    69 y/o female with a history of bronchiectasis and colonization with pseudomonas.  She is here today for follow up.  She is on apixaban given her history of PE.  She was started on remeron for her tremors.  She still has productive cough but no blood.    Review of Systems   Constitution: Positive for weight gain. Negative for chills, decreased appetite, fever, weakness, malaise/fatigue, night sweats and weight loss.   HENT: Negative for congestion, ear pain, hearing loss, hoarse voice, sore throat and tinnitus.    Eyes: Negative for blurred vision, redness and visual disturbance.   Cardiovascular: Positive for palpitations. Negative for chest pain and leg swelling.   Respiratory: Positive for cough, shortness of breath, sputum production and wheezing. Negative for hemoptysis.    Hematologic/Lymphatic: Negative for adenopathy. Bruises/bleeds easily.   Skin: Negative for dry skin, itching, rash and suspicious lesions.   Musculoskeletal: Positive for back pain. Negative for joint pain, myalgias and neck pain.   Gastrointestinal: Negative for abdominal pain, constipation, diarrhea, heartburn, nausea and vomiting.   Genitourinary: Negative for dysuria, flank pain, frequency, hematuria, hesitancy and urgency.   Neurological: Negative for dizziness, headaches, numbness and paresthesias.   Psychiatric/Behavioral: Negative for depression and memory loss. The patient does not have insomnia and is not nervous/anxious.      Objective:   Physical Exam   Constitutional: She is oriented to person, place, and time. She appears well-developed and well-nourished. No distress.   HENT:   Head: Normocephalic and atraumatic.   Right Ear: External ear normal.   Left Ear: External ear normal.   Nose: Nose normal.   Mouth/Throat: Oropharynx is clear and moist. No oropharyngeal exudate.   Eyes: Conjunctivae and  EOM are normal. Pupils are equal, round, and reactive to light. Right eye exhibits no discharge. Left eye exhibits no discharge. No scleral icterus.   Neck: Normal range of motion. Neck supple. No JVD present. No tracheal deviation present. No thyromegaly present.   Cardiovascular: Normal rate, regular rhythm and intact distal pulses. Exam reveals no gallop and no friction rub.   No murmur heard.  Pulmonary/Chest: Effort normal. No stridor. No respiratory distress. She has no wheezes. She has rales. She exhibits no tenderness.   Abdominal: Soft. Bowel sounds are normal. She exhibits no distension and no mass. There is no tenderness. There is no rebound and no guarding.   Musculoskeletal: Normal range of motion. She exhibits no edema or tenderness.   Lymphadenopathy:     She has no cervical adenopathy.   Neurological: She is alert and oriented to person, place, and time. She displays normal reflexes. No cranial nerve deficit. She exhibits normal muscle tone. Coordination normal.   Skin: Skin is warm. No rash noted. She is not diaphoretic. No erythema. No pallor.   Psychiatric: She has a normal mood and affect. Her behavior is normal. Judgment and thought content normal.   Nursing note and vitals reviewed.    Assessment:       1. Bronchiectasis without complication        67 y/o here for routine follow up.  She is doing well.  Feeling much better.  No further antibiotics at this time.  She has follow up with a pulmonary provider.  She is to follow up with me as needed.  Plan:       Bronchiectasis without complication   -follow up as needed

## 2018-11-07 ENCOUNTER — HOSPITAL ENCOUNTER (OUTPATIENT)
Dept: CARDIOLOGY | Facility: CLINIC | Age: 68
Discharge: HOME | End: 2018-11-07
Payer: MEDICARE

## 2018-11-07 ENCOUNTER — OFFICE VISIT (OUTPATIENT)
Dept: ELECTROPHYSIOLOGY | Facility: CLINIC | Age: 68
End: 2018-11-07
Payer: MEDICARE

## 2018-11-07 ENCOUNTER — HOSPITAL ENCOUNTER (OUTPATIENT)
Dept: CARDIOLOGY | Facility: CLINIC | Age: 68
Discharge: HOME | End: 2018-11-07
Attending: INTERNAL MEDICINE
Payer: MEDICARE

## 2018-11-07 VITALS
HEIGHT: 62 IN | WEIGHT: 154 LBS | SYSTOLIC BLOOD PRESSURE: 120 MMHG | BODY MASS INDEX: 28.34 KG/M2 | HEART RATE: 70 BPM | DIASTOLIC BLOOD PRESSURE: 86 MMHG

## 2018-11-07 VITALS — HEART RATE: 75 BPM | SYSTOLIC BLOOD PRESSURE: 126 MMHG | DIASTOLIC BLOOD PRESSURE: 60 MMHG

## 2018-11-07 DIAGNOSIS — G25.0 ESSENTIAL TREMOR: ICD-10-CM

## 2018-11-07 DIAGNOSIS — I47.19 ECTOPIC ATRIAL TACHYCARDIA: ICD-10-CM

## 2018-11-07 DIAGNOSIS — N18.30 BENIGN HYPERTENSION WITH CHRONIC KIDNEY DISEASE, STAGE III: ICD-10-CM

## 2018-11-07 DIAGNOSIS — I47.10 PSVT (PAROXYSMAL SUPRAVENTRICULAR TACHYCARDIA): ICD-10-CM

## 2018-11-07 DIAGNOSIS — Z86.711 PERSONAL HISTORY OF PULMONARY EMBOLISM: ICD-10-CM

## 2018-11-07 DIAGNOSIS — Z86.711 HISTORY OF PULMONARY EMBOLISM: ICD-10-CM

## 2018-11-07 DIAGNOSIS — I12.9 BENIGN HYPERTENSION WITH CHRONIC KIDNEY DISEASE, STAGE III: ICD-10-CM

## 2018-11-07 DIAGNOSIS — J43.8 OTHER EMPHYSEMA: ICD-10-CM

## 2018-11-07 DIAGNOSIS — G25.0 ESSENTIAL TREMOR: Primary | ICD-10-CM

## 2018-11-07 DIAGNOSIS — I49.9 CARDIAC ARRHYTHMIA, UNSPECIFIED CARDIAC ARRHYTHMIA TYPE: ICD-10-CM

## 2018-11-07 LAB
ASCENDING AORTA: 2.81 CM
AV MEAN GRADIENT: 3.03 MMHG
AV PEAK GRADIENT: 5.66 MMHG
AV VALVE AREA: 2.85 CM2
CV ECHO LV RWT: 0.35 CM
DOP CALC AO PEAK VEL: 1.19 M/S
DOP CALC AO VTI: 25.99 CM
DOP CALC LVOT AREA: 3.59 CM2
DOP CALC LVOT DIAMETER: 2.14 CM
DOP CALC LVOT STROKE VOLUME: 74.02 CM3
DOP CALCLVOT PEAK VEL VTI: 20.59 CM
E WAVE DECELERATION TIME: 139.27 MSEC
E/A RATIO: 1.39
E/E' RATIO: 9.18
ECHO LV POSTERIOR WALL: 0.86 CM (ref 0.6–1.1)
FRACTIONAL SHORTENING: 28 % (ref 28–44)
INTERVENTRICULAR SEPTUM: 0.85 CM (ref 0.6–1.1)
LA MAJOR: 5.43 CM
LA MINOR: 5.42 CM
LA WIDTH: 4.47 CM
LEFT ATRIUM SIZE: 4.02 CM
LEFT ATRIUM VOLUME: 82.86 CM3
LEFT INTERNAL DIMENSION IN SYSTOLE: 3.55 CM (ref 2.1–4)
LEFT VENTRICLE DIASTOLIC VOLUME: 114.17 ML
LEFT VENTRICLE SYSTOLIC VOLUME: 52.76 ML
LEFT VENTRICULAR INTERNAL DIMENSION IN DIASTOLE: 4.93 CM (ref 3.5–6)
LEFT VENTRICULAR MASS: 144.48 G
LV LATERAL E/E' RATIO: 8.67
LV SEPTAL E/E' RATIO: 9.75
MV PEAK A VEL: 0.56 M/S
MV PEAK E VEL: 0.78 M/S
MV STENOSIS PRESSURE HALF TIME: 40.39 MS
MV VALVE AREA P 1/2 METHOD: 5.45 CM2
PULM VEIN S/D RATIO: 1.05
PV PEAK D VEL: 0.55 M/S
PV PEAK S VEL: 0.58 M/S
RA MAJOR: 4.98 CM
RA PRESSURE: 3 MMHG
RA WIDTH: 3.59 CM
RIGHT VENTRICULAR END-DIASTOLIC DIMENSION: 3.05 CM
SINUS: 2.91 CM
STJ: 2.56 CM
TDI LATERAL: 0.09
TDI SEPTAL: 0.08
TDI: 0.09
TRICUSPID ANNULAR PLANE SYSTOLIC EXCURSION: 1.9 CM

## 2018-11-07 PROCEDURE — 3079F DIAST BP 80-89 MM HG: CPT | Mod: CPTII,S$GLB,, | Performed by: INTERNAL MEDICINE

## 2018-11-07 PROCEDURE — 3074F SYST BP LT 130 MM HG: CPT | Mod: CPTII,S$GLB,, | Performed by: INTERNAL MEDICINE

## 2018-11-07 PROCEDURE — 99214 OFFICE O/P EST MOD 30 MIN: CPT | Mod: S$GLB,,, | Performed by: INTERNAL MEDICINE

## 2018-11-07 PROCEDURE — 99999 PR PBB SHADOW E&M-EST. PATIENT-LVL V: CPT | Mod: PBBFAC,,, | Performed by: INTERNAL MEDICINE

## 2018-11-07 PROCEDURE — 93000 ELECTROCARDIOGRAM COMPLETE: CPT | Mod: S$GLB,,, | Performed by: INTERNAL MEDICINE

## 2018-11-07 PROCEDURE — 93306 TTE W/DOPPLER COMPLETE: CPT | Mod: S$GLB,,, | Performed by: INTERNAL MEDICINE

## 2018-11-07 PROCEDURE — 1101F PT FALLS ASSESS-DOCD LE1/YR: CPT | Mod: CPTII,S$GLB,, | Performed by: INTERNAL MEDICINE

## 2018-11-07 NOTE — PROGRESS NOTES
"Subjective:    Patient ID:  Yasmin Asencio is a 68 y.o. female who presents for evaluation of PSVT      HPI   68 y.o. F  pulm hemorrhage 11/15, s/p coiling  pseudomonas PNA 2016, s/p extended Abx  COPD, bronchiectasis (home O2 frequently)  HTN on meds  chronic slurred speech, thought due to essential tremor per neuro  hx "AF" destini-lung-coiling procedure (no documentation)  pSVT (s/p AVRNT ablation; likely focal AT remains)    Had palpitations with HR >140 bpm at random intervals for past 10 years. Recently about 1x/month.   WIth palps, gets blurry vision, chest palps with radiation to the neck. Her "blood runs cold" and lasts for 20-35 mins.  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 We also saw a likely focal AT at that procedure also; not ablated due to not able to reinduce.  Since, feeling much better. Does get short palpitations. Event monitor showed short NSAT and one sustained SVT c/w AT. These episodes don't bother her nearly as much as the AVNRT did.    She did well on verapamil for a bit, but developed palps again. HR to 150s. Sx abated after increasing verapamil to 180 bid.  She only had one short run of palps recently while in hospital for pulmonary infection and verapamil was held for a few days. No recurrence with verapamil restarted.    cath 6/16 neg  echo 11/16 50-55% LVEF -> 11/2017 55-60%.  Holter 2/17: SR . no SVT.    My interpretation of today's ECG is NSR 70 bpm. Small P wave.     Review of Systems   Constitution: Negative. Negative for weakness and malaise/fatigue.   HENT: Negative.  Negative for ear pain and tinnitus.    Eyes: Negative for blurred vision.   Cardiovascular: Positive for dyspnea on exertion. Negative for chest pain, near-syncope and syncope.   Respiratory: Negative.  Negative for shortness of breath.    Endocrine: Negative.  Negative for polyuria.   Hematologic/Lymphatic: Does not bruise/bleed easily.   Skin: Negative.  Negative for rash.   Musculoskeletal: Negative.  " Negative for joint pain and muscle weakness.   Gastrointestinal: Negative for abdominal pain and change in bowel habit.   Genitourinary: Negative for frequency.   Neurological: Positive for tremors. Negative for dizziness.   Psychiatric/Behavioral: Negative.  Negative for depression. The patient is not nervous/anxious.    Allergic/Immunologic: Negative for environmental allergies.        Objective:    Physical Exam   Constitutional: She is oriented to person, place, and time. Vital signs are normal. She appears well-developed and well-nourished. She is active and cooperative.   HENT:   Head: Normocephalic and atraumatic.   Eyes: Conjunctivae and EOM are normal.   Neck: Normal range of motion. Carotid bruit is not present. No tracheal deviation and no edema present. No thyroid mass and no thyromegaly present.   Cardiovascular: Normal rate, regular rhythm, normal heart sounds, intact distal pulses and normal pulses.  No extrasystoles are present. PMI is not displaced. Exam reveals no gallop and no friction rub.   No murmur heard.  Pulmonary/Chest: Effort normal. No respiratory distress. She has no wheezes. She has rhonchi in the right middle field, the right lower field, the left middle field and the left lower field. She has no rales.   Abdominal: Soft. Normal appearance. She exhibits no distension. There is no hepatosplenomegaly.   Musculoskeletal: Normal range of motion.   Neurological: She is alert and oriented to person, place, and time. Coordination normal.   Skin: Skin is warm and dry. No rash noted.   Psychiatric: She has a normal mood and affect. Her speech is normal and behavior is normal. Thought content normal. Cognition and memory are normal.   Nursing note and vitals reviewed.        Assessment:       1. Essential tremor    2. PSVT (paroxysmal supraventricular tachycardia)    3. Ectopic atrial tachycardia    4. History of pulmonary embolism    5. Benign hypertension with chronic kidney disease, stage III     6. Personal history of pulmonary embolism         Plan:       continue verapamil for HTN and AT suppression.  Return in 1 year with echo, or earlier prn.

## 2018-11-08 DIAGNOSIS — I49.9 CARDIAC ARRHYTHMIA, UNSPECIFIED CARDIAC ARRHYTHMIA TYPE: Primary | ICD-10-CM

## 2018-11-14 ENCOUNTER — OFFICE VISIT (OUTPATIENT)
Dept: PULMONOLOGY | Facility: CLINIC | Age: 68
End: 2018-11-14
Payer: MEDICARE

## 2018-11-14 ENCOUNTER — HOSPITAL ENCOUNTER (OUTPATIENT)
Dept: RADIOLOGY | Facility: HOSPITAL | Age: 68
Discharge: HOME | End: 2018-11-14
Attending: EMERGENCY MEDICINE
Payer: MEDICARE

## 2018-11-14 VITALS
DIASTOLIC BLOOD PRESSURE: 62 MMHG | SYSTOLIC BLOOD PRESSURE: 118 MMHG | OXYGEN SATURATION: 95 % | HEART RATE: 79 BPM | WEIGHT: 154.31 LBS | HEIGHT: 62 IN | BODY MASS INDEX: 28.39 KG/M2

## 2018-11-14 DIAGNOSIS — B96.5 PSEUDOMONAS RESPIRATORY INFECTION: ICD-10-CM

## 2018-11-14 DIAGNOSIS — I26.99 PE (PULMONARY THROMBOEMBOLISM): ICD-10-CM

## 2018-11-14 DIAGNOSIS — I27.82 OTHER CHRONIC PULMONARY EMBOLISM WITHOUT ACUTE COR PULMONALE: ICD-10-CM

## 2018-11-14 DIAGNOSIS — J47.9 BRONCHIECTASIS WITHOUT COMPLICATION: Primary | ICD-10-CM

## 2018-11-14 DIAGNOSIS — J98.8 PSEUDOMONAS RESPIRATORY INFECTION: ICD-10-CM

## 2018-11-14 PROCEDURE — 99214 OFFICE O/P EST MOD 30 MIN: CPT | Mod: S$GLB,,, | Performed by: EMERGENCY MEDICINE

## 2018-11-14 PROCEDURE — 99999 PR PBB SHADOW E&M-EST. PATIENT-LVL III: CPT | Mod: PBBFAC,,, | Performed by: EMERGENCY MEDICINE

## 2018-11-14 PROCEDURE — 3074F SYST BP LT 130 MM HG: CPT | Mod: CPTII,S$GLB,, | Performed by: EMERGENCY MEDICINE

## 2018-11-14 PROCEDURE — 1101F PT FALLS ASSESS-DOCD LE1/YR: CPT | Mod: CPTII,S$GLB,, | Performed by: EMERGENCY MEDICINE

## 2018-11-14 PROCEDURE — 71046 X-RAY EXAM CHEST 2 VIEWS: CPT | Mod: TC,FY

## 2018-11-14 PROCEDURE — 3078F DIAST BP <80 MM HG: CPT | Mod: CPTII,S$GLB,, | Performed by: EMERGENCY MEDICINE

## 2018-11-14 PROCEDURE — 71046 X-RAY EXAM CHEST 2 VIEWS: CPT | Mod: 26,,, | Performed by: RADIOLOGY

## 2018-11-14 RX ORDER — AMOXICILLIN AND CLAVULANATE POTASSIUM 875; 125 MG/1; MG/1
1 TABLET, FILM COATED ORAL 2 TIMES DAILY
Qty: 14 TABLET | Refills: 0 | Status: SHIPPED | OUTPATIENT
Start: 2018-11-14 | End: 2019-01-29 | Stop reason: SDUPTHER

## 2018-11-14 NOTE — PROGRESS NOTES
Pulmonary & Critical Care Medicine   Clinic Note- 11/14/2018       HPI:     Patient of Dr. Zuñiga in past. New to me. Underlying bronchiectasis, chronic PE on OAC. Etiology of bronchiectasis related to very remote history of M. Kansasi treated at [facility] department on WB. She has recurrent pseudomonal infections requiring varying abx and admissions. Most recent hospital admission in September requiring PICC placement and IV Zosyn. Doing better. Remains with daily productive cough and intermittent blood tinged sputum, but per her at baseline. Continues to utilize bronchodilators, CPT and accapella. Weight and functional status stable.  and daughter present at visit. No additional complaints Denies F/NS/weight loss    Additional Pulmonary History:       Past Medical History:   Diagnosis Date    Acquired bronchiectasis     due to history of TB - followed by pulmonary, Dr. Zuñiga    Allergy     Amblyopia     rt eye per pt    Anemia of other chronic disease     Anticoagulant long-term use     Anxiety     Cataract     Chronic obstructive pulmonary disease with acute exacerbation     Clotting disorder     COPD (chronic obstructive pulmonary disease)     COPD (chronic obstructive pulmonary disease)     Depression     Diverticulosis     Essential tremor     GERD (gastroesophageal reflux disease)     Hemangioma of liver     History of tuberculosis 1978    Hypertension     Mixed anxiety and depressive disorder     Psoriasis     PSVT (paroxysmal supraventricular tachycardia)     Pulmonary embolism     S/P PICC central line placement Apr. 2016 - May 2016    Skin disease     Psoriasis    Spondylosis without myelopathy 8/23/2013    Supraventricular tachycardia     Thoracic aorta atherosclerosis     noted on CT scan of chest 1/3/2011    Tuberculosis     Vaginal delivery     x2    Vitamin D deficiency      Past Surgical History:   Procedure Laterality Date    ABLATION N/A 7/24/2017    Performed  by Marlon Ballesteros MD at [facility] CATH LAB    BLOCK-NERVE-MEDIAL BRANCH-LUMBAR Bilateral 10/14/2016    Performed by [facility]ac Meyers [facility] at [facility]    BLOCK-NERVE-MEDIAL BRANCH-LUMBAR Bilateral 8/26/2016    Performed by [facility]ac [facility]ers [facility] at [facility]    CATARACT EXTRACTION W/  INTRAOCULAR LENS IMPLANT  07/24/12    od dr [place]    CATARACT EXTRACTION W/  INTRAOCULAR LENS IMPLANT  08/07/12    left eye    COLONOSCOPY N/A 4/23/2013    Performed by Simeon Graves MD at [facility] ENDO (4TH FLR)    EGD (ESOPHAGOGASTRODUODENOSCOPY) N/A 4/23/2013    Performed by Simeon Graves MD at [facility] ENDO (4TH FLR)    GALLBLADDER SURGERY  9/2011    HEART CATH-LEFT Left 6/16/2016    Performed [facility]aurus [facility]aga [facility] at [facility] CATH LAB    INJECTION-FACET Bilateral 1/22/2016    Performed by [facility]ers [facility] at [facility]    RADIOFREQUENCY THERMOCOAGULATION (RFTC)-NERVE-MEDIAN BRANCH-LUMBAR Right 4/4/2018    Performed by Issac Meyers [facility] at [facility]    RADIOFREQUENCY THERMOCOAGULATION (RFTC)-NERVE-MEDIAN BRANCH-LUMBAR Left 3/16/2018    Performed by Issac Meyers [facility] at [facility]    RADIOFREQUENCY THERMOCOAGULATION (RFTC)-NERVE-MEDIAN BRANCH-LUMBAR Right 12/16/2016    Performed by [facility]ac Meyers MD at [facility]     Social History:       Drug Allergies:   Review of patient's allergies indicates:   Allergen Reactions    Adhesive Other (See Comments)     Tears up the skin and makes it itch    Bactrim [sulfamethoxazole-trimethoprim] Rash     Was hospitalized for rash    Lipitor [atorvastatin] Other (See Comments)     Muscle aches    Albuterol Other (See Comments)     tremors    Topamax [topiramate]      - made her feel 'bad in the head'    Restasis [cyclosporine] Itching         Review of Systems:   Constitutional: Negative for fever, chills, diaphoresis, appetite change. Positive fatigue   HENT: Negative for congestion, drooling, facial swelling, hearing loss,  "rhinorrhea, sinus pressure, tinnitus, trouble swallowing and voice change.    Eyes: Negative for photophobia, pain and visual disturbance.   Respiratory: Per HPI    Cardiovascular: Negative for chest pain, palpitations and leg swelling.   Gastrointestinal: Negative for nausea, vomiting, abdominal pain, diarrhea and abdominal distention.   Genitourinary: Negative for dysuria, frequency and hematuria.   Musculoskeletal: Negative for myalgias, back pain, arthralgias, neck pain and neck stiffness.   Skin: Negative for color change, pallor, rash and wound.   Allergic/Immunologic: Negative for immunocompromised state.   Neurological: Negative for dizziness, weakness and headaches.    A comprehensive 12-point review of systems was performed, and is negative except for those items mentioned above in the HPI section of this note.     Vital Signs:    /62   Pulse 79   Ht 5' 2" (1.575 m)   Wt 70 kg (154 lb 5.2 oz)   LMP  (LMP Unknown)   SpO2 95%   BMI 28.23 kg/m²      Physical Exam:   GEN- NAD AAOx3 Well Built, Well Appearing   HEENT- ATNC, PERRLA, EOMI, OP-Cl. No JVD, LAD or bruit noted. Trachea Midline.   CV- RRR No M/R/G  RESP- Crackles left UL. Scattered wheezing and rhonchi   GI- S/NT/ND. Positive BS X 4. No HSM Noted  BACK- Spine midline. No step off, crepitus or deformity noted. No midline TTP.   Ext- MAEW, No deformity. No edema or rashes noted.   Neuro- Strength 5/5 symmetric. CN 2-12 intact. Normal gait. Normal sensation.    Clubbing of digits.     Personal Review and Summary of Prior Diagnostics    Laboratory Studies:   Reviewed     Cr- 1.1, HCO3- 30       Microbiology Data:   Microbiology Results (last 7 days)     Procedure Component Value Units Date/Time    AFB Culture & Smear [[id number]]     Order Status:  No result Specimen:  Respiratory from Sputum, Expectorated     AFB Culture & Smear [[id number]]     Order Status:  No result Specimen:  Respiratory from Sputum, Induced     Culture, Respiratory with " Gram Stain [[id number]]     Order Status:  No result Specimen:  Respiratory from Sputum, Expectorated         Summary of Chest Imaging Personally Reviewed:     CT chest-   1. Chronic filling defects within the right upper lobe pulmonary artery branch consistent with chronic pulmonary embolism.  No new acute pulmonary thromboembolism identified.  2. Stable fibrotic changes throughout the lung apices, left greater than right, with bronchiectasis, interstitial prominence, and mosaic attenuation of the lungs, similar to prior CTA 01/05/2017.  3. Small area of focal nodular consolidation within the left lower lobe, not seen on prior studies which, may be infectious or inflammatory in etiology, possibly even contiguous from the left apical fibrotic changes.  Suggest follow-up CT scan 1 month after antimicrobial therapy to exclude neoplastic nodule.  4. Stable 4 mm nodule within the right middle lobe.  5. Additional findings as detailed above.    2D Echo:     Left Ventricle Normal ejection fraction . Cavity is normal. Normal left ventricle diastolic function.   Right Ventricle Normal cavity size.   Left Atrium Cavity is mildly dilated.   Right Atrium Normal cavity size.   Aortic Valve Normal valve structure.   Mitral Valve Normal valve structure. There is mild mitral annular calcification.   Tricuspid Valve The tricuspid valve is normal. Trace regurgitation.   Pulmonic Valve Normal valve structure.   IVC/SVC Normal central venous pressure (3 mm Hg).   Ascending Aorta Normal aortic root, size and contour.   Pericardium No pericardial effusion. Pericardium is normal.         PFT's (4/2017):     FEV1/FVC- 74  FEV1- 1.24L (58)  FVC- 1.67L (62)   DLCO- 53         Assessment:       1. Bronchiectasis without complication    2. Other chronic pulmonary embolism without acute cor pulmonale    3. PE (pulmonary thromboembolism)        Outpatient Encounter Medications as of 11/14/2018   Medication Sig Dispense Refill    acetaminophen  (TYLENOL) 500 MG tablet Take 1,000 mg by mouth once daily. sleep      alprazolam (XANAX) 0.25 MG tablet Take 1 tablet (0.25 mg total) by mouth nightly as needed for Anxiety. 30 tablet 0    apixaban 5 mg Tab Take 5 mg by mouth 2 (two) times daily.      chlorthalidone (HYGROTEN) 25 MG Tab Take 12.5 mg by mouth once daily.      desoximetasone (TOPICORT) 0.25 % cream Apply as directed bid prn (Patient taking differently: Apply as directed twice daily as needed for psoriasis) 60 g 2    escitalopram oxalate (LEXAPRO) 10 MG tablet Take 1 tablet (10 mg total) by mouth once daily. 30 tablet 11    folic acid (FOLVITE) 1 MG tablet Take 1 tablet (1 mg total) by mouth once daily. 100 tablet 2    gabapentin (NEURONTIN) 300 MG capsule Take 2 capsules (600 mg total) by mouth 3 (three) times daily. 540 capsule 0    guaiFENesin (MUCINEX) 600 mg 12 hr tablet Take 1,200 mg by mouth 2 (two) times daily.      ipratropium (ATROVENT) 0.02 % nebulizer solution Inhale 1 vial in nebulizer 3 to 4 times daily      ketoconazole (NIZORAL) 2 % shampoo Apply topically once daily. 120 mL 5    Lactobacillus rhamnosus GG (CULTURELLE) 10 billion cell capsule Take 1 capsule by mouth once daily.      mirtazapine (REMERON) 7.5 MG Tab Take 1 tablet (7.5 mg total) by mouth every evening. 90 tablet 1    multivitamin (THERAGRAN) per tablet Take 1 tablet by mouth once daily.      omeprazole (PRILOSEC) 20 MG capsule TAKE 1 CAPSULE BY MOUTH ONCE DAILY AS NEEDED FOR HEARTBURN (TAKE ONLY AS NEEDED) 90 capsule 0    ondansetron (ZOFRAN) 4 MG tablet Take 1 tablet (4 mg total) by mouth every 8 (eight) hours as needed for Nausea. 45 tablet 0    propylene glycol (SYSTANE BALANCE) 0.6 % Drop Apply 1 drop to eye daily as needed (dry eye).      sodium chloride 2% (XIOMARA 128) 2 % ophthalmic solution Place 1 drop into both eyes 3 (three) times daily as needed.      sodium chloride 3% 3 % nebulizer solution Take 4 mLs by nebulization 2 (two) times daily. 720  mL 3    traMADol (ULTRAM) 50 mg tablet Take 1 tablet (50 mg total) by mouth every 6 (six) hours as needed for Pain. 45 tablet 0    umeclidinium-vilanterol (ANORO ELLIPTA) 62.5-25 mcg/actuation DsDv Inhale into the lungs. Controller      verapamil (CALAN-SR) 180 MG CR tablet 2 (two) times daily.       amoxicillin-clavulanate 875-125mg (AUGMENTIN) 875-125 mg per tablet Take 1 tablet by mouth 2 (two) times daily. Take as needed for exacerbation for 7 days 14 tablet 0    [] ANORO ELLIPTA 62.5-25 mcg/actuation DsDv Inhale 1 puff into the lungs once daily. 30 each 3    fluocinolone (DERMA-SMOOTHE) 0.01 % external oil Apply topically once daily. (Patient taking differently: Apply topically daily as needed. ) 1 Bottle 5    fluocinonide (LIDEX) 0.05 % external solution Apply topically once daily. - apply after shampooing (Patient taking differently: Apply topically once daily. - apply after shampooing as needed) 60 mL 5    [DISCONTINUED] chlorthalidone (HYGROTEN) 25 MG Tab Take 1 tablet (25 mg total) by mouth once daily. 90 tablet 3    [DISCONTINUED] furosemide (LASIX) 20 MG tablet Take 1 tablet (20 mg total) by mouth once daily. for 7 days 7 tablet 0    [DISCONTINUED] gabapentin (NEURONTIN) 300 MG capsule Take 2 capsules (600 mg total) by mouth 3 (three) times daily. 540 capsule 0    [DISCONTINUED] ipratropium (ATROVENT) 0.02 % nebulizer solution INHALE ONE VIAL IN NEBULIZER 3 TIMES DAILY 270 vial 3    [DISCONTINUED] ipratropium (ATROVENT) 0.02 % nebulizer solution INHALE ONE VIAL IN NEBULIZER 4 TIMES DAILY 225 vial 3    [DISCONTINUED] potassium chloride SA (K-DUR,KLOR-CON) 20 MEQ tablet Take 1 tablet (20 mEq total) by mouth once daily. for 7 days 7 tablet 0    [DISCONTINUED] VITAMIN D2 50,000 unit capsule TAKE 1 CAPSULE BY MOUTH ONCE A WEEK (Patient taking differently: TAKE 1 CAPSULE BY MOUTH ONCE A WEEK on ) 4 capsule 4    [] potassium chloride SA CR tablet 40 mEq       [DISCONTINUED]  acetaminophen tablet 1,000 mg       [DISCONTINUED] albuterol-ipratropium 2.5 mg-0.5 mg/3 mL nebulizer solution 3 mL       [DISCONTINUED] albuterol-ipratropium 2.5 mg-0.5 mg/3 mL nebulizer solution 3 mL       [DISCONTINUED] apixaban tablet 5 mg       [DISCONTINUED] artificial tears 0.5 % ophthalmic solution 1 drop       [DISCONTINUED] chlorthalidone tablet 25 mg       [DISCONTINUED] ciprofloxacin HCl tablet 500 mg       [DISCONTINUED] ergocalciferol capsule 50,000 Units       [DISCONTINUED] escitalopram oxalate tablet 10 mg       [DISCONTINUED] folic acid tablet 1 mg       [DISCONTINUED] furosemide injection 20 mg       [DISCONTINUED] gabapentin capsule 600 mg       [DISCONTINUED] guaiFENesin 12 hr tablet 1,200 mg       [DISCONTINUED] ipratropium 0.02 % nebulizer solution 0.5 mg       [DISCONTINUED] ondansetron tablet 4 mg       [DISCONTINUED] pantoprazole EC tablet 40 mg       [DISCONTINUED] piperacillin-tazobactam 4.5 g in sodium chloride 0.9% 100 mL IVPB (ready to mix system)       [DISCONTINUED] potassium chloride 10% oral solution 40 mEq       [DISCONTINUED] ramelteon tablet 8 mg       [DISCONTINUED] sodium chloride 0.9% flush 5 mL       [DISCONTINUED] sodium chloride tablet 1 g       [DISCONTINUED] verapamil CR tablet 180 mg        No facility-administered encounter medications on file as of 11/14/2018.      Orders Placed This Encounter   Procedures    AFB Culture & Smear     Standing Status:   Future     Standing Expiration Date:   1/13/2020    AFB Culture & Smear     Standing Status:   Future     Standing Expiration Date:   1/13/2020    Culture, Respiratory with Gram Stain     Standing Status:   Future     Standing Expiration Date:   1/13/2020    CT Chest Without Contrast     Standing Status:   Future     Standing Expiration Date:   11/14/2019     Order Specific Question:   May the Radiologist modify the order per protocol to meet the clinical needs of the patient?     Answer:   Yes     Ultrasound doppler venous legs bilat (Cupid Only)     Standing Status:   Future     Standing Expiration Date:   11/14/2019     Order Specific Question:   Reason for Exam:     Answer:   Chronic PE, AC       Plan:         1. Bronchiectasis- History of M. Kansasi- Repeat AFB in system never with recurrent organism. Records not available from prior treatment. Recurrent pseudomonas infections (prior cipro resistance) with recent exacerbation in September requiring IV zosyn via PICC. Now clinical improved and at baseline. No constitutional symptoms aside from ongoing fatigue. CT scan from September reviewed and significant PEDRO bronchiectasis and some patchy nodular consolidation in LLL.  Repeat CXR today reviewed and essentially stable.     -- Continue with anoro and inhaled bronchodilators   -- CPT and accapela for mucous clearance   -- Screening AFB sputum/respiratory culture   -- Repeat CT scan in 3 months to follow up LLL nodular opacity   -- Rescue abx provided for worsening sputum production if needed. Discussed role with her in detail       2. Chronic Pulmonary embolism- originally diagnosed 2 years prior. On OAC since that time. Residual RUL defect remains.. Does have some ongoing intermittent minimal hemoptysis.. Will obtain CT imaging of LE.. Will need to reconsider risk benefits of AC moving forward.. Continuing for now.       RTC 4 months     Derek J. Vonderhaar, M.D [facility]

## 2018-11-19 ENCOUNTER — LAB VISIT (OUTPATIENT)
Dept: LAB | Facility: HOSPITAL | Age: 68
End: 2018-11-19
Attending: EMERGENCY MEDICINE
Payer: MEDICARE

## 2018-11-19 DIAGNOSIS — J47.9 BRONCHIECTASIS WITHOUT COMPLICATION: ICD-10-CM

## 2018-11-19 PROCEDURE — 87015 SPECIMEN INFECT AGNT CONCNTJ: CPT | Mod: 59

## 2018-11-19 PROCEDURE — 87205 SMEAR GRAM STAIN: CPT

## 2018-11-19 PROCEDURE — 87186 SC STD MICRODIL/AGAR DIL: CPT

## 2018-11-19 PROCEDURE — 87070 CULTURE OTHR SPECIMN AEROBIC: CPT

## 2018-11-19 PROCEDURE — 87206 SMEAR FLUORESCENT/ACID STAI: CPT | Mod: 91

## 2018-11-19 PROCEDURE — 87077 CULTURE AEROBIC IDENTIFY: CPT | Mod: 59

## 2018-11-19 PROCEDURE — 87116 MYCOBACTERIA CULTURE: CPT

## 2018-11-23 LAB
BACTERIA SPEC AEROBE CULT: NORMAL
BACTERIA SPEC AEROBE CULT: NORMAL
GRAM STN SPEC: NORMAL

## 2018-11-26 DIAGNOSIS — K21.9 GASTROESOPHAGEAL REFLUX DISEASE WITHOUT ESOPHAGITIS: Chronic | ICD-10-CM

## 2018-11-26 RX ORDER — ERGOCALCIFEROL 1.25 MG/1
CAPSULE ORAL
Qty: 4 CAPSULE | Refills: 4 | Status: SHIPPED | OUTPATIENT
Start: 2018-11-26 | End: 2019-04-25 | Stop reason: SDUPTHER

## 2018-11-26 RX ORDER — OMEPRAZOLE 20 MG/1
CAPSULE, DELAYED RELEASE ORAL
Qty: 90 CAPSULE | Refills: 0 | Status: SHIPPED | OUTPATIENT
Start: 2018-11-26 | End: 2018-12-03 | Stop reason: SDUPTHER

## 2018-11-28 ENCOUNTER — HOSPITAL ENCOUNTER (OUTPATIENT)
Facility: OTHER | Age: 68
Discharge: HOME | End: 2018-11-28
Attending: ANESTHESIOLOGY | Admitting: ANESTHESIOLOGY
Payer: MEDICARE

## 2018-11-28 VITALS
OXYGEN SATURATION: 93 % | SYSTOLIC BLOOD PRESSURE: 120 MMHG | WEIGHT: 148 LBS | HEART RATE: 76 BPM | TEMPERATURE: 98 F | BODY MASS INDEX: 27.23 KG/M2 | DIASTOLIC BLOOD PRESSURE: 59 MMHG | RESPIRATION RATE: 7 BRPM | HEIGHT: 62 IN

## 2018-11-28 DIAGNOSIS — M47.819 OSTEOARTHRITIS OF SPINE WITHOUT MYELOPATHY OR RADICULOPATHY, UNSPECIFIED SPINAL REGION: ICD-10-CM

## 2018-11-28 DIAGNOSIS — G89.29 CHRONIC PAIN: ICD-10-CM

## 2018-11-28 DIAGNOSIS — M47.816 SPONDYLOSIS OF LUMBAR REGION WITHOUT MYELOPATHY OR RADICULOPATHY: Primary | ICD-10-CM

## 2018-11-28 DIAGNOSIS — M47.819 SPONDYLOSIS WITHOUT MYELOPATHY: ICD-10-CM

## 2018-11-28 PROCEDURE — 25000003 PHARM REV CODE 250: Performed by: STUDENT IN AN ORGANIZED HEALTH CARE EDUCATION/TRAINING PROGRAM

## 2018-11-28 PROCEDURE — 64636 DESTROY L/S FACET JNT ADDL: CPT | Mod: RT,,, | Performed by: ANESTHESIOLOGY

## 2018-11-28 PROCEDURE — 64635 DESTROY LUMB/SAC FACET JNT: CPT | Performed by: ANESTHESIOLOGY

## 2018-11-28 PROCEDURE — 25000003 PHARM REV CODE 250: Performed by: ANESTHESIOLOGY

## 2018-11-28 PROCEDURE — 99152 MOD SED SAME PHYS/QHP 5/>YRS: CPT | Mod: ,,, | Performed by: ANESTHESIOLOGY

## 2018-11-28 PROCEDURE — 64635 DESTROY LUMB/SAC FACET JNT: CPT | Mod: RT,,, | Performed by: ANESTHESIOLOGY

## 2018-11-28 PROCEDURE — 63600175 PHARM REV CODE 636 W HCPCS: Performed by: ANESTHESIOLOGY

## 2018-11-28 PROCEDURE — 64636 DESTROY L/S FACET JNT ADDL: CPT | Performed by: ANESTHESIOLOGY

## 2018-11-28 RX ORDER — DEXAMETHASONE SODIUM PHOSPHATE 4 MG/ML
INJECTION, SOLUTION INTRA-ARTICULAR; INTRALESIONAL; INTRAMUSCULAR; INTRAVENOUS; SOFT TISSUE
Status: DISCONTINUED | OUTPATIENT
Start: 2018-11-28 | End: 2018-11-28 | Stop reason: HOSPADM

## 2018-11-28 RX ORDER — BUPIVACAINE HYDROCHLORIDE 2.5 MG/ML
INJECTION, SOLUTION EPIDURAL; INFILTRATION; INTRACAUDAL
Status: DISCONTINUED | OUTPATIENT
Start: 2018-11-28 | End: 2018-11-28 | Stop reason: HOSPADM

## 2018-11-28 RX ORDER — FENTANYL CITRATE 50 UG/ML
INJECTION, SOLUTION INTRAMUSCULAR; INTRAVENOUS
Status: DISCONTINUED | OUTPATIENT
Start: 2018-11-28 | End: 2018-11-28 | Stop reason: HOSPADM

## 2018-11-28 RX ORDER — SODIUM CHLORIDE 9 MG/ML
500 INJECTION, SOLUTION INTRAVENOUS CONTINUOUS
Status: DISCONTINUED | OUTPATIENT
Start: 2018-11-28 | End: 2018-11-28 | Stop reason: HOSPADM

## 2018-11-28 RX ORDER — MIDAZOLAM HYDROCHLORIDE 1 MG/ML
INJECTION INTRAMUSCULAR; INTRAVENOUS
Status: DISCONTINUED | OUTPATIENT
Start: 2018-11-28 | End: 2018-11-28 | Stop reason: HOSPADM

## 2018-11-28 RX ORDER — LIDOCAINE HYDROCHLORIDE 10 MG/ML
INJECTION INFILTRATION; PERINEURAL
Status: DISCONTINUED | OUTPATIENT
Start: 2018-11-28 | End: 2018-11-28 | Stop reason: HOSPADM

## 2018-11-28 NOTE — DISCHARGE INSTRUCTIONS
Adult Procedural Sedation Instructions    Recovery After Procedural Sedation (Adult)  You have been given medicine by vein to make you sleep during your surgery. This may have included both a pain medicine and sleeping medicine. Most of the effects have worn off. But you may still have some drowsiness for the next 6 to 8 hours.  Home care  Follow these guidelines when you get home:  · For the next 8 hours, you should be watched by a responsible adult. This person should make sure your condition is not getting worse.  · Don't drink any alcohol for the next 24 hours.  · Don't drive, operate dangerous machinery, or make important business or personal decisions during the next 24 hours.  Note: Your healthcare provider may tell you not to take any medicine by mouth for pain or sleep in the next 4 hours. These medicines may react with the medicines you were given in the hospital. This could cause a much stronger response than usual.  Follow-up care  Follow up with your healthcare provider if you are not alert and back to your usual level of activity within 12 hours.  When to seek medical advice  Call your healthcare provider right away if any of these occur:  · Drowsiness gets worse  · Weakness or dizziness gets worse  · Repeated vomiting  · You can't be awakened   Date Last Reviewed: 10/18/2016  © 8262-0436 The [employer]. [address] [place] [address], [place] [address]. All rights reserved. This information is not intended as a substitute for professional medical care. Always follow your healthcare professional's instructions.       Thank you for allowing us to care for you today. You may receive a survey about the care we provided. Your feedback is valuable and helps us provide excellent care throughout the community.     Home Care Instructions for Pain Management:    1. DIET:   You may resume your normal diet today.   2. BATHING:   You may shower with luke warm water. No tub baths or anything that will soak  injection sites under water for the next 24 hours.  3. DRESSING:   You may remove your bandage today.   4. ACTIVITY LEVEL:   You may resume your normal activities 24 hrs after your procedure. Nothing strenuous today.  5. MEDICATIONS:   You may resume your normal medications today. To restart blood thinners, ask your doctor.  6. DRIVING    If you have received any sedatives by mouth today, you may not drive for 12 hours.    If you have received any sedation through your IV, you may not drive for 24 hrs.   7. SPECIAL INSTRUCTIONS:   No heat to the injection site for 24 hrs including, hot bath or shower, heating pad, moist heat, or hot tubs.    Use ice pack to injection site for any pain or discomfort.  Apply ice packs for 20 minute intervals as needed.    IF you have diabetes, be sure to monitor your blood sugar more closely. IF your injection contained steroids your blood sugar levels may become higher than normal.    If you are still having pain upon discharge:  Your pain may improve over the next 48 hours. The anesthetic (numbing medication) works immediately to 48 hours. IF your injection contained a steroid (anti-inflammatory medication), it takes approximately 3 days to start feeling relief and 7-10 days to see your greatest results from the medication. It is possible you may need subsequent injections. This would be discussed at your follow up appointment with pain management or your referring doctor.      PLEASE CALL YOUR DOCTOR IF:  1. Redness or swelling around the injection site.  2. Fever of 101 degrees or more  3. Drainage (pus) from the injection site.  4. For any continuous bleeding (some dried blood over the incision is normal.)    FOR EMERGENCIES:   If any unusual problems or difficulties occur during clinic hours, call [phone] or [phone].

## 2018-11-28 NOTE — DISCHARGE SUMMARY
Discharge Note  Short Stay      SUMMARY     Admit Date: 11/28/2018    Attending Physician: Gala Jimenes      Discharge Physician: Gala Jimenes      Discharge Date: 11/28/2018 9:40 AM    Procedure(s) (LRB):  RADIOFREQUENCY ABLATION RIGHT LUMBAR L2,3,4,5 RFA (Right)    Final Diagnosis: Lumbar spondylosis [M47.816]    Disposition: Home or self care    Patient Instructions:   Current Discharge Medication List      CONTINUE these medications which have NOT CHANGED    Details   acetaminophen (TYLENOL) 500 MG tablet Take 1,000 mg by mouth once daily. sleep      alprazolam (XANAX) 0.25 MG tablet Take 1 tablet (0.25 mg total) by mouth nightly as needed for Anxiety.  Qty: 30 tablet, Refills: 0    Associated Diagnoses: Mixed anxiety and depressive disorder      apixaban 5 mg Tab Take 5 mg by mouth 2 (two) times daily.      chlorthalidone (HYGROTEN) 25 MG Tab Take 12.5 mg by mouth once daily.      desoximetasone (TOPICORT) 0.25 % cream Apply as directed bid prn  Qty: 60 g, Refills: 2    Associated Diagnoses: Rash      ergocalciferol (ERGOCALCIFEROL) 50,000 unit Cap TAKE 1 CAPSULE BY MOUTH ONCE A WEEK  Qty: 4 capsule, Refills: 4    Comments: Please consider 90 day supplies to promote better adherence      escitalopram oxalate (LEXAPRO) 10 MG tablet Take 1 tablet (10 mg total) by mouth once daily.  Qty: 30 tablet, Refills: 11      fluocinolone (DERMA-SMOOTHE) 0.01 % external oil Apply topically once daily.  Qty: 1 Bottle, Refills: 5    Associated Diagnoses: Psoriasis      fluocinonide (LIDEX) 0.05 % external solution Apply topically once daily. - apply after shampooing  Qty: 60 mL, Refills: 5    Associated Diagnoses: Psoriasis      folic acid (FOLVITE) 1 MG tablet Take 1 tablet (1 mg total) by mouth once daily.  Qty: 100 tablet, Refills: 2    Associated Diagnoses: Personal history of pulmonary embolism; Elevated serum homocysteine level      gabapentin (NEURONTIN) 300 MG capsule Take 2 capsules (600 mg total) by mouth 3  (three) times daily.  Qty: 540 capsule, Refills: 0    Associated Diagnoses: Acute left-sided low back pain with sciatica, sciatica laterality unspecified      guaiFENesin (MUCINEX) 600 mg 12 hr tablet Take 1,200 mg by mouth 2 (two) times daily.      ipratropium (ATROVENT) 0.02 % nebulizer solution Inhale 1 vial in nebulizer 3 to 4 times daily      ketoconazole (NIZORAL) 2 % shampoo Apply topically once daily.  Qty: 120 mL, Refills: 5    Associated Diagnoses: Psoriasis      Lactobacillus rhamnosus GG (CULTURELLE) 10 billion cell capsule Take 1 capsule by mouth once daily.      mirtazapine (REMERON) 7.5 MG Tab Take 1 tablet (7.5 mg total) by mouth every evening.  Qty: 90 tablet, Refills: 1    Associated Diagnoses: Essential tremor      multivitamin (THERAGRAN) per tablet Take 1 tablet by mouth once daily.      !! omeprazole (PRILOSEC) 20 MG capsule TAKE 1 CAPSULE BY MOUTH ONCE DAILY AS NEEDED FOR HEARTBURN (TAKE ONLY AS NEEDED)  Qty: 90 capsule, Refills: 0    Associated Diagnoses: Gastroesophageal reflux disease without esophagitis      !! omeprazole (PRILOSEC) 20 MG capsule TAKE 1 CAPSULE BY MOUTH ONCE DAILY AS NEEDED FOR HEARTBURN (TAKE ONLY AS NEEDED)  Qty: 90 capsule, Refills: 0    Associated Diagnoses: Gastroesophageal reflux disease without esophagitis      ondansetron (ZOFRAN) 4 MG tablet Take 1 tablet (4 mg total) by mouth every 8 (eight) hours as needed for Nausea.  Qty: 45 tablet, Refills: 0      propylene glycol (SYSTANE BALANCE) 0.6 % Drop Apply 1 drop to eye daily as needed (dry eye).      sodium chloride 2% (XIOMARA 128) 2 % ophthalmic solution Place 1 drop into both eyes 3 (three) times daily as needed.      sodium chloride 3% 3 % nebulizer solution Take 4 mLs by nebulization 2 (two) times daily.  Qty: 720 mL, Refills: 3    Comments: Please consider 90 day supplies to promote better adherence  Associated Diagnoses: Acquired bronchiectasis      traMADol (ULTRAM) 50 mg tablet Take 1 tablet (50 mg total) by  mouth every 6 (six) hours as needed for Pain.  Qty: 45 tablet, Refills: 0      umeclidinium-vilanterol (ANORO ELLIPTA) 62.5-25 mcg/actuation DsDv Inhale into the lungs. Controller      verapamil (CALAN-SR) 180 MG CR tablet 2 (two) times daily.        !! - Potential duplicate medications found. Please discuss with provider.              Discharge Diagnosis: Lumbar spondylosis [M47.816]  Condition on Discharge: Stable with no complications to procedure   Diet on Discharge: Same as before.  Activity: as per instruction sheet.  Discharge to: Home with a responsible adult.  Follow up: 2-4 weeks

## 2018-11-28 NOTE — OP NOTE
Patient Name: Yasmin Asencio  MRN: [id number]    INFORMED CONSENT: The procedure, risks, benefits and options were discussed with patient. There are no contraindications to the procedure. The patient expressed understanding and agreed to proceed. The personnel performing the procedure was discussed. I verify that I personally obtained Yasmin's consent prior to the start of the procedure and the signed consent can be found on the patient's chart.    Procedure Date: 11/28/2018    Anesthesia: Topical    Pre Procedure diagnosis: Lumbar spondylosis [M47.816]  1. Spondylosis of lumbar region without myelopathy or radiculopathy    2. Osteoarthritis of spine without myelopathy or radiculopathy, unspecified spinal region    3. Spondylosis without myelopathy    4. Chronic pain      Post-Procedure diagnosis: SAME      Moderate Sedation: Yes - Fentanyl 50 mcg and Midazolam 2 mg    PROCEDURE: Right L2,L3,L4,L5 FACET MEDIAL BRANCH NERVE RADIOFREQUENCY NEUROTOMY (lumbar)          DESCRIPTION OF PROCEDURE: The patient was brought to the procedure room.  After performing time out IV access was obtained prior to the procedure. The patient was positioned prone on the fluoroscopy table. Continuous hemodynamic monitoring was initiated including blood pressure and pulse oximetry. IV sedation was administered incrementally to allow the patient to remain comfortable and conversant throughout the procedure. The area of the lumbar spine was prepped chlorhexidine three times and draped into a sterile field.  Fluoroscopy was used to identify the location of the right side L2, L3, L4, and L5 medial branch nerves at the junctions of the superior articular process and the transverse processes of L3, L4, L5, and the sacral ala respectively.  Skin anesthesia was achieved using 3 cc of Lidocaine 1% over the injection sites. A 20 gauge, 100mm (10mm active tip) venom RF needle was slowly inserted at each level using AP, lateral and oblique  fluoroscopic imaging. Negative aspiration for blood or CSF was confirmed.  Sensory stimulation at 50Hz below 0.5V was achieved at every level. Motor stimulation at 2Hz up to 1.5V did not cause any radicular symptoms at any level. Each level was anesthetized with 1.5 cc of lidocaine 1%.  Radiofrequency lesioning was performed for 90 seconds at 80 degrees at each level.  Total of 6 cc of bupivacaine 0.25% and 10 mg of Decadron was injected was injected at all levels.. The needles were removed and bleeding was nil.  A sterile dressing was applied. Yasmin was taken back to the recovery room for further observation.     Stimulation Results:  L2 = Sensory positive @ 0.5, Motor negative @ 1.5  L3 = Sensory positive @ 1.0, Motor negative @ 1.5  L4 = Sensory positive @ 0.5, Motor negative @ 1.5  L5 = Sensory positive @ 0.5, Motor negative @ 1.5    Blood Loss: Nill  Specimen: None    Attending physician was present throughout the entire case and all critical portions.    Gala Jimenes DO

## 2018-11-28 NOTE — H&P
HPI  Patient with known facet arthropathy who had a successful MBB here today for MB RFA           Past Medical History:   Diagnosis Date    Acquired bronchiectasis     due to history of TB - followed by pulmonary, Dr. Zuñiga    Allergy     Amblyopia     rt eye per pt    Anemia of other chronic disease     Anticoagulant long-term use     Anxiety     Cataract     Chronic obstructive pulmonary disease with acute exacerbation     Clotting disorder     COPD (chronic obstructive pulmonary disease)     COPD (chronic obstructive pulmonary disease)     Depression     Diverticulosis     Essential tremor     GERD (gastroesophageal reflux disease)     Hemangioma of liver     History of tuberculosis 1978    Hypertension     Mixed anxiety and depressive disorder     Psoriasis     PSVT (paroxysmal supraventricular tachycardia)     Pulmonary embolism     S/P PICC central line placement Apr. 2016 - May 2016    Skin disease     Psoriasis    Spondylosis without myelopathy 8/23/2013    Supraventricular tachycardia     Thoracic aorta atherosclerosis     noted on CT scan of chest 1/3/2011    Tuberculosis     Vaginal delivery     x2    Vitamin D deficiency      Past Surgical History:   Procedure Laterality Date    ABLATION N/A 7/24/2017    Performed by [facility]lon [facility]os [facility] at [facility] CATH LAB    BLOCK-NERVE-MEDIAL BRANCH-LUMBAR Bilateral 10/14/2016    [facility] at [facility] PAIN MGT    BLOCK-NERVE-MEDIAL BRANCH-LUMBAR Bilateral 8/26/2016    [facility] at [facility] PAIN [facility]GT    CATARACT EXTRACTION W/  INTRAOCULAR LENS IMPLANT  07/24/12    od dr spain    CATARACT EXTRACTION W/  INTRAOCULAR LENS IMPLANT  08/07/12    left eye    COLONOSCOPY N/A 4/23/2013    Performed by Simeon Graves MD at [facility] ENDO (4TH FLR)    EGD (ESOPHAGOGASTRODUODENOSCOPY) N/A 4/23/2013    Performed by Simeon Graves MD at [facility] ENDO (4TH FLR)    GALLBLADDER SURGERY  9/2011     "HEART CATH-LEFT Left 6/16/2016    Performed [facility] at [facility] CATH LAB    INJECTION-FACET Bilateral 1/22/2016    [facility]formed by [facility] at [facility]    RADIOFREQUENCY THERMOCOAGULATION (RFTC)-NERVE-MEDIAN BRANCH-LUMBAR Right 4/4/2018    Performed by [facility]ac Meyers [facility] at [facility]    RADIOFREQUENCY THERMOCOAGULATION (RFTC)-NERVE-MEDIAN BRANCH-LUMBAR Left 3/16/2018    Performed by [facility]    RADIOFREQUENCY THERMOCOAGULATION (RFTC)-NERVE-MEDIAN BRANCH-LUMBAR Right 12/16/2016    Performed by [facility]ac [facility]ers MD at [facility]     Review of patient's allergies indicates:   Allergen Reactions    Adhesive Other (See Comments)     Tears up the skin and makes it itch    Bactrim [sulfamethoxazole-trimethoprim] Rash     Was hospitalized for rash    Lipitor [atorvastatin] Other (See Comments)     Muscle aches    Albuterol Other (See Comments)     tremors    Topamax [topiramate]      - made her feel 'bad in the head'    Restasis [cyclosporine] Itching        PMHx, PSHx, Allergies, Medications reviewed in epic      ROS negative except pain complaints in HPI    OBJECTIVE:    BP (!) 147/65 (BP Location: Right arm, Patient Position: Lying)   Pulse 78   Temp 98.1 °F (36.7 °C) (Oral)   Resp 18   Ht 5' 2" (1.575 m)   Wt 67.1 kg (148 lb)   LMP  (LMP Unknown)   SpO2 96%   BMI 27.07 kg/m²     PHYSICAL EXAMINATION:    GENERAL: Well appearing, in no acute distress, alert and oriented x3.  PSYCH:  Mood and affect appropriate.  SKIN: Skin color, texture, turgor normal, no rashes or lesions.  CV: RRR with palpation of the radial artery.  PULM: No evidence of respiratory difficulty, symmetric chest rise. Clear to auscultation.  NEURO: Cranial nerves grossly intact.    Plan:    Proceed with procedure as planned    Issac Meyers  11/28/2018  "

## 2018-11-29 DIAGNOSIS — J47.9 ACQUIRED BRONCHIECTASIS: ICD-10-CM

## 2018-11-29 RX ORDER — SODIUM CHLORIDE FOR INHALATION 3 %
VIAL, NEBULIZER (ML) INHALATION
Refills: 6 | OUTPATIENT
Start: 2018-11-29

## 2018-12-03 ENCOUNTER — OFFICE VISIT (OUTPATIENT)
Dept: SURGERY | Facility: CLINIC | Age: 68
End: 2018-12-03
Payer: MEDICARE

## 2018-12-03 VITALS
HEART RATE: 81 BPM | DIASTOLIC BLOOD PRESSURE: 78 MMHG | SYSTOLIC BLOOD PRESSURE: 120 MMHG | WEIGHT: 147.69 LBS | BODY MASS INDEX: 27.18 KG/M2 | HEIGHT: 62 IN

## 2018-12-03 DIAGNOSIS — N64.4 BREAST PAIN, LEFT: Primary | ICD-10-CM

## 2018-12-03 DIAGNOSIS — Z12.31 ENCOUNTER FOR SCREENING MAMMOGRAM FOR MALIGNANT NEOPLASM OF BREAST: ICD-10-CM

## 2018-12-03 PROCEDURE — 99999 PR PBB SHADOW E&M-EST. PATIENT-LVL III: CPT | Mod: PBBFAC,,, | Performed by: SURGERY

## 2018-12-03 PROCEDURE — 99999 PR PBB SHADOW E&M-EST. PATIENT-LVL III: ICD-10-PCS | Mod: PBBFAC,,, | Performed by: SURGERY

## 2018-12-03 PROCEDURE — 1101F PR PT FALLS ASSESS DOC 0-1 FALLS W/OUT INJ PAST YR: ICD-10-PCS | Mod: CPTII,S$GLB,, | Performed by: SURGERY

## 2018-12-03 PROCEDURE — 3078F DIAST BP <80 MM HG: CPT | Mod: CPTII,S$GLB,, | Performed by: SURGERY

## 2018-12-03 PROCEDURE — 1101F PT FALLS ASSESS-DOCD LE1/YR: CPT | Mod: CPTII,S$GLB,, | Performed by: SURGERY

## 2018-12-03 PROCEDURE — 3078F PR MOST RECENT DIASTOLIC BLOOD PRESSURE < 80 MM HG: ICD-10-PCS | Mod: CPTII,S$GLB,, | Performed by: SURGERY

## 2018-12-03 PROCEDURE — 99203 OFFICE O/P NEW LOW 30 MIN: CPT | Mod: S$GLB,,, | Performed by: SURGERY

## 2018-12-03 PROCEDURE — 99203 PR OFFICE/OUTPT VISIT, NEW, LEVL III, 30-44 MIN: ICD-10-PCS | Mod: S$GLB,,, | Performed by: SURGERY

## 2018-12-03 PROCEDURE — 3074F SYST BP LT 130 MM HG: CPT | Mod: CPTII,S$GLB,, | Performed by: SURGERY

## 2018-12-03 PROCEDURE — 3074F PR MOST RECENT SYSTOLIC BLOOD PRESSURE < 130 MM HG: ICD-10-PCS | Mod: CPTII,S$GLB,, | Performed by: SURGERY

## 2018-12-03 NOTE — LETTER
January 29, 2019      Deborah Moses MD  [address]  [facility]  [facility] [address]           [facility] Breast [facility]  [address] [facility] [address]  [place] [address]  Phone: [phone]  Fax: [phone]          Patient: Yasmin Asencio   MR Number: [id number]   YOB: 1950   Date of Visit: 12/3/2018       Dear Dr. Urmila Luna:    Thank you for referring Yasmin Asencio to me for evaluation. Attached you will find relevant portions of my assessment and plan of care.    If you have questions, please do not hesitate to call me. I look forward to following Yasmin Asencio along with you.    Sincerely,    Reinaldo Calvert  CC:  No Recipients    If you would like to receive this communication electronically, please contact [email or web address] or [phone] to request more information on [employer] access.    For providers and/or their staff who would like to refer a patient to [facility], please contact us through our one-stop-shop provider referral line, [facility], at [phone].    If you feel you have received this communication in error or would no longer like to receive these types of communications, please e-mail [email or web address]

## 2018-12-03 NOTE — PROGRESS NOTES
"Left medial inferior breast pain x 1-2 months.  Near nipple.  Sharp pain with pressure only.  Not all the time.        Last mmg 2017    Tried tylenol with relief.    Never had anything like this before      No family history of breast or ovarian cancer    Gyn: menarche at 11  , 1st at 17.  No breastfeed  Menopause at late 40s.  HRT x 5 years.    PE:  /78   Pulse 81   Ht 5' 2" (1.575 m)   Wt 67 kg (147 lb 11.3 oz)   LMP  (LMP Unknown)   BMI 27.02 kg/m²   Breast: No masses, skin changes, or LAD bilaterally. TTP left inferior medial breast.  No masses    Imaging: ordered MMG and ultrasound  Last MMG was 3/2017 negative      ressurance given  Imaging ordered             "

## 2018-12-04 ENCOUNTER — HOSPITAL ENCOUNTER (OUTPATIENT)
Dept: RADIOLOGY | Facility: HOSPITAL | Age: 68
Discharge: HOME | End: 2018-12-04
Attending: SURGERY
Payer: MEDICARE

## 2018-12-04 VITALS — WEIGHT: 147 LBS | HEIGHT: 62 IN | BODY MASS INDEX: 27.05 KG/M2

## 2018-12-04 DIAGNOSIS — N64.4 BREAST PAIN, LEFT: ICD-10-CM

## 2018-12-04 PROCEDURE — 76642 ULTRASOUND BREAST LIMITED: CPT | Mod: TC,LT

## 2018-12-04 PROCEDURE — 77062 BREAST TOMOSYNTHESIS BI: CPT | Mod: 26,,, | Performed by: RADIOLOGY

## 2018-12-04 PROCEDURE — 76642 ULTRASOUND BREAST LIMITED: CPT | Mod: 26,LT,, | Performed by: RADIOLOGY

## 2018-12-04 PROCEDURE — 77066 DX MAMMO INCL CAD BI: CPT | Mod: 26,,, | Performed by: RADIOLOGY

## 2018-12-04 PROCEDURE — 77066 DX MAMMO INCL CAD BI: CPT | Mod: TC

## 2018-12-04 RX ORDER — ESCITALOPRAM OXALATE 10 MG/1
TABLET ORAL
Qty: 30 TABLET | Refills: 11 | Status: SHIPPED | OUTPATIENT
Start: 2018-12-04 | End: 2019-10-30 | Stop reason: SDUPTHER

## 2018-12-05 ENCOUNTER — TELEPHONE (OUTPATIENT)
Dept: FAMILY MEDICINE | Facility: CLINIC | Age: 68
End: 2018-12-05

## 2018-12-05 DIAGNOSIS — Z86.711 PERSONAL HISTORY OF PULMONARY EMBOLISM: Primary | ICD-10-CM

## 2018-12-05 DIAGNOSIS — Z79.01 CURRENT USE OF LONG TERM ANTICOAGULATION: ICD-10-CM

## 2018-12-05 DIAGNOSIS — I27.82 OTHER CHRONIC PULMONARY EMBOLISM WITHOUT ACUTE COR PULMONALE: ICD-10-CM

## 2018-12-05 NOTE — TELEPHONE ENCOUNTER
Spoke with [employer] [facility] states a new script for elquis is needed to complete  Application. Please advise.

## 2018-12-14 NOTE — DISCHARGE INSTRUCTIONS
Home Care Instructions Pain Management:    1.  DIET:    You may resume your normal diet today.    2.  BATHING:    You may shower with luke warm water.    3.  DRESSING:    You may remove your bandage today.    4.  ACTIVITY LEVEL:      You may resume your normal activities 24 hours after your procedure.    5.  MEDICATIONS:    You may resume your normal medications today.    6.  SPECIAL INSTRUCTIONS:    No heat to the injection site for 24 hours including bath or shower, heating pad, moist heat or hot tubs.    Use an ice pack to the injection site for any pain or discomfort.  Apply ice packs for 20 minute intervals as needed.    If you have received any sedatives by mouth today, you can not drive for 12 hours.    If you have received sedation through an IV, you can not drive for 24 hours.    PLEASE CALL YOUR DOCTOR FOR THE FOLLOWIN.  Redness or swelling around the injection site.  2.  Fever of 101 degrees.  3.  Drainage (pus) from the injection site.  4.  For any continuous bleeding (some dried blood over the incision is normal.)    FOR EMERGENCIES:    If any unusual problems or difficulties occur during clinic hours, call [phone] or dial [phone].    Follow up with with your physician in 2-3 weeks.

## 2018-12-17 DIAGNOSIS — M54.40 ACUTE LEFT-SIDED LOW BACK PAIN WITH SCIATICA, SCIATICA LATERALITY UNSPECIFIED: ICD-10-CM

## 2018-12-17 RX ORDER — GABAPENTIN 300 MG/1
CAPSULE ORAL
Qty: 540 CAPSULE | Refills: 0 | Status: SHIPPED | OUTPATIENT
Start: 2018-12-17 | End: 2019-04-25 | Stop reason: SDUPTHER

## 2018-12-18 ENCOUNTER — HOSPITAL ENCOUNTER (OUTPATIENT)
Facility: HOSPITAL | Age: 68
Discharge: HOME | End: 2018-12-18
Attending: ANESTHESIOLOGY | Admitting: ANESTHESIOLOGY
Payer: MEDICARE

## 2018-12-18 VITALS
HEIGHT: 62 IN | OXYGEN SATURATION: 92 % | RESPIRATION RATE: 16 BRPM | TEMPERATURE: 98 F | WEIGHT: 140 LBS | HEART RATE: 74 BPM | BODY MASS INDEX: 25.76 KG/M2 | SYSTOLIC BLOOD PRESSURE: 139 MMHG | DIASTOLIC BLOOD PRESSURE: 60 MMHG

## 2018-12-18 DIAGNOSIS — G89.29 CHRONIC PAIN: ICD-10-CM

## 2018-12-18 DIAGNOSIS — M47.9 OSTEOARTHRITIS OF SPINE, UNSPECIFIED SPINAL OSTEOARTHRITIS COMPLICATION STATUS, UNSPECIFIED SPINAL REGION: Primary | ICD-10-CM

## 2018-12-18 PROCEDURE — 25000003 PHARM REV CODE 250: Performed by: ANESTHESIOLOGY

## 2018-12-18 PROCEDURE — S0020 INJECTION, BUPIVICAINE HYDRO: HCPCS | Performed by: ANESTHESIOLOGY

## 2018-12-18 PROCEDURE — 99152 MOD SED SAME PHYS/QHP 5/>YRS: CPT | Performed by: ANESTHESIOLOGY

## 2018-12-18 PROCEDURE — 99152 MOD SED SAME PHYS/QHP 5/>YRS: CPT | Mod: ,,, | Performed by: ANESTHESIOLOGY

## 2018-12-18 PROCEDURE — 64636 DESTROY L/S FACET JNT ADDL: CPT | Mod: LT,,, | Performed by: ANESTHESIOLOGY

## 2018-12-18 PROCEDURE — 63600175 PHARM REV CODE 636 W HCPCS: Performed by: ANESTHESIOLOGY

## 2018-12-18 PROCEDURE — 64635 DESTROY LUMB/SAC FACET JNT: CPT | Mod: LT,,, | Performed by: ANESTHESIOLOGY

## 2018-12-18 PROCEDURE — 64635 DESTROY LUMB/SAC FACET JNT: CPT | Performed by: ANESTHESIOLOGY

## 2018-12-18 PROCEDURE — 64636 DESTROY L/S FACET JNT ADDL: CPT | Performed by: ANESTHESIOLOGY

## 2018-12-18 RX ORDER — FENTANYL CITRATE 50 UG/ML
INJECTION, SOLUTION INTRAMUSCULAR; INTRAVENOUS
Status: DISCONTINUED | OUTPATIENT
Start: 2018-12-18 | End: 2018-12-18 | Stop reason: HOSPADM

## 2018-12-18 RX ORDER — DEXAMETHASONE SODIUM PHOSPHATE 100 MG/10ML
INJECTION INTRAMUSCULAR; INTRAVENOUS
Status: DISCONTINUED | OUTPATIENT
Start: 2018-12-18 | End: 2018-12-18 | Stop reason: HOSPADM

## 2018-12-18 RX ORDER — SODIUM CHLORIDE 9 MG/ML
500 INJECTION, SOLUTION INTRAVENOUS CONTINUOUS
Status: DISCONTINUED | OUTPATIENT
Start: 2018-12-18 | End: 2018-12-18 | Stop reason: HOSPADM

## 2018-12-18 RX ORDER — BUPIVACAINE HYDROCHLORIDE 5 MG/ML
INJECTION, SOLUTION EPIDURAL; INTRACAUDAL
Status: DISCONTINUED | OUTPATIENT
Start: 2018-12-18 | End: 2018-12-18 | Stop reason: HOSPADM

## 2018-12-18 RX ORDER — MIDAZOLAM HYDROCHLORIDE 1 MG/ML
INJECTION, SOLUTION INTRAMUSCULAR; INTRAVENOUS
Status: DISCONTINUED | OUTPATIENT
Start: 2018-12-18 | End: 2018-12-18 | Stop reason: HOSPADM

## 2018-12-18 RX ORDER — LIDOCAINE HYDROCHLORIDE 10 MG/ML
INJECTION INFILTRATION; PERINEURAL
Status: DISCONTINUED | OUTPATIENT
Start: 2018-12-18 | End: 2018-12-18 | Stop reason: HOSPADM

## 2018-12-18 NOTE — H&P
HPI    67yo F with lumbar spondylosis who presents for left sided L2, 3, 4,5 MB RFA after receiving good results from bilateral MBB at the same levels. Patient is s/p right lumbar RFA at the same levels. Patient stopped taking her eliquis on Saturday and denies any recent fevers or antibiotic use.         Past Medical History:   Diagnosis Date    Acquired bronchiectasis     due to history of TB - followed by pulmonary, Dr. Zuñiga    Allergy     Amblyopia     rt eye per pt    Anemia of other chronic disease     Anticoagulant long-term use     Anxiety     Cataract     Chronic obstructive pulmonary disease with acute exacerbation     Clotting disorder     COPD (chronic obstructive pulmonary disease)     COPD (chronic obstructive pulmonary disease)     Depression     Diverticulosis     Essential tremor     GERD (gastroesophageal reflux disease)     Hemangioma of liver     History of tuberculosis 1978    Hypertension     Mixed anxiety and depressive disorder     Psoriasis     PSVT (paroxysmal supraventricular tachycardia)     Pulmonary embolism     S/P PICC central line placement Apr. 2016 - May 2016    Skin disease     Psoriasis    Spondylosis without myelopathy 8/23/2013    Supraventricular tachycardia     Thoracic aorta atherosclerosis     noted on CT scan of chest 1/3/2011    Tuberculosis     Vaginal delivery     x2    Vitamin D deficiency      Past Surgical History:   Procedure Laterality Date    ABLATION N/A 7/24/2017    Performed by [facility] at [facility] CATH LAB    BLOCK-NERVE-MEDIAL BRANCH-LUMBAR Bilateral 10/14/2016    Performed by [facility]ac Meyers [facility] at [facility] [place] PAIN MGT    BLOCK-NERVE-MEDIAL BRANCH-LUMBAR Bilateral 8/26/2016    Performed by [facility]ac Meyers [facility] at [facility] [place] PAIN MGT    CATARACT EXTRACTION W/  INTRAOCULAR LENS IMPLANT  07/24/12    od dr spain    CATARACT EXTRACTION W/  INTRAOCULAR LENS IMPLANT  08/07/12    left eye    COLONOSCOPY N/A 4/23/2013     "Performed by Simeon Graves MD at [facility] ENDO (4TH FLR)    EGD (ESOPHAGOGASTRODUODENOSCOPY) N/A 4/23/2013    Performed by Simeon Graves MD at [facility] ENDO (4TH FLR)    GALLBLADDER SURGERY  9/2011    HEART CATH-LEFT Left 6/16/2016    Performed by [facility] at [facility] CATH LAB    INJECTION-FACET Bilateral 1/22/2016    [facility] at [facility]    RADIOFREQUENCY ABLATION RIGHT LUMBAR L2,3,4,5 RFA Right 11/28/2018    Performed by [facility] at [facility]    RADIOFREQUENCY THERMOCOAGULATION (RFTC)-NERVE-MEDIAN BRANCH-LUMBAR Right 4/4/2018    Performed by Issac [facility]ers [facility] at [facility]    RADIOFREQUENCY THERMOCOAGULATION (RFTC)-NERVE-MEDIAN BRANCH-LUMBAR Left 3/16/2018    Performed by [facility]    RADIOFREQUENCY THERMOCOAGULATION (RFTC)-NERVE-MEDIAN BRANCH-LUMBAR Right 12/16/2016    Performed by [facility]ers MD at [facility]     Review of patient's allergies indicates:   Allergen Reactions    Adhesive Other (See Comments)     Tears up the skin and makes it itch    Bactrim [sulfamethoxazole-trimethoprim] Rash     Was hospitalized for rash    Lipitor [atorvastatin] Other (See Comments)     Muscle aches    Albuterol Other (See Comments)     tremors    Topamax [topiramate]      - made her feel 'bad in the head'    Restasis [cyclosporine] Itching        PMHx, PSHx, Allergies, Medications reviewed in epic      ROS negative except pain complaints in HPI    OBJECTIVE:    LMP  (LMP Unknown)   Breastfeeding? No      Vitals:    12/18/18 1428   BP: 131/60   BP Location: Right arm   Patient Position: Lying   Pulse: 78   Resp: 18   Temp: 98.5 °F (36.9 °C)   TempSrc: Oral   SpO2: 100%   Weight: 63.5 kg (140 lb)   Height: 5' 2" (1.575 m)         PHYSICAL EXAMINATION:    GENERAL: Well appearing, in no acute distress, alert and oriented x3.  PSYCH:  Mood and affect appropriate.  SKIN: Skin color, texture, turgor normal, " no rashes or lesions.  CV: RRR with palpation of the radial artery.  PULM: No evidence of respiratory difficulty, symmetric chest rise. Clear to auscultation.  NEURO: Cranial nerves grossly intact.    Plan:    Proceed with procedure as planned    Mari Bess  12/18/2018

## 2018-12-18 NOTE — OP NOTE
Patient Name: Yasmin Asencio  MRN: [id number]    INFORMED CONSENT: The procedure, risks, benefits and options were discussed with patient. There are no contraindications to the procedure. The patient expressed understanding and agreed to proceed. The personnel performing the procedure was discussed. I verify that I personally obtained Yasmin's consent prior to the start of the procedure and the signed consent can be found on the patient's chart.    Procedure Date: 12/18/2018    Anesthesia: Topical    Pre Procedure diagnosis: Lumbar spondylosis [M47.816]  1. Osteoarthritis of spine, unspecified spinal osteoarthritis complication status, unspecified spinal region    2. Chronic pain      Post-Procedure diagnosis: SAME      Moderate Sedation: Yes - Fentanyl 50 mcg and Midazolam 2 mg    PROCEDURE: left L2-3-4-5 FACET MEDIAL BRANCH NERVE RADIOFREQUENCY NEUROTOMY (lumbar)          DESCRIPTION OF PROCEDURE: The patient was brought to the procedure room.  After performing time out IV access was obtained prior to the procedure. The patient was positioned prone on the fluoroscopy table. Continuous hemodynamic monitoring was initiated including blood pressure and pulse oximetry. IV sedation was administered incrementally to allow the patient to remain comfortable and conversant throughout the procedure. The area of the lumbar spine was prepped chlorhexidine three times and draped into a sterile field.  Fluoroscopy was used to identify the location of the LEFT side L2, L3, L4, and L5 medial branch nerves at the junctions of the superior articular process and the transverse processes of L3, L4, L5, and the sacral ala respectively.  Skin anesthesia was achieved using 3 cc of Lidocaine 1% over the injection sites. A 18 gauge, 100mm (10mm active tip) curved RF needle was slowly inserted at each level using AP, lateral and oblique fluoroscopic imaging. Negative aspiration for blood or CSF was confirmed.  Sensory stimulation at  50Hz below 0.5V was achieved at every level. Motor stimulation at 2Hz up to 1.5V did not cause any radicular symptoms at any level. Each level was anesthetized with 1.5 cc of lidocaine 1%.  Radiofrequency lesioning was performed for 90 seconds at 80 degrees in two different positions at each level.  Total of 4 cc of bupivacaine 0.25% and 10 mg of Decadron was injected was injected at all levels.. The needles were removed and bleeding was nil.  A sterile dressing was applied. Yasmin was taken back to the recovery room for further observation.     Stimulation Results:  L2 = Sensory positive @ 0.2, Motor negative @ 1.5  L3 = Sensory positive @ 0.4, Motor negative @ 1.5  L4 = Sensory positive @ 0.5, Motor negative @ 1.5  L5 = Sensory positive @ 0.5, Motor negative @ 1.5    Blood Loss: Nill  Specimen: None    Issac Meyers MD

## 2018-12-18 NOTE — DISCHARGE SUMMARY
Discharge Note  Short Stay      SUMMARY     Admit Date: 12/18/2018    Attending Physician: Issac Meyers      Discharge Physician: Issac Meyers      Discharge Date: 12/18/2018 4:11 PM    Procedure(s) (LRB):  RADIOFREQUENCY THERMAL COAGULATION (Left)    Final Diagnosis: Lumbar spondylosis [M47.816]    Disposition: Home or self care    Patient Instructions:   Discharge Medication List as of 12/14/2018 10:50 AM      START taking these medications    Details   acetaminophen (TYLENOL) 500 MG tablet Take 1,000 mg by mouth once daily. sleep, Historical Med      alprazolam (XANAX) 0.25 MG tablet Take 1 tablet (0.25 mg total) by mouth nightly as needed for Anxiety., Starting Fri 9/22/2017, Print      chlorthalidone (HYGROTEN) 25 MG Tab Take 12.5 mg by mouth once daily., Historical Med      desoximetasone (TOPICORT) 0.25 % cream Apply as directed bid prn, Normal      ergocalciferol (ERGOCALCIFEROL) 50,000 unit Cap TAKE 1 CAPSULE BY MOUTH ONCE A WEEK, Normal      escitalopram oxalate (LEXAPRO) 10 MG tablet TAKE ONE TABLET BY MOUTH ONCE DAILY, Normal      folic acid (FOLVITE) 1 MG tablet Take 1 tablet (1 mg total) by mouth once daily., Starting Tue 5/1/2018, Until Wed 5/1/2019, Normal      guaiFENesin (MUCINEX) 600 mg 12 hr tablet Take 1,200 mg by mouth 2 (two) times daily., Starting Tue 12/12/2017, Historical Med      ipratropium (ATROVENT) 0.02 % nebulizer solution Inhale 1 vial in nebulizer 3 to 4 times daily, Historical Med      ketoconazole (NIZORAL) 2 % shampoo Apply topically once daily., Starting Wed 3/21/2018, Normal      Lactobacillus rhamnosus GG (CULTURELLE) 10 billion cell capsule Take 1 capsule by mouth once daily., Historical Med      mirtazapine (REMERON) 7.5 MG Tab Take 1 tablet (7.5 mg total) by mouth every evening., Starting Mon 10/22/2018, Normal      multivitamin (THERAGRAN) per tablet Take 1 tablet by mouth once daily., Historical Med      omeprazole (PRILOSEC) 20 MG capsule TAKE 1 CAPSULE BY MOUTH  ONCE DAILY AS NEEDED FOR HEARTBURN (TAKE ONLY AS NEEDED), Normal      propylene glycol (SYSTANE BALANCE) 0.6 % Drop Apply 1 drop to eye daily as needed (dry eye)., Historical Med      sodium chloride 2% (XIOMARA 128) 2 % ophthalmic solution Place 1 drop into both eyes 3 (three) times daily as needed., Historical Med      sodium chloride 3% 3 % nebulizer solution Take 4 mLs by nebulization 2 (two) times daily., Starting Thu 8/16/2018, Normal      umeclidinium-vilanterol (ANORO ELLIPTA) 62.5-25 mcg/actuation DsDv Inhale into the lungs. Controller, Historical Med      verapamil (CALAN-SR) 180 MG CR tablet 2 (two) times daily. , Starting Thu 3/1/2018, Historical Med      apixaban (ELIQUIS) 5 mg Tab Take 1 tablet (5 mg total) by mouth 2 (two) times daily., Starting Wed 12/5/2018, Print      ondansetron (ZOFRAN) 4 MG tablet Take 1 tablet (4 mg total) by mouth every 8 (eight) hours as needed for Nausea., Starting Thu 10/26/2017, Normal      traMADol (ULTRAM) 50 mg tablet Take 1 tablet (50 mg total) by mouth every 6 (six) hours as needed for Pain., Starting Thu 10/26/2017, Normal      gabapentin (NEURONTIN) 300 MG capsule Take 2 capsules (600 mg total) by mouth 3 (three) times daily., Starting Thu 10/18/2018, Until Wed 1/16/2019, Normal         CONTINUE these medications which have NOT CHANGED    Details   fluocinolone (DERMA-SMOOTHE) 0.01 % external oil Apply topically once daily., Starting Wed 3/21/2018, Until Mon 12/3/2018, Normal      fluocinonide (LIDEX) 0.05 % external solution Apply topically once daily. - apply after shampooing, Starting Wed 3/21/2018, Until Mon 12/3/2018, Normal                 Discharge Diagnosis: Lumbar spondylosis [M47.816]  Condition on Discharge: Stable with no complications to procedure   Diet on Discharge: Same as before.  Activity: as per instruction sheet.  Discharge to: Home with a responsible adult.  Follow up: 2-4 weeks

## 2018-12-18 NOTE — PLAN OF CARE
Sedation start time 1511  MD arrive at 1510  MD departure time 1523  Sedation end time 1541

## 2018-12-26 NOTE — PROGRESS NOTES
Called patient let her know about her results.  Pap was normal, CBC and BMP were good.  Cholesterol slightly elevated however improved from prior. HDL is low. Recommended exercise, healthier diet and yearly follow up of this.     Ferdinand Ramos, PGY2 MIDLINE removed per MD order, pt tolerated well, gauze applied and wrapped with coflex, dressing cdi, pt instructed to keep dressing on and dry for 24 hrs then remove, pt educated on s/s of infection

## 2019-01-10 ENCOUNTER — OFFICE VISIT (OUTPATIENT)
Dept: PAIN MEDICINE | Facility: CLINIC | Age: 69
End: 2019-01-10
Payer: MEDICARE

## 2019-01-10 VITALS
DIASTOLIC BLOOD PRESSURE: 72 MMHG | WEIGHT: 157.19 LBS | SYSTOLIC BLOOD PRESSURE: 125 MMHG | BODY MASS INDEX: 28.93 KG/M2 | TEMPERATURE: 98 F | HEIGHT: 62 IN | HEART RATE: 76 BPM

## 2019-01-10 DIAGNOSIS — M47.816 FACET ARTHRITIS OF LUMBAR REGION: ICD-10-CM

## 2019-01-10 DIAGNOSIS — M47.816 SPONDYLOSIS OF LUMBAR REGION WITHOUT MYELOPATHY OR RADICULOPATHY: ICD-10-CM

## 2019-01-10 DIAGNOSIS — G89.4 CHRONIC PAIN SYNDROME: ICD-10-CM

## 2019-01-10 DIAGNOSIS — M53.3 SACROILIAC JOINT PAIN: Primary | ICD-10-CM

## 2019-01-10 DIAGNOSIS — M51.36 DDD (DEGENERATIVE DISC DISEASE), LUMBAR: ICD-10-CM

## 2019-01-10 PROCEDURE — 99999 PR PBB SHADOW E&M-EST. PATIENT-LVL III: ICD-10-PCS | Mod: PBBFAC,,, | Performed by: NURSE PRACTITIONER

## 2019-01-10 PROCEDURE — 1101F PT FALLS ASSESS-DOCD LE1/YR: CPT | Mod: CPTII,S$GLB,, | Performed by: NURSE PRACTITIONER

## 2019-01-10 PROCEDURE — 99213 PR OFFICE/OUTPT VISIT, EST, LEVL III, 20-29 MIN: ICD-10-PCS | Mod: S$GLB,,, | Performed by: NURSE PRACTITIONER

## 2019-01-10 PROCEDURE — 3078F DIAST BP <80 MM HG: CPT | Mod: CPTII,S$GLB,, | Performed by: NURSE PRACTITIONER

## 2019-01-10 PROCEDURE — 1101F PR PT FALLS ASSESS DOC 0-1 FALLS W/OUT INJ PAST YR: ICD-10-PCS | Mod: CPTII,S$GLB,, | Performed by: NURSE PRACTITIONER

## 2019-01-10 PROCEDURE — 99213 OFFICE O/P EST LOW 20 MIN: CPT | Mod: S$GLB,,, | Performed by: NURSE PRACTITIONER

## 2019-01-10 PROCEDURE — 3078F PR MOST RECENT DIASTOLIC BLOOD PRESSURE < 80 MM HG: ICD-10-PCS | Mod: CPTII,S$GLB,, | Performed by: NURSE PRACTITIONER

## 2019-01-10 PROCEDURE — 99499 RISK ADDL DX/OHS AUDIT: ICD-10-PCS | Mod: S$GLB,,, | Performed by: NURSE PRACTITIONER

## 2019-01-10 PROCEDURE — 3074F PR MOST RECENT SYSTOLIC BLOOD PRESSURE < 130 MM HG: ICD-10-PCS | Mod: CPTII,S$GLB,, | Performed by: NURSE PRACTITIONER

## 2019-01-10 PROCEDURE — 99499 UNLISTED E&M SERVICE: CPT | Mod: S$GLB,,, | Performed by: NURSE PRACTITIONER

## 2019-01-10 PROCEDURE — 3074F SYST BP LT 130 MM HG: CPT | Mod: CPTII,S$GLB,, | Performed by: NURSE PRACTITIONER

## 2019-01-10 PROCEDURE — 99999 PR PBB SHADOW E&M-EST. PATIENT-LVL III: CPT | Mod: PBBFAC,,, | Performed by: NURSE PRACTITIONER

## 2019-01-10 NOTE — PROGRESS NOTES
Chronic patient Established Note (Follow up visit)      SUBJECTIVE:    Yasmin Asencio presents to the clinic for a follow-up appointment for lower back pain. She is s/p right L2,3,4,5 RFA on 11/28/2018. She is s/p left L2,3,4,5 RFA on 12/18/2018. She reports 100% relief on her right side. She is unsure of relief of her left sided low back pain at this time. She continues to report left sided low back and buttock pain. She reports intermittent radiating pain down the back of her left leg to her knee. She describes this pain as pulling in nature. She continues to take Gabapentin with benefit. She denies any other health changes. Her pain today is 4/10.       Of note, she has an extensive PMH including clotting disorder; COPD, h/o PE in 2016, Depression; Diverticulosis; Essential tremor; GERD; Hemangioma of liver; History of tuberculosis (1978); Hypertension; Mixed anxiety and depressive disorder; and Psoriasis.  She has frequent lung infections and is followed by pulmonology and infectious disease.  She was admitted most recently on 9/13/18 and treated with zosyn with improvement.     Pain Disability Index Review:  Last 3 PDI Scores 10/23/2018 2/8/2018 11/11/2016   Pain Disability Index (PDI) [phone]       Pain Medications:  Tramadol PRN and Gabapentin 600 mg TID    Opioid Contract: no     report:  Reviewed and consistent with medication use as prescribed.    Pain Procedures:   10/26/15 IR Epidural Transforaminal Inj 1ST Vert Lumbar Bilat   1/22/16 Facet Injection L2-L5   8/26/16 Bilaeral Lumbar MBB at L2-5  10/14/16 Bilateral L2,3,4,5 MBB- 100% relief for 5 days  12/16/16 Right L2,3,4,5 RFA- 80% relief  3/16/18 Left L2,3,4,5 RFA- 80% relief  4/4/18 Right L2,3,4,5 RFA- 80% relief  11/28/2018- Right L2,3,4,5 RFA  12/18/2018- Left L2,3,4,5 RFA    Physical Therapy/Home Exercise: yes per self    Imaging:     Lumbar XRAYs 10/13/15  Narrative   Lumbar spine    AP and lateral flexion and extension    There are 5  non-rib bearing lumbar vertebral bodies.  There is left convex scoliosis of the lumbar spine.  There is degenerative disk disease of L1/L2, L2/L3 and L5/S1.  Vertebral body height is maintained.  There is osteopenia.    The patient is status post cholecystectomy and there is atherosclerosis.   Impression    There are degenerative changes throughout the lumbar spine.          Lumbar 10/22/15  Narrative   Comparison: Lumbar spine 11/15/13    Technique: Multiplanar multi-sequence MR images of the lumbar spine without contrast    Findings: The vertebral body heights and alignment are maintained.  No diffuse marrow replacement process is identified.  Mild levoscoliosis is noted.  The abdominal structures show a possible right hepatic cyst and mild ectasia of the abdominal aorta.    The spinal cord terminates at the L1 level and exhibits normal signal.  Hemangioma noted in the T12 vertebral body.  There are decreased intravertebral disk height at all lumbar levels, with associated endplate changes at L2-3.  Two small Tarlov cyst again noted in the sacral canal posterior to S2.  Annular fissure is noted at L3-4 and L4-5. Level by level findings are detailed below:    L1-2: Circumferential disk bulge and facet hypertrophy causing mild bilateral foraminal stenoses without spinal canal stenosis.  L2-3: Circumferential disk bulge and facet hypertrophy causing mild bilateral foraminal stenoses without spinal canal stenosis.  Small focus of subarticular signal abnormality on the right could represent a focus of fat or a cyst and may be abutting the exiting right L2 nerve root, unchanged from prior.  L3-4: Circumferential disk bulge and facet hypertrophy causing mild spinal canal, moderate right foraminal, and mild left foraminal stenoses.  L4-5: Circumferential disk bulge and facet and ligamentum flavum hypertrophy causing mild right and moderate left foraminal stenosis as well as mild spinal canal stenosis.  L5-S1: Facet  hypertrophy causing mild left foraminal stenosis.  No right foraminal or spinal canal stenosis.   Impression       Multilevel degenerative changes of the lumbar spine related to facet and disk disease as detailed above, overall similar to the prior examination.    Focus of signal abnormality abutting the exiting right L2 nerve root is favored to represent a focus of epidural fat, but could also represent a small cyst and is similar to prior.     Narrative     EXAMINATION:  CTA CHEST NON CORONARY    CLINICAL HISTORY:  shortness of breath, R lower chest pain;    TECHNIQUE:  Low dose axial images, sagittal and coronal reformations were obtained from the thoracic inlet to the lung bases following the IV administration of 75 mL of Omnipaque 350.  Contrast timing was optimized to evaluate the pulmonary arteries.  MIP images were performed.    COMPARISON:  Radiograph 03/07/2018; CTA 01/05/2017    FINDINGS:  The vascular and soft tissues structures at the base of the neck are unremarkable.    There is a left-sided aortic arch with 3 branch vessels. The aorta is normal in caliber, contour, and course with mild calcific atherosclerosis.    There is no cardiac enlargement or pericardial fluid.  There are aortic valve calcifications.    There is no axillary, hilar, or mediastinal lymphadenopathy.    The pulmonary arteries distribute normally.  This study is adequate for the evaluation of pulmonary thromboembolism. There are filling defects within the right upper lobe segmental branch, similar to prior CTA 01/05/2017 consistent with chronic thrombus.  No new acute pulmonary embolism identified.    The trachea is midline and the proximal airways are patent.  Extensive fibrotic changes are again identified throughout the lung apices, most pronounced within the left lung.  There are stable areas of bronchiectasis with interstitial thickening and mosaic attenuation throughout the lungs, similar to prior CTA.  A more focal area of  nodular consolidation is seen within the left lower lobe, not present on prior study.  Stable right lobe nodule measuring 4 mm is again identified.    The esophagus is normal in caliber and contour.  The imaged intra-abdominal structures demonstrate postsurgical changes of cholecystectomy.  There are calcifications in the spleen.    The osseous structures demonstrate mild degenerative changes without acute fracture or bony destructive process.      Impression       1. Chronic filling defects within the right upper lobe pulmonary artery branch consistent with chronic pulmonary embolism.  No new acute pulmonary thromboembolism identified.  2. Stable fibrotic changes throughout the lung apices, left greater than right, with bronchiectasis, interstitial prominence, and mosaic attenuation of the lungs, similar to prior CTA 01/05/2017.  3. Small area of focal nodular consolidation within the left lower lobe, not seen on prior studies which, may be infectious or inflammatory in etiology, possibly even contiguous from the left apical fibrotic changes.  Suggest follow-up CT scan 1 month after antimicrobial therapy to exclude neoplastic nodule.  4. Stable 4 mm nodule within the right middle lobe.  5. Additional findings as detailed above.  This report was flagged in Epic as abnormal.     BMP  Lab Results   Component Value Date     (L) 10/04/2018    K 3.9 10/04/2018    CL 96 10/04/2018    CO2 30 (H) 10/04/2018    BUN 13 10/04/2018    CREATININE 1.1 10/04/2018    CALCIUM 10.2 10/04/2018    ANIONGAP 9 10/04/2018    ESTGFRAFRICA 59 (A) 10/04/2018    EGFRNONAA 51 (A) 10/04/2018       Lab Results   Component Value Date    WBC 8.85 09/19/2018    HGB 10.2 (L) 09/19/2018    HCT 31.7 (L) 09/19/2018    MCV 93 09/19/2018     09/19/2018         Allergies:   Allergies   Allergen Reactions    Adhesive Other (See Comments)     Tears up the skin and makes it itch    Bactrim [Sulfamethoxazole-Trimethoprim] Rash     Was  hospitalized for rash    Lipitor [Atorvastatin] Other (See Comments)     Muscle aches    Albuterol Other (See Comments)     tremors    Restasis [Cyclosporine] Itching       Current Medications:   Current Outpatient Medications   Medication Sig Dispense Refill    acetaminophen (TYLENOL) 500 MG tablet Take 1,000 mg by mouth once daily. sleep      alprazolam (XANAX) 0.25 MG tablet Take 1 tablet (0.25 mg total) by mouth nightly as needed for Anxiety. 30 tablet 0    apixaban (ELIQUIS) 5 mg Tab Take 1 tablet (5 mg total) by mouth 2 (two) times daily. 30 tablet 11    chlorthalidone (HYGROTEN) 25 MG Tab Take 12.5 mg by mouth once daily.      desoximetasone (TOPICORT) 0.25 % cream Apply as directed bid prn (Patient taking differently: Apply as directed twice daily as needed for psoriasis) 60 g 2    ergocalciferol (ERGOCALCIFEROL) 50,000 unit Cap TAKE 1 CAPSULE BY MOUTH ONCE A WEEK 4 capsule 4    escitalopram oxalate (LEXAPRO) 10 MG tablet TAKE ONE TABLET BY MOUTH ONCE DAILY 30 tablet 11    fluocinolone (DERMA-SMOOTHE) 0.01 % external oil Apply topically once daily. (Patient taking differently: Apply topically daily as needed. ) 1 Bottle 5    fluocinonide (LIDEX) 0.05 % external solution Apply topically once daily. - apply after shampooing (Patient taking differently: Apply topically once daily. - apply after shampooing as needed) 60 mL 5    folic acid (FOLVITE) 1 MG tablet Take 1 tablet (1 mg total) by mouth once daily. 100 tablet 2    gabapentin (NEURONTIN) 300 MG capsule TAKE 2 CAPSULES BY MOUTH THREE TIMES DAILY 540 capsule 0    guaiFENesin (MUCINEX) 600 mg 12 hr tablet Take 1,200 mg by mouth 2 (two) times daily.      ipratropium (ATROVENT) 0.02 % nebulizer solution Inhale 1 vial in nebulizer 3 to 4 times daily      ketoconazole (NIZORAL) 2 % shampoo Apply topically once daily. 120 mL 5    Lactobacillus rhamnosus GG (CULTURELLE) 10 billion cell capsule Take 1 capsule by mouth once daily.       mirtazapine (REMERON) 7.5 MG Tab Take 1 tablet (7.5 mg total) by mouth every evening. 90 tablet 1    multivitamin (THERAGRAN) per tablet Take 1 tablet by mouth once daily.      omeprazole (PRILOSEC) 20 MG capsule TAKE 1 CAPSULE BY MOUTH ONCE DAILY AS NEEDED FOR HEARTBURN (TAKE ONLY AS NEEDED) 90 capsule 0    ondansetron (ZOFRAN) 4 MG tablet Take 1 tablet (4 mg total) by mouth every 8 (eight) hours as needed for Nausea. 45 tablet 0    propylene glycol (SYSTANE BALANCE) 0.6 % Drop Apply 1 drop to eye daily as needed (dry eye).      sodium chloride 2% (XIOMARA 128) 2 % ophthalmic solution Place 1 drop into both eyes 3 (three) times daily as needed.      sodium chloride 3% 3 % nebulizer solution Take 4 mLs by nebulization 2 (two) times daily. 720 mL 3    traMADol (ULTRAM) 50 mg tablet Take 1 tablet (50 mg total) by mouth every 6 (six) hours as needed for Pain. 45 tablet 0    umeclidinium-vilanterol (ANORO ELLIPTA) 62.5-25 mcg/actuation DsDv Inhale into the lungs. Controller      verapamil (CALAN-SR) 180 MG CR tablet 2 (two) times daily.        No current facility-administered medications for this visit.        REVIEW OF SYSTEMS:    GENERAL:  No weight loss, malaise or fevers.  HEENT:  Negative for frequent or significant headaches.  NECK:  Negative for lumps, goiter, pain and significant neck swelling.  RESPIRATORY:  Negative for cough, wheezing or shortness of breath. H/O TB and recurrent lung infections.  CARDIOVASCULAR:  Negative for chest pain, leg swelling or palpitations.  GI:  Negative for abdominal discomfort, blood in stools or black stools or change in bowel habits. GERD.  MUSCULOSKELETAL:  See HPI.  SKIN:  Negative for lesions, rash, and itching.  PSYCH:  Negative for sleep disturbance, mood disorder and recent psychosocial stressors.  HEMATOLOGY/LYMPHOLOGY:  Negative for prolonged bleeding, bruising easily or swollen nodes.  NEURO:   No history of headaches, syncope, paralysis, seizures or  tremors.  All other reviewed and negative other than HPI.    Past Medical History:  Past Medical History:   Diagnosis Date    Acquired bronchiectasis     due to history of TB - followed by pulmonary, Dr. Zuñiga    Allergy     Amblyopia     rt eye per pt    Anemia of other chronic disease     Anticoagulant long-term use     Anxiety     Cataract     Chronic obstructive pulmonary disease with acute exacerbation     Clotting disorder     COPD (chronic obstructive pulmonary disease)     COPD (chronic obstructive pulmonary disease)     Depression     Diverticulosis     Essential tremor     GERD (gastroesophageal reflux disease)     Hemangioma of liver     History of tuberculosis 1978    Hypertension     Mixed anxiety and depressive disorder     Psoriasis     PSVT (paroxysmal supraventricular tachycardia)     Pulmonary embolism     S/P PICC central line placement Apr. 2016 - May 2016    Skin disease     Psoriasis    Spondylosis without myelopathy 8/23/2013    Supraventricular tachycardia     Thoracic aorta atherosclerosis     noted on CT scan of chest 1/3/2011    Tuberculosis     Vaginal delivery     x2    Vitamin D deficiency        Past Surgical History:  Past Surgical History:   Procedure Laterality Date    ABLATION N/A 7/24/2017    Performed by [facility]os [facility] at [facility] CATH LAB    BLOCK-NERVE-MEDIAL BRANCH-LUMBAR Bilateral 10/14/2016    [facility] at [facility] PAIN MGT    BLOCK-NERVE-MEDIAL BRANCH-LUMBAR Bilateral 8/26/2016    [facility] at [facility] PAIN MGT    CATARACT EXTRACTION W/  INTRAOCULAR LENS IMPLANT  07/24/12    od dr [place]    CATARACT EXTRACTION W/  INTRAOCULAR LENS IMPLANT  08/07/12    left eye    COLONOSCOPY N/A 4/23/2013    Performed by Simeon Graves MD at [facility] ENDO (4TH FLR)    EGD (ESOPHAGOGASTRODUODENOSCOPY) N/A 4/23/2013    Performed by Simeon Graves MD at [facility] ENDO (4TH FLR)    GALLBLADDER SURGERY   9/2011    HEART CATH-LEFT Left 6/16/2016    [facility] CATH LAB    INJECTION-FACET Bilateral 1/22/2016    [facility] at [facility]    RADIOFREQUENCY ABLATION RIGHT LUMBAR L2,3,4,5 RFA Right 11/28/2018    Performed by [facility] at [facility]    RADIOFREQUENCY THERMAL COAGULATION Left 12/18/2018    [facility]    RADIOFREQUENCY THERMOCOAGULATION (RFTC)-NERVE-MEDIAN BRANCH-LUMBAR Right 4/4/2018    Performed by [facility] Myeers [facility] at [facility]    RADIOFREQUENCY THERMOCOAGULATION (RFTC)-NERVE-MEDIAN BRANCH-LUMBAR Left 3/16/2018    [facility] at [facility]    RADIOFREQUENCY THERMOCOAGULATION (RFTC)-NERVE-MEDIAN BRANCH-LUMBAR Right 12/16/2016    Performed by [facility]ers [facility] at [facility]       Family History:  Family History   Problem Relation Age of Onset    Hypertension Mother     Heart attack Mother     Cancer Father         liver and bladder    Hyperlipidemia Sister     Psoriasis Sister     Cancer Brother         liver    Stroke Maternal Uncle     Cancer Maternal Aunt         stomach    Lung cancer Sister     Heart attack Brother     Heart attack Son     Amblyopia Neg Hx     Blindness Neg Hx     Cataracts Neg Hx     Glaucoma Neg Hx     Macular degeneration Neg Hx     Retinal detachment Neg Hx     Strabismus Neg Hx     Thyroid disease Neg Hx     Melanoma Neg Hx     Lupus Neg Hx     Eczema Neg Hx     COPD Neg Hx        Social History:  Social History     Socioeconomic History    Marital status:      Spouse name: Not on file    Number of children: 2    Years of education: Not on file    Highest education level: Not on file   Social Needs    Financial resource strain: Not on file    Food insecurity - worry: Not on file    Food insecurity - inability: Not on file    Transportation needs - medical: Not on file    Transportation  "needs - non-medical: Not on file   Occupational History    Occupation: housewife   Tobacco Use    Smoking status: Former Smoker     Packs/day: 2.00     Years: 50.00     Pack years: 100.00     Types: Cigarettes     Last attempt to quit: 2009     Years since quittin.6    Smokeless tobacco: Never Used   Substance and Sexual Activity    Alcohol use: No     Alcohol/week: 0.0 oz    Drug use: No    Sexual activity: Yes     Partners: Male   Other Topics Concern    Are you pregnant or think you may be? No    Breast-feeding No   Social History Narrative    One child  of a heart attack at age 35.       OBJECTIVE:    /72   Pulse 76   Temp 97.8 °F (36.6 °C)   Ht 5' 2" (1.575 m)   Wt 71.3 kg (157 lb 3 oz)   LMP  (LMP Unknown)   BMI 28.75 kg/m²     PHYSICAL EXAMINATION:    General appearance: Well appearing, in no acute distress, alert and oriented x3.  Psych:  Mood and affect appropriate.    Skin: Skin color, texture, turgor normal, no rashes or lesions, in both upper and lower body.  Head/face:  Atraumatic, normocephalic. No palpable lymph nodes  Cor: RRR  Pulm: CTA  GI: Abdomen soft and non-tender.  Back: Straight leg raising in the sitting and supine positions is negative to radicular pain. There is no pain with palpation to bilateral lumbar facet joints.  Limited ROM with pain.  Positive facet loading bilaterally, R>L.    Extremities: Peripheral joint ROM is full and pain free without obvious instability or laxity in all four extremities. No deformities, edema, or skin discoloration. Good capillary refill.  Musculoskeletal:  There is pain with palpation over left SI joint. FABERs is positive to the left. Bilateral lower extremity strength is normal and symmetric.  No atrophy or tone abnormalities are noted.  Neuro: Bilateral lower extremity coordination and muscle stretch reflexes are physiologic and symmetric.  Plantar response are downgoing. Decreased sensation to BLE.  Gait: Antalgic- " ambulates without assistance.    ASSESSMENT: 68 y.o. year old female with lower back pain, consistent with the following diagnoses:     1. Sacroiliac joint pain     2. Spondylosis of lumbar region without myelopathy or radiculopathy     3. Facet arthritis of lumbar region     4. DDD (degenerative disc disease), lumbar     5. Chronic pain syndrome           PLAN:     - Previous imaging was reviewed and discussed with the patient today.    - She is s/p lumbar RFA. We discussed that it may take 4-6 weeks to see full benefit.     - Consider left SI joint injection in the future.     - Will consider ADRIAN versus updating her MRI in the future if radicular symptoms worsen.    - Continue Gabapentin 300 mg 2 pills TID.     - The patient will continue a home exercise routine to help with pain and strengthening.      - RTC in 3 weeks.     - Counseled patient regarding the importance of constant sleeping habits and physical therapy.    - Dr. Meyers was consulted on the patient and agrees with this plan.    The above plan and management options were discussed at length with patient. Patient is in agreement with the above and verbalized understanding.    Jasmin Castaneda NP  01/10/2019

## 2019-01-21 LAB
ACID FAST MOD KINY STN SPEC: NORMAL
ACID FAST MOD KINY STN SPEC: NORMAL
MYCOBACTERIUM SPEC QL CULT: NORMAL
MYCOBACTERIUM SPEC QL CULT: NORMAL

## 2019-01-22 DIAGNOSIS — Z79.01 CURRENT USE OF LONG TERM ANTICOAGULATION: ICD-10-CM

## 2019-01-22 DIAGNOSIS — I27.82 OTHER CHRONIC PULMONARY EMBOLISM WITHOUT ACUTE COR PULMONALE: ICD-10-CM

## 2019-01-22 DIAGNOSIS — Z86.711 PERSONAL HISTORY OF PULMONARY EMBOLISM: ICD-10-CM

## 2019-01-29 DIAGNOSIS — Z79.01 CURRENT USE OF LONG TERM ANTICOAGULATION: ICD-10-CM

## 2019-01-29 DIAGNOSIS — I27.82 OTHER CHRONIC PULMONARY EMBOLISM WITHOUT ACUTE COR PULMONALE: ICD-10-CM

## 2019-01-29 DIAGNOSIS — Z86.711 PERSONAL HISTORY OF PULMONARY EMBOLISM: ICD-10-CM

## 2019-01-29 RX ORDER — AMOXICILLIN AND CLAVULANATE POTASSIUM 875; 125 MG/1; MG/1
TABLET, FILM COATED ORAL
Qty: 14 TABLET | Refills: 0 | Status: SHIPPED | OUTPATIENT
Start: 2019-01-29 | End: 2019-03-13 | Stop reason: SDUPTHER

## 2019-01-29 NOTE — TELEPHONE ENCOUNTER
----- Message from Chika Scott sent at 1/29/2019 11:42 AM CST -----  Contact: VITO [phone]  The patient is calling to check on the status of a medication refill for Eliquis. She wants it sent to [facility] pharmacy.

## 2019-01-29 NOTE — TELEPHONE ENCOUNTER
Spoke with pt states she's receiving a 1 monthly supply of Eliquis instead of 3 months. Pt states a new 90 day script must be faxed to [phone].   Order pended please review and advise.

## 2019-01-30 DIAGNOSIS — Z86.711 PERSONAL HISTORY OF PULMONARY EMBOLISM: ICD-10-CM

## 2019-01-30 DIAGNOSIS — Z79.01 CURRENT USE OF LONG TERM ANTICOAGULATION: ICD-10-CM

## 2019-01-30 DIAGNOSIS — I27.82 OTHER CHRONIC PULMONARY EMBOLISM WITHOUT ACUTE COR PULMONALE: ICD-10-CM

## 2019-01-31 ENCOUNTER — OFFICE VISIT (OUTPATIENT)
Dept: PAIN MEDICINE | Facility: CLINIC | Age: 69
End: 2019-01-31
Payer: MEDICARE

## 2019-01-31 VITALS
WEIGHT: 158.06 LBS | TEMPERATURE: 98 F | HEIGHT: 62 IN | HEART RATE: 81 BPM | DIASTOLIC BLOOD PRESSURE: 56 MMHG | SYSTOLIC BLOOD PRESSURE: 117 MMHG | BODY MASS INDEX: 29.08 KG/M2

## 2019-01-31 DIAGNOSIS — M47.816 FACET ARTHRITIS OF LUMBAR REGION: ICD-10-CM

## 2019-01-31 DIAGNOSIS — M51.36 DDD (DEGENERATIVE DISC DISEASE), LUMBAR: ICD-10-CM

## 2019-01-31 DIAGNOSIS — G89.4 CHRONIC PAIN SYNDROME: ICD-10-CM

## 2019-01-31 DIAGNOSIS — M47.816 SPONDYLOSIS OF LUMBAR REGION WITHOUT MYELOPATHY OR RADICULOPATHY: ICD-10-CM

## 2019-01-31 DIAGNOSIS — M53.3 SACROILIAC JOINT PAIN: Primary | ICD-10-CM

## 2019-01-31 PROCEDURE — 3074F PR MOST RECENT SYSTOLIC BLOOD PRESSURE < 130 MM HG: ICD-10-PCS | Mod: CPTII,S$GLB,, | Performed by: NURSE PRACTITIONER

## 2019-01-31 PROCEDURE — 99213 OFFICE O/P EST LOW 20 MIN: CPT | Mod: S$GLB,,, | Performed by: NURSE PRACTITIONER

## 2019-01-31 PROCEDURE — 3078F PR MOST RECENT DIASTOLIC BLOOD PRESSURE < 80 MM HG: ICD-10-PCS | Mod: CPTII,S$GLB,, | Performed by: NURSE PRACTITIONER

## 2019-01-31 PROCEDURE — 3078F DIAST BP <80 MM HG: CPT | Mod: CPTII,S$GLB,, | Performed by: NURSE PRACTITIONER

## 2019-01-31 PROCEDURE — 99999 PR PBB SHADOW E&M-EST. PATIENT-LVL III: ICD-10-PCS | Mod: PBBFAC,,, | Performed by: NURSE PRACTITIONER

## 2019-01-31 PROCEDURE — 1101F PT FALLS ASSESS-DOCD LE1/YR: CPT | Mod: CPTII,S$GLB,, | Performed by: NURSE PRACTITIONER

## 2019-01-31 PROCEDURE — 99999 PR PBB SHADOW E&M-EST. PATIENT-LVL III: CPT | Mod: PBBFAC,,, | Performed by: NURSE PRACTITIONER

## 2019-01-31 PROCEDURE — 1101F PR PT FALLS ASSESS DOC 0-1 FALLS W/OUT INJ PAST YR: ICD-10-PCS | Mod: CPTII,S$GLB,, | Performed by: NURSE PRACTITIONER

## 2019-01-31 PROCEDURE — 3074F SYST BP LT 130 MM HG: CPT | Mod: CPTII,S$GLB,, | Performed by: NURSE PRACTITIONER

## 2019-01-31 PROCEDURE — 99213 PR OFFICE/OUTPT VISIT, EST, LEVL III, 20-29 MIN: ICD-10-PCS | Mod: S$GLB,,, | Performed by: NURSE PRACTITIONER

## 2019-01-31 NOTE — H&P (VIEW-ONLY)
Chronic patient Established Note (Follow up visit)      SUBJECTIVE:    Yasmin Asencio presents to the clinic for a follow-up appointment for lower back pain. She reports increased low back and buttock pain that is aching in nature. She does report benefit from previous RFA. She reports intermittent radiating pain down the back of her legs to her mid calf, left greater than left. She continues to take Gabapentin with benefit. She is currently taking antibiotics for sinus infection. She denies any other health changes. She denies any bowel or bladder incontinence. Her pain today is 5/10.         Pain Disability Index Review:  Last 3 PDI Scores 1/31/2019 1/10/2019 10/23/2018   Pain Disability Index (PDI) 15 0 49       Pain Medications:  Tramadol PRN and Gabapentin 600 mg TID    Opioid Contract: no     report:  Reviewed and consistent with medication use as prescribed.    Pain Procedures:   10/26/15 IR Epidural Transforaminal Inj 1ST Vert Lumbar Bilat   1/22/16 Facet Injection L2-L5   8/26/16 Bilaeral Lumbar MBB at L2-5  10/14/16 Bilateral L2,3,4,5 MBB- 100% relief for 5 days  12/16/16 Right L2,3,4,5 RFA- 80% relief  3/16/18 Left L2,3,4,5 RFA- 80% relief  4/4/18 Right L2,3,4,5 RFA- 80% relief  11/28/2018- Right L2,3,4,5 RFA  12/18/2018- Left L2,3,4,5 RFA    Physical Therapy/Home Exercise: yes per self    Imaging:   Lumbar XRAYs 10/13/15  Narrative   Lumbar spine    AP and lateral flexion and extension    There are 5 non-rib bearing lumbar vertebral bodies.  There is left convex scoliosis of the lumbar spine.  There is degenerative disk disease of L1/L2, L2/L3 and L5/S1.  Vertebral body height is maintained.  There is osteopenia.    The patient is status post cholecystectomy and there is atherosclerosis.   Impression    There are degenerative changes throughout the lumbar spine.          Lumbar 10/22/15  Narrative   Comparison: Lumbar spine 11/15/13    Technique: Multiplanar multi-sequence MR images of the  lumbar spine without contrast    Findings: The vertebral body heights and alignment are maintained.  No diffuse marrow replacement process is identified.  Mild levoscoliosis is noted.  The abdominal structures show a possible right hepatic cyst and mild ectasia of the abdominal aorta.    The spinal cord terminates at the L1 level and exhibits normal signal.  Hemangioma noted in the T12 vertebral body.  There are decreased intravertebral disk height at all lumbar levels, with associated endplate changes at L2-3.  Two small Tarlov cyst again noted in the sacral canal posterior to S2.  Annular fissure is noted at L3-4 and L4-5. Level by level findings are detailed below:    L1-2: Circumferential disk bulge and facet hypertrophy causing mild bilateral foraminal stenoses without spinal canal stenosis.  L2-3: Circumferential disk bulge and facet hypertrophy causing mild bilateral foraminal stenoses without spinal canal stenosis.  Small focus of subarticular signal abnormality on the right could represent a focus of fat or a cyst and may be abutting the exiting right L2 nerve root, unchanged from prior.  L3-4: Circumferential disk bulge and facet hypertrophy causing mild spinal canal, moderate right foraminal, and mild left foraminal stenoses.  L4-5: Circumferential disk bulge and facet and ligamentum flavum hypertrophy causing mild right and moderate left foraminal stenosis as well as mild spinal canal stenosis.  L5-S1: Facet hypertrophy causing mild left foraminal stenosis.  No right foraminal or spinal canal stenosis.   Impression       Multilevel degenerative changes of the lumbar spine related to facet and disk disease as detailed above, overall similar to the prior examination.    Focus of signal abnormality abutting the exiting right L2 nerve root is favored to represent a focus of epidural fat, but could also represent a small cyst and is similar to prior.     Narrative     EXAMINATION:  CTA CHEST NON  CORONARY    CLINICAL HISTORY:  shortness of breath, R lower chest pain;    TECHNIQUE:  Low dose axial images, sagittal and coronal reformations were obtained from the thoracic inlet to the lung bases following the IV administration of 75 mL of Omnipaque 350.  Contrast timing was optimized to evaluate the pulmonary arteries.  MIP images were performed.    COMPARISON:  Radiograph 03/07/2018; CTA 01/05/2017    FINDINGS:  The vascular and soft tissues structures at the base of the neck are unremarkable.    There is a left-sided aortic arch with 3 branch vessels. The aorta is normal in caliber, contour, and course with mild calcific atherosclerosis.    There is no cardiac enlargement or pericardial fluid.  There are aortic valve calcifications.    There is no axillary, hilar, or mediastinal lymphadenopathy.    The pulmonary arteries distribute normally.  This study is adequate for the evaluation of pulmonary thromboembolism. There are filling defects within the right upper lobe segmental branch, similar to prior CTA 01/05/2017 consistent with chronic thrombus.  No new acute pulmonary embolism identified.    The trachea is midline and the proximal airways are patent.  Extensive fibrotic changes are again identified throughout the lung apices, most pronounced within the left lung.  There are stable areas of bronchiectasis with interstitial thickening and mosaic attenuation throughout the lungs, similar to prior CTA.  A more focal area of nodular consolidation is seen within the left lower lobe, not present on prior study.  Stable right lobe nodule measuring 4 mm is again identified.    The esophagus is normal in caliber and contour.  The imaged intra-abdominal structures demonstrate postsurgical changes of cholecystectomy.  There are calcifications in the spleen.    The osseous structures demonstrate mild degenerative changes without acute fracture or bony destructive process.      Impression       1. Chronic filling  defects within the right upper lobe pulmonary artery branch consistent with chronic pulmonary embolism.  No new acute pulmonary thromboembolism identified.  2. Stable fibrotic changes throughout the lung apices, left greater than right, with bronchiectasis, interstitial prominence, and mosaic attenuation of the lungs, similar to prior CTA 01/05/2017.  3. Small area of focal nodular consolidation within the left lower lobe, not seen on prior studies which, may be infectious or inflammatory in etiology, possibly even contiguous from the left apical fibrotic changes.  Suggest follow-up CT scan 1 month after antimicrobial therapy to exclude neoplastic nodule.  4. Stable 4 mm nodule within the right middle lobe.  5. Additional findings as detailed above.  This report was flagged in Epic as abnormal.     BMP  Lab Results   Component Value Date     (L) 10/04/2018    K 3.9 10/04/2018    CL 96 10/04/2018    CO2 30 (H) 10/04/2018    BUN 13 10/04/2018    CREATININE 1.1 10/04/2018    CALCIUM 10.2 10/04/2018    ANIONGAP 9 10/04/2018    ESTGFRAFRICA 59 (A) 10/04/2018    EGFRNONAA 51 (A) 10/04/2018       Lab Results   Component Value Date    WBC 8.85 09/19/2018    HGB 10.2 (L) 09/19/2018    HCT 31.7 (L) 09/19/2018    MCV 93 09/19/2018     09/19/2018         Allergies:   Allergies   Allergen Reactions    Adhesive Other (See Comments)     Tears up the skin and makes it itch    Bactrim [Sulfamethoxazole-Trimethoprim] Rash     Was hospitalized for rash    Lipitor [Atorvastatin] Other (See Comments)     Muscle aches    Albuterol Other (See Comments)     tremors    Restasis [Cyclosporine] Itching       Current Medications:   Current Outpatient Medications   Medication Sig Dispense Refill    acetaminophen (TYLENOL) 500 MG tablet Take 1,000 mg by mouth once daily. sleep      alprazolam (XANAX) 0.25 MG tablet Take 1 tablet (0.25 mg total) by mouth nightly as needed for Anxiety. 30 tablet 0    amoxicillin-clavulanate  875-125mg (AUGMENTIN) 875-125 mg per tablet TAKE 1 TABLET BY MOUTH TWICE DAILY AS NEEDED FOR EXACERBATION  FOR  7  DAYS 14 tablet 0    apixaban (ELIQUIS) 5 mg Tab Take 1 tablet (5 mg total) by mouth 2 (two) times daily. 180 tablet 3    chlorthalidone (HYGROTEN) 25 MG Tab Take 12.5 mg by mouth once daily.      desoximetasone (TOPICORT) 0.25 % cream Apply as directed bid prn (Patient taking differently: Apply as directed twice daily as needed for psoriasis) 60 g 2    ergocalciferol (ERGOCALCIFEROL) 50,000 unit Cap TAKE 1 CAPSULE BY MOUTH ONCE A WEEK 4 capsule 4    escitalopram oxalate (LEXAPRO) 10 MG tablet TAKE ONE TABLET BY MOUTH ONCE DAILY 30 tablet 11    folic acid (FOLVITE) 1 MG tablet Take 1 tablet (1 mg total) by mouth once daily. 100 tablet 2    gabapentin (NEURONTIN) 300 MG capsule TAKE 2 CAPSULES BY MOUTH THREE TIMES DAILY 540 capsule 0    guaiFENesin (MUCINEX) 600 mg 12 hr tablet Take 1,200 mg by mouth 2 (two) times daily.      ipratropium (ATROVENT) 0.02 % nebulizer solution Inhale 1 vial in nebulizer 3 to 4 times daily      ketoconazole (NIZORAL) 2 % shampoo Apply topically once daily. 120 mL 5    Lactobacillus rhamnosus GG (CULTURELLE) 10 billion cell capsule Take 1 capsule by mouth once daily.      mirtazapine (REMERON) 7.5 MG Tab Take 1 tablet (7.5 mg total) by mouth every evening. 90 tablet 1    multivitamin (THERAGRAN) per tablet Take 1 tablet by mouth once daily.      omeprazole (PRILOSEC) 20 MG capsule TAKE 1 CAPSULE BY MOUTH ONCE DAILY AS NEEDED FOR HEARTBURN (TAKE ONLY AS NEEDED) 90 capsule 0    ondansetron (ZOFRAN) 4 MG tablet Take 1 tablet (4 mg total) by mouth every 8 (eight) hours as needed for Nausea. 45 tablet 0    propylene glycol (SYSTANE BALANCE) 0.6 % Drop Apply 1 drop to eye daily as needed (dry eye).      sodium chloride 2% (XIOMARA 128) 2 % ophthalmic solution Place 1 drop into both eyes 3 (three) times daily as needed.      sodium chloride 3% 3 % nebulizer solution  Take 4 mLs by nebulization 2 (two) times daily. 720 mL 3    traMADol (ULTRAM) 50 mg tablet Take 1 tablet (50 mg total) by mouth every 6 (six) hours as needed for Pain. 45 tablet 0    umeclidinium-vilanterol (ANORO ELLIPTA) 62.5-25 mcg/actuation DsDv Inhale into the lungs. Controller      verapamil (CALAN-SR) 180 MG CR tablet 2 (two) times daily.       fluocinolone (DERMA-SMOOTHE) 0.01 % external oil Apply topically once daily. (Patient taking differently: Apply topically daily as needed. ) 1 Bottle 5    fluocinonide (LIDEX) 0.05 % external solution Apply topically once daily. - apply after shampooing (Patient taking differently: Apply topically once daily. - apply after shampooing as needed) 60 mL 5     No current facility-administered medications for this visit.        REVIEW OF SYSTEMS:    GENERAL:  No weight loss, malaise or fevers.  HEENT:  Negative for frequent or significant headaches.  NECK:  Negative for lumps, goiter, pain and significant neck swelling.  RESPIRATORY:  Negative for cough, wheezing or shortness of breath. H/O TB and recurrent lung infections.  CARDIOVASCULAR:  Negative for chest pain, leg swelling or palpitations.  GI:  Negative for abdominal discomfort, blood in stools or black stools or change in bowel habits. GERD.  MUSCULOSKELETAL:  See HPI.  SKIN:  Negative for lesions, rash, and itching.  PSYCH:  Negative for sleep disturbance, mood disorder and recent psychosocial stressors.  HEMATOLOGY/LYMPHOLOGY:  Negative for prolonged bleeding, bruising easily or swollen nodes.  NEURO:   No history of headaches, syncope, paralysis, seizures or tremors.  All other reviewed and negative other than HPI.    Past Medical History:  Past Medical History:   Diagnosis Date    Acquired bronchiectasis     due to history of TB - followed by pulmonary, Dr. Zñuiga    Allergy     Amblyopia     rt eye per pt    Anemia of other chronic disease     Anticoagulant long-term use     Anxiety     Cataract      Chronic obstructive pulmonary disease with acute exacerbation     Clotting disorder     COPD (chronic obstructive pulmonary disease)     COPD (chronic obstructive pulmonary disease)     Depression     Diverticulosis     Essential tremor     GERD (gastroesophageal reflux disease)     Hemangioma of liver     History of tuberculosis 1978    Hypertension     Mixed anxiety and depressive disorder     Psoriasis     PSVT (paroxysmal supraventricular tachycardia)     Pulmonary embolism     S/P PICC central line placement Apr. 2016 - May 2016    Skin disease     Psoriasis    Spondylosis without myelopathy 8/23/2013    Supraventricular tachycardia     Thoracic aorta atherosclerosis     noted on CT scan of chest 1/3/2011    Tuberculosis     Vaginal delivery     x2    Vitamin D deficiency        Past Surgical History:  Past Surgical History:   Procedure Laterality Date    ABLATION N/A 7/24/2017    Performed by Marlon Ballesteros [facility] at [facility] CATH LAB    BLOCK-NERVE-MEDIAL BRANCH-LUMBAR Bilateral 10/14/2016    Performed by Issac Meyers MD at [facility]    BLOCK-NERVE-MEDIAL BRANCH-LUMBAR Bilateral 8/26/2016    Performed by [facility]ac [facility]ers [facility] at [facility]    CATARACT EXTRACTION W/  INTRAOCULAR LENS IMPLANT  07/24/12    od dr [place]    CATARACT EXTRACTION W/  INTRAOCULAR LENS IMPLANT  08/07/12    left eye    COLONOSCOPY N/A 4/23/2013    Performed by Simeon Graves MD at [facility] ENDO (4TH FLR)    EGD (ESOPHAGOGASTRODUODENOSCOPY) N/A 4/23/2013    [facility] ([facility]    GALLBLADDER SURGERY  9/2011    HEART CATH-LEFT Left 6/16/2016    Performed [facility] at [facility] CATH LAB    INJECTION-FACET Bilateral 1/22/2016    [facility] at [facility]    RADIOFREQUENCY ABLATION RIGHT LUMBAR L2,3,4,5 RFA Right 11/28/2018    [facility]formed by [facility] at [facility]    RADIOFREQUENCY THERMAL COAGULATION  Left 2018    [facility]    RADIOFREQUENCY THERMOCOAGULATION (RFTC)-NERVE-MEDIAN BRANCH-LUMBAR Right 2018    [facility] at [facility]    RADIOFREQUENCY THERMOCOAGULATION (RFTC)-NERVE-MEDIAN BRANCH-LUMBAR Left 3/16/2018    [facility] at [facility]    RADIOFREQUENCY THERMOCOAGULATION (RFTC)-NERVE-MEDIAN BRANCH-LUMBAR Right 2016    [facility] at [facility]       Family History:  Family History   Problem Relation Age of Onset    Hypertension Mother     Heart attack Mother     Cancer Father         liver and bladder    Hyperlipidemia Sister     Psoriasis Sister     Cancer Brother         liver    Stroke Maternal Uncle     Cancer Maternal Aunt         stomach    Lung cancer Sister     Heart attack Brother     Heart attack Son     Amblyopia Neg Hx     Blindness Neg Hx     Cataracts Neg Hx     Glaucoma Neg Hx     Macular degeneration Neg Hx     Retinal detachment Neg Hx     Strabismus Neg Hx     Thyroid disease Neg Hx     Melanoma Neg Hx     Lupus Neg Hx     Eczema Neg Hx     COPD Neg Hx        Social History:  Social History     Socioeconomic History    Marital status:      Spouse name: None    Number of children: 2    Years of education: None    Highest education level: None   Social Needs    Financial resource strain: None    Food insecurity - worry: None    Food insecurity - inability: None    Transportation needs - medical: None    Transportation needs - non-medical: None   Occupational History    Occupation: housewife   Tobacco Use    Smoking status: Former Smoker     Packs/day: 2.00     Years: 50.00     Pack years: 100.00     Types: Cigarettes     Last attempt to quit: 2009     Years since quittin.6    Smokeless tobacco: Never Used   Substance and Sexual Activity    Alcohol use: No     Alcohol/week: 0.0 oz    Drug use: No     "Sexual activity: Yes     Partners: Male   Other Topics Concern    Are you pregnant or think you may be? No    Breast-feeding No   Social History Narrative    One child  of a heart attack at age 35.       OBJECTIVE:    BP (!) 117/56   Pulse 81   Temp 97.7 °F (36.5 °C)   Ht 5' 2" (1.575 m)   Wt 71.7 kg (158 lb 1.1 oz)   LMP  (LMP Unknown)   BMI 28.91 kg/m²     PHYSICAL EXAMINATION:    General appearance: Well appearing, in no acute distress, alert and oriented x3.  Psych:  Mood and affect appropriate.    Skin: Skin color, texture, turgor normal, no rashes or lesions, in both upper and lower body.  Head/face:  Atraumatic, normocephalic. No palpable lymph nodes  Cor: RRR  Pulm: CTA  GI: Abdomen soft and non-tender.  Back: Straight leg raising in the sitting position is negative to radicular pain bilaterally. There is no pain with palpation to bilateral lumbar facet joints.  Limited ROM with pain on extension.  Positive facet loading bilaterally, R>L.    Extremities: Peripheral joint ROM is full and pain free without obvious instability or laxity in all four extremities. No deformities, edema, or skin discoloration. Good capillary refill.  Musculoskeletal:  There is pain with palpation over bilateral SI joints. FABERs is positive bilaterally, left greater than right. Bilateral lower extremity strength is normal and symmetric.  No atrophy or tone abnormalities are noted.  Neuro: Bilateral lower extremity coordination and muscle stretch reflexes are physiologic and symmetric.  Plantar response are downgoing. Decreased sensation to BLE.  Gait: Antalgic- ambulates without assistance.    ASSESSMENT: 68 y.o. year old female with lower back pain, consistent with the following diagnoses:     1. Sacroiliac joint pain     2. Spondylosis of lumbar region without myelopathy or radiculopathy     3. Facet arthritis of lumbar region     4. DDD (degenerative disc disease), lumbar     5. Chronic pain syndrome           PLAN: "     - Previous imaging was reviewed and discussed with the patient today.    - She is s/p lumbar RFA with benefit. We can repeat this as needed.     - Schedule for bilateral SI joint injections.   The procedure, risks, benefits and options were discussed with patient. There are no contraindications to the procedure. The patient expressed understanding and agreed to proceed.      - Will consider ADRIAN versus updating her MRI in the future if radicular symptoms worsen.    - Continue Gabapentin 300 mg 2 pills TID.     - The patient will continue a home exercise routine to help with pain and strengthening.      - RTC 2 weeks after above procedure.     - Counseled patient regarding the importance of constant sleeping habits and physical therapy.    - Dr. Meyers was consulted on the patient and agrees with this plan.    The above plan and management options were discussed at length with patient. Patient is in agreement with the above and verbalized understanding.    Jasmin Castaneda NP  01/31/2019

## 2019-01-31 NOTE — PROGRESS NOTES
Chronic patient Established Note (Follow up visit)      SUBJECTIVE:    Yasmin Asencio presents to the clinic for a follow-up appointment for lower back pain. She reports increased low back and buttock pain that is aching in nature. She does report benefit from previous RFA. She reports intermittent radiating pain down the back of her legs to her mid calf, left greater than left. She continues to take Gabapentin with benefit. She is currently taking antibiotics for sinus infection. She denies any other health changes. She denies any bowel or bladder incontinence. Her pain today is 5/10.         Pain Disability Index Review:  Last 3 PDI Scores 1/31/2019 1/10/2019 10/23/2018   Pain Disability Index (PDI) 15 0 49       Pain Medications:  Tramadol PRN and Gabapentin 600 mg TID    Opioid Contract: no     report:  Reviewed and consistent with medication use as prescribed.    Pain Procedures:   10/26/15 IR Epidural Transforaminal Inj 1ST Vert Lumbar Bilat   1/22/16 Facet Injection L2-L5   8/26/16 Bilaeral Lumbar MBB at L2-5  10/14/16 Bilateral L2,3,4,5 MBB- 100% relief for 5 days  12/16/16 Right L2,3,4,5 RFA- 80% relief  3/16/18 Left L2,3,4,5 RFA- 80% relief  4/4/18 Right L2,3,4,5 RFA- 80% relief  11/28/2018- Right L2,3,4,5 RFA  12/18/2018- Left L2,3,4,5 RFA    Physical Therapy/Home Exercise: yes per self    Imaging:   Lumbar XRAYs 10/13/15  Narrative   Lumbar spine    AP and lateral flexion and extension    There are 5 non-rib bearing lumbar vertebral bodies.  There is left convex scoliosis of the lumbar spine.  There is degenerative disk disease of L1/L2, L2/L3 and L5/S1.  Vertebral body height is maintained.  There is osteopenia.    The patient is status post cholecystectomy and there is atherosclerosis.   Impression    There are degenerative changes throughout the lumbar spine.          Lumbar 10/22/15  Narrative   Comparison: Lumbar spine 11/15/13    Technique: Multiplanar multi-sequence MR images of the  lumbar spine without contrast    Findings: The vertebral body heights and alignment are maintained.  No diffuse marrow replacement process is identified.  Mild levoscoliosis is noted.  The abdominal structures show a possible right hepatic cyst and mild ectasia of the abdominal aorta.    The spinal cord terminates at the L1 level and exhibits normal signal.  Hemangioma noted in the T12 vertebral body.  There are decreased intravertebral disk height at all lumbar levels, with associated endplate changes at L2-3.  Two small Tarlov cyst again noted in the sacral canal posterior to S2.  Annular fissure is noted at L3-4 and L4-5. Level by level findings are detailed below:    L1-2: Circumferential disk bulge and facet hypertrophy causing mild bilateral foraminal stenoses without spinal canal stenosis.  L2-3: Circumferential disk bulge and facet hypertrophy causing mild bilateral foraminal stenoses without spinal canal stenosis.  Small focus of subarticular signal abnormality on the right could represent a focus of fat or a cyst and may be abutting the exiting right L2 nerve root, unchanged from prior.  L3-4: Circumferential disk bulge and facet hypertrophy causing mild spinal canal, moderate right foraminal, and mild left foraminal stenoses.  L4-5: Circumferential disk bulge and facet and ligamentum flavum hypertrophy causing mild right and moderate left foraminal stenosis as well as mild spinal canal stenosis.  L5-S1: Facet hypertrophy causing mild left foraminal stenosis.  No right foraminal or spinal canal stenosis.   Impression       Multilevel degenerative changes of the lumbar spine related to facet and disk disease as detailed above, overall similar to the prior examination.    Focus of signal abnormality abutting the exiting right L2 nerve root is favored to represent a focus of epidural fat, but could also represent a small cyst and is similar to prior.     Narrative     EXAMINATION:  CTA CHEST NON  CORONARY    CLINICAL HISTORY:  shortness of breath, R lower chest pain;    TECHNIQUE:  Low dose axial images, sagittal and coronal reformations were obtained from the thoracic inlet to the lung bases following the IV administration of 75 mL of Omnipaque 350.  Contrast timing was optimized to evaluate the pulmonary arteries.  MIP images were performed.    COMPARISON:  Radiograph 03/07/2018; CTA 01/05/2017    FINDINGS:  The vascular and soft tissues structures at the base of the neck are unremarkable.    There is a left-sided aortic arch with 3 branch vessels. The aorta is normal in caliber, contour, and course with mild calcific atherosclerosis.    There is no cardiac enlargement or pericardial fluid.  There are aortic valve calcifications.    There is no axillary, hilar, or mediastinal lymphadenopathy.    The pulmonary arteries distribute normally.  This study is adequate for the evaluation of pulmonary thromboembolism. There are filling defects within the right upper lobe segmental branch, similar to prior CTA 01/05/2017 consistent with chronic thrombus.  No new acute pulmonary embolism identified.    The trachea is midline and the proximal airways are patent.  Extensive fibrotic changes are again identified throughout the lung apices, most pronounced within the left lung.  There are stable areas of bronchiectasis with interstitial thickening and mosaic attenuation throughout the lungs, similar to prior CTA.  A more focal area of nodular consolidation is seen within the left lower lobe, not present on prior study.  Stable right lobe nodule measuring 4 mm is again identified.    The esophagus is normal in caliber and contour.  The imaged intra-abdominal structures demonstrate postsurgical changes of cholecystectomy.  There are calcifications in the spleen.    The osseous structures demonstrate mild degenerative changes without acute fracture or bony destructive process.      Impression       1. Chronic filling  defects within the right upper lobe pulmonary artery branch consistent with chronic pulmonary embolism.  No new acute pulmonary thromboembolism identified.  2. Stable fibrotic changes throughout the lung apices, left greater than right, with bronchiectasis, interstitial prominence, and mosaic attenuation of the lungs, similar to prior CTA 01/05/2017.  3. Small area of focal nodular consolidation within the left lower lobe, not seen on prior studies which, may be infectious or inflammatory in etiology, possibly even contiguous from the left apical fibrotic changes.  Suggest follow-up CT scan 1 month after antimicrobial therapy to exclude neoplastic nodule.  4. Stable 4 mm nodule within the right middle lobe.  5. Additional findings as detailed above.  This report was flagged in Epic as abnormal.     BMP  Lab Results   Component Value Date     (L) 10/04/2018    K 3.9 10/04/2018    CL 96 10/04/2018    CO2 30 (H) 10/04/2018    BUN 13 10/04/2018    CREATININE 1.1 10/04/2018    CALCIUM 10.2 10/04/2018    ANIONGAP 9 10/04/2018    ESTGFRAFRICA 59 (A) 10/04/2018    EGFRNONAA 51 (A) 10/04/2018       Lab Results   Component Value Date    WBC 8.85 09/19/2018    HGB 10.2 (L) 09/19/2018    HCT 31.7 (L) 09/19/2018    MCV 93 09/19/2018     09/19/2018         Allergies:   Allergies   Allergen Reactions    Adhesive Other (See Comments)     Tears up the skin and makes it itch    Bactrim [Sulfamethoxazole-Trimethoprim] Rash     Was hospitalized for rash    Lipitor [Atorvastatin] Other (See Comments)     Muscle aches    Albuterol Other (See Comments)     tremors    Restasis [Cyclosporine] Itching       Current Medications:   Current Outpatient Medications   Medication Sig Dispense Refill    acetaminophen (TYLENOL) 500 MG tablet Take 1,000 mg by mouth once daily. sleep      alprazolam (XANAX) 0.25 MG tablet Take 1 tablet (0.25 mg total) by mouth nightly as needed for Anxiety. 30 tablet 0    amoxicillin-clavulanate  875-125mg (AUGMENTIN) 875-125 mg per tablet TAKE 1 TABLET BY MOUTH TWICE DAILY AS NEEDED FOR EXACERBATION  FOR  7  DAYS 14 tablet 0    apixaban (ELIQUIS) 5 mg Tab Take 1 tablet (5 mg total) by mouth 2 (two) times daily. 180 tablet 3    chlorthalidone (HYGROTEN) 25 MG Tab Take 12.5 mg by mouth once daily.      desoximetasone (TOPICORT) 0.25 % cream Apply as directed bid prn (Patient taking differently: Apply as directed twice daily as needed for psoriasis) 60 g 2    ergocalciferol (ERGOCALCIFEROL) 50,000 unit Cap TAKE 1 CAPSULE BY MOUTH ONCE A WEEK 4 capsule 4    escitalopram oxalate (LEXAPRO) 10 MG tablet TAKE ONE TABLET BY MOUTH ONCE DAILY 30 tablet 11    folic acid (FOLVITE) 1 MG tablet Take 1 tablet (1 mg total) by mouth once daily. 100 tablet 2    gabapentin (NEURONTIN) 300 MG capsule TAKE 2 CAPSULES BY MOUTH THREE TIMES DAILY 540 capsule 0    guaiFENesin (MUCINEX) 600 mg 12 hr tablet Take 1,200 mg by mouth 2 (two) times daily.      ipratropium (ATROVENT) 0.02 % nebulizer solution Inhale 1 vial in nebulizer 3 to 4 times daily      ketoconazole (NIZORAL) 2 % shampoo Apply topically once daily. 120 mL 5    Lactobacillus rhamnosus GG (CULTURELLE) 10 billion cell capsule Take 1 capsule by mouth once daily.      mirtazapine (REMERON) 7.5 MG Tab Take 1 tablet (7.5 mg total) by mouth every evening. 90 tablet 1    multivitamin (THERAGRAN) per tablet Take 1 tablet by mouth once daily.      omeprazole (PRILOSEC) 20 MG capsule TAKE 1 CAPSULE BY MOUTH ONCE DAILY AS NEEDED FOR HEARTBURN (TAKE ONLY AS NEEDED) 90 capsule 0    ondansetron (ZOFRAN) 4 MG tablet Take 1 tablet (4 mg total) by mouth every 8 (eight) hours as needed for Nausea. 45 tablet 0    propylene glycol (SYSTANE BALANCE) 0.6 % Drop Apply 1 drop to eye daily as needed (dry eye).      sodium chloride 2% (XIOMARA 128) 2 % ophthalmic solution Place 1 drop into both eyes 3 (three) times daily as needed.      sodium chloride 3% 3 % nebulizer solution  Take 4 mLs by nebulization 2 (two) times daily. 720 mL 3    traMADol (ULTRAM) 50 mg tablet Take 1 tablet (50 mg total) by mouth every 6 (six) hours as needed for Pain. 45 tablet 0    umeclidinium-vilanterol (ANORO ELLIPTA) 62.5-25 mcg/actuation DsDv Inhale into the lungs. Controller      verapamil (CALAN-SR) 180 MG CR tablet 2 (two) times daily.       fluocinolone (DERMA-SMOOTHE) 0.01 % external oil Apply topically once daily. (Patient taking differently: Apply topically daily as needed. ) 1 Bottle 5    fluocinonide (LIDEX) 0.05 % external solution Apply topically once daily. - apply after shampooing (Patient taking differently: Apply topically once daily. - apply after shampooing as needed) 60 mL 5     No current facility-administered medications for this visit.        REVIEW OF SYSTEMS:    GENERAL:  No weight loss, malaise or fevers.  HEENT:  Negative for frequent or significant headaches.  NECK:  Negative for lumps, goiter, pain and significant neck swelling.  RESPIRATORY:  Negative for cough, wheezing or shortness of breath. H/O TB and recurrent lung infections.  CARDIOVASCULAR:  Negative for chest pain, leg swelling or palpitations.  GI:  Negative for abdominal discomfort, blood in stools or black stools or change in bowel habits. GERD.  MUSCULOSKELETAL:  See HPI.  SKIN:  Negative for lesions, rash, and itching.  PSYCH:  Negative for sleep disturbance, mood disorder and recent psychosocial stressors.  HEMATOLOGY/LYMPHOLOGY:  Negative for prolonged bleeding, bruising easily or swollen nodes.  NEURO:   No history of headaches, syncope, paralysis, seizures or tremors.  All other reviewed and negative other than HPI.    Past Medical History:  Past Medical History:   Diagnosis Date    Acquired bronchiectasis     due to history of TB - followed by pulmonary, Dr. Zuñiga    Allergy     Amblyopia     rt eye per pt    Anemia of other chronic disease     Anticoagulant long-term use     Anxiety     Cataract      Chronic obstructive pulmonary disease with acute exacerbation     Clotting disorder     COPD (chronic obstructive pulmonary disease)     COPD (chronic obstructive pulmonary disease)     Depression     Diverticulosis     Essential tremor     GERD (gastroesophageal reflux disease)     Hemangioma of liver     History of tuberculosis 1978    Hypertension     Mixed anxiety and depressive disorder     Psoriasis     PSVT (paroxysmal supraventricular tachycardia)     Pulmonary embolism     S/P PICC central line placement Apr. 2016 - May 2016    Skin disease     Psoriasis    Spondylosis without myelopathy 8/23/2013    Supraventricular tachycardia     Thoracic aorta atherosclerosis     noted on CT scan of chest 1/3/2011    Tuberculosis     Vaginal delivery     x2    Vitamin D deficiency        Past Surgical History:  Past Surgical History:   Procedure Laterality Date    ABLATION N/A 7/24/2017    Performed [facility] at [facility] CATH LAB    BLOCK-NERVE-MEDIAL BRANCH-LUMBAR Bilateral 10/14/2016    Performed by Issac Meyers [facility]    BLOCK-NERVE-MEDIAL BRANCH-LUMBAR Bilateral 8/26/2016    Performed by [facility]ac [facility]ers [facility] at [facility]    CATARACT EXTRACTION W/  INTRAOCULAR LENS IMPLANT  07/24/12    od dr [place]    CATARACT EXTRACTION W/  INTRAOCULAR LENS IMPLANT  08/07/12    left eye    COLONOSCOPY N/A 4/23/2013    Performed by Simeon Graves [facility] at [facility] ENDO (4TH FLR)    EGD (ESOPHAGOGASTRODUODENOSCOPY) N/A 4/23/2013    [facility] ([facility]    GALLBLADDER SURGERY  9/2011    HEART CATH-LEFT Left 6/16/2016    [facility] C[facility]    INJECTION-FACET Bilateral 1/22/2016    [facility]    RADIOFREQUENCY ABLATION RIGHT LUMBAR L2,3,4,5 RFA Right 11/28/2018    [facility] at [facility]    RADIOFREQUENCY THERMAL COAGULATION  Left 2018    [facility]    RADIOFREQUENCY THERMOCOAGULATION (RFTC)-NERVE-MEDIAN BRANCH-LUMBAR Right 2018    [facility]    RADIOFREQUENCY THERMOCOAGULATION (RFTC)-NERVE-MEDIAN BRANCH-LUMBAR Left 3/16/2018    [facility] at [facility]    RADIOFREQUENCY THERMOCOAGULATION (RFTC)-NERVE-MEDIAN BRANCH-LUMBAR Right 2016    [facility] by [facility] at [facility]       Family History:  Family History   Problem Relation Age of Onset    Hypertension Mother     Heart attack Mother     Cancer Father         liver and bladder    Hyperlipidemia Sister     Psoriasis Sister     Cancer Brother         liver    Stroke Maternal Uncle     Cancer Maternal Aunt         stomach    Lung cancer Sister     Heart attack Brother     Heart attack Son     Amblyopia Neg Hx     Blindness Neg Hx     Cataracts Neg Hx     Glaucoma Neg Hx     Macular degeneration Neg Hx     Retinal detachment Neg Hx     Strabismus Neg Hx     Thyroid disease Neg Hx     Melanoma Neg Hx     Lupus Neg Hx     Eczema Neg Hx     COPD Neg Hx        Social History:  Social History     Socioeconomic History    Marital status:      Spouse name: None    Number of children: 2    Years of education: None    Highest education level: None   Social Needs    Financial resource strain: None    Food insecurity - worry: None    Food insecurity - inability: None    Transportation needs - medical: None    Transportation needs - non-medical: None   Occupational History    Occupation: housewife   Tobacco Use    Smoking status: Former Smoker     Packs/day: 2.00     Years: 50.00     Pack years: 100.00     Types: Cigarettes     Last attempt to quit: 2009     Years since quittin.6    Smokeless tobacco: Never Used   Substance and Sexual Activity    Alcohol use: No     Alcohol/week: 0.0 oz    Drug use: No     "Sexual activity: Yes     Partners: Male   Other Topics Concern    Are you pregnant or think you may be? No    Breast-feeding No   Social History Narrative    One child  of a heart attack at age 35.       OBJECTIVE:    BP (!) 117/56   Pulse 81   Temp 97.7 °F (36.5 °C)   Ht 5' 2" (1.575 m)   Wt 71.7 kg (158 lb 1.1 oz)   LMP  (LMP Unknown)   BMI 28.91 kg/m²     PHYSICAL EXAMINATION:    General appearance: Well appearing, in no acute distress, alert and oriented x3.  Psych:  Mood and affect appropriate.    Skin: Skin color, texture, turgor normal, no rashes or lesions, in both upper and lower body.  Head/face:  Atraumatic, normocephalic. No palpable lymph nodes  Cor: RRR  Pulm: CTA  GI: Abdomen soft and non-tender.  Back: Straight leg raising in the sitting position is negative to radicular pain bilaterally. There is no pain with palpation to bilateral lumbar facet joints.  Limited ROM with pain on extension.  Positive facet loading bilaterally, R>L.    Extremities: Peripheral joint ROM is full and pain free without obvious instability or laxity in all four extremities. No deformities, edema, or skin discoloration. Good capillary refill.  Musculoskeletal:  There is pain with palpation over bilateral SI joints. FABERs is positive bilaterally, left greater than right. Bilateral lower extremity strength is normal and symmetric.  No atrophy or tone abnormalities are noted.  Neuro: Bilateral lower extremity coordination and muscle stretch reflexes are physiologic and symmetric.  Plantar response are downgoing. Decreased sensation to BLE.  Gait: Antalgic- ambulates without assistance.    ASSESSMENT: 68 y.o. year old female with lower back pain, consistent with the following diagnoses:     1. Sacroiliac joint pain     2. Spondylosis of lumbar region without myelopathy or radiculopathy     3. Facet arthritis of lumbar region     4. DDD (degenerative disc disease), lumbar     5. Chronic pain syndrome           PLAN: "     - Previous imaging was reviewed and discussed with the patient today.    - She is s/p lumbar RFA with benefit. We can repeat this as needed.     - Schedule for bilateral SI joint injections.   The procedure, risks, benefits and options were discussed with patient. There are no contraindications to the procedure. The patient expressed understanding and agreed to proceed.      - Will consider ADRIAN versus updating her MRI in the future if radicular symptoms worsen.    - Continue Gabapentin 300 mg 2 pills TID.     - The patient will continue a home exercise routine to help with pain and strengthening.      - RTC 2 weeks after above procedure.     - Counseled patient regarding the importance of constant sleeping habits and physical therapy.    - Dr. Meyers was consulted on the patient and agrees with this plan.    The above plan and management options were discussed at length with patient. Patient is in agreement with the above and verbalized understanding.    Jasmin Castaneda NP  01/31/2019

## 2019-02-06 ENCOUNTER — HOSPITAL ENCOUNTER (OUTPATIENT)
Dept: CARDIOLOGY | Facility: HOSPITAL | Age: 69
Discharge: HOME | End: 2019-02-06
Attending: EMERGENCY MEDICINE
Payer: MEDICARE

## 2019-02-06 ENCOUNTER — HOSPITAL ENCOUNTER (OUTPATIENT)
Dept: RADIOLOGY | Facility: HOSPITAL | Age: 69
Discharge: HOME | End: 2019-02-06
Attending: EMERGENCY MEDICINE
Payer: MEDICARE

## 2019-02-06 DIAGNOSIS — I27.82 OTHER CHRONIC PULMONARY EMBOLISM WITHOUT ACUTE COR PULMONALE: ICD-10-CM

## 2019-02-06 DIAGNOSIS — J47.9 BRONCHIECTASIS WITHOUT COMPLICATION: ICD-10-CM

## 2019-02-06 DIAGNOSIS — I26.99 PE (PULMONARY THROMBOEMBOLISM): ICD-10-CM

## 2019-02-06 PROCEDURE — 93970 CV US DOPPLER VENOUS LEGS BILATERAL (CUPID ONLY): ICD-10-PCS | Mod: 26,,, | Performed by: SURGERY

## 2019-02-06 PROCEDURE — 71250 CT THORAX DX C-: CPT | Mod: TC

## 2019-02-06 PROCEDURE — 93970 EXTREMITY STUDY: CPT | Mod: 50

## 2019-02-06 PROCEDURE — 93970 EXTREMITY STUDY: CPT | Mod: 26,,, | Performed by: SURGERY

## 2019-02-06 PROCEDURE — 71250 CT CHEST WITHOUT CONTRAST: ICD-10-PCS | Mod: 26,,, | Performed by: RADIOLOGY

## 2019-02-06 PROCEDURE — 71250 CT THORAX DX C-: CPT | Mod: 26,,, | Performed by: RADIOLOGY

## 2019-02-11 DIAGNOSIS — I49.9 CARDIAC ARRHYTHMIA, UNSPECIFIED CARDIAC ARRHYTHMIA TYPE: Primary | ICD-10-CM

## 2019-02-11 RX ORDER — VERAPAMIL HYDROCHLORIDE 180 MG/1
180 TABLET, FILM COATED, EXTENDED RELEASE ORAL 2 TIMES DAILY
Qty: 180 TABLET | Refills: 3 | Status: SHIPPED | OUTPATIENT
Start: 2019-02-11 | End: 2019-02-13 | Stop reason: RX

## 2019-02-13 ENCOUNTER — TELEPHONE (OUTPATIENT)
Dept: ELECTROPHYSIOLOGY | Facility: CLINIC | Age: 69
End: 2019-02-13

## 2019-02-13 DIAGNOSIS — I49.9 CARDIAC ARRHYTHMIA, UNSPECIFIED CARDIAC ARRHYTHMIA TYPE: ICD-10-CM

## 2019-02-13 RX ORDER — VERAPAMIL HYDROCHLORIDE 180 MG/1
180 CAPSULE, EXTENDED RELEASE ORAL 2 TIMES DAILY
Qty: 180 CAPSULE | Refills: 3 | Status: SHIPPED | OUTPATIENT
Start: 2019-02-13 | End: 2020-02-03

## 2019-02-13 RX ORDER — VERAPAMIL HYDROCHLORIDE 180 MG/1
180 TABLET, FILM COATED, EXTENDED RELEASE ORAL 2 TIMES DAILY
Qty: 180 TABLET | Refills: 3 | Status: CANCELLED | OUTPATIENT
Start: 2019-02-13

## 2019-02-13 RX ORDER — VERAPAMIL HYDROCHLORIDE 120 MG/1
120 TABLET, FILM COATED ORAL 3 TIMES DAILY
Qty: 90 TABLET | Refills: 11 | Status: CANCELLED | OUTPATIENT
Start: 2019-02-13 | End: 2020-02-13

## 2019-02-13 NOTE — TELEPHONE ENCOUNTER
----- Message from Yumi Pro MA sent at 2/7/2019 11:52 AM CST -----  Contact: patient called  The patient need  A Prior  Authorization for her medication  Verapamil 180 mg 180/ 3 refill. Please call [facility] at [phone]. Thank you.

## 2019-02-13 NOTE — TELEPHONE ENCOUNTER
Spoke to Catherine at [facility]'s pharmacy. She stated that the medication Calan SR 180MG is on back order until March. She stated that the patient is out of medicine and was hoping the physician would send in another type of medicine.

## 2019-02-13 NOTE — TELEPHONE ENCOUNTER
Spoke to Catherine at [facility] pharmacy after the refill was sent over to the pharrmacy, she stated that she gave me the wrong name for the medication change (message was sent by Kira). She stated that Verampril SR 180mg was available not Verampril ER 180MG. Spoke to Radha the nurse and she stated she will speak to the pharmacy.

## 2019-02-20 ENCOUNTER — HOSPITAL ENCOUNTER (OUTPATIENT)
Facility: OTHER | Age: 69
Discharge: HOME | End: 2019-02-20
Attending: ANESTHESIOLOGY | Admitting: ANESTHESIOLOGY
Payer: MEDICARE

## 2019-02-20 VITALS
SYSTOLIC BLOOD PRESSURE: 110 MMHG | HEART RATE: 72 BPM | OXYGEN SATURATION: 97 % | BODY MASS INDEX: 28.89 KG/M2 | HEIGHT: 62 IN | WEIGHT: 157 LBS | DIASTOLIC BLOOD PRESSURE: 53 MMHG | RESPIRATION RATE: 18 BRPM | TEMPERATURE: 98 F

## 2019-02-20 DIAGNOSIS — G89.29 CHRONIC PAIN: ICD-10-CM

## 2019-02-20 DIAGNOSIS — M46.1 SACROILIITIS: Primary | ICD-10-CM

## 2019-02-20 PROCEDURE — 27096 INJECT SACROILIAC JOINT: CPT | Mod: 50,,, | Performed by: ANESTHESIOLOGY

## 2019-02-20 PROCEDURE — 27096 PR INJECTION,SACROILIAC JOINT: ICD-10-PCS | Mod: 50,,, | Performed by: ANESTHESIOLOGY

## 2019-02-20 PROCEDURE — 25000003 PHARM REV CODE 250: Performed by: ANESTHESIOLOGY

## 2019-02-20 PROCEDURE — 27096 INJECT SACROILIAC JOINT: CPT | Mod: 50 | Performed by: ANESTHESIOLOGY

## 2019-02-20 PROCEDURE — 63600175 PHARM REV CODE 636 W HCPCS: Performed by: ANESTHESIOLOGY

## 2019-02-20 PROCEDURE — 25500020 PHARM REV CODE 255: Performed by: ANESTHESIOLOGY

## 2019-02-20 RX ORDER — TRIAMCINOLONE ACETONIDE 40 MG/ML
INJECTION, SUSPENSION INTRA-ARTICULAR; INTRAMUSCULAR
Status: DISCONTINUED | OUTPATIENT
Start: 2019-02-20 | End: 2019-02-20 | Stop reason: HOSPADM

## 2019-02-20 RX ORDER — LIDOCAINE HYDROCHLORIDE 10 MG/ML
INJECTION INFILTRATION; PERINEURAL
Status: DISCONTINUED | OUTPATIENT
Start: 2019-02-20 | End: 2019-02-20 | Stop reason: HOSPADM

## 2019-02-20 RX ORDER — BUPIVACAINE HYDROCHLORIDE 2.5 MG/ML
INJECTION, SOLUTION EPIDURAL; INFILTRATION; INTRACAUDAL
Status: DISCONTINUED | OUTPATIENT
Start: 2019-02-20 | End: 2019-02-20 | Stop reason: HOSPADM

## 2019-02-20 RX ORDER — MIDAZOLAM HYDROCHLORIDE 1 MG/ML
INJECTION INTRAMUSCULAR; INTRAVENOUS
Status: DISCONTINUED | OUTPATIENT
Start: 2019-02-20 | End: 2019-02-20 | Stop reason: HOSPADM

## 2019-02-20 RX ORDER — ALPRAZOLAM 0.5 MG/1
0.5 TABLET, ORALLY DISINTEGRATING ORAL
Status: DISCONTINUED | OUTPATIENT
Start: 2019-02-20 | End: 2019-02-20 | Stop reason: HOSPADM

## 2019-02-20 RX ORDER — SODIUM CHLORIDE 9 MG/ML
INJECTION, SOLUTION INTRAVENOUS CONTINUOUS
Status: DISCONTINUED | OUTPATIENT
Start: 2019-02-20 | End: 2019-02-20 | Stop reason: HOSPADM

## 2019-02-20 RX ADMIN — SODIUM CHLORIDE: 0.9 INJECTION, SOLUTION INTRAVENOUS at 07:02

## 2019-02-20 NOTE — DISCHARGE SUMMARY
Discharge Note  Short Stay      SUMMARY     Admit Date: 2/20/2019    Attending Physician: Issac Meyers      Discharge Physician: Issac Meyers      Discharge Date: 2/20/2019 8:51 AM    Procedure(s) (LRB):  INJECTION BILATERAL SI JOINT (Bilateral)    Final Diagnosis: Bilateral sacroiliitis [M46.1]    Disposition: Home or self care    Patient Instructions:   Current Discharge Medication List      CONTINUE these medications which have NOT CHANGED    Details   acetaminophen (TYLENOL) 500 MG tablet Take 1,000 mg by mouth once daily. sleep      alprazolam (XANAX) 0.25 MG tablet Take 1 tablet (0.25 mg total) by mouth nightly as needed for Anxiety.  Qty: 30 tablet, Refills: 0    Associated Diagnoses: Mixed anxiety and depressive disorder      amoxicillin-clavulanate 875-125mg (AUGMENTIN) 875-125 mg per tablet TAKE 1 TABLET BY MOUTH TWICE DAILY AS NEEDED FOR EXACERBATION  FOR  7  DAYS  Qty: 14 tablet, Refills: 0    Comments: Please consider 90 day supplies to promote better adherence      apixaban (ELIQUIS) 5 mg Tab Take 1 tablet (5 mg total) by mouth 2 (two) times daily.  Qty: 180 tablet, Refills: 3    Associated Diagnoses: Personal history of pulmonary embolism; Current use of long term anticoagulation; Other chronic pulmonary embolism without acute cor pulmonale      chlorthalidone (HYGROTEN) 25 MG Tab Take 12.5 mg by mouth once daily.      desoximetasone (TOPICORT) 0.25 % cream Apply as directed bid prn  Qty: 60 g, Refills: 2    Associated Diagnoses: Rash      ergocalciferol (ERGOCALCIFEROL) 50,000 unit Cap TAKE 1 CAPSULE BY MOUTH ONCE A WEEK  Qty: 4 capsule, Refills: 4    Comments: Please consider 90 day supplies to promote better adherence      escitalopram oxalate (LEXAPRO) 10 MG tablet TAKE ONE TABLET BY MOUTH ONCE DAILY  Qty: 30 tablet, Refills: 11    Comments: Please consider 90 day supplies to promote better adherence      fluocinolone (DERMA-SMOOTHE) 0.01 % external oil Apply topically once daily.  Qty: 1  Bottle, Refills: 5    Associated Diagnoses: Psoriasis      fluocinonide (LIDEX) 0.05 % external solution Apply topically once daily. - apply after shampooing  Qty: 60 mL, Refills: 5    Associated Diagnoses: Psoriasis      folic acid (FOLVITE) 1 MG tablet Take 1 tablet (1 mg total) by mouth once daily.  Qty: 100 tablet, Refills: 2    Associated Diagnoses: Personal history of pulmonary embolism; Elevated serum homocysteine level      gabapentin (NEURONTIN) 300 MG capsule TAKE 2 CAPSULES BY MOUTH THREE TIMES DAILY  Qty: 540 capsule, Refills: 0    Associated Diagnoses: Acute left-sided low back pain with sciatica, sciatica laterality unspecified      guaiFENesin (MUCINEX) 600 mg 12 hr tablet Take 1,200 mg by mouth 2 (two) times daily.      ipratropium (ATROVENT) 0.02 % nebulizer solution Inhale 1 vial in nebulizer 3 to 4 times daily      ketoconazole (NIZORAL) 2 % shampoo Apply topically once daily.  Qty: 120 mL, Refills: 5    Associated Diagnoses: Psoriasis      Lactobacillus rhamnosus GG (CULTURELLE) 10 billion cell capsule Take 1 capsule by mouth once daily.      mirtazapine (REMERON) 7.5 MG Tab Take 1 tablet (7.5 mg total) by mouth every evening.  Qty: 90 tablet, Refills: 1    Associated Diagnoses: Essential tremor      multivitamin (THERAGRAN) per tablet Take 1 tablet by mouth once daily.      omeprazole (PRILOSEC) 20 MG capsule TAKE 1 CAPSULE BY MOUTH ONCE DAILY AS NEEDED FOR HEARTBURN (TAKE ONLY AS NEEDED)  Qty: 90 capsule, Refills: 0    Associated Diagnoses: Gastroesophageal reflux disease without esophagitis      ondansetron (ZOFRAN) 4 MG tablet Take 1 tablet (4 mg total) by mouth every 8 (eight) hours as needed for Nausea.  Qty: 45 tablet, Refills: 0      propylene glycol (SYSTANE BALANCE) 0.6 % Drop Apply 1 drop to eye daily as needed (dry eye).      sodium chloride 2% (XIOMARA 128) 2 % ophthalmic solution Place 1 drop into both eyes 3 (three) times daily as needed.      sodium chloride 3% 3 % nebulizer solution  Take 4 mLs by nebulization 2 (two) times daily.  Qty: 720 mL, Refills: 3    Comments: Please consider 90 day supplies to promote better adherence  Associated Diagnoses: Acquired bronchiectasis      traMADol (ULTRAM) 50 mg tablet Take 1 tablet (50 mg total) by mouth every 6 (six) hours as needed for Pain.  Qty: 45 tablet, Refills: 0      umeclidinium-vilanterol (ANORO ELLIPTA) 62.5-25 mcg/actuation DsDv Inhale into the lungs. Controller      verapamil (VERELAN) 180 MG C24P Take 1 capsule (180 mg total) by mouth 2 (two) times daily.  Qty: 180 capsule, Refills: 3                 Discharge Diagnosis: Bilateral sacroiliitis [M46.1]  Condition on Discharge: Stable with no complications to procedure   Diet on Discharge: Same as before.  Activity: as per instruction sheet.  Discharge to: Home with a responsible adult.  Follow up: 2-4 weeks

## 2019-02-20 NOTE — DISCHARGE INSTRUCTIONS
Adult Procedural Sedation Instructions    Recovery After Procedural Sedation (Adult)  You have been given medicine by vein to make you sleep during your surgery. This may have included both a pain medicine and sleeping medicine. Most of the effects have worn off. But you may still have some drowsiness for the next 6 to 8 hours.  Home care  Follow these guidelines when you get home:  · For the next 8 hours, you should be watched by a responsible adult. This person should make sure your condition is not getting worse.  · Don't drink any alcohol for the next 24 hours.  · Don't drive, operate dangerous machinery, or make important business or personal decisions during the next 24 hours.  Note: Your healthcare provider may tell you not to take any medicine by mouth for pain or sleep in the next 4 hours. These medicines may react with the medicines you were given in the hospital. This could cause a much stronger response than usual.  Follow-up care  Follow up with your healthcare provider if you are not alert and back to your usual level of activity within 12 hours.  When to seek medical advice  Call your healthcare provider right away if any of these occur:  · Drowsiness gets worse  · Weakness or dizziness gets worse  · Repeated vomiting  · You can't be awakened   Date Last Reviewed: 10/18/2016  © 3810-5601 [employer] [address] [place] [address], [place] [address]. All rights reserved. This information is not intended as a substitute for professional medical care. Always follow your healthcare professional's instructions.       Thank you for allowing us to care for you today. You may receive a survey about the care we provided. Your feedback is valuable and helps us provide excellent care throughout the community.     Home Care Instructions for Pain Management:    1. DIET:   You may resume your normal diet today.   2. BATHING:   You may shower with luke warm water. No tub baths or anything that will soak  injection sites under water for the next 24 hours.  3. DRESSING:   You may remove your bandage today.   4. ACTIVITY LEVEL:   You may resume your normal activities 24 hrs after your procedure. Nothing strenuous today.  5. MEDICATIONS:   You may resume your normal medications today. To restart blood thinners, ask your doctor.  6. DRIVING    If you have received any sedatives by mouth today, you may not drive for 12 hours.    If you have received any sedation through your IV, you may not drive for 24 hrs.   7. SPECIAL INSTRUCTIONS:   No heat to the injection site for 24 hrs including, hot bath or shower, heating pad, moist heat, or hot tubs.    Use ice pack to injection site for any pain or discomfort.  Apply ice packs for 20 minute intervals as needed.    IF you have diabetes, be sure to monitor your blood sugar more closely. IF your injection contained steroids your blood sugar levels may become higher than normal.    If you are still having pain upon discharge:  Your pain may improve over the next 48 hours. The anesthetic (numbing medication) works immediately to 48 hours. IF your injection contained a steroid (anti-inflammatory medication), it takes approximately 3 days to start feeling relief and 7-10 days to see your greatest results from the medication. It is possible you may need subsequent injections. This would be discussed at your follow up appointment with pain management or your referring doctor.      PLEASE CALL YOUR DOCTOR IF:  1. Redness or swelling around the injection site.  2. Fever of 101 degrees or more  3. Drainage (pus) from the injection site.  4. For any continuous bleeding (some dried blood over the incision is normal.)    FOR EMERGENCIES:   If any unusual problems or difficulties occur during clinic hours, call [phone] or [phone].

## 2019-02-20 NOTE — OP NOTE
Patient Name: Yasmin Asencio  MRN: [id number]    INFORMED CONSENT: The procedure, risks, benefits and options were discussed with patient. There are no contraindications to the procedure. The patient expressed understanding and agreed to proceed. The personnel performing the procedure was discussed. I verify that I personally obtained Yasmin's consent prior to the start of the procedure and the signed consent can be found on the patient's chart.    Procedure Date: 02/20/2019    Anesthesia: Topical    Pre Procedure diagnosis: Bilateral sacroiliitis [M46.1]  1. Sacroiliitis    2. Chronic pain      Post-Procedure diagnosis: SAME      Sedation: None    PROCEDURE: bilateral SACROILIAC JOINT INJECTION  DESCRIPTION OF PROCEDURE: The patient was brought to the fluoroscopy suite. After performing time out  IV access was obtained prior to the procedure. The patient was positioned prone on the fluoroscopy table. Continuous hemodynamic monitoring was initiated including blood pressure, EKG, and pulse oximetry.  The lumbosacral area was prepped with chlorhexidine three times and draped into a sterile field. The skin overlying the rt side sacroiliac joint was anesthetized using 3cc of lidocaine 1%. The inferior pole of the sacroiliac joint was identified by cephalo-oblique fluoroscopy. A 22 gauge, 3 1/2 inch spinal needle was slowly advanced through the sacroiliac joint capsule under fluoroscopic guidance. The needle position was confirmed using oblique, AP and lateral fluoroscopic imaging.  Negative aspiration was confirmed. 0.5 cc of Omnipaque 300 was injected confirming intra-articular contrast spread. A combination of 2 cc of Bupivacaine 0.25% and 40 mg kenalog was easily injected. The needle was removed and bleeding was nil. Same was repeated on the other side.  A sterile dressing was applied. PATIENT was taken to the Post-block Recovery Area for further observation.      Blood Loss: Nill  Specimen: None    Issac N  MD M[facility]

## 2019-02-21 DIAGNOSIS — R79.89 ELEVATED SERUM HOMOCYSTEINE LEVEL: ICD-10-CM

## 2019-02-21 DIAGNOSIS — Z86.711 PERSONAL HISTORY OF PULMONARY EMBOLISM: ICD-10-CM

## 2019-02-21 DIAGNOSIS — Z79.01 CURRENT USE OF LONG TERM ANTICOAGULATION: ICD-10-CM

## 2019-02-21 DIAGNOSIS — I27.82 OTHER CHRONIC PULMONARY EMBOLISM WITHOUT ACUTE COR PULMONALE: ICD-10-CM

## 2019-02-21 DIAGNOSIS — K21.9 GASTROESOPHAGEAL REFLUX DISEASE WITHOUT ESOPHAGITIS: Chronic | ICD-10-CM

## 2019-02-21 RX ORDER — FOLIC ACID 1 MG/1
TABLET ORAL
Qty: 100 TABLET | Refills: 2 | Status: SHIPPED | OUTPATIENT
Start: 2019-02-21 | End: 2020-01-02

## 2019-02-21 NOTE — TELEPHONE ENCOUNTER
I only see Eliquis requested. Did she need the omeprazole also?  Am I printing the Eliquis for the patient assistance program or am I sending this to the pharmacy.

## 2019-02-21 NOTE — TELEPHONE ENCOUNTER
----- Message from Leslye Gloria sent at 2/21/2019 12:07 PM CST -----  Contact: pt  Can the clinic reply in [facility]:       Please refill the medication(s) listed below. The patient can be reached at this phone number ([phone]) once it is called into the pharmacy.      Medication #1eliquis 3 month supply       Medication #2      Preferred [facility]

## 2019-02-22 RX ORDER — OMEPRAZOLE 20 MG/1
CAPSULE, DELAYED RELEASE ORAL
Qty: 90 CAPSULE | Refills: 0 | Status: SHIPPED | OUTPATIENT
Start: 2019-02-22 | End: 2019-05-23 | Stop reason: SDUPTHER

## 2019-02-26 ENCOUNTER — PATIENT MESSAGE (OUTPATIENT)
Dept: FAMILY MEDICINE | Facility: CLINIC | Age: 69
End: 2019-02-26

## 2019-02-26 DIAGNOSIS — I27.82 OTHER CHRONIC PULMONARY EMBOLISM WITHOUT ACUTE COR PULMONALE: ICD-10-CM

## 2019-02-26 DIAGNOSIS — Z86.711 PERSONAL HISTORY OF PULMONARY EMBOLISM: ICD-10-CM

## 2019-02-26 DIAGNOSIS — Z79.01 CURRENT USE OF LONG TERM ANTICOAGULATION: ICD-10-CM

## 2019-03-07 ENCOUNTER — OFFICE VISIT (OUTPATIENT)
Dept: PAIN MEDICINE | Facility: CLINIC | Age: 69
End: 2019-03-07
Payer: MEDICARE

## 2019-03-07 VITALS
BODY MASS INDEX: 28.44 KG/M2 | WEIGHT: 154.56 LBS | HEIGHT: 62 IN | TEMPERATURE: 98 F | HEART RATE: 74 BPM | DIASTOLIC BLOOD PRESSURE: 52 MMHG | SYSTOLIC BLOOD PRESSURE: 124 MMHG

## 2019-03-07 DIAGNOSIS — M47.816 FACET ARTHRITIS OF LUMBAR REGION: ICD-10-CM

## 2019-03-07 DIAGNOSIS — M46.1 SACROILIITIS: Primary | ICD-10-CM

## 2019-03-07 DIAGNOSIS — M47.816 SPONDYLOSIS OF LUMBAR REGION WITHOUT MYELOPATHY OR RADICULOPATHY: ICD-10-CM

## 2019-03-07 DIAGNOSIS — M51.36 DDD (DEGENERATIVE DISC DISEASE), LUMBAR: ICD-10-CM

## 2019-03-07 PROCEDURE — 99499 UNLISTED E&M SERVICE: CPT | Mod: S$GLB,,, | Performed by: NURSE PRACTITIONER

## 2019-03-07 PROCEDURE — 99499 RISK ADDL DX/OHS AUDIT: ICD-10-PCS | Mod: S$GLB,,, | Performed by: NURSE PRACTITIONER

## 2019-03-07 PROCEDURE — 3078F DIAST BP <80 MM HG: CPT | Mod: CPTII,S$GLB,, | Performed by: NURSE PRACTITIONER

## 2019-03-07 PROCEDURE — 3074F SYST BP LT 130 MM HG: CPT | Mod: CPTII,S$GLB,, | Performed by: NURSE PRACTITIONER

## 2019-03-07 PROCEDURE — 99213 OFFICE O/P EST LOW 20 MIN: CPT | Mod: S$GLB,,, | Performed by: NURSE PRACTITIONER

## 2019-03-07 PROCEDURE — 99999 PR PBB SHADOW E&M-EST. PATIENT-LVL III: ICD-10-PCS | Mod: PBBFAC,,, | Performed by: NURSE PRACTITIONER

## 2019-03-07 PROCEDURE — 99213 PR OFFICE/OUTPT VISIT, EST, LEVL III, 20-29 MIN: ICD-10-PCS | Mod: S$GLB,,, | Performed by: NURSE PRACTITIONER

## 2019-03-07 PROCEDURE — 99999 PR PBB SHADOW E&M-EST. PATIENT-LVL III: CPT | Mod: PBBFAC,,, | Performed by: NURSE PRACTITIONER

## 2019-03-07 PROCEDURE — 3074F PR MOST RECENT SYSTOLIC BLOOD PRESSURE < 130 MM HG: ICD-10-PCS | Mod: CPTII,S$GLB,, | Performed by: NURSE PRACTITIONER

## 2019-03-07 PROCEDURE — 1101F PR PT FALLS ASSESS DOC 0-1 FALLS W/OUT INJ PAST YR: ICD-10-PCS | Mod: CPTII,S$GLB,, | Performed by: NURSE PRACTITIONER

## 2019-03-07 PROCEDURE — 3078F PR MOST RECENT DIASTOLIC BLOOD PRESSURE < 80 MM HG: ICD-10-PCS | Mod: CPTII,S$GLB,, | Performed by: NURSE PRACTITIONER

## 2019-03-07 PROCEDURE — 1101F PT FALLS ASSESS-DOCD LE1/YR: CPT | Mod: CPTII,S$GLB,, | Performed by: NURSE PRACTITIONER

## 2019-03-07 NOTE — PROGRESS NOTES
Chronic patient Established Note (Follow up visit)      SUBJECTIVE:    Yasmin Asencio presents to the clinic for a follow-up appointment for lower back pain. She is s/p bilateral SI joint injections on 2/20/2019. She reports 100% relief of her low back and buttock pain. She denies any low back pain today. She denies any radiating leg pain. She reports intermittent burning pain to her bilateral feet. She reports that this is tolerable at this time. She continues to take Gabapentin with benefit. She denies any other health changes. She denies any bowel or bladder incontinence. Her pain today is 0/10.          Pain Disability Index Review:  Last 3 PDI Scores 3/7/2019 1/31/2019 1/10/2019   Pain Disability Index (PDI) 0 15 0       Pain Medications:  Tramadol PRN and Gabapentin 600 mg TID    Opioid Contract: no     report:  Reviewed and consistent with medication use as prescribed.    Pain Procedures:   10/26/15 IR Epidural Transforaminal Inj 1ST Vert Lumbar Bilat   1/22/16 Facet Injection L2-L5   8/26/16 Bilaeral Lumbar MBB at L2-5  10/14/16 Bilateral L2,3,4,5 MBB- 100% relief for 5 days  12/16/16 Right L2,3,4,5 RFA- 80% relief  3/16/18 Left L2,3,4,5 RFA- 80% relief  4/4/18 Right L2,3,4,5 RFA- 80% relief  11/28/2018- Right L2,3,4,5 RFA  12/18/2018- Left L2,3,4,5 RFA  2/20/2019- Bilateral SI joint injections    Physical Therapy/Home Exercise: yes per self    Imaging:   Lumbar XRAYs 10/13/15  Narrative   Lumbar spine    AP and lateral flexion and extension    There are 5 non-rib bearing lumbar vertebral bodies.  There is left convex scoliosis of the lumbar spine.  There is degenerative disk disease of L1/L2, L2/L3 and L5/S1.  Vertebral body height is maintained.  There is osteopenia.    The patient is status post cholecystectomy and there is atherosclerosis.   Impression    There are degenerative changes throughout the lumbar spine.          Lumbar 10/22/15  Narrative   Comparison: Lumbar spine  11/15/13    Technique: Multiplanar multi-sequence MR images of the lumbar spine without contrast    Findings: The vertebral body heights and alignment are maintained.  No diffuse marrow replacement process is identified.  Mild levoscoliosis is noted.  The abdominal structures show a possible right hepatic cyst and mild ectasia of the abdominal aorta.    The spinal cord terminates at the L1 level and exhibits normal signal.  Hemangioma noted in the T12 vertebral body.  There are decreased intravertebral disk height at all lumbar levels, with associated endplate changes at L2-3.  Two small Tarlov cyst again noted in the sacral canal posterior to S2.  Annular fissure is noted at L3-4 and L4-5. Level by level findings are detailed below:    L1-2: Circumferential disk bulge and facet hypertrophy causing mild bilateral foraminal stenoses without spinal canal stenosis.  L2-3: Circumferential disk bulge and facet hypertrophy causing mild bilateral foraminal stenoses without spinal canal stenosis.  Small focus of subarticular signal abnormality on the right could represent a focus of fat or a cyst and may be abutting the exiting right L2 nerve root, unchanged from prior.  L3-4: Circumferential disk bulge and facet hypertrophy causing mild spinal canal, moderate right foraminal, and mild left foraminal stenoses.  L4-5: Circumferential disk bulge and facet and ligamentum flavum hypertrophy causing mild right and moderate left foraminal stenosis as well as mild spinal canal stenosis.  L5-S1: Facet hypertrophy causing mild left foraminal stenosis.  No right foraminal or spinal canal stenosis.   Impression       Multilevel degenerative changes of the lumbar spine related to facet and disk disease as detailed above, overall similar to the prior examination.    Focus of signal abnormality abutting the exiting right L2 nerve root is favored to represent a focus of epidural fat, but could also represent a small cyst and is similar to  prior.     Narrative     EXAMINATION:  CTA CHEST NON CORONARY    CLINICAL HISTORY:  shortness of breath, R lower chest pain;    TECHNIQUE:  Low dose axial images, sagittal and coronal reformations were obtained from the thoracic inlet to the lung bases following the IV administration of 75 mL of Omnipaque 350.  Contrast timing was optimized to evaluate the pulmonary arteries.  MIP images were performed.    COMPARISON:  Radiograph 03/07/2018; CTA 01/05/2017    FINDINGS:  The vascular and soft tissues structures at the base of the neck are unremarkable.    There is a left-sided aortic arch with 3 branch vessels. The aorta is normal in caliber, contour, and course with mild calcific atherosclerosis.    There is no cardiac enlargement or pericardial fluid.  There are aortic valve calcifications.    There is no axillary, hilar, or mediastinal lymphadenopathy.    The pulmonary arteries distribute normally.  This study is adequate for the evaluation of pulmonary thromboembolism. There are filling defects within the right upper lobe segmental branch, similar to prior CTA 01/05/2017 consistent with chronic thrombus.  No new acute pulmonary embolism identified.    The trachea is midline and the proximal airways are patent.  Extensive fibrotic changes are again identified throughout the lung apices, most pronounced within the left lung.  There are stable areas of bronchiectasis with interstitial thickening and mosaic attenuation throughout the lungs, similar to prior CTA.  A more focal area of nodular consolidation is seen within the left lower lobe, not present on prior study.  Stable right lobe nodule measuring 4 mm is again identified.    The esophagus is normal in caliber and contour.  The imaged intra-abdominal structures demonstrate postsurgical changes of cholecystectomy.  There are calcifications in the spleen.    The osseous structures demonstrate mild degenerative changes without acute fracture or bony destructive  process.      Impression       1. Chronic filling defects within the right upper lobe pulmonary artery branch consistent with chronic pulmonary embolism.  No new acute pulmonary thromboembolism identified.  2. Stable fibrotic changes throughout the lung apices, left greater than right, with bronchiectasis, interstitial prominence, and mosaic attenuation of the lungs, similar to prior CTA 01/05/2017.  3. Small area of focal nodular consolidation within the left lower lobe, not seen on prior studies which, may be infectious or inflammatory in etiology, possibly even contiguous from the left apical fibrotic changes.  Suggest follow-up CT scan 1 month after antimicrobial therapy to exclude neoplastic nodule.  4. Stable 4 mm nodule within the right middle lobe.  5. Additional findings as detailed above.  This report was flagged in Epic as abnormal.     BMP  Lab Results   Component Value Date     (L) 10/04/2018    K 3.9 10/04/2018    CL 96 10/04/2018    CO2 30 (H) 10/04/2018    BUN 13 10/04/2018    CREATININE 1.1 10/04/2018    CALCIUM 10.2 10/04/2018    ANIONGAP 9 10/04/2018    ESTGFRAFRICA 59 (A) 10/04/2018    EGFRNONAA 51 (A) 10/04/2018       Lab Results   Component Value Date    WBC 8.85 09/19/2018    HGB 10.2 (L) 09/19/2018    HCT 31.7 (L) 09/19/2018    MCV 93 09/19/2018     09/19/2018         Allergies:   Allergies   Allergen Reactions    Adhesive Other (See Comments)     Tears up the skin and makes it itch    Bactrim [Sulfamethoxazole-Trimethoprim] Rash     Was hospitalized for rash    Lipitor [Atorvastatin] Other (See Comments)     Muscle aches    Albuterol Other (See Comments)     tremors    Restasis [Cyclosporine] Itching       Current Medications:   Current Outpatient Medications   Medication Sig Dispense Refill    acetaminophen (TYLENOL) 500 MG tablet Take 1,000 mg by mouth once daily. sleep      alprazolam (XANAX) 0.25 MG tablet Take 1 tablet (0.25 mg total) by mouth nightly as needed for  Anxiety. 30 tablet 0    amoxicillin-clavulanate 875-125mg (AUGMENTIN) 875-125 mg per tablet TAKE 1 TABLET BY MOUTH TWICE DAILY AS NEEDED FOR EXACERBATION  FOR  7  DAYS 14 tablet 0    apixaban (ELIQUIS) 5 mg Tab Take 1 tablet (5 mg total) by mouth 2 (two) times daily. 180 tablet 3    chlorthalidone (HYGROTEN) 25 MG Tab Take 12.5 mg by mouth once daily.      desoximetasone (TOPICORT) 0.25 % cream Apply as directed bid prn (Patient taking differently: Apply as directed twice daily as needed for psoriasis) 60 g 2    ergocalciferol (ERGOCALCIFEROL) 50,000 unit Cap TAKE 1 CAPSULE BY MOUTH ONCE A WEEK 4 capsule 4    escitalopram oxalate (LEXAPRO) 10 MG tablet TAKE ONE TABLET BY MOUTH ONCE DAILY 30 tablet 11    fluocinolone (DERMA-SMOOTHE) 0.01 % external oil Apply topically once daily. (Patient taking differently: Apply topically daily as needed. ) 1 Bottle 5    fluocinonide (LIDEX) 0.05 % external solution Apply topically once daily. - apply after shampooing (Patient taking differently: Apply topically once daily. - apply after shampooing as needed) 60 mL 5    folic acid (FOLVITE) 1 MG tablet TAKE 1 TABLET BY MOUTH ONCE DAILY 100 tablet 2    gabapentin (NEURONTIN) 300 MG capsule TAKE 2 CAPSULES BY MOUTH THREE TIMES DAILY 540 capsule 0    guaiFENesin (MUCINEX) 600 mg 12 hr tablet Take 1,200 mg by mouth 2 (two) times daily.      ipratropium (ATROVENT) 0.02 % nebulizer solution Inhale 1 vial in nebulizer 3 to 4 times daily      ketoconazole (NIZORAL) 2 % shampoo Apply topically once daily. 120 mL 5    Lactobacillus rhamnosus GG (CULTURELLE) 10 billion cell capsule Take 1 capsule by mouth once daily.      mirtazapine (REMERON) 7.5 MG Tab Take 1 tablet (7.5 mg total) by mouth every evening. 90 tablet 1    multivitamin (THERAGRAN) per tablet Take 1 tablet by mouth once daily.      omeprazole (PRILOSEC) 20 MG capsule TAKE 1 CAPSULE BY MOUTH ONCE DAILY AS NEEDED FOR HEARTBURN (TAKE ONLY AS NEEDED) 90 capsule 0     omeprazole (PRILOSEC) 20 MG capsule TAKE 1 CAPSULE BY MOUTH ONCE DAILY AS NEEDED FOR  HEARTBURN  (TAKE  ONLY  AS  NEEDED) 90 capsule 0    ondansetron (ZOFRAN) 4 MG tablet Take 1 tablet (4 mg total) by mouth every 8 (eight) hours as needed for Nausea. 45 tablet 0    propylene glycol (SYSTANE BALANCE) 0.6 % Drop Apply 1 drop to eye daily as needed (dry eye).      sodium chloride 2% (XIOMARA 128) 2 % ophthalmic solution Place 1 drop into both eyes 3 (three) times daily as needed.      sodium chloride 3% 3 % nebulizer solution Take 4 mLs by nebulization 2 (two) times daily. 720 mL 3    traMADol (ULTRAM) 50 mg tablet Take 1 tablet (50 mg total) by mouth every 6 (six) hours as needed for Pain. 45 tablet 0    umeclidinium-vilanterol (ANORO ELLIPTA) 62.5-25 mcg/actuation DsDv Inhale into the lungs. Controller      verapamil (VERELAN) 180 MG C24P Take 1 capsule (180 mg total) by mouth 2 (two) times daily. 180 capsule 3     No current facility-administered medications for this visit.        REVIEW OF SYSTEMS:    GENERAL:  No weight loss, malaise or fevers.  HEENT:  Negative for frequent or significant headaches.  NECK:  Negative for lumps, goiter, pain and significant neck swelling.  RESPIRATORY:  Negative for cough, wheezing or shortness of breath. H/O TB and recurrent lung infections.  CARDIOVASCULAR:  Negative for chest pain, leg swelling or palpitations.  GI:  Negative for abdominal discomfort, blood in stools or black stools or change in bowel habits. GERD.  MUSCULOSKELETAL:  See HPI.  SKIN:  Negative for lesions, rash, and itching.  PSYCH:  Negative for sleep disturbance, mood disorder and recent psychosocial stressors.  HEMATOLOGY/LYMPHOLOGY:  Negative for prolonged bleeding, bruising easily or swollen nodes.  NEURO:   No history of headaches, syncope, paralysis, seizures or tremors.  All other reviewed and negative other than HPI.    Past Medical History:  Past Medical History:   Diagnosis Date    Acquired  bronchiectasis     due to history of TB - followed by pulmonary, Dr. Zuñiga    Allergy     Amblyopia     rt eye per pt    Anemia of other chronic disease     Anticoagulant long-term use     Anxiety     Cataract     Chronic obstructive pulmonary disease with acute exacerbation     Clotting disorder     COPD (chronic obstructive pulmonary disease)     COPD (chronic obstructive pulmonary disease)     Depression     Diverticulosis     Essential tremor     GERD (gastroesophageal reflux disease)     Hemangioma of liver     History of tuberculosis 1978    Hypertension     Mixed anxiety and depressive disorder     Psoriasis     PSVT (paroxysmal supraventricular tachycardia)     Pulmonary embolism     S/P PICC central line placement Apr. 2016 - May 2016    Skin disease     Psoriasis    Spondylosis without myelopathy 8/23/2013    Supraventricular tachycardia     Thoracic aorta atherosclerosis     noted on CT scan of chest 1/3/2011    Tuberculosis     Vaginal delivery     x2    Vitamin D deficiency        Past Surgical History:  Past Surgical History:   Procedure Laterality Date    ABLATION N/A 7/24/2017    Performed by [facility] at [facility] CATH LAB    BLOCK-NERVE-MEDIAL BRANCH-LUMBAR Bilateral 10/14/2016    Performed by Issac Meyers MD at [facility] MGT    BLOCK-NERVE-MEDIAL BRANCH-LUMBAR Bilateral 8/26/2016    Performed by Issac [facility]ers [facility] at [facility]    CATARACT EXTRACTION W/  INTRAOCULAR LENS IMPLANT  07/24/12    od dr [place]    CATARACT EXTRACTION W/  INTRAOCULAR LENS IMPLANT  08/07/12    left eye    COLONOSCOPY N/A 4/23/2013    Performed by Simeon Graves MD at [facility] ENDO (4TH FLR)    EGD (ESOPHAGOGASTRODUODENOSCOPY) N/A 4/23/2013    Performed by Simeon Graves [facility] at [facility] ENDO (4TH FLR)    GALLBLADDER SURGERY  9/2011    HEART CATH-LEFT Left 6/16/2016    [facility] at [facility] CATH LAB    INJECTION BILATERAL SI JOINT Bilateral  2/20/2019    [facility]    INJECTION-FACET Bilateral 1/22/2016    [facility] at [facility]    RADIOFREQUENCY ABLATION RIGHT LUMBAR L2,3,4,5 RFA Right 11/28/2018    [facility] at [facility]    RADIOFREQUENCY THERMAL COAGULATION Left 12/18/2018    [facility]    RADIOFREQUENCY THERMOCOAGULATION (RFTC)-NERVE-MEDIAN BRANCH-LUMBAR Right 4/4/2018    [facility] by [facility] at [facility]    RADIOFREQUENCY THERMOCOAGULATION (RFTC)-NERVE-MEDIAN BRANCH-LUMBAR Left 3/16/2018    [facility] at [facility]    RADIOFREQUENCY THERMOCOAGULATION (RFTC)-NERVE-MEDIAN BRANCH-LUMBAR Right 12/16/2016    Per[facility] by [facility] at [facility]       Family History:  Family History   Problem Relation Age of Onset    Hypertension Mother     Heart attack Mother     Cancer Father         liver and bladder    Hyperlipidemia Sister     Psoriasis Sister     Cancer Brother         liver    Stroke Maternal Uncle     Cancer Maternal Aunt         stomach    Lung cancer Sister     Heart attack Brother     Heart attack Son     Amblyopia Neg Hx     Blindness Neg Hx     Cataracts Neg Hx     Glaucoma Neg Hx     Macular degeneration Neg Hx     Retinal detachment Neg Hx     Strabismus Neg Hx     Thyroid disease Neg Hx     Melanoma Neg Hx     Lupus Neg Hx     Eczema Neg Hx     COPD Neg Hx        Social History:  Social History     Socioeconomic History    Marital status:      Spouse name: Not on file    Number of children: 2    Years of education: Not on file    Highest education level: Not on file   Social Needs    Financial resource strain: Not on file    Food insecurity - worry: Not on file    Food insecurity - inability: Not on file    Transportation needs - medical: Not on file    Transportation needs - non-medical: Not on  "file   Occupational History    Occupation: housewife   Tobacco Use    Smoking status: Former Smoker     Packs/day: 2.00     Years: 50.00     Pack years: 100.00     Types: Cigarettes     Last attempt to quit: 2009     Years since quittin.7    Smokeless tobacco: Never Used   Substance and Sexual Activity    Alcohol use: No     Alcohol/week: 0.0 oz    Drug use: No    Sexual activity: Yes     Partners: Male   Other Topics Concern    Are you pregnant or think you may be? No    Breast-feeding No   Social History Narrative    One child  of a heart attack at age 35.       OBJECTIVE:    BP (!) 124/52   Pulse 74   Temp 97.7 °F (36.5 °C)   Ht 5' 2" (1.575 m)   Wt 70.1 kg (154 lb 8.7 oz)   LMP  (LMP Unknown)   BMI 28.27 kg/m²     PHYSICAL EXAMINATION:    General appearance: Well appearing, in no acute distress, alert and oriented x3.  Psych:  Mood and affect appropriate.    Skin: Skin color, texture, turgor normal, no rashes or lesions, in both upper and lower body.  Head/face:  Atraumatic, normocephalic. No palpable lymph nodes  Cor: RRR  Pulm: CTA  GI: Abdomen soft and non-tender.  Back: Straight leg raising in the sitting position is negative to radicular pain bilaterally. There is no pain with palpation to bilateral lumbar facet joints.  Limited ROM with mild pain on extension.  Positive facet loading bilaterally, R>L.    Extremities: Peripheral joint ROM is full and pain free without obvious instability or laxity in all four extremities. No deformities, edema, or skin discoloration. Good capillary refill.  Musculoskeletal:  There is no pain with palpation over bilateral SI joints. FABERs is negative bilaterally. Bilateral lower extremity strength is normal and symmetric.  No atrophy or tone abnormalities are noted.  Neuro: Bilateral lower extremity coordination and muscle stretch reflexes are physiologic and symmetric.  Plantar response are downgoing. Decreased sensation to BLE.  Gait: Antalgic- " ambulates without assistance.    ASSESSMENT: 68 y.o. year old female with lower back pain, consistent with the following diagnoses:     1. Sacroiliitis     2. Spondylosis of lumbar region without myelopathy or radiculopathy     3. Facet arthritis of lumbar region     4. DDD (degenerative disc disease), lumbar           PLAN:     - Previous imaging was reviewed and discussed with the patient today.    - She is s/p lumbar RFA with benefit. We can repeat this as needed.     - She is s/p bilateral SI joint injections with benefit. We can repeat this as needed.      - Will consider ADRIAN versus updating her MRI in the future if radicular symptoms worsen.    - Continue Gabapentin 300 mg 2 pills TID.     - The patient will continue a home exercise routine to help with pain and strengthening.      - RTC in 3 months or sooner if needed.     - Counseled patient regarding the importance of constant sleeping habits and physical therapy.    - Dr. Meyers was consulted on the patient and agrees with this plan.    The above plan and management options were discussed at length with patient. Patient is in agreement with the above and verbalized understanding.    Jasmin Castaneda NP  03/07/2019

## 2019-03-12 NOTE — PROGRESS NOTES
Pulmonary & Critical Care Medicine   Clinic Note    Follow up visit    HPI: From prior clinic note 11/2018     Patient of Dr. Zuñiga in past. New to me. Underlying bronchiectasis, chronic PE on OAC. Etiology of bronchiectasis related to very remote history of M. Padmini treated at [facility] department on [place]. She has recurrent pseudomonal infections requiring varying abx and admissions. Most recent hospital admission in September requiring PICC placement and IV Zosyn. Doing better. Remains with daily productive cough and intermittent blood tinged sputum, but per her at baseline. Continues to utilize bronchodilators, CPT and accapella. Weight and functional status stable.  and daughter present at visit. No additional complaints Denies F/NS/weight loss    Interval History:  Since her previous visit she is otherwise doing well. States she had 1 episode of increased sputum production around early December for which she utilized rescue antibiotic therapy and had complete resolution..  At present she denies any productive sputum, fever, chills or additional systemic symptoms. She has no shortness of breath, chest pain. She has not had any interval hospitalizations or need for rescue corticosteroids.  She remains active.  No additional complaints at today's visit    Had 1 visit for breast pain for which he was seen by gynecology..  The pain has resolved. .  Mammogram was normal        Past Medical, Social, Surgical, Family History- Reviewed and updated as appropriate     Drug Allergies:   Review of patient's allergies indicates:   Allergen Reactions    Adhesive Other (See Comments)     Tears up the skin and makes it itch    Bactrim [sulfamethoxazole-trimethoprim] Rash     Was hospitalized for rash    Lipitor [atorvastatin] Other (See Comments)     Muscle aches    Albuterol Other (See Comments)     tremors    Topamax [topiramate]      - made her feel 'bad in the head'    Restasis [cyclosporine] Itching  "      Review of Systems:     Constitutional: Negative for fever, chills, diaphoresis, activity change, appetite change and fatigue.   HENT: Negative for congestion, drooling, facial swelling, hearing loss, rhinorrhea, sinus pressure, tinnitus, trouble swallowing and voice change.    Eyes: Negative for photophobia, pain and visual disturbance.   Respiratory: Negative for chest tightness and shortness of breath.   positive dry cough.  Chronic and at baseline  Cardiovascular: Negative for chest pain, palpitations and leg swelling.   Gastrointestinal: Negative for nausea, vomiting, abdominal pain, diarrhea and abdominal distention.   Endocrine: Negative for cold intolerance, heat intolerance, polydipsia and polyuria.   Genitourinary: Negative for dysuria, frequency and hematuria.   Musculoskeletal: Negative for myalgias, back pain, arthralgias, neck pain and neck stiffness.   Skin: Negative for color change, pallor, rash and wound.   Allergic/Immunologic: Negative for immunocompromised state.   Neurological: Negative for dizziness, weakness and headaches.    A comprehensive 12-point review of systems was performed, and is negative except for those items mentioned above in the HPI section of this note.     Vital Signs:    /78 (BP Location: Right arm, Patient Position: Sitting)   Pulse 76   Ht 5' 2" (1.575 m)   Wt 70.3 kg (154 lb 15.7 oz)   LMP  (LMP Unknown)   SpO2 98%   BMI 28.35 kg/m²      Physical Exam:   GEN- NAD AAOx3 Well Built, Well Appearing   HEENT- ATNC, PERRLA, EOMI, OP-Cl. No JVD, LAD or bruit noted. Trachea Midline.   CV- RRR No M/R/G  RESP- Crackles left UL.   GI- S/NT/ND. Positive BS X 4. No HSM Noted  BACK- Spine midline. No step off, crepitus or deformity noted. No midline TTP.   Ext- MAEW, No deformity. No edema or rashes noted.   Neuro- Strength 5/5 symmetric. CN 2-12 intact. Normal gait. Normal sensation.    Clubbing of digits.         Personal Review and Summary of Prior " Diagnostics    Laboratory Studies:   Reviewed      Cr- 1.1, HCO3- 30       Microbiology Data:         Respiratory Culture SERRATIA MARCESCENS   Many       Respiratory Culture PSEUDOMONAS AERUGINOSA   Many   Normal respiratory larry also present       Gram Stain (Respiratory) <10 epithelial cells per low power field.    Gram Stain (Respiratory) Few WBC's    Gram Stain (Respiratory) Many Gram negative rods    Gram Stain (Respiratory) Few Gram positive cocci    Resulting Agency [facility]   Susceptibility      Serratia marcescens Pseudomonas aeruginosa     CULTURE, RESPIRATORY CULTURE, RESPIRATORY     Cefepime <=8  Sensitive <=8  Sensitive     Ceftriaxone <=8  Sensitive <=8  Sensitive     Ciprofloxacin <=1  Sensitive >2  Resistant     Ertapenem <=2  Sensitive <=2  Sensitive     Gentamicin <=4  Sensitive <=4  Sensitive     Meropenem <=4  Sensitive <=4  Sensitive     Piperacillin/Tazo <=16  Sensitive <=16  Sensitive     Tobramycin <=4  Sensitive <=4  Sensitive     Trimeth/Sulfa <=2/38  Sensitive <=2/38  Sensitive             AFB Culture & Smear No growth after 8 weeks.     AFB CULTURE STAIN No acid fast bacilli seen.          AFB Culture & Smear No growth after 8 weeks.     AFB CULTURE STAIN No acid fast bacilli seen.          Summary of Chest Imaging Personally Reviewed:        CT chest-   1. Chronic filling defects within the right upper lobe pulmonary artery branch consistent with chronic pulmonary embolism.  No new acute pulmonary thromboembolism identified.  2. Stable fibrotic changes throughout the lung apices, left greater than right, with bronchiectasis, interstitial prominence, and mosaic attenuation of the lungs, similar to prior CTA 01/05/2017.  3. Small area of focal nodular consolidation within the left lower lobe, not seen on prior studies which, may be infectious or inflammatory in etiology, possibly even contiguous from the left apical fibrotic changes.  Suggest follow-up CT scan 1 month after antimicrobial  therapy to exclude neoplastic nodule.  4. Stable 4 mm nodule within the right middle lobe.  5. Additional findings as detailed above.    CT 2/6/2019    Bilateral upper lung zone predominant lung disease, primarily characterized by severe bronchiectasis.  Overall appearance suggests sequela of chronic infection.    Ground-glass opacities and pulmonary nodule clusters improved from prior study.  0.7 cm right lower lobe pulmonary nodule, new from prior study.  Recommend follow-up chest CT at 6 months to ensure nodule stability.     2D Echo:      Left Ventricle Normal ejection fraction . Cavity is normal. Normal left ventricle diastolic function.   Right Ventricle Normal cavity size.   Left Atrium Cavity is mildly dilated.   Right Atrium Normal cavity size.   Aortic Valve Normal valve structure.   Mitral Valve Normal valve structure. There is mild mitral annular calcification.   Tricuspid Valve The tricuspid valve is normal. Trace regurgitation.   Pulmonic Valve Normal valve structure.   IVC/SVC Normal central venous pressure (3 mm Hg).   Ascending Aorta Normal aortic root, size and contour.   Pericardium No pericardial effusion. Pericardium is normal.      US Doppler-   1. Right lower extremity venous ultrasound shows normal flow in the deep venous system and no DVT.  2. Left lower extremity venous ultrasound shows normal flow in the deep venous system and no DVT.       PFT's (4/2017):      FEV1/FVC- 74  FEV1- 1.24L (58)  FVC- 1.67L (62)   DLCO- 53        Assessment:       1. Pulmonary nodule    2. Bronchiectasis without complication    3. History of pulmonary embolism        Outpatient Encounter Medications as of 3/13/2019   Medication Sig Dispense Refill    acetaminophen (TYLENOL) 500 MG tablet Take 1,000 mg by mouth once daily. sleep      alprazolam (XANAX) 0.25 MG tablet Take 1 tablet (0.25 mg total) by mouth nightly as needed for Anxiety. 30 tablet 0    amoxicillin-clavulanate 875-125mg (AUGMENTIN) 875-125 mg  per tablet TAKE 1 TABLET BY MOUTH TWICE DAILY AS NEEDED FOR EXACERBATION  FOR  7  DAYS 14 tablet 0    apixaban (ELIQUIS) 5 mg Tab Take 1 tablet (5 mg total) by mouth 2 (two) times daily. 180 tablet 3    chlorthalidone (HYGROTEN) 25 MG Tab Take 12.5 mg by mouth once daily.      desoximetasone (TOPICORT) 0.25 % cream Apply as directed bid prn (Patient taking differently: Apply as directed twice daily as needed for psoriasis) 60 g 2    ergocalciferol (ERGOCALCIFEROL) 50,000 unit Cap TAKE 1 CAPSULE BY MOUTH ONCE A WEEK 4 capsule 4    escitalopram oxalate (LEXAPRO) 10 MG tablet TAKE ONE TABLET BY MOUTH ONCE DAILY 30 tablet 11    folic acid (FOLVITE) 1 MG tablet TAKE 1 TABLET BY MOUTH ONCE DAILY 100 tablet 2    gabapentin (NEURONTIN) 300 MG capsule TAKE 2 CAPSULES BY MOUTH THREE TIMES DAILY 540 capsule 0    guaiFENesin (MUCINEX) 600 mg 12 hr tablet Take 1,200 mg by mouth 2 (two) times daily.      ipratropium (ATROVENT) 0.02 % nebulizer solution Inhale 1 vial in nebulizer 3 to 4 times daily      ketoconazole (NIZORAL) 2 % shampoo Apply topically once daily. 120 mL 5    Lactobacillus rhamnosus GG (CULTURELLE) 10 billion cell capsule Take 1 capsule by mouth once daily.      mirtazapine (REMERON) 7.5 MG Tab Take 1 tablet (7.5 mg total) by mouth every evening. 90 tablet 1    multivitamin (THERAGRAN) per tablet Take 1 tablet by mouth once daily.      omeprazole (PRILOSEC) 20 MG capsule TAKE 1 CAPSULE BY MOUTH ONCE DAILY AS NEEDED FOR HEARTBURN (TAKE ONLY AS NEEDED) 90 capsule 0    omeprazole (PRILOSEC) 20 MG capsule TAKE 1 CAPSULE BY MOUTH ONCE DAILY AS NEEDED FOR  HEARTBURN  (TAKE  ONLY  AS  NEEDED) 90 capsule 0    ondansetron (ZOFRAN) 4 MG tablet Take 1 tablet (4 mg total) by mouth every 8 (eight) hours as needed for Nausea. 45 tablet 0    propylene glycol (SYSTANE BALANCE) 0.6 % Drop Apply 1 drop to eye daily as needed (dry eye).      sodium chloride 2% (XIOMARA 128) 2 % ophthalmic solution Place 1 drop into  both eyes 3 (three) times daily as needed.      sodium chloride 3% 3 % nebulizer solution Take 4 mLs by nebulization 2 (two) times daily. 720 mL 3    traMADol (ULTRAM) 50 mg tablet Take 1 tablet (50 mg total) by mouth every 6 (six) hours as needed for Pain. 45 tablet 0    umeclidinium-vilanterol (ANORO ELLIPTA) 62.5-25 mcg/actuation DsDv Inhale 1 puff into the lungs once daily. Controller 1 each 6    verapamil (VERELAN) 180 MG C24P Take 1 capsule (180 mg total) by mouth 2 (two) times daily. 180 capsule 3    [DISCONTINUED] amoxicillin-clavulanate 875-125mg (AUGMENTIN) 875-125 mg per tablet TAKE 1 TABLET BY MOUTH TWICE DAILY AS NEEDED FOR EXACERBATION  FOR  7  DAYS 14 tablet 0    [DISCONTINUED] umeclidinium-vilanterol (ANORO ELLIPTA) 62.5-25 mcg/actuation DsDv Inhale into the lungs. Controller      fluocinolone (DERMA-SMOOTHE) 0.01 % external oil Apply topically once daily. (Patient taking differently: Apply topically daily as needed. ) 1 Bottle 5    fluocinonide (LIDEX) 0.05 % external solution Apply topically once daily. - apply after shampooing (Patient taking differently: Apply topically once daily. - apply after shampooing as needed) 60 mL 5     No facility-administered encounter medications on file as of 3/13/2019.      Orders Placed This Encounter   Procedures    CT Chest Without Contrast     Standing Status:   Future     Standing Expiration Date:   7/13/2019     Order Specific Question:   May the Radiologist modify the order per protocol to meet the clinical needs of the patient?     Answer:   Yes       Plan:          1. Bronchiectasis- History of M. Kansasi- Repeat AFB in system never with recurrent organism. Most recent negative x 2.  Records not available from prior treatment. Recurrent pseudomonas infections (prior cipro resistance) with recent exacerbation in September requiring IV zosyn via PICC. Now clinical improved and at baseline. No constitutional symptoms aside from ongoing fatigue. CT  scan from September reviewed and significant PEDRO bronchiectasis and some patchy nodular consolidation in LLL. repeat CT imaging with resolution aside from new right lower lobe subcentimeter pulmonary nodule..      -- Continue with anoro and inhaled bronchodilators   -- CPT and accapela for mucous clearance   -- Repeat CT scan in 4 months to follow up new RLL nodular opacity   -- Rescue abx provided for worsening sputum production if needed. Discussed role with her in detail   -- vaccinations up-to-date  -- SpO2 96% on ambient air         2. Chronic Pulmonary embolism- originally diagnosed 2 years prior. On OAC since that time. Residual RUL defect remains based on Sept 2018 C T imaging.  No further episodes of hemoptysis noted. LE doppler negative for DVT   Continue OAC for now    3. Pulmonary Nodule- follow-up CT chest as above      RTC July after CT chest completed. [facility] location preferred. Medications refilled. Patient has my contact information and can call in the interim if any issues.     Declan Mills M.D.   [facility]

## 2019-03-13 ENCOUNTER — OFFICE VISIT (OUTPATIENT)
Dept: PULMONOLOGY | Facility: CLINIC | Age: 69
End: 2019-03-13
Payer: MEDICARE

## 2019-03-13 VITALS
DIASTOLIC BLOOD PRESSURE: 78 MMHG | BODY MASS INDEX: 28.52 KG/M2 | SYSTOLIC BLOOD PRESSURE: 136 MMHG | HEIGHT: 62 IN | OXYGEN SATURATION: 98 % | HEART RATE: 76 BPM | WEIGHT: 155 LBS

## 2019-03-13 DIAGNOSIS — R91.1 PULMONARY NODULE: Primary | ICD-10-CM

## 2019-03-13 DIAGNOSIS — J47.9 BRONCHIECTASIS WITHOUT COMPLICATION: ICD-10-CM

## 2019-03-13 DIAGNOSIS — Z86.711 HISTORY OF PULMONARY EMBOLISM: ICD-10-CM

## 2019-03-13 PROCEDURE — 99213 PR OFFICE/OUTPT VISIT, EST, LEVL III, 20-29 MIN: ICD-10-PCS | Mod: S$GLB,,, | Performed by: EMERGENCY MEDICINE

## 2019-03-13 PROCEDURE — 3078F DIAST BP <80 MM HG: CPT | Mod: CPTII,S$GLB,, | Performed by: EMERGENCY MEDICINE

## 2019-03-13 PROCEDURE — 3075F PR MOST RECENT SYSTOLIC BLOOD PRESS GE 130-139MM HG: ICD-10-PCS | Mod: CPTII,S$GLB,, | Performed by: EMERGENCY MEDICINE

## 2019-03-13 PROCEDURE — 3075F SYST BP GE 130 - 139MM HG: CPT | Mod: CPTII,S$GLB,, | Performed by: EMERGENCY MEDICINE

## 2019-03-13 PROCEDURE — 99999 PR PBB SHADOW E&M-EST. PATIENT-LVL III: CPT | Mod: PBBFAC,,, | Performed by: EMERGENCY MEDICINE

## 2019-03-13 PROCEDURE — 99213 OFFICE O/P EST LOW 20 MIN: CPT | Mod: S$GLB,,, | Performed by: EMERGENCY MEDICINE

## 2019-03-13 PROCEDURE — 3078F PR MOST RECENT DIASTOLIC BLOOD PRESSURE < 80 MM HG: ICD-10-PCS | Mod: CPTII,S$GLB,, | Performed by: EMERGENCY MEDICINE

## 2019-03-13 PROCEDURE — 1101F PT FALLS ASSESS-DOCD LE1/YR: CPT | Mod: CPTII,S$GLB,, | Performed by: EMERGENCY MEDICINE

## 2019-03-13 PROCEDURE — 1101F PR PT FALLS ASSESS DOC 0-1 FALLS W/OUT INJ PAST YR: ICD-10-PCS | Mod: CPTII,S$GLB,, | Performed by: EMERGENCY MEDICINE

## 2019-03-13 PROCEDURE — 99999 PR PBB SHADOW E&M-EST. PATIENT-LVL III: ICD-10-PCS | Mod: PBBFAC,,, | Performed by: EMERGENCY MEDICINE

## 2019-03-13 RX ORDER — AMOXICILLIN AND CLAVULANATE POTASSIUM 875; 125 MG/1; MG/1
TABLET, FILM COATED ORAL
Qty: 14 TABLET | Refills: 0 | Status: SHIPPED | OUTPATIENT
Start: 2019-03-13 | End: 2019-05-30

## 2019-03-25 ENCOUNTER — TELEPHONE (OUTPATIENT)
Dept: FAMILY MEDICINE | Facility: CLINIC | Age: 69
End: 2019-03-25

## 2019-03-25 NOTE — TELEPHONE ENCOUNTER
Spoke with the pt and faxed a Rx to [facility] at [phone].  Informed the pt that the Rx has been faxed.  Patient verbalized understandings.

## 2019-03-26 DIAGNOSIS — R21 RASH: ICD-10-CM

## 2019-03-26 RX ORDER — DESOXIMETASONE 2.5 MG/G
CREAM TOPICAL
Qty: 15 G | Refills: 3 | Status: ON HOLD | OUTPATIENT
Start: 2019-03-26 | End: 2021-12-17 | Stop reason: HOSPADM

## 2019-04-16 DIAGNOSIS — G25.0 ESSENTIAL TREMOR: ICD-10-CM

## 2019-04-16 RX ORDER — MIRTAZAPINE 7.5 MG/1
TABLET, FILM COATED ORAL
Qty: 90 TABLET | Refills: 0 | Status: SHIPPED | OUTPATIENT
Start: 2019-04-16 | End: 2019-05-21 | Stop reason: SDUPTHER

## 2019-04-25 DIAGNOSIS — M54.40 ACUTE LEFT-SIDED LOW BACK PAIN WITH SCIATICA, SCIATICA LATERALITY UNSPECIFIED: ICD-10-CM

## 2019-04-25 RX ORDER — ERGOCALCIFEROL 1.25 MG/1
CAPSULE ORAL
Qty: 4 CAPSULE | Refills: 4 | Status: SHIPPED | OUTPATIENT
Start: 2019-04-25 | End: 2019-09-25 | Stop reason: SDUPTHER

## 2019-04-25 RX ORDER — GABAPENTIN 300 MG/1
CAPSULE ORAL
Qty: 540 CAPSULE | Refills: 0 | Status: SHIPPED | OUTPATIENT
Start: 2019-04-25 | End: 2019-08-05 | Stop reason: SDUPTHER

## 2019-05-17 ENCOUNTER — TELEPHONE (OUTPATIENT)
Dept: NEUROLOGY | Facility: CLINIC | Age: 69
End: 2019-05-17

## 2019-05-17 NOTE — TELEPHONE ENCOUNTER
Called; states doesn't think mirtazapine 7.5 mg is not working for her. Tremors are worse more slurring of speech. Pt would like to come in sooner; advised no sooner appointments available. Asking if adjustments to medication could be option.  Please advise

## 2019-05-17 NOTE — TELEPHONE ENCOUNTER
----- Message from Lidia Ledesma sent at 5/17/2019 11:41 AM CDT -----  Contact: Self  She is needing to come in sooner than the next available appt on 6/4/19 because she thinks her medication needs to changed. I did also put her on the wait list. Please advise pt if you can squeeze her in sooner than 6/4/19.

## 2019-05-21 ENCOUNTER — OFFICE VISIT (OUTPATIENT)
Dept: NEUROLOGY | Facility: CLINIC | Age: 69
End: 2019-05-21
Payer: MEDICARE

## 2019-05-21 VITALS
HEART RATE: 85 BPM | DIASTOLIC BLOOD PRESSURE: 71 MMHG | BODY MASS INDEX: 28.34 KG/M2 | SYSTOLIC BLOOD PRESSURE: 131 MMHG | HEIGHT: 62 IN | WEIGHT: 154 LBS

## 2019-05-21 DIAGNOSIS — I12.9 BENIGN HYPERTENSION WITH CHRONIC KIDNEY DISEASE, STAGE III: ICD-10-CM

## 2019-05-21 DIAGNOSIS — N18.30 BENIGN HYPERTENSION WITH CHRONIC KIDNEY DISEASE, STAGE III: ICD-10-CM

## 2019-05-21 DIAGNOSIS — I47.10 PSVT (PAROXYSMAL SUPRAVENTRICULAR TACHYCARDIA): ICD-10-CM

## 2019-05-21 DIAGNOSIS — R25.1 TREMOR: Primary | ICD-10-CM

## 2019-05-21 DIAGNOSIS — D64.9 ANEMIA, UNSPECIFIED TYPE: ICD-10-CM

## 2019-05-21 DIAGNOSIS — I27.82 OTHER CHRONIC PULMONARY EMBOLISM WITHOUT ACUTE COR PULMONALE: ICD-10-CM

## 2019-05-21 DIAGNOSIS — J96.11 CHRONIC RESPIRATORY FAILURE WITH HYPOXIA: ICD-10-CM

## 2019-05-21 DIAGNOSIS — G25.0 ESSENTIAL TREMOR: ICD-10-CM

## 2019-05-21 PROCEDURE — 3075F PR MOST RECENT SYSTOLIC BLOOD PRESS GE 130-139MM HG: ICD-10-PCS | Mod: CPTII,S$GLB,, | Performed by: NURSE PRACTITIONER

## 2019-05-21 PROCEDURE — 1101F PR PT FALLS ASSESS DOC 0-1 FALLS W/OUT INJ PAST YR: ICD-10-PCS | Mod: CPTII,S$GLB,, | Performed by: NURSE PRACTITIONER

## 2019-05-21 PROCEDURE — 99214 OFFICE O/P EST MOD 30 MIN: CPT | Mod: S$GLB,,, | Performed by: NURSE PRACTITIONER

## 2019-05-21 PROCEDURE — 1101F PT FALLS ASSESS-DOCD LE1/YR: CPT | Mod: CPTII,S$GLB,, | Performed by: NURSE PRACTITIONER

## 2019-05-21 PROCEDURE — 3078F PR MOST RECENT DIASTOLIC BLOOD PRESSURE < 80 MM HG: ICD-10-PCS | Mod: CPTII,S$GLB,, | Performed by: NURSE PRACTITIONER

## 2019-05-21 PROCEDURE — 99214 PR OFFICE/OUTPT VISIT, EST, LEVL IV, 30-39 MIN: ICD-10-PCS | Mod: S$GLB,,, | Performed by: NURSE PRACTITIONER

## 2019-05-21 PROCEDURE — 99999 PR PBB SHADOW E&M-EST. PATIENT-LVL III: CPT | Mod: PBBFAC,,, | Performed by: NURSE PRACTITIONER

## 2019-05-21 PROCEDURE — 3075F SYST BP GE 130 - 139MM HG: CPT | Mod: CPTII,S$GLB,, | Performed by: NURSE PRACTITIONER

## 2019-05-21 PROCEDURE — 99999 PR PBB SHADOW E&M-EST. PATIENT-LVL III: ICD-10-PCS | Mod: PBBFAC,,, | Performed by: NURSE PRACTITIONER

## 2019-05-21 PROCEDURE — 99499 RISK ADDL DX/OHS AUDIT: ICD-10-PCS | Mod: S$GLB,,, | Performed by: NURSE PRACTITIONER

## 2019-05-21 PROCEDURE — 99499 UNLISTED E&M SERVICE: CPT | Mod: S$GLB,,, | Performed by: NURSE PRACTITIONER

## 2019-05-21 PROCEDURE — 3078F DIAST BP <80 MM HG: CPT | Mod: CPTII,S$GLB,, | Performed by: NURSE PRACTITIONER

## 2019-05-21 RX ORDER — MIRTAZAPINE 15 MG/1
15 TABLET, FILM COATED ORAL NIGHTLY
Qty: 30 TABLET | Refills: 11 | Status: SHIPPED | OUTPATIENT
Start: 2019-05-21 | End: 2020-02-11 | Stop reason: SDUPTHER

## 2019-05-21 NOTE — PROGRESS NOTES
"Name: Yasmin Asencio  MRN: [id number]   CSN: [id number]      Date: 5-21-19      Referring physician:  Kristi Torres, NP  [id number] [place] [address]    Subjective:      Chief Complaint: shakes    History of Present Illness (HPI):    Yasmin Asencio is a 68 y.o. right-handed female with COPD, PE, HTN, PSVT, who presents today for a follow-up evaluation of Essential Tremor and is accompanied by . She is not able to take primidone due to Eliquis. She was last seen in office 10-1-18. At that time, rec mirtazapine. She discussed this with her PCP and she started this. She is remy w/o diff and initially it helped tremor. Lately, she has noted increase tremor and wonders if the mirtazapine can be increased. She also notes increased anxiety from her "lungs" and this also increases tremor. She is not taking prednisone but is using nebulizer and inhaler.     Don't feel the mirtazapine is working anymore.   Shaking in BH bad.   Difficult to eat.  No longer chops veggies due to tremor.  Diff to put on make-up.   Voice quivers.       Fell twice outside. Trimmed edges of grass to close and foot got caught in the edges both times and went down.     States she knows the lungs have a lot to do with the anxiety.   She does feel very anxious all the time. On Lexapro 10 mg- was on 20 mg at one time.     Meds tried   TPX   Cd/ld  gabapentin  Primidone- CANNOT TAKE with Eliquis as can lower levels of Eliquis  NOT TRIED PROPRANOLOL DUE TO COPD        Review of Systems   Respiratory: Positive for shortness of breath.    Musculoskeletal: Positive for gait problem.   Neurological: Positive for tremors.   Psychiatric/Behavioral: Negative for dysphoric mood. The patient is nervous/anxious.        Past Medical History: The patient  has a past medical history of Acquired bronchiectasis, Allergy, Amblyopia, Anemia of other chronic disease, Anticoagulant long-term use, Anxiety, Cataract, Chronic obstructive pulmonary " disease with acute exacerbation, Clotting disorder, COPD (chronic obstructive pulmonary disease), COPD (chronic obstructive pulmonary disease), Depression, Diverticulosis, Essential tremor, GERD (gastroesophageal reflux disease), Hemangioma of liver, History of tuberculosis (1978), Hypertension, Mixed anxiety and depressive disorder, Psoriasis, PSVT (paroxysmal supraventricular tachycardia), Pulmonary embolism, S/P PICC central line placement (Apr. 2016 - May 2016), Skin disease, Spondylosis without myelopathy (8/23/2013), Supraventricular tachycardia, Thoracic aorta atherosclerosis, Tuberculosis, Vaginal delivery, and Vitamin D deficiency.    Social History: The patient  reports that she quit smoking about 9 years ago. Her smoking use included cigarettes. She has a 100.00 pack-year smoking history. She has never used smokeless tobacco. She reports that she does not drink alcohol or use drugs.    Family History: Their family history includes Cancer in her brother, father, and maternal aunt; Heart attack in her brother, mother, and son; Hyperlipidemia in her sister; Hypertension in her mother; Lung cancer in her sister; Psoriasis in her sister; Stroke in her maternal uncle.    Allergies: Adhesive; Bactrim [sulfamethoxazole-trimethoprim]; Lipitor [atorvastatin]; Albuterol; Topamax [topiramate]; and Restasis [cyclosporine]     Meds:   Current Outpatient Medications on File Prior to Visit   Medication Sig Dispense Refill    alprazolam (XANAX) 0.25 MG tablet Take 1 tablet (0.25 mg total) by mouth nightly as needed for Anxiety. 30 tablet 0    amoxicillin-clavulanate 875-125mg (AUGMENTIN) 875-125 mg per tablet TAKE 1 TABLET BY MOUTH TWICE DAILY AS NEEDED FOR EXACERBATION  FOR  7  DAYS 14 tablet 0    apixaban (ELIQUIS) 5 mg Tab Take 1 tablet (5 mg total) by mouth 2 (two) times daily. 180 tablet 3    chlorthalidone (HYGROTEN) 25 MG Tab Take 12.5 mg by mouth once daily.      ergocalciferol (ERGOCALCIFEROL) 50,000 unit  Cap TAKE 1 CAPSULE BY MOUTH ONCE A WEEK 4 capsule 4    escitalopram oxalate (LEXAPRO) 10 MG tablet TAKE ONE TABLET BY MOUTH ONCE DAILY 30 tablet 11    folic acid (FOLVITE) 1 MG tablet TAKE 1 TABLET BY MOUTH ONCE DAILY 100 tablet 2    gabapentin (NEURONTIN) 300 MG capsule TAKE 2 CAPSULES BY MOUTH THREE TIMES DAILY 540 capsule 0    guaiFENesin (MUCINEX) 600 mg 12 hr tablet Take 1,200 mg by mouth 2 (two) times daily.      ipratropium (ATROVENT) 0.02 % nebulizer solution Inhale 1 vial in nebulizer 3 to 4 times daily      ketoconazole (NIZORAL) 2 % shampoo Apply topically once daily. 120 mL 5    Lactobacillus rhamnosus GG (CULTURELLE) 10 billion cell capsule Take 1 capsule by mouth once daily.      multivitamin (THERAGRAN) per tablet Take 1 tablet by mouth once daily.      omeprazole (PRILOSEC) 20 MG capsule TAKE 1 CAPSULE BY MOUTH ONCE DAILY AS NEEDED FOR HEARTBURN (TAKE ONLY AS NEEDED) 90 capsule 0    omeprazole (PRILOSEC) 20 MG capsule TAKE 1 CAPSULE BY MOUTH ONCE DAILY AS NEEDED FOR  HEARTBURN  (TAKE  ONLY  AS  NEEDED) 90 capsule 0    ondansetron (ZOFRAN) 4 MG tablet Take 1 tablet (4 mg total) by mouth every 8 (eight) hours as needed for Nausea. 45 tablet 0    traMADol (ULTRAM) 50 mg tablet Take 1 tablet (50 mg total) by mouth every 6 (six) hours as needed for Pain. 45 tablet 0    umeclidinium-vilanterol (ANORO ELLIPTA) 62.5-25 mcg/actuation DsDv Inhale 1 puff into the lungs once daily. Controller 1 each 6    verapamil (VERELAN) 180 MG C24P Take 1 capsule (180 mg total) by mouth 2 (two) times daily. 180 capsule 3    [DISCONTINUED] mirtazapine (REMERON) 7.5 MG Tab TAKE 1 TABLET BY MOUTH IN THE EVENING 90 tablet 0    acetaminophen (TYLENOL) 500 MG tablet Take 1,000 mg by mouth once daily. sleep      desoximetasone (TOPICORT) 0.25 % cream APPLY  CREAM EXTERNALLY TWICE DAILY AS NEEDED 15 g 3    fluocinolone (DERMA-SMOOTHE) 0.01 % external oil Apply topically once daily. (Patient taking differently:  "Apply topically daily as needed. ) 1 Bottle 5    fluocinonide (LIDEX) 0.05 % external solution Apply topically once daily. - apply after shampooing (Patient taking differently: Apply topically once daily. - apply after shampooing as needed) 60 mL 5    propylene glycol (SYSTANE BALANCE) 0.6 % Drop Apply 1 drop to eye daily as needed (dry eye).      sodium chloride 2% (XIOMARA 128) 2 % ophthalmic solution Place 1 drop into both eyes 3 (three) times daily as needed.      sodium chloride 3% 3 % nebulizer solution Take 4 mLs by nebulization 2 (two) times daily. 720 mL 3     No current facility-administered medications on file prior to visit.        Objective:     Physical Exam:    Vitals:    05/21/19 0915   BP: 131/71   Pulse: 85   Weight: 69.9 kg (154 lb)   Height: 5' 2" (1.575 m)     Body mass index is 28.17 kg/m².    Constitutional  Well-developed, well-nourished, appears stated age   Cardiovascular  Radial pulses 2+ and symmetric, no LE edema bilaterally           ..  * Specialized movement exam  No hypophonic speech but does have voice tremor.    No facial masking.   No cogwheel rigidity.     No bradykinesia.     Slight rest tremor BH (R>L).    Slight tremor with hands outstretched (L>R).   When pulls to core, RH definitely increases immediately but does not increase with repose, note is made of LH tremor at core as well.   Mild kinetic tremor BH.   BH intention tremor (R>L).       No other dystonia, chorea, athetosis, myoclonus, or tics.       No motor impersistence.   Normal-based gait.   No shortened stride length.   No abnormal arm swing.             Laboratory Results:  Lab Visit on 05/21/2019   Component Date Value Ref Range Status    WBC 05/21/2019 8.29  3.90 - 12.70 K/uL Final    RBC 05/21/2019 4.00  4.00 - 5.40 M/uL Final    Hemoglobin 05/21/2019 11.0* 12.0 - 16.0 g/dL Final    Hematocrit 05/21/2019 35.4* 37.0 - 48.5 % Final    Mean Corpuscular Volume 05/21/2019 89  82 - 98 fL Final    Mean " Corpuscular Hemoglobin 05/21/2019 27.5  27.0 - 31.0 pg Final    Mean Corpuscular Hemoglobin Conc 05/21/2019 31.1* 32.0 - 36.0 g/dL Final    RDW 05/21/2019 14.5  11.5 - 14.5 % Final    Platelets 05/21/2019 260  150 - 350 K/uL Final    MPV 05/21/2019 9.6  9.2 - 12.9 fL Final    Immature Granulocytes 05/21/2019 0.4  0.0 - 0.5 % Final    Gran # (ANC) 05/21/2019 4.9  1.8 - 7.7 K/uL Final    Immature Grans (Abs) 05/21/2019 0.03  0.00 - 0.04 K/uL Final    Lymph # 05/21/2019 2.2  1.0 - 4.8 K/uL Final    Mono # 05/21/2019 1.0  0.3 - 1.0 K/uL Final    Eos # 05/21/2019 0.2  0.0 - 0.5 K/uL Final    Baso # 05/21/2019 0.06  0.00 - 0.20 K/uL Final    nRBC 05/21/2019 0  0 /100 WBC Final    Gran% 05/21/2019 58.6  38.0 - 73.0 % Final    Lymph% 05/21/2019 26.5  18.0 - 48.0 % Final    Mono% 05/21/2019 11.9  4.0 - 15.0 % Final    Eosinophil% 05/21/2019 1.9  0.0 - 8.0 % Final    Basophil% 05/21/2019 0.7  0.0 - 1.9 % Final    Differential Method 05/21/2019 Automated   Final    Sodium 05/21/2019 139  136 - 145 mmol/L Final    Potassium 05/21/2019 3.6  3.5 - 5.1 mmol/L Final    Chloride 05/21/2019 98  95 - 110 mmol/L Final    CO2 05/21/2019 31* 23 - 29 mmol/L Final    Glucose 05/21/2019 92  70 - 110 mg/dL Final    BUN, Bld 05/21/2019 20  8 - 23 mg/dL Final    Creatinine 05/21/2019 1.1  0.5 - 1.4 mg/dL Final    Calcium 05/21/2019 10.2  8.7 - 10.5 mg/dL Final    Total Protein 05/21/2019 8.0  6.0 - 8.4 g/dL Final    Albumin 05/21/2019 3.9  3.5 - 5.2 g/dL Final    Total Bilirubin 05/21/2019 0.4  0.1 - 1.0 mg/dL Final    Alkaline Phosphatase 05/21/2019 79  55 - 135 U/L Final    AST 05/21/2019 13  10 - 40 U/L Final    ALT 05/21/2019 11  10 - 44 U/L Final    Anion Gap 05/21/2019 10  8 - 16 mmol/L Final    eGFR if  05/21/2019 59.2* >60 mL/min/1.73 m^2 Final    eGFR if non African American 05/21/2019 51.3* >60 mL/min/1.73 m^2 Final         Assessment and Plan     Tremor  -     CBC auto differential;  Future; Expected date: 05/21/2019  -     Comprehensive metabolic panel; Future; Expected date: 05/21/2019    Essential tremor  -     mirtazapine (REMERON) 15 MG tablet; Take 1 tablet (15 mg total) by mouth every evening.  Dispense: 30 tablet; Refill: 11    Benign hypertension with chronic kidney disease, stage III    Other chronic pulmonary embolism without acute cor pulmonale    Chronic respiratory failure with hypoxia    PSVT (paroxysmal supraventricular tachycardia)    Anemia, unspecified type        Medical Decision Making:    Increase mirtazapine from 7.5 mg to 15 mg daily.   I will alert Dr. Leos of this increase.   Obtain CBC and CMP to check liver due to increase mirtazapine.     I am not sure what else can be tried for tremor control if this does not help.   DBS is certainly an option but due to her general health, may not be a good candidate. Will think about. I would certainly need to run this by the entire DBS team as well as her other providers before seriously considering.     Follow up 6 months.     I spent 35 minutes face-to-face with the patient and  with >50% of the time spent with counseling and education regarding:  - results of data, diagnosis, and recommendations stated above  - the prognosis of tremor  - risks and benefits of mirtazapine  -gave overview of DBS- expectations and poss risks      Kristi Torres, BRIA, NP-C  Division of Movement and Memory Disorders  [facility]  [phone]

## 2019-05-21 NOTE — PATIENT INSTRUCTIONS
Increase mirtazapine to 15 mg nightly.   I will message Dr. Luna to assure she is okay with this.     Please proceed to the 2nd floor for labs.    Call for problems.     Follow up 6 months.

## 2019-05-21 NOTE — Clinical Note
Saw in clinic today for increased tremor. Increased mirtazapine from 7.5 mg to 15 mg qhs. If you have any objections, please let me know.Thanks,Kristi

## 2019-05-23 DIAGNOSIS — K21.9 GASTROESOPHAGEAL REFLUX DISEASE WITHOUT ESOPHAGITIS: Chronic | ICD-10-CM

## 2019-05-23 RX ORDER — OMEPRAZOLE 20 MG/1
CAPSULE, DELAYED RELEASE ORAL
Qty: 90 CAPSULE | Refills: 0 | Status: SHIPPED | OUTPATIENT
Start: 2019-05-23 | End: 2019-08-21 | Stop reason: SDUPTHER

## 2019-05-30 ENCOUNTER — OFFICE VISIT (OUTPATIENT)
Dept: CARDIOLOGY | Facility: CLINIC | Age: 69
End: 2019-05-30
Payer: MEDICARE

## 2019-05-30 VITALS
WEIGHT: 161.38 LBS | HEART RATE: 74 BPM | HEIGHT: 62 IN | BODY MASS INDEX: 29.7 KG/M2 | SYSTOLIC BLOOD PRESSURE: 130 MMHG | DIASTOLIC BLOOD PRESSURE: 58 MMHG | OXYGEN SATURATION: 96 %

## 2019-05-30 DIAGNOSIS — Z86.711 HISTORY OF PULMONARY EMBOLISM: ICD-10-CM

## 2019-05-30 DIAGNOSIS — G72.0 STATIN MYOPATHY: ICD-10-CM

## 2019-05-30 DIAGNOSIS — I47.10 PSVT (PAROXYSMAL SUPRAVENTRICULAR TACHYCARDIA): Primary | ICD-10-CM

## 2019-05-30 DIAGNOSIS — I47.19 ECTOPIC ATRIAL TACHYCARDIA: ICD-10-CM

## 2019-05-30 DIAGNOSIS — R06.02 SOB (SHORTNESS OF BREATH): ICD-10-CM

## 2019-05-30 DIAGNOSIS — Z86.79 STATUS POST CATHETER ABLATION OF SLOW PATHWAY: ICD-10-CM

## 2019-05-30 DIAGNOSIS — I12.9 BENIGN HYPERTENSION WITH CHRONIC KIDNEY DISEASE, STAGE III: ICD-10-CM

## 2019-05-30 DIAGNOSIS — Z86.711 PERSONAL HISTORY OF PULMONARY EMBOLISM: ICD-10-CM

## 2019-05-30 DIAGNOSIS — N18.30 BENIGN HYPERTENSION WITH CHRONIC KIDNEY DISEASE, STAGE III: ICD-10-CM

## 2019-05-30 DIAGNOSIS — R07.89 NON-CARDIAC CHEST PAIN: ICD-10-CM

## 2019-05-30 DIAGNOSIS — T46.6X5A STATIN MYOPATHY: ICD-10-CM

## 2019-05-30 DIAGNOSIS — Z98.890 STATUS POST CATHETER ABLATION OF SLOW PATHWAY: ICD-10-CM

## 2019-05-30 PROCEDURE — 3075F SYST BP GE 130 - 139MM HG: CPT | Mod: CPTII,S$GLB,, | Performed by: INTERNAL MEDICINE

## 2019-05-30 PROCEDURE — 99499 UNLISTED E&M SERVICE: CPT | Mod: S$GLB,,, | Performed by: INTERNAL MEDICINE

## 2019-05-30 PROCEDURE — 3075F PR MOST RECENT SYSTOLIC BLOOD PRESS GE 130-139MM HG: ICD-10-PCS | Mod: CPTII,S$GLB,, | Performed by: INTERNAL MEDICINE

## 2019-05-30 PROCEDURE — 1101F PR PT FALLS ASSESS DOC 0-1 FALLS W/OUT INJ PAST YR: ICD-10-PCS | Mod: CPTII,S$GLB,, | Performed by: INTERNAL MEDICINE

## 2019-05-30 PROCEDURE — 99999 PR PBB SHADOW E&M-EST. PATIENT-LVL III: ICD-10-PCS | Mod: PBBFAC,,, | Performed by: INTERNAL MEDICINE

## 2019-05-30 PROCEDURE — 3078F PR MOST RECENT DIASTOLIC BLOOD PRESSURE < 80 MM HG: ICD-10-PCS | Mod: CPTII,S$GLB,, | Performed by: INTERNAL MEDICINE

## 2019-05-30 PROCEDURE — 99499 RISK ADDL DX/OHS AUDIT: ICD-10-PCS | Mod: S$GLB,,, | Performed by: INTERNAL MEDICINE

## 2019-05-30 PROCEDURE — 99214 OFFICE O/P EST MOD 30 MIN: CPT | Mod: S$GLB,,, | Performed by: INTERNAL MEDICINE

## 2019-05-30 PROCEDURE — 99214 PR OFFICE/OUTPT VISIT, EST, LEVL IV, 30-39 MIN: ICD-10-PCS | Mod: S$GLB,,, | Performed by: INTERNAL MEDICINE

## 2019-05-30 PROCEDURE — 93000 ELECTROCARDIOGRAM COMPLETE: CPT | Mod: S$GLB,,, | Performed by: INTERNAL MEDICINE

## 2019-05-30 PROCEDURE — 93000 EKG 12-LEAD: ICD-10-PCS | Mod: S$GLB,,, | Performed by: INTERNAL MEDICINE

## 2019-05-30 PROCEDURE — 1101F PT FALLS ASSESS-DOCD LE1/YR: CPT | Mod: CPTII,S$GLB,, | Performed by: INTERNAL MEDICINE

## 2019-05-30 PROCEDURE — 99999 PR PBB SHADOW E&M-EST. PATIENT-LVL III: CPT | Mod: PBBFAC,,, | Performed by: INTERNAL MEDICINE

## 2019-05-30 PROCEDURE — 3078F DIAST BP <80 MM HG: CPT | Mod: CPTII,S$GLB,, | Performed by: INTERNAL MEDICINE

## 2019-05-30 NOTE — PROGRESS NOTES
"Subjective:    Patient ID:  Yasmin Asencio is a 68 y.o. female who presents for follow-up of Follow-up      HPI     PMH of pulmonary hemorrhage,S/P embolization in 11/2015 and pseudomonas pneumonia in 2016,has been treated with IV Abx, hypertension and COPD,AOCD,atrial tachycardia,depression,chronic slurred speech     SVT ablation 7/24/17 6/16/16 [facility] EDP 18, EF 55%, normal coronaries  Medical Rx    EF does not appear depressed - ? Recent echo result     Echo 11/7/18  · The left ventricle cavity is normal.  · Left ventricle ejection fraction is normal at 60%  · Left atrium is mildly dilated.  · Normal LV diastolic function.  · Normal central venous pressure (3 mm Hg).     Holter 3/28/18  1. Sinus rhythm with heart rates varying between 58 and 108 bpm with an average of 78 bpm.     VENTRICULAR ARRHYTHMIAS  1. There were very rare PVCs totalling 39 and averaging 1 per hour.     2. There were no episodes of ventricular tachycardia.    SUPRA VENTRICULAR ARRHYTHMIAS  1. There were occasional PACs totalling 673 and averaging 28 per hour.  There were 29 couplets.    2. There were no episodes of sustained supraventricular tachycardia.     Saw Dr Ballesteros 11/7/18  68 y.o. F  pulm hemorrhage 11/15, s/p coiling  pseudomonas PNA 2016, s/p extended Abx  COPD, bronchiectasis (home O2 frequently)  HTN on meds  chronic slurred speech, thought due to essential tremor per neuro  hx "AF" destini-lung-coiling procedure (no documentation)  pSVT (s/p AVRNT ablation; likely focal AT remains)     Had palpitations with HR >140 bpm at random intervals for past 10 years. Recently about 1x/month.   WIth palps, gets blurry vision, chest palps with radiation to the neck. Her "blood runs cold" and lasts for 20-35 mins.  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 We also saw a likely focal AT at that procedure also; not ablated due to not able to reinduce.  Since, feeling much better. Does get short palpitations. Event monitor showed short NSAT and " one sustained SVT c/w AT. These episodes don't bother her nearly as much as the AVNRT did.     She did well on verapamil for a bit, but developed palps again. HR to 150s. Sx abated after increasing verapamil to 180 bid.  She only had one short run of palps recently while in hospital for pulmonary infection and verapamil was held for a few days. No recurrence with verapamil restarted.     cath 6/16 neg  echo 11/16 50-55% LVEF -> 11/2017 55-60%.  Holter 2/17: SR . no SVT.    Having some issues with anxiety and tremors  Denies palpitations  EKG accelerated junctional 70s NSSTT changes    Review of Systems   Constitution: Negative for decreased appetite.   HENT: Negative for ear discharge.    Eyes: Negative for blurred vision.   Respiratory: Negative for hemoptysis.    Endocrine: Negative for polyphagia.   Hematologic/Lymphatic: Negative for adenopathy.   Skin: Negative for color change.   Musculoskeletal: Negative for joint swelling.   Genitourinary: Negative for bladder incontinence.   Neurological: Negative for brief paralysis.   Psychiatric/Behavioral: Negative for hallucinations.   Allergic/Immunologic: Negative for hives.        Objective:    Physical Exam   Constitutional: She is oriented to person, place, and time. Vital signs are normal. She appears well-developed and well-nourished. She is active and cooperative.   HENT:   Head: Normocephalic and atraumatic.   Eyes: Conjunctivae and EOM are normal.   Neck: Normal range of motion. Carotid bruit is not present. No tracheal deviation and no edema present. No thyroid mass and no thyromegaly present.   Cardiovascular: Normal rate, regular rhythm, normal heart sounds, intact distal pulses and normal pulses.  No extrasystoles are present. PMI is not displaced. Exam reveals no gallop and no friction rub.   No murmur heard.  Pulmonary/Chest: Effort normal. No respiratory distress. She has no wheezes. She has rhonchi in the right middle field, the right lower  field, the left middle field and the left lower field. She has no rales.   Abdominal: Soft. Normal appearance. She exhibits no distension. There is no hepatosplenomegaly.   Musculoskeletal: Normal range of motion.   Neurological: She is alert and oriented to person, place, and time. Coordination normal.   Skin: Skin is warm and dry. No rash noted.   Psychiatric: She has a normal mood and affect. Her speech is normal and behavior is normal. Thought content normal. Cognition and memory are normal.   Nursing note and vitals reviewed.        Assessment:       1. PSVT (paroxysmal supraventricular tachycardia)    2. Ectopic atrial tachycardia    3. Status post catheter ablation of slow pathway    4. Benign hypertension with chronic kidney disease, stage III    5. History of pulmonary embolism    6. Personal history of pulmonary embolism    7. Non-cardiac chest pain    8. Statin myopathy         Plan:       Continue Rx  OV 6 months

## 2019-06-07 ENCOUNTER — OFFICE VISIT (OUTPATIENT)
Dept: PAIN MEDICINE | Facility: CLINIC | Age: 69
End: 2019-06-07
Payer: MEDICARE

## 2019-06-07 VITALS
WEIGHT: 161.38 LBS | SYSTOLIC BLOOD PRESSURE: 124 MMHG | HEIGHT: 62 IN | TEMPERATURE: 97 F | BODY MASS INDEX: 29.7 KG/M2 | HEART RATE: 76 BPM | DIASTOLIC BLOOD PRESSURE: 61 MMHG

## 2019-06-07 DIAGNOSIS — G89.4 CHRONIC PAIN SYNDROME: ICD-10-CM

## 2019-06-07 DIAGNOSIS — M46.1 SACROILIITIS: ICD-10-CM

## 2019-06-07 DIAGNOSIS — M51.36 DDD (DEGENERATIVE DISC DISEASE), LUMBAR: ICD-10-CM

## 2019-06-07 DIAGNOSIS — M47.816 FACET ARTHRITIS OF LUMBAR REGION: ICD-10-CM

## 2019-06-07 DIAGNOSIS — M47.816 SPONDYLOSIS OF LUMBAR REGION WITHOUT MYELOPATHY OR RADICULOPATHY: Primary | ICD-10-CM

## 2019-06-07 PROCEDURE — 99999 PR PBB SHADOW E&M-EST. PATIENT-LVL III: ICD-10-PCS | Mod: PBBFAC,,, | Performed by: NURSE PRACTITIONER

## 2019-06-07 PROCEDURE — 99499 RISK ADDL DX/OHS AUDIT: ICD-10-PCS | Mod: S$GLB,,, | Performed by: NURSE PRACTITIONER

## 2019-06-07 PROCEDURE — 3078F PR MOST RECENT DIASTOLIC BLOOD PRESSURE < 80 MM HG: ICD-10-PCS | Mod: CPTII,S$GLB,, | Performed by: NURSE PRACTITIONER

## 2019-06-07 PROCEDURE — 99213 OFFICE O/P EST LOW 20 MIN: CPT | Mod: S$GLB,,, | Performed by: NURSE PRACTITIONER

## 2019-06-07 PROCEDURE — 1101F PT FALLS ASSESS-DOCD LE1/YR: CPT | Mod: CPTII,S$GLB,, | Performed by: NURSE PRACTITIONER

## 2019-06-07 PROCEDURE — 99499 UNLISTED E&M SERVICE: CPT | Mod: S$GLB,,, | Performed by: NURSE PRACTITIONER

## 2019-06-07 PROCEDURE — 3078F DIAST BP <80 MM HG: CPT | Mod: CPTII,S$GLB,, | Performed by: NURSE PRACTITIONER

## 2019-06-07 PROCEDURE — 1101F PR PT FALLS ASSESS DOC 0-1 FALLS W/OUT INJ PAST YR: ICD-10-PCS | Mod: CPTII,S$GLB,, | Performed by: NURSE PRACTITIONER

## 2019-06-07 PROCEDURE — 3074F SYST BP LT 130 MM HG: CPT | Mod: CPTII,S$GLB,, | Performed by: NURSE PRACTITIONER

## 2019-06-07 PROCEDURE — 3074F PR MOST RECENT SYSTOLIC BLOOD PRESSURE < 130 MM HG: ICD-10-PCS | Mod: CPTII,S$GLB,, | Performed by: NURSE PRACTITIONER

## 2019-06-07 PROCEDURE — 99999 PR PBB SHADOW E&M-EST. PATIENT-LVL III: CPT | Mod: PBBFAC,,, | Performed by: NURSE PRACTITIONER

## 2019-06-07 PROCEDURE — 99213 PR OFFICE/OUTPT VISIT, EST, LEVL III, 20-29 MIN: ICD-10-PCS | Mod: S$GLB,,, | Performed by: NURSE PRACTITIONER

## 2019-06-07 NOTE — PROGRESS NOTES
Chronic patient Established Note (Follow up visit)      SUBJECTIVE:    Yasmin Asencio presents to the clinic for a follow-up appointment for lower back pain. She reports increased low back pain over the last month. She describes this pain as a band across her lower back. The pain is constant and aching in nature. She denies any radiating leg pain. She does report intermittent tingling and burning pain to her feet. She continues to take Gabapentin with benefit. She denies any other health changes. She denies any bowel or bladder incontinence. Her pain today is 4/10.         Pain Disability Index Review:  Last 3 PDI Scores 6/7/2019 3/7/2019 1/31/2019   Pain Disability Index (PDI) 3 0 15       Pain Medications:  Tramadol PRN and Gabapentin 600 mg TID    Opioid Contract: no     report:  Reviewed and consistent with medication use as prescribed.    Pain Procedures:   10/26/15 IR Epidural Transforaminal Inj 1ST Vert Lumbar Bilat   1/22/16 Facet Injection L2-L5   8/26/16 Bilaeral Lumbar MBB at L2-5  10/14/16 Bilateral L2,3,4,5 MBB- 100% relief for 5 days  12/16/16 Right L2,3,4,5 RFA- 80% relief  3/16/18 Left L2,3,4,5 RFA- 80% relief  4/4/18 Right L2,3,4,5 RFA- 80% relief  11/28/2018- Right L2,3,4,5 RFA  12/18/2018- Left L2,3,4,5 RFA  2/20/2019- Bilateral SI joint injections    Physical Therapy/Home Exercise: yes per self    Imaging:   Lumbar XRAYs 10/13/15  Narrative   Lumbar spine    AP and lateral flexion and extension    There are 5 non-rib bearing lumbar vertebral bodies.  There is left convex scoliosis of the lumbar spine.  There is degenerative disk disease of L1/L2, L2/L3 and L5/S1.  Vertebral body height is maintained.  There is osteopenia.    The patient is status post cholecystectomy and there is atherosclerosis.   Impression    There are degenerative changes throughout the lumbar spine.          Lumbar 10/22/15  Narrative   Comparison: Lumbar spine 11/15/13    Technique: Multiplanar multi-sequence MR  images of the lumbar spine without contrast    Findings: The vertebral body heights and alignment are maintained.  No diffuse marrow replacement process is identified.  Mild levoscoliosis is noted.  The abdominal structures show a possible right hepatic cyst and mild ectasia of the abdominal aorta.    The spinal cord terminates at the L1 level and exhibits normal signal.  Hemangioma noted in the T12 vertebral body.  There are decreased intravertebral disk height at all lumbar levels, with associated endplate changes at L2-3.  Two small Tarlov cyst again noted in the sacral canal posterior to S2.  Annular fissure is noted at L3-4 and L4-5. Level by level findings are detailed below:    L1-2: Circumferential disk bulge and facet hypertrophy causing mild bilateral foraminal stenoses without spinal canal stenosis.  L2-3: Circumferential disk bulge and facet hypertrophy causing mild bilateral foraminal stenoses without spinal canal stenosis.  Small focus of subarticular signal abnormality on the right could represent a focus of fat or a cyst and may be abutting the exiting right L2 nerve root, unchanged from prior.  L3-4: Circumferential disk bulge and facet hypertrophy causing mild spinal canal, moderate right foraminal, and mild left foraminal stenoses.  L4-5: Circumferential disk bulge and facet and ligamentum flavum hypertrophy causing mild right and moderate left foraminal stenosis as well as mild spinal canal stenosis.  L5-S1: Facet hypertrophy causing mild left foraminal stenosis.  No right foraminal or spinal canal stenosis.   Impression       Multilevel degenerative changes of the lumbar spine related to facet and disk disease as detailed above, overall similar to the prior examination.    Focus of signal abnormality abutting the exiting right L2 nerve root is favored to represent a focus of epidural fat, but could also represent a small cyst and is similar to prior.     Narrative     EXAMINATION:  CTA CHEST NON  CORONARY    CLINICAL HISTORY:  shortness of breath, R lower chest pain;    TECHNIQUE:  Low dose axial images, sagittal and coronal reformations were obtained from the thoracic inlet to the lung bases following the IV administration of 75 mL of Omnipaque 350.  Contrast timing was optimized to evaluate the pulmonary arteries.  MIP images were performed.    COMPARISON:  Radiograph 03/07/2018; CTA 01/05/2017    FINDINGS:  The vascular and soft tissues structures at the base of the neck are unremarkable.    There is a left-sided aortic arch with 3 branch vessels. The aorta is normal in caliber, contour, and course with mild calcific atherosclerosis.    There is no cardiac enlargement or pericardial fluid.  There are aortic valve calcifications.    There is no axillary, hilar, or mediastinal lymphadenopathy.    The pulmonary arteries distribute normally.  This study is adequate for the evaluation of pulmonary thromboembolism. There are filling defects within the right upper lobe segmental branch, similar to prior CTA 01/05/2017 consistent with chronic thrombus.  No new acute pulmonary embolism identified.    The trachea is midline and the proximal airways are patent.  Extensive fibrotic changes are again identified throughout the lung apices, most pronounced within the left lung.  There are stable areas of bronchiectasis with interstitial thickening and mosaic attenuation throughout the lungs, similar to prior CTA.  A more focal area of nodular consolidation is seen within the left lower lobe, not present on prior study.  Stable right lobe nodule measuring 4 mm is again identified.    The esophagus is normal in caliber and contour.  The imaged intra-abdominal structures demonstrate postsurgical changes of cholecystectomy.  There are calcifications in the spleen.    The osseous structures demonstrate mild degenerative changes without acute fracture or bony destructive process.      Impression       1. Chronic filling  defects within the right upper lobe pulmonary artery branch consistent with chronic pulmonary embolism.  No new acute pulmonary thromboembolism identified.  2. Stable fibrotic changes throughout the lung apices, left greater than right, with bronchiectasis, interstitial prominence, and mosaic attenuation of the lungs, similar to prior CTA 01/05/2017.  3. Small area of focal nodular consolidation within the left lower lobe, not seen on prior studies which, may be infectious or inflammatory in etiology, possibly even contiguous from the left apical fibrotic changes.  Suggest follow-up CT scan 1 month after antimicrobial therapy to exclude neoplastic nodule.  4. Stable 4 mm nodule within the right middle lobe.  5. Additional findings as detailed above.  This report was flagged in Epic as abnormal.     BMP  Lab Results   Component Value Date     05/21/2019    K 3.6 05/21/2019    CL 98 05/21/2019    CO2 31 (H) 05/21/2019    BUN 20 05/21/2019    CREATININE 1.1 05/21/2019    CALCIUM 10.2 05/21/2019    ANIONGAP 10 05/21/2019    ESTGFRAFRICA 59.2 (A) 05/21/2019    EGFRNONAA 51.3 (A) 05/21/2019       Lab Results   Component Value Date    WBC 8.29 05/21/2019    HGB 11.0 (L) 05/21/2019    HCT 35.4 (L) 05/21/2019    MCV 89 05/21/2019     05/21/2019         Allergies:   Allergies   Allergen Reactions    Adhesive Other (See Comments)     Tears up the skin and makes it itch    Bactrim [Sulfamethoxazole-Trimethoprim] Rash     Was hospitalized for rash    Lipitor [Atorvastatin] Other (See Comments)     Muscle aches    Albuterol Other (See Comments)     tremors    Restasis [Cyclosporine] Itching       Current Medications:   Current Outpatient Medications   Medication Sig Dispense Refill    acetaminophen (TYLENOL) 500 MG tablet Take 1,000 mg by mouth once daily. sleep      alprazolam (XANAX) 0.25 MG tablet Take 1 tablet (0.25 mg total) by mouth nightly as needed for Anxiety. 30 tablet 0    apixaban (ELIQUIS) 5 mg  Tab Take 1 tablet (5 mg total) by mouth 2 (two) times daily. 180 tablet 3    chlorthalidone (HYGROTEN) 25 MG Tab Take 12.5 mg by mouth once daily.      desoximetasone (TOPICORT) 0.25 % cream APPLY  CREAM EXTERNALLY TWICE DAILY AS NEEDED 15 g 3    ergocalciferol (ERGOCALCIFEROL) 50,000 unit Cap TAKE 1 CAPSULE BY MOUTH ONCE A WEEK 4 capsule 4    escitalopram oxalate (LEXAPRO) 10 MG tablet TAKE ONE TABLET BY MOUTH ONCE DAILY 30 tablet 11    folic acid (FOLVITE) 1 MG tablet TAKE 1 TABLET BY MOUTH ONCE DAILY 100 tablet 2    gabapentin (NEURONTIN) 300 MG capsule TAKE 2 CAPSULES BY MOUTH THREE TIMES DAILY 540 capsule 0    guaiFENesin (MUCINEX) 600 mg 12 hr tablet Take 1,200 mg by mouth 2 (two) times daily.      ipratropium (ATROVENT) 0.02 % nebulizer solution Inhale 1 vial in nebulizer 3 to 4 times daily      ketoconazole (NIZORAL) 2 % shampoo Apply topically once daily. 120 mL 5    Lactobacillus rhamnosus GG (CULTURELLE) 10 billion cell capsule Take 1 capsule by mouth once daily.      mirtazapine (REMERON) 15 MG tablet Take 1 tablet (15 mg total) by mouth every evening. 30 tablet 11    multivitamin (THERAGRAN) per tablet Take 1 tablet by mouth once daily.      omeprazole (PRILOSEC) 20 MG capsule TAKE 1 CAPSULE BY MOUTH ONCE DAILY AS NEEDED FOR  HEARTBURN  (TAKE  AS  NEEDED) 90 capsule 0    ondansetron (ZOFRAN) 4 MG tablet Take 1 tablet (4 mg total) by mouth every 8 (eight) hours as needed for Nausea. 45 tablet 0    propylene glycol (SYSTANE BALANCE) 0.6 % Drop Apply 1 drop to eye daily as needed (dry eye).      sodium chloride 2% (XIOMARA 128) 2 % ophthalmic solution Place 1 drop into both eyes 3 (three) times daily as needed.      sodium chloride 3% 3 % nebulizer solution Take 4 mLs by nebulization 2 (two) times daily. 720 mL 3    traMADol (ULTRAM) 50 mg tablet Take 1 tablet (50 mg total) by mouth every 6 (six) hours as needed for Pain. 45 tablet 0    umeclidinium-vilanterol (ANORO ELLIPTA) 62.5-25  mcg/actuation DsDv Inhale 1 puff into the lungs once daily. Controller 1 each 6    verapamil (VERELAN) 180 MG C24P Take 1 capsule (180 mg total) by mouth 2 (two) times daily. 180 capsule 3    fluocinolone (DERMA-SMOOTHE) 0.01 % external oil Apply topically once daily. (Patient taking differently: Apply topically daily as needed. ) 1 Bottle 5    fluocinonide (LIDEX) 0.05 % external solution Apply topically once daily. - apply after shampooing (Patient taking differently: Apply topically once daily. - apply after shampooing as needed) 60 mL 5     No current facility-administered medications for this visit.        REVIEW OF SYSTEMS:    GENERAL:  No weight loss, malaise or fevers.  HEENT:  Negative for frequent or significant headaches.  NECK:  Negative for lumps, goiter, pain and significant neck swelling.  RESPIRATORY:  Negative for cough, wheezing or shortness of breath. H/O TB and recurrent lung infections.  CARDIOVASCULAR:  Negative for chest pain, leg swelling or palpitations.  GI:  Negative for abdominal discomfort, blood in stools or black stools or change in bowel habits. GERD.  MUSCULOSKELETAL:  See HPI.  SKIN:  Negative for lesions, rash, and itching.  PSYCH:  Negative for sleep disturbance, mood disorder and recent psychosocial stressors.  HEMATOLOGY/LYMPHOLOGY:  Negative for prolonged bleeding, bruising easily or swollen nodes.  NEURO:   No history of headaches, syncope, paralysis, seizures or tremors.  All other reviewed and negative other than HPI.    Past Medical History:  Past Medical History:   Diagnosis Date    Acquired bronchiectasis     due to history of TB - followed by pulmonary, Dr. Zuñiga    Allergy     Amblyopia     rt eye per pt    Anemia of other chronic disease     Anticoagulant long-term use     Anxiety     Cataract     Chronic obstructive pulmonary disease with acute exacerbation     Clotting disorder     COPD (chronic obstructive pulmonary disease)     COPD (chronic  obstructive pulmonary disease)     Depression     Diverticulosis     Essential tremor     GERD (gastroesophageal reflux disease)     Hemangioma of liver     History of tuberculosis 1978    Hypertension     Mixed anxiety and depressive disorder     Psoriasis     PSVT (paroxysmal supraventricular tachycardia)     Pulmonary embolism     S/P PICC central line placement Apr. 2016 - May 2016    Skin disease     Psoriasis    Spondylosis without myelopathy 8/23/2013    Supraventricular tachycardia     Thoracic aorta atherosclerosis     noted on CT scan of chest 1/3/2011    Tuberculosis     Vaginal delivery     x2    Vitamin D deficiency        Past Surgical History:  Past Surgical History:   Procedure Laterality Date    ABLATION N/A 7/24/2017    [facility]    BLOCK-NERVE-MEDIAL BRANCH-LUMBAR Bilateral 10/14/2016    [facility]    BLOCK-NERVE-MEDIAL BRANCH-LUMBAR Bilateral 8/26/2016    [facility]    CATARACT EXTRACTION W/  INTRAOCULAR LENS IMPLANT  07/24/12    od dr [place]    CATARACT EXTRACTION W/  INTRAOCULAR LENS IMPLANT  08/07/12    left eye    COLONOSCOPY N/A 4/23/2013    [facility] ([facility]    EGD (ESOPHAGOGASTRODUODENOSCOPY) N/A 4/23/2013    [facility] at [facility] ([facility])    GALLBLADDER SURGERY  9/2011    HEART CATH-LEFT Left 6/16/2016    [facility]    INJECTION BILATERAL SI JOINT Bilateral 2/20/2019    [facility]    INJECTION-FACET Bilateral 1/22/2016    [facility]    RADIOFREQUENCY ABLATION RIGHT LUMBAR L2,3,4,5 RFA Right 11/28/2018    [facility]    RADIOFREQUENCY THERMAL COAGULATION Left 12/18/2018    [facility] PATTERSON  [facility]    RADIOFREQUENCY THERMOCOAGULATION (RFTC)-NERVE-MEDIAN BRANCH-LUMBAR Right 4/4/2018    [facility]    RADIOFREQUENCY THERMOCOAGULATION (RFTC)-NERVE-MEDIAN BRANCH-LUMBAR Left 3/16/2018    [facility]    RADIOFREQUENCY THERMOCOAGULATION (RFTC)-NERVE-MEDIAN BRANCH-LUMBAR Right 12/16/2016    [facility]       Family History:  Family History   Problem Relation Age of Onset    Hypertension Mother     Heart attack Mother     Cancer Father         liver and bladder    Hyperlipidemia Sister     Psoriasis Sister     Cancer Brother         liver    Stroke Maternal Uncle     Cancer Maternal Aunt         stomach    Lung cancer Sister     Heart attack Brother     Heart attack Son     Amblyopia Neg Hx     Blindness Neg Hx     Cataracts Neg Hx     Glaucoma Neg Hx     Macular degeneration Neg Hx     Retinal detachment Neg Hx     Strabismus Neg Hx     Thyroid disease Neg Hx     Melanoma Neg Hx     Lupus Neg Hx     Eczema Neg Hx     COPD Neg Hx        Social History:  Social History     Socioeconomic History    Marital status:      Spouse name: Not on file    Number of children: 2    Years of education: Not on file    Highest education level: Not on file   Occupational History    Occupation: housewife   Social Needs    Financial resource strain: Not on file    Food insecurity:     Worry: Not on file     Inability: Not on file    Transportation needs:     Medical: Not on file     Non-medical: Not on file   Tobacco Use    Smoking status: Former Smoker     Packs/day: 2.00     Years: 50.00     Pack years: 100.00     Types: Cigarettes     Last attempt to quit: 5/27/2009     Years since quitting: 10.0    Smokeless tobacco: Never Used   Substance and Sexual Activity    Alcohol use: No     Alcohol/week: 0.0 oz    Drug use: No    Sexual activity: Yes  "    Partners: Male   Lifestyle    Physical activity:     Days per week: Not on file     Minutes per session: Not on file    Stress: Not on file   Relationships    Social connections:     Talks on phone: Not on file     Gets together: Not on file     Attends [employer]ical service: Not on file     Active member of club or organization: Not on file     Attends meetings of clubs or organizations: Not on file     Relationship status: Not on file   Other Topics Concern    Are you pregnant or think you may be? No    Breast-feeding No   Social History Narrative    One child  of a heart attack at age 35.       OBJECTIVE:    /61   Pulse 76   Temp 97.2 °F (36.2 °C)   Ht 5' 2" (1.575 m)   Wt 73.2 kg (161 lb 6 oz)   LMP  (LMP Unknown)   BMI 29.52 kg/m²     PHYSICAL EXAMINATION:    General appearance: Well appearing, in no acute distress, alert and oriented x3.  Psych:  Mood and affect appropriate.    Skin: Skin color, texture, turgor normal, no rashes or lesions, in both upper and lower body.  Head/face:  Atraumatic, normocephalic. No palpable lymph nodes  Cor: RRR  Pulm: CTA  GI: Abdomen soft and non-tender.  Back: Straight leg raising in the sitting position is negative to radicular pain bilaterally. There is mild pain with palpation over lumbar paraspinals. There is pain with palpation to bilateral lumbar facet joints.  Limited ROM with pain on extension>flexion.  Positive facet loading bilaterally.    Extremities: Peripheral joint ROM is full and pain free without obvious instability or laxity in all four extremities. No deformities, edema, or skin discoloration. Good capillary refill.  Musculoskeletal:  There is mild pain with palpation over bilateral SI joints. FABERs is negative bilaterally. Bilateral lower extremity strength is normal and symmetric.  No atrophy or tone abnormalities are noted.  Neuro: Bilateral lower extremity coordination and muscle stretch reflexes are physiologic and symmetric.  " Plantar response are downgoing. Decreased sensation to BLE.  Gait: Antalgic- ambulates without assistance.    ASSESSMENT: 68 y.o. year old female with lower back pain, consistent with the following diagnoses:     1. Spondylosis of lumbar region without myelopathy or radiculopathy     2. Facet arthritis of lumbar region     3. DDD (degenerative disc disease), lumbar     4. Sacroiliitis     5. Chronic pain syndrome           PLAN:     - Previous imaging was reviewed and discussed with the patient today.    - Schedule for left the right L2-5 RFA one side at a time, two weeks apart.   The procedure, risks, benefits and options were discussed with patient. There are no contraindications to the procedure. The patient expressed understanding and agreed to proceed.    - She is s/p bilateral SI joint injections with benefit. We can repeat this as needed.      - Will consider ADRIAN versus updating her MRI in the future if radicular symptoms worsen.    - Continue Gabapentin 300 mg 2 pills TID.     - The patient will continue a home exercise routine to help with pain and strengthening.      - RTC 3 weeks after above procedure.     - Counseled patient regarding the importance of constant sleeping habits and physical therapy.    - Dr. Meyers was consulted on the patient and agrees with this plan.    The above plan and management options were discussed at length with patient. Patient is in agreement with the above and verbalized understanding.    Jasmin Castaneda NP  06/07/2019

## 2019-06-09 ENCOUNTER — TELEPHONE (OUTPATIENT)
Dept: CARDIOLOGY | Facility: CLINIC | Age: 69
End: 2019-06-09

## 2019-06-09 NOTE — TELEPHONE ENCOUNTER
----- Message from Oxana Slater MA sent at 6/7/2019  3:19 PM CDT -----      ----- Message -----  From: Taina Baker  Sent: 6/7/2019   3:16 PM  To: [facility] Staff    Requesting clearance of Eliquis 3 days to schedule Left then Right L2-L5 Medial Branch Radiofrequency with Dr. Meyers, please advise.

## 2019-06-10 ENCOUNTER — TELEPHONE (OUTPATIENT)
Dept: PAIN MEDICINE | Facility: CLINIC | Age: 69
End: 2019-06-10

## 2019-06-10 NOTE — TELEPHONE ENCOUNTER
----- Message from Taina Baker sent at 6/10/2019  3:43 PM CDT -----  Left message asking pt. To call to scheduled RFA's with Dr. Meyers,  clearance obtained of Eliquis .

## 2019-06-26 ENCOUNTER — HOSPITAL ENCOUNTER (EMERGENCY)
Facility: HOSPITAL | Age: 69
Discharge: HOME | End: 2019-06-26
Attending: EMERGENCY MEDICINE
Payer: MEDICARE

## 2019-06-26 VITALS
WEIGHT: 160 LBS | HEART RATE: 82 BPM | HEIGHT: 62 IN | SYSTOLIC BLOOD PRESSURE: 147 MMHG | BODY MASS INDEX: 29.44 KG/M2 | TEMPERATURE: 98 F | RESPIRATION RATE: 19 BRPM | OXYGEN SATURATION: 98 % | DIASTOLIC BLOOD PRESSURE: 67 MMHG

## 2019-06-26 DIAGNOSIS — R11.0 NAUSEA: ICD-10-CM

## 2019-06-26 DIAGNOSIS — A09 DIARRHEA OF INFECTIOUS ORIGIN: ICD-10-CM

## 2019-06-26 DIAGNOSIS — M47.819 ARTHROPATHY OF FACET JOINTS AT MULTIPLE LEVELS: Primary | ICD-10-CM

## 2019-06-26 DIAGNOSIS — J18.9 PNEUMONIA OF LEFT UPPER LOBE DUE TO INFECTIOUS ORGANISM: Primary | ICD-10-CM

## 2019-06-26 LAB
ALBUMIN SERPL BCP-MCNC: 4.2 G/DL (ref 3.5–5.2)
ALP SERPL-CCNC: 84 U/L (ref 55–135)
ALT SERPL W/O P-5'-P-CCNC: 13 U/L (ref 10–44)
ANION GAP SERPL CALC-SCNC: 12 MMOL/L (ref 8–16)
AST SERPL-CCNC: 17 U/L (ref 10–40)
BACTERIA #/AREA URNS HPF: NORMAL /HPF
BASOPHILS # BLD AUTO: 0.02 K/UL (ref 0–0.2)
BASOPHILS NFR BLD: 0.3 % (ref 0–1.9)
BILIRUB SERPL-MCNC: 0.3 MG/DL (ref 0.1–1)
BILIRUB UR QL STRIP: NEGATIVE
BUN SERPL-MCNC: 16 MG/DL (ref 8–23)
CALCIUM SERPL-MCNC: 10.1 MG/DL (ref 8.7–10.5)
CHLORIDE SERPL-SCNC: 99 MMOL/L (ref 95–110)
CLARITY UR: CLEAR
CO2 SERPL-SCNC: 28 MMOL/L (ref 23–29)
COLOR UR: YELLOW
CREAT SERPL-MCNC: 1.2 MG/DL (ref 0.5–1.4)
DIFFERENTIAL METHOD: ABNORMAL
EOSINOPHIL # BLD AUTO: 0.1 K/UL (ref 0–0.5)
EOSINOPHIL NFR BLD: 1.5 % (ref 0–8)
ERYTHROCYTE [DISTWIDTH] IN BLOOD BY AUTOMATED COUNT: 14.4 % (ref 11.5–14.5)
EST. GFR  (AFRICAN AMERICAN): 53 ML/MIN/1.73 M^2
EST. GFR  (NON AFRICAN AMERICAN): 46 ML/MIN/1.73 M^2
GLUCOSE SERPL-MCNC: 105 MG/DL (ref 70–110)
GLUCOSE UR QL STRIP: NEGATIVE
HCT VFR BLD AUTO: 35.2 % (ref 37–48.5)
HGB BLD-MCNC: 10.7 G/DL (ref 12–16)
HGB UR QL STRIP: ABNORMAL
HYALINE CASTS #/AREA URNS LPF: 0 /LPF
KETONES UR QL STRIP: NEGATIVE
LEUKOCYTE ESTERASE UR QL STRIP: NEGATIVE
LIPASE SERPL-CCNC: 25 U/L (ref 4–60)
LYMPHOCYTES # BLD AUTO: 1.3 K/UL (ref 1–4.8)
LYMPHOCYTES NFR BLD: 22.4 % (ref 18–48)
MAGNESIUM SERPL-MCNC: 2.1 MG/DL (ref 1.6–2.6)
MCH RBC QN AUTO: 27.1 PG (ref 27–31)
MCHC RBC AUTO-ENTMCNC: 30.4 G/DL (ref 32–36)
MCV RBC AUTO: 89 FL (ref 82–98)
MICROSCOPIC COMMENT: NORMAL
MONOCYTES # BLD AUTO: 0.5 K/UL (ref 0.3–1)
MONOCYTES NFR BLD: 7.8 % (ref 4–15)
NEUTROPHILS # BLD AUTO: 4 K/UL (ref 1.8–7.7)
NEUTROPHILS NFR BLD: 68.2 % (ref 38–73)
NITRITE UR QL STRIP: NEGATIVE
PH UR STRIP: 7 [PH] (ref 5–8)
PHOSPHATE SERPL-MCNC: 3.2 MG/DL (ref 2.7–4.5)
PLATELET # BLD AUTO: 298 K/UL (ref 150–350)
PMV BLD AUTO: 9.5 FL (ref 9.2–12.9)
POTASSIUM SERPL-SCNC: 3.5 MMOL/L (ref 3.5–5.1)
PROT SERPL-MCNC: 8.4 G/DL (ref 6–8.4)
PROT UR QL STRIP: ABNORMAL
RBC # BLD AUTO: 3.95 M/UL (ref 4–5.4)
RBC #/AREA URNS HPF: 2 /HPF (ref 0–4)
SODIUM SERPL-SCNC: 139 MMOL/L (ref 136–145)
SP GR UR STRIP: 1.01 (ref 1–1.03)
TROPONIN I SERPL DL<=0.01 NG/ML-MCNC: <0.006 NG/ML (ref 0–0.03)
TSH SERPL DL<=0.005 MIU/L-ACNC: 2.45 UIU/ML (ref 0.4–4)
URN SPEC COLLECT METH UR: ABNORMAL
UROBILINOGEN UR STRIP-ACNC: NEGATIVE EU/DL
WBC # BLD AUTO: 5.9 K/UL (ref 3.9–12.7)
WBC #/AREA URNS HPF: 0 /HPF (ref 0–5)

## 2019-06-26 PROCEDURE — 83690 ASSAY OF LIPASE: CPT

## 2019-06-26 PROCEDURE — 99285 EMERGENCY DEPT VISIT HI MDM: CPT | Mod: 25

## 2019-06-26 PROCEDURE — 81000 URINALYSIS NONAUTO W/SCOPE: CPT

## 2019-06-26 PROCEDURE — 63600175 PHARM REV CODE 636 W HCPCS: Performed by: EMERGENCY MEDICINE

## 2019-06-26 PROCEDURE — 85025 COMPLETE CBC W/AUTO DIFF WBC: CPT

## 2019-06-26 PROCEDURE — 84443 ASSAY THYROID STIM HORMONE: CPT

## 2019-06-26 PROCEDURE — 93005 ELECTROCARDIOGRAM TRACING: CPT

## 2019-06-26 PROCEDURE — 84100 ASSAY OF PHOSPHORUS: CPT

## 2019-06-26 PROCEDURE — 84484 ASSAY OF TROPONIN QUANT: CPT

## 2019-06-26 PROCEDURE — 96374 THER/PROPH/DIAG INJ IV PUSH: CPT

## 2019-06-26 PROCEDURE — 83735 ASSAY OF MAGNESIUM: CPT

## 2019-06-26 PROCEDURE — 80053 COMPREHEN METABOLIC PANEL: CPT

## 2019-06-26 RX ORDER — LEVOFLOXACIN 500 MG/1
750 TABLET, FILM COATED ORAL DAILY
Qty: 5 TABLET | Refills: 0 | Status: SHIPPED | OUTPATIENT
Start: 2019-06-26 | End: 2019-07-01

## 2019-06-26 RX ORDER — ONDANSETRON 2 MG/ML
8 INJECTION INTRAMUSCULAR; INTRAVENOUS
Status: COMPLETED | OUTPATIENT
Start: 2019-06-26 | End: 2019-06-26

## 2019-06-26 RX ORDER — VERAPAMIL HYDROCHLORIDE 180 MG/1
180 CAPSULE, EXTENDED RELEASE ORAL DAILY
COMMUNITY
End: 2019-07-25 | Stop reason: SDUPTHER

## 2019-06-26 RX ORDER — ONDANSETRON 4 MG/1
4 TABLET, FILM COATED ORAL EVERY 6 HOURS PRN
Qty: 12 TABLET | Refills: 0 | Status: SHIPPED | OUTPATIENT
Start: 2019-06-26 | End: 2019-06-27 | Stop reason: SDUPTHER

## 2019-06-26 RX ADMIN — ONDANSETRON 8 MG: 2 INJECTION INTRAMUSCULAR; INTRAVENOUS at 05:06

## 2019-06-26 NOTE — ED PROVIDER NOTES
Encounter Date: 6/26/2019  SORT:   67 y/o female presenting for evaluation of nausea and diarrhea. Denies abdominal pain, vomiting. Initial orders placed. YESENIA Lazcano PA-C   SCRIBE #1 NOTE: I, Jose M Aviles, am scribing for, and in the presence of,  Tamika Antony MD. I have scribed the following portions of the note - Other sections scribed: HPI, ROS, PE.       History   No chief complaint on file.    CC: Diarrhea     68 year old female with a history of PE and tremors presents to the ED for evaluation of a 2 day history of nausea, non-bloody, non-melanotic diarrhea, and bilateral lower abdominal pain. Pt had similar symptoms 1 year ago w/ no acute findings. Pt has not been vomiting. Pt reports relief w/ bowel movements. She reports 5 episodes of diarrhea today before even eating breakfast. Pt denies change in medications. She has not recently traveled out of the country. She has not been on antibiotics recently. She denies sick contacts. Pt is on 2L of home O2. Pt denies vomiting, chest pain, increased shortness of breath, and fever. States she had similar symptoms in the past that were related to both her tachycardia and PNA.    Pt has been on medications for tachycardia. Pt is compliant w/ medications including Varapomil and Eloquist.     Pt had an appointment for spinal injection today but did not go due to pain.         The history is provided by the patient. No  was used.     Review of patient's allergies indicates:  Allergies not on file  No past medical history on file.  No past surgical history on file.  No family history on file.  Social History     Tobacco Use    Smoking status: Not on file   Substance Use Topics    Alcohol use: Not on file    Drug use: Not on file     Review of Systems   Constitutional: Negative for activity change, appetite change, chills and fever.   HENT: Negative for sore throat.    Respiratory: Negative for shortness of breath.    Cardiovascular:  Negative for chest pain.   Gastrointestinal: Positive for abdominal pain (lower), diarrhea and nausea. Negative for vomiting.   Genitourinary: Negative for dysuria and hematuria.   Musculoskeletal: Negative for back pain, neck pain and neck stiffness.   Skin: Negative for rash.   Neurological: Negative for dizziness, seizures and weakness.   Hematological: Does not bruise/bleed easily.   Psychiatric/Behavioral: Negative for confusion.       Physical Exam     Initial Vitals   BP Pulse Resp Temp SpO2   -- -- -- -- --      MAP       --         Physical Exam    Nursing note and vitals reviewed.  Constitutional: She appears well-developed and well-nourished. She is not diaphoretic. No distress.   HENT:   Head: Normocephalic and atraumatic.   Right Ear: External ear normal.   Left Ear: External ear normal.   Nose: Nose normal.   Mouth/Throat: Oropharynx is clear and moist. No oropharyngeal exudate.   Eyes: Conjunctivae and EOM are normal. Pupils are equal, round, and reactive to light. Right eye exhibits no discharge. Left eye exhibits no discharge.   Neck: Normal range of motion. Neck supple.   Cardiovascular: Normal rate, regular rhythm, normal heart sounds and intact distal pulses. Exam reveals no gallop and no friction rub.    No murmur heard.  Pulmonary/Chest: Breath sounds normal. No respiratory distress. She has no wheezes. She has no rhonchi.   Abdominal: Soft. Bowel sounds are normal. She exhibits no distension and no mass. There is no tenderness. There is no rebound and no guarding.   Musculoskeletal: Normal range of motion. She exhibits no edema or tenderness.   Neurological: She is alert and oriented to person, place, and time. She has normal strength. GCS score is 15. GCS eye subscore is 4. GCS verbal subscore is 5. GCS motor subscore is 6.   Skin: Skin is warm. Capillary refill takes less than 2 seconds. No erythema. No pallor.   Psychiatric: She has a normal mood and affect. Thought content normal.          ED Course   Procedures  Labs Reviewed - No data to display  EKG Readings: (Independently Interpreted)   Initial Reading: No STEMI. Heart Rate: 84. Ectopy: No Ectopy. Conduction: Normal. ST Segments: Normal ST Segments. T Waves: Normal. Axis: Normal.       Imaging Results    None       X-Rays:   Independently Interpreted Readings:   Other Readings:  ? L infiltrate. No PTX, effusion or cardiomegaly.     Medical Decision Makinyo F with atraumatic abd pain, diarrhea and nausea. On exam she is AFVSS nontoxic appearing. DDx includes but not limited to viral syndrome, AGE, diverticulitis, colitis, electrolyte abnl, dehydration, UTI. I doubt sepsis, pyelonephritis, ACS, acute hepatobiliary dx, appendicitis, perforated viscous, bowel obstruction. Labs and imaging reviewed. She is improved with IVF and zofran.  Will treat for possible PNA with levaquin. F/u with PCP. Return precautions given. She agrees with plan.  Tamika Antony M.D.  5:17 PM 2019              Scribe Attestation:   Scribe #1: I performed the above scribed service and the documentation accurately describes the services I performed. I attest to the accuracy of the note.    Attending Attestation:           Physician Attestation for Scribe:  Physician Attestation Statement for Scribe #1: I, Tamika Antony MD, reviewed documentation, as scribed by Jose M Aviles in my presence, and it is both accurate and complete.                    Clinical Impression:       ICD-10-CM ICD-9-CM   1. Nausea R11.0 787.02   2. Nausea R11.0 787.02                                Tamika Antony MD  [phone]

## 2019-06-27 ENCOUNTER — OFFICE VISIT (OUTPATIENT)
Dept: FAMILY MEDICINE | Facility: CLINIC | Age: 69
End: 2019-06-27
Payer: MEDICARE

## 2019-06-27 VITALS
DIASTOLIC BLOOD PRESSURE: 60 MMHG | OXYGEN SATURATION: 91 % | HEART RATE: 96 BPM | SYSTOLIC BLOOD PRESSURE: 136 MMHG | TEMPERATURE: 98 F

## 2019-06-27 DIAGNOSIS — J43.8 OTHER EMPHYSEMA: ICD-10-CM

## 2019-06-27 DIAGNOSIS — Z79.01 CURRENT USE OF LONG TERM ANTICOAGULATION: ICD-10-CM

## 2019-06-27 DIAGNOSIS — I47.19 ECTOPIC ATRIAL TACHYCARDIA: ICD-10-CM

## 2019-06-27 DIAGNOSIS — I70.0 THORACIC AORTA ATHEROSCLEROSIS: ICD-10-CM

## 2019-06-27 DIAGNOSIS — G25.0 ESSENTIAL TREMOR: ICD-10-CM

## 2019-06-27 DIAGNOSIS — M46.1 SACROILIITIS: ICD-10-CM

## 2019-06-27 DIAGNOSIS — K52.9 GASTROENTERITIS: ICD-10-CM

## 2019-06-27 DIAGNOSIS — J96.11 CHRONIC RESPIRATORY FAILURE WITH HYPOXIA: ICD-10-CM

## 2019-06-27 DIAGNOSIS — J18.9 PNEUMONIA DUE TO INFECTIOUS ORGANISM, UNSPECIFIED LATERALITY, UNSPECIFIED PART OF LUNG: Primary | ICD-10-CM

## 2019-06-27 DIAGNOSIS — I47.10 PSVT (PAROXYSMAL SUPRAVENTRICULAR TACHYCARDIA): ICD-10-CM

## 2019-06-27 DIAGNOSIS — I27.82 OTHER CHRONIC PULMONARY EMBOLISM WITHOUT ACUTE COR PULMONALE: ICD-10-CM

## 2019-06-27 DIAGNOSIS — M43.10 ACQUIRED SPONDYLOLISTHESIS: ICD-10-CM

## 2019-06-27 DIAGNOSIS — I12.9 BENIGN HYPERTENSION WITH CHRONIC KIDNEY DISEASE, STAGE III: ICD-10-CM

## 2019-06-27 DIAGNOSIS — F33.0 MAJOR DEPRESSIVE DISORDER, RECURRENT, MILD: ICD-10-CM

## 2019-06-27 DIAGNOSIS — R11.0 NAUSEA: ICD-10-CM

## 2019-06-27 DIAGNOSIS — N18.30 BENIGN HYPERTENSION WITH CHRONIC KIDNEY DISEASE, STAGE III: ICD-10-CM

## 2019-06-27 DIAGNOSIS — D63.8 ANEMIA OF CHRONIC DISEASE: ICD-10-CM

## 2019-06-27 DIAGNOSIS — J47.9 ACQUIRED BRONCHIECTASIS: ICD-10-CM

## 2019-06-27 DIAGNOSIS — Z86.711 HISTORY OF PULMONARY EMBOLISM: ICD-10-CM

## 2019-06-27 PROCEDURE — 99214 OFFICE O/P EST MOD 30 MIN: CPT | Mod: S$GLB,,, | Performed by: NURSE PRACTITIONER

## 2019-06-27 PROCEDURE — 3075F SYST BP GE 130 - 139MM HG: CPT | Mod: CPTII,S$GLB,, | Performed by: NURSE PRACTITIONER

## 2019-06-27 PROCEDURE — 99499 UNLISTED E&M SERVICE: CPT | Mod: S$GLB,,, | Performed by: NURSE PRACTITIONER

## 2019-06-27 PROCEDURE — 99499 RISK ADDL DX/OHS AUDIT: ICD-10-PCS | Mod: S$GLB,,, | Performed by: NURSE PRACTITIONER

## 2019-06-27 PROCEDURE — 3078F PR MOST RECENT DIASTOLIC BLOOD PRESSURE < 80 MM HG: ICD-10-PCS | Mod: CPTII,S$GLB,, | Performed by: NURSE PRACTITIONER

## 2019-06-27 PROCEDURE — 99214 PR OFFICE/OUTPT VISIT, EST, LEVL IV, 30-39 MIN: ICD-10-PCS | Mod: S$GLB,,, | Performed by: NURSE PRACTITIONER

## 2019-06-27 PROCEDURE — 1101F PT FALLS ASSESS-DOCD LE1/YR: CPT | Mod: CPTII,S$GLB,, | Performed by: NURSE PRACTITIONER

## 2019-06-27 PROCEDURE — 1101F PR PT FALLS ASSESS DOC 0-1 FALLS W/OUT INJ PAST YR: ICD-10-PCS | Mod: CPTII,S$GLB,, | Performed by: NURSE PRACTITIONER

## 2019-06-27 PROCEDURE — 3078F DIAST BP <80 MM HG: CPT | Mod: CPTII,S$GLB,, | Performed by: NURSE PRACTITIONER

## 2019-06-27 PROCEDURE — 3075F PR MOST RECENT SYSTOLIC BLOOD PRESS GE 130-139MM HG: ICD-10-PCS | Mod: CPTII,S$GLB,, | Performed by: NURSE PRACTITIONER

## 2019-06-27 PROCEDURE — 99999 PR PBB SHADOW E&M-EST. PATIENT-LVL III: CPT | Mod: PBBFAC,,, | Performed by: NURSE PRACTITIONER

## 2019-06-27 PROCEDURE — 99999 PR PBB SHADOW E&M-EST. PATIENT-LVL III: ICD-10-PCS | Mod: PBBFAC,,, | Performed by: NURSE PRACTITIONER

## 2019-06-27 RX ORDER — ONDANSETRON 8 MG/1
8 TABLET, ORALLY DISINTEGRATING ORAL EVERY 8 HOURS PRN
Qty: 30 TABLET | Refills: 0 | Status: SHIPPED | OUTPATIENT
Start: 2019-06-27 | End: 2020-02-21 | Stop reason: SDUPTHER

## 2019-06-27 NOTE — PATIENT INSTRUCTIONS
"Go ahead and start the [facility]  CT of chest 7/5/2029  See pulmonary Dr. Mccann on 7/5/2019  Drink lots of fluids   [place] diet advance as tolerated   [place] Diet  Your healthcare provider may recommend a bland diet if you have an upset stomach. It consists of foods that are mild and easy to digest. It is better to eat small frequent meals rather than 3 large meals a day.    Beverages  OK: Fruit juices, non-caffeinated teas and coffee, non-carbonated huynh  Avoid: Carbonated beverage, caffeinated tea and coffee, all alcoholic beverages  Bread  OK: Refined white, wheat or rye bread, randy or soda crackers, [place] toast, plain rolls, bagels  Avoid: Whole-grain bread  Cereal  OK: Refined cereals: cooked or ready to eat  Avoid: Whole-grain cereals and granola, or those containing bran, seeds or nuts  Desserts  OK: Peanut butter and all others except those to "avoid"  Avoid: Chocolate, cocoa, coconut, popcorn, nuts, seeds, jam, marmalade  Fruits  OK: Canned, cooked, frozen or fresh fruits without seeds or tough skin  Avoid: Olives, skin and seeds of fruit  Meats  OK: All fresh or preserved meat, fish and fowl  Avoid: Any that are prepared with those spices to "avoid"  Cheese and eggs  OK: Eggs, cottage cheese, cream cheese, other cheeses  Avoid: All cheeses made with those spices to "avoid"  Potatoes and pasta  OK: Potato, rice, macaroni, noodles, spaghetti  Avoid: None  Soups  OK: All soups without heavy seasoning  Avoid: Soups made with those spices to "avoid"  Vegetables  OK: Canned, cooked, fresh or frozen mildly flavored vegetables without seeds, skins or coarse fiber  Avoid: Vegetables prepared with those spices to "avoid"; skin and seeds of vegetables and those with coarse fiber  Spices  OK: Salt, lemon and lime juice, vinegar, all extracts, yordan, cinnamon, thyme, mace, allspice, paprika  Avoid: Chili powder, cloves, pepper, seed spices, garlic, gravy pickles, highly seasoned salad dressings  Date Last Reviewed: " 11/20/2015  © 1867-0749 [employer]. [address] [place] [address], [place] [address]. All rights reserved. This information is not intended as a substitute for professional medical care. Always follow your healthcare professional's instructions.

## 2019-06-27 NOTE — DISCHARGE INSTRUCTIONS
Take medications as directed. Follow-up with your primary care doctor. Return for any new or worsening complaints.

## 2019-06-27 NOTE — PROGRESS NOTES
Subjective:       Patient ID: Yasmin Asencio is a 68 y.o. female.    Chief Complaint: Nausea (3 days); Diarrhea (3 days); and Abdominal Pain (3 days)    68-year-old female presents to the clinic today with complaint of coughing, wheezing, nausea, and diarrhea for the past 3 days.  She denies any known fever, chills, sore throat, sinus congestion, ear pain, shortness of breath, chest pain, heart palpitations, or swelling to lower extremities.  She was seen in the emergency room yesterday and was diagnosed with pneumonia and prescribed Levaquin which she filled but has not started because she wanted a 2nd opinion.  I explained with her with her history of lung disease I recommended that she start Levaquin and take the whole prescription.  She has a CT of the chest schedule on 07/05/2019 with a follow-up with Pulmonary later that day.  She is on 2 L of oxygen every night.  She states she has had no further vomiting however she has had several jelly diarrhea stools today.  She denies any blood in her stool.  She says her appetite has been decreased.  I stressed the importance of needing to drink lots of fluids and trying a bland diet and advancing as tolerated.    Past Medical History:   Diagnosis Date    Acquired bronchiectasis     due to history of TB - followed by pulmonary, Dr. Zuñiga    Allergy     Amblyopia     rt eye per pt    Anemia of other chronic disease     Anticoagulant long-term use     Anxiety     Cataract     Chronic obstructive pulmonary disease with acute exacerbation     Clotting disorder     COPD (chronic obstructive pulmonary disease)     Depression     Diverticulosis     Essential tremor     GERD (gastroesophageal reflux disease)     Hemangioma of liver     History of tuberculosis 1978    Hypertension     Mixed anxiety and depressive disorder     Psoriasis     PSVT (paroxysmal supraventricular tachycardia)     Pulmonary embolism     S/P PICC central line placement Apr.  2016 - May 2016    Skin disease     Psoriasis    Spondylosis without myelopathy 8/23/2013    Supraventricular tachycardia     Tachycardia     Thoracic aorta atherosclerosis     noted on CT scan of chest 1/3/2011    Tuberculosis     Vaginal delivery     x2    Vitamin D deficiency      Past Surgical History:   Procedure Laterality Date    ABLATION N/A 7/24/2017    Performed [facility] at [facility] CATH LAB    BLOCK-NERVE-MEDIAL BRANCH-LUMBAR Bilateral 10/14/2016    Per[facility] by [facility]ac [facility]ers [facility]    BLOCK-NERVE-MEDIAL BRANCH-LUMBAR Bilateral 8/26/2016    [facility]    CATARACT EXTRACTION W/  INTRAOCULAR LENS IMPLANT  07/24/12    od dr [place]    CATARACT EXTRACTION W/  INTRAOCULAR LENS IMPLANT  08/07/12    left eye    CHOLECYSTECTOMY      COLONOSCOPY N/A 4/23/2013    Performed by Simeon Graves MD at [facility] ENDO (4TH FLR)    EGD (ESOPHAGOGASTRODUODENOSCOPY) N/A 4/23/2013    Performed by Simeon Graves [facility] at [facility] ENDO (4TH FLR)    EYE SURGERY      GALLBLADDER SURGERY  9/2011    HEART CATH-LEFT Left 6/16/2016    [facility] at [facility] C[facility] LAB    INJECTION BILATERAL SI JOINT Bilateral 2/20/2019    [facility]    INJECTION-FACET Bilateral 1/22/2016    [facility]    RADIOFREQUENCY ABLATION RIGHT LUMBAR L2,3,4,5 RFA Right 11/28/2018    [facility]    RADIOFREQUENCY THERMAL COAGULATION Left 12/18/2018    [facility] at [facility]    RADIOFREQUENCY THERMOCOAGULATION (RFTC)-NERVE-MEDIAN BRANCH-LUMBAR Right 4/4/2018    [facility]formed by [facility]    RADIOFREQUENCY THERMOCOAGULATION (RFTC)-NERVE-MEDIAN BRANCH-LUMBAR Left 3/16/2018    [facility] at [facility]    RADIOFREQUENCY THERMOCOAGULATION  (RFTC)-NERVE-MEDIAN BRANCH-LUMBAR Right 12/16/2016    Performed by [facility]ac [facility]ers [facility] at [facility] PAIN MGT      reports that she quit smoking about 10 years ago. Her smoking use included cigarettes. She has a 100.00 pack-year smoking history. She has never used smokeless tobacco. She reports that she drank alcohol. She reports that she does not use drugs.  Review of Systems   Constitutional: Negative for chills and fever.   HENT: Negative for congestion, ear discharge, ear pain, postnasal drip, rhinorrhea, sinus pressure, sneezing and sore throat.    Eyes: Negative for pain, discharge and itching.   Respiratory: Positive for cough and wheezing. Negative for shortness of breath.    Cardiovascular: Negative for chest pain, palpitations and leg swelling.   Gastrointestinal: Positive for diarrhea. Negative for abdominal pain and vomiting.   Musculoskeletal: Negative for back pain, neck pain and neck stiffness.   Skin: Negative for rash.   Neurological: Negative for dizziness, light-headedness and headaches.   Hematological: Negative for adenopathy.       Objective:      Physical Exam   Constitutional: She is oriented to person, place, and time. She appears well-developed and well-nourished. No distress.   HENT:   Head: Normocephalic and atraumatic.   Right Ear: External ear normal.   Left Ear: External ear normal.   Nose: Nose normal.   Mouth/Throat: Oropharynx is clear and moist. No oropharyngeal exudate.   Eyes: Pupils are equal, round, and reactive to light. Conjunctivae and EOM are normal. Right eye exhibits no discharge. Left eye exhibits no discharge. No scleral icterus.   Neck: Normal range of motion. Neck supple.   Cardiovascular: Normal rate and regular rhythm. Exam reveals no gallop and no friction rub.   No murmur heard.  Pulmonary/Chest: Effort normal. No respiratory distress. She has wheezes. She has no rales.   Slight wheezing noted not rales or rhonchi noted    Abdominal: Soft. Bowel sounds are normal.  There is no tenderness.   Musculoskeletal: Normal range of motion. She exhibits no edema.   Lymphadenopathy:     She has no cervical adenopathy.   Neurological: She is alert and oriented to person, place, and time.   Skin: Skin is warm and dry. No rash noted. She is not diaphoretic.   Psychiatric: She has a normal mood and affect.       Assessment:       1. Pneumonia due to infectious organism, unspecified laterality, unspecified part of lung    2. Gastroenteritis    3. Major depressive disorder, recurrent, mild    4. Other emphysema    5. Nausea    6. Acquired bronchiectasis    7. Acquired spondylolisthesis    8. Anemia of chronic disease    9. Benign hypertension with chronic kidney disease, stage III    10. Chronic respiratory failure with hypoxia    11. Current use of long term anticoagulation    12. Essential tremor    13. History of pulmonary embolism    14. Thoracic aorta atherosclerosis    15. PSVT (paroxysmal supraventricular tachycardia)    16. Sacroiliitis    17. Other chronic pulmonary embolism without acute cor pulmonale    18. Ectopic atrial tachycardia        Plan:         Pneumonia due to infectious organism, unspecified laterality, unspecified part of lung  - recommended taking the Levaquin prescription as prescribed  - see pulmonary as scheduled  - be sure and have the CT chest as scheduled    Gastroenteritis  - clear liquids advance to bland diet as tolerated    Major depressive disorder, recurrent, mild  - The current medical regimen is effective;  continue present plan and medications.    Other emphysema  - The current medical regimen is effective;  continue present plan and medications.    Nausea  -     ondansetron (ZOFRAN-ODT) 8 MG TbDL; Take 1 tablet (8 mg total) by mouth every 8 (eight) hours as needed.  Dispense: 30 tablet; Refill: 0    Acquired bronchiectasis  - The current medical regimen is effective;  continue present plan and medications.  - follow up with pulmonary as scheduled      Acquired spondylolisthesis  - Ultram as needed    Anemia of chronic disease  - stable continue to monitor    Benign hypertension with chronic kidney disease, stage III  - The current medical regimen is effective;  continue present plan and medications.  - avoid all anti-inflammatories and stay well hydrated    Chronic respiratory failure with hypoxia  - continue oxygen at night     Current use of long term anticoagulation  - The current medical regimen is effective;  continue present plan and medications.    Essential tremor  - followed by neurology Dr. Juarez    History of pulmonary embolism  - The current medical regimen is effective;  continue present plan and medications.    Thoracic aorta atherosclerosis  - Stable / Asymptomatic is on blood pressure and  lowering medications and cholesterol monitoring     PSVT (paroxysmal supraventricular tachycardia)  - The current medical regimen is effective;  continue present plan and medications.    Sacroiliitis  - The current medical regimen is effective;  continue present plan and medications.    Other chronic pulmonary embolism without acute cor pulmonale  - The current medical regimen is effective;  continue present plan and medications.    Ectopic atrial tachycardia  - The current medical regimen is effective;  continue present plan and medications.

## 2019-07-02 ENCOUNTER — PATIENT OUTREACH (OUTPATIENT)
Dept: ADMINISTRATIVE | Facility: OTHER | Age: 69
End: 2019-07-02

## 2019-07-05 ENCOUNTER — OFFICE VISIT (OUTPATIENT)
Dept: PULMONOLOGY | Facility: CLINIC | Age: 69
End: 2019-07-05
Payer: MEDICARE

## 2019-07-05 ENCOUNTER — HOSPITAL ENCOUNTER (OUTPATIENT)
Dept: RADIOLOGY | Facility: HOSPITAL | Age: 69
Discharge: HOME | End: 2019-07-05
Attending: EMERGENCY MEDICINE
Payer: MEDICARE

## 2019-07-05 VITALS
SYSTOLIC BLOOD PRESSURE: 115 MMHG | DIASTOLIC BLOOD PRESSURE: 70 MMHG | WEIGHT: 159.94 LBS | BODY MASS INDEX: 29.43 KG/M2 | HEART RATE: 81 BPM | OXYGEN SATURATION: 88 % | HEIGHT: 62 IN

## 2019-07-05 DIAGNOSIS — R91.1 PULMONARY NODULE: ICD-10-CM

## 2019-07-05 DIAGNOSIS — I26.99 OTHER PULMONARY EMBOLISM WITHOUT ACUTE COR PULMONALE, UNSPECIFIED CHRONICITY: Primary | ICD-10-CM

## 2019-07-05 DIAGNOSIS — J47.9 BRONCHIECTASIS WITHOUT COMPLICATION: ICD-10-CM

## 2019-07-05 DIAGNOSIS — Z86.711 HISTORY OF PULMONARY EMBOLISM: ICD-10-CM

## 2019-07-05 DIAGNOSIS — J47.9 ACQUIRED BRONCHIECTASIS: ICD-10-CM

## 2019-07-05 DIAGNOSIS — Z79.01 CURRENT USE OF LONG TERM ANTICOAGULATION: ICD-10-CM

## 2019-07-05 PROCEDURE — 3078F PR MOST RECENT DIASTOLIC BLOOD PRESSURE < 80 MM HG: ICD-10-PCS | Mod: CPTII,S$GLB,, | Performed by: EMERGENCY MEDICINE

## 2019-07-05 PROCEDURE — 3074F SYST BP LT 130 MM HG: CPT | Mod: CPTII,S$GLB,, | Performed by: EMERGENCY MEDICINE

## 2019-07-05 PROCEDURE — 99999 PR PBB SHADOW E&M-EST. PATIENT-LVL III: CPT | Mod: PBBFAC,,, | Performed by: EMERGENCY MEDICINE

## 2019-07-05 PROCEDURE — 99999 PR PBB SHADOW E&M-EST. PATIENT-LVL III: ICD-10-PCS | Mod: PBBFAC,,, | Performed by: EMERGENCY MEDICINE

## 2019-07-05 PROCEDURE — 99214 PR OFFICE/OUTPT VISIT, EST, LEVL IV, 30-39 MIN: ICD-10-PCS | Mod: S$GLB,,, | Performed by: EMERGENCY MEDICINE

## 2019-07-05 PROCEDURE — 71250 CT CHEST WITHOUT CONTRAST: ICD-10-PCS | Mod: 26,,, | Performed by: RADIOLOGY

## 2019-07-05 PROCEDURE — 3078F DIAST BP <80 MM HG: CPT | Mod: CPTII,S$GLB,, | Performed by: EMERGENCY MEDICINE

## 2019-07-05 PROCEDURE — 1101F PT FALLS ASSESS-DOCD LE1/YR: CPT | Mod: CPTII,S$GLB,, | Performed by: EMERGENCY MEDICINE

## 2019-07-05 PROCEDURE — 71250 CT THORAX DX C-: CPT | Mod: TC

## 2019-07-05 PROCEDURE — 3074F PR MOST RECENT SYSTOLIC BLOOD PRESSURE < 130 MM HG: ICD-10-PCS | Mod: CPTII,S$GLB,, | Performed by: EMERGENCY MEDICINE

## 2019-07-05 PROCEDURE — 71250 CT THORAX DX C-: CPT | Mod: 26,,, | Performed by: RADIOLOGY

## 2019-07-05 PROCEDURE — 1101F PR PT FALLS ASSESS DOC 0-1 FALLS W/OUT INJ PAST YR: ICD-10-PCS | Mod: CPTII,S$GLB,, | Performed by: EMERGENCY MEDICINE

## 2019-07-05 PROCEDURE — 99214 OFFICE O/P EST MOD 30 MIN: CPT | Mod: S$GLB,,, | Performed by: EMERGENCY MEDICINE

## 2019-07-05 RX ORDER — SODIUM CHLORIDE FOR INHALATION 3 %
4 VIAL, NEBULIZER (ML) INHALATION 2 TIMES DAILY
Qty: 720 ML | Refills: 3 | Status: SHIPPED | OUTPATIENT
Start: 2019-07-05 | End: 2020-07-09

## 2019-07-05 NOTE — PROGRESS NOTES
"Pulmonary & Critical Care Medicine   Clinic Note     Follow up visit     HPI: From prior clinic note 11/2018      Patient of Dr. Zuñiga in past. New to me. Underlying bronchiectasis, chronic PE on OAC. Etiology of bronchiectasis related to very remote history of M. Padmini treated at [facility] department on [place]. She has recurrent pseudomonal infections requiring varying abx and admissions. Most recent hospital admission in September requiring PICC placement and IV Zosyn. Doing better. Remains with daily productive cough and intermittent blood tinged sputum, but per her at baseline. Continues to utilize bronchodilators, CPT and accapella. Weight and functional status stable.  and daughter present at visit. No additional complaints Denies F/NS/weight loss     Interval History      -- 1 ED Visit 6/30- N/diarrhea.. Started on Abx at that time for "PNA", which she has completed. Did not have any cough, or worsening SOB.. Everything has resolved. She feels good and respiratory status unchanged. Wears Supplemental O2 QHS and with exertion when sats decrease.. None with her today. Feels good.  present. No F/C/NS or hemoptysis.     Past Medical, Social, Surgical, Family History- Reviewed and updated as appropriate      Drug Allergies:         Review of patient's allergies indicates:   Allergen Reactions    Adhesive Other (See Comments)       Tears up the skin and makes it itch    Bactrim [sulfamethoxazole-trimethoprim] Rash       Was hospitalized for rash    Lipitor [atorvastatin] Other (See Comments)       Muscle aches    Albuterol Other (See Comments)       tremors    Topamax [topiramate]         - made her feel 'bad in the head'    Restasis [cyclosporine] Itching         Review of Systems:      Constitutional: Negative for fever, chills, diaphoresis, activity change, appetite change and fatigue.   HENT: Negative for congestion, drooling, facial swelling, hearing loss, rhinorrhea, sinus pressure, tinnitus, " "trouble swallowing and voice change.    Eyes: Negative for photophobia, pain and visual disturbance.   Respiratory: Negative for chest tightness and shortness of breath.   positive dry cough.  Chronic and at baseline  Cardiovascular: Negative for chest pain, palpitations and leg swelling.   Gastrointestinal: Negative for nausea, vomiting, abdominal pain, diarrhea and abdominal distention.   Endocrine: Negative for cold intolerance, heat intolerance, polydipsia and polyuria.   Genitourinary: Negative for dysuria, frequency and hematuria.   Musculoskeletal: Negative for myalgias, back pain, arthralgias, neck pain and neck stiffness.   Skin: Negative for color change, pallor, rash and wound.   Allergic/Immunologic: Negative for immunocompromised state.   Neurological: Negative for dizziness, weakness and headaches.    A comprehensive 12-point review of systems was performed, and is negative except for those items mentioned above in the HPI section of this note.      Vital Signs:      /70   Pulse 81   Ht 5' 2" (1.575 m)   Wt 72.6 kg (159 lb 15.1 oz)   LMP  (LMP Unknown)   SpO2 (!) 88%   BMI 29.25 kg/m²        Physical Exam:   GEN- NAD AAOx3 Well Built, Well Appearing   HEENT- ATNC, PERRLA, EOMI, OP-Cl. No JVD, LAD or bruit noted. Trachea Midline.   CV- RRR No M/R/G  RESP- Crackles left UL.   GI- S/NT/ND. Positive BS X 4. No HSM Noted  BACK- Spine midline. No step off, crepitus or deformity noted. No midline TTP.   Ext- MAEW, No deformity. No edema or rashes noted.   Neuro- Strength 5/5 symmetric. CN 2-12 intact. Normal gait. Normal sensation.    Clubbing of digits.            Personal Review and Summary of Prior Diagnostics     Laboratory Studies:   Reviewed      Cr- 1.1, HCO3- 30           TSH 0.400 - 4.000 uIU/mL 2.449      Sodium 136 - 145 mmol/L 139    Potassium 3.5 - 5.1 mmol/L 3.5    Chloride 95 - 110 mmol/L 99    CO2 23 - 29 mmol/L 28    Glucose 70 - 110 mg/dL 105    BUN, Bld 8 - 23 mg/dL 16  "   Creatinine 0.5 - 1.4 mg/dL 1.2    Calcium 8.7 - 10.5 mg/dL 10.1    Total Protein 6.0 - 8.4 g/dL 8.4    Albumin 3.5 - 5.2 g/dL 4.2    Total Bilirubin 0.1 - 1.0 mg/dL 0.3    Comment: For infants and newborns, interpretation of results should be based   on gestational age, weight and in agreement with clinical   observations.   Premature Infant recommended reference ranges:   Up to 24 hours.............<8.0 mg/dL   Up to 48 hours............<12.0 mg/dL   3-5 days..................<15.0 mg/dL   6-29 days.................<15.0 mg/dL    Alkaline Phosphatase 55 - 135 U/L 84    AST 10 - 40 U/L 17    ALT 10 - 44 U/L 13      WBC 3.90 - 12.70 K/uL 5.90    RBC 4.00 - 5.40 M/uL 3.95Low     Hemoglobin 12.0 - 16.0 g/dL 10.7Low     Hematocrit 37.0 - 48.5 % 35.2Low     Mean Corpuscular Volume 82 - 98 fL 89    Mean Corpuscular Hemoglobin 27.0 - 31.0 pg 27.1    Mean Corpuscular Hemoglobin Conc 32.0 - 36.0 g/dL 30.4Low     RDW 11.5 - 14.5 % 14.4    Platelets 150 - 350 K/uL 298    MPV 9.2 - 12.9 fL 9.5    Gran # (ANC) 1.8 - 7.7 K/uL 4.0    Lymph # 1.0 - 4.8 K/uL 1.3    Mono # 0.3 - 1.0 K/uL 0.5    Eos # 0.0 - 0.5 K/uL 0.1    Baso # 0.00 - 0.20 K/uL 0.02    Gran% 38.0 - 73.0 % 68.2    Lymph% 18.0 - 48.0 % 22.4    Mono% 4.0 - 15.0 % 7.8    Eosinophil% 0.0 - 8.0 % 1.5    Basophil% 0.0 - 1.9 % 0.3           Microbiology Data:            Respiratory Culture SERRATIA MARCESCENS   Many       Respiratory Culture PSEUDOMONAS AERUGINOSA   Many   Normal respiratory larry also present       Gram Stain (Respiratory) <10 epithelial cells per low power field.    Gram Stain (Respiratory) Few WBC's    Gram Stain (Respiratory) Many Gram negative rods    Gram Stain (Respiratory) Few Gram positive cocci    Resulting Agency [facility]   Susceptibility                 Serratia marcescens Pseudomonas aeruginosa       CULTURE, RESPIRATORY CULTURE, RESPIRATORY       Cefepime <=8  Sensitive <=8  Sensitive       Ceftriaxone <=8  Sensitive <=8  Sensitive        Ciprofloxacin <=1  Sensitive >2  Resistant       Ertapenem <=2  Sensitive <=2  Sensitive       Gentamicin <=4  Sensitive <=4  Sensitive       Meropenem <=4  Sensitive <=4  Sensitive       Piperacillin/Tazo <=16  Sensitive <=16  Sensitive       Tobramycin <=4  Sensitive <=4  Sensitive       Trimeth/Sulfa <=2/38  Sensitive <=2/38  Sensitive                 AFB Culture & Smear No growth after 8 weeks.     AFB CULTURE STAIN No acid fast bacilli seen.             AFB Culture & Smear No growth after 8 weeks.     AFB CULTURE STAIN No acid fast bacilli seen.          Summary of Chest Imaging Personally Reviewed:      CT Chest 7/5-     1. Absence of superior segment dominant nodule right lower lobe from previous exam.  Micro nodules stable.  2. Emphysema and bronchiectasis changes of lungs stable.       CT chest-   1. Chronic filling defects within the right upper lobe pulmonary artery branch consistent with chronic pulmonary embolism.  No new acute pulmonary thromboembolism identified.  2. Stable fibrotic changes throughout the lung apices, left greater than right, with bronchiectasis, interstitial prominence, and mosaic attenuation of the lungs, similar to prior CTA 01/05/2017.  3. Small area of focal nodular consolidation within the left lower lobe, not seen on prior studies which, may be infectious or inflammatory in etiology, possibly even contiguous from the left apical fibrotic changes.  Suggest follow-up CT scan 1 month after antimicrobial therapy to exclude neoplastic nodule.  4. Stable 4 mm nodule within the right middle lobe.  5. Additional findings as detailed above.     CT 2/6/2019     Bilateral upper lung zone predominant lung disease, primarily characterized by severe bronchiectasis.  Overall appearance suggests sequela of chronic infection.    Ground-glass opacities and pulmonary nodule clusters improved from prior study.  0.7 cm right lower lobe pulmonary nodule, new from prior study.  Recommend follow-up  chest CT at 6 months to ensure nodule stability.     2D Echo:      Left Ventricle Normal ejection fraction . Cavity is normal. Normal left ventricle diastolic function.   Right Ventricle Normal cavity size.   Left Atrium Cavity is mildly dilated.   Right Atrium Normal cavity size.   Aortic Valve Normal valve structure.   Mitral Valve Normal valve structure. There is mild mitral annular calcification.   Tricuspid Valve The tricuspid valve is normal. Trace regurgitation.   Pulmonic Valve Normal valve structure.   IVC/SVC Normal central venous pressure (3 mm Hg).   Ascending Aorta Normal aortic root, size and contour.   Pericardium No pericardial effusion. Pericardium is normal.      US Doppler-   1. Right lower extremity venous ultrasound shows normal flow in the deep venous system and no DVT.  2. Left lower extremity venous ultrasound shows normal flow in the deep venous system and no DVT.         PFT's (4/2017):      FEV1/FVC- 74  FEV1- 1.24L (58)  FVC- 1.67L (62)   DLCO- 53           Assessment:       1. Pulmonary nodule    2. Bronchiectasis without complication    3. History of pulmonary embolism           Plan:          1. Bronchiectasis- History of M. Kansasi- Repeat AFB in system never with recurrent organism. Most recent negative x 2.  Records not available from prior treatment. Recurrent pseudomonas infections (prior cipro resistance).. No recent exacerbations.. Clinical at baseline. No constitutional symptoms aside from ongoing fatigue. CT scan is stable.     -- Continue with anoro and inhaled bronchodilators   -- CPT, 3% saline,  and accapela for mucous clearance   -- Rescue abx provided for worsening sputum production if needed. Discussed role with her in detail   -- vaccinations up-to-date..   -- Supplemental O2 to maintain SpO2 >88%. Discussed importance of utilizing         2. Chronic Pulmonary embolism- originally diagnosed 2 years prior. On OAC since that time. Residual RUL defect remains based on  Sept 2018 C T imaging.  No further episodes of hemoptysis noted. LE doppler negative for DVT   Continue OAC for now as she is tolerating.      3. Pulmonary Nodule- resolved RLL nodule.. No need for any additional follow up.         RTC 6 months on  campus. . Patient has my contact information and can call in the interim if any issues.      Declan Mills M.D.   [facility]

## 2019-07-25 ENCOUNTER — OFFICE VISIT (OUTPATIENT)
Dept: GASTROENTEROLOGY | Facility: CLINIC | Age: 69
End: 2019-07-25
Payer: MEDICARE

## 2019-07-25 ENCOUNTER — LAB VISIT (OUTPATIENT)
Dept: LAB | Facility: HOSPITAL | Age: 69
End: 2019-07-25
Payer: MEDICARE

## 2019-07-25 ENCOUNTER — TELEPHONE (OUTPATIENT)
Dept: PULMONOLOGY | Facility: CLINIC | Age: 69
End: 2019-07-25

## 2019-07-25 VITALS
HEART RATE: 77 BPM | HEIGHT: 62 IN | WEIGHT: 157.63 LBS | SYSTOLIC BLOOD PRESSURE: 125 MMHG | BODY MASS INDEX: 29.01 KG/M2 | DIASTOLIC BLOOD PRESSURE: 68 MMHG

## 2019-07-25 DIAGNOSIS — R06.89 ADVENTITIOUS BREATH SOUNDS: ICD-10-CM

## 2019-07-25 DIAGNOSIS — R19.7 DIARRHEA, UNSPECIFIED TYPE: Primary | ICD-10-CM

## 2019-07-25 DIAGNOSIS — R19.7 DIARRHEA, UNSPECIFIED TYPE: ICD-10-CM

## 2019-07-25 DIAGNOSIS — R11.2 NAUSEA AND VOMITING, INTRACTABILITY OF VOMITING NOT SPECIFIED, UNSPECIFIED VOMITING TYPE: ICD-10-CM

## 2019-07-25 DIAGNOSIS — R10.9 ABDOMINAL PAIN, UNSPECIFIED ABDOMINAL LOCATION: ICD-10-CM

## 2019-07-25 LAB
ALBUMIN SERPL BCP-MCNC: 4.1 G/DL (ref 3.5–5.2)
ALP SERPL-CCNC: 74 U/L (ref 55–135)
ALT SERPL W/O P-5'-P-CCNC: 13 U/L (ref 10–44)
ANION GAP SERPL CALC-SCNC: 12 MMOL/L (ref 8–16)
AST SERPL-CCNC: 18 U/L (ref 10–40)
BASOPHILS # BLD AUTO: 0.04 K/UL (ref 0–0.2)
BASOPHILS NFR BLD: 0.4 % (ref 0–1.9)
BILIRUB SERPL-MCNC: 0.3 MG/DL (ref 0.1–1)
BUN SERPL-MCNC: 21 MG/DL (ref 8–23)
CALCIUM SERPL-MCNC: 10 MG/DL (ref 8.7–10.5)
CHLORIDE SERPL-SCNC: 96 MMOL/L (ref 95–110)
CO2 SERPL-SCNC: 30 MMOL/L (ref 23–29)
CREAT SERPL-MCNC: 1.2 MG/DL (ref 0.5–1.4)
CRP SERPL-MCNC: 15.2 MG/L (ref 0–8.2)
DIFFERENTIAL METHOD: ABNORMAL
EOSINOPHIL # BLD AUTO: 0.1 K/UL (ref 0–0.5)
EOSINOPHIL NFR BLD: 1.2 % (ref 0–8)
ERYTHROCYTE [DISTWIDTH] IN BLOOD BY AUTOMATED COUNT: 13.9 % (ref 11.5–14.5)
EST. GFR  (AFRICAN AMERICAN): 53.3 ML/MIN/1.73 M^2
EST. GFR  (NON AFRICAN AMERICAN): 46.2 ML/MIN/1.73 M^2
GLUCOSE SERPL-MCNC: 102 MG/DL (ref 70–110)
HCT VFR BLD AUTO: 37.5 % (ref 37–48.5)
HGB BLD-MCNC: 11.3 G/DL (ref 12–16)
IMM GRANULOCYTES # BLD AUTO: 0.03 K/UL (ref 0–0.04)
IMM GRANULOCYTES NFR BLD AUTO: 0.3 % (ref 0–0.5)
LYMPHOCYTES # BLD AUTO: 2.7 K/UL (ref 1–4.8)
LYMPHOCYTES NFR BLD: 26.8 % (ref 18–48)
MCH RBC QN AUTO: 26.2 PG (ref 27–31)
MCHC RBC AUTO-ENTMCNC: 30.1 G/DL (ref 32–36)
MCV RBC AUTO: 87 FL (ref 82–98)
MONOCYTES # BLD AUTO: 0.9 K/UL (ref 0.3–1)
MONOCYTES NFR BLD: 9 % (ref 4–15)
NEUTROPHILS # BLD AUTO: 6.3 K/UL (ref 1.8–7.7)
NEUTROPHILS NFR BLD: 62.3 % (ref 38–73)
NRBC BLD-RTO: 0 /100 WBC
PLATELET # BLD AUTO: 305 K/UL (ref 150–350)
PMV BLD AUTO: 9.6 FL (ref 9.2–12.9)
POTASSIUM SERPL-SCNC: 3.7 MMOL/L (ref 3.5–5.1)
PROT SERPL-MCNC: 8.3 G/DL (ref 6–8.4)
RBC # BLD AUTO: 4.32 M/UL (ref 4–5.4)
SODIUM SERPL-SCNC: 138 MMOL/L (ref 136–145)
WBC # BLD AUTO: 10.07 K/UL (ref 3.9–12.7)

## 2019-07-25 PROCEDURE — 99205 OFFICE O/P NEW HI 60 MIN: CPT | Mod: S$GLB,,, | Performed by: NURSE PRACTITIONER

## 2019-07-25 PROCEDURE — 86140 C-REACTIVE PROTEIN: CPT

## 2019-07-25 PROCEDURE — 3078F DIAST BP <80 MM HG: CPT | Mod: CPTII,S$GLB,, | Performed by: NURSE PRACTITIONER

## 2019-07-25 PROCEDURE — 99205 PR OFFICE/OUTPT VISIT, NEW, LEVL V, 60-74 MIN: ICD-10-PCS | Mod: S$GLB,,, | Performed by: NURSE PRACTITIONER

## 2019-07-25 PROCEDURE — 80053 COMPREHEN METABOLIC PANEL: CPT

## 2019-07-25 PROCEDURE — 1101F PT FALLS ASSESS-DOCD LE1/YR: CPT | Mod: CPTII,S$GLB,, | Performed by: NURSE PRACTITIONER

## 2019-07-25 PROCEDURE — 1101F PR PT FALLS ASSESS DOC 0-1 FALLS W/OUT INJ PAST YR: ICD-10-PCS | Mod: CPTII,S$GLB,, | Performed by: NURSE PRACTITIONER

## 2019-07-25 PROCEDURE — 3074F SYST BP LT 130 MM HG: CPT | Mod: CPTII,S$GLB,, | Performed by: NURSE PRACTITIONER

## 2019-07-25 PROCEDURE — 99999 PR PBB SHADOW E&M-EST. PATIENT-LVL III: ICD-10-PCS | Mod: PBBFAC,,, | Performed by: NURSE PRACTITIONER

## 2019-07-25 PROCEDURE — 36415 COLL VENOUS BLD VENIPUNCTURE: CPT

## 2019-07-25 PROCEDURE — 3074F PR MOST RECENT SYSTOLIC BLOOD PRESSURE < 130 MM HG: ICD-10-PCS | Mod: CPTII,S$GLB,, | Performed by: NURSE PRACTITIONER

## 2019-07-25 PROCEDURE — 85025 COMPLETE CBC W/AUTO DIFF WBC: CPT

## 2019-07-25 PROCEDURE — 99999 PR PBB SHADOW E&M-EST. PATIENT-LVL III: CPT | Mod: PBBFAC,,, | Performed by: NURSE PRACTITIONER

## 2019-07-25 PROCEDURE — 3078F PR MOST RECENT DIASTOLIC BLOOD PRESSURE < 80 MM HG: ICD-10-PCS | Mod: CPTII,S$GLB,, | Performed by: NURSE PRACTITIONER

## 2019-07-25 RX ORDER — HYOSCYAMINE SULFATE 0.125 MG
125 TABLET ORAL EVERY 6 HOURS PRN
Qty: 30 TABLET | Refills: 0 | Status: SHIPPED | OUTPATIENT
Start: 2019-07-25 | End: 2020-10-19

## 2019-07-25 NOTE — TELEPHONE ENCOUNTER
Patient stated she just left Gastro doctor, doctor didn't like the sound and wanted to rule out pneumonia. Patient stated that she was antibiotic, completed on 07/17. Patient stated she has more fluid in her lungs now then before, she's coughing up a lot of fluid, keeps a constant flow of fluid coming up but this is more. Patient was informed that her message will be forwarded to Dr. Mills to review and advise. Patient verbalized that she understand.

## 2019-07-25 NOTE — PATIENT INSTRUCTIONS
H. Pylori needs to be ruled out with a stool test.   Must be off antibiotics for 4 weeks.  Must be off omeprazole and all antacids for 2 weeks.   Stop taking omeprazole on August 1. Turn in your stool for H. Pylori on august 15.   After you turn in the stool, may resume omeprazole.    Start back on your probiotics

## 2019-07-25 NOTE — PROGRESS NOTES
[facility] Consultation Note    Reason for Consult:  The primary encounter diagnosis was Diarrhea, unspecified type. Diagnoses of Nausea and vomiting, intractability of vomiting not specified, unspecified vomiting type, Abdominal pain, unspecified abdominal location, and Adventitious breath sounds were also pertinent to this visit.    PCP:   Urmila Luna   No address on file    Referring MD:  Aaareferral Self  No address on file    HPI:  This is a 68 y.o. female here for evaluation of diarrhea, nausea, and abdominal pain.     Started a couple of months ago. She has had these symptoms in the past. Has been seen by GI in the past in 2013. Things have been stable since then.  Sx are not every day. Cant correlate anything to the sx.  The abdominal pain starts. Starts After meals 30 minutes to a 3 hours later. Always will have the diarrhea after abdominal pain.  May have 1-2 BM per day. She can have 3-4 BMs during the abdominal pain episodes.   When she is having the diarrhea is when she gets the nausea and may have some vomiting occasionally.   Gets these episodes a couple times a week.   No nocturnal sx.   Does not have a fever with this. No hematochezia, melena. No hematemesis.   No dysphagia.    She routinely takes Abx for her chronic respiratory infections. Several times a year she takes Abx. Sometimes she has to get PICC lines for IV abx.  Was taking culturelle. Not taking now.   Reports pyrosis. Takes omeprazole. Well controlled. When she tried to get off her Sx return.    Cholestyramine did not help in the past, but she tried it while she still had her gallbladder.     She reports she went to the ER 6/26/19    ROS:  Constitutional: No fevers, chills, No weight loss  ENT: No allergies  CV: No chest pain  Pulm: +cough, productive, +shortness of breath  Ophtho: + vision changes. Has appt next week  GI: see HPI  Derm: No rash  MSK: + arthritis  : No dysuria, No hematuria  Neuro: No  syncope, No seizure  Psych: + anxiety, No depression    Medical History:  has a past medical history of Acquired bronchiectasis, Allergy, Amblyopia, Anemia of other chronic disease, Anticoagulant long-term use, Anxiety, Cataract, Chronic obstructive pulmonary disease with acute exacerbation, Clotting disorder, COPD (chronic obstructive pulmonary disease), Depression, Diverticulosis, Essential tremor, GERD (gastroesophageal reflux disease), Hemangioma of liver, History of tuberculosis (1978), Hypertension, Mixed anxiety and depressive disorder, Psoriasis, PSVT (paroxysmal supraventricular tachycardia), Pulmonary embolism, S/P PICC central line placement (Apr. 2016 - May 2016), Skin disease, Spondylosis without myelopathy (8/23/2013), Supraventricular tachycardia, Tachycardia, Thoracic aorta atherosclerosis, Tuberculosis, Vaginal delivery, and Vitamin D deficiency.    Surgical History:  has a past surgical history that includes Gallbladder surgery (9/2011); Cataract extraction w/  intraocular lens implant (07/24/12); Cataract extraction w/  intraocular lens implant (08/07/12); Radiofrequency thermocoagulation (Left, 12/18/2018); Cholecystectomy; and Eye surgery.    Family History: family history includes Cancer in her brother, father, and maternal aunt; Heart attack in her brother, mother, and son; Hyperlipidemia in her sister; Hypertension in her mother; Lung cancer in her sister; Psoriasis in her sister; Stroke in her maternal uncle..     Social History:  reports that she quit smoking about 10 years ago. Her smoking use included cigarettes. She has a 100.00 pack-year smoking history. She has never used smokeless tobacco. She reports that she drank alcohol. She reports that she does not use drugs.    Review of patient's allergies indicates:   Allergen Reactions    Adhesive Other (See Comments)     Tears up the skin and makes it itch   (leads)    Bactrim [sulfamethoxazole-trimethoprim] Rash     Was hospitalized for  rash    Restasis [cyclosporine]     Lipitor [atorvastatin] Other (See Comments)     Muscle aches    Albuterol Other (See Comments)     tremors    Topamax [topiramate]      - made her feel 'bad in the head'       Current Outpatient Medications on File Prior to Visit   Medication Sig Dispense Refill    acetaminophen (TYLENOL) 500 MG tablet Take 1,000 mg by mouth once daily. sleep      alprazolam (XANAX) 0.25 MG tablet Take 1 tablet (0.25 mg total) by mouth nightly as needed for Anxiety. 30 tablet 0    apixaban (ELIQUIS) 5 mg Tab Take 1 tablet (5 mg total) by mouth 2 (two) times daily. 180 tablet 3    chlorthalidone (HYGROTEN) 25 MG Tab Take 12.5 mg by mouth once daily.      desoximetasone (TOPICORT) 0.25 % cream APPLY  CREAM EXTERNALLY TWICE DAILY AS NEEDED 15 g 3    ergocalciferol (ERGOCALCIFEROL) 50,000 unit Cap TAKE 1 CAPSULE BY MOUTH ONCE A WEEK 4 capsule 4    escitalopram oxalate (LEXAPRO) 10 MG tablet TAKE ONE TABLET BY MOUTH ONCE DAILY 30 tablet 11    fluocinolone (DERMA-SMOOTHE) 0.01 % external oil Apply topically once daily. (Patient taking differently: Apply topically daily as needed. ) 1 Bottle 5    folic acid (FOLVITE) 1 MG tablet TAKE 1 TABLET BY MOUTH ONCE DAILY 100 tablet 2    gabapentin (NEURONTIN) 300 MG capsule TAKE 2 CAPSULES BY MOUTH THREE TIMES DAILY 540 capsule 0    guaiFENesin (MUCINEX) 600 mg 12 hr tablet Take 1,200 mg by mouth 2 (two) times daily.      ipratropium (ATROVENT) 0.02 % nebulizer solution Inhale 1 vial in nebulizer 3 to 4 times daily      ketoconazole (NIZORAL) 2 % shampoo Apply topically once daily. 120 mL 5    Lactobacillus rhamnosus GG (CULTURELLE) 10 billion cell capsule Take 1 capsule by mouth once daily.      multivitamin (THERAGRAN) per tablet Take 1 tablet by mouth once daily.      omeprazole (PRILOSEC) 20 MG capsule TAKE 1 CAPSULE BY MOUTH ONCE DAILY AS NEEDED FOR  HEARTBURN  (TAKE  AS  NEEDED) 90 capsule 0    ondansetron (ZOFRAN-ODT) 8 MG TbDL Take 1  "tablet (8 mg total) by mouth every 8 (eight) hours as needed. 30 tablet 0    propylene glycol (SYSTANE BALANCE) 0.6 % Drop Apply 1 drop to eye daily as needed (dry eye).      sodium chloride 2% (XIOMARA 128) 2 % ophthalmic solution Place 1 drop into both eyes 3 (three) times daily as needed.      sodium chloride 3% 3 % nebulizer solution Take 4 mLs by nebulization 2 (two) times daily. 720 mL 3    traMADol (ULTRAM) 50 mg tablet Take 1 tablet (50 mg total) by mouth every 6 (six) hours as needed for Pain. 45 tablet 0    umeclidinium-vilanterol (ANORO ELLIPTA) 62.5-25 mcg/actuation DsDv Inhale 1 puff into the lungs once daily. Controller 1 each 6    verapamil (VERELAN) 180 MG C24P Take 1 capsule (180 mg total) by mouth 2 (two) times daily. 180 capsule 3    fluocinonide (LIDEX) 0.05 % external solution Apply topically once daily. - apply after shampooing (Patient taking differently: Apply topically once daily. - apply after shampooing as needed) 60 mL 5    mirtazapine (REMERON) 15 MG tablet Take 1 tablet (15 mg total) by mouth every evening. 30 tablet 11    [DISCONTINUED] ondansetron (ZOFRAN) 4 MG tablet Take 1 tablet (4 mg total) by mouth every 8 (eight) hours as needed for Nausea. 45 tablet 0    [DISCONTINUED] verapamil (VERELAN) 180 MG C24P Take 180 mg by mouth once daily.       No current facility-administered medications on file prior to visit.          Objective Findings:    Vital Signs:  /68 (BP Location: Right arm)   Pulse 77   Ht 5' 2" (1.575 m)   Wt 71.5 kg (157 lb 10.1 oz)   LMP  (LMP Unknown)   BMI 28.83 kg/m²   Body mass index is 28.83 kg/m².    Physical Exam:  General Appearance: Well appearing in no acute distress.  Head:   Normocephalic, without obvious abnormality  Eyes:    No scleral icterus  ENT: Neck supple  Lungs: Bilateral lower lobe crackles and wheezes heard. Productive cough throughout visit.   Heart:  Regular rate and rhythm, S1, S2 normal, no murmurs heard  Abdomen: Soft, non " tender, non distended with positive bowel sounds in all four quadrants.   Skin: No rash  Neurologic: AAO x 3      Labs:  Lab Results   Component Value Date    WBC 5.90 06/26/2019    HGB 10.7 (L) 06/26/2019    HCT 35.2 (L) 06/26/2019     06/26/2019    CHOL 204 (H) 03/22/2018    TRIG 139 03/22/2018    HDL 68 03/22/2018    ALT 13 06/26/2019    AST 17 06/26/2019     06/26/2019    K 3.5 06/26/2019    CL 99 06/26/2019    CREATININE 1.2 06/26/2019    BUN 16 06/26/2019    CO2 28 06/26/2019    TSH 2.449 06/26/2019    INR 1.1 08/25/2017    HGBA1C 5.2 09/13/2018       Imaging:  None reviewed    Endoscopy:    Colonoscopy 4/2013 with Dr. Graves. No specimens removed. Recommended repeat 5-10 yrs.      Assessment:    Ms. Asencio is a 68 y.o. WF with:    1. Diarrhea, unspecified type    2. Nausea and vomiting, intractability of vomiting not specified, unspecified vomiting type    3. Abdominal pain, unspecified abdominal location    4. Adventitious breath sounds      My main concern today were the crackles and wheezes heard during PE along with her productive cough. She has an extensive pulmonary hx. Just recently treated for pneumonia with abx that she completed almost 2 weeks ago. Pt stated she always has adventitious breath sounds and will never run a fever when she actually has pneumonia. I have messaged her pulmonologist with my concerns. Pt is going to call him as well. I discussed with pt that if her pulmonary sx worsens she should go to ER. We also discussed since endoscopies require sedation, I would like her to be seen by her pulmonologist to make sure nothing acute is going on before her endoscopies are scheduled.  Today I will get labs to make sure there are no signs of infection or anemia.    For her GI symptoms, she has had these types of symptoms in the past.  She has been seen by GI in 2013.  She reports since then she has not had these GI symptoms.  Diarrhea nausea and vomiting and abdominal pain  started just recently.  Starts with abdominal pain after meals that result in diarrhea with nausea and some times vomiting. These episodes occur maybe once to twice a week.  She has some days throughout the week where she has no GI complaints.   For now will rule out infectious causes with stool studies.  She is also due for her screening colonoscopy so colonoscopy will be ordered to rule out microscopic colitis as well. She has a history of H pylori.  We discussed proper timing between antibiotic use and PPI use before turning her stool.  This was written down in her AVS.  I recommended she try Levsin when the abdominal pain starts.  Since it is not every day I just told her to take it as needed.       Recommendations:  1. See pulmonologist for adventitious breath sounds. Go to ER if pulmonary sx worsen.  2. Labs  3. Stool Studies  4. Egd and colonoscopy to be scheduled after pt sees pulmonary.    F/u pending above.    Order summary:  Orders Placed This Encounter    Stool culture    Gastrointestinal Pathogens Panel, PCR    H. pylori antigen, stool    CBC auto differential    Comprehensive metabolic panel    C-reactive protein    hyoscyamine (ANASPAZ,LEVSIN) 0.125 mg Tab    Case request GI: EGD (ESOPHAGOGASTRODUODENOSCOPY), COLONOSCOPY         Thank you so much for allowing me to participate in the care of SALONI Lao

## 2019-07-25 NOTE — TELEPHONE ENCOUNTER
----- Message from Jane Shannon sent at 7/25/2019  2:29 PM CDT -----  Contact: self [phone]  .Needs Advice    Reason for call:        Communication Preference:phone    Additional Information:pt states she was in the hospital and  didn't like the sound of her lungs and she needs to see her doctor again to rule out [place]

## 2019-07-26 ENCOUNTER — PATIENT MESSAGE (OUTPATIENT)
Dept: GASTROENTEROLOGY | Facility: CLINIC | Age: 69
End: 2019-07-26

## 2019-07-26 ENCOUNTER — HOSPITAL ENCOUNTER (OUTPATIENT)
Dept: RADIOLOGY | Facility: HOSPITAL | Age: 69
Discharge: HOME | End: 2019-07-26
Attending: EMERGENCY MEDICINE
Payer: MEDICARE

## 2019-07-26 ENCOUNTER — PATIENT OUTREACH (OUTPATIENT)
Dept: ADMINISTRATIVE | Facility: OTHER | Age: 69
End: 2019-07-26

## 2019-07-26 DIAGNOSIS — J47.1 BRONCHIECTASIS WITH ACUTE EXACERBATION: ICD-10-CM

## 2019-07-26 DIAGNOSIS — J47.1 BRONCHIECTASIS WITH ACUTE EXACERBATION: Primary | ICD-10-CM

## 2019-07-26 PROCEDURE — 71046 XR CHEST PA AND LATERAL: ICD-10-PCS | Mod: 26,,, | Performed by: RADIOLOGY

## 2019-07-26 PROCEDURE — 71046 X-RAY EXAM CHEST 2 VIEWS: CPT | Mod: TC,FY

## 2019-07-26 PROCEDURE — 71046 X-RAY EXAM CHEST 2 VIEWS: CPT | Mod: 26,,, | Performed by: RADIOLOGY

## 2019-07-26 RX ORDER — AZITHROMYCIN 250 MG/1
TABLET, FILM COATED ORAL
Qty: 6 TABLET | Refills: 0 | Status: ON HOLD | OUTPATIENT
Start: 2019-07-26 | End: 2019-08-02 | Stop reason: HOSPADM

## 2019-07-27 ENCOUNTER — LAB VISIT (OUTPATIENT)
Dept: LAB | Facility: HOSPITAL | Age: 69
End: 2019-07-27
Attending: EMERGENCY MEDICINE
Payer: MEDICARE

## 2019-07-27 DIAGNOSIS — J47.1 BRONCHIECTASIS WITH ACUTE EXACERBATION: ICD-10-CM

## 2019-07-27 PROCEDURE — 87205 SMEAR GRAM STAIN: CPT

## 2019-07-27 PROCEDURE — 87186 SC STD MICRODIL/AGAR DIL: CPT

## 2019-07-27 PROCEDURE — 87015 SPECIMEN INFECT AGNT CONCNTJ: CPT

## 2019-07-27 PROCEDURE — 87070 CULTURE OTHR SPECIMN AEROBIC: CPT

## 2019-07-27 PROCEDURE — 87077 CULTURE AEROBIC IDENTIFY: CPT | Mod: 59

## 2019-07-27 PROCEDURE — 87206 SMEAR FLUORESCENT/ACID STAI: CPT

## 2019-07-27 PROCEDURE — 87116 MYCOBACTERIA CULTURE: CPT

## 2019-07-30 ENCOUNTER — TELEPHONE (OUTPATIENT)
Dept: ENDOSCOPY | Facility: HOSPITAL | Age: 69
End: 2019-07-30

## 2019-07-30 ENCOUNTER — TELEPHONE (OUTPATIENT)
Dept: GASTROENTEROLOGY | Facility: CLINIC | Age: 69
End: 2019-07-30

## 2019-07-30 NOTE — PROGRESS NOTES
Please let pt know she is still anemic, but it has improved since her last labs were drawn. Her crp is elevated, however it could be elevated due to her respiratory infection.

## 2019-07-30 NOTE — TELEPHONE ENCOUNTER
"Patient contacted regarding scheduling EGD and colonoscopy.  Informed patient that Susan HUBBARD-GI-recommended "Egd and colonoscopy to be scheduled after pt sees pulmonary".  Patient aware of this and will call to make an appointment after finishing antibiotics she is on presently.  Main line phone number provided to return call when clear to proceed.  Stated understanding.      "

## 2019-07-31 ENCOUNTER — HOSPITAL ENCOUNTER (OUTPATIENT)
Facility: HOSPITAL | Age: 69
Discharge: HOME HEALTH | End: 2019-08-02
Attending: EMERGENCY MEDICINE | Admitting: HOSPITALIST
Payer: MEDICARE

## 2019-07-31 ENCOUNTER — PATIENT MESSAGE (OUTPATIENT)
Dept: GASTROENTEROLOGY | Facility: CLINIC | Age: 69
End: 2019-07-31

## 2019-07-31 ENCOUNTER — TELEPHONE (OUTPATIENT)
Dept: GASTROENTEROLOGY | Facility: CLINIC | Age: 69
End: 2019-07-31

## 2019-07-31 ENCOUNTER — TELEPHONE (OUTPATIENT)
Dept: PULMONOLOGY | Facility: CLINIC | Age: 69
End: 2019-07-31

## 2019-07-31 DIAGNOSIS — R84.5 POSITIVE SPUTUM CULTURE FOR PSEUDOMONAS: ICD-10-CM

## 2019-07-31 DIAGNOSIS — R05.9 COUGH: ICD-10-CM

## 2019-07-31 DIAGNOSIS — J47.9 ACQUIRED BRONCHIECTASIS: Chronic | ICD-10-CM

## 2019-07-31 PROBLEM — I10 ESSENTIAL HYPERTENSION: Status: ACTIVE | Noted: 2019-07-31

## 2019-07-31 PROBLEM — Z86.19 HISTORY OF MAI INFECTION: Status: ACTIVE | Noted: 2019-07-31

## 2019-07-31 PROBLEM — F32.A DEPRESSION: Chronic | Status: ACTIVE | Noted: 2019-07-31

## 2019-07-31 PROBLEM — I10 ESSENTIAL HYPERTENSION: Chronic | Status: ACTIVE | Noted: 2019-07-31

## 2019-07-31 PROBLEM — Z79.01 CHRONIC ANTICOAGULATION: Chronic | Status: ACTIVE | Noted: 2017-03-20

## 2019-07-31 PROBLEM — Z86.79 HISTORY OF PSVT (PAROXYSMAL SUPRAVENTRICULAR TACHYCARDIA): Chronic | Status: ACTIVE | Noted: 2017-03-20

## 2019-07-31 PROBLEM — Z86.711 HISTORY OF PULMONARY EMBOLISM: Chronic | Status: ACTIVE | Noted: 2017-01-05

## 2019-07-31 PROBLEM — Z86.19 HISTORY OF MAI INFECTION: Chronic | Status: ACTIVE | Noted: 2019-07-31

## 2019-07-31 LAB
ALBUMIN SERPL BCP-MCNC: 4.1 G/DL (ref 3.5–5.2)
ALP SERPL-CCNC: 69 U/L (ref 55–135)
ALT SERPL W/O P-5'-P-CCNC: 11 U/L (ref 10–44)
ANION GAP SERPL CALC-SCNC: 10 MMOL/L (ref 8–16)
AST SERPL-CCNC: 15 U/L (ref 10–40)
BACTERIA SPEC AEROBE CULT: ABNORMAL
BASOPHILS # BLD AUTO: 0.03 K/UL (ref 0–0.2)
BASOPHILS NFR BLD: 0.4 % (ref 0–1.9)
BILIRUB SERPL-MCNC: 0.5 MG/DL (ref 0.1–1)
BILIRUB UR QL STRIP: NEGATIVE
BUN SERPL-MCNC: 15 MG/DL (ref 8–23)
CALCIUM SERPL-MCNC: 9.9 MG/DL (ref 8.7–10.5)
CHLORIDE SERPL-SCNC: 99 MMOL/L (ref 95–110)
CLARITY UR: CLEAR
CO2 SERPL-SCNC: 29 MMOL/L (ref 23–29)
COLOR UR: YELLOW
CREAT SERPL-MCNC: 1.4 MG/DL (ref 0.5–1.4)
DIFFERENTIAL METHOD: ABNORMAL
EOSINOPHIL # BLD AUTO: 0 K/UL (ref 0–0.5)
EOSINOPHIL NFR BLD: 0.3 % (ref 0–8)
ERYTHROCYTE [DISTWIDTH] IN BLOOD BY AUTOMATED COUNT: 14.5 % (ref 11.5–14.5)
EST. GFR  (AFRICAN AMERICAN): 44 ML/MIN/1.73 M^2
EST. GFR  (NON AFRICAN AMERICAN): 38 ML/MIN/1.73 M^2
GLUCOSE SERPL-MCNC: 105 MG/DL (ref 70–110)
GLUCOSE UR QL STRIP: NEGATIVE
GRAM STN SPEC: ABNORMAL
HCT VFR BLD AUTO: 35.5 % (ref 37–48.5)
HGB BLD-MCNC: 10.8 G/DL (ref 12–16)
HGB UR QL STRIP: NEGATIVE
KETONES UR QL STRIP: NEGATIVE
LACTATE SERPL-SCNC: 1.5 MMOL/L (ref 0.5–2.2)
LEUKOCYTE ESTERASE UR QL STRIP: NEGATIVE
LYMPHOCYTES # BLD AUTO: 1.6 K/UL (ref 1–4.8)
LYMPHOCYTES NFR BLD: 21.6 % (ref 18–48)
MCH RBC QN AUTO: 26.2 PG (ref 27–31)
MCHC RBC AUTO-ENTMCNC: 30.4 G/DL (ref 32–36)
MCV RBC AUTO: 86 FL (ref 82–98)
MONOCYTES # BLD AUTO: 0.5 K/UL (ref 0.3–1)
MONOCYTES NFR BLD: 6.7 % (ref 4–15)
NEUTROPHILS # BLD AUTO: 5.1 K/UL (ref 1.8–7.7)
NEUTROPHILS NFR BLD: 71 % (ref 38–73)
NITRITE UR QL STRIP: NEGATIVE
PH UR STRIP: >8 [PH] (ref 5–8)
PLATELET # BLD AUTO: 310 K/UL (ref 150–350)
PMV BLD AUTO: 9.4 FL (ref 9.2–12.9)
POTASSIUM SERPL-SCNC: 3.6 MMOL/L (ref 3.5–5.1)
PROT SERPL-MCNC: 8 G/DL (ref 6–8.4)
PROT UR QL STRIP: NEGATIVE
RBC # BLD AUTO: 4.13 M/UL (ref 4–5.4)
SODIUM SERPL-SCNC: 138 MMOL/L (ref 136–145)
SP GR UR STRIP: 1.01 (ref 1–1.03)
URN SPEC COLLECT METH UR: ABNORMAL
UROBILINOGEN UR STRIP-ACNC: NEGATIVE EU/DL
WBC # BLD AUTO: 7.16 K/UL (ref 3.9–12.7)

## 2019-07-31 PROCEDURE — G0378 HOSPITAL OBSERVATION PER HR: HCPCS

## 2019-07-31 PROCEDURE — 83605 ASSAY OF LACTIC ACID: CPT

## 2019-07-31 PROCEDURE — 63600175 PHARM REV CODE 636 W HCPCS: Performed by: INTERNAL MEDICINE

## 2019-07-31 PROCEDURE — 85025 COMPLETE CBC W/AUTO DIFF WBC: CPT

## 2019-07-31 PROCEDURE — 87186 SC STD MICRODIL/AGAR DIL: CPT

## 2019-07-31 PROCEDURE — 96374 THER/PROPH/DIAG INJ IV PUSH: CPT

## 2019-07-31 PROCEDURE — 87449 NOS EACH ORGANISM AG IA: CPT

## 2019-07-31 PROCEDURE — 81003 URINALYSIS AUTO W/O SCOPE: CPT

## 2019-07-31 PROCEDURE — 96375 TX/PRO/DX INJ NEW DRUG ADDON: CPT

## 2019-07-31 PROCEDURE — 87070 CULTURE OTHR SPECIMN AEROBIC: CPT

## 2019-07-31 PROCEDURE — 87205 SMEAR GRAM STAIN: CPT

## 2019-07-31 PROCEDURE — 25000003 PHARM REV CODE 250: Performed by: EMERGENCY MEDICINE

## 2019-07-31 PROCEDURE — 87077 CULTURE AEROBIC IDENTIFY: CPT

## 2019-07-31 PROCEDURE — 80053 COMPREHEN METABOLIC PANEL: CPT

## 2019-07-31 PROCEDURE — 11000001 HC ACUTE MED/SURG PRIVATE ROOM

## 2019-07-31 PROCEDURE — 99285 EMERGENCY DEPT VISIT HI MDM: CPT | Mod: 25

## 2019-07-31 PROCEDURE — 25000003 PHARM REV CODE 250: Performed by: INTERNAL MEDICINE

## 2019-07-31 PROCEDURE — 63600175 PHARM REV CODE 636 W HCPCS: Performed by: EMERGENCY MEDICINE

## 2019-07-31 PROCEDURE — 96376 TX/PRO/DX INJ SAME DRUG ADON: CPT

## 2019-07-31 PROCEDURE — 87040 BLOOD CULTURE FOR BACTERIA: CPT | Mod: 59

## 2019-07-31 RX ORDER — IPRATROPIUM BROMIDE 0.5 MG/2.5ML
0.5 SOLUTION RESPIRATORY (INHALATION) EVERY 6 HOURS
Status: DISCONTINUED | OUTPATIENT
Start: 2019-08-01 | End: 2019-07-31

## 2019-07-31 RX ORDER — PANTOPRAZOLE SODIUM 40 MG/1
40 TABLET, DELAYED RELEASE ORAL DAILY
Status: DISCONTINUED | OUTPATIENT
Start: 2019-08-01 | End: 2019-08-02 | Stop reason: HOSPADM

## 2019-07-31 RX ORDER — PROCHLORPERAZINE EDISYLATE 5 MG/ML
5 INJECTION INTRAMUSCULAR; INTRAVENOUS EVERY 6 HOURS PRN
Status: DISCONTINUED | OUTPATIENT
Start: 2019-07-31 | End: 2019-08-02 | Stop reason: HOSPADM

## 2019-07-31 RX ORDER — IPRATROPIUM BROMIDE AND ALBUTEROL SULFATE 2.5; .5 MG/3ML; MG/3ML
3 SOLUTION RESPIRATORY (INHALATION)
Status: DISCONTINUED | OUTPATIENT
Start: 2019-07-31 | End: 2019-08-01

## 2019-07-31 RX ORDER — CEFEPIME HYDROCHLORIDE 1 G/50ML
2 INJECTION, SOLUTION INTRAVENOUS
Status: DISCONTINUED | OUTPATIENT
Start: 2019-07-31 | End: 2019-08-01

## 2019-07-31 RX ORDER — GABAPENTIN 300 MG/1
600 CAPSULE ORAL 3 TIMES DAILY
Status: DISCONTINUED | OUTPATIENT
Start: 2019-08-01 | End: 2019-08-02 | Stop reason: HOSPADM

## 2019-07-31 RX ORDER — ACETAMINOPHEN 500 MG
500 TABLET ORAL EVERY 6 HOURS PRN
Status: DISCONTINUED | OUTPATIENT
Start: 2019-07-31 | End: 2019-08-02 | Stop reason: HOSPADM

## 2019-07-31 RX ORDER — CLONIDINE HYDROCHLORIDE 0.1 MG/1
0.1 TABLET ORAL 3 TIMES DAILY PRN
Status: DISCONTINUED | OUTPATIENT
Start: 2019-07-31 | End: 2019-08-02 | Stop reason: HOSPADM

## 2019-07-31 RX ORDER — MIRTAZAPINE 15 MG/1
15 TABLET, FILM COATED ORAL NIGHTLY
Status: DISCONTINUED | OUTPATIENT
Start: 2019-07-31 | End: 2019-08-02 | Stop reason: HOSPADM

## 2019-07-31 RX ORDER — ESCITALOPRAM OXALATE 10 MG/1
10 TABLET ORAL DAILY
Status: DISCONTINUED | OUTPATIENT
Start: 2019-08-01 | End: 2019-08-02 | Stop reason: HOSPADM

## 2019-07-31 RX ORDER — AMOXICILLIN 250 MG
1 CAPSULE ORAL 2 TIMES DAILY PRN
Status: DISCONTINUED | OUTPATIENT
Start: 2019-07-31 | End: 2019-08-02 | Stop reason: HOSPADM

## 2019-07-31 RX ORDER — HYOSCYAMINE SULFATE 0.12 MG/1
0.12 TABLET SUBLINGUAL EVERY 6 HOURS PRN
Status: DISCONTINUED | OUTPATIENT
Start: 2019-08-01 | End: 2019-08-02 | Stop reason: HOSPADM

## 2019-07-31 RX ORDER — RAMELTEON 8 MG/1
8 TABLET ORAL NIGHTLY PRN
Status: DISCONTINUED | OUTPATIENT
Start: 2019-07-31 | End: 2019-08-02 | Stop reason: HOSPADM

## 2019-07-31 RX ORDER — SODIUM CHLORIDE 0.9 % (FLUSH) 0.9 %
10 SYRINGE (ML) INJECTION
Status: DISCONTINUED | OUTPATIENT
Start: 2019-07-31 | End: 2019-07-31

## 2019-07-31 RX ORDER — ONDANSETRON 2 MG/ML
8 INJECTION INTRAMUSCULAR; INTRAVENOUS EVERY 8 HOURS PRN
Status: DISCONTINUED | OUTPATIENT
Start: 2019-07-31 | End: 2019-07-31

## 2019-07-31 RX ORDER — VERAPAMIL HYDROCHLORIDE 180 MG/1
180 TABLET, FILM COATED, EXTENDED RELEASE ORAL 2 TIMES DAILY
Status: DISCONTINUED | OUTPATIENT
Start: 2019-07-31 | End: 2019-08-02 | Stop reason: HOSPADM

## 2019-07-31 RX ADMIN — HYOSCYAMINE SULFATE 0.12 MG: 0.12 TABLET ORAL; SUBLINGUAL at 11:07

## 2019-07-31 RX ADMIN — VERAPAMIL HYDROCHLORIDE 180 MG: 180 TABLET, FILM COATED, EXTENDED RELEASE ORAL at 11:07

## 2019-07-31 RX ADMIN — CEFEPIME HYDROCHLORIDE 2 G: 2 INJECTION, SOLUTION INTRAVENOUS at 11:07

## 2019-07-31 RX ADMIN — MIRTAZAPINE 15 MG: 15 TABLET, FILM COATED ORAL at 10:07

## 2019-07-31 RX ADMIN — APIXABAN 5 MG: 5 TABLET, FILM COATED ORAL at 09:07

## 2019-07-31 RX ADMIN — SODIUM CHLORIDE, SODIUM LACTATE, POTASSIUM CHLORIDE, AND CALCIUM CHLORIDE 1000 ML: .6; .31; .03; .02 INJECTION, SOLUTION INTRAVENOUS at 06:07

## 2019-07-31 RX ADMIN — ONDANSETRON 8 MG: 2 INJECTION INTRAMUSCULAR; INTRAVENOUS at 04:07

## 2019-07-31 RX ADMIN — CEFEPIME HYDROCHLORIDE 2 G: 2 INJECTION, SOLUTION INTRAVENOUS at 04:07

## 2019-07-31 NOTE — ED PROVIDER NOTES
Encounter Date: 7/31/2019       History     Chief Complaint   Patient presents with    Cough     Patient reports that she had a positive sputum culture for psuedmonas. Patient dropped cuture off at lab on 7/27/19, which was ordered by her pulmonologist. Patient reports productive coug, nausea, diarrhea. Denies fevers, chills, nasal congestion, ear pain, sore throat, vomiting. Patient states that the physician called her and told her to go to the ED to get admitted.       68 y.o. female Past Medical History:  No date: Acquired bronchiectasis      Comment:  due to history of TB - followed by pulmonary, Dr. Zuñiga  No date: Allergy  No date: Amblyopia      Comment:  rt eye per pt  No date: Anemia of other chronic disease  No date: Anticoagulant long-term use  No date: Anxiety  No date: Cataract  No date: Chronic obstructive pulmonary disease with acute exacerbation  No date: Clotting disorder  No date: COPD (chronic obstructive pulmonary disease)  No date: Depression  No date: Diverticulosis  No date: Essential tremor  No date: GERD (gastroesophageal reflux disease)  No date: Hemangioma of liver  1978: History of tuberculosis  No date: Hypertension  No date: Mixed anxiety and depressive disorder  No date: Psoriasis  No date: PSVT (paroxysmal supraventricular tachycardia)  No date: Pulmonary embolism  Apr. 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 Given resistances directed to the ED for admission.  Notes she has been feeling poorly and has a productive  cough.    Component 4d ago  Respiratory Culture No S aureus isolated.   Respiratory Culture Abnormal    PSEUDOMONAS AERUGINOSA   Moderate    Respiratory Culture Abnormal    SERRATIA MARCESCENS   Many   Normal respiratory larry also present    Gram Stain (Respiratory) <10 epithelial cells per low power field.   Gram Stain (Respiratory) Rare WBC's   Gram Stain (Respiratory) Moderate Gram negative rods   Gram Stain (Respiratory) Few Gram positive cocci   Resulting Agency [facility]  Susceptibility        Pseudomonas aeruginosa    CULTURE, RESPIRATORY    Amikacin <=16 mcg/mL Sensitive    Cefepime <=8 mcg/mL Sensitive    Ciprofloxacin >2 mcg/mL Resistant    Gentamicin <=4 mcg/mL Sensitive    Piperacillin/Tazo <=16 mcg/mL Sensitive    Tobramycin <=4 mcg/mL Sensitive          Linear View  Susceptibility        Serratia marcescens    CULTURE, RESPIRATORY    Cefepime <=8 mcg/mL Sensitive    Ceftriaxone <=8 mcg/mL Sensitive    Ciprofloxacin <=1 mcg/mL Sensitive    Gentamicin <=4 mcg/mL Sensitive    Levofloxacin <=2 mcg/mL Sensitive    Piperacillin/Tazo <=16 mcg/mL Sensitive    Tobramycin <=4 mcg/mL Sensitive    Trimeth/Sulfa <=2/38 mcg/mL Sensitive          Linear View              Review of patient's allergies indicates:   Allergen Reactions    Adhesive Other (See Comments)     Tears up the skin and makes it itch   (leads)    Bactrim [sulfamethoxazole-trimethoprim] Rash     Was hospitalized for rash    Restasis [cyclosporine]     Lipitor [atorvastatin] Other (See Comments)     Muscle aches    Albuterol Other (See Comments)     tremors    Topamax [topiramate]      - made her feel 'bad in the head'     Past Medical History:   Diagnosis Date    Acquired bronchiectasis     due to history of TB - followed by pulmonaryDr. Zuñiga    Allergy     Amblyopia     rt eye per pt    Anemia of other chronic disease     Anticoagulant long-term use     Anxiety     Cataract     Chronic obstructive pulmonary disease with acute  exacerbation     Clotting disorder     COPD (chronic obstructive pulmonary disease)     Depression     Diverticulosis     Essential tremor     GERD (gastroesophageal reflux disease)     Hemangioma of liver     History of tuberculosis 1978    Hypertension     Mixed anxiety and depressive disorder     Psoriasis     PSVT (paroxysmal supraventricular tachycardia)     Pulmonary embolism     S/P PICC central line placement Apr. 2016 - May 2016    Skin disease     Psoriasis    Spondylosis without myelopathy 8/23/2013    Supraventricular tachycardia     Tachycardia     Thoracic aorta atherosclerosis     noted on CT scan of chest 1/3/2011    Tuberculosis     Vaginal delivery     x2    Vitamin D deficiency      Past Surgical History:   Procedure Laterality Date    ABLATION N/A 7/24/2017    [facility] LAB    BLOCK-NERVE-MEDIAL BRANCH-LUMBAR Bilateral 10/14/2016    [facility]    BLOCK-NERVE-MEDIAL BRANCH-LUMBAR Bilateral 8/26/2016    [facility] at [facility]    CATARACT EXTRACTION W/  INTRAOCULAR LENS IMPLANT  07/24/12    od dr [place]    CATARACT EXTRACTION W/  INTRAOCULAR LENS IMPLANT  08/07/12    left eye    CHOLECYSTECTOMY      COLONOSCOPY N/A 4/23/2013    Performed by [facility] at [facility] ([facility]    EGD (ESOPHAGOGASTRODUODENOSCOPY) N/A 4/23/2013    Per[facility] ([facility])    EYE SURGERY      GALLBLADDER SURGERY  9/2011    HEART CATH-LEFT Left 6/16/2016    [facility]    INJECTION BILATERAL SI JOINT Bilateral 2/20/2019    [facility]    INJECTION-FACET Bilateral 1/22/2016    [facility]    RADIOFREQUENCY ABLATION RIGHT LUMBAR L2,3,4,5 RFA Right 11/28/2018    [facility]     RADIOFREQUENCY THERMAL COAGULATION Left 12/18/2018    [facility]    RADIOFREQUENCY THERMOCOAGULATION ([facility])-NERVE-MEDIAN BRANCH-LUMBAR Right 4/4/2018    [facility] at [facility]    RADIOFREQUENCY THERMOCOAGULATION ([facility])-NERVE-MEDIAN BRANCH-LUMBAR Left 3/16/2018    [facility] at [facility]    RADIOFREQUENCY THERMOCOAGULATION ([facility])-NERVE-MEDIAN BRANCH-LUMBAR Right 12/16/2016    Performed by [facility] at [facility]     Family History   Problem Relation Age of Onset    Hypertension Mother     Heart attack Mother     Cancer Father         liver and bladder    Hyperlipidemia Sister     Psoriasis Sister     Cancer Brother         liver    Stroke Maternal Uncle     Cancer Maternal Aunt         stomach    Lung cancer Sister     Heart attack Brother     Heart attack Son     Amblyopia Neg Hx     Blindness Neg Hx     Cataracts Neg Hx     Glaucoma Neg Hx     Macular degeneration Neg Hx     Retinal detachment Neg Hx     Strabismus Neg Hx     Thyroid disease Neg Hx     Melanoma Neg Hx     Lupus Neg Hx     Eczema Neg Hx     COPD Neg Hx     Colon cancer Neg Hx      Social History     Tobacco Use    Smoking status: Former Smoker     Packs/day: 2.00     Years: 50.00     Pack years: 100.00     Types: Cigarettes     Last attempt to quit: 5/27/2009     Years since quitting: 10.1    Smokeless tobacco: Never Used   Substance Use Topics    Alcohol use: Not Currently     Frequency: Never    Drug use: Never     Review of Systems   Constitutional: Negative for fever.   HENT: Negative for sore throat.    Respiratory: Positive for cough and shortness of breath.    Cardiovascular: Negative for chest pain.   Gastrointestinal: Negative for nausea.   Genitourinary: Negative for dysuria.   Musculoskeletal: Negative for back pain.   Skin: Negative for rash.   Neurological: Negative for weakness.    Hematological: Does not bruise/bleed easily.   All other systems reviewed and are negative.      Physical Exam     Initial Vitals [07/31/19 1452]   BP Pulse Resp Temp SpO2   (!) 130/96 97 18 98.5 °F (36.9 °C) 96 %      MAP       --         Physical Exam    Nursing note and vitals reviewed.  Constitutional: She appears well-developed and well-nourished.   HENT:   Head: Normocephalic and atraumatic.   Eyes: Conjunctivae and EOM are normal. Pupils are equal, round, and reactive to light.   Neck: Normal range of motion.   Cardiovascular: Normal rate and regular rhythm.   Abdominal: She exhibits no distension.   Musculoskeletal: Normal range of motion.   Neurological: She is alert. No cranial nerve deficit. GCS score is 15. GCS eye subscore is 4. GCS verbal subscore is 5. GCS motor subscore is 6.   Skin: Skin is warm and dry.   Psychiatric: She has a normal mood and affect. Thought content normal.     +increased wob,   Wet/rhonchi    ED Course   Procedures  Labs Reviewed   CULTURE, BLOOD   CULTURE, BLOOD   CULTURE, RESPIRATORY   CBC W/ AUTO DIFFERENTIAL   COMPREHENSIVE METABOLIC PANEL   LACTIC ACID, PLASMA   URINALYSIS, REFLEX TO URINE CULTURE          Imaging Results    None          Medical Decision Making:   Initial Assessment:   Pt has resistant infection. I have spoken with pulm Dr. Capps who recommends start cefepime                      Clinical Impression:       ICD-10-CM ICD-9-CM   1. Cough R05 786.2   2. Positive sputum culture for Pseudomonas R84.5 041.7                                Heydi Sheikh MD  07/31/19 2109

## 2019-07-31 NOTE — TELEPHONE ENCOUNTER
Spoke with patient, informed her that I have received her message and will forward it to Dr. Mills to review/advise. Patient verbalizes that she understands.

## 2019-07-31 NOTE — ED TRIAGE NOTES
Pt arrives to er via personal vehicle with family pt seth4. Pt  reports that she had a positive sputum culture for psuedmonas.  . Patient reports productive coug, nausea, diarrhea. Denies fevers, chills, nasal congestion, ear pain, sore throat, vomiting. Patient states that the physician called her and told her to go to the ED to get admitted.

## 2019-07-31 NOTE — CONSULTS
Brief Consult Note    Yasmin Asencio is a 68 y.o. female PMH bronchiectasis with pseudomonas colonization. Presents with productive cough, green sputum, worsening SOB, has not been eating or drinking for last few days.  Has been have frequent watery stools.  Has failed outpatient therapy with a course of Augmentin and azithro.  Sputum cultures with Pseudomonas without oral options.    Assessment:  1. Exacerbation of bronchiectasis 2/2 pseudomonal overgrowth  2. Hypovolemia   3. Diarrhea-at risk for C. Diff      Plan:   1. C/w Cefepime  2. Nebs, okay to use home Anoro  3. Check C. Diff  4. ID consult to assist with setting up home IV abx   5. IVF    Full consult note to follow    Sudeep Holley MD  Department of Pulmonary & Critical Care  Cell: [phone]

## 2019-07-31 NOTE — TELEPHONE ENCOUNTER
----- Message from Rowan Saha MA sent at 7/31/2019 10:10 AM CDT -----  Contact: self/[phone]  Pt is calling to get her results from her test and would like to speak with the Doctor.

## 2019-08-01 PROBLEM — J47.1 BRONCHIECTASIS WITH (ACUTE) EXACERBATION: Status: ACTIVE | Noted: 2017-11-29

## 2019-08-01 PROBLEM — R05.9 COUGH: Status: ACTIVE | Noted: 2019-08-01

## 2019-08-01 PROBLEM — R19.7 DIARRHEA OF PRESUMED INFECTIOUS ORIGIN: Status: ACTIVE | Noted: 2019-08-01

## 2019-08-01 LAB
ALBUMIN SERPL BCP-MCNC: 3.8 G/DL (ref 3.5–5.2)
ALP SERPL-CCNC: 59 U/L (ref 55–135)
ALT SERPL W/O P-5'-P-CCNC: 10 U/L (ref 10–44)
ANION GAP SERPL CALC-SCNC: 10 MMOL/L (ref 8–16)
AST SERPL-CCNC: 14 U/L (ref 10–40)
BASOPHILS # BLD AUTO: 0.01 K/UL (ref 0–0.2)
BASOPHILS NFR BLD: 0.2 % (ref 0–1.9)
BILIRUB SERPL-MCNC: 0.5 MG/DL (ref 0.1–1)
BUN SERPL-MCNC: 16 MG/DL (ref 8–23)
C DIFF GDH STL QL: NEGATIVE
C DIFF TOX A+B STL QL IA: NEGATIVE
CALCIUM SERPL-MCNC: 9.4 MG/DL (ref 8.7–10.5)
CHLORIDE SERPL-SCNC: 100 MMOL/L (ref 95–110)
CO2 SERPL-SCNC: 30 MMOL/L (ref 23–29)
CREAT SERPL-MCNC: 1.2 MG/DL (ref 0.5–1.4)
DIFFERENTIAL METHOD: ABNORMAL
EOSINOPHIL # BLD AUTO: 0.1 K/UL (ref 0–0.5)
EOSINOPHIL NFR BLD: 1.5 % (ref 0–8)
ERYTHROCYTE [DISTWIDTH] IN BLOOD BY AUTOMATED COUNT: 14.3 % (ref 11.5–14.5)
EST. GFR  (AFRICAN AMERICAN): 53 ML/MIN/1.73 M^2
EST. GFR  (NON AFRICAN AMERICAN): 46 ML/MIN/1.73 M^2
GLUCOSE SERPL-MCNC: 94 MG/DL (ref 70–110)
HCT VFR BLD AUTO: 33.7 % (ref 37–48.5)
HGB BLD-MCNC: 10.4 G/DL (ref 12–16)
LYMPHOCYTES # BLD AUTO: 0.8 K/UL (ref 1–4.8)
LYMPHOCYTES NFR BLD: 14.6 % (ref 18–48)
MAGNESIUM SERPL-MCNC: 2 MG/DL (ref 1.6–2.6)
MCH RBC QN AUTO: 26.3 PG (ref 27–31)
MCHC RBC AUTO-ENTMCNC: 30.9 G/DL (ref 32–36)
MCV RBC AUTO: 85 FL (ref 82–98)
MONOCYTES # BLD AUTO: 0.7 K/UL (ref 0.3–1)
MONOCYTES NFR BLD: 12.6 % (ref 4–15)
NEUTROPHILS # BLD AUTO: 3.7 K/UL (ref 1.8–7.7)
NEUTROPHILS NFR BLD: 71.3 % (ref 38–73)
PHOSPHATE SERPL-MCNC: 3.6 MG/DL (ref 2.7–4.5)
PLATELET # BLD AUTO: 226 K/UL (ref 150–350)
PMV BLD AUTO: 9.3 FL (ref 9.2–12.9)
POTASSIUM SERPL-SCNC: 3 MMOL/L (ref 3.5–5.1)
PROT SERPL-MCNC: 7.3 G/DL (ref 6–8.4)
RBC # BLD AUTO: 3.96 M/UL (ref 4–5.4)
SODIUM SERPL-SCNC: 140 MMOL/L (ref 136–145)
WBC # BLD AUTO: 5.22 K/UL (ref 3.9–12.7)

## 2019-08-01 PROCEDURE — 36569 INSJ PICC 5 YR+ W/O IMAGING: CPT

## 2019-08-01 PROCEDURE — 94664 DEMO&/EVAL PT USE INHALER: CPT | Mod: 59

## 2019-08-01 PROCEDURE — 27000646 HC AEROBIKA DEVICE

## 2019-08-01 PROCEDURE — 83735 ASSAY OF MAGNESIUM: CPT

## 2019-08-01 PROCEDURE — 25000003 PHARM REV CODE 250: Performed by: INTERNAL MEDICINE

## 2019-08-01 PROCEDURE — 25000003 PHARM REV CODE 250: Performed by: HOSPITALIST

## 2019-08-01 PROCEDURE — 99223 PR INITIAL HOSPITAL CARE,LEVL III: ICD-10-PCS | Mod: ,,, | Performed by: PHYSICIAN ASSISTANT

## 2019-08-01 PROCEDURE — 94761 N-INVAS EAR/PLS OXIMETRY MLT: CPT

## 2019-08-01 PROCEDURE — A4216 STERILE WATER/SALINE, 10 ML: HCPCS | Performed by: HOSPITALIST

## 2019-08-01 PROCEDURE — 94640 AIRWAY INHALATION TREATMENT: CPT

## 2019-08-01 PROCEDURE — 63600175 PHARM REV CODE 636 W HCPCS: Performed by: PHYSICIAN ASSISTANT

## 2019-08-01 PROCEDURE — 84100 ASSAY OF PHOSPHORUS: CPT

## 2019-08-01 PROCEDURE — 63600175 PHARM REV CODE 636 W HCPCS: Performed by: INTERNAL MEDICINE

## 2019-08-01 PROCEDURE — G0378 HOSPITAL OBSERVATION PER HR: HCPCS

## 2019-08-01 PROCEDURE — 96375 TX/PRO/DX INJ NEW DRUG ADDON: CPT

## 2019-08-01 PROCEDURE — 99900035 HC TECH TIME PER 15 MIN (STAT)

## 2019-08-01 PROCEDURE — 99223 1ST HOSP IP/OBS HIGH 75: CPT | Mod: ,,, | Performed by: PHYSICIAN ASSISTANT

## 2019-08-01 PROCEDURE — 99215 OFFICE O/P EST HI 40 MIN: CPT | Mod: ,,, | Performed by: INTERNAL MEDICINE

## 2019-08-01 PROCEDURE — 36415 COLL VENOUS BLD VENIPUNCTURE: CPT

## 2019-08-01 PROCEDURE — 85025 COMPLETE CBC W/AUTO DIFF WBC: CPT

## 2019-08-01 PROCEDURE — 99215 PR OFFICE/OUTPT VISIT, EST, LEVL V, 40-54 MIN: ICD-10-PCS | Mod: ,,, | Performed by: INTERNAL MEDICINE

## 2019-08-01 PROCEDURE — 96376 TX/PRO/DX INJ SAME DRUG ADON: CPT

## 2019-08-01 PROCEDURE — 36573 INSJ PICC RS&I 5 YR+: CPT

## 2019-08-01 PROCEDURE — 80053 COMPREHEN METABOLIC PANEL: CPT

## 2019-08-01 PROCEDURE — 25000242 PHARM REV CODE 250 ALT 637 W/ HCPCS: Performed by: INTERNAL MEDICINE

## 2019-08-01 RX ORDER — LOPERAMIDE HYDROCHLORIDE 2 MG/1
2 CAPSULE ORAL 4 TIMES DAILY PRN
Status: DISCONTINUED | OUTPATIENT
Start: 2019-08-01 | End: 2019-08-02 | Stop reason: HOSPADM

## 2019-08-01 RX ORDER — SODIUM CHLORIDE FOR INHALATION 3 %
4 VIAL, NEBULIZER (ML) INHALATION
Status: DISCONTINUED | OUTPATIENT
Start: 2019-08-01 | End: 2019-08-02 | Stop reason: HOSPADM

## 2019-08-01 RX ORDER — POTASSIUM CHLORIDE 20 MEQ/15ML
40 SOLUTION ORAL ONCE
Status: COMPLETED | OUTPATIENT
Start: 2019-08-01 | End: 2019-08-01

## 2019-08-01 RX ORDER — SODIUM CHLORIDE 0.9 % (FLUSH) 0.9 %
10 SYRINGE (ML) INJECTION
Status: DISCONTINUED | OUTPATIENT
Start: 2019-08-01 | End: 2019-08-02 | Stop reason: HOSPADM

## 2019-08-01 RX ORDER — IPRATROPIUM BROMIDE 0.5 MG/2.5ML
0.5 SOLUTION RESPIRATORY (INHALATION) 3 TIMES DAILY
Status: DISCONTINUED | OUTPATIENT
Start: 2019-08-01 | End: 2019-08-02 | Stop reason: HOSPADM

## 2019-08-01 RX ORDER — SODIUM CHLORIDE 0.9 % (FLUSH) 0.9 %
10 SYRINGE (ML) INJECTION EVERY 6 HOURS
Status: DISCONTINUED | OUTPATIENT
Start: 2019-08-01 | End: 2019-08-02 | Stop reason: HOSPADM

## 2019-08-01 RX ORDER — CEFEPIME HYDROCHLORIDE 1 G/50ML
2 INJECTION, SOLUTION INTRAVENOUS
Status: DISCONTINUED | OUTPATIENT
Start: 2019-08-01 | End: 2019-08-02 | Stop reason: HOSPADM

## 2019-08-01 RX ADMIN — PANTOPRAZOLE SODIUM 40 MG: 40 TABLET, DELAYED RELEASE ORAL at 09:08

## 2019-08-01 RX ADMIN — POTASSIUM CHLORIDE 40 MEQ: 20 SOLUTION ORAL at 11:08

## 2019-08-01 RX ADMIN — Medication 10 ML: at 11:08

## 2019-08-01 RX ADMIN — PROMETHAZINE HYDROCHLORIDE 6.25 MG: 25 INJECTION INTRAMUSCULAR; INTRAVENOUS at 07:08

## 2019-08-01 RX ADMIN — APIXABAN 5 MG: 5 TABLET, FILM COATED ORAL at 10:08

## 2019-08-01 RX ADMIN — CEFEPIME HYDROCHLORIDE 2 G: 2 INJECTION, SOLUTION INTRAVENOUS at 11:08

## 2019-08-01 RX ADMIN — GABAPENTIN 600 MG: 300 CAPSULE ORAL at 09:08

## 2019-08-01 RX ADMIN — ESCITALOPRAM OXALATE 10 MG: 10 TABLET ORAL at 09:08

## 2019-08-01 RX ADMIN — SODIUM CHLORIDE 30 MG/ML INHALATION SOLUTION 4 ML: 30 SOLUTION INHALANT at 03:08

## 2019-08-01 RX ADMIN — VERAPAMIL HYDROCHLORIDE 180 MG: 180 TABLET, FILM COATED, EXTENDED RELEASE ORAL at 09:08

## 2019-08-01 RX ADMIN — MIRTAZAPINE 15 MG: 15 TABLET, FILM COATED ORAL at 10:08

## 2019-08-01 RX ADMIN — HYOSCYAMINE SULFATE 0.12 MG: 0.12 TABLET ORAL; SUBLINGUAL at 09:08

## 2019-08-01 RX ADMIN — PROCHLORPERAZINE EDISYLATE 5 MG: 5 INJECTION INTRAMUSCULAR; INTRAVENOUS at 04:08

## 2019-08-01 RX ADMIN — TRAZODONE HYDROCHLORIDE 25 MG: 50 TABLET ORAL at 10:08

## 2019-08-01 RX ADMIN — GABAPENTIN 600 MG: 300 CAPSULE ORAL at 10:08

## 2019-08-01 RX ADMIN — CHLORTHALIDONE 12.5 MG: 25 TABLET ORAL at 09:08

## 2019-08-01 RX ADMIN — Medication 10 ML: at 10:08

## 2019-08-01 RX ADMIN — ACETAMINOPHEN 500 MG: 500 TABLET ORAL at 10:08

## 2019-08-01 RX ADMIN — GABAPENTIN 600 MG: 300 CAPSULE ORAL at 03:08

## 2019-08-01 RX ADMIN — ACETAMINOPHEN 500 MG: 500 TABLET ORAL at 01:08

## 2019-08-01 RX ADMIN — LOPERAMIDE HYDROCHLORIDE 2 MG: 2 CAPSULE ORAL at 04:08

## 2019-08-01 RX ADMIN — VERAPAMIL HYDROCHLORIDE 180 MG: 180 TABLET, FILM COATED, EXTENDED RELEASE ORAL at 10:08

## 2019-08-01 RX ADMIN — CEFEPIME HYDROCHLORIDE 2 G: 2 INJECTION, SOLUTION INTRAVENOUS at 09:08

## 2019-08-01 RX ADMIN — IPRATROPIUM BROMIDE 0.5 MG: 0.5 SOLUTION RESPIRATORY (INHALATION) at 08:08

## 2019-08-01 RX ADMIN — IPRATROPIUM BROMIDE 0.5 MG: 0.5 SOLUTION RESPIRATORY (INHALATION) at 03:08

## 2019-08-01 RX ADMIN — APIXABAN 5 MG: 5 TABLET, FILM COATED ORAL at 09:08

## 2019-08-01 NOTE — HOSPITAL COURSE
Pt admitted with positive sputum culture - Pseudomonas, only sensitive to IV therapy. ID consulted. picc line placement and started on cefepime. C.diff work up pending.     C.diff negative  picc line placed late yesterday. Await insurance authorization for IV abx.  Appreciate ID recs - will plan to continue abx until 8/14/2019. Follow up PCP, pulmonology and ID as scheduled. Stable for dc home today.

## 2019-08-01 NOTE — HPI
Mrs. Yasmin Asencio is a 68 y.o. female known to me with essential hypertension, COPD, GERD, depression, history ERNIE, history of PSVT s/p radiofrequency ablation, and history of pulmonary embolism on chronic anticoagulation who presents to [facility] ED with complaints of positive sputum culture today.  She has chronic bronchiectasis and is familiar when she starts to feel acute exacerbation.  Two weeks ago she started to feel chest congestion, dyspnea, and a cough that was occasionally productive of clear sputum.  She has as needed Augmentin available to her from her pulmonologist and started to take a course of it.  She did not feel any better but when she went to her gastroenterologist she had recommended that her pulmonologist be called.  Her pulmonologist decided to try her on a course of azithromycin also without improvement.  She denies any fevers fevers or chills, but did have nausea without vomiting.  She denies any chest pain, palpitations, diaphoresis, hemoptysis, nor any lower extremity pain or swelling. She has not had any sick contacts or recent travel.      Of note, she does report diarrhea that started after her antibiotics.  She reports that they are watery loose but denies any hematochezia nor melena.  She does have abdominal cramping.

## 2019-08-01 NOTE — CONSULTS
[facility]  Infectious Disease  Consult Note    Patient Name: Yasmin Asencio  MRN: [id number]  Admission Date: 7/31/2019  Hospital Length of Stay: 1 days  Attending Physician: Dina Mckeon MD  Primary Care Provider: Urmila Luna MD     Isolation Status: No active isolations    Patient information was obtained from patient and past medical records.      Inpatient consult to Infectious Diseases  Consult performed by: KURT Suarez  Consult ordered by: Aden Tinoco MD        Assessment/Plan:     * Positive sputum culture for Pseudomonas  68 year old female with chronic bronchiectasis and history of colonization with pseudomonas (follows with Dr. Yuan in ID clinic) presents with worsening cough, dyspnea, and SOB. Sputum culture obtained prior to admission positive for pseudomonas (cipro R) and pan-sensitive serratia. Pt is currently on cefepime. Of note, she took both augmentin then azithromycin prior to admission and she is now having watery diarrhea with abdominal cramping. c diff negative. ID consulted for further recs.     Pt is afebrile and without leukocytosis. She is not in respiratory distress. pulm following    Plan:  1. Continue cefepime 2 gram IV q 12 hours (renally dosed) for a total of 14 days (end date: 8/14/19)  2. okay to give anti-diarrheals given negative c diff  3. Will schedule f/u with ID at end of therapy  4. Needs f/u with pulmonology as well  5. Will sign off        Thank you for your consult. I will sign off. Please contact us if you have any additional questions.    KURT Huang Pager: [phone]    Infectious Disease  [facility]    Subjective:     Principal Problem: Positive sputum culture for Pseudomonas    HPI: This is a 68 year old female with PMH of chronic bronchiectasis (with history of pseudomonas pneumonia) essential hypertension, COPD, GERD, depression, history ERNIE, history of PSVT s/p radiofrequency ablation, and history of  pulmonary embolism on chronic anticoagulation who presents to [facility] ED with complaints of positive sputum culture today. Two weeks ago she started to feel chest congestion, dyspnea, and a cough that was occasionally productive of clear sputum.  She  took Augmentin available to her from her pulmonologist. She did not improved so her pulmonologist decided to try her on a course of azithromycin also without improvement.  She denies any fevers fevers or chills.  She denies any chest pain, palpitations, diaphoresis, hemoptysis, nor any lower extremity pain or swelling. She has not had any sick contacts or recent travel.       Of note, she does report diarrhea that started after her antibiotics.  She reports that they are loose with associated abdominal cramping. Denies history of c difficile.     ID consulted for recs. Sputum culture positive for pseudomonas (cipro-R) and serratia (pan-sensitive). She is currently on cefepime. C diff is pending. She reports 3 episodes of watery diarrhea last night and 1 this morning. Denies urinary symptoms.     Past Medical History:   Diagnosis Date    Acquired bronchiectasis     due to history of TB - followed by Dr. Zara rahman    Allergy     Amblyopia     rt eye per pt    Anemia of other chronic disease     Anticoagulant long-term use     Anxiety     Cataract     Chronic obstructive pulmonary disease with acute exacerbation     Clotting disorder     COPD (chronic obstructive pulmonary disease)     Depression     Diverticulosis     Essential tremor     GERD (gastroesophageal reflux disease)     Hemangioma of liver     History of tuberculosis 1978    Hypertension     Mixed anxiety and depressive disorder     On home oxygen therapy     Psoriasis     PSVT (paroxysmal supraventricular tachycardia)     Pulmonary embolism     S/P PICC central line placement Apr. 2016 - May 2016    Skin disease     Psoriasis    Spondylosis without myelopathy 8/23/2013     Supraventricular tachycardia     Tachycardia     Thoracic aorta atherosclerosis     noted on CT scan of chest 1/3/2011    Tuberculosis     Vaginal delivery     x2    Vitamin D deficiency        Past Surgical History:   Procedure Laterality Date    ABLATION N/A 7/24/2017    Performed by [facility]lon [facility]os [facility] at [facility] CATH LAB    BLOCK-NERVE-MEDIAL BRANCH-LUMBAR Bilateral 10/14/2016    [facility] at [facility]    BLOCK-NERVE-MEDIAL BRANCH-LUMBAR Bilateral 8/26/2016    [facility] at [facility]    CATARACT EXTRACTION W/  INTRAOCULAR LENS IMPLANT  07/24/12    od dr [place]    CATARACT EXTRACTION W/  INTRAOCULAR LENS IMPLANT  08/07/12    left eye    CHOLECYSTECTOMY      COLONOSCOPY N/A 4/23/2013    Performed by Simeon Graves MD at [facility] ENDO (4TH FLR)    EGD (ESOPHAGOGASTRODUODENOSCOPY) N/A 4/23/2013    Performed by Simeon Graves MD at [facility] ENDO (4TH FLR)    EYE SURGERY      GALLBLADDER SURGERY      HEART CATH-LEFT Left 6/16/2016    Performed by [facility]aurus Braga [facility] at [facility] CATH LAB    INJECTION BILATERAL SI JOINT Bilateral 2/20/2019    [facility] at [facility]    INJECTION-FACET Bilateral 1/22/2016    Performed [facility] at [facility]    RADIOFREQUENCY ABLATION RIGHT LUMBAR L2,3,4,5 RFA Right 11/28/2018    [facility]formed [facility] at [facility]    RADIOFREQUENCY THERMAL COAGULATION Left 12/18/2018    Performed by [facility]ers [facility] at [facility]    RADIOFREQUENCY THERMOCOAGULATION (RFTC)-NERVE-MEDIAN BRANCH-LUMBAR Right 4/4/2018    Performed by Issac Meyers [facility] at [facility]    RADIOFREQUENCY THERMOCOAGULATION (RFTC)-NERVE-MEDIAN BRANCH-LUMBAR Left 3/16/2018    [facility]formed by [facility]    RADIOFREQUENCY THERMOCOAGULATION (RFTC)-NERVE-MEDIAN BRANCH-LUMBAR Right 12/16/2016    Performed by [facility]ers [facility] at [facility]        Review of patient's allergies indicates:   Allergen Reactions    Adhesive Other (See Comments)     Tears up the skin and makes it itch   (leads)    Bactrim [sulfamethoxazole-trimethoprim] Rash     Was hospitalized for rash    Restasis [cyclosporine]     Lipitor [atorvastatin] Other (See Comments)     Muscle aches    Albuterol Other (See Comments)     tremors    Topamax [topiramate]      - made her feel 'bad in the head'       Medications:  Medications Prior to Admission   Medication Sig    acetaminophen (TYLENOL) 500 MG tablet Take 1,000 mg by mouth once daily. sleep    apixaban (ELIQUIS) 5 mg Tab Take 1 tablet (5 mg total) by mouth 2 (two) times daily.    chlorthalidone (HYGROTEN) 25 MG Tab Take 12.5 mg by mouth once daily.    desoximetasone (TOPICORT) 0.25 % cream APPLY  CREAM EXTERNALLY TWICE DAILY AS NEEDED    ergocalciferol (ERGOCALCIFEROL) 50,000 unit Cap TAKE 1 CAPSULE BY MOUTH ONCE A WEEK    escitalopram oxalate (LEXAPRO) 10 MG tablet TAKE ONE TABLET BY MOUTH ONCE DAILY    folic acid (FOLVITE) 1 MG tablet TAKE 1 TABLET BY MOUTH ONCE DAILY    gabapentin (NEURONTIN) 300 MG capsule TAKE 2 CAPSULES BY MOUTH THREE TIMES DAILY    guaiFENesin (MUCINEX) 600 mg 12 hr tablet Take 1,200 mg by mouth 2 (two) times daily.    hyoscyamine (ANASPAZ,LEVSIN) 0.125 mg Tab Take 1 tablet (125 mcg total) by mouth every 6 (six) hours as needed.    ipratropium (ATROVENT) 0.02 % nebulizer solution Inhale 1 vial in nebulizer 3 to 4 times daily    ketoconazole (NIZORAL) 2 % shampoo Apply topically once daily.    Lactobacillus rhamnosus GG (CULTURELLE) 10 billion cell capsule Take 1 capsule by mouth once daily.    mirtazapine (REMERON) 15 MG tablet Take 1 tablet (15 mg total) by mouth every evening.    multivitamin (THERAGRAN) per tablet Take 1 tablet by mouth once daily.    omeprazole (PRILOSEC) 20 MG capsule TAKE 1 CAPSULE BY MOUTH ONCE DAILY AS NEEDED FOR  HEARTBURN  (TAKE  AS  NEEDED)    ondansetron  (ZOFRAN-ODT) 8 MG TbDL Take 1 tablet (8 mg total) by mouth every 8 (eight) hours as needed.    sodium chloride 2% (XIOMARA 128) 2 % ophthalmic solution Place 1 drop into both eyes 3 (three) times daily as needed.    sodium chloride 3% 3 % nebulizer solution Take 4 mLs by nebulization 2 (two) times daily.    traMADol (ULTRAM) 50 mg tablet Take 1 tablet (50 mg total) by mouth every 6 (six) hours as needed for Pain.    umeclidinium-vilanterol (ANORO ELLIPTA) 62.5-25 mcg/actuation DsDv Inhale 1 puff into the lungs once daily. Controller    verapamil (VERELAN) 180 MG C24P Take 1 capsule (180 mg total) by mouth 2 (two) times daily.    alprazolam (XANAX) 0.25 MG tablet Take 1 tablet (0.25 mg total) by mouth nightly as needed for Anxiety.    azithromycin (ZITHROMAX Z-AMINATA) 250 MG tablet 2 tabs by mouth on day 1 then 1 tab by mouth on day 2-5    fluocinolone (DERMA-SMOOTHE) 0.01 % external oil Apply topically once daily. (Patient taking differently: Apply topically daily as needed. )    fluocinonide (LIDEX) 0.05 % external solution Apply topically once daily. - apply after shampooing (Patient taking differently: Apply topically once daily. - apply after shampooing as needed)    propylene glycol (SYSTANE BALANCE) 0.6 % Drop Apply 1 drop to eye daily as needed (dry eye).     Antibiotics (From admission, onward)    Start     Stop Route Frequency Ordered    08/01/19 2124  cefepime in dextrose 5 % IVPB 2 g      -- IV Every 12 hours (non-standard times) 08/01/19 0950        Antifungals (From admission, onward)    None        Antivirals (From admission, onward)    None           Immunization History   Administered Date(s) Administered    Influenza 10/06/2009, 10/27/2010, 09/21/2011, 01/07/2013, 12/11/2013, 10/06/2014    Influenza - High Dose 10/03/2016, 10/09/2017, 09/25/2018    Influenza - Trivalent (ADULT) 10/06/2014    Influenza A (H1N1) 2009 Monovalent - IM 01/12/2010    Influenza Split 01/07/2013, 12/11/2013    PPD  Test 2015    Pneumococcal Conjugate 2008    Pneumococcal Conjugate - 13 Valent 2008, 10/19/2015    Pneumococcal Polysaccharide - 23 Valent 2015    Tdap 2009       Family History     Problem Relation (Age of Onset)    Cancer Father, Brother, Maternal Aunt    Heart attack Mother, Brother, Son    Hyperlipidemia Sister    Hypertension Mother    Lung cancer Sister    Psoriasis Sister    Stroke Maternal Uncle        Social History     Socioeconomic History    Marital status:      Spouse name: Not on file    Number of children: 2    Years of education: Not on file    Highest education level: Not on file   Occupational History    Occupation: housewife   Social Needs    Financial resource strain: Not on file    Food insecurity:     Worry: Not on file     Inability: Not on file    Transportation needs:     Medical: Not on file     Non-medical: Not on file   Tobacco Use    Smoking status: Former Smoker     Packs/day: 2.00     Years: 50.00     Pack years: 100.00     Types: Cigarettes     Last attempt to quit: 2009     Years since quitting: 10.1    Smokeless tobacco: Never Used   Substance and Sexual Activity    Alcohol use: Not Currently     Frequency: Never    Drug use: Never    Sexual activity: Yes     Partners: Male   Lifestyle    Physical activity:     Days per week: Not on file     Minutes per session: Not on file    Stress: Not on file   Relationships    Social connections:     Talks on phone: Not on file     Gets together: Not on file     Attends Taoist service: Not on file     Active member of club or organization: Not on file     Attends meetings of clubs or organizations: Not on file     Relationship status: Not on file   Other Topics Concern    Are you pregnant or think you may be? No    Breast-feeding No   Social History Narrative    ** Merged History Encounter **         One child  of a heart attack at age 35.     Review of Systems    Constitutional: Positive for appetite change and fatigue. Negative for chills and fever.   Respiratory: Positive for cough and shortness of breath.    Cardiovascular: Negative for chest pain and leg swelling.   Gastrointestinal: Positive for abdominal pain (cramping) and diarrhea. Negative for constipation, nausea and vomiting.   Genitourinary: Negative for dysuria, frequency and hematuria.   Musculoskeletal: Negative for back pain and myalgias.   Skin: Negative for rash and wound.   Neurological: Negative for dizziness, weakness, light-headedness, numbness and headaches.   Psychiatric/Behavioral: Negative for agitation and behavioral problems. The patient is not nervous/anxious.      Objective:     Vital Signs (Most Recent):  Temp: 98 °F (36.7 °C) (08/01/19 0738)  Pulse: 83 (08/01/19 0738)  Resp: 18 (08/01/19 0738)  BP: (!) 123/57 (08/01/19 0738)  SpO2: (!) 93 % (08/01/19 0738) Vital Signs (24h Range):  Temp:  [97.9 °F (36.6 °C)-98.5 °F (36.9 °C)] 98 °F (36.7 °C)  Pulse:  [82-97] 83  Resp:  [18-20] 18  SpO2:  [92 %-98 %] 93 %  BP: (120-171)/(57-96) 123/57     Weight: 52.2 kg (115 lb)  Body mass index is 21.03 kg/m².    Estimated Creatinine Clearance: 35 mL/min (based on SCr of 1.2 mg/dL).    Physical Exam   Constitutional: She is oriented to person, place, and time. She appears well-developed and well-nourished. No distress.   HENT:   Head: Normocephalic and atraumatic.   Neck: Normal range of motion.   Cardiovascular: Normal rate, regular rhythm, normal heart sounds and intact distal pulses. Exam reveals no gallop and no friction rub.   No murmur heard.  Pulmonary/Chest: No tachypnea. No respiratory distress. She has no wheezes.   Bibasilar crackles   Abdominal: Soft. Bowel sounds are normal. She exhibits no distension. There is no tenderness. There is no rebound and no guarding.   Musculoskeletal: Normal range of motion. She exhibits no edema.   Neurological: She is alert and oriented to person, place, and  time.   Skin: Skin is warm and dry. She is not diaphoretic. No erythema.   Psychiatric: She has a normal mood and affect. Her behavior is normal. Judgment and thought content normal.   Nursing note and vitals reviewed.      Significant Labs:   Blood Culture:   Recent Labs   Lab 07/31/19  1604 07/31/19  1607   LABBLOO No Growth to date No Growth to date     CBC:   Recent Labs   Lab 07/31/19  1513 08/01/19  0502   WBC 7.16 5.22   HGB 10.8* 10.4*   HCT 35.5* 33.7*    226     CMP:   Recent Labs   Lab 07/31/19  1513 08/01/19  0502    140   K 3.6 3.0*   CL 99 100   CO2 29 30*    94   BUN 15 16   CREATININE 1.4 1.2   CALCIUM 9.9 9.4   PROT 8.0 7.3   ALBUMIN 4.1 3.8   BILITOT 0.5 0.5   ALKPHOS 69 59   AST 15 14   ALT 11 10   ANIONGAP 10 10   EGFRNONAA 38* 46*     Respiratory Culture:   Recent Labs   Lab 07/27/19  1128 07/31/19  1618   GSRESP <10 epithelial cells per low power field.  Rare WBC's  Moderate Gram negative rods  Few Gram positive cocci <10 epithelial cells per low power field.  Few WBC's  Few Gram positive cocci in pairs  Rare Gram negative rods   RESPIRATORYC No S aureus isolated.  PSEUDOMONAS AERUGINOSA  Moderate  *  SERRATIA MARCESCENS  Many  Normal respiratory larry also present  *  --        Significant Imaging: I have reviewed all pertinent imaging results/findings within the past 24 hours.

## 2019-08-01 NOTE — HPI
This is a 68 year old female with PMH of chronic bronchiectasis (with history of pseudomonas pneumonia) essential hypertension, COPD, GERD, depression, history ERNIE, history of PSVT s/p radiofrequency ablation, and history of pulmonary embolism on chronic anticoagulation who presents to [facility] ED with complaints of positive sputum culture today. Two weeks ago she started to feel chest congestion, dyspnea, and a cough that was occasionally productive of clear sputum.  She took Augmentin available to her from her pulmonologist. She did not improved so her pulmonologist decided to try her on a course of azithromycin also without improvement.  She denies any fevers fevers or chills.  She denies any chest pain, palpitations, diaphoresis, hemoptysis, nor any lower extremity pain or swelling. She has not had any sick contacts or recent travel.       Of note, she does report diarrhea that started after her antibiotics.  She reports that they are loose with associated abdominal cramping. Denies history of c difficile.     ID consulted for recs. Sputum culture positive for pseudomonas (cipro-R) and serratia (pan-sensitive). She is currently on cefepime. C diff is pending. She reports 3 episodes of watery diarrhea last night and 1 this morning. Denies urinary symptoms.

## 2019-08-01 NOTE — PROGRESS NOTES
Nursing Notes  19:30 pm Report received from morning nurse.      21:09 Physician notified about patient's hypertension and stomach discomfort.  Awaiting new orders.      22.53 Patient medications given.      11:34 pm Physician in to see patient.  Patient mediations given.  Patient resting in bed in low and locked position.      01:04 a.m. Patient given tylenol for headache.  Will continue to monitor.      04:47 am Patient given Prochlorperazine for nausea.  Will continue to monitor.      07:12 am Report given to morning nurse.  Patient resting in bed in low and locked position.        Huddle Comments

## 2019-08-01 NOTE — PROGRESS NOTES
[facility] Hospital Medicine  Progress Note    Patient Name: Yasmin Asencio  MRN: [id number]  Patient Class: IP- Inpatient   Admission Date: 7/31/2019  Length of Stay: 1 days  Attending Physician: Dina Mckeon MD  Primary Care Provider: Urmila Luna MD        Subjective:     Principal Problem:Positive sputum culture for Pseudomonas      HPI:  Mrs. Yasmin Asencio is a 68 y.o. female known to me with essential hypertension, COPD, GERD, depression, history ERNIE, history of PSVT s/p radiofrequency ablation, and history of pulmonary embolism on chronic anticoagulation who presents to [facility] ED with complaints of positive sputum culture today.  She has chronic bronchiectasis and is familiar when she starts to feel acute exacerbation.  Two weeks ago she started to feel chest congestion, dyspnea, and a cough that was occasionally productive of clear sputum.  She has as needed Augmentin available to her from her pulmonologist and started to take a course of it.  She did not feel any better but when she went to her gastroenterologist she had recommended that her pulmonologist be called.  Her pulmonologist decided to try her on a course of azithromycin also without improvement.  She denies any fevers fevers or chills, but did have nausea without vomiting.  She denies any chest pain, palpitations, diaphoresis, hemoptysis, nor any lower extremity pain or swelling. She has not had any sick contacts or recent travel.      Of note, she does report diarrhea that started after her antibiotics.  She reports that they are watery loose but denies any hematochezia nor melena.  She does have abdominal cramping.    Overview/Hospital Course:  Pt admitted with positive sputum culture - Pseudomonas, only sensitive to IV therapy. ID consulted. picc line placement and started on cefepime. C.diff work up pending.     Interval History: pt c/o loose stools    Review of Systems   Constitutional: Positive for  appetite change and fatigue. Negative for chills and fever.   Respiratory: Positive for cough and shortness of breath.    Cardiovascular: Negative for chest pain and leg swelling.   Gastrointestinal: Positive for abdominal pain (cramping) and diarrhea. Negative for constipation, nausea and vomiting.   Genitourinary: Negative for dysuria, frequency and hematuria.   Musculoskeletal: Negative for back pain and myalgias.   Skin: Negative for rash and wound.   Neurological: Negative for dizziness, weakness, light-headedness, numbness and headaches.   Psychiatric/Behavioral: Negative for agitation and behavioral problems. The patient is not nervous/anxious.      Objective:     Vital Signs (Most Recent):  Temp: 98 °F (36.7 °C) (08/01/19 0738)  Pulse: 83 (08/01/19 0738)  Resp: 18 (08/01/19 0738)  BP: (!) 123/57 (08/01/19 0738)  SpO2: (!) 93 % (08/01/19 0738) Vital Signs (24h Range):  Temp:  [97.9 °F (36.6 °C)-98.5 °F (36.9 °C)] 98 °F (36.7 °C)  Pulse:  [82-97] 83  Resp:  [18-20] 18  SpO2:  [92 %-98 %] 93 %  BP: (120-171)/(57-96) 123/57     Weight: 52.2 kg (115 lb)  Body mass index is 21.03 kg/m².    Intake/Output Summary (Last 24 hours) at 8/1/2019 1108  Last data filed at 7/31/2019 2331  Gross per 24 hour   Intake 100 ml   Output --   Net 100 ml      Physical Exam   Constitutional: She is oriented to person, place, and time. She appears well-developed and well-nourished. No distress.   HENT:   Head: Normocephalic and atraumatic.   Neck: Normal range of motion.   Cardiovascular: Normal rate, regular rhythm, normal heart sounds and intact distal pulses. Exam reveals no gallop and no friction rub.   No murmur heard.  Pulmonary/Chest: No tachypnea. No respiratory distress. She has no wheezes.   Bibasilar crackles   Abdominal: Soft. Bowel sounds are normal. She exhibits no distension. There is no tenderness. There is no rebound and no guarding.   Musculoskeletal: Normal range of motion. She exhibits no edema.   Neurological:  She is alert and oriented to person, place, and time.   Skin: Skin is warm and dry. She is not diaphoretic. No erythema.   Psychiatric: She has a normal mood and affect. Her behavior is normal. Judgment and thought content normal.   Nursing note and vitals reviewed.      Significant Labs:   Blood Culture:   Recent Labs   Lab 07/31/19  1604 07/31/19  1607   LABBLOO No Growth to date No Growth to date     BMP:   Recent Labs   Lab 08/01/19  0502   GLU 94      K 3.0*      CO2 30*   BUN 16   CREATININE 1.2   CALCIUM 9.4   MG 2.0     CBC:   Recent Labs   Lab 07/31/19  1513 08/01/19  0502   WBC 7.16 5.22   HGB 10.8* 10.4*   HCT 35.5* 33.7*    226     Magnesium:   Recent Labs   Lab 08/01/19  0502   MG 2.0     Urine Culture: No results for input(s): LABURIN in the last 48 hours.    Significant Imaging: I have reviewed and interpreted all pertinent imaging results/findings within the past 24 hours.      Assessment/Plan:      * Positive sputum culture for Pseudomonas  Was instructed to return to for treatment of positive respiratory culture.  She had Pseudomonas aeruginosa resistant to ciprofloxacin and pansensitive Serratia marcescens.  Pulmonology was consulted and recommended starting cefepime with Infectious Diseases consultation.  She is stable and does not meet criteria for sepsis.    picc line/midline - IV cefepime at home     Diarrhea of presumed infectious origin  Stool culture and c diff pending        History of ERNIE infection  Stable; there no acute issues    Depression  Stable; will continue her home regimen of escitalopram and mirtazapine.    Essential hypertension  Patient's blood pressure is poorly-controlled; will continue home regimen of chlorthalidone and provide as-needed clonidine..    Chronic anticoagulation  As addressed above.    History of PSVT (paroxysmal supraventricular tachycardia)  Stable; there no acute issues.    History of pulmonary embolism  Stable; will continue her home  regimen of apixaban.    GERD (gastroesophageal reflux disease)  Will substitute her home regimen omeprazole for pantoprazole while she is inpatient.    Acquired bronchiectasis  As addressed above.      VTE Risk Mitigation (From admission, onward)        Ordered     apixaban tablet 5 mg  2 times daily      07/31/19 2020     IP VTE HIGH RISK PATIENT  Once      07/31/19 2020                Dina Mckeon MD  Department of Hospital Medicine   [facility]

## 2019-08-01 NOTE — SUBJECTIVE & OBJECTIVE
Past Medical History:   Diagnosis Date    Acquired bronchiectasis     due to history of TB - followed by pulmonary, Dr. Zuñiga    Allergy     Amblyopia     rt eye per pt    Anemia of other chronic disease     Anticoagulant long-term use     Anxiety     Cataract     Chronic obstructive pulmonary disease with acute exacerbation     Clotting disorder     COPD (chronic obstructive pulmonary disease)     Depression     Diverticulosis     Essential tremor     GERD (gastroesophageal reflux disease)     Hemangioma of liver     History of tuberculosis 1978    Hypertension     Mixed anxiety and depressive disorder     On home oxygen therapy     Psoriasis     PSVT (paroxysmal supraventricular tachycardia)     Pulmonary embolism     S/P PICC central line placement Apr. 2016 - May 2016    Skin disease     Psoriasis    Spondylosis without myelopathy 8/23/2013    Supraventricular tachycardia     Tachycardia     Thoracic aorta atherosclerosis     noted on CT scan of chest 1/3/2011    Tuberculosis     Vaginal delivery     x2    Vitamin D deficiency        Past Surgical History:   Procedure Laterality Date    ABLATION N/A 7/24/2017    Performed by Marlon [facility]esteros [facility] at [facility] CATH LAB    BLOCK-NERVE-MEDIAL BRANCH-LUMBAR Bilateral 10/14/2016    Performed by [facility]ac [facility]ers [facility] at [facility] MGT    BLOCK-NERVE-MEDIAL BRANCH-LUMBAR Bilateral 8/26/2016    Performed by [facility]ac [facility]ers [facility] at [facility] MGT    CATARACT EXTRACTION W/  INTRAOCULAR LENS IMPLANT  07/24/12    od dr [place]    CATARACT EXTRACTION W/  INTRAOCULAR LENS IMPLANT  08/07/12    left eye    CHOLECYSTECTOMY      COLONOSCOPY N/A 4/23/2013    Performed by Simeon Graves MD at [facility] ENDO (4TH FLR)    EGD (ESOPHAGOGASTRODUODENOSCOPY) N/A 4/23/2013    Performed by Simeon Graves MD at [facility] ENDO (4TH FLR)    EYE SURGERY      GALLBLADDER SURGERY      HEART CATH-LEFT Left 6/16/2016    Performed [facility]aurus [facility]aga [facility] at  [facility] CATH LAB    INJECTION BILATERAL SI JOINT Bilateral 2/20/2019    [facility]formed by [facility] at [facility]    INJECTION-FACET Bilateral 1/22/2016    Performed by [facility]ac [facility]ers [facility] at [facility]    RADIOFREQUENCY ABLATION RIGHT LUMBAR L2,3,4,5 RFA Right 11/28/2018    Performed by [facility]ac Meyers MD at [facility]    RADIOFREQUENCY THERMAL COAGULATION Left 12/18/2018    [facility]    RADIOFREQUENCY THERMOCOAGULATION (RFTC)-NERVE-MEDIAN BRANCH-LUMBAR Right 4/4/2018    Performed by Issac Meyers [facility] at [facility]    RADIOFREQUENCY THERMOCOAGULATION (RFTC)-NERVE-MEDIAN BRANCH-LUMBAR Left 3/16/2018    Performed by [facility] at [facility]    RADIOFREQUENCY THERMOCOAGULATION (RFTC)-NERVE-MEDIAN BRANCH-LUMBAR Right 12/16/2016    Performed by [facility]ac Meyers MD at [facility]       Review of patient's allergies indicates:   Allergen Reactions    Adhesive Other (See Comments)     Tears up the skin and makes it itch   (leads)    Bactrim [sulfamethoxazole-trimethoprim] Rash     Was hospitalized for rash    Restasis [cyclosporine]     Lipitor [atorvastatin] Other (See Comments)     Muscle aches    Albuterol Other (See Comments)     tremors    Topamax [topiramate]      - made her feel 'bad in the head'       No current facility-administered medications on file prior to encounter.      Current Outpatient Medications on File Prior to Encounter   Medication Sig    acetaminophen (TYLENOL) 500 MG tablet Take 1,000 mg by mouth once daily. sleep    apixaban (ELIQUIS) 5 mg Tab Take 1 tablet (5 mg total) by mouth 2 (two) times daily.    chlorthalidone (HYGROTEN) 25 MG Tab Take 12.5 mg by mouth once daily.    desoximetasone (TOPICORT) 0.25 % cream APPLY  CREAM EXTERNALLY TWICE DAILY AS NEEDED    ergocalciferol (ERGOCALCIFEROL) 50,000 unit Cap TAKE 1 CAPSULE BY MOUTH ONCE A WEEK    escitalopram oxalate (LEXAPRO) 10 MG tablet TAKE  ONE TABLET BY MOUTH ONCE DAILY    folic acid (FOLVITE) 1 MG tablet TAKE 1 TABLET BY MOUTH ONCE DAILY    gabapentin (NEURONTIN) 300 MG capsule TAKE 2 CAPSULES BY MOUTH THREE TIMES DAILY    guaiFENesin (MUCINEX) 600 mg 12 hr tablet Take 1,200 mg by mouth 2 (two) times daily.    hyoscyamine (ANASPAZ,LEVSIN) 0.125 mg Tab Take 1 tablet (125 mcg total) by mouth every 6 (six) hours as needed.    ipratropium (ATROVENT) 0.02 % nebulizer solution Inhale 1 vial in nebulizer 3 to 4 times daily    ketoconazole (NIZORAL) 2 % shampoo Apply topically once daily.    Lactobacillus rhamnosus GG (CULTURELLE) 10 billion cell capsule Take 1 capsule by mouth once daily.    mirtazapine (REMERON) 15 MG tablet Take 1 tablet (15 mg total) by mouth every evening.    multivitamin (THERAGRAN) per tablet Take 1 tablet by mouth once daily.    omeprazole (PRILOSEC) 20 MG capsule TAKE 1 CAPSULE BY MOUTH ONCE DAILY AS NEEDED FOR  HEARTBURN  (TAKE  AS  NEEDED)    ondansetron (ZOFRAN-ODT) 8 MG TbDL Take 1 tablet (8 mg total) by mouth every 8 (eight) hours as needed.    sodium chloride 2% (XIOMARA 128) 2 % ophthalmic solution Place 1 drop into both eyes 3 (three) times daily as needed.    sodium chloride 3% 3 % nebulizer solution Take 4 mLs by nebulization 2 (two) times daily.    traMADol (ULTRAM) 50 mg tablet Take 1 tablet (50 mg total) by mouth every 6 (six) hours as needed for Pain.    umeclidinium-vilanterol (ANORO ELLIPTA) 62.5-25 mcg/actuation DsDv Inhale 1 puff into the lungs once daily. Controller    verapamil (VERELAN) 180 MG C24P Take 1 capsule (180 mg total) by mouth 2 (two) times daily.    alprazolam (XANAX) 0.25 MG tablet Take 1 tablet (0.25 mg total) by mouth nightly as needed for Anxiety.    azithromycin (ZITHROMAX Z-AMINATA) 250 MG tablet 2 tabs by mouth on day 1 then 1 tab by mouth on day 2-5    fluocinolone (DERMA-SMOOTHE) 0.01 % external oil Apply topically once daily. (Patient taking differently: Apply topically daily as  needed. )    fluocinonide (LIDEX) 0.05 % external solution Apply topically once daily. - apply after shampooing (Patient taking differently: Apply topically once daily. - apply after shampooing as needed)    propylene glycol (SYSTANE BALANCE) 0.6 % Drop Apply 1 drop to eye daily as needed (dry eye).     Family History     Problem Relation (Age of Onset)    Cancer Father, Brother, Maternal Aunt    Heart attack Mother, Brother, Son    Hyperlipidemia Sister    Hypertension Mother    Lung cancer Sister    Psoriasis Sister    Stroke Maternal Uncle        Tobacco Use    Smoking status: Former Smoker     Packs/day: 2.00     Years: 50.00     Pack years: 100.00     Types: Cigarettes     Last attempt to quit: 5/27/2009     Years since quitting: 10.1    Smokeless tobacco: Never Used   Substance and Sexual Activity    Alcohol use: Not Currently     Frequency: Never    Drug use: Never    Sexual activity: Yes     Partners: Male     Review of Systems   Constitutional: Positive for activity change, appetite change and fatigue. Negative for chills, diaphoresis, fever and unexpected weight change.   HENT: Negative.    Eyes: Negative.    Respiratory: Positive for cough and shortness of breath. Negative for chest tightness and wheezing.    Cardiovascular: Negative for chest pain, palpitations and leg swelling.   Gastrointestinal: Positive for nausea. Negative for abdominal distention, abdominal pain, blood in stool, constipation, diarrhea and vomiting.   Genitourinary: Negative for dysuria and hematuria.   Musculoskeletal: Negative.    Skin: Negative.    Neurological: Positive for weakness. Negative for dizziness, seizures, syncope and light-headedness.   Psychiatric/Behavioral: Negative.      Objective:     Vital Signs (Most Recent):  Temp: 98 °F (36.7 °C) (07/31/19 2008)  Pulse: 85 (07/31/19 2008)  Resp: 20 (07/31/19 2008)  BP: (!) 171/70 (07/31/19 2008)  SpO2: (!) 93 % (07/31/19 2008) Vital Signs (24h Range):  Temp:  [98 °F  (36.7 °C)-98.5 °F (36.9 °C)] 98 °F (36.7 °C)  Pulse:  [83-97] 85  Resp:  [18-20] 20  SpO2:  [93 %-98 %] 93 %  BP: (120-171)/(57-96) 171/70     Weight: 70.3 kg (155 lb)  Body mass index is 28.35 kg/m².    Physical Exam   Constitutional: She is oriented to person, place, and time. She appears well-developed and well-nourished. No distress.   HENT:   Head: Normocephalic and atraumatic.   Right Ear: External ear normal.   Left Ear: External ear normal.   Nose: Nose normal.   Eyes: Right eye exhibits no discharge. Left eye exhibits no discharge.   Neck: Normal range of motion.   Cardiovascular: Normal rate, regular rhythm, normal heart sounds and intact distal pulses. Exam reveals no gallop and no friction rub.   No murmur heard.  Pulmonary/Chest:   Normal respiratory effort with rhonchus breath sounds bilaterally and bibasilar crackles, no wheezing   Abdominal: Soft. Bowel sounds are normal. She exhibits no distension. There is no tenderness. There is no rebound and no guarding.   Musculoskeletal: Normal range of motion. She exhibits no edema.   Neurological: She is alert and oriented to person, place, and time.   Skin: Skin is warm and dry. She is not diaphoretic. No erythema.   Psychiatric: She has a normal mood and affect. Her behavior is normal. Judgment and thought content normal.   Nursing note and vitals reviewed.          Significant Labs: All pertinent labs within the past 24 hours have been reviewed.    Significant Imaging: I have reviewed and interpreted all pertinent imaging results/findings within the past 24 hours.

## 2019-08-01 NOTE — PLAN OF CARE
08/01/19 1535   Discharge Assessment   Assessment Type Discharge Planning Assessment   Confirmed/corrected address and phone number on facesheet? Yes   Assessment information obtained from? Patient;Caregiver   Prior to hospitilization cognitive status: Alert/Oriented   Prior to hospitalization functional status: Independent   Current cognitive status: Alert/Oriented   Current Functional Status: Independent   Facility Arrived From: Home    Lives With spouse   Able to Return to Prior Arrangements yes   Is patient able to care for self after discharge? Yes   Who are your caregiver(s) and their phone number(s)? Marc Spouse [phone]     Patient's perception of discharge disposition admitted as an inpatient   Readmission Within the Last 30 Days no previous admission in last 30 days   Patient currently being followed by outpatient case management? No   Patient currently receives any other outside agency services? No   Equipment Currently Used at Home oxygen;shower chair   Do you have any problems affording any of your prescribed medications? No   Is the patient taking medications as prescribed? yes   Does the patient have transportation home? Yes   Transportation Anticipated family or friend will provide   Dialysis Name and Scheduled days N/A    Does the patient receive services at the [facility]? No   Discharge Plan A Home with family;Home Health   Discharge Plan B Other  (TBD )   DME Needed Upon Discharge  other (see comments)   Patient/Family in Agreement with Plan yes     SW to patient's room to discuss Helping the patient manage care at home.   TN/SW role explained to pt.  Patient identified using two identifiers:  Name and date of birth.    SW's name and contact info placed on white board.     Person who will help at home if needed:  Marc pt's      Preferred pharmacy:   [id number] [address] [place] [id number] [facility] [place] [address]  Phone: [phone] Fax:  "[phone]    "Help at home Questions" discussed and placed in "My Health Packet" and placed at bedside.     Preferred Appointment time: Appointment scheduled.         "

## 2019-08-01 NOTE — ASSESSMENT & PLAN NOTE
Was instructed to return to for treatment of positive respiratory culture.  She had Pseudomonas aeruginosa resistant to ciprofloxacin and pansensitive Serratia marcescens.  Pulmonology was consulted and recommended starting cefepime with Infectious Diseases consultation.  She is stable and does not meet criteria for sepsis.

## 2019-08-01 NOTE — PROCEDURES
"Yasmin Asencio is a 68 y.o. female patient.    Temp: 98.3 °F (36.8 °C) (08/01/19 1625)  Pulse: 76 (08/01/19 1625)  Resp: 18 (08/01/19 1625)  BP: 123/60 (08/01/19 1625)  SpO2: 97 % (08/01/19 1625)  Weight: 52.2 kg (115 lb) (08/01/19 0500)  Height: 5' 2" (157.5 cm) (08/01/19 0500)    PICC  Date/Time: 8/1/2019 5:21 PM  Performed by: Mu Hartley RN  Consent Done: Yes  Time out: Immediately prior to procedure a time out was called to verify the correct patient, procedure, equipment, support staff and site/side marked as required  Indications: med administration and vascular access  Anesthesia: local infiltration  Local anesthetic: lidocaine 1% without epinephrine  Anesthetic Total (mL): 2  Preparation: skin prepped with chlorhexidine (without alcohol)  Skin prep agent dried: skin prep agent completely dried prior to procedure  Sterile barriers: all five maximum sterile barriers used - cap, mask, sterile gown, sterile gloves, and large sterile sheet  Hand hygiene: hand hygiene performed prior to central venous catheter insertion  Location details: right basilic  Catheter type: double lumen  Catheter size: 5 Fr  Catheter Length: 34cm    Ultrasound guidance: yes  Vessel Caliber: medium, compressibility normal  Vascular Doppler: not done  Needle advanced into vessel with real time Ultrasound guidance.  Guidewire confirmed in vessel.  Sterile sheath used.  no esophageal manometryNumber of attempts: 1  Post-procedure: blood return through all ports and sterile dressing applied            Mu Hartley  8/1/2019    "

## 2019-08-01 NOTE — SUBJECTIVE & OBJECTIVE
Interval History: pt c/o loose stools    Review of Systems   Constitutional: Positive for appetite change and fatigue. Negative for chills and fever.   Respiratory: Positive for cough and shortness of breath.    Cardiovascular: Negative for chest pain and leg swelling.   Gastrointestinal: Positive for abdominal pain (cramping) and diarrhea. Negative for constipation, nausea and vomiting.   Genitourinary: Negative for dysuria, frequency and hematuria.   Musculoskeletal: Negative for back pain and myalgias.   Skin: Negative for rash and wound.   Neurological: Negative for dizziness, weakness, light-headedness, numbness and headaches.   Psychiatric/Behavioral: Negative for agitation and behavioral problems. The patient is not nervous/anxious.      Objective:     Vital Signs (Most Recent):  Temp: 98 °F (36.7 °C) (08/01/19 0738)  Pulse: 83 (08/01/19 0738)  Resp: 18 (08/01/19 0738)  BP: (!) 123/57 (08/01/19 0738)  SpO2: (!) 93 % (08/01/19 0738) Vital Signs (24h Range):  Temp:  [97.9 °F (36.6 °C)-98.5 °F (36.9 °C)] 98 °F (36.7 °C)  Pulse:  [82-97] 83  Resp:  [18-20] 18  SpO2:  [92 %-98 %] 93 %  BP: (120-171)/(57-96) 123/57     Weight: 52.2 kg (115 lb)  Body mass index is 21.03 kg/m².    Intake/Output Summary (Last 24 hours) at 8/1/2019 1108  Last data filed at 7/31/2019 2331  Gross per 24 hour   Intake 100 ml   Output --   Net 100 ml      Physical Exam   Constitutional: She is oriented to person, place, and time. She appears well-developed and well-nourished. No distress.   HENT:   Head: Normocephalic and atraumatic.   Neck: Normal range of motion.   Cardiovascular: Normal rate, regular rhythm, normal heart sounds and intact distal pulses. Exam reveals no gallop and no friction rub.   No murmur heard.  Pulmonary/Chest: No tachypnea. No respiratory distress. She has no wheezes.   Bibasilar crackles   Abdominal: Soft. Bowel sounds are normal. She exhibits no distension. There is no tenderness. There is no rebound and no  guarding.   Musculoskeletal: Normal range of motion. She exhibits no edema.   Neurological: She is alert and oriented to person, place, and time.   Skin: Skin is warm and dry. She is not diaphoretic. No erythema.   Psychiatric: She has a normal mood and affect. Her behavior is normal. Judgment and thought content normal.   Nursing note and vitals reviewed.      Significant Labs:   Blood Culture:   Recent Labs   Lab 07/31/19  1604 07/31/19  1607   LABBLOO No Growth to date No Growth to date     BMP:   Recent Labs   Lab 08/01/19  0502   GLU 94      K 3.0*      CO2 30*   BUN 16   CREATININE 1.2   CALCIUM 9.4   MG 2.0     CBC:   Recent Labs   Lab 07/31/19  1513 08/01/19  0502   WBC 7.16 5.22   HGB 10.8* 10.4*   HCT 35.5* 33.7*    226     Magnesium:   Recent Labs   Lab 08/01/19  0502   MG 2.0     Urine Culture: No results for input(s): LABURIN in the last 48 hours.    Significant Imaging: I have reviewed and interpreted all pertinent imaging results/findings within the past 24 hours.   Yes

## 2019-08-01 NOTE — ASSESSMENT & PLAN NOTE
Patient's blood pressure is poorly-controlled; will continue home regimen of chlorthalidone and provide as-needed clonidine..

## 2019-08-01 NOTE — SUBJECTIVE & OBJECTIVE
Past Medical History:   Diagnosis Date    Acquired bronchiectasis     due to history of TB - followed by pulmonary, Dr. Zuñiga    Allergy     Amblyopia     rt eye per pt    Anemia of other chronic disease     Anticoagulant long-term use     Anxiety     Cataract     Chronic obstructive pulmonary disease with acute exacerbation     Clotting disorder     COPD (chronic obstructive pulmonary disease)     Depression     Diverticulosis     Essential tremor     GERD (gastroesophageal reflux disease)     Hemangioma of liver     History of tuberculosis 1978    Hypertension     Mixed anxiety and depressive disorder     On home oxygen therapy     Psoriasis     PSVT (paroxysmal supraventricular tachycardia)     Pulmonary embolism     S/P PICC central line placement Apr. 2016 - May 2016    Skin disease     Psoriasis    Spondylosis without myelopathy 8/23/2013    Supraventricular tachycardia     Tachycardia     Thoracic aorta atherosclerosis     noted on CT scan of chest 1/3/2011    Tuberculosis     Vaginal delivery     x2    Vitamin D deficiency        Past Surgical History:   Procedure Laterality Date    ABLATION N/A 7/24/2017    Performed by Marlon [facility]esteros [facility] at [facility] CATH LAB    BLOCK-NERVE-MEDIAL BRANCH-LUMBAR Bilateral 10/14/2016    Performed [facility]ac [facility]ers [facility] at [facility] MGT    BLOCK-NERVE-MEDIAL BRANCH-LUMBAR Bilateral 8/26/2016    Per[facility] by [facility] at [facility] MGT    CATARACT EXTRACTION W/  INTRAOCULAR LENS IMPLANT  07/24/12    od dr [place]    CATARACT EXTRACTION W/  INTRAOCULAR LENS IMPLANT  08/07/12    left eye    CHOLECYSTECTOMY      COLONOSCOPY N/A 4/23/2013    Performed by Simeon Graves MD at [facility] ENDO (4TH FLR)    EGD (ESOPHAGOGASTRODUODENOSCOPY) N/A 4/23/2013    Performed by Simeon Graves MD at [facility] ENDO (4TH FLR)    EYE SURGERY      GALLBLADDER SURGERY      HEART CATH-LEFT Left 6/16/2016    Performed [facility]aga [facility] at  [facility] CATH LAB    INJECTION BILATERAL SI JOINT Bilateral 2/20/2019    [facility] at [facility]    INJECTION-FACET Bilateral 1/22/2016    Performed by [facility]ers [facility] at [facility]    RADIOFREQUENCY ABLATION RIGHT LUMBAR L2,3,4,5 RFA Right 11/28/2018    Performed by [facility] Meyers MD at [facility]    RADIOFREQUENCY THERMAL COAGULATION Left 12/18/2018    [facility]formed by [facility] at [facility]    RADIOFREQUENCY THERMOCOAGULATION (RFTC)-NERVE-MEDIAN BRANCH-LUMBAR Right 4/4/2018    Performed by Issac Meyers MD at [facility]    RADIOFREQUENCY THERMOCOAGULATION (RFTC)-NERVE-MEDIAN BRANCH-LUMBAR Left 3/16/2018    Performed by [facility]ac [facility]ers [facility] at [facility]    RADIOFREQUENCY THERMOCOAGULATION (RFTC)-NERVE-MEDIAN BRANCH-LUMBAR Right 12/16/2016    Performed by [facility]ac Meyers MD at [facility]       Review of patient's allergies indicates:   Allergen Reactions    Adhesive Other (See Comments)     Tears up the skin and makes it itch   (leads)    Bactrim [sulfamethoxazole-trimethoprim] Rash     Was hospitalized for rash    Restasis [cyclosporine]     Lipitor [atorvastatin] Other (See Comments)     Muscle aches    Albuterol Other (See Comments)     tremors    Topamax [topiramate]      - made her feel 'bad in the head'       Medications:  Medications Prior to Admission   Medication Sig    acetaminophen (TYLENOL) 500 MG tablet Take 1,000 mg by mouth once daily. sleep    apixaban (ELIQUIS) 5 mg Tab Take 1 tablet (5 mg total) by mouth 2 (two) times daily.    chlorthalidone (HYGROTEN) 25 MG Tab Take 12.5 mg by mouth once daily.    desoximetasone (TOPICORT) 0.25 % cream APPLY  CREAM EXTERNALLY TWICE DAILY AS NEEDED    ergocalciferol (ERGOCALCIFEROL) 50,000 unit Cap TAKE 1 CAPSULE BY MOUTH ONCE A WEEK    escitalopram oxalate (LEXAPRO) 10 MG tablet TAKE ONE TABLET BY MOUTH ONCE DAILY    folic acid (FOLVITE) 1 MG tablet TAKE 1 TABLET BY MOUTH  ONCE DAILY    gabapentin (NEURONTIN) 300 MG capsule TAKE 2 CAPSULES BY MOUTH THREE TIMES DAILY    guaiFENesin (MUCINEX) 600 mg 12 hr tablet Take 1,200 mg by mouth 2 (two) times daily.    hyoscyamine (ANASPAZ,LEVSIN) 0.125 mg Tab Take 1 tablet (125 mcg total) by mouth every 6 (six) hours as needed.    ipratropium (ATROVENT) 0.02 % nebulizer solution Inhale 1 vial in nebulizer 3 to 4 times daily    ketoconazole (NIZORAL) 2 % shampoo Apply topically once daily.    Lactobacillus rhamnosus GG (CULTURELLE) 10 billion cell capsule Take 1 capsule by mouth once daily.    mirtazapine (REMERON) 15 MG tablet Take 1 tablet (15 mg total) by mouth every evening.    multivitamin (THERAGRAN) per tablet Take 1 tablet by mouth once daily.    omeprazole (PRILOSEC) 20 MG capsule TAKE 1 CAPSULE BY MOUTH ONCE DAILY AS NEEDED FOR  HEARTBURN  (TAKE  AS  NEEDED)    ondansetron (ZOFRAN-ODT) 8 MG TbDL Take 1 tablet (8 mg total) by mouth every 8 (eight) hours as needed.    sodium chloride 2% (XIOMARA 128) 2 % ophthalmic solution Place 1 drop into both eyes 3 (three) times daily as needed.    sodium chloride 3% 3 % nebulizer solution Take 4 mLs by nebulization 2 (two) times daily.    traMADol (ULTRAM) 50 mg tablet Take 1 tablet (50 mg total) by mouth every 6 (six) hours as needed for Pain.    umeclidinium-vilanterol (ANORO ELLIPTA) 62.5-25 mcg/actuation DsDv Inhale 1 puff into the lungs once daily. Controller    verapamil (VERELAN) 180 MG C24P Take 1 capsule (180 mg total) by mouth 2 (two) times daily.    alprazolam (XANAX) 0.25 MG tablet Take 1 tablet (0.25 mg total) by mouth nightly as needed for Anxiety.    azithromycin (ZITHROMAX Z-AMINATA) 250 MG tablet 2 tabs by mouth on day 1 then 1 tab by mouth on day 2-5    fluocinolone (DERMA-SMOOTHE) 0.01 % external oil Apply topically once daily. (Patient taking differently: Apply topically daily as needed. )    fluocinonide (LIDEX) 0.05 % external solution Apply topically once daily. -  apply after shampooing (Patient taking differently: Apply topically once daily. - apply after shampooing as needed)    propylene glycol (SYSTANE BALANCE) 0.6 % Drop Apply 1 drop to eye daily as needed (dry eye).     Antibiotics (From admission, onward)    Start     Stop Route Frequency Ordered    08/01/19 2124  cefepime in dextrose 5 % IVPB 2 g      -- IV Every 12 hours (non-standard times) 08/01/19 0950        Antifungals (From admission, onward)    None        Antivirals (From admission, onward)    None           Immunization History   Administered Date(s) Administered    Influenza 10/06/2009, 10/27/2010, 09/21/2011, 01/07/2013, 12/11/2013, 10/06/2014    Influenza - High Dose 10/03/2016, 10/09/2017, 09/25/2018    Influenza - Trivalent (ADULT) 10/06/2014    Influenza A (H1N1) 2009 Monovalent - IM 01/12/2010    Influenza Split 01/07/2013, 12/11/2013    PPD Test 07/24/2015    Pneumococcal Conjugate 11/03/2008    Pneumococcal Conjugate - 13 Valent 11/03/2008, 10/19/2015    Pneumococcal Polysaccharide - 23 Valent 02/11/2015    Tdap 01/19/2009       Family History     Problem Relation (Age of Onset)    Cancer Father, Brother, Maternal Aunt    Heart attack Mother, Brother, Son    Hyperlipidemia Sister    Hypertension Mother    Lung cancer Sister    Psoriasis Sister    Stroke Maternal Uncle        Social History     Socioeconomic History    Marital status:      Spouse name: Not on file    Number of children: 2    Years of education: Not on file    Highest education level: Not on file   Occupational History    Occupation: housewife   Social Needs    Financial resource strain: Not on file    Food insecurity:     Worry: Not on file     Inability: Not on file    Transportation needs:     Medical: Not on file     Non-medical: Not on file   Tobacco Use    Smoking status: Former Smoker     Packs/day: 2.00     Years: 50.00     Pack years: 100.00     Types: Cigarettes     Last attempt to quit: 5/27/2009      Years since quitting: 10.1    Smokeless tobacco: Never Used   Substance and Sexual Activity    Alcohol use: Not Currently     Frequency: Never    Drug use: Never    Sexual activity: Yes     Partners: Male   Lifestyle    Physical activity:     Days per week: Not on file     Minutes per session: Not on file    Stress: Not on file   Relationships    Social connections:     Talks on phone: Not on file     Gets together: Not on file     Attends Yazdanism service: Not on file     Active member of club or organization: Not on file     Attends meetings of clubs or organizations: Not on file     Relationship status: Not on file   Other Topics Concern    Are you pregnant or think you may be? No    Breast-feeding No   Social History Narrative    ** Merged History Encounter **         One child  of a heart attack at age 35.     Review of Systems   Constitutional: Positive for appetite change and fatigue. Negative for chills and fever.   Respiratory: Positive for cough and shortness of breath.    Cardiovascular: Negative for chest pain and leg swelling.   Gastrointestinal: Positive for abdominal pain (cramping) and diarrhea. Negative for constipation, nausea and vomiting.   Genitourinary: Negative for dysuria, frequency and hematuria.   Musculoskeletal: Negative for back pain and myalgias.   Skin: Negative for rash and wound.   Neurological: Negative for dizziness, weakness, light-headedness, numbness and headaches.   Psychiatric/Behavioral: Negative for agitation and behavioral problems. The patient is not nervous/anxious.      Objective:     Vital Signs (Most Recent):  Temp: 98 °F (36.7 °C) (19)  Pulse: 83 (19)  Resp: 18 (19)  BP: (!) 123/57 (19)  SpO2: (!) 93 % (19) Vital Signs (24h Range):  Temp:  [97.9 °F (36.6 °C)-98.5 °F (36.9 °C)] 98 °F (36.7 °C)  Pulse:  [82-97] 83  Resp:  [18-20] 18  SpO2:  [92 %-98 %] 93 %  BP: (120-171)/(57-96) 123/57     Weight:  52.2 kg (115 lb)  Body mass index is 21.03 kg/m².    Estimated Creatinine Clearance: 35 mL/min (based on SCr of 1.2 mg/dL).    Physical Exam   Constitutional: She is oriented to person, place, and time. She appears well-developed and well-nourished. No distress.   HENT:   Head: Normocephalic and atraumatic.   Neck: Normal range of motion.   Cardiovascular: Normal rate, regular rhythm, normal heart sounds and intact distal pulses. Exam reveals no gallop and no friction rub.   No murmur heard.  Pulmonary/Chest: No tachypnea. No respiratory distress. She has no wheezes.   Bibasilar crackles   Abdominal: Soft. Bowel sounds are normal. She exhibits no distension. There is no tenderness. There is no rebound and no guarding.   Musculoskeletal: Normal range of motion. She exhibits no edema.   Neurological: She is alert and oriented to person, place, and time.   Skin: Skin is warm and dry. She is not diaphoretic. No erythema.   Psychiatric: She has a normal mood and affect. Her behavior is normal. Judgment and thought content normal.   Nursing note and vitals reviewed.      Significant Labs:   Blood Culture:   Recent Labs   Lab 07/31/19  1604 07/31/19  1607   LABBLOO No Growth to date No Growth to date     CBC:   Recent Labs   Lab 07/31/19  1513 08/01/19  0502   WBC 7.16 5.22   HGB 10.8* 10.4*   HCT 35.5* 33.7*    226     CMP:   Recent Labs   Lab 07/31/19  1513 08/01/19  0502    140   K 3.6 3.0*   CL 99 100   CO2 29 30*    94   BUN 15 16   CREATININE 1.4 1.2   CALCIUM 9.9 9.4   PROT 8.0 7.3   ALBUMIN 4.1 3.8   BILITOT 0.5 0.5   ALKPHOS 69 59   AST 15 14   ALT 11 10   ANIONGAP 10 10   EGFRNONAA 38* 46*     Respiratory Culture:   Recent Labs   Lab 07/27/19  1128 07/31/19  1618   GSRESP <10 epithelial cells per low power field.  Rare WBC's  Moderate Gram negative rods  Few Gram positive cocci <10 epithelial cells per low power field.  Few WBC's  Few Gram positive cocci in pairs  Rare Gram negative rods    RESPIRATORYC No S aureus isolated.  PSEUDOMONAS AERUGINOSA  Moderate  *  SERRATIA MARCESCENS  Many  Normal respiratory larry also present  *  --        Significant Imaging: I have reviewed all pertinent imaging results/findings within the past 24 hours.

## 2019-08-01 NOTE — MEDICAL/APP STUDENT
"7/31/2019 8:09 PM   Yasmin Asencio   1950   [id number]       DATE OF ADMISSION: 7/31/2019  2:54 PM  SITE: [facility]    History of Presenting Illness  Yasmin Asencio is a 68 y.o. female with a past medical history of recurrent pseudomonas bronchiectasis, COPD, mycobacterium kansasii infection, and pulmonary embolism who is currently admitted for positive pseudomonas sputum cultures.    Patient began experiencing a "chest cold" with increased sputum production and work of breathing about one week ago. She was put on a course of Augmentin followed by a course of azithromycin. Neither of these helped her condition. Her pulmonologist took a sputum culture on 07/27 which came in positive for pseudomonas earlier today. She was instructed to present to the ED. Associated symptoms include nausea (for which she takes Zofran) and generalized weakness. Both these symptoms worsened on Monday. She denies any fevers but notes she has never run a fever with her pseudomonas infections.     Patient has an extensive pulmonary history. Mrs. Asencio reports she has had at least 5 PICC lines for recurrent pseudomonas infections. Family states the most recent episode was in 2017. She reports she has had COPD for about 10 years. She smoked for about 40 years but quit 10 years ago. She also was treated for Mycobacterium kansasii for one year in 1978.    Patient also complains of intermittent, spasm-like cramping of her entire "intestine" starting 3 weeks ago. This has been accompanied with daily diarrhea. On 07/25 patient went in to see gastroenterology for these problems. Chart review shows she saw Susan Venegas NP who wanted to rule out H. Pylori as patient has had this in the past. H. Pylori stool test was scheduled for 08/15 after patient was off antacids for 2 weeks and off antibiotics for 4 weeks. Patient was given hyoscyamine to take as needed for stomach pain. She reports this helped with spasms significantly. " Mrs. Asencio was trying to keep her fluid intake over the course of these stomach problems but has not been able to since Monday when her nausea worsened.       Medical History  - COPD  - Bronchiectasis  - Amblyopia  - Anemia of chronic disease  - Anxiety  - History of mycobacterium kansasii   - Hypertension  - Psoriasis  - Essential tremor   - effects speech  - Supraventricular tachycardia s/p ablation 2017  - Thoracic aorta atherosclerosis       Surgical History  Past Surgical History:   Procedure Laterality Date    ABLATION N/A 7/24/2017    Performed by Marlno Ballesteros MD at [facility] CATH LAB    BLOCK-NERVE-MEDIAL BRANCH-LUMBAR Bilateral 10/14/2016    Performed by [facility]ac [facility]ers [facility] at [facility]    BLOCK-NERVE-MEDIAL BRANCH-LUMBAR Bilateral 8/26/2016    Performed by [facility] at [facility]    CATARACT EXTRACTION W/  INTRAOCULAR LENS IMPLANT  07/24/12    od dr [place]    CATARACT EXTRACTION W/  INTRAOCULAR LENS IMPLANT  08/07/12    left eye    CHOLECYSTECTOMY      COLONOSCOPY N/A 4/23/2013    Performed by Simeon Graves MD at [facility] ENDO (4TH FLR)    EGD (ESOPHAGOGASTRODUODENOSCOPY) N/A 4/23/2013    [facility] ([facility])    EYE SURGERY      GALLBLADDER SURGERY      HEART CATH-LEFT Left 6/16/2016    [facility] CATH LAB    INJECTION BILATERAL SI JOINT Bilateral 2/20/2019    Performed by [facility] at [facility]    INJECTION-FACET Bilateral 1/22/2016    Performed by [facility]ers [facility] at [facility]    RADIOFREQUENCY ABLATION RIGHT LUMBAR L2,3,4,5 RFA Right 11/28/2018    Performed by [facility]ers [facility] at [facility]    RADIOFREQUENCY THERMAL COAGULATION Left 12/18/2018    [facility] at [facility]    RADIOFREQUENCY THERMOCOAGULATION (RFTC)-NERVE-MEDIAN BRANCH-LUMBAR Right 4/4/2018    Performed by [facility]ers [facility] at [facility]    RADIOFREQUENCY  THERMOCOAGULATION (RFTC)-NERVE-MEDIAN BRANCH-LUMBAR Left 3/16/2018    [facility]    RADIOFREQUENCY THERMOCOAGULATION (RFTC)-NERVE-MEDIAN BRANCH-LUMBAR Right 12/16/2016    [facility] at [facility]         Family History  Family History   Problem Relation Age of Onset    Hypertension Mother     Heart attack Mother     Cancer Father         liver and bladder    Hyperlipidemia Sister     Psoriasis Sister     Cancer Brother         liver    Stroke Maternal Uncle     Cancer Maternal Aunt         stomach    Lung cancer Sister     Heart attack Brother     Heart attack Son     Amblyopia Neg Hx     Blindness Neg Hx     Cataracts Neg Hx     Glaucoma Neg Hx     Macular degeneration Neg Hx     Retinal detachment Neg Hx     Strabismus Neg Hx     Thyroid disease Neg Hx     Melanoma Neg Hx     Lupus Neg Hx     Eczema Neg Hx     COPD Neg Hx     Colon cancer Neg Hx          Medications  Home Meds:   Prior to Admission medications    Medication Sig Start Date End Date Taking? Authorizing Provider   acetaminophen (TYLENOL) 500 MG tablet Take 1,000 mg by mouth once daily. sleep   Yes Historical Provider, MD   apixaban (ELIQUIS) 5 mg Tab Take 1 tablet (5 mg total) by mouth 2 (two) times daily. 2/22/19  Yes Urmila Luna MD   chlorthalidone (HYGROTEN) 25 MG Tab Take 12.5 mg by mouth once daily.   Yes Historical Provider, MD   desoximetasone (TOPICORT) 0.25 % cream APPLY  CREAM EXTERNALLY TWICE DAILY AS NEEDED 3/26/19  Yes Urmila Luna MD   ergocalciferol (ERGOCALCIFEROL) 50,000 unit Cap TAKE 1 CAPSULE BY MOUTH ONCE A WEEK 4/25/19  Yes Blu Espinosa, HAYDEE   escitalopram oxalate (LEXAPRO) 10 MG tablet TAKE ONE TABLET BY MOUTH ONCE DAILY 12/4/18  Yes Lázaro Levy MD   folic acid (FOLVITE) 1 MG tablet TAKE 1 TABLET BY MOUTH ONCE DAILY 2/21/19  Yes Craig Montana MD   gabapentin (NEURONTIN) 300 MG capsule TAKE 2 CAPSULES BY  MOUTH THREE TIMES DAILY 4/25/19  Yes REGAN Tate   guaiFENesin (MUCINEX) 600 mg 12 hr tablet Take 1,200 mg by mouth 2 (two) times daily. 12/12/17  Yes Historical Provider, MD   hyoscyamine (ANASPAZ,LEVSIN) 0.125 mg Tab Take 1 tablet (125 mcg total) by mouth every 6 (six) hours as needed. 7/25/19 8/24/19 Yes Susan Venegas NP   ipratropium (ATROVENT) 0.02 % nebulizer solution Inhale 1 vial in nebulizer 3 to 4 times daily   Yes Historical Provider, MD   ketoconazole (NIZORAL) 2 % shampoo Apply topically once daily. 3/21/18  Yes Urmila Luna MD   Lactobacillus rhamnosus GG (CULTURELLE) 10 billion cell capsule Take 1 capsule by mouth once daily.   Yes Historical Provider, MD   mirtazapine (REMERON) 15 MG tablet Take 1 tablet (15 mg total) by mouth every evening. 5/21/19  Yes Kristi Torres NP   multivitamin (THERAGRAN) per tablet Take 1 tablet by mouth once daily.   Yes Historical Provider, MD   omeprazole (PRILOSEC) 20 MG capsule TAKE 1 CAPSULE BY MOUTH ONCE DAILY AS NEEDED FOR  HEARTBURN  (TAKE  AS  NEEDED) 5/23/19  Yes Urmila Luna MD   ondansetron (ZOFRAN-ODT) 8 MG TbDL Take 1 tablet (8 mg total) by mouth every 8 (eight) hours as needed. 6/27/19  Yes REGAN Huynh-VASILE   sodium chloride 2% (XIOMARA 128) 2 % ophthalmic solution Place 1 drop into both eyes 3 (three) times daily as needed.   Yes Historical Provider, MD   sodium chloride 3% 3 % nebulizer solution Take 4 mLs by nebulization 2 (two) times daily. 7/5/19  Yes Declan Mills MD   traMADol (ULTRAM) 50 mg tablet Take 1 tablet (50 mg total) by mouth every 6 (six) hours as needed for Pain. 10/26/17  Yes Louie Yuan MD   umeclidinium-vilanterol (ANORO ELLIPTA) 62.5-25 mcg/actuation DsDv Inhale 1 puff into the lungs once daily. Controller 7/5/19  Yes Declan Mills MD   verapamil (VERELAN) 180 MG C24P Take 1 capsule (180 mg total) by mouth 2 (two) times daily. 2/13/19 2/13/20 Yes Marlon Ballesteros MD   alprazolam  (XANAX) 0.25 MG tablet Take 1 tablet (0.25 mg total) by mouth nightly as needed for Anxiety. 9/22/17   Urmila Luna MD   azithromycin (ZITHROMAX Z-AMINATA) 250 MG tablet 2 tabs by mouth on day 1 then 1 tab by mouth on day 2-5 7/26/19   Declan Mills MD   fluocinolone (DERMA-SMOOTHE) 0.01 % external oil Apply topically once daily.  Patient taking differently: Apply topically daily as needed.  3/21/18 7/25/19  Urmila Luna MD   fluocinonide (LIDEX) 0.05 % external solution Apply topically once daily. - apply after shampooing  Patient taking differently: Apply topically once daily. - apply after shampooing as needed 3/21/18 5/30/19  Urimla Luna MD   propylene glycol (SYSTANE BALANCE) 0.6 % Drop Apply 1 drop to eye daily as needed (dry eye).    Historical Provider, MD       Scheduled Meds:    albuterol-ipratropium  3 mL Nebulization Q6H WAKE    ceFEPime (MAXIPIME) IVPB  2 g Intravenous Q8H    lactated ringers  1,000 mL Intravenous Once        PRN Meds:   ondansetron       Review of Systems   Constitutional: Positive for activity change, appetite change and fatigue.   HENT: Negative.    Eyes: Negative.    Respiratory: Positive for apnea, cough, chest tightness, shortness of breath and wheezing.    Cardiovascular: Negative.    Gastrointestinal: Positive for diarrhea, nausea and vomiting.   Endocrine: Negative.    Genitourinary: Negative.    Musculoskeletal: Negative.    Skin: Positive for pallor.   Allergic/Immunologic: Negative.    Neurological: Positive for tremors.   Hematological: Negative.    Psychiatric/Behavioral: Negative.          Physical Exam  Physical Exam   Constitutional: She is oriented to person, place, and time.   Cardiovascular: Normal rate, regular rhythm and normal heart sounds.   Pulmonary/Chest: Stridor present. She has wheezes. She has rales.   Abdominal: Soft. Bowel sounds are normal.   Musculoskeletal: Normal range of motion.   Neurological: She is alert and oriented to  person, place, and time.   Skin: Skin is warm and dry.   Psychiatric: She has a normal mood and affect. Her behavior is normal.         Vitals:    07/31/19 2008   BP: (!) 171/70   Pulse: 85   Resp: 20   Temp: 98 °F (36.7 °C)         Labs  Recent Results (from the past 24 hour(s))   CBC auto differential    Collection Time: 07/31/19  3:13 PM   Result Value Ref Range    WBC 7.16 3.90 - 12.70 K/uL    RBC 4.13 4.00 - 5.40 M/uL    Hemoglobin 10.8 (L) 12.0 - 16.0 g/dL    Hematocrit 35.5 (L) 37.0 - 48.5 %    Mean Corpuscular Volume 86 82 - 98 fL    Mean Corpuscular Hemoglobin 26.2 (L) 27.0 - 31.0 pg    Mean Corpuscular Hemoglobin Conc 30.4 (L) 32.0 - 36.0 g/dL    RDW 14.5 11.5 - 14.5 %    Platelets 310 150 - 350 K/uL    MPV 9.4 9.2 - 12.9 fL    Gran # (ANC) 5.1 1.8 - 7.7 K/uL    Lymph # 1.6 1.0 - 4.8 K/uL    Mono # 0.5 0.3 - 1.0 K/uL    Eos # 0.0 0.0 - 0.5 K/uL    Baso # 0.03 0.00 - 0.20 K/uL    Gran% 71.0 38.0 - 73.0 %    Lymph% 21.6 18.0 - 48.0 %    Mono% 6.7 4.0 - 15.0 %    Eosinophil% 0.3 0.0 - 8.0 %    Basophil% 0.4 0.0 - 1.9 %    Differential Method Automated    Comprehensive metabolic panel    Collection Time: 07/31/19  3:13 PM   Result Value Ref Range    Sodium 138 136 - 145 mmol/L    Potassium 3.6 3.5 - 5.1 mmol/L    Chloride 99 95 - 110 mmol/L    CO2 29 23 - 29 mmol/L    Glucose 105 70 - 110 mg/dL    BUN, Bld 15 8 - 23 mg/dL    Creatinine 1.4 0.5 - 1.4 mg/dL    Calcium 9.9 8.7 - 10.5 mg/dL    Total Protein 8.0 6.0 - 8.4 g/dL    Albumin 4.1 3.5 - 5.2 g/dL    Total Bilirubin 0.5 0.1 - 1.0 mg/dL    Alkaline Phosphatase 69 55 - 135 U/L    AST 15 10 - 40 U/L    ALT 11 10 - 44 U/L    Anion Gap 10 8 - 16 mmol/L    eGFR if African American 44 (A) >60 mL/min/1.73 m^2    eGFR if non African American 38 (A) >60 mL/min/1.73 m^2   Lactic acid, plasma    Collection Time: 07/31/19  3:13 PM   Result Value Ref Range    Lactate (Lactic Acid) 1.5 0.5 - 2.2 mmol/L   Urinalysis, Reflex to Urine Culture Urine, Clean Catch     Collection Time: 07/31/19  4:18 PM   Result Value Ref Range    Specimen UA Urine, Clean Catch     Color, UA Yellow Yellow, Straw, Devora    Appearance, UA Clear Clear    pH, UA >8.0 (A) 5.0 - 8.0    Specific Gravity, UA 1.015 1.005 - 1.030    Protein, UA Negative Negative    Glucose, UA Negative Negative    Ketones, UA Negative Negative    Bilirubin (UA) Negative Negative    Occult Blood UA Negative Negative    Nitrite, UA Negative Negative    Urobilinogen, UA Negative <2.0 EU/dL    Leukocytes, UA Negative Negative          Imaging  Imaging Results          X-Ray Chest PA And Lateral (Final result)  Result time 07/31/19 15:45:38    Final result by Marlon Colby Jr., MD (07/31/19 15:45:38)                 Impression:      Abnormal study similar.  Findings most consistent with cystic bronchiectasis and probable emphysematous change.      Electronically signed by: Marlon Colby MD  Date:    07/31/2019  Time:    15:45             Narrative:    EXAMINATION:  XR CHEST PA AND LATERAL    CLINICAL HISTORY:  Cough    TECHNIQUE:  PA and lateral views of the chest were performed.    COMPARISON:  July 26, 2019.    FINDINGS:  Heart size pulmonary vessels are similar.  Irregular cystic lucencies left greater than right in the upper lung fields likely bulla and bronchiectasis.  Appear to be a few air-fluid level similar.  No pleural fluid.  Bones are osteopenic but intact.                                Assessment and Plan  Bronchiectasis with positive Pseudomonas sputum culture   Patient's pulmonologist asked her to be admitted for positive pseudomonas culture. She has recurrent pseudomonas infections and has been treated with PICC line in the past.   - Start cefepime 2 g every 8 hours   - Consult ID to guide antibiotic treatment   - Check blood cultures    COPD  Patient is usually well controlled on home regimen including oxygen.,   - albuterol-ipratropium every 6 hours   - oxygen as needed    Abdominal Pain  Patient has been  experiencing ongoing cramping pain with diarrhea for 3 weeks.   - consider antispasmodic agent for stomach pain    Nausea   - ondansetron as needed for nausea    Diarrhea   Patient has not been able to tolerate liquids for a few days. Diarrhea has been happening for 3 weeks but has worsened since course of Augmentin and azithromycin   - lactated ringer's bolus    - check stool for c.diff    Hypertension   - Continue home medications    Anxiety   - Continue home medications      Mireille Alaniz, MS3

## 2019-08-01 NOTE — NURSING
Patient ambulated around unit twice this shift without any distress noted.  Family was at patient's side.

## 2019-08-01 NOTE — H&P
[facility] Medicine  History & Physical    Patient Name: Yasmin Asencio  MRN: [id number]  Admission Date: 7/31/2019  Attending Physician: Dina Mckeon MD   Primary Care Provider: Urmila Luna MD         Patient information was obtained from patient.     Subjective:     Principal Problem:Positive sputum culture for Pseudomonas    Chief Complaint:  Positive sputum cultures today.    HPI: Mrs. Yasmin Asencio is a 68 y.o. female known to me with essential hypertension, COPD, GERD, depression, history ERNIE, history of PSVT s/p radiofrequency ablation, and history of pulmonary embolism on chronic anticoagulation who presents to [facility] ED with complaints of positive sputum culture today.  She has chronic bronchiectasis and is familiar when she starts to feel acute exacerbation.  Two weeks ago she started to feel chest congestion, dyspnea, and a cough that was occasionally productive of clear sputum.  She has as needed Augmentin available to her from her pulmonologist and started to take a course of it.  She did not feel any better but when she went to her gastroenterologist she had recommended that her pulmonologist be called.  Her pulmonologist decided to try her on a course of azithromycin also without improvement.  She denies any fevers fevers or chills, but did have nausea without vomiting.  She denies any chest pain, palpitations, diaphoresis, hemoptysis, nor any lower extremity pain or swelling. She has not had any sick contacts or recent travel.      Of note, she does report diarrhea that started after her antibiotics.  She reports that they are watery loose but denies any hematochezia nor melena.  She does have abdominal cramping.    Chart Review:  Previous Hospitalizations  Date Hospital Diagnosis   Sept 2018 [facility] Pneumonia due to Pseudomonas aeruginosa   Nov 2017 [facility] PSVT s/p ablation   Jul 2017 [facility] SVT s/p radiofrequency ablation   Jan 2017 [facility] Acute  pulmonary embolism   Jun 2016 [facility] LHC with normal coronaries   Feb 2016 [facility] Intractable nausea vomiting   Nov 2015 [facility] Hemoptysis s/p IR embolization right & left internal mammary arteries   Jul 2015 [facility] Hemoptysis   Nov 26, 2014 [facility] Pneumonia   Nov 23, 2014 [facility] COPD exacerbation     Outpatient Follow-Up  Date of Visit Physician Service   Jul 2019 Susan Venegas, HAYDEE Gastroenterology   Jul 2019 Declan Mills MD Pulmonology   Jun 2019 Willi Chaidez, HAYDEE Primary Care   Jun 2019 Jasmin Castaneda NP Pain Medicine   May 2019 Taurus Braga MD Cardiology   May 2019 Kristi Torres NP Neurology   Dec 2018 Deborah Moses MD Breast Surgery   Oct 2018 Louie Yuan MD Infectious Diseases   Oct 2018 Leslye اللعي NP Palliative Medicine   Aug 2018 YESENIA Montana MD Hematology   Jan 2017 Savita Beauchamp NP Primary Care Clinic   Jan 2016 Nestor Beauchamp MD Gynecology   Nov 2013 Pieter Baker MD Orthopedic Surgery   May 2013 Pretty Gonzales MD Dermatology     Past Medical History:   Diagnosis Date    Acquired bronchiectasis     due to history of TB - followed by pulmonary, Dr. Zuñiga    Allergy     Amblyopia     rt eye per pt    Anemia of other chronic disease     Anticoagulant long-term use     Anxiety     Cataract     Chronic obstructive pulmonary disease with acute exacerbation     Clotting disorder     COPD (chronic obstructive pulmonary disease)     Depression     Diverticulosis     Essential tremor     GERD (gastroesophageal reflux disease)     Hemangioma of liver     History of tuberculosis 1978    Hypertension     Mixed anxiety and depressive disorder     On home oxygen therapy     Psoriasis     PSVT (paroxysmal supraventricular tachycardia)     Pulmonary embolism     S/P PICC central line placement Apr. 2016 - May 2016    Skin disease     Psoriasis    Spondylosis without myelopathy 8/23/2013    Supraventricular tachycardia     Tachycardia     Thoracic aorta  atherosclerosis     noted on CT scan of chest 1/3/2011    Tuberculosis     Vaginal delivery     x2    Vitamin D deficiency        Past Surgical History:   Procedure Laterality Date    ABLATION N/A 7/24/2017    Performed [facility]os [facility] CATH LAB    BLOCK-NERVE-MEDIAL BRANCH-LUMBAR Bilateral 10/14/2016    Performed by Issac Meyers MD at [facility]    BLOCK-NERVE-MEDIAL BRANCH-LUMBAR Bilateral 8/26/2016    Performed by [facility]ac [facility]ers [facility]    CATARACT EXTRACTION W/  INTRAOCULAR LENS IMPLANT  07/24/12    od dr [place]    CATARACT EXTRACTION W/  INTRAOCULAR LENS IMPLANT  08/07/12    left eye    CHOLECYSTECTOMY      COLONOSCOPY N/A 4/23/2013    Performed by Simeon Graves MD at [facility] ENDO (4TH FLR)    EGD (ESOPHAGOGASTRODUODENOSCOPY) N/A 4/23/2013    Performed by Simeon Graves [facility] at [facility] ENDO (4TH FLR)    EYE SURGERY      GALLBLADDER SURGERY      HEART CATH-LEFT Left 6/16/2016    [facility]    INJECTION BILATERAL SI JOINT Bilateral 2/20/2019    Performed by [facility]ers [facility] at [facility]    INJECTION-FACET Bilateral 1/22/2016    Performed by [facility]ers [facility] at [facility]    RADIOFREQUENCY ABLATION RIGHT LUMBAR L2,3,4,5 RFA Right 11/28/2018    Performed by [facility]ers [facility] at [facility]    RADIOFREQUENCY THERMAL COAGULATION Left 12/18/2018    Performed by [facility]ers [facility] at [facility]    RADIOFREQUENCY THERMOCOAGULATION (RFTC)-NERVE-MEDIAN BRANCH-LUMBAR Right 4/4/2018    Performed by Issac Meyers [facility]    RADIOFREQUENCY THERMOCOAGULATION (RFTC)-NERVE-MEDIAN BRANCH-LUMBAR Left 3/16/2018    [facility]formed by [facility]    RADIOFREQUENCY THERMOCOAGULATION (RFTC)-NERVE-MEDIAN BRANCH-LUMBAR Right 12/16/2016    Performed by [facility]ac Meyers [facility] at [facility]       Review of patient's allergies indicates:   Allergen Reactions     Adhesive Other (See Comments)     Tears up the skin and makes it itch   (leads)    Bactrim [sulfamethoxazole-trimethoprim] Rash     Was hospitalized for rash    Restasis [cyclosporine]     Lipitor [atorvastatin] Other (See Comments)     Muscle aches    Albuterol Other (See Comments)     tremors    Topamax [topiramate]      - made her feel 'bad in the head'       No current facility-administered medications on file prior to encounter.      Current Outpatient Medications on File Prior to Encounter   Medication Sig    acetaminophen (TYLENOL) 500 MG tablet Take 1,000 mg by mouth once daily. sleep    apixaban (ELIQUIS) 5 mg Tab Take 1 tablet (5 mg total) by mouth 2 (two) times daily.    chlorthalidone (HYGROTEN) 25 MG Tab Take 12.5 mg by mouth once daily.    desoximetasone (TOPICORT) 0.25 % cream APPLY  CREAM EXTERNALLY TWICE DAILY AS NEEDED    ergocalciferol (ERGOCALCIFEROL) 50,000 unit Cap TAKE 1 CAPSULE BY MOUTH ONCE A WEEK    escitalopram oxalate (LEXAPRO) 10 MG tablet TAKE ONE TABLET BY MOUTH ONCE DAILY    folic acid (FOLVITE) 1 MG tablet TAKE 1 TABLET BY MOUTH ONCE DAILY    gabapentin (NEURONTIN) 300 MG capsule TAKE 2 CAPSULES BY MOUTH THREE TIMES DAILY    guaiFENesin (MUCINEX) 600 mg 12 hr tablet Take 1,200 mg by mouth 2 (two) times daily.    hyoscyamine (ANASPAZ,LEVSIN) 0.125 mg Tab Take 1 tablet (125 mcg total) by mouth every 6 (six) hours as needed.    ipratropium (ATROVENT) 0.02 % nebulizer solution Inhale 1 vial in nebulizer 3 to 4 times daily    ketoconazole (NIZORAL) 2 % shampoo Apply topically once daily.    Lactobacillus rhamnosus GG (CULTURELLE) 10 billion cell capsule Take 1 capsule by mouth once daily.    mirtazapine (REMERON) 15 MG tablet Take 1 tablet (15 mg total) by mouth every evening.    multivitamin (THERAGRAN) per tablet Take 1 tablet by mouth once daily.    omeprazole (PRILOSEC) 20 MG capsule TAKE 1 CAPSULE BY MOUTH ONCE DAILY AS NEEDED FOR  HEARTBURN  (TAKE  AS  NEEDED)     ondansetron (ZOFRAN-ODT) 8 MG TbDL Take 1 tablet (8 mg total) by mouth every 8 (eight) hours as needed.    sodium chloride 2% (XIOMARA 128) 2 % ophthalmic solution Place 1 drop into both eyes 3 (three) times daily as needed.    sodium chloride 3% 3 % nebulizer solution Take 4 mLs by nebulization 2 (two) times daily.    traMADol (ULTRAM) 50 mg tablet Take 1 tablet (50 mg total) by mouth every 6 (six) hours as needed for Pain.    umeclidinium-vilanterol (ANORO ELLIPTA) 62.5-25 mcg/actuation DsDv Inhale 1 puff into the lungs once daily. Controller    verapamil (VERELAN) 180 MG C24P Take 1 capsule (180 mg total) by mouth 2 (two) times daily.    alprazolam (XANAX) 0.25 MG tablet Take 1 tablet (0.25 mg total) by mouth nightly as needed for Anxiety.    azithromycin (ZITHROMAX Z-AMINATA) 250 MG tablet 2 tabs by mouth on day 1 then 1 tab by mouth on day 2-5    fluocinolone (DERMA-SMOOTHE) 0.01 % external oil Apply topically once daily. (Patient taking differently: Apply topically daily as needed. )    fluocinonide (LIDEX) 0.05 % external solution Apply topically once daily. - apply after shampooing (Patient taking differently: Apply topically once daily. - apply after shampooing as needed)    propylene glycol (SYSTANE BALANCE) 0.6 % Drop Apply 1 drop to eye daily as needed (dry eye).     Family History     Problem Relation (Age of Onset)    Cancer Father, Brother, Maternal Aunt    Heart attack Mother, Brother, Son    Hyperlipidemia Sister    Hypertension Mother    Lung cancer Sister    Psoriasis Sister    Stroke Maternal Uncle        Tobacco Use    Smoking status: Former Smoker     Packs/day: 2.00     Years: 50.00     Pack years: 100.00     Types: Cigarettes     Last attempt to quit: 5/27/2009     Years since quitting: 10.1    Smokeless tobacco: Never Used   Substance and Sexual Activity    Alcohol use: Not Currently     Frequency: Never    Drug use: Never    Sexual activity: Yes     Partners: Male     Review of  Systems   Constitutional: Positive for activity change, appetite change and fatigue. Negative for chills, diaphoresis, fever and unexpected weight change.   HENT: Negative.    Eyes: Negative.    Respiratory: Positive for cough and shortness of breath. Negative for chest tightness and wheezing.    Cardiovascular: Negative for chest pain, palpitations and leg swelling.   Gastrointestinal: Positive for nausea. Negative for abdominal distention, abdominal pain, blood in stool, constipation, diarrhea and vomiting.   Genitourinary: Negative for dysuria and hematuria.   Musculoskeletal: Negative.    Skin: Negative.    Neurological: Positive for weakness. Negative for dizziness, seizures, syncope and light-headedness.   Psychiatric/Behavioral: Negative.      Objective:     Vital Signs (Most Recent):  Temp: 98 °F (36.7 °C) (07/31/19 2008)  Pulse: 85 (07/31/19 2008)  Resp: 20 (07/31/19 2008)  BP: (!) 171/70 (07/31/19 2008)  SpO2: (!) 93 % (07/31/19 2008) Vital Signs (24h Range):  Temp:  [98 °F (36.7 °C)-98.5 °F (36.9 °C)] 98 °F (36.7 °C)  Pulse:  [83-97] 85  Resp:  [18-20] 20  SpO2:  [93 %-98 %] 93 %  BP: (120-171)/(57-96) 171/70     Weight: 70.3 kg (155 lb)  Body mass index is 28.35 kg/m².    Physical Exam   Constitutional: She is oriented to person, place, and time. She appears well-developed and well-nourished. No distress.   HENT:   Head: Normocephalic and atraumatic.   Right Ear: External ear normal.   Left Ear: External ear normal.   Nose: Nose normal.   Eyes: Right eye exhibits no discharge. Left eye exhibits no discharge.   Neck: Normal range of motion.   Cardiovascular: Normal rate, regular rhythm, normal heart sounds and intact distal pulses. Exam reveals no gallop and no friction rub.   No murmur heard.  Pulmonary/Chest:   Normal respiratory effort with rhonchus breath sounds bilaterally and bibasilar crackles, no wheezing   Abdominal: Soft. Bowel sounds are normal. She exhibits no distension. There is no  tenderness. There is no rebound and no guarding.   Musculoskeletal: Normal range of motion. She exhibits no edema.   Neurological: She is alert and oriented to person, place, and time.   Skin: Skin is warm and dry. She is not diaphoretic. No erythema.   Psychiatric: She has a normal mood and affect. Her behavior is normal. Judgment and thought content normal.   Nursing note and vitals reviewed.          Significant Labs: All pertinent labs within the past 24 hours have been reviewed.    Significant Imaging: I have reviewed and interpreted all pertinent imaging results/findings within the past 24 hours.    Assessment/Plan:     * Positive sputum culture for Pseudomonas  Was instructed to return to for treatment of positive respiratory culture.  She had Pseudomonas aeruginosa resistant to ciprofloxacin and pansensitive Serratia marcescens.  Pulmonology was consulted and recommended starting cefepime with Infectious Diseases consultation.  She is stable and does not meet criteria for sepsis.    Essential hypertension  Patient's blood pressure is poorly-controlled; will continue home regimen of chlorthalidone and provide as-needed clonidine..    Acquired bronchiectasis  As addressed above.    GERD (gastroesophageal reflux disease)  Will substitute her home regimen omeprazole for pantoprazole while she is inpatient.    Depression  Stable; will continue her home regimen of escitalopram and mirtazapine.    History of ERNIE infection  Stable; there no acute issues    History of PSVT (paroxysmal supraventricular tachycardia)  Stable; there no acute issues.    History of pulmonary embolism  Stable; will continue her home regimen of apixaban.    Chronic anticoagulation  As addressed above.    VTE Risk Mitigation (From admission, onward)        Ordered     apixaban tablet 5 mg  2 times daily      07/31/19 2020     IP VTE HIGH RISK PATIENT  Once      07/31/19 2020             Aden Tinoco M.D.  Staff Nocturnist  Department of  Hospital Medicine [facility]  Pager: [phone]          N.B.: Portions of this note was dictated using M*Modal Fluency Direct--there may be voice recognition errors occasionally missed on review.

## 2019-08-01 NOTE — PLAN OF CARE
Problem: Adult Inpatient Plan of Care  Goal: Plan of Care Review  Outcome: Ongoing (interventions implemented as appropriate)     08/01/19 4029   Plan of Care Review   Plan of Care Reviewed With patient   Patient continue on isolation precautions pending result from stool.  Patient receives antibiotics.  Patient continues to be treated for stomach cramps and nausea.  Pain medicated for headache.  No acute distress noted.  No injures noted during shift.  Patient is alert and oriented x 4.

## 2019-08-01 NOTE — PLAN OF CARE
Problem: Adult Inpatient Plan of Care  Goal: Patient-Specific Goal (Individualization)  Outcome: Ongoing (interventions implemented as appropriate)  Pt remains on RA. Continue atrovent and sodium chloride svn tx along with Anoro and aerobika therapy. Will continue to monitor. ASHLEY Williamson, RRT

## 2019-08-01 NOTE — ASSESSMENT & PLAN NOTE
68 year old female with chronic bronchiectasis and history of colonization with pseudomonas (follows with Dr. Yuan in ID clinic) presents with worsening cough, dyspnea, and SOB. Sputum culture obtained prior to admission positive for pseudomonas (cipro R) and pan-sensitive serratia. Pt is currently on cefepime. Of note, she took both augmentin then azithromycin prior to admission and she is now having watery diarrhea with abdominal cramping. c diff negative. ID consulted for further recs.     Pt is afebrile and without leukocytosis. She is not in respiratory distress. pulm following    Plan:  1. Continue cefepime 2 gram IV q 12 hours (renally dosed) for a total of 14 days (end date: 8/14/19)  2. okay to give anti-diarrheals given negative c diff  3. Will schedule f/u with ID at end of therapy  4. Needs f/u with pulmonology as well  5. Will sign off

## 2019-08-01 NOTE — ASSESSMENT & PLAN NOTE
Was instructed to return to for treatment of positive respiratory culture.  She had Pseudomonas aeruginosa resistant to ciprofloxacin and pansensitive Serratia marcescens.  Pulmonology was consulted and recommended starting cefepime with Infectious Diseases consultation.  She is stable and does not meet criteria for sepsis.    picc line/midline - IV cefepime at home

## 2019-08-02 VITALS
HEIGHT: 62 IN | RESPIRATION RATE: 18 BRPM | WEIGHT: 151 LBS | SYSTOLIC BLOOD PRESSURE: 119 MMHG | TEMPERATURE: 98 F | HEART RATE: 94 BPM | DIASTOLIC BLOOD PRESSURE: 55 MMHG | OXYGEN SATURATION: 99 % | BODY MASS INDEX: 27.79 KG/M2

## 2019-08-02 PROCEDURE — 99900035 HC TECH TIME PER 15 MIN (STAT)

## 2019-08-02 PROCEDURE — G0378 HOSPITAL OBSERVATION PER HR: HCPCS

## 2019-08-02 PROCEDURE — 94640 AIRWAY INHALATION TREATMENT: CPT

## 2019-08-02 PROCEDURE — 96376 TX/PRO/DX INJ SAME DRUG ADON: CPT

## 2019-08-02 PROCEDURE — 25000003 PHARM REV CODE 250: Performed by: INTERNAL MEDICINE

## 2019-08-02 PROCEDURE — 94664 DEMO&/EVAL PT USE INHALER: CPT

## 2019-08-02 PROCEDURE — A4216 STERILE WATER/SALINE, 10 ML: HCPCS | Performed by: HOSPITALIST

## 2019-08-02 PROCEDURE — 63600175 PHARM REV CODE 636 W HCPCS: Performed by: PHYSICIAN ASSISTANT

## 2019-08-02 PROCEDURE — 94761 N-INVAS EAR/PLS OXIMETRY MLT: CPT

## 2019-08-02 PROCEDURE — 25000003 PHARM REV CODE 250: Performed by: HOSPITALIST

## 2019-08-02 PROCEDURE — 25000242 PHARM REV CODE 250 ALT 637 W/ HCPCS: Performed by: INTERNAL MEDICINE

## 2019-08-02 RX ORDER — LOPERAMIDE HYDROCHLORIDE 2 MG/1
2 CAPSULE ORAL 4 TIMES DAILY PRN
Qty: 20 CAPSULE | Refills: 0 | Status: SHIPPED | OUTPATIENT
Start: 2019-08-02 | End: 2019-08-12

## 2019-08-02 RX ORDER — CEFEPIME HYDROCHLORIDE 1 G/50ML
2 INJECTION, SOLUTION INTRAVENOUS EVERY 12 HOURS
Qty: 1200 ML | Refills: 0 | Status: SHIPPED | OUTPATIENT
Start: 2019-08-02 | End: 2019-08-14

## 2019-08-02 RX ADMIN — CEFEPIME HYDROCHLORIDE 2 G: 2 INJECTION, SOLUTION INTRAVENOUS at 09:08

## 2019-08-02 RX ADMIN — GABAPENTIN 600 MG: 300 CAPSULE ORAL at 08:08

## 2019-08-02 RX ADMIN — PANTOPRAZOLE SODIUM 40 MG: 40 TABLET, DELAYED RELEASE ORAL at 08:08

## 2019-08-02 RX ADMIN — SODIUM CHLORIDE 30 MG/ML INHALATION SOLUTION 4 ML: 30 SOLUTION INHALANT at 07:08

## 2019-08-02 RX ADMIN — Medication 10 ML: at 05:08

## 2019-08-02 RX ADMIN — IPRATROPIUM BROMIDE 0.5 MG: 0.5 SOLUTION RESPIRATORY (INHALATION) at 07:08

## 2019-08-02 RX ADMIN — APIXABAN 5 MG: 5 TABLET, FILM COATED ORAL at 08:08

## 2019-08-02 RX ADMIN — VERAPAMIL HYDROCHLORIDE 180 MG: 180 TABLET, FILM COATED, EXTENDED RELEASE ORAL at 08:08

## 2019-08-02 RX ADMIN — ESCITALOPRAM OXALATE 10 MG: 10 TABLET ORAL at 08:08

## 2019-08-02 RX ADMIN — CHLORTHALIDONE 12.5 MG: 25 TABLET ORAL at 08:08

## 2019-08-02 NOTE — CONSULTS
[facility]  Critical Care Medicine  Consult Note    Patient Name: Yasmin Asencio  MRN: [id number]  Admission Date: 7/31/2019  Hospital Length of Stay: 1 days  Code Status: Full Code  Attending Physician: Dina Mckeon MD   Primary Care Provider: Urmila Luna MD   Principal Problem: Positive sputum culture for Pseudomonas    Consults bronchiectasis  Subjective:     HPI:  This is a 68 year old female with PMH of chronic bronchiectasis (with history of pseudomonas pneumonia) essential hypertension, COPD, GERD, depression, history ERNIE, history of PSVT s/p radiofrequency ablation, and history of pulmonary embolism on chronic anticoagulation who presents to [facility] ED with productive cough. Two weeks ago she started to feel chest congestion, dyspnea, and a cough that was occasionally productive of clear sputum.  She  took Augmentin without improvement, then azithro without improvement.  She denies any fevers fevers or chills.  She denies any chest pain, palpitations, diaphoresis, hemoptysis, nor any lower extremity pain or swelling. She has not had any sick contacts or recent travel.     Hospital/ICU Course:  No notes on file    Past Medical History:   Diagnosis Date    Acquired bronchiectasis     due to history of TB - followed by pulmonary, Dr. Zuñiga    Allergy     Amblyopia     rt eye per pt    Anemia of other chronic disease     Anticoagulant long-term use     Anxiety     Cataract     Chronic obstructive pulmonary disease with acute exacerbation     Clotting disorder     COPD (chronic obstructive pulmonary disease)     Depression     Diverticulosis     Essential tremor     GERD (gastroesophageal reflux disease)     Hemangioma of liver     History of tuberculosis 1978    Hypertension     Mixed anxiety and depressive disorder     On home oxygen therapy     Psoriasis     PSVT (paroxysmal supraventricular tachycardia)     Pulmonary embolism     S/P PICC central line  placement Apr. 2016 - May 2016    Skin disease     Psoriasis    Spondylosis without myelopathy 8/23/2013    Supraventricular tachycardia     Tachycardia     Thoracic aorta atherosclerosis     noted on CT scan of chest 1/3/2011    Tuberculosis     Vaginal delivery     x2    Vitamin D deficiency        Past Surgical History:   Procedure Laterality Date    ABLATION N/A 7/24/2017    Performed by Marlon [facility]esteros [facility] at [facility] CATH LAB    BLOCK-NERVE-MEDIAL BRANCH-LUMBAR Bilateral 10/14/2016    [facility]    BLOCK-NERVE-MEDIAL BRANCH-LUMBAR Bilateral 8/26/2016    [facility]    CATARACT EXTRACTION W/  INTRAOCULAR LENS IMPLANT  07/24/12    od dr [place]    CATARACT EXTRACTION W/  INTRAOCULAR LENS IMPLANT  08/07/12    left eye    CHOLECYSTECTOMY      COLONOSCOPY N/A 4/23/2013    Performed by Simeon Graves MD at [facility] ENDO (4TH FLR)    EGD (ESOPHAGOGASTRODUODENOSCOPY) N/A 4/23/2013    Performed by Simeon Graves MD at [facility] ENDO (4TH FLR)    EYE SURGERY      GALLBLADDER SURGERY      HEART CATH-LEFT Left 6/16/2016    [facility] CATH [facility]    INJECTION BILATERAL SI JOINT Bilateral 2/20/2019    [facility]    INJECTION-FACET Bilateral 1/22/2016    [facility]    RADIOFREQUENCY ABLATION RIGHT LUMBAR L2,3,4,5 RFA Right 11/28/2018    [facility]    RADIOFREQUENCY THERMAL COAGULATION Left 12/18/2018    Performed by Issac [facility]ers [facility]    RADIOFREQUENCY THERMOCOAGULATION (RFTC)-NERVE-MEDIAN BRANCH-LUMBAR Right 4/4/2018    [facility]    RADIOFREQUENCY THERMOCOAGULATION (RFTC)-NERVE-MEDIAN BRANCH-LUMBAR Left 3/16/2018    [facility]    RADIOFREQUENCY THERMOCOAGULATION  (RFTC)-NERVE-MEDIAN BRANCH-LUMBAR Right 12/16/2016    [facility]       Review of patient's allergies indicates:   Allergen Reactions    Adhesive Other (See Comments)     Tears up the skin and makes it itch   (leads)    Bactrim [sulfamethoxazole-trimethoprim] Rash     Was hospitalized for rash    Restasis [cyclosporine]     Lipitor [atorvastatin] Other (See Comments)     Muscle aches    Albuterol Other (See Comments)     tremors    Topamax [topiramate]      - made her feel 'bad in the head'       Family History     Problem Relation (Age of Onset)    Cancer Father, Brother, Maternal Aunt    Heart attack Mother, Brother, Son    Hyperlipidemia Sister    Hypertension Mother    Lung cancer Sister    Psoriasis Sister    Stroke Maternal Uncle        Tobacco Use    Smoking status: Former Smoker     Packs/day: 2.00     Years: 50.00     Pack years: 100.00     Types: Cigarettes     Last attempt to quit: 5/27/2009     Years since quitting: 10.1    Smokeless tobacco: Never Used   Substance and Sexual Activity    Alcohol use: Not Currently     Frequency: Never    Drug use: Never    Sexual activity: Yes     Partners: Male      Review of Systems   Constitutional: Positive for activity change, appetite change, chills and fatigue. Negative for fever.   HENT: Negative.    Eyes: Negative.    Respiratory: Positive for cough and shortness of breath.    Cardiovascular: Negative.    Gastrointestinal: Positive for diarrhea and nausea.   Endocrine: Negative.    Genitourinary: Negative.    Musculoskeletal: Negative.    Skin: Negative.    Allergic/Immunologic: Negative.    Neurological: Negative.    Hematological: Negative.    Psychiatric/Behavioral: Negative.      Objective:     Vital Signs (Most Recent):  Temp: 99 °F (37.2 °C) (08/01/19 1947)  Pulse: 93 (08/01/19 2041)  Resp: 18 (08/01/19 2041)  BP: (!) 115/54 (08/01/19 1947)  SpO2: (!) 94 % (08/01/19 2041) Vital Signs (24h Range):  Temp:   [97.9 °F (36.6 °C)-99 °F (37.2 °C)] 99 °F (37.2 °C)  Pulse:  [76-94] 93  Resp:  [17-20] 18  SpO2:  [91 %-100 %] 94 %  BP: (115-135)/(54-60) 115/54   Weight: 52.2 kg (115 lb)  Body mass index is 21.03 kg/m².      Intake/Output Summary (Last 24 hours) at 8/1/2019 2150  Last data filed at 8/1/2019 1740  Gross per 24 hour   Intake 770 ml   Output --   Net 770 ml       Physical Exam   Constitutional: She is oriented to person, place, and time. She appears well-developed and well-nourished. No distress.   HENT:   Head: Normocephalic.   Right Ear: External ear normal.   Left Ear: External ear normal.   Eyes: Right eye exhibits no discharge. Left eye exhibits no discharge. No scleral icterus.   Neck: Neck supple. No JVD present.   Cardiovascular: Regular rhythm and normal heart sounds.   Pulmonary/Chest:   rhonchi   Abdominal: She exhibits no distension. There is no tenderness. There is no guarding.   Musculoskeletal: She exhibits no edema or deformity.   Neurological: She is alert and oriented to person, place, and time.   Skin: Skin is warm. She is not diaphoretic. No erythema.       Vents:     Lines/Drains/Airways     Peripherally Inserted Central Catheter Line                 PICC Double Lumen 08/01/19 1722 right basilic less than 1 day          Peripheral Intravenous Line                 Peripheral IV - Single Lumen 07/31/19 1815 20 G Left Forearm 1 day              Significant Labs:    CBC/Anemia Profile:  Recent Labs   Lab 07/31/19  1513 08/01/19  0502   WBC 7.16 5.22   HGB 10.8* 10.4*   HCT 35.5* 33.7*    226   MCV 86 85   RDW 14.5 14.3        Chemistries:  Recent Labs   Lab 07/31/19  1513 08/01/19  0502    140   K 3.6 3.0*   CL 99 100   CO2 29 30*   BUN 15 16   CREATININE 1.4 1.2   CALCIUM 9.9 9.4   ALBUMIN 4.1 3.8   PROT 8.0 7.3   BILITOT 0.5 0.5   ALKPHOS 69 59   ALT 11 10   AST 15 14   MG  --  2.0   PHOS  --  3.6       CXR-reviewed      ABG  No results for input(s): PH, PO2, PCO2, HCO3, BE in the  last 168 hours.  Assessment/Plan:     Pulmonary  Bronchiectasis with (acute) exacerbation  Assessment:  1. Bronchiectasis exacerbation-2/2 Pseudomonas overgrowth  2. COPD    Plan:  1. Cefepime x 2 weeks, arrange for outpatient abx  2. Anoro, nebs  3. F/u with Dr. Mills upon discharge         Sudeep Holley MD  Department of Pulmonary & Critical Care  Cell: [phone]

## 2019-08-02 NOTE — PLAN OF CARE
Problem: Adult Inpatient Plan of Care  Goal: Plan of Care Review  Outcome: Outcome(s) achieved Date Met: 08/02/19 08/02/19 0525   Plan of Care Review   Plan of Care Reviewed With patient   Progress improving   Patient is alert and oriented x 4.  No injuries noted throughout shift.  Patient continues on Antibiotic therapy.

## 2019-08-02 NOTE — PLAN OF CARE
"   08/02/19 0952   Post-Acute Status   Post-Acute Authorization Home Health/Hospice   Home Health/Hospice Status Pending Payor Review   Discharge Delays None known at this time     Orders received for HH. JORDAN faxed orders, h&p, face sheet, and progress notes to [facility] @ [phone]. SW waiting for confirmation of services.     12:29PM  SW contacted [facility] @ [phone] to determine if pt's care had been arranged. According to Jelly, pt will have [facility], and [facility], however, she is waiting on authorization for iv home infusion from [facility].     12:38PM  EDUCATION:  Pt provided with educational information on [employer].  Information reviewed and placed in :My Healthcare Packet" to be brought home for  use as resource after discharge.  Information included:  signs and symptoms to look for at discharge. JORDAN instructed pt to call the doctor if experiencing symptoms that may indicate a medical emergency: CALL 911 if signs and symptoms worsen. JORDAN asked pt to provide SW with two signs and symptoms recently discussed.  Pt stated, "PAIN". Reminded pt things she will be responsible for to manage her healthcare at home: getting Rx filled, attending follow up appointments, and taking medication as prescribed were discussed.   Teach back method used.  All questions answered.  Patient verbalized understanding of all information.      JORDAN provided pt with a copy of follow-up appointments. JORDAN explained/highlighted date, time, and location of each appointment. JORDAN provided pt with a blue folder and instructed pt to place all medical documents in blue folder. JORDAN explained to pt the nurse will provide an AVS with diagnosis and instructed pt to place in blue folder and bring to follow-up appointment.     "

## 2019-08-02 NOTE — PLAN OF CARE
08/02/19 1241   Final Note   Assessment Type Final Discharge Note   Anticipated Discharge Disposition Home-Health   What phone number can be called within the next 1-3 days to see how you are doing after discharge? [phone]   Hospital Follow Up  Appt(s) scheduled? Yes   Discharge plans and expectations educations in teach back method with documentation complete? Yes   Right Care Referral Info   Post Acute Recommendation Home-care   Referral Type [facility]

## 2019-08-02 NOTE — ASSESSMENT & PLAN NOTE
Assessment:  1. Bronchiectasis exacerbation-2/2 Pseudomonas overgrowth  2. COPD    Plan:  1. Cefepime x 2 weeks, arrange for outpatient abx  2. Anoro, nebs  3. F/u with Dr. Mills upon discharge

## 2019-08-02 NOTE — DISCHARGE SUMMARY
[facility] Hospital Medicine  Discharge Summary      Patient Name: Yasmin Asencio  MRN: [id number]  Admission Date: 7/31/2019  Hospital Length of Stay: 1 days  Discharge Date and Time:  08/02/2019 9:44 AM  Attending Physician: Dina Mckeon MD   Discharging Provider: Dina Mckeon MD  Primary Care Provider: Urmila Luna MD      HPI:   Mrs. Yasmin Asencio is a 68 y.o. female known to me with essential hypertension, COPD, GERD, depression, history ERNIE, history of PSVT s/p radiofrequency ablation, and history of pulmonary embolism on chronic anticoagulation who presents to [facility] ED with complaints of positive sputum culture today.  She has chronic bronchiectasis and is familiar when she starts to feel acute exacerbation.  Two weeks ago she started to feel chest congestion, dyspnea, and a cough that was occasionally productive of clear sputum.  She has as needed Augmentin available to her from her pulmonologist and started to take a course of it.  She did not feel any better but when she went to her gastroenterologist she had recommended that her pulmonologist be called.  Her pulmonologist decided to try her on a course of azithromycin also without improvement.  She denies any fevers fevers or chills, but did have nausea without vomiting.  She denies any chest pain, palpitations, diaphoresis, hemoptysis, nor any lower extremity pain or swelling. She has not had any sick contacts or recent travel.      Of note, she does report diarrhea that started after her antibiotics.  She reports that they are watery loose but denies any hematochezia nor melena.  She does have abdominal cramping.    * No surgery found *      Hospital Course:   Pt admitted with positive sputum culture - Pseudomonas, only sensitive to IV therapy. ID consulted. picc line placement and started on cefepime. C.diff work up pending.     C.diff negative  picc line placed late yesterday. Await insurance authorization  for IV abx.  Appreciate ID recs - will plan to continue abx until 8/14/2019. Follow up PCP, pulmonology and ID as scheduled. Stable for dc home today.     Consults:   Consults (From admission, onward)        Status Ordering Provider     Inpatient consult to Infectious Diseases  Once     Provider:  Taylor Villalpando MD    Completed KAYLA ELIZABETH     [facility]  Once     Provider:  (Not yet assigned)    Acknowledged LONG DUMONT     Inpatient consult to Pulmonology  Once     Provider:  Declan Mills MD    Completed OSMAN BERNARDO     Inpatient consult to Pulmonology  Once     Provider:  Sudeep Holley MD    Acknowledged KAYLA ELIZABETH          No new Assessment & Plan notes have been filed under this hospital service since the last note was generated.  Service: Hospital Medicine    Final Active Diagnoses:    Diagnosis Date Noted POA    PRINCIPAL PROBLEM:  Positive sputum culture for Pseudomonas [R84.5] 07/31/2019 Yes    Diarrhea of presumed infectious origin [R19.7] 08/01/2019 Yes    Cough [R05] 08/01/2019 Yes    Essential hypertension [I10] 07/31/2019 Yes     Chronic    Depression [F32.9] 07/31/2019 Yes     Chronic    History of ERNIE infection [Z86.19] 07/31/2019 Yes     Chronic    Bronchiectasis with (acute) exacerbation [J47.1] 11/29/2017 Yes    Chronic anticoagulation [Z79.01] 03/20/2017 Not Applicable     Chronic    History of PSVT (paroxysmal supraventricular tachycardia) [Z86.79] 03/20/2017 Not Applicable     Chronic    History of pulmonary embolism [Z86.711] 01/05/2017 Yes     Chronic    GERD (gastroesophageal reflux disease) [K21.9]  Yes     Chronic    Acquired bronchiectasis [J47.9]  Yes     Chronic      Problems Resolved During this Admission:       Discharged Condition: good    Disposition: Home-Health Care Svc    Follow Up:  Follow-up Information     Urmila Luna MD On 8/21/2019.    Specialties:  Internal Medicine, Pediatrics  Why:  Outpatient  Services, PCP, follow-up appointment @ 2:20PM.   Contact information:  [place] [address] [phone]                 Patient Instructions:      Diet Cardiac     Notify your health care provider if you experience any of the following:  temperature >100.4     Notify your health care provider if you experience any of the following:  persistent nausea and vomiting or diarrhea     Notify your health care provider if you experience any of the following:  severe uncontrolled pain     Activity as tolerated         Pending Diagnostic Studies:     None         Medications:  Reconciled Home Medications:      Medication List      START taking these medications    cefepime in dextrose 5 % 2 gram/50 mL Pgbk  Commonly known as:  MAXIPIME  Inject 50 mLs (2 g total) into the vein every 12 (twelve) hours. for 12 days     loperamide 2 mg capsule  Commonly known as:  IMODIUM  Take 1 capsule (2 mg total) by mouth 4 (four) times daily as needed for Diarrhea.        CHANGE how you take these medications    fluocinolone 0.01 % external oil  Commonly known as:  DERMA-SMOOTHE  Apply topically once daily.  What changed:    · when to take this  · reasons to take this     fluocinonide 0.05 % external solution  Commonly known as:  LIDEX  Apply topically once daily. - apply after shampooing  What changed:  additional instructions        CONTINUE taking these medications    acetaminophen 500 MG tablet  Commonly known as:  TYLENOL  Take 1,000 mg by mouth once daily. sleep     ALPRAZolam 0.25 MG tablet  Commonly known as:  XANAX  Take 1 tablet (0.25 mg total) by mouth nightly as needed for Anxiety.     apixaban 5 mg Tab  Commonly known as:  ELIQUIS  Take 1 tablet (5 mg total) by mouth 2 (two) times daily.     chlorthalidone 25 MG Tab  Commonly known as:  HYGROTEN  Take 12.5 mg by mouth once daily.     desoximetasone 0.25 % cream  Commonly known as:  TOPICORT  APPLY  CREAM EXTERNALLY TWICE DAILY AS NEEDED     ergocalciferol 50,000  unit Cap  Commonly known as:  ERGOCALCIFEROL  TAKE 1 CAPSULE BY MOUTH ONCE A WEEK     escitalopram oxalate 10 MG tablet  Commonly known as:  LEXAPRO  TAKE ONE TABLET BY MOUTH ONCE DAILY     folic acid 1 MG tablet  Commonly known as:  FOLVITE  TAKE 1 TABLET BY MOUTH ONCE DAILY     gabapentin 300 MG capsule  Commonly known as:  NEURONTIN  TAKE 2 CAPSULES BY MOUTH THREE TIMES DAILY     guaiFENesin 600 mg 12 hr tablet  Commonly known as:  MUCINEX  Take 1,200 mg by mouth 2 (two) times daily.     hyoscyamine 0.125 mg Tab  Commonly known as:  ANASPAZ,LEVSIN  Take 1 tablet (125 mcg total) by mouth every 6 (six) hours as needed.     ipratropium 0.02 % nebulizer solution  Commonly known as:  ATROVENT  Inhale 1 vial in nebulizer 3 to 4 times daily     ketoconazole 2 % shampoo  Commonly known as:  NIZORAL  Apply topically once daily.     Lactobacillus rhamnosus GG 10 billion cell capsule  Commonly known as:  CULTURELLE  Take 1 capsule by mouth once daily.     mirtazapine 15 MG tablet  Commonly known as:  REMERON  Take 1 tablet (15 mg total) by mouth every evening.     multivitamin per tablet  Commonly known as:  THERAGRAN  Take 1 tablet by mouth once daily.     omeprazole 20 MG capsule  Commonly known as:  PRILOSEC  TAKE 1 CAPSULE BY MOUTH ONCE DAILY AS NEEDED FOR  HEARTBURN  (TAKE  AS  NEEDED)     ondansetron 8 MG Tbdl  Commonly known as:  ZOFRAN-ODT  Take 1 tablet (8 mg total) by mouth every 8 (eight) hours as needed.     sodium chloride 2% 2 % ophthalmic solution  Commonly known as:  XIOMARA 128  Place 1 drop into both eyes 3 (three) times daily as needed.     sodium chloride 3% 3 % nebulizer solution  Take 4 mLs by nebulization 2 (two) times daily.     SYSTANE BALANCE 0.6 % Drop  Generic drug:  propylene glycol  Apply 1 drop to eye daily as needed (dry eye).     traMADol 50 mg tablet  Commonly known as:  ULTRAM  Take 1 tablet (50 mg total) by mouth every 6 (six) hours as needed for Pain.     umeclidinium-vilanterol 62.5-25  mcg/actuation Dsdv  Commonly known as:  ANORO ELLIPTA  Inhale 1 puff into the lungs once daily. Controller     verapamil 180 MG C24p  Commonly known as:  VERELAN  Take 1 capsule (180 mg total) by mouth 2 (two) times daily.        STOP taking these medications    azithromycin 250 MG tablet  Commonly known as:  ZITHROMAX Z-AMINATA            Indwelling Lines/Drains at time of discharge:   Lines/Drains/Airways     Peripherally Inserted Central Catheter Line                 PICC Double Lumen 08/01/19 1722 right basilic less than 1 day                Time spent on the discharge of patient: 40 minutes  Patient was seen and examined on the date of discharge and determined to be suitable for discharge.         Dina Mckeon MD  Department of Hospital Medicine  [facility]

## 2019-08-02 NOTE — HPI
This is a 68 year old female with PMH of chronic bronchiectasis (with history of pseudomonas pneumonia) essential hypertension, COPD, GERD, depression, history ERNIE, history of PSVT s/p radiofrequency ablation, and history of pulmonary embolism on chronic anticoagulation who presents to [facility] ED with productive cough. Two weeks ago she started to feel chest congestion, dyspnea, and a cough that was occasionally productive of clear sputum.  She  took Augmentin without improvement, then azithro without improvement.  She denies any fevers fevers or chills.  She denies any chest pain, palpitations, diaphoresis, hemoptysis, nor any lower extremity pain or swelling. She has not had any sick contacts or recent travel.

## 2019-08-02 NOTE — PROGRESS NOTES
WRITTEN HEALTHCARE DISCHARGE INFORMATION     Things that YOU are RESPONSIBLE for to Manage Your Care At Home:    1. Getting your prescriptions filled.  2. Taking you medications as directed. DO NOT MISS ANY DOSES!  3. Going to your follow-up doctor appointments. This is important because it allows the doctor to monitor your progress and to determine if any changes need to be made to your treatment plan.    If you are unable to make your follow up appointments, please call the number listed and reschedule this appointment.     ____________HELP AT HOME____________________    Experiencing any SIGNS or SYMPTOMS: YOU CAN    Schedule a same day appopintment with your Primary Care Doctor or  you can call [facility] On [facility] Line for 24/7 assistance at [phone]    If you are experience any signs or symptoms that have become severe, Call 911 and come to your nearest Emergency Room.    Thank you for choosing [facility] and allowing us to care for you.   From your care management team: Chelle SANCHEZ [facility] [phone]    You should receive a call from [facility] Discharge [facility] within 48-72 hours to help manage your care after discharge. Please try to make sure that you answer your phone for this important phone call.  Follow-up Information     Urmila Luna MD On 8/21/2019.    Specialties:  Internal Medicine, Pediatrics  Why:  Outpatient Services, PCP, follow-up appointment @ 2:20PM.   Contact information:  [address] [facility] [phone]             [facility].    Specialties:  Home Health Services, Physical Therapy, Occupational Therapy  Why:  Home Health  Contact information:  [address]  [place] [address]  [phone]             [facility].    Specialties:  Pharmacist, DME Provider, IV Infusion  Why:  Infusion  Contact information:  [address]  [place] [address]  [phone]

## 2019-08-02 NOTE — PLAN OF CARE
Problem: Adult Inpatient Plan of Care  Goal: Plan of Care Review  Outcome: Ongoing (interventions implemented as appropriate)  Pt is on room air and treatments will be given as scheduked.

## 2019-08-02 NOTE — SUBJECTIVE & OBJECTIVE
Past Medical History:   Diagnosis Date    Acquired bronchiectasis     due to history of TB - followed by pulmonary, Dr. Zuñiga    Allergy     Amblyopia     rt eye per pt    Anemia of other chronic disease     Anticoagulant long-term use     Anxiety     Cataract     Chronic obstructive pulmonary disease with acute exacerbation     Clotting disorder     COPD (chronic obstructive pulmonary disease)     Depression     Diverticulosis     Essential tremor     GERD (gastroesophageal reflux disease)     Hemangioma of liver     History of tuberculosis 1978    Hypertension     Mixed anxiety and depressive disorder     On home oxygen therapy     Psoriasis     PSVT (paroxysmal supraventricular tachycardia)     Pulmonary embolism     S/P PICC central line placement Apr. 2016 - May 2016    Skin disease     Psoriasis    Spondylosis without myelopathy 8/23/2013    Supraventricular tachycardia     Tachycardia     Thoracic aorta atherosclerosis     noted on CT scan of chest 1/3/2011    Tuberculosis     Vaginal delivery     x2    Vitamin D deficiency        Past Surgical History:   Procedure Laterality Date    ABLATION N/A 7/24/2017    Performed by Marlon Ballesteros MD at [facility] CATH LAB    BLOCK-NERVE-MEDIAL BRANCH-LUMBAR Bilateral 10/14/2016    Performed by Issac Meyers [facility] at [facility] [place] MGT    BLOCK-NERVE-MEDIAL BRANCH-LUMBAR Bilateral 8/26/2016    Performed by Issac Meyers MD at [facility] [place] MGT    CATARACT EXTRACTION W/  INTRAOCULAR LENS IMPLANT  07/24/12    od dr spain    CATARACT EXTRACTION W/  INTRAOCULAR LENS IMPLANT  08/07/12    left eye    CHOLECYSTECTOMY      COLONOSCOPY N/A 4/23/2013    Performed by Simeon Graves MD at [facility] ENDO (4TH FLR)    EGD (ESOPHAGOGASTRODUODENOSCOPY) N/A 4/23/2013    Performed by Simeon Graves MD at [facility] ENDO (4TH FLR)    EYE SURGERY      GALLBLADDER SURGERY      HEART CATH-LEFT Left 6/16/2016    Performed by [facility]aga [facility] at  [facility] CATH LAB    INJECTION BILATERAL SI JOINT Bilateral 2/20/2019    [facility]formed by [facility] at [facility]    INJECTION-FACET Bilateral 1/22/2016    Performed by [facility]ac Meyers MD at [facility]    RADIOFREQUENCY ABLATION RIGHT LUMBAR L2,3,4,5 RFA Right 11/28/2018    Performed by Issac Meyers MD at [facility]    RADIOFREQUENCY THERMAL COAGULATION Left 12/18/2018    [facility]    RADIOFREQUENCY THERMOCOAGULATION (RFTC)-NERVE-MEDIAN BRANCH-LUMBAR Right 4/4/2018    Performed by [facility]ac Meyers MD at [facility]    RADIOFREQUENCY THERMOCOAGULATION (RFTC)-NERVE-MEDIAN BRANCH-LUMBAR Left 3/16/2018    Performed by [facility]ac Meyers MD at [facility]    RADIOFREQUENCY THERMOCOAGULATION (RFTC)-NERVE-MEDIAN BRANCH-LUMBAR Right 12/16/2016    Performed by Issac Meyers MD at [facility]       Review of patient's allergies indicates:   Allergen Reactions    Adhesive Other (See Comments)     Tears up the skin and makes it itch   (leads)    Bactrim [sulfamethoxazole-trimethoprim] Rash     Was hospitalized for rash    Restasis [cyclosporine]     Lipitor [atorvastatin] Other (See Comments)     Muscle aches    Albuterol Other (See Comments)     tremors    Topamax [topiramate]      - made her feel 'bad in the head'       Family History     Problem Relation (Age of Onset)    Cancer Father, Brother, Maternal Aunt    Heart attack Mother, Brother, Son    Hyperlipidemia Sister    Hypertension Mother    Lung cancer Sister    Psoriasis Sister    Stroke Maternal Uncle        Tobacco Use    Smoking status: Former Smoker     Packs/day: 2.00     Years: 50.00     Pack years: 100.00     Types: Cigarettes     Last attempt to quit: 5/27/2009     Years since quitting: 10.1    Smokeless tobacco: Never Used   Substance and Sexual Activity    Alcohol use: Not Currently     Frequency: Never    Drug use: Never    Sexual activity: Yes     Partners: Male       Review of Systems   Constitutional: Positive for activity change, appetite change, chills and fatigue. Negative for fever.   HENT: Negative.    Eyes: Negative.    Respiratory: Positive for cough and shortness of breath.    Cardiovascular: Negative.    Gastrointestinal: Positive for diarrhea and nausea.   Endocrine: Negative.    Genitourinary: Negative.    Musculoskeletal: Negative.    Skin: Negative.    Allergic/Immunologic: Negative.    Neurological: Negative.    Hematological: Negative.    Psychiatric/Behavioral: Negative.      Objective:     Vital Signs (Most Recent):  Temp: 99 °F (37.2 °C) (08/01/19 1947)  Pulse: 93 (08/01/19 2041)  Resp: 18 (08/01/19 2041)  BP: (!) 115/54 (08/01/19 1947)  SpO2: (!) 94 % (08/01/19 2041) Vital Signs (24h Range):  Temp:  [97.9 °F (36.6 °C)-99 °F (37.2 °C)] 99 °F (37.2 °C)  Pulse:  [76-94] 93  Resp:  [17-20] 18  SpO2:  [91 %-100 %] 94 %  BP: (115-135)/(54-60) 115/54   Weight: 52.2 kg (115 lb)  Body mass index is 21.03 kg/m².      Intake/Output Summary (Last 24 hours) at 8/1/2019 2150  Last data filed at 8/1/2019 1740  Gross per 24 hour   Intake 770 ml   Output --   Net 770 ml       Physical Exam   Constitutional: She is oriented to person, place, and time. She appears well-developed and well-nourished. No distress.   HENT:   Head: Normocephalic.   Right Ear: External ear normal.   Left Ear: External ear normal.   Eyes: Right eye exhibits no discharge. Left eye exhibits no discharge. No scleral icterus.   Neck: Neck supple. No JVD present.   Cardiovascular: Regular rhythm and normal heart sounds.   Pulmonary/Chest:   rhonchi   Abdominal: She exhibits no distension. There is no tenderness. There is no guarding.   Musculoskeletal: She exhibits no edema or deformity.   Neurological: She is alert and oriented to person, place, and time.   Skin: Skin is warm. She is not diaphoretic. No erythema.       Vents:     Lines/Drains/Airways     Peripherally Inserted Central Catheter Line                  PICC Double Lumen 08/01/19 1722 right basilic less than 1 day          Peripheral Intravenous Line                 Peripheral IV - Single Lumen 07/31/19 1815 20 G Left Forearm 1 day              Significant Labs:    CBC/Anemia Profile:  Recent Labs   Lab 07/31/19  1513 08/01/19  0502   WBC 7.16 5.22   HGB 10.8* 10.4*   HCT 35.5* 33.7*    226   MCV 86 85   RDW 14.5 14.3        Chemistries:  Recent Labs   Lab 07/31/19  1513 08/01/19  0502    140   K 3.6 3.0*   CL 99 100   CO2 29 30*   BUN 15 16   CREATININE 1.4 1.2   CALCIUM 9.9 9.4   ALBUMIN 4.1 3.8   PROT 8.0 7.3   BILITOT 0.5 0.5   ALKPHOS 69 59   ALT 11 10   AST 15 14   MG  --  2.0   PHOS  --  3.6       CXR-reviewed

## 2019-08-02 NOTE — NURSING
Pt remains free form falls able to make needs known, remy meds well,pain management effective,remy leonardo tx,d/c orders noted,pt d/c home with RUE picc line,pt received teaching for picc  Form edi F form [employer] ,d/c instruction and f/u appt discussed and given to pt,iv removed and pressure dressing applied to lt lower arm.

## 2019-08-02 NOTE — PLAN OF CARE
[facility]    HOME HEALTH ORDERS  FACE TO FACE ENCOUNTER    Patient Name: Yasmin Asencio  YOB: 1950    PCP: Urmila Luna MD   PCP Address: [address] / [place] [address]  PCP Phone Number: [phone]  PCP Fax: [phone]    Encounter Date: 08/02/2019    Admit to Home Health    Diagnoses:  Active Hospital Problems    Diagnosis  POA    *Positive sputum culture for Pseudomonas [R84.5]  Yes    Diarrhea of presumed infectious origin [R19.7]  Yes    Cough [R05]  Yes    Essential hypertension [I10]  Yes     Chronic    Depression [F32.9]  Yes     Chronic    History of ERNIE infection [Z86.19]  Yes     Chronic    Bronchiectasis with (acute) exacerbation [J47.1]  Yes     Dr. Zuñiga left will be seeing Dr. Buck Mills      Chronic anticoagulation [Z79.01]  Not Applicable     Chronic    History of PSVT (paroxysmal supraventricular tachycardia) [Z86.79]  Not Applicable     Chronic    History of pulmonary embolism [Z86.711]  Yes     Chronic     1/2017  - on Eliquis      GERD (gastroesophageal reflux disease) [K21.9]  Yes     Chronic    Acquired bronchiectasis [J47.9]  Yes     Chronic     due to history of TB - followed by pulmonary, Dr. Zuñiga  - on home oxygen as needed (uses it mostly at night)        Resolved Hospital Problems   No resolved problems to display.       Future Appointments   Date Time Provider Department Center   8/21/2019  2:20 PM Urmila Luna MD [facility] FAM MED [place]   8/27/2019 10:00 AM Ad Domingo OD [facility] OPTOMTY [place]   9/3/2019 10:00 AM Jasmin Castaneda NP [facility] PAINMGT [facility]     Follow-up Information     Urmila Luna MD On 8/21/2019.    Specialties:  Internal Medicine, Pediatrics  Why:  Outpatient Services, PCP, follow-up appointment @ 2:20PM.   Contact information:  [address]  [place] [id number] [phone]                     I have seen and examined this patient face to face today. My clinical findings that  support the need for the home health skilled services and home bound status are the following:  Weakness/numbness causing balance and gait disturbance due to Infection making it taxing to leave home.  Medical restrictions requiring assistance of another human to leave home due to  IV infusion Needs.    Allergies:  Review of patient's allergies indicates:   Allergen Reactions    Adhesive Other (See Comments)     Tears up the skin and makes it itch   (leads)    Bactrim [sulfamethoxazole-trimethoprim] Rash     Was hospitalized for rash    Restasis [cyclosporine]     Lipitor [atorvastatin] Other (See Comments)     Muscle aches    Albuterol Other (See Comments)     tremors    Topamax [topiramate]      - made her feel 'bad in the head'       Diet: cardiac diet    Activities: activity as tolerated    Nursing:   SN to complete comprehensive assessment including routine vital signs. Instruct on disease process and s/s of complications to report to MD. Review/verify medication list sent home with the patient at time of discharge  and instruct patient/caregiver as needed. Frequency may be adjusted depending on start of care date.    Notify MD if SBP > 160 or < 90; DBP > 90 or < 50; HR > 120 or < 50; Temp > 101; Other:         CONSULTS:    Aide to provide assistance with personal care, ADLs, and vital signs.    MISCELLANEOUS CARE:  Home Infusion Therapy:   SN to perform Infusion Therapy/Central Line Care.  Review Central Line Care & Central Line Flush with patient.    Administer (drug and dose): Cefepime 2gm IV Q12 hours     Last dose given: 8/2/2019                        Home dose due: 8/2/2019    Scrub the Hub: Prior to accessing the line, always perform a 30 second alcohol scrub  Each lumen of the central line is to be flushed at least daily with 10 mL Normal Saline and 3 mL Heparin flush (10 units/mL)  Skilled Nurse (SN) may draw blood from IV access  Blood Draw Procedure:   - Aspirate at least 5 mL of blood   -  Discard   - Obtain specimen   - Change injection cap   - Flush with 20 mL Normal Saline followed by a                 3-5 mL Heparin flush (10 units/mL)  Central :   - Sterile dressing changes are done weekly and as needed.   - Use chlor-hexadine scrub to cleanse site, apply Biopatch to insertion site,       apply securement device dressing   - Injection caps are changed weekly and after EVERY lab draw.   - If sterile gauze is under dressing to control oozing,                 dressing change must be performed every 24 hours until gauze is not needed.    Complete antibiotics 8/14/2019  Remove picc line after last dose of IV antibiotic    Medications: Review discharge medications with patient and family and provide education.      Current Discharge Medication List      START taking these medications    Details   cefepime in dextrose 5 % (MAXIPIME) 2 gram/50 mL PgBk Inject 50 mLs (2 g total) into the vein every 12 (twelve) hours. for 12 days  Qty: 1200 mL, Refills: 0      loperamide (IMODIUM) 2 mg capsule Take 1 capsule (2 mg total) by mouth 4 (four) times daily as needed for Diarrhea.  Qty: 20 capsule, Refills: 0         CONTINUE these medications which have NOT CHANGED    Details   acetaminophen (TYLENOL) 500 MG tablet Take 1,000 mg by mouth once daily. sleep      apixaban (ELIQUIS) 5 mg Tab Take 1 tablet (5 mg total) by mouth 2 (two) times daily.  Qty: 180 tablet, Refills: 3    Associated Diagnoses: Personal history of pulmonary embolism; Current use of long term anticoagulation; Other chronic pulmonary embolism without acute cor pulmonale      chlorthalidone (HYGROTEN) 25 MG Tab Take 12.5 mg by mouth once daily.      desoximetasone (TOPICORT) 0.25 % cream APPLY  CREAM EXTERNALLY TWICE DAILY AS NEEDED  Qty: 15 g, Refills: 3    Comments: Please consider 90 day supplies to promote better adherence  Associated Diagnoses: Rash      ergocalciferol (ERGOCALCIFEROL) 50,000 unit Cap TAKE 1 CAPSULE BY MOUTH  ONCE A WEEK  Qty: 4 capsule, Refills: 4    Comments: Please consider 90 day supplies to promote better adherence      escitalopram oxalate (LEXAPRO) 10 MG tablet TAKE ONE TABLET BY MOUTH ONCE DAILY  Qty: 30 tablet, Refills: 11    Comments: Please consider 90 day supplies to promote better adherence      folic acid (FOLVITE) 1 MG tablet TAKE 1 TABLET BY MOUTH ONCE DAILY  Qty: 100 tablet, Refills: 2    Associated Diagnoses: Personal history of pulmonary embolism; Elevated serum homocysteine level      gabapentin (NEURONTIN) 300 MG capsule TAKE 2 CAPSULES BY MOUTH THREE TIMES DAILY  Qty: 540 capsule, Refills: 0    Associated Diagnoses: Acute left-sided low back pain with sciatica, sciatica laterality unspecified      guaiFENesin (MUCINEX) 600 mg 12 hr tablet Take 1,200 mg by mouth 2 (two) times daily.      hyoscyamine (ANASPAZ,LEVSIN) 0.125 mg Tab Take 1 tablet (125 mcg total) by mouth every 6 (six) hours as needed.  Qty: 30 tablet, Refills: 0    Associated Diagnoses: Diarrhea, unspecified type      ipratropium (ATROVENT) 0.02 % nebulizer solution Inhale 1 vial in nebulizer 3 to 4 times daily      ketoconazole (NIZORAL) 2 % shampoo Apply topically once daily.  Qty: 120 mL, Refills: 5    Associated Diagnoses: Psoriasis      Lactobacillus rhamnosus GG (CULTURELLE) 10 billion cell capsule Take 1 capsule by mouth once daily.      mirtazapine (REMERON) 15 MG tablet Take 1 tablet (15 mg total) by mouth every evening.  Qty: 30 tablet, Refills: 11    Associated Diagnoses: Essential tremor      multivitamin (THERAGRAN) per tablet Take 1 tablet by mouth once daily.      omeprazole (PRILOSEC) 20 MG capsule TAKE 1 CAPSULE BY MOUTH ONCE DAILY AS NEEDED FOR  HEARTBURN  (TAKE  AS  NEEDED)  Qty: 90 capsule, Refills: 0    Associated Diagnoses: Gastroesophageal reflux disease without esophagitis      ondansetron (ZOFRAN-ODT) 8 MG TbDL Take 1 tablet (8 mg total) by mouth every 8 (eight) hours as needed.  Qty: 30 tablet, Refills: 0     Associated Diagnoses: Nausea      sodium chloride 2% (XIOMARA 128) 2 % ophthalmic solution Place 1 drop into both eyes 3 (three) times daily as needed.      sodium chloride 3% 3 % nebulizer solution Take 4 mLs by nebulization 2 (two) times daily.  Qty: 720 mL, Refills: 3    Comments: Please consider 90 day supplies to promote better adherence  Associated Diagnoses: Acquired bronchiectasis      traMADol (ULTRAM) 50 mg tablet Take 1 tablet (50 mg total) by mouth every 6 (six) hours as needed for Pain.  Qty: 45 tablet, Refills: 0      umeclidinium-vilanterol (ANORO ELLIPTA) 62.5-25 mcg/actuation DsDv Inhale 1 puff into the lungs once daily. Controller  Qty: 1 each, Refills: 6      verapamil (VERELAN) 180 MG C24P Take 1 capsule (180 mg total) by mouth 2 (two) times daily.  Qty: 180 capsule, Refills: 3      alprazolam (XANAX) 0.25 MG tablet Take 1 tablet (0.25 mg total) by mouth nightly as needed for Anxiety.  Qty: 30 tablet, Refills: 0    Associated Diagnoses: Mixed anxiety and depressive disorder      fluocinolone (DERMA-SMOOTHE) 0.01 % external oil Apply topically once daily.  Qty: 1 Bottle, Refills: 5    Associated Diagnoses: Psoriasis      fluocinonide (LIDEX) 0.05 % external solution Apply topically once daily. - apply after shampooing  Qty: 60 mL, Refills: 5    Associated Diagnoses: Psoriasis      propylene glycol (SYSTANE BALANCE) 0.6 % Drop Apply 1 drop to eye daily as needed (dry eye).         STOP taking these medications       azithromycin (ZITHROMAX Z-AMINATA) 250 MG tablet Comments:   Reason for Stopping:               I certify that this patient is confined to her home and needs intermittent skilled nursing care.

## 2019-08-02 NOTE — PROGRESS NOTES
Nursing Notes  19:35 Report received from morning nurse.  Patient resting in bed in low and locked position.  Call light within reach.      22:24 Patient medications given.  Tylenol given for head discomfort.  Will continue to monitor.      0:01 Patient PICC line flushed.  No acute distress noted.      07:43 Report given to morning nurse.  Patient resting in bed in low and position.         Huddle Comments

## 2019-08-03 DIAGNOSIS — M54.40 ACUTE LEFT-SIDED LOW BACK PAIN WITH SCIATICA, SCIATICA LATERALITY UNSPECIFIED: ICD-10-CM

## 2019-08-03 PROCEDURE — G0180 PR HOME HEALTH MD CERTIFICATION: ICD-10-PCS | Mod: ,,, | Performed by: HOSPITALIST

## 2019-08-03 PROCEDURE — G0180 MD CERTIFICATION HHA PATIENT: HCPCS | Mod: ,,, | Performed by: HOSPITALIST

## 2019-08-03 NOTE — NURSING
Pt left unit with  at her side walked to 4th floor parking garage by this nurse,GALDINO hobson pain RUE picc line clamped off dressing clean,dry,intact.

## 2019-08-05 ENCOUNTER — TELEPHONE (OUTPATIENT)
Dept: PAIN MEDICINE | Facility: CLINIC | Age: 69
End: 2019-08-05

## 2019-08-05 LAB
BACTERIA BLD CULT: NORMAL
BACTERIA BLD CULT: NORMAL
BACTERIA SPEC AEROBE CULT: ABNORMAL
GRAM STN SPEC: ABNORMAL

## 2019-08-05 RX ORDER — GABAPENTIN 300 MG/1
CAPSULE ORAL
Qty: 540 CAPSULE | Refills: 0 | Status: SHIPPED | OUTPATIENT
Start: 2019-08-05 | End: 2019-11-20 | Stop reason: SDUPTHER

## 2019-08-05 NOTE — TELEPHONE ENCOUNTER
----- Message from Ladi Trinidad sent at 8/5/2019  1:23 PM CDT -----  Contact: Self   Can the clinic reply in [facility]: N       Please refill the medication(s) listed below. Please call the patient when the prescription(s) is ready for  at this phone number  [phone]      Medication #1 gabapentin (NEURONTIN) 300 MG capsule    Medication #2       Preferred Pharmacy:  [facility]  [place] [address]

## 2019-08-08 ENCOUNTER — TELEPHONE (OUTPATIENT)
Dept: INFECTIOUS DISEASES | Facility: CLINIC | Age: 69
End: 2019-08-08

## 2019-08-08 NOTE — TELEPHONE ENCOUNTER
Labs collected on 8/6/19 are in the media tab      Infectious Disease - Lab follow up     Dx: pseudomonas PNA  Antibiotics:cefepime  Estimated End Date: 8/14    WBC - 6.2  H/H - 9.2/30.1  Platelets - 177  Creatinine - 1.08  ESR - 46  CRP - 6.4  AST/ALT 13/8

## 2019-08-12 ENCOUNTER — TELEPHONE (OUTPATIENT)
Dept: INFECTIOUS DISEASES | Facility: CLINIC | Age: 69
End: 2019-08-12

## 2019-08-13 ENCOUNTER — TELEPHONE (OUTPATIENT)
Dept: INFECTIOUS DISEASES | Facility: CLINIC | Age: 69
End: 2019-08-13

## 2019-08-13 NOTE — TELEPHONE ENCOUNTER
----- Message from Nuvia Beauchamp PA-C sent at 8/13/2019  3:30 PM CDT -----  Continue abx until date of follow up  Thank you   ----- Message -----  From: Jaja Mccormack MA  Sent: 8/13/2019  10:38 AM  To: Nuvia Beauchamp PA-C    Chayo with cpp called pt's end of care is 8/14, with a follow up on 8/19. Please advice.

## 2019-08-13 NOTE — TELEPHONE ENCOUNTER
----- Message from Zoë Hutson MA sent at 8/13/2019 10:20 AM CDT -----  Contact: [facility]/ [phone] ext [phone]- [facility] wants to know if you need a sputum sample before the pt ends her abx   ----- Message -----  From: Rowan Saha MA  Sent: 8/13/2019  10:11 AM  To: Kwabena Palma Staff    [facility] is requesting a phone from Dr. Morales in regards to one of his PT.

## 2019-08-14 ENCOUNTER — TELEPHONE (OUTPATIENT)
Dept: INFECTIOUS DISEASES | Facility: CLINIC | Age: 69
End: 2019-08-14

## 2019-08-15 ENCOUNTER — TELEPHONE (OUTPATIENT)
Dept: PAIN MEDICINE | Facility: CLINIC | Age: 69
End: 2019-08-15

## 2019-08-15 NOTE — TELEPHONE ENCOUNTER
----- Message from Guillaume Reynolds sent at 8/15/2019  9:57 AM CDT -----  Contact: VITO PERKINS [[id number]]  Name of Who is Calling:VITO PERKINS [[id number]]    What is the request in detail: Pt requesting to cancel her 8.21.19 injection. Contact for additional questions. Thanks-    Can the clinic reply by [facility]: N    What [facility]: [phone]

## 2019-08-15 NOTE — TELEPHONE ENCOUNTER
----- Message from Taina Baker sent at 8/15/2019  3:38 PM CDT -----  Left message asking pt. Call and confirm her request to cancel her procedure.

## 2019-08-18 ENCOUNTER — HOSPITAL ENCOUNTER (EMERGENCY)
Facility: HOSPITAL | Age: 69
Discharge: HOME | End: 2019-08-19
Attending: EMERGENCY MEDICINE
Payer: MEDICARE

## 2019-08-18 DIAGNOSIS — R11.0 NAUSEA: ICD-10-CM

## 2019-08-18 DIAGNOSIS — R11.2 NAUSEA AND VOMITING, INTRACTABILITY OF VOMITING NOT SPECIFIED, UNSPECIFIED VOMITING TYPE: Primary | ICD-10-CM

## 2019-08-18 DIAGNOSIS — E86.0 DEHYDRATION: ICD-10-CM

## 2019-08-18 DIAGNOSIS — R05.9 COUGH: ICD-10-CM

## 2019-08-18 DIAGNOSIS — R19.7 DIARRHEA, UNSPECIFIED TYPE: ICD-10-CM

## 2019-08-18 LAB
BASOPHILS # BLD AUTO: 0.04 K/UL (ref 0–0.2)
BASOPHILS NFR BLD: 0.6 % (ref 0–1.9)
DIFFERENTIAL METHOD: ABNORMAL
EOSINOPHIL # BLD AUTO: 0.1 K/UL (ref 0–0.5)
EOSINOPHIL NFR BLD: 1.4 % (ref 0–8)
ERYTHROCYTE [DISTWIDTH] IN BLOOD BY AUTOMATED COUNT: 14.4 % (ref 11.5–14.5)
HCT VFR BLD AUTO: 33.5 % (ref 37–48.5)
HGB BLD-MCNC: 10.4 G/DL (ref 12–16)
LYMPHOCYTES # BLD AUTO: 1.9 K/UL (ref 1–4.8)
LYMPHOCYTES NFR BLD: 27.1 % (ref 18–48)
MCH RBC QN AUTO: 26.1 PG (ref 27–31)
MCHC RBC AUTO-ENTMCNC: 31 G/DL (ref 32–36)
MCV RBC AUTO: 84 FL (ref 82–98)
MONOCYTES # BLD AUTO: 0.7 K/UL (ref 0.3–1)
MONOCYTES NFR BLD: 10.1 % (ref 4–15)
NEUTROPHILS # BLD AUTO: 4.2 K/UL (ref 1.8–7.7)
NEUTROPHILS NFR BLD: 60.8 % (ref 38–73)
PLATELET # BLD AUTO: 152 K/UL (ref 150–350)
PMV BLD AUTO: 9.7 FL (ref 9.2–12.9)
RBC # BLD AUTO: 3.98 M/UL (ref 4–5.4)
WBC # BLD AUTO: 7 K/UL (ref 3.9–12.7)

## 2019-08-18 PROCEDURE — 81000 URINALYSIS NONAUTO W/SCOPE: CPT

## 2019-08-18 PROCEDURE — 93010 EKG 12-LEAD: ICD-10-PCS | Mod: ,,, | Performed by: INTERNAL MEDICINE

## 2019-08-18 PROCEDURE — 96376 TX/PRO/DX INJ SAME DRUG ADON: CPT

## 2019-08-18 PROCEDURE — 96374 THER/PROPH/DIAG INJ IV PUSH: CPT

## 2019-08-18 PROCEDURE — 84484 ASSAY OF TROPONIN QUANT: CPT

## 2019-08-18 PROCEDURE — 80053 COMPREHEN METABOLIC PANEL: CPT

## 2019-08-18 PROCEDURE — 83880 ASSAY OF NATRIURETIC PEPTIDE: CPT

## 2019-08-18 PROCEDURE — 96375 TX/PRO/DX INJ NEW DRUG ADDON: CPT

## 2019-08-18 PROCEDURE — 93005 ELECTROCARDIOGRAM TRACING: CPT

## 2019-08-18 PROCEDURE — 93010 ELECTROCARDIOGRAM REPORT: CPT | Mod: ,,, | Performed by: INTERNAL MEDICINE

## 2019-08-18 PROCEDURE — 83690 ASSAY OF LIPASE: CPT

## 2019-08-18 PROCEDURE — 85025 COMPLETE CBC W/AUTO DIFF WBC: CPT

## 2019-08-18 PROCEDURE — 99284 EMERGENCY DEPT VISIT MOD MDM: CPT | Mod: 25

## 2019-08-19 ENCOUNTER — OFFICE VISIT (OUTPATIENT)
Dept: INFECTIOUS DISEASES | Facility: CLINIC | Age: 69
End: 2019-08-19
Payer: MEDICARE

## 2019-08-19 ENCOUNTER — INFUSION (OUTPATIENT)
Dept: INFECTIOUS DISEASES | Facility: HOSPITAL | Age: 69
End: 2019-08-19
Payer: MEDICARE

## 2019-08-19 VITALS
TEMPERATURE: 99 F | WEIGHT: 157.44 LBS | HEIGHT: 62 IN | SYSTOLIC BLOOD PRESSURE: 134 MMHG | DIASTOLIC BLOOD PRESSURE: 75 MMHG | BODY MASS INDEX: 28.97 KG/M2 | HEART RATE: 98 BPM

## 2019-08-19 VITALS
OXYGEN SATURATION: 95 % | TEMPERATURE: 98 F | HEIGHT: 62 IN | RESPIRATION RATE: 21 BRPM | HEART RATE: 102 BPM | WEIGHT: 154 LBS | DIASTOLIC BLOOD PRESSURE: 72 MMHG | SYSTOLIC BLOOD PRESSURE: 160 MMHG | BODY MASS INDEX: 28.34 KG/M2

## 2019-08-19 DIAGNOSIS — J15.1 PNEUMONIA DUE TO PSEUDOMONAS SPECIES, UNSPECIFIED LATERALITY, UNSPECIFIED PART OF LUNG: Primary | ICD-10-CM

## 2019-08-19 DIAGNOSIS — B96.5 PSEUDOMONAS RESPIRATORY INFECTION: ICD-10-CM

## 2019-08-19 DIAGNOSIS — J98.8 PSEUDOMONAS RESPIRATORY INFECTION: ICD-10-CM

## 2019-08-19 DIAGNOSIS — J42 CHRONIC BRONCHITIS, UNSPECIFIED CHRONIC BRONCHITIS TYPE: ICD-10-CM

## 2019-08-19 DIAGNOSIS — J47.9 ACQUIRED BRONCHIECTASIS: Chronic | ICD-10-CM

## 2019-08-19 LAB
ALBUMIN SERPL BCP-MCNC: 4.2 G/DL (ref 3.5–5.2)
ALP SERPL-CCNC: 64 U/L (ref 55–135)
ALT SERPL W/O P-5'-P-CCNC: 16 U/L (ref 10–44)
ANION GAP SERPL CALC-SCNC: 12 MMOL/L (ref 8–16)
AST SERPL-CCNC: 17 U/L (ref 10–40)
BACTERIA #/AREA URNS HPF: ABNORMAL /HPF
BILIRUB SERPL-MCNC: 0.4 MG/DL (ref 0.1–1)
BILIRUB UR QL STRIP: NEGATIVE
BNP SERPL-MCNC: 276 PG/ML (ref 0–99)
BUN SERPL-MCNC: 15 MG/DL (ref 8–23)
CALCIUM SERPL-MCNC: 10 MG/DL (ref 8.7–10.5)
CHLORIDE SERPL-SCNC: 101 MMOL/L (ref 95–110)
CLARITY UR: CLEAR
CO2 SERPL-SCNC: 27 MMOL/L (ref 23–29)
COLOR UR: YELLOW
CREAT SERPL-MCNC: 1.2 MG/DL (ref 0.5–1.4)
EST. GFR  (AFRICAN AMERICAN): 53 ML/MIN/1.73 M^2
EST. GFR  (NON AFRICAN AMERICAN): 46 ML/MIN/1.73 M^2
GLUCOSE SERPL-MCNC: 103 MG/DL (ref 70–110)
GLUCOSE UR QL STRIP: NEGATIVE
GRAN CASTS #/AREA URNS LPF: 0.1 /LPF
HGB UR QL STRIP: ABNORMAL
HYALINE CASTS #/AREA URNS LPF: 2 /LPF
KETONES UR QL STRIP: ABNORMAL
LEUKOCYTE ESTERASE UR QL STRIP: NEGATIVE
LIPASE SERPL-CCNC: 23 U/L (ref 4–60)
MICROSCOPIC COMMENT: ABNORMAL
NITRITE UR QL STRIP: NEGATIVE
PH UR STRIP: 6 [PH] (ref 5–8)
POTASSIUM SERPL-SCNC: 3.2 MMOL/L (ref 3.5–5.1)
PROT SERPL-MCNC: 8.3 G/DL (ref 6–8.4)
PROT UR QL STRIP: ABNORMAL
RBC #/AREA URNS HPF: 0 /HPF (ref 0–4)
SODIUM SERPL-SCNC: 140 MMOL/L (ref 136–145)
SP GR UR STRIP: 1.02 (ref 1–1.03)
TROPONIN I SERPL DL<=0.01 NG/ML-MCNC: <0.006 NG/ML (ref 0–0.03)
URN SPEC COLLECT METH UR: ABNORMAL
UROBILINOGEN UR STRIP-ACNC: NEGATIVE EU/DL
WBC #/AREA URNS HPF: 1 /HPF (ref 0–5)

## 2019-08-19 PROCEDURE — 3075F SYST BP GE 130 - 139MM HG: CPT | Mod: CPTII,S$GLB,, | Performed by: PHYSICIAN ASSISTANT

## 2019-08-19 PROCEDURE — 3078F DIAST BP <80 MM HG: CPT | Mod: CPTII,S$GLB,, | Performed by: PHYSICIAN ASSISTANT

## 2019-08-19 PROCEDURE — 3078F PR MOST RECENT DIASTOLIC BLOOD PRESSURE < 80 MM HG: ICD-10-PCS | Mod: CPTII,S$GLB,, | Performed by: PHYSICIAN ASSISTANT

## 2019-08-19 PROCEDURE — 1101F PR PT FALLS ASSESS DOC 0-1 FALLS W/OUT INJ PAST YR: ICD-10-PCS | Mod: CPTII,S$GLB,, | Performed by: PHYSICIAN ASSISTANT

## 2019-08-19 PROCEDURE — 99499 UNLISTED E&M SERVICE: CPT | Mod: S$GLB,,, | Performed by: PHYSICIAN ASSISTANT

## 2019-08-19 PROCEDURE — 99999 PR PBB SHADOW E&M-EST. PATIENT-LVL III: ICD-10-PCS | Mod: PBBFAC,,, | Performed by: PHYSICIAN ASSISTANT

## 2019-08-19 PROCEDURE — 25000003 PHARM REV CODE 250: Performed by: EMERGENCY MEDICINE

## 2019-08-19 PROCEDURE — 1101F PT FALLS ASSESS-DOCD LE1/YR: CPT | Mod: CPTII,S$GLB,, | Performed by: PHYSICIAN ASSISTANT

## 2019-08-19 PROCEDURE — 25500020 PHARM REV CODE 255: Performed by: EMERGENCY MEDICINE

## 2019-08-19 PROCEDURE — 63600175 PHARM REV CODE 636 W HCPCS: Performed by: EMERGENCY MEDICINE

## 2019-08-19 PROCEDURE — 99499 RISK ADDL DX/OHS AUDIT: ICD-10-PCS | Mod: S$GLB,,, | Performed by: PHYSICIAN ASSISTANT

## 2019-08-19 PROCEDURE — 99213 PR OFFICE/OUTPT VISIT, EST, LEVL III, 20-29 MIN: ICD-10-PCS | Mod: S$GLB,,, | Performed by: PHYSICIAN ASSISTANT

## 2019-08-19 PROCEDURE — 99213 OFFICE O/P EST LOW 20 MIN: CPT | Mod: S$GLB,,, | Performed by: PHYSICIAN ASSISTANT

## 2019-08-19 PROCEDURE — 3075F PR MOST RECENT SYSTOLIC BLOOD PRESS GE 130-139MM HG: ICD-10-PCS | Mod: CPTII,S$GLB,, | Performed by: PHYSICIAN ASSISTANT

## 2019-08-19 PROCEDURE — 99999 PR PBB SHADOW E&M-EST. PATIENT-LVL III: CPT | Mod: PBBFAC,,, | Performed by: PHYSICIAN ASSISTANT

## 2019-08-19 RX ORDER — HALOPERIDOL 5 MG/ML
2.5 INJECTION INTRAMUSCULAR
Status: COMPLETED | OUTPATIENT
Start: 2019-08-19 | End: 2019-08-19

## 2019-08-19 RX ORDER — PROMETHAZINE HYDROCHLORIDE 25 MG/1
25 SUPPOSITORY RECTAL EVERY 6 HOURS PRN
Qty: 10 SUPPOSITORY | Refills: 1 | Status: SHIPPED | OUTPATIENT
Start: 2019-08-19 | End: 2020-02-20

## 2019-08-19 RX ORDER — PROMETHAZINE HYDROCHLORIDE 25 MG/1
25 TABLET ORAL EVERY 4 HOURS PRN
Qty: 15 TABLET | Refills: 1 | Status: SHIPPED | OUTPATIENT
Start: 2019-08-19 | End: 2019-11-22

## 2019-08-19 RX ORDER — POTASSIUM CHLORIDE 20 MEQ/15ML
40 SOLUTION ORAL
Status: COMPLETED | OUTPATIENT
Start: 2019-08-19 | End: 2019-08-19

## 2019-08-19 RX ORDER — ONDANSETRON 2 MG/ML
4 INJECTION INTRAMUSCULAR; INTRAVENOUS
Status: COMPLETED | OUTPATIENT
Start: 2019-08-19 | End: 2019-08-19

## 2019-08-19 RX ORDER — LOPERAMIDE HYDROCHLORIDE 2 MG/1
CAPSULE ORAL
Qty: 12 CAPSULE | Refills: 1 | Status: ON HOLD | OUTPATIENT
Start: 2019-08-19 | End: 2021-12-17 | Stop reason: HOSPADM

## 2019-08-19 RX ADMIN — POTASSIUM CHLORIDE 40 MEQ: 20 SOLUTION ORAL at 02:08

## 2019-08-19 RX ADMIN — SODIUM CHLORIDE 1000 ML: 0.9 INJECTION, SOLUTION INTRAVENOUS at 02:08

## 2019-08-19 RX ADMIN — IOHEXOL 100 ML: 350 INJECTION, SOLUTION INTRAVENOUS at 03:08

## 2019-08-19 RX ADMIN — PROMETHAZINE HYDROCHLORIDE 12.5 MG: 25 INJECTION INTRAMUSCULAR; INTRAVENOUS at 02:08

## 2019-08-19 RX ADMIN — ONDANSETRON 4 MG: 2 INJECTION INTRAMUSCULAR; INTRAVENOUS at 02:08

## 2019-08-19 RX ADMIN — HALOPERIDOL LACTATE 2.5 MG: 5 INJECTION, SOLUTION INTRAMUSCULAR at 04:08

## 2019-08-19 RX ADMIN — PROMETHAZINE HYDROCHLORIDE 12.5 MG: 25 INJECTION INTRAMUSCULAR; INTRAVENOUS at 04:08

## 2019-08-19 RX ADMIN — LIDOCAINE HYDROCHLORIDE: 20 SOLUTION ORAL; TOPICAL at 04:08

## 2019-08-19 NOTE — ED TRIAGE NOTES
"Pt c/o nausea and diarrhea since Friday. Pt states "I can't keep nothing down." Pt receiving abx through PICC line for pneumonia.  "

## 2019-08-19 NOTE — PROGRESS NOTES
CARSON PICC line removed, pt tolerated well. Pt instructed to leave dressing on for 24hrs, verbalized understanding. Pt observed for 30 mins after pulling line. Pt left in NAD.

## 2019-08-19 NOTE — ED PROVIDER NOTES
"Encounter Date: 8/18/2019       History     Chief Complaint   Patient presents with    Nausea     Pt c/o nausea and diarrhea since Friday. Pt states "I can't keep nothing down." Pt receiving abx through PICC line for pneumonia.     Diarrhea     68 yo female presents via personal transportation () with nausea, vomiting, diarrhea, and abdominal cramps. Patient was recently admitted here for positive sputum culture and was d/c'ed with RUE PICC through which she is receiving cefepime BID. She was doing okay until this past Friday 8/16/19 when she developed nausea, vomiting, diarrhea, and abdominal cramps. Cramps occur just prior to diarrhea. Patient has been using Zofran ODTs 8mg without relief. No fevers.     Recent respiratory culture positive for Pseudomonas aeruginosa and Serratia marcescens.     PMH: COPD, PE on Eliquis, acquired bronchiectasis due to TB, paroxysmal SVT s/p ablation, HTN, GERD, essential tremor, diverticulosis, hemangioma of liver, amblyopia R eye, anemia, cataract, psoriasis, spondylosis, thoracic aortic atherosclerosis, vitamin D deficiency, vaginal delivery x 2    Psych: anxiety, depression    PSH: ablation, nerve block, eye surgery, cholecystectomy, EGD/colonscopy, L heart cath        Review of patient's allergies indicates:   Allergen Reactions    Adhesive Other (See Comments)     Tears up the skin and makes it itch   (leads)    Bactrim [sulfamethoxazole-trimethoprim] Rash     Was hospitalized for rash    Restasis [cyclosporine]     Lipitor [atorvastatin] Other (See Comments)     Muscle aches    Albuterol Other (See Comments)     tremors    Topamax [topiramate]      - made her feel 'bad in the head'     Past Medical History:   Diagnosis Date    Acquired bronchiectasis     due to history of TB - followed by pulmonary, Dr. Zuñiga    Allergy     Amblyopia     rt eye per pt    Anemia of other chronic disease     Anticoagulant long-term use     Anxiety     Cataract     Chronic " obstructive pulmonary disease with acute exacerbation     Clotting disorder     COPD (chronic obstructive pulmonary disease)     Depression     Diverticulosis     Essential tremor     GERD (gastroesophageal reflux disease)     Hemangioma of liver     History of tuberculosis 1978    Hypertension     Mixed anxiety and depressive disorder     On home oxygen therapy     Psoriasis     PSVT (paroxysmal supraventricular tachycardia)     Pulmonary embolism     S/P PICC central line placement Apr. 2016 - May 2016    Skin disease     Psoriasis    Spondylosis without myelopathy 8/23/2013    Supraventricular tachycardia     Tachycardia     Thoracic aorta atherosclerosis     noted on CT scan of chest 1/3/2011    Tuberculosis     Vaginal delivery     x2    Vitamin D deficiency      Past Surgical History:   Procedure Laterality Date    ABLATION N/A 7/24/2017    Performed by Marlon [facility]os [facility] at [facility] CATH LAB    BLOCK-NERVE-MEDIAL BRANCH-LUMBAR Bilateral 10/14/2016    [facility]    BLOCK-NERVE-MEDIAL BRANCH-LUMBAR Bilateral 8/26/2016    [facility]    CATARACT EXTRACTION W/  INTRAOCULAR LENS IMPLANT  07/24/12    od dr [place]    CATARACT EXTRACTION W/  INTRAOCULAR LENS IMPLANT  08/07/12    left eye    CHOLECYSTECTOMY      COLONOSCOPY N/A 4/23/2013    Performed by Simeon Graves MD at [facility] ENDO (4TH FLR)    EGD (ESOPHAGOGASTRODUODENOSCOPY) N/A 4/23/2013    Performed by [facility]ime[facility] Graves [facility] at [facility] ENDO (4TH FLR)    EYE SURGERY      GALLBLADDER SURGERY      HEART CATH-LEFT Left 6/16/2016    [facility] CATH LAB    INJECTION BILATERAL SI JOINT Bilateral 2/20/2019    [facility]    INJECTION-FACET Bilateral 1/22/2016    [facility]    RADIOFREQUENCY ABLATION RIGHT LUMBAR L2,3,4,5 RFA Right  11/28/2018    [facility]    RADIOFREQUENCY THERMAL COAGULATION Left 12/18/2018    [facility]    RADIOFREQUENCY THERMOCOAGULATION (RFTC)-NERVE-MEDIAN BRANCH-LUMBAR Right 4/4/2018    [facility]    RADIOFREQUENCY THERMOCOAGULATION (RFTC)-NERVE-MEDIAN BRANCH-LUMBAR Left 3/16/2018    [facility]    RADIOFREQUENCY THERMOCOAGULATION (RFTC)-NERVE-MEDIAN BRANCH-LUMBAR Right 12/16/2016    [facility]     Family History   Problem Relation Age of Onset    Hypertension Mother     Heart attack Mother     Cancer Father         liver and bladder    Hyperlipidemia Sister     Psoriasis Sister     Cancer Brother         liver    Stroke Maternal Uncle     Cancer Maternal Aunt         stomach    Lung cancer Sister     Heart attack Brother     Heart attack Son     Amblyopia Neg Hx     Blindness Neg Hx     Cataracts Neg Hx     Glaucoma Neg Hx     Macular degeneration Neg Hx     Retinal detachment Neg Hx     Strabismus Neg Hx     Thyroid disease Neg Hx     Melanoma Neg Hx     Lupus Neg Hx     Eczema Neg Hx     COPD Neg Hx     Colon cancer Neg Hx      Social History     Tobacco Use    Smoking status: Former Smoker     Packs/day: 2.00     Years: 50.00     Pack years: 100.00     Types: Cigarettes     Last attempt to quit: 5/27/2009     Years since quitting: 10.2    Smokeless tobacco: Never Used   Substance Use Topics    Alcohol use: Not Currently     Frequency: Never     Binge frequency: Never    Drug use: Never     Review of Systems   Constitutional: Negative for fever.   HENT: Negative for sore throat.    Eyes: Negative for visual disturbance.   Respiratory: Positive for cough and shortness of breath.    Cardiovascular: Negative for chest pain.   Gastrointestinal: Positive for abdominal pain, diarrhea, nausea  and vomiting.   Genitourinary: Negative for dysuria.   Musculoskeletal: Negative for neck pain.   Skin: Negative for rash.   Neurological: Negative for syncope.       Physical Exam     Initial Vitals [08/18/19 2132]   BP Pulse Resp Temp SpO2   (!) 158/67 95 20 98 °F (36.7 °C) 95 %      MAP       --         Physical Exam    Nursing note and vitals reviewed.  Constitutional: She appears well-developed and well-nourished. She is not diaphoretic.   Awake, alert, nontoxic older female.   HENT:   Head: Normocephalic and atraumatic.   Eyes: Conjunctivae and EOM are normal. Pupils are equal, round, and reactive to light.   Neck: Normal range of motion. Neck supple.   Cardiovascular: Regular rhythm, normal heart sounds and intact distal pulses.   No murmur heard.  Mild tachycardia, regular.   Pulmonary/Chest: No respiratory distress. She has wheezes. She has rhonchi. She has no rales.   RLL rhonchi. Faint wheezes.   Abdominal: Soft. There is tenderness (mild diffuse). There is no rebound and no guarding.   Musculoskeletal: Normal range of motion. She exhibits no edema or tenderness.   RUE PICC. No surrounding erythema, bruising, discharge, swelling.   Neurological: She is alert and oriented to person, place, and time. She has normal strength.   Moving all extremities.   Skin: Skin is warm and dry. No erythema. No pallor.   Psychiatric: She has a normal mood and affect.         ED Course   Procedures  Labs Reviewed   COMPREHENSIVE METABOLIC PANEL - Abnormal; Notable for the following components:       Result Value    Potassium 3.2 (*)     eGFR if  53 (*)     eGFR if non  46 (*)     All other components within normal limits   CBC W/ AUTO DIFFERENTIAL - Abnormal; Notable for the following components:    RBC 3.98 (*)     Hemoglobin 10.4 (*)     Hematocrit 33.5 (*)     Mean Corpuscular Hemoglobin 26.1 (*)     Mean Corpuscular Hemoglobin Conc 31.0 (*)     All other components within normal limits    B-TYPE NATRIURETIC PEPTIDE - Abnormal; Notable for the following components:     (*)     All other components within normal limits   URINALYSIS, REFLEX TO URINE CULTURE - Abnormal; Notable for the following components:    Protein, UA 2+ (*)     Ketones, UA Trace (*)     Occult Blood UA 2+ (*)     All other components within normal limits    Narrative:     Preferred Collection Type->Urine, Clean Catch   URINALYSIS MICROSCOPIC - Abnormal; Notable for the following components:    Hyaline Casts, UA 2 (*)     Granular Casts, UA .1 (*)     All other components within normal limits    Narrative:     Preferred Collection Type->Urine, Clean Catch   TROPONIN I   LIPASE     EKG Readings: (Independently Interpreted)   23:27: NSR, HR 84. No ectopy. No STEMI.      ECG Results          EKG 12-lead (Final result)  Result time 08/20/19 03:16:56    Final result by Interface, Lab In [facility] (08/20/19 03:16:56)                 Narrative:    Test Reason : R11.0,    Vent. Rate : 084 BPM     Atrial Rate : 084 BPM     P-R Int : 134 ms          QRS Dur : 082 ms      QT Int : 368 ms       P-R-T Axes : 083 065 068 degrees     QTc Int : 434 ms    Normal sinus rhythm  Normal ECG  When compared with ECG of 30-MAY-2019 12:40,  Significant changes have occurred  Confirmed by Rocael Shah MD ([id number]) on 8/20/2019 3:16:44 AM    Referred By: [email or web address]   SELF           Confirmed By:Rocael Shah MD                             EKG 12-LEAD (Final result)  Result time 08/22/19 14:24:05    Final result by Unknown User (08/22/19 14:24:05)                                Imaging Results          CTA Chest Non-Coronary (PE Study) (Final result)  Result time 08/19/19 04:19:14    Final result by Jadiel Cardenas MD (08/19/19 04:19:14)                 Impression:      There is no large central saddle type pulmonary embolus, several of the small pulmonary arteries demonstrate poor or lack of opacification however this appears stable when compared  to the prior study and may relate to the history of chronic thromboembolic disease and or chronic lung disease however when accounting for the aforementioned factors there is no evidence for acute pulmonary thromboembolic disease.    Prominent chronic lung changes are noted, as well as small pulmonary nodules again noted, these findings are stable.    3.1 cm right adnexal cyst.      Electronically signed by: Jadiel Cardenas  Date:    08/19/2019  Time:    04:19             Narrative:    EXAMINATION:  CTA CHEST NON CORONARY    CLINICAL HISTORY:  cough, history of PE;    TECHNIQUE:  CT examination of the chest abdomen and pelvis was performed following the IV administration of 100 mL of Omnipaque 350.  Axial CT examination of the chest was performed via pulmonary arterial angiographic protocol, sagittal and coronal reconstruction imaging of the chest is submitted.  Single phase postcontrast imaging of the abdomen and pelvis is submitted, sagittal and coronal reconstruction imaging of the abdomen and pelvis is submitted.  Axial MIP imaging of the chest is submitted.    COMPARISON:  CT examination of the chest dated September 13, 2018 as well as CT examination of the chest dated July 5, 2019    FINDINGS:  There is no large central saddle type pulmonary embolus.  Note is made of the history of chronic pulmonary thromboembolic disease.  There has been interval improvement when compared to the prior pulmonary embolus chest CT protocol of September 13, 2018, improved opacification of the pulmonary arterial vasculature.  Some of the smaller pulmonary arteries demonstrate poor opacification or lack of opacification, the distribution of such is stable when compared to the prior study and may relate to chronic thromboembolic disease and or chronic lung disease.  On close evaluation of available imaging when accounting for the aforementioned factors, and artifact there is no abnormal pattern of pulmonary arterial filling defects  to specifically suggest the presence of acute pulmonary thromboembolic disease.    There is a right-sided PICC line noted the distal tip at the level of the SVC.  Thoracic aorta demonstrates appropriate opacification, atherosclerotic change is noted.  There are small mediastinal lymph nodes appearing stable.  There is mild pericardial thickening without significant pericardial effusion this appears stable.  There is extensive chronic appearing lung change most notably at the lung apices and most prominent at the left apex, these findings appear stable.  There are pulmonary nodules noted that appears stable when compared to the prior examination as well.  There is no evidence for pneumothorax and no significant pleural effusion.    The stomach demonstrates nonspecific appearance of mild fluid and air within the gastric lumen, the gallbladder is surgically absent.  Small hepatic hypodensity noted too small for characterization appearance stable.  There is no evidence for acute process of the pancreas, spleen, or adrenal glands.  There is no evidence for hydronephrosis or obstructive uropathy or perinephric inflammatory change bilaterally.  The abdominal aorta demonstrates appropriate opacification, atherosclerotic change noted.  The urinary bladder appears unremarkable for degree of distention.  There is a 3.1 cm right adnexal cyst, the uterus and adnexa otherwise appear appropriate.    There is no evidence for small bowel obstructive process.  The appendix is identified and does not appear inflamed.  There is no evidence for inflammatory or obstructive process of the colon.  There is no evidence for free intraperitoneal air.  The visualized osseous structures demonstrate chronic change.                               CT Abdomen Pelvis With Contrast (In process)                X-Ray Chest AP Portable (Final result)  Result time 08/19/19 02:54:21    Final result by Jeffery White MD (08/19/19 02:54:21)                  Impression:      Chronic changes in both lungs appears similar to prior.    No significant change from prior study.      Electronically signed by: Jeffery White MD  Date:    08/19/2019  Time:    02:54             Narrative:    EXAMINATION:  XR CHEST AP PORTABLE    CLINICAL HISTORY:  Cough    TECHNIQUE:  Single frontal view of the chest was performed.    COMPARISON:  August 1, 2019.    FINDINGS:  Right PICC line appears unchanged.    Chronic changes in both lungs appears similar to prior.    Heart and lungs  appear unchanged when allowing for differences in technique and positioning.                              X-Rays:   Independently Interpreted Readings:   Other Readings:  CXR +RUE PICC. +chronic appearing lung changes.     Medical Decision Making:   History:   Old Medical Records: I decided to obtain old medical records.  Old Records Summarized: records from previous admission(s) and records from clinic visits.  Initial Assessment:   69 y.o. Female with nausea, vomiting, diarrhea, abdominal pain. Recent admit for abnormal sputum cultures, currently on cefepime via PICC.  Differential Diagnosis:   Ddx includes dehydration, CATRACHITO, AGE, UTI, pyelonephritis, other.  Independently Interpreted Test(s):   I have ordered and independently interpreted X-rays - see prior notes.  I have ordered and independently interpreted EKG Reading(s) - see prior notes  Clinical Tests:   Lab Tests: Ordered and Reviewed  Radiological Study: Ordered and Reviewed  Medical Tests: Ordered and Reviewed  ED Management:  EKG no STEMI.    CXR NAD.    Labs reassuring. Hgb 10.4, stable. CMP reassuring aside from mildly low K. Troponin and lipase normal. UA no leuks, no nitrites.     CTA chest (for history of PE, tachycardia, also chronic lung disease) negative for PE.    CT abdom/pelvis without acute process.    I suspect GI symptoms are adverse effect of antibiotics which unfortunately patient requires for positive sputum culture.     Patient  had NS bolus, IV zofran, IV phenergan, then PO KCl, then IV phenergan and IV haldol with PO GI cocktail.    After ED tx, patient tolerated PO and stated she felt better. I have discussed ED workup with her and my suspicion that symptoms are related to (necessary) antibiotics. Patient feels okay to go home with  who is with her here. I have rx'ed phenergan (as patient has been using zofran without relief) and also imodium PRN. I have discussed f/u. Patient has close f/u with ID and will discuss duration of antibiotic therapy with that service.    D/c'ed.                      Clinical Impression:       ICD-10-CM ICD-9-CM   1. Nausea and vomiting, intractability of vomiting not specified, unspecified vomiting type R11.2 787.01   2. Nausea R11.0 787.02   3. Cough R05 786.2   4. Dehydration E86.0 276.51   5. Diarrhea, unspecified type R19.7 787.91                                Chelsey Delatorre MD  08/23/19 0313

## 2019-08-19 NOTE — PROGRESS NOTES
Subjective:       Patient ID: Yasmin Asencio is a 69 y.o. female.    Chief Complaint: Hospital Follow Up    HPI     68 year old female with chronic bronchiectasis and history of colonization with pseudomonas (follows with Dr. Yuan in ID clinic) recently admitted with worsening cough, dyspnea, and SOB. Sputum culture obtained prior to admission positive for pseudomonas (cipro R) and pan-sensitive serratia. Pt is currently on cefepime. She c/o of having diarrhea. Her C.diff test was negative. She presents today for follow up.    Pt is afebrile and without leukocytosis. She is not in respiratory distress. . She was seen in ED this AM for nausea. She has had no fever chills or night sweats. She has PICC line of RUE. No increase sputum production. Repeat CXR no consolidations.       Review of Systems   Constitutional: Positive for fatigue. Negative for chills, diaphoresis and fever.   Respiratory: Positive for cough ( chronikc) and shortness of breath ( chronic).    Cardiovascular: Negative for chest pain.   Gastrointestinal: Positive for nausea. Negative for abdominal pain, constipation and vomiting.   Genitourinary: Negative for dysuria.   Neurological: Negative for dizziness and headaches.       Objective:      Physical Exam   Constitutional: She appears well-developed and well-nourished. No distress.   HENT:   Head: Normocephalic.   Eyes: Pupils are equal, round, and reactive to light.   Cardiovascular: Normal rate and regular rhythm.   Pulmonary/Chest: She is in respiratory distress. She has wheezes. She has rales.   Abdominal: Soft.   Musculoskeletal: Normal range of motion. She exhibits no edema or deformity.   Neurological: She is alert.   Skin: Skin is warm and dry. She is not diaphoretic. No erythema. No pallor.   Psychiatric: She has a normal mood and affect.   Vitals reviewed.      Assessment:       1. Pneumonia due to Pseudomonas species, unspecified laterality, unspecified part of lung    2. Acquired  bronchiectasis    3. Pseudomonas respiratory infection    4. Chronic bronchitis, unspecified chronic bronchitis type        Plan:       Plan:  1. Completed IV cefepime. picc line removal today.   2. Close f/u with pulmonology (scheduled). Earliest appt in October.   4. F/u as needed

## 2019-08-21 ENCOUNTER — OFFICE VISIT (OUTPATIENT)
Dept: FAMILY MEDICINE | Facility: CLINIC | Age: 69
End: 2019-08-21
Payer: MEDICARE

## 2019-08-21 VITALS
SYSTOLIC BLOOD PRESSURE: 128 MMHG | HEIGHT: 62 IN | BODY MASS INDEX: 28.84 KG/M2 | TEMPERATURE: 98 F | WEIGHT: 156.75 LBS | OXYGEN SATURATION: 95 % | DIASTOLIC BLOOD PRESSURE: 56 MMHG | HEART RATE: 91 BPM

## 2019-08-21 DIAGNOSIS — G72.0 STATIN MYOPATHY: ICD-10-CM

## 2019-08-21 DIAGNOSIS — T46.6X5A STATIN MYOPATHY: ICD-10-CM

## 2019-08-21 DIAGNOSIS — F41.9 ANXIETY: ICD-10-CM

## 2019-08-21 DIAGNOSIS — M46.1 SACROILIITIS: ICD-10-CM

## 2019-08-21 DIAGNOSIS — N18.30 BENIGN HYPERTENSION WITH CHRONIC KIDNEY DISEASE, STAGE III: Primary | ICD-10-CM

## 2019-08-21 DIAGNOSIS — Z23 NEED FOR SHINGLES VACCINE: ICD-10-CM

## 2019-08-21 DIAGNOSIS — R10.9 ABDOMINAL SPASMS: ICD-10-CM

## 2019-08-21 DIAGNOSIS — K21.9 GASTROESOPHAGEAL REFLUX DISEASE WITHOUT ESOPHAGITIS: Chronic | ICD-10-CM

## 2019-08-21 DIAGNOSIS — I12.9 BENIGN HYPERTENSION WITH CHRONIC KIDNEY DISEASE, STAGE III: Primary | ICD-10-CM

## 2019-08-21 DIAGNOSIS — E66.3 OVERWEIGHT (BMI 25.0-29.9): ICD-10-CM

## 2019-08-21 DIAGNOSIS — I70.0 THORACIC AORTA ATHEROSCLEROSIS: ICD-10-CM

## 2019-08-21 DIAGNOSIS — J47.9 ACQUIRED BRONCHIECTASIS: Chronic | ICD-10-CM

## 2019-08-21 DIAGNOSIS — F32.0 MILD MAJOR DEPRESSION: ICD-10-CM

## 2019-08-21 DIAGNOSIS — G25.0 ESSENTIAL TREMOR: ICD-10-CM

## 2019-08-21 DIAGNOSIS — Z23 FLU VACCINE NEED: ICD-10-CM

## 2019-08-21 DIAGNOSIS — J96.11 CHRONIC RESPIRATORY FAILURE WITH HYPOXIA: ICD-10-CM

## 2019-08-21 DIAGNOSIS — R11.0 NAUSEA: ICD-10-CM

## 2019-08-21 PROBLEM — I10 ESSENTIAL HYPERTENSION: Chronic | Status: RESOLVED | Noted: 2019-07-31 | Resolved: 2019-08-21

## 2019-08-21 PROBLEM — R19.7 DIARRHEA OF PRESUMED INFECTIOUS ORIGIN: Status: RESOLVED | Noted: 2019-08-01 | Resolved: 2019-08-21

## 2019-08-21 PROBLEM — R84.5 POSITIVE SPUTUM CULTURE FOR PSEUDOMONAS: Status: RESOLVED | Noted: 2019-07-31 | Resolved: 2019-08-21

## 2019-08-21 PROBLEM — F32.A DEPRESSION: Chronic | Status: RESOLVED | Noted: 2019-07-31 | Resolved: 2019-08-21

## 2019-08-21 PROBLEM — R05.9 COUGH: Status: RESOLVED | Noted: 2019-08-01 | Resolved: 2019-08-21

## 2019-08-21 PROCEDURE — 90471 ZOSTER RECOMBINANT VACCINE: ICD-10-PCS | Mod: S$GLB,,, | Performed by: INTERNAL MEDICINE

## 2019-08-21 PROCEDURE — 90750 ZOSTER RECOMBINANT VACCINE: ICD-10-PCS | Mod: S$GLB,,, | Performed by: INTERNAL MEDICINE

## 2019-08-21 PROCEDURE — 99999 PR PBB SHADOW E&M-EST. PATIENT-LVL IV: CPT | Mod: PBBFAC,,, | Performed by: INTERNAL MEDICINE

## 2019-08-21 PROCEDURE — 3078F DIAST BP <80 MM HG: CPT | Mod: CPTII,S$GLB,, | Performed by: INTERNAL MEDICINE

## 2019-08-21 PROCEDURE — 99215 OFFICE O/P EST HI 40 MIN: CPT | Mod: 25,S$GLB,, | Performed by: INTERNAL MEDICINE

## 2019-08-21 PROCEDURE — 3078F PR MOST RECENT DIASTOLIC BLOOD PRESSURE < 80 MM HG: ICD-10-PCS | Mod: CPTII,S$GLB,, | Performed by: INTERNAL MEDICINE

## 2019-08-21 PROCEDURE — 3074F SYST BP LT 130 MM HG: CPT | Mod: CPTII,S$GLB,, | Performed by: INTERNAL MEDICINE

## 2019-08-21 PROCEDURE — 3074F PR MOST RECENT SYSTOLIC BLOOD PRESSURE < 130 MM HG: ICD-10-PCS | Mod: CPTII,S$GLB,, | Performed by: INTERNAL MEDICINE

## 2019-08-21 PROCEDURE — 1101F PT FALLS ASSESS-DOCD LE1/YR: CPT | Mod: CPTII,S$GLB,, | Performed by: INTERNAL MEDICINE

## 2019-08-21 PROCEDURE — 90750 HZV VACC RECOMBINANT IM: CPT | Mod: S$GLB,,, | Performed by: INTERNAL MEDICINE

## 2019-08-21 PROCEDURE — 99999 PR PBB SHADOW E&M-EST. PATIENT-LVL IV: ICD-10-PCS | Mod: PBBFAC,,, | Performed by: INTERNAL MEDICINE

## 2019-08-21 PROCEDURE — 1101F PR PT FALLS ASSESS DOC 0-1 FALLS W/OUT INJ PAST YR: ICD-10-PCS | Mod: CPTII,S$GLB,, | Performed by: INTERNAL MEDICINE

## 2019-08-21 PROCEDURE — 90471 IMMUNIZATION ADMIN: CPT | Mod: S$GLB,,, | Performed by: INTERNAL MEDICINE

## 2019-08-21 PROCEDURE — 99215 PR OFFICE/OUTPT VISIT, EST, LEVL V, 40-54 MIN: ICD-10-PCS | Mod: 25,S$GLB,, | Performed by: INTERNAL MEDICINE

## 2019-08-21 RX ORDER — ALPRAZOLAM 0.25 MG/1
0.25 TABLET ORAL NIGHTLY PRN
Qty: 30 TABLET | Refills: 0 | Status: SHIPPED | OUTPATIENT
Start: 2019-08-21 | End: 2021-06-04 | Stop reason: SDUPTHER

## 2019-08-21 RX ORDER — OMEPRAZOLE 20 MG/1
CAPSULE, DELAYED RELEASE ORAL
Qty: 90 CAPSULE | Refills: 1 | Status: SHIPPED | OUTPATIENT
Start: 2019-08-21 | End: 2020-02-21 | Stop reason: SDUPTHER

## 2019-08-21 NOTE — PROGRESS NOTES
Shingrix vaccination administered. Tolerated well, instructed to wait 15 min for observation. No reaction noted at discharge.

## 2019-08-21 NOTE — Clinical Note
Dr. Braga and Dr. Mills, this mutual patient of ours is in need of EGD and colonoscopy to evaluate nausea and abdominal spasms. The GI provider would like clearance from you both before proceeding. Can you clear her by chart or do you need to see her in person?I appreciate your help in this matter.Urmila

## 2019-08-21 NOTE — LETTER
August 21, 2019     Dear Demetrice Rubio,    We are pleased to provide you with secure, online access to medical information via [facility] for: Yasmin Asencio       How Do I Sign Up?  Activating a [facility] account is as easy as 1-2-3!     1. Visit [email or web address] and enter this activation code and your date of birth, then select Next.  [email or web address]  2. Create a username and password to use when you visit [facility] in the future and select a security question in case you lose your password and select Next.  3. Enter your e-mail address and click Sign Up!       Additional Information  If you have questions, please e-mail [email or web address] or call [phone] to talk to our [facility] staff. Remember, [facility] is NOT to be used for urgent needs. For non-life threatening issues outside of normal clinic hours, call our after-hours nurse care line, [facility] at [phone]. For medical emergencies, dial 911.     Sincerely,    Your [facility] Team

## 2019-08-21 NOTE — PROGRESS NOTES
HISTORY OF PRESENT ILLNESS:  Yasmin Asencio is a 69 y.o. female who presents to the clinic today for Hypertension (f/u); Medication Refill; and Cough  .   The patient presents to clinic today for follow-up of her hypertension complicated by chronic kidney disease stage 3, acquired bronchiectasis with chronic hypoxia on home oxygen at night, and reflux.  She recently required IV antibiotics for Pseudomonas pneumonia.  PICC line was removed 2 days ago.  She still reports a wet cough.  No fever.  Her shortness of breath and wheezing has improved.  She has chronic back pain and arthralgias.  She sees Pain Management for injections and further management.  She has stable depression and anxiety.  She also has problems with tremor.  She rarely takes Xanax.  She could not tolerate any other medication to help control her tremors.  She does not check blood pressures at home on a regular basis.  She reports compliance with her current medication regimen.  She has had a couple of bouts of nausea with abdominal spasms that were very painful.  She required Zofran and bowel rest for these episodes to resolved.  She is now worried about recurrence and therefore is scared to eat.  GI would like to do further evaluation with EGD and colonoscopy.  She will need clearance from Pulmonary and Cardiology to proceed.      PAST MEDICAL HISTORY:  Past Medical History:   Diagnosis Date    Acquired bronchiectasis     due to history of TB - followed by pulmonary, Dr. Zuñiga    Allergy     Amblyopia     rt eye per pt    Anemia of other chronic disease     Anticoagulant long-term use     Anxiety     Cataract     Chronic obstructive pulmonary disease with acute exacerbation     Clotting disorder     COPD (chronic obstructive pulmonary disease)     Depression     Diverticulosis     Essential tremor     GERD (gastroesophageal reflux disease)     Hemangioma of liver     History of tuberculosis 1978    Hypertension     Mixed  anxiety and depressive disorder     On home oxygen therapy     Psoriasis     PSVT (paroxysmal supraventricular tachycardia)     Pulmonary embolism     S/P PICC central line placement Apr. 2016 - May 2016    Skin disease     Psoriasis    Spondylosis without myelopathy 8/23/2013    Supraventricular tachycardia     Tachycardia     Thoracic aorta atherosclerosis     noted on CT scan of chest 1/3/2011    Tuberculosis     Vaginal delivery     x2    Vitamin D deficiency        PAST SURGICAL HISTORY:  Past Surgical History:   Procedure Laterality Date    ABLATION N/A 7/24/2017    Performed [facility] at [facility] CATH LAB    BLOCK-NERVE-MEDIAL BRANCH-LUMBAR Bilateral 10/14/2016    [facility]formed by [facility]    BLOCK-NERVE-MEDIAL BRANCH-LUMBAR Bilateral 8/26/2016    [facility]formed by [facility] at [facility]    CATARACT EXTRACTION W/  INTRAOCULAR LENS IMPLANT  07/24/12    od dr [place]    CATARACT EXTRACTION W/  INTRAOCULAR LENS IMPLANT  08/07/12    left eye    CHOLECYSTECTOMY      COLONOSCOPY N/A 4/23/2013    Performed by Simeon Graves MD at [facility] ENDO (4TH FLR)    EGD (ESOPHAGOGASTRODUODENOSCOPY) N/A 4/23/2013    Performed by Simeon Graves [facility] at [facility] ENDO (4TH FLR)    EYE SURGERY      GALLBLADDER SURGERY      HEART CATH-LEFT Left 6/16/2016    [facility] CATH [facility]    INJECTION BILATERAL SI JOINT Bilateral 2/20/2019    [facility] by [facility] at [facility]    INJECTION-FACET Bilateral 1/22/2016    Performed by [facility]ers MD at [facility]    RADIOFREQUENCY ABLATION RIGHT LUMBAR L2,3,4,5 RFA Right 11/28/2018    [facility]formed by [facility] at [facility]    RADIOFREQUENCY THERMAL COAGULATION Left 12/18/2018    Performed by Issac Meyers [facility]    RADIOFREQUENCY THERMOCOAGULATION (RFTC)-NERVE-MEDIAN BRANCH-LUMBAR Right 4/4/2018    Performed [facility] Issac PATTERSON  [facility]    RADIOFREQUENCY THERMOCOAGULATION (RFTC)-NERVE-MEDIAN BRANCH-LUMBAR Left 3/16/2018    [facility] at [facility]    RADIOFREQUENCY THERMOCOAGULATION (RFTC)-NERVE-MEDIAN BRANCH-LUMBAR Right 2016    Per[facility] by [facility]ers [facility] at [facility]       SOCIAL HISTORY:  Social History     Socioeconomic History    Marital status:      Spouse name: Not on file    Number of children: 2    Years of education: Not on file    Highest education level: Not on file   Occupational History    Occupation: housewife   Social Needs    Financial resource strain: Not very hard    Food insecurity:     Worry: Never true     Inability: Never true    Transportation needs:     Medical: No     Non-medical: No   Tobacco Use    Smoking status: Former Smoker     Packs/day: 2.00     Years: 50.00     Pack years: 100.00     Types: Cigarettes     Last attempt to quit: 2009     Years since quitting: 10.2    Smokeless tobacco: Never Used   Substance and Sexual Activity    Alcohol use: Not Currently     Frequency: Never     Binge frequency: Never    Drug use: Never    Sexual activity: Yes     Partners: Male   Lifestyle    Physical activity:     Days per week: 0 days     Minutes per session: 0 min    Stress: To some extent   Relationships    Social connections:     Talks on phone: More than three times a week     Gets together: Once a week     Attends Zoroastrianism service: Not on file     Active member of club or organization: No     Attends meetings of clubs or organizations: Never     Relationship status:    Other Topics Concern    Are you pregnant or think you may be? No    Breast-feeding No   Social History Narrative    ** Merged History Encounter **         One child  of a heart attack at age 35.       FAMILY HISTORY:  Family History   Problem Relation Age of Onset    Hypertension Mother     Heart attack Mother     Cancer Father          liver and bladder    Hyperlipidemia Sister     Psoriasis Sister     Cancer Brother         liver    Stroke Maternal Uncle     Cancer Maternal Aunt         stomach    Lung cancer Sister     Heart attack Brother     Heart attack Son     Amblyopia Neg Hx     Blindness Neg Hx     Cataracts Neg Hx     Glaucoma Neg Hx     Macular degeneration Neg Hx     Retinal detachment Neg Hx     Strabismus Neg Hx     Thyroid disease Neg Hx     Melanoma Neg Hx     Lupus Neg Hx     Eczema Neg Hx     COPD Neg Hx     Colon cancer Neg Hx        ALLERGIES AND MEDICATIONS: updated and reviewed.  Review of patient's allergies indicates:   Allergen Reactions    Adhesive Other (See Comments)     Tears up the skin and makes it itch   (leads)    Bactrim [sulfamethoxazole-trimethoprim] Rash     Was hospitalized for rash    Restasis [cyclosporine]     Lipitor [atorvastatin] Other (See Comments)     Muscle aches    Albuterol Other (See Comments)     tremors    Topamax [topiramate]      - made her feel 'bad in the head'     Medication List with Changes/Refills   New Medications    WALKER MISC    Rollator for use with ambulation   Current Medications    ACETAMINOPHEN (TYLENOL) 500 MG TABLET    Take 1,000 mg by mouth once daily. sleep    APIXABAN (ELIQUIS) 5 MG TAB    Take 1 tablet (5 mg total) by mouth 2 (two) times daily.    CHLORTHALIDONE (HYGROTEN) 25 MG TAB    Take 12.5 mg by mouth once daily.    DESOXIMETASONE (TOPICORT) 0.25 % CREAM    APPLY  CREAM EXTERNALLY TWICE DAILY AS NEEDED    ERGOCALCIFEROL (ERGOCALCIFEROL) 50,000 UNIT CAP    TAKE 1 CAPSULE BY MOUTH ONCE A WEEK    ESCITALOPRAM OXALATE (LEXAPRO) 10 MG TABLET    TAKE ONE TABLET BY MOUTH ONCE DAILY    FLUOCINOLONE (DERMA-SMOOTHE) 0.01 % EXTERNAL OIL    Apply topically once daily.    FLUOCINONIDE (LIDEX) 0.05 % EXTERNAL SOLUTION    Apply topically once daily. - apply after shampooing    FOLIC ACID (FOLVITE) 1 MG TABLET    TAKE 1 TABLET BY MOUTH ONCE DAILY     GABAPENTIN (NEURONTIN) 300 MG CAPSULE    TAKE 2 CAPSULES BY MOUTH THREE TIMES DAILY    GUAIFENESIN (MUCINEX) 600 MG 12 HR TABLET    Take 1,200 mg by mouth 2 (two) times daily.    HYOSCYAMINE (ANASPAZ,LEVSIN) 0.125 MG TAB    Take 1 tablet (125 mcg total) by mouth every 6 (six) hours as needed.    IPRATROPIUM (ATROVENT) 0.02 % NEBULIZER SOLUTION    Inhale 1 vial in nebulizer 3 to 4 times daily    KETOCONAZOLE (NIZORAL) 2 % SHAMPOO    Apply topically once daily.    LACTOBACILLUS RHAMNOSUS GG (CULTURELLE) 10 BILLION CELL CAPSULE    Take 1 capsule by mouth once daily.    LOPERAMIDE (IMODIUM) 2 MG CAPSULE    Take 2 capsules after first loose stool, then 1 capsule after each loose stool following. Do not exceed 8 capsules per day.    MIRTAZAPINE (REMERON) 15 MG TABLET    Take 1 tablet (15 mg total) by mouth every evening.    MULTIVITAMIN (THERAGRAN) PER TABLET    Take 1 tablet by mouth once daily.    ONDANSETRON (ZOFRAN-ODT) 8 MG TBDL    Take 1 tablet (8 mg total) by mouth every 8 (eight) hours as needed.    PROMETHAZINE (PHENERGAN) 25 MG SUPPOSITORY    Place 1 suppository (25 mg total) rectally every 6 (six) hours as needed for Nausea.    PROMETHAZINE (PHENERGAN) 25 MG TABLET    Take 1 tablet (25 mg total) by mouth every 4 (four) hours as needed for Nausea.    PROPYLENE GLYCOL (SYSTANE BALANCE) 0.6 % DROP    Apply 1 drop to eye daily as needed (dry eye).    SODIUM CHLORIDE 2% (XIOMARA 128) 2 % OPHTHALMIC SOLUTION    Place 1 drop into both eyes 3 (three) times daily as needed.    SODIUM CHLORIDE 3% 3 % NEBULIZER SOLUTION    Take 4 mLs by nebulization 2 (two) times daily.    TRAMADOL (ULTRAM) 50 MG TABLET    Take 1 tablet (50 mg total) by mouth every 6 (six) hours as needed for Pain.    UMECLIDINIUM-VILANTEROL (ANORO ELLIPTA) 62.5-25 MCG/ACTUATION DSDV    Inhale 1 puff into the lungs once daily. Controller    VERAPAMIL (VERELAN) 180 MG C24P    Take 1 capsule (180 mg total) by mouth 2 (two) times daily.   Changed and/or  Refilled Medications    Modified Medication Previous Medication    ALPRAZOLAM (XANAX) 0.25 MG TABLET alprazolam (XANAX) 0.25 MG tablet       Take 1 tablet (0.25 mg total) by mouth nightly as needed for Anxiety.    Take 1 tablet (0.25 mg total) by mouth nightly as needed for Anxiety.    OMEPRAZOLE (PRILOSEC) 20 MG CAPSULE omeprazole (PRILOSEC) 20 MG capsule       TAKE 1 CAPSULE BY MOUTH ONCE DAILY AS NEEDED FOR  HEARTBURN  (TAKE  AS  NEEDED)    TAKE 1 CAPSULE BY MOUTH ONCE DAILY AS NEEDED FOR  HEARTBURN  (TAKE  AS  NEEDED)          CARE TEAM:  Patient Care Team:  Urmila Luna MD as PCP - General (Internal Medicine)  Taurus Braga MD as Consulting Physician (Cardiology)  PRECIOUS Zuñiga MD as Consulting Physician (Pulmonary Disease)  Louie Yuan MD as Consulting Physician (Infectious Diseases)  Issac Meyers MD as Consulting Physician (Pain Medicine)  Urmila Luna MD (Internal Medicine)         REVIEW OF SYSTEMS:  Review of Systems   Constitutional: Positive for activity change and fatigue. Negative for chills, fever and unexpected weight change.   HENT: Negative for congestion, hearing loss, postnasal drip, rhinorrhea and trouble swallowing.    Eyes: Positive for visual disturbance. Negative for pain and discharge.   Respiratory: Positive for cough (- wet sounding - she just completed IV antibiotics) and wheezing. Negative for chest tightness and shortness of breath.    Cardiovascular: Negative for chest pain, palpitations and leg swelling.   Gastrointestinal: Negative for blood in stool, constipation, diarrhea and vomiting.        She has had a couple of bouts of nausea with abdominal spasms.  She does not know what caused these.  GI would like to do an EGD and colonoscopy, but will need cardiac and pulmonary clearance.  Patient is currently doing okay, but is nervous to eat for fear of recurrence.   Endocrine: Negative for polydipsia and polyuria.   Genitourinary: Negative for difficulty  "urinating, dysuria, hematuria and menstrual problem.   Musculoskeletal: Positive for arthralgias. Negative for back pain, joint swelling and neck pain.   Skin: Negative for rash.   Neurological: Positive for tremors. Negative for weakness and headaches.   Psychiatric/Behavioral: Positive for dysphoric mood. Negative for confusion and sleep disturbance. The patient is nervous/anxious.          PHYSICAL EXAM:   Vitals:    08/21/19 1432   BP: (!) 128/56   Pulse: 91   Temp: 97.8 °F (36.6 °C)     Weight: 71.1 kg (156 lb 12 oz)   Height: 5' 2" (157.5 cm)   Body mass index is 28.67 kg/m².     General appearance - alert, well appearing, and in no distress and overweight  Mental status - alert, oriented to person, place, and time, normal mood, behavior, speech, dress, motor activity, and thought processes; occasionally forgets things  Eyes - pupils equal and reactive, extraocular eye movements intact, sclera anicteric  Mouth - mucous membranes moist, pharynx normal without lesions  Neck - supple, no significant adenopathy, carotids upstroke normal bilaterally, no bruits  Lymphatics - no palpable lymphadenopathy  Chest - rales and rhonchi auscultated throughout, more so in the posterior bases; no wheezes; no significant shortness of breath with speech or exertion; no tachypnea  Heart - normal rate and regular rhythm, no gallops noted  Neurological - alert, oriented, normal speech, no focal findings or movement disorder noted, cranial nerves II through XII intact  Musculoskeletal - no muscular tenderness noted, Moderate osteoarthritic changes noted to both knee joints. No joint effusions noted.    Extremities - peripheral pulses normal, no pedal edema, no clubbing or cyanosis  Skin - normal coloration and turgor, no rashes, no suspicious skin lesions noted      ASSESSMENT AND PLAN:  1. Benign hypertension with chronic kidney disease, stage III/2. Statin myopathy  Discussed sodium restriction, maintaining ideal body weight and " regular exercise program as physiologic means to achieve blood pressure control. The patient will strive towards this. The current medical regimen is effective;  continue present plan and medications. Recommended patient to check home readings to monitor and see me for followup as scheduled or sooner as needed. Patient was educated that both decongestant and anti-inflammatory medication may raise blood pressure. Stable decreased kidney function. Observe. Patient counseled to avoid/minimize the use of anti-inflammatory  Medication. Discussed to stay well hydrated. Also discussed with patient that good control of blood pressure and/or diabetes, if present, will help to prevent progression.   - Lipid panel; Future  - PTH, intact; Future    3. Acquired bronchiectasis/4. Chronic respiratory failure with hypoxia  Status post recent course of IV antibiotics for Pseudomonas pneumonia.  Doing better but still with wet cough.  She will contact her pulmonologist for further treatment recommendations.  Continue inhalers and other chest percussion treatments for now.    5. Gastroesophageal reflux disease without esophagitis  Symptoms controlled: yes. Reflux precautions discussed (eliminate tobacco if a smoker; minimize caffeine, chocolate and red/white peppermint intake; avoid heavy and spicy meals; don't lay down within 2-3 hours after eating; minimize the intake of NSAIDs). Medication as needed. Patient asked to take medication breaks, if possible - discussed chronic use can limit calcium absorption (which can lead to osteopenia/osteoporosis), increases the risk for intestinal infections, and can cause kidney damage. There are also some newer studies that show possible increased risk of mortality.   - omeprazole (PRILOSEC) 20 MG capsule; TAKE 1 CAPSULE BY MOUTH ONCE DAILY AS NEEDED FOR  HEARTBURN  (TAKE  AS  NEEDED)  Dispense: 90 capsule; Refill: 1    6. Thoracic aorta atherosclerosis  Patient with Atherosclerosis of the  Aorta.  Stable/asymptomatic. Currently stable on lipid and b/p monitoring. Statin intolerant.    7. Essential tremor/8. Sacroiliitis  It is recommended she get a Rollator to use with ambulation as she has been having some falls due to her tremor.  - walker Misc; Rollator for use with ambulation  Dispense: 1 each; Refill: 0    9. Mild major depression  The current medical regimen is effective;  continue present plan and medications.     10. Anxiety  Stable. Medication as needed. Patient counseled that this can be an addicting medication.  Patient was counseled that a recent study showed that the chronic use of anxiolytic medication may increase the risk for developing dementia.  Patient was warned of the sedating properties of this medication and was advised to abstain from driving, operating heavy machinery, etc if they feel drowsy.   - ALPRAZolam (XANAX) 0.25 MG tablet; Take 1 tablet (0.25 mg total) by mouth nightly as needed for Anxiety.  Dispense: 30 tablet; Refill: 0    11. Overweight (BMI 25.0-29.9)  The patient is asked to make an attempt to improve diet and exercise patterns to aid in medical management of this problem.     12. Nausea/13. Abdominal spasms  Evaluation currently in progress by GI.  I will contact pulmonology and Cardiology for clearance so they can proceed.    14. Need for shingles vaccine    - Zoster Recombinant Vaccine    15. Flu vaccine need  Patient advised to get flu shot in September/October.            Follow up in about 6 months (around 2/21/2020), or if symptoms worsen or fail to improve, for annual exam. or sooner as needed.

## 2019-08-22 ENCOUNTER — EXTERNAL HOME HEALTH (OUTPATIENT)
Dept: HOME HEALTH SERVICES | Facility: HOSPITAL | Age: 69
End: 2019-08-22
Payer: MEDICARE

## 2019-08-22 ENCOUNTER — TELEPHONE (OUTPATIENT)
Dept: FAMILY MEDICINE | Facility: CLINIC | Age: 69
End: 2019-08-22

## 2019-08-22 ENCOUNTER — TELEPHONE (OUTPATIENT)
Dept: CARDIOLOGY | Facility: CLINIC | Age: 69
End: 2019-08-22

## 2019-08-22 DIAGNOSIS — M46.1 SACROILIITIS: Primary | ICD-10-CM

## 2019-08-22 NOTE — TELEPHONE ENCOUNTER
Spoke with patient and she informed me that [facility] pharmacy does not carry Rollator and will need script sent to [facility].

## 2019-08-22 NOTE — TELEPHONE ENCOUNTER
----- Message from Urmila Luna MD sent at 8/21/2019  3:21 PM CDT -----  Dr. Braga and Dr. Mills, this mutual patient of ours is in need of EGD and colonoscopy to evaluate nausea and abdominal spasms. The GI provider would like clearance from you both before proceeding. Can you clear her by chart or do you need to see her in person?  I appreciate your help in this matter.  Urmila

## 2019-08-22 NOTE — TELEPHONE ENCOUNTER
----- Message from Amina Adan sent at 8/22/2019  8:56 AM CDT -----  Contact: Patient   Type: Patient Call Back    Who called: Patient     What is the request in detail: Pt is requesting a call back to discuss her walker/chair. Pt states that [facility] do not have it. Pt is requesting that a prescription be sent to [facility].     Can the clinic reply by [facility]? No    Would the patient rather a call back or a response via [facility]? Call back     Best call back number: [phone]    Additional Information:

## 2019-08-23 ENCOUNTER — PATIENT OUTREACH (OUTPATIENT)
Dept: ADMINISTRATIVE | Facility: OTHER | Age: 69
End: 2019-08-23

## 2019-08-27 ENCOUNTER — OFFICE VISIT (OUTPATIENT)
Dept: OPTOMETRY | Facility: CLINIC | Age: 69
End: 2019-08-27
Payer: MEDICARE

## 2019-08-27 ENCOUNTER — TELEPHONE (OUTPATIENT)
Dept: FAMILY MEDICINE | Facility: CLINIC | Age: 69
End: 2019-08-27

## 2019-08-27 DIAGNOSIS — H18.529 ANTERIOR BASEMENT MEMBRANE DYSTROPHY: Primary | ICD-10-CM

## 2019-08-27 DIAGNOSIS — H02.88B MEIBOMIAN GLAND DYSFUNCTION (MGD), BILATERAL, BOTH UPPER AND LOWER LIDS: ICD-10-CM

## 2019-08-27 DIAGNOSIS — H52.03 HYPEROPIA WITH ASTIGMATISM, BILATERAL: ICD-10-CM

## 2019-08-27 DIAGNOSIS — H52.203 HYPEROPIA WITH ASTIGMATISM, BILATERAL: ICD-10-CM

## 2019-08-27 DIAGNOSIS — H02.88A MEIBOMIAN GLAND DYSFUNCTION (MGD), BILATERAL, BOTH UPPER AND LOWER LIDS: ICD-10-CM

## 2019-08-27 PROCEDURE — 99999 PR PBB SHADOW E&M-EST. PATIENT-LVL II: CPT | Mod: PBBFAC,,, | Performed by: OPTOMETRIST

## 2019-08-27 PROCEDURE — 92015 DETERMINE REFRACTIVE STATE: CPT | Mod: S$GLB,,, | Performed by: OPTOMETRIST

## 2019-08-27 PROCEDURE — 92014 COMPRE OPH EXAM EST PT 1/>: CPT | Mod: S$GLB,,, | Performed by: OPTOMETRIST

## 2019-08-27 PROCEDURE — 99999 PR PBB SHADOW E&M-EST. PATIENT-LVL II: ICD-10-PCS | Mod: PBBFAC,,, | Performed by: OPTOMETRIST

## 2019-08-27 PROCEDURE — 92015 PR REFRACTION: ICD-10-PCS | Mod: S$GLB,,, | Performed by: OPTOMETRIST

## 2019-08-27 PROCEDURE — 92014 PR EYE EXAM, EST PATIENT,COMPREHESV: ICD-10-PCS | Mod: S$GLB,,, | Performed by: OPTOMETRIST

## 2019-08-27 NOTE — TELEPHONE ENCOUNTER
Spoke with patient's spouse and he said that he contact [facility] and they said that they never received order for Rollator. Informed him that script was faxed to [facility] on 8/22/19 but will refax today.

## 2019-08-27 NOTE — TELEPHONE ENCOUNTER
----- Message from Yelitza Mckenzie sent at 8/27/2019 12:37 PM CDT -----  Contact: Marc/Spouse/  [phone]  Type: Patient Call Back    Who called: Marc/Spouse/      What is the request in detail:  Marc would like staff to give him a call.  Thank you.      Would the patient rather a call back or a response via [facility]? Call back    Best call back number:  [phone]

## 2019-08-27 NOTE — PROGRESS NOTES
HPI     pt sts blurry vision distance /near  Hx ABMD- Using BID, Radha QD     Last edited by Ad Domingo, OD on 8/27/2019 10:11 AM. (History)            Assessment /Plan     For exam results, see Encounter Report.    Anterior basement membrane dystrophy  -Increase Radha 128 QID  -if limited benefit may Super-K w/ [place]    Meibomian gland dysfunction (MGD), bilateral, both upper and lower lids  -Systane QID    Hyperopia with astigmatism, bilateral  -hold      RTC 1 yr, PRN ABMD

## 2019-08-29 RX ORDER — CHLORTHALIDONE 25 MG/1
TABLET ORAL
Qty: 90 TABLET | Refills: 3 | Status: SHIPPED | OUTPATIENT
Start: 2019-08-29 | End: 2019-10-25 | Stop reason: SDUPTHER

## 2019-09-05 DIAGNOSIS — J47.9 ACQUIRED BRONCHIECTASIS: ICD-10-CM

## 2019-09-05 RX ORDER — SODIUM CHLORIDE FOR INHALATION 3 %
VIAL, NEBULIZER (ML) INHALATION
Refills: 11 | OUTPATIENT
Start: 2019-09-05

## 2019-09-22 ENCOUNTER — PATIENT MESSAGE (OUTPATIENT)
Dept: OPTOMETRY | Facility: CLINIC | Age: 69
End: 2019-09-22

## 2019-09-22 ENCOUNTER — PATIENT MESSAGE (OUTPATIENT)
Dept: GASTROENTEROLOGY | Facility: CLINIC | Age: 69
End: 2019-09-22

## 2019-09-24 ENCOUNTER — TELEPHONE (OUTPATIENT)
Dept: ENDOSCOPY | Facility: HOSPITAL | Age: 69
End: 2019-09-24

## 2019-09-24 DIAGNOSIS — Z12.11 SPECIAL SCREENING FOR MALIGNANT NEOPLASMS, COLON: Primary | ICD-10-CM

## 2019-09-24 RX ORDER — POLYETHYLENE GLYCOL 3350, SODIUM SULFATE ANHYDROUS, SODIUM BICARBONATE, SODIUM CHLORIDE, POTASSIUM CHLORIDE 236; 22.74; 6.74; 5.86; 2.97 G/4L; G/4L; G/4L; G/4L; G/4L
4 POWDER, FOR SOLUTION ORAL ONCE
Qty: 4000 ML | Refills: 0 | Status: SHIPPED | OUTPATIENT
Start: 2019-09-24 | End: 2019-09-24

## 2019-09-24 NOTE — TELEPHONE ENCOUNTER
Patient needs to be scheduled for an EGD/Colonoscopy. Is it ok for her to hold Eliquis 2 days prior? Please advise.    Thank you,  Mali

## 2019-09-25 RX ORDER — ERGOCALCIFEROL 1.25 MG/1
CAPSULE ORAL
Qty: 4 CAPSULE | Refills: 4 | Status: SHIPPED | OUTPATIENT
Start: 2019-09-25 | End: 2020-02-03

## 2019-09-28 LAB
ACID FAST MOD KINY STN SPEC: NORMAL
MYCOBACTERIUM SPEC QL CULT: NORMAL

## 2019-10-01 ENCOUNTER — PATIENT OUTREACH (OUTPATIENT)
Dept: ADMINISTRATIVE | Facility: OTHER | Age: 69
End: 2019-10-01

## 2019-10-02 ENCOUNTER — OFFICE VISIT (OUTPATIENT)
Dept: OPHTHALMOLOGY | Facility: CLINIC | Age: 69
End: 2019-10-02
Payer: MEDICARE

## 2019-10-02 DIAGNOSIS — H52.213 IRREGULAR ASTIGMATISM OF BOTH EYES: ICD-10-CM

## 2019-10-02 DIAGNOSIS — H18.529 ANTERIOR BASEMENT MEMBRANE DYSTROPHY: ICD-10-CM

## 2019-10-02 DIAGNOSIS — H53.8 BLURRED VISION, BILATERAL: Primary | ICD-10-CM

## 2019-10-02 PROCEDURE — 92025 COMPUTERIZED CORNEAL TOPOGRAPHY: ICD-10-PCS | Mod: S$GLB,,, | Performed by: OPHTHALMOLOGY

## 2019-10-02 PROCEDURE — 99999 PR PBB SHADOW E&M-EST. PATIENT-LVL II: CPT | Mod: PBBFAC,,, | Performed by: OPHTHALMOLOGY

## 2019-10-02 PROCEDURE — 92014 PR EYE EXAM, EST PATIENT,COMPREHESV: ICD-10-PCS | Mod: S$GLB,,, | Performed by: OPHTHALMOLOGY

## 2019-10-02 PROCEDURE — 99999 PR PBB SHADOW E&M-EST. PATIENT-LVL II: ICD-10-PCS | Mod: PBBFAC,,, | Performed by: OPHTHALMOLOGY

## 2019-10-02 PROCEDURE — 92025 CPTRIZED CORNEAL TOPOGRAPHY: CPT | Mod: S$GLB,,, | Performed by: OPHTHALMOLOGY

## 2019-10-02 PROCEDURE — 92014 COMPRE OPH EXAM EST PT 1/>: CPT | Mod: S$GLB,,, | Performed by: OPHTHALMOLOGY

## 2019-10-02 RX ORDER — POLYETHYLENE GLYCOL 3350, SODIUM SULFATE ANHYDROUS, SODIUM BICARBONATE, SODIUM CHLORIDE, POTASSIUM CHLORIDE 236; 22.74; 6.74; 5.86; 2.97 G/4L; G/4L; G/4L; G/4L; G/4L
POWDER, FOR SOLUTION ORAL
Refills: 0 | Status: ON HOLD | COMMUNITY
Start: 2019-09-24 | End: 2019-12-04 | Stop reason: HOSPADM

## 2019-10-02 NOTE — LETTER
October 2, 2019      Ad Domingo, OD  [address]  [place] [address]           [facility]  [address]  [place] [address]  Phone: [phone]  Fax: [phone]          Patient: Yasmin Asencio   MR Number: [id number]   YOB: 1950   Date of Visit: 10/2/2019       Dear Dr. Ad Domingo:    Thank you for referring Yasmin Asencio to me for evaluation. Attached you will find relevant portions of my assessment and plan of care.    If you have questions, please do not hesitate to call me. I look forward to following Yasmin Asencio along with you.    Sincerely,    Emeli Muniz MD    Enclosure  CC:  No Recipients    If you would like to receive this communication electronically, please contact [email or web address] or [phone] to request more information on [employer] access.    For providers and/or their staff who would like to refer a patient to [facility], please contact us through our one-stop-shop provider referral line, [facility], at [phone].    If you feel you have received this communication in error or would no longer like to receive these types of communications, please e-mail [email or web address]

## 2019-10-02 NOTE — PROGRESS NOTES
HPI     Ref by Dr. Domingo at [facility].    S/p cataract extraction w/ IOL OS - 8/7/2012  S/p cataract extraction w/ IOL OD - 7/24/2012  MGD OU  Dermatochalasis OU  Ectropion OU  Anterior basement membrane dystrophy OU -    PCIOL OU  PVD OU    Radha 128 OU qid    Patient here for anterior basement membrane dystrophy + super-k   discussion. Patiernt states VA has been blurry for 1 yr. No pain. No other   ocular complaints.    Last edited by Emeli Muniz MD on 10/2/2019  3:44 PM. (History)            Assessment /Plan     For exam results, see Encounter Report.    Blurred vision, bilateral    Irregular astigmatism of both eyes  -     Computerized corneal topography    Anterior basement membrane dystrophy  -     Computerized corneal topography      irreg astig OU 2/2 presumed ABMD however clinically does not look like classic ABMD, more diffuse scarring    Discussed options for [facility] rehab -- will plan for Super K OD first.  If NI, can discuss RGP OR / specialty CL fitting

## 2019-10-07 ENCOUNTER — PATIENT OUTREACH (OUTPATIENT)
Dept: ADMINISTRATIVE | Facility: OTHER | Age: 69
End: 2019-10-07

## 2019-10-08 ENCOUNTER — OFFICE VISIT (OUTPATIENT)
Dept: PULMONOLOGY | Facility: CLINIC | Age: 69
End: 2019-10-08
Payer: MEDICARE

## 2019-10-08 VITALS
DIASTOLIC BLOOD PRESSURE: 63 MMHG | WEIGHT: 160.19 LBS | BODY MASS INDEX: 29.48 KG/M2 | HEIGHT: 62 IN | OXYGEN SATURATION: 92 % | SYSTOLIC BLOOD PRESSURE: 130 MMHG | HEART RATE: 86 BPM

## 2019-10-08 DIAGNOSIS — Z79.01 CURRENT USE OF LONG TERM ANTICOAGULATION: ICD-10-CM

## 2019-10-08 DIAGNOSIS — B96.5 PSEUDOMONAS RESPIRATORY INFECTION: ICD-10-CM

## 2019-10-08 DIAGNOSIS — I26.99 PE (PULMONARY THROMBOEMBOLISM): ICD-10-CM

## 2019-10-08 DIAGNOSIS — J47.9 BRONCHIECTASIS WITHOUT COMPLICATION: Primary | ICD-10-CM

## 2019-10-08 DIAGNOSIS — J98.8 PSEUDOMONAS RESPIRATORY INFECTION: ICD-10-CM

## 2019-10-08 PROCEDURE — 3078F DIAST BP <80 MM HG: CPT | Mod: CPTII,S$GLB,, | Performed by: EMERGENCY MEDICINE

## 2019-10-08 PROCEDURE — 3075F PR MOST RECENT SYSTOLIC BLOOD PRESS GE 130-139MM HG: ICD-10-PCS | Mod: CPTII,S$GLB,, | Performed by: EMERGENCY MEDICINE

## 2019-10-08 PROCEDURE — 99999 PR PBB SHADOW E&M-EST. PATIENT-LVL III: CPT | Mod: PBBFAC,,, | Performed by: EMERGENCY MEDICINE

## 2019-10-08 PROCEDURE — 99213 PR OFFICE/OUTPT VISIT, EST, LEVL III, 20-29 MIN: ICD-10-PCS | Mod: S$GLB,,, | Performed by: EMERGENCY MEDICINE

## 2019-10-08 PROCEDURE — 99999 PR PBB SHADOW E&M-EST. PATIENT-LVL III: ICD-10-PCS | Mod: PBBFAC,,, | Performed by: EMERGENCY MEDICINE

## 2019-10-08 PROCEDURE — 3078F PR MOST RECENT DIASTOLIC BLOOD PRESSURE < 80 MM HG: ICD-10-PCS | Mod: CPTII,S$GLB,, | Performed by: EMERGENCY MEDICINE

## 2019-10-08 PROCEDURE — 99213 OFFICE O/P EST LOW 20 MIN: CPT | Mod: S$GLB,,, | Performed by: EMERGENCY MEDICINE

## 2019-10-08 PROCEDURE — 3075F SYST BP GE 130 - 139MM HG: CPT | Mod: CPTII,S$GLB,, | Performed by: EMERGENCY MEDICINE

## 2019-10-08 PROCEDURE — 1101F PT FALLS ASSESS-DOCD LE1/YR: CPT | Mod: CPTII,S$GLB,, | Performed by: EMERGENCY MEDICINE

## 2019-10-08 PROCEDURE — 1101F PR PT FALLS ASSESS DOC 0-1 FALLS W/OUT INJ PAST YR: ICD-10-PCS | Mod: CPTII,S$GLB,, | Performed by: EMERGENCY MEDICINE

## 2019-10-08 RX ORDER — AMOXICILLIN AND CLAVULANATE POTASSIUM 875; 125 MG/1; MG/1
1 TABLET, FILM COATED ORAL 2 TIMES DAILY
Qty: 14 TABLET | Refills: 1 | Status: SHIPPED | OUTPATIENT
Start: 2019-10-08 | End: 2019-11-22

## 2019-10-08 NOTE — PROGRESS NOTES
Pulmonary & Critical Care Medicine   Clinic Note     Follow up visit     HPI: From prior clinic note 11/2018      Patient of Dr. Zuñiga in past. New to me. Underlying bronchiectasis, chronic PE on OAC. Etiology of bronchiectasis related to very remote history of M. Padmini [facility]. She has recurrent pseudomonal infections requiring varying abx and admissions. Most recent hospital admission in September requiring PICC placement and IV Zosyn. Doing better. Remains with daily productive cough and intermittent blood tinged sputum, but per her at baseline. Continues to utilize bronchodilators, CPT and accapella. Weight and functional status stable.  and daughter present at visit. No additional complaints Denies F/NS/weight loss     Interval History      Following her last visit, she was admitted to [facility] for bronchiectasis exacerbation with cultures demonstrating pseudomonas. Treated with course of anti-microbials and improved. Feels good. Still with intermittent N/emesis, but improved. Scheduled for EGD in Dec. No complaints. Monitoring O2 sats and uses supplemental O2 only when <88%, but has not needed.  No additional complaints. Scheduled to go to [place] for vacation in November and request rescue Abx. Has not received flu vaccination.      Past Medical, Social, Surgical, Family History- Reviewed and updated as appropriate      Drug Allergies:             Review of patient's allergies indicates:   Allergen Reactions    Adhesive Other (See Comments)       Tears up the skin and makes it itch    Bactrim [sulfamethoxazole-trimethoprim] Rash       Was hospitalized for rash    Lipitor [atorvastatin] Other (See Comments)       Muscle aches    Albuterol Other (See Comments)       tremors    Topamax [topiramate]         - made her feel 'bad in the head'    Restasis [cyclosporine] Itching         Review of Systems:      Constitutional: Negative for fever, chills, diaphoresis, activity  "change, appetite change and fatigue.   HENT: Negative for congestion, drooling, facial swelling, hearing loss, rhinorrhea, sinus pressure, tinnitus, trouble swallowing and voice change.    Eyes: Negative for photophobia, pain and visual disturbance.   Respiratory: Negative for chest tightness and shortness of breath.   positive dry cough.  Chronic and at baseline  Cardiovascular: Negative for chest pain, palpitations and leg swelling.   Gastrointestinal: Negative for nausea, vomiting, abdominal pain, diarrhea and abdominal distention.   Endocrine: Negative for cold intolerance, heat intolerance, polydipsia and polyuria.   Genitourinary: Negative for dysuria, frequency and hematuria.   Musculoskeletal: Negative for myalgias, back pain, arthralgias, neck pain and neck stiffness.   Skin: Negative for color change, pallor, rash and wound.   Allergic/Immunologic: Negative for immunocompromised state.   Neurological: Negative for dizziness, weakness and headaches.    A comprehensive 12-point review of systems was performed, and is negative except for those items mentioned above in the HPI section of this note.      Vital Signs:       /63   Pulse 86   Ht 5' 2" (1.575 m)   Wt 72.7 kg (160 lb 2.6 oz)   LMP  (LMP Unknown)   SpO2 (!) 92%   BMI 29.29 kg/m²       Physical Exam:   GEN- NAD AAOx3 Well Built, Well Appearing   HEENT- ATNC, PERRLA, EOMI, OP-Cl. No JVD, LAD or bruit noted. Trachea Midline.   CV- RRR No M/R/G  RESP- Crackles left UL.   GI- S/NT/ND. Positive BS X 4. No HSM Noted  BACK- Spine midline. No step off, crepitus or deformity noted. No midline TTP.   Ext- MAEW, No deformity. No edema or rashes noted.   Neuro- Strength 5/5 symmetric. CN 2-12 intact. Normal gait. Normal sensation.    Clubbing of digits.            Personal Review and Summary of Prior Diagnostics     Laboratory Studies:   Reviewed      Cr- 1.1, HCO3- 30              TSH 0.400 - 4.000 uIU/mL 2.449       Sodium 136 - 145 mmol/L 139  "   Potassium 3.5 - 5.1 mmol/L 3.5    Chloride 95 - 110 mmol/L 99    CO2 23 - 29 mmol/L 28    Glucose 70 - 110 mg/dL 105    BUN, Bld 8 - 23 mg/dL 16    Creatinine 0.5 - 1.4 mg/dL 1.2    Calcium 8.7 - 10.5 mg/dL 10.1    Total Protein 6.0 - 8.4 g/dL 8.4    Albumin 3.5 - 5.2 g/dL 4.2    Total Bilirubin 0.1 - 1.0 mg/dL 0.3    Comment: For infants and newborns, interpretation of results should be based   on gestational age, weight and in agreement with clinical   observations.   Premature Infant recommended reference ranges:   Up to 24 hours.............<8.0 mg/dL   Up to 48 hours............<12.0 mg/dL   3-5 days..................<15.0 mg/dL   6-29 days.................<15.0 mg/dL    Alkaline Phosphatase 55 - 135 U/L 84    AST 10 - 40 U/L 17    ALT 10 - 44 U/L 13       WBC 3.90 - 12.70 K/uL 5.90    RBC 4.00 - 5.40 M/uL 3.95Low     Hemoglobin 12.0 - 16.0 g/dL 10.7Low     Hematocrit 37.0 - 48.5 % 35.2Low     Mean Corpuscular Volume 82 - 98 fL 89    Mean Corpuscular Hemoglobin 27.0 - 31.0 pg 27.1    Mean Corpuscular Hemoglobin Conc 32.0 - 36.0 g/dL 30.4Low     RDW 11.5 - 14.5 % 14.4    Platelets 150 - 350 K/uL 298    MPV 9.2 - 12.9 fL 9.5    Gran # (ANC) 1.8 - 7.7 K/uL 4.0    Lymph # 1.0 - 4.8 K/uL 1.3    Mono # 0.3 - 1.0 K/uL 0.5    Eos # 0.0 - 0.5 K/uL 0.1    Baso # 0.00 - 0.20 K/uL 0.02    Gran% 38.0 - 73.0 % 68.2    Lymph% 18.0 - 48.0 % 22.4    Mono% 4.0 - 15.0 % 7.8    Eosinophil% 0.0 - 8.0 % 1.5    Basophil% 0.0 - 1.9 % 0.3             Microbiology Data:            Respiratory Culture SERRATIA MARCESCENS   Many       Respiratory Culture PSEUDOMONAS AERUGINOSA   Many   Normal respiratory larry also present       Gram Stain (Respiratory) <10 epithelial cells per low power field.    Gram Stain (Respiratory) Few WBC's    Gram Stain (Respiratory) Many Gram negative rods    Gram Stain (Respiratory) Few Gram positive cocci    Resulting Agency [facility]   Susceptibility                        Serratia marcescens Pseudomonas  aeruginosa       CULTURE, RESPIRATORY CULTURE, RESPIRATORY       Cefepime <=8  Sensitive <=8  Sensitive       Ceftriaxone <=8  Sensitive <=8  Sensitive       Ciprofloxacin <=1  Sensitive >2  Resistant       Ertapenem <=2  Sensitive <=2  Sensitive       Gentamicin <=4  Sensitive <=4  Sensitive       Meropenem <=4  Sensitive <=4  Sensitive       Piperacillin/Tazo <=16  Sensitive <=16  Sensitive       Tobramycin <=4  Sensitive <=4  Sensitive       Trimeth/Sulfa <=2/38  Sensitive <=2/38  Sensitive                 AFB Culture & Smear No growth after 8 weeks.     AFB CULTURE STAIN No acid fast bacilli seen.             AFB Culture & Smear No growth after 8 weeks.     AFB CULTURE STAIN No acid fast bacilli seen.        Pseudomonas aeruginosa     CULTURE, RESPIRATORY     Amikacin <=16 mcg/mL Sensitive     Cefepime <=8 mcg/mL Sensitive     Ciprofloxacin >2 mcg/mL Resistant     Gentamicin <=4 mcg/mL Sensitive     Tobramycin <=4 mcg/mL Sensitive             Summary of Chest Imaging Personally Reviewed:      CT Chest 7/5-      1. Absence of superior segment dominant nodule right lower lobe from previous exam.  Micro nodules stable.  2. Emphysema and bronchiectasis changes of lungs stable.        CT chest-   1. Chronic filling defects within the right upper lobe pulmonary artery branch consistent with chronic pulmonary embolism.  No new acute pulmonary thromboembolism identified.  2. Stable fibrotic changes throughout the lung apices, left greater than right, with bronchiectasis, interstitial prominence, and mosaic attenuation of the lungs, similar to prior CTA 01/05/2017.  3. Small area of focal nodular consolidation within the left lower lobe, not seen on prior studies which, may be infectious or inflammatory in etiology, possibly even contiguous from the left apical fibrotic changes.  Suggest follow-up CT scan 1 month after antimicrobial therapy to exclude neoplastic nodule.  4. Stable 4 mm nodule within the right middle  lobe.  5. Additional findings as detailed above.     CT 2/6/2019     Bilateral upper lung zone predominant lung disease, primarily characterized by severe bronchiectasis.  Overall appearance suggests sequela of chronic infection.    Ground-glass opacities and pulmonary nodule clusters improved from prior study.  0.7 cm right lower lobe pulmonary nodule, new from prior study.  Recommend follow-up chest CT at 6 months to ensure nodule stability.     2D Echo:      Left Ventricle Normal ejection fraction . Cavity is normal. Normal left ventricle diastolic function.   Right Ventricle Normal cavity size.   Left Atrium Cavity is mildly dilated.   Right Atrium Normal cavity size.   Aortic Valve Normal valve structure.   Mitral Valve Normal valve structure. There is mild mitral annular calcification.   Tricuspid Valve The tricuspid valve is normal. Trace regurgitation.   Pulmonic Valve Normal valve structure.   IVC/SVC Normal central venous pressure (3 mm Hg).   Ascending Aorta Normal aortic root, size and contour.   Pericardium No pericardial effusion. Pericardium is normal.      US Doppler-   1. Right lower extremity venous ultrasound shows normal flow in the deep venous system and no DVT.  2. Left lower extremity venous ultrasound shows normal flow in the deep venous system and no DVT.         PFT's (4/2017):      FEV1/FVC- 74  FEV1- 1.24L (58)  FVC- 1.67L (62)   DLCO- 53           Assessment:      1. Pulmonary nodule    2. Bronchiectasis without complication    3. History of pulmonary embolism          Plan:         1. Bronchiectasis- History of M. Kansasi- Repeat AFB in system never with recurrent organism. Most recent negative x 2.  Records not available from prior treatment. Recurrent pseudomonas infections with one recent exacerbation, now resolved.. Clinical at baseline. CT scan is stable. Looks good today     -- Continue with anoro and inhaled bronchodilators   -- CPT, 3% saline,  and accapela for mucous clearance     -- Rescue abx provided for worsening sputum production if needed. Discussed role with her in detail.. She has history of resistant organisms and I discussed caution and need to let me know if she initiates.   -- Needs flu vaccine.. Will d/w PCP   -- Supplemental O2 to maintain SpO2 >88%. Discussed importance of utilizing         2. Chronic Pulmonary embolism- originally diagnosed 2 years prior. On OAC since that time. Residual RUL defect remains based on Sept 2018 C T imaging.  No further episodes of hemoptysis noted. LE doppler negative for DVT   Continue OAC for now as she is tolerating.      3. Pulmonary Nodule- resolved RLL nodule.. No need for any additional follow up.         RTC 6 months on  campus. . Patient has my contact information and can call in the interim if any issues.      Declan Mills M.D.   [facility]

## 2019-10-09 ENCOUNTER — TELEPHONE (OUTPATIENT)
Dept: NEUROLOGY | Facility: CLINIC | Age: 69
End: 2019-10-09

## 2019-10-09 NOTE — TELEPHONE ENCOUNTER
"----- Message from Charlene Domingo sent at 10/9/2019  2:27 PM CDT -----  Contact: Yasmin   tel:   [phone]    Caller says she has been seeing the NP for Dr. Juarez and would like to see the "DR" .   Has not seen him since 2017   and would like to have an appt. W/him.     Pls call to set this up.  (no access).   "

## 2019-10-15 NOTE — ADDENDUM NOTE
Addended by: MARINA GOEL on: 5/23/2017 06:42 PM     Modules accepted: Orders    
Price (Do Not Change): 0.00
Instructions: This plan will send the code FBSD to the PM system.  DO NOT or CHANGE the price.
Detail Level: Simple

## 2019-10-18 ENCOUNTER — PROCEDURE VISIT (OUTPATIENT)
Dept: OPHTHALMOLOGY | Facility: CLINIC | Age: 69
End: 2019-10-18
Payer: MEDICARE

## 2019-10-18 VITALS — DIASTOLIC BLOOD PRESSURE: 63 MMHG | SYSTOLIC BLOOD PRESSURE: 126 MMHG | HEART RATE: 83 BPM

## 2019-10-18 DIAGNOSIS — H53.8 BLURRED VISION, BILATERAL: Primary | ICD-10-CM

## 2019-10-18 DIAGNOSIS — H18.529 ANTERIOR BASEMENT MEMBRANE DYSTROPHY: ICD-10-CM

## 2019-10-18 PROCEDURE — 92014 PR EYE EXAM, EST PATIENT,COMPREHESV: ICD-10-PCS | Mod: 57,S$GLB,, | Performed by: OPHTHALMOLOGY

## 2019-10-18 PROCEDURE — 65400 REMOVAL OF EYE LESION: CPT | Mod: RT,S$GLB,, | Performed by: OPHTHALMOLOGY

## 2019-10-18 PROCEDURE — 92014 COMPRE OPH EXAM EST PT 1/>: CPT | Mod: 57,S$GLB,, | Performed by: OPHTHALMOLOGY

## 2019-10-18 PROCEDURE — 65400 PR EXCIS CORNEA LESN: ICD-10-PCS | Mod: RT,S$GLB,, | Performed by: OPHTHALMOLOGY

## 2019-10-18 RX ORDER — OXYCODONE AND ACETAMINOPHEN 7.5; 325 MG/1; MG/1
1 TABLET ORAL EVERY 6 HOURS PRN
Qty: 10 TABLET | Refills: 0 | Status: SHIPPED | OUTPATIENT
Start: 2019-10-18 | End: 2019-11-22

## 2019-10-18 RX ORDER — OFLOXACIN 3 MG/ML
1 SOLUTION/ DROPS OPHTHALMIC 2 TIMES DAILY
Qty: 5 ML | Refills: 3 | Status: SHIPPED | OUTPATIENT
Start: 2019-10-18 | End: 2019-11-17

## 2019-10-18 NOTE — PROGRESS NOTES
HPI     Ref by Dr. Domingo at [facility].    S/p cataract extraction w/ IOL OS - 8/7/2012  S/p cataract extraction w/ IOL OD - 7/24/2012  MGD OU  Dermatochalasis OU  Ectropion OU  Anterior basement membrane dystrophy OU -    PCIOL OU  PVD OU    Radha 128 OU qid    Patient here for Super K OD. Patient has no new ocular changes at this   time blurry VA OS as before    Last edited by Cindy Stapleton on 10/18/2019  2:53 PM. (History)            Assessment /Plan     For exam results, see Encounter Report.    Blurred vision, bilateral    Anterior basement membrane dystrophy    Other orders  -     ofloxacin (OCUFLOX) 0.3 % ophthalmic solution; Place 1 drop into the right eye 2 (two) times daily.  Dispense: 5 mL; Refill: 3  -     oxyCODONE-acetaminophen (PERCOCET) 7.5-325 mg per tablet; Take 1 tablet by mouth every 6 (six) hours as needed for Pain.  Dispense: 10 tablet; Refill: 0      SURGEON: Emeli Muniz M.D.     PREOPERATIVE DIAGNOSIS: Anterior basement membrane dystrophy    POSTOPERATIVE DIAGNOSIS:   Anterior basement membrane dystrophy    PROCEDURES: Superficial Keratectomy OD      ANESTHESIA: Topical Tetracaine 0.5%     COMPLICATIONS: None     ESTIMATED BLOOD LOSS: None     SPECIMENS: None     INDICATIONS:   The patient has a history of the diagnosis above, causing visual symptoms. After a thorough discussion of risks, benefits, and alternatives, it was agreed to proceed with surgical removal of corneal epithelium to attempt visual rehabilitation and improve activities of daily living which have suffered due to the impaired visual status.     PROCEDURE IN DETAIL   The patient was placed in a supine position on the procedure table while the eye was prepped and draped in standard sterile fashion with 5% Betadine. A lid speculum was placed and the procedure begun by debridement of the corneal epithelium with a [place] blade, which was also used to remove as much of the diseased cornea as could safely and easily be  accomplished.     Antibiotic drops were placed on the eye, and a bandage contact lens applied.     The patient will continue with antibiotic and anti-inflammatory treatment, and be followed up in the eye clinic in 1 week.     va OD next

## 2019-10-21 DIAGNOSIS — R04.2 HEMOPTYSIS: ICD-10-CM

## 2019-10-21 RX ORDER — IPRATROPIUM BROMIDE 0.5 MG/2.5ML
SOLUTION RESPIRATORY (INHALATION)
Refills: 4 | OUTPATIENT
Start: 2019-10-21

## 2019-10-24 DIAGNOSIS — J47.1 BRONCHIECTASIS WITH (ACUTE) EXACERBATION: ICD-10-CM

## 2019-10-24 DIAGNOSIS — J47.1 BRONCHIECTASIS WITH ACUTE EXACERBATION: Primary | ICD-10-CM

## 2019-10-25 ENCOUNTER — TELEPHONE (OUTPATIENT)
Dept: PULMONOLOGY | Facility: CLINIC | Age: 69
End: 2019-10-25

## 2019-10-25 RX ORDER — IPRATROPIUM BROMIDE 0.5 MG/2.5ML
500 SOLUTION RESPIRATORY (INHALATION) 3 TIMES DAILY PRN
Qty: 2 BOX | Refills: 11 | Status: SHIPPED | OUTPATIENT
Start: 2019-10-25 | End: 2020-09-14

## 2019-10-25 NOTE — TELEPHONE ENCOUNTER
Spoke with patient pharmacist (Mari), informed her that I have received her message. Pharmacist was advised that patient instructions on her inhaler should state that patient inhale (1) vial in nebulizer (3) to (4) times daily as needed for wheezing. Patient pharmacist verbalizes that she understand.

## 2019-10-25 NOTE — TELEPHONE ENCOUNTER
----- Message from Charlene Domingo sent at 10/25/2019  9:14 AM CDT -----  Contact: Mari martinez/ Student at [facility].  [phone]   Pls clarify:   IPRATROPIUM  .  Has questions ref. The directions.   Pls call today.

## 2019-10-29 RX ORDER — ESCITALOPRAM OXALATE 10 MG/1
TABLET ORAL
Qty: 90 TABLET | Refills: 3 | OUTPATIENT
Start: 2019-10-29

## 2019-10-30 ENCOUNTER — OFFICE VISIT (OUTPATIENT)
Dept: OPHTHALMOLOGY | Facility: CLINIC | Age: 69
End: 2019-10-30
Payer: MEDICARE

## 2019-10-30 DIAGNOSIS — H53.8 BLURRED VISION, BILATERAL: ICD-10-CM

## 2019-10-30 DIAGNOSIS — H52.213 IRREGULAR ASTIGMATISM OF BOTH EYES: ICD-10-CM

## 2019-10-30 DIAGNOSIS — H18.529 ANTERIOR BASEMENT MEMBRANE DYSTROPHY: Primary | ICD-10-CM

## 2019-10-30 PROCEDURE — 99999 PR PBB SHADOW E&M-EST. PATIENT-LVL IV: CPT | Mod: PBBFAC,,, | Performed by: OPHTHALMOLOGY

## 2019-10-30 PROCEDURE — 99024 PR POST-OP FOLLOW-UP VISIT: ICD-10-PCS | Mod: S$GLB,,, | Performed by: OPHTHALMOLOGY

## 2019-10-30 PROCEDURE — 99024 POSTOP FOLLOW-UP VISIT: CPT | Mod: S$GLB,,, | Performed by: OPHTHALMOLOGY

## 2019-10-30 PROCEDURE — 99999 PR PBB SHADOW E&M-EST. PATIENT-LVL IV: ICD-10-PCS | Mod: PBBFAC,,, | Performed by: OPHTHALMOLOGY

## 2019-10-30 RX ORDER — ESCITALOPRAM OXALATE 10 MG/1
10 TABLET ORAL DAILY
Qty: 30 TABLET | Refills: 3 | Status: SHIPPED | OUTPATIENT
Start: 2019-10-30 | End: 2020-02-21 | Stop reason: SDUPTHER

## 2019-10-30 NOTE — TELEPHONE ENCOUNTER
----- Message from Sameer Angeles sent at 10/30/2019  9:21 AM CDT -----  Contact: [facility]: [phone] ext [phone]  Type: RX Refill Request    Who Called: ..  [facility] [id number] [place] [address]  [id number]  [place] [id number]  Phone: [phone] Fax: [phone]            Refill or New Rx:refill    RX Name and Strength:Lexapro 10mg        Preferred Pharmacy with phone number:..  [id number]  [address]  [place] [id number]  Phone: [phone] Fax: [phone]        Local or Mail Order:Local    Ordering Provider:Dr. Luna    Would the patient rather a call back or a response via [facility]?   Best Call Back Number:[phone]

## 2019-10-30 NOTE — PROGRESS NOTES
HPI     Ref by Dr. Domingo     S/p cataract extraction w/ IOL OS - 8/7/2012   S/p cataract extraction w/ IOL OD - 7/24/2012   MGD OU   Dermatochalasis OU   Ectropion OU   Anterior basement membrane dystrophy OU -     PCIOL OU   PVD OU   Irregular Astigmatism OU   S/P Super K OD- 10/18/2019    Systane OU PRN   Ocuflox  OD BID    Pt here for 1 week post op Super K. Pt states VA is blurry OD. Pt states   she has to put AT when she wakes up due to dry eye. Still has bandage c/l   in.  No other ocular complaints.     Last edited by Vane Taveras on 10/30/2019  1:35 PM. (History)            Assessment /Plan     For exam results, see Encounter Report.    Anterior basement membrane dystrophy    Irregular astigmatism of both eyes    Blurred vision, bilateral      S/P Super K OD- 10/18/2019    Surface healed, dry 2/2 tight fitting BCL.  Remove BCL today, okay to stop abx. Cont systane 3-6 x / day. No marychuy OD    F/up 2 wks - va OD / arx ./ mrx OD

## 2019-11-01 ENCOUNTER — CLINICAL SUPPORT (OUTPATIENT)
Dept: FAMILY MEDICINE | Facility: CLINIC | Age: 69
End: 2019-11-01
Payer: MEDICARE

## 2019-11-01 DIAGNOSIS — Z23 NEED FOR SHINGLES VACCINE: Primary | ICD-10-CM

## 2019-11-01 DIAGNOSIS — Z23 FLU VACCINE NEED: ICD-10-CM

## 2019-11-01 PROCEDURE — 99499 UNLISTED E&M SERVICE: CPT | Mod: S$GLB,,, | Performed by: INTERNAL MEDICINE

## 2019-11-01 PROCEDURE — 90750 HZV VACC RECOMBINANT IM: CPT | Mod: S$GLB,,, | Performed by: INTERNAL MEDICINE

## 2019-11-01 PROCEDURE — 90750 ZOSTER RECOMBINANT VACCINE: ICD-10-PCS | Mod: S$GLB,,, | Performed by: INTERNAL MEDICINE

## 2019-11-01 PROCEDURE — 90471 IMMUNIZATION ADMIN: CPT | Mod: S$GLB,,, | Performed by: INTERNAL MEDICINE

## 2019-11-01 PROCEDURE — 90471 ZOSTER RECOMBINANT VACCINE: ICD-10-PCS | Mod: S$GLB,,, | Performed by: INTERNAL MEDICINE

## 2019-11-01 PROCEDURE — 90662 FLU VACCINE - HIGH DOSE (65+) PRESERVATIVE FREE IM: ICD-10-PCS | Mod: S$GLB,,, | Performed by: INTERNAL MEDICINE

## 2019-11-01 PROCEDURE — 99499 NO LOS: ICD-10-PCS | Mod: S$GLB,,, | Performed by: INTERNAL MEDICINE

## 2019-11-01 PROCEDURE — G0008 ADMIN INFLUENZA VIRUS VAC: HCPCS | Mod: 59,S$GLB,, | Performed by: INTERNAL MEDICINE

## 2019-11-01 PROCEDURE — 90662 IIV NO PRSV INCREASED AG IM: CPT | Mod: S$GLB,,, | Performed by: INTERNAL MEDICINE

## 2019-11-01 PROCEDURE — G0008 FLU VACCINE - HIGH DOSE (65+) PRESERVATIVE FREE IM: ICD-10-PCS | Mod: 59,S$GLB,, | Performed by: INTERNAL MEDICINE

## 2019-11-01 NOTE — PROGRESS NOTES
SHINGRIX   Given IM in right deltoid , tolerated well. Patient instructed to waited 15 minutes ,VIS form given

## 2019-11-12 ENCOUNTER — TELEPHONE (OUTPATIENT)
Dept: FAMILY MEDICINE | Facility: CLINIC | Age: 69
End: 2019-11-12

## 2019-11-13 ENCOUNTER — PATIENT OUTREACH (OUTPATIENT)
Dept: ADMINISTRATIVE | Facility: OTHER | Age: 69
End: 2019-11-13

## 2019-11-15 ENCOUNTER — HOSPITAL ENCOUNTER (OUTPATIENT)
Dept: CARDIOLOGY | Facility: CLINIC | Age: 69
Discharge: HOME | End: 2019-11-15
Attending: INTERNAL MEDICINE
Payer: MEDICARE

## 2019-11-15 ENCOUNTER — OFFICE VISIT (OUTPATIENT)
Dept: OPHTHALMOLOGY | Facility: CLINIC | Age: 69
End: 2019-11-15
Payer: MEDICARE

## 2019-11-15 VITALS
WEIGHT: 160 LBS | HEIGHT: 62 IN | SYSTOLIC BLOOD PRESSURE: 130 MMHG | HEART RATE: 85 BPM | DIASTOLIC BLOOD PRESSURE: 60 MMHG | BODY MASS INDEX: 29.44 KG/M2

## 2019-11-15 DIAGNOSIS — I47.19 ECTOPIC ATRIAL TACHYCARDIA: ICD-10-CM

## 2019-11-15 DIAGNOSIS — I47.10 PSVT (PAROXYSMAL SUPRAVENTRICULAR TACHYCARDIA): ICD-10-CM

## 2019-11-15 DIAGNOSIS — H53.8 BLURRED VISION, BILATERAL: ICD-10-CM

## 2019-11-15 DIAGNOSIS — Z86.711 HISTORY OF PULMONARY EMBOLISM: ICD-10-CM

## 2019-11-15 DIAGNOSIS — G25.0 ESSENTIAL TREMOR: ICD-10-CM

## 2019-11-15 DIAGNOSIS — H52.213 IRREGULAR ASTIGMATISM OF BOTH EYES: ICD-10-CM

## 2019-11-15 DIAGNOSIS — H18.529 ANTERIOR BASEMENT MEMBRANE DYSTROPHY: Primary | ICD-10-CM

## 2019-11-15 DIAGNOSIS — I49.8 OTHER SPECIFIED CARDIAC ARRHYTHMIAS: Primary | ICD-10-CM

## 2019-11-15 LAB
ASCENDING AORTA: 3.2 CM
AV INDEX (PROSTH): 0.95
AV MEAN GRADIENT: 5 MMHG
AV PEAK GRADIENT: 10 MMHG
AV VALVE AREA: 3.2 CM2
AV VELOCITY RATIO: 0.85
BSA FOR ECHO PROCEDURE: 1.78 M2
CV ECHO LV RWT: 0.43 CM
DOP CALC AO PEAK VEL: 1.58 M/S
DOP CALC AO VTI: 32.09 CM
DOP CALC LVOT AREA: 3.4 CM2
DOP CALC LVOT DIAMETER: 2.07 CM
DOP CALC LVOT PEAK VEL: 1.34 M/S
DOP CALC LVOT STROKE VOLUME: 102.62 CM3
DOP CALCLVOT PEAK VEL VTI: 30.51 CM
E WAVE DECELERATION TIME: 189.66 MSEC
E/A RATIO: 1.45
E/E' RATIO: 8.29 M/S
ECHO LV POSTERIOR WALL: 0.94 CM (ref 0.6–1.1)
FRACTIONAL SHORTENING: 23 % (ref 28–44)
INTERVENTRICULAR SEPTUM: 0.92 CM (ref 0.6–1.1)
IVRT: 0.06 MSEC
LA MAJOR: 5.55 CM
LA MINOR: 5.54 CM
LA WIDTH: 5.04 CM
LEFT ATRIUM SIZE: 3.96 CM
LEFT ATRIUM VOLUME INDEX: 54.1 ML/M2
LEFT ATRIUM VOLUME: 94.07 CM3
LEFT INTERNAL DIMENSION IN SYSTOLE: 3.35 CM (ref 2.1–4)
LEFT VENTRICLE DIASTOLIC VOLUME INDEX: 49.55 ML/M2
LEFT VENTRICLE DIASTOLIC VOLUME: 86.15 ML
LEFT VENTRICLE MASS INDEX: 76 G/M2
LEFT VENTRICLE SYSTOLIC VOLUME INDEX: 26.3 ML/M2
LEFT VENTRICLE SYSTOLIC VOLUME: 45.64 ML
LEFT VENTRICULAR INTERNAL DIMENSION IN DIASTOLE: 4.37 CM (ref 3.5–6)
LEFT VENTRICULAR MASS: 132.35 G
LV LATERAL E/E' RATIO: 7.25 M/S
LV SEPTAL E/E' RATIO: 9.67 M/S
MV PEAK A VEL: 0.6 M/S
MV PEAK E VEL: 0.87 M/S
PISA TR MAX VEL: 2.25 M/S
PULM VEIN S/D RATIO: 1.04
PV PEAK D VEL: 0.74 M/S
PV PEAK S VEL: 0.77 M/S
RA MAJOR: 4.2 CM
RA PRESSURE: 3 MMHG
RA WIDTH: 3.81 CM
RIGHT VENTRICULAR END-DIASTOLIC DIMENSION: 3.76 CM
RV TISSUE DOPPLER FREE WALL SYSTOLIC VELOCITY 1 (APICAL 4 CHAMBER VIEW): 15.82 CM/S
SINUS: 3.27 CM
STJ: 2.92 CM
TDI LATERAL: 0.12 M/S
TDI SEPTAL: 0.09 M/S
TDI: 0.11 M/S
TR MAX PG: 20 MMHG
TRICUSPID ANNULAR PLANE SYSTOLIC EXCURSION: 2.05 CM
TV REST PULMONARY ARTERY PRESSURE: 23 MMHG

## 2019-11-15 PROCEDURE — 92014 COMPRE OPH EXAM EST PT 1/>: CPT | Mod: S$GLB,,, | Performed by: OPHTHALMOLOGY

## 2019-11-15 PROCEDURE — 99999 PR PBB SHADOW E&M-EST. PATIENT-LVL IV: CPT | Mod: PBBFAC,,, | Performed by: OPHTHALMOLOGY

## 2019-11-15 PROCEDURE — 93306 TRANSTHORACIC ECHO (TTE) COMPLETE (CUPID ONLY): ICD-10-PCS | Mod: S$GLB,,, | Performed by: INTERNAL MEDICINE

## 2019-11-15 PROCEDURE — 92014 PR EYE EXAM, EST PATIENT,COMPREHESV: ICD-10-PCS | Mod: S$GLB,,, | Performed by: OPHTHALMOLOGY

## 2019-11-15 PROCEDURE — 93306 TTE W/DOPPLER COMPLETE: CPT | Mod: S$GLB,,, | Performed by: INTERNAL MEDICINE

## 2019-11-15 PROCEDURE — 99999 PR PBB SHADOW E&M-EST. PATIENT-LVL IV: ICD-10-PCS | Mod: PBBFAC,,, | Performed by: OPHTHALMOLOGY

## 2019-11-15 NOTE — PROGRESS NOTES
HPI     Ref by Dr. Domingo     S/p cataract extraction w/ IOL OS - 8/7/2012   S/p cataract extraction w/ IOL OD - 7/24/2012   MGD OU   Dermatochalasis OU   Ectropion OU   Anterior basement membrane dystrophy OU -     PCIOL OU   PVD OU   Irregular Astigmatism OU   S/P Super K OD- 10/18/2019     Systane OU PRN   Ocuflox  OD BID     Pt here for 2 week f/u post op super K. Pt states VA varies. Right now,   blurry. Other days it is clear. Denies eye pain, flashes, floaters. No   other ocular complaints.     Last edited by Vane Taveras on 11/15/2019 11:01 AM. (History)            Assessment /Plan     For exam results, see Encounter Report.    Anterior basement membrane dystrophy    Irregular astigmatism of both eyes    Blurred vision, bilateral        S/P Super K OD- 10/18/2019    BCVA 20/70 range today.    As discussed pre-procedure, suspect there was more than just ABMD that was causing decreased VA OD.  Non-fuchs guttae present however do not feel this is VS.    Rec. RGP OR with Dr. Domingo, as astig still very irreg s/p super K OD.    If NI - can re-eval cornea and / or retina

## 2019-11-19 ENCOUNTER — PATIENT OUTREACH (OUTPATIENT)
Dept: ADMINISTRATIVE | Facility: OTHER | Age: 69
End: 2019-11-19

## 2019-11-20 DIAGNOSIS — M54.40 ACUTE LEFT-SIDED LOW BACK PAIN WITH SCIATICA, SCIATICA LATERALITY UNSPECIFIED: ICD-10-CM

## 2019-11-20 RX ORDER — GABAPENTIN 300 MG/1
CAPSULE ORAL
Qty: 540 CAPSULE | Refills: 0 | Status: SHIPPED | OUTPATIENT
Start: 2019-11-20 | End: 2020-02-11 | Stop reason: SDUPTHER

## 2019-11-22 ENCOUNTER — HOSPITAL ENCOUNTER (OUTPATIENT)
Dept: CARDIOLOGY | Facility: CLINIC | Age: 69
Discharge: HOME | End: 2019-11-22
Payer: MEDICARE

## 2019-11-22 ENCOUNTER — OFFICE VISIT (OUTPATIENT)
Dept: ELECTROPHYSIOLOGY | Facility: CLINIC | Age: 69
End: 2019-11-22
Payer: MEDICARE

## 2019-11-22 VITALS
SYSTOLIC BLOOD PRESSURE: 132 MMHG | HEIGHT: 62 IN | BODY MASS INDEX: 28.97 KG/M2 | DIASTOLIC BLOOD PRESSURE: 70 MMHG | WEIGHT: 157.44 LBS | HEART RATE: 78 BPM

## 2019-11-22 DIAGNOSIS — M48.061 SPINAL STENOSIS OF LUMBAR REGION WITHOUT NEUROGENIC CLAUDICATION: ICD-10-CM

## 2019-11-22 DIAGNOSIS — F33.0 MILD RECURRENT MAJOR DEPRESSION: ICD-10-CM

## 2019-11-22 DIAGNOSIS — Z86.79 STATUS POST CATHETER ABLATION OF SLOW PATHWAY: ICD-10-CM

## 2019-11-22 DIAGNOSIS — Z86.79 HISTORY OF PSVT (PAROXYSMAL SUPRAVENTRICULAR TACHYCARDIA): Chronic | ICD-10-CM

## 2019-11-22 DIAGNOSIS — I49.8 OTHER SPECIFIED CARDIAC ARRHYTHMIAS: ICD-10-CM

## 2019-11-22 DIAGNOSIS — Z98.890 STATUS POST CATHETER ABLATION OF SLOW PATHWAY: ICD-10-CM

## 2019-11-22 DIAGNOSIS — I47.19 ECTOPIC ATRIAL TACHYCARDIA: ICD-10-CM

## 2019-11-22 DIAGNOSIS — I12.9 BENIGN HYPERTENSION WITH CHRONIC KIDNEY DISEASE, STAGE III: ICD-10-CM

## 2019-11-22 DIAGNOSIS — N18.30 BENIGN HYPERTENSION WITH CHRONIC KIDNEY DISEASE, STAGE III: ICD-10-CM

## 2019-11-22 DIAGNOSIS — G25.0 ESSENTIAL TREMOR: Primary | ICD-10-CM

## 2019-11-22 PROCEDURE — 93005 RHYTHM STRIP: ICD-10-PCS | Mod: S$GLB,,, | Performed by: INTERNAL MEDICINE

## 2019-11-22 PROCEDURE — 3075F PR MOST RECENT SYSTOLIC BLOOD PRESS GE 130-139MM HG: ICD-10-PCS | Mod: CPTII,S$GLB,, | Performed by: INTERNAL MEDICINE

## 2019-11-22 PROCEDURE — 99999 PR PBB SHADOW E&M-EST. PATIENT-LVL III: CPT | Mod: PBBFAC,,, | Performed by: INTERNAL MEDICINE

## 2019-11-22 PROCEDURE — 93010 RHYTHM STRIP: ICD-10-PCS | Mod: S$GLB,,, | Performed by: INTERNAL MEDICINE

## 2019-11-22 PROCEDURE — 1101F PT FALLS ASSESS-DOCD LE1/YR: CPT | Mod: CPTII,S$GLB,, | Performed by: INTERNAL MEDICINE

## 2019-11-22 PROCEDURE — 1159F PR MEDICATION LIST DOCUMENTED IN MEDICAL RECORD: ICD-10-PCS | Mod: S$GLB,,, | Performed by: INTERNAL MEDICINE

## 2019-11-22 PROCEDURE — 3075F SYST BP GE 130 - 139MM HG: CPT | Mod: CPTII,S$GLB,, | Performed by: INTERNAL MEDICINE

## 2019-11-22 PROCEDURE — 99999 PR PBB SHADOW E&M-EST. PATIENT-LVL III: ICD-10-PCS | Mod: PBBFAC,,, | Performed by: INTERNAL MEDICINE

## 2019-11-22 PROCEDURE — 3078F DIAST BP <80 MM HG: CPT | Mod: CPTII,S$GLB,, | Performed by: INTERNAL MEDICINE

## 2019-11-22 PROCEDURE — 3078F PR MOST RECENT DIASTOLIC BLOOD PRESSURE < 80 MM HG: ICD-10-PCS | Mod: CPTII,S$GLB,, | Performed by: INTERNAL MEDICINE

## 2019-11-22 PROCEDURE — 93005 ELECTROCARDIOGRAM TRACING: CPT | Mod: S$GLB,,, | Performed by: INTERNAL MEDICINE

## 2019-11-22 PROCEDURE — 1159F MED LIST DOCD IN RCRD: CPT | Mod: S$GLB,,, | Performed by: INTERNAL MEDICINE

## 2019-11-22 PROCEDURE — 1101F PR PT FALLS ASSESS DOC 0-1 FALLS W/OUT INJ PAST YR: ICD-10-PCS | Mod: CPTII,S$GLB,, | Performed by: INTERNAL MEDICINE

## 2019-11-22 PROCEDURE — 99214 OFFICE O/P EST MOD 30 MIN: CPT | Mod: S$GLB,,, | Performed by: INTERNAL MEDICINE

## 2019-11-22 PROCEDURE — 99214 PR OFFICE/OUTPT VISIT, EST, LEVL IV, 30-39 MIN: ICD-10-PCS | Mod: S$GLB,,, | Performed by: INTERNAL MEDICINE

## 2019-11-22 PROCEDURE — 1126F PR PAIN SEVERITY QUANTIFIED, NO PAIN PRESENT: ICD-10-PCS | Mod: S$GLB,,, | Performed by: INTERNAL MEDICINE

## 2019-11-22 PROCEDURE — 1126F AMNT PAIN NOTED NONE PRSNT: CPT | Mod: S$GLB,,, | Performed by: INTERNAL MEDICINE

## 2019-11-22 PROCEDURE — 93010 ELECTROCARDIOGRAM REPORT: CPT | Mod: S$GLB,,, | Performed by: INTERNAL MEDICINE

## 2019-11-22 RX ORDER — CHLORTHALIDONE 25 MG/1
25 TABLET ORAL DAILY
Qty: 90 TABLET | Refills: 3 | Status: SHIPPED | OUTPATIENT
Start: 2019-11-22 | End: 2020-02-10 | Stop reason: SDUPTHER

## 2019-11-22 NOTE — PROGRESS NOTES
"Subjective:    Patient ID:  Yasmin Asencio is a 69 y.o. female who presents for evaluation of No chief complaint on file.      HPI   69 y.o. F  pulm hemorrhage 11/15, s/p coiling  pseudomonas PNA 2016, s/p extended Abx  COPD, bronchiectasis (home O2 frequently)  HTN on meds  chronic slurred speech, thought due to essential tremor per neuro  hx "AF" destini-lung-coiling procedure (no documentation)  pSVT (s/p AVRNT ablation; likely focal AT remains)    Had palpitations with HR >140 bpm at random intervals for past 10 years. Recently about 1x/month.   WIth palps, gets blurry vision, chest palps with radiation to the neck. Her "blood runs cold" and lasts for 20-35 mins.  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 We also saw a likely focal AT at that procedure also; not ablated due to not able to reinduce.  Since, feeling much better. Does get short palpitations. Event monitor showed short NSAT and one sustained SVT c/w AT. These episodes don't bother her nearly as much as the AVNRT did.    She did well on verapamil for a bit, but developed palps again. HR to 150s. Sx abated after increasing verapamil to 180 bid.  She feels well with regard to heart.    cath 6/16 neg  echo 11/16 50-55% LVEF -> 11/2017 55-60%. -> 2019 60%, severe LAE  Holter 2/17: SR . no SVT.    My interpretation of today's ECG is SR with APCs on rhythm strip, with periods of likely AT with 2:1 conduction (well rate controlled). Completely asx.    Review of Systems   Constitution: Negative. Negative for malaise/fatigue.   HENT: Negative.  Negative for ear pain and tinnitus.    Eyes: Negative for blurred vision.   Cardiovascular: Positive for dyspnea on exertion. Negative for chest pain, near-syncope, palpitations and syncope.   Respiratory: Negative.  Negative for shortness of breath.    Endocrine: Negative.  Negative for polyuria.   Hematologic/Lymphatic: Does not bruise/bleed easily.   Skin: Negative.  Negative for rash.   Musculoskeletal: " Negative.  Negative for joint pain and muscle weakness.   Gastrointestinal: Negative.  Negative for abdominal pain and change in bowel habit.   Genitourinary: Negative for frequency.   Neurological: Positive for tremors. Negative for dizziness and weakness.   Psychiatric/Behavioral: Negative.  Negative for depression. The patient is not nervous/anxious.    Allergic/Immunologic: Negative for environmental allergies.        Objective:    Physical Exam   Constitutional: She is oriented to person, place, and time. Vital signs are normal. She appears well-developed and well-nourished. She is active and cooperative.   HENT:   Head: Normocephalic and atraumatic.   Eyes: Conjunctivae and EOM are normal.   Neck: Normal range of motion. Carotid bruit is not present. No tracheal deviation and no edema present. No thyroid mass and no thyromegaly present.   Cardiovascular: Normal rate, regular rhythm, normal heart sounds, intact distal pulses and normal pulses.  No extrasystoles are present. PMI is not displaced. Exam reveals no gallop and no friction rub.   No murmur heard.  Pulmonary/Chest: Effort normal. No respiratory distress. She has no wheezes. She has rhonchi in the right middle field, the right lower field, the left middle field and the left lower field. She has no rales.   Abdominal: Soft. Normal appearance. She exhibits no distension. There is no hepatosplenomegaly.   Musculoskeletal: Normal range of motion.   Neurological: She is alert and oriented to person, place, and time. Coordination normal.   Skin: Skin is warm and dry. No rash noted.   Psychiatric: She has a normal mood and affect. Her speech is normal and behavior is normal. Thought content normal. Cognition and memory are normal.   Nursing note and vitals reviewed.        Assessment:       1. Essential tremor    2. Spinal stenosis of lumbar region without neurogenic claudication    3. Mild recurrent major depression    4. History of PSVT (paroxysmal  supraventricular tachycardia)    5. Ectopic atrial tachycardia    6. Status post catheter ablation of slow pathway    7. Benign hypertension with chronic kidney disease, stage III         Plan:       Continue verapamil for HTN and AT suppression.  Increase chlorthalidone for HTN.  Return in 1 year with echo, or earlier prn.

## 2019-11-26 DIAGNOSIS — Z86.711 PERSONAL HISTORY OF PULMONARY EMBOLISM: ICD-10-CM

## 2019-11-26 DIAGNOSIS — I27.82 OTHER CHRONIC PULMONARY EMBOLISM WITHOUT ACUTE COR PULMONALE: ICD-10-CM

## 2019-11-26 DIAGNOSIS — Z79.01 CURRENT USE OF LONG TERM ANTICOAGULATION: ICD-10-CM

## 2019-11-26 NOTE — TELEPHONE ENCOUNTER
Received fax stating that in order to complete application, missing information must be obtained. Spoke to representative will re send fax so it can be completed.

## 2019-11-27 ENCOUNTER — PATIENT OUTREACH (OUTPATIENT)
Dept: ADMINISTRATIVE | Facility: OTHER | Age: 69
End: 2019-11-27

## 2019-12-02 ENCOUNTER — OFFICE VISIT (OUTPATIENT)
Dept: CARDIOLOGY | Facility: CLINIC | Age: 69
End: 2019-12-02
Payer: MEDICARE

## 2019-12-02 VITALS
DIASTOLIC BLOOD PRESSURE: 60 MMHG | BODY MASS INDEX: 29.62 KG/M2 | SYSTOLIC BLOOD PRESSURE: 129 MMHG | HEART RATE: 80 BPM | OXYGEN SATURATION: 96 % | HEIGHT: 62 IN | WEIGHT: 160.94 LBS

## 2019-12-02 DIAGNOSIS — Z86.711 HISTORY OF PULMONARY EMBOLISM: Chronic | ICD-10-CM

## 2019-12-02 DIAGNOSIS — J42 CHRONIC BRONCHITIS, UNSPECIFIED CHRONIC BRONCHITIS TYPE: Primary | ICD-10-CM

## 2019-12-02 DIAGNOSIS — Z98.890 STATUS POST CATHETER ABLATION OF SLOW PATHWAY: ICD-10-CM

## 2019-12-02 DIAGNOSIS — I47.19 ECTOPIC ATRIAL TACHYCARDIA: ICD-10-CM

## 2019-12-02 DIAGNOSIS — Z86.79 STATUS POST CATHETER ABLATION OF SLOW PATHWAY: ICD-10-CM

## 2019-12-02 DIAGNOSIS — Z86.79 HISTORY OF PSVT (PAROXYSMAL SUPRAVENTRICULAR TACHYCARDIA): Chronic | ICD-10-CM

## 2019-12-02 PROCEDURE — 1101F PT FALLS ASSESS-DOCD LE1/YR: CPT | Mod: CPTII,S$GLB,, | Performed by: INTERNAL MEDICINE

## 2019-12-02 PROCEDURE — 3078F PR MOST RECENT DIASTOLIC BLOOD PRESSURE < 80 MM HG: ICD-10-PCS | Mod: CPTII,S$GLB,, | Performed by: INTERNAL MEDICINE

## 2019-12-02 PROCEDURE — 3074F SYST BP LT 130 MM HG: CPT | Mod: CPTII,S$GLB,, | Performed by: INTERNAL MEDICINE

## 2019-12-02 PROCEDURE — 1159F MED LIST DOCD IN RCRD: CPT | Mod: S$GLB,,, | Performed by: INTERNAL MEDICINE

## 2019-12-02 PROCEDURE — 99999 PR PBB SHADOW E&M-EST. PATIENT-LVL III: CPT | Mod: PBBFAC,,, | Performed by: INTERNAL MEDICINE

## 2019-12-02 PROCEDURE — 1126F AMNT PAIN NOTED NONE PRSNT: CPT | Mod: S$GLB,,, | Performed by: INTERNAL MEDICINE

## 2019-12-02 PROCEDURE — 99214 PR OFFICE/OUTPT VISIT, EST, LEVL IV, 30-39 MIN: ICD-10-PCS | Mod: S$GLB,,, | Performed by: INTERNAL MEDICINE

## 2019-12-02 PROCEDURE — 99999 PR PBB SHADOW E&M-EST. PATIENT-LVL III: ICD-10-PCS | Mod: PBBFAC,,, | Performed by: INTERNAL MEDICINE

## 2019-12-02 PROCEDURE — 3074F PR MOST RECENT SYSTOLIC BLOOD PRESSURE < 130 MM HG: ICD-10-PCS | Mod: CPTII,S$GLB,, | Performed by: INTERNAL MEDICINE

## 2019-12-02 PROCEDURE — 1101F PR PT FALLS ASSESS DOC 0-1 FALLS W/OUT INJ PAST YR: ICD-10-PCS | Mod: CPTII,S$GLB,, | Performed by: INTERNAL MEDICINE

## 2019-12-02 PROCEDURE — 1126F PR PAIN SEVERITY QUANTIFIED, NO PAIN PRESENT: ICD-10-PCS | Mod: S$GLB,,, | Performed by: INTERNAL MEDICINE

## 2019-12-02 PROCEDURE — 99214 OFFICE O/P EST MOD 30 MIN: CPT | Mod: S$GLB,,, | Performed by: INTERNAL MEDICINE

## 2019-12-02 PROCEDURE — 3078F DIAST BP <80 MM HG: CPT | Mod: CPTII,S$GLB,, | Performed by: INTERNAL MEDICINE

## 2019-12-02 PROCEDURE — 1159F PR MEDICATION LIST DOCUMENTED IN MEDICAL RECORD: ICD-10-PCS | Mod: S$GLB,,, | Performed by: INTERNAL MEDICINE

## 2019-12-02 NOTE — PROGRESS NOTES
"Subjective:    Patient ID:  Yasmin Asencio is a 69 y.o. female who presents for follow-up of Palpitations      HPI     PMH of pulmonary hemorrhage,S/P embolization in 11/2015 and pseudomonas pneumonia in 2016,has been treated with IV Abx, hypertension and COPD,atrial tachycardia,depression,chronic slurred speech     SVT ablation 7/24/17 6/16/16 [facility] EDP 18, EF 55%, normal coronaries  Medical Rx    EF does not appear depressed - ? Recent echo result     Echo 11/7/18  · The left ventricle cavity is normal.  · Left ventricle ejection fraction is normal at 60%  · Left atrium is mildly dilated.  · Normal LV diastolic function.  · Normal central venous pressure (3 mm Hg).     Holter 3/28/18  1. Sinus rhythm with heart rates varying between 58 and 108 bpm with an average of 78 bpm.     VENTRICULAR ARRHYTHMIAS  1. There were very rare PVCs totalling 39 and averaging 1 per hour.     2. There were no episodes of ventricular tachycardia.    SUPRA VENTRICULAR ARRHYTHMIAS  1. There were occasional PACs totalling 673 and averaging 28 per hour.  There were 29 couplets.    2. There were no episodes of sustained supraventricular tachycardia.        5/30/19 Having some issues with anxiety and tremors  Denies palpitations  EKG accelerated junctional 70s NSSTT changes     Saw Dr Ballesteros 11/22/19  69 y.o. F  pulm hemorrhage 11/15, s/p coiling  pseudomonas PNA 2016, s/p extended Abx  COPD, bronchiectasis (home O2 frequently)  HTN on meds  chronic slurred speech, thought due to essential tremor per neuro  hx "AF" destini-lung-coiling procedure (no documentation)  pSVT (s/p AVRNT ablation; likely focal AT remains)     Had palpitations with HR >140 bpm at random intervals for past 10 years. Recently about 1x/month.   WIth palps, gets blurry vision, chest palps with radiation to the neck. Her "blood runs cold" and lasts for 20-35 mins.  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 We also saw a likely focal AT at that procedure also; not ablated " due to not able to reinduce.  Since, feeling much better. Does get short palpitations. Event monitor showed short NSAT and one sustained SVT c/w AT. These episodes don't bother her nearly as much as the AVNRT did.     She did well on verapamil for a bit, but developed palps again. HR to 150s. Sx abated after increasing verapamil to 180 bid.  She feels well with regard to heart.     cath 6/16 neg  echo 11/16 50-55% LVEF -> 11/2017 55-60%. -> 2019 60%, severe LAE  Holter 2/17: SR . no SVT.     My interpretation of today's ECG is SR with APCs on rhythm strip, with periods of likely AT with 2:1 conduction (well rate controlled). Completely asx.     12/2/19 Denies CP, SOB, or palpitations  Still with resting tremor    Review of Systems   Constitution: Negative for decreased appetite.   HENT: Negative for ear discharge.    Eyes: Negative for blurred vision.   Respiratory: Negative for hemoptysis.    Endocrine: Negative for polyphagia.   Hematologic/Lymphatic: Negative for adenopathy.   Skin: Negative for color change.   Musculoskeletal: Negative for joint swelling.   Genitourinary: Negative for bladder incontinence.   Neurological: Negative for brief paralysis.   Psychiatric/Behavioral: Negative for hallucinations.   Allergic/Immunologic: Negative for hives.        Objective:    Physical Exam   Constitutional: She is oriented to person, place, and time. Vital signs are normal. She appears well-developed and well-nourished. She is active and cooperative.   HENT:   Head: Normocephalic and atraumatic.   Eyes: Conjunctivae and EOM are normal.   Neck: Normal range of motion. Carotid bruit is not present. No tracheal deviation and no edema present. No thyroid mass and no thyromegaly present.   Cardiovascular: Normal rate, regular rhythm, normal heart sounds, intact distal pulses and normal pulses.  No extrasystoles are present. PMI is not displaced. Exam reveals no gallop and no friction rub.   No murmur  heard.  Pulmonary/Chest: Effort normal. No respiratory distress. She has no wheezes. She has rhonchi in the right middle field, the right lower field, the left middle field and the left lower field. She has no rales.   Abdominal: Soft. Normal appearance. She exhibits no distension. There is no hepatosplenomegaly.   Musculoskeletal: Normal range of motion.   Neurological: She is alert and oriented to person, place, and time. Coordination normal.   Skin: Skin is warm and dry. No rash noted.   Psychiatric: She has a normal mood and affect. Her speech is normal and behavior is normal. Thought content normal. Cognition and memory are normal.   Nursing note and vitals reviewed.        Assessment:       1. Chronic bronchitis, unspecified chronic bronchitis type    2. History of PSVT (paroxysmal supraventricular tachycardia)    3. Ectopic atrial tachycardia    4. Status post catheter ablation of slow pathway    5. History of pulmonary embolism         Plan:       Cardiac stable  OV 6 months

## 2019-12-04 ENCOUNTER — ANESTHESIA (OUTPATIENT)
Dept: ENDOSCOPY | Facility: HOSPITAL | Age: 69
End: 2019-12-04
Payer: MEDICARE

## 2019-12-04 ENCOUNTER — HOSPITAL ENCOUNTER (OUTPATIENT)
Facility: HOSPITAL | Age: 69
Discharge: HOME | End: 2019-12-04
Attending: INTERNAL MEDICINE | Admitting: INTERNAL MEDICINE
Payer: MEDICARE

## 2019-12-04 ENCOUNTER — ANESTHESIA EVENT (OUTPATIENT)
Dept: ENDOSCOPY | Facility: HOSPITAL | Age: 69
End: 2019-12-04
Payer: MEDICARE

## 2019-12-04 VITALS
HEART RATE: 66 BPM | HEIGHT: 62 IN | BODY MASS INDEX: 29.08 KG/M2 | OXYGEN SATURATION: 99 % | DIASTOLIC BLOOD PRESSURE: 56 MMHG | RESPIRATION RATE: 16 BRPM | WEIGHT: 158 LBS | TEMPERATURE: 98 F | SYSTOLIC BLOOD PRESSURE: 119 MMHG

## 2019-12-04 DIAGNOSIS — R10.13 DYSPEPSIA: ICD-10-CM

## 2019-12-04 DIAGNOSIS — K52.9 CHRONIC DIARRHEA: ICD-10-CM

## 2019-12-04 DIAGNOSIS — R19.7 DIARRHEA, UNSPECIFIED TYPE: Primary | ICD-10-CM

## 2019-12-04 DIAGNOSIS — K21.9 GASTROESOPHAGEAL REFLUX DISEASE WITHOUT ESOPHAGITIS: Chronic | ICD-10-CM

## 2019-12-04 PROCEDURE — 43251 EGD REMOVE LESION SNARE: CPT | Performed by: INTERNAL MEDICINE

## 2019-12-04 PROCEDURE — 37000008 HC ANESTHESIA 1ST 15 MINUTES: Performed by: INTERNAL MEDICINE

## 2019-12-04 PROCEDURE — 88305 TISSUE EXAM BY PATHOLOGIST: CPT | Mod: 26,,, | Performed by: PATHOLOGY

## 2019-12-04 PROCEDURE — 45380 PR COLONOSCOPY,BIOPSY: ICD-10-PCS | Mod: GC,,, | Performed by: INTERNAL MEDICINE

## 2019-12-04 PROCEDURE — E9220 PRA ENDO ANESTHESIA: ICD-10-PCS | Mod: ,,, | Performed by: NURSE ANESTHETIST, CERTIFIED REGISTERED

## 2019-12-04 PROCEDURE — 63600175 PHARM REV CODE 636 W HCPCS: Performed by: NURSE ANESTHETIST, CERTIFIED REGISTERED

## 2019-12-04 PROCEDURE — 45380 COLONOSCOPY AND BIOPSY: CPT | Performed by: INTERNAL MEDICINE

## 2019-12-04 PROCEDURE — 43251 PR EGD, FLEX, W/REMOVAL, TUMOR/POLYP/LESION(S), SNARE: ICD-10-PCS | Mod: 51,GC,, | Performed by: INTERNAL MEDICINE

## 2019-12-04 PROCEDURE — 88342 CHG IMMUNOCYTOCHEMISTRY: ICD-10-PCS | Mod: 26,,, | Performed by: PATHOLOGY

## 2019-12-04 PROCEDURE — 43239 EGD BIOPSY SINGLE/MULTIPLE: CPT | Mod: 59,,, | Performed by: INTERNAL MEDICINE

## 2019-12-04 PROCEDURE — 43239 EGD BIOPSY SINGLE/MULTIPLE: CPT | Performed by: INTERNAL MEDICINE

## 2019-12-04 PROCEDURE — 27201089 HC SNARE, DISP (ANY): Performed by: INTERNAL MEDICINE

## 2019-12-04 PROCEDURE — 27201012 HC FORCEPS, HOT/COLD, DISP: Performed by: INTERNAL MEDICINE

## 2019-12-04 PROCEDURE — E9220 PRA ENDO ANESTHESIA: HCPCS | Mod: ,,, | Performed by: NURSE ANESTHETIST, CERTIFIED REGISTERED

## 2019-12-04 PROCEDURE — 88342 IMHCHEM/IMCYTCHM 1ST ANTB: CPT | Mod: 26,,, | Performed by: PATHOLOGY

## 2019-12-04 PROCEDURE — 43251 EGD REMOVE LESION SNARE: CPT | Mod: 51,GC,, | Performed by: INTERNAL MEDICINE

## 2019-12-04 PROCEDURE — 45380 COLONOSCOPY AND BIOPSY: CPT | Mod: GC,,, | Performed by: INTERNAL MEDICINE

## 2019-12-04 PROCEDURE — 37000009 HC ANESTHESIA EA ADD 15 MINS: Performed by: INTERNAL MEDICINE

## 2019-12-04 PROCEDURE — 88305 TISSUE EXAM BY PATHOLOGIST: ICD-10-PCS | Mod: 26,,, | Performed by: PATHOLOGY

## 2019-12-04 PROCEDURE — 43239 PR EGD, FLEX, W/BIOPSY, SGL/MULTI: ICD-10-PCS | Mod: 59,,, | Performed by: INTERNAL MEDICINE

## 2019-12-04 PROCEDURE — 88305 TISSUE EXAM BY PATHOLOGIST: CPT | Performed by: PATHOLOGY

## 2019-12-04 PROCEDURE — 63600175 PHARM REV CODE 636 W HCPCS: Performed by: INTERNAL MEDICINE

## 2019-12-04 RX ORDER — LIDOCAINE HCL/PF 100 MG/5ML
SYRINGE (ML) INTRAVENOUS
Status: DISCONTINUED | OUTPATIENT
Start: 2019-12-04 | End: 2019-12-04

## 2019-12-04 RX ORDER — SODIUM CHLORIDE 9 MG/ML
INJECTION, SOLUTION INTRAVENOUS CONTINUOUS
Status: DISCONTINUED | OUTPATIENT
Start: 2019-12-04 | End: 2019-12-04 | Stop reason: HOSPADM

## 2019-12-04 RX ORDER — PROPOFOL 10 MG/ML
VIAL (ML) INTRAVENOUS CONTINUOUS PRN
Status: DISCONTINUED | OUTPATIENT
Start: 2019-12-04 | End: 2019-12-04

## 2019-12-04 RX ORDER — PROPOFOL 10 MG/ML
VIAL (ML) INTRAVENOUS
Status: DISCONTINUED | OUTPATIENT
Start: 2019-12-04 | End: 2019-12-04

## 2019-12-04 RX ADMIN — PROPOFOL 150 MCG/KG/MIN: 10 INJECTION, EMULSION INTRAVENOUS at 09:12

## 2019-12-04 RX ADMIN — SODIUM CHLORIDE: 0.9 INJECTION, SOLUTION INTRAVENOUS at 08:12

## 2019-12-04 RX ADMIN — LIDOCAINE HYDROCHLORIDE 60 MG: 20 INJECTION, SOLUTION INTRAVENOUS at 09:12

## 2019-12-04 RX ADMIN — PROPOFOL 50 MG: 10 INJECTION, EMULSION INTRAVENOUS at 09:12

## 2019-12-04 NOTE — [employer]OVATION PATIENT INSTRUCTIONS
Discharge Summary/Instructions after an Endoscopic Procedure  Patient Name: Yasmin Asencio  Patient MRN: [id number]  Patient YOB: 1950 Wednesday, December 04, 2019  Franco Charles MD  RESTRICTIONS:  During your procedure today, you received medications for sedation.  These   medications may affect your judgment, balance and coordination.  Therefore,   for 24 hours, you have the following restrictions:   - DO NOT drive a car, operate machinery, make legal/financial decisions,   sign important papers or drink alcohol.    ACTIVITY:  Today: no heavy lifting, straining or running due to procedural   sedation/anesthesia.  The following day: return to full activity including work.  DIET:  Eat and drink normally unless instructed otherwise.     TREATMENT FOR COMMON SIDE EFFECTS:  - Mild abdominal pain, nausea, belching, bloating or excessive gas:  rest,   eat lightly and use a heating pad.  - Sore Throat: treat with throat lozenges and/or gargle with warm salt   water.  - Because air was used during the procedure, expelling large amounts of air   from your rectum or belching is normal.  - If a bowel prep was taken, you may not have a bowel movement for 1-3 days.    This is normal.  SYMPTOMS TO WATCH FOR AND REPORT TO YOUR PHYSICIAN:  1. Abdominal pain or bloating, other than gas cramps.  2. Chest pain.  3. Back pain.  4. Signs of infection such as: chills or fever occurring within 24 hours   after the procedure.  5. Rectal bleeding, which would show as bright red, maroon, or black stools.   (A tablespoon of blood from the rectum is not serious, especially if   hemorrhoids are present.)  6. Vomiting.  7. Weakness or dizziness.  GO DIRECTLY TO THE NEAREST EMERGENCY ROOM IF YOU HAVE ANY OF THE FOLLOWING:      Difficulty breathing              Chills and/or fever over 101 F   Persistent vomiting and/or vomiting blood   Severe abdominal pain   Severe chest pain   Black, tarry stools   Bleeding- more than one  tablespoon   Any other symptom or condition that you feel may need urgent attention  Your doctor recommends these additional instructions:  If any biopsies were taken, your doctors clinic will contact you in 1 to 2   weeks with any results.  - Discharge patient to home.   - Patient has a contact number available for emergencies.  The signs and   symptoms of potential delayed complications were discussed with the   patient.  Return to normal activities tomorrow.  Written discharge   instructions were provided to the patient.   - Resume previous diet.   - Continue present medications.   - Resume Eliquis (apixaban) at prior dose tomorrow.   - Await pathology results.   - Telephone GI clinic for pathology results in 2 weeks.   - No repeat colonoscopy due to age.  For questions, problems or results please call your physician - Franco Charles MD at Work:  [phone].  [facility], EMERGENCY ROOM PHONE NUMBER: [phone]  IF A COMPLICATION OR EMERGENCY SITUATION ARISES AND YOU ARE UNABLE TO REACH   YOUR PHYSICIAN - GO DIRECTLY TO THE EMERGENCY ROOM.  Franco Charles MD  12/4/2019 9:48:28 AM  This report has been verified and signed electronically.  PROVATION

## 2019-12-04 NOTE — PROVATION PATIENT INSTRUCTIONS
Discharge Summary/Instructions after an Endoscopic Procedure  Patient Name: Yasmin Asencio  Patient MRN: [id number]  Patient YOB: 1950 Wednesday, December 04, 2019  Franco Charles MD  RESTRICTIONS:  During your procedure today, you received medications for sedation.  These   medications may affect your judgment, balance and coordination.  Therefore,   for 24 hours, you have the following restrictions:   - DO NOT drive a car, operate machinery, make legal/financial decisions,   sign important papers or drink alcohol.    ACTIVITY:  Today: no heavy lifting, straining or running due to procedural   sedation/anesthesia.  The following day: return to full activity including work.  DIET:  Eat and drink normally unless instructed otherwise.     TREATMENT FOR COMMON SIDE EFFECTS:  - Mild abdominal pain, nausea, belching, bloating or excessive gas:  rest,   eat lightly and use a heating pad.  - Sore Throat: treat with throat lozenges and/or gargle with warm salt   water.  - Because air was used during the procedure, expelling large amounts of air   from your rectum or belching is normal.  - If a bowel prep was taken, you may not have a bowel movement for 1-3 days.    This is normal.  SYMPTOMS TO WATCH FOR AND REPORT TO YOUR PHYSICIAN:  1. Abdominal pain or bloating, other than gas cramps.  2. Chest pain.  3. Back pain.  4. Signs of infection such as: chills or fever occurring within 24 hours   after the procedure.  5. Rectal bleeding, which would show as bright red, maroon, or black stools.   (A tablespoon of blood from the rectum is not serious, especially if   hemorrhoids are present.)  6. Vomiting.  7. Weakness or dizziness.  GO DIRECTLY TO THE NEAREST EMERGENCY ROOM IF YOU HAVE ANY OF THE FOLLOWING:      Difficulty breathing              Chills and/or fever over 101 F   Persistent vomiting and/or vomiting blood   Severe abdominal pain   Severe chest pain   Black, tarry stools   Bleeding- more than one  tablespoon   Any other symptom or condition that you feel may need urgent attention  Your doctor recommends these additional instructions:  If any biopsies were taken, your doctors clinic will contact you in 1 to 2   weeks with any results.  - Await pathology results.   - Telephone GI clinic for pathology results in 2 weeks.   - Perform a colonoscopy now.   - See colonoscopy report for further details.  For questions, problems or results please call your physician - Franco Charles MD at Work:  [phone].  [facility], EMERGENCY ROOM PHONE NUMBER: [phone]  IF A COMPLICATION OR EMERGENCY SITUATION ARISES AND YOU ARE UNABLE TO REACH   YOUR PHYSICIAN - GO DIRECTLY TO THE EMERGENCY ROOM.  Franco Charles MD  12/4/2019 9:19:56 AM  This report has been verified and signed electronically.  PROVATION

## 2019-12-04 NOTE — H&P
Short Stay Endoscopy History and Physical    PCP - Urmila Luna MD     Procedure - EGD, colonoscopy  ASA - per anesthesia  Mallampati - per anesthesia  History of Anesthesia problems - no  Family history Anesthesia problems -  no   Plan of anesthesia - General    HPI:  This is a 69 y.o. female here for evaluation of abdominal pain, dyspepsia, nausea, and diarrhea.       Reflux - no  Dysphagia - no  Abdominal pain - no  Diarrhea - no    ROS:  Constitutional: No fevers, chills, No weight loss  CV: No chest pain  Pulm: No cough, No shortness of breath  Ophtho: No vision changes  GI: see HPI  Derm: No rash    Medical History:  has a past medical history of Acquired bronchiectasis, Allergy, Amblyopia, Anemia of other chronic disease, Anticoagulant long-term use, Anxiety, Cataract, Chronic obstructive pulmonary disease with acute exacerbation, Clotting disorder, COPD (chronic obstructive pulmonary disease), Depression, Diverticulosis, Essential tremor, GERD (gastroesophageal reflux disease), Hemangioma of liver, History of tuberculosis (1978), Hypertension, Mixed anxiety and depressive disorder, On home oxygen therapy, Psoriasis, PSVT (paroxysmal supraventricular tachycardia), Pulmonary embolism, S/P PICC central line placement (Apr. 2016 - May 2016), Skin disease, Spondylosis without myelopathy (8/23/2013), Supraventricular tachycardia, Tachycardia, Thoracic aorta atherosclerosis, Tuberculosis, Vaginal delivery, and Vitamin D deficiency.    Surgical History:  has a past surgical history that includes Cataract extraction w/  intraocular lens implant (07/24/12); Cataract extraction w/  intraocular lens implant (08/07/12); Radiofrequency thermocoagulation (Left, 12/18/2018); Cholecystectomy; Eye surgery; and Gallbladder surgery.    Family History: family history includes Cancer in her brother, father, and maternal aunt; Heart attack in her brother, mother, and son; Hyperlipidemia in her sister; Hypertension in her  mother; Lung cancer in her sister; Psoriasis in her sister; Stroke in her maternal uncle.. Otherwise no colon cancer, inflammatory bowel disease, or GI malignancies.    Social History:  reports that she quit smoking about 10 years ago. Her smoking use included cigarettes. She has a 100.00 pack-year smoking history. She has never used smokeless tobacco. She reports that she drank alcohol. She reports that she does not use drugs.    Review of patient's allergies indicates:   Allergen Reactions    Adhesive Other (See Comments)     Tears up the skin and makes it itch   (leads)    Bactrim [sulfamethoxazole-trimethoprim] Rash     Was hospitalized for rash    Restasis [cyclosporine]     Lipitor [atorvastatin] Other (See Comments)     Muscle aches    Albuterol Other (See Comments)     tremors    Topamax [topiramate]      - made her feel 'bad in the head'       Medications:   Medications Prior to Admission   Medication Sig Dispense Refill Last Dose    chlorthalidone (HYGROTEN) 25 MG Tab Take 1 tablet (25 mg total) by mouth once daily. 90 tablet 3 12/4/2019 at Unknown time    ergocalciferol (ERGOCALCIFEROL) 50,000 unit Cap TAKE 1 CAPSULE BY MOUTH ONCE A WEEK 4 capsule 4 Past Week at Unknown time    escitalopram oxalate (LEXAPRO) 10 MG tablet Take 1 tablet (10 mg total) by mouth once daily. 30 tablet 3 12/3/2019 at Unknown time    folic acid (FOLVITE) 1 MG tablet TAKE 1 TABLET BY MOUTH ONCE DAILY 100 tablet 2 12/3/2019 at Unknown time    gabapentin (NEURONTIN) 300 MG capsule TAKE 2 CAPSULES BY MOUTH THREE TIMES DAILY 540 capsule 0 12/3/2019 at Unknown time    guaiFENesin (MUCINEX) 600 mg 12 hr tablet Take 1,200 mg by mouth 2 (two) times daily.   12/3/2019 at Unknown time    hyoscyamine (ANASPAZ,LEVSIN) 0.125 mg Tab Take 1 tablet (125 mcg total) by mouth every 6 (six) hours as needed. 30 tablet 0 Past Week at Unknown time    ipratropium (ATROVENT) 0.02 % nebulizer solution Take 2.5 mLs (500 mcg total) by  nebulization 3 (three) times daily as needed for Wheezing. Inhale 1 vial in nebulizer 3 to 4 times daily 2 Box 11 12/3/2019 at Unknown time    Lactobacillus rhamnosus GG (CULTURELLE) 10 billion cell capsule Take 1 capsule by mouth once daily.   12/3/2019 at Unknown time    mirtazapine (REMERON) 15 MG tablet Take 1 tablet (15 mg total) by mouth every evening. 30 tablet 11 12/3/2019 at Unknown time    multivitamin (THERAGRAN) per tablet Take 1 tablet by mouth once daily.   12/3/2019 at Unknown time    omeprazole (PRILOSEC) 20 MG capsule TAKE 1 CAPSULE BY MOUTH ONCE DAILY AS NEEDED FOR  HEARTBURN  (TAKE  AS  NEEDED) 90 capsule 1 12/3/2019 at Unknown time    verapamil (VERELAN) 180 MG C24P Take 1 capsule (180 mg total) by mouth 2 (two) times daily. 180 capsule 3 12/4/2019 at Unknown time    ALPRAZolam (XANAX) 0.25 MG tablet Take 1 tablet (0.25 mg total) by mouth nightly as needed for Anxiety. 30 tablet 0 More than a month at Unknown time    apixaban (ELIQUIS) 5 mg Tab Take 1 tablet (5 mg total) by mouth 2 (two) times daily. 180 tablet 3 11/30/2019    desoximetasone (TOPICORT) 0.25 % cream APPLY  CREAM EXTERNALLY TWICE DAILY AS NEEDED 15 g 3 Taking    fluocinolone (DERMA-SMOOTHE) 0.01 % external oil Apply topically once daily. (Patient taking differently: Apply topically daily as needed. ) 1 Bottle 5 Taking    fluocinonide (LIDEX) 0.05 % external solution Apply topically once daily. - apply after shampooing (Patient taking differently: Apply topically once daily. - apply after shampooing as needed) 60 mL 5 Taking    ketoconazole (NIZORAL) 2 % shampoo Apply topically once daily. 120 mL 5 Taking    loperamide (IMODIUM) 2 mg capsule Take 2 capsules after first loose stool, then 1 capsule after each loose stool following. Do not exceed 8 capsules per day. 12 capsule 1 More than a month at Unknown time    ondansetron (ZOFRAN-ODT) 8 MG TbDL Take 1 tablet (8 mg total) by mouth every 8 (eight) hours as needed. 30  tablet 0 More than a month at Unknown time    polyethylene glycol (GOLYTELY,NULYTELY) 236-22.74-6.74 -5.86 gram suspension USE AS DIRECTED PER PHYSICIAN INSTRUCTION  0 Taking    promethazine (PHENERGAN) 25 MG suppository Place 1 suppository (25 mg total) rectally every 6 (six) hours as needed for Nausea. 10 suppository 1 More than a month at Unknown time    propylene glycol (SYSTANE BALANCE) 0.6 % Drop Apply 1 drop to eye daily as needed (dry eye).   Taking    sodium chloride 2% (XIOMARA 128) 2 % ophthalmic solution Place 1 drop into both eyes 3 (three) times daily as needed.   Taking    sodium chloride 3% 3 % nebulizer solution Take 4 mLs by nebulization 2 (two) times daily. 720 mL 3 Taking    umeclidinium-vilanterol (ANORO ELLIPTA) 62.5-25 mcg/actuation DsDv Inhale 1 puff into the lungs once daily. Controller 1 each 6 Taking       Physical Exam:    Vital Signs:   Vitals:    12/04/19 0817   BP: 132/60   Pulse: 72   Resp: 16   Temp: 98.2 °F (36.8 °C)       General Appearance: Well appearing in no acute distress  Eyes:    No scleral icterus  ENT: Neck supple, Lips, mucosa, and tongue normal; teeth and gums normal  Lungs: CTA anteriorly  Heart:  Regular rate, S1, S2 normal, no murmurs heard.  Abdomen: Soft, non tender, non distended with normal bowel sounds. No hepatosplenomegaly, ascites, or mass.  Extremities: No edema  Skin: No rash    Labs:  Lab Results   Component Value Date    WBC 7.00 08/18/2019    HGB 10.4 (L) 08/18/2019    HCT 33.5 (L) 08/18/2019     08/18/2019    CHOL 204 (H) 03/22/2018    TRIG 139 03/22/2018    HDL 68 03/22/2018    ALT 16 08/18/2019    AST 17 08/18/2019     08/18/2019    K 3.2 (L) 08/18/2019     08/18/2019    CREATININE 1.2 08/18/2019    BUN 15 08/18/2019    CO2 27 08/18/2019    TSH 2.449 06/26/2019    INR 1.1 08/25/2017    HGBA1C 5.2 09/13/2018       I have explained the risks and benefits of endoscopy procedures to the patient including but not limited to bleeding,  perforation, infection, and death.  The patient was asked if they understand and allowed to ask any further questions to their satisfaction.    Justice Cuevas MD PGY-VI  Gastroenterology Fellow  [facility]

## 2019-12-04 NOTE — ANESTHESIA PREPROCEDURE EVALUATION
12/04/2019  Yasmin Asencio is a 69 y.o., female.  Past Surgical History:   Procedure Laterality Date    CATARACT EXTRACTION W/  INTRAOCULAR LENS IMPLANT  07/24/12    od dr [place]    CATARACT EXTRACTION W/  INTRAOCULAR LENS IMPLANT  08/07/12    left eye    CHOLECYSTECTOMY      EYE SURGERY      GALLBLADDER SURGERY      RADIOFREQUENCY THERMOCOAGULATION Left 12/18/2018    Procedure: RADIOFREQUENCY THERMAL COAGULATION;  Surgeon: Issac Meyers MD;  Location: [facility];  Service: Pain Management;  Laterality: Left;  OK to hold Eliquis 3 days prior per Dr. Braga     Past Medical History:   Diagnosis Date    Acquired bronchiectasis     due to history of TB - followed by pulmonary, Dr. Zuñiga    Allergy     Amblyopia     rt eye per pt    Anemia of other chronic disease     Anticoagulant long-term use     Anxiety     Cataract     Chronic obstructive pulmonary disease with acute exacerbation     Clotting disorder     COPD (chronic obstructive pulmonary disease)     Depression     Diverticulosis     Essential tremor     GERD (gastroesophageal reflux disease)     Hemangioma of liver     History of tuberculosis 1978    Hypertension     Mixed anxiety and depressive disorder     On home oxygen therapy     Psoriasis     PSVT (paroxysmal supraventricular tachycardia)     Pulmonary embolism     S/P PICC central line placement Apr. 2016 - May 2016    Skin disease     Psoriasis    Spondylosis without myelopathy 8/23/2013    Supraventricular tachycardia     Tachycardia     Thoracic aorta atherosclerosis     noted on CT scan of chest 1/3/2011    Tuberculosis     Vaginal delivery     x2    Vitamin D deficiency          Anesthesia Evaluation    I have reviewed the Patient Summary Reports.    I have reviewed the Nursing Notes.   I have reviewed the Medications.     Review of  Systems  Anesthesia Hx:  No problems with previous Anesthesia    Hematology/Oncology:  Hematology Normal   Oncology Normal     EENT/Dental:EENT/Dental Normal   Cardiovascular:   Hypertension    Pulmonary:   COPD Asthma    Renal/:   Chronic Renal Disease    Hepatic/GI:   GERD Liver Disease,    Musculoskeletal:   Arthritis     Neurological:   Neuromuscular Disease,    Endocrine:  Endocrine Normal    Dermatological:  Skin Normal    Psych:   Psychiatric History          Physical Exam  General:  Well nourished    Airway/Jaw/Neck:  Airway Findings: Mouth Opening: Normal Tongue: Normal  General Airway Assessment: Adult  Mallampati: II  TM Distance: Normal, at least 6 cm        Eyes/Ears/Nose:  EYES/EARS/NOSE FINDINGS: Normal   Dental:  DENTAL FINDINGS: Normal   Chest/Lungs:  Chest/Lungs Clear    Heart/Vascular:  Heart Findings: Normal Heart murmur: negative Vascular Findings: Normal    Abdomen:  Abdomen Findings: Normal    Musculoskeletal:  Musculoskeletal Findings: Normal   Skin:  Skin Findings: Normal    Mental Status:  Mental Status Findings: Normal        Anesthesia Plan  Type of Anesthesia, risks & benefits discussed:  Anesthesia Type:  general  Patient's Preference: general  Intra-op Monitoring Plan: standard ASA monitors  Intra-op Monitoring Plan Comments:   Post Op Pain Control Plan: multimodal analgesia  Post Op Pain Control Plan Comments:   Induction:   IV  Beta Blocker:  Patient is not currently on a Beta-Blocker (No further documentation required).       Informed Consent: Patient understands risks and agrees with Anesthesia plan.  Questions answered. Anesthesia consent signed with patient.  ASA Score: 3     Day of Surgery Review of History & Physical: I have interviewed and examined the patient. I have reviewed the patient's H&P dated:  There are no significant changes.  H&P update referred to the provider.         Ready For Surgery From Anesthesia Perspective.

## 2019-12-04 NOTE — TRANSFER OF CARE
"Anesthesia Transfer of Care Note    Patient: Yasmin Asencio    Procedure(s) Performed: Procedure(s) (LRB):  EGD (ESOPHAGOGASTRODUODENOSCOPY) (N/A)  COLONOSCOPY (N/A)    Patient location: GI    Anesthesia Type: general    Transport from OR: Transported from OR on room air with adequate spontaneous ventilation    Post pain: adequate analgesia    Post assessment: no apparent anesthetic complications and tolerated procedure well    Post vital signs: stable    Level of consciousness: awake, alert and oriented    Nausea/Vomiting: no nausea/vomiting    Complications: none    Transfer of care protocol was followed      Last vitals:   Visit Vitals  BP (!) 120/57   Pulse 62   Temp 36.6 °C (97.9 °F)   Resp 16   Ht 5' 2" (1.575 m)   Wt 71.7 kg (158 lb)   LMP  (LMP Unknown)   SpO2 99%   Breastfeeding? No   BMI 28.90 kg/m²     "

## 2019-12-04 NOTE — ANESTHESIA POSTPROCEDURE EVALUATION
Anesthesia Post Evaluation    Patient: Yasmin Asencio    Procedure(s) Performed: Procedure(s) (LRB):  EGD (ESOPHAGOGASTRODUODENOSCOPY) (N/A)  COLONOSCOPY (N/A)    Final Anesthesia Type: general    Patient location during evaluation: PACU  Patient participation: Yes- Able to Participate  Level of consciousness: awake and alert and oriented  Post-procedure vital signs: reviewed and stable  Pain management: adequate  Airway patency: patent    PONV status at discharge: No PONV  Anesthetic complications: no      Cardiovascular status: stable  Respiratory status: unassisted, spontaneous ventilation and nasal cannula  Hydration status: euvolemic  Follow-up not needed.          Vitals Value Taken Time   /67 12/4/2019 10:07 AM   Temp 36.6 °C (97.9 °F) 12/4/2019  9:52 AM   Pulse 72 12/4/2019 10:07 AM   Resp 18 12/4/2019 10:07 AM   SpO2 99 % 12/4/2019 10:07 AM         No case tracking events are documented in the log.      Pain/Agustín Score: No data recorded

## 2019-12-09 ENCOUNTER — PATIENT MESSAGE (OUTPATIENT)
Dept: PAIN MEDICINE | Facility: CLINIC | Age: 69
End: 2019-12-09

## 2019-12-09 DIAGNOSIS — L40.9 PSORIASIS: ICD-10-CM

## 2019-12-09 RX ORDER — FLUOCINOLONE ACETONIDE 0.11 MG/ML
OIL TOPICAL DAILY
Qty: 119 ML | Refills: 5 | Status: SHIPPED | OUTPATIENT
Start: 2019-12-09 | End: 2021-05-25

## 2019-12-11 ENCOUNTER — TELEPHONE (OUTPATIENT)
Dept: ENDOSCOPY | Facility: HOSPITAL | Age: 69
End: 2019-12-11

## 2019-12-17 ENCOUNTER — PATIENT OUTREACH (OUTPATIENT)
Dept: ADMINISTRATIVE | Facility: OTHER | Age: 69
End: 2019-12-17

## 2019-12-17 DIAGNOSIS — Z12.31 ENCOUNTER FOR SCREENING MAMMOGRAM FOR MALIGNANT NEOPLASM OF BREAST: Primary | ICD-10-CM

## 2019-12-19 ENCOUNTER — OFFICE VISIT (OUTPATIENT)
Dept: OPTOMETRY | Facility: CLINIC | Age: 69
End: 2019-12-19
Payer: MEDICARE

## 2019-12-19 DIAGNOSIS — H52.03 HYPEROPIA WITH ASTIGMATISM, BILATERAL: ICD-10-CM

## 2019-12-19 DIAGNOSIS — H02.88A MEIBOMIAN GLAND DYSFUNCTION (MGD), BILATERAL, BOTH UPPER AND LOWER LIDS: ICD-10-CM

## 2019-12-19 DIAGNOSIS — H18.529 ANTERIOR BASEMENT MEMBRANE DYSTROPHY: Primary | ICD-10-CM

## 2019-12-19 DIAGNOSIS — H52.203 HYPEROPIA WITH ASTIGMATISM, BILATERAL: ICD-10-CM

## 2019-12-19 DIAGNOSIS — Z96.1 PSEUDOPHAKIA OF BOTH EYES: ICD-10-CM

## 2019-12-19 DIAGNOSIS — H02.88B MEIBOMIAN GLAND DYSFUNCTION (MGD), BILATERAL, BOTH UPPER AND LOWER LIDS: ICD-10-CM

## 2019-12-19 PROCEDURE — 92015 DETERMINE REFRACTIVE STATE: CPT | Mod: S$GLB,,, | Performed by: OPTOMETRIST

## 2019-12-19 PROCEDURE — 92014 COMPRE OPH EXAM EST PT 1/>: CPT | Mod: S$GLB,,, | Performed by: OPTOMETRIST

## 2019-12-19 PROCEDURE — 99999 PR PBB SHADOW E&M-EST. PATIENT-LVL II: CPT | Mod: PBBFAC,,, | Performed by: OPTOMETRIST

## 2019-12-19 PROCEDURE — 92014 PR EYE EXAM, EST PATIENT,COMPREHESV: ICD-10-PCS | Mod: S$GLB,,, | Performed by: OPTOMETRIST

## 2019-12-19 PROCEDURE — 99999 PR PBB SHADOW E&M-EST. PATIENT-LVL II: ICD-10-PCS | Mod: PBBFAC,,, | Performed by: OPTOMETRIST

## 2019-12-19 PROCEDURE — 92015 PR REFRACTION: ICD-10-PCS | Mod: S$GLB,,, | Performed by: OPTOMETRIST

## 2019-12-19 NOTE — PROGRESS NOTES
HPI     Pt here for Blurrred VA after Super-K  Possible GP over refraction  Poor VA OD>>OS      Last edited by Ad Domingo, OD on 12/19/2019  8:48 AM. (History)            Assessment /Plan     For exam results, see Encounter Report.    Anterior basement membrane dystrophy  -BVA 20/40 OU with SRx vastly improved VA with new sRx    Meibomian gland dysfunction (MGD), bilateral, both upper and lower lids  -Systane QID  -reviewed importance of tears on VA    Pseudophakia of both eyes  -clear, centered    Hyperopia with astigmatism, bilateral  Eyeglass Final Rx     Eyeglass Final Rx       Sphere Cylinder Axis Dist VA Add    Right -0.50 +0.50 175 20/40 +2.50    Left +0.25 +2.00 165 20/40 +2.50    Expiration Date:  12/19/2020                Pt happy with improved VA, GP lenses not necessary        RTC 1 yr

## 2019-12-19 NOTE — LETTER
December 19, 2019      Emeli Muniz MD  [id number]  [facility] [address]           [facility]  [address]  [facility] [address]  Phone: [phone]  Fax: [phone]          Patient: Yasmin Asencio   MR Number: [id number]   YOB: 1950   Date of Visit: 12/19/2019       Dear Dr. Emeli Muniz:    Thank you for referring Yasmin Asencio to me for evaluation. Attached you will find relevant portions of my assessment and plan of care.    If you have questions, please do not hesitate to call me. I look forward to following Yasmin Asencio along with you.    Sincerely,    Ad Domingo, OD    Enclosure  CC:  No Recipients    If you would like to receive this communication electronically, please contact [email or web address] or [phone] to request more information on [employer] Link access.    For providers and/or their staff who would like to refer a patient to [facility], please contact us through our one-stop-shop provider referral line, [facility], at [phone].    If you feel you have received this communication in error or would no longer like to receive these types of communications, please e-mail [email or web address]

## 2019-12-23 LAB
FINAL PATHOLOGIC DIAGNOSIS: NORMAL
GROSS: NORMAL

## 2019-12-27 ENCOUNTER — HOSPITAL ENCOUNTER (OUTPATIENT)
Dept: RADIOLOGY | Facility: HOSPITAL | Age: 69
Discharge: HOME | End: 2019-12-27
Attending: INTERNAL MEDICINE
Payer: MEDICARE

## 2019-12-27 DIAGNOSIS — Z12.31 ENCOUNTER FOR SCREENING MAMMOGRAM FOR MALIGNANT NEOPLASM OF BREAST: ICD-10-CM

## 2019-12-27 PROCEDURE — 77067 MAMMO DIGITAL SCREENING BILAT WITH TOMOSYNTHESIS_CAD: ICD-10-PCS | Mod: 26,,, | Performed by: RADIOLOGY

## 2019-12-27 PROCEDURE — 77067 SCR MAMMO BI INCL CAD: CPT | Mod: TC,PO

## 2019-12-27 PROCEDURE — 77067 SCR MAMMO BI INCL CAD: CPT | Mod: 26,,, | Performed by: RADIOLOGY

## 2019-12-27 PROCEDURE — 77063 MAMMO DIGITAL SCREENING BILAT WITH TOMOSYNTHESIS_CAD: ICD-10-PCS | Mod: 26,,, | Performed by: RADIOLOGY

## 2019-12-27 PROCEDURE — 77063 BREAST TOMOSYNTHESIS BI: CPT | Mod: 26,,, | Performed by: RADIOLOGY

## 2020-01-02 DIAGNOSIS — R79.89 ELEVATED SERUM HOMOCYSTEINE LEVEL: ICD-10-CM

## 2020-01-02 DIAGNOSIS — Z86.711 PERSONAL HISTORY OF PULMONARY EMBOLISM: ICD-10-CM

## 2020-01-02 RX ORDER — FOLIC ACID 1 MG/1
TABLET ORAL
Qty: 100 TABLET | Refills: 0 | Status: SHIPPED | OUTPATIENT
Start: 2020-01-02 | End: 2020-04-27

## 2020-01-28 ENCOUNTER — PATIENT OUTREACH (OUTPATIENT)
Dept: ADMINISTRATIVE | Facility: OTHER | Age: 70
End: 2020-01-28

## 2020-01-28 NOTE — PROGRESS NOTES
Pulmonary & Critical Care Medicine   Clinic Note     Follow up visit     HPI: From prior clinic note 11/2018      Patient of Dr. Zuñiga in past. New to me. Underlying bronchiectasis, chronic PE on OAC. Etiology of bronchiectasis related to very remote history of M. [facility]. She has recurrent pseudomonal infections requiring varying abx and admissions. Most recent hospital admission in September requiring PICC placement and IV Zosyn. Doing better. Remains with daily productive cough and intermittent blood tinged sputum, but per her at baseline. Continues to utilize bronchodilators, CPT and accapella. Weight and functional status stable.  and daughter present at visit. No additional complaints Denies F/NS/weight loss     Prior Visit:    Following her last visit, she was admitted to [facility] for bronchiectasis exacerbation with cultures demonstrating pseudomonas. Treated with course of anti-microbials and improved. Feels good. Still with intermittent N/emesis, but improved. Scheduled for EGD in Dec. No complaints. Monitoring O2 sats and uses supplemental O2 only when <88%, but has not needed.  No additional complaints. Scheduled to go to [place] for vacation in November and request rescue Abx. Has not received flu vaccination.       INTERVAL HISTORY:     No hospitalizations. No Abx/steroids since last visit. Remains active. C-scope and EGD completed. Small hiatal hernia. Adjusting diet to control symptoms. Does feel slightly more congested in last 3 days with increased mucous discoloration. Does not feel this is typical of flare but watching closely. Using O2 when needed.. Monitors SpO2 closely at home. No F/C. Weight stable. No additional complaints.      Past Medical, Social, Surgical, Family History- Reviewed and updated as appropriate      Drug Allergies:             Review of patient's allergies indicates:   Allergen Reactions    Adhesive Other (See Comments)       Tears up  the skin and makes it itch    Bactrim [sulfamethoxazole-trimethoprim] Rash       Was hospitalized for rash    Lipitor [atorvastatin] Other (See Comments)       Muscle aches    Albuterol Other (See Comments)       tremors    Topamax [topiramate]         - made her feel 'bad in the head'    Restasis [cyclosporine] Itching         Review of Systems:      Constitutional: Negative for fever, chills, diaphoresis, activity change, appetite change and fatigue.   HENT: Negative for congestion, drooling, facial swelling, hearing loss, rhinorrhea, sinus pressure, tinnitus, trouble swallowing and voice change.    Eyes: Negative for photophobia, pain and visual disturbance.   Respiratory: Negative for chest tightness and shortness of breath.   positive dry cough.  Chronic and at baseline  Cardiovascular: Negative for chest pain, palpitations and leg swelling.   Gastrointestinal: Negative for nausea, vomiting, abdominal pain, diarrhea and abdominal distention.   Endocrine: Negative for cold intolerance, heat intolerance, polydipsia and polyuria.   Genitourinary: Negative for dysuria, frequency and hematuria.   Musculoskeletal: Negative for myalgias, back pain, arthralgias, neck pain and neck stiffness.   Skin: Negative for color change, pallor, rash and wound.   Allergic/Immunologic: Negative for immunocompromised state.   Neurological: Negative for dizziness, weakness and headaches.    A comprehensive 12-point review of systems was performed, and is negative except for those items mentioned above in the HPI section of this note.      Vital Signs:       BP (!) 119/55   Pulse 83   Wt 73 kg (160 lb 15 oz)   LMP  (LMP Unknown)   SpO2 (!) 91%   BMI 29.44 kg/m²       Physical Exam:   GEN- NAD AAOx3 Well Built, Well Appearing   HEENT- ATNC, PERRLA, EOMI, OP-Cl. No JVD, LAD or bruit noted. Trachea Midline.   CV- RRR No M/R/G  RESP- Crackles left UL.   GI- S/NT/ND. Positive BS X 4. No HSM Noted  BACK- Spine midline. No step  off, crepitus or deformity noted. No midline TTP.   Ext- MAEW, No deformity. No edema or rashes noted.   Neuro- Strength 5/5 symmetric. CN 2-12 intact. Normal gait. Normal sensation.    Clubbing of digits.     Personal Review and Summary of Prior Diagnostics     Laboratory Studies:   Reviewed      Cr- 1.1, HCO3- 30              TSH 0.400 - 4.000 uIU/mL 2.449       Sodium 136 - 145 mmol/L 139    Potassium 3.5 - 5.1 mmol/L 3.5    Chloride 95 - 110 mmol/L 99    CO2 23 - 29 mmol/L 28    Glucose 70 - 110 mg/dL 105    BUN, Bld 8 - 23 mg/dL 16    Creatinine 0.5 - 1.4 mg/dL 1.2    Calcium 8.7 - 10.5 mg/dL 10.1    Total Protein 6.0 - 8.4 g/dL 8.4    Albumin 3.5 - 5.2 g/dL 4.2    Total Bilirubin 0.1 - 1.0 mg/dL 0.3    Comment: For infants and newborns, interpretation of results should be based   on gestational age, weight and in agreement with clinical   observations.   Premature Infant recommended reference ranges:   Up to 24 hours.............<8.0 mg/dL   Up to 48 hours............<12.0 mg/dL   3-5 days..................<15.0 mg/dL   6-29 days.................<15.0 mg/dL    Alkaline Phosphatase 55 - 135 U/L 84    AST 10 - 40 U/L 17    ALT 10 - 44 U/L 13       WBC 3.90 - 12.70 K/uL 5.90    RBC 4.00 - 5.40 M/uL 3.95Low     Hemoglobin 12.0 - 16.0 g/dL 10.7Low     Hematocrit 37.0 - 48.5 % 35.2Low     Mean Corpuscular Volume 82 - 98 fL 89    Mean Corpuscular Hemoglobin 27.0 - 31.0 pg 27.1    Mean Corpuscular Hemoglobin Conc 32.0 - 36.0 g/dL 30.4Low     RDW 11.5 - 14.5 % 14.4    Platelets 150 - 350 K/uL 298    MPV 9.2 - 12.9 fL 9.5    Gran # (ANC) 1.8 - 7.7 K/uL 4.0    Lymph # 1.0 - 4.8 K/uL 1.3    Mono # 0.3 - 1.0 K/uL 0.5    Eos # 0.0 - 0.5 K/uL 0.1    Baso # 0.00 - 0.20 K/uL 0.02    Gran% 38.0 - 73.0 % 68.2    Lymph% 18.0 - 48.0 % 22.4    Mono% 4.0 - 15.0 % 7.8    Eosinophil% 0.0 - 8.0 % 1.5    Basophil% 0.0 - 1.9 % 0.3             Microbiology Data:            Respiratory Culture SERRATIA MARCESCENS   Many       Respiratory  Culture PSEUDOMONAS AERUGINOSA   Many   Normal respiratory larry also present       Gram Stain (Respiratory) <10 epithelial cells per low power field.    Gram Stain (Respiratory) Few WBC's    Gram Stain (Respiratory) Many Gram negative rods    Gram Stain (Respiratory) Few Gram positive cocci    Resulting Agency [facility]   Susceptibility                        Serratia marcescens Pseudomonas aeruginosa       CULTURE, RESPIRATORY CULTURE, RESPIRATORY       Cefepime <=8  Sensitive <=8  Sensitive       Ceftriaxone <=8  Sensitive <=8  Sensitive       Ciprofloxacin <=1  Sensitive >2  Resistant       Ertapenem <=2  Sensitive <=2  Sensitive       Gentamicin <=4  Sensitive <=4  Sensitive       Meropenem <=4  Sensitive <=4  Sensitive       Piperacillin/Tazo <=16  Sensitive <=16  Sensitive       Tobramycin <=4  Sensitive <=4  Sensitive       Trimeth/Sulfa <=2/38  Sensitive <=2/38  Sensitive                 AFB Culture & Smear No growth after 8 weeks.     AFB CULTURE STAIN No acid fast bacilli seen.             AFB Culture & Smear No growth after 8 weeks.     AFB CULTURE STAIN No acid fast bacilli seen.                Pseudomonas aeruginosa       CULTURE, RESPIRATORY       Amikacin <=16 mcg/mL Sensitive       Cefepime <=8 mcg/mL Sensitive       Ciprofloxacin >2 mcg/mL Resistant       Gentamicin <=4 mcg/mL Sensitive       Tobramycin <=4 mcg/mL Sensitive                 Summary of Chest Imaging Personally Reviewed:      CT Chest 7/5-      1. Absence of superior segment dominant nodule right lower lobe from previous exam.  Micro nodules stable.  2. Emphysema and bronchiectasis changes of lungs stable.        CT chest-   1. Chronic filling defects within the right upper lobe pulmonary artery branch consistent with chronic pulmonary embolism.  No new acute pulmonary thromboembolism identified.  2. Stable fibrotic changes throughout the lung apices, left greater than right, with bronchiectasis, interstitial prominence, and mosaic  attenuation of the lungs, similar to prior CTA 01/05/2017.  3. Small area of focal nodular consolidation within the left lower lobe, not seen on prior studies which, may be infectious or inflammatory in etiology, possibly even contiguous from the left apical fibrotic changes.  Suggest follow-up CT scan 1 month after antimicrobial therapy to exclude neoplastic nodule.  4. Stable 4 mm nodule within the right middle lobe.  5. Additional findings as detailed above.     CT 2/6/2019     Bilateral upper lung zone predominant lung disease, primarily characterized by severe bronchiectasis.  Overall appearance suggests sequela of chronic infection.    Ground-glass opacities and pulmonary nodule clusters improved from prior study.  0.7 cm right lower lobe pulmonary nodule, new from prior study.  Recommend follow-up chest CT at 6 months to ensure nodule stability.     2D Echo:      Left Ventricle Normal ejection fraction . Cavity is normal. Normal left ventricle diastolic function.   Right Ventricle Normal cavity size.   Left Atrium Cavity is mildly dilated.   Right Atrium Normal cavity size.   Aortic Valve Normal valve structure.   Mitral Valve Normal valve structure. There is mild mitral annular calcification.   Tricuspid Valve The tricuspid valve is normal. Trace regurgitation.   Pulmonic Valve Normal valve structure.   IVC/SVC Normal central venous pressure (3 mm Hg).   Ascending Aorta Normal aortic root, size and contour.   Pericardium No pericardial effusion. Pericardium is normal.      US Doppler-   1. Right lower extremity venous ultrasound shows normal flow in the deep venous system and no DVT.  2. Left lower extremity venous ultrasound shows normal flow in the deep venous system and no DVT.         PFT's (4/2017):      FEV1/FVC- 74  FEV1- 1.24L (58)  FVC- 1.67L (62)   DLCO- 53           Assessment:      1. Pulmonary nodule    2. Bronchiectasis without complication    3. History of pulmonary embolism          Plan:          1. Bronchiectasis- History of M. Kansasi- Repeat AFB in system never with recurrent organism. Most recent negative x 2.  Records not available from prior treatment. Recurrent pseudomonas infections with one recent exacerbation, now resolved.. Clinical at baseline. CT scan is stable. Looks good today     -- Continue with anoro and inhaled bronchodilators.. Does not feel anoro helps.. She can attempt discontinuation and observe symptoms.. It is for bronchiectasis management only.   -- CPT, 3% saline,  and accapela for mucous clearance    -- Rescue abx provided for worsening sputum production if needed. Discussed role with her in detail.. She has history of resistant organisms and I discussed caution and need to let me know if she initiates and not improved..    -- Flu/PNA vaccinations UTD- maintain   -- Supplemental O2 to maintain SpO2 >88%. Discussed importance of utilizing      2. Chronic Pulmonary embolism- originally diagnosed 2 years prior. On OAC since that time. Residual RUL defect remains based on Sept 2018 C T imaging.  No further episodes of hemoptysis noted. LE doppler negative for DVT   Continue OAC for now as she is tolerating.      3. Pulmonary Nodule- resolved RLL nodule.. No need for any additional follow up.         RTC 6 months on  campus. . Patient has my contact information and can call in the interim if any issues.      Declan Mills M.D.   [facility]

## 2020-01-29 ENCOUNTER — OFFICE VISIT (OUTPATIENT)
Dept: NEUROLOGY | Facility: CLINIC | Age: 70
End: 2020-01-29
Payer: MEDICARE

## 2020-01-29 ENCOUNTER — OFFICE VISIT (OUTPATIENT)
Dept: PULMONOLOGY | Facility: CLINIC | Age: 70
End: 2020-01-29
Payer: MEDICARE

## 2020-01-29 VITALS
DIASTOLIC BLOOD PRESSURE: 55 MMHG | WEIGHT: 160.94 LBS | BODY MASS INDEX: 29.44 KG/M2 | OXYGEN SATURATION: 91 % | HEART RATE: 83 BPM | SYSTOLIC BLOOD PRESSURE: 119 MMHG

## 2020-01-29 VITALS
SYSTOLIC BLOOD PRESSURE: 116 MMHG | WEIGHT: 160 LBS | BODY MASS INDEX: 29.44 KG/M2 | HEART RATE: 72 BPM | DIASTOLIC BLOOD PRESSURE: 59 MMHG | HEIGHT: 62 IN

## 2020-01-29 DIAGNOSIS — J47.9 BRONCHIECTASIS WITHOUT COMPLICATION: Primary | ICD-10-CM

## 2020-01-29 DIAGNOSIS — I26.99 PE (PULMONARY THROMBOEMBOLISM): ICD-10-CM

## 2020-01-29 DIAGNOSIS — R25.1 TREMOR: Primary | ICD-10-CM

## 2020-01-29 PROCEDURE — 99214 PR OFFICE/OUTPT VISIT, EST, LEVL IV, 30-39 MIN: ICD-10-PCS | Mod: S$GLB,,, | Performed by: EMERGENCY MEDICINE

## 2020-01-29 PROCEDURE — 99214 OFFICE O/P EST MOD 30 MIN: CPT | Mod: S$GLB,,, | Performed by: EMERGENCY MEDICINE

## 2020-01-29 PROCEDURE — 3074F PR MOST RECENT SYSTOLIC BLOOD PRESSURE < 130 MM HG: ICD-10-PCS | Mod: CPTII,S$GLB,, | Performed by: PSYCHIATRY & NEUROLOGY

## 2020-01-29 PROCEDURE — 1126F PR PAIN SEVERITY QUANTIFIED, NO PAIN PRESENT: ICD-10-PCS | Mod: S$GLB,,, | Performed by: EMERGENCY MEDICINE

## 2020-01-29 PROCEDURE — 1126F PR PAIN SEVERITY QUANTIFIED, NO PAIN PRESENT: ICD-10-PCS | Mod: S$GLB,,, | Performed by: PSYCHIATRY & NEUROLOGY

## 2020-01-29 PROCEDURE — 99214 OFFICE O/P EST MOD 30 MIN: CPT | Mod: S$GLB,,, | Performed by: PSYCHIATRY & NEUROLOGY

## 2020-01-29 PROCEDURE — 3078F PR MOST RECENT DIASTOLIC BLOOD PRESSURE < 80 MM HG: ICD-10-PCS | Mod: CPTII,S$GLB,, | Performed by: EMERGENCY MEDICINE

## 2020-01-29 PROCEDURE — 1159F PR MEDICATION LIST DOCUMENTED IN MEDICAL RECORD: ICD-10-PCS | Mod: S$GLB,,, | Performed by: PSYCHIATRY & NEUROLOGY

## 2020-01-29 PROCEDURE — 1101F PT FALLS ASSESS-DOCD LE1/YR: CPT | Mod: CPTII,S$GLB,, | Performed by: EMERGENCY MEDICINE

## 2020-01-29 PROCEDURE — 1126F AMNT PAIN NOTED NONE PRSNT: CPT | Mod: S$GLB,,, | Performed by: EMERGENCY MEDICINE

## 2020-01-29 PROCEDURE — 3078F DIAST BP <80 MM HG: CPT | Mod: CPTII,S$GLB,, | Performed by: EMERGENCY MEDICINE

## 2020-01-29 PROCEDURE — 1159F MED LIST DOCD IN RCRD: CPT | Mod: S$GLB,,, | Performed by: EMERGENCY MEDICINE

## 2020-01-29 PROCEDURE — 99999 PR PBB SHADOW E&M-EST. PATIENT-LVL II: CPT | Mod: PBBFAC,,, | Performed by: EMERGENCY MEDICINE

## 2020-01-29 PROCEDURE — 1101F PR PT FALLS ASSESS DOC 0-1 FALLS W/OUT INJ PAST YR: ICD-10-PCS | Mod: CPTII,S$GLB,, | Performed by: PSYCHIATRY & NEUROLOGY

## 2020-01-29 PROCEDURE — 3078F DIAST BP <80 MM HG: CPT | Mod: CPTII,S$GLB,, | Performed by: PSYCHIATRY & NEUROLOGY

## 2020-01-29 PROCEDURE — 1126F AMNT PAIN NOTED NONE PRSNT: CPT | Mod: S$GLB,,, | Performed by: PSYCHIATRY & NEUROLOGY

## 2020-01-29 PROCEDURE — 3078F PR MOST RECENT DIASTOLIC BLOOD PRESSURE < 80 MM HG: ICD-10-PCS | Mod: CPTII,S$GLB,, | Performed by: PSYCHIATRY & NEUROLOGY

## 2020-01-29 PROCEDURE — 99999 PR PBB SHADOW E&M-EST. PATIENT-LVL IV: ICD-10-PCS | Mod: PBBFAC,,, | Performed by: PSYCHIATRY & NEUROLOGY

## 2020-01-29 PROCEDURE — 3074F SYST BP LT 130 MM HG: CPT | Mod: CPTII,S$GLB,, | Performed by: PSYCHIATRY & NEUROLOGY

## 2020-01-29 PROCEDURE — 99499 RISK ADDL DX/OHS AUDIT: ICD-10-PCS | Mod: S$GLB,,, | Performed by: PSYCHIATRY & NEUROLOGY

## 2020-01-29 PROCEDURE — 1159F PR MEDICATION LIST DOCUMENTED IN MEDICAL RECORD: ICD-10-PCS | Mod: S$GLB,,, | Performed by: EMERGENCY MEDICINE

## 2020-01-29 PROCEDURE — 3074F SYST BP LT 130 MM HG: CPT | Mod: CPTII,S$GLB,, | Performed by: EMERGENCY MEDICINE

## 2020-01-29 PROCEDURE — 99499 UNLISTED E&M SERVICE: CPT | Mod: S$GLB,,, | Performed by: PSYCHIATRY & NEUROLOGY

## 2020-01-29 PROCEDURE — 99999 PR PBB SHADOW E&M-EST. PATIENT-LVL IV: CPT | Mod: PBBFAC,,, | Performed by: PSYCHIATRY & NEUROLOGY

## 2020-01-29 PROCEDURE — 1159F MED LIST DOCD IN RCRD: CPT | Mod: S$GLB,,, | Performed by: PSYCHIATRY & NEUROLOGY

## 2020-01-29 PROCEDURE — 99214 PR OFFICE/OUTPT VISIT, EST, LEVL IV, 30-39 MIN: ICD-10-PCS | Mod: S$GLB,,, | Performed by: PSYCHIATRY & NEUROLOGY

## 2020-01-29 PROCEDURE — 3074F PR MOST RECENT SYSTOLIC BLOOD PRESSURE < 130 MM HG: ICD-10-PCS | Mod: CPTII,S$GLB,, | Performed by: EMERGENCY MEDICINE

## 2020-01-29 PROCEDURE — 1101F PR PT FALLS ASSESS DOC 0-1 FALLS W/OUT INJ PAST YR: ICD-10-PCS | Mod: CPTII,S$GLB,, | Performed by: EMERGENCY MEDICINE

## 2020-01-29 PROCEDURE — 99999 PR PBB SHADOW E&M-EST. PATIENT-LVL II: ICD-10-PCS | Mod: PBBFAC,,, | Performed by: EMERGENCY MEDICINE

## 2020-01-29 PROCEDURE — 1101F PT FALLS ASSESS-DOCD LE1/YR: CPT | Mod: CPTII,S$GLB,, | Performed by: PSYCHIATRY & NEUROLOGY

## 2020-01-29 NOTE — PROGRESS NOTES
"Name: Yasmin Asencio  MRN: [id number]   CSN: [id number]      Date: 5-21-19      Referring physician:  No referring provider defined for this encounter.    Subjective:      Chief Complaint: shakes  Interval History  - Tremors have gotten worse, despite the increase in mirtazipine (up to 15 mg )  - Difficulty w/ speech  - Difficulty w/ writing   - Both hands equally affected  - Burning feeling in the feet at times  - Has fallen 2x in April; foot was placed on the crack in the driveway, two days in a row, and she couldn't catch her balance in time     History of Present Illness (HPI):    Yasmin Asencio is a 69 y.o. right-handed female with COPD, PE, HTN, PSVT, who presents today for a follow-up evaluation of Essential Tremor and is accompanied by . She is not able to take primidone due to Eliquis. She was last seen in office 10-1-18. At that time, rec mirtazapine. She discussed this with her PCP and she started this. She is remy w/o diff and initially it helped tremor. Lately, she has noted increase tremor and wonders if the mirtazapine can be increased. She also notes increased anxiety from her "lungs" and this also increases tremor. She is not taking prednisone but is using nebulizer and inhaler.     Don't feel the mirtazapine is working anymore.   Shaking in BH bad.   Difficult to eat.  No longer chops veggies due to tremor.  Diff to put on make-up.   Voice quivers.       Fell twice outside. Trimmed edges of grass to close and foot got caught in the edges both times and went down.     States she knows the lungs have a lot to do with the anxiety.   She does feel very anxious all the time. On Lexapro 10 mg- was on 20 mg at one time.     Meds tried   TPX - brain fog  Cd/ld - no response  Gabapentin - currently taking  Primidone- CANNOT TAKE with Eliquis as can lower levels of Eliquis  NOT TRIED PROPRANOLOL DUE TO COPD        Review of Systems   Respiratory: Positive for shortness of breath.  "   Musculoskeletal: Positive for gait problem.   Neurological: Positive for tremors.   Psychiatric/Behavioral: Negative for dysphoric mood. The patient is nervous/anxious.        Past Medical History: The patient  has a past medical history of Acquired bronchiectasis, Allergy, Amblyopia, Anemia of other chronic disease, Anticoagulant long-term use, Anxiety, Cataract, Chronic obstructive pulmonary disease with acute exacerbation, Clotting disorder, COPD (chronic obstructive pulmonary disease), Depression, Diverticulosis, Essential tremor, GERD (gastroesophageal reflux disease), Hemangioma of liver, History of tuberculosis (1978), Hypertension, Mixed anxiety and depressive disorder, On home oxygen therapy, Psoriasis, PSVT (paroxysmal supraventricular tachycardia), Pulmonary embolism, S/P PICC central line placement (Apr. 2016 - May 2016), Skin disease, Spondylosis without myelopathy (8/23/2013), Supraventricular tachycardia, Tachycardia, Thoracic aorta atherosclerosis, Tuberculosis, Vaginal delivery, and Vitamin D deficiency.    Social History: The patient  reports that she quit smoking about 10 years ago. Her smoking use included cigarettes. She has a 100.00 pack-year smoking history. She has never used smokeless tobacco. She reports that she drank alcohol. She reports that she does not use drugs.    Family History: Their family history includes Cancer in her brother, father, and maternal aunt; Heart attack in her brother, mother, and son; Hyperlipidemia in her sister; Hypertension in her mother; Lung cancer in her sister; Psoriasis in her sister; Stroke in her maternal uncle.    Allergies: Adhesive; Bactrim [sulfamethoxazole-trimethoprim]; Restasis [cyclosporine]; Lipitor [atorvastatin]; Albuterol; and Topamax [topiramate]     Meds:   Current Outpatient Medications on File Prior to Visit   Medication Sig Dispense Refill    ALPRAZolam (XANAX) 0.25 MG tablet Take 1 tablet (0.25 mg total) by mouth nightly as needed for  Anxiety. 30 tablet 0    apixaban (ELIQUIS) 5 mg Tab Take 1 tablet (5 mg total) by mouth 2 (two) times daily. 180 tablet 3    chlorthalidone (HYGROTEN) 25 MG Tab Take 1 tablet (25 mg total) by mouth once daily. 90 tablet 3    desoximetasone (TOPICORT) 0.25 % cream APPLY  CREAM EXTERNALLY TWICE DAILY AS NEEDED 15 g 3    ergocalciferol (ERGOCALCIFEROL) 50,000 unit Cap TAKE 1 CAPSULE BY MOUTH ONCE A WEEK 4 capsule 4    escitalopram oxalate (LEXAPRO) 10 MG tablet Take 1 tablet (10 mg total) by mouth once daily. 30 tablet 3    fluocinolone (DERMA-SMOOTHE) 0.01 % external oil APPLY TOPICALLY ONCE DAILY 119 mL 5    fluocinonide (LIDEX) 0.05 % external solution Apply topically once daily. - apply after shampooing (Patient taking differently: Apply topically once daily. - apply after shampooing as needed) 60 mL 5    folic acid (FOLVITE) 1 MG tablet TAKE 1 TABLET BY MOUTH ONCE DAILY 100 tablet 0    gabapentin (NEURONTIN) 300 MG capsule TAKE 2 CAPSULES BY MOUTH THREE TIMES DAILY 540 capsule 0    guaiFENesin (MUCINEX) 600 mg 12 hr tablet Take 1,200 mg by mouth 2 (two) times daily.      hyoscyamine (ANASPAZ,LEVSIN) 0.125 mg Tab Take 1 tablet (125 mcg total) by mouth every 6 (six) hours as needed. 30 tablet 0    ipratropium (ATROVENT) 0.02 % nebulizer solution Take 2.5 mLs (500 mcg total) by nebulization 3 (three) times daily as needed for Wheezing. Inhale 1 vial in nebulizer 3 to 4 times daily 2 Box 11    ketoconazole (NIZORAL) 2 % shampoo Apply topically once daily. 120 mL 5    Lactobacillus rhamnosus GG (CULTURELLE) 10 billion cell capsule Take 1 capsule by mouth once daily.      loperamide (IMODIUM) 2 mg capsule Take 2 capsules after first loose stool, then 1 capsule after each loose stool following. Do not exceed 8 capsules per day. 12 capsule 1    mirtazapine (REMERON) 15 MG tablet Take 1 tablet (15 mg total) by mouth every evening. 30 tablet 11    multivitamin (THERAGRAN) per tablet Take 1 tablet by mouth  "once daily.      omeprazole (PRILOSEC) 20 MG capsule TAKE 1 CAPSULE BY MOUTH ONCE DAILY AS NEEDED FOR  HEARTBURN  (TAKE  AS  NEEDED) 90 capsule 1    ondansetron (ZOFRAN-ODT) 8 MG TbDL Take 1 tablet (8 mg total) by mouth every 8 (eight) hours as needed. 30 tablet 0    promethazine (PHENERGAN) 25 MG suppository Place 1 suppository (25 mg total) rectally every 6 (six) hours as needed for Nausea. 10 suppository 1    propylene glycol (SYSTANE BALANCE) 0.6 % Drop Apply 1 drop to eye daily as needed (dry eye).      sodium chloride 2% (XIOMARA 128) 2 % ophthalmic solution Place 1 drop into both eyes 3 (three) times daily as needed.      sodium chloride 3% 3 % nebulizer solution Take 4 mLs by nebulization 2 (two) times daily. 720 mL 3    umeclidinium-vilanterol (ANORO ELLIPTA) 62.5-25 mcg/actuation DsDv Inhale 1 puff into the lungs once daily. Controller 1 each 6    verapamil (VERELAN) 180 MG C24P Take 1 capsule (180 mg total) by mouth 2 (two) times daily. 180 capsule 3     No current facility-administered medications on file prior to visit.        Objective:     Physical Exam:    Vitals:    01/29/20 0952   BP: (!) 116/59   Pulse: 72   Weight: 72.6 kg (160 lb)   Height: 5' 2" (1.575 m)     Body mass index is 29.26 kg/m².    Constitutional  Well-developed, well-nourished, appears stated age   Cardiovascular  Radial pulses 2+ and symmetric, no LE edema bilaterally       ..  * Specialized movement exam  No hypophonic speech, obvious voice tremor is present   No facial masking.   No cogwheel rigidity.     No bradykinesia.    Rest tremor BH, moderate (R>L).   Tremor with hands outstretched (L>R).   Mild kinetic tremor BH.   BH intention tremor (R>L).       No other dystonia, chorea, athetosis, myoclonus, or tics.      No motor impersistence.   Gait unsteady, balance issues   No shortened stride length.   No abnormal arm swing.             Laboratory Results:  Admission on 12/04/2019, Discharged on 12/04/2019   Component Date " "Value Ref Range Status    Final Pathologic Diagnosis 12/04/2019    Final                    Value:APS DIAGNOSIS:  A. Gastric , Biopsy:  -Gastric antral type mucosa with reactive gastropathy.  -Gastric body type mucosa with no diagnostic abnormality.  -Immunohistochemical stain for Helicobacter is negative.    B. Gastric Nodule, Biopsy:  -Hyperplastic polyp.  -Negative for dysplasia and malignancy.    C. Random Colon , Biopsy:  -No diagnostic abnormality.  -No evidence of chronic or active colitis, including lymphocytic and  collagenous colitis.  -Negative for dysplasia or malignancy.    Comments:  B. Gastric hyperplastic polyps are associated with a wide range of background  gastric mucosal  abnormalities. While they are most strongly associated with atrophic  gastritis of either autoimmune or  environmental (H. pylori-associated) types, they can be a response to a  variety of gastric injuries. Also in  the differential diagnosis is Menetrier disease; although unlikely.    DANIEL Bee.    Report attached.      Performing site:  [facility]  [address] [place] [id number]      Gross 12/04/2019    Final                    Value:Patient ID/ Pathology ID:  [id number]  Received in formalin in 3 parts.    Part 1:  Received in formalin, labeled "stomach," are 4 tan-pink tissue fragments that  measure 7 x 4 x 2 mm in aggregate. The specimen is submitted entirely in  cassette QAN--1-A.    Part 2:  Received in formalin, labeled "gastric nodule," is a tan-pink 2 tan-red  sessile polyp with focal hemorrhages noted sporadically. The specimen is  trisected and submitted entirely in cassette FUQ--2-A.    Part 3:  Received in formalin, labeled "random colon," are multiple tan-pink, focally  hemorrhagic tissue fragments that measure 8 x 5 x 2 mm in aggregate. The  specimen is submitted entirely in cassette MVH--3-A.    Madonna: MB   "   Hospital Outpatient Visit on 11/15/2019   Component Date Value Ref Range Status    Ascending aorta 11/15/2019 3.20  cm Final    STJ 11/15/2019 2.92  cm Final    AV mean gradient 11/15/2019 5  mmHg Final    Ao peak cristino 11/15/2019 1.58  m/s Final    Ao VTI 11/15/2019 32.09  cm Final    IVRT 11/15/2019 0.06  msec Final    IVS 11/15/2019 0.92  0.6 - 1.1 cm Final    LA size 11/15/2019 3.96  cm Final    Left Atrium Major Axis 11/15/2019 5.55  cm Final    Left Atrium Minor Axis 11/15/2019 5.54  cm Final    LVIDD 11/15/2019 4.37  3.5 - 6.0 cm Final    LVIDS 11/15/2019 3.35  2.1 - 4.0 cm Final    LVOT diameter 11/15/2019 2.07  cm Final    LVOT peak VTI 11/15/2019 30.51  cm Final    PW 11/15/2019 0.94  0.6 - 1.1 cm Final    MV Peak A Cristino 11/15/2019 0.60  m/s Final    E wave decelartion time 11/15/2019 189.66  msec Final    MV Peak E Cristino 11/15/2019 0.87  m/s Final    PV Peak D Cristino 11/15/2019 0.74  m/s Final    PV Peak S Cristino 11/15/2019 0.77  m/s Final    RA Major Axis 11/15/2019 4.20  cm Final    RA Width 11/15/2019 3.81  cm Final    RVDD 11/15/2019 3.76  cm Final    Sinus 11/15/2019 3.27  cm Final    TAPSE 11/15/2019 2.05  cm Final    TR Max Cristino 11/15/2019 2.25  m/s Final    TDI LATERAL 11/15/2019 0.12  m/s Final    TDI SEPTAL 11/15/2019 0.09  m/s Final    LA WIDTH 11/15/2019 5.04  cm Final    LV Diastolic Volume 11/15/2019 86.15  mL Final    LV Systolic Volume 11/15/2019 45.64  mL Final    RV S' 11/15/2019 15.82  cm/s Final    LVOT peak cristino 11/15/2019 1.34  m/s Final    LV LATERAL E/E' RATIO 11/15/2019 7.25  m/s Final    LV SEPTAL E/E' RATIO 11/15/2019 9.67  m/s Final    FS 11/15/2019 23  % Final    LA volume 11/15/2019 94.07  cm3 Final    LV mass 11/15/2019 132.35  g Final    Left Ventricle Relative Wall Thick* 11/15/2019 0.43  cm Final    AV valve area 11/15/2019 3.20  cm2 Final    AV Velocity Ratio 11/15/2019 0.85   Final    AV index (prosthetic) 11/15/2019 0.95   Final    E/A  ratio 11/15/2019 1.45   Final    Mean e' 11/15/2019 0.11  m/s Final    Pulm vein S/D ratio 11/15/2019 1.04   Final    LVOT area 11/15/2019 3.4  cm2 Final    LVOT stroke volume 11/15/2019 102.62  cm3 Final    AV peak gradient 11/15/2019 10  mmHg Final    E/E' ratio 11/15/2019 8.29  m/s Final    Triscuspid Valve Regurgitation Pea* 11/15/2019 20  mmHg Final    BSA 11/15/2019 1.78  m2 Final    LV Systolic Volume Index 11/15/2019 26.3  mL/m2 Final    LV Diastolic Volume Index 11/15/2019 49.55  mL/m2 Final    LA Volume Index 11/15/2019 54.1  mL/m2 Final    LV Mass Index 11/15/2019 76  g/m2 Final    Right Atrial Pressure (from IVC) 11/15/2019 3  mmHg Final    TV rest pulmonary artery pressure 11/15/2019 23  mmHg Final         Assessment and Plan       Medical Decision Making:  Can continue Mirtazapine at the current dose  Will increase Gabapentin to 3 tabs TID    DBS brought up and discussed, will discuss again at the next appointment.   Will follow up w/ HAYDEE Torres and will discuss the benefits of DBS as well as response to increase in Gabapentin.   Limited medication options at this time, as everything has been tried or we are unable to try it due to comorbid conditions    Patient seen and evaluated w/ Dr. Ann Ochoa MD  PGY-III  Neurology Resident   [facility]     Patient seen and examined.  I agree with the history, exam, assessment and plan within the resident's note with any notable exceptions edited above.          Olu Juarez MD, MPH  Division of Movement and Memory Disorders  [facility]

## 2020-02-03 RX ORDER — VERAPAMIL HYDROCHLORIDE 180 MG/1
CAPSULE, EXTENDED RELEASE ORAL
Qty: 90 CAPSULE | Refills: 3 | Status: SHIPPED | OUTPATIENT
Start: 2020-02-03 | End: 2020-02-06

## 2020-02-03 RX ORDER — ERGOCALCIFEROL 1.25 MG/1
CAPSULE ORAL
Qty: 4 CAPSULE | Refills: 0 | Status: SHIPPED | OUTPATIENT
Start: 2020-02-03 | End: 2020-03-16

## 2020-02-05 ENCOUNTER — TELEPHONE (OUTPATIENT)
Dept: GASTROENTEROLOGY | Facility: CLINIC | Age: 70
End: 2020-02-05

## 2020-02-05 NOTE — TELEPHONE ENCOUNTER
MA contacted pt to give pathology results per Susan from pt scope back in December. Pt did not answer, MA left message for pt tot give the clinic a call.                 ----- Message from Susan Venegas NP sent at 2/5/2020  3:31 PM CST -----  Biopsies from her stomach and her colon were normal. No evidence of infection or inflammation

## 2020-02-05 NOTE — TELEPHONE ENCOUNTER
MA contacted pt back to give results per Susan , Biopsies from her stomach and her colon were normal. No evidence of infection or inflammation. Pt verbalized understanding .            ----- Message from Angela Hill RN sent at 2/5/2020  3:55 PM CST -----  Contact: [phone]      ----- Message -----  From: Ksenia Briggs  Sent: 2/5/2020   3:48 PM CST  To: Rubi Davis Staff    Missed call from Victoria. Please call

## 2020-02-06 ENCOUNTER — TELEPHONE (OUTPATIENT)
Dept: ELECTROPHYSIOLOGY | Facility: CLINIC | Age: 70
End: 2020-02-06

## 2020-02-06 NOTE — TELEPHONE ENCOUNTER
----- Message from Homero Perales MA sent at 2/6/2020  3:06 PM CST -----  Contact: Patient  Is it ok to change the verapamil? im not understanding this message clearly, but I will if its ok.   ----- Message -----  From: Kira Ta  Sent: 2/6/2020   2:53 PM CST  To: Lesli BELL Staff    The pt is calling to get a refill on her chlorthalidone (HYGROTEN) 25 MG Tab and  verapamil (VERELAN) 180 MG C24P and she needs 180 ER table because its cheaper and her insurance will pay for it. Please send it to [facility] - [address] [phone] (Phone) [phone] (Fax). Thanks, Kira

## 2020-02-07 DIAGNOSIS — I47.10 PSVT (PAROXYSMAL SUPRAVENTRICULAR TACHYCARDIA): Primary | ICD-10-CM

## 2020-02-07 DIAGNOSIS — I47.19 ECTOPIC ATRIAL TACHYCARDIA: ICD-10-CM

## 2020-02-07 DIAGNOSIS — Z86.79 HISTORY OF PSVT (PAROXYSMAL SUPRAVENTRICULAR TACHYCARDIA): ICD-10-CM

## 2020-02-07 RX ORDER — VERAPAMIL HYDROCHLORIDE 120 MG/1
120 TABLET, FILM COATED ORAL 2 TIMES DAILY
Qty: 60 TABLET | Refills: 11 | OUTPATIENT
Start: 2020-02-07 | End: 2021-02-06

## 2020-02-07 RX ORDER — VERAPAMIL HYDROCHLORIDE 120 MG/1
120 TABLET, FILM COATED, EXTENDED RELEASE ORAL 2 TIMES DAILY
Qty: 180 TABLET | Refills: 3 | Status: SHIPPED | OUTPATIENT
Start: 2020-02-07 | End: 2020-04-16 | Stop reason: ALTCHOICE

## 2020-02-07 NOTE — TELEPHONE ENCOUNTER
LVM for pt about changing from Verapamil time release capsules to tablets.Pt will now take medication twice daily instead of once daily. Callback number given.

## 2020-02-10 RX ORDER — CHLORTHALIDONE 25 MG/1
25 TABLET ORAL DAILY
Qty: 90 TABLET | Refills: 3 | Status: SHIPPED | OUTPATIENT
Start: 2020-02-10 | End: 2021-04-05

## 2020-02-11 DIAGNOSIS — G25.0 ESSENTIAL TREMOR: ICD-10-CM

## 2020-02-11 DIAGNOSIS — M54.40 ACUTE LEFT-SIDED LOW BACK PAIN WITH SCIATICA, SCIATICA LATERALITY UNSPECIFIED: ICD-10-CM

## 2020-02-13 DIAGNOSIS — M54.40 ACUTE LEFT-SIDED LOW BACK PAIN WITH SCIATICA, SCIATICA LATERALITY UNSPECIFIED: ICD-10-CM

## 2020-02-13 RX ORDER — MIRTAZAPINE 15 MG/1
15 TABLET, FILM COATED ORAL NIGHTLY
Qty: 30 TABLET | Refills: 11 | Status: SHIPPED | OUTPATIENT
Start: 2020-02-13 | End: 2021-03-23

## 2020-02-13 RX ORDER — GABAPENTIN 300 MG/1
600 CAPSULE ORAL 3 TIMES DAILY
Qty: 540 CAPSULE | Refills: 3 | Status: SHIPPED | OUTPATIENT
Start: 2020-02-13 | End: 2020-02-13 | Stop reason: SDUPTHER

## 2020-02-13 NOTE — TELEPHONE ENCOUNTER
----- Message from Emily Felix sent at 2/13/2020 12:24 PM CST -----  Rx Refill/Request     Is this a Refill or New Rx:  Refills (sent to wrong pharmacy)    Rx Name and Strength:  gabapentin (NEURONTIN) 300 MG capsule and mirtazapine (REMERON) 15 MG tablet    Preferred Pharmacy with phone number:        [facility] - [address]  [address]  [facility] [address]  Phone: [phone] Fax: [phone]    Communication Preference:  Additional Information:

## 2020-02-13 NOTE — TELEPHONE ENCOUNTER
----- Message from Emily Felix sent at 2/13/2020 12:24 PM CST -----  Rx Refill/Request     Is this a Refill or New Rx:  Refills (sent to wrong pharmacy)    Rx Name and Strength:  gabapentin (NEURONTIN) 300 MG capsule and mirtazapine (REMERON) 15 MG tablet    Preferred Pharmacy with phone number:        [facility] - [address]  [address]  [facility] [address]  Phone: [phone] Fax: [phone]    Communication Preference:  Additional Information:

## 2020-02-17 ENCOUNTER — TELEPHONE (OUTPATIENT)
Dept: ENDOSCOPY | Facility: HOSPITAL | Age: 70
End: 2020-02-17

## 2020-02-17 ENCOUNTER — HOSPITAL ENCOUNTER (OUTPATIENT)
Facility: HOSPITAL | Age: 70
Discharge: HOME | End: 2020-02-18
Attending: EMERGENCY MEDICINE | Admitting: EMERGENCY MEDICINE
Payer: MEDICARE

## 2020-02-17 DIAGNOSIS — R11.2 INTRACTABLE VOMITING WITH NAUSEA, UNSPECIFIED VOMITING TYPE: Primary | ICD-10-CM

## 2020-02-17 DIAGNOSIS — I10 ESSENTIAL HYPERTENSION: Chronic | ICD-10-CM

## 2020-02-17 DIAGNOSIS — R11.2 NAUSEA AND VOMITING, INTRACTABILITY OF VOMITING NOT SPECIFIED, UNSPECIFIED VOMITING TYPE: ICD-10-CM

## 2020-02-17 DIAGNOSIS — R11.10 VOMITING: ICD-10-CM

## 2020-02-17 DIAGNOSIS — J47.9 BRONCHIECTASIS WITHOUT COMPLICATION: ICD-10-CM

## 2020-02-17 PROBLEM — N18.30 CKD (CHRONIC KIDNEY DISEASE), STAGE III: Chronic | Status: ACTIVE | Noted: 2020-02-17

## 2020-02-17 PROBLEM — R11.0 NAUSEA: Chronic | Status: ACTIVE | Noted: 2020-02-17

## 2020-02-17 LAB
ALBUMIN SERPL BCP-MCNC: 3.8 G/DL (ref 3.5–5.2)
ALP SERPL-CCNC: 74 U/L (ref 55–135)
ALT SERPL W/O P-5'-P-CCNC: 13 U/L (ref 10–44)
AMYLASE SERPL-CCNC: 49 U/L (ref 20–110)
ANION GAP SERPL CALC-SCNC: 11 MMOL/L (ref 8–16)
AST SERPL-CCNC: 16 U/L (ref 10–40)
BASOPHILS # BLD AUTO: 0.04 K/UL (ref 0–0.2)
BASOPHILS NFR BLD: 0.6 % (ref 0–1.9)
BILIRUB SERPL-MCNC: 0.4 MG/DL (ref 0.1–1)
BNP SERPL-MCNC: 185 PG/ML (ref 0–99)
BUN SERPL-MCNC: 15 MG/DL (ref 8–23)
CALCIUM SERPL-MCNC: 9.7 MG/DL (ref 8.7–10.5)
CHLORIDE SERPL-SCNC: 96 MMOL/L (ref 95–110)
CO2 SERPL-SCNC: 30 MMOL/L (ref 23–29)
CREAT SERPL-MCNC: 1.1 MG/DL (ref 0.5–1.4)
CTP QC/QA: YES
DIFFERENTIAL METHOD: ABNORMAL
EOSINOPHIL # BLD AUTO: 0 K/UL (ref 0–0.5)
EOSINOPHIL NFR BLD: 0.1 % (ref 0–8)
ERYTHROCYTE [DISTWIDTH] IN BLOOD BY AUTOMATED COUNT: 15 % (ref 11.5–14.5)
EST. GFR  (AFRICAN AMERICAN): 59 ML/MIN/1.73 M^2
EST. GFR  (NON AFRICAN AMERICAN): 51 ML/MIN/1.73 M^2
GLUCOSE SERPL-MCNC: 104 MG/DL (ref 70–110)
HCT VFR BLD AUTO: 32.9 % (ref 37–48.5)
HGB BLD-MCNC: 10.2 G/DL (ref 12–16)
IMM GRANULOCYTES # BLD AUTO: 0.02 K/UL (ref 0–0.04)
IMM GRANULOCYTES NFR BLD AUTO: 0.3 % (ref 0–0.5)
LIPASE SERPL-CCNC: 12 U/L (ref 4–60)
LYMPHOCYTES # BLD AUTO: 1.4 K/UL (ref 1–4.8)
LYMPHOCYTES NFR BLD: 19.2 % (ref 18–48)
MCH RBC QN AUTO: 25.1 PG (ref 27–31)
MCHC RBC AUTO-ENTMCNC: 31 G/DL (ref 32–36)
MCV RBC AUTO: 81 FL (ref 82–98)
MONOCYTES # BLD AUTO: 0.5 K/UL (ref 0.3–1)
MONOCYTES NFR BLD: 7 % (ref 4–15)
NEUTROPHILS # BLD AUTO: 5.2 K/UL (ref 1.8–7.7)
NEUTROPHILS NFR BLD: 72.8 % (ref 38–73)
NRBC BLD-RTO: 0 /100 WBC
PLATELET # BLD AUTO: 277 K/UL (ref 150–350)
PMV BLD AUTO: 9.6 FL (ref 9.2–12.9)
POC MOLECULAR INFLUENZA A AGN: NEGATIVE
POC MOLECULAR INFLUENZA B AGN: NEGATIVE
POTASSIUM SERPL-SCNC: 3.2 MMOL/L (ref 3.5–5.1)
PROT SERPL-MCNC: 7.9 G/DL (ref 6–8.4)
RBC # BLD AUTO: 4.07 M/UL (ref 4–5.4)
SODIUM SERPL-SCNC: 137 MMOL/L (ref 136–145)
TROPONIN I SERPL DL<=0.01 NG/ML-MCNC: 0.01 NG/ML (ref 0–0.03)
TROPONIN I SERPL DL<=0.01 NG/ML-MCNC: 0.01 NG/ML (ref 0–0.03)
TROPONIN I SERPL DL<=0.01 NG/ML-MCNC: <0.006 NG/ML (ref 0–0.03)
WBC # BLD AUTO: 7.13 K/UL (ref 3.9–12.7)

## 2020-02-17 PROCEDURE — 25000242 PHARM REV CODE 250 ALT 637 W/ HCPCS: Performed by: EMERGENCY MEDICINE

## 2020-02-17 PROCEDURE — 85025 COMPLETE CBC W/AUTO DIFF WBC: CPT

## 2020-02-17 PROCEDURE — 93010 EKG 12-LEAD: ICD-10-PCS | Mod: ,,, | Performed by: INTERNAL MEDICINE

## 2020-02-17 PROCEDURE — 96361 HYDRATE IV INFUSION ADD-ON: CPT | Performed by: EMERGENCY MEDICINE

## 2020-02-17 PROCEDURE — 93010 ELECTROCARDIOGRAM REPORT: CPT | Mod: ,,, | Performed by: INTERNAL MEDICINE

## 2020-02-17 PROCEDURE — 96375 TX/PRO/DX INJ NEW DRUG ADDON: CPT

## 2020-02-17 PROCEDURE — 96375 TX/PRO/DX INJ NEW DRUG ADDON: CPT | Performed by: EMERGENCY MEDICINE

## 2020-02-17 PROCEDURE — 63600175 PHARM REV CODE 636 W HCPCS: Performed by: EMERGENCY MEDICINE

## 2020-02-17 PROCEDURE — G0378 HOSPITAL OBSERVATION PER HR: HCPCS

## 2020-02-17 PROCEDURE — 96361 HYDRATE IV INFUSION ADD-ON: CPT

## 2020-02-17 PROCEDURE — 94761 N-INVAS EAR/PLS OXIMETRY MLT: CPT

## 2020-02-17 PROCEDURE — 83690 ASSAY OF LIPASE: CPT

## 2020-02-17 PROCEDURE — 36415 COLL VENOUS BLD VENIPUNCTURE: CPT

## 2020-02-17 PROCEDURE — 93005 ELECTROCARDIOGRAM TRACING: CPT

## 2020-02-17 PROCEDURE — 99285 EMERGENCY DEPT VISIT HI MDM: CPT | Mod: 25

## 2020-02-17 PROCEDURE — 84484 ASSAY OF TROPONIN QUANT: CPT | Mod: 91

## 2020-02-17 PROCEDURE — 83880 ASSAY OF NATRIURETIC PEPTIDE: CPT

## 2020-02-17 PROCEDURE — 82150 ASSAY OF AMYLASE: CPT

## 2020-02-17 PROCEDURE — 96376 TX/PRO/DX INJ SAME DRUG ADON: CPT

## 2020-02-17 PROCEDURE — 94640 AIRWAY INHALATION TREATMENT: CPT

## 2020-02-17 PROCEDURE — 25000003 PHARM REV CODE 250: Performed by: HOSPITALIST

## 2020-02-17 PROCEDURE — 96365 THER/PROPH/DIAG IV INF INIT: CPT

## 2020-02-17 PROCEDURE — 80053 COMPREHEN METABOLIC PANEL: CPT

## 2020-02-17 PROCEDURE — 63600175 PHARM REV CODE 636 W HCPCS: Performed by: HOSPITALIST

## 2020-02-17 PROCEDURE — 87502 INFLUENZA DNA AMP PROBE: CPT

## 2020-02-17 PROCEDURE — 25000003 PHARM REV CODE 250: Performed by: EMERGENCY MEDICINE

## 2020-02-17 RX ORDER — GABAPENTIN 300 MG/1
600 CAPSULE ORAL 3 TIMES DAILY
Status: DISCONTINUED | OUTPATIENT
Start: 2020-02-17 | End: 2020-02-18 | Stop reason: HOSPADM

## 2020-02-17 RX ORDER — DIPHENHYDRAMINE HYDROCHLORIDE 50 MG/ML
25 INJECTION INTRAMUSCULAR; INTRAVENOUS ONCE
Status: COMPLETED | OUTPATIENT
Start: 2020-02-17 | End: 2020-02-17

## 2020-02-17 RX ORDER — POTASSIUM CHLORIDE 20 MEQ/1
20 TABLET, EXTENDED RELEASE ORAL
Status: COMPLETED | OUTPATIENT
Start: 2020-02-17 | End: 2020-02-17

## 2020-02-17 RX ORDER — IPRATROPIUM BROMIDE 0.5 MG/2.5ML
0.5 SOLUTION RESPIRATORY (INHALATION) ONCE
Status: COMPLETED | OUTPATIENT
Start: 2020-02-17 | End: 2020-02-17

## 2020-02-17 RX ORDER — HYOSCYAMINE SULFATE 0.125 MG
125 TABLET ORAL EVERY 6 HOURS PRN
Status: DISCONTINUED | OUTPATIENT
Start: 2020-02-17 | End: 2020-02-17 | Stop reason: CLARIF

## 2020-02-17 RX ORDER — ASPIRIN 325 MG
325 TABLET ORAL
Status: DISCONTINUED | OUTPATIENT
Start: 2020-02-17 | End: 2020-02-17

## 2020-02-17 RX ORDER — ONDANSETRON 2 MG/ML
4 INJECTION INTRAMUSCULAR; INTRAVENOUS EVERY 6 HOURS PRN
Status: DISCONTINUED | OUTPATIENT
Start: 2020-02-17 | End: 2020-02-18 | Stop reason: HOSPADM

## 2020-02-17 RX ORDER — ACETAMINOPHEN 325 MG/1
650 TABLET ORAL EVERY 8 HOURS PRN
Status: DISCONTINUED | OUTPATIENT
Start: 2020-02-17 | End: 2020-02-18 | Stop reason: HOSPADM

## 2020-02-17 RX ORDER — PROCHLORPERAZINE EDISYLATE 5 MG/ML
10 INJECTION INTRAMUSCULAR; INTRAVENOUS ONCE
Status: COMPLETED | OUTPATIENT
Start: 2020-02-17 | End: 2020-02-17

## 2020-02-17 RX ORDER — PANTOPRAZOLE SODIUM 40 MG/1
40 TABLET, DELAYED RELEASE ORAL DAILY
Status: DISCONTINUED | OUTPATIENT
Start: 2020-02-18 | End: 2020-02-18 | Stop reason: HOSPADM

## 2020-02-17 RX ORDER — FAMOTIDINE 20 MG/1
20 TABLET, FILM COATED ORAL DAILY
Status: DISCONTINUED | OUTPATIENT
Start: 2020-02-18 | End: 2020-02-17

## 2020-02-17 RX ORDER — ESCITALOPRAM OXALATE 10 MG/1
10 TABLET ORAL DAILY
Status: DISCONTINUED | OUTPATIENT
Start: 2020-02-18 | End: 2020-02-18 | Stop reason: HOSPADM

## 2020-02-17 RX ORDER — PROCHLORPERAZINE EDISYLATE 5 MG/ML
2.5 INJECTION INTRAMUSCULAR; INTRAVENOUS EVERY 6 HOURS PRN
Status: DISCONTINUED | OUTPATIENT
Start: 2020-02-17 | End: 2020-02-18 | Stop reason: HOSPADM

## 2020-02-17 RX ORDER — ONDANSETRON 2 MG/ML
4 INJECTION INTRAMUSCULAR; INTRAVENOUS EVERY 8 HOURS PRN
Status: DISCONTINUED | OUTPATIENT
Start: 2020-02-17 | End: 2020-02-17

## 2020-02-17 RX ORDER — HYOSCYAMINE SULFATE 0.12 MG/1
0.12 TABLET SUBLINGUAL EVERY 6 HOURS PRN
Status: DISCONTINUED | OUTPATIENT
Start: 2020-02-17 | End: 2020-02-18 | Stop reason: HOSPADM

## 2020-02-17 RX ORDER — SODIUM CHLORIDE 9 MG/ML
1000 INJECTION, SOLUTION INTRAVENOUS
Status: COMPLETED | OUTPATIENT
Start: 2020-02-17 | End: 2020-02-18

## 2020-02-17 RX ORDER — FOLIC ACID 1 MG/1
1000 TABLET ORAL DAILY
Status: DISCONTINUED | OUTPATIENT
Start: 2020-02-18 | End: 2020-02-18 | Stop reason: HOSPADM

## 2020-02-17 RX ORDER — CHLORTHALIDONE 25 MG/1
25 TABLET ORAL DAILY
Status: DISCONTINUED | OUTPATIENT
Start: 2020-02-18 | End: 2020-02-18 | Stop reason: HOSPADM

## 2020-02-17 RX ORDER — SODIUM CHLORIDE 0.9 % (FLUSH) 0.9 %
10 SYRINGE (ML) INJECTION
Status: DISCONTINUED | OUTPATIENT
Start: 2020-02-17 | End: 2020-02-18 | Stop reason: HOSPADM

## 2020-02-17 RX ORDER — ONDANSETRON 2 MG/ML
4 INJECTION INTRAMUSCULAR; INTRAVENOUS
Status: COMPLETED | OUTPATIENT
Start: 2020-02-17 | End: 2020-02-17

## 2020-02-17 RX ORDER — VERAPAMIL HYDROCHLORIDE 120 MG/1
120 TABLET, FILM COATED, EXTENDED RELEASE ORAL 2 TIMES DAILY
Status: DISCONTINUED | OUTPATIENT
Start: 2020-02-17 | End: 2020-02-18 | Stop reason: HOSPADM

## 2020-02-17 RX ORDER — MIRTAZAPINE 15 MG/1
15 TABLET, FILM COATED ORAL NIGHTLY
Status: DISCONTINUED | OUTPATIENT
Start: 2020-02-17 | End: 2020-02-18 | Stop reason: HOSPADM

## 2020-02-17 RX ORDER — ALPRAZOLAM 0.25 MG/1
0.25 TABLET ORAL NIGHTLY PRN
Status: DISCONTINUED | OUTPATIENT
Start: 2020-02-17 | End: 2020-02-18 | Stop reason: HOSPADM

## 2020-02-17 RX ADMIN — MIRTAZAPINE 15 MG: 15 TABLET, FILM COATED ORAL at 10:02

## 2020-02-17 RX ADMIN — PROCHLORPERAZINE EDISYLATE 10 MG: 5 INJECTION INTRAMUSCULAR; INTRAVENOUS at 10:02

## 2020-02-17 RX ADMIN — IPRATROPIUM BROMIDE 0.5 MG: 0.5 SOLUTION RESPIRATORY (INHALATION) at 06:02

## 2020-02-17 RX ADMIN — PROMETHAZINE HYDROCHLORIDE 25 MG: 25 INJECTION INTRAMUSCULAR; INTRAVENOUS at 02:02

## 2020-02-17 RX ADMIN — DIPHENHYDRAMINE HYDROCHLORIDE 25 MG: 50 INJECTION INTRAMUSCULAR; INTRAVENOUS at 10:02

## 2020-02-17 RX ADMIN — SODIUM CHLORIDE 500 ML: 0.9 INJECTION, SOLUTION INTRAVENOUS at 02:02

## 2020-02-17 RX ADMIN — ONDANSETRON HYDROCHLORIDE 4 MG: 2 SOLUTION INTRAMUSCULAR; INTRAVENOUS at 06:02

## 2020-02-17 RX ADMIN — ONDANSETRON HYDROCHLORIDE 4 MG: 2 SOLUTION INTRAMUSCULAR; INTRAVENOUS at 12:02

## 2020-02-17 RX ADMIN — APIXABAN 5 MG: 5 TABLET, FILM COATED ORAL at 10:02

## 2020-02-17 RX ADMIN — SODIUM CHLORIDE 500 ML: 0.9 INJECTION, SOLUTION INTRAVENOUS at 12:02

## 2020-02-17 RX ADMIN — LIDOCAINE HYDROCHLORIDE: 20 SOLUTION ORAL; TOPICAL at 04:02

## 2020-02-17 RX ADMIN — VERAPAMIL HYDROCHLORIDE 120 MG: 120 TABLET, FILM COATED, EXTENDED RELEASE ORAL at 10:02

## 2020-02-17 RX ADMIN — SODIUM CHLORIDE 1000 ML: 0.9 INJECTION, SOLUTION INTRAVENOUS at 08:02

## 2020-02-17 RX ADMIN — POTASSIUM CHLORIDE 20 MEQ: 1500 TABLET, EXTENDED RELEASE ORAL at 04:02

## 2020-02-17 RX ADMIN — GABAPENTIN 600 MG: 300 CAPSULE ORAL at 10:02

## 2020-02-17 NOTE — ED PROVIDER NOTES
Encounter Date: 2/17/2020    SCRIBE #1 NOTE: I, Raeann Moraes, am scribing for, and in the presence of,  Wilberto Neves MD. I have scribed the following portions of the note - Other sections scribed: HPI, ROS, PE and EKG.       History     Chief Complaint   Patient presents with    Vomiting     Pt went out to eat on Saturday and has been throwing up since then. with hx of hiatal hernea     CC: Emesis    HPI: Ms. Yasmin Asencio is a 69 y.o female, with a medical history of acquired bronchiectasis, clotting disorder, COPD, diverticulosis, GERD, hemangioma of liver, hypertension, pulmonary embolism, paroxysmal supraventricular tachycardia, and thoracic aorta atherosclerosis, who presents to the ED c/o acute, constant nausea and emesis since Saturday night. Pt reports also experiencing associated abdominal pain and a burning sensation to the esophagus. She notes experiencing 1 bowel movement since the onset of symptoms. Pt states that she consumed the same food as other individuals Saturday night, however, she notes that no one else is experiencing similar symptoms. She reports use of Zofran as well as Phenergan suppositories for treatment with minimal improvement. Pt states that she experiences frequent episodes of similar symptoms, noting that the episodes typically occur twice a month. She reports a history of hiatal hernia. No other associated symptoms. No alleviating factors. Denies chest pain. Reports sxs have never been this bad.     The history is provided by the patient.     Review of patient's allergies indicates:   Allergen Reactions    Adhesive Other (See Comments)     Tears up the skin and makes it itch   (leads)    Bactrim [sulfamethoxazole-trimethoprim] Rash     Was hospitalized for rash    Restasis [cyclosporine]     Lipitor [atorvastatin] Other (See Comments)     Muscle aches    Albuterol Other (See Comments)     tremors    Topamax [topiramate]      - made her feel 'bad in the head'      Past Medical History:   Diagnosis Date    Acquired bronchiectasis     due to history of TB - followed by pulmonary, Dr. Zuñiga    Allergy     Amblyopia     rt eye per pt    Anemia of other chronic disease     Anticoagulant long-term use     Anxiety     Cataract     Chronic obstructive pulmonary disease with acute exacerbation     Clotting disorder     COPD (chronic obstructive pulmonary disease)     Depression     Diverticulosis     Essential tremor     GERD (gastroesophageal reflux disease)     Hemangioma of liver     History of tuberculosis 1978    Hypertension     Mixed anxiety and depressive disorder     On home oxygen therapy     Psoriasis     PSVT (paroxysmal supraventricular tachycardia)     Pulmonary embolism     S/P PICC central line placement Apr. 2016 - May 2016    Skin disease     Psoriasis    Spondylosis without myelopathy 8/23/2013    Supraventricular tachycardia     Tachycardia     Thoracic aorta atherosclerosis     noted on CT scan of chest 1/3/2011    Tuberculosis     Vaginal delivery     x2    Vitamin D deficiency      Past Surgical History:   Procedure Laterality Date    CATARACT EXTRACTION W/  INTRAOCULAR LENS IMPLANT  07/24/12    od dr spain    CATARACT EXTRACTION W/  INTRAOCULAR LENS IMPLANT  08/07/12    left eye    CHOLECYSTECTOMY      COLONOSCOPY N/A 12/4/2019    Procedure: COLONOSCOPY;  Surgeon: Franco Charles MD;  Location: [address]);  Service: Endoscopy;  Laterality: N/A;  ok to hold Eliquis 2days prior per     ESOPHAGOGASTRODUODENOSCOPY N/A 12/4/2019    Procedure: EGD (ESOPHAGOGASTRODUODENOSCOPY);  Surgeon: Franco Charles MD;  Location: [facility] ([address] [place] [address]);  Service: Endoscopy;  Laterality: N/A;  ok to hold Eliquis 2days prior per     EYE SURGERY      GALLBLADDER SURGERY      RADIOFREQUENCY THERMOCOAGULATION Left 12/18/2018    Procedure: RADIOFREQUENCY THERMAL COAGULATION;  Surgeon: Issac Meyers MD;   Location: [facility];  Service: Pain Management;  Laterality: Left;  OK to hold Eliquis 3 days prior per Dr. Braga     Family History   Problem Relation Age of Onset    Hypertension Mother     Heart attack Mother     Cancer Father         liver and bladder    Hyperlipidemia Sister     Psoriasis Sister     Cancer Brother         liver    Stroke Maternal Uncle     Cancer Maternal Aunt         stomach    Lung cancer Sister     Heart attack Brother     Heart attack Son     Amblyopia Neg Hx     Blindness Neg Hx     Cataracts Neg Hx     Glaucoma Neg Hx     Macular degeneration Neg Hx     Retinal detachment Neg Hx     Strabismus Neg Hx     Thyroid disease Neg Hx     Melanoma Neg Hx     Lupus Neg Hx     Eczema Neg Hx     COPD Neg Hx     Colon cancer Neg Hx      Social History     Tobacco Use    Smoking status: Former Smoker     Packs/day: 2.00     Years: 50.00     Pack years: 100.00     Types: Cigarettes     Last attempt to quit: 5/27/2009     Years since quitting: 10.7    Smokeless tobacco: Never Used   Substance Use Topics    Alcohol use: Not Currently     Frequency: Never     Binge frequency: Never    Drug use: Never     Review of Systems   Gastrointestinal: Positive for abdominal pain, nausea and vomiting.        (+) burning sensation to the esophagus   All other systems reviewed and are negative.      Physical Exam     Initial Vitals [02/17/20 1143]   BP Pulse Resp Temp SpO2   (!) 144/67 104 18 97.6 °F (36.4 °C) (!) 94 %      MAP       --         Physical Exam    Nursing note and vitals reviewed.  Constitutional: She appears well-developed and well-nourished. No distress.   Patient appears to not feel well.   HENT:   Head: Normocephalic and atraumatic.   Right Ear: Tympanic membrane normal.   Left Ear: Tympanic membrane normal.   Nose: Nose normal.   Mouth/Throat: Uvula is midline, oropharynx is clear and moist and mucous membranes are normal.   Eyes: EOM are normal. Pupils are equal,  round, and reactive to light.   Neck: Trachea normal, normal range of motion, full passive range of motion without pain and phonation normal. Neck supple. No stridor present. No spinous process tenderness and no muscular tenderness present. Normal range of motion present. No neck rigidity.   Cardiovascular: Normal rate, regular rhythm and normal heart sounds. Exam reveals no gallop and no friction rub.    No murmur heard.  Pulmonary/Chest: Effort normal. No respiratory distress. She has wheezes. She has rhonchi. She has no rales.   Abdominal: Soft. Bowel sounds are normal. There is tenderness. There is no rebound and no guarding.   There is mild diffuse upper abdominal tenderness to palpation.   Musculoskeletal: Normal range of motion.   Neurological: She is alert and oriented to person, place, and time. She has normal strength. No cranial nerve deficit or sensory deficit.   Skin: Skin is warm and dry. Capillary refill takes less than 2 seconds.   Psychiatric: She has a normal mood and affect.         ED Course   Procedures  Labs Reviewed   CBC W/ AUTO DIFFERENTIAL - Abnormal; Notable for the following components:       Result Value    Hemoglobin 10.2 (*)     Hematocrit 32.9 (*)     Mean Corpuscular Volume 81 (*)     Mean Corpuscular Hemoglobin 25.1 (*)     Mean Corpuscular Hemoglobin Conc 31.0 (*)     RDW 15.0 (*)     All other components within normal limits   COMPREHENSIVE METABOLIC PANEL - Abnormal; Notable for the following components:    Potassium 3.2 (*)     CO2 30 (*)     eGFR if  59 (*)     eGFR if non  51 (*)     All other components within normal limits   B-TYPE NATRIURETIC PEPTIDE - Abnormal; Notable for the following components:     (*)     All other components within normal limits   LIPASE   AMYLASE   TROPONIN I   TROPONIN I   POCT INFLUENZA A/B MOLECULAR     EKG Readings: (Independently Interpreted)   Initial Reading: No STEMI. Rhythm: Normal Sinus Rhythm. Heart  Rate: 86. Ectopy: No Ectopy. Conduction: 1st Degree AV Block.   Non-specific T wave abnormalities.      ECG Results          EKG 12-lead (Final result)  Result time 02/17/20 19:00:31    Final result by Interface, Lab In [facility] (02/17/20 19:00:31)                 Narrative:    Test Reason : R11.10,    Vent. Rate : 086 BPM     Atrial Rate : 086 BPM     P-R Int : 282 ms          QRS Dur : 082 ms      QT Int : 276 ms       P-R-T Axes : 082 073 031 degrees     QTc Int : 330 ms    Sinus rhythm with 1st degree A-V block  Nonspecific T wave abnormality  Abnormal ECG  When compared with ECG of 22-NOV-2019 12:51,  Significant changes have occurred  Confirmed by Tu Aldridge MD ([id number]) on 2/17/2020 7:00:24 PM    Referred By: DELMER   SELF           Confirmed By:Tu Aldridge MD                            Imaging Results          X-Ray Chest AP Portable (Final result)  Result time 02/17/20 15:58:51    Final result by Carlos Eduardo Matthew MD (02/17/20 15:58:51)                 Impression:      1. No acute cardiopulmonary process appreciated.      Electronically signed by: Carlos Eduardo Matthew  Date:    02/17/2020  Time:    15:58             Narrative:    EXAMINATION:  XR CHEST AP PORTABLE    CLINICAL HISTORY:  Vomiting, unspecified    TECHNIQUE:  Single frontal portable view of the chest was performed.    COMPARISON:  Chest radiograph 08/19/2019    FINDINGS:  Cardiomediastinal silhouette is within normal limits.    Left greater than right severe biapical bronchiectatic, chronic fibrotic and emphysematous changes, not significantly changed.  Otherwise, no focal consolidation, overt interstitial edema, sizable pleural effusion or pneumothorax.    Multilevel degenerative changes of the imaged spine.                                 Medical Decision Making:   Clinical Tests:   Lab Tests: Ordered and Reviewed  Radiological Study: Ordered and Reviewed  Medical Tests: Ordered and Reviewed  ED Management:  Ms Asencio has been stable during her  time in the ER. Has required several courses of nausea medicine. No resp changes. Heart score 4, troponin is still wnl on recheck but is elevated from previous. Cannot rule out acs at this time, will need trending.   Pt has nausea of unknown etiology, possibly related to hiatal hernia but it is reportedly small, and this nausea is longer lasting than any other. Pt states she feels bad. All discussed w Josh, PA w [facility], will see pt soon. Pt happy with plan for observation for further care for nausea and rule out acs.     Additional MDM:   Heart Score:    History:          Slightly suspicious.  ECG:             Nonspecific repolarisation disturbance  Age:               >65 years  Risk factors: 1-2 risk factors  Troponin:       Less than or equal to normal limit  Final Score: 4             Scribe Attestation:   Scribe #1: I performed the above scribed service and the documentation accurately describes the services I performed. I attest to the accuracy of the note.                          Clinical Impression:       ICD-10-CM ICD-9-CM   1. Intractable vomiting with nausea, unspecified vomiting type R11.2 536.2   2. Vomiting R11.10 787.03   3. Bronchiectasis without complication J47.9 494.0   4. Nausea and vomiting, intractability of vomiting not specified, unspecified vomiting type R11.2 787.01                             Wilberto Neves MD  02/17/20 2051

## 2020-02-17 NOTE — ED TRIAGE NOTES
Patient reports that she ate Lasagna from a school on 1/15/2020, and has been having generalized abdominal discomfort, nausea, and vomiting since them. Patient also reports that she had has been having loose stools since then. Denies fevers, chills. Patient states that other people ate the same food and did not get sick. Patient reports that she has hx of these symptoms but has never been diagnosed with any GI diseases.

## 2020-02-17 NOTE — TELEPHONE ENCOUNTER
Patient called. Stated that since Saturday she has had diarrhea and nausea. She says she is not able to hold anything down. She states that Zofran is not helping. She denies dizziness, but did state that she is urinating very little. She is concerned about dehydration. Discussed with Susan Venegas, HAYDEE. Advise patient that she should go to ER or Urgent care. Ms Wadsworth verbalized understanding.

## 2020-02-18 ENCOUNTER — PATIENT OUTREACH (OUTPATIENT)
Dept: ADMINISTRATIVE | Facility: OTHER | Age: 70
End: 2020-02-18

## 2020-02-18 VITALS
HEIGHT: 63 IN | OXYGEN SATURATION: 92 % | WEIGHT: 155 LBS | TEMPERATURE: 98 F | RESPIRATION RATE: 18 BRPM | HEART RATE: 81 BPM | SYSTOLIC BLOOD PRESSURE: 117 MMHG | DIASTOLIC BLOOD PRESSURE: 57 MMHG | BODY MASS INDEX: 27.46 KG/M2

## 2020-02-18 PROBLEM — D64.9 ANEMIA: Status: RESOLVED | Noted: 2018-05-01 | Resolved: 2020-02-18

## 2020-02-18 PROBLEM — I10 ESSENTIAL HYPERTENSION: Chronic | Status: RESOLVED | Noted: 2019-07-31 | Resolved: 2020-02-18

## 2020-02-18 PROBLEM — I27.82 CHRONIC PULMONARY EMBOLISM: Status: RESOLVED | Noted: 2018-09-13 | Resolved: 2020-02-18

## 2020-02-18 PROBLEM — F32.A DEPRESSION: Chronic | Status: RESOLVED | Noted: 2019-07-31 | Resolved: 2020-02-18

## 2020-02-18 PROBLEM — N18.30 CKD (CHRONIC KIDNEY DISEASE), STAGE III: Chronic | Status: RESOLVED | Noted: 2020-02-17 | Resolved: 2020-02-18

## 2020-02-18 PROBLEM — Z86.711 PERSONAL HISTORY OF PULMONARY EMBOLISM: Status: RESOLVED | Noted: 2018-04-16 | Resolved: 2020-02-18

## 2020-02-18 LAB
ALBUMIN SERPL BCP-MCNC: 3.3 G/DL (ref 3.5–5.2)
ALP SERPL-CCNC: 59 U/L (ref 55–135)
ALT SERPL W/O P-5'-P-CCNC: 13 U/L (ref 10–44)
ANION GAP SERPL CALC-SCNC: 7 MMOL/L (ref 8–16)
AST SERPL-CCNC: 14 U/L (ref 10–40)
BASOPHILS # BLD AUTO: 0.05 K/UL (ref 0–0.2)
BASOPHILS NFR BLD: 0.7 % (ref 0–1.9)
BILIRUB SERPL-MCNC: 0.3 MG/DL (ref 0.1–1)
BUN SERPL-MCNC: 12 MG/DL (ref 8–23)
CALCIUM SERPL-MCNC: 8.6 MG/DL (ref 8.7–10.5)
CHLORIDE SERPL-SCNC: 104 MMOL/L (ref 95–110)
CO2 SERPL-SCNC: 29 MMOL/L (ref 23–29)
CREAT SERPL-MCNC: 1 MG/DL (ref 0.5–1.4)
DIFFERENTIAL METHOD: ABNORMAL
EOSINOPHIL # BLD AUTO: 0.1 K/UL (ref 0–0.5)
EOSINOPHIL NFR BLD: 0.9 % (ref 0–8)
ERYTHROCYTE [DISTWIDTH] IN BLOOD BY AUTOMATED COUNT: 15.2 % (ref 11.5–14.5)
EST. GFR  (AFRICAN AMERICAN): >60 ML/MIN/1.73 M^2
EST. GFR  (NON AFRICAN AMERICAN): 57 ML/MIN/1.73 M^2
GLUCOSE SERPL-MCNC: 92 MG/DL (ref 70–110)
HCT VFR BLD AUTO: 31.4 % (ref 37–48.5)
HGB BLD-MCNC: 9.3 G/DL (ref 12–16)
IMM GRANULOCYTES # BLD AUTO: 0.02 K/UL (ref 0–0.04)
IMM GRANULOCYTES NFR BLD AUTO: 0.3 % (ref 0–0.5)
LYMPHOCYTES # BLD AUTO: 2.4 K/UL (ref 1–4.8)
LYMPHOCYTES NFR BLD: 34.8 % (ref 18–48)
MCH RBC QN AUTO: 24.9 PG (ref 27–31)
MCHC RBC AUTO-ENTMCNC: 29.6 G/DL (ref 32–36)
MCV RBC AUTO: 84 FL (ref 82–98)
MONOCYTES # BLD AUTO: 0.7 K/UL (ref 0.3–1)
MONOCYTES NFR BLD: 10.7 % (ref 4–15)
NEUTROPHILS # BLD AUTO: 3.6 K/UL (ref 1.8–7.7)
NEUTROPHILS NFR BLD: 52.6 % (ref 38–73)
NRBC BLD-RTO: 0 /100 WBC
PLATELET # BLD AUTO: 248 K/UL (ref 150–350)
PMV BLD AUTO: 9.3 FL (ref 9.2–12.9)
POTASSIUM SERPL-SCNC: 2.7 MMOL/L (ref 3.5–5.1)
POTASSIUM SERPL-SCNC: 4 MMOL/L (ref 3.5–5.1)
PROT SERPL-MCNC: 6.7 G/DL (ref 6–8.4)
RBC # BLD AUTO: 3.73 M/UL (ref 4–5.4)
SODIUM SERPL-SCNC: 140 MMOL/L (ref 136–145)
TROPONIN I SERPL DL<=0.01 NG/ML-MCNC: 0.01 NG/ML (ref 0–0.03)
WBC # BLD AUTO: 6.81 K/UL (ref 3.9–12.7)

## 2020-02-18 PROCEDURE — 84484 ASSAY OF TROPONIN QUANT: CPT

## 2020-02-18 PROCEDURE — 96375 TX/PRO/DX INJ NEW DRUG ADDON: CPT | Performed by: EMERGENCY MEDICINE

## 2020-02-18 PROCEDURE — 80053 COMPREHEN METABOLIC PANEL: CPT

## 2020-02-18 PROCEDURE — 36415 COLL VENOUS BLD VENIPUNCTURE: CPT

## 2020-02-18 PROCEDURE — 25000003 PHARM REV CODE 250: Performed by: NURSE PRACTITIONER

## 2020-02-18 PROCEDURE — 85025 COMPLETE CBC W/AUTO DIFF WBC: CPT

## 2020-02-18 PROCEDURE — 25000003 PHARM REV CODE 250: Performed by: HOSPITALIST

## 2020-02-18 PROCEDURE — 96376 TX/PRO/DX INJ SAME DRUG ADON: CPT | Performed by: EMERGENCY MEDICINE

## 2020-02-18 PROCEDURE — 84132 ASSAY OF SERUM POTASSIUM: CPT

## 2020-02-18 PROCEDURE — 63600175 PHARM REV CODE 636 W HCPCS: Performed by: NURSE PRACTITIONER

## 2020-02-18 PROCEDURE — 96361 HYDRATE IV INFUSION ADD-ON: CPT | Performed by: EMERGENCY MEDICINE

## 2020-02-18 PROCEDURE — G0378 HOSPITAL OBSERVATION PER HR: HCPCS

## 2020-02-18 RX ORDER — POTASSIUM CHLORIDE 7.45 MG/ML
10 INJECTION INTRAVENOUS
Status: DISPENSED | OUTPATIENT
Start: 2020-02-18 | End: 2020-02-18

## 2020-02-18 RX ORDER — GABAPENTIN 300 MG/1
600 CAPSULE ORAL 3 TIMES DAILY
Qty: 540 CAPSULE | Refills: 3 | Status: SHIPPED | OUTPATIENT
Start: 2020-02-18 | End: 2020-08-19 | Stop reason: SDUPTHER

## 2020-02-18 RX ORDER — POTASSIUM CHLORIDE 20 MEQ/1
40 TABLET, EXTENDED RELEASE ORAL ONCE
Status: COMPLETED | OUTPATIENT
Start: 2020-02-18 | End: 2020-02-18

## 2020-02-18 RX ORDER — POTASSIUM CHLORIDE 20 MEQ/1
40 TABLET, EXTENDED RELEASE ORAL ONCE
Status: DISCONTINUED | OUTPATIENT
Start: 2020-02-18 | End: 2020-02-18

## 2020-02-18 RX ADMIN — POTASSIUM CHLORIDE 10 MEQ: 10 INJECTION, SOLUTION INTRAVENOUS at 10:02

## 2020-02-18 RX ADMIN — CHLORTHALIDONE 25 MG: 25 TABLET ORAL at 08:02

## 2020-02-18 RX ADMIN — VERAPAMIL HYDROCHLORIDE 120 MG: 120 TABLET, FILM COATED, EXTENDED RELEASE ORAL at 08:02

## 2020-02-18 RX ADMIN — POTASSIUM CHLORIDE 40 MEQ: 1500 TABLET, EXTENDED RELEASE ORAL at 07:02

## 2020-02-18 RX ADMIN — POTASSIUM CHLORIDE 10 MEQ: 10 INJECTION, SOLUTION INTRAVENOUS at 07:02

## 2020-02-18 RX ADMIN — ESCITALOPRAM OXALATE 10 MG: 10 TABLET ORAL at 08:02

## 2020-02-18 RX ADMIN — POTASSIUM CHLORIDE 40 MEQ: 1500 TABLET, EXTENDED RELEASE ORAL at 10:02

## 2020-02-18 RX ADMIN — PANTOPRAZOLE SODIUM 40 MG: 40 TABLET, DELAYED RELEASE ORAL at 08:02

## 2020-02-18 RX ADMIN — APIXABAN 5 MG: 5 TABLET, FILM COATED ORAL at 08:02

## 2020-02-18 RX ADMIN — FOLIC ACID 1000 MCG: 1 TABLET ORAL at 08:02

## 2020-02-18 RX ADMIN — GABAPENTIN 600 MG: 300 CAPSULE ORAL at 08:02

## 2020-02-18 RX ADMIN — THERA TABS 1 TABLET: TAB at 08:02

## 2020-02-18 RX ADMIN — POTASSIUM CHLORIDE 10 MEQ: 10 INJECTION, SOLUTION INTRAVENOUS at 08:02

## 2020-02-18 NOTE — SUBJECTIVE & OBJECTIVE
Past Medical History:   Diagnosis Date    Acquired bronchiectasis     due to history of TB - followed by pulmonary, Dr. Zuñiga    Allergy     Amblyopia     rt eye per pt    Anemia of other chronic disease     Anticoagulant long-term use     Anxiety     Cataract     Chronic obstructive pulmonary disease with acute exacerbation     Clotting disorder     COPD (chronic obstructive pulmonary disease)     Depression     Diverticulosis     Essential tremor     GERD (gastroesophageal reflux disease)     Hemangioma of liver     History of tuberculosis 1978    Hypertension     Mixed anxiety and depressive disorder     On home oxygen therapy     Psoriasis     PSVT (paroxysmal supraventricular tachycardia)     Pulmonary embolism     S/P PICC central line placement Apr. 2016 - May 2016    Skin disease     Psoriasis    Spondylosis without myelopathy 8/23/2013    Supraventricular tachycardia     Tachycardia     Thoracic aorta atherosclerosis     noted on CT scan of chest 1/3/2011    Tuberculosis     Vaginal delivery     x2    Vitamin D deficiency        Past Surgical History:   Procedure Laterality Date    CATARACT EXTRACTION W/  INTRAOCULAR LENS IMPLANT  07/24/12    od dr spain    CATARACT EXTRACTION W/  INTRAOCULAR LENS IMPLANT  08/07/12    left eye    CHOLECYSTECTOMY      COLONOSCOPY N/A 12/4/2019    Procedure: COLONOSCOPY;  Surgeon: Franco Charles MD;  Location: [address]);  Service: Endoscopy;  Laterality: N/A;  ok to hold Eliquis 2days prior per     ESOPHAGOGASTRODUODENOSCOPY N/A 12/4/2019    Procedure: EGD (ESOPHAGOGASTRODUODENOSCOPY);  Surgeon: Franco Charles MD;  Location: [facility] ([address] [place] [address]);  Service: Endoscopy;  Laterality: N/A;  ok to hold Eliquis 2days prior per     EYE SURGERY      GALLBLADDER SURGERY      RADIOFREQUENCY THERMOCOAGULATION Left 12/18/2018    Procedure: RADIOFREQUENCY THERMAL COAGULATION;  Surgeon: Issac Meyers MD;   Location: [facility];  Service: Pain Management;  Laterality: Left;  OK to hold Eliquis 3 days prior per Dr. Braga       Review of patient's allergies indicates:   Allergen Reactions    Adhesive Other (See Comments)     Tears up the skin and makes it itch   (leads)    Bactrim [sulfamethoxazole-trimethoprim] Rash     Was hospitalized for rash    Restasis [cyclosporine]     Lipitor [atorvastatin] Other (See Comments)     Muscle aches    Albuterol Other (See Comments)     tremors    Topamax [topiramate]      - made her feel 'bad in the head'       No current facility-administered medications on file prior to encounter.      Current Outpatient Medications on File Prior to Encounter   Medication Sig    apixaban (ELIQUIS) 5 mg Tab Take 1 tablet (5 mg total) by mouth 2 (two) times daily.    chlorthalidone (HYGROTEN) 25 MG Tab Take 1 tablet (25 mg total) by mouth once daily.    desoximetasone (TOPICORT) 0.25 % cream APPLY  CREAM EXTERNALLY TWICE DAILY AS NEEDED    ergocalciferol (ERGOCALCIFEROL) 50,000 unit Cap TAKE 1 CAPSULE BY MOUTH ONCE A WEEK    escitalopram oxalate (LEXAPRO) 10 MG tablet Take 1 tablet (10 mg total) by mouth once daily.    folic acid (FOLVITE) 1 MG tablet TAKE 1 TABLET BY MOUTH ONCE DAILY    gabapentin (NEURONTIN) 300 MG capsule Take 2 capsules (600 mg total) by mouth 3 (three) times daily.    hyoscyamine (ANASPAZ,LEVSIN) 0.125 mg Tab Take 1 tablet (125 mcg total) by mouth every 6 (six) hours as needed.    ipratropium (ATROVENT) 0.02 % nebulizer solution Take 2.5 mLs (500 mcg total) by nebulization 3 (three) times daily as needed for Wheezing. Inhale 1 vial in nebulizer 3 to 4 times daily    ALPRAZolam (XANAX) 0.25 MG tablet Take 1 tablet (0.25 mg total) by mouth nightly as needed for Anxiety.    fluocinolone (DERMA-SMOOTHE) 0.01 % external oil APPLY TOPICALLY ONCE DAILY    fluocinonide (LIDEX) 0.05 % external solution Apply topically once daily. - apply after shampooing (Patient  taking differently: Apply topically once daily. - apply after shampooing as needed)    guaiFENesin (MUCINEX) 600 mg 12 hr tablet Take 1,200 mg by mouth 2 (two) times daily.    ketoconazole (NIZORAL) 2 % shampoo Apply topically once daily.    Lactobacillus rhamnosus GG (CULTURELLE) 10 billion cell capsule Take 1 capsule by mouth once daily.    loperamide (IMODIUM) 2 mg capsule Take 2 capsules after first loose stool, then 1 capsule after each loose stool following. Do not exceed 8 capsules per day.    mirtazapine (REMERON) 15 MG tablet Take 1 tablet (15 mg total) by mouth every evening.    multivitamin (THERAGRAN) per tablet Take 1 tablet by mouth once daily.    omeprazole (PRILOSEC) 20 MG capsule TAKE 1 CAPSULE BY MOUTH ONCE DAILY AS NEEDED FOR  HEARTBURN  (TAKE  AS  NEEDED)    ondansetron (ZOFRAN-ODT) 8 MG TbDL Take 1 tablet (8 mg total) by mouth every 8 (eight) hours as needed.    promethazine (PHENERGAN) 25 MG suppository Place 1 suppository (25 mg total) rectally every 6 (six) hours as needed for Nausea.    propylene glycol (SYSTANE BALANCE) 0.6 % Drop Apply 1 drop to eye daily as needed (dry eye).    sodium chloride 2% (XIOMARA 128) 2 % ophthalmic solution Place 1 drop into both eyes 3 (three) times daily as needed.    sodium chloride 3% 3 % nebulizer solution Take 4 mLs by nebulization 2 (two) times daily.    umeclidinium-vilanterol (ANORO ELLIPTA) 62.5-25 mcg/actuation DsDv Inhale 1 puff into the lungs once daily. Controller    verapamil (CALAN-SR) 120 MG CR tablet Take 1 tablet (120 mg total) by mouth 2 (two) times daily.     Family History     Problem Relation (Age of Onset)    Cancer Father, Brother, Maternal Aunt    Heart attack Mother, Brother, Son    Hyperlipidemia Sister    Hypertension Mother    Lung cancer Sister    Psoriasis Sister    Stroke Maternal Uncle        Tobacco Use    Smoking status: Former Smoker     Packs/day: 2.00     Years: 50.00     Pack years: 100.00     Types: Cigarettes      Last attempt to quit: 5/27/2009     Years since quitting: 10.7    Smokeless tobacco: Never Used   Substance and Sexual Activity    Alcohol use: Not Currently     Frequency: Never     Binge frequency: Never    Drug use: Never    Sexual activity: Yes     Partners: Male     Review of Systems   Constitutional: Negative for chills, fatigue and fever.   Eyes: Negative for photophobia and visual disturbance.   Respiratory: Positive for cough (At chronic baseline) and shortness of breath ( At chronic baseline).    Cardiovascular: Negative for chest pain, palpitations and leg swelling.   Gastrointestinal: Positive for abdominal pain, diarrhea, nausea and vomiting.   Genitourinary: Negative for dysuria, frequency and urgency.   Skin: Negative for pallor, rash and wound.   Neurological: Negative for light-headedness and headaches.   Psychiatric/Behavioral: Negative for confusion and decreased concentration.     Objective:     Vital Signs (Most Recent):  Temp: 98.2 °F (36.8 °C) (02/17/20 2056)  Pulse: 86 (02/17/20 2056)  Resp: 16 (02/17/20 2056)  BP: (!) 145/71 (02/17/20 2056)  SpO2: 95 % (02/17/20 2056) Vital Signs (24h Range):  Temp:  [97.6 °F (36.4 °C)-98.6 °F (37 °C)] 98.2 °F (36.8 °C)  Pulse:  [] 86  Resp:  [16-27] 16  SpO2:  [94 %-100 %] 95 %  BP: (120-150)/(58-71) 145/71     Weight: 70.4 kg (155 lb 3.3 oz)  Body mass index is 27.49 kg/m².    Physical Exam   Constitutional: She is oriented to person, place, and time. She appears well-developed and well-nourished. No distress.   HENT:   Head: Normocephalic and atraumatic.   Right Ear: External ear normal.   Left Ear: External ear normal.   Nose: Nose normal.   Mouth/Throat: Oropharynx is clear and moist.   Eyes: Pupils are equal, round, and reactive to light. Conjunctivae and EOM are normal.   Neck: Normal range of motion. Neck supple.   Cardiovascular: Normal rate, regular rhythm and intact distal pulses.   Pulmonary/Chest: Effort normal and breath sounds  normal. No respiratory distress. She has no wheezes.   Abdominal: Soft. Bowel sounds are normal. She exhibits no distension. There is tenderness (Mild, generalized). There is no rebound and no guarding.   No palpable hepatomegaly or splenomegaly    Musculoskeletal: Normal range of motion. She exhibits no edema or tenderness.   Neurological: She is alert and oriented to person, place, and time.   Skin: Skin is warm and dry.   Psychiatric: She has a normal mood and affect. Thought content normal.   Nursing note and vitals reviewed.        CRANIAL NERVES     CN III, IV, VI   Pupils are equal, round, and reactive to light.  Extraocular motions are normal.        Significant Labs: All pertinent labs within the past 24 hours have been reviewed.    Significant Imaging: I have reviewed all pertinent imaging results/findings within the past 24 hours.

## 2020-02-18 NOTE — ASSESSMENT & PLAN NOTE
Recurrent chronic problem, labs and vitals reassuring, continue supportive care with IV fluids and as needed antiemetics.

## 2020-02-18 NOTE — PROGRESS NOTES

## 2020-02-18 NOTE — H&P
[facility] Hospital Medicine  History & Physical    Patient Name: Yasmin Asencio  MRN: [id number]  Admission Date: 2/17/2020  Attending Physician: Lashell Altamirano MD   Primary Care Provider: Urmila Luna MD         Patient information was obtained from patient, spouse/SO, past medical records and ER records.     Subjective:     Principal Problem:Nausea    Chief Complaint:   Chief Complaint   Patient presents with    Vomiting     Pt went out to eat on Saturday and has been throwing up since then. with hx of hiatal hernea        HPI: 69 y.o. female with acquired bronchiectasis, hypertension, depression, CKD stage 3, chronic nausea and diarrhea, GERD hiatal hernia, and history of PE on apixaban presents with a complaint of nausea and vomiting.  Symptoms began Saturday night after going out to eat.  Associated with some loose stool and abdominal cramping.  She states this usually happens every time she goes out to eat does not usually last this long.  States she last vomited Sunday evening.  Last loose stool earlier today.  Attempted self treatment with Zofran tablets without relief.  Recent EGD and colonoscopy with gastroenterology as workup for these chronic symptoms without significant finding.  She denies fever, chills, chest pain, palpitations, dizziness, syncope, bloody or black stool, hematemesis, coffee-ground emesis, dysuria, frequency, or urgency.  She reports her respiratory status is at her chronic baseline.  In the ED, routine labs, BNP, troponin, flu swab, chest x-ray, and EKG were unremarkable for any acute abnormality.    Past Medical History:   Diagnosis Date    Acquired bronchiectasis     due to history of TB - followed by pulmonary, Dr. Zuñiga    Allergy     Amblyopia     rt eye per pt    Anemia of other chronic disease     Anticoagulant long-term use     Anxiety     Cataract     Chronic obstructive pulmonary disease with acute exacerbation     Clotting  disorder     COPD (chronic obstructive pulmonary disease)     Depression     Diverticulosis     Essential tremor     GERD (gastroesophageal reflux disease)     Hemangioma of liver     History of tuberculosis 1978    Hypertension     Mixed anxiety and depressive disorder     On home oxygen therapy     Psoriasis     PSVT (paroxysmal supraventricular tachycardia)     Pulmonary embolism     S/P PICC central line placement Apr. 2016 - May 2016    Skin disease     Psoriasis    Spondylosis without myelopathy 8/23/2013    Supraventricular tachycardia     Tachycardia     Thoracic aorta atherosclerosis     noted on CT scan of chest 1/3/2011    Tuberculosis     Vaginal delivery     x2    Vitamin D deficiency        Past Surgical History:   Procedure Laterality Date    CATARACT EXTRACTION W/  INTRAOCULAR LENS IMPLANT  07/24/12    od dr spain    CATARACT EXTRACTION W/  INTRAOCULAR LENS IMPLANT  08/07/12    left eye    CHOLECYSTECTOMY      COLONOSCOPY N/A 12/4/2019    Procedure: COLONOSCOPY;  Surgeon: Franco Charles MD;  Location: [address]);  Service: Endoscopy;  Laterality: N/A;  ok to hold Eliquis 2days prior per     ESOPHAGOGASTRODUODENOSCOPY N/A 12/4/2019    Procedure: EGD (ESOPHAGOGASTRODUODENOSCOPY);  Surgeon: Franco Charles MD;  Location: [address]);  Service: Endoscopy;  Laterality: N/A;  ok to hold Eliquis 2days prior per     EYE SURGERY      GALLBLADDER SURGERY      RADIOFREQUENCY THERMOCOAGULATION Left 12/18/2018    Procedure: RADIOFREQUENCY THERMAL COAGULATION;  Surgeon: Issac Meyers MD;  Location: [facility];  Service: Pain Management;  Laterality: Left;  OK to hold Eliquis 3 days prior per Dr. Braga       Review of patient's allergies indicates:   Allergen Reactions    Adhesive Other (See Comments)     Tears up the skin and makes it itch   (leads)    Bactrim [sulfamethoxazole-trimethoprim] Rash     Was hospitalized for rash     Restasis [cyclosporine]     Lipitor [atorvastatin] Other (See Comments)     Muscle aches    Albuterol Other (See Comments)     tremors    Topamax [topiramate]      - made her feel 'bad in the head'       No current facility-administered medications on file prior to encounter.      Current Outpatient Medications on File Prior to Encounter   Medication Sig    apixaban (ELIQUIS) 5 mg Tab Take 1 tablet (5 mg total) by mouth 2 (two) times daily.    chlorthalidone (HYGROTEN) 25 MG Tab Take 1 tablet (25 mg total) by mouth once daily.    desoximetasone (TOPICORT) 0.25 % cream APPLY  CREAM EXTERNALLY TWICE DAILY AS NEEDED    ergocalciferol (ERGOCALCIFEROL) 50,000 unit Cap TAKE 1 CAPSULE BY MOUTH ONCE A WEEK    escitalopram oxalate (LEXAPRO) 10 MG tablet Take 1 tablet (10 mg total) by mouth once daily.    folic acid (FOLVITE) 1 MG tablet TAKE 1 TABLET BY MOUTH ONCE DAILY    gabapentin (NEURONTIN) 300 MG capsule Take 2 capsules (600 mg total) by mouth 3 (three) times daily.    hyoscyamine (ANASPAZ,LEVSIN) 0.125 mg Tab Take 1 tablet (125 mcg total) by mouth every 6 (six) hours as needed.    ipratropium (ATROVENT) 0.02 % nebulizer solution Take 2.5 mLs (500 mcg total) by nebulization 3 (three) times daily as needed for Wheezing. Inhale 1 vial in nebulizer 3 to 4 times daily    ALPRAZolam (XANAX) 0.25 MG tablet Take 1 tablet (0.25 mg total) by mouth nightly as needed for Anxiety.    fluocinolone (DERMA-SMOOTHE) 0.01 % external oil APPLY TOPICALLY ONCE DAILY    fluocinonide (LIDEX) 0.05 % external solution Apply topically once daily. - apply after shampooing (Patient taking differently: Apply topically once daily. - apply after shampooing as needed)    guaiFENesin (MUCINEX) 600 mg 12 hr tablet Take 1,200 mg by mouth 2 (two) times daily.    ketoconazole (NIZORAL) 2 % shampoo Apply topically once daily.    Lactobacillus rhamnosus GG (CULTURELLE) 10 billion cell capsule Take 1 capsule by mouth once daily.     loperamide (IMODIUM) 2 mg capsule Take 2 capsules after first loose stool, then 1 capsule after each loose stool following. Do not exceed 8 capsules per day.    mirtazapine (REMERON) 15 MG tablet Take 1 tablet (15 mg total) by mouth every evening.    multivitamin (THERAGRAN) per tablet Take 1 tablet by mouth once daily.    omeprazole (PRILOSEC) 20 MG capsule TAKE 1 CAPSULE BY MOUTH ONCE DAILY AS NEEDED FOR  HEARTBURN  (TAKE  AS  NEEDED)    ondansetron (ZOFRAN-ODT) 8 MG TbDL Take 1 tablet (8 mg total) by mouth every 8 (eight) hours as needed.    promethazine (PHENERGAN) 25 MG suppository Place 1 suppository (25 mg total) rectally every 6 (six) hours as needed for Nausea.    propylene glycol (SYSTANE BALANCE) 0.6 % Drop Apply 1 drop to eye daily as needed (dry eye).    sodium chloride 2% (XIOMARA 128) 2 % ophthalmic solution Place 1 drop into both eyes 3 (three) times daily as needed.    sodium chloride 3% 3 % nebulizer solution Take 4 mLs by nebulization 2 (two) times daily.    umeclidinium-vilanterol (ANORO ELLIPTA) 62.5-25 mcg/actuation DsDv Inhale 1 puff into the lungs once daily. Controller    verapamil (CALAN-SR) 120 MG CR tablet Take 1 tablet (120 mg total) by mouth 2 (two) times daily.     Family History     Problem Relation (Age of Onset)    Cancer Father, Brother, Maternal Aunt    Heart attack Mother, Brother, Son    Hyperlipidemia Sister    Hypertension Mother    Lung cancer Sister    Psoriasis Sister    Stroke Maternal Uncle        Tobacco Use    Smoking status: Former Smoker     Packs/day: 2.00     Years: 50.00     Pack years: 100.00     Types: Cigarettes     Last attempt to quit: 5/27/2009     Years since quitting: 10.7    Smokeless tobacco: Never Used   Substance and Sexual Activity    Alcohol use: Not Currently     Frequency: Never     Binge frequency: Never    Drug use: Never    Sexual activity: Yes     Partners: Male     Review of Systems   Constitutional: Negative for chills, fatigue and  fever.   Eyes: Negative for photophobia and visual disturbance.   Respiratory: Positive for cough (At chronic baseline) and shortness of breath ( At chronic baseline).    Cardiovascular: Negative for chest pain, palpitations and leg swelling.   Gastrointestinal: Positive for abdominal pain, diarrhea, nausea and vomiting.   Genitourinary: Negative for dysuria, frequency and urgency.   Skin: Negative for pallor, rash and wound.   Neurological: Negative for light-headedness and headaches.   Psychiatric/Behavioral: Negative for confusion and decreased concentration.     Objective:     Vital Signs (Most Recent):  Temp: 98.2 °F (36.8 °C) (02/17/20 2056)  Pulse: 86 (02/17/20 2056)  Resp: 16 (02/17/20 2056)  BP: (!) 145/71 (02/17/20 2056)  SpO2: 95 % (02/17/20 2056) Vital Signs (24h Range):  Temp:  [97.6 °F (36.4 °C)-98.6 °F (37 °C)] 98.2 °F (36.8 °C)  Pulse:  [] 86  Resp:  [16-27] 16  SpO2:  [94 %-100 %] 95 %  BP: (120-150)/(58-71) 145/71     Weight: 70.4 kg (155 lb 3.3 oz)  Body mass index is 27.49 kg/m².    Physical Exam   Constitutional: She is oriented to person, place, and time. She appears well-developed and well-nourished. No distress.   HENT:   Head: Normocephalic and atraumatic.   Right Ear: External ear normal.   Left Ear: External ear normal.   Nose: Nose normal.   Mouth/Throat: Oropharynx is clear and moist.   Eyes: Pupils are equal, round, and reactive to light. Conjunctivae and EOM are normal.   Neck: Normal range of motion. Neck supple.   Cardiovascular: Normal rate, regular rhythm and intact distal pulses.   Pulmonary/Chest: Effort normal and breath sounds normal. No respiratory distress. She has no wheezes.   Abdominal: Soft. Bowel sounds are normal. She exhibits no distension. There is tenderness (Mild, generalized). There is no rebound and no guarding.   No palpable hepatomegaly or splenomegaly    Musculoskeletal: Normal range of motion. She exhibits no edema or tenderness.   Neurological: She is  alert and oriented to person, place, and time.   Skin: Skin is warm and dry.   Psychiatric: She has a normal mood and affect. Thought content normal.   Nursing note and vitals reviewed.        CRANIAL NERVES     CN III, IV, VI   Pupils are equal, round, and reactive to light.  Extraocular motions are normal.        Significant Labs: All pertinent labs within the past 24 hours have been reviewed.    Significant Imaging: I have reviewed all pertinent imaging results/findings within the past 24 hours.    Assessment/Plan:     * Nausea  Recurrent chronic problem, labs and vitals reassuring, continue supportive care with IV fluids and as needed antiemetics.    CKD (chronic kidney disease), stage III  Stable, at baseline, maintain euvolemic state, strict I/O's, monitor renal function and electrolytes, avoid nephrotoxic agents.     Chronic diarrhea  Continue supportive care    Depression  Continue escitalopram and as needed alprazolam    Essential hypertension  Well controlled, continue home medications and monitor blood pressure, adjust as needed.     History of pulmonary embolism  Continue apixaban    Acquired bronchiectasis  No acute issue, continue home regimen    VTE Risk Mitigation (From admission, onward)         Ordered     apixaban tablet 5 mg  2 times daily      02/17/20 2053     IP VTE HIGH RISK PATIENT  Once      02/17/20 2051     Place HUSSEIN hose  Until discontinued      02/17/20 2051              Josh Alexander Jr., APRN, AGACNP-BC  Hospitalist - Department of Hospital Medicine  [facility] [address] [place] [address]  Office #: [phone]; Pager #: [phone]

## 2020-02-18 NOTE — PLAN OF CARE
02/18/20 0932   Discharge Assessment   Assessment Type Discharge Planning Assessment   Assessment information obtained from? Medical Record   Prior to hospitilization cognitive status: Alert/Oriented   Prior to hospitalization functional status: Independent   Current cognitive status: Alert/Oriented   Current Functional Status: Independent   Facility Arrived From: home   Lives With spouse   Able to Return to Prior Arrangements yes   Is patient able to care for self after discharge? Yes   Who are your caregiver(s) and their phone number(s)? [facility] [phone]   Patient's perception of discharge disposition home or selfcare   Readmission Within the Last 30 Days no previous admission in last 30 days   Patient currently being followed by outpatient case management? No   Patient currently receives any other outside agency services? No   Equipment Currently Used at Home oxygen;shower chair   Do you have any problems affording any of your prescribed medications? No   Is the patient taking medications as prescribed? yes   Does the patient have transportation home? Yes   Transportation Anticipated family or friend will provide   Does the patient receive services at the [facility]mad[facility] Clinic? No   Discharge Plan A Home with family  (with follow up )   DME Needed Upon Discharge  none   Patient/Family in Agreement with Plan yes     [id number] [address] [place] - [address] [id number] [facility] [place] [address]  Phone: [phone] Fax: [phone]    [facility] - [address]  [address]  [facility] [address]  Phone: [phone] Fax: [phone]

## 2020-02-18 NOTE — PROGRESS NOTES
WRITTEN HEALTHCARE DISCHARGE INFORMATION      Things that YOU are RESPONSIBLE for to Manage Your Care At Home:     1. Getting your prescriptions filled.  2. Taking you medications as directed. DO NOT MISS ANY DOSES!  3. Going to your follow-up doctor appointments. This is important because it allows the doctor to monitor your progress and to determine if any changes need to be made to your treatment plan.     If you are unable to make your follow up appointments, please call the number listed and reschedule this appointment.      ____________HELP AT HOME____________________     Experiencing any SIGNS or SYMPTOMS: YOU CAN     Schedule a same day appopintment with your Primary Care Doctor or  you can call [facility] On Call [facility] Care Line for 24/7 assistance at [phone]     If you are experience any signs or symptoms that have become severe, Call 911 and come to your nearest Emergency Room.     Thank you for choosing [facility] and allowing us to care for you.   From your care management team:      You should receive a call from [facility] within 48-72 hours to help manage your care after discharge. Please try to make sure that you answer your phone for this important phone call.      Follow-up Information     Susan Venegas NP On 2/20/2020.    Specialty:  Gastroenterology  Why:  Appointment scheduled for February 20th at 1pm  Contact information:  [address]  [place] [address]  [phone]             Urmila Luna MD On 2/21/2020.    Specialties:  Internal Medicine, Pediatrics  Why:  Appointment scheduled for February 21st at 2pm  Contact information:  [address] [place] [address]  [phone]

## 2020-02-18 NOTE — ASSESSMENT & PLAN NOTE
Stable, at baseline, maintain euvolemic state, strict I/O's, monitor renal function and electrolytes, avoid nephrotoxic agents.

## 2020-02-18 NOTE — HPI
69 y.o. female with acquired bronchiectasis, hypertension, depression, CKD stage 3, chronic nausea and diarrhea, GERD hiatal hernia, and history of PE on apixaban presents with a complaint of nausea and vomiting.  Symptoms began Saturday night after going out to eat.  Associated with some loose stool and abdominal cramping.  She states this usually happens every time she goes out to eat does not usually last this long.  States she last vomited Sunday evening.  Last loose stool earlier today.  Attempted self treatment with Zofran tablets without relief.  Recent EGD and colonoscopy with gastroenterology as workup for these chronic symptoms without significant finding.  She denies fever, chills, chest pain, palpitations, dizziness, syncope, bloody or black stool, hematemesis, coffee-ground emesis, dysuria, frequency, or urgency.  She reports her respiratory status is at her chronic baseline.  In the ED, routine labs, BNP, troponin, flu swab, chest x-ray, and EKG were unremarkable for any acute abnormality.

## 2020-02-18 NOTE — HOSPITAL COURSE
Mrs. Asencio is a 68 yo female who was admitted to observation for intractable nausea, vomiting and diarrhea felt possible viral gastroenteritis. Symptoms improved with bowel rest. Tolerated breakfast. K supplements provided with improvement in potassium K 4.0. Patient stable for discharge home with close follow PCP and GI.

## 2020-02-18 NOTE — PLAN OF CARE
02/18/20 0936   Final Note   Assessment Type Final Discharge Note   Anticipated Discharge Disposition Home   Hospital Follow Up  Appt(s) scheduled? Yes   Discharge plans and expectations educations in teach back method with documentation complete? Yes   Right Care Referral Info   Post Acute Recommendation No Care   pts nurse Susan notified that pt can d/c from CM standpoint.

## 2020-02-18 NOTE — DISCHARGE SUMMARY
[facility] Hospital Medicine  Discharge Summary      Patient Name: Yasmin Asencio  MRN: [id number]  Admission Date: 2/17/2020  Hospital Length of Stay: 0 days  Discharge Date and Time:  02/18/2020 2:47 PM  Attending Physician: Elsy att. providers found   Discharging Provider: Savita Beauchamp NP  Primary Care Provider: Urmila Luna MD      HPI:   69 y.o. female with acquired bronchiectasis, hypertension, depression, CKD stage 3, chronic nausea and diarrhea, GERD hiatal hernia, and history of PE on apixaban presents with a complaint of nausea and vomiting.  Symptoms began Saturday night after going out to eat.  Associated with some loose stool and abdominal cramping.  She states this usually happens every time she goes out to eat does not usually last this long.  States she last vomited Sunday evening.  Last loose stool earlier today.  Attempted self treatment with Zofran tablets without relief.  Recent EGD and colonoscopy with gastroenterology as workup for these chronic symptoms without significant finding.  She denies fever, chills, chest pain, palpitations, dizziness, syncope, bloody or black stool, hematemesis, coffee-ground emesis, dysuria, frequency, or urgency.  She reports her respiratory status is at her chronic baseline.  In the ED, routine labs, BNP, troponin, flu swab, chest x-ray, and EKG were unremarkable for any acute abnormality.    * No surgery found *      Hospital Course:   Mrs. Asencio is a 70 yo female who was admitted to observation for intractable nausea, vomiting and diarrhea felt possible viral gastroenteritis. Symptoms improved with bowel rest. Tolerated breakfast. K supplements provided with improvement in potassium K 4.0. Patient stable for discharge home with close follow PCP and GI.      Consults:     No new Assessment & Plan notes have been filed under this hospital service since the last note was generated.  Service: Hospital Medicine    Final Active  Diagnoses:    Diagnosis Date Noted POA    PRINCIPAL PROBLEM:  Nausea [R11.0] 02/17/2020 Yes     Chronic    CKD (chronic kidney disease), stage III [N18.3] 02/17/2020 Yes     Chronic    Chronic diarrhea [K52.9] 12/04/2019 Yes    Essential hypertension [I10] 07/31/2019 Yes     Chronic    Depression [F32.9] 07/31/2019 Yes     Chronic    History of pulmonary embolism [Z86.711] 01/05/2017 Yes     Chronic    Acquired bronchiectasis [J47.9]  Yes     Chronic      Problems Resolved During this Admission:       Discharged Condition: good    Disposition: Home or Self Care    Follow Up:  Follow-up Information     Susan Venegas NP On 2/20/2020.    Specialty:  Gastroenterology  Why:  Appointment scheduled for February 20th at 1pm  Contact information:  [address]  [place] [address]  [phone]             Urmila Luna MD On 2/21/2020.    Specialties:  Internal Medicine, Pediatrics  Why:  Appointment scheduled for February 21st at 2pm  Contact information:  [address] [facility] [place] [address]  [phone]                 Patient Instructions:      Diet Adult Regular     Notify your health care provider if you experience any of the following:  temperature >100.4     Notify your health care provider if you experience any of the following:  difficulty breathing or increased cough     Notify your health care provider if you experience any of the following:  increased confusion or weakness     Activity as tolerated       Significant Diagnostic Studies: Labs: All labs within the past 24 hours have been reviewed    Pending Diagnostic Studies:     None         Medications:  Reconciled Home Medications:      Medication List      CHANGE how you take these medications    fluocinonide 0.05 % external solution  Commonly known as:  LIDEX  Apply topically once daily. - apply after shampooing  What changed:  additional instructions        CONTINUE taking these medications    ALPRAZolam 0.25 MG tablet  Commonly  known as:  XANAX  Take 1 tablet (0.25 mg total) by mouth nightly as needed for Anxiety.     apixaban 5 mg Tab  Commonly known as:  ELIQUIS  Take 1 tablet (5 mg total) by mouth 2 (two) times daily.     chlorthalidone 25 MG Tab  Commonly known as:  HYGROTEN  Take 1 tablet (25 mg total) by mouth once daily.     desoximetasone 0.25 % cream  Commonly known as:  TOPICORT  APPLY  CREAM EXTERNALLY TWICE DAILY AS NEEDED     ergocalciferol 50,000 unit Cap  Commonly known as:  ERGOCALCIFEROL  TAKE 1 CAPSULE BY MOUTH ONCE A WEEK     escitalopram oxalate 10 MG tablet  Commonly known as:  LEXAPRO  Take 1 tablet (10 mg total) by mouth once daily.     fluocinolone 0.01 % external oil  Commonly known as:  DERMA-SMOOTHE  APPLY TOPICALLY ONCE DAILY     folic acid 1 MG tablet  Commonly known as:  FOLVITE  TAKE 1 TABLET BY MOUTH ONCE DAILY     guaiFENesin 600 mg 12 hr tablet  Commonly known as:  MUCINEX  Take 1,200 mg by mouth 2 (two) times daily.     hyoscyamine 0.125 mg Tab  Commonly known as:  ANASPAZ,LEVSIN  Take 1 tablet (125 mcg total) by mouth every 6 (six) hours as needed.     ipratropium 0.02 % nebulizer solution  Commonly known as:  ATROVENT  Take 2.5 mLs (500 mcg total) by nebulization 3 (three) times daily as needed for Wheezing. Inhale 1 vial in nebulizer 3 to 4 times daily     ketoconazole 2 % shampoo  Commonly known as:  NIZORAL  Apply topically once daily.     Lactobacillus rhamnosus GG 10 billion cell capsule  Commonly known as:  CULTURELLE  Take 1 capsule by mouth once daily.     loperamide 2 mg capsule  Commonly known as:  IMODIUM  Take 2 capsules after first loose stool, then 1 capsule after each loose stool following. Do not exceed 8 capsules per day.     mirtazapine 15 MG tablet  Commonly known as:  REMERON  Take 1 tablet (15 mg total) by mouth every evening.     multivitamin per tablet  Commonly known as:  THERAGRAN  Take 1 tablet by mouth once daily.     omeprazole 20 MG capsule  Commonly known as:   PRILOSEC  TAKE 1 CAPSULE BY MOUTH ONCE DAILY AS NEEDED FOR  HEARTBURN  (TAKE  AS  NEEDED)     ondansetron 8 MG Tbdl  Commonly known as:  ZOFRAN-ODT  Take 1 tablet (8 mg total) by mouth every 8 (eight) hours as needed.     promethazine 25 MG suppository  Commonly known as:  PHENERGAN  Place 1 suppository (25 mg total) rectally every 6 (six) hours as needed for Nausea.     sodium chloride 2% 2 % ophthalmic solution  Commonly known as:  XIOMARA 128  Place 1 drop into both eyes 3 (three) times daily as needed.     sodium chloride 3% 3 % nebulizer solution  Take 4 mLs by nebulization 2 (two) times daily.     Systane Balance 0.6 % Drop  Generic drug:  propylene glycol  Apply 1 drop to eye daily as needed (dry eye).     umeclidinium-vilanterol 62.5-25 mcg/actuation Dsdv  Commonly known as:  Anoro Ellipta  Inhale 1 puff into the lungs once daily. Controller     verapamil 120 MG CR tablet  Commonly known as:  CALAN-SR  Take 1 tablet (120 mg total) by mouth 2 (two) times daily.            Indwelling Lines/Drains at time of discharge:   Lines/Drains/Airways     None                 Time spent on the discharge of patient: 30 minutes  Patient was seen and examined on the date of discharge and determined to be suitable for discharge.         Savita Beauchamp NP  Department of Hospital Medicine  [facility]

## 2020-02-20 ENCOUNTER — OFFICE VISIT (OUTPATIENT)
Dept: GASTROENTEROLOGY | Facility: CLINIC | Age: 70
End: 2020-02-20
Payer: MEDICARE

## 2020-02-20 VITALS
SYSTOLIC BLOOD PRESSURE: 125 MMHG | WEIGHT: 157.44 LBS | BODY MASS INDEX: 28.97 KG/M2 | HEART RATE: 80 BPM | HEIGHT: 62 IN | DIASTOLIC BLOOD PRESSURE: 65 MMHG

## 2020-02-20 DIAGNOSIS — R11.2 NAUSEA AND VOMITING, INTRACTABILITY OF VOMITING NOT SPECIFIED, UNSPECIFIED VOMITING TYPE: ICD-10-CM

## 2020-02-20 DIAGNOSIS — Z90.49 S/P CHOLE: ICD-10-CM

## 2020-02-20 DIAGNOSIS — R10.9 ABDOMINAL PAIN, UNSPECIFIED ABDOMINAL LOCATION: ICD-10-CM

## 2020-02-20 DIAGNOSIS — R19.7 DIARRHEA, UNSPECIFIED TYPE: Primary | ICD-10-CM

## 2020-02-20 PROCEDURE — 3078F DIAST BP <80 MM HG: CPT | Mod: CPTII,S$GLB,, | Performed by: NURSE PRACTITIONER

## 2020-02-20 PROCEDURE — 99999 PR PBB SHADOW E&M-EST. PATIENT-LVL III: CPT | Mod: PBBFAC,,, | Performed by: NURSE PRACTITIONER

## 2020-02-20 PROCEDURE — 1159F MED LIST DOCD IN RCRD: CPT | Mod: S$GLB,,, | Performed by: NURSE PRACTITIONER

## 2020-02-20 PROCEDURE — 99999 PR PBB SHADOW E&M-EST. PATIENT-LVL III: ICD-10-PCS | Mod: PBBFAC,,, | Performed by: NURSE PRACTITIONER

## 2020-02-20 PROCEDURE — 99215 PR OFFICE/OUTPT VISIT, EST, LEVL V, 40-54 MIN: ICD-10-PCS | Mod: S$GLB,,, | Performed by: NURSE PRACTITIONER

## 2020-02-20 PROCEDURE — 1159F PR MEDICATION LIST DOCUMENTED IN MEDICAL RECORD: ICD-10-PCS | Mod: S$GLB,,, | Performed by: NURSE PRACTITIONER

## 2020-02-20 PROCEDURE — 3074F PR MOST RECENT SYSTOLIC BLOOD PRESSURE < 130 MM HG: ICD-10-PCS | Mod: CPTII,S$GLB,, | Performed by: NURSE PRACTITIONER

## 2020-02-20 PROCEDURE — 99215 OFFICE O/P EST HI 40 MIN: CPT | Mod: S$GLB,,, | Performed by: NURSE PRACTITIONER

## 2020-02-20 PROCEDURE — 3078F PR MOST RECENT DIASTOLIC BLOOD PRESSURE < 80 MM HG: ICD-10-PCS | Mod: CPTII,S$GLB,, | Performed by: NURSE PRACTITIONER

## 2020-02-20 PROCEDURE — 1126F AMNT PAIN NOTED NONE PRSNT: CPT | Mod: S$GLB,,, | Performed by: NURSE PRACTITIONER

## 2020-02-20 PROCEDURE — 1101F PR PT FALLS ASSESS DOC 0-1 FALLS W/OUT INJ PAST YR: ICD-10-PCS | Mod: CPTII,S$GLB,, | Performed by: NURSE PRACTITIONER

## 2020-02-20 PROCEDURE — 1101F PT FALLS ASSESS-DOCD LE1/YR: CPT | Mod: CPTII,S$GLB,, | Performed by: NURSE PRACTITIONER

## 2020-02-20 PROCEDURE — 3074F SYST BP LT 130 MM HG: CPT | Mod: CPTII,S$GLB,, | Performed by: NURSE PRACTITIONER

## 2020-02-20 PROCEDURE — 1126F PR PAIN SEVERITY QUANTIFIED, NO PAIN PRESENT: ICD-10-PCS | Mod: S$GLB,,, | Performed by: NURSE PRACTITIONER

## 2020-02-20 RX ORDER — PROMETHAZINE HYDROCHLORIDE 12.5 MG/1
12.5 SUPPOSITORY RECTAL EVERY 6 HOURS PRN
Qty: 10 SUPPOSITORY | Refills: 1 | Status: ON HOLD | OUTPATIENT
Start: 2020-02-20 | End: 2021-12-17 | Stop reason: HOSPADM

## 2020-02-20 RX ORDER — PROMETHAZINE HYDROCHLORIDE 12.5 MG/1
12.5 TABLET ORAL EVERY 6 HOURS PRN
Qty: 10 TABLET | Refills: 1 | Status: SHIPPED | OUTPATIENT
Start: 2020-02-20 | End: 2020-12-04 | Stop reason: SDUPTHER

## 2020-02-20 NOTE — PROGRESS NOTES
[facility] Consultation Note    Reason for Consult:  The primary encounter diagnosis was Diarrhea, unspecified type. Diagnoses of Nausea and vomiting, intractability of vomiting not specified, unspecified vomiting type, Abdominal pain, unspecified abdominal location, and S/P jacinto were also pertinent to this visit.    PCP:   Urmila Luna       Referring MD:  No referring provider defined for this encounter.    Initial HPI 7/2019:  This is a 69 y.o. female here for evaluation of diarrhea, nausea, and abdominal pain.     Started a couple of months ago. She has had these symptoms in the past. Has been seen by GI in the past in 2013. Things have been stable since then.  Sx are not every day. Cant correlate anything to the sx.  The abdominal pain starts. Starts After meals 30 minutes to a 3 hours later. Always will have the diarrhea after abdominal pain.  May have 1-2 BM per day. She can have 3-4 BMs during the abdominal pain episodes.   When she is having the diarrhea is when she gets the nausea and may have some vomiting occasionally.   Gets these episodes a couple times a week.   No nocturnal sx.   Does not have a fever with this. No hematochezia, melena. No hematemesis.   No dysphagia.    She routinely takes Abx for her chronic respiratory infections. Several times a year she takes Abx. Sometimes she has to get PICC lines for IV abx.  Was taking culturelle. Not taking now.   Reports pyrosis. Takes omeprazole. Well controlled. When she tried to get off her Sx return.    Cholestyramine did not help in the past, but she tried it while she still had her gallbladder.     She reports she went to the ER 6/26/19    Interval hx 2/20/20  She went to the ER a couple of days ago for nausea, vomiting and diarrhea after she ate lasagna which is something she typically does not ER regular basis.  Prior to that she was not having any symptoms.  She has with her  today and they both report that her  GI symptoms only flare when she eats a heavy meal or something very acidic.  She does not feel any acid reflux. Had a lot of belching at night when she missed a couple of days of omeprazole when she was sick..  Other than that reflux is well controlled the omeprazole 20 mg she takes in morning.  Hyoscyamine did not help with abd pain  Having 1 soft BM per day  Taking miralax once a day due to constipation. Chronic.     ROS:  Constitutional: No fevers, chills, No weight loss  ENT: No allergies  CV: No chest pain  Pulm: +cough, productive, +shortness of breath  Ophtho: + vision changes. Has appt next week  GI: see HPI  Derm: No rash  MSK: + arthritis  : No dysuria, No hematuria  Neuro: No syncope, No seizure  Psych: + anxiety, No depression    Medical History:  has a past medical history of Acquired bronchiectasis, Allergy, Amblyopia, Anemia of other chronic disease, Anticoagulant long-term use, Anxiety, Cataract, Chronic obstructive pulmonary disease with acute exacerbation, Clotting disorder, COPD (chronic obstructive pulmonary disease), Depression, Diverticulosis, Essential tremor, GERD (gastroesophageal reflux disease), Hemangioma of liver, History of tuberculosis (1978), Hypertension, Mixed anxiety and depressive disorder, On home oxygen therapy, Psoriasis, PSVT (paroxysmal supraventricular tachycardia), Pulmonary embolism, S/P PICC central line placement (Apr. 2016 - May 2016), Skin disease, Spondylosis without myelopathy (8/23/2013), Supraventricular tachycardia, Tachycardia, Thoracic aorta atherosclerosis, Tuberculosis, Vaginal delivery, and Vitamin D deficiency.    Surgical History:  has a past surgical history that includes Cataract extraction w/  intraocular lens implant (07/24/12); Cataract extraction w/  intraocular lens implant (08/07/12); Radiofrequency thermocoagulation (Left, 12/18/2018); Cholecystectomy; Eye surgery; Gallbladder surgery; Esophagogastroduodenoscopy (N/A, 12/4/2019); and Colonoscopy  (N/A, 12/4/2019).    Family History: family history includes Cancer in her brother, father, and maternal aunt; Heart attack in her brother, mother, and son; Hyperlipidemia in her sister; Hypertension in her mother; Lung cancer in her sister; Psoriasis in her sister; Stroke in her maternal uncle..     Social History:  reports that she quit smoking about 10 years ago. Her smoking use included cigarettes. She has a 100.00 pack-year smoking history. She has never used smokeless tobacco. She reports that she drank alcohol. She reports that she does not use drugs.    Review of patient's allergies indicates:   Allergen Reactions    Adhesive Other (See Comments)     Tears up the skin and makes it itch   (leads)    Bactrim [sulfamethoxazole-trimethoprim] Rash     Was hospitalized for rash    Restasis [cyclosporine]     Lipitor [atorvastatin] Other (See Comments)     Muscle aches    Albuterol Other (See Comments)     tremors    Topamax [topiramate]      - made her feel 'bad in the head'       Current Outpatient Medications on File Prior to Visit   Medication Sig Dispense Refill    ALPRAZolam (XANAX) 0.25 MG tablet Take 1 tablet (0.25 mg total) by mouth nightly as needed for Anxiety. 30 tablet 0    apixaban (ELIQUIS) 5 mg Tab Take 1 tablet (5 mg total) by mouth 2 (two) times daily. 180 tablet 3    chlorthalidone (HYGROTEN) 25 MG Tab Take 1 tablet (25 mg total) by mouth once daily. 90 tablet 3    desoximetasone (TOPICORT) 0.25 % cream APPLY  CREAM EXTERNALLY TWICE DAILY AS NEEDED 15 g 3    ergocalciferol (ERGOCALCIFEROL) 50,000 unit Cap TAKE 1 CAPSULE BY MOUTH ONCE A WEEK 4 capsule 0    escitalopram oxalate (LEXAPRO) 10 MG tablet Take 1 tablet (10 mg total) by mouth once daily. 30 tablet 3    folic acid (FOLVITE) 1 MG tablet TAKE 1 TABLET BY MOUTH ONCE DAILY 100 tablet 0    gabapentin (NEURONTIN) 300 MG capsule Take 2 capsules (600 mg total) by mouth 3 (three) times daily. 540 capsule 3    guaiFENesin (MUCINEX)  600 mg 12 hr tablet Take 1,200 mg by mouth 2 (two) times daily.      ipratropium (ATROVENT) 0.02 % nebulizer solution Take 2.5 mLs (500 mcg total) by nebulization 3 (three) times daily as needed for Wheezing. Inhale 1 vial in nebulizer 3 to 4 times daily 2 Box 11    ketoconazole (NIZORAL) 2 % shampoo Apply topically once daily. 120 mL 5    Lactobacillus rhamnosus GG (CULTURELLE) 10 billion cell capsule Take 1 capsule by mouth once daily.      loperamide (IMODIUM) 2 mg capsule Take 2 capsules after first loose stool, then 1 capsule after each loose stool following. Do not exceed 8 capsules per day. 12 capsule 1    mirtazapine (REMERON) 15 MG tablet Take 1 tablet (15 mg total) by mouth every evening. 30 tablet 11    multivitamin (THERAGRAN) per tablet Take 1 tablet by mouth once daily.      omeprazole (PRILOSEC) 20 MG capsule TAKE 1 CAPSULE BY MOUTH ONCE DAILY AS NEEDED FOR  HEARTBURN  (TAKE  AS  NEEDED) 90 capsule 1    ondansetron (ZOFRAN-ODT) 8 MG TbDL Take 1 tablet (8 mg total) by mouth every 8 (eight) hours as needed. 30 tablet 0    propylene glycol (SYSTANE BALANCE) 0.6 % Drop Apply 1 drop to eye daily as needed (dry eye).      sodium chloride 2% (XIOMARA 128) 2 % ophthalmic solution Place 1 drop into both eyes 3 (three) times daily as needed.      sodium chloride 3% 3 % nebulizer solution Take 4 mLs by nebulization 2 (two) times daily. 720 mL 3    umeclidinium-vilanterol (ANORO ELLIPTA) 62.5-25 mcg/actuation DsDv Inhale 1 puff into the lungs once daily. Controller 1 each 6    verapamil (CALAN-SR) 120 MG CR tablet Take 1 tablet (120 mg total) by mouth 2 (two) times daily. 180 tablet 3    [DISCONTINUED] promethazine (PHENERGAN) 25 MG suppository Place 1 suppository (25 mg total) rectally every 6 (six) hours as needed for Nausea. 10 suppository 1    fluocinolone (DERMA-SMOOTHE) 0.01 % external oil APPLY TOPICALLY ONCE DAILY 119 mL 5    fluocinonide (LIDEX) 0.05 % external solution Apply topically once  "daily. - apply after shampooing (Patient taking differently: Apply topically once daily. - apply after shampooing as needed) 60 mL 5    hyoscyamine (ANASPAZ,LEVSIN) 0.125 mg Tab Take 1 tablet (125 mcg total) by mouth every 6 (six) hours as needed. 30 tablet 0     No current facility-administered medications on file prior to visit.          Objective Findings:    Vital Signs:  /65   Pulse 80   Ht 5' 2" (1.575 m)   Wt 71.4 kg (157 lb 6.5 oz)   LMP  (LMP Unknown)   BMI 28.79 kg/m²   Body mass index is 28.79 kg/m².    Physical Exam:  General Appearance: Well appearing in no acute distress.  Head:   Normocephalic, without obvious abnormality  Eyes:    No scleral icterus  Skin: No rash  Neurologic: AAO x 3      Labs:  Lab Results   Component Value Date    WBC 6.81 02/18/2020    HGB 9.3 (L) 02/18/2020    HCT 31.4 (L) 02/18/2020     02/18/2020    CRP 15.2 (H) 07/25/2019    CHOL 204 (H) 03/22/2018    TRIG 139 03/22/2018    HDL 68 03/22/2018    ALT 13 02/18/2020    AST 14 02/18/2020     02/18/2020    K 4.0 02/18/2020     02/18/2020    CREATININE 1.0 02/18/2020    BUN 12 02/18/2020    CO2 29 02/18/2020    TSH 2.449 06/26/2019    INR 1.1 08/25/2017    HGBA1C 5.2 09/13/2018       Imaging:  None reviewed    Endoscopy:    Colonoscopy 12/2019 Hemorrhoids found on perianal exam.                        - The entire examined colon is normal on direct and                         retroflexion views.                        - The examined portion of the ileum was normal.                        - Biopsies were taken with a cold forceps from the                         entire colon for evaluation of microscopic colitis.  EGD 12/2019 Normal esophagus.                        - 2 cm type-I sliding hiatal hernia.                        - A single gastric polyp. Resected and retrieved.                         Benign appearing. Possibly hyperplastic or                         inflammatory.                        - " Gastritis. Biopsied.                        - Normal examined duodenum.  Colonoscopy 4/2013 with Dr. Graves. No specimens removed. Recommended repeat 5-10 yrs.      Assessment:    Ms. Asencio is a 68 y.o. WF with:    1. Diarrhea, unspecified type    2. Nausea and vomiting, intractability of vomiting not specified, unspecified vomiting type    3. Abdominal pain, unspecified abdominal location    4. S/P jacinto      Day to day pts GI symptoms are stable.  She reports episodes of nausea, vomiting with diarrhea and abdominal pain only when she eats heavily acidic or heavy foods.  Today we discussed given that she has had an unremarkable EGD, colonoscopy and CT scan then I do not feel she needs any other further testing at this time.  She does not have consistent diarrhea.  It is only during the nausea vomiting episodes.  Stool studies would be of no value at this time.    Recommendations:  1. GERD diet since symptoms are only ecaberbated by acidic foods  2. Continue omeprazole  3. Phenergan PRN during vomiting episodes.  We did discuss she should not take the oral and suppository Phenergan at the same time.  Need to be spaced out by 6 hr.      Follow-up if symptoms worsen or change    Order summary:  Orders Placed This Encounter    promethazine (PHENERGAN) 12.5 MG Tab    promethazine (PHENERGAN) 12.5 MG Supp       40 MINUTES TOTAL FACE TO FACE >50% SPENT IN COUNSELING AND COORDINATION OF CARE    Thank you so much for allowing me to participate in the care of Yasmin Asencio    Susan Venegas, REGAN-C

## 2020-02-20 NOTE — PATIENT INSTRUCTIONS
[email or web address]  Dropping Acid The Reflux Diet Cookbook and Cure -  Bernard Lau M.D.    GERD  Worst Foods for Acid Reflux  Chocolate (milk chocolate worse than dark chocolate)  Soda (all carbonated beverages)  Alcohol (beer, liquor, wine)  Fried foods  Noguera, sausage, ribs  Cream sauce  Fatty meats (beef)  Butter, margarine, lard, shortening  Coffee, tea  Mint   High fat nuts  Hot sauces and pepper  Citrus fruit/juices      Acidic foods (pH - 1 is MORE acidic, 5 is LESS acidic)     Do not eat or drink these (lower numbers are worse)    Induction diet - For 2 weeks eat nothing below pH 5     Lemon juice 2.3  Grape cranberry juice 2.5  Stomach Acid 2.5  Gelatin Dessert 2.6  Lemon/lime 2.9/2.7  Vinegar 2.9  Gatorade 3.0  Fruits - plums, apricots, strawberries, cherries 3.0  Vitamin C (ascorbic acid) 3.0  Iced tea, Snapple 3.1  Mustard 3.2  Soft drinks 3.3  Nectarines 3.3  Pomegranate 3.3  Applesauce 3.4  Grapefruit 3.4  Kiwi 3.4  Barbecue sauce 3.4  Caesar dressing 3.5  [place] dressing 3.6  Strawberries 3.5  Pineapple juice 3.5  Beer 3.5  Wine 3.5  Grape 3.6  Apples 3.6  Pineapple 3.7  Pickle 3.7  Blackberries, blueberries 3.7  [place] 3.7  Orange 3.8  Cherries 3.9  Red Bull 3.9  Tomatoes 4.2  Coffee 5.1      These are Safe foods:  Agave  Aloe Vera  Apple (only red)  Bagels  Banana (worsens reflux in 1%)  Beans - black, red, lima, lentils  Bread - whole grain, rye  Caramel  Celery  Chamomile tea  Chicken - skinless, never fried  Chicken stock or bouillon  Coffee - one cup/day with milk  Fennel  Fish  [place]  Green vegetables (no green peppers)  Herbs  Honey  Melon  Milk - skin, soy, or Lactaid skim milk  Mushrooms  Oatmeal  Olive oil  Parsley  Pasta  Pears  Popcorn  Potatoes  Red bell peppers  Rice  Soups  Tofu  Turkey Breast  Turnip  Vegetables - no onion, tomatoes, peppers  Vinaigrette  Water - non carbonated  Whole grain breads, crackers, breakfast cereals      Best Foods for Acid  Reflux  Whole grain breads  Oatmeal  Aloe Vera  Salad (no tomatoes, onions, cheese, or high fat dressing)  Banana  Melon  Fennel  Chicken and turkey (skinless, never fried)  Fish/seafood (never fried)  Celery  Parsley  Couscous and Rice    Maybe bad foods (Everyone is unique)  Tomatoes  Garlic  Onion  Nuts (macadamia nuts)  Apples (especially green)  Cucumber  Green peppers  Spicy food  Some herbal teas    GERD tips  Change what you eat:  Eat smaller meals  Eat slowly and chew thoroughly until food is almost liquid  Cut down on junk carbohydrates such as sugar and white flour  Use herbs in your cooking  Eat more raw foods (more than 10 ingredients is not a raw food)  Avoid trans fats and partially hydrogenated oils  Eat more fish and switch to grass fed beef  Switch your cooking oil to macadamia nut or olive oil  Watch extremes of salt intake (too high or too low is bad)    If just cutting out acidic foods is not enough, change how you eat:  Large breakfast, medium lunch, light dinner  Dont mix fruit juices, sweet fruits, and refined starches with meats and heavy food  Dont wash your food down with a lot of liquid      Change these habits:  Stop smoking  Eat dinner earlier (3-4 hours before lying down to sleep)  Elevate the head of your bed 6 inches (blocks under the head of the bed are better than pillows) OR [employer] sells a special [place] Reflux relief system  That may be purchased online for a comfortable, individual solution for raising the head of the bed.  Exercise (but wait 2 hours after eating)  Drink more water (between meals)    Take these supplements:  Multi vitamin  Probiotic  Fish oil    Most common food allergens: milk, eggs, peanuts, tree nuts, fish, shellfish, wheat, and soy    All natural immediate relief:  Chew 2-3 soft probiotic capsules - Dr. Rock's Probiotics 12 Plus  Chew chewable DGL licorice tablet  Chew papaya tablet with high protein meal - [employer]  Drink 2 ounces of aloe  vera juice  Swedish bitters  Prelief- reduce the acid in food to keep it form burning sensitive tissue  Iberogast  [place]  Drink Chamomile Tea  Teaspoon of baking soda in water  Spoonful of vinegar in water      All natural ulcer healers:  Zinc carnosine - 75.5 mg with food twice a day x 8 weeks   Allyson by Sukhi - $8 for 60 pills  DGL (deglycyrrhizinated licorice) - 2 tablets before meals. Heals stomach lining   Natural Factors brand, Enzymatic therapy brand.  Aloe Vera juice  - 2 to 8 ounces a day   [place] or [place]

## 2020-02-21 ENCOUNTER — OFFICE VISIT (OUTPATIENT)
Dept: FAMILY MEDICINE | Facility: CLINIC | Age: 70
End: 2020-02-21
Payer: MEDICARE

## 2020-02-21 VITALS
DIASTOLIC BLOOD PRESSURE: 56 MMHG | HEART RATE: 88 BPM | OXYGEN SATURATION: 96 % | SYSTOLIC BLOOD PRESSURE: 120 MMHG | WEIGHT: 157.31 LBS | BODY MASS INDEX: 28.95 KG/M2 | HEIGHT: 62 IN | TEMPERATURE: 98 F

## 2020-02-21 DIAGNOSIS — L40.9 PSORIASIS: ICD-10-CM

## 2020-02-21 DIAGNOSIS — J47.9 ACQUIRED BRONCHIECTASIS: Chronic | ICD-10-CM

## 2020-02-21 DIAGNOSIS — J96.11 CHRONIC RESPIRATORY FAILURE WITH HYPOXIA: ICD-10-CM

## 2020-02-21 DIAGNOSIS — Z86.711 HISTORY OF PULMONARY EMBOLISM: Chronic | ICD-10-CM

## 2020-02-21 DIAGNOSIS — M48.061 SPINAL STENOSIS OF LUMBAR REGION WITHOUT NEUROGENIC CLAUDICATION: ICD-10-CM

## 2020-02-21 DIAGNOSIS — I12.9 BENIGN HYPERTENSION WITH CHRONIC KIDNEY DISEASE, STAGE III: ICD-10-CM

## 2020-02-21 DIAGNOSIS — T46.6X5A STATIN MYOPATHY: ICD-10-CM

## 2020-02-21 DIAGNOSIS — M46.1 SACROILIITIS: ICD-10-CM

## 2020-02-21 DIAGNOSIS — G25.0 ESSENTIAL TREMOR: ICD-10-CM

## 2020-02-21 DIAGNOSIS — I70.0 THORACIC AORTA ATHEROSCLEROSIS: ICD-10-CM

## 2020-02-21 DIAGNOSIS — D63.8 ANEMIA OF CHRONIC DISEASE: ICD-10-CM

## 2020-02-21 DIAGNOSIS — F32.0 MILD MAJOR DEPRESSION: ICD-10-CM

## 2020-02-21 DIAGNOSIS — R11.0 NAUSEA: ICD-10-CM

## 2020-02-21 DIAGNOSIS — I47.19 ECTOPIC ATRIAL TACHYCARDIA: ICD-10-CM

## 2020-02-21 DIAGNOSIS — J42 CHRONIC BRONCHITIS, UNSPECIFIED CHRONIC BRONCHITIS TYPE: ICD-10-CM

## 2020-02-21 DIAGNOSIS — N18.30 BENIGN HYPERTENSION WITH CHRONIC KIDNEY DISEASE, STAGE III: ICD-10-CM

## 2020-02-21 DIAGNOSIS — G72.0 STATIN MYOPATHY: ICD-10-CM

## 2020-02-21 DIAGNOSIS — Z00.00 ROUTINE MEDICAL EXAM: Primary | ICD-10-CM

## 2020-02-21 DIAGNOSIS — Z79.01 CHRONIC ANTICOAGULATION: Chronic | ICD-10-CM

## 2020-02-21 DIAGNOSIS — K21.9 GASTROESOPHAGEAL REFLUX DISEASE WITHOUT ESOPHAGITIS: Chronic | ICD-10-CM

## 2020-02-21 DIAGNOSIS — E66.3 OVERWEIGHT (BMI 25.0-29.9): ICD-10-CM

## 2020-02-21 DIAGNOSIS — M85.89 OSTEOPENIA OF MULTIPLE SITES: ICD-10-CM

## 2020-02-21 PROCEDURE — 3074F SYST BP LT 130 MM HG: CPT | Mod: CPTII,S$GLB,, | Performed by: INTERNAL MEDICINE

## 2020-02-21 PROCEDURE — 3078F DIAST BP <80 MM HG: CPT | Mod: CPTII,S$GLB,, | Performed by: INTERNAL MEDICINE

## 2020-02-21 PROCEDURE — 3074F PR MOST RECENT SYSTOLIC BLOOD PRESSURE < 130 MM HG: ICD-10-PCS | Mod: CPTII,S$GLB,, | Performed by: INTERNAL MEDICINE

## 2020-02-21 PROCEDURE — 99499 UNLISTED E&M SERVICE: CPT | Mod: S$GLB,,, | Performed by: INTERNAL MEDICINE

## 2020-02-21 PROCEDURE — 99499 RISK ADDL DX/OHS AUDIT: ICD-10-PCS | Mod: S$GLB,,, | Performed by: INTERNAL MEDICINE

## 2020-02-21 PROCEDURE — 99214 PR OFFICE/OUTPT VISIT, EST, LEVL IV, 30-39 MIN: ICD-10-PCS | Mod: S$GLB,,, | Performed by: INTERNAL MEDICINE

## 2020-02-21 PROCEDURE — 99999 PR PBB SHADOW E&M-EST. PATIENT-LVL IV: CPT | Mod: PBBFAC,,, | Performed by: INTERNAL MEDICINE

## 2020-02-21 PROCEDURE — 3078F PR MOST RECENT DIASTOLIC BLOOD PRESSURE < 80 MM HG: ICD-10-PCS | Mod: CPTII,S$GLB,, | Performed by: INTERNAL MEDICINE

## 2020-02-21 PROCEDURE — 99999 PR PBB SHADOW E&M-EST. PATIENT-LVL IV: ICD-10-PCS | Mod: PBBFAC,,, | Performed by: INTERNAL MEDICINE

## 2020-02-21 PROCEDURE — 99214 OFFICE O/P EST MOD 30 MIN: CPT | Mod: S$GLB,,, | Performed by: INTERNAL MEDICINE

## 2020-02-21 RX ORDER — ESCITALOPRAM OXALATE 20 MG/1
20 TABLET ORAL DAILY
Qty: 90 TABLET | Refills: 1 | Status: SHIPPED | OUTPATIENT
Start: 2020-02-21 | End: 2020-08-10

## 2020-02-21 RX ORDER — ONDANSETRON 8 MG/1
8 TABLET, ORALLY DISINTEGRATING ORAL EVERY 8 HOURS PRN
Qty: 30 TABLET | Refills: 3 | Status: ON HOLD | OUTPATIENT
Start: 2020-02-21 | End: 2021-12-17 | Stop reason: HOSPADM

## 2020-02-21 RX ORDER — OMEPRAZOLE 20 MG/1
CAPSULE, DELAYED RELEASE ORAL
Qty: 90 CAPSULE | Refills: 1 | Status: SHIPPED | OUTPATIENT
Start: 2020-02-21 | End: 2020-09-04

## 2020-02-21 RX ORDER — FLUOCINONIDE TOPICAL SOLUTION USP, 0.05% 0.5 MG/ML
SOLUTION TOPICAL DAILY
Qty: 60 ML | Refills: 3 | Status: SHIPPED | OUTPATIENT
Start: 2020-02-21 | End: 2020-07-10

## 2020-02-21 NOTE — PROGRESS NOTES
HISTORY OF PRESENT ILLNESS:  Yasmin Asencio is a 69 y.o. female who presents to the clinic today for a routine physical exam. Her last physical exam was approximately 1 years(s) ago.        PAST MEDICAL HISTORY:  Past Medical History:   Diagnosis Date    Acquired bronchiectasis     due to history of TB - followed by pulmonary, Dr. Zuñiga    Allergy     Amblyopia     rt eye per pt    Anemia of other chronic disease     Anticoagulant long-term use     Anxiety     Cataract     Chronic obstructive pulmonary disease with acute exacerbation     Clotting disorder     COPD (chronic obstructive pulmonary disease)     Depression     Diverticulosis     Essential tremor     GERD (gastroesophageal reflux disease)     Hemangioma of liver     History of tuberculosis 1978    Hypertension     Mixed anxiety and depressive disorder     On home oxygen therapy     Psoriasis     PSVT (paroxysmal supraventricular tachycardia)     Pulmonary embolism     S/P PICC central line placement Apr. 2016 - May 2016    Skin disease     Psoriasis    Spondylosis without myelopathy 8/23/2013    Supraventricular tachycardia     Tachycardia     Thoracic aorta atherosclerosis     noted on CT scan of chest 1/3/2011    Tuberculosis     Vaginal delivery     x2    Vitamin D deficiency        PAST SURGICAL HISTORY:  Past Surgical History:   Procedure Laterality Date    CATARACT EXTRACTION W/  INTRAOCULAR LENS IMPLANT  07/24/12    od dr [place]    CATARACT EXTRACTION W/  INTRAOCULAR LENS IMPLANT  08/07/12    left eye    CHOLECYSTECTOMY      COLONOSCOPY N/A 12/4/2019    Procedure: COLONOSCOPY;  Surgeon: Franco Charles MD;  Location: [facility] ([address] [place] [address]);  Service: Endoscopy;  Laterality: N/A;  ok to hold Eliquis 2days prior per     ESOPHAGOGASTRODUODENOSCOPY N/A 12/4/2019    Procedure: EGD (ESOPHAGOGASTRODUODENOSCOPY);  Surgeon: Franco Charles MD;  Location: [facility] ([address] [place] [address]);  Service: Endoscopy;   Laterality: N/A;  ok to hold Eliquis 2days prior per     EYE SURGERY      GALLBLADDER SURGERY      RADIOFREQUENCY THERMOCOAGULATION Left 2018    Procedure: RADIOFREQUENCY THERMAL COAGULATION;  Surgeon: Issac Meyers MD;  Location: [facility];  Service: Pain Management;  Laterality: Left;  OK to hold Eliquis 3 days prior per Dr. Braga       SOCIAL HISTORY:  Social History     Socioeconomic History    Marital status:      Spouse name: Not on file    Number of children: 2    Years of education: Not on file    Highest education level: Not on file   Occupational History    Occupation: housewife   Social Needs    Financial resource strain: Not very hard    Food insecurity:     Worry: Never true     Inability: Never true    Transportation needs:     Medical: No     Non-medical: No   Tobacco Use    Smoking status: Former Smoker     Packs/day: 2.00     Years: 50.00     Pack years: 100.00     Types: Cigarettes     Last attempt to quit: 2009     Years since quitting: 10.7    Smokeless tobacco: Never Used   Substance and Sexual Activity    Alcohol use: Not Currently     Frequency: Never     Binge frequency: Never    Drug use: Never    Sexual activity: Yes     Partners: Male   Lifestyle    Physical activity:     Days per week: 0 days     Minutes per session: 0 min    Stress: Very much   Relationships    Social connections:     Talks on phone: More than three times a week     Gets together: Once a week     Attends Buddhism service: Not on file     Active member of club or organization: No     Attends meetings of clubs or organizations: Never     Relationship status:    Other Topics Concern    Are you pregnant or think you may be? No    Breast-feeding No   Social History Narrative    ** Merged History Encounter **         One child  of a heart attack at age 35.       FAMILY HISTORY:  Family History   Problem Relation Age of Onset    Hypertension Mother      Heart attack Mother     Cancer Father         liver and bladder    Hyperlipidemia Sister     Psoriasis Sister     Cancer Brother         liver    Stroke Maternal Uncle     Cancer Maternal Aunt         stomach    Lung cancer Sister     Heart attack Brother     Heart attack Son     Amblyopia Neg Hx     Blindness Neg Hx     Cataracts Neg Hx     Glaucoma Neg Hx     Macular degeneration Neg Hx     Retinal detachment Neg Hx     Strabismus Neg Hx     Thyroid disease Neg Hx     Melanoma Neg Hx     Lupus Neg Hx     Eczema Neg Hx     COPD Neg Hx     Colon cancer Neg Hx        ALLERGIES AND MEDICATIONS: updated and reviewed.  Review of patient's allergies indicates:   Allergen Reactions    Adhesive Other (See Comments)     Tears up the skin and makes it itch   (leads)    Bactrim [sulfamethoxazole-trimethoprim] Rash     Was hospitalized for rash    Restasis [cyclosporine]     Lipitor [atorvastatin] Other (See Comments)     Muscle aches    Albuterol Other (See Comments)     tremors    Topamax [topiramate]      - made her feel 'bad in the head'     Medication List with Changes/Refills   Current Medications    ALPRAZOLAM (XANAX) 0.25 MG TABLET    Take 1 tablet (0.25 mg total) by mouth nightly as needed for Anxiety.    APIXABAN (ELIQUIS) 5 MG TAB    Take 1 tablet (5 mg total) by mouth 2 (two) times daily.    CHLORTHALIDONE (HYGROTEN) 25 MG TAB    Take 1 tablet (25 mg total) by mouth once daily.    DESOXIMETASONE (TOPICORT) 0.25 % CREAM    APPLY  CREAM EXTERNALLY TWICE DAILY AS NEEDED    ERGOCALCIFEROL (ERGOCALCIFEROL) 50,000 UNIT CAP    TAKE 1 CAPSULE BY MOUTH ONCE A WEEK    FLUOCINOLONE (DERMA-SMOOTHE) 0.01 % EXTERNAL OIL    APPLY TOPICALLY ONCE DAILY    FOLIC ACID (FOLVITE) 1 MG TABLET    TAKE 1 TABLET BY MOUTH ONCE DAILY    GABAPENTIN (NEURONTIN) 300 MG CAPSULE    Take 2 capsules (600 mg total) by mouth 3 (three) times daily.    GUAIFENESIN (MUCINEX) 600 MG 12 HR TABLET    Take 1,200 mg by mouth 2  (two) times daily.    HYOSCYAMINE (ANASPAZ,LEVSIN) 0.125 MG TAB    Take 1 tablet (125 mcg total) by mouth every 6 (six) hours as needed.    IPRATROPIUM (ATROVENT) 0.02 % NEBULIZER SOLUTION    Take 2.5 mLs (500 mcg total) by nebulization 3 (three) times daily as needed for Wheezing. Inhale 1 vial in nebulizer 3 to 4 times daily    KETOCONAZOLE (NIZORAL) 2 % SHAMPOO    Apply topically once daily.    LACTOBACILLUS RHAMNOSUS GG (CULTURELLE) 10 BILLION CELL CAPSULE    Take 1 capsule by mouth once daily.    LOPERAMIDE (IMODIUM) 2 MG CAPSULE    Take 2 capsules after first loose stool, then 1 capsule after each loose stool following. Do not exceed 8 capsules per day.    MIRTAZAPINE (REMERON) 15 MG TABLET    Take 1 tablet (15 mg total) by mouth every evening.    MULTIVITAMIN (THERAGRAN) PER TABLET    Take 1 tablet by mouth once daily.    PROMETHAZINE (PHENERGAN) 12.5 MG SUPP    Place 1 suppository (12.5 mg total) rectally every 6 (six) hours as needed.    PROMETHAZINE (PHENERGAN) 12.5 MG TAB    Take 1 tablet (12.5 mg total) by mouth every 6 (six) hours as needed.    PROPYLENE GLYCOL (SYSTANE BALANCE) 0.6 % DROP    Apply 1 drop to eye daily as needed (dry eye).    SODIUM CHLORIDE 2% (XIOMARA 128) 2 % OPHTHALMIC SOLUTION    Place 1 drop into both eyes 3 (three) times daily as needed.    SODIUM CHLORIDE 3% 3 % NEBULIZER SOLUTION    Take 4 mLs by nebulization 2 (two) times daily.    UMECLIDINIUM-VILANTEROL (ANORO ELLIPTA) 62.5-25 MCG/ACTUATION DSDV    Inhale 1 puff into the lungs once daily. Controller    VERAPAMIL (CALAN-SR) 120 MG CR TABLET    Take 1 tablet (120 mg total) by mouth 2 (two) times daily.   Changed and/or Refilled Medications    Modified Medication Previous Medication    ESCITALOPRAM OXALATE (LEXAPRO) 20 MG TABLET escitalopram oxalate (LEXAPRO) 10 MG tablet       Take 1 tablet (20 mg total) by mouth once daily.    Take 1 tablet (10 mg total) by mouth once daily.    FLUOCINONIDE (LIDEX) 0.05 % EXTERNAL SOLUTION  fluocinonide (LIDEX) 0.05 % external solution       Apply topically once daily. - apply after shampooing for 10 days    Apply topically once daily. - apply after shampooing    OMEPRAZOLE (PRILOSEC) 20 MG CAPSULE omeprazole (PRILOSEC) 20 MG capsule       TAKE 1 CAPSULE BY MOUTH ONCE DAILY AS NEEDED FOR  HEARTBURN  (TAKE  AS  NEEDED)    TAKE 1 CAPSULE BY MOUTH ONCE DAILY AS NEEDED FOR  HEARTBURN  (TAKE  AS  NEEDED)    ONDANSETRON (ZOFRAN-ODT) 8 MG TBDL ondansetron (ZOFRAN-ODT) 8 MG TbDL       Take 1 tablet (8 mg total) by mouth every 8 (eight) hours as needed.    Take 1 tablet (8 mg total) by mouth every 8 (eight) hours as needed.          CARE TEAM:  Patient Care Team:  Urmila Luna MD as PCP - General (Internal Medicine)  Taurus Braga MD as Consulting Physician (Cardiology)  PRECIOUS Zuñiga MD as Consulting Physician (Pulmonary Disease)  Louie Yuan MD as Consulting Physician (Infectious Diseases)  Issac Meyers MD as Consulting Physician (Pain Medicine)  Urmila Luna MD (Internal Medicine)           SCREENING HISTORY:  Health Maintenance       Date Due Completion Date    TETANUS VACCINE 01/19/2019 1/19/2009    Override on 1/19/2009: Done    Lipid Panel 03/22/2019 3/22/2018    LDCT Lung Screen 08/19/2020 8/19/2019    Mammogram 12/27/2020 12/27/2019    DEXA SCAN 04/10/2021 4/10/2018    Colonoscopy 12/04/2029 12/4/2019            REVIEW OF SYSTEMS:   The patient reports: good dietary habits.  The patient reports : that they are unable to exercise because of respiratory limitations.  Review of Systems   Constitutional: Negative for activity change, chills, fatigue, fever and unexpected weight change.   HENT: Negative for congestion, hearing loss, postnasal drip, rhinorrhea and trouble swallowing.    Eyes: Negative for pain, discharge and visual disturbance.   Respiratory: Positive for wheezing (- chronic). Negative for cough, chest tightness and shortness of breath.    Cardiovascular:  "Negative for chest pain, palpitations and leg swelling.   Gastrointestinal: Negative for abdominal pain, blood in stool, constipation, diarrhea, nausea and vomiting.        Had a recent brief hospitalization after she became ill with nausea/vomiting and diarrhea after eating out (lasagna).   Endocrine: Negative for polydipsia and polyuria.   Genitourinary: Negative for difficulty urinating, dysuria, hematuria and menstrual problem.   Musculoskeletal: Positive for arthralgias. Negative for back pain, joint swelling and neck pain.   Skin: Negative for rash.   Neurological: Positive for weakness. Negative for headaches.   Psychiatric/Behavioral: Negative for confusion, dysphoric mood and sleep disturbance. The patient is not nervous/anxious.       ROS (Optional)-: no pelvic pain  Breast ROS (Optional)-: negative for breast lumps/discharge            Physical Examination:   Vitals:    02/21/20 1414   BP: (!) 120/56   Pulse: 88   Temp: 97.9 °F (36.6 °C)     Weight: 71.3 kg (157 lb 4.8 oz)   Height: 5' 2" (157.5 cm)   Body mass index is 28.77 kg/m².      Patient did not require to have a chaperone present during the exam today.    General appearance - alert, well appearing, and in no distress, overweight  Psychiatric - alert, oriented to person, place, and time, normal behavior, speech, dress, motor activity, mildly depressed mood  Eyes - pupils equal and reactive, extraocular eye movements intact, sclera anicteric  Mouth - mucous membranes moist, pharynx normal without lesions  Neck - supple, no significant adenopathy, carotids upstroke normal bilaterally, no bruits, thyroid exam: thyroid is normal in size without nodules or tenderness  Lymphatics - no palpable cervical lymphadenopathy  Chest - no tachypnea, retractions or cyanosis; inspiratory wheezing noted throughout (baseline)  Heart - normal rate and regular rhythm, no gallops noted  Abdomen - not examined  Breasts - not examined  Back exam - mild limit in range " of motion noted on exam, in depth exam deferred  Neurological - alert, normal speech, no focal findings noted, cranial nerves II through XII intact; baseline tremor noted  Musculoskeletal - patient noted to have Mild-Moderate osteoarthritic changes to both knee joints. No joint effusions noted., no muscular tenderness noted  Extremities - peripheral pulses normal, no pedal edema, no clubbing or cyanosis  Skin - normal coloration and turgor, no rashes, no suspicious skin lesions noted      Labs:  No labs needed at this time; lipid panel ordered, but can complete with next blood draw later this year      ASSESSMENT AND PLAN:  1. Routine medical exam  Counseled on age appropriate medical preventative services including age appropriate cancer screenings, age appropriate eye and dental exams, over all nutritional health, need for a consistent exercise regimen, and an over all push towards maintaining a vigorous and active lifestyle.  Counseled on age appropriate vaccines and discussed upcoming health care needs based on age/gender. Discussed good sleep hygiene and stress management.    2. Benign hypertension with chronic kidney disease, stage III  Discussed sodium restriction, maintaining ideal body weight and regular exercise program as physiologic means to achieve blood pressure control. The patient will strive towards this.   The current medical regimen is effective;  continue present plan and medications. Recommended patient to check home readings to monitor and see me for followup as scheduled or sooner as needed.   Patient was educated that both decongestant and anti-inflammatory medication may raise blood pressure.  The patient is not eligible for the digital hypertension program.  Stable decreased kidney function. Observe. Patient counseled to avoid/minimize the use of anti-inflammatory  Medication. Discussed to stay well hydrated. Also discussed with patient that good control of blood pressure and/or diabetes,  if present, will help to prevent progression.    3. Ectopic atrial tachycardia  Stable. Followed by cardiology.    4. Statin myopathy      5. History of pulmonary embolism/6. Chronic anticoagulation  The current medical regimen is effective;  continue present plan and medications.    7. Anemia of chronic disease  Stable. Asymptomatic. Observe.    8. Thoracic aorta atherosclerosis  Patient with Atherosclerosis of the Aorta.  Stable/asymptomatic. Currently stable on lipid and b/p monitoring. Statin intolerant.    9. Chronic bronchitis, unspecified chronic bronchitis type/10. Acquired bronchiectasis/11. Chronic respiratory failure with hypoxia  Currently at baseline.  Continue current regimen.  Followed by Pulmonary.    12. Mild major depression  She reports some increase in her depression.  I will increase her Lexapro to 20 mg daily.  She will let me know in 4-6 weeks how she is doing.  - escitalopram oxalate (LEXAPRO) 20 MG tablet; Take 1 tablet (20 mg total) by mouth once daily.  Dispense: 90 tablet; Refill: 1    13. Essential tremor  Stable. Observe.    14. Spinal stenosis of lumbar region without neurogenic claudication/15. Sacroiliitis  Stable. Medication as needed.    16. Gastroesophageal reflux disease without esophagitis  Symptoms controlled: yes, but needs to take medication on a daily basis to control symptoms.   Reflux precautions discussed (eliminate tobacco if a smoker; minimize caffeine, chocolate and red/white peppermint intake; avoid heavy and spicy meals; don't lay down within 2-3 hours after eating; minimize the intake of NSAIDs). Medication as needed.   Patient asked to take medication breaks, if possible - discussed chronic use can limit calcium absorption (which can lead to osteopenia/osteoporosis), increases the risk for intestinal infections, and can cause kidney damage. There are also some newer studies that show possible increased risk of mortality.  - omeprazole (PRILOSEC) 20 MG capsule; TAKE  1 CAPSULE BY MOUTH ONCE DAILY AS NEEDED FOR  HEARTBURN  (TAKE  AS  NEEDED)  Dispense: 90 capsule; Refill: 1    17. Osteopenia of multiple sites  We discussed adequate calcium and vitamin D supplementation. We discussed fall precautions. She is up to date on her BMD. No need for prescription medication at this time    18. Overweight (BMI 25.0-29.9)  The patient is asked to make an attempt to improve diet and exercise patterns to aid in medical management of this problem.    19. Psoriasis  Stable. Medication as needed.  - fluocinonide (LIDEX) 0.05 % external solution; Apply topically once daily. - apply after shampooing for 10 days  Dispense: 60 mL; Refill: 3    20. Nausea  Stable. Medication as needed.  - ondansetron (ZOFRAN-ODT) 8 MG TbDL; Take 1 tablet (8 mg total) by mouth every 8 (eight) hours as needed.  Dispense: 30 tablet; Refill: 3          Follow up in about 6 months (around 8/21/2020), or if symptoms worsen or fail to improve, for follow up chronic medical conditions.. or sooner as needed.

## 2020-02-26 DIAGNOSIS — Z86.711 PERSONAL HISTORY OF PULMONARY EMBOLISM: ICD-10-CM

## 2020-02-26 DIAGNOSIS — I27.82 OTHER CHRONIC PULMONARY EMBOLISM WITHOUT ACUTE COR PULMONALE: ICD-10-CM

## 2020-02-26 DIAGNOSIS — Z79.01 CURRENT USE OF LONG TERM ANTICOAGULATION: ICD-10-CM

## 2020-03-04 ENCOUNTER — TELEPHONE (OUTPATIENT)
Dept: PULMONOLOGY | Facility: CLINIC | Age: 70
End: 2020-03-04

## 2020-03-04 ENCOUNTER — HOSPITAL ENCOUNTER (INPATIENT)
Facility: HOSPITAL | Age: 70
LOS: 7 days | Discharge: HOME | DRG: 190 | End: 2020-03-11
Attending: EMERGENCY MEDICINE | Admitting: HOSPITALIST
Payer: MEDICARE

## 2020-03-04 DIAGNOSIS — Z86.711 HISTORY OF PULMONARY EMBOLISM: ICD-10-CM

## 2020-03-04 DIAGNOSIS — R04.2 HEMOPTYSIS: Primary | ICD-10-CM

## 2020-03-04 DIAGNOSIS — R06.02 SHORTNESS OF BREATH: ICD-10-CM

## 2020-03-04 DIAGNOSIS — R94.31 QT PROLONGATION: ICD-10-CM

## 2020-03-04 DIAGNOSIS — J44.9 CHRONIC OBSTRUCTIVE PULMONARY DISEASE, UNSPECIFIED COPD TYPE: ICD-10-CM

## 2020-03-04 DIAGNOSIS — J47.1 BRONCHIECTASIS WITH ACUTE EXACERBATION: ICD-10-CM

## 2020-03-04 LAB
ALBUMIN SERPL BCP-MCNC: 4 G/DL (ref 3.5–5.2)
ALP SERPL-CCNC: 81 U/L (ref 55–135)
ALT SERPL W/O P-5'-P-CCNC: 14 U/L (ref 10–44)
ANION GAP SERPL CALC-SCNC: 11 MMOL/L (ref 8–16)
AST SERPL-CCNC: 17 U/L (ref 10–40)
BASOPHILS # BLD AUTO: 0.03 K/UL (ref 0–0.2)
BASOPHILS NFR BLD: 0.4 % (ref 0–1.9)
BILIRUB SERPL-MCNC: 0.4 MG/DL (ref 0.1–1)
BNP SERPL-MCNC: 153 PG/ML (ref 0–99)
BUN SERPL-MCNC: 14 MG/DL (ref 8–23)
CALCIUM SERPL-MCNC: 9.8 MG/DL (ref 8.7–10.5)
CHLORIDE SERPL-SCNC: 97 MMOL/L (ref 95–110)
CO2 SERPL-SCNC: 30 MMOL/L (ref 23–29)
CREAT SERPL-MCNC: 1.3 MG/DL (ref 0.5–1.4)
DIFFERENTIAL METHOD: ABNORMAL
EOSINOPHIL # BLD AUTO: 0 K/UL (ref 0–0.5)
EOSINOPHIL NFR BLD: 0.3 % (ref 0–8)
ERYTHROCYTE [DISTWIDTH] IN BLOOD BY AUTOMATED COUNT: 15 % (ref 11.5–14.5)
EST. GFR  (AFRICAN AMERICAN): 48 ML/MIN/1.73 M^2
EST. GFR  (NON AFRICAN AMERICAN): 42 ML/MIN/1.73 M^2
GLUCOSE SERPL-MCNC: 104 MG/DL (ref 70–110)
HCT VFR BLD AUTO: 33.6 % (ref 37–48.5)
HGB BLD-MCNC: 9.9 G/DL (ref 12–16)
IMM GRANULOCYTES # BLD AUTO: 0.02 K/UL (ref 0–0.04)
IMM GRANULOCYTES NFR BLD AUTO: 0.3 % (ref 0–0.5)
LYMPHOCYTES # BLD AUTO: 1.5 K/UL (ref 1–4.8)
LYMPHOCYTES NFR BLD: 21.1 % (ref 18–48)
MCH RBC QN AUTO: 24.5 PG (ref 27–31)
MCHC RBC AUTO-ENTMCNC: 29.5 G/DL (ref 32–36)
MCV RBC AUTO: 83 FL (ref 82–98)
MONOCYTES # BLD AUTO: 0.6 K/UL (ref 0.3–1)
MONOCYTES NFR BLD: 7.6 % (ref 4–15)
NEUTROPHILS # BLD AUTO: 5.1 K/UL (ref 1.8–7.7)
NEUTROPHILS NFR BLD: 70.3 % (ref 38–73)
NRBC BLD-RTO: 0 /100 WBC
PLATELET # BLD AUTO: 270 K/UL (ref 150–350)
PMV BLD AUTO: 9.4 FL (ref 9.2–12.9)
POTASSIUM SERPL-SCNC: 3.5 MMOL/L (ref 3.5–5.1)
PROT SERPL-MCNC: 8 G/DL (ref 6–8.4)
RBC # BLD AUTO: 4.04 M/UL (ref 4–5.4)
SODIUM SERPL-SCNC: 138 MMOL/L (ref 136–145)
TROPONIN I SERPL DL<=0.01 NG/ML-MCNC: <0.006 NG/ML (ref 0–0.03)
WBC # BLD AUTO: 7.19 K/UL (ref 3.9–12.7)

## 2020-03-04 PROCEDURE — 83880 ASSAY OF NATRIURETIC PEPTIDE: CPT

## 2020-03-04 PROCEDURE — 63600175 PHARM REV CODE 636 W HCPCS: Performed by: EMERGENCY MEDICINE

## 2020-03-04 PROCEDURE — 80053 COMPREHEN METABOLIC PANEL: CPT

## 2020-03-04 PROCEDURE — 96365 THER/PROPH/DIAG IV INF INIT: CPT

## 2020-03-04 PROCEDURE — 87070 CULTURE OTHR SPECIMN AEROBIC: CPT

## 2020-03-04 PROCEDURE — 85025 COMPLETE CBC W/AUTO DIFF WBC: CPT

## 2020-03-04 PROCEDURE — 87040 BLOOD CULTURE FOR BACTERIA: CPT

## 2020-03-04 PROCEDURE — 87186 SC STD MICRODIL/AGAR DIL: CPT

## 2020-03-04 PROCEDURE — G0378 HOSPITAL OBSERVATION PER HR: HCPCS

## 2020-03-04 PROCEDURE — 99285 EMERGENCY DEPT VISIT HI MDM: CPT | Mod: 25

## 2020-03-04 PROCEDURE — 96366 THER/PROPH/DIAG IV INF ADDON: CPT

## 2020-03-04 PROCEDURE — 12000002 HC ACUTE/MED SURGE SEMI-PRIVATE ROOM

## 2020-03-04 PROCEDURE — 87205 SMEAR GRAM STAIN: CPT

## 2020-03-04 PROCEDURE — 93005 ELECTROCARDIOGRAM TRACING: CPT

## 2020-03-04 PROCEDURE — 96375 TX/PRO/DX INJ NEW DRUG ADDON: CPT

## 2020-03-04 PROCEDURE — 93010 ELECTROCARDIOGRAM REPORT: CPT | Mod: ,,, | Performed by: INTERNAL MEDICINE

## 2020-03-04 PROCEDURE — 84484 ASSAY OF TROPONIN QUANT: CPT

## 2020-03-04 PROCEDURE — 87077 CULTURE AEROBIC IDENTIFY: CPT

## 2020-03-04 PROCEDURE — 93010 EKG 12-LEAD: ICD-10-PCS | Mod: ,,, | Performed by: INTERNAL MEDICINE

## 2020-03-04 RX ORDER — CEFEPIME HYDROCHLORIDE 1 G/50ML
2 INJECTION, SOLUTION INTRAVENOUS
Status: DISCONTINUED | OUTPATIENT
Start: 2020-03-04 | End: 2020-03-11 | Stop reason: HOSPADM

## 2020-03-04 RX ORDER — ONDANSETRON 2 MG/ML
4 INJECTION INTRAMUSCULAR; INTRAVENOUS
Status: COMPLETED | OUTPATIENT
Start: 2020-03-04 | End: 2020-03-04

## 2020-03-04 RX ADMIN — ONDANSETRON HYDROCHLORIDE 4 MG: 2 SOLUTION INTRAMUSCULAR; INTRAVENOUS at 10:03

## 2020-03-04 RX ADMIN — CEFEPIME 2 G: 2 INJECTION, POWDER, FOR SOLUTION INTRAVENOUS at 09:03

## 2020-03-04 RX ADMIN — VANCOMYCIN HYDROCHLORIDE 1500 MG: 1.5 INJECTION, POWDER, LYOPHILIZED, FOR SOLUTION INTRAVENOUS at 10:03

## 2020-03-04 NOTE — TELEPHONE ENCOUNTER
states she's been coughing up blood inside her sputum. Patient also states  stated to contact the office once she's finished antibiotic. And also discuss more details about a pick line. I advised  if she's not feeling good and also still coughing up blood to visit the Nearest ER.Pt verbalized she understands

## 2020-03-04 NOTE — TELEPHONE ENCOUNTER
----- Message from Rowan Saha MA sent at 3/4/2020 12:12 PM CST -----  Contact: self/[phone]  PT stated she has finished her antibiotic last month and was informed by   to call in . Per pt  this morning stated she was coughing up blood.

## 2020-03-05 PROBLEM — J44.9 COPD (CHRONIC OBSTRUCTIVE PULMONARY DISEASE): Status: ACTIVE | Noted: 2020-03-05

## 2020-03-05 PROBLEM — R11.0 NAUSEA IN ADULT: Chronic | Status: ACTIVE | Noted: 2020-03-05

## 2020-03-05 LAB
ALBUMIN SERPL BCP-MCNC: 3.7 G/DL (ref 3.5–5.2)
ALP SERPL-CCNC: 74 U/L (ref 55–135)
ALT SERPL W/O P-5'-P-CCNC: 13 U/L (ref 10–44)
ANION GAP SERPL CALC-SCNC: 12 MMOL/L (ref 8–16)
AST SERPL-CCNC: 15 U/L (ref 10–40)
BASOPHILS # BLD AUTO: 0.03 K/UL (ref 0–0.2)
BASOPHILS NFR BLD: 0.4 % (ref 0–1.9)
BILIRUB SERPL-MCNC: 0.4 MG/DL (ref 0.1–1)
BUN SERPL-MCNC: 14 MG/DL (ref 8–23)
CALCIUM SERPL-MCNC: 9.5 MG/DL (ref 8.7–10.5)
CHLORIDE SERPL-SCNC: 98 MMOL/L (ref 95–110)
CO2 SERPL-SCNC: 28 MMOL/L (ref 23–29)
CREAT SERPL-MCNC: 1.1 MG/DL (ref 0.5–1.4)
DIFFERENTIAL METHOD: ABNORMAL
EOSINOPHIL # BLD AUTO: 0 K/UL (ref 0–0.5)
EOSINOPHIL NFR BLD: 0.4 % (ref 0–8)
ERYTHROCYTE [DISTWIDTH] IN BLOOD BY AUTOMATED COUNT: 15 % (ref 11.5–14.5)
EST. GFR  (AFRICAN AMERICAN): 59 ML/MIN/1.73 M^2
EST. GFR  (NON AFRICAN AMERICAN): 51 ML/MIN/1.73 M^2
FERRITIN SERPL-MCNC: 8 NG/ML (ref 20–300)
GLUCOSE SERPL-MCNC: 115 MG/DL (ref 70–110)
HCT VFR BLD AUTO: 32.4 % (ref 37–48.5)
HGB BLD-MCNC: 9.8 G/DL (ref 12–16)
IMM GRANULOCYTES # BLD AUTO: 0.03 K/UL (ref 0–0.04)
IMM GRANULOCYTES NFR BLD AUTO: 0.4 % (ref 0–0.5)
IRON SERPL-MCNC: 64 UG/DL (ref 30–160)
LYMPHOCYTES # BLD AUTO: 0.5 K/UL (ref 1–4.8)
LYMPHOCYTES NFR BLD: 7.1 % (ref 18–48)
MCH RBC QN AUTO: 24.9 PG (ref 27–31)
MCHC RBC AUTO-ENTMCNC: 30.2 G/DL (ref 32–36)
MCV RBC AUTO: 82 FL (ref 82–98)
MONOCYTES # BLD AUTO: 0.5 K/UL (ref 0.3–1)
MONOCYTES NFR BLD: 6.8 % (ref 4–15)
NEUTROPHILS # BLD AUTO: 6.5 K/UL (ref 1.8–7.7)
NEUTROPHILS NFR BLD: 84.9 % (ref 38–73)
NRBC BLD-RTO: 0 /100 WBC
PLATELET # BLD AUTO: 246 K/UL (ref 150–350)
PMV BLD AUTO: 9.2 FL (ref 9.2–12.9)
POTASSIUM SERPL-SCNC: 3.2 MMOL/L (ref 3.5–5.1)
PROT SERPL-MCNC: 7.5 G/DL (ref 6–8.4)
RBC # BLD AUTO: 3.93 M/UL (ref 4–5.4)
SATURATED IRON: 14 % (ref 20–50)
SODIUM SERPL-SCNC: 138 MMOL/L (ref 136–145)
TOTAL IRON BINDING CAPACITY: 444 UG/DL (ref 250–450)
TRANSFERRIN SERPL-MCNC: 300 MG/DL (ref 200–375)
WBC # BLD AUTO: 7.61 K/UL (ref 3.9–12.7)

## 2020-03-05 PROCEDURE — 87206 SMEAR FLUORESCENT/ACID STAI: CPT

## 2020-03-05 PROCEDURE — 63600175 PHARM REV CODE 636 W HCPCS: Performed by: NURSE PRACTITIONER

## 2020-03-05 PROCEDURE — 82728 ASSAY OF FERRITIN: CPT

## 2020-03-05 PROCEDURE — C9113 INJ PANTOPRAZOLE SODIUM, VIA: HCPCS | Performed by: NURSE PRACTITIONER

## 2020-03-05 PROCEDURE — 80053 COMPREHEN METABOLIC PANEL: CPT

## 2020-03-05 PROCEDURE — 99223 1ST HOSP IP/OBS HIGH 75: CPT | Mod: ,,, | Performed by: PHYSICIAN ASSISTANT

## 2020-03-05 PROCEDURE — 83540 ASSAY OF IRON: CPT

## 2020-03-05 PROCEDURE — 96375 TX/PRO/DX INJ NEW DRUG ADDON: CPT

## 2020-03-05 PROCEDURE — 99223 1ST HOSP IP/OBS HIGH 75: CPT | Mod: ,,, | Performed by: EMERGENCY MEDICINE

## 2020-03-05 PROCEDURE — 27000221 HC OXYGEN, UP TO 24 HOURS

## 2020-03-05 PROCEDURE — 25000003 PHARM REV CODE 250: Performed by: EMERGENCY MEDICINE

## 2020-03-05 PROCEDURE — 99223 PR INITIAL HOSPITAL CARE,LEVL III: ICD-10-PCS | Mod: ,,, | Performed by: EMERGENCY MEDICINE

## 2020-03-05 PROCEDURE — 99223 PR INITIAL HOSPITAL CARE,LEVL III: ICD-10-PCS | Mod: ,,, | Performed by: PHYSICIAN ASSISTANT

## 2020-03-05 PROCEDURE — 25000003 PHARM REV CODE 250: Performed by: NURSE PRACTITIONER

## 2020-03-05 PROCEDURE — 87015 SPECIMEN INFECT AGNT CONCNTJ: CPT

## 2020-03-05 PROCEDURE — 96376 TX/PRO/DX INJ SAME DRUG ADON: CPT

## 2020-03-05 PROCEDURE — 63600175 PHARM REV CODE 636 W HCPCS: Performed by: EMERGENCY MEDICINE

## 2020-03-05 PROCEDURE — 36415 COLL VENOUS BLD VENIPUNCTURE: CPT

## 2020-03-05 PROCEDURE — 21400001 HC TELEMETRY ROOM

## 2020-03-05 PROCEDURE — 94640 AIRWAY INHALATION TREATMENT: CPT

## 2020-03-05 PROCEDURE — 87116 MYCOBACTERIA CULTURE: CPT

## 2020-03-05 PROCEDURE — 85025 COMPLETE CBC W/AUTO DIFF WBC: CPT

## 2020-03-05 PROCEDURE — 25000242 PHARM REV CODE 250 ALT 637 W/ HCPCS: Performed by: NURSE PRACTITIONER

## 2020-03-05 RX ORDER — FOLIC ACID 1 MG/1
1000 TABLET ORAL DAILY
Status: DISCONTINUED | OUTPATIENT
Start: 2020-03-05 | End: 2020-03-11 | Stop reason: HOSPADM

## 2020-03-05 RX ORDER — PREDNISONE 20 MG/1
40 TABLET ORAL DAILY
Status: DISPENSED | OUTPATIENT
Start: 2020-03-06 | End: 2020-03-11

## 2020-03-05 RX ORDER — PANTOPRAZOLE SODIUM 40 MG/1
40 TABLET, DELAYED RELEASE ORAL DAILY
Status: DISCONTINUED | OUTPATIENT
Start: 2020-03-05 | End: 2020-03-05

## 2020-03-05 RX ORDER — IPRATROPIUM BROMIDE 0.5 MG/2.5ML
0.5 SOLUTION RESPIRATORY (INHALATION) EVERY 6 HOURS
Status: DISCONTINUED | OUTPATIENT
Start: 2020-03-05 | End: 2020-03-11 | Stop reason: HOSPADM

## 2020-03-05 RX ORDER — ESCITALOPRAM OXALATE 10 MG/1
20 TABLET ORAL DAILY
Status: DISCONTINUED | OUTPATIENT
Start: 2020-03-05 | End: 2020-03-11 | Stop reason: HOSPADM

## 2020-03-05 RX ORDER — ONDANSETRON 2 MG/ML
8 INJECTION INTRAMUSCULAR; INTRAVENOUS EVERY 8 HOURS
Status: DISCONTINUED | OUTPATIENT
Start: 2020-03-05 | End: 2020-03-06

## 2020-03-05 RX ORDER — ONDANSETRON 2 MG/ML
4 INJECTION INTRAMUSCULAR; INTRAVENOUS EVERY 8 HOURS PRN
Status: DISCONTINUED | OUTPATIENT
Start: 2020-03-05 | End: 2020-03-05

## 2020-03-05 RX ORDER — ALPRAZOLAM 0.25 MG/1
0.25 TABLET ORAL NIGHTLY PRN
Status: DISCONTINUED | OUTPATIENT
Start: 2020-03-05 | End: 2020-03-11 | Stop reason: HOSPADM

## 2020-03-05 RX ORDER — FLUTICASONE FUROATE AND VILANTEROL 200; 25 UG/1; UG/1
1 POWDER RESPIRATORY (INHALATION) DAILY
Status: DISCONTINUED | OUTPATIENT
Start: 2020-03-06 | End: 2020-03-08

## 2020-03-05 RX ORDER — CHLORTHALIDONE 25 MG/1
25 TABLET ORAL DAILY
Status: DISCONTINUED | OUTPATIENT
Start: 2020-03-05 | End: 2020-03-11 | Stop reason: HOSPADM

## 2020-03-05 RX ORDER — POTASSIUM CHLORIDE 20 MEQ/1
40 TABLET, EXTENDED RELEASE ORAL ONCE
Status: DISCONTINUED | OUTPATIENT
Start: 2020-03-05 | End: 2020-03-05

## 2020-03-05 RX ORDER — ACETAMINOPHEN 325 MG/1
650 TABLET ORAL EVERY 8 HOURS PRN
Status: DISCONTINUED | OUTPATIENT
Start: 2020-03-05 | End: 2020-03-11 | Stop reason: HOSPADM

## 2020-03-05 RX ORDER — PANTOPRAZOLE SODIUM 40 MG/10ML
40 INJECTION, POWDER, LYOPHILIZED, FOR SOLUTION INTRAVENOUS ONCE
Status: COMPLETED | OUTPATIENT
Start: 2020-03-05 | End: 2020-03-05

## 2020-03-05 RX ORDER — IPRATROPIUM BROMIDE 0.5 MG/2.5ML
500 SOLUTION RESPIRATORY (INHALATION) 3 TIMES DAILY PRN
Status: DISCONTINUED | OUTPATIENT
Start: 2020-03-05 | End: 2020-03-11 | Stop reason: HOSPADM

## 2020-03-05 RX ORDER — POTASSIUM CHLORIDE 7.45 MG/ML
10 INJECTION INTRAVENOUS
Status: COMPLETED | OUTPATIENT
Start: 2020-03-05 | End: 2020-03-05

## 2020-03-05 RX ORDER — GUAIFENESIN 600 MG/1
1200 TABLET, EXTENDED RELEASE ORAL 2 TIMES DAILY
Status: DISCONTINUED | OUTPATIENT
Start: 2020-03-05 | End: 2020-03-11 | Stop reason: HOSPADM

## 2020-03-05 RX ORDER — SODIUM CHLORIDE 0.9 % (FLUSH) 0.9 %
10 SYRINGE (ML) INJECTION
Status: DISCONTINUED | OUTPATIENT
Start: 2020-03-05 | End: 2020-03-11 | Stop reason: HOSPADM

## 2020-03-05 RX ORDER — MIRTAZAPINE 15 MG/1
15 TABLET, FILM COATED ORAL NIGHTLY
Status: DISCONTINUED | OUTPATIENT
Start: 2020-03-05 | End: 2020-03-11 | Stop reason: HOSPADM

## 2020-03-05 RX ORDER — VERAPAMIL HYDROCHLORIDE 120 MG/1
120 TABLET, FILM COATED, EXTENDED RELEASE ORAL 2 TIMES DAILY
Status: DISCONTINUED | OUTPATIENT
Start: 2020-03-05 | End: 2020-03-11 | Stop reason: HOSPADM

## 2020-03-05 RX ORDER — FAMOTIDINE 20 MG/1
20 TABLET, FILM COATED ORAL DAILY
Status: DISCONTINUED | OUTPATIENT
Start: 2020-03-05 | End: 2020-03-11 | Stop reason: HOSPADM

## 2020-03-05 RX ADMIN — ACETAMINOPHEN 650 MG: 325 TABLET ORAL at 02:03

## 2020-03-05 RX ADMIN — FAMOTIDINE 20 MG: 20 TABLET ORAL at 09:03

## 2020-03-05 RX ADMIN — POTASSIUM CHLORIDE 10 MEQ: 7.46 INJECTION, SOLUTION INTRAVENOUS at 08:03

## 2020-03-05 RX ADMIN — CEFEPIME 2 G: 2 INJECTION, POWDER, FOR SOLUTION INTRAVENOUS at 04:03

## 2020-03-05 RX ADMIN — ONDANSETRON HYDROCHLORIDE 8 MG: 2 SOLUTION INTRAMUSCULAR; INTRAVENOUS at 02:03

## 2020-03-05 RX ADMIN — IPRATROPIUM BROMIDE 0.5 MG: 0.5 SOLUTION RESPIRATORY (INHALATION) at 03:03

## 2020-03-05 RX ADMIN — ONDANSETRON HYDROCHLORIDE 8 MG: 2 SOLUTION INTRAMUSCULAR; INTRAVENOUS at 09:03

## 2020-03-05 RX ADMIN — POTASSIUM CHLORIDE 10 MEQ: 7.46 INJECTION, SOLUTION INTRAVENOUS at 06:03

## 2020-03-05 RX ADMIN — ONDANSETRON HYDROCHLORIDE 4 MG: 2 SOLUTION INTRAMUSCULAR; INTRAVENOUS at 07:03

## 2020-03-05 RX ADMIN — VERAPAMIL HYDROCHLORIDE 120 MG: 120 TABLET, FILM COATED, EXTENDED RELEASE ORAL at 11:03

## 2020-03-05 RX ADMIN — METHYLPREDNISOLONE SODIUM SUCCINATE 40 MG: 40 INJECTION, POWDER, FOR SOLUTION INTRAMUSCULAR; INTRAVENOUS at 02:03

## 2020-03-05 RX ADMIN — POTASSIUM CHLORIDE 10 MEQ: 7.46 INJECTION, SOLUTION INTRAVENOUS at 09:03

## 2020-03-05 RX ADMIN — PROMETHAZINE HYDROCHLORIDE 12.5 MG: 25 INJECTION INTRAMUSCULAR; INTRAVENOUS at 04:03

## 2020-03-05 RX ADMIN — GUAIFENESIN 1200 MG: 600 TABLET, EXTENDED RELEASE ORAL at 11:03

## 2020-03-05 RX ADMIN — IPRATROPIUM BROMIDE 0.5 MG: 0.5 SOLUTION RESPIRATORY (INHALATION) at 09:03

## 2020-03-05 RX ADMIN — CEFEPIME 2 G: 2 INJECTION, POWDER, FOR SOLUTION INTRAVENOUS at 12:03

## 2020-03-05 RX ADMIN — PANTOPRAZOLE SODIUM 40 MG: 40 INJECTION, POWDER, LYOPHILIZED, FOR SOLUTION INTRAVENOUS at 06:03

## 2020-03-05 RX ADMIN — CEFEPIME 2 G: 2 INJECTION, POWDER, FOR SOLUTION INTRAVENOUS at 11:03

## 2020-03-05 RX ADMIN — MIRTAZAPINE 15 MG: 15 TABLET, FILM COATED ORAL at 11:03

## 2020-03-05 RX ADMIN — PROMETHAZINE HYDROCHLORIDE 12.5 MG: 25 INJECTION INTRAMUSCULAR; INTRAVENOUS at 10:03

## 2020-03-05 NOTE — HPI
Ms. Asencio is a 68 y/o female with chronic bronchiectasis, hx of pseudomonas infections presents to ED +hemoptysis. She denies having any fever chills or night sweats. She is on eliquis for hx of PE. She reports hemoptysis with exacerbation of her cough. She was on augmentin for her cough recently. CXR with findings including bronchiectasis, no large focal consolidation seen. RCX with GPCs and moderate WBCs. No fever chills or night sweats. She was given a dose of vancomycin and cefepime in ED.     Reports treatment for history of TB in 1970s. 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 AFB culture 2016 negative. No homelessness, incarceration or recent travel. Reports having hemoptysis for years per pt and . Reports having nausea with vancomycin in ED yesterday.

## 2020-03-05 NOTE — HPI
Mrs. 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 Pulmonologist suspect recurrent Pseudomonas infection. Patient reports coughing more recently for the last few days compared to COPD baseline consisting gross bright red blood and red mucous yesterday morning associated with mild nose bleeding. She also reports worsening of SOB and chest pain with cough for 1 weeks . She denies any fever, chill, night sweat, vomit, sick contact, weakness, abdominal pain, headache, blood in stool/urine, any bruising or other bleeding. Recent completed Augmentin Feb (not sure exact)     In ED, EKG and Troponin negative, Chest X-ray with stable chronic bronchiectasis and emphysematous/fibrotic without large focal consolidation, Blood and Sputum culture obtained, BNP of 153, Vanc + Cefepime ordered.

## 2020-03-05 NOTE — ASSESSMENT & PLAN NOTE
"See above #1.   Patient not able to tolerate albuterol "tremors." Continue home SILVIO and LABA/LAMA. If need then can add xopenex.   Prednisone 40 mg x 5 days as above  Keep O2 >88    "

## 2020-03-05 NOTE — ASSESSMENT & PLAN NOTE
Ms. Asencio is a 70 y/o female with chronic bronchiectasis, hx of pseudomonas infections presents to ED +hemoptysis. She denies having any fever chills or night sweats. She is on eliquis for hx of PE. She reports hemoptysis with exacerbation of her cough. She was on augmentin for her cough recently. CXR with findings including bronchiectasis, no large focal consolidation seen. RCX with GPCs and moderate WBCs. No fever chills or night sweats. She was given a dose of vancomycin and cefepime in ED.     Reports treatment for history of TB in 1970s. 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 AFB culture 2016 negative. No homelessness, incarceration or recent travel. Reports having hemoptysis for years per pt and . Reports having nausea with vancomycin in ED yesterday.     Recommendations  1. Continue cefeipme 2gq8hr. Respiratory cultures pending. Ordered AFB cultures x 3 with TB PCR r/o NTM   2. quantiferon gold TB ordered today  3. Recommend CT chest for further evaluation   3. Will follow cultures and tailor abx accordingly. Discussed with ID staff

## 2020-03-05 NOTE — PLAN OF CARE
Problem: Adult Inpatient Plan of Care  Goal: Plan of Care Review  Outcome: Ongoing, Progressing     Problem: Fall Injury Risk  Goal: Absence of Fall and Fall-Related Injury  Outcome: Ongoing, Progressing

## 2020-03-05 NOTE — SUBJECTIVE & OBJECTIVE
Past Medical History:   Diagnosis Date    Acquired bronchiectasis     due to history of TB - followed by pulmonary, Dr. Zuñiga    Allergy     Amblyopia     rt eye per pt    Anemia of other chronic disease     Anticoagulant long-term use     Anxiety     Cataract     Chronic obstructive pulmonary disease with acute exacerbation     Clotting disorder     COPD (chronic obstructive pulmonary disease)     Depression     Diverticulosis     Essential tremor     GERD (gastroesophageal reflux disease)     Hemangioma of liver     History of tuberculosis 1978    Hypertension     Mixed anxiety and depressive disorder     On home oxygen therapy     Psoriasis     PSVT (paroxysmal supraventricular tachycardia)     Pulmonary embolism     S/P PICC central line placement Apr. 2016 - May 2016    Skin disease     Psoriasis    Spondylosis without myelopathy 8/23/2013    Supraventricular tachycardia     Tachycardia     Thoracic aorta atherosclerosis     noted on CT scan of chest 1/3/2011    Tuberculosis     Vaginal delivery     x2    Vitamin D deficiency        Past Surgical History:   Procedure Laterality Date    CATARACT EXTRACTION W/  INTRAOCULAR LENS IMPLANT  07/24/12    od dr [place]    CATARACT EXTRACTION W/  INTRAOCULAR LENS IMPLANT  08/07/12    left eye    CHOLECYSTECTOMY      COLONOSCOPY N/A 12/4/2019    Procedure: COLONOSCOPY;  Surgeon: Franco Charles MD;  Location: [address]);  Service: Endoscopy;  Laterality: N/A;  ok to hold Eliquis 2days prior per     ESOPHAGOGASTRODUODENOSCOPY N/A 12/4/2019    Procedure: EGD (ESOPHAGOGASTRODUODENOSCOPY);  Surgeon: Franco Charles MD;  Location: [facility] ([address] [place] [address]);  Service: Endoscopy;  Laterality: N/A;  ok to hold Eliquis 2days prior per     EYE SURGERY      GALLBLADDER SURGERY      RADIOFREQUENCY THERMOCOAGULATION Left 12/18/2018    Procedure: RADIOFREQUENCY THERMAL COAGULATION;  Surgeon: Issac Meyers MD;   Location: [facility];  Service: Pain Management;  Laterality: Left;  OK to hold Eliquis 3 days prior per Dr. Braga       Review of patient's allergies indicates:   Allergen Reactions    Adhesive Other (See Comments)     Tears up the skin and makes it itch   (leads)    Bactrim [sulfamethoxazole-trimethoprim] Rash     Was hospitalized for rash    Restasis [cyclosporine]     Lipitor [atorvastatin] Other (See Comments)     Muscle aches    Albuterol Other (See Comments)     tremors    Topamax [topiramate]      - made her feel 'bad in the head'       No current facility-administered medications on file prior to encounter.      Current Outpatient Medications on File Prior to Encounter   Medication Sig    apixaban (ELIQUIS) 5 mg Tab Take 1 tablet (5 mg total) by mouth 2 (two) times daily.    ergocalciferol (ERGOCALCIFEROL) 50,000 unit Cap TAKE 1 CAPSULE BY MOUTH ONCE A WEEK    escitalopram oxalate (LEXAPRO) 20 MG tablet Take 1 tablet (20 mg total) by mouth once daily.    folic acid (FOLVITE) 1 MG tablet TAKE 1 TABLET BY MOUTH ONCE DAILY    gabapentin (NEURONTIN) 300 MG capsule Take 2 capsules (600 mg total) by mouth 3 (three) times daily.    guaiFENesin (MUCINEX) 600 mg 12 hr tablet Take 1,200 mg by mouth 2 (two) times daily.    ipratropium (ATROVENT) 0.02 % nebulizer solution Take 2.5 mLs (500 mcg total) by nebulization 3 (three) times daily as needed for Wheezing. Inhale 1 vial in nebulizer 3 to 4 times daily    Lactobacillus rhamnosus GG (CULTURELLE) 10 billion cell capsule Take 1 capsule by mouth once daily.    mirtazapine (REMERON) 15 MG tablet Take 1 tablet (15 mg total) by mouth every evening.    multivitamin (THERAGRAN) per tablet Take 1 tablet by mouth once daily.    omeprazole (PRILOSEC) 20 MG capsule TAKE 1 CAPSULE BY MOUTH ONCE DAILY AS NEEDED FOR  HEARTBURN  (TAKE  AS  NEEDED)    ondansetron (ZOFRAN-ODT) 8 MG TbDL Take 1 tablet (8 mg total) by mouth every 8 (eight) hours as needed.     propylene glycol (SYSTANE BALANCE) 0.6 % Drop Apply 1 drop to eye daily as needed (dry eye).    sodium chloride 3% 3 % nebulizer solution Take 4 mLs by nebulization 2 (two) times daily.    umeclidinium-vilanterol (ANORO ELLIPTA) 62.5-25 mcg/actuation DsDv Inhale 1 puff into the lungs once daily. Controller    verapamil (CALAN-SR) 120 MG CR tablet Take 1 tablet (120 mg total) by mouth 2 (two) times daily.    ALPRAZolam (XANAX) 0.25 MG tablet Take 1 tablet (0.25 mg total) by mouth nightly as needed for Anxiety.    chlorthalidone (HYGROTEN) 25 MG Tab Take 1 tablet (25 mg total) by mouth once daily.    desoximetasone (TOPICORT) 0.25 % cream APPLY  CREAM EXTERNALLY TWICE DAILY AS NEEDED    fluocinolone (DERMA-SMOOTHE) 0.01 % external oil APPLY TOPICALLY ONCE DAILY    fluocinonide (LIDEX) 0.05 % external solution Apply topically once daily. - apply after shampooing for 10 days    hyoscyamine (ANASPAZ,LEVSIN) 0.125 mg Tab Take 1 tablet (125 mcg total) by mouth every 6 (six) hours as needed.    ketoconazole (NIZORAL) 2 % shampoo Apply topically once daily.    loperamide (IMODIUM) 2 mg capsule Take 2 capsules after first loose stool, then 1 capsule after each loose stool following. Do not exceed 8 capsules per day.    promethazine (PHENERGAN) 12.5 MG Supp Place 1 suppository (12.5 mg total) rectally every 6 (six) hours as needed.    promethazine (PHENERGAN) 12.5 MG Tab Take 1 tablet (12.5 mg total) by mouth every 6 (six) hours as needed.    sodium chloride 2% (XIOMARA 128) 2 % ophthalmic solution Place 1 drop into both eyes 3 (three) times daily as needed.     Family History     Problem Relation (Age of Onset)    Cancer Father, Brother, Maternal Aunt    Heart attack Mother, Brother, Son    Hyperlipidemia Sister    Hypertension Mother    Lung cancer Sister    Psoriasis Sister    Stroke Maternal Uncle        Tobacco Use    Smoking status: Former Smoker     Packs/day: 2.00     Years: 50.00     Pack years: 100.00      Types: Cigarettes     Last attempt to quit: 5/27/2009     Years since quitting: 10.7    Smokeless tobacco: Never Used   Substance and Sexual Activity    Alcohol use: Not Currently     Frequency: Never     Binge frequency: Never    Drug use: Never    Sexual activity: Yes     Partners: Male     Review of Systems   Constitutional: Negative for activity change, appetite change, chills, diaphoresis, fatigue and fever.   HENT: Positive for nosebleeds. Negative for congestion, ear pain, rhinorrhea, sore throat and trouble swallowing.    Eyes: Negative for pain.   Respiratory: Positive for cough (cough up blood), chest tightness (chest pain with cough), shortness of breath and wheezing.    Cardiovascular: Negative for chest pain, palpitations and leg swelling.   Gastrointestinal: Positive for nausea (chronic). Negative for abdominal distention, abdominal pain, blood in stool, constipation, diarrhea and vomiting.   Endocrine: Negative for cold intolerance and heat intolerance.   Genitourinary: Negative for decreased urine volume, difficulty urinating, dysuria, flank pain, hematuria and pelvic pain.   Musculoskeletal: Negative for arthralgias, myalgias, neck pain and neck stiffness.   Neurological: Positive for tremors (chronic). Negative for dizziness, seizures, syncope, weakness, numbness and headaches.   Hematological: Negative for adenopathy. Does not bruise/bleed easily.   Psychiatric/Behavioral: Negative for agitation and confusion.     Objective:     Vital Signs (Most Recent):  Temp: 98 °F (36.7 °C) (03/05/20 1102)  Pulse: 93 (03/05/20 1102)  Resp: 18 (03/05/20 1102)  BP: (!) 148/65 (03/05/20 1102)  SpO2: (!) 94 % (03/05/20 1102) Vital Signs (24h Range):  Temp:  [97.9 °F (36.6 °C)-98.6 °F (37 °C)] 98 °F (36.7 °C)  Pulse:  [76-93] 93  Resp:  [18-22] 18  SpO2:  [89 %-96 %] 94 %  BP: (128-168)/(56-71) 148/65     Weight: 70.7 kg (155 lb 13.8 oz)  Body mass index is 28.51 kg/m².    Physical Exam   Constitutional: She is  oriented to person, place, and time. She appears well-developed and well-nourished. No distress.   HENT:   Head: Normocephalic and atraumatic.   Eyes: Pupils are equal, round, and reactive to light. Conjunctivae and EOM are normal.   Neck: Normal range of motion. Neck supple. No JVD present.   Cardiovascular: Normal rate, regular rhythm and normal heart sounds.   No murmur heard.  Pulmonary/Chest: She has wheezes. She has rales.   Abdominal: Soft. Bowel sounds are normal. She exhibits no distension. There is no tenderness. There is no guarding.   Musculoskeletal: Normal range of motion. She exhibits no edema.   Neurological: She is alert and oriented to person, place, and time.   Skin: Skin is warm and dry. She is not diaphoretic.   Psychiatric: Thought content normal.         CRANIAL NERVES     CN III, IV, VI   Pupils are equal, round, and reactive to light.  Extraocular motions are normal.        Significant Labs:   Blood Culture:   Recent Labs   Lab 03/04/20 2032 03/04/20 2155   LABBLOO No Growth to date No Growth to date     BMP:   Recent Labs   Lab 03/05/20  0744   *      K 3.2*   CL 98   CO2 28   BUN 14   CREATININE 1.1   CALCIUM 9.5     CBC:   Recent Labs   Lab 03/04/20 2055 03/05/20  0744   WBC 7.19 7.61   HGB 9.9* 9.8*   HCT 33.6* 32.4*    246     CMP:   Recent Labs   Lab 03/04/20 2055 03/05/20  0744    138   K 3.5 3.2*   CL 97 98   CO2 30* 28    115*   BUN 14 14   CREATININE 1.3 1.1   CALCIUM 9.8 9.5   PROT 8.0 7.5   ALBUMIN 4.0 3.7   BILITOT 0.4 0.4   ALKPHOS 81 74   AST 17 15   ALT 14 13   ANIONGAP 11 12   EGFRNONAA 42* 51*     Coagulation: No results for input(s): PT, INR, APTT in the last 48 hours.  Respiratory Culture:   Recent Labs   Lab 03/04/20 2155   GSRESP <10 epithelial cells per low power field.  Moderate WBC's  Few Gram positive cocci in pairs   RESPIRATORYC Insufficient incubation, culture in progress     Troponin:   Recent Labs   Lab 03/04/20 2055    TROPONINI <0.006       Significant Imaging: I have reviewed and interpreted all pertinent imaging results/findings within the past 24 hours.

## 2020-03-05 NOTE — ASSESSMENT & PLAN NOTE
This is a chronic problem. Zofran and Phenergan at home.   EGD 12/2019 gastritis, 2 cm type 1 sliding hernia-- possible contributing to chronic nausea  Colonoscopy 12/2020 normal   Schedule IV Zofran 8mg q8hr  Phenergan PRN  PPI

## 2020-03-05 NOTE — ASSESSMENT & PLAN NOTE
Has small chronic UL filling defect.. Initial diagnosis 2 years prior.. Her recent LE dopplers are negative and has been on OAC x 2 years.. Given increased hemoptysis will hold.

## 2020-03-05 NOTE — ED NOTES
VITAL SIGNS UPDATED. PATIENT UPDATED ON PLAN OF CARE AND APOLOGIZED FOR WAIT TIME. WILL OBTAIN LABS, PATIENT REPORTS SHE NEEDS OXYGEN AT NIGHT. OXYGEN SATURATION WAS 88-89, PATIENT WILL BE PLACED ON NASAL CANNULA 2L ,

## 2020-03-05 NOTE — PROGRESS NOTES
WRITTEN HEALTHCARE DISCHARGE INFORMATION      Things that YOU are RESPONSIBLE for to Manage Your Care At Home:     1. Getting your prescriptions filled.  2. Taking you medications as directed. DO NOT MISS ANY DOSES!  3. Going to your follow-up doctor appointments. This is important because it allows the doctor to monitor your progress and to determine if any changes need to be made to your treatment plan.     If you are unable to make your follow up appointments, please call the number listed and reschedule this appointment.      ____________HELP AT HOME____________________     Experiencing any SIGNS or SYMPTOMS: YOU CAN     Schedule a same day appopintment with your Primary Care Doctor or  you can call [facility] On Call Nurse Care Line for 24/7 assistance at [phone]     If you are experience any signs or symptoms that have become severe, Call 911 and come to your nearest Emergency Room.     Thank you for choosing [facility] and allowing us to care for you.   From your care management team:      You should receive a call from [facility] Discharge Department within 48-72 hours to help manage your care after discharge. Please try to make sure that you answer your phone for this important phone call.    Follow-up Information     Kristi Torres NP On 5/5/2020.    Specialty:  Neurology  Why:  Appointment scheduled for May 5th at 1:45pm  Contact information:  [address]  [place] [address]  [phone]             Urmila Luna MD On 3/11/2020.    Specialties:  Internal Medicine, Pediatrics  Why:  Appointment scheduled for March 11th at 10am  Contact information:  [address] [address]  [phone]

## 2020-03-05 NOTE — ED TRIAGE NOTES
Pt arrived to ED with complaints of coughing up a small amount  bright red blood this morning. Pt reports SOB, and nausea . Denies pain, chest pain.

## 2020-03-05 NOTE — CONSULTS
[facility]  Infectious Disease  Consult Note    Patient Name: Yasmin Asencio  MRN: [id number]  Admission Date: 3/4/2020  Hospital Length of Stay: 0 days  Attending Physician: Carlos Brantley MD  Primary Care Provider: Urmila Luna MD     Isolation Status: No active isolations      Inpatient consult to Infectious Diseases  Consult performed by: Nuvia Beauchamp PA-C  Consult ordered by: Demetrio Edmonds MD        Assessment/Plan:     Hemoptysis  Ms. Asencio is a 68 y/o female with chronic bronchiectasis, hx of pseudomonas infections presents to ED +hemoptysis. She denies having any fever chills or night sweats. She is on eliquis for hx of PE. She reports hemoptysis with exacerbation of her cough. She was on augmentin for her cough recently. CXR with findings including bronchiectasis, no large focal consolidation seen. RCX with GPCs and moderate WBCs. No fever chills or night sweats. She was given a dose of vancomycin and cefepime in ED.     Reports treatment for history of TB in 1970s. 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 AFB culture 2016 negative. No homelessness, incarceration or recent travel. Reports having hemoptysis for years per pt and . Reports having nausea with vancomycin in ED yesterday.     Recommendations  1. Continue cefeipme 2gq8hr. Respiratory cultures pending. Ordered AFB cultures x 3 with TB PCR r/o NTM. Recommend respiratory t herapy evlauaton for aerobica therapy for mucus clearing  2. quantiferon gold TB ordered today  3. Recommend CT chest for further evaluation   3. Will follow cultures and tailor abx accordingly. Discussed with ID staff      Thank you for your consult. I will follow-up with patient. Please contact us if you have any additional questions.    Nuvia Beauchamp PA-C  Infectious Disease  [facility]    Subjective:     Principal Problem: <principal problem not specified>    HPI: Ms. Asencio is a 68 y/o female with chronic  bronchiectasis, hx of pseudomonas infections presents to ED +hemoptysis. She denies having any fever chills or night sweats. She is on eliquis for hx of PE. She reports hemoptysis with exacerbation of her cough. She was on augmentin for her cough recently. CXR with findings including bronchiectasis, no large focal consolidation seen. RCX with GPCs and moderate WBCs. No fever chills or night sweats. She was given a dose of vancomycin and cefepime in ED.     Reports treatment for history of TB in 1970s. 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 AFB culture 2016 negative. No homelessness, incarceration or recent travel. Reports having hemoptysis for years per pt and . Reports having nausea with vancomycin in ED yesterday.     Past Medical History:   Diagnosis Date    Acquired bronchiectasis     due to history of TB - followed by pulmonary, Dr. Zuñiga    Allergy     Amblyopia     rt eye per pt    Anemia of other chronic disease     Anticoagulant long-term use     Anxiety     Cataract     Chronic obstructive pulmonary disease with acute exacerbation     Clotting disorder     COPD (chronic obstructive pulmonary disease)     Depression     Diverticulosis     Essential tremor     GERD (gastroesophageal reflux disease)     Hemangioma of liver     History of tuberculosis 1978    Hypertension     Mixed anxiety and depressive disorder     On home oxygen therapy     Psoriasis     PSVT (paroxysmal supraventricular tachycardia)     Pulmonary embolism     S/P PICC central line placement Apr. 2016 - May 2016    Skin disease     Psoriasis    Spondylosis without myelopathy 8/23/2013    Supraventricular tachycardia     Tachycardia     Thoracic aorta atherosclerosis     noted on CT scan of chest 1/3/2011    Tuberculosis     Vaginal delivery     x2    Vitamin D deficiency        Past Surgical History:   Procedure Laterality Date    CATARACT EXTRACTION W/  INTRAOCULAR LENS IMPLANT  07/24/12    od dr [place]     CATARACT EXTRACTION W/  INTRAOCULAR LENS IMPLANT  08/07/12    left eye    CHOLECYSTECTOMY      COLONOSCOPY N/A 12/4/2019    Procedure: COLONOSCOPY;  Surgeon: Franco Charles MD;  [facility]  Service: Endoscopy;  Laterality: N/A;  ok to hold Eliquis 2days prior per     ESOPHAGOGASTRODUODENOSCOPY N/A 12/4/2019    Procedure: EGD (ESOPHAGOGASTRODUODENOSCOPY);  Surgeon: Franco Charles MD;  [facility]  Service: Endoscopy;  Laterality: N/A;  ok to hold Eliquis 2days prior per     EYE SURGERY      GALLBLADDER SURGERY      RADIOFREQUENCY THERMOCOAGULATION Left 12/18/2018    Procedure: RADIOFREQUENCY THERMAL COAGULATION;  Surgeon: Issac Meyers MD;  Location: [facility];  Service: Pain Management;  Laterality: Left;  OK to hold Eliquis 3 days prior per Dr. Braga       Review of patient's allergies indicates:   Allergen Reactions    Adhesive Other (See Comments)     Tears up the skin and makes it itch   (leads)    Bactrim [sulfamethoxazole-trimethoprim] Rash     Was hospitalized for rash    Restasis [cyclosporine]     Lipitor [atorvastatin] Other (See Comments)     Muscle aches    Albuterol Other (See Comments)     tremors    Topamax [topiramate]      - made her feel 'bad in the head'       Medications:  Medications Prior to Admission   Medication Sig    apixaban (ELIQUIS) 5 mg Tab Take 1 tablet (5 mg total) by mouth 2 (two) times daily.    ergocalciferol (ERGOCALCIFEROL) 50,000 unit Cap TAKE 1 CAPSULE BY MOUTH ONCE A WEEK    escitalopram oxalate (LEXAPRO) 20 MG tablet Take 1 tablet (20 mg total) by mouth once daily.    folic acid (FOLVITE) 1 MG tablet TAKE 1 TABLET BY MOUTH ONCE DAILY    gabapentin (NEURONTIN) 300 MG capsule Take 2 capsules (600 mg total) by mouth 3 (three) times daily.    guaiFENesin (MUCINEX) 600 mg 12 hr tablet Take 1,200 mg by mouth 2 (two) times daily.    ipratropium (ATROVENT) 0.02 % nebulizer solution Take  2.5 mLs (500 mcg total) by nebulization 3 (three) times daily as needed for Wheezing. Inhale 1 vial in nebulizer 3 to 4 times daily    Lactobacillus rhamnosus GG (CULTURELLE) 10 billion cell capsule Take 1 capsule by mouth once daily.    mirtazapine (REMERON) 15 MG tablet Take 1 tablet (15 mg total) by mouth every evening.    multivitamin (THERAGRAN) per tablet Take 1 tablet by mouth once daily.    omeprazole (PRILOSEC) 20 MG capsule TAKE 1 CAPSULE BY MOUTH ONCE DAILY AS NEEDED FOR  HEARTBURN  (TAKE  AS  NEEDED)    ondansetron (ZOFRAN-ODT) 8 MG TbDL Take 1 tablet (8 mg total) by mouth every 8 (eight) hours as needed.    propylene glycol (SYSTANE BALANCE) 0.6 % Drop Apply 1 drop to eye daily as needed (dry eye).    sodium chloride 3% 3 % nebulizer solution Take 4 mLs by nebulization 2 (two) times daily.    umeclidinium-vilanterol (ANORO ELLIPTA) 62.5-25 mcg/actuation DsDv Inhale 1 puff into the lungs once daily. Controller    verapamil (CALAN-SR) 120 MG CR tablet Take 1 tablet (120 mg total) by mouth 2 (two) times daily.    ALPRAZolam (XANAX) 0.25 MG tablet Take 1 tablet (0.25 mg total) by mouth nightly as needed for Anxiety.    chlorthalidone (HYGROTEN) 25 MG Tab Take 1 tablet (25 mg total) by mouth once daily.    desoximetasone (TOPICORT) 0.25 % cream APPLY  CREAM EXTERNALLY TWICE DAILY AS NEEDED    fluocinolone (DERMA-SMOOTHE) 0.01 % external oil APPLY TOPICALLY ONCE DAILY    fluocinonide (LIDEX) 0.05 % external solution Apply topically once daily. - apply after shampooing for 10 days    hyoscyamine (ANASPAZ,LEVSIN) 0.125 mg Tab Take 1 tablet (125 mcg total) by mouth every 6 (six) hours as needed.    ketoconazole (NIZORAL) 2 % shampoo Apply topically once daily.    loperamide (IMODIUM) 2 mg capsule Take 2 capsules after first loose stool, then 1 capsule after each loose stool following. Do not exceed 8 capsules per day.    promethazine (PHENERGAN) 12.5 MG Supp Place 1 suppository (12.5 mg total)  rectally every 6 (six) hours as needed.    promethazine (PHENERGAN) 12.5 MG Tab Take 1 tablet (12.5 mg total) by mouth every 6 (six) hours as needed.    sodium chloride 2% (XIOMARA 128) 2 % ophthalmic solution Place 1 drop into both eyes 3 (three) times daily as needed.     Antibiotics (From admission, onward)    Start     Stop Route Frequency Ordered    03/04/20 2030  cefepime in dextrose 5 % IVPB 2 g      -- IV Every 8 hours (non-standard times) 03/04/20 2011        Antifungals (From admission, onward)    None        Antivirals (From admission, onward)    None           Immunization History   Administered Date(s) Administered    Influenza 10/06/2009, 10/27/2010, 09/21/2011, 01/07/2013, 12/11/2013, 10/06/2014    Influenza - High Dose - PF (65 years and older) 10/03/2016, 10/09/2017, 09/25/2018, 11/01/2019    Influenza - Trivalent (ADULT) 10/06/2014    Influenza A (H1N1) 2009 Monovalent - IM 01/12/2010    Influenza Split 10/06/2009, 10/27/2010, 09/21/2011, 01/07/2013, 01/07/2013, 12/11/2013, 12/11/2013, 10/06/2014    PPD Test 07/24/2015    Pneumococcal Conjugate - 13 Valent 11/03/2008, 11/03/2008, 10/19/2015    Pneumococcal Polysaccharide - 23 Valent 02/11/2015    Tdap 01/19/2009    Zoster Recombinant 08/21/2019, 11/01/2019       Family History     Problem Relation (Age of Onset)    Cancer Father, Brother, Maternal Aunt    Heart attack Mother, Brother, Son    Hyperlipidemia Sister    Hypertension Mother    Lung cancer Sister    Psoriasis Sister    Stroke Maternal Uncle        Social History     Socioeconomic History    Marital status:      Spouse name: Not on file    Number of children: 2    Years of education: Not on file    Highest education level: Not on file   Occupational History    Occupation: housewife   Social Needs    Financial resource strain: Not very hard    Food insecurity:     Worry: Never true     Inability: Never true    Transportation needs:     Medical: No     Non-medical:  No   Tobacco Use    Smoking status: Former Smoker     Packs/day: 2.00     Years: 50.00     Pack years: 100.00     Types: Cigarettes     Last attempt to quit: 2009     Years since quitting: 10.7    Smokeless tobacco: Never Used   Substance and Sexual Activity    Alcohol use: Not Currently     Frequency: Never     Binge frequency: Never    Drug use: Never    Sexual activity: Yes     Partners: Male   Lifestyle    Physical activity:     Days per week: 0 days     Minutes per session: 0 min    Stress: Very much   Relationships    Social connections:     Talks on phone: More than three times a week     Gets together: Once a week     Attends Sikhism service: Not on file     Active member of club or organization: No     Attends meetings of clubs or organizations: Never     Relationship status:    Other Topics Concern    Are you pregnant or think you may be? No    Breast-feeding No   Social History Narrative    ** Merged History Encounter **         One child  of a heart attack at age 35.     Review of Systems   Constitutional: Positive for fatigue. Negative for chills, diaphoresis and fever.   Respiratory: Positive for cough, choking and wheezing.    Cardiovascular: Negative for chest pain, palpitations and leg swelling.   Gastrointestinal: Positive for nausea. Negative for abdominal pain, diarrhea and vomiting.   Musculoskeletal: Negative for back pain.   Skin: Negative for color change, pallor, rash and wound.   Neurological: Negative for dizziness and headaches.   All other systems reviewed and are negative.    Objective:     Vital Signs (Most Recent):  Temp: 98 °F (36.7 °C) (20 1102)  Pulse: 93 (20 1102)  Resp: 18 (20 1102)  BP: (!) 148/65 (20 1102)  SpO2: (!) 94 % (20 1102) Vital Signs (24h Range):  Temp:  [97.9 °F (36.6 °C)-98.6 °F (37 °C)] 98 °F (36.7 °C)  Pulse:  [76-93] 93  Resp:  [18-22] 18  SpO2:  [89 %-96 %] 94 %  BP: (128-168)/(56-71) 148/65     Weight:  70.7 kg (155 lb 13.8 oz)  Body mass index is 28.51 kg/m².    Estimated Creatinine Clearance: 43.8 mL/min (based on SCr of 1.1 mg/dL).    Physical Exam   Constitutional: She appears well-developed and well-nourished. No distress.   HENT:   Head: Normocephalic.   Eyes: Pupils are equal, round, and reactive to light.   Cardiovascular: Normal rate, regular rhythm and normal heart sounds.   No murmur heard.  Pulmonary/Chest: Effort normal. She has wheezes. She has rales.   On nasal cannula   Abdominal: Soft. She exhibits no distension.   Musculoskeletal: Normal range of motion. She exhibits no edema or deformity.   Neurological: She is alert.   Skin: Skin is warm. She is not diaphoretic. No erythema. No pallor.   Psychiatric: She has a normal mood and affect.   Vitals reviewed.      Significant Labs: All pertinent labs within the past 24 hours have been reviewed.    Significant Imaging: I have reviewed all pertinent imaging results/findings within the past 24 hours.

## 2020-03-05 NOTE — ASSESSMENT & PLAN NOTE
Hgb consistent with previous labs. Denies history of or current melena, hematochezia, hemoptysis, or hematemesis. Obtain iron studies.

## 2020-03-05 NOTE — ASSESSMENT & PLAN NOTE
Hx of K[place] and recurrent pseudomonas infection resistant to cipro  No episode of hemoptysis overnight. Cough improved. No fever/leukocytosis. SOB improved with breathing treatment. SpO2 92-94% on 2L NC Oxygen supplement. On Vanc+Cefepime, but Vanc stopped due to body redness?   ID and Pulmonary following.   Continue Cefepime  Check quantiferon gold TB and AFB x 3   Prednisone 40 mg daily x5 days  Stop Vanc and Eliquis  Respiratory Viral Panel ordered  Continue to wean Oxygen >88 %, xopenox and acapella   Will consider CTA chest time for bronchial circulation if recurrent hemoptosis

## 2020-03-05 NOTE — PLAN OF CARE
03/05/20 1010   Discharge Assessment   Assessment Type Discharge Planning Assessment   Confirmed/corrected address and phone number on facesheet? Yes   Assessment information obtained from? Patient   Expected Length of Stay (days) 1   Communicated expected length of stay with patient/caregiver yes   Prior to hospitilization cognitive status: Alert/Oriented   Prior to hospitalization functional status: Independent   Current cognitive status: Alert/Oriented   Current Functional Status: Independent   Lives With spouse   Able to Return to Prior Arrangements yes   Is patient able to care for self after discharge? Yes   Who are your caregiver(s) and their phone number(s)? Carl Asencio () [phone]   Patient's perception of discharge disposition home or selfcare   Readmission Within the Last 30 Days previous discharge plan unsuccessful  (OBS admission on 2/17/20 for same)   If yes, most recent facility name: [facility]   Patient currently being followed by outpatient case management? No   Patient currently receives any other outside agency services? No   Equipment Currently Used at Home oxygen;shower chair   Do you have any problems affording any of your prescribed medications? No   Is the patient taking medications as prescribed? yes   Does the patient have transportation home? Yes   Does the patient receive services at the [facility]? No   Discharge Plan A Home   Discharge Plan B Home Health   DME Needed Upon Discharge  none   Patient/Family in Agreement with Plan yes   Readmission Questionnaire   At the time of your discharge, did someone talk to you about what your health problems were? Yes   At the time of discharge, did someone talk to you about what to watch out for regarding worsening of your health problem? Yes   At the time of discharge, did someone talk to you about what to do if you experienced worsening of your health problem? Yes   At the time of discharge, did someone talk to you about which  medication to take when you left the hospital and which ones to stop taking? Yes   At the time of discharge, did someone talk to you about when and where to follow up with a doctor after you left the hospital? Yes   Do you have problems taking your medications as prescribed? No   Do you have any problems affording any of  your prescribed medications? No   Do you have problems obtaining/receiving your medications? No   Living Arrangements [id number]  [address] [address] [place] [address]  Phone: [phone] Fax: [phone]    [facility] - [address]  [address]  [facility] [address]  Phone: [phone] Fax: [phone]      To patient's room to discuss patient managing his care at home.       TN Role Explained.  Patient identified by using 2 identifiers:  Name and date of birth     Patient stated that , Carl, WILL HELP AT HOME WITH his RECOVERY if needed.       TN name and contact info placed on the communication board; encouraged to call for questions; does not anticipate any further discharge needs

## 2020-03-05 NOTE — SUBJECTIVE & OBJECTIVE
Past Medical History:   Diagnosis Date    Acquired bronchiectasis     due to history of TB - followed by pulmonary, Dr. Zuñiga    Allergy     Amblyopia     rt eye per pt    Anemia of other chronic disease     Anticoagulant long-term use     Anxiety     Cataract     Chronic obstructive pulmonary disease with acute exacerbation     Clotting disorder     COPD (chronic obstructive pulmonary disease)     Depression     Diverticulosis     Essential tremor     GERD (gastroesophageal reflux disease)     Hemangioma of liver     History of tuberculosis 1978    Hypertension     Mixed anxiety and depressive disorder     On home oxygen therapy     Psoriasis     PSVT (paroxysmal supraventricular tachycardia)     Pulmonary embolism     S/P PICC central line placement Apr. 2016 - May 2016    Skin disease     Psoriasis    Spondylosis without myelopathy 8/23/2013    Supraventricular tachycardia     Tachycardia     Thoracic aorta atherosclerosis     noted on CT scan of chest 1/3/2011    Tuberculosis     Vaginal delivery     x2    Vitamin D deficiency        Past Surgical History:   Procedure Laterality Date    CATARACT EXTRACTION W/  INTRAOCULAR LENS IMPLANT  07/24/12    od dr spain    CATARACT EXTRACTION W/  INTRAOCULAR LENS IMPLANT  08/07/12    left eye    CHOLECYSTECTOMY      COLONOSCOPY N/A 12/4/2019    Procedure: COLONOSCOPY;  Surgeon: Franco Charles MD;  Location: [address]);  Service: Endoscopy;  Laterality: N/A;  ok to hold Eliquis 2days prior per     ESOPHAGOGASTRODUODENOSCOPY N/A 12/4/2019    Procedure: EGD (ESOPHAGOGASTRODUODENOSCOPY);  Surgeon: Franco Charles MD;  Location: [facility] ([address] [place] [address]);  Service: Endoscopy;  Laterality: N/A;  ok to hold Eliquis 2days prior per     EYE SURGERY      GALLBLADDER SURGERY      RADIOFREQUENCY THERMOCOAGULATION Left 12/18/2018    Procedure: RADIOFREQUENCY THERMAL COAGULATION;  Surgeon: Issac Meyers MD;   Location: [facility];  Service: Pain Management;  Laterality: Left;  OK to hold Eliquis 3 days prior per Dr. Braga       Review of patient's allergies indicates:   Allergen Reactions    Adhesive Other (See Comments)     Tears up the skin and makes it itch   (leads)    Bactrim [sulfamethoxazole-trimethoprim] Rash     Was hospitalized for rash    Restasis [cyclosporine]     Lipitor [atorvastatin] Other (See Comments)     Muscle aches    Albuterol Other (See Comments)     tremors    Topamax [topiramate]      - made her feel 'bad in the head'       Medications:  Medications Prior to Admission   Medication Sig    apixaban (ELIQUIS) 5 mg Tab Take 1 tablet (5 mg total) by mouth 2 (two) times daily.    ergocalciferol (ERGOCALCIFEROL) 50,000 unit Cap TAKE 1 CAPSULE BY MOUTH ONCE A WEEK    escitalopram oxalate (LEXAPRO) 20 MG tablet Take 1 tablet (20 mg total) by mouth once daily.    folic acid (FOLVITE) 1 MG tablet TAKE 1 TABLET BY MOUTH ONCE DAILY    gabapentin (NEURONTIN) 300 MG capsule Take 2 capsules (600 mg total) by mouth 3 (three) times daily.    guaiFENesin (MUCINEX) 600 mg 12 hr tablet Take 1,200 mg by mouth 2 (two) times daily.    ipratropium (ATROVENT) 0.02 % nebulizer solution Take 2.5 mLs (500 mcg total) by nebulization 3 (three) times daily as needed for Wheezing. Inhale 1 vial in nebulizer 3 to 4 times daily    Lactobacillus rhamnosus GG (CULTURELLE) 10 billion cell capsule Take 1 capsule by mouth once daily.    mirtazapine (REMERON) 15 MG tablet Take 1 tablet (15 mg total) by mouth every evening.    multivitamin (THERAGRAN) per tablet Take 1 tablet by mouth once daily.    omeprazole (PRILOSEC) 20 MG capsule TAKE 1 CAPSULE BY MOUTH ONCE DAILY AS NEEDED FOR  HEARTBURN  (TAKE  AS  NEEDED)    ondansetron (ZOFRAN-ODT) 8 MG TbDL Take 1 tablet (8 mg total) by mouth every 8 (eight) hours as needed.    propylene glycol (SYSTANE BALANCE) 0.6 % Drop Apply 1 drop to eye daily as needed (dry eye).     sodium chloride 3% 3 % nebulizer solution Take 4 mLs by nebulization 2 (two) times daily.    umeclidinium-vilanterol (ANORO ELLIPTA) 62.5-25 mcg/actuation DsDv Inhale 1 puff into the lungs once daily. Controller    verapamil (CALAN-SR) 120 MG CR tablet Take 1 tablet (120 mg total) by mouth 2 (two) times daily.    ALPRAZolam (XANAX) 0.25 MG tablet Take 1 tablet (0.25 mg total) by mouth nightly as needed for Anxiety.    chlorthalidone (HYGROTEN) 25 MG Tab Take 1 tablet (25 mg total) by mouth once daily.    desoximetasone (TOPICORT) 0.25 % cream APPLY  CREAM EXTERNALLY TWICE DAILY AS NEEDED    fluocinolone (DERMA-SMOOTHE) 0.01 % external oil APPLY TOPICALLY ONCE DAILY    fluocinonide (LIDEX) 0.05 % external solution Apply topically once daily. - apply after shampooing for 10 days    hyoscyamine (ANASPAZ,LEVSIN) 0.125 mg Tab Take 1 tablet (125 mcg total) by mouth every 6 (six) hours as needed.    ketoconazole (NIZORAL) 2 % shampoo Apply topically once daily.    loperamide (IMODIUM) 2 mg capsule Take 2 capsules after first loose stool, then 1 capsule after each loose stool following. Do not exceed 8 capsules per day.    promethazine (PHENERGAN) 12.5 MG Supp Place 1 suppository (12.5 mg total) rectally every 6 (six) hours as needed.    promethazine (PHENERGAN) 12.5 MG Tab Take 1 tablet (12.5 mg total) by mouth every 6 (six) hours as needed.    sodium chloride 2% (XIOMARA 128) 2 % ophthalmic solution Place 1 drop into both eyes 3 (three) times daily as needed.     Antibiotics (From admission, onward)    Start     Stop Route Frequency Ordered    03/04/20 2030  cefepime in dextrose 5 % IVPB 2 g      -- IV Every 8 hours (non-standard times) 03/04/20 2011        Antifungals (From admission, onward)    None        Antivirals (From admission, onward)    None           Immunization History   Administered Date(s) Administered    Influenza 10/06/2009, 10/27/2010, 09/21/2011, 01/07/2013, 12/11/2013, 10/06/2014     Influenza - High Dose - PF (65 years and older) 10/03/2016, 10/09/2017, 09/25/2018, 11/01/2019    Influenza - Trivalent (ADULT) 10/06/2014    Influenza A (H1N1) 2009 Monovalent - IM 01/12/2010    Influenza Split 10/06/2009, 10/27/2010, 09/21/2011, 01/07/2013, 01/07/2013, 12/11/2013, 12/11/2013, 10/06/2014    PPD Test 07/24/2015    Pneumococcal Conjugate - 13 Valent 11/03/2008, 11/03/2008, 10/19/2015    Pneumococcal Polysaccharide - 23 Valent 02/11/2015    Tdap 01/19/2009    Zoster Recombinant 08/21/2019, 11/01/2019       Family History     Problem Relation (Age of Onset)    Cancer Father, Brother, Maternal Aunt    Heart attack Mother, Brother, Son    Hyperlipidemia Sister    Hypertension Mother    Lung cancer Sister    Psoriasis Sister    Stroke Maternal Uncle        Social History     Socioeconomic History    Marital status:      Spouse name: Not on file    Number of children: 2    Years of education: Not on file    Highest education level: Not on file   Occupational History    Occupation: housewife   Social Needs    Financial resource strain: Not very hard    Food insecurity:     Worry: Never true     Inability: Never true    Transportation needs:     Medical: No     Non-medical: No   Tobacco Use    Smoking status: Former Smoker     Packs/day: 2.00     Years: 50.00     Pack years: 100.00     Types: Cigarettes     Last attempt to quit: 5/27/2009     Years since quitting: 10.7    Smokeless tobacco: Never Used   Substance and Sexual Activity    Alcohol use: Not Currently     Frequency: Never     Binge frequency: Never    Drug use: Never    Sexual activity: Yes     Partners: Male   Lifestyle    Physical activity:     Days per week: 0 days     Minutes per session: 0 min    Stress: Very much   Relationships    Social connections:     Talks on phone: More than three times a week     Gets together: Once a week     Attends [employer] service: Not on file     Active member of club or  organization: No     Attends meetings of clubs or organizations: Never     Relationship status:    Other Topics Concern    Are you pregnant or think you may be? No    Breast-feeding No   Social History Narrative    ** Merged History Encounter **         One child  of a heart attack at age 35.     Review of Systems   Constitutional: Positive for fatigue. Negative for chills, diaphoresis and fever.   Respiratory: Positive for cough, choking and wheezing.    Cardiovascular: Negative for chest pain, palpitations and leg swelling.   Gastrointestinal: Positive for nausea. Negative for abdominal pain, diarrhea and vomiting.   Musculoskeletal: Negative for back pain.   Skin: Negative for color change, pallor, rash and wound.   Neurological: Negative for dizziness and headaches.   All other systems reviewed and are negative.    Objective:     Vital Signs (Most Recent):  Temp: 98 °F (36.7 °C) (20 1102)  Pulse: 93 (20 1102)  Resp: 18 (20 1102)  BP: (!) 148/65 (20 1102)  SpO2: (!) 94 % (20 1102) Vital Signs (24h Range):  Temp:  [97.9 °F (36.6 °C)-98.6 °F (37 °C)] 98 °F (36.7 °C)  Pulse:  [76-93] 93  Resp:  [18-22] 18  SpO2:  [89 %-96 %] 94 %  BP: (128-168)/(56-71) 148/65     Weight: 70.7 kg (155 lb 13.8 oz)  Body mass index is 28.51 kg/m².    Estimated Creatinine Clearance: 43.8 mL/min (based on SCr of 1.1 mg/dL).    Physical Exam   Constitutional: She appears well-developed and well-nourished. No distress.   HENT:   Head: Normocephalic.   Eyes: Pupils are equal, round, and reactive to light.   Cardiovascular: Normal rate, regular rhythm and normal heart sounds.   No murmur heard.  Pulmonary/Chest: Effort normal. She has wheezes. She has rales.   On nasal cannula   Abdominal: Soft. She exhibits no distension.   Musculoskeletal: Normal range of motion. She exhibits no edema or deformity.   Neurological: She is alert.   Skin: Skin is warm. She is not diaphoretic. No erythema. No pallor.    Psychiatric: She has a normal mood and affect.   Vitals reviewed.      Significant Labs: All pertinent labs within the past 24 hours have been reviewed.    Significant Imaging: I have reviewed all pertinent imaging results/findings within the past 24 hours.

## 2020-03-05 NOTE — ED NOTES
Rec'd report from LETTY Mantilla RN. Pt is A & O x 3, no respiratory distress observed and skin is warm, dry and pink. VSS. Pt is connected to the pulse ox, B/P cuff and EKG monitor. Bed is locked and in the low position w/ the side rails up and locked for pt safety. Call bell and  at the BS. Will continue to monitor closely.

## 2020-03-05 NOTE — SUBJECTIVE & OBJECTIVE
Past Medical History:   Diagnosis Date    Acquired bronchiectasis     due to history of TB - followed by pulmonary, Dr. Zuñiga    Allergy     Amblyopia     rt eye per pt    Anemia of other chronic disease     Anticoagulant long-term use     Anxiety     Cataract     Chronic obstructive pulmonary disease with acute exacerbation     Clotting disorder     COPD (chronic obstructive pulmonary disease)     Depression     Diverticulosis     Essential tremor     GERD (gastroesophageal reflux disease)     Hemangioma of liver     History of tuberculosis 1978    Hypertension     Mixed anxiety and depressive disorder     On home oxygen therapy     Psoriasis     PSVT (paroxysmal supraventricular tachycardia)     Pulmonary embolism     S/P PICC central line placement Apr. 2016 - May 2016    Skin disease     Psoriasis    Spondylosis without myelopathy 8/23/2013    Supraventricular tachycardia     Tachycardia     Thoracic aorta atherosclerosis     noted on CT scan of chest 1/3/2011    Tuberculosis     Vaginal delivery     x2    Vitamin D deficiency        Past Surgical History:   Procedure Laterality Date    CATARACT EXTRACTION W/  INTRAOCULAR LENS IMPLANT  07/24/12    od dr spain    CATARACT EXTRACTION W/  INTRAOCULAR LENS IMPLANT  08/07/12    left eye    CHOLECYSTECTOMY      COLONOSCOPY N/A 12/4/2019    Procedure: COLONOSCOPY;  Surgeon: Franco Charles MD;  Location: [address]);  Service: Endoscopy;  Laterality: N/A;  ok to hold Eliquis 2days prior per     ESOPHAGOGASTRODUODENOSCOPY N/A 12/4/2019    Procedure: EGD (ESOPHAGOGASTRODUODENOSCOPY);  Surgeon: Franco Charles MD;  Location: [facility] ([address] [place] [address]);  Service: Endoscopy;  Laterality: N/A;  ok to hold Eliquis 2days prior per     EYE SURGERY      GALLBLADDER SURGERY      RADIOFREQUENCY THERMOCOAGULATION Left 12/18/2018    Procedure: RADIOFREQUENCY THERMAL COAGULATION;  Surgeon: Issac Meyers MD;   Location: [facility];  Service: Pain Management;  Laterality: Left;  OK to hold Eliquis 3 days prior per Dr. Braga       Review of patient's allergies indicates:   Allergen Reactions    Adhesive Other (See Comments)     Tears up the skin and makes it itch   (leads)    Bactrim [sulfamethoxazole-trimethoprim] Rash     Was hospitalized for rash    Restasis [cyclosporine]     Lipitor [atorvastatin] Other (See Comments)     Muscle aches    Albuterol Other (See Comments)     tremors    Topamax [topiramate]      - made her feel 'bad in the head'       Social/Family History- Reviewed and updated.     Review of Systems   Constitutional: Positive for activity change, appetite change and fatigue. Negative for chills, diaphoresis, fever and unexpected weight change.   HENT: Positive for nosebleeds. Negative for congestion, rhinorrhea, sinus pressure, sinus pain, sore throat, tinnitus, trouble swallowing and voice change.    Eyes: Negative for photophobia, pain, discharge, redness and visual disturbance.   Respiratory: Positive for cough, shortness of breath and wheezing. Negative for chest tightness and stridor.    Cardiovascular: Negative for chest pain, palpitations and leg swelling.   Gastrointestinal: Positive for nausea and vomiting. Negative for abdominal distention, abdominal pain, anal bleeding, blood in stool, constipation, diarrhea and rectal pain.   Endocrine: Negative for polydipsia, polyphagia and polyuria.   Genitourinary: Negative for dysuria, flank pain and hematuria.   Musculoskeletal: Negative for arthralgias, back pain, gait problem, joint swelling, myalgias, neck pain and neck stiffness.   Skin: Negative for color change, pallor, rash and wound.   Allergic/Immunologic: Negative for immunocompromised state.   Neurological: Positive for weakness. Negative for dizziness and headaches.   All other systems reviewed and are negative.    Objective:     Vital Signs (Most Recent):  Temp: 98 °F (36.7 °C)  (03/05/20 1102)  Pulse: 93 (03/05/20 1102)  Resp: 18 (03/05/20 1102)  BP: (!) 148/65 (03/05/20 1102)  SpO2: (!) 94 % (03/05/20 1102) Vital Signs (24h Range):  Temp:  [97.9 °F (36.6 °C)-98.6 °F (37 °C)] 98 °F (36.7 °C)  Pulse:  [76-93] 93  Resp:  [18-22] 18  SpO2:  [89 %-96 %] 94 %  BP: (128-168)/(56-71) 148/65     Weight: 70.7 kg (155 lb 13.8 oz)  Body mass index is 28.51 kg/m².      Intake/Output Summary (Last 24 hours) at 3/5/2020 1506  Last data filed at 3/5/2020 0500  Gross per 24 hour   Intake 100 ml   Output --   Net 100 ml       Physical Exam   Constitutional: She is oriented to person, place, and time. She appears well-developed and well-nourished. No distress.   Appears run down/uncomfortable.    HENT:   Head: Normocephalic and atraumatic.   Right Ear: External ear normal.   Left Ear: External ear normal.   Nose: Nose normal.   Mouth/Throat: Oropharynx is clear and moist. No oropharyngeal exudate.   Eyes: Pupils are equal, round, and reactive to light. Conjunctivae and EOM are normal. Right eye exhibits no discharge. Left eye exhibits no discharge. No scleral icterus.   Neck: Normal range of motion. Neck supple. No JVD present. No tracheal deviation present. No thyromegaly present.   Cardiovascular: Normal rate, regular rhythm, normal heart sounds and intact distal pulses. Exam reveals no gallop and no friction rub.   No murmur heard.  Pulmonary/Chest: Effort normal. No respiratory distress. She has wheezes. She has rales.   Abdominal: Soft. Bowel sounds are normal. She exhibits no distension and no mass. There is no tenderness. No hernia.   Musculoskeletal: Normal range of motion. She exhibits no edema, tenderness or deformity.   Lymphadenopathy:     She has no cervical adenopathy.   Neurological: She is alert and oriented to person, place, and time. No cranial nerve deficit. Coordination normal.   Skin: Skin is warm and dry. Capillary refill takes less than 2 seconds. No rash noted. She is not  diaphoretic. No erythema. No pallor.   Psychiatric: She has a normal mood and affect. Her behavior is normal.       Vents:       Lines/Drains/Airways     Peripheral Intravenous Line                 Peripheral IV - Single Lumen 03/04/20 2124 18 G Right Antecubital less than 1 day                Significant Labs:    CBC/Anemia Profile:  Recent Labs   Lab 03/04/20 2055 03/05/20  0744   WBC 7.19 7.61   HGB 9.9* 9.8*   HCT 33.6* 32.4*    246   MCV 83 82   RDW 15.0* 15.0*        Chemistries:  Recent Labs   Lab 03/04/20 2055 03/05/20  0744    138   K 3.5 3.2*   CL 97 98   CO2 30* 28   BUN 14 14   CREATININE 1.3 1.1   CALCIUM 9.8 9.5   ALBUMIN 4.0 3.7   PROT 8.0 7.5   BILITOT 0.4 0.4   ALKPHOS 81 74   ALT 14 13   AST 17 15       All additional results in last 24 hours reviewed.     Significant Imaging:   I have reviewed and interpreted all pertinent imaging results/findings within the past 24 hours.     CXR (admit)     The cardiomediastinal silhouette is not enlarged, stable..  There is no pleural effusion.  The trachea is deviated rightward by a tortuous aorta..  The lungs are symmetrically expanded bilaterally with coarse interstitial attenuation.  There is bronchiectasis and emphysematous/fibrotic change projected over the bilateral upper lung zones, left greater than right, grossly stable..  No large focal consolidation seen.  There is no pneumothorax.  The osseous structures are remarkable for degenerative changes..    CT Chest 7/5-      1. Absence of superior segment dominant nodule right lower lobe from previous exam.  Micro nodules stable.  2. Emphysema and bronchiectasis changes of lungs stable.        CT chest-   1. Chronic filling defects within the right upper lobe pulmonary artery branch consistent with chronic pulmonary embolism.  No new acute pulmonary thromboembolism identified.  2. Stable fibrotic changes throughout the lung apices, left greater than right, with bronchiectasis, interstitial  prominence, and mosaic attenuation of the lungs, similar to prior CTA 01/05/2017.  3. Small area of focal nodular consolidation within the left lower lobe, not seen on prior studies which, may be infectious or inflammatory in etiology, possibly even contiguous from the left apical fibrotic changes.  Suggest follow-up CT scan 1 month after antimicrobial therapy to exclude neoplastic nodule.  4. Stable 4 mm nodule within the right middle lobe.  5. Additional findings as detailed above.     CT 2/6/2019     Bilateral upper lung zone predominant lung disease, primarily characterized by severe bronchiectasis.  Overall appearance suggests sequela of chronic infection.    Ground-glass opacities and pulmonary nodule clusters improved from prior study.  0.7 cm right lower lobe pulmonary nodule, new from prior study.  Recommend follow-up chest CT at 6 months to ensure nodule stability.     2D Echo:      Left Ventricle Normal ejection fraction . Cavity is normal. Normal left ventricle diastolic function.   Right Ventricle Normal cavity size.   Left Atrium Cavity is mildly dilated.   Right Atrium Normal cavity size.   Aortic Valve Normal valve structure.   Mitral Valve Normal valve structure. There is mild mitral annular calcification.   Tricuspid Valve The tricuspid valve is normal. Trace regurgitation.   Pulmonic Valve Normal valve structure.   IVC/SVC Normal central venous pressure (3 mm Hg).   Ascending Aorta Normal aortic root, size and contour.   Pericardium No pericardial effusion. Pericardium is normal.      US Doppler-   1. Right lower extremity venous ultrasound shows normal flow in the deep venous system and no DVT.  2. Left lower extremity venous ultrasound shows normal flow in the deep venous system and no DVT.         PFT's (4/2017):      FEV1/FVC- 74  FEV1- 1.24L (58)  FVC- 1.67L (62)   DLCO- 53      PSG- None

## 2020-03-05 NOTE — ASSESSMENT & PLAN NOTE
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 Most recent negative x 2.  Records not available from prior treatment. 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 Chest imaging is stable but now with worsening dyspnea, sputum production and scant hemoptysis suggestive of bronchiectasis exacerbation. Admitted due to history of resistant organisms and failed OP Abx.     -- Stop eliquis   -- Agree with cefepime awaiting culture results.   -- Start prednisone 40mg daily x 5 days   -- Wean supplemental O2 to maintain SpO2 >88%. On 5L at home   -- Anti-emetics   -- Bronchodilators and acapella for mucous clearance   -- If increased hemoptysis please obtain STAT CTA chest time for bronchial circulation

## 2020-03-05 NOTE — ED PROVIDER NOTES
Encounter Date: 3/4/2020    SCRIBE #1 NOTE: I, Gustavo Hess, am scribing for, and in the presence of,  Demetrio Edmonds MD. I have scribed the following portions of the note - Other sections scribed: HPI, ROS, PE.       History     Chief Complaint   Patient presents with    Hemoptysis     pt c/o cough and blood tinged sputum starting today. Denies fever. Pt with hx of COPD. Pt reports bright red blood in sputum      This is a 69 y.o. Female with a PMHx of COPD, PE, tuberculosis, and acquired bronchiectasis who is referred to the ED by her pulmonologist for evaluation of gross hemoptysis with bright red blood and red mucous that started this morning.  She is not coughing up clots.  Associated with worsening SOB compared to baseline. She denies abd pain, HA, CP, blood in stool, dysuria, hematuria, or any other symptoms. She admits to mild epistaxis this morning.  No gingival bleeding.  No bruising.  Patient is on Eliquis for history of pulmonary embolism.  Denies any worsening or alleviating factors.  She recently completed a course of Augmentin for worsening cough.  Patient has history of recurrent Pseudomonas infection of the lungs.  She states that she usually develops hemoptysis when she has an exacerbation.  Pulmonology recommended admission on vancomycin and cefepime.        The history is provided by the patient and medical records.     Review of patient's allergies indicates:   Allergen Reactions    Adhesive Other (See Comments)     Tears up the skin and makes it itch   (leads)    Bactrim [sulfamethoxazole-trimethoprim] Rash     Was hospitalized for rash    Restasis [cyclosporine]     Lipitor [atorvastatin] Other (See Comments)     Muscle aches    Albuterol Other (See Comments)     tremors    Topamax [topiramate]      - made her feel 'bad in the head'     Past Medical History:   Diagnosis Date    Acquired bronchiectasis     due to history of TB - followed by pulmonary, Dr. Zuñiga    Allergy      Amblyopia     rt eye per pt    Anemia of other chronic disease     Anticoagulant long-term use     Anxiety     Cataract     Chronic obstructive pulmonary disease with acute exacerbation     Clotting disorder     COPD (chronic obstructive pulmonary disease)     Depression     Diverticulosis     Essential tremor     GERD (gastroesophageal reflux disease)     Hemangioma of liver     History of tuberculosis 1978    Hypertension     Mixed anxiety and depressive disorder     On home oxygen therapy     Psoriasis     PSVT (paroxysmal supraventricular tachycardia)     Pulmonary embolism     S/P PICC central line placement Apr. 2016 - May 2016    Skin disease     Psoriasis    Spondylosis without myelopathy 8/23/2013    Supraventricular tachycardia     Tachycardia     Thoracic aorta atherosclerosis     noted on CT scan of chest 1/3/2011    Tuberculosis     Vaginal delivery     x2    Vitamin D deficiency      Past Surgical History:   Procedure Laterality Date    CATARACT EXTRACTION W/  INTRAOCULAR LENS IMPLANT  07/24/12    od dr spain    CATARACT EXTRACTION W/  INTRAOCULAR LENS IMPLANT  08/07/12    left eye    CHOLECYSTECTOMY      COLONOSCOPY N/A 12/4/2019    Procedure: COLONOSCOPY;  Surgeon: Franco Charles MD;  Location: [address]);  Service: Endoscopy;  Laterality: N/A;  ok to hold Eliquis 2days prior per     ESOPHAGOGASTRODUODENOSCOPY N/A 12/4/2019    Procedure: EGD (ESOPHAGOGASTRODUODENOSCOPY);  Surgeon: Franco Charles MD;  Location: [address]);  Service: Endoscopy;  Laterality: N/A;  ok to hold Eliquis 2days prior per     EYE SURGERY      GALLBLADDER SURGERY      RADIOFREQUENCY THERMOCOAGULATION Left 12/18/2018    Procedure: RADIOFREQUENCY THERMAL COAGULATION;  Surgeon: Issac Meyers MD;  Location: [facility];  Service: Pain Management;  Laterality: Left;  OK to hold Eliquis 3 days prior per Dr. Braga     Family History   Problem  Relation Age of Onset    Hypertension Mother     Heart attack Mother     Cancer Father         liver and bladder    Hyperlipidemia Sister     Psoriasis Sister     Cancer Brother         liver    Stroke Maternal Uncle     Cancer Maternal Aunt         stomach    Lung cancer Sister     Heart attack Brother     Heart attack Son     Amblyopia Neg Hx     Blindness Neg Hx     Cataracts Neg Hx     Glaucoma Neg Hx     Macular degeneration Neg Hx     Retinal detachment Neg Hx     Strabismus Neg Hx     Thyroid disease Neg Hx     Melanoma Neg Hx     Lupus Neg Hx     Eczema Neg Hx     COPD Neg Hx     Colon cancer Neg Hx      Social History     Tobacco Use    Smoking status: Former Smoker     Packs/day: 2.00     Years: 50.00     Pack years: 100.00     Types: Cigarettes     Last attempt to quit: 5/27/2009     Years since quitting: 10.7    Smokeless tobacco: Never Used   Substance Use Topics    Alcohol use: Not Currently     Frequency: Never     Binge frequency: Never    Drug use: Never     Review of Systems   Constitutional: Negative for chills, diaphoresis and fever.   HENT: Positive for nosebleeds. Negative for congestion and sore throat.    Eyes: Negative for visual disturbance.   Respiratory: Positive for cough (blood) and shortness of breath. Negative for wheezing and stridor.    Cardiovascular: Negative for chest pain, palpitations and leg swelling.   Gastrointestinal: Negative for abdominal pain, blood in stool, diarrhea, nausea and vomiting.        No melena.   Genitourinary: Negative for dysuria, flank pain and hematuria.   Musculoskeletal: Negative for back pain.   Skin: Negative for rash.   Neurological: Negative for dizziness, speech difficulty, weakness, light-headedness, numbness and headaches.   Psychiatric/Behavioral: Negative for confusion.   All other systems reviewed and are negative.      Physical Exam     Initial Vitals [03/04/20 1658]   BP Pulse Resp Temp SpO2   128/60 92 18 98.6  °F (37 °C) (!) 94 %      MAP       --         Physical Exam    Nursing note and vitals reviewed.  Constitutional: She appears well-developed and well-nourished. She is not diaphoretic. No distress.   HENT:   Head: Normocephalic and atraumatic.   Nose: Nose normal.   Mouth/Throat: Oropharynx is clear and moist.   Eyes: Conjunctivae and EOM are normal. Pupils are equal, round, and reactive to light. Right eye exhibits no discharge. Left eye exhibits no discharge. No scleral icterus.   Neck: Normal range of motion. Neck supple. No thyromegaly present.   Cardiovascular: Normal rate, regular rhythm and normal heart sounds.   No murmur heard.  Pulmonary/Chest: No stridor. No respiratory distress. She has no wheezes. She has rhonchi (bilaterally). She has rales (bilaterally). She exhibits no tenderness.   Abdominal: Soft. She exhibits no distension. There is no tenderness. There is no rebound and no guarding.   Musculoskeletal: Normal range of motion. She exhibits no edema or tenderness.   Neurological: She is alert and oriented to person, place, and time. She has normal strength. No cranial nerve deficit or sensory deficit. GCS score is 15. GCS eye subscore is 4. GCS verbal subscore is 5. GCS motor subscore is 6.   Skin: Skin is warm and dry. No rash noted. No erythema.   Psychiatric: She has a normal mood and affect. Her behavior is normal. Judgment and thought content normal.         ED Course   Procedures  Labs Reviewed   CBC W/ AUTO DIFFERENTIAL - Abnormal; Notable for the following components:       Result Value    Hemoglobin 9.9 (*)     Hematocrit 33.6 (*)     Mean Corpuscular Hemoglobin 24.5 (*)     Mean Corpuscular Hemoglobin Conc 29.5 (*)     RDW 15.0 (*)     All other components within normal limits   COMPREHENSIVE METABOLIC PANEL - Abnormal; Notable for the following components:    CO2 30 (*)     eGFR if  48 (*)     eGFR if non  42 (*)     All other components within normal  limits   B-TYPE NATRIURETIC PEPTIDE - Abnormal; Notable for the following components:     (*)     All other components within normal limits   CULTURE, RESPIRATORY   CULTURE, BLOOD   CULTURE, BLOOD   TROPONIN I     EKG Readings: (Independently Interpreted)   Initial Reading: No STEMI. Previous EKG: Compared with most recent EKG Rhythm: Normal Sinus Rhythm. Heart Rate: 90 BPM. Ectopy: No Ectopy. Conduction: Normal. ST Segments: Normal ST Segments. T Waves: Normal. Clinical Impression: Normal Sinus Rhythm       Imaging Results          X-Ray Chest AP Portable (Final result)  Result time 03/04/20 18:38:57    Final result by Sami Mallory MD (03/04/20 18:38:57)                 Impression:      1. Grossly stable chronic upper lung zone findings, no large focal consolidation.      Electronically signed by: Sami Mallory MD  Date:    03/04/2020  Time:    18:38             Narrative:    EXAMINATION:  XR CHEST AP PORTABLE    CLINICAL HISTORY:  Chest Pain;    TECHNIQUE:  Single frontal view of the chest was performed.    COMPARISON:  02/17/2020    FINDINGS:  The cardiomediastinal silhouette is not enlarged, stable..  There is no pleural effusion.  The trachea is deviated rightward by a tortuous aorta..  The lungs are symmetrically expanded bilaterally with coarse interstitial attenuation.  There is bronchiectasis and emphysematous/fibrotic change projected over the bilateral upper lung zones, left greater than right, grossly stable..  No large focal consolidation seen.  There is no pneumothorax.  The osseous structures are remarkable for degenerative changes..                                 Medical Decision Making:   History:   Old Medical Records: I decided to obtain old medical records.  Initial Assessment:   Patient presents for evaluation of acute hemoptysis.  Patient has longstanding history of bronchiectasis with mycoplasma infection.  She also has a history of Pseudomonas infection.  She has been sent by  pulmonology for admission for broad-spectrum antibiotics for presumed recurrence Pseudomonas.  Sputum and blood cultures have been sent.  Patient is afebrile.  Lab work is unchanged from previous.  Chest x-ray is unchanged from previous.  Antibiotics initiated as plan.  Will admit for observation.  Will consult Infectious Disease and pulmonology.  Differential Diagnosis:   Bronchiectasis, pneumonia, bronchitis, medication side effect  Independently Interpreted Test(s):   I have ordered and independently interpreted EKG Reading(s) - see prior notes  Clinical Tests:   Lab Tests: Ordered and Reviewed  Radiological Study: Ordered and Reviewed  Medical Tests: Ordered and Reviewed  ED Management:  2250:             Scribe Attestation:   Scribe #1: I performed the above scribed service and the documentation accurately describes the services I performed. I attest to the accuracy of the note.                          Clinical Impression:       ICD-10-CM ICD-9-CM   1. Hemoptysis R04.2 786.30   2. Shortness of breath R06.02 786.05   3. Bronchiectasis with acute exacerbation J47.1 494.1         Disposition:   Disposition: Placed in Observation  Condition: Stable     ED Disposition Condition    Observation            I, Demetrio Edmonds MD, personally performed the services described in this documentation. All medical record entries made by the scribe were at my direction and in my presence.  I have reviewed the chart and agree that the record reflects my personal performance and is accurate and complete                  Demetrio Edmonds MD  03/04/20 6629       Demetrio Edmonds MD  03/05/20 0038

## 2020-03-05 NOTE — CONSULTS
[facility]  Pulmonology  Consult Note    Patient Name: Yasmin Asencio  MRN: [id number]  Admission Date: 3/4/2020  Hospital Length of Stay: 0 days  Code Status: Full Code  Attending Physician: Carlos Brantley MD  Primary Care Provider: Urmila Luna MD   Principal Problem: Bronchiectasis with acute exacerbation    Consults  Subjective:     HPI:  Patient weel known to me. Established patient in pulmonary clinic. Briefly, she has Underlying bronchiectasis, chronic PE on OAC. Etiology of bronchiectasis related to very remote history of M. Kansasi [facility] [place]. Last seen by me in January. At that time she was doing fairly well. Increased cough and productive sputum, but respiratory symptoms have been stable. She was in initiated on course of oral augmenting, but did not note any improvement. Over the last several days she reports increased blood tinged sputum, worsening cough, dyspnea and increased nausea. Admitted through ED o/n given history of prior bronchiectasis exacerbation in past related to resistant pseudomonas. Initiated on V/cefepime. 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 Nausea remains big issue and difficulty tolerating PO. Blood tinged sputum has significantly decreased in volume. Please see my prior clinic note for additional details regarding history/care.     Past Medical History:   Diagnosis Date    Acquired bronchiectasis     due to history of TB - followed by pulmonary, Dr. Zuñiga    Allergy     Amblyopia     rt eye per pt    Anemia of other chronic disease     Anticoagulant long-term use     Anxiety     Cataract     Chronic obstructive pulmonary disease with acute exacerbation     Clotting disorder     COPD (chronic obstructive pulmonary disease)     Depression     Diverticulosis     Essential tremor     GERD (gastroesophageal reflux disease)     Hemangioma of liver     History of tuberculosis  1978    Hypertension     Mixed anxiety and depressive disorder     On home oxygen therapy     Psoriasis     PSVT (paroxysmal supraventricular tachycardia)     Pulmonary embolism     S/P PICC central line placement Apr. 2016 - May 2016    Skin disease     Psoriasis    Spondylosis without myelopathy 8/23/2013    Supraventricular tachycardia     Tachycardia     Thoracic aorta atherosclerosis     noted on CT scan of chest 1/3/2011    Tuberculosis     Vaginal delivery     x2    Vitamin D deficiency        Past Surgical History:   Procedure Laterality Date    CATARACT EXTRACTION W/  INTRAOCULAR LENS IMPLANT  07/24/12    od dr spain    CATARACT EXTRACTION W/  INTRAOCULAR LENS IMPLANT  08/07/12    left eye    CHOLECYSTECTOMY      COLONOSCOPY N/A 12/4/2019    Procedure: COLONOSCOPY;  Surgeon: Franco Charles MD;  [facility]  Service: Endoscopy;  Laterality: N/A;  ok to hold Eliquis 2days prior per     ESOPHAGOGASTRODUODENOSCOPY N/A 12/4/2019    Procedure: EGD (ESOPHAGOGASTRODUODENOSCOPY);  Surgeon: Franco Charles MD;  Location: [address]);  Service: Endoscopy;  Laterality: N/A;  ok to hold Eliquis 2days prior per     EYE SURGERY      GALLBLADDER SURGERY      RADIOFREQUENCY THERMOCOAGULATION Left 12/18/2018    Procedure: RADIOFREQUENCY THERMAL COAGULATION;  Surgeon: Issac Meyers MD;  [facility]: [facility];  Service: Pain Management;  Laterality: Left;  OK to hold Eliquis 3 days prior per Dr. Braga       Review of patient's allergies indicates:   Allergen Reactions    Adhesive Other (See Comments)     Tears up the skin and makes it itch   (leads)    Bactrim [sulfamethoxazole-trimethoprim] Rash     Was hospitalized for rash    Restasis [cyclosporine]     Lipitor [atorvastatin] Other (See Comments)     Muscle aches    Albuterol Other (See Comments)     tremors    Topamax [topiramate]      - made her feel 'bad in the head'        Social/Family History- Reviewed and updated.     Review of Systems   Constitutional: Positive for activity change, appetite change and fatigue. Negative for chills, diaphoresis, fever and unexpected weight change.   HENT: Positive for nosebleeds. Negative for congestion, rhinorrhea, sinus pressure, sinus pain, sore throat, tinnitus, trouble swallowing and voice change.    Eyes: Negative for photophobia, pain, discharge, redness and visual disturbance.   Respiratory: Positive for cough, shortness of breath and wheezing. Negative for chest tightness and stridor.    Cardiovascular: Negative for chest pain, palpitations and leg swelling.   Gastrointestinal: Positive for nausea and vomiting. Negative for abdominal distention, abdominal pain, anal bleeding, blood in stool, constipation, diarrhea and rectal pain.   Endocrine: Negative for polydipsia, polyphagia and polyuria.   Genitourinary: Negative for dysuria, flank pain and hematuria.   Musculoskeletal: Negative for arthralgias, back pain, gait problem, joint swelling, myalgias, neck pain and neck stiffness.   Skin: Negative for color change, pallor, rash and wound.   Allergic/Immunologic: Negative for immunocompromised state.   Neurological: Positive for weakness. Negative for dizziness and headaches.   All other systems reviewed and are negative.    Objective:     Vital Signs (Most Recent):  Temp: 98 °F (36.7 °C) (03/05/20 1102)  Pulse: 93 (03/05/20 1102)  Resp: 18 (03/05/20 1102)  BP: (!) 148/65 (03/05/20 1102)  SpO2: (!) 94 % (03/05/20 1102) Vital Signs (24h Range):  Temp:  [97.9 °F (36.6 °C)-98.6 °F (37 °C)] 98 °F (36.7 °C)  Pulse:  [76-93] 93  Resp:  [18-22] 18  SpO2:  [89 %-96 %] 94 %  BP: (128-168)/(56-71) 148/65     Weight: 70.7 kg (155 lb 13.8 oz)  Body mass index is 28.51 kg/m².      Intake/Output Summary (Last 24 hours) at 3/5/2020 1506  Last data filed at 3/5/2020 0500  Gross per 24 hour   Intake 100 ml   Output --   Net 100 ml       Physical Exam    Constitutional: She is oriented to person, place, and time. She appears well-developed and well-nourished. No distress.   Appears run down/uncomfortable.    HENT:   Head: Normocephalic and atraumatic.   Right Ear: External ear normal.   Left Ear: External ear normal.   Nose: Nose normal.   Mouth/Throat: Oropharynx is clear and moist. No oropharyngeal exudate.   Eyes: Pupils are equal, round, and reactive to light. Conjunctivae and EOM are normal. Right eye exhibits no discharge. Left eye exhibits no discharge. No scleral icterus.   Neck: Normal range of motion. Neck supple. No JVD present. No tracheal deviation present. No thyromegaly present.   Cardiovascular: Normal rate, regular rhythm, normal heart sounds and intact distal pulses. Exam reveals no gallop and no friction rub.   No murmur heard.  Pulmonary/Chest: Effort normal. No respiratory distress. She has wheezes. She has rales.   Abdominal: Soft. Bowel sounds are normal. She exhibits no distension and no mass. There is no tenderness. No hernia.   Musculoskeletal: Normal range of motion. She exhibits no edema, tenderness or deformity.   Lymphadenopathy:     She has no cervical adenopathy.   Neurological: She is alert and oriented to person, place, and time. No cranial nerve deficit. Coordination normal.   Skin: Skin is warm and dry. Capillary refill takes less than 2 seconds. No rash noted. She is not diaphoretic. No erythema. No pallor.   Psychiatric: She has a normal mood and affect. Her behavior is normal.       Vents:       Lines/Drains/Airways     Peripheral Intravenous Line                 Peripheral IV - Single Lumen 03/04/20 2124 18 G Right Antecubital less than 1 day                Significant Labs:    CBC/Anemia Profile:  Recent Labs   Lab 03/04/20 2055 03/05/20  0744   WBC 7.19 7.61   HGB 9.9* 9.8*   HCT 33.6* 32.4*    246   MCV 83 82   RDW 15.0* 15.0*        Chemistries:  Recent Labs   Lab 03/04/20 2055 03/05/20  0744    138   K  3.5 3.2*   CL 97 98   CO2 30* 28   BUN 14 14   CREATININE 1.3 1.1   CALCIUM 9.8 9.5   ALBUMIN 4.0 3.7   PROT 8.0 7.5   BILITOT 0.4 0.4   ALKPHOS 81 74   ALT 14 13   AST 17 15       All additional results in last 24 hours reviewed.     Significant Imaging:   I have reviewed and interpreted all pertinent imaging results/findings within the past 24 hours.     CXR (admit)     The cardiomediastinal silhouette is not enlarged, stable..  There is no pleural effusion.  The trachea is deviated rightward by a tortuous aorta..  The lungs are symmetrically expanded bilaterally with coarse interstitial attenuation.  There is bronchiectasis and emphysematous/fibrotic change projected over the bilateral upper lung zones, left greater than right, grossly stable..  No large focal consolidation seen.  There is no pneumothorax.  The osseous structures are remarkable for degenerative changes..    CT Chest 7/5-      1. Absence of superior segment dominant nodule right lower lobe from previous exam.  Micro nodules stable.  2. Emphysema and bronchiectasis changes of lungs stable.        CT chest-   1. Chronic filling defects within the right upper lobe pulmonary artery branch consistent with chronic pulmonary embolism.  No new acute pulmonary thromboembolism identified.  2. Stable fibrotic changes throughout the lung apices, left greater than right, with bronchiectasis, interstitial prominence, and mosaic attenuation of the lungs, similar to prior CTA 01/05/2017.  3. Small area of focal nodular consolidation within the left lower lobe, not seen on prior studies which, may be infectious or inflammatory in etiology, possibly even contiguous from the left apical fibrotic changes.  Suggest follow-up CT scan 1 month after antimicrobial therapy to exclude neoplastic nodule.  4. Stable 4 mm nodule within the right middle lobe.  5. Additional findings as detailed above.     CT 2/6/2019     Bilateral upper lung zone predominant lung disease,  primarily characterized by severe bronchiectasis.  Overall appearance suggests sequela of chronic infection.    Ground-glass opacities and pulmonary nodule clusters improved from prior study.  0.7 cm right lower lobe pulmonary nodule, new from prior study.  Recommend follow-up chest CT at 6 months to ensure nodule stability.     2D Echo:      Left Ventricle Normal ejection fraction . Cavity is normal. Normal left ventricle diastolic function.   Right Ventricle Normal cavity size.   Left Atrium Cavity is mildly dilated.   Right Atrium Normal cavity size.   Aortic Valve Normal valve structure.   Mitral Valve Normal valve structure. There is mild mitral annular calcification.   Tricuspid Valve The tricuspid valve is normal. Trace regurgitation.   Pulmonic Valve Normal valve structure.   IVC/SVC Normal central venous pressure (3 mm Hg).   Ascending Aorta Normal aortic root, size and contour.   Pericardium No pericardial effusion. Pericardium is normal.      US Doppler-   1. Right lower extremity venous ultrasound shows normal flow in the deep venous system and no DVT.  2. Left lower extremity venous ultrasound shows normal flow in the deep venous system and no DVT.         PFT's (4/2017):      FEV1/FVC- 74  FEV1- 1.24L (58)  FVC- 1.67L (62)   DLCO- 53      PSG- None             Assessment/Plan:     * Bronchiectasis with acute exacerbation  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 Most recent negative x 2.  Records not available from prior treatment. 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 Chest imaging is stable but now with worsening dyspnea, sputum production and scant hemoptysis suggestive of bronchiectasis exacerbation. Admitted due to history of resistant organisms and failed OP Abx.     -- Stop eliquis   -- Agree with cefepime awaiting culture results.   -- Start prednisone 40mg daily x 5 days   -- Wean supplemental O2 to maintain SpO2 >88%. On 5L  at home   -- Anti-emetics   -- Bronchodilators and acapella for mucous clearance   -- If increased hemoptysis please obtain STAT CTA chest time for bronchial circulation     Hemoptysis  As above       History of pulmonary embolism  Has small chronic UL filling defect.. Initial diagnosis 2 years prior.. Her recent LE dopplers are negative and has been on OAC x 2 years.. Given increased hemoptysis will hold.           Thank you for your consult. I will follow-up with patient. Please contact us if you have any additional questions.     Declan Mills MD  Pulmonology/Critical Care Medicine   [facility]

## 2020-03-05 NOTE — HPI
Patient keven known to me. Established patient in pulmonary clinic. Briefly, she has Underlying bronchiectasis, chronic PE on OAC. Etiology of bronchiectasis related to very remote history of KENNETH. Padmini [facility] on [facility]. Last seen by me in January. At that time she was doing fairly well. Increased cough and productive sputum, but respiratory symptoms have been stable. She was in initiated on course of oral augmenting, but did not note any improvement. Over the last several days she reports increased blood tinged sputum, worsening cough, dyspnea and increased nausea. Admitted through ED o/n given history of prior bronchiectasis exacerbation in past related to resistant pseudomonas. Initiated on V/cefepime. 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 Nausea remains big issue and difficulty tolerating PO. Blood tinged sputum has significantly decreased in volume. Please see my prior clinic note for additional details regarding history/care.

## 2020-03-05 NOTE — NURSING
Report rec'd from JOSE JUAN Arthur. Patient AAOx4 ambulatory dx: hemoptysis. TB noted from 1970's. Vanc stopped d/t flushing and feeling hot. Await arrival to unit.

## 2020-03-05 NOTE — ED NOTES
Pt c/o redness to the face and feeling hot. Vancomycin stopped and Dr. Edmonds notified.. Pt denies respiratory distress. Respirations are even and unlabored. VSS. Will continue to monitor closely.

## 2020-03-06 LAB
ADENOVIRUS: NOT DETECTED
ALBUMIN SERPL BCP-MCNC: 3.6 G/DL (ref 3.5–5.2)
ALP SERPL-CCNC: 68 U/L (ref 55–135)
ALT SERPL W/O P-5'-P-CCNC: 14 U/L (ref 10–44)
ANION GAP SERPL CALC-SCNC: 11 MMOL/L (ref 8–16)
AST SERPL-CCNC: 17 U/L (ref 10–40)
BASOPHILS # BLD AUTO: 0.02 K/UL (ref 0–0.2)
BASOPHILS NFR BLD: 0.4 % (ref 0–1.9)
BILIRUB SERPL-MCNC: 0.4 MG/DL (ref 0.1–1)
BORDETELLA PARAPERTUSSIS (IS1001): NOT DETECTED
BORDETELLA PERTUSSIS (PTXP): NOT DETECTED
BUN SERPL-MCNC: 15 MG/DL (ref 8–23)
CALCIUM SERPL-MCNC: 9 MG/DL (ref 8.7–10.5)
CHLAMYDIA PNEUMONIAE: NOT DETECTED
CHLORIDE SERPL-SCNC: 96 MMOL/L (ref 95–110)
CO2 SERPL-SCNC: 27 MMOL/L (ref 23–29)
CORONAVIRUS 229E, COMMON COLD VIRUS: NOT DETECTED
CORONAVIRUS HKU1, COMMON COLD VIRUS: NOT DETECTED
CORONAVIRUS NL63, COMMON COLD VIRUS: NOT DETECTED
CORONAVIRUS OC43, COMMON COLD VIRUS: NOT DETECTED
CREAT SERPL-MCNC: 1.2 MG/DL (ref 0.5–1.4)
DIFFERENTIAL METHOD: ABNORMAL
EOSINOPHIL # BLD AUTO: 0.1 K/UL (ref 0–0.5)
EOSINOPHIL NFR BLD: 1.3 % (ref 0–8)
ERYTHROCYTE [DISTWIDTH] IN BLOOD BY AUTOMATED COUNT: 14.9 % (ref 11.5–14.5)
EST. GFR  (AFRICAN AMERICAN): 53 ML/MIN/1.73 M^2
EST. GFR  (NON AFRICAN AMERICAN): 46 ML/MIN/1.73 M^2
FLUBV RNA NPH QL NAA+NON-PROBE: NOT DETECTED
GLUCOSE SERPL-MCNC: 117 MG/DL (ref 70–110)
HCT VFR BLD AUTO: 29.7 % (ref 37–48.5)
HGB BLD-MCNC: 9.1 G/DL (ref 12–16)
HPIV1 RNA NPH QL NAA+NON-PROBE: NOT DETECTED
HPIV2 RNA NPH QL NAA+NON-PROBE: NOT DETECTED
HPIV3 RNA NPH QL NAA+NON-PROBE: NOT DETECTED
HPIV4 RNA NPH QL NAA+NON-PROBE: NOT DETECTED
HUMAN METAPNEUMOVIRUS: NOT DETECTED
IMM GRANULOCYTES # BLD AUTO: 0.02 K/UL (ref 0–0.04)
IMM GRANULOCYTES NFR BLD AUTO: 0.4 % (ref 0–0.5)
INFLUENZA A (SUBTYPES H1,H1-2009,H3): NOT DETECTED
LYMPHOCYTES # BLD AUTO: 1 K/UL (ref 1–4.8)
LYMPHOCYTES NFR BLD: 18 % (ref 18–48)
MCH RBC QN AUTO: 24.8 PG (ref 27–31)
MCHC RBC AUTO-ENTMCNC: 30.6 G/DL (ref 32–36)
MCV RBC AUTO: 81 FL (ref 82–98)
MONOCYTES # BLD AUTO: 0.7 K/UL (ref 0.3–1)
MONOCYTES NFR BLD: 12.2 % (ref 4–15)
MYCOPLASMA PNEUMONIAE: NOT DETECTED
NEUTROPHILS # BLD AUTO: 3.6 K/UL (ref 1.8–7.7)
NEUTROPHILS NFR BLD: 67.7 % (ref 38–73)
NRBC BLD-RTO: 0 /100 WBC
PLATELET # BLD AUTO: 225 K/UL (ref 150–350)
PMV BLD AUTO: 9.9 FL (ref 9.2–12.9)
POTASSIUM SERPL-SCNC: 3.1 MMOL/L (ref 3.5–5.1)
PROT SERPL-MCNC: 7.3 G/DL (ref 6–8.4)
RBC # BLD AUTO: 3.67 M/UL (ref 4–5.4)
RESPIRATORY INFECTION PANEL SOURCE: NORMAL
RSV RNA NPH QL NAA+NON-PROBE: NOT DETECTED
RV+EV RNA NPH QL NAA+NON-PROBE: NOT DETECTED
SODIUM SERPL-SCNC: 134 MMOL/L (ref 136–145)
WBC # BLD AUTO: 5.33 K/UL (ref 3.9–12.7)

## 2020-03-06 PROCEDURE — 25000003 PHARM REV CODE 250: Performed by: NURSE PRACTITIONER

## 2020-03-06 PROCEDURE — 87116 MYCOBACTERIA CULTURE: CPT

## 2020-03-06 PROCEDURE — 87556 M.TUBERCULO DNA AMP PROBE: CPT

## 2020-03-06 PROCEDURE — 25000242 PHARM REV CODE 250 ALT 637 W/ HCPCS: Performed by: NURSE PRACTITIONER

## 2020-03-06 PROCEDURE — 63600175 PHARM REV CODE 636 W HCPCS: Performed by: NURSE PRACTITIONER

## 2020-03-06 PROCEDURE — 87015 SPECIMEN INFECT AGNT CONCNTJ: CPT

## 2020-03-06 PROCEDURE — 36415 COLL VENOUS BLD VENIPUNCTURE: CPT

## 2020-03-06 PROCEDURE — 94640 AIRWAY INHALATION TREATMENT: CPT

## 2020-03-06 PROCEDURE — 99233 SBSQ HOSP IP/OBS HIGH 50: CPT | Mod: ,,, | Performed by: PHYSICIAN ASSISTANT

## 2020-03-06 PROCEDURE — 63600175 PHARM REV CODE 636 W HCPCS: Performed by: EMERGENCY MEDICINE

## 2020-03-06 PROCEDURE — 27000221 HC OXYGEN, UP TO 24 HOURS

## 2020-03-06 PROCEDURE — 99233 PR SUBSEQUENT HOSPITAL CARE,LEVL III: ICD-10-PCS | Mod: ,,, | Performed by: PHYSICIAN ASSISTANT

## 2020-03-06 PROCEDURE — 86480 TB TEST CELL IMMUN MEASURE: CPT

## 2020-03-06 PROCEDURE — 80053 COMPREHEN METABOLIC PANEL: CPT

## 2020-03-06 PROCEDURE — 87798 DETECT AGENT NOS DNA AMP: CPT

## 2020-03-06 PROCEDURE — 99233 PR SUBSEQUENT HOSPITAL CARE,LEVL III: ICD-10-PCS | Mod: ,,, | Performed by: EMERGENCY MEDICINE

## 2020-03-06 PROCEDURE — 63600175 PHARM REV CODE 636 W HCPCS: Performed by: INTERNAL MEDICINE

## 2020-03-06 PROCEDURE — 87118 MYCOBACTERIC IDENTIFICATION: CPT

## 2020-03-06 PROCEDURE — 87149 DNA/RNA DIRECT PROBE: CPT

## 2020-03-06 PROCEDURE — 87206 SMEAR FLUORESCENT/ACID STAI: CPT

## 2020-03-06 PROCEDURE — 85025 COMPLETE CBC W/AUTO DIFF WBC: CPT

## 2020-03-06 PROCEDURE — 94761 N-INVAS EAR/PLS OXIMETRY MLT: CPT

## 2020-03-06 PROCEDURE — 21400001 HC TELEMETRY ROOM

## 2020-03-06 PROCEDURE — 25000003 PHARM REV CODE 250: Performed by: INTERNAL MEDICINE

## 2020-03-06 PROCEDURE — 25000003 PHARM REV CODE 250: Performed by: EMERGENCY MEDICINE

## 2020-03-06 PROCEDURE — 99233 SBSQ HOSP IP/OBS HIGH 50: CPT | Mod: ,,, | Performed by: EMERGENCY MEDICINE

## 2020-03-06 RX ORDER — DEXTROSE MONOHYDRATE, SODIUM CHLORIDE, AND POTASSIUM CHLORIDE 50; 1.49; 9 G/1000ML; G/1000ML; G/1000ML
INJECTION, SOLUTION INTRAVENOUS CONTINUOUS
Status: DISCONTINUED | OUTPATIENT
Start: 2020-03-06 | End: 2020-03-09

## 2020-03-06 RX ORDER — ONDANSETRON 2 MG/ML
8 INJECTION INTRAMUSCULAR; INTRAVENOUS
Status: DISCONTINUED | OUTPATIENT
Start: 2020-03-06 | End: 2020-03-11 | Stop reason: HOSPADM

## 2020-03-06 RX ORDER — METOCLOPRAMIDE HYDROCHLORIDE 5 MG/ML
5 INJECTION INTRAMUSCULAR; INTRAVENOUS
Status: DISCONTINUED | OUTPATIENT
Start: 2020-03-06 | End: 2020-03-07

## 2020-03-06 RX ADMIN — CEFEPIME 2 G: 2 INJECTION, POWDER, FOR SOLUTION INTRAVENOUS at 02:03

## 2020-03-06 RX ADMIN — ONDANSETRON HYDROCHLORIDE 8 MG: 2 SOLUTION INTRAMUSCULAR; INTRAVENOUS at 08:03

## 2020-03-06 RX ADMIN — METOCLOPRAMIDE 5 MG: 5 INJECTION, SOLUTION INTRAMUSCULAR; INTRAVENOUS at 08:03

## 2020-03-06 RX ADMIN — VERAPAMIL HYDROCHLORIDE 120 MG: 120 TABLET, FILM COATED, EXTENDED RELEASE ORAL at 08:03

## 2020-03-06 RX ADMIN — PROMETHAZINE HYDROCHLORIDE 25 MG: 25 INJECTION INTRAMUSCULAR; INTRAVENOUS at 07:03

## 2020-03-06 RX ADMIN — ONDANSETRON HYDROCHLORIDE 8 MG: 2 SOLUTION INTRAMUSCULAR; INTRAVENOUS at 11:03

## 2020-03-06 RX ADMIN — CEFEPIME 2 G: 2 INJECTION, POWDER, FOR SOLUTION INTRAVENOUS at 06:03

## 2020-03-06 RX ADMIN — GUAIFENESIN 1200 MG: 600 TABLET, EXTENDED RELEASE ORAL at 08:03

## 2020-03-06 RX ADMIN — ALPRAZOLAM 0.25 MG: 0.25 TABLET ORAL at 08:03

## 2020-03-06 RX ADMIN — DEXTROSE MONOHYDRATE, SODIUM CHLORIDE, AND POTASSIUM CHLORIDE: 50; 9; 1.49 INJECTION, SOLUTION INTRAVENOUS at 11:03

## 2020-03-06 RX ADMIN — PROMETHAZINE HYDROCHLORIDE 25 MG: 25 INJECTION INTRAMUSCULAR; INTRAVENOUS at 08:03

## 2020-03-06 RX ADMIN — CEFEPIME 2 G: 2 INJECTION, POWDER, FOR SOLUTION INTRAVENOUS at 11:03

## 2020-03-06 RX ADMIN — IPRATROPIUM BROMIDE 0.5 MG: 0.5 SOLUTION RESPIRATORY (INHALATION) at 01:03

## 2020-03-06 RX ADMIN — IPRATROPIUM BROMIDE 0.5 MG: 0.5 SOLUTION RESPIRATORY (INHALATION) at 12:03

## 2020-03-06 RX ADMIN — METOCLOPRAMIDE 5 MG: 5 INJECTION, SOLUTION INTRAMUSCULAR; INTRAVENOUS at 04:03

## 2020-03-06 RX ADMIN — PROMETHAZINE HYDROCHLORIDE 25 MG: 25 INJECTION INTRAMUSCULAR; INTRAVENOUS at 01:03

## 2020-03-06 RX ADMIN — FLUTICASONE FUROATE AND VILANTEROL TRIFENATATE 1 PUFF: 200; 25 POWDER RESPIRATORY (INHALATION) at 01:03

## 2020-03-06 RX ADMIN — ONDANSETRON HYDROCHLORIDE 8 MG: 2 SOLUTION INTRAMUSCULAR; INTRAVENOUS at 06:03

## 2020-03-06 RX ADMIN — DEXTROSE MONOHYDRATE, SODIUM CHLORIDE, AND POTASSIUM CHLORIDE: 50; 9; 1.49 INJECTION, SOLUTION INTRAVENOUS at 02:03

## 2020-03-06 RX ADMIN — ACETAMINOPHEN 650 MG: 325 TABLET ORAL at 12:03

## 2020-03-06 RX ADMIN — IPRATROPIUM BROMIDE 0.5 MG: 0.5 SOLUTION RESPIRATORY (INHALATION) at 07:03

## 2020-03-06 RX ADMIN — MIRTAZAPINE 15 MG: 15 TABLET, FILM COATED ORAL at 08:03

## 2020-03-06 RX ADMIN — ONDANSETRON HYDROCHLORIDE 8 MG: 2 SOLUTION INTRAMUSCULAR; INTRAVENOUS at 04:03

## 2020-03-06 NOTE — SUBJECTIVE & OBJECTIVE
INTERVAL HISTORY-     No acute events in last 24 hours. Still with significant nausea despite scheduled anti-emetics. Hemoptysis improved. + epistaxis this AM. Sputum more clear.     Review of patient's allergies indicates:   Allergen Reactions    Adhesive Other (See Comments)     Tears up the skin and makes it itch   (leads)    Bactrim [sulfamethoxazole-trimethoprim] Rash     Was hospitalized for rash    Restasis [cyclosporine]     Lipitor [atorvastatin] Other (See Comments)     Muscle aches    Albuterol Other (See Comments)     tremors    Topamax [topiramate]      - made her feel 'bad in the head'       Review of Systems   Constitutional: Positive for activity change, appetite change and fatigue. Negative for chills, diaphoresis, fever and unexpected weight change.   HENT: Positive for nosebleeds. Negative for congestion, rhinorrhea, sinus pressure, sinus pain, sore throat, tinnitus, trouble swallowing and voice change.    Eyes: Negative for photophobia, pain, discharge, redness and visual disturbance.   Respiratory: Positive for cough, shortness of breath and wheezing. Negative for chest tightness and stridor.    Cardiovascular: Negative for chest pain, palpitations and leg swelling.   Gastrointestinal: Positive for nausea and vomiting. Negative for abdominal distention, abdominal pain, anal bleeding, blood in stool, constipation, diarrhea and rectal pain.   Endocrine: Negative for polydipsia, polyphagia and polyuria.   Genitourinary: Negative for dysuria, flank pain and hematuria.   Musculoskeletal: Negative for arthralgias, back pain, gait problem, joint swelling, myalgias, neck pain and neck stiffness.   Skin: Negative for color change, pallor, rash and wound.   Allergic/Immunologic: Negative for immunocompromised state.   Neurological: Positive for weakness. Negative for dizziness and headaches.   All other systems reviewed and are negative.    Objective:     Vital Signs (Most Recent):  Temp: 97.9 °F  (36.6 °C) (03/06/20 1123)  Pulse: 95 (03/06/20 1123)  Resp: 17 (03/06/20 1123)  BP: (!) 121/58 (03/06/20 1123)  SpO2: 97 % (03/06/20 1123) Vital Signs (24h Range):  Temp:  [97.8 °F (36.6 °C)-98.4 °F (36.9 °C)] 97.9 °F (36.6 °C)  Pulse:  [] 95  Resp:  [17-20] 17  SpO2:  [90 %-99 %] 97 %  BP: (114-143)/(55-68) 121/58     Weight: 69.4 kg (153 lb)  Body mass index is 27.98 kg/m².      Intake/Output Summary (Last 24 hours) at 3/6/2020 1149  Last data filed at 3/6/2020 0601  Gross per 24 hour   Intake 50 ml   Output --   Net 50 ml       Physical Exam   Constitutional: She is oriented to person, place, and time. She appears well-developed and well-nourished. No distress.   Appears run down/uncomfortable.    HENT:   Head: Normocephalic and atraumatic.   Right Ear: External ear normal.   Left Ear: External ear normal.   Nose: Nose normal.   Mouth/Throat: Oropharynx is clear and moist. No oropharyngeal exudate.   Eyes: Pupils are equal, round, and reactive to light. Conjunctivae and EOM are normal. Right eye exhibits no discharge. Left eye exhibits no discharge. No scleral icterus.   Neck: Normal range of motion. Neck supple. No JVD present. No tracheal deviation present. No thyromegaly present.   Cardiovascular: Normal rate, regular rhythm, normal heart sounds and intact distal pulses. Exam reveals no gallop and no friction rub.   No murmur heard.  Pulmonary/Chest: Effort normal. No respiratory distress. She has wheezes. She has rales.   Abdominal: Soft. Bowel sounds are normal. She exhibits no distension and no mass. There is no tenderness. No hernia.   Musculoskeletal: Normal range of motion. She exhibits no edema, tenderness or deformity.   Lymphadenopathy:     She has no cervical adenopathy.   Neurological: She is alert and oriented to person, place, and time. No cranial nerve deficit. Coordination normal.   Skin: Skin is warm and dry. Capillary refill takes less than 2 seconds. No rash noted. She is not  diaphoretic. No erythema. No pallor.   Psychiatric: She has a normal mood and affect. Her behavior is normal.       Vents:       Lines/Drains/Airways     Peripheral Intravenous Line                 Peripheral IV - Single Lumen 03/04/20 2124 18 G Right Antecubital 1 day                Significant Labs:    CBC/Anemia Profile:  Recent Labs   Lab 03/04/20 2055 03/05/20 0744 03/06/20  0637   WBC 7.19 7.61 5.33   HGB 9.9* 9.8* 9.1*   HCT 33.6* 32.4* 29.7*    246 225   MCV 83 82 81*   RDW 15.0* 15.0* 14.9*   IRON  --  64  --    FERRITIN  --  8*  --         Chemistries:  Recent Labs   Lab 03/04/20 2055 03/05/20 0744 03/06/20  0637    138 134*   K 3.5 3.2* 3.1*   CL 97 98 96   CO2 30* 28 27   BUN 14 14 15   CREATININE 1.3 1.1 1.2   CALCIUM 9.8 9.5 9.0   ALBUMIN 4.0 3.7 3.6   PROT 8.0 7.5 7.3   BILITOT 0.4 0.4 0.4   ALKPHOS 81 74 68   ALT 14 13 14   AST 17 15 17       All additional results in last 24 hours reviewed.     Significant Imaging:   I have reviewed and interpreted all pertinent imaging results/findings within the past 24 hours.     CXR (admit)     The cardiomediastinal silhouette is not enlarged, stable..  There is no pleural effusion.  The trachea is deviated rightward by a tortuous aorta..  The lungs are symmetrically expanded bilaterally with coarse interstitial attenuation.  There is bronchiectasis and emphysematous/fibrotic change projected over the bilateral upper lung zones, left greater than right, grossly stable..  No large focal consolidation seen.  There is no pneumothorax.  The osseous structures are remarkable for degenerative changes..    CT Chest 7/5-      1. Absence of superior segment dominant nodule right lower lobe from previous exam.  Micro nodules stable.  2. Emphysema and bronchiectasis changes of lungs stable.        CT chest-   1. Chronic filling defects within the right upper lobe pulmonary artery branch consistent with chronic pulmonary embolism.  No new acute pulmonary  thromboembolism identified.  2. Stable fibrotic changes throughout the lung apices, left greater than right, with bronchiectasis, interstitial prominence, and mosaic attenuation of the lungs, similar to prior CTA 01/05/2017.  3. Small area of focal nodular consolidation within the left lower lobe, not seen on prior studies which, may be infectious or inflammatory in etiology, possibly even contiguous from the left apical fibrotic changes.  Suggest follow-up CT scan 1 month after antimicrobial therapy to exclude neoplastic nodule.  4. Stable 4 mm nodule within the right middle lobe.  5. Additional findings as detailed above.     CT 2/6/2019     Bilateral upper lung zone predominant lung disease, primarily characterized by severe bronchiectasis.  Overall appearance suggests sequela of chronic infection.    Ground-glass opacities and pulmonary nodule clusters improved from prior study.  0.7 cm right lower lobe pulmonary nodule, new from prior study.  Recommend follow-up chest CT at 6 months to ensure nodule stability.     2D Echo:      Left Ventricle Normal ejection fraction . Cavity is normal. Normal left ventricle diastolic function.   Right Ventricle Normal cavity size.   Left Atrium Cavity is mildly dilated.   Right Atrium Normal cavity size.   Aortic Valve Normal valve structure.   Mitral Valve Normal valve structure. There is mild mitral annular calcification.   Tricuspid Valve The tricuspid valve is normal. Trace regurgitation.   Pulmonic Valve Normal valve structure.   IVC/SVC Normal central venous pressure (3 mm Hg).   Ascending Aorta Normal aortic root, size and contour.   Pericardium No pericardial effusion. Pericardium is normal.      US Doppler-   1. Right lower extremity venous ultrasound shows normal flow in the deep venous system and no DVT.  2. Left lower extremity venous ultrasound shows normal flow in the deep venous system and no DVT.         PFT's (4/2017):      FEV1/FVC- 74  FEV1- 1.24L (58)  FVC-  1.67L (62)   DLCO- 53      PSG- None

## 2020-03-06 NOTE — PROGRESS NOTES
[facility] Hospital Medicine  Progress Note    Patient Name: Yasmin Asencio  MRN: [id number]  Patient Class: IP- Inpatient   Admission Date: 3/4/2020  Length of Stay: 1 days  Attending Physician: Lexis Saab MD  Primary Care Provider: Urmila Luna MD        Subjective:     Principal Problem:Bronchiectasis with acute exacerbation        HPI:  Mrs. 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 Pulmonologist suspect recurrent Pseudomonas infection. Patient reports coughing more recently for the last few days compared to COPD baseline consisting gross bright red blood and red mucous yesterday morning associated with mild nose bleeding. She also reports worsening of SOB and chest pain with cough for 1 weeks . She denies any fever, chill, night sweat, vomit, sick contact, weakness, abdominal pain, headache, blood in stool/urine, any bruising or other bleeding. Recent completed Augmentin Feb (not sure exact)     In ED, EKG and Troponin negative, Chest X-ray with stable chronic bronchiectasis and emphysematous/fibrotic without large focal consolidation, Blood and Sputum culture obtained, BNP of 153, Vanc + Cefepime ordered.    Overview/Hospital Course:  No notes on file    Interval History: with nausea    Review of Systems   Respiratory: Negative.    Cardiovascular: Negative.    Gastrointestinal: Positive for nausea and vomiting.     Objective:     Vital Signs (Most Recent):  Temp: 98.3 °F (36.8 °C) (03/06/20 1500)  Pulse: 91 (03/06/20 1500)  Resp: 18 (03/06/20 1500)  BP: (!) 137/57 (03/06/20 1500)  SpO2: 99 %  "(03/06/20 1500) Vital Signs (24h Range):  Temp:  [97.8 °F (36.6 °C)-98.4 °F (36.9 °C)] 98.3 °F (36.8 °C)  Pulse:  [] 91  Resp:  [17-20] 18  SpO2:  [92 %-99 %] 99 %  BP: (114-143)/(55-65) 137/57     Weight: 69.4 kg (153 lb)  Body mass index is 27.98 kg/m².    Intake/Output Summary (Last 24 hours) at 3/6/2020 1643  Last data filed at 3/6/2020 0601  Gross per 24 hour   Intake 50 ml   Output --   Net 50 ml      Physical Exam   Constitutional: She is oriented to person, place, and time. She appears well-developed. No distress.   Cardiovascular: Normal rate and regular rhythm.   Pulmonary/Chest: Effort normal and breath sounds normal.   Abdominal: Soft. Bowel sounds are normal.   Neurological: She is alert and oriented to person, place, and time.   Skin: She is not diaphoretic.   Nursing note and vitals reviewed.      Significant Labs: All pertinent labs within the past 24 hours have been reviewed.    Significant Imaging: I have reviewed all pertinent imaging results/findings within the past 24 hours.  I have reviewed and interpreted all pertinent imaging results/findings within the past 24 hours.      Assessment/Plan:      * Bronchiectasis with acute exacerbation  Hx of Kansasi and recurrent pseudomonas infection resistant to cipro  No episode of hemoptysis overnight. Cough improved. No fever/leukocytosis. SOB improved with breathing treatment. SpO2 92-94% on 2L NC Oxygen supplement. On Vanc+Cefepime, but Vanc stopped due to body redness?   ID and Pulmonary following.   Continue Cefepime  Prednisone 40 mg daily x5 days  Hold Vanc and Eliquis  Respiratory Viral Panel negative  Continue to wean Oxygen >88 %, xopenox and acapella   Will consider CTA chest time for bronchial circulation if recurrent hemoptosys       COPD (chronic obstructive pulmonary disease)  See above #1.   Patient not able to tolerate albuterol "tremors." Continue home SILVIO and LABA/LAMA. If need then can add xopenex.   Prednisone 40 mg x 5 days as " above  Keep O2 >88      Nausea in adult  Adjust antiemetic regimen for better control      Hemoptysis  See above      Hypokalemia  2/2 poor PO intake  Replace  Recheck in AM      History of pulmonary embolism  Dx 2017 . Not sure cause but occurred while in hospital.   Hold Eliquis.       Anemia of chronic disease  Hgb consistent with previous labs. Denies history of or current melena, hematochezia, or hematemesis. Has mild hemoptysis. Obtain iron studies.           VTE Risk Mitigation (From admission, onward)         Ordered     Place HUSSEIN hose  Until discontinued      03/05/20 7464     IP VTE HIGH RISK PATIENT  Once      03/05/20 0139     Place HUSSEIN hose  Until discontinued      03/05/20 0139                      Lexis Florian MD  Department of Hospital Medicine   [facility]

## 2020-03-06 NOTE — NURSING
Report received from JOSE JUAN Brady. Pt moved to [facility]. AFB and respiratory viral panel collected and pt medicated for headache with PRN tylenol.

## 2020-03-06 NOTE — PROGRESS NOTES
[facility]  Infectious Disease  Progress Note    Patient Name: Yasmin Asencio  MRN: [id number]  Admission Date: 3/4/2020  Length of Stay: 1 days  Attending Physician: Lexis Saab MD  Primary Care Provider: Urmila Luna MD    Isolation Status: Airborne  Assessment/Plan:      Hemoptysis  Ms. Asencio is a 70 y/o female with chronic bronchiectasis, GERD, hx of pseudomonas infections presents to ED +hemoptysis. She reports hemoptysis with exacerbation of her cough. She was on augmentin for her cough recently. CXR with findings including bronchiectasis, no large focal consolidation seen. RCX with GPCs and moderate WBCs. No fever chills or night sweats. She was given a dose of vancomycin and cefepime in ED.     Reports treatment for history of NTM in 1970s. Treated x 1 year and was seen by Dr. Williamson. 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 Last AFB cultures 7/2019 were negative. No homelessness, incarceration or recent travel. Reports having chronic hemoptysis for years per pt and . Continues to have nausea    Recommendations  1. Continue cefeipme 2gq8hr. Respiratory cultures +pseudomonas. AFB cultures x 3 with TB PCR r/o NTM   2. quantiferon gold TB ordered   3. Recommend CT chest for follow up   4. GERD mgmt per primary team  3. Will follow cultures and tailor abx accordingly.    Seen and discussed with ID staff primary team and pulmonology.           Thank you for your consult. If you have any questions over the weekend please contact ID on call DR. Irasema Beauchamp PA-C  Infectious Disease  [facility]    Subjective:     Principal Problem:Bronchiectasis with acute exacerbation    HPI: Ms. Asencio is a 70 y/o female with chronic bronchiectasis, hx of pseudomonas infections presents to ED +hemoptysis. She denies having any fever chills or night sweats. She is on eliquis for hx of PE. She reports hemoptysis with exacerbation of her cough. She was on augmentin  for her cough recently. CXR with findings including bronchiectasis, no large focal consolidation seen. RCX with GPCs and moderate WBCs. No fever chills or night sweats. She was given a dose of vancomycin and cefepime in ED.     Reports treatment for history of TB in 1970s. 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 AFB culture 2016 negative. No homelessness, incarceration or recent travel. Reports having hemoptysis for years per pt and . Reports having nausea with vancomycin in ED yesterday.   Interval History:   Continues to have nausea  Respiratory cultures +pseudomonas  On cefepime. Feels improved  AFB studies pending      Review of Systems   Constitutional: Positive for fatigue. Negative for chills, diaphoresis and fever.   Respiratory: Positive for cough and wheezing. Negative for choking.    Cardiovascular: Negative for chest pain, palpitations and leg swelling.   Gastrointestinal: Positive for nausea and vomiting. Negative for abdominal pain and diarrhea.   Musculoskeletal: Negative for back pain.   Skin: Negative for color change, pallor, rash and wound.   Neurological: Negative for dizziness and headaches.   All other systems reviewed and are negative.    Objective:     Vital Signs (Most Recent):  Temp: 97.9 °F (36.6 °C) (03/06/20 1123)  Pulse: 95 (03/06/20 1123)  Resp: 17 (03/06/20 1123)  BP: (!) 121/58 (03/06/20 1123)  SpO2: 97 % (03/06/20 1123) Vital Signs (24h Range):  Temp:  [97.8 °F (36.6 °C)-98.4 °F (36.9 °C)] 97.9 °F (36.6 °C)  Pulse:  [] 95  Resp:  [17-20] 17  SpO2:  [90 %-99 %] 97 %  BP: (114-143)/(55-68) 121/58     Weight: 69.4 kg (153 lb)  Body mass index is 27.98 kg/m².    Estimated Creatinine Clearance: 39.8 mL/min (based on SCr of 1.2 mg/dL).    Physical Exam   Constitutional: She appears well-developed and well-nourished. No distress.   HENT:   Head: Normocephalic.   Eyes: Pupils are equal, round, and reactive to light.   Cardiovascular: Normal rate, regular rhythm and normal heart  sounds.   No murmur heard.  Pulmonary/Chest: Effort normal. She has wheezes. She has rales.   On nasal cannula   Abdominal: Soft. She exhibits no distension.   Musculoskeletal: Normal range of motion. She exhibits no edema or deformity.   Neurological: She is alert.   Skin: Skin is warm. She is not diaphoretic. No erythema. No pallor.   Psychiatric: She has a normal mood and affect.   Vitals reviewed.      Significant Labs: All pertinent labs within the past 24 hours have been reviewed.    Significant Imaging: I have reviewed all pertinent imaging results/findings within the past 24 hours.

## 2020-03-06 NOTE — ASSESSMENT & PLAN NOTE
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 Most recent negative x 2.  Records not available from prior treatment. 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 Chest imaging is stable but now with worsening dyspnea, sputum production and scant hemoptysis suggestive of bronchiectasis exacerbation. Admitted due to history of resistant organisms and failed OP Abx.     -- Stop eliquis   -- Agree with cefepime awaiting culture results.   -- prednisone 40mg daily x 5 days   -- Wean supplemental O2 to maintain SpO2 >88%. On 5L at home   -- Anoro   -- Anti-emetics   -- Bronchodilators and acapella for mucous clearance   -- If increased hemoptysis please obtain STAT CTA chest time for bronchial circulation

## 2020-03-06 NOTE — ASSESSMENT & PLAN NOTE
Ms. Asencio is a 70 y/o female with chronic bronchiectasis, GERD, hx of pseudomonas infections presents to ED +hemoptysis. She reports hemoptysis with exacerbation of her cough. She was on augmentin for her cough recently. CXR with findings including bronchiectasis, no large focal consolidation seen. RCX with GPCs and moderate WBCs. No fever chills or night sweats. She was given a dose of vancomycin and cefepime in ED.     Reports treatment for history of NTM in 1970s. Treated x 1 year and was seen by Dr. Williamson. 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 Last AFB cultures 7/2019 were negative. No homelessness, incarceration or recent travel. Reports having chronic hemoptysis for years per pt and . Continues to have nausea    Recommendations  1. Continue cefeipme 2gq8hr. Respiratory cultures +pseudomonas. AFB cultures x 3 with TB PCR r/o NTM   2. quantiferon gold TB ordered   3. Recommend CT chest for follow up   4. GERD mgmt per primary team  3. Will follow cultures and tailor abx accordingly.    Seen and discussed with ID staff primary team and pulmonology.

## 2020-03-06 NOTE — ASSESSMENT & PLAN NOTE
Hgb consistent with previous labs. Denies history of or current melena, hematochezia, or hematemesis. Has mild hemoptysis. Obtain iron studies.

## 2020-03-06 NOTE — SUBJECTIVE & OBJECTIVE
Interval History:   Continues to have nausea  Respiratory cultures +pseudomonas  On cefepime. Feels improved  AFB studies pending      Review of Systems   Constitutional: Positive for fatigue. Negative for chills, diaphoresis and fever.   Respiratory: Positive for cough and wheezing. Negative for choking.    Cardiovascular: Negative for chest pain, palpitations and leg swelling.   Gastrointestinal: Positive for nausea and vomiting. Negative for abdominal pain and diarrhea.   Musculoskeletal: Negative for back pain.   Skin: Negative for color change, pallor, rash and wound.   Neurological: Negative for dizziness and headaches.   All other systems reviewed and are negative.    Objective:     Vital Signs (Most Recent):  Temp: 97.9 °F (36.6 °C) (03/06/20 1123)  Pulse: 95 (03/06/20 1123)  Resp: 17 (03/06/20 1123)  BP: (!) 121/58 (03/06/20 1123)  SpO2: 97 % (03/06/20 1123) Vital Signs (24h Range):  Temp:  [97.8 °F (36.6 °C)-98.4 °F (36.9 °C)] 97.9 °F (36.6 °C)  Pulse:  [] 95  Resp:  [17-20] 17  SpO2:  [90 %-99 %] 97 %  BP: (114-143)/(55-68) 121/58     Weight: 69.4 kg (153 lb)  Body mass index is 27.98 kg/m².    Estimated Creatinine Clearance: 39.8 mL/min (based on SCr of 1.2 mg/dL).    Physical Exam   Constitutional: She appears well-developed and well-nourished. No distress.   HENT:   Head: Normocephalic.   Eyes: Pupils are equal, round, and reactive to light.   Cardiovascular: Normal rate, regular rhythm and normal heart sounds.   No murmur heard.  Pulmonary/Chest: Effort normal. She has wheezes. She has rales.   On nasal cannula   Abdominal: Soft. She exhibits no distension.   Musculoskeletal: Normal range of motion. She exhibits no edema or deformity.   Neurological: She is alert.   Skin: Skin is warm. She is not diaphoretic. No erythema. No pallor.   Psychiatric: She has a normal mood and affect.   Vitals reviewed.      Significant Labs: All pertinent labs within the past 24 hours have been  reviewed.    Significant Imaging: I have reviewed all pertinent imaging results/findings within the past 24 hours.

## 2020-03-06 NOTE — H&P
[facility] Medicine  History & Physical    Patient Name: Yasmin Asencio  MRN: [id number]  Admission Date: 3/4/2020  Attending Physician: Carlos Brantley MD   Primary Care Provider: Urmila Luna MD         Patient information was obtained from patient and ER records.     Subjective:     Principal Problem:Bronchiectasis with acute exacerbation    Chief Complaint:   Chief Complaint   Patient presents with    Hemoptysis     pt c/o cough and blood tinged sputum starting today. Denies fever. Pt with hx of COPD. Pt reports bright red blood in sputum         HPI: Mrs. 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 Pulmonologist suspect recurrent Pseudomonas infection. Patient reports coughing more recently for the last few days compared to COPD baseline consisting gross bright red blood and red mucous yesterday morning associated with mild nose bleeding. She also reports worsening of SOB and chest pain with cough for 1 weeks . She denies any fever, chill, night sweat, vomit, sick contact, weakness, abdominal pain, headache, blood in stool/urine, any bruising or other bleeding. Recent completed Augmentin Feb (not sure exact)     In ED, EKG and Troponin negative, Chest X-ray with stable chronic bronchiectasis and emphysematous/fibrotic without large focal consolidation, Blood and Sputum culture obtained, BNP of 153, Vanc + Cefepime ordered.    Past Medical History:   Diagnosis Date    Acquired bronchiectasis     due to history of TB - followed by pulmonary, Dr. Zuñiga    Allergy     Amblyopia     rt eye per pt     Anemia of other chronic disease     Anticoagulant long-term use     Anxiety     Cataract     Chronic obstructive pulmonary disease with acute exacerbation     Clotting disorder     COPD (chronic obstructive pulmonary disease)     Depression     Diverticulosis     Essential tremor     GERD (gastroesophageal reflux disease)     Hemangioma of liver     History of tuberculosis 1978    Hypertension     Mixed anxiety and depressive disorder     On home oxygen therapy     Psoriasis     PSVT (paroxysmal supraventricular tachycardia)     Pulmonary embolism     S/P PICC central line placement Apr. 2016 - May 2016    Skin disease     Psoriasis    Spondylosis without myelopathy 8/23/2013    Supraventricular tachycardia     Tachycardia     Thoracic aorta atherosclerosis     noted on CT scan of chest 1/3/2011    Tuberculosis     Vaginal delivery     x2    Vitamin D deficiency        Past Surgical History:   Procedure Laterality Date    CATARACT EXTRACTION W/  INTRAOCULAR LENS IMPLANT  07/24/12    od dr [place]    CATARACT EXTRACTION W/  INTRAOCULAR LENS IMPLANT  08/07/12    left eye    CHOLECYSTECTOMY      COLONOSCOPY N/A 12/4/2019    Procedure: COLONOSCOPY;  Surgeon: Franco Charles MD;  Location: [address]);  Service: Endoscopy;  Laterality: N/A;  ok to hold Eliquis 2days prior per     ESOPHAGOGASTRODUODENOSCOPY N/A 12/4/2019    Procedure: EGD (ESOPHAGOGASTRODUODENOSCOPY);  Surgeon: Franco Charles MD;  Location: [address]);  Service: Endoscopy;  Laterality: N/A;  ok to hold Eliquis 2days prior per     EYE SURGERY      GALLBLADDER SURGERY      RADIOFREQUENCY THERMOCOAGULATION Left 12/18/2018    Procedure: RADIOFREQUENCY THERMAL COAGULATION;  Surgeon: Issac Meyers MD;  Location: [facility];  Service: Pain Management;  Laterality: Left;  OK to hold Eliquis 3 days prior per Dr. Braga       Review of patient's allergies indicates:    Allergen Reactions    Adhesive Other (See Comments)     Tears up the skin and makes it itch   (leads)    Bactrim [sulfamethoxazole-trimethoprim] Rash     Was hospitalized for rash    Restasis [cyclosporine]     Lipitor [atorvastatin] Other (See Comments)     Muscle aches    Albuterol Other (See Comments)     tremors    Topamax [topiramate]      - made her feel 'bad in the head'       No current facility-administered medications on file prior to encounter.      Current Outpatient Medications on File Prior to Encounter   Medication Sig    apixaban (ELIQUIS) 5 mg Tab Take 1 tablet (5 mg total) by mouth 2 (two) times daily.    ergocalciferol (ERGOCALCIFEROL) 50,000 unit Cap TAKE 1 CAPSULE BY MOUTH ONCE A WEEK    escitalopram oxalate (LEXAPRO) 20 MG tablet Take 1 tablet (20 mg total) by mouth once daily.    folic acid (FOLVITE) 1 MG tablet TAKE 1 TABLET BY MOUTH ONCE DAILY    gabapentin (NEURONTIN) 300 MG capsule Take 2 capsules (600 mg total) by mouth 3 (three) times daily.    guaiFENesin (MUCINEX) 600 mg 12 hr tablet Take 1,200 mg by mouth 2 (two) times daily.    ipratropium (ATROVENT) 0.02 % nebulizer solution Take 2.5 mLs (500 mcg total) by nebulization 3 (three) times daily as needed for Wheezing. Inhale 1 vial in nebulizer 3 to 4 times daily    Lactobacillus rhamnosus GG (CULTURELLE) 10 billion cell capsule Take 1 capsule by mouth once daily.    mirtazapine (REMERON) 15 MG tablet Take 1 tablet (15 mg total) by mouth every evening.    multivitamin (THERAGRAN) per tablet Take 1 tablet by mouth once daily.    omeprazole (PRILOSEC) 20 MG capsule TAKE 1 CAPSULE BY MOUTH ONCE DAILY AS NEEDED FOR  HEARTBURN  (TAKE  AS  NEEDED)    ondansetron (ZOFRAN-ODT) 8 MG TbDL Take 1 tablet (8 mg total) by mouth every 8 (eight) hours as needed.    propylene glycol (SYSTANE BALANCE) 0.6 % Drop Apply 1 drop to eye daily as needed (dry eye).    sodium chloride 3% 3 % nebulizer solution Take 4 mLs by nebulization 2  (two) times daily.    umeclidinium-vilanterol (ANORO ELLIPTA) 62.5-25 mcg/actuation DsDv Inhale 1 puff into the lungs once daily. Controller    verapamil (CALAN-SR) 120 MG CR tablet Take 1 tablet (120 mg total) by mouth 2 (two) times daily.    ALPRAZolam (XANAX) 0.25 MG tablet Take 1 tablet (0.25 mg total) by mouth nightly as needed for Anxiety.    chlorthalidone (HYGROTEN) 25 MG Tab Take 1 tablet (25 mg total) by mouth once daily.    desoximetasone (TOPICORT) 0.25 % cream APPLY  CREAM EXTERNALLY TWICE DAILY AS NEEDED    fluocinolone (DERMA-SMOOTHE) 0.01 % external oil APPLY TOPICALLY ONCE DAILY    fluocinonide (LIDEX) 0.05 % external solution Apply topically once daily. - apply after shampooing for 10 days    hyoscyamine (ANASPAZ,LEVSIN) 0.125 mg Tab Take 1 tablet (125 mcg total) by mouth every 6 (six) hours as needed.    ketoconazole (NIZORAL) 2 % shampoo Apply topically once daily.    loperamide (IMODIUM) 2 mg capsule Take 2 capsules after first loose stool, then 1 capsule after each loose stool following. Do not exceed 8 capsules per day.    promethazine (PHENERGAN) 12.5 MG Supp Place 1 suppository (12.5 mg total) rectally every 6 (six) hours as needed.    promethazine (PHENERGAN) 12.5 MG Tab Take 1 tablet (12.5 mg total) by mouth every 6 (six) hours as needed.    sodium chloride 2% (XIOMARA 128) 2 % ophthalmic solution Place 1 drop into both eyes 3 (three) times daily as needed.     Family History     Problem Relation (Age of Onset)    Cancer Father, Brother, Maternal Aunt    Heart attack Mother, Brother, Son    Hyperlipidemia Sister    Hypertension Mother    Lung cancer Sister    Psoriasis Sister    Stroke Maternal Uncle        Tobacco Use    Smoking status: Former Smoker     Packs/day: 2.00     Years: 50.00     Pack years: 100.00     Types: Cigarettes     Last attempt to quit: 5/27/2009     Years since quitting: 10.7    Smokeless tobacco: Never Used   Substance and Sexual Activity    Alcohol use:  Not Currently     Frequency: Never     Binge frequency: Never    Drug use: Never    Sexual activity: Yes     Partners: Male     Review of Systems   Constitutional: Negative for activity change, appetite change, chills, diaphoresis, fatigue and fever.   HENT: Positive for nosebleeds. Negative for congestion, ear pain, rhinorrhea, sore throat and trouble swallowing.    Eyes: Negative for pain.   Respiratory: Positive for cough (cough up blood), chest tightness (chest pain with cough), shortness of breath and wheezing.    Cardiovascular: Negative for chest pain, palpitations and leg swelling.   Gastrointestinal: Positive for nausea (chronic). Negative for abdominal distention, abdominal pain, blood in stool, constipation, diarrhea and vomiting.   Endocrine: Negative for cold intolerance and heat intolerance.   Genitourinary: Negative for decreased urine volume, difficulty urinating, dysuria, flank pain, hematuria and pelvic pain.   Musculoskeletal: Negative for arthralgias, myalgias, neck pain and neck stiffness.   Neurological: Positive for tremors (chronic). Negative for dizziness, seizures, syncope, weakness, numbness and headaches.   Hematological: Negative for adenopathy. Does not bruise/bleed easily.   Psychiatric/Behavioral: Negative for agitation and confusion.     Objective:     Vital Signs (Most Recent):  Temp: 98 °F (36.7 °C) (03/05/20 1102)  Pulse: 93 (03/05/20 1102)  Resp: 18 (03/05/20 1102)  BP: (!) 148/65 (03/05/20 1102)  SpO2: (!) 94 % (03/05/20 1102) Vital Signs (24h Range):  Temp:  [97.9 °F (36.6 °C)-98.6 °F (37 °C)] 98 °F (36.7 °C)  Pulse:  [76-93] 93  Resp:  [18-22] 18  SpO2:  [89 %-96 %] 94 %  BP: (128-168)/(56-71) 148/65     Weight: 70.7 kg (155 lb 13.8 oz)  Body mass index is 28.51 kg/m².    Physical Exam   Constitutional: She is oriented to person, place, and time. She appears well-developed and well-nourished. No distress.   HENT:   Head: Normocephalic and atraumatic.   Eyes: Pupils are  equal, round, and reactive to light. Conjunctivae and EOM are normal.   Neck: Normal range of motion. Neck supple. No JVD present.   Cardiovascular: Normal rate, regular rhythm and normal heart sounds.   No murmur heard.  Pulmonary/Chest: She has wheezes. She has rales.   Abdominal: Soft. Bowel sounds are normal. She exhibits no distension. There is no tenderness. There is no guarding.   Musculoskeletal: Normal range of motion. She exhibits no edema.   Neurological: She is alert and oriented to person, place, and time.   Skin: Skin is warm and dry. She is not diaphoretic.   Psychiatric: Thought content normal.         CRANIAL NERVES     CN III, IV, VI   Pupils are equal, round, and reactive to light.  Extraocular motions are normal.        Significant Labs:   Blood Culture:   Recent Labs   Lab 03/04/20 2032 03/04/20 2155   LABBLOO No Growth to date No Growth to date     BMP:   Recent Labs   Lab 03/05/20  0744   *      K 3.2*   CL 98   CO2 28   BUN 14   CREATININE 1.1   CALCIUM 9.5     CBC:   Recent Labs   Lab 03/04/20 2055 03/05/20  0744   WBC 7.19 7.61   HGB 9.9* 9.8*   HCT 33.6* 32.4*    246     CMP:   Recent Labs   Lab 03/04/20 2055 03/05/20  0744    138   K 3.5 3.2*   CL 97 98   CO2 30* 28    115*   BUN 14 14   CREATININE 1.3 1.1   CALCIUM 9.8 9.5   PROT 8.0 7.5   ALBUMIN 4.0 3.7   BILITOT 0.4 0.4   ALKPHOS 81 74   AST 17 15   ALT 14 13   ANIONGAP 11 12   EGFRNONAA 42* 51*     Coagulation: No results for input(s): PT, INR, APTT in the last 48 hours.  Respiratory Culture:   Recent Labs   Lab 03/04/20 2155   GSRESP <10 epithelial cells per low power field.  Moderate WBC's  Few Gram positive cocci in pairs   RESPIRATORYC Insufficient incubation, culture in progress     Troponin:   Recent Labs   Lab 03/04/20 2055   TROPONINI <0.006       Significant Imaging: I have reviewed and interpreted all pertinent imaging results/findings within the past 24 hours.    Assessment/Plan:  "    * Bronchiectasis with acute exacerbation  Hx of Kansasi and recurrent pseudomonas infection resistant to cipro  No episode of hemoptysis overnight. Cough improved. No fever/leukocytosis. SOB improved with breathing treatment. SpO2 92-94% on 2L NC Oxygen supplement. On Vanc+Cefepime, but Vanc stopped due to body redness?   ID and Pulmonary following.   Continue Cefepime  Check quantiferon gold TB and AFB x 3   Prednisone 40 mg daily x5 days  Stop Vanc and Eliquis  Respiratory Viral Panel ordered  Continue to wean Oxygen >88 %, xopenox and acapella   Will consider CTA chest time for bronchial circulation if recurrent hemoptosis       Hemoptysis  See above      Hypokalemia  K 3.2. Replace potasium.        Nausea in adult  This is a chronic problem. Zofran and Phenergan at home.   EGD 12/2019 gastritis, 2 cm type 1 sliding hernia-- possible contributing to chronic nausea  Colonoscopy 12/2020 normal   Schedule IV Zofran 8mg q8hr  Phenergan PRN  PPI      COPD (chronic obstructive pulmonary disease)  See above #1.   Patient not able to tolerate albuterol "tremors." Continue home SILVIO and LABA/LAMA. If need then can add xopenex.   Prednisone 40 mg x 5 days as above  Keep O2 >88      History of pulmonary embolism  Dx 2017 . Not sure cause but occurred while in hospital.   Hold Eliquis.       Anemia of chronic disease  Hgb consistent with previous labs. Denies history of or current melena, hematochezia, hemoptysis, or hematemesis. Obtain iron studies.           VTE Risk Mitigation (From admission, onward)         Ordered     Place HUSSEIN hose  Until discontinued      03/05/20 2617     IP VTE HIGH RISK PATIENT  Once      03/05/20 0139     Place HUSSEIN hose  Until discontinued      03/05/20 0139                   Maribel Beauchamp NP  Department of Hospital Medicine   [facility]  "

## 2020-03-06 NOTE — PROGRESS NOTES
[facility] [place]  Pulmonology  Progress Note    Patient Name: Yasmin Asencio  MRN: [id number]  Admission Date: 3/4/2020  Hospital Length of Stay: 1 days  Code Status: Full Code  Attending Provider: Lexis Saab MD  Primary Care Provider: Urmila Luna MD   Principal Problem: Bronchiectasis with acute exacerbation    Subjective:     INTERVAL HISTORY-     No acute events in last 24 hours. Still with significant nausea despite scheduled anti-emetics. Hemoptysis improved. + epistaxis this AM. Sputum more clear.     Review of patient's allergies indicates:   Allergen Reactions    Adhesive Other (See Comments)     Tears up the skin and makes it itch   (leads)    Bactrim [sulfamethoxazole-trimethoprim] Rash     Was hospitalized for rash    Restasis [cyclosporine]     Lipitor [atorvastatin] Other (See Comments)     Muscle aches    Albuterol Other (See Comments)     tremors    Topamax [topiramate]      - made her feel 'bad in the head'       Review of Systems   Constitutional: Positive for activity change, appetite change and fatigue. Negative for chills, diaphoresis, fever and unexpected weight change.   HENT: Positive for nosebleeds. Negative for congestion, rhinorrhea, sinus pressure, sinus pain, sore throat, tinnitus, trouble swallowing and voice change.    Eyes: Negative for photophobia, pain, discharge, redness and visual disturbance.   Respiratory: Positive for cough, shortness of breath and wheezing. Negative for chest tightness and stridor.    Cardiovascular: Negative for chest pain, palpitations and leg swelling.   Gastrointestinal: Positive for nausea and vomiting. Negative for abdominal distention, abdominal pain, anal bleeding, blood in stool, constipation, diarrhea and rectal pain.   Endocrine: Negative for polydipsia, polyphagia and polyuria.   Genitourinary: Negative for dysuria, flank pain and hematuria.   Musculoskeletal: Negative for arthralgias, back pain, gait problem, joint  swelling, myalgias, neck pain and neck stiffness.   Skin: Negative for color change, pallor, rash and wound.   Allergic/Immunologic: Negative for immunocompromised state.   Neurological: Positive for weakness. Negative for dizziness and headaches.   All other systems reviewed and are negative.    Objective:     Vital Signs (Most Recent):  Temp: 97.9 °F (36.6 °C) (03/06/20 1123)  Pulse: 95 (03/06/20 1123)  Resp: 17 (03/06/20 1123)  BP: (!) 121/58 (03/06/20 1123)  SpO2: 97 % (03/06/20 1123) Vital Signs (24h Range):  Temp:  [97.8 °F (36.6 °C)-98.4 °F (36.9 °C)] 97.9 °F (36.6 °C)  Pulse:  [] 95  Resp:  [17-20] 17  SpO2:  [90 %-99 %] 97 %  BP: (114-143)/(55-68) 121/58     Weight: 69.4 kg (153 lb)  Body mass index is 27.98 kg/m².      Intake/Output Summary (Last 24 hours) at 3/6/2020 1149  Last data filed at 3/6/2020 0601  Gross per 24 hour   Intake 50 ml   Output --   Net 50 ml       Physical Exam   Constitutional: She is oriented to person, place, and time. She appears well-developed and well-nourished. No distress.   Appears run down/uncomfortable.    HENT:   Head: Normocephalic and atraumatic.   Right Ear: External ear normal.   Left Ear: External ear normal.   Nose: Nose normal.   Mouth/Throat: Oropharynx is clear and moist. No oropharyngeal exudate.   Eyes: Pupils are equal, round, and reactive to light. Conjunctivae and EOM are normal. Right eye exhibits no discharge. Left eye exhibits no discharge. No scleral icterus.   Neck: Normal range of motion. Neck supple. No JVD present. No tracheal deviation present. No thyromegaly present.   Cardiovascular: Normal rate, regular rhythm, normal heart sounds and intact distal pulses. Exam reveals no gallop and no friction rub.   No murmur heard.  Pulmonary/Chest: Effort normal. No respiratory distress. She has wheezes. She has rales.   Abdominal: Soft. Bowel sounds are normal. She exhibits no distension and no mass. There is no tenderness. No hernia.   Musculoskeletal:  Normal range of motion. She exhibits no edema, tenderness or deformity.   Lymphadenopathy:     She has no cervical adenopathy.   Neurological: She is alert and oriented to person, place, and time. No cranial nerve deficit. Coordination normal.   Skin: Skin is warm and dry. Capillary refill takes less than 2 seconds. No rash noted. She is not diaphoretic. No erythema. No pallor.   Psychiatric: She has a normal mood and affect. Her behavior is normal.       Vents:       Lines/Drains/Airways     Peripheral Intravenous Line                 Peripheral IV - Single Lumen 03/04/20 2124 18 G Right Antecubital 1 day                Significant Labs:    CBC/Anemia Profile:  Recent Labs   Lab 03/04/20 2055 03/05/20  0744 03/06/20  0637   WBC 7.19 7.61 5.33   HGB 9.9* 9.8* 9.1*   HCT 33.6* 32.4* 29.7*    246 225   MCV 83 82 81*   RDW 15.0* 15.0* 14.9*   IRON  --  64  --    FERRITIN  --  8*  --         Chemistries:  Recent Labs   Lab 03/04/20 2055 03/05/20  0744 03/06/20  0637    138 134*   K 3.5 3.2* 3.1*   CL 97 98 96   CO2 30* 28 27   BUN 14 14 15   CREATININE 1.3 1.1 1.2   CALCIUM 9.8 9.5 9.0   ALBUMIN 4.0 3.7 3.6   PROT 8.0 7.5 7.3   BILITOT 0.4 0.4 0.4   ALKPHOS 81 74 68   ALT 14 13 14   AST 17 15 17       All additional results in last 24 hours reviewed.     Significant Imaging:   I have reviewed and interpreted all pertinent imaging results/findings within the past 24 hours.     CXR (admit)     The cardiomediastinal silhouette is not enlarged, stable..  There is no pleural effusion.  The trachea is deviated rightward by a tortuous aorta..  The lungs are symmetrically expanded bilaterally with coarse interstitial attenuation.  There is bronchiectasis and emphysematous/fibrotic change projected over the bilateral upper lung zones, left greater than right, grossly stable..  No large focal consolidation seen.  There is no pneumothorax.  The osseous structures are remarkable for degenerative changes..    CT Chest  7/5-      1. Absence of superior segment dominant nodule right lower lobe from previous exam.  Micro nodules stable.  2. Emphysema and bronchiectasis changes of lungs stable.        CT chest-   1. Chronic filling defects within the right upper lobe pulmonary artery branch consistent with chronic pulmonary embolism.  No new acute pulmonary thromboembolism identified.  2. Stable fibrotic changes throughout the lung apices, left greater than right, with bronchiectasis, interstitial prominence, and mosaic attenuation of the lungs, similar to prior CTA 01/05/2017.  3. Small area of focal nodular consolidation within the left lower lobe, not seen on prior studies which, may be infectious or inflammatory in etiology, possibly even contiguous from the left apical fibrotic changes.  Suggest follow-up CT scan 1 month after antimicrobial therapy to exclude neoplastic nodule.  4. Stable 4 mm nodule within the right middle lobe.  5. Additional findings as detailed above.     CT 2/6/2019     Bilateral upper lung zone predominant lung disease, primarily characterized by severe bronchiectasis.  Overall appearance suggests sequela of chronic infection.    Ground-glass opacities and pulmonary nodule clusters improved from prior study.  0.7 cm right lower lobe pulmonary nodule, new from prior study.  Recommend follow-up chest CT at 6 months to ensure nodule stability.     2D Echo:      Left Ventricle Normal ejection fraction . Cavity is normal. Normal left ventricle diastolic function.   Right Ventricle Normal cavity size.   Left Atrium Cavity is mildly dilated.   Right Atrium Normal cavity size.   Aortic Valve Normal valve structure.   Mitral Valve Normal valve structure. There is mild mitral annular calcification.   Tricuspid Valve The tricuspid valve is normal. Trace regurgitation.   Pulmonic Valve Normal valve structure.   IVC/SVC Normal central venous pressure (3 mm Hg).   Ascending Aorta Normal aortic root, size and contour.    Pericardium No pericardial effusion. Pericardium is normal.      US Doppler-   1. Right lower extremity venous ultrasound shows normal flow in the deep venous system and no DVT.  2. Left lower extremity venous ultrasound shows normal flow in the deep venous system and no DVT.         PFT's (4/2017):      FEV1/FVC- 74  FEV1- 1.24L (58)  FVC- 1.67L (62)   DLCO- 53      PSG- None             Assessment/Plan:     * Bronchiectasis with acute exacerbation  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 Most recent negative x 2.  Records not available from prior treatment. 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 Chest imaging is stable but now with worsening dyspnea, sputum production and scant hemoptysis suggestive of bronchiectasis exacerbation. Admitted due to history of resistant organisms and failed OP Abx.     -- Stop eliquis   -- Agree with cefepime awaiting culture results.   -- prednisone 40mg daily x 5 days   -- Wean supplemental O2 to maintain SpO2 >88%. On 5L at home   -- Anoro   -- Anti-emetics   -- Bronchodilators and acapella for mucous clearance   -- If increased hemoptysis please obtain STAT CTA chest time for bronchial circulation     Hemoptysis  As above       History of pulmonary embolism  Has small chronic UL filling defect.. Initial diagnosis 2 years prior.. Her recent LE dopplers are negative and has been on OAC x 2 years.. Given increased hemoptysis will hold.            Declan Mills MD  Pulmonology/Critical Care Medicine   [facility] [place]

## 2020-03-06 NOTE — SUBJECTIVE & OBJECTIVE
Interval History: with nausea    Review of Systems   Respiratory: Negative.    Cardiovascular: Negative.    Gastrointestinal: Positive for nausea and vomiting.     Objective:     Vital Signs (Most Recent):  Temp: 98.3 °F (36.8 °C) (03/06/20 1500)  Pulse: 91 (03/06/20 1500)  Resp: 18 (03/06/20 1500)  BP: (!) 137/57 (03/06/20 1500)  SpO2: 99 % (03/06/20 1500) Vital Signs (24h Range):  Temp:  [97.8 °F (36.6 °C)-98.4 °F (36.9 °C)] 98.3 °F (36.8 °C)  Pulse:  [] 91  Resp:  [17-20] 18  SpO2:  [92 %-99 %] 99 %  BP: (114-143)/(55-65) 137/57     Weight: 69.4 kg (153 lb)  Body mass index is 27.98 kg/m².    Intake/Output Summary (Last 24 hours) at 3/6/2020 1643  Last data filed at 3/6/2020 0601  Gross per 24 hour   Intake 50 ml   Output --   Net 50 ml      Physical Exam   Constitutional: She is oriented to person, place, and time. She appears well-developed. No distress.   Cardiovascular: Normal rate and regular rhythm.   Pulmonary/Chest: Effort normal and breath sounds normal.   Abdominal: Soft. Bowel sounds are normal.   Neurological: She is alert and oriented to person, place, and time.   Skin: She is not diaphoretic.   Nursing note and vitals reviewed.      Significant Labs: All pertinent labs within the past 24 hours have been reviewed.    Significant Imaging: I have reviewed all pertinent imaging results/findings within the past 24 hours.  I have reviewed and interpreted all pertinent imaging results/findings within the past 24 hours.

## 2020-03-06 NOTE — ASSESSMENT & PLAN NOTE
Hx of Kansasi and recurrent pseudomonas infection resistant to cipro  No episode of hemoptysis overnight. Cough improved. No fever/leukocytosis. SOB improved with breathing treatment. SpO2 92-94% on 2L NC Oxygen supplement. On Vanc+Cefepime, but Vanc stopped due to body redness?   ID and Pulmonary following.   Continue Cefepime  Prednisone 40 mg daily x5 days  Hold Vanc and Eliquis  Respiratory Viral Panel negative  Continue to wean Oxygen >88 %, xopenox and acapella   Will consider CTA chest time for bronchial circulation if recurrent hemoptosys

## 2020-03-07 LAB
ALBUMIN SERPL BCP-MCNC: 3.2 G/DL (ref 3.5–5.2)
ALP SERPL-CCNC: 55 U/L (ref 55–135)
ALT SERPL W/O P-5'-P-CCNC: 14 U/L (ref 10–44)
ANION GAP SERPL CALC-SCNC: 6 MMOL/L (ref 8–16)
AST SERPL-CCNC: 15 U/L (ref 10–40)
BILIRUB SERPL-MCNC: 0.2 MG/DL (ref 0.1–1)
BUN SERPL-MCNC: 14 MG/DL (ref 8–23)
CALCIUM SERPL-MCNC: 8.2 MG/DL (ref 8.7–10.5)
CHLORIDE SERPL-SCNC: 104 MMOL/L (ref 95–110)
CO2 SERPL-SCNC: 28 MMOL/L (ref 23–29)
CREAT SERPL-MCNC: 1.2 MG/DL (ref 0.5–1.4)
EST. GFR  (AFRICAN AMERICAN): 53 ML/MIN/1.73 M^2
EST. GFR  (NON AFRICAN AMERICAN): 46 ML/MIN/1.73 M^2
GLUCOSE SERPL-MCNC: 97 MG/DL (ref 70–110)
MAGNESIUM SERPL-MCNC: 1.9 MG/DL (ref 1.6–2.6)
POTASSIUM SERPL-SCNC: 3.2 MMOL/L (ref 3.5–5.1)
PROT SERPL-MCNC: 6.4 G/DL (ref 6–8.4)
SODIUM SERPL-SCNC: 138 MMOL/L (ref 136–145)

## 2020-03-07 PROCEDURE — 25000003 PHARM REV CODE 250: Performed by: INTERNAL MEDICINE

## 2020-03-07 PROCEDURE — 83735 ASSAY OF MAGNESIUM: CPT

## 2020-03-07 PROCEDURE — 63600175 PHARM REV CODE 636 W HCPCS: Performed by: INTERNAL MEDICINE

## 2020-03-07 PROCEDURE — 87015 SPECIMEN INFECT AGNT CONCNTJ: CPT

## 2020-03-07 PROCEDURE — 25000003 PHARM REV CODE 250: Performed by: NURSE PRACTITIONER

## 2020-03-07 PROCEDURE — A4216 STERILE WATER/SALINE, 10 ML: HCPCS | Performed by: INTERNAL MEDICINE

## 2020-03-07 PROCEDURE — 63600175 PHARM REV CODE 636 W HCPCS: Performed by: NURSE PRACTITIONER

## 2020-03-07 PROCEDURE — 87206 SMEAR FLUORESCENT/ACID STAI: CPT

## 2020-03-07 PROCEDURE — 36415 COLL VENOUS BLD VENIPUNCTURE: CPT

## 2020-03-07 PROCEDURE — 94640 AIRWAY INHALATION TREATMENT: CPT

## 2020-03-07 PROCEDURE — 36569 INSJ PICC 5 YR+ W/O IMAGING: CPT

## 2020-03-07 PROCEDURE — C1751 CATH, INF, PER/CENT/MIDLINE: HCPCS

## 2020-03-07 PROCEDURE — 25000003 PHARM REV CODE 250: Performed by: EMERGENCY MEDICINE

## 2020-03-07 PROCEDURE — 25000242 PHARM REV CODE 250 ALT 637 W/ HCPCS: Performed by: NURSE PRACTITIONER

## 2020-03-07 PROCEDURE — 63600175 PHARM REV CODE 636 W HCPCS: Performed by: EMERGENCY MEDICINE

## 2020-03-07 PROCEDURE — 80053 COMPREHEN METABOLIC PANEL: CPT

## 2020-03-07 PROCEDURE — 87116 MYCOBACTERIA CULTURE: CPT

## 2020-03-07 PROCEDURE — 21400001 HC TELEMETRY ROOM

## 2020-03-07 RX ORDER — SODIUM CHLORIDE 0.9 % (FLUSH) 0.9 %
10 SYRINGE (ML) INJECTION
Status: DISCONTINUED | OUTPATIENT
Start: 2020-03-07 | End: 2020-03-11 | Stop reason: HOSPADM

## 2020-03-07 RX ORDER — SODIUM CHLORIDE 0.9 % (FLUSH) 0.9 %
10 SYRINGE (ML) INJECTION EVERY 6 HOURS
Status: DISCONTINUED | OUTPATIENT
Start: 2020-03-07 | End: 2020-03-11 | Stop reason: HOSPADM

## 2020-03-07 RX ORDER — METOCLOPRAMIDE HYDROCHLORIDE 5 MG/ML
10 INJECTION INTRAMUSCULAR; INTRAVENOUS
Status: COMPLETED | OUTPATIENT
Start: 2020-03-07 | End: 2020-03-08

## 2020-03-07 RX ORDER — POTASSIUM CHLORIDE 7.45 MG/ML
10 INJECTION INTRAVENOUS
Status: DISPENSED | OUTPATIENT
Start: 2020-03-07 | End: 2020-03-07

## 2020-03-07 RX ADMIN — IPRATROPIUM BROMIDE 0.5 MG: 0.5 SOLUTION RESPIRATORY (INHALATION) at 08:03

## 2020-03-07 RX ADMIN — ONDANSETRON HYDROCHLORIDE 8 MG: 2 SOLUTION INTRAMUSCULAR; INTRAVENOUS at 10:03

## 2020-03-07 RX ADMIN — CEFEPIME 2 G: 2 INJECTION, POWDER, FOR SOLUTION INTRAVENOUS at 02:03

## 2020-03-07 RX ADMIN — Medication 10 ML: at 07:03

## 2020-03-07 RX ADMIN — FAMOTIDINE 20 MG: 20 TABLET ORAL at 09:03

## 2020-03-07 RX ADMIN — POTASSIUM CHLORIDE 10 MEQ: 10 INJECTION, SOLUTION INTRAVENOUS at 12:03

## 2020-03-07 RX ADMIN — CEFEPIME 2 G: 2 INJECTION, POWDER, FOR SOLUTION INTRAVENOUS at 06:03

## 2020-03-07 RX ADMIN — Medication 10 ML: at 10:03

## 2020-03-07 RX ADMIN — FLUTICASONE FUROATE AND VILANTEROL TRIFENATATE 1 PUFF: 200; 25 POWDER RESPIRATORY (INHALATION) at 08:03

## 2020-03-07 RX ADMIN — CHLORTHALIDONE 25 MG: 25 TABLET ORAL at 09:03

## 2020-03-07 RX ADMIN — IPRATROPIUM BROMIDE 0.5 MG: 0.5 SOLUTION RESPIRATORY (INHALATION) at 12:03

## 2020-03-07 RX ADMIN — ONDANSETRON HYDROCHLORIDE 8 MG: 2 SOLUTION INTRAMUSCULAR; INTRAVENOUS at 05:03

## 2020-03-07 RX ADMIN — FOLIC ACID 1000 MCG: 1 TABLET ORAL at 09:03

## 2020-03-07 RX ADMIN — ONDANSETRON HYDROCHLORIDE 8 MG: 2 SOLUTION INTRAMUSCULAR; INTRAVENOUS at 06:03

## 2020-03-07 RX ADMIN — ALPRAZOLAM 0.25 MG: 0.25 TABLET ORAL at 10:03

## 2020-03-07 RX ADMIN — GUAIFENESIN 1200 MG: 600 TABLET, EXTENDED RELEASE ORAL at 09:03

## 2020-03-07 RX ADMIN — PROMETHAZINE HYDROCHLORIDE 25 MG: 25 INJECTION INTRAMUSCULAR; INTRAVENOUS at 09:03

## 2020-03-07 RX ADMIN — ONDANSETRON HYDROCHLORIDE 8 MG: 2 SOLUTION INTRAMUSCULAR; INTRAVENOUS at 12:03

## 2020-03-07 RX ADMIN — ACETAMINOPHEN 650 MG: 325 TABLET ORAL at 04:03

## 2020-03-07 RX ADMIN — PROMETHAZINE HYDROCHLORIDE 25 MG: 25 INJECTION INTRAMUSCULAR; INTRAVENOUS at 10:03

## 2020-03-07 RX ADMIN — METOCLOPRAMIDE 5 MG: 5 INJECTION, SOLUTION INTRAMUSCULAR; INTRAVENOUS at 06:03

## 2020-03-07 RX ADMIN — IPRATROPIUM BROMIDE 0.5 MG: 0.5 SOLUTION RESPIRATORY (INHALATION) at 01:03

## 2020-03-07 RX ADMIN — METOCLOPRAMIDE 10 MG: 5 INJECTION, SOLUTION INTRAMUSCULAR; INTRAVENOUS at 05:03

## 2020-03-07 RX ADMIN — GUAIFENESIN 1200 MG: 600 TABLET, EXTENDED RELEASE ORAL at 10:03

## 2020-03-07 RX ADMIN — VERAPAMIL HYDROCHLORIDE 120 MG: 120 TABLET, FILM COATED, EXTENDED RELEASE ORAL at 10:03

## 2020-03-07 RX ADMIN — DEXTROSE MONOHYDRATE, SODIUM CHLORIDE, AND POTASSIUM CHLORIDE: 50; 9; 1.49 INJECTION, SOLUTION INTRAVENOUS at 10:03

## 2020-03-07 RX ADMIN — CEFEPIME 2 G: 2 INJECTION, POWDER, FOR SOLUTION INTRAVENOUS at 10:03

## 2020-03-07 RX ADMIN — VERAPAMIL HYDROCHLORIDE 120 MG: 120 TABLET, FILM COATED, EXTENDED RELEASE ORAL at 09:03

## 2020-03-07 RX ADMIN — ESCITALOPRAM OXALATE 20 MG: 10 TABLET ORAL at 09:03

## 2020-03-07 RX ADMIN — METOCLOPRAMIDE 10 MG: 5 INJECTION, SOLUTION INTRAMUSCULAR; INTRAVENOUS at 10:03

## 2020-03-07 RX ADMIN — PREDNISONE 40 MG: 20 TABLET ORAL at 09:03

## 2020-03-07 RX ADMIN — MIRTAZAPINE 15 MG: 15 TABLET, FILM COATED ORAL at 10:03

## 2020-03-07 NOTE — PROGRESS NOTES
[facility] Hospital Medicine  Progress Note    Patient Name: Yasmin Asencio  MRN: [id number]  Patient Class: IP- Inpatient   Admission Date: 3/4/2020  Length of Stay: 2 days  Attending Physician: Lexis Saab MD  Primary Care Provider: Urmila Luna MD        Subjective:     Principal Problem:Bronchiectasis with acute exacerbation        HPI:  Mrs. 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 Pulmonologist suspect recurrent Pseudomonas infection. Patient reports coughing more recently for the last few days compared to COPD baseline consisting gross bright red blood and red mucous yesterday morning associated with mild nose bleeding. She also reports worsening of SOB and chest pain with cough for 1 weeks . She denies any fever, chill, night sweat, vomit, sick contact, weakness, abdominal pain, headache, blood in stool/urine, any bruising or other bleeding. Recent completed Augmentin Feb (not sure exact)     In ED, EKG and Troponin negative, Chest X-ray with stable chronic bronchiectasis and emphysematous/fibrotic without large focal consolidation, Blood and Sputum culture obtained, BNP of 153, Vanc + Cefepime ordered.    Overview/Hospital Course:  No notes on file    Interval History: with nausea but states feeling better in general. PO intake still suboptimal    Review of Systems   Respiratory: Negative.    Cardiovascular: Negative.    Gastrointestinal: Positive for nausea. Negative for vomiting.     Objective:     Vital Signs (Most Recent):  Temp: 97.8 °F (36.6 °C) (03/07/20 1146)  Pulse: 80 (03/07/20  "1322)  Resp: 19 (03/07/20 1322)  BP: (!) 120/59 (03/07/20 1146)  SpO2: 95 % (03/07/20 1322) Vital Signs (24h Range):  Temp:  [97.5 °F (36.4 °C)-98.3 °F (36.8 °C)] 97.8 °F (36.6 °C)  Pulse:  [80-96] 80  Resp:  [17-20] 19  SpO2:  [93 %-99 %] 95 %  BP: (111-140)/(56-69) 120/59     Weight: 70.3 kg (154 lb 15.7 oz)  Body mass index is 28.35 kg/m².    Intake/Output Summary (Last 24 hours) at 3/7/2020 1442  Last data filed at 3/7/2020 0648  Gross per 24 hour   Intake 360 ml   Output --   Net 360 ml      Physical Exam   Constitutional: She is oriented to person, place, and time. She appears well-developed. No distress.   Cardiovascular: Normal rate and regular rhythm.   Pulmonary/Chest: Effort normal and breath sounds normal.   Abdominal: Soft. Bowel sounds are normal.   Neurological: She is alert and oriented to person, place, and time.   Skin: She is not diaphoretic.   Nursing note and vitals reviewed.      Significant Labs: All pertinent labs within the past 24 hours have been reviewed.    Significant Imaging: I have reviewed all pertinent imaging results/findings within the past 24 hours.  I have reviewed and interpreted all pertinent imaging results/findings within the past 24 hours.      Assessment/Plan:      * Bronchiectasis with acute exacerbation  Hx of Kansa and recurrent pseudomonas infection resistant to cipro  No episode of hemoptysis overnight. Cough improved. No fever/leukocytosis. SOB improved with breathing treatment. SpO2 92-94%. Currently at room air  ID and Pulmonary following.   Sputum culture growing Pseudomonas sp previously sensitive to cefepime  Continue Cefepime  Prednisone 40 mg daily x5 days  Hold eliquis for now but will resume soon if possible  Respiratory Viral Panel negative          COPD (chronic obstructive pulmonary disease)  See above #1.   Patient not able to tolerate albuterol "tremors." Continue home SILVIO and LABA/LAMA. If need then can add xopenex.   Prednisone 40 mg x 5 days as " above  Keep O2 >88      Nausea in adult  Adjust antiemetic regimen for better control      Hemoptysis  See above      Hypokalemia  2/2 poor PO intake  Replace  Recheck in AM      History of pulmonary embolism  Dx 2017 . Not sure cause but occurred while in hospital.   Hold Eliquis.       Anemia of chronic disease  Hgb consistent with previous labs. Denies history of or current melena, hematochezia, or hematemesis. Has mild hemoptysis. Obtain iron studies.         VTE Risk Mitigation (From admission, onward)         Ordered     Place HUSSEIN hose  Until discontinued      03/05/20 5907     IP VTE HIGH RISK PATIENT  Once      03/05/20 0139     Place HUSSEIN hose  Until discontinued      03/05/20 0139                Dispo: likely home with IV cefepime. F/u sputum Cx sensitivities. Treat nausea      Lexis Florian MD  Department of Hospital Medicine   [facility]

## 2020-03-07 NOTE — NURSING
Note that patient c/o 6/10 pain to left peripheral IV and arm while potassium-rider infusing infusing;   Attempted to decrease rate of rider, however, patient moaning and guarding shoulder;   Immediately stopped IV;    Dr. Saab notified;  Order discontinue K-riders;    Cefepime infusion started at 1419;   After approximately five minutes, patient began to c/o pain to left arm;   Notified Dr. Saab;  Order: PICC eval & placement;     Called Charge RN and House Supervisor for PICC team notification;

## 2020-03-07 NOTE — SUBJECTIVE & OBJECTIVE
Interval History: with nausea but states feeling better in general. PO intake still suboptimal    Review of Systems   Respiratory: Negative.    Cardiovascular: Negative.    Gastrointestinal: Positive for nausea. Negative for vomiting.     Objective:     Vital Signs (Most Recent):  Temp: 97.8 °F (36.6 °C) (03/07/20 1146)  Pulse: 80 (03/07/20 1322)  Resp: 19 (03/07/20 1322)  BP: (!) 120/59 (03/07/20 1146)  SpO2: 95 % (03/07/20 1322) Vital Signs (24h Range):  Temp:  [97.5 °F (36.4 °C)-98.3 °F (36.8 °C)] 97.8 °F (36.6 °C)  Pulse:  [80-96] 80  Resp:  [17-20] 19  SpO2:  [93 %-99 %] 95 %  BP: (111-140)/(56-69) 120/59     Weight: 70.3 kg (154 lb 15.7 oz)  Body mass index is 28.35 kg/m².    Intake/Output Summary (Last 24 hours) at 3/7/2020 1442  Last data filed at 3/7/2020 0648  Gross per 24 hour   Intake 360 ml   Output --   Net 360 ml      Physical Exam   Constitutional: She is oriented to person, place, and time. She appears well-developed. No distress.   Cardiovascular: Normal rate and regular rhythm.   Pulmonary/Chest: Effort normal and breath sounds normal.   Abdominal: Soft. Bowel sounds are normal.   Neurological: She is alert and oriented to person, place, and time.   Skin: She is not diaphoretic.   Nursing note and vitals reviewed.      Significant Labs: All pertinent labs within the past 24 hours have been reviewed.    Significant Imaging: I have reviewed all pertinent imaging results/findings within the past 24 hours.  I have reviewed and interpreted all pertinent imaging results/findings within the past 24 hours.

## 2020-03-07 NOTE — NURSING
Received bedside handoff per Adelina. Discussed plan of care, pain management and safety measures with patient and spouse. Patient resting comfortably at present time

## 2020-03-07 NOTE — NURSING
Note that patient refusing potassium riders;  Asking for some other form of potassium,;     Will notify Dr. Saab;

## 2020-03-07 NOTE — ASSESSMENT & PLAN NOTE
Hx of Kansasi and recurrent pseudomonas infection resistant to cipro  No episode of hemoptysis overnight. Cough improved. No fever/leukocytosis. SOB improved with breathing treatment. SpO2 92-94%. Currently at room air  ID and Pulmonary following.   Sputum culture growing Pseudomonas sp previously sensitive to cefepime  Continue Cefepime  Prednisone 40 mg daily x5 days  Hold eliquis for now but will resume soon if possible  Respiratory Viral Panel negative

## 2020-03-07 NOTE — PLAN OF CARE
Problem: Infection  Goal: Infection Symptom Resolution  Outcome: Ongoing, Progressing  Intervention: Prevent or Manage Infection  Flowsheets (Taken 3/7/2020 1700)  Fever Reduction/Comfort Measures: medication administered  Isolation Precautions: airborne precautions maintained; droplet precautions maintained     Problem: Infection  Goal: Infection Symptom Resolution  Intervention: Prevent or Manage Infection  Flowsheets (Taken 3/7/2020 1700)  Fever Reduction/Comfort Measures: medication administered  Isolation Precautions: airborne precautions maintained; droplet precautions maintained

## 2020-03-07 NOTE — PROCEDURES
"Yasmin Asencio is a 69 y.o. female patient.    Temp: 98.2 °F (36.8 °C) (03/07/20 1455)  Pulse: 91 (03/07/20 1455)  Resp: 17 (03/07/20 1455)  BP: (!) 114/50 (03/07/20 1455)  SpO2: 95 % (03/07/20 1455)  Weight: 70.3 kg (154 lb 15.7 oz) (03/07/20 0400)  Height: 5' 2" (157.5 cm) (03/04/20 1658)    PICC  Date/Time: 3/7/2020 4:35 PM  Performed by: López Loving RN  Consent Done: Yes  Time out: Immediately prior to procedure a time out was called to verify the correct patient, procedure, equipment, support staff and site/side marked as required  Indications: med administration  Anesthesia: local infiltration  Local anesthetic: lidocaine 1% without epinephrine  Anesthetic Total (mL): 1  Preparation: skin prepped with ChloraPrep  Skin prep agent dried: skin prep agent completely dried prior to procedure  Sterile barriers: all five maximum sterile barriers used - cap, mask, sterile gown, sterile gloves, and large sterile sheet  Hand hygiene: hand hygiene performed prior to central venous catheter insertion  Location details: right basilic  Catheter type: double lumen  Catheter size: 5 Fr  Catheter Length: 31cm    Ultrasound guidance: yes  Vessel Caliber: medium and large and patent, compressibility normal  Needle advanced into vessel with real time Ultrasound guidance.  Guidewire confirmed in vessel.  Sterile sheath used.  Post-procedure: blood return through all ports, chlorhexidine patch and sterile dressing applied  Estimated blood loss (mL): 0            López Loving  3/7/2020  "

## 2020-03-08 LAB
BACTERIA SPEC AEROBE CULT: ABNORMAL
GRAM STN SPEC: ABNORMAL
M TB CMPLX DNA SPEC QL NAA+PROBE: NORMAL
SPECIMEN SOURCE: NORMAL

## 2020-03-08 PROCEDURE — 94761 N-INVAS EAR/PLS OXIMETRY MLT: CPT

## 2020-03-08 PROCEDURE — 25000003 PHARM REV CODE 250: Performed by: NURSE PRACTITIONER

## 2020-03-08 PROCEDURE — 21400001 HC TELEMETRY ROOM

## 2020-03-08 PROCEDURE — 94640 AIRWAY INHALATION TREATMENT: CPT

## 2020-03-08 PROCEDURE — 63600175 PHARM REV CODE 636 W HCPCS: Performed by: EMERGENCY MEDICINE

## 2020-03-08 PROCEDURE — 99233 PR SUBSEQUENT HOSPITAL CARE,LEVL III: ICD-10-PCS | Mod: ,,, | Performed by: INTERNAL MEDICINE

## 2020-03-08 PROCEDURE — 27000221 HC OXYGEN, UP TO 24 HOURS

## 2020-03-08 PROCEDURE — 25000242 PHARM REV CODE 250 ALT 637 W/ HCPCS: Performed by: NURSE PRACTITIONER

## 2020-03-08 PROCEDURE — 63600175 PHARM REV CODE 636 W HCPCS: Performed by: NURSE PRACTITIONER

## 2020-03-08 PROCEDURE — 25000003 PHARM REV CODE 250: Performed by: INTERNAL MEDICINE

## 2020-03-08 PROCEDURE — A4216 STERILE WATER/SALINE, 10 ML: HCPCS | Performed by: INTERNAL MEDICINE

## 2020-03-08 PROCEDURE — 99233 SBSQ HOSP IP/OBS HIGH 50: CPT | Mod: ,,, | Performed by: INTERNAL MEDICINE

## 2020-03-08 PROCEDURE — 25000003 PHARM REV CODE 250: Performed by: EMERGENCY MEDICINE

## 2020-03-08 PROCEDURE — 63600175 PHARM REV CODE 636 W HCPCS: Performed by: INTERNAL MEDICINE

## 2020-03-08 RX ORDER — POTASSIUM CHLORIDE 20 MEQ/15ML
60 SOLUTION ORAL ONCE
Status: COMPLETED | OUTPATIENT
Start: 2020-03-08 | End: 2020-03-08

## 2020-03-08 RX ADMIN — CHLORTHALIDONE 25 MG: 25 TABLET ORAL at 09:03

## 2020-03-08 RX ADMIN — IPRATROPIUM BROMIDE 0.5 MG: 0.5 SOLUTION RESPIRATORY (INHALATION) at 01:03

## 2020-03-08 RX ADMIN — Medication 10 ML: at 06:03

## 2020-03-08 RX ADMIN — GUAIFENESIN 1200 MG: 600 TABLET, EXTENDED RELEASE ORAL at 09:03

## 2020-03-08 RX ADMIN — ONDANSETRON HYDROCHLORIDE 8 MG: 2 SOLUTION INTRAMUSCULAR; INTRAVENOUS at 06:03

## 2020-03-08 RX ADMIN — FAMOTIDINE 20 MG: 20 TABLET ORAL at 09:03

## 2020-03-08 RX ADMIN — CEFEPIME 2 G: 2 INJECTION, POWDER, FOR SOLUTION INTRAVENOUS at 06:03

## 2020-03-08 RX ADMIN — POTASSIUM CHLORIDE 60 MEQ: 20 SOLUTION ORAL at 06:03

## 2020-03-08 RX ADMIN — ALPRAZOLAM 0.25 MG: 0.25 TABLET ORAL at 11:03

## 2020-03-08 RX ADMIN — MIRTAZAPINE 15 MG: 15 TABLET, FILM COATED ORAL at 09:03

## 2020-03-08 RX ADMIN — ONDANSETRON HYDROCHLORIDE 8 MG: 2 SOLUTION INTRAMUSCULAR; INTRAVENOUS at 10:03

## 2020-03-08 RX ADMIN — Medication 10 ML: at 11:03

## 2020-03-08 RX ADMIN — METOCLOPRAMIDE 10 MG: 5 INJECTION, SOLUTION INTRAMUSCULAR; INTRAVENOUS at 06:03

## 2020-03-08 RX ADMIN — FLUTICASONE FUROATE AND VILANTEROL TRIFENATATE 1 PUFF: 200; 25 POWDER RESPIRATORY (INHALATION) at 08:03

## 2020-03-08 RX ADMIN — IPRATROPIUM BROMIDE 0.5 MG: 0.5 SOLUTION RESPIRATORY (INHALATION) at 08:03

## 2020-03-08 RX ADMIN — PREDNISONE 40 MG: 20 TABLET ORAL at 09:03

## 2020-03-08 RX ADMIN — IPRATROPIUM BROMIDE 0.5 MG: 0.5 SOLUTION RESPIRATORY (INHALATION) at 07:03

## 2020-03-08 RX ADMIN — DEXTROSE MONOHYDRATE, SODIUM CHLORIDE, AND POTASSIUM CHLORIDE: 50; 9; 1.49 INJECTION, SOLUTION INTRAVENOUS at 01:03

## 2020-03-08 RX ADMIN — CEFEPIME 2 G: 2 INJECTION, POWDER, FOR SOLUTION INTRAVENOUS at 10:03

## 2020-03-08 RX ADMIN — DEXTROSE MONOHYDRATE, SODIUM CHLORIDE, AND POTASSIUM CHLORIDE: 50; 9; 1.49 INJECTION, SOLUTION INTRAVENOUS at 03:03

## 2020-03-08 RX ADMIN — ONDANSETRON HYDROCHLORIDE 8 MG: 2 SOLUTION INTRAMUSCULAR; INTRAVENOUS at 11:03

## 2020-03-08 RX ADMIN — VERAPAMIL HYDROCHLORIDE 120 MG: 120 TABLET, FILM COATED, EXTENDED RELEASE ORAL at 09:03

## 2020-03-08 RX ADMIN — CEFEPIME 2 G: 2 INJECTION, POWDER, FOR SOLUTION INTRAVENOUS at 03:03

## 2020-03-08 RX ADMIN — ESCITALOPRAM OXALATE 20 MG: 10 TABLET ORAL at 09:03

## 2020-03-08 RX ADMIN — FOLIC ACID 1000 MCG: 1 TABLET ORAL at 09:03

## 2020-03-08 NOTE — PROGRESS NOTES
[facility]  Pulmonology  Progress Note    Patient Name: Yasmin Asencio  MRN: [id number]  Admission Date: 3/4/2020  Hospital Length of Stay: 3 days  Code Status: Full Code  Attending Provider: Lexis Saab MD  Primary Care Provider: Urmila Luna MD   Principal Problem: Bronchiectasis with acute exacerbation    Subjective:     INTERVAL HISTORY-     No acute events overnight.     Review of patient's allergies indicates:   Allergen Reactions    Adhesive Other (See Comments)     Tears up the skin and makes it itch   (leads)    Bactrim [sulfamethoxazole-trimethoprim] Rash     Was hospitalized for rash    Restasis [cyclosporine]     Lipitor [atorvastatin] Other (See Comments)     Muscle aches    Albuterol Other (See Comments)     tremors    Topamax [topiramate]      - made her feel 'bad in the head'       Review of Systems   Constitutional: Positive for activity change, appetite change and fatigue. Negative for chills, diaphoresis, fever and unexpected weight change.   HENT: Positive for nosebleeds. Negative for congestion, rhinorrhea, sinus pressure, sinus pain, sore throat, tinnitus, trouble swallowing and voice change.    Eyes: Negative for photophobia, pain, discharge, redness and visual disturbance.   Respiratory: Positive for cough, shortness of breath and wheezing. Negative for chest tightness and stridor.    Cardiovascular: Negative for chest pain, palpitations and leg swelling.   Gastrointestinal: Positive for nausea and vomiting. Negative for abdominal distention, abdominal pain, anal bleeding, blood in stool, constipation, diarrhea and rectal pain.   Endocrine: Negative for polydipsia, polyphagia and polyuria.   Genitourinary: Negative for dysuria, flank pain and hematuria.   Musculoskeletal: Negative for arthralgias, back pain, gait problem, joint swelling, myalgias, neck pain and neck stiffness.   Skin: Negative for color change, pallor, rash and wound.    Allergic/Immunologic: Negative for immunocompromised state.   Neurological: Positive for weakness. Negative for dizziness and headaches.   All other systems reviewed and are negative.    Objective:     Vital Signs (Most Recent):  Temp: 98.4 °F (36.9 °C) (03/08/20 0753)  Pulse: 87 (03/08/20 0810)  Resp: 17 (03/08/20 0810)  BP: 132/61 (03/08/20 0753)  SpO2: (!) 93 % (03/08/20 0810) Vital Signs (24h Range):  Temp:  [97.8 °F (36.6 °C)-98.4 °F (36.9 °C)] 98.4 °F (36.9 °C)  Pulse:  [80-99] 87  Resp:  [16-19] 17  SpO2:  [93 %-96 %] 93 %  BP: (114-139)/(50-63) 132/61     Weight: 71 kg (156 lb 8.4 oz)  Body mass index is 28.63 kg/m².      Intake/Output Summary (Last 24 hours) at 3/8/2020 1111  Last data filed at 3/8/2020 0000  Gross per 24 hour   Intake 260 ml   Output --   Net 260 ml       Physical Exam   Constitutional: She is oriented to person, place, and time. She appears well-developed and well-nourished. No distress.   HENT:   Head: Normocephalic and atraumatic.   Right Ear: External ear normal.   Left Ear: External ear normal.   Nose: Nose normal.   Mouth/Throat: Oropharynx is clear and moist. No oropharyngeal exudate.   Eyes: Pupils are equal, round, and reactive to light. Conjunctivae and EOM are normal. Right eye exhibits no discharge. Left eye exhibits no discharge. No scleral icterus.   Neck: Normal range of motion. Neck supple. No JVD present. No tracheal deviation present. No thyromegaly present.   Cardiovascular: Normal rate, regular rhythm, normal heart sounds and intact distal pulses. Exam reveals no gallop and no friction rub.   No murmur heard.  Pulmonary/Chest: Effort normal. No respiratory distress. She has no wheezes. She has rales.   Abdominal: Soft. Bowel sounds are normal. She exhibits no distension and no mass. There is no tenderness. No hernia.   Musculoskeletal: Normal range of motion. She exhibits no edema, tenderness or deformity.   Lymphadenopathy:     She has no cervical adenopathy.    Neurological: She is alert and oriented to person, place, and time. No cranial nerve deficit. Coordination normal.   Skin: Skin is warm and dry. Capillary refill takes less than 2 seconds. No rash noted. She is not diaphoretic. No erythema. No pallor.   Psychiatric: She has a normal mood and affect. Her behavior is normal.       Vents:       Lines/Drains/Airways     Peripherally Inserted Central Catheter Line            PICC Double Lumen 03/07/20 1635 right basilic less than 1 day                Significant Labs:    CBC/Anemia Profile:  No results for input(s): WBC, HGB, HCT, PLT, MCV, RDW, IRON, FERRITIN, RETIC, FOLATE, LUTNNYBW30, OCCULTBLOOD in the last 48 hours.     Chemistries:  Recent Labs   Lab 03/07/20  0549      K 3.2*      CO2 28   BUN 14   CREATININE 1.2   CALCIUM 8.2*   ALBUMIN 3.2*   PROT 6.4   BILITOT 0.2   ALKPHOS 55   ALT 14   AST 15   MG 1.9       All additional results in last 24 hours reviewed.     Significant Imaging:   I have reviewed and interpreted all pertinent imaging results/findings within the past 24 hours.     CXR (admit)     The cardiomediastinal silhouette is not enlarged, stable..  There is no pleural effusion.  The trachea is deviated rightward by a tortuous aorta..  The lungs are symmetrically expanded bilaterally with coarse interstitial attenuation.  There is bronchiectasis and emphysematous/fibrotic change projected over the bilateral upper lung zones, left greater than right, grossly stable..  No large focal consolidation seen.  There is no pneumothorax.  The osseous structures are remarkable for degenerative changes..    CT Chest 7/5-      1. Absence of superior segment dominant nodule right lower lobe from previous exam.  Micro nodules stable.  2. Emphysema and bronchiectasis changes of lungs stable.        CT chest-   1. Chronic filling defects within the right upper lobe pulmonary artery branch consistent with chronic pulmonary embolism.  No new acute pulmonary  thromboembolism identified.  2. Stable fibrotic changes throughout the lung apices, left greater than right, with bronchiectasis, interstitial prominence, and mosaic attenuation of the lungs, similar to prior CTA 01/05/2017.  3. Small area of focal nodular consolidation within the left lower lobe, not seen on prior studies which, may be infectious or inflammatory in etiology, possibly even contiguous from the left apical fibrotic changes.  Suggest follow-up CT scan 1 month after antimicrobial therapy to exclude neoplastic nodule.  4. Stable 4 mm nodule within the right middle lobe.  5. Additional findings as detailed above.     CT 2/6/2019     Bilateral upper lung zone predominant lung disease, primarily characterized by severe bronchiectasis.  Overall appearance suggests sequela of chronic infection.    Ground-glass opacities and pulmonary nodule clusters improved from prior study.  0.7 cm right lower lobe pulmonary nodule, new from prior study.  Recommend follow-up chest CT at 6 months to ensure nodule stability.     2D Echo:      Left Ventricle Normal ejection fraction . Cavity is normal. Normal left ventricle diastolic function.   Right Ventricle Normal cavity size.   Left Atrium Cavity is mildly dilated.   Right Atrium Normal cavity size.   Aortic Valve Normal valve structure.   Mitral Valve Normal valve structure. There is mild mitral annular calcification.   Tricuspid Valve The tricuspid valve is normal. Trace regurgitation.   Pulmonic Valve Normal valve structure.   IVC/SVC Normal central venous pressure (3 mm Hg).   Ascending Aorta Normal aortic root, size and contour.   Pericardium No pericardial effusion. Pericardium is normal.      US Doppler-   1. Right lower extremity venous ultrasound shows normal flow in the deep venous system and no DVT.  2. Left lower extremity venous ultrasound shows normal flow in the deep venous system and no DVT.         PFT's (4/2017):      FEV1/FVC- 74  FEV1- 1.24L (58)  FVC-  1.67L (62)   DLCO- 53      PSG- None             Assessment/Plan:     * Bronchiectasis with acute exacerbation  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 Most recent negative x 2.  Records not available from prior treatment. 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 Chest imaging is stable but now with worsening dyspnea, sputum production and scant hemoptysis suggestive of bronchiectasis exacerbation. Admitted due to history of resistant organisms and failed OP Abx.     -- Stop eliquis   -- Agree with cefepime awaiting culture results. Pseudomonas in sputum that is pan sensitive. Discuss with ID about transitioning to Cipro.   -- prednisone 40mg daily x 5 days   -- Wean supplemental O2 to maintain SpO2 >88%. On 5L at home   -- Anoro   -- Anti-emetics   -- Bronchodilators and acapella for mucous clearance   -- If increased hemoptysis please obtain STAT CTA chest time for bronchial circulation     Hemoptysis  As above       Will sign off. Call with questions. Patient to follow up in 4-6 weeks with Dr. Mills in clinic.     MD Keila Reza MD  Pulmonology  [facility]

## 2020-03-08 NOTE — ASSESSMENT & PLAN NOTE
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 Most recent negative x 2.  Records not available from prior treatment. 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 Chest imaging is stable but now with worsening dyspnea, sputum production and scant hemoptysis suggestive of bronchiectasis exacerbation. Admitted due to history of resistant organisms and failed OP Abx.     -- Stop eliquis   -- Agree with cefepime awaiting culture results. Pseudomonas in sputum that is pan sensitive. Discuss with ID about transitioning to Cipro.   -- prednisone 40mg daily x 5 days   -- Wean supplemental O2 to maintain SpO2 >88%. On 5L at home   -- Anoro   -- Anti-emetics   -- Bronchodilators and acapella for mucous clearance   -- If increased hemoptysis please obtain STAT CTA chest time for bronchial circulation

## 2020-03-08 NOTE — ASSESSMENT & PLAN NOTE
Hx of Kansasi and recurrent pseudomonas infection resistant to cipro  No episode of hemoptysis overnight. Cough improved. No fever/leukocytosis. SOB improved with breathing treatment. SpO2 92-94%. Currently at room air  ID and Pulmonary following.   Sputum culture growing Pseudomonas aeruginosa, this time pansensitive  Continue Cefepime pending final ID recs  Prednisone 40 mg daily x5 days  Hold eliquis for now but will resume soon if possible  Respiratory Viral Panel negative         Dorsal Nasal Flap Text: The defect edges were debeveled with a #15 scalpel blade.  Given the location of the defect and the proximity to free margins a dorsal nasal flap was deemed most appropriate.  Using a sterile surgical marker, an appropriate dorsal nasal flap was drawn around the defect.    The area thus outlined was incised deep to adipose tissue with a #15 scalpel blade.  The skin margins were undermined to an appropriate distance in all directions utilizing iris scissors.

## 2020-03-08 NOTE — SUBJECTIVE & OBJECTIVE
Feels much better today. Tolerating diet    Review of Systems   Respiratory: Negative.    Cardiovascular: Negative.    Gastrointestinal: Negative for nausea and vomiting.     Objective:     Vital Signs (Most Recent):  Temp: 98.3 °F (36.8 °C) (03/08/20 1536)  Pulse: 89 (03/08/20 1536)  Resp: 18 (03/08/20 1536)  BP: 135/63 (03/08/20 1536)  SpO2: (!) 92 % (03/08/20 1536) Vital Signs (24h Range):  Temp:  [97.8 °F (36.6 °C)-98.4 °F (36.9 °C)] 98.3 °F (36.8 °C)  Pulse:  [83-99] 89  Resp:  [16-18] 18  SpO2:  [92 %-97 %] 92 %  BP: (122-139)/(58-63) 135/63     Weight: 71 kg (156 lb 8.4 oz)  Body mass index is 28.63 kg/m².    Intake/Output Summary (Last 24 hours) at 3/8/2020 1632  Last data filed at 3/8/2020 0000  Gross per 24 hour   Intake 260 ml   Output --   Net 260 ml      Physical Exam   Constitutional: She is oriented to person, place, and time. She appears well-developed. No distress.   Cardiovascular: Normal rate and regular rhythm.   Pulmonary/Chest: Effort normal and breath sounds normal.   Abdominal: Soft. Bowel sounds are normal.   Neurological: She is alert and oriented to person, place, and time.   Skin: She is not diaphoretic.   Nursing note and vitals reviewed.      Significant Labs: All pertinent labs within the past 24 hours have been reviewed.    Significant Imaging: I have reviewed all pertinent imaging results/findings within the past 24 hours.  I have reviewed and interpreted all pertinent imaging results/findings within the past 24 hours.

## 2020-03-08 NOTE — NURSING
Received shift handoff from Maircel. Discussed plan of care, pain management and safety measures with patient and her spouse. Patient indicates she is resting comfortably.

## 2020-03-08 NOTE — PLAN OF CARE
Deep breathing  Pt explained to RT after taking BREO she had a significant increase in 's.  Pt does not take steroids/Coticosteroids.  Pt uses ANORO inhaler

## 2020-03-08 NOTE — SUBJECTIVE & OBJECTIVE
INTERVAL HISTORY-     No acute events overnight.     Review of patient's allergies indicates:   Allergen Reactions    Adhesive Other (See Comments)     Tears up the skin and makes it itch   (leads)    Bactrim [sulfamethoxazole-trimethoprim] Rash     Was hospitalized for rash    Restasis [cyclosporine]     Lipitor [atorvastatin] Other (See Comments)     Muscle aches    Albuterol Other (See Comments)     tremors    Topamax [topiramate]      - made her feel 'bad in the head'       Review of Systems   Constitutional: Positive for activity change, appetite change and fatigue. Negative for chills, diaphoresis, fever and unexpected weight change.   HENT: Positive for nosebleeds. Negative for congestion, rhinorrhea, sinus pressure, sinus pain, sore throat, tinnitus, trouble swallowing and voice change.    Eyes: Negative for photophobia, pain, discharge, redness and visual disturbance.   Respiratory: Positive for cough, shortness of breath and wheezing. Negative for chest tightness and stridor.    Cardiovascular: Negative for chest pain, palpitations and leg swelling.   Gastrointestinal: Positive for nausea and vomiting. Negative for abdominal distention, abdominal pain, anal bleeding, blood in stool, constipation, diarrhea and rectal pain.   Endocrine: Negative for polydipsia, polyphagia and polyuria.   Genitourinary: Negative for dysuria, flank pain and hematuria.   Musculoskeletal: Negative for arthralgias, back pain, gait problem, joint swelling, myalgias, neck pain and neck stiffness.   Skin: Negative for color change, pallor, rash and wound.   Allergic/Immunologic: Negative for immunocompromised state.   Neurological: Positive for weakness. Negative for dizziness and headaches.   All other systems reviewed and are negative.    Objective:     Vital Signs (Most Recent):  Temp: 98.4 °F (36.9 °C) (03/08/20 0753)  Pulse: 87 (03/08/20 0810)  Resp: 17 (03/08/20 0810)  BP: 132/61 (03/08/20 0753)  SpO2: (!) 93 % (03/08/20  0810) Vital Signs (24h Range):  Temp:  [97.8 °F (36.6 °C)-98.4 °F (36.9 °C)] 98.4 °F (36.9 °C)  Pulse:  [80-99] 87  Resp:  [16-19] 17  SpO2:  [93 %-96 %] 93 %  BP: (114-139)/(50-63) 132/61     Weight: 71 kg (156 lb 8.4 oz)  Body mass index is 28.63 kg/m².      Intake/Output Summary (Last 24 hours) at 3/8/2020 1111  Last data filed at 3/8/2020 0000  Gross per 24 hour   Intake 260 ml   Output --   Net 260 ml       Physical Exam   Constitutional: She is oriented to person, place, and time. She appears well-developed and well-nourished. No distress.   HENT:   Head: Normocephalic and atraumatic.   Right Ear: External ear normal.   Left Ear: External ear normal.   Nose: Nose normal.   Mouth/Throat: Oropharynx is clear and moist. No oropharyngeal exudate.   Eyes: Pupils are equal, round, and reactive to light. Conjunctivae and EOM are normal. Right eye exhibits no discharge. Left eye exhibits no discharge. No scleral icterus.   Neck: Normal range of motion. Neck supple. No JVD present. No tracheal deviation present. No thyromegaly present.   Cardiovascular: Normal rate, regular rhythm, normal heart sounds and intact distal pulses. Exam reveals no gallop and no friction rub.   No murmur heard.  Pulmonary/Chest: Effort normal. No respiratory distress. She has no wheezes. She has rales.   Abdominal: Soft. Bowel sounds are normal. She exhibits no distension and no mass. There is no tenderness. No hernia.   Musculoskeletal: Normal range of motion. She exhibits no edema, tenderness or deformity.   Lymphadenopathy:     She has no cervical adenopathy.   Neurological: She is alert and oriented to person, place, and time. No cranial nerve deficit. Coordination normal.   Skin: Skin is warm and dry. Capillary refill takes less than 2 seconds. No rash noted. She is not diaphoretic. No erythema. No pallor.   Psychiatric: She has a normal mood and affect. Her behavior is normal.       Vents:       Lines/Drains/Airways     Peripherally  Inserted Central Catheter Line            PICC Double Lumen 03/07/20 1635 right basilic less than 1 day                Significant Labs:    CBC/Anemia Profile:  No results for input(s): WBC, HGB, HCT, PLT, MCV, RDW, IRON, FERRITIN, RETIC, FOLATE, [id number], OCCULTBLOOD in the last 48 hours.     Chemistries:  Recent Labs   Lab 03/07/20  0549      K 3.2*      CO2 28   BUN 14   CREATININE 1.2   CALCIUM 8.2*   ALBUMIN 3.2*   PROT 6.4   BILITOT 0.2   ALKPHOS 55   ALT 14   AST 15   MG 1.9       All additional results in last 24 hours reviewed.     Significant Imaging:   I have reviewed and interpreted all pertinent imaging results/findings within the past 24 hours.     CXR (admit)     The cardiomediastinal silhouette is not enlarged, stable..  There is no pleural effusion.  The trachea is deviated rightward by a tortuous aorta..  The lungs are symmetrically expanded bilaterally with coarse interstitial attenuation.  There is bronchiectasis and emphysematous/fibrotic change projected over the bilateral upper lung zones, left greater than right, grossly stable..  No large focal consolidation seen.  There is no pneumothorax.  The osseous structures are remarkable for degenerative changes..    CT Chest 7/5-      1. Absence of superior segment dominant nodule right lower lobe from previous exam.  Micro nodules stable.  2. Emphysema and bronchiectasis changes of lungs stable.        CT chest-   1. Chronic filling defects within the right upper lobe pulmonary artery branch consistent with chronic pulmonary embolism.  No new acute pulmonary thromboembolism identified.  2. Stable fibrotic changes throughout the lung apices, left greater than right, with bronchiectasis, interstitial prominence, and mosaic attenuation of the lungs, similar to prior CTA 01/05/2017.  3. Small area of focal nodular consolidation within the left lower lobe, not seen on prior studies which, may be infectious or inflammatory in etiology,  possibly even contiguous from the left apical fibrotic changes.  Suggest follow-up CT scan 1 month after antimicrobial therapy to exclude neoplastic nodule.  4. Stable 4 mm nodule within the right middle lobe.  5. Additional findings as detailed above.     CT 2/6/2019     Bilateral upper lung zone predominant lung disease, primarily characterized by severe bronchiectasis.  Overall appearance suggests sequela of chronic infection.    Ground-glass opacities and pulmonary nodule clusters improved from prior study.  0.7 cm right lower lobe pulmonary nodule, new from prior study.  Recommend follow-up chest CT at 6 months to ensure nodule stability.     2D Echo:      Left Ventricle Normal ejection fraction . Cavity is normal. Normal left ventricle diastolic function.   Right Ventricle Normal cavity size.   Left Atrium Cavity is mildly dilated.   Right Atrium Normal cavity size.   Aortic Valve Normal valve structure.   Mitral Valve Normal valve structure. There is mild mitral annular calcification.   Tricuspid Valve The tricuspid valve is normal. Trace regurgitation.   Pulmonic Valve Normal valve structure.   IVC/SVC Normal central venous pressure (3 mm Hg).   Ascending Aorta Normal aortic root, size and contour.   Pericardium No pericardial effusion. Pericardium is normal.      US Doppler-   1. Right lower extremity venous ultrasound shows normal flow in the deep venous system and no DVT.  2. Left lower extremity venous ultrasound shows normal flow in the deep venous system and no DVT.         PFT's (4/2017):      FEV1/FVC- 74  FEV1- 1.24L (58)  FVC- 1.67L (62)   DLCO- 53      PSG- None

## 2020-03-08 NOTE — PROGRESS NOTES
[facility] Hospital Medicine  Progress Note    Patient Name: Yasmin Asencio  MRN: [id number]  Patient Class: IP- Inpatient   Admission Date: 3/4/2020  Length of Stay: 3 days  Attending Physician: Lexis Saab MD  Primary Care Provider: Urmila Luna MD        Subjective:     Principal Problem:Bronchiectasis with acute exacerbation        HPI:  Mrs. 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 Pulmonologist suspect recurrent Pseudomonas infection. Patient reports coughing more recently for the last few days compared to COPD baseline consisting gross bright red blood and red mucous yesterday morning associated with mild nose bleeding. She also reports worsening of SOB and chest pain with cough for 1 weeks . She denies any fever, chill, night sweat, vomit, sick contact, weakness, abdominal pain, headache, blood in stool/urine, any bruising or other bleeding. Recent completed Augmentin Feb (not sure exact)     In ED, EKG and Troponin negative, Chest X-ray with stable chronic bronchiectasis and emphysematous/fibrotic without large focal consolidation, Blood and Sputum culture obtained, BNP of 153, Vanc + Cefepime ordered.    Overview/Hospital Course:  No notes on file    Feels much better today. Tolerating diet    Review of Systems   Respiratory: Negative.    Cardiovascular: Negative.    Gastrointestinal: Negative for nausea and vomiting.     Objective:     Vital Signs (Most Recent):  Temp: 98.3 °F (36.8 °C) (03/08/20 1536)  Pulse: 89 (03/08/20 1536)  Resp: 18 (03/08/20 1536)  BP: 135/63 (03/08/20 1536)  SpO2: (!) 92  "% (03/08/20 1536) Vital Signs (24h Range):  Temp:  [97.8 °F (36.6 °C)-98.4 °F (36.9 °C)] 98.3 °F (36.8 °C)  Pulse:  [83-99] 89  Resp:  [16-18] 18  SpO2:  [92 %-97 %] 92 %  BP: (122-139)/(58-63) 135/63     Weight: 71 kg (156 lb 8.4 oz)  Body mass index is 28.63 kg/m².    Intake/Output Summary (Last 24 hours) at 3/8/2020 1632  Last data filed at 3/8/2020 0000  Gross per 24 hour   Intake 260 ml   Output --   Net 260 ml      Physical Exam   Constitutional: She is oriented to person, place, and time. She appears well-developed. No distress.   Cardiovascular: Normal rate and regular rhythm.   Pulmonary/Chest: Effort normal and breath sounds normal.   Abdominal: Soft. Bowel sounds are normal.   Neurological: She is alert and oriented to person, place, and time.   Skin: She is not diaphoretic.   Nursing note and vitals reviewed.      Significant Labs: All pertinent labs within the past 24 hours have been reviewed.    Significant Imaging: I have reviewed all pertinent imaging results/findings within the past 24 hours.  I have reviewed and interpreted all pertinent imaging results/findings within the past 24 hours.      Assessment/Plan:      * Bronchiectasis with acute exacerbation  Hx of [facility] and recurrent pseudomonas infection resistant to cipro  No episode of hemoptysis overnight. Cough improved. No fever/leukocytosis. SOB improved with breathing treatment. SpO2 92-94%. Currently at room air  ID and Pulmonary following.   Sputum culture growing Pseudomonas aeruginosa, this time pansensitive  Continue Cefepime pending final ID recs  Prednisone 40 mg daily x5 days  Hold eliquis for now but will resume soon if possible  Respiratory Viral Panel negative          COPD (chronic obstructive pulmonary disease)  See above #1.   Patient not able to tolerate albuterol "tremors." Continue home SILVIO and LABA/LAMA. If need then can add xopenex.   Prednisone 40 mg x 5 days as above  Keep O2 >88      Nausea in adult  Improved "     Hemoptysis  See above      Hypokalemia  2/2 poor PO intake  Replace  Recheck in AM      History of pulmonary embolism  Dx 2017 . Not sure cause but occurred while in hospital.   Hold Eliquis.       Anemia of chronic disease  Hgb consistent with previous labs. Denies history of or current melena, hematochezia, or hematemesis. Has mild hemoptysis. Obtain iron studies.           VTE Risk Mitigation (From admission, onward)         Ordered     Place HUSSEIN hose  Until discontinued      03/05/20 1758     IP VTE HIGH RISK PATIENT  Once      03/05/20 0139     Place HUSSEIN hose  Until discontinued      03/05/20 0139                      Lexis Florian MD  Department of Hospital Medicine   [facility]

## 2020-03-08 NOTE — PLAN OF CARE
Problem: Fall Injury Risk  Goal: Absence of Fall and Fall-Related Injury  Outcome: Ongoing, Progressing  Intervention: Identify and Manage Contributors to Fall Injury Risk  Flowsheets (Taken 3/8/2020 1613)  Self-Care Promotion: independence encouraged; BADL personal routines maintained  Medication Review/Management: medications reviewed  Intervention: Promote Injury-Free Environment  Flowsheets (Taken 3/8/2020 1613)  Safety Promotion/Fall Prevention: side rails raised x 2; toileting scheduled; nonskid shoes/socks when out of bed; Fall Risk reviewed with patient/family

## 2020-03-09 LAB
ANION GAP SERPL CALC-SCNC: 8 MMOL/L (ref 8–16)
BACTERIA BLD CULT: NORMAL
BUN SERPL-MCNC: 16 MG/DL (ref 8–23)
CALCIUM SERPL-MCNC: 8.7 MG/DL (ref 8.7–10.5)
CHLORIDE SERPL-SCNC: 107 MMOL/L (ref 95–110)
CO2 SERPL-SCNC: 25 MMOL/L (ref 23–29)
CREAT SERPL-MCNC: 1.1 MG/DL (ref 0.5–1.4)
EST. GFR  (AFRICAN AMERICAN): 59 ML/MIN/1.73 M^2
EST. GFR  (NON AFRICAN AMERICAN): 51 ML/MIN/1.73 M^2
GAMMA INTERFERON BACKGROUND BLD IA-ACNC: 0.02 IU/ML
GLUCOSE SERPL-MCNC: 98 MG/DL (ref 70–110)
M TB IFN-G CD4+ BCKGRND COR BLD-ACNC: -0.01 IU/ML
MITOGEN IGNF BCKGRD COR BLD-ACNC: 8.19 IU/ML
POTASSIUM SERPL-SCNC: 3.9 MMOL/L (ref 3.5–5.1)
SODIUM SERPL-SCNC: 140 MMOL/L (ref 136–145)
TB GOLD PLUS: NEGATIVE
TB2 - NIL: -0.01 IU/ML

## 2020-03-09 PROCEDURE — 27000221 HC OXYGEN, UP TO 24 HOURS

## 2020-03-09 PROCEDURE — 21400001 HC TELEMETRY ROOM

## 2020-03-09 PROCEDURE — 25000003 PHARM REV CODE 250: Performed by: NURSE PRACTITIONER

## 2020-03-09 PROCEDURE — 94640 AIRWAY INHALATION TREATMENT: CPT

## 2020-03-09 PROCEDURE — 93010 EKG 12-LEAD: ICD-10-PCS | Mod: ,,, | Performed by: INTERNAL MEDICINE

## 2020-03-09 PROCEDURE — 25000003 PHARM REV CODE 250: Performed by: INTERNAL MEDICINE

## 2020-03-09 PROCEDURE — 93010 ELECTROCARDIOGRAM REPORT: CPT | Mod: ,,, | Performed by: INTERNAL MEDICINE

## 2020-03-09 PROCEDURE — 63600175 PHARM REV CODE 636 W HCPCS: Performed by: EMERGENCY MEDICINE

## 2020-03-09 PROCEDURE — 25500020 PHARM REV CODE 255: Performed by: INTERNAL MEDICINE

## 2020-03-09 PROCEDURE — 63600175 PHARM REV CODE 636 W HCPCS: Performed by: NURSE PRACTITIONER

## 2020-03-09 PROCEDURE — 25000242 PHARM REV CODE 250 ALT 637 W/ HCPCS: Performed by: NURSE PRACTITIONER

## 2020-03-09 PROCEDURE — 93005 ELECTROCARDIOGRAM TRACING: CPT

## 2020-03-09 PROCEDURE — 63600175 PHARM REV CODE 636 W HCPCS: Performed by: INTERNAL MEDICINE

## 2020-03-09 PROCEDURE — 94761 N-INVAS EAR/PLS OXIMETRY MLT: CPT

## 2020-03-09 PROCEDURE — A4216 STERILE WATER/SALINE, 10 ML: HCPCS | Performed by: INTERNAL MEDICINE

## 2020-03-09 PROCEDURE — 80048 BASIC METABOLIC PNL TOTAL CA: CPT

## 2020-03-09 RX ORDER — GABAPENTIN 300 MG/1
300 CAPSULE ORAL 3 TIMES DAILY
Status: DISCONTINUED | OUTPATIENT
Start: 2020-03-09 | End: 2020-03-11 | Stop reason: HOSPADM

## 2020-03-09 RX ORDER — METOCLOPRAMIDE HYDROCHLORIDE 5 MG/ML
10 INJECTION INTRAMUSCULAR; INTRAVENOUS EVERY 6 HOURS PRN
Status: DISCONTINUED | OUTPATIENT
Start: 2020-03-09 | End: 2020-03-11 | Stop reason: HOSPADM

## 2020-03-09 RX ADMIN — Medication 10 ML: at 06:03

## 2020-03-09 RX ADMIN — VERAPAMIL HYDROCHLORIDE 120 MG: 120 TABLET, FILM COATED, EXTENDED RELEASE ORAL at 02:03

## 2020-03-09 RX ADMIN — METOCLOPRAMIDE 10 MG: 5 INJECTION, SOLUTION INTRAMUSCULAR; INTRAVENOUS at 11:03

## 2020-03-09 RX ADMIN — ONDANSETRON HYDROCHLORIDE 8 MG: 2 SOLUTION INTRAMUSCULAR; INTRAVENOUS at 10:03

## 2020-03-09 RX ADMIN — CEFEPIME 2 G: 2 INJECTION, POWDER, FOR SOLUTION INTRAVENOUS at 06:03

## 2020-03-09 RX ADMIN — MIRTAZAPINE 15 MG: 15 TABLET, FILM COATED ORAL at 09:03

## 2020-03-09 RX ADMIN — VERAPAMIL HYDROCHLORIDE 120 MG: 120 TABLET, FILM COATED, EXTENDED RELEASE ORAL at 09:03

## 2020-03-09 RX ADMIN — IPRATROPIUM BROMIDE 0.5 MG: 0.5 SOLUTION RESPIRATORY (INHALATION) at 12:03

## 2020-03-09 RX ADMIN — APIXABAN 10 MG: 5 TABLET, FILM COATED ORAL at 05:03

## 2020-03-09 RX ADMIN — METOCLOPRAMIDE 10 MG: 5 INJECTION, SOLUTION INTRAMUSCULAR; INTRAVENOUS at 05:03

## 2020-03-09 RX ADMIN — Medication 10 ML: at 11:03

## 2020-03-09 RX ADMIN — ALPRAZOLAM 0.25 MG: 0.25 TABLET ORAL at 11:03

## 2020-03-09 RX ADMIN — PROMETHAZINE HYDROCHLORIDE 25 MG: 25 INJECTION INTRAMUSCULAR; INTRAVENOUS at 08:03

## 2020-03-09 RX ADMIN — IOHEXOL 75 ML: 350 INJECTION, SOLUTION INTRAVENOUS at 03:03

## 2020-03-09 RX ADMIN — GABAPENTIN 300 MG: 300 CAPSULE ORAL at 09:03

## 2020-03-09 RX ADMIN — IPRATROPIUM BROMIDE 0.5 MG: 0.5 SOLUTION RESPIRATORY (INHALATION) at 09:03

## 2020-03-09 RX ADMIN — GUAIFENESIN 1200 MG: 600 TABLET, EXTENDED RELEASE ORAL at 09:03

## 2020-03-09 RX ADMIN — CEFEPIME 2 G: 2 INJECTION, POWDER, FOR SOLUTION INTRAVENOUS at 02:03

## 2020-03-09 RX ADMIN — Medication 10 ML: at 12:03

## 2020-03-09 RX ADMIN — IPRATROPIUM BROMIDE 0.5 MG: 0.5 SOLUTION RESPIRATORY (INHALATION) at 04:03

## 2020-03-09 RX ADMIN — DEXTROSE MONOHYDRATE, SODIUM CHLORIDE, AND POTASSIUM CHLORIDE: 50; 9; 1.49 INJECTION, SOLUTION INTRAVENOUS at 05:03

## 2020-03-09 RX ADMIN — IPRATROPIUM BROMIDE 0.5 MG: 0.5 SOLUTION RESPIRATORY (INHALATION) at 07:03

## 2020-03-09 RX ADMIN — CEFEPIME 2 G: 2 INJECTION, POWDER, FOR SOLUTION INTRAVENOUS at 11:03

## 2020-03-09 RX ADMIN — GABAPENTIN 300 MG: 300 CAPSULE ORAL at 05:03

## 2020-03-09 RX ADMIN — ONDANSETRON HYDROCHLORIDE 8 MG: 2 SOLUTION INTRAMUSCULAR; INTRAVENOUS at 06:03

## 2020-03-09 NOTE — HOSPITAL COURSE
"Ms Asencio presented with hemoptysis. Recurrent Pseudomonas pneumonia suspected. Initiated on empiric cefepime, sputum cultures obtained and ID consulted.  Apixaban held. Hemoptysis resolved. Sputum culture grew pansensitive Pseudomonas aeruginosa.  She developed left sided upper chest discomfort which she states felt similar to prior PE. CTA chest on 3/9/20 showed "findings consistent with pulmonary thromboembolism involving the lobar branch to the left upper lobe, extending into the left main pulmonary artery, as well as filling defects within a segmental/subsegmental branch to the right lower lobe."  Patient's hemoptysis stopped after treatment initiated for her pneumonia, and case discussed with Pulmonary, and apixaban was restarted for anticoagulation.  Patient continued to do well and did not have return of hemoptysis.  She completed the full 7 days of cefepime as per ID recommendations during hospitalization and she was back to her baseline oxygen requirement of 2 L via nasal cannula.  She was stable for discharge with close follow-up with her primary care physician and Pulmonary.  "

## 2020-03-09 NOTE — PROGRESS NOTES
[facility] WRITTEN HEALTHCARE AND DISCHARGE INFORMATION.  Follow-up Information     Kristi Torres NP On 5/5/2020.    Specialty:  Neurology  Why:  Appointment scheduled for May 5th at 1:45pm  Contact information:  [address]  [facility] [address]  [phone]             Urmila Luna MD On 3/11/2020.    Specialties:  Internal Medicine, Pediatrics  Why:  Appointment scheduled for March 11th at 10am  Contact information:  [address]  [place] [address]  [phone]             Declan Mills MD On 4/22/2020.    Specialty:  Pulmonary Disease  Why:  out patient services: 1:30pm follow up from the hospital  Contact information:  [address]  [address]  [facility] [address]  [phone]             Follow up On 3/9/2020.                 Help at Home           [phone]  After discharge for assistance [facility] On Call Nurse Care Line 24/7 Assistance.   Things You are responsible For To Manage Your Care At Home:  1.    Getting your prescriptions filled   2.    Taking your medications as directed, DO NOT MISS ANY DOSES!  3.    Going to your follow-up doctor appointment. This is important because it  allow the doctor to monitor your progress and determine if  any changes need to made to your treatment plan.   Thank you for choosing [facility] for your care. [facility] happy to have the opportunity to serve you.    Sincerely,  Your [facility] Team,  Kate Perez RN, BSN, [facility]  [phone]  3/9/2020

## 2020-03-09 NOTE — SUBJECTIVE & OBJECTIVE
Complaining of left sided chest pain that feels similar to her prior PE    Review of Systems   Respiratory: Negative.    Cardiovascular: Positive for chest pain.   Gastrointestinal: Negative for nausea and vomiting.   Psychiatric/Behavioral: The patient is nervous/anxious.      Objective:     Vital Signs (Most Recent):  Temp: 98.5 °F (36.9 °C) (03/09/20 1202)  Pulse: 97 (03/09/20 1259)  Resp: 16 (03/09/20 1259)  BP: 134/64 (03/09/20 1202)  SpO2: 98 % (03/09/20 1259) Vital Signs (24h Range):  Temp:  [98.1 °F (36.7 °C)-99 °F (37.2 °C)] 98.5 °F (36.9 °C)  Pulse:  [73-97] 97  Resp:  [16-20] 16  SpO2:  [92 %-100 %] 98 %  BP: (134-148)/(63-67) 134/64     Weight: 69.2 kg (152 lb 8.9 oz)  Body mass index is 27.9 kg/m².    Intake/Output Summary (Last 24 hours) at 3/9/2020 1453  Last data filed at 3/9/2020 0600  Gross per 24 hour   Intake 7083.33 ml   Output --   Net 7083.33 ml      Physical Exam   Constitutional: She is oriented to person, place, and time. She appears well-developed. No distress.   Cardiovascular: Normal rate and regular rhythm.   Pulmonary/Chest: Effort normal and breath sounds normal.   Abdominal: Soft. Bowel sounds are normal.   Neurological: She is alert and oriented to person, place, and time.   Skin: She is not diaphoretic.   Psychiatric: Her mood appears anxious.   Nursing note and vitals reviewed.      Significant Labs: All pertinent labs within the past 24 hours have been reviewed.    Significant Imaging: I have reviewed all pertinent imaging results/findings within the past 24 hours.  I have reviewed and interpreted all pertinent imaging results/findings within the past 24 hours.

## 2020-03-09 NOTE — PLAN OF CARE
Problem: Fall Injury Risk  Goal: Absence of Fall and Fall-Related Injury  Outcome: Ongoing, Progressing     Problem: Adult Inpatient Plan of Care  Goal: Plan of Care Review  Outcome: Ongoing, Progressing  Goal: Patient-Specific Goal (Individualization)  Outcome: Ongoing, Progressing  Goal: Absence of Hospital-Acquired Illness or Injury  Outcome: Ongoing, Progressing  Goal: Optimal Comfort and Wellbeing  Outcome: Ongoing, Progressing  Goal: Readiness for Transition of Care  Outcome: Ongoing, Progressing  Goal: Rounds/Family Conference  Outcome: Ongoing, Progressing     Problem: Infection  Goal: Infection Symptom Resolution  Outcome: Ongoing, Progressing     Problem: Nausea and Vomiting  Goal: Fluid and Electrolyte Balance  Outcome: Ongoing, Progressing     Problem: COPD Comorbidity  Goal: Maintenance of COPD Symptom Control  Outcome: Ongoing, Progressing     Problem: Venous Thromboembolism  Goal: VTE (Venous Thromboembolism) Symptom Resolution  Outcome: Ongoing, Progressing

## 2020-03-09 NOTE — NURSING
Report received from JOSE JUAN Ruffin. Patient resting comfortably bed, no acute distress noted. Plan of care reviewed with patient. Instructed patient to call for assistance before ambulating, side rails up x2, bed alarm set, call light in reach, non skid socks in use. IV fluids infusing to PICC line, no redness or swelling noted.  Patient verbalized understanding of instructions.  Will continue to monitor.

## 2020-03-09 NOTE — PROGRESS NOTES
[facility] Medicine  Progress Note    Patient Name: Yasmin Asencio  MRN: [id number]  Patient Class: IP- Inpatient   Admission Date: 3/4/2020  Length of Stay: 4 days  Attending Physician: Lexis Saab MD  Primary Care Provider: Urmila Luna MD        Subjective:     Principal Problem:Bronchiectasis with acute exacerbation        HPI:  Mrs. 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 Pulmonologist suspect recurrent Pseudomonas infection. Patient reports coughing more recently for the last few days compared to COPD baseline consisting gross bright red blood and red mucous yesterday morning associated with mild nose bleeding. She also reports worsening of SOB and chest pain with cough for 1 weeks . She denies any fever, chill, night sweat, vomit, sick contact, weakness, abdominal pain, headache, blood in stool/urine, any bruising or other bleeding. Recent completed Augmentin Feb (not sure exact)     In ED, EKG and Troponin negative, Chest X-ray with stable chronic bronchiectasis and emphysematous/fibrotic without large focal consolidation, Blood and Sputum culture obtained, BNP of 153, Vanc + Cefepime ordered.    Overview/Hospital Course:  Ms Asencio presented with hemoptysis. Recurrent Pseudomonas pneumonia suspected. Initiated on empiric cefepime, sputum cultures obtained and ID consulted. NOAC patient took for Hx of PE held. Hemoptysis resolved. Sputum culture grew pansensitive Pseudomonas aeruginosa. Developed left sided upper chest discomfort which she states felt similar to prior PE. CTA chest  ordered.     Complaining of left sided chest pain that feels similar to her prior PE    Review of Systems   Respiratory: Negative.    Cardiovascular: Positive for chest pain.   Gastrointestinal: Negative for nausea and vomiting.   Psychiatric/Behavioral: The patient is nervous/anxious.      Objective:     Vital Signs (Most Recent):  Temp: 98.5 °F (36.9 °C) (03/09/20 1202)  Pulse: 97 (03/09/20 1259)  Resp: 16 (03/09/20 1259)  BP: 134/64 (03/09/20 1202)  SpO2: 98 % (03/09/20 1259) Vital Signs (24h Range):  Temp:  [98.1 °F (36.7 °C)-99 °F (37.2 °C)] 98.5 °F (36.9 °C)  Pulse:  [73-97] 97  Resp:  [16-20] 16  SpO2:  [92 %-100 %] 98 %  BP: (134-148)/(63-67) 134/64     Weight: 69.2 kg (152 lb 8.9 oz)  Body mass index is 27.9 kg/m².    Intake/Output Summary (Last 24 hours) at 3/9/2020 1453  Last data filed at 3/9/2020 0600  Gross per 24 hour   Intake 7083.33 ml   Output --   Net 7083.33 ml      Physical Exam   Constitutional: She is oriented to person, place, and time. She appears well-developed. No distress.   Cardiovascular: Normal rate and regular rhythm.   Pulmonary/Chest: Effort normal and breath sounds normal.   Abdominal: Soft. Bowel sounds are normal.   Neurological: She is alert and oriented to person, place, and time.   Skin: She is not diaphoretic.   Psychiatric: Her mood appears anxious.   Nursing note and vitals reviewed.      Significant Labs: All pertinent labs within the past 24 hours have been reviewed.    Significant Imaging: I have reviewed all pertinent imaging results/findings within the past 24 hours.  I have reviewed and interpreted all pertinent imaging results/findings within the past 24 hours.      Assessment/Plan:      * Bronchiectasis with acute exacerbation  Hx of Kansasi and recurrent pseudomonas infection resistant to cipro  No episode of hemoptysis overnight. Cough improved. No fever/leukocytosis. SOB improved with breathing treatment. SpO2 92-94%. Currently at room air  ID and Pulmonary  "following.   Sputum culture growing Pseudomonas aeruginosa, this time pansensitive  Continue Cefepime while inpatient. On day 5/7 (will start day 6 tonight).   Prednisone 40 mg daily x5 days  Hemoptysis resolved. With chest pain similar to prior PE. CTA chest. If positive, will restart NOAC at appropriate dose.   Respiratory Viral Panel negative          COPD (chronic obstructive pulmonary disease)  See above #1.   Patient not able to tolerate albuterol "tremors." Continue home SILVIO and LABA/LAMA. If need then can add xopenex.   Prednisone 40 mg x 5 days as above  Keep O2 >88      Nausea in adult  Improved     Hemoptysis  See above      Hypokalemia  2/2 poor PO intake  Replace  Recheck in AM      History of pulmonary embolism  Dx 2017 . Not sure cause but occurred while in hospital.   Hold Eliquis.       Anemia of chronic disease  Hgb consistent with previous labs. Denies history of or current melena, hematochezia, or hematemesis. Has mild hemoptysis. Obtain iron studies.         VTE Risk Mitigation (From admission, onward)         Ordered     Place HUSSEIN hose  Until discontinued      03/05/20 4259     IP VTE HIGH RISK PATIENT  Once      03/05/20 0139     Place HUSSEIN hose  Until discontinued      03/05/20 0139                      Lexis Florian MD  Department of Hospital Medicine   [facility]    "

## 2020-03-09 NOTE — PLAN OF CARE
03/09/20 1022   Medicare Message   Important Message from Medicare regarding Discharge Appeal Rights Given to patient/caregiver;Explained to patient/caregiver;Signed/date by patient/caregiver   Date IMM was signed 03/09/20   Time IMM was signed 1022   .Kate Perez RN, BSN, STN [facility]  3/9/2020

## 2020-03-09 NOTE — ASSESSMENT & PLAN NOTE
Hx of Kansasi and recurrent pseudomonas infection resistant to cipro  No episode of hemoptysis overnight. Cough improved. No fever/leukocytosis. SOB improved with breathing treatment. SpO2 92-94%. Currently at room air  ID and Pulmonary following.   Sputum culture growing Pseudomonas aeruginosa, this time pansensitive  Continue Cefepime while inpatient. On day 5/7 (will start day 6 tonight).   Prednisone 40 mg daily x5 days  Hemoptysis resolved. With chest pain similar to prior PE. CTA chest. If positive, will restart NOAC at appropriate dose.   Respiratory Viral Panel negative

## 2020-03-10 ENCOUNTER — TELEPHONE (OUTPATIENT)
Dept: FAMILY MEDICINE | Facility: CLINIC | Age: 70
End: 2020-03-10

## 2020-03-10 LAB
ANION GAP SERPL CALC-SCNC: 7 MMOL/L (ref 8–16)
BACTERIA BLD CULT: NORMAL
BASOPHILS # BLD AUTO: 0.04 K/UL (ref 0–0.2)
BASOPHILS NFR BLD: 0.6 % (ref 0–1.9)
BUN SERPL-MCNC: 13 MG/DL (ref 8–23)
CALCIUM SERPL-MCNC: 8.6 MG/DL (ref 8.7–10.5)
CHLORIDE SERPL-SCNC: 103 MMOL/L (ref 95–110)
CO2 SERPL-SCNC: 28 MMOL/L (ref 23–29)
CREAT SERPL-MCNC: 1 MG/DL (ref 0.5–1.4)
DIFFERENTIAL METHOD: ABNORMAL
EOSINOPHIL # BLD AUTO: 0.2 K/UL (ref 0–0.5)
EOSINOPHIL NFR BLD: 2.5 % (ref 0–8)
ERYTHROCYTE [DISTWIDTH] IN BLOOD BY AUTOMATED COUNT: 15.4 % (ref 11.5–14.5)
EST. GFR  (AFRICAN AMERICAN): >60 ML/MIN/1.73 M^2
EST. GFR  (NON AFRICAN AMERICAN): 57 ML/MIN/1.73 M^2
GLUCOSE SERPL-MCNC: 79 MG/DL (ref 70–110)
HCT VFR BLD AUTO: 28.3 % (ref 37–48.5)
HGB BLD-MCNC: 8.5 G/DL (ref 12–16)
IMM GRANULOCYTES # BLD AUTO: 0.02 K/UL (ref 0–0.04)
IMM GRANULOCYTES NFR BLD AUTO: 0.3 % (ref 0–0.5)
LYMPHOCYTES # BLD AUTO: 1.8 K/UL (ref 1–4.8)
LYMPHOCYTES NFR BLD: 26 % (ref 18–48)
MCH RBC QN AUTO: 25.1 PG (ref 27–31)
MCHC RBC AUTO-ENTMCNC: 30 G/DL (ref 32–36)
MCV RBC AUTO: 84 FL (ref 82–98)
MONOCYTES # BLD AUTO: 0.9 K/UL (ref 0.3–1)
MONOCYTES NFR BLD: 12.9 % (ref 4–15)
NEUTROPHILS # BLD AUTO: 4.1 K/UL (ref 1.8–7.7)
NEUTROPHILS NFR BLD: 57.7 % (ref 38–73)
NRBC BLD-RTO: 0 /100 WBC
PLATELET # BLD AUTO: 201 K/UL (ref 150–350)
PMV BLD AUTO: 9.6 FL (ref 9.2–12.9)
POTASSIUM SERPL-SCNC: 3.4 MMOL/L (ref 3.5–5.1)
RBC # BLD AUTO: 3.39 M/UL (ref 4–5.4)
SODIUM SERPL-SCNC: 138 MMOL/L (ref 136–145)
WBC # BLD AUTO: 7.08 K/UL (ref 3.9–12.7)

## 2020-03-10 PROCEDURE — A4216 STERILE WATER/SALINE, 10 ML: HCPCS | Performed by: INTERNAL MEDICINE

## 2020-03-10 PROCEDURE — 21400001 HC TELEMETRY ROOM

## 2020-03-10 PROCEDURE — 80048 BASIC METABOLIC PNL TOTAL CA: CPT

## 2020-03-10 PROCEDURE — 94640 AIRWAY INHALATION TREATMENT: CPT

## 2020-03-10 PROCEDURE — 25000003 PHARM REV CODE 250: Performed by: EMERGENCY MEDICINE

## 2020-03-10 PROCEDURE — 25000003 PHARM REV CODE 250: Performed by: INTERNAL MEDICINE

## 2020-03-10 PROCEDURE — 25000242 PHARM REV CODE 250 ALT 637 W/ HCPCS: Performed by: NURSE PRACTITIONER

## 2020-03-10 PROCEDURE — 25000003 PHARM REV CODE 250: Performed by: NURSE PRACTITIONER

## 2020-03-10 PROCEDURE — 94761 N-INVAS EAR/PLS OXIMETRY MLT: CPT

## 2020-03-10 PROCEDURE — 63600175 PHARM REV CODE 636 W HCPCS: Performed by: EMERGENCY MEDICINE

## 2020-03-10 PROCEDURE — 63600175 PHARM REV CODE 636 W HCPCS: Performed by: INTERNAL MEDICINE

## 2020-03-10 PROCEDURE — 27000221 HC OXYGEN, UP TO 24 HOURS

## 2020-03-10 PROCEDURE — 85025 COMPLETE CBC W/AUTO DIFF WBC: CPT

## 2020-03-10 PROCEDURE — 63600175 PHARM REV CODE 636 W HCPCS: Performed by: NURSE PRACTITIONER

## 2020-03-10 RX ADMIN — Medication 10 ML: at 06:03

## 2020-03-10 RX ADMIN — FOLIC ACID 1000 MCG: 1 TABLET ORAL at 08:03

## 2020-03-10 RX ADMIN — IPRATROPIUM BROMIDE 0.5 MG: 0.5 SOLUTION RESPIRATORY (INHALATION) at 08:03

## 2020-03-10 RX ADMIN — IPRATROPIUM BROMIDE 0.5 MG: 0.5 SOLUTION RESPIRATORY (INHALATION) at 02:03

## 2020-03-10 RX ADMIN — IPRATROPIUM BROMIDE 0.5 MG: 0.5 SOLUTION RESPIRATORY (INHALATION) at 01:03

## 2020-03-10 RX ADMIN — FAMOTIDINE 20 MG: 20 TABLET ORAL at 08:03

## 2020-03-10 RX ADMIN — GABAPENTIN 300 MG: 300 CAPSULE ORAL at 09:03

## 2020-03-10 RX ADMIN — PREDNISONE 40 MG: 20 TABLET ORAL at 08:03

## 2020-03-10 RX ADMIN — APIXABAN 10 MG: 5 TABLET, FILM COATED ORAL at 08:03

## 2020-03-10 RX ADMIN — ESCITALOPRAM OXALATE 20 MG: 10 TABLET ORAL at 08:03

## 2020-03-10 RX ADMIN — GABAPENTIN 300 MG: 300 CAPSULE ORAL at 08:03

## 2020-03-10 RX ADMIN — CEFEPIME 2 G: 2 INJECTION, POWDER, FOR SOLUTION INTRAVENOUS at 06:03

## 2020-03-10 RX ADMIN — GABAPENTIN 300 MG: 300 CAPSULE ORAL at 03:03

## 2020-03-10 RX ADMIN — GUAIFENESIN 1200 MG: 600 TABLET, EXTENDED RELEASE ORAL at 09:03

## 2020-03-10 RX ADMIN — Medication 10 ML: at 12:03

## 2020-03-10 RX ADMIN — VERAPAMIL HYDROCHLORIDE 120 MG: 120 TABLET, FILM COATED, EXTENDED RELEASE ORAL at 09:03

## 2020-03-10 RX ADMIN — VERAPAMIL HYDROCHLORIDE 120 MG: 120 TABLET, FILM COATED, EXTENDED RELEASE ORAL at 08:03

## 2020-03-10 RX ADMIN — MIRTAZAPINE 15 MG: 15 TABLET, FILM COATED ORAL at 09:03

## 2020-03-10 RX ADMIN — CHLORTHALIDONE 25 MG: 25 TABLET ORAL at 08:03

## 2020-03-10 RX ADMIN — CEFEPIME 2 G: 2 INJECTION, POWDER, FOR SOLUTION INTRAVENOUS at 03:03

## 2020-03-10 RX ADMIN — ONDANSETRON HYDROCHLORIDE 8 MG: 2 SOLUTION INTRAMUSCULAR; INTRAVENOUS at 09:03

## 2020-03-10 RX ADMIN — Medication 10 ML: at 01:03

## 2020-03-10 RX ADMIN — GUAIFENESIN 1200 MG: 600 TABLET, EXTENDED RELEASE ORAL at 08:03

## 2020-03-10 RX ADMIN — APIXABAN 10 MG: 5 TABLET, FILM COATED ORAL at 09:03

## 2020-03-10 NOTE — NURSING
Patient resting no distress.skin remains intact  NAD  Bed low call light in reach  AM assessment completed.

## 2020-03-10 NOTE — NURSING
Report received from JOSE JUAN Sahu. Patient resting comfortably in bed, no acute distress noted. Instructed patient to call for assistance before ambulating, side rails up x2, bed alarm set, call light in reach, non skid socks in use.Peripheral IV site, no redness or swelling noted.  Patient verbalized understanding of instructions. Daughter at bedside. Will continue to monitor.

## 2020-03-10 NOTE — PLAN OF CARE
Problem: Fall Injury Risk  Goal: Absence of Fall and Fall-Related Injury  Outcome: Ongoing, Progressing  Intervention: Identify and Manage Contributors to Fall Injury Risk  Flowsheets (Taken 3/10/2020 0526)  Self-Care Promotion: independence encouraged; BADL personal objects within reach; BADL personal routines maintained  Medication Review/Management: medications reviewed; high risk medications identified  Intervention: Promote Injury-Free Environment  Flowsheets (Taken 3/10/2020 0526)  Safety Promotion/Fall Prevention: assistive device/personal item within reach; medications reviewed; supervised activity; high risk medications identified; Fall Risk reviewed with patient/family; side rails raised x 2  Environmental Safety Modification: assistive device/personal items within reach; clutter free environment maintained; lighting adjusted     Problem: Adult Inpatient Plan of Care  Goal: Plan of Care Review  Outcome: Ongoing, Progressing  Flowsheets (Taken 3/10/2020 0526)  Plan of Care Reviewed With: patient

## 2020-03-11 VITALS
OXYGEN SATURATION: 100 % | TEMPERATURE: 98 F | WEIGHT: 152.56 LBS | RESPIRATION RATE: 18 BRPM | DIASTOLIC BLOOD PRESSURE: 60 MMHG | HEART RATE: 87 BPM | HEIGHT: 62 IN | BODY MASS INDEX: 28.07 KG/M2 | SYSTOLIC BLOOD PRESSURE: 128 MMHG

## 2020-03-11 PROCEDURE — 25000003 PHARM REV CODE 250: Performed by: NURSE PRACTITIONER

## 2020-03-11 PROCEDURE — 94761 N-INVAS EAR/PLS OXIMETRY MLT: CPT

## 2020-03-11 PROCEDURE — 27000221 HC OXYGEN, UP TO 24 HOURS

## 2020-03-11 PROCEDURE — A4216 STERILE WATER/SALINE, 10 ML: HCPCS | Performed by: INTERNAL MEDICINE

## 2020-03-11 PROCEDURE — 63600175 PHARM REV CODE 636 W HCPCS: Performed by: INTERNAL MEDICINE

## 2020-03-11 PROCEDURE — 25000003 PHARM REV CODE 250: Performed by: EMERGENCY MEDICINE

## 2020-03-11 PROCEDURE — 63600175 PHARM REV CODE 636 W HCPCS: Performed by: EMERGENCY MEDICINE

## 2020-03-11 PROCEDURE — 25000003 PHARM REV CODE 250: Performed by: INTERNAL MEDICINE

## 2020-03-11 PROCEDURE — 94640 AIRWAY INHALATION TREATMENT: CPT

## 2020-03-11 PROCEDURE — 25000242 PHARM REV CODE 250 ALT 637 W/ HCPCS: Performed by: NURSE PRACTITIONER

## 2020-03-11 PROCEDURE — 99900035 HC TECH TIME PER 15 MIN (STAT)

## 2020-03-11 RX ADMIN — FOLIC ACID 1000 MCG: 1 TABLET ORAL at 09:03

## 2020-03-11 RX ADMIN — ALPRAZOLAM 0.25 MG: 0.25 TABLET ORAL at 12:03

## 2020-03-11 RX ADMIN — GABAPENTIN 300 MG: 300 CAPSULE ORAL at 02:03

## 2020-03-11 RX ADMIN — GUAIFENESIN 1200 MG: 600 TABLET, EXTENDED RELEASE ORAL at 09:03

## 2020-03-11 RX ADMIN — Medication 10 ML: at 02:03

## 2020-03-11 RX ADMIN — CHLORTHALIDONE 25 MG: 25 TABLET ORAL at 09:03

## 2020-03-11 RX ADMIN — Medication 10 ML: at 06:03

## 2020-03-11 RX ADMIN — IPRATROPIUM BROMIDE 0.5 MG: 0.5 SOLUTION RESPIRATORY (INHALATION) at 12:03

## 2020-03-11 RX ADMIN — CEFEPIME 2 G: 2 INJECTION, POWDER, FOR SOLUTION INTRAVENOUS at 02:03

## 2020-03-11 RX ADMIN — ESCITALOPRAM OXALATE 20 MG: 10 TABLET ORAL at 09:03

## 2020-03-11 RX ADMIN — FAMOTIDINE 20 MG: 20 TABLET ORAL at 09:03

## 2020-03-11 RX ADMIN — IPRATROPIUM BROMIDE 0.5 MG: 0.5 SOLUTION RESPIRATORY (INHALATION) at 07:03

## 2020-03-11 RX ADMIN — GABAPENTIN 300 MG: 300 CAPSULE ORAL at 09:03

## 2020-03-11 RX ADMIN — CEFEPIME 2 G: 2 INJECTION, POWDER, FOR SOLUTION INTRAVENOUS at 12:03

## 2020-03-11 RX ADMIN — Medication 10 ML: at 12:03

## 2020-03-11 RX ADMIN — APIXABAN 10 MG: 5 TABLET, FILM COATED ORAL at 09:03

## 2020-03-11 RX ADMIN — VERAPAMIL HYDROCHLORIDE 120 MG: 120 TABLET, FILM COATED, EXTENDED RELEASE ORAL at 09:03

## 2020-03-11 RX ADMIN — CEFEPIME 2 G: 2 INJECTION, POWDER, FOR SOLUTION INTRAVENOUS at 06:03

## 2020-03-11 RX ADMIN — ONDANSETRON HYDROCHLORIDE 8 MG: 2 SOLUTION INTRAMUSCULAR; INTRAVENOUS at 06:03

## 2020-03-11 NOTE — PROGRESS NOTES
[facility] WRITTEN HEALTHCARE AND DISCHARGE INFORMATION.  Follow-up Information     Kristi Torres NP On 5/5/2020.    Specialty:  Neurology  Why:  Appointment scheduled for May 5th at 1:45pm  Contact information:  [address]  [place] [address]  [phone]             Urmila Luna MD On 3/25/2020.    Specialties:  Internal Medicine, Pediatrics  Why:  Appointment scheduled for March 25th at 10am, follow up from the hospital  Contact information:  [address]  [place] [address]  [phone]             Declan Mills MD On 4/22/2020.    Specialty:  Pulmonary Disease  Why:  out patient services: 1:30pm follow up from the hospital  Contact information:  [address]  [address]  [place] [address]  [phone]                   Help at Home           [phone]  After discharge for assistance [facility] 24/7 Assistance.   Things You are responsible For To Manage Your Care At Home:  1.    Getting your prescriptions filled   2.    Taking your medications as directed, DO NOT MISS ANY DOSES!  3.    Going to your follow-up doctor appointment. This is important because it  allow the doctor to monitor your progress and determine if  any changes need to made to your treatment plan.   Thank you for choosing [facility] for your care. [facility] happy to have the opportunity to serve you.    Sincerely,  Your [facility],  Kate Perez RN, BSN, [facility]  [phone]  3/11/2020

## 2020-03-11 NOTE — PROGRESS NOTES
TN informed telemetry nurse Laly that pt is cleared for discharge from cm viewpoint..Kate Perez RN, BSN, STN [facility]  3/11/2020

## 2020-03-11 NOTE — ASSESSMENT & PLAN NOTE
Hgb consistent with previous labs. Denies history of or current melena, hematochezia, or hematemesis.   Has mild hemoptysis reported which has since resolved.   H&H with overall no significant change  Will continue monitor

## 2020-03-11 NOTE — NURSING
patient discharged to home. PICC removed. Tele removed and returned. Patient given discharge instruction mediation record . Patient and  educated on both. Verbalized understanding of all instructions.

## 2020-03-11 NOTE — PLAN OF CARE
Problem: Adult Inpatient Plan of Care  Goal: Plan of Care Review  3/11/2020 0541 by Sneha Stapleton RN  Outcome: Ongoing, Progressing  Pt remained free of falls during current shift. Denied pain and did not receive any prn pain medications. IV antibiotic administered per MD order and monitoring pt's O2 sat's. Eliquis given per MD order and monitoring pt for any signs of bleeding. Plan of care and fall precautions reviewed with pt and verbalized understanding. Bed locked, lowered, SR up x2 and call light placed within reach.

## 2020-03-11 NOTE — PROGRESS NOTES
[facility]  Progress Note    Patient Name: Yasmin Asencio  MRN: [id number]  Patient Class: IP- Inpatient   Admission Date: 3/4/2020  Length of Stay: 5 days  Attending Physician: Barbara Hernandez MD  Primary Care Provider: Urmila Luna MD        Subjective:     Principal Problem:Bronchiectasis with acute exacerbation        HPI:  Mrs. 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 Pulmonologist suspect recurrent Pseudomonas infection. Patient reports coughing more recently for the last few days compared to COPD baseline consisting gross bright red blood and red mucous yesterday morning associated with mild nose bleeding. She also reports worsening of SOB and chest pain with cough for 1 weeks . She denies any fever, chill, night sweat, vomit, sick contact, weakness, abdominal pain, headache, blood in stool/urine, any bruising or other bleeding. Recent completed Augmentin Feb (not sure exact)     In ED, EKG and Troponin negative, Chest X-ray with stable chronic bronchiectasis and emphysematous/fibrotic without large focal consolidation, Blood and Sputum culture obtained, BNP of 153, Vanc + Cefepime ordered.    Overview/Hospital Course:  Ms Asencio presented with hemoptysis. Recurrent Pseudomonas pneumonia suspected. Initiated on empiric cefepime, sputum cultures obtained and ID consulted. NOAC patient took for Hx of PE held. Hemoptysis resolved. Sputum culture grew pansensitive Pseudomonas aeruginosa. Developed left sided upper chest discomfort which she states felt similar to prior PE. CTA chest with  questionable PE.    Interval HPI: Shortness of breath much improved, reports using oxygen at 2 L at baseline.  No more chest pain.  Reports recently taken off due to hemoptysis.    Review of Systems   Respiratory: Negative.    Cardiovascular: Negative for chest pain.   Gastrointestinal: Negative for nausea and vomiting.   Psychiatric/Behavioral: The patient is nervous/anxious.      Objective:     Vital Signs (Most Recent):  Temp: 98.5 °F (36.9 °C) (03/10/20 1927)  Pulse: 93 (03/10/20 2005)  Resp: 18 (03/10/20 2005)  BP: (!) 116/58 (03/10/20 1927)  SpO2: 97 % (03/10/20 2005) Vital Signs (24h Range):  Temp:  [97.6 °F (36.4 °C)-98.5 °F (36.9 °C)] 98.5 °F (36.9 °C)  Pulse:  [72-95] 93  Resp:  [18-24] 18  SpO2:  [96 %-100 %] 97 %  BP: (112-127)/(55-66) 116/58     Weight: 69.2 kg (152 lb 8.9 oz)  Body mass index is 27.9 kg/m².  No intake or output data in the 24 hours ending 03/10/20 6494   Physical Exam   Constitutional: She is oriented to person, place, and time. She appears well-developed. No distress.   HENT:   Head: Normocephalic.   Eyes: Conjunctivae are normal.   Cardiovascular: Normal rate and regular rhythm.   Pulmonary/Chest: Effort normal. No respiratory distress.   Coarse breath sounds but with fair air movement   Abdominal: Soft. Bowel sounds are normal.   Musculoskeletal: Normal range of motion. She exhibits no edema.   Neurological: She is alert and oriented to person, place, and time.   Skin: Skin is warm. Capillary refill takes less than 2 seconds. She is not diaphoretic.   Psychiatric: Her mood appears anxious.   Nursing note and vitals reviewed.      Significant Labs: All pertinent labs within the past 24 hours have been reviewed.    Significant Imaging: I have reviewed all pertinent imaging results/findings within the past 24 hours.  I have reviewed and interpreted all pertinent imaging results/findings within the past 24 hours.      Assessment/Plan:      * Bronchiectasis with acute exacerbation  Hx of  "Kansasi and recurrent pseudomonas infection resistant to cipro  No episode of hemoptysis overnight. Cough improved. No fever/leukocytosis. SOB improved with breathing treatment. SpO2 92-94%. Currently at room air  ID and Pulmonary following.   Sputum culture growing Pseudomonas aeruginosa, this time pansensitive  Continue Cefepime while inpatient. On day 6/7   Prednisone 40 mg daily x5 days  Hemoptysis resolved. With chest pain similar to prior PE.   CTA chest with probable PE and restart of apixaban  Will check lower extremity ultrasound  Respiratory Viral Panel negative  Appreciate pulmonary input  Markedly improved    History of pulmonary embolism  Previous PE in 2017  Now with CTA with questionable PE   Continue apixaban  Will check ultrasound of lower extremities    Anemia of chronic disease  Hgb consistent with previous labs. Denies history of or current melena, hematochezia, or hematemesis.   Has mild hemoptysis reported which has since resolved.   H&H with overall no significant change  Will continue monitor    COPD (chronic obstructive pulmonary disease)  As discussed above  Patient not able to tolerate albuterol "tremors." Continue home SILVIO and LABA/LAMA. If need then can add xopenex.   Prednisone 40 mg x 5 days as above  Keep O2 >88   Chronically on O2 at home at 2 L    Nausea in adult  Improved     Hemoptysis  See above    Hypokalemia  2/2 poor PO intake  Replace  Recheck in AM      VTE Risk Mitigation (From admission, onward)         Ordered     apixaban tablet 5 mg  2 times daily      03/09/20 1737     apixaban tablet 10 mg  2 times daily      03/09/20 1734     Place HUSSEIN hose  Until discontinued      03/05/20 1757     IP VTE HIGH RISK PATIENT  Once      03/05/20 0139     Place HUSSEIN hose  Until discontinued      03/05/20 0139                      Barbara Hernandez MD  Department of Hospital Medicine   [facility]    "

## 2020-03-11 NOTE — NURSING
PER handoff received from YESENIA Eddy RN. Responds spontaneous and is AAO x4. Denies having any pain and appears in no current distress at current time. Assessment completed per Doc Flowsheets; reference if needed. Fall and safety precautions maintained. Bed locked in lowest position, with side rails up x2. Call bell and personal items within reach. Will continue to monitor pt for any changes.

## 2020-03-11 NOTE — ASSESSMENT & PLAN NOTE
Hx of Kansasi and recurrent pseudomonas infection resistant to cipro  No episode of hemoptysis overnight. Cough improved. No fever/leukocytosis. SOB improved with breathing treatment. SpO2 92-94%. Currently at room air  ID and Pulmonary following.   Sputum culture growing Pseudomonas aeruginosa, this time pansensitive  Continue Cefepime while inpatient. On day 6/7   Prednisone 40 mg daily x5 days  Hemoptysis resolved. With chest pain similar to prior PE.   CTA chest with probable PE and restart of apixaban  Will check lower extremity ultrasound  Respiratory Viral Panel negative  Appreciate pulmonary input  Markedly improved

## 2020-03-11 NOTE — SUBJECTIVE & OBJECTIVE
Interval HPI: Shortness of breath much improved, reports using oxygen at 2 L at baseline.  No more chest pain.  Reports recently taken off due to hemoptysis.    Review of Systems   Respiratory: Negative.    Cardiovascular: Negative for chest pain.   Gastrointestinal: Negative for nausea and vomiting.   Psychiatric/Behavioral: The patient is nervous/anxious.      Objective:     Vital Signs (Most Recent):  Temp: 98.5 °F (36.9 °C) (03/10/20 1927)  Pulse: 93 (03/10/20 2005)  Resp: 18 (03/10/20 2005)  BP: (!) 116/58 (03/10/20 1927)  SpO2: 97 % (03/10/20 2005) Vital Signs (24h Range):  Temp:  [97.6 °F (36.4 °C)-98.5 °F (36.9 °C)] 98.5 °F (36.9 °C)  Pulse:  [72-95] 93  Resp:  [18-24] 18  SpO2:  [96 %-100 %] 97 %  BP: (112-127)/(55-66) 116/58     Weight: 69.2 kg (152 lb 8.9 oz)  Body mass index is 27.9 kg/m².  No intake or output data in the 24 hours ending 03/10/20 7846   Physical Exam   Constitutional: She is oriented to person, place, and time. She appears well-developed. No distress.   HENT:   Head: Normocephalic.   Eyes: Conjunctivae are normal.   Cardiovascular: Normal rate and regular rhythm.   Pulmonary/Chest: Effort normal. No respiratory distress.   Coarse breath sounds but with fair air movement   Abdominal: Soft. Bowel sounds are normal.   Musculoskeletal: Normal range of motion. She exhibits no edema.   Neurological: She is alert and oriented to person, place, and time.   Skin: Skin is warm. Capillary refill takes less than 2 seconds. She is not diaphoretic.   Psychiatric: Her mood appears anxious.   Nursing note and vitals reviewed.      Significant Labs: All pertinent labs within the past 24 hours have been reviewed.    Significant Imaging: I have reviewed all pertinent imaging results/findings within the past 24 hours.  I have reviewed and interpreted all pertinent imaging results/findings within the past 24 hours.

## 2020-03-11 NOTE — ASSESSMENT & PLAN NOTE
"As discussed above  Patient not able to tolerate albuterol "tremors." Continue home SILVIO and LABA/LAMA. If need then can add xopenex.   Prednisone 40 mg x 5 days as above  Keep O2 >88   Chronically on O2 at home at 2 L  "

## 2020-03-11 NOTE — ASSESSMENT & PLAN NOTE
Previous PE in 2017  Now with CTA with questionable PE   Continue apixaban  Will check ultrasound of lower extremities

## 2020-03-13 ENCOUNTER — TELEPHONE (OUTPATIENT)
Dept: PULMONOLOGY | Facility: CLINIC | Age: 70
End: 2020-03-13

## 2020-03-13 NOTE — TELEPHONE ENCOUNTER
----- Message from Jane Shannon sent at 3/13/2020  2:31 PM CDT -----  Contact: Self [phone]  Pt states her tempeture 99.3 states she would like to speak with nurse please call back to discuss

## 2020-03-13 NOTE — TELEPHONE ENCOUNTER
Patient states she has been in the hospital for a week she was discharged on Wednesday 3/11/2020.Pt stated she had a Cat Scan completed which the doctors found a blood clot on her left lung.Patient stated she's running a low grade fever 99.5. Patient states she's really weak and coughing up clear mucus with a runny nose. Patient also stated she may only need antibiotics.  I advise patient if her fever gets higher to visit the nearest hospital.Patient states she would feel better if  called her and advise her on what to do. I advised patient I will forward this message to  to review and advise.Patient verbalized she understands

## 2020-03-14 NOTE — DISCHARGE SUMMARY
[facility] Medicine  Discharge Summary      Patient Name: Yasmin Asencio  MRN: [id number]  Admission Date: 3/4/2020  Hospital Length of Stay: 7 days  Discharge Date and Time: 3/11/2020  5:01 PM  Attending Physician: Barbara Hernandez MD  Discharging Provider: Barbara Hernandez MD  Primary Care Provider: Urmila Luna MD      HPI:   Mrs. 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 Pulmonologist suspect recurrent Pseudomonas infection. Patient reports coughing more recently for the last few days compared to COPD baseline consisting gross bright red blood and red mucous yesterday morning associated with mild nose bleeding. She also reports worsening of SOB and chest pain with cough for 1 weeks . She denies any fever, chill, night sweat, vomit, sick contact, weakness, abdominal pain, headache, blood in stool/urine, any bruising or other bleeding. Recent completed Augmentin Feb (not sure exact)     In ED, EKG and Troponin negative, Chest X-ray with stable chronic bronchiectasis and emphysematous/fibrotic without large focal consolidation, Blood and Sputum culture obtained, BNP of 153, Vanc + Cefepime ordered.    * No surgery found *      Hospital Course:   Ms Asencio presented with hemoptysis. Recurrent Pseudomonas pneumonia suspected. Initiated on empiric cefepime, sputum cultures obtained and ID consulted.  Apixaban held. Hemoptysis resolved. Sputum culture grew pansensitive Pseudomonas aeruginosa.  She developed left sided upper chest discomfort which she states felt similar to prior PE. CTA chest on 3/9/20 showed  ""findings consistent with pulmonary thromboembolism involving the lobar branch to the left upper lobe, extending into the left main pulmonary artery, as well as filling defects within a segmental/subsegmental branch to the right lower lobe."  Patient's hemoptysis stopped after treatment initiated for her pneumonia, and case discussed with Pulmonary, and apixaban was restarted for anticoagulation.  Patient continued to do well and did not have return of hemoptysis.  She completed the full 7 days of cefepime as per ID recommendations during hospitalization and she was back to her baseline oxygen requirement of 2 L via nasal cannula.  She was stable for discharge with close follow-up with her primary care physician and Pulmonary.     Consults:   Consults (From admission, onward)        Status Ordering Provider     Inpatient consult to Infectious Diseases  Once     Provider:  Taylor Villalpando MD    Completed BLANCA LAWTON     [facility]  [facility]     Provider:  (Not yet assigned)    Completed NASRA CONLEY     Inpatient consult to Pulmonology  Once     Provider:  Declan Mills MD    Completed BLANCA LAWTON     IP consult to case management  Once     Provider:  (Not yet assigned)    Completed ODALIS SESAY        Service: Hospital Medicine    Final Active Diagnoses:    Diagnosis Date Noted POA    PRINCIPAL PROBLEM:  Bronchiectasis with acute exacerbation [J47.1] 11/29/2017 Yes    History of pulmonary embolism [Z86.711] 01/05/2017 Yes    Anemia of chronic disease [D63.8]  Yes    Nausea in adult [R11.0] 03/05/2020 Yes     Chronic    COPD (chronic obstructive pulmonary disease) [J44.9] 03/05/2020 Yes    Hemoptysis [R04.2] 03/04/2020 Yes    Hypokalemia [E87.6] 09/14/2018 Yes      Problems Resolved During this Admission:       Discharged Condition: good    Disposition: Home or Self Care    Follow Up:  Follow-up Information     Kristi Torres NP On 5/5/2020.    Specialty:  " Neurology  Why:  Appointment scheduled for May 5th at 1:45pm  Contact information:  [address]  [facility] [address]  [phone]             Urmila Luna MD On 3/25/2020.    Specialties:  Internal Medicine, Pediatrics  Why:  Appointment scheduled for March 25th at 10am, follow up from the hospital  Contact information:  [address] [address]  [phone]             Declan Mills MD On 4/22/2020.    Specialty:  Pulmonary Disease  Why:  out patient services: 1:30pm follow up from the hospital  Contact information:  [address]  [address]  [place] [address]  [phone]                 Patient Instructions:      Diet Cardiac     Activity as tolerated       Significant Diagnostic Studies:    Pending Diagnostic Studies:     None         Medications:  Reconciled Home Medications:      Medication List      CONTINUE taking these medications    ALPRAZolam 0.25 MG tablet  Commonly known as:  XANAX  Take 1 tablet (0.25 mg total) by mouth nightly as needed for Anxiety.     apixaban 5 mg Tab  Commonly known as:  ELIQUIS  Take 1 tablet (5 mg total) by mouth 2 (two) times daily.     chlorthalidone 25 MG Tab  Commonly known as:  HYGROTEN  Take 1 tablet (25 mg total) by mouth once daily.     desoximetasone 0.25 % cream  Commonly known as:  TOPICORT  APPLY  CREAM EXTERNALLY TWICE DAILY AS NEEDED     ergocalciferol 50,000 unit Cap  Commonly known as:  ERGOCALCIFEROL  TAKE 1 CAPSULE BY MOUTH ONCE A WEEK     escitalopram oxalate 20 MG tablet  Commonly known as:  LEXAPRO  Take 1 tablet (20 mg total) by mouth once daily.     fluocinolone 0.01 % external oil  Commonly known as:  DERMA-SMOOTHE  APPLY TOPICALLY ONCE DAILY     fluocinonide 0.05 % external solution  Commonly known as:  LIDEX  Apply topically once daily. - apply after shampooing for 10 days     folic acid 1 MG tablet  Commonly known as:  FOLVITE  TAKE 1 TABLET BY MOUTH ONCE DAILY     gabapentin 300 MG capsule  Commonly known as:   NEURONTIN  Take 2 capsules (600 mg total) by mouth 3 (three) times daily.     guaiFENesin 600 mg 12 hr tablet  Commonly known as:  MUCINEX  Take 1,200 mg by mouth 2 (two) times daily.     hyoscyamine 0.125 mg Tab  Commonly known as:  ANASPAZ,LEVSIN  Take 1 tablet (125 mcg total) by mouth every 6 (six) hours as needed.     ipratropium 0.02 % nebulizer solution  Commonly known as:  ATROVENT  Take 2.5 mLs (500 mcg total) by nebulization 3 (three) times daily as needed for Wheezing. Inhale 1 vial in nebulizer 3 to 4 times daily     ketoconazole 2 % shampoo  Commonly known as:  NIZORAL  Apply topically once daily.     Lactobacillus rhamnosus GG 10 billion cell capsule  Commonly known as:  CULTURELLE  Take 1 capsule by mouth once daily.     loperamide 2 mg capsule  Commonly known as:  IMODIUM  Take 2 capsules after first loose stool, then 1 capsule after each loose stool following. Do not exceed 8 capsules per day.     mirtazapine 15 MG tablet  Commonly known as:  REMERON  Take 1 tablet (15 mg total) by mouth every evening.     multivitamin per tablet  Commonly known as:  THERAGRAN  Take 1 tablet by mouth once daily.     omeprazole 20 MG capsule  Commonly known as:  PRILOSEC  TAKE 1 CAPSULE BY MOUTH ONCE DAILY AS NEEDED FOR  HEARTBURN  (TAKE  AS  NEEDED)     ondansetron 8 MG Tbdl  Commonly known as:  ZOFRAN-ODT  Take 1 tablet (8 mg total) by mouth every 8 (eight) hours as needed.     * promethazine 12.5 MG Tab  Commonly known as:  PHENERGAN  Take 1 tablet (12.5 mg total) by mouth every 6 (six) hours as needed.     * promethazine 12.5 MG Supp  Commonly known as:  PHENERGAN  Place 1 suppository (12.5 mg total) rectally every 6 (six) hours as needed.     sodium chloride 2% 2 % ophthalmic solution  Commonly known as:  XIOMARA 128  Place 1 drop into both eyes 3 (three) times daily as needed.     sodium chloride 3% 3 % nebulizer solution  Take 4 mLs by nebulization 2 (two) times daily.     SYSTANE BALANCE 0.6 % Drop  Generic drug:   propylene glycol  Apply 1 drop to eye daily as needed (dry eye).     umeclidinium-vilanteroL 62.5-25 mcg/actuation Dsdv  Commonly known as:  ANORO ELLIPTA  Inhale 1 puff into the lungs once daily. Controller     verapamiL 120 MG CR tablet  Commonly known as:  CALAN-SR  Take 1 tablet (120 mg total) by mouth 2 (two) times daily.         * This list has 2 medication(s) that are the same as other medications prescribed for you. Read the directions carefully, and ask your doctor or other care provider to review them with you.                Indwelling Lines/Drains at time of discharge:   Lines/Drains/Airways     None                 Time spent on the discharge of patient: 40 minutes  Patient was seen and examined on the date of discharge and determined to be suitable for discharge.         Barbara Hernandez MD  Department of Hospital Medicine  [facility]

## 2020-03-16 RX ORDER — ERGOCALCIFEROL 1.25 MG/1
CAPSULE ORAL
Qty: 4 CAPSULE | Refills: 0 | Status: SHIPPED | OUTPATIENT
Start: 2020-03-16 | End: 2020-04-09

## 2020-03-18 NOTE — PHYSICIAN QUERY
"PT Name: Yasmin Asencio  MR #: [id number]     PHYSICIAN QUERY -  ACUITY OF CONDITION CLARIFICATION      CDS/: Susana GRANT       Contact information: [email or web address]  This form is a permanent document in the medical record.     Query Date: March 18, 2020    By submitting this query, we are merely seeking further clarification of documentation to reflect the severity of illness of your patient. Please utilize your independent clinical judgment when addressing the question(s) below.    The Medical record reflects the following:     Indicators   Supporting Clinical Findings Location in Medical Record   X Documentation of condition History of pulmonary embolism Previous PE in 2017 Now with CTA with questionable PE Continue apixaban Will check ultrasound of lower extremities     She developed left sided upper chest discomfort which she states felt similar to prior PE. CTA chest on 3/9/20 showed "findings consistent with pulmonary thromboembolism involving the lobar branch to the left upper lobe, extending into the left main pulmonary artery, as well as filling defects within a segmental/subsegmental branch to the right lower lobe."    Patient's hemoptysis stopped after treatment initiated for her pneumonia, and case discussed with Pulmonary, and apixaban was restarted for anticoagulation.     Final active diagnosis list :  History of pulmonary embolism    Hospital medicine 3/10 1009 AM  ABDIAS Hernandez MD      Discharge summary 3/13 1148 pm   ABDIAS Hernandez MD    Lab Value(s)     X Radiology Findings CT of chest 3/9  Findings consistent with pulmonary thromboembolism involving the lobar branch to the left upper lobe, extending into the left main pulmonary artery, as well as filling defects within a segmental/subsegmental branch to the right lower lobe. Radiology - CT Chest   X Treatment                                 Medication Apixaban tablet 10 mg 2 times daily 3/9 - 3/11 Medication sheet    Other   "     Provider, please specify the acuity/chronicity of  Pulmonary Embolism:    [   ] Acute   [   ] Chronic   [ X  ] Acute and/on chronic   [   ] Past history only, not a current diagnosis   [   ] Ruled Out   [   ]  Clinically Undetermined       Please document in your progress notes daily for the duration of treatment until resolved, and include in your discharge summary.

## 2020-03-20 ENCOUNTER — TELEPHONE (OUTPATIENT)
Dept: FAMILY MEDICINE | Facility: CLINIC | Age: 70
End: 2020-03-20

## 2020-03-23 ENCOUNTER — TELEPHONE (OUTPATIENT)
Dept: FAMILY MEDICINE | Facility: CLINIC | Age: 70
End: 2020-03-23

## 2020-03-23 NOTE — TELEPHONE ENCOUNTER
Spoke with pt she had questions regarding her virtual visit scheduled for 3/25. Pt was unable to log in states she'll get assistance from her daughter when she's available.

## 2020-03-23 NOTE — TELEPHONE ENCOUNTER
----- Message from Tamia Machuca sent at 3/23/2020 11:03 AM CDT -----  Contact: self  Type: Patient Call Back    Who called:self    What is the request in detail:pt is calling to speak to nurse    Can the clinic reply by [facility]?call    Would the patient rather a call back or a response via [facility]? no    Best call back number

## 2020-03-25 ENCOUNTER — PATIENT MESSAGE (OUTPATIENT)
Dept: FAMILY MEDICINE | Facility: CLINIC | Age: 70
End: 2020-03-25

## 2020-03-25 ENCOUNTER — OFFICE VISIT (OUTPATIENT)
Dept: FAMILY MEDICINE | Facility: CLINIC | Age: 70
End: 2020-03-25
Payer: MEDICARE

## 2020-03-25 DIAGNOSIS — I26.99 ACUTE PULMONARY EMBOLISM, UNSPECIFIED PULMONARY EMBOLISM TYPE, UNSPECIFIED WHETHER ACUTE COR PULMONALE PRESENT: ICD-10-CM

## 2020-03-25 DIAGNOSIS — R04.2 HEMOPTYSIS: Primary | ICD-10-CM

## 2020-03-25 DIAGNOSIS — J44.9 CHRONIC OBSTRUCTIVE PULMONARY DISEASE, UNSPECIFIED COPD TYPE: ICD-10-CM

## 2020-03-25 DIAGNOSIS — J47.9 ACQUIRED BRONCHIECTASIS: Chronic | ICD-10-CM

## 2020-03-25 DIAGNOSIS — J42 CHRONIC BRONCHITIS, UNSPECIFIED CHRONIC BRONCHITIS TYPE: ICD-10-CM

## 2020-03-25 DIAGNOSIS — Z71.89 ADVICE GIVEN ABOUT 2019 NOVEL CORONAVIRUS INFECTION: ICD-10-CM

## 2020-03-25 DIAGNOSIS — J96.11 CHRONIC RESPIRATORY FAILURE WITH HYPOXIA: ICD-10-CM

## 2020-03-25 DIAGNOSIS — F41.9 ANXIETY: ICD-10-CM

## 2020-03-25 PROBLEM — J47.1 BRONCHIECTASIS WITH ACUTE EXACERBATION: Status: RESOLVED | Noted: 2017-11-29 | Resolved: 2020-03-25

## 2020-03-25 PROCEDURE — 99214 OFFICE O/P EST MOD 30 MIN: CPT | Mod: 95,,, | Performed by: INTERNAL MEDICINE

## 2020-03-25 PROCEDURE — 1159F PR MEDICATION LIST DOCUMENTED IN MEDICAL RECORD: ICD-10-PCS | Mod: 95,,, | Performed by: INTERNAL MEDICINE

## 2020-03-25 PROCEDURE — 99499 RISK ADDL DX/OHS AUDIT: ICD-10-PCS | Mod: 95,,, | Performed by: INTERNAL MEDICINE

## 2020-03-25 PROCEDURE — 1101F PR PT FALLS ASSESS DOC 0-1 FALLS W/OUT INJ PAST YR: ICD-10-PCS | Mod: CPTII,95,, | Performed by: INTERNAL MEDICINE

## 2020-03-25 PROCEDURE — 1159F MED LIST DOCD IN RCRD: CPT | Mod: 95,,, | Performed by: INTERNAL MEDICINE

## 2020-03-25 PROCEDURE — 99214 PR OFFICE/OUTPT VISIT, EST, LEVL IV, 30-39 MIN: ICD-10-PCS | Mod: 95,,, | Performed by: INTERNAL MEDICINE

## 2020-03-25 PROCEDURE — 1101F PT FALLS ASSESS-DOCD LE1/YR: CPT | Mod: CPTII,95,, | Performed by: INTERNAL MEDICINE

## 2020-03-25 PROCEDURE — 99499 UNLISTED E&M SERVICE: CPT | Mod: 95,,, | Performed by: INTERNAL MEDICINE

## 2020-03-25 NOTE — PROGRESS NOTES
The patient location is: Home.  The chief complaint leading to consultation is: Hospital Follow Up (hemoptysis)     Visit type: Virtual visit with synchronous audio and video  Total time spent with patient: 12 minutes.  Each patient to whom he or she provides medical services by telemedicine is:  (1) informed of the relationship between the physician and patient and the respective role of any other health care provider with respect to management of the patient; and (2) notified that he or she may decline to receive medical services by telemedicine and may withdraw from such care at any time.      HISTORY OF PRESENT ILLNESS:  Yasmin Asencio is a 69 y.o. female who presents to the clinic today for Hospital Follow Up (hemoptysis)  .   The patient reports that she was hospitalized on 03/04 because she had developed hemoptysis.  They felt that this was secondary to a Pseudomonas infection - she has underlying bronchiectasis/chronic bronchitis/COPD.  She was started on IV antibiotics.  They held her Eliquis which she takes for history of pulmonary embolism.  After about 5 days of being hospitalized she felt that she was developing some left-sided lung pain.  They did further scans and found her to have a new pulmonary embolism.  She was restarted on the Eliquis.  She completed 7 days of IV antibiotics for her Pseudomonas infection.  She was sent home on 03/11.  She states that on 03/13 as well as 3/14 she had a low-grade fever up to 99.5.  No fever since.  She has felt that her breathing has been a little bit harder since she was discharged home.  She states that her pulse ox is 91-92% on room air today at rest and is generally 96% on 2 L. she has no significant cough at this time.  She denies any further hemoptysis since her hospitalization.  She has been staying at home under self quarantine.  She is very conscious about handwashing at this time.      PAST MEDICAL HISTORY:  Past Medical History:   Diagnosis Date     Acquired bronchiectasis     due to history of TB - followed by pulmonary, Dr. Zuñiga    Allergy     Amblyopia     rt eye per pt    Anemia of other chronic disease     Anticoagulant long-term use     Anxiety     Cataract     Chronic obstructive pulmonary disease with acute exacerbation     Clotting disorder     COPD (chronic obstructive pulmonary disease)     Depression     Diverticulosis     Essential tremor     GERD (gastroesophageal reflux disease)     Hemangioma of liver     History of pulmonary embolus (PE)     History of tuberculosis 1978    Hypertension     Mixed anxiety and depressive disorder     On home oxygen therapy     Psoriasis     PSVT (paroxysmal supraventricular tachycardia)     Pulmonary embolism     S/P PICC central line placement Apr. 2016 - May 2016    Skin disease     Psoriasis    Spondylosis without myelopathy 8/23/2013    Supraventricular tachycardia     Tachycardia     Thoracic aorta atherosclerosis     noted on CT scan of chest 1/3/2011    Tuberculosis     Vaginal delivery     x2    Vitamin D deficiency        PAST SURGICAL HISTORY:  Past Surgical History:   Procedure Laterality Date    CATARACT EXTRACTION W/  INTRAOCULAR LENS IMPLANT  07/24/12    od dr spain    CATARACT EXTRACTION W/  INTRAOCULAR LENS IMPLANT  08/07/12    left eye    CHOLECYSTECTOMY      COLONOSCOPY N/A 12/4/2019    Procedure: COLONOSCOPY;  Surgeon: Franco Charles MD;  Location: [facility] ([address] [place] [address]);  Service: Endoscopy;  Laterality: N/A;  ok to hold Eliquis 2days prior per     ESOPHAGOGASTRODUODENOSCOPY N/A 12/4/2019    Procedure: EGD (ESOPHAGOGASTRODUODENOSCOPY);  Surgeon: Franco Charles MD;  [facility]  Service: Endoscopy;  Laterality: N/A;  ok to hold Eliquis 2days prior per     EYE SURGERY      GALLBLADDER SURGERY      RADIOFREQUENCY THERMOCOAGULATION Left 12/18/2018    Procedure: RADIOFREQUENCY THERMAL COAGULATION;  Surgeon:  Issac Meyers MD;  Location: [facility];  Service: Pain Management;  Laterality: Left;  OK to hold Eliquis 3 days prior per Dr. Braga       SOCIAL HISTORY:  Social History     Socioeconomic History    Marital status:      Spouse name: Not on file    Number of children: 2    Years of education: Not on file    Highest education level: Not on file   Occupational History    Occupation: housewife   Social Needs    Financial resource strain: Not very hard    Food insecurity:     Worry: Never true     Inability: Never true    Transportation needs:     Medical: No     Non-medical: No   Tobacco Use    Smoking status: Former Smoker     Packs/day: 2.00     Years: 50.00     Pack years: 100.00     Types: Cigarettes     Last attempt to quit: 2009     Years since quitting: 10.8    Smokeless tobacco: Never Used   Substance and Sexual Activity    Alcohol use: Not Currently     Frequency: Never     Binge frequency: Never    Drug use: Never    Sexual activity: Yes     Partners: Male   Lifestyle    Physical activity:     Days per week: 0 days     Minutes per session: 0 min    Stress: Very much   Relationships    Social connections:     Talks on phone: More than three times a week     Gets together: Once a week     Attends Synagogue service: Not on file     Active member of club or organization: No     Attends meetings of clubs or organizations: Never     Relationship status:    Other Topics Concern    Are you pregnant or think you may be? No    Breast-feeding No   Social History Narrative    ** Merged History Encounter **         One child  of a heart attack at age 35.       FAMILY HISTORY:  Family History   Problem Relation Age of Onset    Hypertension Mother     Heart attack Mother     Cancer Father         liver and bladder    Hyperlipidemia Sister     Psoriasis Sister     Cancer Brother         liver    Stroke Maternal Uncle     Cancer Maternal Aunt         stomach    Lung  cancer Sister     Heart attack Brother     Heart attack Son     Amblyopia Neg Hx     Blindness Neg Hx     Cataracts Neg Hx     Glaucoma Neg Hx     Macular degeneration Neg Hx     Retinal detachment Neg Hx     Strabismus Neg Hx     Thyroid disease Neg Hx     Melanoma Neg Hx     Lupus Neg Hx     Eczema Neg Hx     COPD Neg Hx     Colon cancer Neg Hx        ALLERGIES AND MEDICATIONS: updated and reviewed.  Review of patient's allergies indicates:   Allergen Reactions    Adhesive Other (See Comments)     Tears up the skin and makes it itch   (leads)    Bactrim [sulfamethoxazole-trimethoprim] Rash     Was hospitalized for rash    Restasis [cyclosporine]     Lipitor [atorvastatin] Other (See Comments)     Muscle aches    Albuterol Other (See Comments)     tremors    Topamax [topiramate]      - made her feel 'bad in the head'     Medication List with Changes/Refills   Current Medications    ALPRAZOLAM (XANAX) 0.25 MG TABLET    Take 1 tablet (0.25 mg total) by mouth nightly as needed for Anxiety.    APIXABAN (ELIQUIS) 5 MG TAB    Take 1 tablet (5 mg total) by mouth 2 (two) times daily.    CHLORTHALIDONE (HYGROTEN) 25 MG TAB    Take 1 tablet (25 mg total) by mouth once daily.    DESOXIMETASONE (TOPICORT) 0.25 % CREAM    APPLY  CREAM EXTERNALLY TWICE DAILY AS NEEDED    ERGOCALCIFEROL (ERGOCALCIFEROL) 50,000 UNIT CAP    Take 1 capsule by mouth once a week    ESCITALOPRAM OXALATE (LEXAPRO) 20 MG TABLET    Take 1 tablet (20 mg total) by mouth once daily.    FLUOCINOLONE (DERMA-SMOOTHE) 0.01 % EXTERNAL OIL    APPLY TOPICALLY ONCE DAILY    FLUOCINONIDE (LIDEX) 0.05 % EXTERNAL SOLUTION    Apply topically once daily. - apply after shampooing for 10 days    FOLIC ACID (FOLVITE) 1 MG TABLET    TAKE 1 TABLET BY MOUTH ONCE DAILY    GABAPENTIN (NEURONTIN) 300 MG CAPSULE    Take 2 capsules (600 mg total) by mouth 3 (three) times daily.    GUAIFENESIN (MUCINEX) 600 MG 12 HR TABLET    Take 1,200 mg by mouth 2 (two) times  daily.    HYOSCYAMINE (ANASPAZ,LEVSIN) 0.125 MG TAB    Take 1 tablet (125 mcg total) by mouth every 6 (six) hours as needed.    IPRATROPIUM (ATROVENT) 0.02 % NEBULIZER SOLUTION    Take 2.5 mLs (500 mcg total) by nebulization 3 (three) times daily as needed for Wheezing. Inhale 1 vial in nebulizer 3 to 4 times daily    KETOCONAZOLE (NIZORAL) 2 % SHAMPOO    Apply topically once daily.    LACTOBACILLUS RHAMNOSUS GG (CULTURELLE) 10 BILLION CELL CAPSULE    Take 1 capsule by mouth once daily.    LOPERAMIDE (IMODIUM) 2 MG CAPSULE    Take 2 capsules after first loose stool, then 1 capsule after each loose stool following. Do not exceed 8 capsules per day.    MIRTAZAPINE (REMERON) 15 MG TABLET    Take 1 tablet (15 mg total) by mouth every evening.    MULTIVITAMIN (THERAGRAN) PER TABLET    Take 1 tablet by mouth once daily.    OMEPRAZOLE (PRILOSEC) 20 MG CAPSULE    TAKE 1 CAPSULE BY MOUTH ONCE DAILY AS NEEDED FOR  HEARTBURN  (TAKE  AS  NEEDED)    ONDANSETRON (ZOFRAN-ODT) 8 MG TBDL    Take 1 tablet (8 mg total) by mouth every 8 (eight) hours as needed.    PROMETHAZINE (PHENERGAN) 12.5 MG SUPP    Place 1 suppository (12.5 mg total) rectally every 6 (six) hours as needed.    PROMETHAZINE (PHENERGAN) 12.5 MG TAB    Take 1 tablet (12.5 mg total) by mouth every 6 (six) hours as needed.    PROPYLENE GLYCOL (SYSTANE BALANCE) 0.6 % DROP    Apply 1 drop to eye daily as needed (dry eye).    SODIUM CHLORIDE 2% (XIOMARA 128) 2 % OPHTHALMIC SOLUTION    Place 1 drop into both eyes 3 (three) times daily as needed.    SODIUM CHLORIDE 3% 3 % NEBULIZER SOLUTION    Take 4 mLs by nebulization 2 (two) times daily.    UMECLIDINIUM-VILANTEROL (ANORO ELLIPTA) 62.5-25 MCG/ACTUATION DSDV    Inhale 1 puff into the lungs once daily. Controller    VERAPAMIL (CALAN-SR) 120 MG CR TABLET    Take 1 tablet (120 mg total) by mouth 2 (two) times daily.         CARE TEAM:  Patient Care Team:  Urmila Luna MD as PCP - General (Internal Medicine)  Taurus COOL  MD Omi as Consulting Physician (Cardiology)  PRECIOUS Zuñiga MD as Consulting Physician (Pulmonary Disease)  Louie Yuan MD as Consulting Physician (Infectious Diseases)  Issac Meyers MD as Consulting Physician (Pain Medicine)  Urmila Luna MD (Internal Medicine)         REVIEW OF SYSTEMS:  Review of Systems   Constitutional: Positive for fatigue. Negative for chills and fever.   HENT: Positive for postnasal drip. Negative for congestion and sore throat.    Respiratory: Positive for cough, shortness of breath and wheezing.    Cardiovascular: Positive for chest pain.   Gastrointestinal: Negative for abdominal pain and diarrhea.   Genitourinary: Negative for dysuria and frequency.   Musculoskeletal: Positive for arthralgias.   Hematological: Negative for adenopathy.   Psychiatric/Behavioral: The patient is nervous/anxious.          PHYSICAL EXAM:   There were no vitals filed for this visit.          There is no height or weight on file to calculate BMI.      General appearance - alert, well appearing, and in no distress  Psychiatric - alert, oriented to person, place, and time, normal behavior, speech, dress, motor activity, mildly anxious (baseline)  Chest - no clearly audible wheezing noted; mild shortness of breath with speech which is generally her baseline; no respiratory distress noted  Unable to perform remainder of PE as this was a video visit      Labs:  Pre-visit Labs - not applicable      ASSESSMENT AND PLAN:  1. Hemoptysis/2. Acquired bronchiectasis/3. Chronic bronchitis, unspecified chronic bronchitis type/4. Chronic respiratory failure with hypoxia/5. Chronic obstructive pulmonary disease, unspecified COPD type  Her hemoptysis has resolved.  She is currently not on antibiotics.  She will continue with inhalers and other respiratory treatments as per Pulmonary.    6. Acute pulmonary embolism, unspecified pulmonary embolism type, unspecified whether acute cor pulmonale present  She  is back on Eliquis.  She will need to stay on this indefinitely.  I educated her that Eliquis will prevent her current blood clot from getting larger and hopefully prevent new blood clots.  It does not resolved the clot she has now.  She may continue with some increased shortness of breath and chest discomfort from the blood clot that is currently present.  She will let us know if symptoms worsen.    7. Advice Given About 2019 Novel Coronavirus Infection  Patient counseled on the worrisome symptoms for which she should seek medical care which include high fever and cough/shortness of breath.  We discussed social distancing and hand hygiene.    8. Anxiety  She has some anxiety about the current situation and her underlying health conditions.  We discussed some thing she can do at home including meditation and getting her rest/proper diet.         No orders of the defined types were placed in this encounter.     Follow up in about 5 months (around 8/25/2020), or if symptoms worsen or fail to improve, for follow up chronic medical conditions.. or sooner as needed.

## 2020-03-30 ENCOUNTER — TELEPHONE (OUTPATIENT)
Dept: PULMONOLOGY | Facility: CLINIC | Age: 70
End: 2020-03-30

## 2020-03-30 NOTE — TELEPHONE ENCOUNTER
Patient states she's coughing up mucus which also contain blood inside the mucus.Patient states she feels good denies any pains and fever. Patient also states when that happens  usually prescribes her antibiotics. Patient states she was able to find her antibiotics and on the bottle it reads she has 1 refill left. Patient states she will call the medication to be refilled. I advised patient to stay safe and take care. If she have any questions or concerns to give us a call or send us a message from [facility].Patient verbalized she understands.

## 2020-03-30 NOTE — TELEPHONE ENCOUNTER
----- Message from Rowan Saha MA sent at 3/30/2020 10:59 AM CDT -----  Contact: Self/[phone]  Pt stated this morning she started coughing up blood, per pt stated it was not a lot of blood. .Please call back to discuss.

## 2020-04-09 ENCOUNTER — TELEPHONE (OUTPATIENT)
Dept: INFECTIOUS DISEASES | Facility: CLINIC | Age: 70
End: 2020-04-09

## 2020-04-09 LAB
ACID FAST MOD KINY STN SPEC: ABNORMAL
MYCOBACTERIUM SPEC QL CULT: ABNORMAL
MYCOBACTERIUM SPEC QL CULT: ABNORMAL

## 2020-04-09 RX ORDER — ERGOCALCIFEROL 1.25 MG/1
CAPSULE ORAL
Qty: 4 CAPSULE | Refills: 0 | Status: SHIPPED | OUTPATIENT
Start: 2020-04-09 | End: 2020-05-13

## 2020-04-10 ENCOUNTER — TELEPHONE (OUTPATIENT)
Dept: INFECTIOUS DISEASES | Facility: HOSPITAL | Age: 70
End: 2020-04-10

## 2020-04-10 DIAGNOSIS — J47.9 ACQUIRED BRONCHIECTASIS: Primary | ICD-10-CM

## 2020-04-10 NOTE — TELEPHONE ENCOUNTER
Attempted to call patient to discuss MAC results. Left voicemail. Unclear if colonization vs true infection. Needs repeat sputums. Have ordered. Will arrange id f/u appt

## 2020-04-15 ENCOUNTER — TELEPHONE (OUTPATIENT)
Dept: PULMONOLOGY | Facility: CLINIC | Age: 70
End: 2020-04-15

## 2020-04-15 ENCOUNTER — OFFICE VISIT (OUTPATIENT)
Dept: PULMONOLOGY | Facility: CLINIC | Age: 70
End: 2020-04-15
Payer: MEDICARE

## 2020-04-15 DIAGNOSIS — J47.9 BRONCHIECTASIS WITHOUT COMPLICATION: ICD-10-CM

## 2020-04-15 DIAGNOSIS — R04.2 HEMOPTYSIS: Primary | ICD-10-CM

## 2020-04-15 PROCEDURE — 99499 UNLISTED E&M SERVICE: CPT | Mod: 95,,, | Performed by: INTERNAL MEDICINE

## 2020-04-15 PROCEDURE — 1101F PT FALLS ASSESS-DOCD LE1/YR: CPT | Mod: CPTII,95,, | Performed by: INTERNAL MEDICINE

## 2020-04-15 PROCEDURE — 99214 PR OFFICE/OUTPT VISIT, EST, LEVL IV, 30-39 MIN: ICD-10-PCS | Mod: 95,,, | Performed by: INTERNAL MEDICINE

## 2020-04-15 PROCEDURE — 1101F PR PT FALLS ASSESS DOC 0-1 FALLS W/OUT INJ PAST YR: ICD-10-PCS | Mod: CPTII,95,, | Performed by: INTERNAL MEDICINE

## 2020-04-15 PROCEDURE — 1159F MED LIST DOCD IN RCRD: CPT | Mod: 95,,, | Performed by: INTERNAL MEDICINE

## 2020-04-15 PROCEDURE — 1159F PR MEDICATION LIST DOCUMENTED IN MEDICAL RECORD: ICD-10-PCS | Mod: 95,,, | Performed by: INTERNAL MEDICINE

## 2020-04-15 PROCEDURE — 99214 OFFICE O/P EST MOD 30 MIN: CPT | Mod: 95,,, | Performed by: INTERNAL MEDICINE

## 2020-04-15 PROCEDURE — 99499 RISK ADDL DX/OHS AUDIT: ICD-10-PCS | Mod: 95,,, | Performed by: INTERNAL MEDICINE

## 2020-04-15 RX ORDER — AMOXICILLIN AND CLAVULANATE POTASSIUM 500; 125 MG/1; MG/1
TABLET, FILM COATED ORAL
Qty: 14 TABLET | Refills: 4 | Status: SHIPPED | OUTPATIENT
Start: 2020-04-15 | End: 2020-12-04

## 2020-04-15 RX ORDER — TETRACYCLINE HYDROCHLORIDE 500 MG/1
CAPSULE ORAL
Qty: 14 CAPSULE | Refills: 4 | Status: ON HOLD | OUTPATIENT
Start: 2020-04-15 | End: 2021-12-17 | Stop reason: HOSPADM

## 2020-04-15 NOTE — PROGRESS NOTES
Subjective:      Patient ID: Yasmin Asencio is a 69 y.o. female.    Chief Complaint: No chief complaint on file.  The patient location is home  The chief complaint leading to consultation is: hemoptysis  Visit type: audio visual    Total time spent with patient:30 min  Each patient to whom he or she provides medical services by telemedicine is:  (1) informed of the relationship between the physician and patient and the respective role of any other health care provider with respect to management of the patient; and (2) notified that he or she may decline to receive medical services by telemedicine and may withdraw from such care at any time.    Notes: Patient had an episode of painless hemoptysis.  She has severe bronchiectasis in the left upper lobe. Long term patient of  in the past.Had to have IV antibiotics as one point for a pseudomonas pneumonia. The recent CT shows large contingious cystic spaces in the left upper lobe.and significant changes in the right upper lobes as well No air fluid levels. Her process seems stable and chronic and the amount of blood  expectorated was very small. In the distant past she was treated for atypical mycobacterial disease.  Also had hx of pulmonary emboli  She is a survivior    Will start on an course of alternating antibiotics for 7 days each month  Augmentin 500mg once daily and the following month tetracycyine 500 mg.   Throrectically it is to reduce the incidence of bacterial over growth!!    HPI      No flowsheet data found.  Review of Systems   Constitutional: Negative.    HENT: Negative.    Eyes: Negative.    Respiratory: Negative.         Acqyured bronchiectasis    Hx of MAC infection     Hx of pulmonary emboli   Cardiovascular: Negative.    Genitourinary: Negative.    Musculoskeletal: Negative.    Skin: Negative.    Gastrointestinal: Negative.    Neurological: Negative.    Hematological:        3/5 Anemia of chronic disease   Psychiatric/Behavioral:  Negative.      Objective:     Physical Exam   Constitutional:   Thin and frail but oriented and in no distress at rest.  Telemedicine visit   Had her annual visit with Dr. Luna in late February    Assessment:     No diagnosis found.  Outpatient Encounter Medications as of 4/15/2020   Medication Sig Dispense Refill    ALPRAZolam (XANAX) 0.25 MG tablet Take 1 tablet (0.25 mg total) by mouth nightly as needed for Anxiety. 30 tablet 0    apixaban (ELIQUIS) 5 mg Tab Take 1 tablet (5 mg total) by mouth 2 (two) times daily. 180 tablet 3    chlorthalidone (HYGROTEN) 25 MG Tab Take 1 tablet (25 mg total) by mouth once daily. 90 tablet 3    desoximetasone (TOPICORT) 0.25 % cream APPLY  CREAM EXTERNALLY TWICE DAILY AS NEEDED 15 g 3    ergocalciferol (ERGOCALCIFEROL) 50,000 unit Cap Take 1 capsule by mouth once a week 4 capsule 0    escitalopram oxalate (LEXAPRO) 20 MG tablet Take 1 tablet (20 mg total) by mouth once daily. 90 tablet 1    fluocinolone (DERMA-SMOOTHE) 0.01 % external oil APPLY TOPICALLY ONCE DAILY 119 mL 5    fluocinonide (LIDEX) 0.05 % external solution Apply topically once daily. - apply after shampooing for 10 days 60 mL 3    folic acid (FOLVITE) 1 MG tablet TAKE 1 TABLET BY MOUTH ONCE DAILY 100 tablet 0    gabapentin (NEURONTIN) 300 MG capsule Take 2 capsules (600 mg total) by mouth 3 (three) times daily. 540 capsule 3    guaiFENesin (MUCINEX) 600 mg 12 hr tablet Take 1,200 mg by mouth 2 (two) times daily.      hyoscyamine (ANASPAZ,LEVSIN) 0.125 mg Tab Take 1 tablet (125 mcg total) by mouth every 6 (six) hours as needed. 30 tablet 0    ipratropium (ATROVENT) 0.02 % nebulizer solution Take 2.5 mLs (500 mcg total) by nebulization 3 (three) times daily as needed for Wheezing. Inhale 1 vial in nebulizer 3 to 4 times daily 2 Box 11    ketoconazole (NIZORAL) 2 % shampoo Apply topically once daily. 120 mL 5    Lactobacillus rhamnosus GG (CULTURELLE) 10 billion cell capsule Take 1 capsule by mouth  once daily.      loperamide (IMODIUM) 2 mg capsule Take 2 capsules after first loose stool, then 1 capsule after each loose stool following. Do not exceed 8 capsules per day. 12 capsule 1    mirtazapine (REMERON) 15 MG tablet Take 1 tablet (15 mg total) by mouth every evening. 30 tablet 11    multivitamin (THERAGRAN) per tablet Take 1 tablet by mouth once daily.      omeprazole (PRILOSEC) 20 MG capsule TAKE 1 CAPSULE BY MOUTH ONCE DAILY AS NEEDED FOR  HEARTBURN  (TAKE  AS  NEEDED) 90 capsule 1    ondansetron (ZOFRAN-ODT) 8 MG TbDL Take 1 tablet (8 mg total) by mouth every 8 (eight) hours as needed. 30 tablet 3    promethazine (PHENERGAN) 12.5 MG Supp Place 1 suppository (12.5 mg total) rectally every 6 (six) hours as needed. 10 suppository 1    promethazine (PHENERGAN) 12.5 MG Tab Take 1 tablet (12.5 mg total) by mouth every 6 (six) hours as needed. 10 tablet 1    propylene glycol (SYSTANE BALANCE) 0.6 % Drop Apply 1 drop to eye daily as needed (dry eye).      sodium chloride 2% (XIOMARA 128) 2 % ophthalmic solution Place 1 drop into both eyes 3 (three) times daily as needed.      sodium chloride 3% 3 % nebulizer solution Take 4 mLs by nebulization 2 (two) times daily. 720 mL 3    umeclidinium-vilanterol (ANORO ELLIPTA) 62.5-25 mcg/actuation DsDv Inhale 1 puff into the lungs once daily. Controller 1 each 6    verapamil (CALAN-SR) 120 MG CR tablet Take 1 tablet (120 mg total) by mouth 2 (two) times daily. 180 tablet 3     No facility-administered encounter medications on file as of 4/15/2020.        Plan:   Start alternating antibiotics every month    Augment then tetracycyline  Severe stable bronchiectasis and cystic lung disease. Will occasionally expecctorate blood Don't worry unless it is a cup or more.   Problem List Items Addressed This Visit     None

## 2020-04-15 NOTE — TELEPHONE ENCOUNTER
----- Message from Jesika Kaiser sent at 4/15/2020  4:30 PM CDT -----  Contact: pt  Urgent        Dr Giron  Was suppose to order an antibiotic at this morning's visit        Pt is calling     Did he order it   Needs to start taking        [id number] - [place], [place] - [address]    Please call pt  And  Inform    [phone]  Thanks

## 2020-04-15 NOTE — TELEPHONE ENCOUNTER
I called Mrs Asencio back, she had a virtual visit with Dr Giron and was suppose to call in antibiotics for this month and next. I have put in a call to Dr Giron and I told Mrs Asencio it may not be till tomorrow to get the medicine and she was alright with this. Cherise Hays LPN

## 2020-04-16 RX ORDER — VERAPAMIL HYDROCHLORIDE 180 MG/1
180 CAPSULE, EXTENDED RELEASE ORAL 2 TIMES DAILY
COMMUNITY
Start: 2020-04-03 | End: 2020-08-10

## 2020-04-20 ENCOUNTER — TELEPHONE (OUTPATIENT)
Dept: PULMONOLOGY | Facility: CLINIC | Age: 70
End: 2020-04-20

## 2020-04-20 NOTE — TELEPHONE ENCOUNTER
Patient declined to see a different Provider and states that she will just keep her upcoming appt in July

## 2020-04-27 DIAGNOSIS — Z86.711 PERSONAL HISTORY OF PULMONARY EMBOLISM: ICD-10-CM

## 2020-04-27 DIAGNOSIS — R79.89 ELEVATED SERUM HOMOCYSTEINE LEVEL: ICD-10-CM

## 2020-04-27 RX ORDER — FOLIC ACID 1 MG/1
TABLET ORAL
Qty: 100 TABLET | Refills: 0 | Status: SHIPPED | OUTPATIENT
Start: 2020-04-27 | End: 2020-08-11

## 2020-04-28 ENCOUNTER — TELEPHONE (OUTPATIENT)
Dept: NEUROLOGY | Facility: CLINIC | Age: 70
End: 2020-04-28

## 2020-04-28 NOTE — TELEPHONE ENCOUNTER
Lm informing Ms. Hoffman the appt 5/5 has been converted to a video visit, to contact the clinic to receive instructions via telephone. Phone number given in message.

## 2020-04-28 NOTE — TELEPHONE ENCOUNTER
----- Message from Caitlyn Logan sent at 4/28/2020  3:01 PM CDT -----  Contact: pt   Pt is calling to speak with the nurse pt would like to change her appt on 5/5/2020 to a virtual visit pt doesn't want to come in the clinic due to covid reasons. Can you please call pt at [phone].      ADAM

## 2020-05-01 LAB
ACID FAST SUSCEPTIBILITY, SLOW GROWER: ABNORMAL
SPECIMEN SOURCE AND ORGANISM NAME: ABNORMAL

## 2020-05-04 ENCOUNTER — TELEPHONE (OUTPATIENT)
Dept: NEUROLOGY | Facility: CLINIC | Age: 70
End: 2020-05-04

## 2020-05-04 NOTE — TELEPHONE ENCOUNTER
Spoke with Ms. Hoffman, she was informed her 5/5 appt will need to be rescheduled due to the provider being under the weather. Ms. Hoffman voiced understanding rescheduling the appt for 6/25/2020 at 8:15. appt letter to be mailed.

## 2020-05-08 LAB
ACID FAST MOD KINY STN SPEC: NORMAL
MYCOBACTERIUM SPEC QL CULT: NORMAL

## 2020-05-10 LAB
ACID FAST MOD KINY STN SPEC: NORMAL
MYCOBACTERIUM SPEC QL CULT: NORMAL

## 2020-05-13 RX ORDER — ERGOCALCIFEROL 1.25 MG/1
CAPSULE ORAL
Qty: 4 CAPSULE | Refills: 0 | Status: SHIPPED | OUTPATIENT
Start: 2020-05-13 | End: 2020-06-18

## 2020-05-14 ENCOUNTER — TELEPHONE (OUTPATIENT)
Dept: FAMILY MEDICINE | Facility: CLINIC | Age: 70
End: 2020-05-14

## 2020-05-14 DIAGNOSIS — J96.11 CHRONIC RESPIRATORY FAILURE WITH HYPOXIA: Primary | ICD-10-CM

## 2020-06-01 ENCOUNTER — TELEPHONE (OUTPATIENT)
Dept: FAMILY MEDICINE | Facility: CLINIC | Age: 70
End: 2020-06-01

## 2020-06-01 NOTE — TELEPHONE ENCOUNTER
Informed pt medication was available for .Pt verbalized understanding and request that provider fax over paperwork to company to send rx to her home instead of office.

## 2020-06-01 NOTE — TELEPHONE ENCOUNTER
----- Message from Bindu Juan sent at 6/1/2020 12:20 PM CDT -----  Contact: sELF  Type: Patient Call Back    Who called: sELF    What is the request in detail: PLEASE CHECK TO SEE IF PATIENT'S MEDICATION WAS SENT TO THE OFFICE.  apixaban (ELIQUIS) 5 mg Tab    Can the clinic reply by [facility]? NO    Would the patient rather a call back or a response via [facility]? CALL    Best call back number: [phone]

## 2020-06-01 NOTE — TELEPHONE ENCOUNTER
Please call the company from home we are getting the medication.  It is my understanding that she no longer qualifies as her insurance now covers the medication.

## 2020-06-24 ENCOUNTER — TELEPHONE (OUTPATIENT)
Dept: PULMONOLOGY | Facility: CLINIC | Age: 70
End: 2020-06-24

## 2020-06-24 ENCOUNTER — PATIENT OUTREACH (OUTPATIENT)
Dept: ADMINISTRATIVE | Facility: OTHER | Age: 70
End: 2020-06-24

## 2020-06-24 NOTE — TELEPHONE ENCOUNTER
I spoke to patient to let her know I have her paperwork for [employer] patient assistance program. I spoke to [employer] and we just need to send forms with printed rx of Anoro for patient. I sent a message to Dr Mills for rx. I will fax forms and rx once I have everything. Patient verbalized understanding.

## 2020-06-25 ENCOUNTER — OFFICE VISIT (OUTPATIENT)
Dept: NEUROLOGY | Facility: CLINIC | Age: 70
End: 2020-06-25
Payer: MEDICARE

## 2020-06-25 DIAGNOSIS — Z79.01 CHRONIC ANTICOAGULATION: Chronic | ICD-10-CM

## 2020-06-25 DIAGNOSIS — G25.0 ESSENTIAL TREMOR: Primary | ICD-10-CM

## 2020-06-25 DIAGNOSIS — J44.9 CHRONIC OBSTRUCTIVE PULMONARY DISEASE, UNSPECIFIED COPD TYPE: ICD-10-CM

## 2020-06-25 DIAGNOSIS — Z86.711 HISTORY OF PULMONARY EMBOLISM: ICD-10-CM

## 2020-06-25 DIAGNOSIS — N18.30 BENIGN HYPERTENSION WITH CHRONIC KIDNEY DISEASE, STAGE III: ICD-10-CM

## 2020-06-25 DIAGNOSIS — I12.9 BENIGN HYPERTENSION WITH CHRONIC KIDNEY DISEASE, STAGE III: ICD-10-CM

## 2020-06-25 DIAGNOSIS — J96.11 CHRONIC RESPIRATORY FAILURE WITH HYPOXIA: ICD-10-CM

## 2020-06-25 PROCEDURE — 99442 PR PHYSICIAN TELEPHONE EVALUATION 11-20 MIN: ICD-10-PCS | Mod: 95,,, | Performed by: NURSE PRACTITIONER

## 2020-06-25 PROCEDURE — 1101F PR PT FALLS ASSESS DOC 0-1 FALLS W/OUT INJ PAST YR: ICD-10-PCS | Mod: CPTII,95,, | Performed by: NURSE PRACTITIONER

## 2020-06-25 PROCEDURE — 1101F PT FALLS ASSESS-DOCD LE1/YR: CPT | Mod: CPTII,95,, | Performed by: NURSE PRACTITIONER

## 2020-06-25 PROCEDURE — 1159F MED LIST DOCD IN RCRD: CPT | Mod: 95,,, | Performed by: NURSE PRACTITIONER

## 2020-06-25 PROCEDURE — 1159F PR MEDICATION LIST DOCUMENTED IN MEDICAL RECORD: ICD-10-PCS | Mod: 95,,, | Performed by: NURSE PRACTITIONER

## 2020-06-25 PROCEDURE — 99442 PR PHYSICIAN TELEPHONE EVALUATION 11-20 MIN: CPT | Mod: 95,,, | Performed by: NURSE PRACTITIONER

## 2020-06-25 RX ORDER — UMECLIDINIUM BROMIDE AND VILANTEROL TRIFENATATE 62.5; 25 UG/1; UG/1
1 POWDER RESPIRATORY (INHALATION) DAILY
Qty: 3 EACH | Refills: 3 | Status: SHIPPED | OUTPATIENT
Start: 2020-06-25 | End: 2021-02-22 | Stop reason: SDUPTHER

## 2020-06-25 NOTE — PROGRESS NOTES
Requested updates within Care Everywhere.  Patient's chart was reviewed for overdue JENNIFER topics.  Immunizations reconciled.

## 2020-06-25 NOTE — LETTER
June 25, 2020      Urmila Luna MD  [address]  [place] [address]           [facility]  [address]  [place] [address]  Phone: [phone]  Fax: [phone]          Patient: Yasmin Asencio   MR Number: [id number]   YOB: 1950   Date of Visit: 6/25/2020       Dear Dr. Urmila Luna:    Thank you for referring Yasmin Asencio to me for evaluation. Attached you will find relevant portions of my assessment and plan of care.    If you have questions, please do not hesitate to call me. I look forward to following Yasmin Asencio along with you.    Sincerely,    Kristi Torres, HAYDEE    Enclosure  CC:  No Recipients    If you would like to receive this communication electronically, please contact [email or web address] or [phone] to request more information on [facility] access.    For providers and/or their staff who would like to refer a patient to [facility], please contact us through our one-stop-shop provider referral line, [facility], at [phone].    If you feel you have received this communication in error or would no longer like to receive these types of communications, please e-mail [email or web address]

## 2020-06-25 NOTE — PROGRESS NOTES
Established Patient - Audio Only Telehealth Visit     The patient location is: [place]  The chief complaint leading to consultation is: tremor  Visit type: Virtual visit with audio only (telephone)  Total time spent with patient: 12 minutes       The reason for the audio only service rather than synchronous audio and video virtual visit was related to technical difficulties or patient preference/necessity. Attempted multiple times with help of our staff as well as tech support but was she unable to connect on her device.      Each patient to whom I provide medical services by telemedicine is:  (1) informed of the relationship between the physician and patient and the respective role of any other health care provider with respect to management of the patient; and (2) notified that they may decline to receive medical services by telemedicine and may withdraw from such care at any time. Patient verbally consented to receive this service via voice-only telephone call.       HPI: 68 yo RH with ET, last seen by Dr. Juarez 5-21-19. At that time, he wanted her to try 3 tabs gabapentin TID. She says when this was when called in it was called as 2 tabs TID and she did not call to let him know. She has cont 2 tabs TID.     Couple days of intermittent fever. Thinks lungs acting up on her.     Hospitalized in March- coughing up blood. Took off Elaquis x 5 days but wound up getting blood clot in lung. Back on Elaquis.      Says she won't have back injections again bc she has to be off Elaquis for 3 days and she is afraid    Tremors real bad. Notes all over.    Speech worse bc tremor.    Balance varies. Now using Rollator for distances. No falls but occasional near falls     Limited exam     Awake, alert, pleasant. Good insight.   Provides good history.   Hearing intact to conversation.   Obvious voice tremor.        IMPRESSION:  ET  CKD, stage 3  COPD  Chronic anticoagulation  Hx PE  Chronic resp failure with hypoxia      PLAN:    I  will send gabapentin 300 mg - three times daily. [facility]  If this helps, we can certainly look at reducing or weaning mirtazapine completely.       Urged her to contact PCP about intermittent fever.     Discussed DBS briefly. She is not interested due to getting off Elaquis is too risky. She was off 5 days in March 2/2 hemoptysis and ended up with PE.      Call for problems or questions.     Follow up 6 months Dr. Juarez.        This service was not originating from a related E/M service provided within the previous 7 days nor will  to an E/M service or procedure within the next 24 hours or my soonest available appointment.  Prevailing standard of care was able to be met in this audio-only visit.        ..  ..Kristi Torres, BRIA, NP-C

## 2020-07-28 ENCOUNTER — PATIENT OUTREACH (OUTPATIENT)
Dept: ADMINISTRATIVE | Facility: OTHER | Age: 70
End: 2020-07-28

## 2020-07-29 ENCOUNTER — OFFICE VISIT (OUTPATIENT)
Dept: PULMONOLOGY | Facility: CLINIC | Age: 70
End: 2020-07-29
Payer: MEDICARE

## 2020-07-29 VITALS
HEIGHT: 62 IN | DIASTOLIC BLOOD PRESSURE: 69 MMHG | HEART RATE: 86 BPM | OXYGEN SATURATION: 95 % | TEMPERATURE: 98 F | SYSTOLIC BLOOD PRESSURE: 144 MMHG | WEIGHT: 158.94 LBS | BODY MASS INDEX: 29.25 KG/M2

## 2020-07-29 DIAGNOSIS — Z79.01 CURRENT USE OF LONG TERM ANTICOAGULATION: ICD-10-CM

## 2020-07-29 DIAGNOSIS — R04.2 HEMOPTYSIS: ICD-10-CM

## 2020-07-29 DIAGNOSIS — J98.8 PSEUDOMONAS RESPIRATORY INFECTION: ICD-10-CM

## 2020-07-29 DIAGNOSIS — Z01.84 ENCOUNTER FOR ANTIBODY RESPONSE EXAMINATION: ICD-10-CM

## 2020-07-29 DIAGNOSIS — I26.99 PE (PULMONARY THROMBOEMBOLISM): ICD-10-CM

## 2020-07-29 DIAGNOSIS — J47.9 BRONCHIECTASIS WITHOUT COMPLICATION: Primary | ICD-10-CM

## 2020-07-29 DIAGNOSIS — B96.5 PSEUDOMONAS RESPIRATORY INFECTION: ICD-10-CM

## 2020-07-29 PROCEDURE — 3008F BODY MASS INDEX DOCD: CPT | Mod: CPTII,S$GLB,, | Performed by: EMERGENCY MEDICINE

## 2020-07-29 PROCEDURE — 1126F PR PAIN SEVERITY QUANTIFIED, NO PAIN PRESENT: ICD-10-PCS | Mod: S$GLB,,, | Performed by: EMERGENCY MEDICINE

## 2020-07-29 PROCEDURE — 1159F MED LIST DOCD IN RCRD: CPT | Mod: S$GLB,,, | Performed by: EMERGENCY MEDICINE

## 2020-07-29 PROCEDURE — 1159F PR MEDICATION LIST DOCUMENTED IN MEDICAL RECORD: ICD-10-PCS | Mod: S$GLB,,, | Performed by: EMERGENCY MEDICINE

## 2020-07-29 PROCEDURE — 1126F AMNT PAIN NOTED NONE PRSNT: CPT | Mod: S$GLB,,, | Performed by: EMERGENCY MEDICINE

## 2020-07-29 PROCEDURE — 99999 PR PBB SHADOW E&M-EST. PATIENT-LVL III: ICD-10-PCS | Mod: PBBFAC,,, | Performed by: EMERGENCY MEDICINE

## 2020-07-29 PROCEDURE — 3008F PR BODY MASS INDEX (BMI) DOCUMENTED: ICD-10-PCS | Mod: CPTII,S$GLB,, | Performed by: EMERGENCY MEDICINE

## 2020-07-29 PROCEDURE — 99499 RISK ADDL DX/OHS AUDIT: ICD-10-PCS | Mod: S$GLB,,, | Performed by: EMERGENCY MEDICINE

## 2020-07-29 PROCEDURE — 1101F PT FALLS ASSESS-DOCD LE1/YR: CPT | Mod: CPTII,S$GLB,, | Performed by: EMERGENCY MEDICINE

## 2020-07-29 PROCEDURE — 3078F DIAST BP <80 MM HG: CPT | Mod: CPTII,S$GLB,, | Performed by: EMERGENCY MEDICINE

## 2020-07-29 PROCEDURE — 3078F PR MOST RECENT DIASTOLIC BLOOD PRESSURE < 80 MM HG: ICD-10-PCS | Mod: CPTII,S$GLB,, | Performed by: EMERGENCY MEDICINE

## 2020-07-29 PROCEDURE — 3077F SYST BP >= 140 MM HG: CPT | Mod: CPTII,S$GLB,, | Performed by: EMERGENCY MEDICINE

## 2020-07-29 PROCEDURE — 99499 UNLISTED E&M SERVICE: CPT | Mod: S$GLB,,, | Performed by: EMERGENCY MEDICINE

## 2020-07-29 PROCEDURE — 99214 PR OFFICE/OUTPT VISIT, EST, LEVL IV, 30-39 MIN: ICD-10-PCS | Mod: S$GLB,,, | Performed by: EMERGENCY MEDICINE

## 2020-07-29 PROCEDURE — 99214 OFFICE O/P EST MOD 30 MIN: CPT | Mod: S$GLB,,, | Performed by: EMERGENCY MEDICINE

## 2020-07-29 PROCEDURE — 99999 PR PBB SHADOW E&M-EST. PATIENT-LVL III: CPT | Mod: PBBFAC,,, | Performed by: EMERGENCY MEDICINE

## 2020-07-29 PROCEDURE — 3077F PR MOST RECENT SYSTOLIC BLOOD PRESSURE >= 140 MM HG: ICD-10-PCS | Mod: CPTII,S$GLB,, | Performed by: EMERGENCY MEDICINE

## 2020-07-29 PROCEDURE — 1101F PR PT FALLS ASSESS DOC 0-1 FALLS W/OUT INJ PAST YR: ICD-10-PCS | Mod: CPTII,S$GLB,, | Performed by: EMERGENCY MEDICINE

## 2020-07-29 NOTE — PROGRESS NOTES
Pulmonary & Critical Care Medicine   Clinic Note     Follow up visit     HPI: From prior clinic note 11/2018      Patient of Dr. Zuñiga in past. New to me. Underlying bronchiectasis, chronic PE on OAC. Etiology of bronchiectasis related to very remote history of DANIEL [place] treated at [facility] department on . She has recurrent pseudomonal infections requiring varying abx and admissions. Most recent hospital admission in September requiring PICC placement and IV Zosyn. Doing better. Remains with daily productive cough and intermittent blood tinged sputum, but per her at baseline. Continues to utilize bronchodilators, CPT and accapella. Weight and functional status stable.  and daughter present at visit. No additional complaints Denies F/NS/weight loss     Following her last visit, she was admitted to [facility] for bronchiectasis exacerbation with cultures demonstrating pseudomonas. Treated with course of anti-microbials and improved. Feels good. Still with intermittent N/emesis, but improved. Scheduled for EGD in Dec. No complaints. Monitoring O2 sats and uses supplemental O2 only when <88%, but has not needed.  No additional complaints. Scheduled to go to [place] for vacation in November and request rescue Abx. Has not received flu vaccination.      Last Visit:    No hospitalizations. No Abx/steroids since last visit. Remains active. C-scope and EGD completed. Small hiatal hernia. Adjusting diet to control symptoms. Does feel slightly more congested in last 3 days with increased mucous discoloration. Does not feel this is typical of flare but watching closely. Using O2 when needed.. Monitors SpO2 closely at home. No F/C. Weight stable. No additional complaints.      INTERVAL HISTORY:     Since last visit she was admitted to [facility] in May.. Treated for bronchiectasis flare. Had hemotysis and AC held.. Developed new PE and now on AC.. Since discharge she was seen by Dr. Giron in April post hospital discharge.  Initiated on alternating Abx (amoxicillin + tetracycline).   She clinically feels much improved. Back to baseline. She feels that prior hospitalization likely related to COVID infection.. No fevers/NS. Weight stable. Still with productive sputum. Worse in AM... No syncope. No additional complaints.   Has been self isolating as appropriate.     Past Medical, Social, Surgical, Family History- Reviewed and updated as appropriate      Drug Allergies:             Review of patient's allergies indicates:   Allergen Reactions    Adhesive Other (See Comments)       Tears up the skin and makes it itch    Bactrim [sulfamethoxazole-trimethoprim] Rash       Was hospitalized for rash    Lipitor [atorvastatin] Other (See Comments)       Muscle aches    Albuterol Other (See Comments)       tremors    Topamax [topiramate]         - made her feel 'bad in the head'    Restasis [cyclosporine] Itching         Review of Systems:      Constitutional: Negative for fever, chills, diaphoresis, activity change, appetite change and fatigue.   HENT: Negative for congestion, drooling, facial swelling, hearing loss, rhinorrhea, sinus pressure, tinnitus, trouble swallowing and voice change.    Eyes: Negative for photophobia, pain and visual disturbance.   Respiratory: Negative for chest tightness and shortness of breath.   positive dry cough.  Chronic and at baseline  Cardiovascular: Negative for chest pain, palpitations and leg swelling.   Gastrointestinal: Negative for nausea, vomiting, abdominal pain, diarrhea and abdominal distention.   Endocrine: Negative for cold intolerance, heat intolerance, polydipsia and polyuria.   Genitourinary: Negative for dysuria, frequency and hematuria.   Musculoskeletal: Negative for myalgias, back pain, arthralgias, neck pain and neck stiffness.   Skin: Negative for color change, pallor, rash and wound.   Allergic/Immunologic: Negative for immunocompromised state.   Neurological: Negative for dizziness,  "weakness and headaches.    A comprehensive 12-point review of systems was performed, and is negative except for those items mentioned above in the HPI section of this note.      Vital Signs:      BP (!) 144/69   Pulse 86   Temp 98.3 °F (36.8 °C)   Ht 5' 2" (1.575 m)   Wt 72.1 kg (158 lb 15.2 oz)   LMP  (LMP Unknown)   SpO2 95%   BMI 29.07 kg/m²         Physical Exam:   GEN- NAD AAOx3 Well Built, Well Appearing   HEENT- ATNC, PERRLA, EOMI, OP-Cl. No JVD, LAD or bruit noted. Trachea Midline.   CV- RRR No M/R/G  RESP- Crackles left UL.   GI- S/NT/ND. Positive BS X 4. No HSM Noted  BACK- Spine midline. No step off, crepitus or deformity noted. No midline TTP.   Ext- MAEW, No deformity. No edema or rashes noted.   Neuro- Strength 5/5 symmetric. CN 2-12 intact. Normal gait. Normal sensation.    Clubbing of digits.      Personal Review and Summary of Prior Diagnostics     Laboratory Studies: Reviewed     Sodium 136 - 145 mmol/L 138    Potassium 3.5 - 5.1 mmol/L 3.4Low     Chloride 95 - 110 mmol/L 103    CO2 23 - 29 mmol/L 28    Glucose 70 - 110 mg/dL 79    BUN, Bld 8 - 23 mg/dL 13    Creatinine 0.5 - 1.4 mg/dL 1.0    Calcium 8.7 - 10.5 mg/dL 8.6Low     Anion Gap 8 - 16 mmol/L 7Low     eGFR if African American >60 mL/min/1.73 m^2 >60    eGFR if non African American >60 mL/min/1.73 m^2 57Abnormal       WBC 3.90 - 12.70 K/uL 7.08    RBC 4.00 - 5.40 M/uL 3.39Low     Hemoglobin 12.0 - 16.0 g/dL 8.5Low     Hematocrit 37.0 - 48.5 % 28.3Low     Mean Corpuscular Volume 82 - 98 fL 84    Mean Corpuscular Hemoglobin 27.0 - 31.0 pg 25.1Low     Mean Corpuscular Hemoglobin Conc 32.0 - 36.0 g/dL 30.0Low     RDW 11.5 - 14.5 % 15.4High     Platelets 150 - 350 K/uL 201    MPV 9.2 - 12.9 fL 9.6    Immature Granulocytes 0.0 - 0.5 % 0.3    Gran # (ANC) 1.8 - 7.7 K/uL 4.1    Immature Grans (Abs) 0.00 - 0.04 K/uL 0.02      Microbiology Data: Reviewed     Specimen Source and Organism Name Sputum & MAC    Acid Fast Susceptibility, Slow " Nuria FINAL 05/01/2020 2308Abnormal     Comment: SOURCE: SPUTUM, SPUTUM and MAC   SUSC, AFB, SLOWLY GROWING                              FINAL   MYCOBACTERIUM AVIUM COMPLEX     Organism identified by client.   There are no established interpretive guidelines for agents   reported without interpretations.   ----------------------------------------------------------   Organism     MYCOBACTERIUM AVIUM COMPLEX   Antibiotic                   SABRINA (mcg/mL)  Interpretation   ----------------------------------------------------------   Moxifloxacin                            1       S   Clarithromycin                          1       S   Amikacin (IV)                          32       I   Amikacin (liposomal, inhaled)          32       S   Streptomycin                           64   Linezolid                              16       I   Ethambutol                             16   Rifampin                                4   Rifabutin                          <=0.25   Moxifloxacin: The in vivo effectiveness of this agent for   MAC disease is unproven.   Clarithromycin: Clarithromycin is the class drug for   macrolides and the only macrolide that needs to be tested.   Linezolid: The in vivo effectiveness of this agent for MAC   disease is unproven.   ------------------------------------------------------------   S=SUSCEPTIBLE  I=INTERMEDIATE  R=RESISTANT   NS=NONSUSCEPTIBLE  SDD=SUSCEPTIBLE DOSE DEPENDENT      Pseudomonas aeruginosa     CULTURE, RESPIRATORY     Amikacin <=16 mcg/mL Sensitive     Cefepime <=2 mcg/mL Sensitive     Ciprofloxacin <=1 mcg/mL Sensitive     Gentamicin <=4 mcg/mL Sensitive     Meropenem <=1 mcg/mL Sensitive     Piperacillin/Tazo <=16 mcg/mL Sensitive     Tobramycin <=4 mcg/mL Sensitive                 Summary of Chest Imaging Personally Reviewed:      CT Chest 7/5-      1. Absence of superior segment dominant nodule right lower lobe from previous exam.  Micro nodules stable.  2. Emphysema and  bronchiectasis changes of lungs stable.        CT chest-   1. Chronic filling defects within the right upper lobe pulmonary artery branch consistent with chronic pulmonary embolism.  No new acute pulmonary thromboembolism identified.  2. Stable fibrotic changes throughout the lung apices, left greater than right, with bronchiectasis, interstitial prominence, and mosaic attenuation of the lungs, similar to prior CTA 01/05/2017.  3. Small area of focal nodular consolidation within the left lower lobe, not seen on prior studies which, may be infectious or inflammatory in etiology, possibly even contiguous from the left apical fibrotic changes.  Suggest follow-up CT scan 1 month after antimicrobial therapy to exclude neoplastic nodule.  4. Stable 4 mm nodule within the right middle lobe.  5. Additional findings as detailed above.     CT 2/6/2019     Bilateral upper lung zone predominant lung disease, primarily characterized by severe bronchiectasis.  Overall appearance suggests sequela of chronic infection.    Ground-glass opacities and pulmonary nodule clusters improved from prior study.  0.7 cm right lower lobe pulmonary nodule, new from prior study.  Recommend follow-up chest CT at 6 months to ensure nodule stability.    CT CHEST 3/2020- 1. Findings consistent with pulmonary thromboembolism involving the lobar branch to the left upper lobe, extending into the left main pulmonary artery, as well as filling defects within a segmental/subsegmental branch to the right lower lobe.  2. Diffuse ground-glass attenuation and mosaic parenchymal attenuation suggesting underlying edema/small airways disease small vessel disease.  The appearance is somewhat more prominent than on the previous examination.  3. Fibrotic/bronchiectatic changes involving the bilateral apices, and to a lesser degree the left lower lobe, grossly stable as compared to the previous examination.  4. Web like secretion within the left mainstem bronchus  without occlusion.  There is peribronchial thickening involving the left lower lobe bronchial airways, superimposed bronchial infection or inflammation is a consideration.  5. Please see above for several additional findings.        2D Echo:      Left Ventricle Normal ejection fraction . Cavity is normal. Normal left ventricle diastolic function.   Right Ventricle Normal cavity size.   Left Atrium Cavity is mildly dilated.   Right Atrium Normal cavity size.   Aortic Valve Normal valve structure.   Mitral Valve Normal valve structure. There is mild mitral annular calcification.   Tricuspid Valve The tricuspid valve is normal. Trace regurgitation.   Pulmonic Valve Normal valve structure.   IVC/SVC Normal central venous pressure (3 mm Hg).   Ascending Aorta Normal aortic root, size and contour.   Pericardium No pericardial effusion. Pericardium is normal.      US Doppler-   1. Right lower extremity venous ultrasound shows normal flow in the deep venous system and no DVT.  2. Left lower extremity venous ultrasound shows normal flow in the deep venous system and no DVT.         PFT's (4/2017):      FEV1/FVC- 74  FEV1- 1.24L (58)  FVC- 1.67L (62)   DLCO- 53           Assessment:      1. Pulmonary nodule    2. Bronchiectasis without complication    3. History of pulmonary embolism          Plan:         1. Bronchiectasis- History of . [facility]-   Records not available from prior treatment. Recurrent pseudomonas infections with one recent exacerbation, now resolved.. Clinical at baseline. CT scan is stable. Looks good today.. Does have 1 AFB sputum from recent hospitalization + for ERNIE. Has been on suppressive abx, but feels about same..      -- Continue with anoro and inhaled bronchodilators.. It is for bronchiectasis management only.   -- CPT, 3% saline,  and accapela for mucous clearance    -- Rescue abx provided for worsening sputum production if needed. Discussed role with her in detail..will back off on supressive  therapy as initiated by Dr. Giron given lack of benefit and GI upset.   -- Flu/PNA vaccinations UTD- maintain   -- Supplemental O2 to maintain SpO2 >88%. Discussed importance of utilizing   -- Check surveillance AFB.. If repeat + will discuss with ID.      2.  Pulmonary embolism- originally diagnosed 2 years prior. On OAC since that time. Residual RUL defect remains based on Sept 2018 C T imaging.  LE doppler negative for DVT.. Had OAC held on prior admit and developed new PE.. Now back on OAC and no additional recurrence.      Continue OAC. Will need lifelong.      3. Pulmonary Nodule- resolved RLL nodule.. No need for any additional follow up.      RTC 6 months on  campus. . Patient has my contact information and can call in the interim if any issues.      Declan Mills M.D.   [facility]

## 2020-08-03 ENCOUNTER — LAB VISIT (OUTPATIENT)
Dept: LAB | Facility: HOSPITAL | Age: 70
End: 2020-08-03
Attending: EMERGENCY MEDICINE
Payer: MEDICARE

## 2020-08-03 DIAGNOSIS — J47.9 BRONCHIECTASIS WITHOUT COMPLICATION: ICD-10-CM

## 2020-08-03 DIAGNOSIS — Z01.84 ENCOUNTER FOR ANTIBODY RESPONSE EXAMINATION: ICD-10-CM

## 2020-08-03 LAB — SARS-COV-2 IGG SERPLBLD QL IA.RAPID: NEGATIVE

## 2020-08-03 PROCEDURE — 87015 SPECIMEN INFECT AGNT CONCNTJ: CPT

## 2020-08-03 PROCEDURE — 36415 COLL VENOUS BLD VENIPUNCTURE: CPT

## 2020-08-03 PROCEDURE — 87206 SMEAR FLUORESCENT/ACID STAI: CPT

## 2020-08-03 PROCEDURE — 86769 SARS-COV-2 COVID-19 ANTIBODY: CPT

## 2020-08-03 PROCEDURE — 87116 MYCOBACTERIA CULTURE: CPT

## 2020-08-10 DIAGNOSIS — F32.0 MILD MAJOR DEPRESSION: ICD-10-CM

## 2020-08-10 RX ORDER — ESCITALOPRAM OXALATE 20 MG/1
TABLET ORAL
Qty: 90 TABLET | Refills: 0 | Status: SHIPPED | OUTPATIENT
Start: 2020-08-10 | End: 2020-11-10

## 2020-08-19 DIAGNOSIS — M54.40 ACUTE LEFT-SIDED LOW BACK PAIN WITH SCIATICA, SCIATICA LATERALITY UNSPECIFIED: ICD-10-CM

## 2020-08-19 RX ORDER — GABAPENTIN 300 MG/1
600 CAPSULE ORAL 3 TIMES DAILY
Qty: 540 CAPSULE | Refills: 3 | Status: SHIPPED | OUTPATIENT
Start: 2020-08-19 | End: 2021-06-03 | Stop reason: SDUPTHER

## 2020-08-19 NOTE — TELEPHONE ENCOUNTER
----- Message from Zack Perez sent at 8/19/2020 12:40 PM CDT -----  Contact: Pt @ [phone]  Rx Refill/Request     Is this a Refill or New Rx:  Yes ( needs a prior authorization )   Rx Name and Strength:  gabapentin (NEURONTIN) 300 MG capsule  Preferred Pharmacy with phone number:       [facility] - [address]  [address]  [facility] [address]  Phone: [phone] Fax: [phone]

## 2020-08-20 DIAGNOSIS — N18.30 BENIGN HYPERTENSION WITH CHRONIC KIDNEY DISEASE, STAGE III: Primary | ICD-10-CM

## 2020-08-20 DIAGNOSIS — I12.9 BENIGN HYPERTENSION WITH CHRONIC KIDNEY DISEASE, STAGE III: Primary | ICD-10-CM

## 2020-08-20 PROBLEM — R11.0 NAUSEA IN ADULT: Chronic | Status: RESOLVED | Noted: 2020-03-05 | Resolved: 2020-08-20

## 2020-08-20 PROBLEM — R11.0 NAUSEA: Chronic | Status: RESOLVED | Noted: 2020-02-17 | Resolved: 2020-08-20

## 2020-08-21 ENCOUNTER — TELEPHONE (OUTPATIENT)
Dept: FAMILY MEDICINE | Facility: CLINIC | Age: 70
End: 2020-08-21

## 2020-08-21 NOTE — TELEPHONE ENCOUNTER
----- Message from Urmila Luna MD sent at 8/20/2020  5:10 PM CDT -----  Please call patient to complete labs prior to office visit next Friday.

## 2020-08-21 NOTE — TELEPHONE ENCOUNTER
----- Message from Sandie Cedeno sent at 8/21/2020  8:33 AM CDT -----  Type: Patient Call Back    Who called: Self     What is the request in detail: patient returned Maggie's call     Can the clinic reply by [facility]? No     Would the patient rather a call back or a response via [facility]?  Call     Best call back number:[phone] (home)

## 2020-08-25 ENCOUNTER — LAB VISIT (OUTPATIENT)
Dept: LAB | Facility: HOSPITAL | Age: 70
End: 2020-08-25
Attending: INTERNAL MEDICINE
Payer: MEDICARE

## 2020-08-25 DIAGNOSIS — I12.9 BENIGN HYPERTENSION WITH CHRONIC KIDNEY DISEASE, STAGE III: ICD-10-CM

## 2020-08-25 DIAGNOSIS — N18.30 BENIGN HYPERTENSION WITH CHRONIC KIDNEY DISEASE, STAGE III: ICD-10-CM

## 2020-08-25 LAB
ANION GAP SERPL CALC-SCNC: 11 MMOL/L (ref 8–16)
BUN SERPL-MCNC: 16 MG/DL (ref 8–23)
CALCIUM SERPL-MCNC: 9.2 MG/DL (ref 8.7–10.5)
CHLORIDE SERPL-SCNC: 98 MMOL/L (ref 95–110)
CHOLEST SERPL-MCNC: 190 MG/DL (ref 120–199)
CHOLEST/HDLC SERPL: 2.8 {RATIO} (ref 2–5)
CO2 SERPL-SCNC: 31 MMOL/L (ref 23–29)
CREAT SERPL-MCNC: 1.1 MG/DL (ref 0.5–1.4)
EST. GFR  (AFRICAN AMERICAN): 58.4 ML/MIN/1.73 M^2
EST. GFR  (NON AFRICAN AMERICAN): 50.6 ML/MIN/1.73 M^2
GLUCOSE SERPL-MCNC: 92 MG/DL (ref 70–110)
HDLC SERPL-MCNC: 68 MG/DL (ref 40–75)
HDLC SERPL: 35.8 % (ref 20–50)
LDLC SERPL CALC-MCNC: 102.6 MG/DL (ref 63–159)
NONHDLC SERPL-MCNC: 122 MG/DL
POTASSIUM SERPL-SCNC: 3.8 MMOL/L (ref 3.5–5.1)
SODIUM SERPL-SCNC: 140 MMOL/L (ref 136–145)
TRIGL SERPL-MCNC: 97 MG/DL (ref 30–150)

## 2020-08-25 PROCEDURE — 80048 BASIC METABOLIC PNL TOTAL CA: CPT

## 2020-08-25 PROCEDURE — 80061 LIPID PANEL: CPT

## 2020-08-25 PROCEDURE — 36415 COLL VENOUS BLD VENIPUNCTURE: CPT | Mod: PO

## 2020-08-28 ENCOUNTER — OFFICE VISIT (OUTPATIENT)
Dept: FAMILY MEDICINE | Facility: CLINIC | Age: 70
End: 2020-08-28
Payer: MEDICARE

## 2020-08-28 VITALS
SYSTOLIC BLOOD PRESSURE: 120 MMHG | HEART RATE: 81 BPM | OXYGEN SATURATION: 95 % | HEIGHT: 62 IN | DIASTOLIC BLOOD PRESSURE: 60 MMHG | TEMPERATURE: 98 F | WEIGHT: 158 LBS | BODY MASS INDEX: 29.08 KG/M2

## 2020-08-28 DIAGNOSIS — T46.6X5A STATIN MYOPATHY: ICD-10-CM

## 2020-08-28 DIAGNOSIS — D63.8 ANEMIA OF CHRONIC DISEASE: ICD-10-CM

## 2020-08-28 DIAGNOSIS — J47.9 ACQUIRED BRONCHIECTASIS: Chronic | ICD-10-CM

## 2020-08-28 DIAGNOSIS — G25.0 ESSENTIAL TREMOR: ICD-10-CM

## 2020-08-28 DIAGNOSIS — I12.9 BENIGN HYPERTENSION WITH CHRONIC KIDNEY DISEASE, STAGE III: ICD-10-CM

## 2020-08-28 DIAGNOSIS — J42 CHRONIC BRONCHITIS, UNSPECIFIED CHRONIC BRONCHITIS TYPE: ICD-10-CM

## 2020-08-28 DIAGNOSIS — L40.9 PSORIASIS: ICD-10-CM

## 2020-08-28 DIAGNOSIS — N18.30 BENIGN HYPERTENSION WITH CHRONIC KIDNEY DISEASE, STAGE III: ICD-10-CM

## 2020-08-28 DIAGNOSIS — R79.89 ELEVATED SERUM HOMOCYSTEINE LEVEL: ICD-10-CM

## 2020-08-28 DIAGNOSIS — G72.0 STATIN MYOPATHY: ICD-10-CM

## 2020-08-28 DIAGNOSIS — Z86.711 PERSONAL HISTORY OF PULMONARY EMBOLISM: ICD-10-CM

## 2020-08-28 DIAGNOSIS — R30.0 DYSURIA: ICD-10-CM

## 2020-08-28 DIAGNOSIS — J44.9 CHRONIC OBSTRUCTIVE PULMONARY DISEASE, UNSPECIFIED COPD TYPE: Primary | ICD-10-CM

## 2020-08-28 PROCEDURE — 1159F MED LIST DOCD IN RCRD: CPT | Mod: S$GLB,,, | Performed by: INTERNAL MEDICINE

## 2020-08-28 PROCEDURE — 3074F PR MOST RECENT SYSTOLIC BLOOD PRESSURE < 130 MM HG: ICD-10-PCS | Mod: CPTII,S$GLB,, | Performed by: INTERNAL MEDICINE

## 2020-08-28 PROCEDURE — 1126F PR PAIN SEVERITY QUANTIFIED, NO PAIN PRESENT: ICD-10-PCS | Mod: S$GLB,,, | Performed by: INTERNAL MEDICINE

## 2020-08-28 PROCEDURE — 3008F BODY MASS INDEX DOCD: CPT | Mod: CPTII,S$GLB,, | Performed by: INTERNAL MEDICINE

## 2020-08-28 PROCEDURE — 1101F PT FALLS ASSESS-DOCD LE1/YR: CPT | Mod: CPTII,S$GLB,, | Performed by: INTERNAL MEDICINE

## 2020-08-28 PROCEDURE — 99214 PR OFFICE/OUTPT VISIT, EST, LEVL IV, 30-39 MIN: ICD-10-PCS | Mod: S$GLB,,, | Performed by: INTERNAL MEDICINE

## 2020-08-28 PROCEDURE — 99999 PR PBB SHADOW E&M-EST. PATIENT-LVL III: ICD-10-PCS | Mod: PBBFAC,,, | Performed by: INTERNAL MEDICINE

## 2020-08-28 PROCEDURE — 1126F AMNT PAIN NOTED NONE PRSNT: CPT | Mod: S$GLB,,, | Performed by: INTERNAL MEDICINE

## 2020-08-28 PROCEDURE — 99999 PR PBB SHADOW E&M-EST. PATIENT-LVL III: CPT | Mod: PBBFAC,,, | Performed by: INTERNAL MEDICINE

## 2020-08-28 PROCEDURE — 3074F SYST BP LT 130 MM HG: CPT | Mod: CPTII,S$GLB,, | Performed by: INTERNAL MEDICINE

## 2020-08-28 PROCEDURE — 3008F PR BODY MASS INDEX (BMI) DOCUMENTED: ICD-10-PCS | Mod: CPTII,S$GLB,, | Performed by: INTERNAL MEDICINE

## 2020-08-28 PROCEDURE — 1159F PR MEDICATION LIST DOCUMENTED IN MEDICAL RECORD: ICD-10-PCS | Mod: S$GLB,,, | Performed by: INTERNAL MEDICINE

## 2020-08-28 PROCEDURE — 3078F PR MOST RECENT DIASTOLIC BLOOD PRESSURE < 80 MM HG: ICD-10-PCS | Mod: CPTII,S$GLB,, | Performed by: INTERNAL MEDICINE

## 2020-08-28 PROCEDURE — 99214 OFFICE O/P EST MOD 30 MIN: CPT | Mod: S$GLB,,, | Performed by: INTERNAL MEDICINE

## 2020-08-28 PROCEDURE — 3078F DIAST BP <80 MM HG: CPT | Mod: CPTII,S$GLB,, | Performed by: INTERNAL MEDICINE

## 2020-08-28 PROCEDURE — 1101F PR PT FALLS ASSESS DOC 0-1 FALLS W/OUT INJ PAST YR: ICD-10-PCS | Mod: CPTII,S$GLB,, | Performed by: INTERNAL MEDICINE

## 2020-08-28 RX ORDER — FOLIC ACID 1 MG/1
1000 TABLET ORAL DAILY
Qty: 100 TABLET | Refills: 3 | Status: SHIPPED | OUTPATIENT
Start: 2020-08-28 | End: 2021-09-17

## 2020-08-28 NOTE — PROGRESS NOTES
HISTORY OF PRESENT ILLNESS:  Yasmin Asencio is a 70 y.o. female who presents to the clinic today for No chief complaint on file.  .   The patient was seen today for follow-up of her COPD/chronic bronchitis/bronchiectasis, hypertension complicated by chronic kidney disease stage 3, and essential tremor.  She overall is doing well.  She was hospitalized in March of this year for severe respiratory symptoms.  She had some hemoptysis.  They had stopped her blood thinners briefly and unfortunately she developed another PE in her left lung.  She is now on lifelong blood thinners.  She denies any cardiac chest pain today.  She has stable chronic shortness of breath.  Primary complaint today is some mild dysuria.  No hematuria or frequency.  She wants to make sure she does not have a urinary tract infection.  She also has of some psoriasis currently that is affecting the palms of her hands which is made worse by frequent hand washing and hand  is.  She is using over-the-counter lubricants.  She does admit to keeping her hands in water a lot while doing dishes.  She recently started using gloves.      PAST MEDICAL HISTORY:  Past Medical History:   Diagnosis Date    Acquired bronchiectasis     due to history of TB - followed by pulmonaryDr. Zuñiga    Allergy     Amblyopia     rt eye per pt    Anemia of other chronic disease     Anticoagulant long-term use     Anxiety     Cataract     Chronic obstructive pulmonary disease with acute exacerbation     Clotting disorder     COPD (chronic obstructive pulmonary disease)     Depression     Diverticulosis     Essential tremor     GERD (gastroesophageal reflux disease)     Hemangioma of liver     History of pulmonary embolus (PE)     History of tuberculosis 1978    Hypertension     Mixed anxiety and depressive disorder     On home oxygen therapy     Psoriasis     PSVT (paroxysmal supraventricular tachycardia)     Pulmonary embolism     S/P  PICC central line placement Apr. 2016 - May 2016    Skin disease     Psoriasis    Spondylosis without myelopathy 8/23/2013    Supraventricular tachycardia     Tachycardia     Thoracic aorta atherosclerosis     noted on CT scan of chest 1/3/2011    Tuberculosis     Vaginal delivery     x2    Vitamin D deficiency        PAST SURGICAL HISTORY:  Past Surgical History:   Procedure Laterality Date    CATARACT EXTRACTION W/  INTRAOCULAR LENS IMPLANT  07/24/12    od dr [place]    CATARACT EXTRACTION W/  INTRAOCULAR LENS IMPLANT  08/07/12    left eye    CHOLECYSTECTOMY      COLONOSCOPY N/A 12/4/2019    Procedure: COLONOSCOPY;  Surgeon: Franco Charles MD;  Location: [address]);  Service: Endoscopy;  Laterality: N/A;  ok to hold Eliquis 2days prior per     ESOPHAGOGASTRODUODENOSCOPY N/A 12/4/2019    Procedure: EGD (ESOPHAGOGASTRODUODENOSCOPY);  Surgeon: Franco Charles MD;  Location: [address]);  Service: Endoscopy;  Laterality: N/A;  ok to hold Eliquis 2days prior per     EYE SURGERY      GALLBLADDER SURGERY      RADIOFREQUENCY THERMOCOAGULATION Left 12/18/2018    Procedure: RADIOFREQUENCY THERMAL COAGULATION;  Surgeon: Issac Meyres MD;  Location: [facility];  Service: Pain Management;  Laterality: Left;  OK to hold Eliquis 3 days prior per Dr. Braga       SOCIAL HISTORY:  Social History     Socioeconomic History    Marital status:      Spouse name: Not on file    Number of children: 2    Years of education: Not on file    Highest education level: Not on file   Occupational History    Occupation: housewife   Social Needs    Financial resource strain: Not very hard    Food insecurity     Worry: Never true     Inability: Never true    Transportation needs     Medical: No     Non-medical: No   Tobacco Use    Smoking status: Former Smoker     Packs/day: 2.00     Years: 50.00     Pack years: 100.00     Types: Cigarettes     Quit date: 5/27/2009      Years since quittin.2    Smokeless tobacco: Never Used   Substance and Sexual Activity    Alcohol use: Not Currently     Frequency: Never     Binge frequency: Never    Drug use: Never    Sexual activity: Yes     Partners: Male   Lifestyle    Physical activity     Days per week: 0 days     Minutes per session: 0 min    Stress: Very much   Relationships    Social connections     Talks on phone: More than three times a week     Gets together: Once a week     Attends Jainism service: Not on file     Active member of club or organization: No     Attends meetings of clubs or organizations: Never     Relationship status:    Other Topics Concern    Are you pregnant or think you may be? No    Breast-feeding No   Social History Narrative    ** Merged History Encounter **         One child  of a heart attack at age 35.       FAMILY HISTORY:  Family History   Problem Relation Age of Onset    Hypertension Mother     Heart attack Mother     Cancer Father         liver and bladder    Hyperlipidemia Sister     Psoriasis Sister     Cancer Brother         liver    Stroke Maternal Uncle     Cancer Maternal Aunt         stomach    Lung cancer Sister     Heart attack Brother     Heart attack Son     Amblyopia Neg Hx     Blindness Neg Hx     Cataracts Neg Hx     Glaucoma Neg Hx     Macular degeneration Neg Hx     Retinal detachment Neg Hx     Strabismus Neg Hx     Thyroid disease Neg Hx     Melanoma Neg Hx     Lupus Neg Hx     Eczema Neg Hx     COPD Neg Hx     Colon cancer Neg Hx        ALLERGIES AND MEDICATIONS: updated and reviewed.  Review of patient's allergies indicates:   Allergen Reactions    Adhesive Other (See Comments)     Tears up the skin and makes it itch   (leads)    Bactrim [sulfamethoxazole-trimethoprim] Rash     Was hospitalized for rash    Restasis [cyclosporine]     Lipitor [atorvastatin] Other (See Comments)     Muscle aches    Albuterol Other (See Comments)      tremors    Topamax [topiramate]      - made her feel 'bad in the head'     Medication List with Changes/Refills   Current Medications    ALPRAZOLAM (XANAX) 0.25 MG TABLET    Take 1 tablet (0.25 mg total) by mouth nightly as needed for Anxiety.    AMOXICILLIN-CLAVULANATE 500-125MG (AUGMENTIN) 500-125 MG TAB    Take one tablet daily for 7days in the middle of the month alternating with tetracycline for 7 days    APIXABAN (ELIQUIS) 5 MG TAB    Take 1 tablet (5 mg total) by mouth 2 (two) times daily.    CHLORTHALIDONE (HYGROTEN) 25 MG TAB    Take 1 tablet (25 mg total) by mouth once daily.    DESOXIMETASONE (TOPICORT) 0.25 % CREAM    APPLY  CREAM EXTERNALLY TWICE DAILY AS NEEDED    ERGOCALCIFEROL (ERGOCALCIFEROL) 50,000 UNIT CAP    Take 1 capsule by mouth once a week    ESCITALOPRAM OXALATE (LEXAPRO) 20 MG TABLET    Take 1 tablet by mouth once daily    FLUOCINOLONE (DERMA-SMOOTHE) 0.01 % EXTERNAL OIL    APPLY TOPICALLY ONCE DAILY    FLUOCINONIDE (LIDEX) 0.05 % EXTERNAL SOLUTION    APPLY SOLUTION TOPICALLY ONCE DAILY. APPLY AFTER SHAMPOOING    GABAPENTIN (NEURONTIN) 300 MG CAPSULE    Take 2 capsules (600 mg total) by mouth 3 (three) times daily.    GUAIFENESIN (MUCINEX) 600 MG 12 HR TABLET    Take 1,200 mg by mouth 2 (two) times daily.    HYOSCYAMINE (ANASPAZ,LEVSIN) 0.125 MG TAB    Take 1 tablet (125 mcg total) by mouth every 6 (six) hours as needed.    IPRATROPIUM (ATROVENT) 0.02 % NEBULIZER SOLUTION    Take 2.5 mLs (500 mcg total) by nebulization 3 (three) times daily as needed for Wheezing. Inhale 1 vial in nebulizer 3 to 4 times daily    KETOCONAZOLE (NIZORAL) 2 % SHAMPOO    Apply topically once daily.    LACTOBACILLUS RHAMNOSUS GG (CULTURELLE) 10 BILLION CELL CAPSULE    Take 1 capsule by mouth once daily.    LOPERAMIDE (IMODIUM) 2 MG CAPSULE    Take 2 capsules after first loose stool, then 1 capsule after each loose stool following. Do not exceed 8 capsules per day.    MIRTAZAPINE (REMERON) 15 MG TABLET     Take 1 tablet (15 mg total) by mouth every evening.    MULTIVITAMIN (THERAGRAN) PER TABLET    Take 1 tablet by mouth once daily.    OMEPRAZOLE (PRILOSEC) 20 MG CAPSULE    TAKE 1 CAPSULE BY MOUTH ONCE DAILY AS NEEDED FOR  HEARTBURN  (TAKE  AS  NEEDED)    ONDANSETRON (ZOFRAN-ODT) 8 MG TBDL    Take 1 tablet (8 mg total) by mouth every 8 (eight) hours as needed.    PROMETHAZINE (PHENERGAN) 12.5 MG SUPP    Place 1 suppository (12.5 mg total) rectally every 6 (six) hours as needed.    PROMETHAZINE (PHENERGAN) 12.5 MG TAB    Take 1 tablet (12.5 mg total) by mouth every 6 (six) hours as needed.    PROPYLENE GLYCOL (SYSTANE BALANCE) 0.6 % DROP    Apply 1 drop to eye daily as needed (dry eye).    SODIUM CHLORIDE 2% (XIOMARA 128) 2 % OPHTHALMIC SOLUTION    Place 1 drop into both eyes 3 (three) times daily as needed.    SODIUM CHLORIDE 3% 3 % NEBULIZER SOLUTION    USE 4 ML IN NEBULIZER  TWICE DAILY    TETRACYCLINE (ACHROMYCIN,SUMYCIN) 500 MG CAPSULE    Take one tablet daily for a week in the middle of the month and alternate the next month with augmentin for 7 days    UMECLIDINIUM-VILANTEROL (ANORO ELLIPTA) 62.5-25 MCG/ACTUATION DSDV    Inhale 1 puff into the lungs once daily. Controller    VERAPAMIL (VERELAN) 180 MG C24P    Take 1 capsule by mouth twice daily   Changed and/or Refilled Medications    Modified Medication Previous Medication    FOLIC ACID (FOLVITE) 1 MG TABLET folic acid (FOLVITE) 1 MG tablet       Take 1 tablet (1,000 mcg total) by mouth once daily.    Take 1 tablet by mouth once daily         CARE TEAM:  Patient Care Team:  Urmila Luna MD as PCP - General (Internal Medicine)  Taurus Braga MD as Consulting Physician (Cardiology)  PRECIOUS Zuñiga MD as Consulting Physician (Pulmonary Disease)  Louie Yuan MD as Consulting Physician (Infectious Diseases)  Issac Meyers MD as Consulting Physician (Pain Medicine)  Urmila Luna MD (Internal Medicine)         REVIEW OF SYSTEMS:  Review of  "Systems   Constitutional: Negative for chills, fatigue, fever and unexpected weight change.   HENT: Negative for congestion and postnasal drip.    Eyes: Negative for pain and visual disturbance.   Respiratory: Positive for cough, shortness of breath and wheezing.         Respiratory symptoms currently at baseline.   Cardiovascular: Negative for chest pain, palpitations and leg swelling.   Gastrointestinal: Negative for abdominal pain, constipation, diarrhea, nausea and vomiting.   Genitourinary: Positive for dysuria. Negative for frequency, hematuria and urgency.   Musculoskeletal: Positive for arthralgias. Negative for back pain.   Skin: Positive for rash (- chronic psoriasis).   Neurological: Negative for weakness and headaches.   Psychiatric/Behavioral: Negative for dysphoric mood and sleep disturbance. The patient is not nervous/anxious.          PHYSICAL EXAM:   Vitals:    08/28/20 1300   BP: 120/60   Pulse: 81   Temp: 97.7 °F (36.5 °C)     Weight: 71.7 kg (158 lb)   Height: 5' 2" (157.5 cm)   Body mass index is 28.9 kg/m².      General appearance - alert, well appearing, and in no distress, overweight  Psychiatric - alert, oriented to person, place, and time, normal mood, behavior, speech, dress, motor activity, and thought processes  Eyes - pupils equal and reactive, extraocular eye movements intact, sclera anicteric  Mouth - not examined; patient wearing mask due to Covid 19 pandemic  Neck - supple, no significant adenopathy, carotids upstroke normal bilaterally, no bruits  Lymphatics - no palpable cervical lymphadenopathy  Chest - moderate inspiratory crackles/wheezes noted, symmetric air entry, no tachypnea, retractions or cyanosis  Heart - normal rate and regular rhythm, no gallops noted  Neurological - alert, normal speech, no focal findings or movement disorder noted, cranial nerves II through XII intact; no tremor noted today  Musculoskeletal - patient noted to have Mild-Moderate osteoarthritic changes " to both knee joints. No joint effusions noted., no muscular tenderness noted  Extremities - peripheral pulses normal, no pedal edema, no clubbing or cyanosis  Skin - normal coloration and turgor, no rashes, no suspicious skin lesions noted      Labs:  Results for orders placed or performed in visit on 08/25/20   Lipid Panel   Result Value Ref Range    Cholesterol 190 120 - 199 mg/dL    Triglycerides 97 30 - 150 mg/dL    HDL 68 40 - 75 mg/dL    LDL Cholesterol 102.6 63.0 - 159.0 mg/dL    Hdl/Cholesterol Ratio 35.8 20.0 - 50.0 %    Total Cholesterol/HDL Ratio 2.8 2.0 - 5.0    Non-HDL Cholesterol 122 mg/dL   Basic metabolic panel   Result Value Ref Range    Sodium 140 136 - 145 mmol/L    Potassium 3.8 3.5 - 5.1 mmol/L    Chloride 98 95 - 110 mmol/L    CO2 31 (H) 23 - 29 mmol/L    Glucose 92 70 - 110 mg/dL    BUN, Bld 16 8 - 23 mg/dL    Creatinine 1.1 0.5 - 1.4 mg/dL    Calcium 9.2 8.7 - 10.5 mg/dL    Anion Gap 11 8 - 16 mmol/L    eGFR if African American 58.4 (A) >60 mL/min/1.73 m^2    eGFR if non African American 50.6 (A) >60 mL/min/1.73 m^2     *Note: Due to a large number of results and/or encounters for the requested time period, some results have not been displayed. A complete set of results can be found in Results Review.          ASSESSMENT AND PLAN:  1. Chronic obstructive pulmonary disease, unspecified COPD type/2. Chronic bronchitis, unspecified chronic bronchitis type/3. Acquired bronchiectasis  The current medical regimen is effective;  continue present plan and medications.   Followed by: Pulmonology.     4. Benign hypertension with chronic kidney disease, stage III/5. Statin myopathy  Discussed sodium restriction, maintaining ideal body weight and regular exercise program as physiologic means to achieve blood pressure control. The patient will strive towards this.   The current medical regimen is effective;  continue present plan and medications. Recommended patient to check home readings to monitor and  see me for followup as scheduled or sooner as needed.   Patient was educated that both decongestant and anti-inflammatory medication may raise blood pressure.  Stable decreased kidney function. Observe. Patient counseled to avoid/minimize the use of anti-inflammatory  Medication. Discussed to stay well hydrated. Also discussed with patient that good control of blood pressure and/or diabetes, if present, will help to prevent progression.  The patient is not eligible for the digital hypertension program.    6. Essential tremor  Stable. Observe.    7. Anemia of chronic disease  Stable. Asymptomatic. Observe.    8. Personal history of pulmonary embolism  The current medical regimen is effective;  continue present plan and medications.   - folic acid (FOLVITE) 1 MG tablet; Take 1 tablet (1,000 mcg total) by mouth once daily.  Dispense: 100 tablet; Refill: 3    9. Elevated serum homocysteine level  The current medical regimen is effective;  continue present plan and medications.   - folic acid (FOLVITE) 1 MG tablet; Take 1 tablet (1,000 mcg total) by mouth once daily.  Dispense: 100 tablet; Refill: 3    10. Dysuria  I will check a urine culture and address results accordingly.  For now she will increase her fluid intake and add some cranberry juice.  - Urine culture; Future    11. Psoriasis  Continue good skin hydration.  Follow-up with Dermatology if symptoms worsen or persist.         Orders Placed This Encounter   Procedures    Urine culture      Follow up in about 6 months (around 2/28/2021) for follow up chronic medical conditions.. or sooner as needed.

## 2020-08-30 DIAGNOSIS — N30.90 CYSTITIS: Primary | ICD-10-CM

## 2020-08-30 RX ORDER — AMOXICILLIN AND CLAVULANATE POTASSIUM 875; 125 MG/1; MG/1
1 TABLET, FILM COATED ORAL 2 TIMES DAILY
Qty: 6 TABLET | Refills: 0 | Status: SHIPPED | OUTPATIENT
Start: 2020-08-30 | End: 2020-09-02

## 2020-09-04 DIAGNOSIS — K21.9 GASTROESOPHAGEAL REFLUX DISEASE WITHOUT ESOPHAGITIS: Chronic | ICD-10-CM

## 2020-09-04 RX ORDER — OMEPRAZOLE 20 MG/1
CAPSULE, DELAYED RELEASE ORAL
Qty: 90 CAPSULE | Refills: 1 | Status: SHIPPED | OUTPATIENT
Start: 2020-09-04 | End: 2021-03-18

## 2020-09-23 ENCOUNTER — CLINICAL SUPPORT (OUTPATIENT)
Dept: FAMILY MEDICINE | Facility: CLINIC | Age: 70
End: 2020-09-23
Payer: MEDICARE

## 2020-09-23 DIAGNOSIS — Z23 NEED FOR INFLUENZA VACCINATION: Primary | ICD-10-CM

## 2020-09-23 PROCEDURE — 90694 VACC AIIV4 NO PRSRV 0.5ML IM: CPT | Mod: S$GLB,ICN,, | Performed by: INTERNAL MEDICINE

## 2020-09-23 PROCEDURE — G0008 ADMIN INFLUENZA VIRUS VAC: HCPCS | Mod: S$GLB,ICN,, | Performed by: INTERNAL MEDICINE

## 2020-09-23 PROCEDURE — 90694 FLU VACCINE - QUADRIVALENT - ADJUVANTED: ICD-10-PCS | Mod: S$GLB,ICN,, | Performed by: INTERNAL MEDICINE

## 2020-09-23 PROCEDURE — 99499 UNLISTED E&M SERVICE: CPT | Mod: S$GLB,,, | Performed by: INTERNAL MEDICINE

## 2020-09-23 PROCEDURE — 99499 NO LOS: ICD-10-PCS | Mod: S$GLB,,, | Performed by: INTERNAL MEDICINE

## 2020-09-23 PROCEDURE — G0008 PR ADMIN INFLUENZA VIRUS VAC: ICD-10-PCS | Mod: S$GLB,ICN,, | Performed by: INTERNAL MEDICINE

## 2020-10-06 LAB
ACID FAST MOD KINY STN SPEC: NORMAL
MYCOBACTERIUM SPEC QL CULT: NORMAL

## 2020-10-08 DIAGNOSIS — J47.9 ACQUIRED BRONCHIECTASIS: ICD-10-CM

## 2020-10-08 DIAGNOSIS — J47.1 BRONCHIECTASIS WITH (ACUTE) EXACERBATION: ICD-10-CM

## 2020-10-08 RX ORDER — SODIUM CHLORIDE FOR INHALATION 3 %
VIAL, NEBULIZER (ML) INHALATION
Qty: 240 ML | Refills: 0 | Status: SHIPPED | OUTPATIENT
Start: 2020-10-08 | End: 2020-12-29

## 2020-10-08 RX ORDER — SODIUM CHLORIDE FOR INHALATION 3 %
VIAL, NEBULIZER (ML) INHALATION
Qty: 240 ML | Refills: 0 | Status: SHIPPED | OUTPATIENT
Start: 2020-10-08 | End: 2020-10-08 | Stop reason: SDUPTHER

## 2020-10-08 RX ORDER — IPRATROPIUM BROMIDE 0.5 MG/2.5ML
SOLUTION RESPIRATORY (INHALATION)
Qty: 150 ML | Refills: 0 | Status: SHIPPED | OUTPATIENT
Start: 2020-10-08 | End: 2020-11-10

## 2020-10-19 ENCOUNTER — HOSPITAL ENCOUNTER (EMERGENCY)
Facility: HOSPITAL | Age: 70
Discharge: HOME | End: 2020-10-19
Attending: EMERGENCY MEDICINE
Payer: MEDICARE

## 2020-10-19 VITALS
WEIGHT: 157 LBS | TEMPERATURE: 98 F | BODY MASS INDEX: 28.89 KG/M2 | HEART RATE: 95 BPM | DIASTOLIC BLOOD PRESSURE: 66 MMHG | SYSTOLIC BLOOD PRESSURE: 147 MMHG | HEIGHT: 62 IN | RESPIRATION RATE: 18 BRPM | OXYGEN SATURATION: 97 %

## 2020-10-19 DIAGNOSIS — R19.7 DIARRHEA, UNSPECIFIED TYPE: ICD-10-CM

## 2020-10-19 DIAGNOSIS — R53.81 MALAISE: ICD-10-CM

## 2020-10-19 DIAGNOSIS — R11.0 NAUSEA: ICD-10-CM

## 2020-10-19 DIAGNOSIS — E86.0 DEHYDRATION: ICD-10-CM

## 2020-10-19 DIAGNOSIS — I49.8 JUNCTIONAL RHYTHM: ICD-10-CM

## 2020-10-19 DIAGNOSIS — E87.6 HYPOKALEMIA: ICD-10-CM

## 2020-10-19 DIAGNOSIS — R10.32 LLQ ABDOMINAL PAIN: Primary | ICD-10-CM

## 2020-10-19 LAB
ALBUMIN SERPL BCP-MCNC: 4.5 G/DL (ref 3.5–5.2)
ALP SERPL-CCNC: 89 U/L (ref 55–135)
ALT SERPL W/O P-5'-P-CCNC: 20 U/L (ref 10–44)
ANION GAP SERPL CALC-SCNC: 14 MMOL/L (ref 8–16)
AST SERPL-CCNC: 19 U/L (ref 10–40)
BASOPHILS # BLD AUTO: 0.05 K/UL (ref 0–0.2)
BASOPHILS NFR BLD: 0.5 % (ref 0–1.9)
BILIRUB SERPL-MCNC: 0.5 MG/DL (ref 0.1–1)
BILIRUB UR QL STRIP: NEGATIVE
BNP SERPL-MCNC: 100 PG/ML (ref 0–99)
BUN SERPL-MCNC: 22 MG/DL (ref 8–23)
CALCIUM SERPL-MCNC: 9.5 MG/DL (ref 8.7–10.5)
CHLORIDE SERPL-SCNC: 95 MMOL/L (ref 95–110)
CLARITY UR: CLEAR
CO2 SERPL-SCNC: 29 MMOL/L (ref 23–29)
COLOR UR: YELLOW
CREAT SERPL-MCNC: 1.5 MG/DL (ref 0.5–1.4)
DIFFERENTIAL METHOD: ABNORMAL
EOSINOPHIL # BLD AUTO: 0 K/UL (ref 0–0.5)
EOSINOPHIL NFR BLD: 0.2 % (ref 0–8)
ERYTHROCYTE [DISTWIDTH] IN BLOOD BY AUTOMATED COUNT: 15.6 % (ref 11.5–14.5)
EST. GFR  (AFRICAN AMERICAN): 40 ML/MIN/1.73 M^2
EST. GFR  (NON AFRICAN AMERICAN): 35 ML/MIN/1.73 M^2
GLUCOSE SERPL-MCNC: 99 MG/DL (ref 70–110)
GLUCOSE UR QL STRIP: NEGATIVE
HCT VFR BLD AUTO: 35.8 % (ref 37–48.5)
HGB BLD-MCNC: 11.1 G/DL (ref 12–16)
HGB UR QL STRIP: NEGATIVE
IMM GRANULOCYTES # BLD AUTO: 0.03 K/UL (ref 0–0.04)
IMM GRANULOCYTES NFR BLD AUTO: 0.3 % (ref 0–0.5)
KETONES UR QL STRIP: ABNORMAL
LEUKOCYTE ESTERASE UR QL STRIP: ABNORMAL
LIPASE SERPL-CCNC: 16 U/L (ref 4–60)
LYMPHOCYTES # BLD AUTO: 1.5 K/UL (ref 1–4.8)
LYMPHOCYTES NFR BLD: 15.9 % (ref 18–48)
MCH RBC QN AUTO: 25.3 PG (ref 27–31)
MCHC RBC AUTO-ENTMCNC: 31 G/DL (ref 32–36)
MCV RBC AUTO: 82 FL (ref 82–98)
MICROSCOPIC COMMENT: ABNORMAL
MONOCYTES # BLD AUTO: 1 K/UL (ref 0.3–1)
MONOCYTES NFR BLD: 10.6 % (ref 4–15)
NEUTROPHILS # BLD AUTO: 6.6 K/UL (ref 1.8–7.7)
NEUTROPHILS NFR BLD: 72.5 % (ref 38–73)
NITRITE UR QL STRIP: NEGATIVE
NRBC BLD-RTO: 0 /100 WBC
PH UR STRIP: 6 [PH] (ref 5–8)
PLATELET # BLD AUTO: 294 K/UL (ref 150–350)
PMV BLD AUTO: 9.6 FL (ref 9.2–12.9)
POTASSIUM SERPL-SCNC: 2.8 MMOL/L (ref 3.5–5.1)
PROT SERPL-MCNC: 8.4 G/DL (ref 6–8.4)
PROT UR QL STRIP: NEGATIVE
RBC # BLD AUTO: 4.39 M/UL (ref 4–5.4)
SODIUM SERPL-SCNC: 138 MMOL/L (ref 136–145)
SP GR UR STRIP: >1.03 (ref 1–1.03)
SQUAMOUS #/AREA URNS HPF: 7 /HPF
TROPONIN I SERPL DL<=0.01 NG/ML-MCNC: 0.01 NG/ML (ref 0–0.03)
URN SPEC COLLECT METH UR: ABNORMAL
UROBILINOGEN UR STRIP-ACNC: NEGATIVE EU/DL
WBC # BLD AUTO: 9.13 K/UL (ref 3.9–12.7)
WBC #/AREA URNS HPF: 7 /HPF (ref 0–5)

## 2020-10-19 PROCEDURE — 83690 ASSAY OF LIPASE: CPT

## 2020-10-19 PROCEDURE — 96365 THER/PROPH/DIAG IV INF INIT: CPT | Mod: 59

## 2020-10-19 PROCEDURE — 93010 ELECTROCARDIOGRAM REPORT: CPT | Mod: ,,, | Performed by: INTERNAL MEDICINE

## 2020-10-19 PROCEDURE — 25500020 PHARM REV CODE 255: Performed by: EMERGENCY MEDICINE

## 2020-10-19 PROCEDURE — 96361 HYDRATE IV INFUSION ADD-ON: CPT

## 2020-10-19 PROCEDURE — 93010 EKG 12-LEAD: ICD-10-PCS | Mod: ,,, | Performed by: INTERNAL MEDICINE

## 2020-10-19 PROCEDURE — 63600175 PHARM REV CODE 636 W HCPCS: Performed by: PHYSICIAN ASSISTANT

## 2020-10-19 PROCEDURE — 83880 ASSAY OF NATRIURETIC PEPTIDE: CPT

## 2020-10-19 PROCEDURE — 85025 COMPLETE CBC W/AUTO DIFF WBC: CPT

## 2020-10-19 PROCEDURE — 93005 ELECTROCARDIOGRAM TRACING: CPT

## 2020-10-19 PROCEDURE — 25000003 PHARM REV CODE 250: Performed by: PHYSICIAN ASSISTANT

## 2020-10-19 PROCEDURE — 80053 COMPREHEN METABOLIC PANEL: CPT

## 2020-10-19 PROCEDURE — 87086 URINE CULTURE/COLONY COUNT: CPT

## 2020-10-19 PROCEDURE — 99285 EMERGENCY DEPT VISIT HI MDM: CPT | Mod: 25

## 2020-10-19 PROCEDURE — 84484 ASSAY OF TROPONIN QUANT: CPT

## 2020-10-19 PROCEDURE — 87147 CULTURE TYPE IMMUNOLOGIC: CPT

## 2020-10-19 PROCEDURE — 87088 URINE BACTERIA CULTURE: CPT

## 2020-10-19 PROCEDURE — 81000 URINALYSIS NONAUTO W/SCOPE: CPT

## 2020-10-19 RX ORDER — DICYCLOMINE HYDROCHLORIDE 10 MG/1
20 CAPSULE ORAL
Status: COMPLETED | OUTPATIENT
Start: 2020-10-19 | End: 2020-10-19

## 2020-10-19 RX ORDER — CEPHALEXIN 500 MG/1
500 CAPSULE ORAL 2 TIMES DAILY
Qty: 14 CAPSULE | Refills: 0 | Status: SHIPPED | OUTPATIENT
Start: 2020-10-19 | End: 2020-10-26

## 2020-10-19 RX ORDER — ONDANSETRON 4 MG/1
4 TABLET, ORALLY DISINTEGRATING ORAL EVERY 6 HOURS PRN
Qty: 20 TABLET | Refills: 0 | Status: ON HOLD | OUTPATIENT
Start: 2020-10-19 | End: 2021-12-17 | Stop reason: HOSPADM

## 2020-10-19 RX ORDER — PROMETHAZINE HYDROCHLORIDE 12.5 MG/1
12.5 TABLET ORAL EVERY 6 HOURS PRN
Qty: 10 TABLET | Refills: 0 | Status: SHIPPED | OUTPATIENT
Start: 2020-10-19 | End: 2020-12-04 | Stop reason: SDUPTHER

## 2020-10-19 RX ORDER — HYOSCYAMINE SULFATE 0.125 MG
TABLET ORAL
Qty: 30 TABLET | Refills: 0 | Status: SHIPPED | OUTPATIENT
Start: 2020-10-19 | End: 2020-10-27 | Stop reason: SDUPTHER

## 2020-10-19 RX ADMIN — SODIUM CHLORIDE 1000 ML: 0.9 INJECTION, SOLUTION INTRAVENOUS at 05:10

## 2020-10-19 RX ADMIN — PROMETHAZINE HYDROCHLORIDE 6.25 MG: 25 INJECTION INTRAMUSCULAR; INTRAVENOUS at 05:10

## 2020-10-19 RX ADMIN — POTASSIUM BICARBONATE 25 MEQ: 978 TABLET, EFFERVESCENT ORAL at 05:10

## 2020-10-19 RX ADMIN — IOHEXOL 75 ML: 350 INJECTION, SOLUTION INTRAVENOUS at 05:10

## 2020-10-19 RX ADMIN — DICYCLOMINE HYDROCHLORIDE 20 MG: 10 CAPSULE ORAL at 05:10

## 2020-10-19 NOTE — ED PROVIDER NOTES
Encounter Date: 10/19/2020       HISTORY  Chief Complaint   Patient presents with    Abdominal Pain     Pt reports lower L side abdominal pain that comes and goes. Pt reports nausea since Thursday. Pt denies vomiting but has diarrhea.     Nausea     70-year-old female with history of GERD, COPD on home oxygen, acquired bronchiectasis, hypertension, PE 2017 on Eliquis, recurrence Pseudomonas lung infection, clotting disorder, essential tremor, anemia, depression, PSVT, presents to ED complaining of left lower quadrant abdominal pain, nausea, decreased appetite and intake over the past week.    Patient states she began approximately 7 days ago with intermittent nausea and left lower quadrant burning/sharp pain.  She admits to infrequent spasms of pain.  Pain initially followed meals.  Symptoms became constant on Thursday evening, having constant since then.  She admits to decreased appetite and intake secondary to nausea over the past 5 days.  Denies emesis.  No fever but she does admit to chills.    She denies any worsening of her chronic cough, denies shortness of breath or chest pain.  She denies abdominal distention.  She denies any urinary complaints or flank pain.  She admits to history of waxing waning diarrhea for which she has seen GI in the past.  She admits to loose/watery stools earlier this week, however since Thursday stools have been small and a little more firm.  Initially there was increased frequency of bowel movements, however since decreased intake, has become more infrequent.  She denies melena or hematochezia.  She has been taking Levsin with some relief of left lower abdominal painful spasms.  She has had pain similar to this in the past.    Recent EGD and colonoscopy in December of 2019 unremarkable.  No history of diverticulosis or diverticulitis.  There were some hemorrhoids found on perianal exam, however the patient denies tenesmus or rectal pain.  She denies any recent illness or sick  contacts.  No night sweats.  He does feel generally weak over the past few days.     Abdominal surgical history:  Cholecystectomy        Review of patient's allergies indicates:   Allergen Reactions    Adhesive Other (See Comments)     Tears up the skin and makes it itch   (leads)    Bactrim [sulfamethoxazole-trimethoprim] Rash     Was hospitalized for rash    Restasis [cyclosporine]     Lipitor [atorvastatin] Other (See Comments)     Muscle aches    Albuterol Other (See Comments)     tremors    Topamax [topiramate]      - made her feel 'bad in the head'     Past Medical History:   Diagnosis Date    Acquired bronchiectasis     due to history of TB - followed by pulmonary, Dr. Zuñiga    Allergy     Amblyopia     rt eye per pt    Anemia of other chronic disease     Anticoagulant long-term use     Anxiety     Cataract     Chronic obstructive pulmonary disease with acute exacerbation     Clotting disorder     COPD (chronic obstructive pulmonary disease)     Depression     Diverticulosis     Essential tremor     GERD (gastroesophageal reflux disease)     Hemangioma of liver     History of pulmonary embolus (PE)     History of tuberculosis 1978    Hypertension     Mixed anxiety and depressive disorder     On home oxygen therapy     Psoriasis     PSVT (paroxysmal supraventricular tachycardia)     Pulmonary embolism     S/P PICC central line placement Apr. 2016 - May 2016    Skin disease     Psoriasis    Spondylosis without myelopathy 8/23/2013    Supraventricular tachycardia     Tachycardia     Thoracic aorta atherosclerosis     noted on CT scan of chest 1/3/2011    Tuberculosis     Vaginal delivery     x2    Vitamin D deficiency      Past Surgical History:   Procedure Laterality Date    CATARACT EXTRACTION W/  INTRAOCULAR LENS IMPLANT  07/24/12    od dr spain    CATARACT EXTRACTION W/  INTRAOCULAR LENS IMPLANT  08/07/12    left eye    CHOLECYSTECTOMY      COLONOSCOPY N/A  2019    Procedure: COLONOSCOPY;  Surgeon: Franco Charles MD;  [facility]  Service: Endoscopy;  Laterality: N/A;  ok to hold Eliquis 2days prior per     ESOPHAGOGASTRODUODENOSCOPY N/A 2019    Procedure: EGD (ESOPHAGOGASTRODUODENOSCOPY);  Surgeon: Franco Charles MD;  [facility]  Service: Endoscopy;  Laterality: N/A;  ok to hold Eliquis 2days prior per     EYE SURGERY      GALLBLADDER SURGERY      RADIOFREQUENCY THERMOCOAGULATION Left 2018    Procedure: RADIOFREQUENCY THERMAL COAGULATION;  Surgeon: Issac Meyers MD;  Location: [facility];  Service: Pain Management;  Laterality: Left;  OK to hold Eliquis 3 days prior per Dr. Braga     Family History   Problem Relation Age of Onset    Hypertension Mother     Heart attack Mother     Cancer Father         liver and bladder    Hyperlipidemia Sister     Psoriasis Sister     Cancer Brother         liver    Stroke Maternal Uncle     Cancer Maternal Aunt         stomach    Lung cancer Sister     Heart attack Brother     Heart attack Son     Amblyopia Neg Hx     Blindness Neg Hx     Cataracts Neg Hx     Glaucoma Neg Hx     Macular degeneration Neg Hx     Retinal detachment Neg Hx     Strabismus Neg Hx     Thyroid disease Neg Hx     Melanoma Neg Hx     Lupus Neg Hx     Eczema Neg Hx     COPD Neg Hx     Colon cancer Neg Hx      Social History     Tobacco Use    Smoking status: Former Smoker     Packs/day: 2.00     Years: 50.00     Pack years: 100.00     Types: Cigarettes     Quit date: 2009     Years since quittin.4    Smokeless tobacco: Never Used   Substance Use Topics    Alcohol use: Not Currently     Frequency: Never     Binge frequency: Never    Drug use: Never     Review of Systems   Constitutional: Positive for appetite change, chills and fatigue. Negative for fever.   Respiratory: Negative for shortness of breath.    Cardiovascular: Negative  for chest pain.   Gastrointestinal: Positive for abdominal pain, diarrhea and nausea. Negative for abdominal distention, anal bleeding, blood in stool and vomiting.   Genitourinary: Negative for dysuria, flank pain, frequency and vaginal discharge.   Musculoskeletal: Negative for back pain, myalgias, neck pain and neck stiffness.   Skin: Negative for rash.   Neurological: Negative for light-headedness.       Physical Exam     Initial Vitals [10/19/20 1526]   BP Pulse Resp Temp SpO2   126/63 99 20 98.1 °F (36.7 °C) (!) 94 %      MAP       --         Physical Exam    Nursing note and vitals reviewed.  Constitutional: She appears well-developed and well-nourished. She is not diaphoretic. No distress.   Overall well-appearing nontoxic, resting upright on exam table.   HENT:   Head: Normocephalic and atraumatic.   Tacky mucous membranes   Eyes: EOM are normal.   Neck: Neck supple.   Cardiovascular:   Normal sinus rhythm   Pulmonary/Chest: No respiratory distress.   Abdominal:   Abdomen overall soft, normal bowel sounds ×4. TTP LLQ.  No rebound or guarding.  No palpable mass or distention.  No flank or CVA tenderness.  Negative Boothe sign.  No pain over McBurney's point.   Musculoskeletal: Normal range of motion.   Neurological: She is alert and oriented to person, place, and time. GCS score is 15. GCS eye subscore is 4. GCS verbal subscore is 5. GCS motor subscore is 6.   Skin: Skin is warm.   Psychiatric: She has a normal mood and affect. Thought content normal.         ED Course   Procedures  Labs Reviewed   CBC W/ AUTO DIFFERENTIAL - Abnormal; Notable for the following components:       Result Value    Hemoglobin 11.1 (*)     Hematocrit 35.8 (*)     Mean Corpuscular Hemoglobin 25.3 (*)     Mean Corpuscular Hemoglobin Conc 31.0 (*)     RDW 15.6 (*)     Lymph% 15.9 (*)     All other components within normal limits   COMPREHENSIVE METABOLIC PANEL - Abnormal; Notable for the following components:    Potassium 2.8 (*)      Creatinine 1.5 (*)     eGFR if  40 (*)     eGFR if non  35 (*)     All other components within normal limits   URINALYSIS, REFLEX TO URINE CULTURE - Abnormal; Notable for the following components:    Specific Gravity, UA >1.030 (*)     Ketones, UA 1+ (*)     Leukocytes, UA 2+ (*)     All other components within normal limits    Narrative:     Specimen Source->Urine   B-TYPE NATRIURETIC PEPTIDE - Abnormal; Notable for the following components:     (*)     All other components within normal limits   URINALYSIS MICROSCOPIC - Abnormal; Notable for the following components:    WBC, UA 7 (*)     All other components within normal limits    Narrative:     Specimen Source->Urine   CULTURE, URINE    Narrative:     Indicated criteria for high risk culture:->Other  Other (specify):->contaminated specimen   LIPASE   TROPONIN I     EKG Readings: (Independently Interpreted)   Junctional rhythm, rate 80 beats per minute.     ECG Results          EKG 12-lead (In process)  Result time 10/20/20 06:03:47    In process by Interface, Lab In [facility] (10/20/20 06:03:47)                 Narrative:    Test Reason : R53.81,    Vent. Rate : 088 BPM     Atrial Rate : 088 BPM     P-R Int : 000 ms          QRS Dur : 088 ms      QT Int : 330 ms       P-R-T Axes : 000 077 -13 degrees     QTc Int : 399 ms    Accelerated Junctional rhythm  Nonspecific T wave abnormality  Abnormal ECG  When compared with ECG of 09-MAR-2020 14:18,  Significant changes have occurred    Referred By: [email or web address]   SELF           Confirmed By:                   In process by Interface, Lab In [facility] (10/20/20 05:55:59)                 Narrative:    Test Reason : R53.81,    Vent. Rate : 088 BPM     Atrial Rate : 088 BPM     P-R Int : 000 ms          QRS Dur : 088 ms      QT Int : 330 ms       P-R-T Axes : 000 077 -13 degrees     QTc Int : 399 ms    Accelerated Junctional rhythm  Nonspecific T wave abnormality  Abnormal ECG  When  compared with ECG of 09-MAR-2020 14:18,  Significant changes have occurred    Referred By: [email or web address]   SELF           Confirmed By:                             Imaging Results          CT Abdomen Pelvis With Contrast (Final result)  Result time 10/19/20 18:06:10    Final result by Abbie Baker MD (10/19/20 18:06:10)                 Impression:      Right ovarian cyst.    Mild perivesicular infiltration.  The possibility of cystitis cannot be entirely excluded.  Recommend correlation with urinalysis.    Mild bronchiectatic changes in the lingula.      Electronically signed by: Abbie Baker  Date:    10/19/2020  Time:    18:06             Narrative:    EXAMINATION:  CT OF ABDOMEN PELVIS WITH    CLINICAL HISTORY:  LLQ abdominal pain, diverticulitis suspected;    TECHNIQUE:  5 mm enhanced axial images were obtained from the lung bases through the greater trochanters.  Seventy-five mL of Omnipaque 350 was injected.    COMPARISON:  08/19/2019    FINDINGS:  The liver, spleen, pancreas, kidneys, and adrenal glands are unremarkable. The gallbladder is surgically absent.  Calcified granulomas seen within the spleen.    There is no definite evidence for abdominal adenopathy or ascites.  Moderate vascular calcifications are present.  There is a retroaortic left renal vein.    There are no pelvic masses or adenopathy.  The appendix is not inflamed.  2.8 x 2.6 cm right ovarian cyst is present.  A 3.1 cm right adnexal cyst was seen on the prior exam.  Mild perivesicular infiltration is present.    There is no free fluid in the pelvis.    There is minimal right basilar atelectasis.  Mild bronchiectatic changes are seen in the lingula.    There is moderate levoscoliosis.  Spondylitic changes are present.                               X-Ray Chest AP Portable (Final result)  Result time 10/19/20 17:10:30    Final result by Sami Mallory MD (10/19/20 17:10:30)                 Impression:      1. Grossly stable chronic  interstitial findings noting there may be interstitial edema in the interval, correlation is advised.      Electronically signed by: Sami Mallory MD  Date:    10/19/2020  Time:    17:10             Narrative:    EXAMINATION:  XR CHEST AP PORTABLE    CLINICAL HISTORY:  Shortness of breath    TECHNIQUE:  Single frontal view of the chest was performed.    COMPARISON:  03/07/2020, CT 03/09/2020    FINDINGS:  The cardiomediastinal silhouette is not enlarged.  There is no pleural effusion.  The trachea is midline.  The lungs are symmetrically expanded bilaterally with prominent interstitial attenuation bilaterally.  There is bilateral upper lung zone predominant bronchiectasis and fibrotic change.  Interval interstitial edema is a consideration..  No convincing large focal consolidation.  There is no pneumothorax.  The osseous structures are remarkable for degenerative changes..                              X-Rays:   Independently Interpreted Readings:   Chest X-Ray: Chronic interstitial changes, possibly increased left mid to lower lung zone interstitial edema.  No large focal consolidation.  No pneumothorax.  No effusion.     Medical Decision Making:   Initial Assessment:   70-year-old female with 5 day history constant burning pain to her left lower quadrant coupled with sharp spasms of pain, constant nausea without emesis, decreased appetite and intake.  Differential Diagnosis:   Diverticulosis, diverticulitis, chronic GI issues, IBS, enteritis  ED Management:  Patient states she has had similar presentations in the past.  She does have established GI physician.  EGD and colonoscopy in December 2019 unremarkable.  There were some hemorrhoids however no complaints of tenesmus or rectal pain.  No bright red blood per rectum, no hematochezia, no melena.  No upper abdominal pain.  She states she is fairly sensitive to certain foods, typically follows a GERD diet due to acidic foods bothering her GI system.  There is  relief of spasms of pain with Levsin.  She denies any black or bloody stools, denies any painful bowel movements, denies any bright red blood per rectum.    She is tender to left lower quadrant.  Her vitals are reassuring.  No leukocytosis.  Mild elevation in her creatinine secondary to decreased intake.  Will give some fluids, repeat CT, get some antiemetics and Bentyl, re-evaluate.    Chest x-ray appears to be grossly stable to previous.  No significant elevation of BNP.  No evidence of volume overload on exam or imaging.  No worsening of chronic cough.  No shortness of breath.  On home O2 due to COPD, bronchiectasis.  CT abdomen without acute findings.  Urinalysis pending.    Urinalysis with possible infection, however contaminated specimen.  CT findings which could indicate UTI.  There is no flank pain, no flank or CVA tenderness.  Recent UTI sensitive to penicillins.  I have elected to treat empirically with Keflex, will give her some Zofran and promethazine to help with nausea, continue with Levsin as needed for abdominal cramping, contact PCP or GI tomorrow.  She does understand and agree with this plan.                   ED Course as of Oct 20 1642   Mon Oct 19, 2020   1917 On re-evaluation, nausea and pain improving, awaiting urinalysis.    [SM]      ED Course User Index  [SM] Edmundo Ruiz PA-C            Clinical Impression:       ICD-10-CM ICD-9-CM   1. LLQ abdominal pain  R10.32 789.04   2. Malaise  R53.81 780.79   3. Junctional rhythm  I49.8 427.89   4. Hypokalemia  E87.6 276.8   5. Nausea  R11.0 787.02   6. Dehydration  E86.0 276.51                      Disposition:   Disposition: Discharged  Condition: Stable     ED Disposition Condition    Discharge Stable        ED Prescriptions     Medication Sig Dispense Start Date End Date Auth. Provider    ondansetron (ZOFRAN-ODT) 4 MG TbDL Take 1 tablet (4 mg total) by mouth every 6 (six) hours as needed (Nausea). 20 tablet 10/19/2020  Edmundo Ruiz,  SABINE    promethazine (PHENERGAN) 12.5 MG Tab Take 1 tablet (12.5 mg total) by mouth every 6 (six) hours as needed (Severe/persistent nausea). 10 tablet 10/19/2020  Edmundo Ruiz PA-C    cephALEXin (KEFLEX) 500 MG capsule Take 1 capsule (500 mg total) by mouth 2 (two) times a day. for 7 days 14 capsule 10/19/2020 10/26/2020 Edmundo Ruiz PA-C    potassium bicarbonate (K-LYTE) disintegrating tablet Take 1 tablet (25 mEq total) by mouth once daily. for 3 days 3 tablet 10/19/2020 10/22/2020 Edmundo Ruiz PA-C        Follow-up Information     Follow up With Specialties Details Why Contact Info    Urmila Luna MD Internal Medicine, Pediatrics Schedule an appointment as soon as possible for a visit  For reevaluation, to recheck Potassium [address] [facility] [id number] [phone]      Susan Venegas NP Gastroenterology Schedule an appointment as soon as possible for a visit  For reevaluation, If symptoms persist [address] [facility] [address]  [phone]      Lele Camacho MD Cardiology, INTERVENTIONAL CARDIOLOGY Schedule an appointment as soon as possible for a visit  For reevaluation of abnormal  [address]  [address]  [place] [address]  [phone]                                         Edmundo Ruiz PA-C  10/20/20 4822

## 2020-10-19 NOTE — ED TRIAGE NOTES
Pt reports lower L side abdominal pain that comes and goes. Pt reports nausea since Thursday. Pt denies dizziness, headache and fever, but pt  reports diarrhea and nausea. Pt took zofran and pain meds at home without relief

## 2020-10-19 NOTE — FIRST PROVIDER EVALUATION
Emergency Department TeleTriage Encounter Note      CHIEF COMPLAINT    Chief Complaint   Patient presents with    Abdominal Pain     Pt reports lower L side abdominal pain that comes and goes. Pt reports nausea since Thursday. Pt denies vomiting but has diarrhea.     Nausea       VITAL SIGNS   Initial Vitals [10/19/20 1526]   BP Pulse Resp Temp SpO2   126/63 99 20 98.1 °F (36.7 °C) (!) 94 %      MAP       --            ALLERGIES    Review of patient's allergies indicates:   Allergen Reactions    Adhesive Other (See Comments)     Tears up the skin and makes it itch   (leads)    Bactrim [sulfamethoxazole-trimethoprim] Rash     Was hospitalized for rash    Restasis [cyclosporine]     Lipitor [atorvastatin] Other (See Comments)     Muscle aches    Albuterol Other (See Comments)     tremors    Topamax [topiramate]      - made her feel 'bad in the head'       PROVIDER TRIAGE NOTE  70-year-old female presents ER for evaluation of left lower quadrant pain that started on Thursday.  Associated nausea.  No vomiting    Initial orders will be placed and care will be transferred to an alternate provider when patient is roomed for a full evaluation. Any additional orders and the final disposition will be determined by that provider.      ORDERS  Labs Reviewed   CBC W/ AUTO DIFFERENTIAL   COMPREHENSIVE METABOLIC PANEL   LIPASE   URINALYSIS, REFLEX TO URINE CULTURE   TROPONIN I   B-TYPE NATRIURETIC PEPTIDE       ED Orders (720h ago, onward)    Start Ordered     Status Ordering Provider    10/19/20 1630 10/19/20 1628  sodium chloride 0.9% bolus 1,000 mL  ED 1 Time      Ordered MAUREEN KEEN    10/19/20 1629 10/19/20 1628  Insert Saline lock IV  Once      Ordered MAUREEN KEEN    10/19/20 1524 10/19/20 1523  Brain natriuretic peptide  STAT      In process OSMAN BERNARDO    10/19/20 1524 10/19/20 1523  EKG 12-lead  Once      Ordered OSMAN BERNARDO    10/19/20 1524 10/19/20 1523  X-Ray Chest AP  Portable  1 time imaging      In process MARTHA BERNARDOLAMAR BURNETTE.    10/19/20 1523 10/19/20 1523  CBC auto differential  STAT      In process OSMAN BERNARDO VASILE.    10/19/20 1523 10/19/20 1523  Comprehensive metabolic panel  STAT      In process ANA ROSA BERNARDOSANJUANA BURNETTE.    10/19/20 1523 10/19/20 1523  Lipase  STAT      In process JUDITH BERNARDOCELINA BURNETTE.    10/19/20 1523 10/19/20 1523  Urinalysis, Reflex to Urine Culture Urine, Clean Catch  STAT      Ordered MARTHA BERNARDOLAMAR BURNETTE.    10/19/20 1523 10/19/20 1523  Troponin I  STAT      In process JUDITH BERNARDOCELINA LANG            Virtual Visit Note: The provider triage portion of this emergency department evaluation and documentation was performed via [employer], a HIPAA-compliant telemedicine application, in concert with a tele-presenter in the room. A face to face patient evaluation with one of my colleagues will occur once the patient is placed in an emergency department room.      DISCLAIMER: This note was prepared with [employer] voice recognition transcription software. Garbled syntax, mangled pronouns, and other bizarre constructions may be attributed to that software system.

## 2020-10-20 ENCOUNTER — OFFICE VISIT (OUTPATIENT)
Dept: FAMILY MEDICINE | Facility: CLINIC | Age: 70
End: 2020-10-20
Payer: MEDICARE

## 2020-10-20 ENCOUNTER — LAB VISIT (OUTPATIENT)
Dept: LAB | Facility: HOSPITAL | Age: 70
End: 2020-10-20
Attending: FAMILY MEDICINE
Payer: MEDICARE

## 2020-10-20 VITALS
RESPIRATION RATE: 18 BRPM | DIASTOLIC BLOOD PRESSURE: 67 MMHG | OXYGEN SATURATION: 97 % | SYSTOLIC BLOOD PRESSURE: 140 MMHG | BODY MASS INDEX: 28.37 KG/M2 | HEART RATE: 92 BPM | WEIGHT: 154.19 LBS | HEIGHT: 62 IN

## 2020-10-20 DIAGNOSIS — E87.6 HYPOKALEMIA: ICD-10-CM

## 2020-10-20 DIAGNOSIS — K92.1 MELENA: ICD-10-CM

## 2020-10-20 DIAGNOSIS — Z09 HOSPITAL DISCHARGE FOLLOW-UP: Primary | ICD-10-CM

## 2020-10-20 DIAGNOSIS — N30.00 ACUTE CYSTITIS WITHOUT HEMATURIA: ICD-10-CM

## 2020-10-20 DIAGNOSIS — R10.32 LLQ ABDOMINAL PAIN: ICD-10-CM

## 2020-10-20 LAB
ALBUMIN SERPL BCP-MCNC: 4.4 G/DL (ref 3.5–5.2)
ALP SERPL-CCNC: 87 U/L (ref 55–135)
ALT SERPL W/O P-5'-P-CCNC: 20 U/L (ref 10–44)
ANION GAP SERPL CALC-SCNC: 17 MMOL/L (ref 8–16)
AST SERPL-CCNC: 20 U/L (ref 10–40)
BILIRUB SERPL-MCNC: 0.5 MG/DL (ref 0.1–1)
BUN SERPL-MCNC: 21 MG/DL (ref 8–23)
CALCIUM SERPL-MCNC: 9.7 MG/DL (ref 8.7–10.5)
CHLORIDE SERPL-SCNC: 93 MMOL/L (ref 95–110)
CO2 SERPL-SCNC: 25 MMOL/L (ref 23–29)
CREAT SERPL-MCNC: 1.4 MG/DL (ref 0.5–1.4)
EST. GFR  (AFRICAN AMERICAN): 43.6 ML/MIN/1.73 M^2
EST. GFR  (NON AFRICAN AMERICAN): 37.8 ML/MIN/1.73 M^2
GLUCOSE SERPL-MCNC: 77 MG/DL (ref 70–110)
POTASSIUM SERPL-SCNC: 2.8 MMOL/L (ref 3.5–5.1)
PROT SERPL-MCNC: 8.3 G/DL (ref 6–8.4)
SODIUM SERPL-SCNC: 135 MMOL/L (ref 136–145)

## 2020-10-20 PROCEDURE — 3078F PR MOST RECENT DIASTOLIC BLOOD PRESSURE < 80 MM HG: ICD-10-PCS | Mod: CPTII,S$GLB,, | Performed by: FAMILY MEDICINE

## 2020-10-20 PROCEDURE — 1101F PR PT FALLS ASSESS DOC 0-1 FALLS W/OUT INJ PAST YR: ICD-10-PCS | Mod: CPTII,S$GLB,, | Performed by: FAMILY MEDICINE

## 2020-10-20 PROCEDURE — 99214 PR OFFICE/OUTPT VISIT, EST, LEVL IV, 30-39 MIN: ICD-10-PCS | Mod: S$GLB,,, | Performed by: FAMILY MEDICINE

## 2020-10-20 PROCEDURE — 1125F PR PAIN SEVERITY QUANTIFIED, PAIN PRESENT: ICD-10-PCS | Mod: S$GLB,,, | Performed by: FAMILY MEDICINE

## 2020-10-20 PROCEDURE — 3008F BODY MASS INDEX DOCD: CPT | Mod: CPTII,S$GLB,, | Performed by: FAMILY MEDICINE

## 2020-10-20 PROCEDURE — 1159F MED LIST DOCD IN RCRD: CPT | Mod: S$GLB,,, | Performed by: FAMILY MEDICINE

## 2020-10-20 PROCEDURE — 36415 COLL VENOUS BLD VENIPUNCTURE: CPT | Mod: PO

## 2020-10-20 PROCEDURE — 1125F AMNT PAIN NOTED PAIN PRSNT: CPT | Mod: S$GLB,,, | Performed by: FAMILY MEDICINE

## 2020-10-20 PROCEDURE — 99999 PR PBB SHADOW E&M-EST. PATIENT-LVL III: ICD-10-PCS | Mod: PBBFAC,,, | Performed by: FAMILY MEDICINE

## 2020-10-20 PROCEDURE — 3008F PR BODY MASS INDEX (BMI) DOCUMENTED: ICD-10-PCS | Mod: CPTII,S$GLB,, | Performed by: FAMILY MEDICINE

## 2020-10-20 PROCEDURE — 80053 COMPREHEN METABOLIC PANEL: CPT

## 2020-10-20 PROCEDURE — 3077F PR MOST RECENT SYSTOLIC BLOOD PRESSURE >= 140 MM HG: ICD-10-PCS | Mod: CPTII,S$GLB,, | Performed by: FAMILY MEDICINE

## 2020-10-20 PROCEDURE — 99999 PR PBB SHADOW E&M-EST. PATIENT-LVL III: CPT | Mod: PBBFAC,,, | Performed by: FAMILY MEDICINE

## 2020-10-20 PROCEDURE — 99214 OFFICE O/P EST MOD 30 MIN: CPT | Mod: S$GLB,,, | Performed by: FAMILY MEDICINE

## 2020-10-20 PROCEDURE — 1101F PT FALLS ASSESS-DOCD LE1/YR: CPT | Mod: CPTII,S$GLB,, | Performed by: FAMILY MEDICINE

## 2020-10-20 PROCEDURE — 1159F PR MEDICATION LIST DOCUMENTED IN MEDICAL RECORD: ICD-10-PCS | Mod: S$GLB,,, | Performed by: FAMILY MEDICINE

## 2020-10-20 PROCEDURE — 3077F SYST BP >= 140 MM HG: CPT | Mod: CPTII,S$GLB,, | Performed by: FAMILY MEDICINE

## 2020-10-20 PROCEDURE — 3078F DIAST BP <80 MM HG: CPT | Mod: CPTII,S$GLB,, | Performed by: FAMILY MEDICINE

## 2020-10-20 RX ORDER — METRONIDAZOLE 500 MG/1
500 TABLET ORAL EVERY 8 HOURS
Qty: 21 TABLET | Refills: 0 | Status: SHIPPED | OUTPATIENT
Start: 2020-10-20 | End: 2020-10-27

## 2020-10-20 RX ORDER — AMOXICILLIN AND CLAVULANATE POTASSIUM 875; 125 MG/1; MG/1
1 TABLET, FILM COATED ORAL EVERY 8 HOURS
Qty: 21 TABLET | Refills: 0 | Status: SHIPPED | OUTPATIENT
Start: 2020-10-20 | End: 2020-10-27

## 2020-10-20 NOTE — PROGRESS NOTES
Assessment & Plan  Hospital discharge follow-up    LLQ abdominal pain  -     Giardia / Cryptosporidum, EIA; Future; Expected date: 10/20/2020  -     Stool Exam-Ova,Cysts,Parasites; Future; Expected date: 10/20/2020  -     WBC, Stool; Future; Expected date: 10/20/2020  -     Stool culture; Future; Expected date: 10/20/2020  -     Clostridium difficile EIA; Future; Expected date: 10/20/2020    Melena  -     Occult blood x 1, stool; Future; Expected date: 11/03/2020    Acute cystitis without hematuria    Hypokalemia  -     Comprehensive Metabolic Panel; Future; Expected date: 10/20/2020    Other orders  -     amoxicillin-clavulanate 875-125mg (AUGMENTIN) 875-125 mg per tablet; Take 1 tablet by mouth every 8 (eight) hours. for 7 days  Dispense: 21 tablet; Refill: 0  -     metroNIDAZOLE (FLAGYL) 500 MG tablet; Take 1 tablet (500 mg total) by mouth every 8 (eight) hours. for 7 days  Dispense: 21 tablet; Refill: 0      Etiologies include colitis, upper GI bleed, or cystitis. Stool studies to be collected. D/C Keflex and start Augmentin and Flagyl for GI coverage. Augmentin will also be empiric therapy for cystitis. Advised to drink plenty of water with some Gatorade. Counseled on bland diet and informational handout provided to patient. Continue Zofran for symptomatic relief. There is a concern for a low upper GI bleed since patient reported an episode of melena - H&H and vitals are stable, so will continue Prilosec and advised to contact her GI for a follow up as soon as available. Follow up in 1-2 weeks if symptoms do not improve or unable to be seen by GI sooner.     Follow-up: Follow up if symptoms worsen or fail to improve.  ______________________________________________________________________    Chief Complaint  Chief Complaint   Patient presents with    Nausea     ER follow-up from 10/19/2020    Stomach pain       HPI  Yasmin Asencio is a 70 y.o. female with medical diagnoses as listed in the medical  "history and problem list that presents to the office to follow up on an ER visit yesterday for LLQ abdominal pain and nausea that began Thursday. Associated with loss of appetite and weakness. Patient's workup was significant for CATRACHITO w/Cr 1.5, K 2.8, CT abd/pelvis showing signs of possible cystitis, and UA 2+leukocytes. Patient was treated with IVFs, K supplements, antiemetics, and Bentyl in the ER. She was discharged home with Keflex for a UTI and Zofran and phenergan for nausea and instructed to f/u with her PCP or GI. Last colonoscopy in December 2019 showed hemorrhoids and mild colitis. Patient has been taking Zofran and Levsin over the weekend and was finally able to eat toast and drink some water today. She started taking Keflex over the weekend. However she still c/o LLQ abdominal spasms, nausea, and chills. She denies urinary complaints and fever. Last BM was this morning and described as "dark". She came to her appt in a wheelchair because she feels weak.       PAST MEDICAL HISTORY:  Past Medical History:   Diagnosis Date    Acquired bronchiectasis     due to history of TB - followed by pulmonary, Dr. Zuñiga    Allergy     Amblyopia     rt eye per pt    Anemia of other chronic disease     Anticoagulant long-term use     Anxiety     Cataract     Chronic obstructive pulmonary disease with acute exacerbation     Clotting disorder     COPD (chronic obstructive pulmonary disease)     Depression     Diverticulosis     Essential tremor     GERD (gastroesophageal reflux disease)     Hemangioma of liver     History of pulmonary embolus (PE)     History of tuberculosis 1978    Hypertension     Mixed anxiety and depressive disorder     On home oxygen therapy     Psoriasis     PSVT (paroxysmal supraventricular tachycardia)     Pulmonary embolism     S/P PICC central line placement Apr. 2016 - May 2016    Skin disease     Psoriasis    Spondylosis without myelopathy 8/23/2013    Supraventricular " tachycardia     Tachycardia     Thoracic aorta atherosclerosis     noted on CT scan of chest 1/3/2011    Tuberculosis     Vaginal delivery     x2    Vitamin D deficiency        PAST SURGICAL HISTORY:  Past Surgical History:   Procedure Laterality Date    CATARACT EXTRACTION W/  INTRAOCULAR LENS IMPLANT  12    od dr spain    CATARACT EXTRACTION W/  INTRAOCULAR LENS IMPLANT  12    left eye    CHOLECYSTECTOMY      COLONOSCOPY N/A 2019    Procedure: COLONOSCOPY;  Surgeon: Franco Charles MD;  Location: [address]);  Service: Endoscopy;  Laterality: N/A;  ok to hold Eliquis 2days prior per     ESOPHAGOGASTRODUODENOSCOPY N/A 2019    Procedure: EGD (ESOPHAGOGASTRODUODENOSCOPY);  Surgeon: Franco Charles MD;  Location: [address]);  Service: Endoscopy;  Laterality: N/A;  ok to hold Eliquis 2days prior per     EYE SURGERY      GALLBLADDER SURGERY      RADIOFREQUENCY THERMOCOAGULATION Left 2018    Procedure: RADIOFREQUENCY THERMAL COAGULATION;  Surgeon: Issac Meyers MD;  Location: [facility];  Service: Pain Management;  Laterality: Left;  OK to hold Eliquis 3 days prior per Dr. rBaga       SOCIAL HISTORY:  Social History     Socioeconomic History    Marital status:      Spouse name: Not on file    Number of children: 2    Years of education: Not on file    Highest education level: Not on file   Occupational History    Occupation: housewife   Social Needs    Financial resource strain: Not very hard    Food insecurity     Worry: Never true     Inability: Never true    Transportation needs     Medical: No     Non-medical: No   Tobacco Use    Smoking status: Former Smoker     Packs/day: 2.00     Years: 50.00     Pack years: 100.00     Types: Cigarettes     Quit date: 2009     Years since quittin.4    Smokeless tobacco: Never Used   Substance and Sexual Activity    Alcohol use: Not Currently     Frequency:  Never     Binge frequency: Never    Drug use: Never    Sexual activity: Yes     Partners: Male   Lifestyle    Physical activity     Days per week: 0 days     Minutes per session: 0 min    Stress: Very much   Relationships    Social connections     Talks on phone: More than three times a week     Gets together: Once a week     Attends Shinto service: Not on file     Active member of club or organization: No     Attends meetings of clubs or organizations: Never     Relationship status:    Other Topics Concern    Are you pregnant or think you may be? No    Breast-feeding No   Social History Narrative    ** Merged History Encounter **         One child  of a heart attack at age 35.       FAMILY HISTORY:  Family History   Problem Relation Age of Onset    Hypertension Mother     Heart attack Mother     Cancer Father         liver and bladder    Hyperlipidemia Sister     Psoriasis Sister     Cancer Brother         liver    Stroke Maternal Uncle     Cancer Maternal Aunt         stomach    Lung cancer Sister     Heart attack Brother     Heart attack Son     Amblyopia Neg Hx     Blindness Neg Hx     Cataracts Neg Hx     Glaucoma Neg Hx     Macular degeneration Neg Hx     Retinal detachment Neg Hx     Strabismus Neg Hx     Thyroid disease Neg Hx     Melanoma Neg Hx     Lupus Neg Hx     Eczema Neg Hx     COPD Neg Hx     Colon cancer Neg Hx        ALLERGIES AND MEDICATIONS: updated and reviewed.  Review of patient's allergies indicates:   Allergen Reactions    Adhesive Other (See Comments)     Tears up the skin and makes it itch   (leads)    Bactrim [sulfamethoxazole-trimethoprim] Rash     Was hospitalized for rash    Restasis [cyclosporine]     Lipitor [atorvastatin] Other (See Comments)     Muscle aches    Albuterol Other (See Comments)     tremors    Topamax [topiramate]      - made her feel 'bad in the head'     Current Outpatient Medications   Medication Sig Dispense Refill     ALPRAZolam (XANAX) 0.25 MG tablet Take 1 tablet (0.25 mg total) by mouth nightly as needed for Anxiety. 30 tablet 0    amoxicillin-clavulanate 500-125mg (AUGMENTIN) 500-125 mg Tab Take one tablet daily for 7days in the middle of the month alternating with tetracycline for 7 days (Patient not taking: Reported on 8/28/2020) 14 tablet 4    amoxicillin-clavulanate 875-125mg (AUGMENTIN) 875-125 mg per tablet Take 1 tablet by mouth every 8 (eight) hours. for 7 days 21 tablet 0    apixaban (ELIQUIS) 5 mg Tab Take 1 tablet (5 mg total) by mouth 2 (two) times daily. 180 tablet 3    cephALEXin (KEFLEX) 500 MG capsule Take 1 capsule (500 mg total) by mouth 2 (two) times a day. for 7 days 14 capsule 0    chlorthalidone (HYGROTEN) 25 MG Tab Take 1 tablet (25 mg total) by mouth once daily. 90 tablet 3    desoximetasone (TOPICORT) 0.25 % cream APPLY  CREAM EXTERNALLY TWICE DAILY AS NEEDED 15 g 3    ergocalciferol (ERGOCALCIFEROL) 50,000 unit Cap Take 1 capsule by mouth once a week 4 capsule 0    escitalopram oxalate (LEXAPRO) 20 MG tablet Take 1 tablet by mouth once daily 90 tablet 0    fluocinolone (DERMA-SMOOTHE) 0.01 % external oil APPLY TOPICALLY ONCE DAILY 119 mL 5    fluocinonide (LIDEX) 0.05 % external solution APPLY SOLUTION TOPICALLY ONCE DAILY. APPLY AFTER SHAMPOOING 60 mL 0    folic acid (FOLVITE) 1 MG tablet Take 1 tablet (1,000 mcg total) by mouth once daily. 100 tablet 3    gabapentin (NEURONTIN) 300 MG capsule Take 2 capsules (600 mg total) by mouth 3 (three) times daily. 540 capsule 3    guaiFENesin (MUCINEX) 600 mg 12 hr tablet Take 1,200 mg by mouth 2 (two) times daily.      hyoscyamine (ANASPAZ,LEVSIN) 0.125 mg Tab TAKE 1 TABLET BY MOUTH EVERY 6 HOURS AS NEEDED 30 tablet 0    ipratropium (ATROVENT) 0.02 % nebulizer solution INHALE 1 VIAL IN NEBULIZER 3 TO 4 TIMES DAILY AS NEEDED FOR WHEEZING 150 mL 0    ketoconazole (NIZORAL) 2 % shampoo Apply topically once daily. 120 mL 5    Lactobacillus  rhamnosus GG (CULTURELLE) 10 billion cell capsule Take 1 capsule by mouth once daily.      loperamide (IMODIUM) 2 mg capsule Take 2 capsules after first loose stool, then 1 capsule after each loose stool following. Do not exceed 8 capsules per day. 12 capsule 1    metroNIDAZOLE (FLAGYL) 500 MG tablet Take 1 tablet (500 mg total) by mouth every 8 (eight) hours. for 7 days 21 tablet 0    mirtazapine (REMERON) 15 MG tablet Take 1 tablet (15 mg total) by mouth every evening. 30 tablet 11    multivitamin (THERAGRAN) per tablet Take 1 tablet by mouth once daily.      omeprazole (PRILOSEC) 20 MG capsule TAKE 1 CAPSULE BY MOUTH ONCE DAILY AS NEEDED FOR HEARTBURN 90 capsule 1    ondansetron (ZOFRAN-ODT) 4 MG TbDL Take 1 tablet (4 mg total) by mouth every 6 (six) hours as needed (Nausea). 20 tablet 0    ondansetron (ZOFRAN-ODT) 8 MG TbDL Take 1 tablet (8 mg total) by mouth every 8 (eight) hours as needed. 30 tablet 3    potassium bicarbonate (K-LYTE) disintegrating tablet Take 1 tablet (25 mEq total) by mouth once daily. for 3 days 3 tablet 0    promethazine (PHENERGAN) 12.5 MG Supp Place 1 suppository (12.5 mg total) rectally every 6 (six) hours as needed. 10 suppository 1    promethazine (PHENERGAN) 12.5 MG Tab Take 1 tablet (12.5 mg total) by mouth every 6 (six) hours as needed. 10 tablet 1    promethazine (PHENERGAN) 12.5 MG Tab Take 1 tablet (12.5 mg total) by mouth every 6 (six) hours as needed (Severe/persistent nausea). 10 tablet 0    propylene glycol (SYSTANE BALANCE) 0.6 % Drop Apply 1 drop to eye daily as needed (dry eye).      sodium chloride 2% (XIOMARA 128) 2 % ophthalmic solution Place 1 drop into both eyes 3 (three) times daily as needed.      sodium chloride 3% 3 % nebulizer solution USE 4 ML IN NEBULIZER  TWICE DAILY 240 mL 0    tetracycline (ACHROMYCIN,SUMYCIN) 500 MG capsule Take one tablet daily for a week in the middle of the month and alternate the next month with augmentin for 7 days  "(Patient not taking: Reported on 8/28/2020) 14 capsule 4    umeclidinium-vilanteroL (ANORO ELLIPTA) 62.5-25 mcg/actuation DsDv Inhale 1 puff into the lungs once daily. Controller 3 each 3    verapamiL (VERELAN) 180 MG C24P Take 1 capsule by mouth twice daily 90 capsule 3     No current facility-administered medications for this visit.          ROS  Review of Systems   Constitutional: Positive for appetite change and chills. Negative for activity change, fever and unexpected weight change.   Gastrointestinal: Positive for abdominal pain, blood in stool ("dark" stools) and nausea. Negative for diarrhea and vomiting.   Genitourinary: Negative for difficulty urinating, dysuria, frequency and urgency.   Skin: Negative for color change and pallor.   Neurological: Positive for weakness. Negative for dizziness.   Psychiatric/Behavioral: Negative for confusion and dysphoric mood.           Physical Exam  Vitals:    10/20/20 1201   BP: (!) 140/67   Pulse: 92   Resp: 18   SpO2: 97%   Weight: 70 kg (154 lb 3.4 oz)   Height: 5' 2" (1.575 m)    Body mass index is 28.21 kg/m².  Weight: 70 kg (154 lb 3.4 oz)   Height: 5' 2" (157.5 cm)   Physical Exam  Constitutional:       General: She is not in acute distress.     Appearance: Normal appearance.   HENT:      Head: Normocephalic and atraumatic.      Mouth/Throat:      Mouth: Mucous membranes are dry.      Pharynx: Oropharynx is clear.   Eyes:      General: No scleral icterus.     Conjunctiva/sclera: Conjunctivae normal.   Neck:      Musculoskeletal: Neck supple.   Cardiovascular:      Rate and Rhythm: Normal rate and regular rhythm.      Heart sounds: Normal heart sounds.   Pulmonary:      Effort: Pulmonary effort is normal. No respiratory distress.      Breath sounds: Normal breath sounds.   Abdominal:      General: Abdomen is flat. Bowel sounds are normal.      Palpations: Abdomen is soft.      Tenderness: There is abdominal tenderness in the left upper quadrant and left lower " quadrant. Negative signs include Boothe's sign and McBurney's sign.   Lymphadenopathy:      Cervical: No cervical adenopathy.   Skin:     General: Skin is warm and dry.   Neurological:      General: No focal deficit present.      Mental Status: She is alert and oriented to person, place, and time.   Psychiatric:         Mood and Affect: Mood normal.         Behavior: Behavior normal.         Thought Content: Thought content normal.             Health Maintenance       Date Due Completion Date    TETANUS VACCINE 01/19/2019 1/19/2009    Override on 1/19/2009: Done    LDCT Lung Screen 03/09/2021 3/9/2020    DEXA SCAN 04/10/2021 4/10/2018    Lipid Panel 08/25/2021 8/25/2020    Mammogram 12/27/2021 12/27/2019    Colorectal Cancer Screening 12/04/2029 12/4/2019

## 2020-10-20 NOTE — TELEPHONE ENCOUNTER
MA spoke with to. Pt stated she was given the 2 abx for UTI . Pt stated she was told to f/u in the hospital with gastro for abd pain and nausea , pt saw pcp today . Pt is fully aware that the next available in clinic or virtual appt is for 10/27 at 3:40 . Pt accepted virtual appt and verbalized all understanding .Pt answered all virtual scheduling questions.        ----- Message from Charlene Domingo sent at 10/20/2020  1:55 PM CDT -----  Contact: [phone]  Caller says she was just discharged from [facility] last night hospital.      Also pcp saw her today.   Problem could be her colon or she might have a UTI.   (no Access)    Asking if she could have a virtual with you THIS week or asap, or to come in and see you.   Pls call to discuss this further.    Was told to call your office today for this.

## 2020-10-20 NOTE — PATIENT INSTRUCTIONS
"· Please submit a stool sample to be tested BEFORE starting antibiotics. We will also repeat your CMP.  · Stop taking Keflex. Take Augmentin and Flagyl every 8 hours for 7 days.   · Drink plenty of water with some Gatorade. Try to eat something small before you take your antibiotic.   · Continue Prilosec, Zofran, and phenergan.  · Contact your GI doctor about your symptoms.   · Follow up in 1-2 weeks if your symptoms do not improve.       [place] Diet  Your healthcare provider may recommend a bland diet if you have an upset stomach. It consists of foods that are mild and easy to digest. It is better to eat small frequent meals rather than 3 large meals a day.    Beverages  OK: Fruit juices, non-caffeinated teas and coffee, non-carbonated huynh  Avoid: Carbonated beverage, caffeinated tea and coffee, all alcoholic beverages  Bread  OK: Refined white, wheat or rye bread, randy or soda crackers, [place] toast, plain rolls, bagels  Avoid: Whole-grain bread  Cereal  OK: Refined cereals: cooked or ready to eat  Avoid: Whole-grain cereals and granola, or those containing bran, seeds or nuts  Desserts  OK: Peanut butter and all others except those to "avoid"  Avoid: Chocolate, cocoa, coconut, popcorn, nuts, seeds, jam, marmalade  Fruits  OK: Canned, cooked, frozen or fresh fruits without seeds or tough skin  Avoid: Olives, skin and seeds of fruit  Meats  OK: All fresh or preserved meat, fish and fowl  Avoid: Any that are prepared with those spices to "avoid"  Cheese and eggs  OK: Eggs, cottage cheese, cream cheese, other cheeses  Avoid: All cheeses made with those spices to "avoid"  Potatoes and pasta  OK: Potato, rice, macaroni, noodles, spaghetti  Avoid: None  Soups  OK: All soups without heavy seasoning  Avoid: Soups made with those spices to "avoid"  Vegetables  OK: Canned, cooked, fresh or frozen mildly flavored vegetables without seeds, skins or coarse fiber  Avoid: Vegetables prepared with those spices to "avoid"; skin " and seeds of vegetables and those with coarse fiber  Spices  OK: Salt, lemon and lime juice, vinegar, all extracts, yordan, cinnamon, thyme, mace, allspice, paprika  Avoid: Chili powder, cloves, pepper, seed spices, garlic, gravy pickles, highly seasoned salad dressings  Date Last Reviewed: 11/20/2015  © 1496-4180 [employer]. [address] [place] [address]. All rights reserved. This information is not intended as a substitute for professional medical care. Always follow your healthcare professional's instructions.

## 2020-10-20 NOTE — DISCHARGE INSTRUCTIONS
Take antibiotics as prescribed, try to take with meals to limit nausea.  Zofran for nausea, Phenergan for severe or persistent nausea. Be aware, this medication is sedating.  Do not mix with alcohol or any other sedating medications.  Do not drive or operate machinery when taking this medication.  Continue taking Levsin as needed for abdominal cramping.    Follow-up with Cardiology for evaluation of abnormal EKG.  Take potassium daily for the next 3 days.    Please follow-up with primary for re-evaluation.  Follow-up with Gastroenterology should your symptoms persist.  Return to this ED if still unable to eat or drink, if you begin to feel worsening fatigue or weakness, if you begin with fever, if you begin with black or bloody stools, if uncontrolled vomiting, if any other problems occur.

## 2020-10-21 ENCOUNTER — LAB VISIT (OUTPATIENT)
Dept: LAB | Facility: HOSPITAL | Age: 70
End: 2020-10-21
Attending: FAMILY MEDICINE
Payer: MEDICARE

## 2020-10-21 ENCOUNTER — TELEPHONE (OUTPATIENT)
Dept: FAMILY MEDICINE | Facility: CLINIC | Age: 70
End: 2020-10-21

## 2020-10-21 DIAGNOSIS — K92.1 MELENA: ICD-10-CM

## 2020-10-21 DIAGNOSIS — E87.6 HYPOKALEMIA: Primary | ICD-10-CM

## 2020-10-21 DIAGNOSIS — R10.32 LLQ ABDOMINAL PAIN: ICD-10-CM

## 2020-10-21 LAB
BACTERIA UR CULT: ABNORMAL
C DIFF GDH STL QL: POSITIVE
C DIFF TOX A+B STL QL IA: NEGATIVE
OB PNL STL: NEGATIVE
WBC #/AREA STL HPF: NORMAL /[HPF]

## 2020-10-21 PROCEDURE — 87449 NOS EACH ORGANISM AG IA: CPT

## 2020-10-21 PROCEDURE — 87209 SMEAR COMPLEX STAIN: CPT

## 2020-10-21 PROCEDURE — 82272 OCCULT BLD FECES 1-3 TESTS: CPT

## 2020-10-21 PROCEDURE — 89055 LEUKOCYTE ASSESSMENT FECAL: CPT

## 2020-10-21 PROCEDURE — 87329 GIARDIA AG IA: CPT

## 2020-10-21 PROCEDURE — 87045 FECES CULTURE AEROBIC BACT: CPT

## 2020-10-21 PROCEDURE — 87493 C DIFF AMPLIFIED PROBE: CPT

## 2020-10-21 PROCEDURE — 87427 SHIGA-LIKE TOXIN AG IA: CPT

## 2020-10-21 PROCEDURE — 87046 STOOL CULTR AEROBIC BACT EA: CPT | Mod: 59

## 2020-10-21 PROCEDURE — 87324 CLOSTRIDIUM AG IA: CPT

## 2020-10-21 RX ORDER — POTASSIUM CHLORIDE 20 MEQ/1
20 TABLET, EXTENDED RELEASE ORAL 2 TIMES DAILY
Qty: 60 TABLET | Refills: 1 | Status: SHIPPED | OUTPATIENT
Start: 2020-10-21 | End: 2020-12-16

## 2020-10-21 NOTE — TELEPHONE ENCOUNTER
Patient was informed of K still low at 2.8. Recommended KCl 40 meq a day and recheck CMP in a week. She is on chlorthalidone which can lower K. Low appetite has also been contributing. Patient states that she started her antibiotics yesterday and is feeling much better. Stool studies pending.

## 2020-10-22 ENCOUNTER — TELEPHONE (OUTPATIENT)
Dept: FAMILY MEDICINE | Facility: CLINIC | Age: 70
End: 2020-10-22

## 2020-10-22 DIAGNOSIS — I47.10 PSVT (PAROXYSMAL SUPRAVENTRICULAR TACHYCARDIA): Primary | ICD-10-CM

## 2020-10-22 LAB
C DIFF TOX GENS STL QL NAA+PROBE: NEGATIVE
CRYPTOSP AG STL QL IA: NEGATIVE
E COLI SXT1 STL QL IA: NEGATIVE
E COLI SXT2 STL QL IA: NEGATIVE
G LAMBLIA AG STL QL IA: NEGATIVE

## 2020-10-22 RX ORDER — METRONIDAZOLE 500 MG/1
500 TABLET ORAL EVERY 8 HOURS
Qty: 9 TABLET | Refills: 0 | Status: SHIPPED | OUTPATIENT
Start: 2020-10-22 | End: 2020-10-25

## 2020-10-22 NOTE — TELEPHONE ENCOUNTER
Pt's stool studies show unequivocal C.diff. Stool ova/parasites still in process. Pt was informed and recommended to extend Flagyl course to 10 days - sent in 3 additional days to pharmacy. She states that she is still feeling better but having diarrhea. Instructed to D/C Augmentin. Will follow up once other study results.

## 2020-10-23 ENCOUNTER — OFFICE VISIT (OUTPATIENT)
Dept: CARDIOLOGY | Facility: CLINIC | Age: 70
End: 2020-10-23
Payer: MEDICARE

## 2020-10-23 VITALS
DIASTOLIC BLOOD PRESSURE: 56 MMHG | HEART RATE: 66 BPM | SYSTOLIC BLOOD PRESSURE: 115 MMHG | OXYGEN SATURATION: 91 % | HEIGHT: 62 IN | BODY MASS INDEX: 28.93 KG/M2 | WEIGHT: 157.19 LBS

## 2020-10-23 DIAGNOSIS — I49.8 JUNCTIONAL RHYTHM: ICD-10-CM

## 2020-10-23 DIAGNOSIS — J44.9 CHRONIC OBSTRUCTIVE PULMONARY DISEASE, UNSPECIFIED COPD TYPE: Primary | ICD-10-CM

## 2020-10-23 DIAGNOSIS — Z86.79 HISTORY OF PSVT (PAROXYSMAL SUPRAVENTRICULAR TACHYCARDIA): Chronic | ICD-10-CM

## 2020-10-23 DIAGNOSIS — R07.89 NON-CARDIAC CHEST PAIN: ICD-10-CM

## 2020-10-23 DIAGNOSIS — I47.19 ECTOPIC ATRIAL TACHYCARDIA: ICD-10-CM

## 2020-10-23 LAB — O+P STL MICRO: NORMAL

## 2020-10-23 PROCEDURE — 3074F PR MOST RECENT SYSTOLIC BLOOD PRESSURE < 130 MM HG: ICD-10-PCS | Mod: CPTII,S$GLB,, | Performed by: INTERNAL MEDICINE

## 2020-10-23 PROCEDURE — 3078F DIAST BP <80 MM HG: CPT | Mod: CPTII,S$GLB,, | Performed by: INTERNAL MEDICINE

## 2020-10-23 PROCEDURE — 3074F SYST BP LT 130 MM HG: CPT | Mod: CPTII,S$GLB,, | Performed by: INTERNAL MEDICINE

## 2020-10-23 PROCEDURE — 1101F PR PT FALLS ASSESS DOC 0-1 FALLS W/OUT INJ PAST YR: ICD-10-PCS | Mod: CPTII,S$GLB,, | Performed by: INTERNAL MEDICINE

## 2020-10-23 PROCEDURE — 99999 PR PBB SHADOW E&M-EST. PATIENT-LVL V: ICD-10-PCS | Mod: PBBFAC,,, | Performed by: INTERNAL MEDICINE

## 2020-10-23 PROCEDURE — 99214 OFFICE O/P EST MOD 30 MIN: CPT | Mod: S$GLB,,, | Performed by: INTERNAL MEDICINE

## 2020-10-23 PROCEDURE — 3078F PR MOST RECENT DIASTOLIC BLOOD PRESSURE < 80 MM HG: ICD-10-PCS | Mod: CPTII,S$GLB,, | Performed by: INTERNAL MEDICINE

## 2020-10-23 PROCEDURE — 3008F BODY MASS INDEX DOCD: CPT | Mod: CPTII,S$GLB,, | Performed by: INTERNAL MEDICINE

## 2020-10-23 PROCEDURE — 99214 PR OFFICE/OUTPT VISIT, EST, LEVL IV, 30-39 MIN: ICD-10-PCS | Mod: S$GLB,,, | Performed by: INTERNAL MEDICINE

## 2020-10-23 PROCEDURE — 1101F PT FALLS ASSESS-DOCD LE1/YR: CPT | Mod: CPTII,S$GLB,, | Performed by: INTERNAL MEDICINE

## 2020-10-23 PROCEDURE — 1126F PR PAIN SEVERITY QUANTIFIED, NO PAIN PRESENT: ICD-10-PCS | Mod: S$GLB,,, | Performed by: INTERNAL MEDICINE

## 2020-10-23 PROCEDURE — 1159F PR MEDICATION LIST DOCUMENTED IN MEDICAL RECORD: ICD-10-PCS | Mod: S$GLB,,, | Performed by: INTERNAL MEDICINE

## 2020-10-23 PROCEDURE — 1126F AMNT PAIN NOTED NONE PRSNT: CPT | Mod: S$GLB,,, | Performed by: INTERNAL MEDICINE

## 2020-10-23 PROCEDURE — 3008F PR BODY MASS INDEX (BMI) DOCUMENTED: ICD-10-PCS | Mod: CPTII,S$GLB,, | Performed by: INTERNAL MEDICINE

## 2020-10-23 PROCEDURE — 1159F MED LIST DOCD IN RCRD: CPT | Mod: S$GLB,,, | Performed by: INTERNAL MEDICINE

## 2020-10-23 PROCEDURE — 99999 PR PBB SHADOW E&M-EST. PATIENT-LVL V: CPT | Mod: PBBFAC,,, | Performed by: INTERNAL MEDICINE

## 2020-10-23 NOTE — LETTER
October 23, 2020      Edmundo Ruiz PA-C  [address]  [place] [address]           [facility] - Cardiology  [address]  [place] [address]  Phone: [phone]          Patient: Yasmin Asencio   MR Number: [id number]   YOB: 1950   Date of Visit: 10/23/2020       Dear Edmundo Ruiz:    Thank you for referring Yasmin Asencio to me for evaluation. Attached you will find relevant portions of my assessment and plan of care.    If you have questions, please do not hesitate to call me. I look forward to following Yasmin Asencio along with you.    Sincerely,    Taurus Braga MD    Enclosure  CC:  No Recipients    If you would like to receive this communication electronically, please contact [email or web address] or [phone] to request more information on [employer] access.    For providers and/or their staff who would like to refer a patient to [facility], please contact us through our one-stop-shop provider referral line, [facility], at [phone].    If you feel you have received this communication in error or would no longer like to receive these types of communications, please e-mail [email or web address]

## 2020-10-23 NOTE — PROGRESS NOTES
"Subjective:    Patient ID:  Yasmin Asencio is a 70 y.o. female who presents for follow-up of Hospital Follow Up      HPI     PMH of pulmonary hemorrhage,S/P embolization in 11/2015 and pseudomonas pneumonia in 2016,has been treated with IV Abx, hypertension and COPD,atrial tachycardia,depression,chronic slurred speech     SVT ablation 7/24/17 6/16/16 [facility] EDP 18, EF 55%, normal coronaries  Medical Rx    EF does not appear depressed - ? Recent echo result     Echo 11/7/18  · The left ventricle cavity is normal.  · Left ventricle ejection fraction is normal at 60%  · Left atrium is mildly dilated.  · Normal LV diastolic function.  · Normal central venous pressure (3 mm Hg).     Holter 3/28/18  1. Sinus rhythm with heart rates varying between 58 and 108 bpm with an average of 78 bpm.     VENTRICULAR ARRHYTHMIAS  1. There were very rare PVCs totalling 39 and averaging 1 per hour.     2. There were no episodes of ventricular tachycardia.    SUPRA VENTRICULAR ARRHYTHMIAS  1. There were occasional PACs totalling 673 and averaging 28 per hour.  There were 29 couplets.    2. There were no episodes of sustained supraventricular tachycardia.        5/30/19 Having some issues with anxiety and tremors  Denies palpitations  EKG accelerated junctional 70s NSSTT changes     Saw Dr Ballesteros 11/22/19  69 y.o. F  pulm hemorrhage 11/15, s/p coiling  pseudomonas PNA 2016, s/p extended Abx  COPD, bronchiectasis (home O2 frequently)  HTN on meds  chronic slurred speech, thought due to essential tremor per neuro  hx "AF" destini-lung-coiling procedure (no documentation)  pSVT (s/p AVRNT ablation; likely focal AT remains)     Had palpitations with HR >140 bpm at random intervals for past 10 years. Recently about 1x/month.   WIth palps, gets blurry vision, chest palps with radiation to the neck. Her "blood runs cold" and lasts for 20-35 mins.  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 We also saw a likely focal AT at that procedure also; not " ablated due to not able to reinduce.  Since, feeling much better. Does get short palpitations. Event monitor showed short NSAT and one sustained SVT c/w AT. These episodes don't bother her nearly as much as the AVNRT did.     She did well on verapamil for a bit, but developed palps again. HR to 150s. Sx abated after increasing verapamil to 180 bid.  She feels well with regard to heart.     cath 6/16 neg  echo 11/16 50-55% LVEF -> 11/2017 55-60%. -> 2019 60%, severe LAE  Holter 2/17: SR . no SVT.     My interpretation of today's ECG is SR with APCs on rhythm strip, with periods of likely AT with 2:1 conduction (well rate controlled). Completely asx.     12/2/19 Denies CP, SOB, or palpitations  Still with resting tremor    Went to ER 10/19/20  70-year-old female with history of GERD, COPD on home oxygen, acquired bronchiectasis, hypertension, PE 2017 on Eliquis, recurrence Pseudomonas lung infection, clotting disorder, essential tremor, anemia, depression, PSVT, presents to ED complaining of left lower quadrant abdominal pain, nausea, decreased appetite and intake over the past week.     Patient states she began approximately 7 days ago with intermittent nausea and left lower quadrant burning/sharp pain.  She admits to infrequent spasms of pain.  Pain initially followed meals.  Symptoms became constant on Thursday evening, having constant since then.  She admits to decreased appetite and intake secondary to nausea over the past 5 days.  Denies emesis.  No fever but she does admit to chills.     She denies any worsening of her chronic cough, denies shortness of breath or chest pain.  She denies abdominal distention.  She denies any urinary complaints or flank pain.  She admits to history of waxing waning diarrhea for which she has seen GI in the past.  She admits to loose/watery stools earlier this week, however since Thursday stools have been small and a little more firm.  Initially there was increased frequency  of bowel movements, however since decreased intake, has become more infrequent.  She denies melena or hematochezia.  She has been taking Levsin with some relief of left lower abdominal painful spasms.  She has had pain similar to this in the past.     Recent EGD and colonoscopy in December of 2019 unremarkable.  No history of diverticulosis or diverticulitis.  There were some hemorrhoids found on perianal exam, however the patient denies tenesmus or rectal pain.  She denies any recent illness or sick contacts.  No night sweats.  He does feel generally weak over the past few days.     EKG AT 2:1 NSSTT changes    10/23/20 Denies CP. Stable CH  Possible C-diff per patient      Review of Systems   Constitution: Negative for decreased appetite.   HENT: Negative for ear discharge.    Eyes: Negative for blurred vision.   Respiratory: Negative for hemoptysis.    Endocrine: Negative for polyphagia.   Hematologic/Lymphatic: Negative for adenopathy.   Skin: Negative for color change.   Musculoskeletal: Negative for joint swelling.   Genitourinary: Negative for bladder incontinence.   Neurological: Negative for brief paralysis.   Psychiatric/Behavioral: Negative for hallucinations.   Allergic/Immunologic: Negative for hives.        Objective:    Physical Exam   Constitutional: She is oriented to person, place, and time. She appears well-developed and well-nourished.   HENT:   Head: Normocephalic and atraumatic.   Eyes: Pupils are equal, round, and reactive to light. Conjunctivae are normal.   Neck: Normal range of motion. Neck supple.   Cardiovascular: Normal rate, normal heart sounds and intact distal pulses.   Pulmonary/Chest: Effort normal and breath sounds normal.   Abdominal: Soft. Bowel sounds are normal.   Musculoskeletal: Normal range of motion.   Neurological: She is alert and oriented to person, place, and time.   Skin: Skin is warm and dry.         Assessment:       1. Chronic obstructive pulmonary disease,  unspecified COPD type    2. Junctional rhythm    3. History of PSVT (paroxysmal supraventricular tachycardia)    4. Ectopic atrial tachycardia    5. Non-cardiac chest pain         Plan:       AT with 2:1 block noted on recent EKG - rate controlled and asymptomatic  Keep f/u with EP   OV here 6 months with echo

## 2020-10-26 ENCOUNTER — PATIENT OUTREACH (OUTPATIENT)
Dept: ADMINISTRATIVE | Facility: OTHER | Age: 70
End: 2020-10-26

## 2020-10-27 ENCOUNTER — OFFICE VISIT (OUTPATIENT)
Dept: GASTROENTEROLOGY | Facility: CLINIC | Age: 70
End: 2020-10-27
Payer: MEDICARE

## 2020-10-27 ENCOUNTER — TELEPHONE (OUTPATIENT)
Dept: GASTROENTEROLOGY | Facility: CLINIC | Age: 70
End: 2020-10-27

## 2020-10-27 DIAGNOSIS — R19.7 DIARRHEA, UNSPECIFIED TYPE: ICD-10-CM

## 2020-10-27 DIAGNOSIS — R11.2 NAUSEA AND VOMITING, INTRACTABILITY OF VOMITING NOT SPECIFIED, UNSPECIFIED VOMITING TYPE: Primary | ICD-10-CM

## 2020-10-27 DIAGNOSIS — R10.9 ABDOMINAL PAIN, UNSPECIFIED ABDOMINAL LOCATION: ICD-10-CM

## 2020-10-27 LAB — BACTERIA STL CULT: NORMAL

## 2020-10-27 PROCEDURE — 99213 PR OFFICE/OUTPT VISIT, EST, LEVL III, 20-29 MIN: ICD-10-PCS | Mod: 95,,, | Performed by: NURSE PRACTITIONER

## 2020-10-27 PROCEDURE — 1101F PT FALLS ASSESS-DOCD LE1/YR: CPT | Mod: CPTII,95,, | Performed by: NURSE PRACTITIONER

## 2020-10-27 PROCEDURE — 99213 OFFICE O/P EST LOW 20 MIN: CPT | Mod: 95,,, | Performed by: NURSE PRACTITIONER

## 2020-10-27 PROCEDURE — 1101F PR PT FALLS ASSESS DOC 0-1 FALLS W/OUT INJ PAST YR: ICD-10-PCS | Mod: CPTII,95,, | Performed by: NURSE PRACTITIONER

## 2020-10-27 PROCEDURE — 1159F PR MEDICATION LIST DOCUMENTED IN MEDICAL RECORD: ICD-10-PCS | Mod: 95,,, | Performed by: NURSE PRACTITIONER

## 2020-10-27 PROCEDURE — 1159F MED LIST DOCD IN RCRD: CPT | Mod: 95,,, | Performed by: NURSE PRACTITIONER

## 2020-10-27 RX ORDER — HYOSCYAMINE SULFATE 0.125 MG
125 TABLET ORAL EVERY 6 HOURS PRN
Qty: 30 TABLET | Refills: 3 | Status: ON HOLD | OUTPATIENT
Start: 2020-10-27 | End: 2021-12-17 | Stop reason: HOSPADM

## 2020-10-27 NOTE — TELEPHONE ENCOUNTER
MA contacted pt for virtual visit at 3:40 . Pt stated she did not have any trouble with completing the pre-check . MA went over all rooming questions. Pt is aware if she is not on by 3:45 , MA will call to see if she is having any trouble with logging on. Pt verbalized all understanding.

## 2020-10-27 NOTE — PROGRESS NOTES
[facility] Consultation Note    Reason for Consult:  The primary encounter diagnosis was Nausea and vomiting, intractability of vomiting not specified, unspecified vomiting type. Diagnoses of Diarrhea, unspecified type and Abdominal pain, unspecified abdominal location were also pertinent to this visit.    PCP:   Urmila Luna       Referring MD:  No referring provider defined for this encounter.    Initial HPI 7/2019:  This is a 70 y.o. female here for evaluation of diarrhea, nausea, and abdominal pain.     Started a couple of months ago. She has had these symptoms in the past. Has been seen by GI in the past in 2013. Things have been stable since then.  Sx are not every day. Cant correlate anything to the sx.  The abdominal pain starts. Starts After meals 30 minutes to a 3 hours later. Always will have the diarrhea after abdominal pain.  May have 1-2 BM per day. She can have 3-4 BMs during the abdominal pain episodes.   When she is having the diarrhea is when she gets the nausea and may have some vomiting occasionally.   Gets these episodes a couple times a week.   No nocturnal sx.   Does not have a fever with this. No hematochezia, melena. No hematemesis.   No dysphagia.    She routinely takes Abx for her chronic respiratory infections. Several times a year she takes Abx. Sometimes she has to get PICC lines for IV abx.  Was taking culturelle. Not taking now.   Reports pyrosis. Takes omeprazole. Well controlled. When she tried to get off her Sx return.    Cholestyramine did not help in the past, but she tried it while she still had her gallbladder.     She reports she went to the ER 6/26/19    Interval hx 2/20/20  She went to the ER a couple of days ago for nausea, vomiting and diarrhea after she ate lasagna which is something she typically does not ER regular basis.  Prior to that she was not having any symptoms.  She has with her  today and they both report that her GI symptoms  only flare when she eats a heavy meal or something very acidic.  She does not feel any acid reflux. Had a lot of belching at night when she missed a couple of days of omeprazole when she was sick..  Other than that reflux is well controlled the omeprazole 20 mg she takes in morning.  Hyoscyamine did not help with abd pain  Having 1 soft BM per day  Taking miralax once a day due to constipation. Chronic.     Interval 10/27/2020  The patient location is:  Patient Home   The chief complaint leading to consultation is: n/v, diarrhea and abd pain  Visit type: Virtual visit with synchronous audio and video  Total time spent with patient: 15 mins  Each patient to whom he or she provides medical services by telemedicine is:  (1) informed of the relationship between the physician and patient and the respective role of any other health care provider with respect to management of the patient; and (2) notified that he or she may decline to receive medical services by telemedicine and may withdraw from such care at any time.      ER follow up. She went to the ER 10/19/20 for nausea and diarrhea and LLQ and abd pain. Prior to that she had eaten fried fish. Labs and CT unremarkable. Thought may have UTI, discharged on Keflex.   On 10/20/20 Went to see Dr. Felix in primary care. Work up with labs and stool studies showed equivocal c. Diff.  She was prescribed an extended course of flagyl, keflex was dc'd.  She reports she started feeling better Tuesday night 10/20/20. She reports she will finish her flagyl this Friday. Denies n/v, diarrhea and abd pain      ROS:  Constitutional: No fevers, chills, No weight loss  ENT: No allergies  CV: No chest pain  Pulm: +cough, productive, +shortness of breath  GI: see HPI  Psych: + anxiety, No depression    Medical History:  has a past medical history of Acquired bronchiectasis, Allergy, Amblyopia, Anemia of other chronic disease, Anticoagulant long-term use, Anxiety, Cataract, Chronic  obstructive pulmonary disease with acute exacerbation, Clotting disorder, COPD (chronic obstructive pulmonary disease), Depression, Diverticulosis, Essential tremor, GERD (gastroesophageal reflux disease), Hemangioma of liver, History of pulmonary embolus (PE), History of tuberculosis (1978), Hypertension, Mixed anxiety and depressive disorder, On home oxygen therapy, Psoriasis, PSVT (paroxysmal supraventricular tachycardia), Pulmonary embolism, S/P PICC central line placement (Apr. 2016 - May 2016), Skin disease, Spondylosis without myelopathy (8/23/2013), Supraventricular tachycardia, Tachycardia, Thoracic aorta atherosclerosis, Tuberculosis, Vaginal delivery, and Vitamin D deficiency.    Surgical History:  has a past surgical history that includes Cataract extraction w/  intraocular lens implant (07/24/12); Cataract extraction w/  intraocular lens implant (08/07/12); Radiofrequency thermocoagulation (Left, 12/18/2018); Cholecystectomy; Eye surgery; Gallbladder surgery; Esophagogastroduodenoscopy (N/A, 12/4/2019); and Colonoscopy (N/A, 12/4/2019).    Family History: family history includes Cancer in her brother, father, and maternal aunt; Heart attack in her brother, mother, and son; Hyperlipidemia in her sister; Hypertension in her mother; Lung cancer in her sister; Psoriasis in her sister; Stroke in her maternal uncle..     Social History:  reports that she quit smoking about 11 years ago. Her smoking use included cigarettes. She has a 100.00 pack-year smoking history. She has never used smokeless tobacco. She reports previous alcohol use. She reports that she does not use drugs.    Review of patient's allergies indicates:   Allergen Reactions    Adhesive Other (See Comments)     Tears up the skin and makes it itch   (leads)    Bactrim [sulfamethoxazole-trimethoprim] Rash     Was hospitalized for rash    Restasis [cyclosporine]     Lipitor [atorvastatin] Other (See Comments)     Muscle aches    Albuterol  Other (See Comments)     tremors    Topamax [topiramate]      - made her feel 'bad in the head'       Current Outpatient Medications on File Prior to Visit   Medication Sig Dispense Refill    ALPRAZolam (XANAX) 0.25 MG tablet Take 1 tablet (0.25 mg total) by mouth nightly as needed for Anxiety. 30 tablet 0    amoxicillin-clavulanate 500-125mg (AUGMENTIN) 500-125 mg Tab Take one tablet daily for 7days in the middle of the month alternating with tetracycline for 7 days 14 tablet 4    amoxicillin-clavulanate 875-125mg (AUGMENTIN) 875-125 mg per tablet Take 1 tablet by mouth every 8 (eight) hours. for 7 days 21 tablet 0    apixaban (ELIQUIS) 5 mg Tab Take 1 tablet (5 mg total) by mouth 2 (two) times daily. 180 tablet 3    chlorthalidone (HYGROTEN) 25 MG Tab Take 1 tablet (25 mg total) by mouth once daily. 90 tablet 3    desoximetasone (TOPICORT) 0.25 % cream APPLY  CREAM EXTERNALLY TWICE DAILY AS NEEDED 15 g 3    ergocalciferol (ERGOCALCIFEROL) 50,000 unit Cap Take 1 capsule by mouth once a week 4 capsule 0    escitalopram oxalate (LEXAPRO) 20 MG tablet Take 1 tablet by mouth once daily 90 tablet 0    fluocinonide (LIDEX) 0.05 % external solution APPLY SOLUTION TOPICALLY ONCE DAILY. APPLY AFTER SHAMPOOING 60 mL 0    folic acid (FOLVITE) 1 MG tablet Take 1 tablet (1,000 mcg total) by mouth once daily. 100 tablet 3    gabapentin (NEURONTIN) 300 MG capsule Take 2 capsules (600 mg total) by mouth 3 (three) times daily. 540 capsule 3    guaiFENesin (MUCINEX) 600 mg 12 hr tablet Take 1,200 mg by mouth 2 (two) times daily.      ipratropium (ATROVENT) 0.02 % nebulizer solution INHALE 1 VIAL IN NEBULIZER 3 TO 4 TIMES DAILY AS NEEDED FOR WHEEZING 150 mL 0    ketoconazole (NIZORAL) 2 % shampoo Apply topically once daily. 120 mL 5    Lactobacillus rhamnosus GG (CULTURELLE) 10 billion cell capsule Take 1 capsule by mouth once daily.      loperamide (IMODIUM) 2 mg capsule Take 2 capsules after first loose stool, then  1 capsule after each loose stool following. Do not exceed 8 capsules per day. 12 capsule 1    metroNIDAZOLE (FLAGYL) 500 MG tablet Take 1 tablet (500 mg total) by mouth every 8 (eight) hours. for 7 days 21 tablet 0    mirtazapine (REMERON) 15 MG tablet Take 1 tablet (15 mg total) by mouth every evening. 30 tablet 11    multivitamin (THERAGRAN) per tablet Take 1 tablet by mouth once daily.      omeprazole (PRILOSEC) 20 MG capsule TAKE 1 CAPSULE BY MOUTH ONCE DAILY AS NEEDED FOR HEARTBURN 90 capsule 1    ondansetron (ZOFRAN-ODT) 4 MG TbDL Take 1 tablet (4 mg total) by mouth every 6 (six) hours as needed (Nausea). 20 tablet 0    ondansetron (ZOFRAN-ODT) 8 MG TbDL Take 1 tablet (8 mg total) by mouth every 8 (eight) hours as needed. 30 tablet 3    potassium chloride SA (K-DUR,KLOR-CON) 20 MEQ tablet Take 1 tablet (20 mEq total) by mouth 2 (two) times daily. 60 tablet 1    promethazine (PHENERGAN) 12.5 MG Supp Place 1 suppository (12.5 mg total) rectally every 6 (six) hours as needed. 10 suppository 1    promethazine (PHENERGAN) 12.5 MG Tab Take 1 tablet (12.5 mg total) by mouth every 6 (six) hours as needed. 10 tablet 1    promethazine (PHENERGAN) 12.5 MG Tab Take 1 tablet (12.5 mg total) by mouth every 6 (six) hours as needed (Severe/persistent nausea). 10 tablet 0    propylene glycol (SYSTANE BALANCE) 0.6 % Drop Apply 1 drop to eye daily as needed (dry eye).      sodium chloride 2% (XIOMARA 128) 2 % ophthalmic solution Place 1 drop into both eyes 3 (three) times daily as needed.      sodium chloride 3% 3 % nebulizer solution USE 4 ML IN NEBULIZER  TWICE DAILY 240 mL 0    tetracycline (ACHROMYCIN,SUMYCIN) 500 MG capsule Take one tablet daily for a week in the middle of the month and alternate the next month with augmentin for 7 days 14 capsule 4    umeclidinium-vilanteroL (ANORO ELLIPTA) 62.5-25 mcg/actuation DsDv Inhale 1 puff into the lungs once daily. Controller 3 each 3    verapamiL (VERELAN) 180 MG  C24P Take 1 capsule by mouth twice daily 90 capsule 3    [DISCONTINUED] hyoscyamine (ANASPAZ,LEVSIN) 0.125 mg Tab TAKE 1 TABLET BY MOUTH EVERY 6 HOURS AS NEEDED 30 tablet 0    [] cephALEXin (KEFLEX) 500 MG capsule Take 1 capsule (500 mg total) by mouth 2 (two) times a day. for 7 days 14 capsule 0    fluocinolone (DERMA-SMOOTHE) 0.01 % external oil APPLY TOPICALLY ONCE DAILY 119 mL 5     No current facility-administered medications on file prior to visit.          Objective Findings:    Vital Signs:  LMP  (LMP Unknown)   There is no height or weight on file to calculate BMI.    Physical Exam:  General Appearance: Well appearing in no acute distress.  Head:   Normocephalic, without obvious abnormality  Eyes:    No scleral icterus  Skin: No rash  Neurologic: AAO x 3      Labs:  Lab Results   Component Value Date    WBC 9.13 10/19/2020    HGB 11.1 (L) 10/19/2020    HCT 35.8 (L) 10/19/2020     10/19/2020    CRP 15.2 (H) 2019    CHOL 190 2020    TRIG 97 2020    HDL 68 2020    ALT 20 10/20/2020    AST 20 10/20/2020     (L) 10/20/2020    K 2.8 (L) 10/20/2020    CL 93 (L) 10/20/2020    CREATININE 1.4 10/20/2020    BUN 21 10/20/2020    CO2 25 10/20/2020    TSH 2.449 2019    INR 1.1 2017    HGBA1C 5.2 2018       Imaging:  Ct 10/2020 reviewed, unremarkable from [facility]    Endoscopy review:    Colonoscopy 2019 Hemorrhoids found on perianal exam.                        - The entire examined colon is normal on direct and                         retroflexion views.                        - The examined portion of the ileum was normal.                        - Biopsies were taken with a cold forceps from the                         entire colon for evaluation of microscopic colitis.  EGD 2019 Normal esophagus.                        - 2 cm type-I sliding hiatal hernia.                        - A single gastric polyp. Resected and retrieved.                          Benign appearing. Possibly hyperplastic or                         inflammatory.                        - Gastritis. Biopsied.                        - Normal examined duodenum.  Colonoscopy 4/2013 with Dr. Graves. No specimens removed. Recommended repeat 5-10 yrs.      Assessment:    Ms. Asencio is a 68 y.o. WF with:    1. Nausea and vomiting, intractability of vomiting not specified, unspecified vomiting type    2. Diarrhea, unspecified type    3. Abdominal pain, unspecified abdominal location      Sx started after eating fried fish. Today I discussed with pt when she gets these episodes it is after eating a rich meal that may be hard for her stomach to digest. She agreed.      Recommendations:  1. GERD diet since symptoms are only exacerbated by acidic foods  2. Continue omeprazole 20 mg QAM  3. Levsin PRN for abd pain      Follow-up if symptoms worsen or change    Order summary:  Orders Placed This Encounter    hyoscyamine (ANASPAZ,LEVSIN) 0.125 mg Tab       15 MINUTES TOTAL FACE TO FACE >50% SPENT IN COUNSELING AND COORDINATION OF CARE    Thank you so much for allowing me to participate in the care of Yasmin Asencio    Susan Venegas, CHARMAINEP-C

## 2020-11-02 ENCOUNTER — TELEPHONE (OUTPATIENT)
Dept: FAMILY MEDICINE | Facility: CLINIC | Age: 70
End: 2020-11-02

## 2020-11-02 NOTE — TELEPHONE ENCOUNTER
Spoke with pt informed her medication is available for pickup.Pt states she will  Send her  to  tomorrow.

## 2020-11-10 DIAGNOSIS — F32.0 MILD MAJOR DEPRESSION: ICD-10-CM

## 2020-11-10 RX ORDER — ESCITALOPRAM OXALATE 20 MG/1
TABLET ORAL
Qty: 90 TABLET | Refills: 1 | Status: SHIPPED | OUTPATIENT
Start: 2020-11-10 | End: 2021-05-14

## 2020-12-02 ENCOUNTER — PATIENT OUTREACH (OUTPATIENT)
Dept: ADMINISTRATIVE | Facility: OTHER | Age: 70
End: 2020-12-02

## 2020-12-04 ENCOUNTER — OFFICE VISIT (OUTPATIENT)
Dept: ELECTROPHYSIOLOGY | Facility: CLINIC | Age: 70
End: 2020-12-04
Payer: MEDICARE

## 2020-12-04 ENCOUNTER — HOSPITAL ENCOUNTER (OUTPATIENT)
Dept: CARDIOLOGY | Facility: CLINIC | Age: 70
Discharge: HOME | End: 2020-12-04
Payer: MEDICARE

## 2020-12-04 ENCOUNTER — HOSPITAL ENCOUNTER (OUTPATIENT)
Dept: CARDIOLOGY | Facility: HOSPITAL | Age: 70
Discharge: HOME | End: 2020-12-04
Attending: INTERNAL MEDICINE
Payer: MEDICARE

## 2020-12-04 VITALS
DIASTOLIC BLOOD PRESSURE: 58 MMHG | BODY MASS INDEX: 28.89 KG/M2 | DIASTOLIC BLOOD PRESSURE: 66 MMHG | BODY MASS INDEX: 28.72 KG/M2 | WEIGHT: 156.06 LBS | HEIGHT: 62 IN | HEIGHT: 62 IN | HEART RATE: 72 BPM | SYSTOLIC BLOOD PRESSURE: 138 MMHG | WEIGHT: 157 LBS | HEART RATE: 67 BPM | SYSTOLIC BLOOD PRESSURE: 128 MMHG

## 2020-12-04 DIAGNOSIS — I49.8 OTHER SPECIFIED CARDIAC ARRHYTHMIAS: ICD-10-CM

## 2020-12-04 DIAGNOSIS — I47.10 PSVT (PAROXYSMAL SUPRAVENTRICULAR TACHYCARDIA): ICD-10-CM

## 2020-12-04 DIAGNOSIS — Z79.01 CHRONIC ANTICOAGULATION: Chronic | ICD-10-CM

## 2020-12-04 DIAGNOSIS — I47.19 ECTOPIC ATRIAL TACHYCARDIA: ICD-10-CM

## 2020-12-04 DIAGNOSIS — I12.9 BENIGN HYPERTENSION WITH CHRONIC KIDNEY DISEASE, STAGE III: ICD-10-CM

## 2020-12-04 DIAGNOSIS — N18.30 BENIGN HYPERTENSION WITH CHRONIC KIDNEY DISEASE, STAGE III: ICD-10-CM

## 2020-12-04 DIAGNOSIS — Z86.79 HISTORY OF PSVT (PAROXYSMAL SUPRAVENTRICULAR TACHYCARDIA): Primary | Chronic | ICD-10-CM

## 2020-12-04 LAB
ASCENDING AORTA: 2.78 CM
AV INDEX (PROSTH): 1.02
AV MEAN GRADIENT: 3 MMHG
AV PEAK GRADIENT: 6 MMHG
AV VALVE AREA: 3.34 CM2
AV VELOCITY RATIO: 0.96
BSA FOR ECHO PROCEDURE: 1.76 M2
CV ECHO LV RWT: 0.47 CM
DOP CALC AO PEAK VEL: 1.26 M/S
DOP CALC AO VTI: 24.76 CM
DOP CALC LVOT AREA: 3.3 CM2
DOP CALC LVOT DIAMETER: 2.04 CM
DOP CALC LVOT PEAK VEL: 1.21 M/S
DOP CALC LVOT STROKE VOLUME: 82.62 CM3
DOP CALCLVOT PEAK VEL VTI: 25.29 CM
E WAVE DECELERATION TIME: 175.52 MSEC
E/A RATIO: 1.59
E/E' RATIO: 14.57 M/S
ECHO LV POSTERIOR WALL: 0.86 CM (ref 0.6–1.1)
FRACTIONAL SHORTENING: 27 % (ref 28–44)
INTERVENTRICULAR SEPTUM: 1.07 CM (ref 0.6–1.1)
IVRT: 91.34 MSEC
LA MAJOR: 5.59 CM
LA MINOR: 5.62 CM
LA WIDTH: 4.23 CM
LEFT ATRIUM SIZE: 3.71 CM
LEFT ATRIUM VOLUME INDEX: 43.5 ML/M2
LEFT ATRIUM VOLUME: 74.77 CM3
LEFT INTERNAL DIMENSION IN SYSTOLE: 2.67 CM (ref 2.1–4)
LEFT VENTRICLE DIASTOLIC VOLUME INDEX: 33.28 ML/M2
LEFT VENTRICLE DIASTOLIC VOLUME: 57.26 ML
LEFT VENTRICLE MASS INDEX: 62 G/M2
LEFT VENTRICLE SYSTOLIC VOLUME INDEX: 15.2 ML/M2
LEFT VENTRICLE SYSTOLIC VOLUME: 26.23 ML
LEFT VENTRICULAR INTERNAL DIMENSION IN DIASTOLE: 3.68 CM (ref 3.5–6)
LEFT VENTRICULAR MASS: 106.03 G
LV LATERAL E/E' RATIO: 14.57 M/S
LV SEPTAL E/E' RATIO: 14.57 M/S
MV PEAK A VEL: 0.64 M/S
MV PEAK E VEL: 1.02 M/S
MV STENOSIS PRESSURE HALF TIME: 50.9 MS
MV VALVE AREA P 1/2 METHOD: 4.32 CM2
PULM VEIN S/D RATIO: 0.83
PV PEAK D VEL: 0.69 M/S
PV PEAK S VEL: 0.57 M/S
RA MAJOR: 4.54 CM
RA PRESSURE: 3 MMHG
RA WIDTH: 3.51 CM
RIGHT VENTRICULAR END-DIASTOLIC DIMENSION: 2.5 CM
RV TISSUE DOPPLER FREE WALL SYSTOLIC VELOCITY 1 (APICAL 4 CHAMBER VIEW): 15.91 CM/S
SINUS: 2.91 CM
STJ: 2.54 CM
TDI LATERAL: 0.07 M/S
TDI SEPTAL: 0.07 M/S
TDI: 0.07 M/S
TRICUSPID ANNULAR PLANE SYSTOLIC EXCURSION: 1.79 CM

## 2020-12-04 PROCEDURE — 93005 RHYTHM STRIP: ICD-10-PCS | Mod: S$GLB,,, | Performed by: INTERNAL MEDICINE

## 2020-12-04 PROCEDURE — 93010 ELECTROCARDIOGRAM REPORT: CPT | Mod: S$GLB,,, | Performed by: INTERNAL MEDICINE

## 2020-12-04 PROCEDURE — 1126F AMNT PAIN NOTED NONE PRSNT: CPT | Mod: S$GLB,,, | Performed by: INTERNAL MEDICINE

## 2020-12-04 PROCEDURE — 1101F PR PT FALLS ASSESS DOC 0-1 FALLS W/OUT INJ PAST YR: ICD-10-PCS | Mod: CPTII,S$GLB,, | Performed by: INTERNAL MEDICINE

## 2020-12-04 PROCEDURE — 1101F PT FALLS ASSESS-DOCD LE1/YR: CPT | Mod: CPTII,S$GLB,, | Performed by: INTERNAL MEDICINE

## 2020-12-04 PROCEDURE — 1126F PR PAIN SEVERITY QUANTIFIED, NO PAIN PRESENT: ICD-10-PCS | Mod: S$GLB,,, | Performed by: INTERNAL MEDICINE

## 2020-12-04 PROCEDURE — 3074F SYST BP LT 130 MM HG: CPT | Mod: CPTII,S$GLB,, | Performed by: INTERNAL MEDICINE

## 2020-12-04 PROCEDURE — 3074F PR MOST RECENT SYSTOLIC BLOOD PRESSURE < 130 MM HG: ICD-10-PCS | Mod: CPTII,S$GLB,, | Performed by: INTERNAL MEDICINE

## 2020-12-04 PROCEDURE — 1159F PR MEDICATION LIST DOCUMENTED IN MEDICAL RECORD: ICD-10-PCS | Mod: S$GLB,,, | Performed by: INTERNAL MEDICINE

## 2020-12-04 PROCEDURE — 93306 TTE W/DOPPLER COMPLETE: CPT | Mod: 26,,, | Performed by: INTERNAL MEDICINE

## 2020-12-04 PROCEDURE — 93000 ELECTROCARDIOGRAM COMPLETE: CPT | Mod: 76,S$GLB,ICN, | Performed by: INTERNAL MEDICINE

## 2020-12-04 PROCEDURE — 99999 PR PBB SHADOW E&M-EST. PATIENT-LVL III: ICD-10-PCS | Mod: PBBFAC,,, | Performed by: INTERNAL MEDICINE

## 2020-12-04 PROCEDURE — 93010 RHYTHM STRIP: ICD-10-PCS | Mod: S$GLB,,, | Performed by: INTERNAL MEDICINE

## 2020-12-04 PROCEDURE — 1157F PR ADVANCE CARE PLAN OR EQUIV PRESENT IN MEDICAL RECORD: ICD-10-PCS | Mod: S$GLB,,, | Performed by: INTERNAL MEDICINE

## 2020-12-04 PROCEDURE — 1157F ADVNC CARE PLAN IN RCRD: CPT | Mod: S$GLB,,, | Performed by: INTERNAL MEDICINE

## 2020-12-04 PROCEDURE — 3288F FALL RISK ASSESSMENT DOCD: CPT | Mod: CPTII,S$GLB,, | Performed by: INTERNAL MEDICINE

## 2020-12-04 PROCEDURE — 99214 OFFICE O/P EST MOD 30 MIN: CPT | Mod: S$GLB,,, | Performed by: INTERNAL MEDICINE

## 2020-12-04 PROCEDURE — 3078F PR MOST RECENT DIASTOLIC BLOOD PRESSURE < 80 MM HG: ICD-10-PCS | Mod: CPTII,S$GLB,, | Performed by: INTERNAL MEDICINE

## 2020-12-04 PROCEDURE — 1159F MED LIST DOCD IN RCRD: CPT | Mod: S$GLB,,, | Performed by: INTERNAL MEDICINE

## 2020-12-04 PROCEDURE — 3288F PR FALLS RISK ASSESSMENT DOCUMENTED: ICD-10-PCS | Mod: CPTII,S$GLB,, | Performed by: INTERNAL MEDICINE

## 2020-12-04 PROCEDURE — 3078F DIAST BP <80 MM HG: CPT | Mod: CPTII,S$GLB,, | Performed by: INTERNAL MEDICINE

## 2020-12-04 PROCEDURE — 99214 PR OFFICE/OUTPT VISIT, EST, LEVL IV, 30-39 MIN: ICD-10-PCS | Mod: S$GLB,,, | Performed by: INTERNAL MEDICINE

## 2020-12-04 PROCEDURE — 99999 PR PBB SHADOW E&M-EST. PATIENT-LVL III: CPT | Mod: PBBFAC,,, | Performed by: INTERNAL MEDICINE

## 2020-12-04 PROCEDURE — 93306 TTE W/DOPPLER COMPLETE: CPT

## 2020-12-04 PROCEDURE — 93306 ECHO (CUPID ONLY): ICD-10-PCS | Mod: 26,,, | Performed by: INTERNAL MEDICINE

## 2020-12-04 PROCEDURE — 93000 RHYTHM STRIP: ICD-10-PCS | Mod: 76,S$GLB,ICN, | Performed by: INTERNAL MEDICINE

## 2020-12-04 PROCEDURE — 93005 ELECTROCARDIOGRAM TRACING: CPT | Mod: S$GLB,,, | Performed by: INTERNAL MEDICINE

## 2020-12-04 PROCEDURE — 3008F BODY MASS INDEX DOCD: CPT | Mod: CPTII,S$GLB,, | Performed by: INTERNAL MEDICINE

## 2020-12-04 PROCEDURE — 3008F PR BODY MASS INDEX (BMI) DOCUMENTED: ICD-10-PCS | Mod: CPTII,S$GLB,, | Performed by: INTERNAL MEDICINE

## 2020-12-04 NOTE — LETTER
December 4, 2020      Edmundo Ruiz PA-C  [address] [place] [address]           [facility]  [address]  [facility] [address]  Phone: [phone]  Fax: [phone]          Patient: Yasmin Asencio   MR Number: [id number]   YOB: 1950   Date of Visit: 12/4/2020       Dear Edmundo Ruiz:    Thank you for referring Yasmin Asencio to me for evaluation. Attached you will find relevant portions of my assessment and plan of care.    If you have questions, please do not hesitate to call me. I look forward to following Yasmin Asencio along with you.    Sincerely,    Marlon Ballesteros MD    Enclosure  CC:  No Recipients    If you would like to receive this communication electronically, please contact [email or web address] or [phone] to request more information on [employer] access.    For providers and/or their staff who would like to refer a patient to [facility], please contact us through our one-stop-shop provider referral line, [facility], at [phone].    If you feel you have received this communication in error or would no longer like to receive these types of communications, please e-mail [email or web address]

## 2020-12-04 NOTE — PROGRESS NOTES
"Subjective:    Patient ID:  Yasmin Asencio is a 70 y.o. female who presents for evaluation of No chief complaint on file.      HPI     70 y.o. F  pulm hemorrhage 11/15, s/p coiling  pseudomonas PNA 2016, s/p extended Abx  COPD, bronchiectasis (home O2 frequently)  HTN on meds  chronic slurred speech, thought due to essential tremor per neuro  hx "AF" destini-lung-coiling procedure (no documentation)  pSVT (s/p AVRNT ablation; likely focal AT remains)  PE 3/2020 while off NOAC for pulm hemorrhage    Had palpitations with HR >140 bpm at random intervals for past 10 years. Recently about 1x/month.   WIth palps, gets blurry vision, chest palps with radiation to the neck. Her "blood runs cold" and lasts for 20-35 mins.  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 We also saw a likely focal AT at that procedure also; not ablated due to not able to reinduce.  Since, feeling much better. Does get short palpitations. Event monitor showed short NSAT and one sustained SVT c/w AT. These episodes don't bother her nearly as much as the AVNRT did.    She did well on verapamil for a bit, but developed palps again. HR to 150s. Sx abated after increasing verapamil to 180 bid.  She feels well with regard to heart.    cath 6/16 neg  echo 11/16 50-55% LVEF -> 11/2017 55-60%. -> 2019 60%, severe LAE -> 2020 58%  Holter 2/17: SR . no SVT.    My interpretation of today's ECG is SR with 1st degree AVB    Review of Systems   Constitution: Negative. Negative for malaise/fatigue.   HENT: Negative.  Negative for ear pain and tinnitus.    Eyes: Negative for blurred vision.   Cardiovascular: Positive for dyspnea on exertion. Negative for chest pain, near-syncope, palpitations and syncope.   Respiratory: Negative.  Negative for shortness of breath.    Endocrine: Negative.  Negative for polyuria.   Hematologic/Lymphatic: Does not bruise/bleed easily.   Skin: Negative.  Negative for rash.   Musculoskeletal: Negative.  Negative for joint pain and " "muscle weakness.   Gastrointestinal: Negative.  Negative for abdominal pain and change in bowel habit.   Genitourinary: Negative for frequency.   Neurological: Positive for tremors. Negative for dizziness and weakness.   Psychiatric/Behavioral: Negative.  Negative for depression. The patient is not nervous/anxious.    Allergic/Immunologic: Negative for environmental allergies.        Objective:    Physical Exam   Constitutional: She is oriented to person, place, and time. Vital signs are normal. She appears well-developed and well-nourished. She is active and cooperative.   HENT:   Head: Normocephalic and atraumatic.   Eyes: Conjunctivae and EOM are normal.   Neck: Normal range of motion. Carotid bruit is not present. No tracheal deviation and no edema present. No thyroid mass and no thyromegaly present.   Cardiovascular: Normal rate, regular rhythm and normal pulses.   Pulmonary/Chest: Effort normal. No respiratory distress. She has no wheezes. She has rhonchi in the right middle field, the right lower field, the left middle field and the left lower field.   Abdominal: Normal appearance. She exhibits no distension. There is no hepatosplenomegaly.   Musculoskeletal: Normal range of motion.   Neurological: She is alert and oriented to person, place, and time. Coordination normal.   Skin: Skin is warm and dry. No rash noted.   Psychiatric: She has a normal mood and affect. Her speech is normal and behavior is normal. Thought content normal. Cognition and memory are normal.   Nursing note and vitals reviewed.        Assessment:       1. History of PSVT (paroxysmal supraventricular tachycardia)    2. Ectopic atrial tachycardia    3. Benign hypertension with chronic kidney disease, stage III    4. Chronic anticoagulation         Plan:       Continue verapamil for HTN and AT suppression.  Eliquis is for PE and remote "AF" episode (see HPI).  Return in 1 year with echo, or earlier prn.                    "

## 2020-12-08 ENCOUNTER — TELEPHONE (OUTPATIENT)
Dept: PULMONOLOGY | Facility: CLINIC | Age: 70
End: 2020-12-08

## 2020-12-08 NOTE — TELEPHONE ENCOUNTER
Returned call no answer no vm.          ----- Message from Blanca Paul MA sent at 12/8/2020  1:05 PM CST -----    ----- Message -----  From: Jane Shannon  Sent: 12/8/2020  12:29 PM CST  To: Gene Manriquez Staff, #    Pt states she need a 6 month f/u with dr hooper  please call back to discuss

## 2020-12-11 DIAGNOSIS — I27.82 OTHER CHRONIC PULMONARY EMBOLISM WITHOUT ACUTE COR PULMONALE: ICD-10-CM

## 2020-12-11 DIAGNOSIS — Z79.01 CURRENT USE OF LONG TERM ANTICOAGULATION: ICD-10-CM

## 2020-12-11 DIAGNOSIS — Z86.711 PERSONAL HISTORY OF PULMONARY EMBOLISM: ICD-10-CM

## 2020-12-11 NOTE — TELEPHONE ENCOUNTER
----- Message from Ashleigh Putnam MA sent at 12/11/2020 11:05 AM CST -----  Regarding: FW: Medication  I believe Dr Ballesteros asked that the pt contact yall's office to have the prescription for eliquis refilled, as Dr Luna send the original prescription.  Please let me know if shyam have any questions.    Thanks,  Ashleigh  ----- Message -----  From: Marie Gaines RN  Sent: 12/11/2020  10:09 AM CST  To: Ashleigh Putnam MA  Subject: RE: Medication                                   Nope sure don't  ----- Message -----  From: Ashleigh Putnam MA  Sent: 12/11/2020   9:42 AM CST  To: Marie Gaines RN  Subject: FW: Medication                                   Do you know anything about this?  ----- Message -----  From: Keli Bermudez  Sent: 12/11/2020   9:31 AM CST  To: Lesli BELL Staff  Subject: Medication                                       Dr. Luna's office called stating the pt spouse Carl [phone] was at their office to pickup the pt Eliquis, and they do not have the medicine there. The spouse says he was told to go there to get the medicine.    Thanks.

## 2020-12-14 ENCOUNTER — TELEPHONE (OUTPATIENT)
Dept: FAMILY MEDICINE | Facility: CLINIC | Age: 70
End: 2020-12-14

## 2020-12-14 NOTE — TELEPHONE ENCOUNTER
----- Message from Cherelle Damian sent at 12/14/2020 12:38 PM CST -----  Regarding: Paperwork  Patient dropped off paperwork. Please call when you receive it.

## 2020-12-14 NOTE — TELEPHONE ENCOUNTER
Spoke with pt informed her paperwork was received and placed in providers mailbox. Pt verbalized understanding

## 2020-12-15 ENCOUNTER — TELEPHONE (OUTPATIENT)
Dept: PULMONOLOGY | Facility: CLINIC | Age: 70
End: 2020-12-15

## 2020-12-15 NOTE — TELEPHONE ENCOUNTER
----- Message from Mylene Mota sent at 12/15/2020  9:23 AM CST -----  Regarding: Call back  Contact: [phone]  Pt states she need a 6 month f/u with Dr. Mills  please call back to discuss.

## 2020-12-15 NOTE — TELEPHONE ENCOUNTER
LVM for patient to let her know the calendar for 2021 is not available yet for Dr Mills. I have patient on my list to schedule for follow up when the schedule becomes available. If Ms Asencio has any questions call back.

## 2020-12-16 RX ORDER — POTASSIUM CHLORIDE 20 MEQ/1
TABLET, EXTENDED RELEASE ORAL
Qty: 60 TABLET | Refills: 0 | Status: ON HOLD | OUTPATIENT
Start: 2020-12-16 | End: 2021-12-17 | Stop reason: HOSPADM

## 2020-12-16 NOTE — TELEPHONE ENCOUNTER
Patient's K supplement has been refilled but she is overdue to have CMP checked. Please call her to schedule this.

## 2020-12-17 ENCOUNTER — LAB VISIT (OUTPATIENT)
Dept: LAB | Facility: HOSPITAL | Age: 70
End: 2020-12-17
Attending: FAMILY MEDICINE
Payer: MEDICARE

## 2020-12-17 DIAGNOSIS — E87.6 HYPOKALEMIA: ICD-10-CM

## 2020-12-17 LAB
ALBUMIN SERPL BCP-MCNC: 3.8 G/DL (ref 3.5–5.2)
ALP SERPL-CCNC: 75 U/L (ref 55–135)
ALT SERPL W/O P-5'-P-CCNC: 12 U/L (ref 10–44)
ANION GAP SERPL CALC-SCNC: 10 MMOL/L (ref 8–16)
AST SERPL-CCNC: 14 U/L (ref 10–40)
BILIRUB SERPL-MCNC: 0.3 MG/DL (ref 0.1–1)
BUN SERPL-MCNC: 19 MG/DL (ref 8–23)
CALCIUM SERPL-MCNC: 9.3 MG/DL (ref 8.7–10.5)
CHLORIDE SERPL-SCNC: 98 MMOL/L (ref 95–110)
CO2 SERPL-SCNC: 32 MMOL/L (ref 23–29)
CREAT SERPL-MCNC: 1.3 MG/DL (ref 0.5–1.4)
EST. GFR  (AFRICAN AMERICAN): 48 ML/MIN/1.73 M^2
EST. GFR  (NON AFRICAN AMERICAN): 41 ML/MIN/1.73 M^2
GLUCOSE SERPL-MCNC: 98 MG/DL (ref 70–110)
POTASSIUM SERPL-SCNC: 3.6 MMOL/L (ref 3.5–5.1)
PROT SERPL-MCNC: 7.7 G/DL (ref 6–8.4)
SODIUM SERPL-SCNC: 140 MMOL/L (ref 136–145)

## 2020-12-17 PROCEDURE — 36415 COLL VENOUS BLD VENIPUNCTURE: CPT

## 2020-12-17 PROCEDURE — 80053 COMPREHEN METABOLIC PANEL: CPT

## 2020-12-17 RX ORDER — ERGOCALCIFEROL 1.25 MG/1
CAPSULE ORAL
Qty: 4 CAPSULE | Refills: 0 | Status: SHIPPED | OUTPATIENT
Start: 2020-12-17 | End: 2021-01-28

## 2020-12-17 NOTE — TELEPHONE ENCOUNTER
Pt notified of Dr. Felix' instructions below. Pt verbalized understanding of lab results. Pt request that the K-Dur dose be clarified with Dr. Felix. Pt wants to know if she continues taking it and if so at what dose. Pt informed that clarification request will be sent to Dr. Felix and pt will be called back with instructions.

## 2020-12-17 NOTE — TELEPHONE ENCOUNTER
----- Message from Zoe Felix, DO sent at 12/17/2020  5:12 PM CST -----  Please inform pt that potassium is normal

## 2021-01-03 ENCOUNTER — PATIENT MESSAGE (OUTPATIENT)
Dept: FAMILY MEDICINE | Facility: CLINIC | Age: 71
End: 2021-01-03

## 2021-01-16 ENCOUNTER — IMMUNIZATION (OUTPATIENT)
Dept: OBSTETRICS AND GYNECOLOGY | Facility: CLINIC | Age: 71
End: 2021-01-16
Payer: MEDICARE

## 2021-01-16 DIAGNOSIS — Z23 NEED FOR VACCINATION: Primary | ICD-10-CM

## 2021-01-16 PROCEDURE — 91300 COVID-19, MRNA, LNP-S, PF, 30 MCG/0.3 ML DOSE VACCINE: CPT | Mod: PBBFAC | Performed by: FAMILY MEDICINE

## 2021-02-02 DIAGNOSIS — J47.9 BRONCHIECTASIS WITHOUT COMPLICATION: Primary | ICD-10-CM

## 2021-02-06 ENCOUNTER — IMMUNIZATION (OUTPATIENT)
Dept: OBSTETRICS AND GYNECOLOGY | Facility: CLINIC | Age: 71
End: 2021-02-06
Payer: MEDICARE

## 2021-02-06 DIAGNOSIS — Z23 NEED FOR VACCINATION: Primary | ICD-10-CM

## 2021-02-06 PROCEDURE — 91300 COVID-19, MRNA, LNP-S, PF, 30 MCG/0.3 ML DOSE VACCINE: CPT | Mod: PBBFAC | Performed by: FAMILY MEDICINE

## 2021-02-06 PROCEDURE — 0002A COVID-19, MRNA, LNP-S, PF, 30 MCG/0.3 ML DOSE VACCINE: CPT | Mod: PBBFAC | Performed by: FAMILY MEDICINE

## 2021-02-10 ENCOUNTER — TELEPHONE (OUTPATIENT)
Dept: FAMILY MEDICINE | Facility: CLINIC | Age: 71
End: 2021-02-10

## 2021-02-10 DIAGNOSIS — Z12.31 ENCOUNTER FOR SCREENING MAMMOGRAM FOR BREAST CANCER: Primary | ICD-10-CM

## 2021-02-19 ENCOUNTER — PATIENT OUTREACH (OUTPATIENT)
Dept: ADMINISTRATIVE | Facility: OTHER | Age: 71
End: 2021-02-19

## 2021-02-22 ENCOUNTER — HOSPITAL ENCOUNTER (OUTPATIENT)
Dept: RADIOLOGY | Facility: HOSPITAL | Age: 71
Discharge: HOME | End: 2021-02-22
Attending: EMERGENCY MEDICINE
Payer: MEDICARE

## 2021-02-22 ENCOUNTER — OFFICE VISIT (OUTPATIENT)
Dept: PULMONOLOGY | Facility: CLINIC | Age: 71
End: 2021-02-22
Payer: MEDICARE

## 2021-02-22 VITALS
SYSTOLIC BLOOD PRESSURE: 132 MMHG | HEIGHT: 62 IN | DIASTOLIC BLOOD PRESSURE: 59 MMHG | WEIGHT: 156.94 LBS | BODY MASS INDEX: 28.88 KG/M2 | OXYGEN SATURATION: 85 % | HEART RATE: 86 BPM

## 2021-02-22 DIAGNOSIS — I26.99 PE (PULMONARY THROMBOEMBOLISM): Primary | ICD-10-CM

## 2021-02-22 DIAGNOSIS — J47.9 BRONCHIECTASIS WITHOUT COMPLICATION: ICD-10-CM

## 2021-02-22 DIAGNOSIS — J47.9 ACQUIRED BRONCHIECTASIS: ICD-10-CM

## 2021-02-22 DIAGNOSIS — J47.1 BRONCHIECTASIS WITH (ACUTE) EXACERBATION: ICD-10-CM

## 2021-02-22 DIAGNOSIS — Z79.01 CURRENT USE OF LONG TERM ANTICOAGULATION: ICD-10-CM

## 2021-02-22 PROCEDURE — 3078F DIAST BP <80 MM HG: CPT | Mod: CPTII,S$GLB,, | Performed by: EMERGENCY MEDICINE

## 2021-02-22 PROCEDURE — 1101F PR PT FALLS ASSESS DOC 0-1 FALLS W/OUT INJ PAST YR: ICD-10-PCS | Mod: CPTII,S$GLB,, | Performed by: EMERGENCY MEDICINE

## 2021-02-22 PROCEDURE — 99499 UNLISTED E&M SERVICE: CPT | Mod: S$GLB,,, | Performed by: EMERGENCY MEDICINE

## 2021-02-22 PROCEDURE — 1157F ADVNC CARE PLAN IN RCRD: CPT | Mod: S$GLB,,, | Performed by: EMERGENCY MEDICINE

## 2021-02-22 PROCEDURE — 71046 XR CHEST PA AND LATERAL: ICD-10-PCS | Mod: 26,,, | Performed by: RADIOLOGY

## 2021-02-22 PROCEDURE — 1157F PR ADVANCE CARE PLAN OR EQUIV PRESENT IN MEDICAL RECORD: ICD-10-PCS | Mod: S$GLB,,, | Performed by: EMERGENCY MEDICINE

## 2021-02-22 PROCEDURE — 3288F FALL RISK ASSESSMENT DOCD: CPT | Mod: CPTII,S$GLB,, | Performed by: EMERGENCY MEDICINE

## 2021-02-22 PROCEDURE — 99213 OFFICE O/P EST LOW 20 MIN: CPT | Mod: S$GLB,,, | Performed by: EMERGENCY MEDICINE

## 2021-02-22 PROCEDURE — 1126F AMNT PAIN NOTED NONE PRSNT: CPT | Mod: S$GLB,,, | Performed by: EMERGENCY MEDICINE

## 2021-02-22 PROCEDURE — 3288F PR FALLS RISK ASSESSMENT DOCUMENTED: ICD-10-PCS | Mod: CPTII,S$GLB,, | Performed by: EMERGENCY MEDICINE

## 2021-02-22 PROCEDURE — 3008F PR BODY MASS INDEX (BMI) DOCUMENTED: ICD-10-PCS | Mod: CPTII,S$GLB,, | Performed by: EMERGENCY MEDICINE

## 2021-02-22 PROCEDURE — 3008F BODY MASS INDEX DOCD: CPT | Mod: CPTII,S$GLB,, | Performed by: EMERGENCY MEDICINE

## 2021-02-22 PROCEDURE — 99999 PR PBB SHADOW E&M-EST. PATIENT-LVL III: CPT | Mod: PBBFAC,,, | Performed by: EMERGENCY MEDICINE

## 2021-02-22 PROCEDURE — 3075F SYST BP GE 130 - 139MM HG: CPT | Mod: CPTII,S$GLB,, | Performed by: EMERGENCY MEDICINE

## 2021-02-22 PROCEDURE — 99213 PR OFFICE/OUTPT VISIT, EST, LEVL III, 20-29 MIN: ICD-10-PCS | Mod: S$GLB,,, | Performed by: EMERGENCY MEDICINE

## 2021-02-22 PROCEDURE — 3075F PR MOST RECENT SYSTOLIC BLOOD PRESS GE 130-139MM HG: ICD-10-PCS | Mod: CPTII,S$GLB,, | Performed by: EMERGENCY MEDICINE

## 2021-02-22 PROCEDURE — 1126F PR PAIN SEVERITY QUANTIFIED, NO PAIN PRESENT: ICD-10-PCS | Mod: S$GLB,,, | Performed by: EMERGENCY MEDICINE

## 2021-02-22 PROCEDURE — 3078F PR MOST RECENT DIASTOLIC BLOOD PRESSURE < 80 MM HG: ICD-10-PCS | Mod: CPTII,S$GLB,, | Performed by: EMERGENCY MEDICINE

## 2021-02-22 PROCEDURE — 1101F PT FALLS ASSESS-DOCD LE1/YR: CPT | Mod: CPTII,S$GLB,, | Performed by: EMERGENCY MEDICINE

## 2021-02-22 PROCEDURE — 71046 X-RAY EXAM CHEST 2 VIEWS: CPT | Mod: 26,,, | Performed by: RADIOLOGY

## 2021-02-22 PROCEDURE — 1159F MED LIST DOCD IN RCRD: CPT | Mod: S$GLB,,, | Performed by: EMERGENCY MEDICINE

## 2021-02-22 PROCEDURE — 99499 RISK ADDL DX/OHS AUDIT: ICD-10-PCS | Mod: S$GLB,,, | Performed by: EMERGENCY MEDICINE

## 2021-02-22 PROCEDURE — 71046 X-RAY EXAM CHEST 2 VIEWS: CPT | Mod: TC,FY

## 2021-02-22 PROCEDURE — 99999 PR PBB SHADOW E&M-EST. PATIENT-LVL III: ICD-10-PCS | Mod: PBBFAC,,, | Performed by: EMERGENCY MEDICINE

## 2021-02-22 PROCEDURE — 1159F PR MEDICATION LIST DOCUMENTED IN MEDICAL RECORD: ICD-10-PCS | Mod: S$GLB,,, | Performed by: EMERGENCY MEDICINE

## 2021-02-22 RX ORDER — IPRATROPIUM BROMIDE 0.5 MG/2.5ML
SOLUTION RESPIRATORY (INHALATION)
Qty: 150 ML | Refills: 6 | Status: SHIPPED | OUTPATIENT
Start: 2021-02-22 | End: 2021-02-23 | Stop reason: SDUPTHER

## 2021-02-22 RX ORDER — SODIUM CHLORIDE FOR INHALATION 3 %
VIAL, NEBULIZER (ML) INHALATION
Qty: 240 ML | Refills: 6 | Status: SHIPPED | OUTPATIENT
Start: 2021-02-22 | End: 2021-10-13

## 2021-02-22 RX ORDER — UMECLIDINIUM BROMIDE AND VILANTEROL TRIFENATATE 62.5; 25 UG/1; UG/1
1 POWDER RESPIRATORY (INHALATION) DAILY
Qty: 1 EACH | Refills: 6 | Status: ON HOLD | OUTPATIENT
Start: 2021-02-22 | End: 2021-12-17 | Stop reason: HOSPADM

## 2021-02-23 DIAGNOSIS — J47.1 BRONCHIECTASIS WITH (ACUTE) EXACERBATION: ICD-10-CM

## 2021-02-23 RX ORDER — IPRATROPIUM BROMIDE 0.5 MG/2.5ML
SOLUTION RESPIRATORY (INHALATION)
Qty: 150 ML | Refills: 6 | Status: SHIPPED | OUTPATIENT
Start: 2021-02-23 | End: 2021-09-22

## 2021-02-24 PROCEDURE — 87070 CULTURE OTHR SPECIMN AEROBIC: CPT

## 2021-02-24 PROCEDURE — 87205 SMEAR GRAM STAIN: CPT

## 2021-02-24 PROCEDURE — 87015 SPECIMEN INFECT AGNT CONCNTJ: CPT

## 2021-02-24 PROCEDURE — 87077 CULTURE AEROBIC IDENTIFY: CPT | Mod: 59

## 2021-02-24 PROCEDURE — 87116 MYCOBACTERIA CULTURE: CPT

## 2021-02-24 PROCEDURE — 87186 SC STD MICRODIL/AGAR DIL: CPT | Mod: 59

## 2021-02-24 PROCEDURE — 87206 SMEAR FLUORESCENT/ACID STAI: CPT

## 2021-02-25 ENCOUNTER — LAB VISIT (OUTPATIENT)
Dept: LAB | Facility: HOSPITAL | Age: 71
End: 2021-02-25
Attending: EMERGENCY MEDICINE
Payer: MEDICARE

## 2021-02-25 DIAGNOSIS — J47.1 BRONCHIECTASIS WITH (ACUTE) EXACERBATION: ICD-10-CM

## 2021-02-25 DIAGNOSIS — J47.9 ACQUIRED BRONCHIECTASIS: ICD-10-CM

## 2021-03-02 LAB
BACTERIA SPEC AEROBE CULT: ABNORMAL
GRAM STN SPEC: ABNORMAL

## 2021-03-10 RX ORDER — LEVOFLOXACIN 500 MG/1
500 TABLET, FILM COATED ORAL DAILY
Qty: 7 TABLET | Refills: 0 | Status: SHIPPED | OUTPATIENT
Start: 2021-03-10 | End: 2021-03-17

## 2021-03-27 NOTE — PROGRESS NOTES
"Subjective:    Patient ID:  Yasmin Asencio is a 67 y.o. female who presents for evaluation of No chief complaint on file.      HPI   67 y.o. F  pulm hemorrhage 11/15, s/p coiling  pseudomonas PNA 2016, s/p extended Abx  COPD, bronchiectasis (home O2 frequently)  HTN on meds  chronic slurred speech, thought due to essential tremor per neuro  hx "AF" destini-lung-coiling procedure (no documentation)  pSVT (s/p AVRNT ablation; likely focal AT remains)    Had palpitations with HR >140 bpm at random intervals for past 10 years. Recently about 1x/month.   WIth palps, gets blurry vision, chest palps with radiation to the neck. Her "blood runs cold" and lasts for 20-35 mins.  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 We also saw a likely focal AT at that procedure also; not ablated due to not able to reinduce.  Since, feeling much better. Does get short palpitations. Event monitor showed short NSAT and one sustained SVT c/w AT. These episodes don't bother her nearly as much as the AVNRT did.    At last visit, we increased verapamil. Since, no palpitations at all!    cath 6/16 neg  echo 11/16 50-55% LVEF -> 11/2017 55-60%.  Holter 2/17: SR . no SVT.    My interpretation of today's ECG is NSR 81 bpm. Small P wave. No delta.    Review of Systems   Constitution: Negative. Negative for weakness and malaise/fatigue.   HENT: Negative.  Negative for ear pain and tinnitus.    Eyes: Negative for blurred vision.   Cardiovascular: Positive for dyspnea on exertion. Negative for chest pain, near-syncope and syncope.   Respiratory: Negative.  Negative for shortness of breath.    Endocrine: Negative.  Negative for polyuria.   Hematologic/Lymphatic: Does not bruise/bleed easily.   Skin: Negative.  Negative for rash.   Musculoskeletal: Negative.  Negative for joint pain and muscle weakness.   Gastrointestinal: Positive for constipation. Negative for abdominal pain and change in bowel habit.   Genitourinary: Negative for frequency. "   Neurological: Positive for tremors. Negative for dizziness.   Psychiatric/Behavioral: Negative.  Negative for depression. The patient is not nervous/anxious.    Allergic/Immunologic: Negative for environmental allergies.        Objective:    Physical Exam   Constitutional: She is oriented to person, place, and time. Vital signs are normal. She appears well-developed and well-nourished. She is active and cooperative.   HENT:   Head: Normocephalic and atraumatic.   Eyes: Conjunctivae and EOM are normal.   Neck: Normal range of motion. Carotid bruit is not present. No tracheal deviation and no edema present. No thyroid mass and no thyromegaly present.   Cardiovascular: Normal rate, regular rhythm, normal heart sounds, intact distal pulses and normal pulses.   No extrasystoles are present. PMI is not displaced.  Exam reveals no gallop and no friction rub.    No murmur heard.  Pulmonary/Chest: Effort normal. No respiratory distress. She has no wheezes. She has rhonchi in the right middle field, the right lower field, the left middle field and the left lower field. She has no rales.   Abdominal: Soft. Normal appearance. She exhibits no distension. There is no hepatosplenomegaly.   Musculoskeletal: Normal range of motion.   Neurological: She is alert and oriented to person, place, and time. Coordination normal.   Skin: Skin is warm and dry. No rash noted.   Psychiatric: She has a normal mood and affect. Her speech is normal and behavior is normal. Thought content normal. Cognition and memory are normal.   Nursing note and vitals reviewed.        Assessment:       1. PSVT (paroxysmal supraventricular tachycardia)    2. Ectopic atrial tachycardia    3. Other emphysema    4. Acquired bronchiectasis    5. Essential tremor    6. Pseudomonas respiratory infection    7. Current use of long term anticoagulation    8. Other pulmonary embolism without acute cor pulmonale, unspecified chronicity         Plan:       Continue  verapamil, which is suppressing focal AT well.  OTC laxative prn    Return in 1 year with echo, or earlier prn.  I discussed with patient risks, indications, benefits, and alternatives of the planned procedure. All questions were answered. Patient understands and wishes to proceed.               Warm/Dry

## 2021-04-05 ENCOUNTER — TELEPHONE (OUTPATIENT)
Dept: NEUROLOGY | Facility: CLINIC | Age: 71
End: 2021-04-05

## 2021-04-09 ENCOUNTER — TELEPHONE (OUTPATIENT)
Dept: NEUROLOGY | Facility: CLINIC | Age: 71
End: 2021-04-09

## 2021-04-16 ENCOUNTER — OFFICE VISIT (OUTPATIENT)
Dept: URGENT CARE | Facility: CLINIC | Age: 71
End: 2021-04-16
Payer: MEDICARE

## 2021-04-16 VITALS
RESPIRATION RATE: 18 BRPM | HEART RATE: 90 BPM | SYSTOLIC BLOOD PRESSURE: 133 MMHG | DIASTOLIC BLOOD PRESSURE: 62 MMHG | TEMPERATURE: 98 F | WEIGHT: 156 LBS | BODY MASS INDEX: 28.71 KG/M2 | HEIGHT: 62 IN | OXYGEN SATURATION: 95 %
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
## 2021-04-16 PROCEDURE — 90715 TDAP VACCINE 7 YRS/> IM: CPT | Mod: S$GLB,,, | Performed by: PHYSICIAN ASSISTANT

## 2021-04-16 PROCEDURE — 1157F ADVNC CARE PLAN IN RCRD: CPT | Mod: S$GLB,,, | Performed by: PHYSICIAN ASSISTANT

## 2021-04-16 PROCEDURE — 3008F BODY MASS INDEX DOCD: CPT | Mod: CPTII,S$GLB,, | Performed by: PHYSICIAN ASSISTANT

## 2021-04-16 PROCEDURE — 99214 PR OFFICE/OUTPT VISIT, EST, LEVL IV, 30-39 MIN: ICD-10-PCS | Mod: 25,S$GLB,, | Performed by: PHYSICIAN ASSISTANT

## 2021-04-16 PROCEDURE — 3008F PR BODY MASS INDEX (BMI) DOCUMENTED: ICD-10-PCS | Mod: CPTII,S$GLB,, | Performed by: PHYSICIAN ASSISTANT

## 2021-04-16 PROCEDURE — 90471 IMMUNIZATION ADMIN: CPT | Mod: S$GLB,,, | Performed by: PHYSICIAN ASSISTANT

## 2021-04-16 PROCEDURE — 1157F PR ADVANCE CARE PLAN OR EQUIV PRESENT IN MEDICAL RECORD: ICD-10-PCS | Mod: S$GLB,,, | Performed by: PHYSICIAN ASSISTANT

## 2021-04-16 PROCEDURE — 99214 OFFICE O/P EST MOD 30 MIN: CPT | Mod: 25,S$GLB,, | Performed by: PHYSICIAN ASSISTANT

## 2021-04-16 PROCEDURE — 90471 TDAP VACCINE GREATER THAN OR EQUAL TO 7YO IM: ICD-10-PCS | Mod: S$GLB,,, | Performed by: PHYSICIAN ASSISTANT

## 2021-04-16 PROCEDURE — 90715 TDAP VACCINE GREATER THAN OR EQUAL TO 7YO IM: ICD-10-PCS | Mod: S$GLB,,, | Performed by: PHYSICIAN ASSISTANT

## 2021-04-16 RX ORDER — AMOXICILLIN AND CLAVULANATE POTASSIUM 875; 125 MG/1; MG/1
1 TABLET, FILM COATED ORAL 2 TIMES DAILY
Qty: 20 TABLET | Refills: 0 | Status: SHIPPED | OUTPATIENT
Start: 2021-04-16 | End: 2021-04-26

## 2021-04-20 ENCOUNTER — HOSPITAL ENCOUNTER (OUTPATIENT)
Dept: CARDIOLOGY | Facility: HOSPITAL | Age: 71
Discharge: HOME | End: 2021-04-20
Attending: INTERNAL MEDICINE
Payer: MEDICARE

## 2021-04-20 ENCOUNTER — TELEPHONE (OUTPATIENT)
Dept: NEUROLOGY | Facility: CLINIC | Age: 71
End: 2021-04-20

## 2021-04-20 VITALS — HEIGHT: 62 IN | WEIGHT: 156 LBS | BODY MASS INDEX: 28.71 KG/M2

## 2021-04-20 DIAGNOSIS — Z86.79 HISTORY OF PSVT (PAROXYSMAL SUPRAVENTRICULAR TACHYCARDIA): ICD-10-CM

## 2021-04-20 DIAGNOSIS — I49.8 JUNCTIONAL RHYTHM: ICD-10-CM

## 2021-04-20 DIAGNOSIS — I47.19 ECTOPIC ATRIAL TACHYCARDIA: ICD-10-CM

## 2021-04-20 DIAGNOSIS — R07.89 NON-CARDIAC CHEST PAIN: ICD-10-CM

## 2021-04-20 DIAGNOSIS — J44.9 CHRONIC OBSTRUCTIVE PULMONARY DISEASE, UNSPECIFIED COPD TYPE: ICD-10-CM

## 2021-04-20 LAB
AORTIC ROOT ANNULUS: 3.02 CM
AORTIC VALVE CUSP SEPERATION: 2.22 CM
ASCENDING AORTA: 2.45 CM
AV INDEX (PROSTH): 0.92
AV MEAN GRADIENT: 4 MMHG
AV PEAK GRADIENT: 7 MMHG
AV VALVE AREA: 2.8 CM2
AV VELOCITY RATIO: 0.85
BSA FOR ECHO PROCEDURE: 1.76 M2
CV ECHO LV RWT: 0.5 CM
DOP CALC AO PEAK VEL: 1.31 M/S
DOP CALC AO VTI: 27.39 CM
DOP CALC LVOT AREA: 3 CM2
DOP CALC LVOT DIAMETER: 1.97 CM
DOP CALC LVOT PEAK VEL: 1.11 M/S
DOP CALC LVOT STROKE VOLUME: 76.62 CM3
DOP CALCLVOT PEAK VEL VTI: 25.15 CM
E WAVE DECELERATION TIME: 130.04 MSEC
E/A RATIO: 1.41
E/E' RATIO: 10.21 M/S
ECHO LV POSTERIOR WALL: 1.24 CM (ref 0.6–1.1)
EJECTION FRACTION: 60 %
FRACTIONAL SHORTENING: 36 % (ref 28–44)
INTERVENTRICULAR SEPTUM: 1.23 CM (ref 0.6–1.1)
IVRT: 62.28 MSEC
LA MAJOR: 5 CM
LA MINOR: 5.15 CM
LA WIDTH: 3.73 CM
LEFT ATRIUM SIZE: 4.23 CM
LEFT ATRIUM VOLUME INDEX: 39.6 ML/M2
LEFT ATRIUM VOLUME: 68.05 CM3
LEFT INTERNAL DIMENSION IN SYSTOLE: 3.19 CM (ref 2.1–4)
LEFT VENTRICLE DIASTOLIC VOLUME INDEX: 68.16 ML/M2
LEFT VENTRICLE DIASTOLIC VOLUME: 117.23 ML
LEFT VENTRICLE MASS INDEX: 141 G/M2
LEFT VENTRICLE SYSTOLIC VOLUME INDEX: 23.6 ML/M2
LEFT VENTRICLE SYSTOLIC VOLUME: 40.53 ML
LEFT VENTRICULAR INTERNAL DIMENSION IN DIASTOLE: 4.98 CM (ref 3.5–6)
LEFT VENTRICULAR MASS: 241.87 G
LV LATERAL E/E' RATIO: 8.82 M/S
LV SEPTAL E/E' RATIO: 12.13 M/S
MV A" WAVE DURATION": 9 MSEC
MV PEAK A VEL: 0.69 M/S
MV PEAK E VEL: 0.97 M/S
MV STENOSIS PRESSURE HALF TIME: 37.71 MS
MV VALVE AREA P 1/2 METHOD: 5.83 CM2
PISA TR MAX VEL: 2.55 M/S
PULM VEIN S/D RATIO: 0.85
PV PEAK D VEL: 0.62 M/S
PV PEAK S VEL: 0.53 M/S
PV PEAK VELOCITY: 0.76 CM/S
RA MAJOR: 4.53 CM
RA PRESSURE: 8 MMHG
RA WIDTH: 2.69 CM
RIGHT VENTRICULAR END-DIASTOLIC DIMENSION: 3.07 CM
RV TISSUE DOPPLER FREE WALL SYSTOLIC VELOCITY 1 (APICAL 4 CHAMBER VIEW): 13.07 CM/S
SINUS: 2.92 CM
STJ: 2.27 CM
TDI LATERAL: 0.11 M/S
TDI SEPTAL: 0.08 M/S
TDI: 0.1 M/S
TR MAX PG: 26 MMHG
TRICUSPID ANNULAR PLANE SYSTOLIC EXCURSION: 1.72 CM
TV REST PULMONARY ARTERY PRESSURE: 34 MMHG

## 2021-04-20 PROCEDURE — 93306 ECHO (CUPID ONLY): ICD-10-PCS | Mod: 26,,, | Performed by: INTERNAL MEDICINE

## 2021-04-20 PROCEDURE — 93306 TTE W/DOPPLER COMPLETE: CPT

## 2021-04-20 PROCEDURE — 93306 TTE W/DOPPLER COMPLETE: CPT | Mod: 26,,, | Performed by: INTERNAL MEDICINE

## 2021-04-21 ENCOUNTER — OFFICE VISIT (OUTPATIENT)
Dept: CARDIOLOGY | Facility: CLINIC | Age: 71
End: 2021-04-21
Payer: MEDICARE

## 2021-04-21 VITALS
HEART RATE: 78 BPM | SYSTOLIC BLOOD PRESSURE: 110 MMHG | BODY MASS INDEX: 28.26 KG/M2 | WEIGHT: 153.56 LBS | DIASTOLIC BLOOD PRESSURE: 66 MMHG | OXYGEN SATURATION: 94 % | HEIGHT: 62 IN

## 2021-04-21 DIAGNOSIS — I70.0 THORACIC AORTA ATHEROSCLEROSIS: ICD-10-CM

## 2021-04-21 DIAGNOSIS — T46.6X5A STATIN MYOPATHY: ICD-10-CM

## 2021-04-21 DIAGNOSIS — I12.9 BENIGN HYPERTENSION WITH CHRONIC KIDNEY DISEASE, STAGE III: ICD-10-CM

## 2021-04-21 DIAGNOSIS — G72.0 STATIN MYOPATHY: ICD-10-CM

## 2021-04-21 DIAGNOSIS — I47.19 ECTOPIC ATRIAL TACHYCARDIA: ICD-10-CM

## 2021-04-21 DIAGNOSIS — N18.30 BENIGN HYPERTENSION WITH CHRONIC KIDNEY DISEASE, STAGE III: ICD-10-CM

## 2021-04-21 DIAGNOSIS — Z86.79 HISTORY OF PSVT (PAROXYSMAL SUPRAVENTRICULAR TACHYCARDIA): Primary | Chronic | ICD-10-CM

## 2021-04-21 DIAGNOSIS — Z86.79 STATUS POST CATHETER ABLATION OF SLOW PATHWAY: ICD-10-CM

## 2021-04-21 DIAGNOSIS — R07.89 NON-CARDIAC CHEST PAIN: ICD-10-CM

## 2021-04-21 DIAGNOSIS — Z98.890 STATUS POST CATHETER ABLATION OF SLOW PATHWAY: ICD-10-CM

## 2021-04-21 PROCEDURE — 1159F MED LIST DOCD IN RCRD: CPT | Mod: S$GLB,,, | Performed by: INTERNAL MEDICINE

## 2021-04-21 PROCEDURE — 99999 PR PBB SHADOW E&M-EST. PATIENT-LVL V: ICD-10-PCS | Mod: PBBFAC,,, | Performed by: INTERNAL MEDICINE

## 2021-04-21 PROCEDURE — 3008F BODY MASS INDEX DOCD: CPT | Mod: CPTII,S$GLB,, | Performed by: INTERNAL MEDICINE

## 2021-04-21 PROCEDURE — 99499 RISK ADDL DX/OHS AUDIT: ICD-10-PCS | Mod: S$GLB,,, | Performed by: INTERNAL MEDICINE

## 2021-04-21 PROCEDURE — 1159F PR MEDICATION LIST DOCUMENTED IN MEDICAL RECORD: ICD-10-PCS | Mod: S$GLB,,, | Performed by: INTERNAL MEDICINE

## 2021-04-21 PROCEDURE — 3288F FALL RISK ASSESSMENT DOCD: CPT | Mod: CPTII,S$GLB,, | Performed by: INTERNAL MEDICINE

## 2021-04-21 PROCEDURE — 3008F PR BODY MASS INDEX (BMI) DOCUMENTED: ICD-10-PCS | Mod: CPTII,S$GLB,, | Performed by: INTERNAL MEDICINE

## 2021-04-21 PROCEDURE — 1126F PR PAIN SEVERITY QUANTIFIED, NO PAIN PRESENT: ICD-10-PCS | Mod: S$GLB,,, | Performed by: INTERNAL MEDICINE

## 2021-04-21 PROCEDURE — 3288F PR FALLS RISK ASSESSMENT DOCUMENTED: ICD-10-PCS | Mod: CPTII,S$GLB,, | Performed by: INTERNAL MEDICINE

## 2021-04-21 PROCEDURE — 1101F PR PT FALLS ASSESS DOC 0-1 FALLS W/OUT INJ PAST YR: ICD-10-PCS | Mod: CPTII,S$GLB,, | Performed by: INTERNAL MEDICINE

## 2021-04-21 PROCEDURE — 99214 PR OFFICE/OUTPT VISIT, EST, LEVL IV, 30-39 MIN: ICD-10-PCS | Mod: S$GLB,,, | Performed by: INTERNAL MEDICINE

## 2021-04-21 PROCEDURE — 99499 UNLISTED E&M SERVICE: CPT | Mod: S$GLB,,, | Performed by: INTERNAL MEDICINE

## 2021-04-21 PROCEDURE — 99999 PR PBB SHADOW E&M-EST. PATIENT-LVL V: CPT | Mod: PBBFAC,,, | Performed by: INTERNAL MEDICINE

## 2021-04-21 PROCEDURE — 1126F AMNT PAIN NOTED NONE PRSNT: CPT | Mod: S$GLB,,, | Performed by: INTERNAL MEDICINE

## 2021-04-21 PROCEDURE — 99214 OFFICE O/P EST MOD 30 MIN: CPT | Mod: S$GLB,,, | Performed by: INTERNAL MEDICINE

## 2021-04-21 PROCEDURE — 1157F ADVNC CARE PLAN IN RCRD: CPT | Mod: S$GLB,,, | Performed by: INTERNAL MEDICINE

## 2021-04-21 PROCEDURE — 1101F PT FALLS ASSESS-DOCD LE1/YR: CPT | Mod: CPTII,S$GLB,, | Performed by: INTERNAL MEDICINE

## 2021-04-21 PROCEDURE — 1157F PR ADVANCE CARE PLAN OR EQUIV PRESENT IN MEDICAL RECORD: ICD-10-PCS | Mod: S$GLB,,, | Performed by: INTERNAL MEDICINE

## 2021-04-28 ENCOUNTER — TELEPHONE (OUTPATIENT)
Dept: NEUROLOGY | Facility: CLINIC | Age: 71
End: 2021-04-28

## 2021-05-01 LAB
ACID FAST MOD KINY STN SPEC: NORMAL
MYCOBACTERIUM SPEC QL CULT: NORMAL

## 2021-05-13 ENCOUNTER — HOSPITAL ENCOUNTER (OUTPATIENT)
Dept: RADIOLOGY | Facility: HOSPITAL | Age: 71
Discharge: HOME | End: 2021-05-13
Attending: INTERNAL MEDICINE
Payer: MEDICARE

## 2021-05-13 DIAGNOSIS — Z12.31 ENCOUNTER FOR SCREENING MAMMOGRAM FOR BREAST CANCER: ICD-10-CM

## 2021-05-13 PROCEDURE — 77063 MAMMO DIGITAL SCREENING BILAT WITH TOMO: ICD-10-PCS | Mod: 26,,, | Performed by: RADIOLOGY

## 2021-05-13 PROCEDURE — 77063 BREAST TOMOSYNTHESIS BI: CPT | Mod: 26,,, | Performed by: RADIOLOGY

## 2021-05-13 PROCEDURE — 77067 MAMMO DIGITAL SCREENING BILAT WITH TOMO: ICD-10-PCS | Mod: 26,,, | Performed by: RADIOLOGY

## 2021-05-13 PROCEDURE — 77067 SCR MAMMO BI INCL CAD: CPT | Mod: 26,,, | Performed by: RADIOLOGY

## 2021-05-13 PROCEDURE — 77067 SCR MAMMO BI INCL CAD: CPT | Mod: TC,PO

## 2021-05-14 DIAGNOSIS — F32.0 MILD MAJOR DEPRESSION: ICD-10-CM

## 2021-05-14 RX ORDER — ESCITALOPRAM OXALATE 20 MG/1
TABLET ORAL
Qty: 90 TABLET | Refills: 0 | Status: SHIPPED | OUTPATIENT
Start: 2021-05-14 | End: 2021-08-17

## 2021-05-22 ENCOUNTER — OFFICE VISIT (OUTPATIENT)
Dept: URGENT CARE | Facility: CLINIC | Age: 71
End: 2021-05-22
Payer: MEDICARE

## 2021-05-22 VITALS
SYSTOLIC BLOOD PRESSURE: 134 MMHG | HEIGHT: 62 IN | DIASTOLIC BLOOD PRESSURE: 63 MMHG | BODY MASS INDEX: 28.16 KG/M2 | HEART RATE: 75 BPM | OXYGEN SATURATION: 95 % | TEMPERATURE: 97 F | RESPIRATION RATE: 18 BRPM | WEIGHT: 153 LBS

## 2021-05-22 DIAGNOSIS — N39.0 URINARY TRACT INFECTION WITH HEMATURIA, SITE UNSPECIFIED: Primary | ICD-10-CM

## 2021-05-22 DIAGNOSIS — R31.9 URINARY TRACT INFECTION WITH HEMATURIA, SITE UNSPECIFIED: Primary | ICD-10-CM

## 2021-05-22 DIAGNOSIS — R30.0 DYSURIA: ICD-10-CM

## 2021-05-22 LAB
BILIRUB UR QL STRIP: NEGATIVE
GLUCOSE UR QL STRIP: NEGATIVE
KETONES UR QL STRIP: NEGATIVE
LEUKOCYTE ESTERASE UR QL STRIP: POSITIVE
PH, POC UA: 7.5 (ref 5–8)
POC BLOOD, URINE: POSITIVE
POC NITRATES, URINE: NEGATIVE
PROT UR QL STRIP: POSITIVE
SP GR UR STRIP: 1.01 (ref 1–1.03)
UROBILINOGEN UR STRIP-ACNC: ABNORMAL (ref 0.1–1.1)

## 2021-05-22 PROCEDURE — 81003 URINALYSIS AUTO W/O SCOPE: CPT | Mod: QW,S$GLB,, | Performed by: PHYSICIAN ASSISTANT

## 2021-05-22 PROCEDURE — 1157F ADVNC CARE PLAN IN RCRD: CPT | Mod: S$GLB,,, | Performed by: PHYSICIAN ASSISTANT

## 2021-05-22 PROCEDURE — 99214 PR OFFICE/OUTPT VISIT, EST, LEVL IV, 30-39 MIN: ICD-10-PCS | Mod: 25,S$GLB,, | Performed by: PHYSICIAN ASSISTANT

## 2021-05-22 PROCEDURE — 3008F BODY MASS INDEX DOCD: CPT | Mod: CPTII,S$GLB,, | Performed by: PHYSICIAN ASSISTANT

## 2021-05-22 PROCEDURE — 99214 OFFICE O/P EST MOD 30 MIN: CPT | Mod: 25,S$GLB,, | Performed by: PHYSICIAN ASSISTANT

## 2021-05-22 PROCEDURE — 3008F PR BODY MASS INDEX (BMI) DOCUMENTED: ICD-10-PCS | Mod: CPTII,S$GLB,, | Performed by: PHYSICIAN ASSISTANT

## 2021-05-22 PROCEDURE — 1157F PR ADVANCE CARE PLAN OR EQUIV PRESENT IN MEDICAL RECORD: ICD-10-PCS | Mod: S$GLB,,, | Performed by: PHYSICIAN ASSISTANT

## 2021-05-22 PROCEDURE — 81003 POCT URINALYSIS, DIPSTICK, AUTOMATED, W/O SCOPE: ICD-10-PCS | Mod: QW,S$GLB,, | Performed by: PHYSICIAN ASSISTANT

## 2021-05-22 RX ORDER — CEPHALEXIN 500 MG/1
500 TABLET ORAL 3 TIMES DAILY
Qty: 15 TABLET | Refills: 0 | Status: SHIPPED | OUTPATIENT
Start: 2021-05-22 | End: 2021-05-27

## 2021-05-28 ENCOUNTER — TELEPHONE (OUTPATIENT)
Dept: URGENT CARE | Facility: CLINIC | Age: 71
End: 2021-05-28

## 2021-05-28 DIAGNOSIS — N30.01 ACUTE CYSTITIS WITH HEMATURIA: Primary | ICD-10-CM

## 2021-05-28 LAB
BACTERIA UR CULT: ABNORMAL
BACTERIA UR CULT: ABNORMAL
OTHER ANTIBIOTIC SUSC ISLT: ABNORMAL

## 2021-05-28 RX ORDER — AMOXICILLIN AND CLAVULANATE POTASSIUM 875; 125 MG/1; MG/1
1 TABLET, FILM COATED ORAL 2 TIMES DAILY
Qty: 20 TABLET | Refills: 0 | Status: SHIPPED | OUTPATIENT
Start: 2021-05-28 | End: 2021-06-07

## 2021-06-03 DIAGNOSIS — M54.40 ACUTE LEFT-SIDED LOW BACK PAIN WITH SCIATICA, SCIATICA LATERALITY UNSPECIFIED: ICD-10-CM

## 2021-06-04 ENCOUNTER — OFFICE VISIT (OUTPATIENT)
Dept: FAMILY MEDICINE | Facility: CLINIC | Age: 71
End: 2021-06-04
Payer: MEDICARE

## 2021-06-04 VITALS
BODY MASS INDEX: 28.29 KG/M2 | WEIGHT: 153.75 LBS | SYSTOLIC BLOOD PRESSURE: 118 MMHG | HEART RATE: 85 BPM | DIASTOLIC BLOOD PRESSURE: 60 MMHG | HEIGHT: 62 IN | RESPIRATION RATE: 18 BRPM | OXYGEN SATURATION: 94 %

## 2021-06-04 DIAGNOSIS — J47.9 ACQUIRED BRONCHIECTASIS: Chronic | ICD-10-CM

## 2021-06-04 DIAGNOSIS — K21.9 GASTROESOPHAGEAL REFLUX DISEASE WITHOUT ESOPHAGITIS: Chronic | ICD-10-CM

## 2021-06-04 DIAGNOSIS — E66.3 OVERWEIGHT (BMI 25.0-29.9): ICD-10-CM

## 2021-06-04 DIAGNOSIS — F41.9 ANXIETY: ICD-10-CM

## 2021-06-04 DIAGNOSIS — D63.8 ANEMIA OF CHRONIC DISEASE: ICD-10-CM

## 2021-06-04 DIAGNOSIS — I70.0 THORACIC AORTA ATHEROSCLEROSIS: ICD-10-CM

## 2021-06-04 DIAGNOSIS — Z00.00 ROUTINE MEDICAL EXAM: Primary | ICD-10-CM

## 2021-06-04 DIAGNOSIS — M85.89 OSTEOPENIA OF MULTIPLE SITES: ICD-10-CM

## 2021-06-04 DIAGNOSIS — G72.0 STATIN MYOPATHY: ICD-10-CM

## 2021-06-04 DIAGNOSIS — T46.6X5A STATIN MYOPATHY: ICD-10-CM

## 2021-06-04 DIAGNOSIS — J96.11 CHRONIC RESPIRATORY FAILURE WITH HYPOXIA: ICD-10-CM

## 2021-06-04 DIAGNOSIS — I12.9 BENIGN HYPERTENSION WITH CHRONIC KIDNEY DISEASE, STAGE III: ICD-10-CM

## 2021-06-04 DIAGNOSIS — Z78.0 MENOPAUSE: ICD-10-CM

## 2021-06-04 DIAGNOSIS — M54.2 NECK PAIN ON LEFT SIDE: ICD-10-CM

## 2021-06-04 DIAGNOSIS — N18.30 BENIGN HYPERTENSION WITH CHRONIC KIDNEY DISEASE, STAGE III: ICD-10-CM

## 2021-06-04 DIAGNOSIS — J44.9 CHRONIC OBSTRUCTIVE PULMONARY DISEASE, UNSPECIFIED COPD TYPE: ICD-10-CM

## 2021-06-04 DIAGNOSIS — G25.0 ESSENTIAL TREMOR: ICD-10-CM

## 2021-06-04 DIAGNOSIS — E55.9 VITAMIN D DEFICIENCY: ICD-10-CM

## 2021-06-04 DIAGNOSIS — E87.6 HYPOKALEMIA: ICD-10-CM

## 2021-06-04 DIAGNOSIS — F32.0 MILD MAJOR DEPRESSION: ICD-10-CM

## 2021-06-04 DIAGNOSIS — J42 CHRONIC BRONCHITIS, UNSPECIFIED CHRONIC BRONCHITIS TYPE: ICD-10-CM

## 2021-06-04 DIAGNOSIS — I27.20 MILD PULMONARY HYPERTENSION: ICD-10-CM

## 2021-06-04 PROCEDURE — 3288F FALL RISK ASSESSMENT DOCD: CPT | Mod: CPTII,S$GLB,, | Performed by: INTERNAL MEDICINE

## 2021-06-04 PROCEDURE — 99999 PR PBB SHADOW E&M-EST. PATIENT-LVL V: ICD-10-PCS | Mod: PBBFAC,,, | Performed by: INTERNAL MEDICINE

## 2021-06-04 PROCEDURE — 1157F PR ADVANCE CARE PLAN OR EQUIV PRESENT IN MEDICAL RECORD: ICD-10-PCS | Mod: S$GLB,,, | Performed by: INTERNAL MEDICINE

## 2021-06-04 PROCEDURE — 99499 UNLISTED E&M SERVICE: CPT | Mod: S$GLB,,, | Performed by: INTERNAL MEDICINE

## 2021-06-04 PROCEDURE — 99499 RISK ADDL DX/OHS AUDIT: ICD-10-PCS | Mod: S$GLB,,, | Performed by: INTERNAL MEDICINE

## 2021-06-04 PROCEDURE — 1126F PR PAIN SEVERITY QUANTIFIED, NO PAIN PRESENT: ICD-10-PCS | Mod: S$GLB,,, | Performed by: INTERNAL MEDICINE

## 2021-06-04 PROCEDURE — 1101F PT FALLS ASSESS-DOCD LE1/YR: CPT | Mod: CPTII,S$GLB,, | Performed by: INTERNAL MEDICINE

## 2021-06-04 PROCEDURE — 99214 PR OFFICE/OUTPT VISIT, EST, LEVL IV, 30-39 MIN: ICD-10-PCS | Mod: S$GLB,,, | Performed by: INTERNAL MEDICINE

## 2021-06-04 PROCEDURE — 3008F PR BODY MASS INDEX (BMI) DOCUMENTED: ICD-10-PCS | Mod: CPTII,S$GLB,, | Performed by: INTERNAL MEDICINE

## 2021-06-04 PROCEDURE — 1126F AMNT PAIN NOTED NONE PRSNT: CPT | Mod: S$GLB,,, | Performed by: INTERNAL MEDICINE

## 2021-06-04 PROCEDURE — 99214 OFFICE O/P EST MOD 30 MIN: CPT | Mod: S$GLB,,, | Performed by: INTERNAL MEDICINE

## 2021-06-04 PROCEDURE — 1157F ADVNC CARE PLAN IN RCRD: CPT | Mod: S$GLB,,, | Performed by: INTERNAL MEDICINE

## 2021-06-04 PROCEDURE — 99999 PR PBB SHADOW E&M-EST. PATIENT-LVL V: CPT | Mod: PBBFAC,,, | Performed by: INTERNAL MEDICINE

## 2021-06-04 PROCEDURE — 3008F BODY MASS INDEX DOCD: CPT | Mod: CPTII,S$GLB,, | Performed by: INTERNAL MEDICINE

## 2021-06-04 PROCEDURE — 1101F PR PT FALLS ASSESS DOC 0-1 FALLS W/OUT INJ PAST YR: ICD-10-PCS | Mod: CPTII,S$GLB,, | Performed by: INTERNAL MEDICINE

## 2021-06-04 PROCEDURE — 3288F PR FALLS RISK ASSESSMENT DOCUMENTED: ICD-10-PCS | Mod: CPTII,S$GLB,, | Performed by: INTERNAL MEDICINE

## 2021-06-04 RX ORDER — ALPRAZOLAM 0.25 MG/1
0.25 TABLET ORAL NIGHTLY PRN
Qty: 30 TABLET | Refills: 0 | Status: ON HOLD | OUTPATIENT
Start: 2021-06-04 | End: 2021-12-17 | Stop reason: HOSPADM

## 2021-06-04 RX ORDER — GABAPENTIN 300 MG/1
600 CAPSULE ORAL 3 TIMES DAILY
Qty: 540 CAPSULE | Refills: 3 | Status: ON HOLD | OUTPATIENT
Start: 2021-06-04 | End: 2021-12-17 | Stop reason: HOSPADM

## 2021-06-16 DIAGNOSIS — K21.9 GASTROESOPHAGEAL REFLUX DISEASE WITHOUT ESOPHAGITIS: Chronic | ICD-10-CM

## 2021-06-17 RX ORDER — OMEPRAZOLE 20 MG/1
CAPSULE, DELAYED RELEASE ORAL
Qty: 90 CAPSULE | Refills: 1 | Status: ON HOLD | OUTPATIENT
Start: 2021-06-17 | End: 2021-12-17 | Stop reason: HOSPADM

## 2021-07-09 NOTE — PROGRESS NOTES
"Per message from ZBIGNIEW Gaines, "Pt having SVT ABLATION on 7/24/17 with Dr Ballesteros. Need INR range of 2-3. Thanks"  " [facility]  Pulmonary medicine clinic initial consult note. Patient: Rossana Mendoza  : 1950      Chief complaint/[place]:     Rossana Mendoza is a 66 y.o. old female came for further evaluation regarding her Pneumonia and abnormal chest Xray with referral from Dr. Bell Fill    She was recently admitted and discharged from [facility] SURGERY): She underwent treatment for pneumonia: 2021    She is currently on treatment with the following inhalers:  Incruse on inhalation daily  Albuterol HFA 2 puffs every 6 hourly as needed    She used to be on treatment with Advair Diskus in the past the dose of which is uncertain. Patient did not remember the dose of Advair Diskus. She was ever seen by a pulmonologist in the past:no  Prior history of Pneumonia: She gives a history of recurrent pneumonias in the past.  She was ever diagnosed with pulmonary diseases I.e bronchial asthma, COPD, pulmonary embolism or pleural effusion/s in the past: Yes. She was diagnosed with COPD. However the severity of COPD is unknown    She is having shortness of breath:Yes  Onset: On and off  Duration: Few years  Diurnal variation:  None. Functional status prior to beginning of symptoms: Distance she can walk on level ground: She is to walk walk up to 4 miles in the past on level ground. Current functional capacity on level ground: Distance she can walk on level ground:2 Jon[address] Bob on level ground. Flights of steps she can climb: Yes  She gives a history of orthopnea: 1 flight  She gives a history of paroxysmal nocturnal dyspnea:No    She is having cough: No.  She had a cough in 2021. She was hospitalized to [address] [place] [address] at that time  History suggestive of GERD I.e bad tast in the mouth in the morning or heart burn: No  History suggestive of aspiration/silent aspiration: No  Her cough is associated with sputum production: No   Hemoptysis:No  Diurnal variation: None. Associated with fever: No    History of pulmonary tuberculosis in the past: no  Recent exposure to any patients with tuberculosis:no  Recent travel to endemic places of tuberculosis: no  Prior PPD status: negative- Several years back     She ever diagnosed with COVID-19 infection in the past:No.    She received 2 doses of COVID-19 vaccine. The last dose of Covid vaccine was given in 2021    She is currently not using any oxygen supplementation at rest, exercise or during sleep/at night time. No recent hospitalizations or emergency room visits. She denies any lung cancer in first-degree relative; exposure to asbestos, radon, or uranium. She had last Mammogram performed on: negative for malignancy-last year. She had last Colonoscopy performed on: She had a normal colonoscopy several years back      Wells Score:    Sign/Symptom:                  Score:    Symptoms of  DVT :    0.       (3)                                 Tachycardia:               0. (1.5)  Immobilization:           0. (1.5)  History of VTE:           0. (1)  Malignancy:                0. (1)  Hemoptysis:               0. (1)  No alternative diagnosis: 3  (3)    Total score: 3    She denies any history of Glucoma or urinary retention in the past      Social History:  Occupation:  She is current working: Yes  Type of profession: She is currently working as a  at [employer] (for disability children)                         History of tobacco smoking:Yes  Amount of tobacco smokin.0 PPD.   Years of tobacco smokin                                   Quit smoking: No.              Quit year: 12  Current smoker: No.         History of recreational or IV drug use in the past:NO  History of Alcohol use: She rarely drinks alcohol socially     History of exposure to coal mines/coal dust: NO  History of exposure to foundry dust/welding: NO  History of exposure to quarry/silica/sandblasting: NO  History of exposure to asbestos/working with breaks/ships: NO  History of exposure to farm dust: NO  History of recent travel to long distances: NO  History of exposure to birds, pigeons, or chickens in the past:NO  Pet animals at home:No    History of pulmonary embolism in the past: No            History of DVT in the past:No                        Review of Systems:   General/Constitutional: No recent loss of weight or appetite changes. No fever or chills. HENT: Negative. Eyes: Negative. Upper respiratory tract: No nasal stuffiness or post nasal drip. Lower respiratory tract/ lungs: See HPI. No hemoptysis. Cardiovascular: No palpitations. She is giving a history of her pleuritic chest pain on right side. Her chest pain on the right side is on and off in nature since June 2021. She denies any pain today. Gastrointestinal: No nausea or vomiting. Neurological: No focal neurologiacal weakness. Extremities: No edema. Musculoskeletal: No complaints. Genitourinary: No complaints. Hematological: Negative. Psychiatric/Behavioral: Negative. Skin: No itching.       Current Medications:          Past Medical History:   Diagnosis Date    Asthma     Atrial fibrillation ([address]     Blood clot associated with vein wall inflammation     CAD (coronary artery disease)     Cerebral artery occlusion with cerebral infarction (HCC)     CHF (congestive heart failure) (HCC)     COPD (chronic obstructive pulmonary disease) (HCC)     History of blood transfusion     Hx of blood clots     Hyperlipidemia     Hypertension     MI, old     Migraine     Pneumonia     PONV (postoperative nausea and vomiting)     Psychiatric problem        Past Surgical History:   Procedure Laterality Date    APPENDECTOMY      CARDIAC SURGERY  1998 in [facility]    pericardial window   [address]    ablation    COLONOSCOPY      CORONARY ANGIOPLASTY WITH STENT PLACEMENT  [facility]    CORONARY ARTERY BYPASS GRAFT      ENDOSCOPY, Sister     Heart Disease Father     Cancer Brother     Heart Disease Maternal Aunt         MI    Heart Disease Maternal Uncle         MI    Heart Disease Maternal Grandmother         MI    Heart Disease Maternal Grandfather         MI    Stroke Paternal Grandfather     Cancer Sister     Heart Disease Sister         atrial fib    Cancer Brother     Heart Disease Brother         needs stents and has atrial fib    Diabetes Brother     Stroke Sister            Physical Exam     VITALS:  /62 (Site: Left Upper Arm, Position: Sitting, Cuff Size: Medium Adult)   Pulse 68   Temp 97.9 °F (36.6 °C) (Oral)   Ht 5' 3\" (1.6 m)   Wt 143 lb (64.9 kg)   SpO2 95% Comment: on room air at rest  BMI 25.33 kg/m²   Nursing note and vitals reviewed. Constitutional: Patient appears moderately built and moderately nourished. No distress. Patient is oriented to person, place, and time. HENT:   Head: Normocephalic and atraumatic. Right Ear: External ear normal.   Left Ear: External ear normal.   Mouth/Throat: Oropharynx is clear and moist.  No oral thrush. Eyes: Conjunctivae are normal. Pupils are equal, round, and reactive to light. No scleral icterus. Neck: Neck supple. No JVD present. No tracheal deviation present. Cardiovascular: Normal rate, regular rhythm, normal heart sounds. No murmur heard. Pulmonary/Chest: Effort normal and breath sounds normal. No stridor. No respiratory distress. Occasional expiratory wheezes. No rales. Patient exhibits no tenderness. Abdominal: Soft. Patient exhibits no distension. No tenderness. Musculoskeletal: Normal range of motion. Extremities: Patient exhibits no edema and no tenderness. Lymphadenopathy:  No cervical adenopathy. Neurological: Patient is alert and oriented to person, place, and time. Skin: Skin is warm and dry. Patient is not diaphoretic. Psychiatric: Patient  has a normal mood and affect.  Patient behavior is normal.       Diagnostic Data:    Radiological Data:  Thu Jun 24, 2021 11:54 AM   PROCEDURE: XR CHEST (2 VW)   1. Normal heart size. Metallic sternotomy sutures and vascular clips from prior surgery. 2. Mild atelectasis/pneumonia right lower lung field. No effusion. 3. Overall appearance of chest improved from prior       May 9, 2016  PROCEDURE: XR CHEST STANDARD TWO VW  IMPRESSION: 1. No acute cardiopulmonary process. Pulmonary function tests:  None in epic      Echocardiogram performed on 3 Rachael 2021:  6/4/2021   Narrative & Impression  Transthoracic Echocardiography Report (TTE)   Conclusions      Summary   limited echo   Ejection fraction is visually estimated at 45-50%. There was mild global hypokinesis of the left ventricle. The aortic valve leaflets were not well visualized. Aortic valve appears tricuspid. Signature      ----------------------------------------------------------------   Electronically signed by Yadira Gonzalez MD (Interpreting   physician) on 06/04/2021 at 03:59 PM   ----------------------------------------------------------------      Assessment:  -Chronic obstructive pulmonary disease of uncertain severity. She had recent hospitalization with COPD exacerbation in June 2021 to [address] [address]. He is currently on treatment with Incruse and albuterol  -Right-sided pleuritic chest pain with associated on and off shortness of breath. She is allergic to iodine contrast dye. She develops hives. -Exertional dyspnea may be due to COPD VS chronic congestive heart failure with diastolic dysfunction VS CAD  -History of right lower lobe pneumonia diagnosed in June 2021  -Hx of Atrial fibrillation- Controlled ventricular rate. Off anticoagulation.  She is currently taking Effient  -Hypertension under control.  -Coronary artery disease status post bypass surgery.  -Chronic systolic congestive heart failure.  -History of pericardial effusion status post pericardial window placement in the past.  -Coronary artery disease status post stent placement. She is currently following with Dr. Marj Le MD      Recommendations/Plan:  -Continue start patient on Incruse Ellipta 62.5mcg/actuation DPI 1puff daily in am. Tena Vargas  educated and demonstrated in my office how to use Incruse. She   verbalizes understanding. She was detailed about mechanism of action of drug along with associated side effects. She agreed to take the risk and medication. She verbalizes understanding.  -Continue patient on Albuterol HFA 90mcg/Spray MDI, 2puffs  Q6Hprn. She  was informed about adverse effects of Albuterol HFA. She verbalizes understanding.  -She was advised to follow-up with [facility] office regarding optimization of her congestive heart failure with a systolic dysfunction keeping her appointment with their office.  -Will send serum D- dimer today to evaluate for etiology of exertional dyspnea. She was advised and instructed to call my office in [place] [facility]) to go over the above test results and management. She verbalizes understanding.  -Schedule patient for CT scan of chest without IV contrast to further evaluate abnormal chest x-ray with a history of right lower lobe pneumonia and pleuritic chest pain to rule out a pleural effusion.  - Patient educated to update his pneumococcal vaccine with family physician and take influenza vaccine in coming season with out fail. Patient verbalizes understanding.  -Schedule patient for full pulmonary function tests before clinic visit.  -Tena Vargas was advised to make early appointment with my clinic if she develops any constitutional symptoms including loss of weight, poor appetite or hemoptysis. She verbalizes under standing.  - Schedule patient for follow up with my clinic in 1month with recommended test/s CT scan of chest without contrast and full pulmonary function tests. Patient advised to make early appointment if needed.    - Patient educated about my impression and plan. Patient verbalizes understanding.      -I personally reviewed updated the Past medical hx, Past surgical hx,Social hx, Family hx, Medications, Allergies in the discrete data section of the patient chart along with labs, Pulmonary medicine,Sleep medicine related, Pathological, Microbiological and Radiological investigations.

## 2021-08-02 ENCOUNTER — HOSPITAL ENCOUNTER (OUTPATIENT)
Dept: RADIOLOGY | Facility: CLINIC | Age: 71
Discharge: HOME | End: 2021-08-02
Attending: INTERNAL MEDICINE
Payer: MEDICARE

## 2021-08-02 DIAGNOSIS — Z78.0 MENOPAUSE: ICD-10-CM

## 2021-08-02 PROCEDURE — 77080 DXA BONE DENSITY AXIAL: CPT | Mod: 26,,, | Performed by: INTERNAL MEDICINE

## 2021-08-02 PROCEDURE — 77080 DEXA BONE DENSITY SPINE HIP: ICD-10-PCS | Mod: 26,,, | Performed by: INTERNAL MEDICINE

## 2021-08-02 PROCEDURE — 77080 DXA BONE DENSITY AXIAL: CPT | Mod: TC,PO

## 2021-08-09 ENCOUNTER — OFFICE VISIT (OUTPATIENT)
Dept: NEUROLOGY | Facility: CLINIC | Age: 71
End: 2021-08-09
Payer: MEDICARE

## 2021-08-09 DIAGNOSIS — G25.0 ESSENTIAL TREMOR: Primary | ICD-10-CM

## 2021-08-09 PROCEDURE — 99499 RISK ADDL DX/OHS AUDIT: ICD-10-PCS | Mod: 95,,, | Performed by: PSYCHIATRY & NEUROLOGY

## 2021-08-09 PROCEDURE — 1157F ADVNC CARE PLAN IN RCRD: CPT | Mod: CPTII,95,, | Performed by: PSYCHIATRY & NEUROLOGY

## 2021-08-09 PROCEDURE — 99213 PR OFFICE/OUTPT VISIT, EST, LEVL III, 20-29 MIN: ICD-10-PCS | Mod: 95,,, | Performed by: PSYCHIATRY & NEUROLOGY

## 2021-08-09 PROCEDURE — 99213 OFFICE O/P EST LOW 20 MIN: CPT | Mod: 95,,, | Performed by: PSYCHIATRY & NEUROLOGY

## 2021-08-09 PROCEDURE — 1157F PR ADVANCE CARE PLAN OR EQUIV PRESENT IN MEDICAL RECORD: ICD-10-PCS | Mod: CPTII,95,, | Performed by: PSYCHIATRY & NEUROLOGY

## 2021-08-09 PROCEDURE — 99499 UNLISTED E&M SERVICE: CPT | Mod: 95,,, | Performed by: PSYCHIATRY & NEUROLOGY

## 2021-08-09 RX ORDER — TOPIRAMATE 50 MG/1
50 TABLET, FILM COATED ORAL DAILY
Qty: 30 TABLET | Refills: 5 | Status: SHIPPED | OUTPATIENT
Start: 2021-08-09 | End: 2021-11-29

## 2021-08-17 DIAGNOSIS — F32.0 MILD MAJOR DEPRESSION: ICD-10-CM

## 2021-08-17 RX ORDER — ESCITALOPRAM OXALATE 20 MG/1
TABLET ORAL
Qty: 90 TABLET | Refills: 1 | Status: ON HOLD | OUTPATIENT
Start: 2021-08-17 | End: 2021-12-17 | Stop reason: HOSPADM

## 2021-09-04 ENCOUNTER — PATIENT MESSAGE (OUTPATIENT)
Dept: NEUROLOGY | Facility: CLINIC | Age: 71
End: 2021-09-04

## 2021-09-16 NOTE — PROGRESS NOTES
Patient of Dr Indigo Douglas at [facility]    Patient's wife called stating patient was in ER on 09/14 and he is to follow up with Urology  She stated she has questions on the medications he was prescribed  She would like a call back  Subjective:       Patient ID: Yasmin Asencio is a 66 y.o. female.    Chief Complaint: Blood Sugar Problem (BP check up)    HPI Comments: 66-year-old female presents to the clinic today for a blood pressure checkup.  Her home blood pressures have been running anywhere from /53-74.  Her blood pressure the office today is 135/64.  She complains of some shortness of breath on exertion.  She is concerned about having pneumonia.  She would like to have a chest x-ray done.  She denies any fever or chills.  She denies any cardiac chest pain, heart palpitations, or swelling to lower extremities.  She's aware oxygen more than normal.  She is currently on antibiotics.  She has 3 days left Amoxil.  She states her sinuses are much improved.  She denies any ear pain.  She denies any abdominal pain nausea, vomiting, or diarrhea.    Past Medical History   Diagnosis Date    Acquired bronchiectasis      due to history of TB - followed by pulmonary, Dr. Zuñiga    Allergy     Amblyopia      rt eye per pt    Anemia of other chronic disease     Anxiety     Cataract     Clotting disorder     COPD (chronic obstructive pulmonary disease)     COPD (chronic obstructive pulmonary disease)     Depression     Diverticulosis     Essential tremor     GERD (gastroesophageal reflux disease)     Hemangioma of liver     History of tuberculosis 1978    Hypertension     Mixed anxiety and depressive disorder     Psoriasis     S/P PICC central line placement Apr. 2016 - May 2016    Skin disease      Psoriasis    Spondylosis without myelopathy 8/23/2013    Thoracic aorta atherosclerosis      noted on CT scan of chest 1/3/2011    Tuberculosis     Vaginal delivery      x2    Vitamin D deficiency      Past Surgical History   Procedure Laterality Date    Gallbladder surgery  9/2011    Cataract extraction w/  intraocular lens implant  07/24/12     od dr spain    Cataract extraction w/  intraocular lens implant   08/07/12     left eye    Eye surgery       bilateral Cataract      reports that she quit smoking about 7 years ago. Her smoking use included Cigarettes. She has a 100.00 pack-year smoking history. She has never used smokeless tobacco. She reports that she does not drink alcohol or use illicit drugs.  Review of Systems   Constitutional: Negative for chills and fever.   HENT: Negative for congestion, ear pain, postnasal drip, rhinorrhea, sinus pressure and sore throat.    Eyes: Negative for pain, discharge and itching.   Respiratory: Positive for shortness of breath. Negative for cough and wheezing.    Cardiovascular: Negative for chest pain, palpitations and leg swelling.   Gastrointestinal: Negative for abdominal pain, diarrhea, nausea and vomiting.   Musculoskeletal: Negative for gait problem.   Neurological: Negative for dizziness, light-headedness and headaches.       Objective:      Physical Exam   Constitutional: She is oriented to person, place, and time. She appears well-developed and well-nourished. No distress.   HENT:   Head: Normocephalic and atraumatic.   Right Ear: External ear normal.   Left Ear: External ear normal.   Nose: Nose normal.   Mouth/Throat: Oropharynx is clear and moist. No oropharyngeal exudate.   Eyes: Conjunctivae and EOM are normal. Pupils are equal, round, and reactive to light. Right eye exhibits no discharge. Left eye exhibits no discharge. No scleral icterus.   Neck: Normal range of motion. Neck supple. No JVD present.   Cardiovascular: Normal rate, regular rhythm and normal heart sounds.  Exam reveals no gallop and no friction rub.    No murmur heard.  Pulmonary/Chest: Effort normal and breath sounds normal. No respiratory distress. She has no wheezes. She has no rales.   Abdominal: Soft. Bowel sounds are normal. There is no tenderness.   Musculoskeletal: Normal range of motion. She exhibits no edema.   Lymphadenopathy:     She has no cervical adenopathy.   Neurological: She is  alert and oriented to person, place, and time.   Skin: Skin is warm and dry. She is not diaphoretic.   Psychiatric: She has a normal mood and affect.       Assessment:       1. SOB (shortness of breath)    2. Sinusitis, unspecified chronicity, unspecified location    3. Benign hypertension with chronic kidney disease, stage III        Plan:         SOB (shortness of breath)  -     X-Ray Chest PA And Lateral; Future; Expected date: 1/23/17  - I spoke with the patient and explained that she did not have any pneumonia    Sinusitis, unspecified chronicity, unspecified location  - Be sure to complete the Amoxil prescription    Benign hypertension with chronic kidney disease, stage III  - The current medical regimen is effective;  continue present plan and medications.  - continue to monitor blood pressure

## 2021-09-17 DIAGNOSIS — Z86.711 PERSONAL HISTORY OF PULMONARY EMBOLISM: ICD-10-CM

## 2021-09-17 DIAGNOSIS — R79.89 ELEVATED SERUM HOMOCYSTEINE LEVEL: ICD-10-CM

## 2021-09-17 RX ORDER — FOLIC ACID 1 MG/1
TABLET ORAL
Qty: 100 TABLET | Refills: 3 | Status: ON HOLD | OUTPATIENT
Start: 2021-09-17 | End: 2021-12-17 | Stop reason: HOSPADM

## 2021-10-13 ENCOUNTER — IMMUNIZATION (OUTPATIENT)
Dept: FAMILY MEDICINE | Facility: CLINIC | Age: 71
End: 2021-10-13
Payer: MEDICARE

## 2021-10-13 DIAGNOSIS — Z23 NEEDS FLU SHOT: Primary | ICD-10-CM

## 2021-10-13 PROCEDURE — G0008 FLU VACCINE - QUADRIVALENT - ADJUVANTED: ICD-10-PCS | Mod: S$GLB,,, | Performed by: INTERNAL MEDICINE

## 2021-10-13 PROCEDURE — G0008 ADMIN INFLUENZA VIRUS VAC: HCPCS | Mod: S$GLB,,, | Performed by: INTERNAL MEDICINE

## 2021-10-13 PROCEDURE — 90694 VACC AIIV4 NO PRSRV 0.5ML IM: CPT | Mod: S$GLB,,, | Performed by: INTERNAL MEDICINE

## 2021-10-13 PROCEDURE — 90694 FLU VACCINE - QUADRIVALENT - ADJUVANTED: ICD-10-PCS | Mod: S$GLB,,, | Performed by: INTERNAL MEDICINE

## 2021-10-17 ENCOUNTER — PATIENT MESSAGE (OUTPATIENT)
Dept: FAMILY MEDICINE | Facility: CLINIC | Age: 71
End: 2021-10-17
Payer: MEDICARE

## 2021-10-18 DIAGNOSIS — Z86.79 HISTORY OF PSVT (PAROXYSMAL SUPRAVENTRICULAR TACHYCARDIA): Primary | ICD-10-CM

## 2021-10-19 ENCOUNTER — HOSPITAL ENCOUNTER (EMERGENCY)
Facility: HOSPITAL | Age: 71
Discharge: HOME | End: 2021-10-19
Attending: EMERGENCY MEDICINE
Payer: MEDICARE

## 2021-10-19 VITALS
HEART RATE: 68 BPM | TEMPERATURE: 98 F | WEIGHT: 136 LBS | OXYGEN SATURATION: 100 % | DIASTOLIC BLOOD PRESSURE: 54 MMHG | BODY MASS INDEX: 25.03 KG/M2 | RESPIRATION RATE: 14 BRPM | SYSTOLIC BLOOD PRESSURE: 100 MMHG | HEIGHT: 62 IN

## 2021-10-19 DIAGNOSIS — R10.33 PERIUMBILICAL ABDOMINAL PAIN: Primary | ICD-10-CM

## 2021-10-19 DIAGNOSIS — K52.9 ENTERITIS: ICD-10-CM

## 2021-10-19 LAB
ALBUMIN SERPL BCP-MCNC: 3.3 G/DL (ref 3.5–5.2)
ALP SERPL-CCNC: 70 U/L (ref 55–135)
ALT SERPL W/O P-5'-P-CCNC: 9 U/L (ref 10–44)
ANION GAP SERPL CALC-SCNC: 14 MMOL/L (ref 8–16)
AST SERPL-CCNC: 14 U/L (ref 10–40)
BACTERIA #/AREA URNS HPF: ABNORMAL /HPF
BASOPHILS # BLD AUTO: 0.03 K/UL (ref 0–0.2)
BASOPHILS NFR BLD: 0.3 % (ref 0–1.9)
BILIRUB SERPL-MCNC: 0.3 MG/DL (ref 0.1–1)
BILIRUB UR QL STRIP: NEGATIVE
BUN SERPL-MCNC: 16 MG/DL (ref 8–23)
CALCIUM SERPL-MCNC: 9.9 MG/DL (ref 8.7–10.5)
CHLORIDE SERPL-SCNC: 95 MMOL/L (ref 95–110)
CLARITY UR: CLEAR
CO2 SERPL-SCNC: 29 MMOL/L (ref 23–29)
COLOR UR: YELLOW
CREAT SERPL-MCNC: 1.1 MG/DL (ref 0.5–1.4)
DIFFERENTIAL METHOD: ABNORMAL
EOSINOPHIL # BLD AUTO: 0.1 K/UL (ref 0–0.5)
EOSINOPHIL NFR BLD: 0.5 % (ref 0–8)
ERYTHROCYTE [DISTWIDTH] IN BLOOD BY AUTOMATED COUNT: 16 % (ref 11.5–14.5)
EST. GFR  (AFRICAN AMERICAN): 58 ML/MIN/1.73 M^2
EST. GFR  (NON AFRICAN AMERICAN): 50 ML/MIN/1.73 M^2
GLUCOSE SERPL-MCNC: 99 MG/DL (ref 70–110)
GLUCOSE UR QL STRIP: NEGATIVE
HCT VFR BLD AUTO: 28.6 % (ref 37–48.5)
HGB BLD-MCNC: 8.9 G/DL (ref 12–16)
HGB UR QL STRIP: NEGATIVE
HYALINE CASTS #/AREA URNS LPF: 6 /LPF
IMM GRANULOCYTES # BLD AUTO: 0.05 K/UL (ref 0–0.04)
IMM GRANULOCYTES NFR BLD AUTO: 0.5 % (ref 0–0.5)
KETONES UR QL STRIP: NEGATIVE
LEUKOCYTE ESTERASE UR QL STRIP: ABNORMAL
LIPASE SERPL-CCNC: 11 U/L (ref 4–60)
LYMPHOCYTES # BLD AUTO: 1.2 K/UL (ref 1–4.8)
LYMPHOCYTES NFR BLD: 12.2 % (ref 18–48)
MCH RBC QN AUTO: 25.7 PG (ref 27–31)
MCHC RBC AUTO-ENTMCNC: 31.1 G/DL (ref 32–36)
MCV RBC AUTO: 83 FL (ref 82–98)
MICROSCOPIC COMMENT: ABNORMAL
MONOCYTES # BLD AUTO: 0.7 K/UL (ref 0.3–1)
MONOCYTES NFR BLD: 7.4 % (ref 4–15)
NEUTROPHILS # BLD AUTO: 7.6 K/UL (ref 1.8–7.7)
NEUTROPHILS NFR BLD: 79.1 % (ref 38–73)
NITRITE UR QL STRIP: NEGATIVE
NON-SQ EPI CELLS #/AREA URNS HPF: 1 /HPF
NRBC BLD-RTO: 0 /100 WBC
PH UR STRIP: 6 [PH] (ref 5–8)
PLATELET # BLD AUTO: 300 K/UL (ref 150–450)
PLATELET BLD QL SMEAR: ABNORMAL
PMV BLD AUTO: 9.7 FL (ref 9.2–12.9)
POTASSIUM SERPL-SCNC: 3.1 MMOL/L (ref 3.5–5.1)
PROT SERPL-MCNC: 8 G/DL (ref 6–8.4)
PROT UR QL STRIP: NEGATIVE
RBC # BLD AUTO: 3.46 M/UL (ref 4–5.4)
RBC #/AREA URNS HPF: 3 /HPF (ref 0–4)
SODIUM SERPL-SCNC: 138 MMOL/L (ref 136–145)
SP GR UR STRIP: 1.01 (ref 1–1.03)
SQUAMOUS #/AREA URNS HPF: 6 /HPF
URN SPEC COLLECT METH UR: ABNORMAL
UROBILINOGEN UR STRIP-ACNC: NEGATIVE EU/DL
WBC # BLD AUTO: 9.56 K/UL (ref 3.9–12.7)
WBC #/AREA URNS HPF: 2 /HPF (ref 0–5)
WBC CLUMPS URNS QL MICRO: ABNORMAL

## 2021-10-19 PROCEDURE — 80053 COMPREHEN METABOLIC PANEL: CPT | Performed by: EMERGENCY MEDICINE

## 2021-10-19 PROCEDURE — 81000 URINALYSIS NONAUTO W/SCOPE: CPT | Performed by: EMERGENCY MEDICINE

## 2021-10-19 PROCEDURE — 96374 THER/PROPH/DIAG INJ IV PUSH: CPT

## 2021-10-19 PROCEDURE — 99285 EMERGENCY DEPT VISIT HI MDM: CPT | Mod: 25

## 2021-10-19 PROCEDURE — 63600175 PHARM REV CODE 636 W HCPCS: Performed by: EMERGENCY MEDICINE

## 2021-10-19 PROCEDURE — 25000003 PHARM REV CODE 250: Performed by: EMERGENCY MEDICINE

## 2021-10-19 PROCEDURE — 83690 ASSAY OF LIPASE: CPT | Performed by: EMERGENCY MEDICINE

## 2021-10-19 PROCEDURE — 85025 COMPLETE CBC W/AUTO DIFF WBC: CPT | Performed by: EMERGENCY MEDICINE

## 2021-10-19 RX ORDER — METRONIDAZOLE 500 MG/1
500 TABLET ORAL 3 TIMES DAILY
Qty: 21 TABLET | Refills: 0 | Status: SHIPPED | OUTPATIENT
Start: 2021-10-19 | End: 2021-10-26

## 2021-10-19 RX ORDER — CIPROFLOXACIN 500 MG/1
500 TABLET ORAL 2 TIMES DAILY
Qty: 14 TABLET | Refills: 0 | Status: SHIPPED | OUTPATIENT
Start: 2021-10-19 | End: 2021-10-26

## 2021-10-19 RX ORDER — MORPHINE SULFATE 4 MG/ML
3 INJECTION, SOLUTION INTRAMUSCULAR; INTRAVENOUS
Status: COMPLETED | OUTPATIENT
Start: 2021-10-19 | End: 2021-10-19

## 2021-10-19 RX ORDER — ONDANSETRON 4 MG/1
4 TABLET, ORALLY DISINTEGRATING ORAL
Status: COMPLETED | OUTPATIENT
Start: 2021-10-19 | End: 2021-10-19

## 2021-10-19 RX ORDER — HYDROCODONE BITARTRATE AND ACETAMINOPHEN 5; 325 MG/1; MG/1
1 TABLET ORAL EVERY 4 HOURS PRN
Qty: 15 TABLET | Refills: 0 | Status: SHIPPED | OUTPATIENT
Start: 2021-10-19 | End: 2021-10-29

## 2021-10-19 RX ADMIN — MORPHINE SULFATE 3 MG: 4 INJECTION INTRAVENOUS at 05:10

## 2021-10-19 RX ADMIN — ONDANSETRON 4 MG: 4 TABLET, ORALLY DISINTEGRATING ORAL at 05:10

## 2021-10-26 ENCOUNTER — OFFICE VISIT (OUTPATIENT)
Dept: CARDIOLOGY | Facility: CLINIC | Age: 71
End: 2021-10-26
Payer: MEDICARE

## 2021-10-26 VITALS
SYSTOLIC BLOOD PRESSURE: 104 MMHG | HEART RATE: 91 BPM | HEIGHT: 62 IN | BODY MASS INDEX: 24.46 KG/M2 | WEIGHT: 132.94 LBS | DIASTOLIC BLOOD PRESSURE: 60 MMHG

## 2021-10-26 DIAGNOSIS — Z86.79 STATUS POST CATHETER ABLATION OF SLOW PATHWAY: ICD-10-CM

## 2021-10-26 DIAGNOSIS — Z98.890 STATUS POST CATHETER ABLATION OF SLOW PATHWAY: ICD-10-CM

## 2021-10-26 DIAGNOSIS — I70.0 THORACIC AORTA ATHEROSCLEROSIS: ICD-10-CM

## 2021-10-26 DIAGNOSIS — Z86.79 HISTORY OF PSVT (PAROXYSMAL SUPRAVENTRICULAR TACHYCARDIA): Primary | Chronic | ICD-10-CM

## 2021-10-26 DIAGNOSIS — R07.89 NON-CARDIAC CHEST PAIN: ICD-10-CM

## 2021-10-26 DIAGNOSIS — I47.19 ECTOPIC ATRIAL TACHYCARDIA: ICD-10-CM

## 2021-10-26 PROCEDURE — 1157F PR ADVANCE CARE PLAN OR EQUIV PRESENT IN MEDICAL RECORD: ICD-10-PCS | Mod: CPTII,S$GLB,, | Performed by: INTERNAL MEDICINE

## 2021-10-26 PROCEDURE — 1157F ADVNC CARE PLAN IN RCRD: CPT | Mod: CPTII,S$GLB,, | Performed by: INTERNAL MEDICINE

## 2021-10-26 PROCEDURE — 99999 PR PBB SHADOW E&M-EST. PATIENT-LVL V: ICD-10-PCS | Mod: PBBFAC,,, | Performed by: INTERNAL MEDICINE

## 2021-10-26 PROCEDURE — 99499 UNLISTED E&M SERVICE: CPT | Mod: S$GLB,,, | Performed by: INTERNAL MEDICINE

## 2021-10-26 PROCEDURE — 3074F PR MOST RECENT SYSTOLIC BLOOD PRESSURE < 130 MM HG: ICD-10-PCS | Mod: CPTII,S$GLB,, | Performed by: INTERNAL MEDICINE

## 2021-10-26 PROCEDURE — 99499 RISK ADDL DX/OHS AUDIT: ICD-10-PCS | Mod: S$GLB,,, | Performed by: INTERNAL MEDICINE

## 2021-10-26 PROCEDURE — 99999 PR PBB SHADOW E&M-EST. PATIENT-LVL V: CPT | Mod: PBBFAC,,, | Performed by: INTERNAL MEDICINE

## 2021-10-26 PROCEDURE — 1101F PT FALLS ASSESS-DOCD LE1/YR: CPT | Mod: CPTII,S$GLB,, | Performed by: INTERNAL MEDICINE

## 2021-10-26 PROCEDURE — 3078F PR MOST RECENT DIASTOLIC BLOOD PRESSURE < 80 MM HG: ICD-10-PCS | Mod: CPTII,S$GLB,, | Performed by: INTERNAL MEDICINE

## 2021-10-26 PROCEDURE — 3074F SYST BP LT 130 MM HG: CPT | Mod: CPTII,S$GLB,, | Performed by: INTERNAL MEDICINE

## 2021-10-26 PROCEDURE — 1101F PR PT FALLS ASSESS DOC 0-1 FALLS W/OUT INJ PAST YR: ICD-10-PCS | Mod: CPTII,S$GLB,, | Performed by: INTERNAL MEDICINE

## 2021-10-26 PROCEDURE — 3288F FALL RISK ASSESSMENT DOCD: CPT | Mod: CPTII,S$GLB,, | Performed by: INTERNAL MEDICINE

## 2021-10-26 PROCEDURE — 99214 PR OFFICE/OUTPT VISIT, EST, LEVL IV, 30-39 MIN: ICD-10-PCS | Mod: S$GLB,,, | Performed by: INTERNAL MEDICINE

## 2021-10-26 PROCEDURE — 1159F MED LIST DOCD IN RCRD: CPT | Mod: CPTII,S$GLB,, | Performed by: INTERNAL MEDICINE

## 2021-10-26 PROCEDURE — 3288F PR FALLS RISK ASSESSMENT DOCUMENTED: ICD-10-PCS | Mod: CPTII,S$GLB,, | Performed by: INTERNAL MEDICINE

## 2021-10-26 PROCEDURE — 3008F BODY MASS INDEX DOCD: CPT | Mod: CPTII,S$GLB,, | Performed by: INTERNAL MEDICINE

## 2021-10-26 PROCEDURE — 3078F DIAST BP <80 MM HG: CPT | Mod: CPTII,S$GLB,, | Performed by: INTERNAL MEDICINE

## 2021-10-26 PROCEDURE — 99214 OFFICE O/P EST MOD 30 MIN: CPT | Mod: S$GLB,,, | Performed by: INTERNAL MEDICINE

## 2021-10-26 PROCEDURE — 3008F PR BODY MASS INDEX (BMI) DOCUMENTED: ICD-10-PCS | Mod: CPTII,S$GLB,, | Performed by: INTERNAL MEDICINE

## 2021-10-26 PROCEDURE — 1159F PR MEDICATION LIST DOCUMENTED IN MEDICAL RECORD: ICD-10-PCS | Mod: CPTII,S$GLB,, | Performed by: INTERNAL MEDICINE

## 2021-10-27 ENCOUNTER — PATIENT MESSAGE (OUTPATIENT)
Dept: NEUROLOGY | Facility: CLINIC | Age: 71
End: 2021-10-27
Payer: MEDICARE

## 2021-11-01 ENCOUNTER — HOSPITAL ENCOUNTER (OUTPATIENT)
Dept: CARDIOLOGY | Facility: HOSPITAL | Age: 71
Discharge: HOME | End: 2021-11-01
Attending: INTERNAL MEDICINE
Payer: MEDICARE

## 2021-11-01 DIAGNOSIS — I70.0 THORACIC AORTA ATHEROSCLEROSIS: ICD-10-CM

## 2021-11-01 DIAGNOSIS — Z98.890 STATUS POST CATHETER ABLATION OF SLOW PATHWAY: ICD-10-CM

## 2021-11-01 DIAGNOSIS — Z86.79 HISTORY OF PSVT (PAROXYSMAL SUPRAVENTRICULAR TACHYCARDIA): ICD-10-CM

## 2021-11-01 DIAGNOSIS — I47.19 ECTOPIC ATRIAL TACHYCARDIA: ICD-10-CM

## 2021-11-01 DIAGNOSIS — Z86.79 STATUS POST CATHETER ABLATION OF SLOW PATHWAY: ICD-10-CM

## 2021-11-01 DIAGNOSIS — R07.89 NON-CARDIAC CHEST PAIN: ICD-10-CM

## 2021-11-01 PROCEDURE — 93225 XTRNL ECG REC<48 HRS REC: CPT

## 2021-11-01 PROCEDURE — 93227 XTRNL ECG REC<48 HR R&I: CPT | Mod: ,,, | Performed by: INTERNAL MEDICINE

## 2021-11-01 PROCEDURE — 93227 HOLTER MONITOR - 24 HOUR (CUPID ONLY): ICD-10-PCS | Mod: ,,, | Performed by: INTERNAL MEDICINE

## 2021-11-02 LAB
OHS CV EVENT MONITOR DAY: 0
OHS CV HOLTER LENGTH DECIMAL HOURS: 23.98
OHS CV HOLTER LENGTH HOURS: 23
OHS CV HOLTER LENGTH MINUTES: 59
OHS CV HOLTER SINUS AVERAGE HR: 79
OHS CV HOLTER SINUS MAX HR: 121
OHS CV HOLTER SINUS MIN HR: 55

## 2021-11-09 ENCOUNTER — OFFICE VISIT (OUTPATIENT)
Dept: CARDIOLOGY | Facility: CLINIC | Age: 71
End: 2021-11-09
Payer: MEDICARE

## 2021-11-09 VITALS
BODY MASS INDEX: 23.88 KG/M2 | DIASTOLIC BLOOD PRESSURE: 56 MMHG | SYSTOLIC BLOOD PRESSURE: 105 MMHG | HEART RATE: 86 BPM | HEIGHT: 62 IN | WEIGHT: 129.75 LBS

## 2021-11-09 DIAGNOSIS — Z86.79 STATUS POST CATHETER ABLATION OF SLOW PATHWAY: ICD-10-CM

## 2021-11-09 DIAGNOSIS — I47.19 ECTOPIC ATRIAL TACHYCARDIA: ICD-10-CM

## 2021-11-09 DIAGNOSIS — Z86.79 HISTORY OF PSVT (PAROXYSMAL SUPRAVENTRICULAR TACHYCARDIA): Primary | Chronic | ICD-10-CM

## 2021-11-09 DIAGNOSIS — I70.0 THORACIC AORTA ATHEROSCLEROSIS: ICD-10-CM

## 2021-11-09 DIAGNOSIS — Z98.890 STATUS POST CATHETER ABLATION OF SLOW PATHWAY: ICD-10-CM

## 2021-11-09 PROCEDURE — 1101F PT FALLS ASSESS-DOCD LE1/YR: CPT | Mod: CPTII,S$GLB,, | Performed by: INTERNAL MEDICINE

## 2021-11-09 PROCEDURE — 1160F RVW MEDS BY RX/DR IN RCRD: CPT | Mod: CPTII,S$GLB,, | Performed by: INTERNAL MEDICINE

## 2021-11-09 PROCEDURE — 3008F PR BODY MASS INDEX (BMI) DOCUMENTED: ICD-10-PCS | Mod: CPTII,S$GLB,, | Performed by: INTERNAL MEDICINE

## 2021-11-09 PROCEDURE — 3074F SYST BP LT 130 MM HG: CPT | Mod: CPTII,S$GLB,, | Performed by: INTERNAL MEDICINE

## 2021-11-09 PROCEDURE — 1157F ADVNC CARE PLAN IN RCRD: CPT | Mod: CPTII,S$GLB,, | Performed by: INTERNAL MEDICINE

## 2021-11-09 PROCEDURE — 1101F PR PT FALLS ASSESS DOC 0-1 FALLS W/OUT INJ PAST YR: ICD-10-PCS | Mod: CPTII,S$GLB,, | Performed by: INTERNAL MEDICINE

## 2021-11-09 PROCEDURE — 99999 PR PBB SHADOW E&M-EST. PATIENT-LVL V: ICD-10-PCS | Mod: PBBFAC,,, | Performed by: INTERNAL MEDICINE

## 2021-11-09 PROCEDURE — 1159F PR MEDICATION LIST DOCUMENTED IN MEDICAL RECORD: ICD-10-PCS | Mod: CPTII,S$GLB,, | Performed by: INTERNAL MEDICINE

## 2021-11-09 PROCEDURE — 1126F PR PAIN SEVERITY QUANTIFIED, NO PAIN PRESENT: ICD-10-PCS | Mod: CPTII,S$GLB,, | Performed by: INTERNAL MEDICINE

## 2021-11-09 PROCEDURE — 1126F AMNT PAIN NOTED NONE PRSNT: CPT | Mod: CPTII,S$GLB,, | Performed by: INTERNAL MEDICINE

## 2021-11-09 PROCEDURE — 3078F DIAST BP <80 MM HG: CPT | Mod: CPTII,S$GLB,, | Performed by: INTERNAL MEDICINE

## 2021-11-09 PROCEDURE — 99999 PR PBB SHADOW E&M-EST. PATIENT-LVL V: CPT | Mod: PBBFAC,,, | Performed by: INTERNAL MEDICINE

## 2021-11-09 PROCEDURE — 1157F PR ADVANCE CARE PLAN OR EQUIV PRESENT IN MEDICAL RECORD: ICD-10-PCS | Mod: CPTII,S$GLB,, | Performed by: INTERNAL MEDICINE

## 2021-11-09 PROCEDURE — 3074F PR MOST RECENT SYSTOLIC BLOOD PRESSURE < 130 MM HG: ICD-10-PCS | Mod: CPTII,S$GLB,, | Performed by: INTERNAL MEDICINE

## 2021-11-09 PROCEDURE — 3078F PR MOST RECENT DIASTOLIC BLOOD PRESSURE < 80 MM HG: ICD-10-PCS | Mod: CPTII,S$GLB,, | Performed by: INTERNAL MEDICINE

## 2021-11-09 PROCEDURE — 3008F BODY MASS INDEX DOCD: CPT | Mod: CPTII,S$GLB,, | Performed by: INTERNAL MEDICINE

## 2021-11-09 PROCEDURE — 3288F FALL RISK ASSESSMENT DOCD: CPT | Mod: CPTII,S$GLB,, | Performed by: INTERNAL MEDICINE

## 2021-11-09 PROCEDURE — 99214 PR OFFICE/OUTPT VISIT, EST, LEVL IV, 30-39 MIN: ICD-10-PCS | Mod: S$GLB,,, | Performed by: INTERNAL MEDICINE

## 2021-11-09 PROCEDURE — 3288F PR FALLS RISK ASSESSMENT DOCUMENTED: ICD-10-PCS | Mod: CPTII,S$GLB,, | Performed by: INTERNAL MEDICINE

## 2021-11-09 PROCEDURE — 1160F PR REVIEW ALL MEDS BY PRESCRIBER/CLIN PHARMACIST DOCUMENTED: ICD-10-PCS | Mod: CPTII,S$GLB,, | Performed by: INTERNAL MEDICINE

## 2021-11-09 PROCEDURE — 1159F MED LIST DOCD IN RCRD: CPT | Mod: CPTII,S$GLB,, | Performed by: INTERNAL MEDICINE

## 2021-11-09 PROCEDURE — 99214 OFFICE O/P EST MOD 30 MIN: CPT | Mod: S$GLB,,, | Performed by: INTERNAL MEDICINE

## 2021-11-15 ENCOUNTER — TELEPHONE (OUTPATIENT)
Dept: FAMILY MEDICINE | Facility: CLINIC | Age: 71
End: 2021-11-15
Payer: MEDICARE

## 2021-11-16 ENCOUNTER — TELEPHONE (OUTPATIENT)
Dept: FAMILY MEDICINE | Facility: CLINIC | Age: 71
End: 2021-11-16
Payer: MEDICARE

## 2021-11-29 ENCOUNTER — TELEPHONE (OUTPATIENT)
Dept: ENDOSCOPY | Facility: HOSPITAL | Age: 71
End: 2021-11-29
Payer: MEDICARE

## 2021-11-29 ENCOUNTER — OFFICE VISIT (OUTPATIENT)
Dept: GASTROENTEROLOGY | Facility: CLINIC | Age: 71
End: 2021-11-29
Payer: MEDICARE

## 2021-11-29 VITALS
WEIGHT: 135.5 LBS | BODY MASS INDEX: 24.93 KG/M2 | HEART RATE: 90 BPM | SYSTOLIC BLOOD PRESSURE: 118 MMHG | DIASTOLIC BLOOD PRESSURE: 60 MMHG | OXYGEN SATURATION: 90 % | HEIGHT: 62 IN

## 2021-11-29 DIAGNOSIS — R10.84 ABDOMINAL PAIN, GENERALIZED: Primary | ICD-10-CM

## 2021-11-29 PROCEDURE — 99999 PR PBB SHADOW E&M-EST. PATIENT-LVL V: ICD-10-PCS | Mod: PBBFAC,,, | Performed by: INTERNAL MEDICINE

## 2021-11-29 PROCEDURE — 99214 PR OFFICE/OUTPT VISIT, EST, LEVL IV, 30-39 MIN: ICD-10-PCS | Mod: S$GLB,,, | Performed by: INTERNAL MEDICINE

## 2021-11-29 PROCEDURE — 99999 PR PBB SHADOW E&M-EST. PATIENT-LVL V: CPT | Mod: PBBFAC,,, | Performed by: INTERNAL MEDICINE

## 2021-11-29 PROCEDURE — 99214 OFFICE O/P EST MOD 30 MIN: CPT | Mod: S$GLB,,, | Performed by: INTERNAL MEDICINE

## 2021-11-29 PROCEDURE — 1157F ADVNC CARE PLAN IN RCRD: CPT | Mod: CPTII,S$GLB,, | Performed by: INTERNAL MEDICINE

## 2021-11-29 PROCEDURE — 1157F PR ADVANCE CARE PLAN OR EQUIV PRESENT IN MEDICAL RECORD: ICD-10-PCS | Mod: CPTII,S$GLB,, | Performed by: INTERNAL MEDICINE

## 2021-11-30 ENCOUNTER — PATIENT MESSAGE (OUTPATIENT)
Dept: FAMILY MEDICINE | Facility: CLINIC | Age: 71
End: 2021-11-30
Payer: MEDICARE

## 2021-11-30 DIAGNOSIS — N39.9 URINARY PROBLEM IN FEMALE: Primary | ICD-10-CM

## 2021-12-01 DIAGNOSIS — J47.9 ACQUIRED BRONCHIECTASIS: ICD-10-CM

## 2021-12-01 RX ORDER — SODIUM CHLORIDE FOR INHALATION 3 %
VIAL, NEBULIZER (ML) INHALATION
Qty: 750 ML | Refills: 3 | Status: ON HOLD | OUTPATIENT
Start: 2021-12-01 | End: 2021-12-17 | Stop reason: HOSPADM

## 2021-12-02 ENCOUNTER — HOSPITAL ENCOUNTER (OUTPATIENT)
Dept: RADIOLOGY | Facility: HOSPITAL | Age: 71
Discharge: HOME | DRG: 870 | End: 2021-12-02
Attending: INTERNAL MEDICINE
Payer: MEDICARE

## 2021-12-02 ENCOUNTER — TELEPHONE (OUTPATIENT)
Dept: GASTROENTEROLOGY | Facility: CLINIC | Age: 71
End: 2021-12-02
Payer: MEDICARE

## 2021-12-02 ENCOUNTER — TELEPHONE (OUTPATIENT)
Dept: ENDOSCOPY | Facility: HOSPITAL | Age: 71
End: 2021-12-02
Payer: MEDICARE

## 2021-12-02 DIAGNOSIS — R10.84 ABDOMINAL PAIN, GENERALIZED: ICD-10-CM

## 2021-12-02 PROCEDURE — 93976 VASCULAR STUDY: CPT | Mod: 26,,, | Performed by: RADIOLOGY

## 2021-12-02 PROCEDURE — 76775 US EXAM ABDO BACK WALL LIM: CPT | Mod: 26,XS,, | Performed by: RADIOLOGY

## 2021-12-02 PROCEDURE — 93976 US MESENTERIC ISCHEMIA STUDY (XPD): ICD-10-PCS | Mod: 26,,, | Performed by: RADIOLOGY

## 2021-12-02 PROCEDURE — 76775 US MESENTERIC ISCHEMIA STUDY (XPD): ICD-10-PCS | Mod: 26,XS,, | Performed by: RADIOLOGY

## 2021-12-02 PROCEDURE — 76775 US EXAM ABDO BACK WALL LIM: CPT | Mod: TC

## 2021-12-03 ENCOUNTER — PATIENT MESSAGE (OUTPATIENT)
Dept: ENDOSCOPY | Facility: HOSPITAL | Age: 71
End: 2021-12-03
Payer: MEDICARE

## 2021-12-05 ENCOUNTER — HOSPITAL ENCOUNTER (INPATIENT)
Facility: HOSPITAL | Age: 71
LOS: 12 days | DRG: 870 | End: 2021-12-17
Attending: EMERGENCY MEDICINE | Admitting: HOSPITALIST
Payer: MEDICARE

## 2021-12-05 DIAGNOSIS — J96.90 RESPIRATORY FAILURE, UNSPECIFIED CHRONICITY, UNSPECIFIED WHETHER WITH HYPOXIA OR HYPERCAPNIA: Primary | ICD-10-CM

## 2021-12-05 DIAGNOSIS — I48.91 A-FIB: ICD-10-CM

## 2021-12-05 DIAGNOSIS — J96.22 ACUTE ON CHRONIC RESPIRATORY FAILURE WITH HYPOXIA AND HYPERCAPNIA: ICD-10-CM

## 2021-12-05 DIAGNOSIS — R41.82 ALTERED MENTAL STATUS, UNSPECIFIED ALTERED MENTAL STATUS TYPE: ICD-10-CM

## 2021-12-05 DIAGNOSIS — J18.9 MULTIFOCAL PNEUMONIA: ICD-10-CM

## 2021-12-05 DIAGNOSIS — I31.4 CARDIAC/PERICARDIAL TAMPONADE: ICD-10-CM

## 2021-12-05 DIAGNOSIS — I31.39 PERICARDIAL EFFUSION: ICD-10-CM

## 2021-12-05 DIAGNOSIS — R57.9 SHOCK: ICD-10-CM

## 2021-12-05 DIAGNOSIS — Z51.5 COMFORT MEASURES ONLY STATUS: ICD-10-CM

## 2021-12-05 DIAGNOSIS — N17.9 ACUTE RENAL FAILURE, UNSPECIFIED ACUTE RENAL FAILURE TYPE: ICD-10-CM

## 2021-12-05 DIAGNOSIS — I30.9 ACUTE PERICARDIAL EFFUSION: ICD-10-CM

## 2021-12-05 DIAGNOSIS — R07.9 ACUTE CHEST PAIN: ICD-10-CM

## 2021-12-05 DIAGNOSIS — J96.21 ACUTE ON CHRONIC RESPIRATORY FAILURE WITH HYPOXIA AND HYPERCAPNIA: ICD-10-CM

## 2021-12-05 PROBLEM — I48.0 PAROXYSMAL ATRIAL FIBRILLATION: Status: ACTIVE | Noted: 2021-12-05

## 2021-12-05 PROBLEM — N18.30 ACUTE RENAL FAILURE SUPERIMPOSED ON STAGE 3 CHRONIC KIDNEY DISEASE: Status: ACTIVE | Noted: 2021-12-05

## 2021-12-05 PROBLEM — N18.30 STAGE 3 CHRONIC KIDNEY DISEASE: Status: ACTIVE | Noted: 2021-12-05

## 2021-12-05 LAB
ALBUMIN SERPL BCP-MCNC: 2.8 G/DL (ref 3.5–5.2)
ALP SERPL-CCNC: 87 U/L (ref 55–135)
ALT SERPL W/O P-5'-P-CCNC: 22 U/L (ref 10–44)
ANION GAP SERPL CALC-SCNC: 19 MMOL/L (ref 8–16)
AST SERPL-CCNC: 30 U/L (ref 10–40)
BACTERIA #/AREA URNS HPF: ABNORMAL /HPF
BASOPHILS # BLD AUTO: 0.01 K/UL (ref 0–0.2)
BASOPHILS # BLD AUTO: 0.06 K/UL (ref 0–0.2)
BASOPHILS NFR BLD: 0.1 % (ref 0–1.9)
BASOPHILS NFR BLD: 0.5 % (ref 0–1.9)
BILIRUB SERPL-MCNC: 0.4 MG/DL (ref 0.1–1)
BILIRUB UR QL STRIP: NEGATIVE
BNP SERPL-MCNC: 246 PG/ML (ref 0–99)
BUN SERPL-MCNC: 33 MG/DL (ref 8–23)
CALCIUM SERPL-MCNC: 8.9 MG/DL (ref 8.7–10.5)
CHLORIDE SERPL-SCNC: 98 MMOL/L (ref 95–110)
CLARITY UR: ABNORMAL
CO2 SERPL-SCNC: 21 MMOL/L (ref 23–29)
COLOR UR: YELLOW
CREAT SERPL-MCNC: 1.8 MG/DL (ref 0.5–1.4)
CTP QC/QA: YES
CTP QC/QA: YES
DIFFERENTIAL METHOD: ABNORMAL
DIFFERENTIAL METHOD: ABNORMAL
EOSINOPHIL # BLD AUTO: 0 K/UL (ref 0–0.5)
EOSINOPHIL # BLD AUTO: 0.1 K/UL (ref 0–0.5)
EOSINOPHIL NFR BLD: 0 % (ref 0–8)
EOSINOPHIL NFR BLD: 0.7 % (ref 0–8)
ERYTHROCYTE [DISTWIDTH] IN BLOOD BY AUTOMATED COUNT: 15.9 % (ref 11.5–14.5)
ERYTHROCYTE [DISTWIDTH] IN BLOOD BY AUTOMATED COUNT: 16 % (ref 11.5–14.5)
EST. GFR  (AFRICAN AMERICAN): 32 ML/MIN/1.73 M^2
EST. GFR  (NON AFRICAN AMERICAN): 28 ML/MIN/1.73 M^2
GLUCOSE SERPL-MCNC: 171 MG/DL (ref 70–110)
GLUCOSE UR QL STRIP: NEGATIVE
HCT VFR BLD AUTO: 25.2 % (ref 37–48.5)
HCT VFR BLD AUTO: 29.5 % (ref 37–48.5)
HGB BLD-MCNC: 7.2 G/DL (ref 12–16)
HGB BLD-MCNC: 8.9 G/DL (ref 12–16)
HGB UR QL STRIP: NEGATIVE
HYALINE CASTS #/AREA URNS LPF: 84 /LPF
IMM GRANULOCYTES # BLD AUTO: 0.06 K/UL (ref 0–0.04)
IMM GRANULOCYTES # BLD AUTO: 0.1 K/UL (ref 0–0.04)
IMM GRANULOCYTES NFR BLD AUTO: 0.5 % (ref 0–0.5)
IMM GRANULOCYTES NFR BLD AUTO: 1 % (ref 0–0.5)
KETONES UR QL STRIP: NEGATIVE
LACTATE SERPL-SCNC: 3.9 MMOL/L (ref 0.5–2.2)
LEUKOCYTE ESTERASE UR QL STRIP: ABNORMAL
LIPASE SERPL-CCNC: 11 U/L (ref 4–60)
LYMPHOCYTES # BLD AUTO: 0.8 K/UL (ref 1–4.8)
LYMPHOCYTES # BLD AUTO: 1.2 K/UL (ref 1–4.8)
LYMPHOCYTES NFR BLD: 10.4 % (ref 18–48)
LYMPHOCYTES NFR BLD: 7.3 % (ref 18–48)
MAGNESIUM SERPL-MCNC: 2.1 MG/DL (ref 1.6–2.6)
MCH RBC QN AUTO: 25.5 PG (ref 27–31)
MCH RBC QN AUTO: 25.5 PG (ref 27–31)
MCHC RBC AUTO-ENTMCNC: 28.6 G/DL (ref 32–36)
MCHC RBC AUTO-ENTMCNC: 30.2 G/DL (ref 32–36)
MCV RBC AUTO: 85 FL (ref 82–98)
MCV RBC AUTO: 89 FL (ref 82–98)
MICROSCOPIC COMMENT: ABNORMAL
MONOCYTES # BLD AUTO: 1.1 K/UL (ref 0.3–1)
MONOCYTES # BLD AUTO: 1.1 K/UL (ref 0.3–1)
MONOCYTES NFR BLD: 10.3 % (ref 4–15)
MONOCYTES NFR BLD: 9.6 % (ref 4–15)
NEUTROPHILS # BLD AUTO: 8.5 K/UL (ref 1.8–7.7)
NEUTROPHILS # BLD AUTO: 8.7 K/UL (ref 1.8–7.7)
NEUTROPHILS NFR BLD: 78.3 % (ref 38–73)
NEUTROPHILS NFR BLD: 81.3 % (ref 38–73)
NITRITE UR QL STRIP: NEGATIVE
NRBC BLD-RTO: 0 /100 WBC
NRBC BLD-RTO: 0 /100 WBC
PH UR STRIP: 6 [PH] (ref 5–8)
PLATELET # BLD AUTO: 468 K/UL (ref 150–450)
PLATELET # BLD AUTO: 521 K/UL (ref 150–450)
PMV BLD AUTO: 10 FL (ref 9.2–12.9)
PMV BLD AUTO: 9.5 FL (ref 9.2–12.9)
POC MOLECULAR INFLUENZA A AGN: NEGATIVE
POC MOLECULAR INFLUENZA B AGN: NEGATIVE
POTASSIUM SERPL-SCNC: 3.3 MMOL/L (ref 3.5–5.1)
PROCALCITONIN SERPL IA-MCNC: 0.18 NG/ML
PROT SERPL-MCNC: 7 G/DL (ref 6–8.4)
PROT UR QL STRIP: ABNORMAL
RBC # BLD AUTO: 2.82 M/UL (ref 4–5.4)
RBC # BLD AUTO: 3.49 M/UL (ref 4–5.4)
RBC #/AREA URNS HPF: 1 /HPF (ref 0–4)
SARS-COV-2 RDRP RESP QL NAA+PROBE: NEGATIVE
SODIUM SERPL-SCNC: 138 MMOL/L (ref 136–145)
SP GR UR STRIP: 1.02 (ref 1–1.03)
SQUAMOUS #/AREA URNS HPF: 6 /HPF
TROPONIN I SERPL DL<=0.01 NG/ML-MCNC: <0.006 NG/ML (ref 0–0.03)
URN SPEC COLLECT METH UR: ABNORMAL
UROBILINOGEN UR STRIP-ACNC: NEGATIVE EU/DL
WBC # BLD AUTO: 10.46 K/UL (ref 3.9–12.7)
WBC # BLD AUTO: 11.09 K/UL (ref 3.9–12.7)
WBC #/AREA URNS HPF: 8 /HPF (ref 0–5)
WBC CLUMPS URNS QL MICRO: ABNORMAL

## 2021-12-05 PROCEDURE — 96374 THER/PROPH/DIAG INJ IV PUSH: CPT

## 2021-12-05 PROCEDURE — 81000 URINALYSIS NONAUTO W/SCOPE: CPT | Performed by: EMERGENCY MEDICINE

## 2021-12-05 PROCEDURE — 85025 COMPLETE CBC W/AUTO DIFF WBC: CPT | Performed by: EMERGENCY MEDICINE

## 2021-12-05 PROCEDURE — 93005 ELECTROCARDIOGRAM TRACING: CPT

## 2021-12-05 PROCEDURE — 84484 ASSAY OF TROPONIN QUANT: CPT | Performed by: EMERGENCY MEDICINE

## 2021-12-05 PROCEDURE — 25000242 PHARM REV CODE 250 ALT 637 W/ HCPCS: Performed by: INTERNAL MEDICINE

## 2021-12-05 PROCEDURE — 21400001 HC TELEMETRY ROOM

## 2021-12-05 PROCEDURE — 99291 CRITICAL CARE FIRST HOUR: CPT | Mod: 25

## 2021-12-05 PROCEDURE — 87040 BLOOD CULTURE FOR BACTERIA: CPT | Mod: 59 | Performed by: EMERGENCY MEDICINE

## 2021-12-05 PROCEDURE — U0002 COVID-19 LAB TEST NON-CDC: HCPCS | Performed by: EMERGENCY MEDICINE

## 2021-12-05 PROCEDURE — 96375 TX/PRO/DX INJ NEW DRUG ADDON: CPT

## 2021-12-05 PROCEDURE — 63600175 PHARM REV CODE 636 W HCPCS: Performed by: HOSPITALIST

## 2021-12-05 PROCEDURE — 25000003 PHARM REV CODE 250: Performed by: EMERGENCY MEDICINE

## 2021-12-05 PROCEDURE — 83735 ASSAY OF MAGNESIUM: CPT | Performed by: EMERGENCY MEDICINE

## 2021-12-05 PROCEDURE — 80053 COMPREHEN METABOLIC PANEL: CPT | Performed by: EMERGENCY MEDICINE

## 2021-12-05 PROCEDURE — 93010 ELECTROCARDIOGRAM REPORT: CPT | Mod: ,,, | Performed by: INTERNAL MEDICINE

## 2021-12-05 PROCEDURE — 87502 INFLUENZA DNA AMP PROBE: CPT

## 2021-12-05 PROCEDURE — C9113 INJ PANTOPRAZOLE SODIUM, VIA: HCPCS | Performed by: EMERGENCY MEDICINE

## 2021-12-05 PROCEDURE — 83605 ASSAY OF LACTIC ACID: CPT | Performed by: EMERGENCY MEDICINE

## 2021-12-05 PROCEDURE — 93010 EKG 12-LEAD: ICD-10-PCS | Mod: ,,, | Performed by: INTERNAL MEDICINE

## 2021-12-05 PROCEDURE — 85025 COMPLETE CBC W/AUTO DIFF WBC: CPT | Mod: 91 | Performed by: HOSPITALIST

## 2021-12-05 PROCEDURE — 94640 AIRWAY INHALATION TREATMENT: CPT

## 2021-12-05 PROCEDURE — 83690 ASSAY OF LIPASE: CPT | Performed by: EMERGENCY MEDICINE

## 2021-12-05 PROCEDURE — 25000003 PHARM REV CODE 250: Performed by: HOSPITALIST

## 2021-12-05 PROCEDURE — 36415 COLL VENOUS BLD VENIPUNCTURE: CPT | Performed by: HOSPITALIST

## 2021-12-05 PROCEDURE — 84145 PROCALCITONIN (PCT): CPT | Performed by: EMERGENCY MEDICINE

## 2021-12-05 PROCEDURE — 63600175 PHARM REV CODE 636 W HCPCS: Performed by: EMERGENCY MEDICINE

## 2021-12-05 PROCEDURE — 83880 ASSAY OF NATRIURETIC PEPTIDE: CPT | Performed by: EMERGENCY MEDICINE

## 2021-12-05 RX ORDER — MAG HYDROX/ALUMINUM HYD/SIMETH 200-200-20
60 SUSPENSION, ORAL (FINAL DOSE FORM) ORAL
Status: COMPLETED | OUTPATIENT
Start: 2021-12-05 | End: 2021-12-05

## 2021-12-05 RX ORDER — MORPHINE SULFATE 4 MG/ML
2 INJECTION, SOLUTION INTRAMUSCULAR; INTRAVENOUS
Status: COMPLETED | OUTPATIENT
Start: 2021-12-05 | End: 2021-12-05

## 2021-12-05 RX ORDER — SODIUM CHLORIDE 450 MG/100ML
INJECTION, SOLUTION INTRAVENOUS CONTINUOUS
Status: DISCONTINUED | OUTPATIENT
Start: 2021-12-05 | End: 2021-12-07

## 2021-12-05 RX ORDER — LEVALBUTEROL INHALATION SOLUTION 0.63 MG/3ML
0.63 SOLUTION RESPIRATORY (INHALATION) EVERY 8 HOURS PRN
Status: DISCONTINUED | OUTPATIENT
Start: 2021-12-05 | End: 2021-12-06

## 2021-12-05 RX ORDER — MIRTAZAPINE 15 MG/1
15 TABLET, FILM COATED ORAL NIGHTLY
Status: DISCONTINUED | OUTPATIENT
Start: 2021-12-05 | End: 2021-12-15

## 2021-12-05 RX ORDER — GUAIFENESIN/DEXTROMETHORPHAN 100-10MG/5
10 SYRUP ORAL EVERY 4 HOURS PRN
Status: DISCONTINUED | OUTPATIENT
Start: 2021-12-05 | End: 2021-12-07

## 2021-12-05 RX ORDER — ONDANSETRON 4 MG/1
4 TABLET, ORALLY DISINTEGRATING ORAL
Status: COMPLETED | OUTPATIENT
Start: 2021-12-05 | End: 2021-12-05

## 2021-12-05 RX ORDER — ALPRAZOLAM 0.25 MG/1
0.25 TABLET ORAL 2 TIMES DAILY PRN
Status: DISCONTINUED | OUTPATIENT
Start: 2021-12-05 | End: 2021-12-07

## 2021-12-05 RX ORDER — ESCITALOPRAM OXALATE 10 MG/1
20 TABLET ORAL DAILY
Status: DISCONTINUED | OUTPATIENT
Start: 2021-12-06 | End: 2021-12-15

## 2021-12-05 RX ORDER — IPRATROPIUM BROMIDE AND ALBUTEROL SULFATE 2.5; .5 MG/3ML; MG/3ML
3 SOLUTION RESPIRATORY (INHALATION)
Status: DISCONTINUED | OUTPATIENT
Start: 2021-12-05 | End: 2021-12-05

## 2021-12-05 RX ORDER — FAMOTIDINE 20 MG/1
20 TABLET, FILM COATED ORAL DAILY
Status: DISCONTINUED | OUTPATIENT
Start: 2021-12-06 | End: 2021-12-10

## 2021-12-05 RX ORDER — PREDNISONE 20 MG/1
20 TABLET ORAL DAILY
Status: DISCONTINUED | OUTPATIENT
Start: 2021-12-06 | End: 2021-12-07

## 2021-12-05 RX ORDER — IPRATROPIUM BROMIDE 0.5 MG/2.5ML
0.5 SOLUTION RESPIRATORY (INHALATION) EVERY 4 HOURS PRN
Status: DISCONTINUED | OUTPATIENT
Start: 2021-12-05 | End: 2021-12-18 | Stop reason: HOSPADM

## 2021-12-05 RX ORDER — GABAPENTIN 300 MG/1
600 CAPSULE ORAL 3 TIMES DAILY
Status: DISCONTINUED | OUTPATIENT
Start: 2021-12-05 | End: 2021-12-07

## 2021-12-05 RX ORDER — PANTOPRAZOLE SODIUM 40 MG/10ML
40 INJECTION, POWDER, LYOPHILIZED, FOR SOLUTION INTRAVENOUS
Status: COMPLETED | OUTPATIENT
Start: 2021-12-05 | End: 2021-12-05

## 2021-12-05 RX ORDER — AZITHROMYCIN 250 MG/1
250 TABLET, FILM COATED ORAL DAILY
Status: DISCONTINUED | OUTPATIENT
Start: 2021-12-06 | End: 2021-12-07

## 2021-12-05 RX ORDER — ACETAMINOPHEN 325 MG/1
650 TABLET ORAL EVERY 6 HOURS PRN
Status: DISCONTINUED | OUTPATIENT
Start: 2021-12-05 | End: 2021-12-06

## 2021-12-05 RX ADMIN — CEFTRIAXONE 1 G: 1 INJECTION, SOLUTION INTRAVENOUS at 04:12

## 2021-12-05 RX ADMIN — AZITHROMYCIN MONOHYDRATE 500 MG: 500 INJECTION, POWDER, LYOPHILIZED, FOR SOLUTION INTRAVENOUS at 05:12

## 2021-12-05 RX ADMIN — MAGNESIUM HYDROXIDE/ALUMINUM HYDROXICE/SIMETHICONE 60 ML: 120; 1200; 1200 SUSPENSION ORAL at 01:12

## 2021-12-05 RX ADMIN — PIPERACILLIN AND TAZOBACTAM 4.5 G: 4; .5 INJECTION, POWDER, LYOPHILIZED, FOR SOLUTION INTRAVENOUS; PARENTERAL at 02:12

## 2021-12-05 RX ADMIN — MORPHINE SULFATE 2 MG: 4 INJECTION, SOLUTION INTRAMUSCULAR; INTRAVENOUS at 01:12

## 2021-12-05 RX ADMIN — IPRATROPIUM BROMIDE 0.5 MG: 0.5 SOLUTION RESPIRATORY (INHALATION) at 10:12

## 2021-12-05 RX ADMIN — ONDANSETRON 4 MG: 4 TABLET, ORALLY DISINTEGRATING ORAL at 01:12

## 2021-12-05 RX ADMIN — APIXABAN 5 MG: 5 TABLET, FILM COATED ORAL at 08:12

## 2021-12-05 RX ADMIN — MIRTAZAPINE 15 MG: 15 TABLET, FILM COATED ORAL at 08:12

## 2021-12-05 RX ADMIN — SODIUM CHLORIDE 500 ML: 0.9 INJECTION, SOLUTION INTRAVENOUS at 02:12

## 2021-12-05 RX ADMIN — VANCOMYCIN HYDROCHLORIDE 1250 MG: 1.25 INJECTION, POWDER, LYOPHILIZED, FOR SOLUTION INTRAVENOUS at 06:12

## 2021-12-05 RX ADMIN — SODIUM CHLORIDE: 0.45 INJECTION, SOLUTION INTRAVENOUS at 04:12

## 2021-12-05 RX ADMIN — PANTOPRAZOLE SODIUM 40 MG: 40 INJECTION, POWDER, FOR SOLUTION INTRAVENOUS at 01:12

## 2021-12-05 RX ADMIN — GABAPENTIN 600 MG: 300 CAPSULE ORAL at 08:12

## 2021-12-06 PROBLEM — I31.39 PERICARDIAL EFFUSION: Status: ACTIVE | Noted: 2021-12-06

## 2021-12-06 PROBLEM — I30.9 ACUTE PERICARDIAL EFFUSION: Status: ACTIVE | Noted: 2021-12-06

## 2021-12-06 PROBLEM — N18.9 ACUTE-ON-CHRONIC KIDNEY INJURY: Status: ACTIVE | Noted: 2021-12-05

## 2021-12-06 PROBLEM — J96.21 ACUTE ON CHRONIC RESPIRATORY FAILURE WITH HYPOXIA AND HYPERCAPNIA: Status: ACTIVE | Noted: 2021-12-06

## 2021-12-06 PROBLEM — R91.8 LUNG MASS: Status: ACTIVE | Noted: 2021-12-06

## 2021-12-06 PROBLEM — J96.22 ACUTE ON CHRONIC RESPIRATORY FAILURE WITH HYPOXIA AND HYPERCAPNIA: Status: ACTIVE | Noted: 2021-12-06

## 2021-12-06 LAB
ALLENS TEST: ABNORMAL
ANION GAP SERPL CALC-SCNC: 15 MMOL/L (ref 8–16)
AORTIC ROOT ANNULUS: 3.01 CM
AORTIC VALVE CUSP SEPERATION: 1.76 CM
AV INDEX (PROSTH): 0.74
AV MEAN GRADIENT: 7 MMHG
AV PEAK GRADIENT: 10 MMHG
AV VALVE AREA: 2.36 CM2
AV VELOCITY RATIO: 0.78
BSA FOR ECHO PROCEDURE: 1.64 M2
BUN SERPL-MCNC: 36 MG/DL (ref 8–23)
CALCIUM SERPL-MCNC: 8.6 MG/DL (ref 8.7–10.5)
CHLORIDE SERPL-SCNC: 97 MMOL/L (ref 95–110)
CO2 SERPL-SCNC: 23 MMOL/L (ref 23–29)
CREAT SERPL-MCNC: 2 MG/DL (ref 0.5–1.4)
CV ECHO LV RWT: 0.84 CM
DELSYS: ABNORMAL
DOP CALC AO PEAK VEL: 1.58 M/S
DOP CALC AO VTI: 27.95 CM
DOP CALC LVOT AREA: 3.2 CM2
DOP CALC LVOT DIAMETER: 2.02 CM
DOP CALC LVOT PEAK VEL: 1.24 M/S
DOP CALC LVOT STROKE VOLUME: 66.08 CM3
DOP CALCLVOT PEAK VEL VTI: 20.63 CM
ECHO LV POSTERIOR WALL: 1.39 CM (ref 0.6–1.1)
EJECTION FRACTION: 70 %
EST. GFR  (AFRICAN AMERICAN): 28 ML/MIN/1.73 M^2
EST. GFR  (NON AFRICAN AMERICAN): 24 ML/MIN/1.73 M^2
FLOW: 5
FRACTIONAL SHORTENING: 31 % (ref 28–44)
GLUCOSE SERPL-MCNC: 106 MG/DL (ref 70–110)
HCO3 UR-SCNC: 25 MMOL/L (ref 24–28)
INTERVENTRICULAR SEPTUM: 1.36 CM (ref 0.6–1.1)
IVRT: 124.57 MSEC
LA MAJOR: 4.79 CM
LA MINOR: 5.11 CM
LA WIDTH: 4.16 CM
LACTATE SERPL-SCNC: 2.1 MMOL/L (ref 0.5–2.2)
LEFT ATRIUM SIZE: 3.5 CM
LEFT ATRIUM VOLUME INDEX: 37.5 ML/M2
LEFT ATRIUM VOLUME: 61.2 CM3
LEFT INTERNAL DIMENSION IN SYSTOLE: 2.29 CM (ref 2.1–4)
LEFT VENTRICLE DIASTOLIC VOLUME INDEX: 27.2 ML/M2
LEFT VENTRICLE DIASTOLIC VOLUME: 44.34 ML
LEFT VENTRICLE MASS INDEX: 95 G/M2
LEFT VENTRICLE SYSTOLIC VOLUME INDEX: 11 ML/M2
LEFT VENTRICLE SYSTOLIC VOLUME: 17.92 ML
LEFT VENTRICULAR INTERNAL DIMENSION IN DIASTOLE: 3.31 CM (ref 3.5–6)
LEFT VENTRICULAR MASS: 155.59 G
MODE: ABNORMAL
MV PEAK E VEL: 0.87 M/S
PCO2 BLDA: 45.8 MMHG (ref 35–45)
PH SMN: 7.34 [PH] (ref 7.35–7.45)
PISA TR MAX VEL: 1.25 M/S
PO2 BLDA: 55 MMHG (ref 80–100)
POC BE: -1 MMOL/L
POC SATURATED O2: 86 % (ref 95–100)
POC TCO2: 26 MMOL/L (ref 23–27)
POTASSIUM SERPL-SCNC: 4 MMOL/L (ref 3.5–5.1)
PV PEAK VELOCITY: 1.09 CM/S
RA MAJOR: 4.63 CM
RA PRESSURE: 15 MMHG
RA WIDTH: 3.1 CM
RIGHT VENTRICULAR END-DIASTOLIC DIMENSION: 2.03 CM
RV TISSUE DOPPLER FREE WALL SYSTOLIC VELOCITY 1 (APICAL 4 CHAMBER VIEW): 13.3 CM/S
SAMPLE: ABNORMAL
SINUS: 3.22 CM
SITE: ABNORMAL
SODIUM SERPL-SCNC: 135 MMOL/L (ref 136–145)
SP02: 91
STJ: 2.32 CM
TR MAX PG: 6 MMHG
TRICUSPID ANNULAR PLANE SYSTOLIC EXCURSION: 1.62 CM
TV REST PULMONARY ARTERY PRESSURE: 21 MMHG

## 2021-12-06 PROCEDURE — 99223 1ST HOSP IP/OBS HIGH 75: CPT | Mod: ,,, | Performed by: INTERNAL MEDICINE

## 2021-12-06 PROCEDURE — 25000242 PHARM REV CODE 250 ALT 637 W/ HCPCS: Performed by: INTERNAL MEDICINE

## 2021-12-06 PROCEDURE — 36600 WITHDRAWAL OF ARTERIAL BLOOD: CPT

## 2021-12-06 PROCEDURE — 63700000 PHARM REV CODE 250 ALT 637 W/O HCPCS: Performed by: HOSPITALIST

## 2021-12-06 PROCEDURE — 99223 PR INITIAL HOSPITAL CARE,LEVL III: ICD-10-PCS | Mod: ,,, | Performed by: INTERNAL MEDICINE

## 2021-12-06 PROCEDURE — 87798 DETECT AGENT NOS DNA AMP: CPT | Performed by: STUDENT IN AN ORGANIZED HEALTH CARE EDUCATION/TRAINING PROGRAM

## 2021-12-06 PROCEDURE — 99223 1ST HOSP IP/OBS HIGH 75: CPT | Mod: ,,, | Performed by: STUDENT IN AN ORGANIZED HEALTH CARE EDUCATION/TRAINING PROGRAM

## 2021-12-06 PROCEDURE — 99222 PR INITIAL HOSPITAL CARE,LEVL II: ICD-10-PCS | Mod: ,,, | Performed by: STUDENT IN AN ORGANIZED HEALTH CARE EDUCATION/TRAINING PROGRAM

## 2021-12-06 PROCEDURE — 27000221 HC OXYGEN, UP TO 24 HOURS

## 2021-12-06 PROCEDURE — 63600175 PHARM REV CODE 636 W HCPCS: Performed by: HOSPITALIST

## 2021-12-06 PROCEDURE — 94640 AIRWAY INHALATION TREATMENT: CPT

## 2021-12-06 PROCEDURE — 99223 PR INITIAL HOSPITAL CARE,LEVL III: ICD-10-PCS | Mod: ,,, | Performed by: STUDENT IN AN ORGANIZED HEALTH CARE EDUCATION/TRAINING PROGRAM

## 2021-12-06 PROCEDURE — 25000003 PHARM REV CODE 250: Performed by: INTERNAL MEDICINE

## 2021-12-06 PROCEDURE — 82803 BLOOD GASES ANY COMBINATION: CPT

## 2021-12-06 PROCEDURE — 36415 COLL VENOUS BLD VENIPUNCTURE: CPT | Performed by: HOSPITALIST

## 2021-12-06 PROCEDURE — 80048 BASIC METABOLIC PNL TOTAL CA: CPT | Performed by: HOSPITALIST

## 2021-12-06 PROCEDURE — 83605 ASSAY OF LACTIC ACID: CPT | Performed by: HOSPITALIST

## 2021-12-06 PROCEDURE — 99900035 HC TECH TIME PER 15 MIN (STAT)

## 2021-12-06 PROCEDURE — 25000003 PHARM REV CODE 250: Performed by: HOSPITALIST

## 2021-12-06 PROCEDURE — 25000242 PHARM REV CODE 250 ALT 637 W/ HCPCS: Performed by: HOSPITALIST

## 2021-12-06 PROCEDURE — 94761 N-INVAS EAR/PLS OXIMETRY MLT: CPT

## 2021-12-06 PROCEDURE — 92610 EVALUATE SWALLOWING FUNCTION: CPT

## 2021-12-06 PROCEDURE — 99222 1ST HOSP IP/OBS MODERATE 55: CPT | Mod: ,,, | Performed by: STUDENT IN AN ORGANIZED HEALTH CARE EDUCATION/TRAINING PROGRAM

## 2021-12-06 PROCEDURE — 21400001 HC TELEMETRY ROOM

## 2021-12-06 RX ORDER — ONDANSETRON 2 MG/ML
4 INJECTION INTRAMUSCULAR; INTRAVENOUS
Status: DISCONTINUED | OUTPATIENT
Start: 2021-12-06 | End: 2021-12-18 | Stop reason: HOSPADM

## 2021-12-06 RX ORDER — IPRATROPIUM BROMIDE 0.5 MG/2.5ML
0.5 SOLUTION RESPIRATORY (INHALATION) EVERY 6 HOURS
Status: DISCONTINUED | OUTPATIENT
Start: 2021-12-06 | End: 2021-12-18 | Stop reason: HOSPADM

## 2021-12-06 RX ORDER — ONDANSETRON 4 MG/1
4 TABLET, ORALLY DISINTEGRATING ORAL ONCE
Status: DISCONTINUED | OUTPATIENT
Start: 2021-12-06 | End: 2021-12-07

## 2021-12-06 RX ORDER — TRAMADOL HYDROCHLORIDE 50 MG/1
50 TABLET ORAL EVERY 6 HOURS PRN
Status: DISCONTINUED | OUTPATIENT
Start: 2021-12-06 | End: 2021-12-07

## 2021-12-06 RX ORDER — ONDANSETRON 2 MG/ML
4 INJECTION INTRAMUSCULAR; INTRAVENOUS
Status: DISCONTINUED | OUTPATIENT
Start: 2021-12-06 | End: 2021-12-06 | Stop reason: SDUPTHER

## 2021-12-06 RX ORDER — PROCHLORPERAZINE EDISYLATE 5 MG/ML
5 INJECTION INTRAMUSCULAR; INTRAVENOUS EVERY 6 HOURS PRN
Status: DISCONTINUED | OUTPATIENT
Start: 2021-12-06 | End: 2021-12-18 | Stop reason: HOSPADM

## 2021-12-06 RX ADMIN — IPRATROPIUM BROMIDE 0.5 MG: 0.5 SOLUTION RESPIRATORY (INHALATION) at 09:12

## 2021-12-06 RX ADMIN — AZITHROMYCIN 250 MG: 250 TABLET, FILM COATED ORAL at 08:12

## 2021-12-06 RX ADMIN — SODIUM CHLORIDE: 0.45 INJECTION, SOLUTION INTRAVENOUS at 08:12

## 2021-12-06 RX ADMIN — IPRATROPIUM BROMIDE 0.5 MG: 0.5 SOLUTION RESPIRATORY (INHALATION) at 12:12

## 2021-12-06 RX ADMIN — GABAPENTIN 600 MG: 300 CAPSULE ORAL at 08:12

## 2021-12-06 RX ADMIN — IPRATROPIUM BROMIDE 0.5 MG: 0.5 SOLUTION RESPIRATORY (INHALATION) at 07:12

## 2021-12-06 RX ADMIN — SODIUM CHLORIDE: 0.45 INJECTION, SOLUTION INTRAVENOUS at 05:12

## 2021-12-06 RX ADMIN — ACETAMINOPHEN 650 MG: 325 TABLET ORAL at 10:12

## 2021-12-06 RX ADMIN — FAMOTIDINE 20 MG: 20 TABLET ORAL at 08:12

## 2021-12-06 RX ADMIN — ACETAMINOPHEN 650 MG: 325 TABLET ORAL at 02:12

## 2021-12-06 RX ADMIN — PROCHLORPERAZINE EDISYLATE 5 MG: 5 INJECTION INTRAMUSCULAR; INTRAVENOUS at 08:12

## 2021-12-06 RX ADMIN — ONDANSETRON 4 MG: 2 INJECTION INTRAMUSCULAR; INTRAVENOUS at 04:12

## 2021-12-06 RX ADMIN — TRAMADOL HYDROCHLORIDE 50 MG: 50 TABLET, FILM COATED ORAL at 12:12

## 2021-12-06 RX ADMIN — ESCITALOPRAM OXALATE 20 MG: 10 TABLET ORAL at 08:12

## 2021-12-06 RX ADMIN — CEFTRIAXONE 1 G: 1 INJECTION, SOLUTION INTRAVENOUS at 04:12

## 2021-12-06 RX ADMIN — APIXABAN 5 MG: 5 TABLET, FILM COATED ORAL at 08:12

## 2021-12-06 RX ADMIN — MIRTAZAPINE 15 MG: 15 TABLET, FILM COATED ORAL at 08:12

## 2021-12-06 RX ADMIN — PREDNISONE 20 MG: 20 TABLET ORAL at 08:12

## 2021-12-06 RX ADMIN — GABAPENTIN 600 MG: 300 CAPSULE ORAL at 04:12

## 2021-12-07 ENCOUNTER — ANESTHESIA (OUTPATIENT)
Dept: CARDIOLOGY | Facility: HOSPITAL | Age: 71
DRG: 870 | End: 2021-12-07
Payer: MEDICARE

## 2021-12-07 ENCOUNTER — ANESTHESIA EVENT (OUTPATIENT)
Dept: CARDIOLOGY | Facility: HOSPITAL | Age: 71
DRG: 870 | End: 2021-12-07
Payer: MEDICARE

## 2021-12-07 PROBLEM — R57.9 SHOCK: Status: ACTIVE | Noted: 2021-12-07

## 2021-12-07 PROBLEM — Z71.89 ADVANCE CARE PLANNING: Status: ACTIVE | Noted: 2021-12-07

## 2021-12-07 PROBLEM — K72.00 SHOCK LIVER: Status: ACTIVE | Noted: 2021-12-07

## 2021-12-07 PROBLEM — N17.9 ACUTE RENAL FAILURE: Status: ACTIVE | Noted: 2021-12-07

## 2021-12-07 LAB
ABO + RH BLD: NORMAL
ABO GROUP BLD: NORMAL
ADENOVIRUS: NOT DETECTED
ALBUMIN SERPL BCP-MCNC: 2.1 G/DL (ref 3.5–5.2)
ALBUMIN SERPL BCP-MCNC: 2.2 G/DL (ref 3.5–5.2)
ALBUMIN SERPL BCP-MCNC: 2.5 G/DL (ref 3.5–5.2)
ALLENS TEST: ABNORMAL
ALP SERPL-CCNC: 167 U/L (ref 55–135)
ALT SERPL W/O P-5'-P-CCNC: 1192 U/L (ref 10–44)
ANION GAP SERPL CALC-SCNC: 13 MMOL/L (ref 8–16)
ANION GAP SERPL CALC-SCNC: 27 MMOL/L (ref 8–16)
ANION GAP SERPL CALC-SCNC: 28 MMOL/L (ref 8–16)
APPEARANCE FLD: NORMAL
APTT BLDCRRT: 48.3 SEC (ref 21–32)
AST SERPL-CCNC: 2086 U/L (ref 10–40)
BASOPHILS # BLD AUTO: 0.02 K/UL (ref 0–0.2)
BASOPHILS # BLD AUTO: 0.02 K/UL (ref 0–0.2)
BASOPHILS # BLD AUTO: 0.03 K/UL (ref 0–0.2)
BASOPHILS # BLD AUTO: 0.03 K/UL (ref 0–0.2)
BASOPHILS NFR BLD: 0.1 % (ref 0–1.9)
BILIRUB DIRECT SERPL-MCNC: 0.7 MG/DL (ref 0.1–0.3)
BILIRUB SERPL-MCNC: 0.9 MG/DL (ref 0.1–1)
BLD GP AB SCN CELLS X3 SERPL QL: NORMAL
BLD PROD TYP BPU: NORMAL
BLOOD UNIT EXPIRATION DATE: NORMAL
BLOOD UNIT TYPE CODE: 6200
BLOOD UNIT TYPE CODE: 6200
BLOOD UNIT TYPE CODE: 8400
BLOOD UNIT TYPE CODE: 8400
BLOOD UNIT TYPE: NORMAL
BODY FLD TYPE: NORMAL
BORDETELLA PARAPERTUSSIS (IS1001): NOT DETECTED
BORDETELLA PERTUSSIS (PTXP): NOT DETECTED
BSA FOR ECHO PROCEDURE: 1.64 M2
BSA FOR ECHO PROCEDURE: 1.64 M2
BUN SERPL-MCNC: 39 MG/DL (ref 8–23)
BUN SERPL-MCNC: 49 MG/DL (ref 8–23)
BUN SERPL-MCNC: 51 MG/DL (ref 8–23)
CALCIUM SERPL-MCNC: 7.2 MG/DL (ref 8.7–10.5)
CALCIUM SERPL-MCNC: 7.6 MG/DL (ref 8.7–10.5)
CALCIUM SERPL-MCNC: 8.5 MG/DL (ref 8.7–10.5)
CHLAMYDIA PNEUMONIAE: NOT DETECTED
CHLORIDE SERPL-SCNC: 92 MMOL/L (ref 95–110)
CHLORIDE SERPL-SCNC: 96 MMOL/L (ref 95–110)
CHLORIDE SERPL-SCNC: 97 MMOL/L (ref 95–110)
CO2 SERPL-SCNC: 11 MMOL/L (ref 23–29)
CO2 SERPL-SCNC: 20 MMOL/L (ref 23–29)
CO2 SERPL-SCNC: 27 MMOL/L (ref 23–29)
CODING SYSTEM: NORMAL
COLOR FLD: NORMAL
CORONAVIRUS 229E, COMMON COLD VIRUS: NOT DETECTED
CORONAVIRUS HKU1, COMMON COLD VIRUS: NOT DETECTED
CORONAVIRUS NL63, COMMON COLD VIRUS: NOT DETECTED
CORONAVIRUS OC43, COMMON COLD VIRUS: NOT DETECTED
CORTIS SERPL-MCNC: >110 UG/DL
CREAT SERPL-MCNC: 2.5 MG/DL (ref 0.5–1.4)
CREAT SERPL-MCNC: 3.2 MG/DL (ref 0.5–1.4)
CREAT SERPL-MCNC: 3.4 MG/DL (ref 0.5–1.4)
D DIMER PPP IA.FEU-MCNC: 10.48 MG/L FEU
DELSYS: ABNORMAL
DIFFERENTIAL METHOD: ABNORMAL
DISPENSE STATUS: NORMAL
EJECTION FRACTION: 70 %
EJECTION FRACTION: 70 %
EOSINOPHIL # BLD AUTO: 0 K/UL (ref 0–0.5)
EOSINOPHIL NFR BLD: 0 % (ref 0–8)
EP: 5
EP: 5
ERYTHROCYTE [DISTWIDTH] IN BLOOD BY AUTOMATED COUNT: 14.5 % (ref 11.5–14.5)
ERYTHROCYTE [DISTWIDTH] IN BLOOD BY AUTOMATED COUNT: 15.2 % (ref 11.5–14.5)
ERYTHROCYTE [DISTWIDTH] IN BLOOD BY AUTOMATED COUNT: 16 % (ref 11.5–14.5)
ERYTHROCYTE [DISTWIDTH] IN BLOOD BY AUTOMATED COUNT: 16 % (ref 11.5–14.5)
ERYTHROCYTE [SEDIMENTATION RATE] IN BLOOD BY WESTERGREN METHOD: 14 MM/H
ERYTHROCYTE [SEDIMENTATION RATE] IN BLOOD BY WESTERGREN METHOD: 14 MM/H
ERYTHROCYTE [SEDIMENTATION RATE] IN BLOOD BY WESTERGREN METHOD: 26 MM/H
ERYTHROCYTE [SEDIMENTATION RATE] IN BLOOD BY WESTERGREN METHOD: 30 MM/H
EST. GFR  (AFRICAN AMERICAN): 15 ML/MIN/1.73 M^2
EST. GFR  (AFRICAN AMERICAN): 16 ML/MIN/1.73 M^2
EST. GFR  (AFRICAN AMERICAN): 21 ML/MIN/1.73 M^2
EST. GFR  (NON AFRICAN AMERICAN): 13 ML/MIN/1.73 M^2
EST. GFR  (NON AFRICAN AMERICAN): 14 ML/MIN/1.73 M^2
EST. GFR  (NON AFRICAN AMERICAN): 19 ML/MIN/1.73 M^2
FIBRINOGEN PPP-MCNC: 275 MG/DL (ref 182–400)
FIO2: 100
FIO2: 100
FIO2: 40
FIO2: 60
FLUBV RNA NPH QL NAA+NON-PROBE: NOT DETECTED
GLUCOSE SERPL-MCNC: 191 MG/DL (ref 70–110)
GLUCOSE SERPL-MCNC: 305 MG/DL (ref 70–110)
GLUCOSE SERPL-MCNC: 79 MG/DL (ref 70–110)
GRAM STN SPEC: NORMAL
GRAM STN SPEC: NORMAL
HCO3 UR-SCNC: 13.4 MMOL/L (ref 24–28)
HCO3 UR-SCNC: 13.9 MMOL/L (ref 24–28)
HCO3 UR-SCNC: 16 MMOL/L (ref 24–28)
HCO3 UR-SCNC: 22.2 MMOL/L (ref 24–28)
HCO3 UR-SCNC: 23.7 MMOL/L (ref 24–28)
HCT VFR BLD AUTO: 21.9 % (ref 37–48.5)
HCT VFR BLD AUTO: 23.2 % (ref 37–48.5)
HCT VFR BLD AUTO: 24.6 % (ref 37–48.5)
HCT VFR BLD AUTO: 25.5 % (ref 37–48.5)
HGB BLD-MCNC: 6.3 G/DL (ref 12–16)
HGB BLD-MCNC: 6.7 G/DL (ref 12–16)
HGB BLD-MCNC: 7.1 G/DL (ref 12–16)
HGB BLD-MCNC: 8 G/DL (ref 12–16)
HPIV1 RNA NPH QL NAA+NON-PROBE: NOT DETECTED
HPIV2 RNA NPH QL NAA+NON-PROBE: NOT DETECTED
HPIV3 RNA NPH QL NAA+NON-PROBE: NOT DETECTED
HPIV4 RNA NPH QL NAA+NON-PROBE: NOT DETECTED
HUMAN METAPNEUMOVIRUS: NOT DETECTED
IMM GRANULOCYTES # BLD AUTO: 0.31 K/UL (ref 0–0.04)
IMM GRANULOCYTES # BLD AUTO: 0.41 K/UL (ref 0–0.04)
IMM GRANULOCYTES # BLD AUTO: 0.52 K/UL (ref 0–0.04)
IMM GRANULOCYTES # BLD AUTO: 0.73 K/UL (ref 0–0.04)
IMM GRANULOCYTES NFR BLD AUTO: 1.3 % (ref 0–0.5)
IMM GRANULOCYTES NFR BLD AUTO: 1.6 % (ref 0–0.5)
IMM GRANULOCYTES NFR BLD AUTO: 2.5 % (ref 0–0.5)
IMM GRANULOCYTES NFR BLD AUTO: 2.7 % (ref 0–0.5)
INFLUENZA A (SUBTYPES H1,H1-2009,H3): NOT DETECTED
INR PPP: 2.6 (ref 0.8–1.2)
INR PPP: 2.7 (ref 0.8–1.2)
IP: 12
IP: 12
LACTATE SERPL-SCNC: >12 MMOL/L (ref 0.5–2.2)
LACTATE SERPL-SCNC: >12 MMOL/L (ref 0.5–2.2)
LDH SERPL L TO P-CCNC: 7795 U/L (ref 110–260)
LYMPHOCYTES # BLD AUTO: 1.1 K/UL (ref 1–4.8)
LYMPHOCYTES # BLD AUTO: 1.4 K/UL (ref 1–4.8)
LYMPHOCYTES # BLD AUTO: 1.5 K/UL (ref 1–4.8)
LYMPHOCYTES # BLD AUTO: 1.6 K/UL (ref 1–4.8)
LYMPHOCYTES NFR BLD: 4.9 % (ref 18–48)
LYMPHOCYTES NFR BLD: 5.7 % (ref 18–48)
LYMPHOCYTES NFR BLD: 5.8 % (ref 18–48)
LYMPHOCYTES NFR BLD: 6.9 % (ref 18–48)
LYMPHOCYTES NFR FLD MANUAL: 11 %
MAGNESIUM SERPL-MCNC: 1.9 MG/DL (ref 1.6–2.6)
MCH RBC QN AUTO: 25.6 PG (ref 27–31)
MCH RBC QN AUTO: 25.9 PG (ref 27–31)
MCH RBC QN AUTO: 27.8 PG (ref 27–31)
MCH RBC QN AUTO: 28.1 PG (ref 27–31)
MCHC RBC AUTO-ENTMCNC: 27.2 G/DL (ref 32–36)
MCHC RBC AUTO-ENTMCNC: 27.8 G/DL (ref 32–36)
MCHC RBC AUTO-ENTMCNC: 30.6 G/DL (ref 32–36)
MCHC RBC AUTO-ENTMCNC: 32.5 G/DL (ref 32–36)
MCV RBC AUTO: 86 FL (ref 82–98)
MCV RBC AUTO: 91 FL (ref 82–98)
MCV RBC AUTO: 92 FL (ref 82–98)
MCV RBC AUTO: 96 FL (ref 82–98)
MODE: ABNORMAL
MONOCYTES # BLD AUTO: 0.7 K/UL (ref 0.3–1)
MONOCYTES # BLD AUTO: 1.5 K/UL (ref 0.3–1)
MONOCYTES # BLD AUTO: 2 K/UL (ref 0.3–1)
MONOCYTES # BLD AUTO: 2.3 K/UL (ref 0.3–1)
MONOCYTES NFR BLD: 2.9 % (ref 4–15)
MONOCYTES NFR BLD: 5.8 % (ref 4–15)
MONOCYTES NFR BLD: 8.4 % (ref 4–15)
MONOCYTES NFR BLD: 9.9 % (ref 4–15)
MONOS+MACROS NFR FLD MANUAL: 9 %
MYCOPLASMA PNEUMONIAE: NOT DETECTED
NEUTROPHILS # BLD AUTO: 16.7 K/UL (ref 1.8–7.7)
NEUTROPHILS # BLD AUTO: 20.9 K/UL (ref 1.8–7.7)
NEUTROPHILS # BLD AUTO: 22.1 K/UL (ref 1.8–7.7)
NEUTROPHILS # BLD AUTO: 22.6 K/UL (ref 1.8–7.7)
NEUTROPHILS NFR BLD: 80.6 % (ref 38–73)
NEUTROPHILS NFR BLD: 83.1 % (ref 38–73)
NEUTROPHILS NFR BLD: 86.7 % (ref 38–73)
NEUTROPHILS NFR BLD: 90.8 % (ref 38–73)
NEUTROPHILS NFR FLD MANUAL: 80 %
NRBC BLD-RTO: 0 /100 WBC
NRBC BLD-RTO: 0 /100 WBC
NRBC BLD-RTO: 1 /100 WBC
NRBC BLD-RTO: 2 /100 WBC
NUM UNITS TRANS FFP: NORMAL
NUM UNITS TRANS FFP: NORMAL
PCO2 BLDA: 36.9 MMHG (ref 35–45)
PCO2 BLDA: 40.1 MMHG (ref 35–45)
PCO2 BLDA: 45.8 MMHG (ref 35–45)
PCO2 BLDA: 50.8 MMHG (ref 35–45)
PCO2 BLDA: 61.8 MMHG (ref 35–45)
PEEP: 5
PEEP: 5
PH SMN: 7.02 [PH] (ref 7.35–7.45)
PH SMN: 7.05 [PH] (ref 7.35–7.45)
PH SMN: 7.08 [PH] (ref 7.35–7.45)
PH SMN: 7.38 [PH] (ref 7.35–7.45)
PH SMN: 7.39 [PH] (ref 7.35–7.45)
PHOSPHATE SERPL-MCNC: 4.8 MG/DL (ref 2.7–4.5)
PHOSPHATE SERPL-MCNC: 9.5 MG/DL (ref 2.7–4.5)
PLATELET # BLD AUTO: 202 K/UL (ref 150–450)
PLATELET # BLD AUTO: 220 K/UL (ref 150–450)
PLATELET # BLD AUTO: 486 K/UL (ref 150–450)
PLATELET # BLD AUTO: 628 K/UL (ref 150–450)
PLATELET BLD QL SMEAR: ABNORMAL
PMV BLD AUTO: 9.3 FL (ref 9.2–12.9)
PMV BLD AUTO: 9.5 FL (ref 9.2–12.9)
PMV BLD AUTO: 9.6 FL (ref 9.2–12.9)
PMV BLD AUTO: 9.9 FL (ref 9.2–12.9)
PO2 BLDA: 108 MMHG (ref 80–100)
PO2 BLDA: 125 MMHG (ref 80–100)
PO2 BLDA: 195 MMHG (ref 80–100)
PO2 BLDA: 65 MMHG (ref 80–100)
PO2 BLDA: 75 MMHG (ref 80–100)
POC BE: -1 MMOL/L
POC BE: -14 MMOL/L
POC BE: -15 MMOL/L
POC BE: -16 MMOL/L
POC BE: -3 MMOL/L
POC SATURATED O2: 92 % (ref 95–100)
POC SATURATED O2: 94 % (ref 95–100)
POC SATURATED O2: 95 % (ref 95–100)
POC SATURATED O2: 97 % (ref 95–100)
POC SATURATED O2: 99 % (ref 95–100)
POC TCO2: 15 MMOL/L (ref 23–27)
POC TCO2: 15 MMOL/L (ref 23–27)
POC TCO2: 18 MMOL/L (ref 23–27)
POC TCO2: 23 MMOL/L (ref 23–27)
POC TCO2: 25 MMOL/L (ref 23–27)
POCT GLUCOSE: 174 MG/DL (ref 70–110)
POCT GLUCOSE: 223 MG/DL (ref 70–110)
POCT GLUCOSE: 290 MG/DL (ref 70–110)
POTASSIUM SERPL-SCNC: 3.2 MMOL/L (ref 3.5–5.1)
POTASSIUM SERPL-SCNC: 3.4 MMOL/L (ref 3.5–5.1)
POTASSIUM SERPL-SCNC: 4.4 MMOL/L (ref 3.5–5.1)
PROT SERPL-MCNC: 6.1 G/DL (ref 6–8.4)
PROTHROMBIN TIME: 26.3 SEC (ref 9–12.5)
PROTHROMBIN TIME: 26.8 SEC (ref 9–12.5)
RBC # BLD AUTO: 2.41 M/UL (ref 4–5.4)
RBC # BLD AUTO: 2.43 M/UL (ref 4–5.4)
RBC # BLD AUTO: 2.77 M/UL (ref 4–5.4)
RBC # BLD AUTO: 2.85 M/UL (ref 4–5.4)
RESPIRATORY INFECTION PANEL SOURCE: NORMAL
RH BLD: NORMAL
RSV RNA NPH QL NAA+NON-PROBE: NOT DETECTED
RV+EV RNA NPH QL NAA+NON-PROBE: NOT DETECTED
SAMPLE: ABNORMAL
SITE: ABNORMAL
SODIUM SERPL-SCNC: 135 MMOL/L (ref 136–145)
SODIUM SERPL-SCNC: 137 MMOL/L (ref 136–145)
SODIUM SERPL-SCNC: 139 MMOL/L (ref 136–145)
SP02: 95
SP02: 99
TRANS ERYTHROCYTES VOL PATIENT: NORMAL ML
TRANS ERYTHROCYTES VOL PATIENT: NORMAL ML
VANCOMYCIN SERPL-MCNC: 10.8 UG/ML
VT: 400
WBC # BLD AUTO: 20.7 K/UL (ref 3.9–12.7)
WBC # BLD AUTO: 23.01 K/UL (ref 3.9–12.7)
WBC # BLD AUTO: 25.47 K/UL (ref 3.9–12.7)
WBC # BLD AUTO: 27.13 K/UL (ref 3.9–12.7)
WBC # FLD: 3114 /CU MM

## 2021-12-07 PROCEDURE — 87102 FUNGUS ISOLATION CULTURE: CPT | Performed by: STUDENT IN AN ORGANIZED HEALTH CARE EDUCATION/TRAINING PROGRAM

## 2021-12-07 PROCEDURE — 83615 LACTATE (LD) (LDH) ENZYME: CPT | Performed by: STUDENT IN AN ORGANIZED HEALTH CARE EDUCATION/TRAINING PROGRAM

## 2021-12-07 PROCEDURE — 87077 CULTURE AEROBIC IDENTIFY: CPT | Performed by: STUDENT IN AN ORGANIZED HEALTH CARE EDUCATION/TRAINING PROGRAM

## 2021-12-07 PROCEDURE — 82533 TOTAL CORTISOL: CPT | Performed by: EMERGENCY MEDICINE

## 2021-12-07 PROCEDURE — 87116 MYCOBACTERIA CULTURE: CPT | Performed by: INTERNAL MEDICINE

## 2021-12-07 PROCEDURE — 87070 CULTURE OTHR SPECIMN AEROBIC: CPT | Performed by: INTERNAL MEDICINE

## 2021-12-07 PROCEDURE — 99900026 HC AIRWAY MAINTENANCE (STAT)

## 2021-12-07 PROCEDURE — 85025 COMPLETE CBC W/AUTO DIFF WBC: CPT | Performed by: HOSPITALIST

## 2021-12-07 PROCEDURE — 36415 COLL VENOUS BLD VENIPUNCTURE: CPT | Performed by: STUDENT IN AN ORGANIZED HEALTH CARE EDUCATION/TRAINING PROGRAM

## 2021-12-07 PROCEDURE — 86920 COMPATIBILITY TEST SPIN: CPT | Performed by: NURSE PRACTITIONER

## 2021-12-07 PROCEDURE — P9017 PLASMA 1 DONOR FRZ W/IN 8 HR: HCPCS | Performed by: INTERNAL MEDICINE

## 2021-12-07 PROCEDURE — P9021 RED BLOOD CELLS UNIT: HCPCS | Performed by: NURSE PRACTITIONER

## 2021-12-07 PROCEDURE — 87206 SMEAR FLUORESCENT/ACID STAI: CPT | Performed by: INTERNAL MEDICINE

## 2021-12-07 PROCEDURE — 37799 UNLISTED PX VASCULAR SURGERY: CPT

## 2021-12-07 PROCEDURE — 87205 SMEAR GRAM STAIN: CPT | Mod: 59 | Performed by: STUDENT IN AN ORGANIZED HEALTH CARE EDUCATION/TRAINING PROGRAM

## 2021-12-07 PROCEDURE — 94640 AIRWAY INHALATION TREATMENT: CPT

## 2021-12-07 PROCEDURE — 99223 PR INITIAL HOSPITAL CARE,LEVL III: ICD-10-PCS | Mod: ,,, | Performed by: INTERNAL MEDICINE

## 2021-12-07 PROCEDURE — 99291 PR CRITICAL CARE, E/M 30-74 MINUTES: ICD-10-PCS | Mod: 25,,, | Performed by: STUDENT IN AN ORGANIZED HEALTH CARE EDUCATION/TRAINING PROGRAM

## 2021-12-07 PROCEDURE — 33017 PRCRD DRG 6YR+ W/O CGEN CAR: CPT | Mod: ,,, | Performed by: INTERNAL MEDICINE

## 2021-12-07 PROCEDURE — 99900035 HC TECH TIME PER 15 MIN (STAT)

## 2021-12-07 PROCEDURE — 88305 TISSUE EXAM BY PATHOLOGIST: CPT | Mod: 26,,, | Performed by: PATHOLOGY

## 2021-12-07 PROCEDURE — 99292 PR CRITICAL CARE, ADDL 30 MIN: ICD-10-PCS | Mod: 25,,, | Performed by: STUDENT IN AN ORGANIZED HEALTH CARE EDUCATION/TRAINING PROGRAM

## 2021-12-07 PROCEDURE — 88112 PR  CYTOPATH, CELL ENHANCE TECH: ICD-10-PCS | Mod: 26,,, | Performed by: PATHOLOGY

## 2021-12-07 PROCEDURE — 25000242 PHARM REV CODE 250 ALT 637 W/ HCPCS: Performed by: INTERNAL MEDICINE

## 2021-12-07 PROCEDURE — C1751 CATH, INF, PER/CENT/MIDLINE: HCPCS

## 2021-12-07 PROCEDURE — 86704 HEP B CORE ANTIBODY TOTAL: CPT | Performed by: INTERNAL MEDICINE

## 2021-12-07 PROCEDURE — 82803 BLOOD GASES ANY COMBINATION: CPT

## 2021-12-07 PROCEDURE — C1729 CATH, DRAINAGE: HCPCS | Performed by: INTERNAL MEDICINE

## 2021-12-07 PROCEDURE — 85610 PROTHROMBIN TIME: CPT | Performed by: INTERNAL MEDICINE

## 2021-12-07 PROCEDURE — 37000009 HC ANESTHESIA EA ADD 15 MINS: Performed by: INTERNAL MEDICINE

## 2021-12-07 PROCEDURE — 25000003 PHARM REV CODE 250: Performed by: STUDENT IN AN ORGANIZED HEALTH CARE EDUCATION/TRAINING PROGRAM

## 2021-12-07 PROCEDURE — 99222 PR INITIAL HOSPITAL CARE,LEVL II: ICD-10-PCS | Mod: ,,, | Performed by: INTERNAL MEDICINE

## 2021-12-07 PROCEDURE — 83735 ASSAY OF MAGNESIUM: CPT | Mod: 91 | Performed by: INTERNAL MEDICINE

## 2021-12-07 PROCEDURE — 80048 BASIC METABOLIC PNL TOTAL CA: CPT | Performed by: EMERGENCY MEDICINE

## 2021-12-07 PROCEDURE — 87070 CULTURE OTHR SPECIMN AEROBIC: CPT | Mod: 59 | Performed by: STUDENT IN AN ORGANIZED HEALTH CARE EDUCATION/TRAINING PROGRAM

## 2021-12-07 PROCEDURE — 80202 ASSAY OF VANCOMYCIN: CPT | Performed by: STUDENT IN AN ORGANIZED HEALTH CARE EDUCATION/TRAINING PROGRAM

## 2021-12-07 PROCEDURE — 94660 CPAP INITIATION&MGMT: CPT

## 2021-12-07 PROCEDURE — 86920 COMPATIBILITY TEST SPIN: CPT | Performed by: STUDENT IN AN ORGANIZED HEALTH CARE EDUCATION/TRAINING PROGRAM

## 2021-12-07 PROCEDURE — D9220A PRA ANESTHESIA: ICD-10-PCS | Mod: CRNA,,, | Performed by: NURSE ANESTHETIST, CERTIFIED REGISTERED

## 2021-12-07 PROCEDURE — 31500 INSERT EMERGENCY AIRWAY: CPT | Mod: ,,, | Performed by: STUDENT IN AN ORGANIZED HEALTH CARE EDUCATION/TRAINING PROGRAM

## 2021-12-07 PROCEDURE — 86706 HEP B SURFACE ANTIBODY: CPT | Performed by: INTERNAL MEDICINE

## 2021-12-07 PROCEDURE — 36625 ARTERIAL LINE: ICD-10-PCS | Mod: 59,,, | Performed by: NURSE PRACTITIONER

## 2021-12-07 PROCEDURE — 36625 INSERTION CATHETER ARTERY: CPT | Mod: 59,,, | Performed by: NURSE PRACTITIONER

## 2021-12-07 PROCEDURE — A4216 STERILE WATER/SALINE, 10 ML: HCPCS | Performed by: STUDENT IN AN ORGANIZED HEALTH CARE EDUCATION/TRAINING PROGRAM

## 2021-12-07 PROCEDURE — 87205 SMEAR GRAM STAIN: CPT | Performed by: INTERNAL MEDICINE

## 2021-12-07 PROCEDURE — 25000003 PHARM REV CODE 250: Performed by: INTERNAL MEDICINE

## 2021-12-07 PROCEDURE — 87075 CULTR BACTERIA EXCEPT BLOOD: CPT | Performed by: STUDENT IN AN ORGANIZED HEALTH CARE EDUCATION/TRAINING PROGRAM

## 2021-12-07 PROCEDURE — 83605 ASSAY OF LACTIC ACID: CPT | Mod: 91 | Performed by: STUDENT IN AN ORGANIZED HEALTH CARE EDUCATION/TRAINING PROGRAM

## 2021-12-07 PROCEDURE — 99497 PR ADVNCD CARE PLAN 30 MIN: ICD-10-PCS | Mod: 25,,, | Performed by: INTERNAL MEDICINE

## 2021-12-07 PROCEDURE — 85379 FIBRIN DEGRADATION QUANT: CPT | Performed by: STUDENT IN AN ORGANIZED HEALTH CARE EDUCATION/TRAINING PROGRAM

## 2021-12-07 PROCEDURE — 63600175 PHARM REV CODE 636 W HCPCS

## 2021-12-07 PROCEDURE — 80069 RENAL FUNCTION PANEL: CPT | Performed by: STUDENT IN AN ORGANIZED HEALTH CARE EDUCATION/TRAINING PROGRAM

## 2021-12-07 PROCEDURE — 85060 PATHOLOGIST REVIEW: ICD-10-PCS | Mod: ,,, | Performed by: PATHOLOGY

## 2021-12-07 PROCEDURE — 87449 NOS EACH ORGANISM AG IA: CPT | Performed by: EMERGENCY MEDICINE

## 2021-12-07 PROCEDURE — 85730 THROMBOPLASTIN TIME PARTIAL: CPT | Performed by: INTERNAL MEDICINE

## 2021-12-07 PROCEDURE — 99292 CRITICAL CARE ADDL 30 MIN: CPT | Mod: 25,,, | Performed by: STUDENT IN AN ORGANIZED HEALTH CARE EDUCATION/TRAINING PROGRAM

## 2021-12-07 PROCEDURE — 88112 CYTOPATH CELL ENHANCE TECH: CPT | Mod: 26,,, | Performed by: PATHOLOGY

## 2021-12-07 PROCEDURE — D9220A PRA ANESTHESIA: ICD-10-PCS | Mod: ANES,,, | Performed by: ANESTHESIOLOGY

## 2021-12-07 PROCEDURE — 36556 INSERT NON-TUNNEL CV CATH: CPT | Mod: 59,,, | Performed by: NURSE PRACTITIONER

## 2021-12-07 PROCEDURE — 25000242 PHARM REV CODE 250 ALT 637 W/ HCPCS: Performed by: HOSPITALIST

## 2021-12-07 PROCEDURE — 94761 N-INVAS EAR/PLS OXIMETRY MLT: CPT

## 2021-12-07 PROCEDURE — 87186 SC STD MICRODIL/AGAR DIL: CPT | Mod: 59 | Performed by: STUDENT IN AN ORGANIZED HEALTH CARE EDUCATION/TRAINING PROGRAM

## 2021-12-07 PROCEDURE — 33017 PRCRD DRG 6YR+ W/O CGEN CAR: CPT | Performed by: INTERNAL MEDICINE

## 2021-12-07 PROCEDURE — 86709 HEPATITIS A IGM ANTIBODY: CPT | Performed by: INTERNAL MEDICINE

## 2021-12-07 PROCEDURE — 99223 1ST HOSP IP/OBS HIGH 75: CPT | Mod: ,,, | Performed by: INTERNAL MEDICINE

## 2021-12-07 PROCEDURE — 90945 DIALYSIS ONE EVALUATION: CPT

## 2021-12-07 PROCEDURE — 80076 HEPATIC FUNCTION PANEL: CPT | Performed by: HOSPITALIST

## 2021-12-07 PROCEDURE — 20000000 HC ICU ROOM

## 2021-12-07 PROCEDURE — 86900 BLOOD TYPING SEROLOGIC ABO: CPT | Mod: 91 | Performed by: INTERNAL MEDICINE

## 2021-12-07 PROCEDURE — 94002 VENT MGMT INPAT INIT DAY: CPT

## 2021-12-07 PROCEDURE — 86900 BLOOD TYPING SEROLOGIC ABO: CPT | Performed by: INTERNAL MEDICINE

## 2021-12-07 PROCEDURE — 89051 BODY FLUID CELL COUNT: CPT | Performed by: INTERNAL MEDICINE

## 2021-12-07 PROCEDURE — 87340 HEPATITIS B SURFACE AG IA: CPT | Performed by: INTERNAL MEDICINE

## 2021-12-07 PROCEDURE — 99222 1ST HOSP IP/OBS MODERATE 55: CPT | Mod: ,,, | Performed by: INTERNAL MEDICINE

## 2021-12-07 PROCEDURE — 36569 INSJ PICC 5 YR+ W/O IMAGING: CPT

## 2021-12-07 PROCEDURE — 85610 PROTHROMBIN TIME: CPT | Mod: 91 | Performed by: STUDENT IN AN ORGANIZED HEALTH CARE EDUCATION/TRAINING PROGRAM

## 2021-12-07 PROCEDURE — 83010 ASSAY OF HAPTOGLOBIN QUANT: CPT | Performed by: STUDENT IN AN ORGANIZED HEALTH CARE EDUCATION/TRAINING PROGRAM

## 2021-12-07 PROCEDURE — 85240 CLOT FACTOR VIII AHG 1 STAGE: CPT | Performed by: STUDENT IN AN ORGANIZED HEALTH CARE EDUCATION/TRAINING PROGRAM

## 2021-12-07 PROCEDURE — 25000003 PHARM REV CODE 250: Performed by: EMERGENCY MEDICINE

## 2021-12-07 PROCEDURE — 33017 PR DRAIN, PERICARDIAL, W/INSERT INDWELL CATH, PERC, W/FLUORO/US, 6+ YRS: ICD-10-PCS | Mod: ,,, | Performed by: INTERNAL MEDICINE

## 2021-12-07 PROCEDURE — 85025 COMPLETE CBC W/AUTO DIFF WBC: CPT | Mod: 91 | Performed by: STUDENT IN AN ORGANIZED HEALTH CARE EDUCATION/TRAINING PROGRAM

## 2021-12-07 PROCEDURE — 27000190 HC CPAP FULL FACE MASK W/VALVE

## 2021-12-07 PROCEDURE — 36600 WITHDRAWAL OF ARTERIAL BLOOD: CPT

## 2021-12-07 PROCEDURE — 88305 TISSUE EXAM BY PATHOLOGIST: ICD-10-PCS | Mod: 26,,, | Performed by: PATHOLOGY

## 2021-12-07 PROCEDURE — 63600175 PHARM REV CODE 636 W HCPCS: Performed by: NURSE PRACTITIONER

## 2021-12-07 PROCEDURE — 99497 ADVNCD CARE PLAN 30 MIN: CPT | Mod: 25,,, | Performed by: INTERNAL MEDICINE

## 2021-12-07 PROCEDURE — 85384 FIBRINOGEN ACTIVITY: CPT | Performed by: STUDENT IN AN ORGANIZED HEALTH CARE EDUCATION/TRAINING PROGRAM

## 2021-12-07 PROCEDURE — 25000003 PHARM REV CODE 250: Performed by: NURSE ANESTHETIST, CERTIFIED REGISTERED

## 2021-12-07 PROCEDURE — 88305 TISSUE EXAM BY PATHOLOGIST: CPT | Performed by: PATHOLOGY

## 2021-12-07 PROCEDURE — 84311 SPECTROPHOTOMETRY: CPT | Performed by: STUDENT IN AN ORGANIZED HEALTH CARE EDUCATION/TRAINING PROGRAM

## 2021-12-07 PROCEDURE — 63600175 PHARM REV CODE 636 W HCPCS: Performed by: STUDENT IN AN ORGANIZED HEALTH CARE EDUCATION/TRAINING PROGRAM

## 2021-12-07 PROCEDURE — 99291 CRITICAL CARE FIRST HOUR: CPT | Mod: 25,,, | Performed by: STUDENT IN AN ORGANIZED HEALTH CARE EDUCATION/TRAINING PROGRAM

## 2021-12-07 PROCEDURE — P9021 RED BLOOD CELLS UNIT: HCPCS | Performed by: STUDENT IN AN ORGANIZED HEALTH CARE EDUCATION/TRAINING PROGRAM

## 2021-12-07 PROCEDURE — 88112 CYTOPATH CELL ENHANCE TECH: CPT | Performed by: PATHOLOGY

## 2021-12-07 PROCEDURE — 36556 PR INSERT NON-TUNNEL CV CATH 5+ YRS OLD: ICD-10-PCS | Mod: 59,,, | Performed by: NURSE PRACTITIONER

## 2021-12-07 PROCEDURE — D9220A PRA ANESTHESIA: Mod: ANES,,, | Performed by: ANESTHESIOLOGY

## 2021-12-07 PROCEDURE — 37000008 HC ANESTHESIA 1ST 15 MINUTES: Performed by: INTERNAL MEDICINE

## 2021-12-07 PROCEDURE — 31500 PR INSERT, EMERGENCY ENDOTRACH AIRWAY: ICD-10-PCS | Mod: ,,, | Performed by: STUDENT IN AN ORGANIZED HEALTH CARE EDUCATION/TRAINING PROGRAM

## 2021-12-07 PROCEDURE — 85060 BLOOD SMEAR INTERPRETATION: CPT | Mod: ,,, | Performed by: PATHOLOGY

## 2021-12-07 PROCEDURE — 27202415 HC CARTRIDGE, CRRT

## 2021-12-07 PROCEDURE — D9220A PRA ANESTHESIA: Mod: CRNA,,, | Performed by: NURSE ANESTHETIST, CERTIFIED REGISTERED

## 2021-12-07 RX ORDER — GABAPENTIN 300 MG/1
300 CAPSULE ORAL NIGHTLY
Status: DISCONTINUED | OUTPATIENT
Start: 2021-12-08 | End: 2021-12-07

## 2021-12-07 RX ORDER — SODIUM CHLORIDE 9 MG/ML
500 INJECTION, SOLUTION INTRAVENOUS ONCE
Status: DISCONTINUED | OUTPATIENT
Start: 2021-12-07 | End: 2021-12-07

## 2021-12-07 RX ORDER — PHENYLEPHRINE HCL IN 0.9% NACL 1 MG/10 ML
SYRINGE (ML) INTRAVENOUS
Status: DISPENSED
Start: 2021-12-07 | End: 2021-12-08

## 2021-12-07 RX ORDER — SODIUM CHLORIDE 9 MG/ML
INJECTION, SOLUTION INTRAVENOUS
Status: DISCONTINUED | OUTPATIENT
Start: 2021-12-07 | End: 2021-12-13

## 2021-12-07 RX ORDER — ACETAMINOPHEN 325 MG/1
650 TABLET ORAL EVERY 4 HOURS PRN
Status: DISCONTINUED | OUTPATIENT
Start: 2021-12-07 | End: 2021-12-18 | Stop reason: HOSPADM

## 2021-12-07 RX ORDER — SODIUM CHLORIDE 9 MG/ML
500 INJECTION, SOLUTION INTRAVENOUS ONCE
Status: COMPLETED | OUTPATIENT
Start: 2021-12-07 | End: 2021-12-07

## 2021-12-07 RX ORDER — ETOMIDATE 2 MG/ML
10 INJECTION INTRAVENOUS ONCE
Status: COMPLETED | OUTPATIENT
Start: 2021-12-07 | End: 2021-12-07

## 2021-12-07 RX ORDER — MUPIROCIN 20 MG/G
OINTMENT TOPICAL 2 TIMES DAILY
Status: DISPENSED | OUTPATIENT
Start: 2021-12-07 | End: 2021-12-12

## 2021-12-07 RX ORDER — SODIUM CHLORIDE 0.9 % (FLUSH) 0.9 %
10 SYRINGE (ML) INJECTION
Status: DISCONTINUED | OUTPATIENT
Start: 2021-12-07 | End: 2021-12-09

## 2021-12-07 RX ORDER — INDOMETHACIN 25 MG/1
100 CAPSULE ORAL ONCE
Status: COMPLETED | OUTPATIENT
Start: 2021-12-07 | End: 2021-12-07

## 2021-12-07 RX ORDER — VANCOMYCIN HCL IN 5 % DEXTROSE 1G/250ML
15 PLASTIC BAG, INJECTION (ML) INTRAVENOUS ONCE
Status: COMPLETED | OUTPATIENT
Start: 2021-12-07 | End: 2021-12-07

## 2021-12-07 RX ORDER — SODIUM CHLORIDE 0.9 % (FLUSH) 0.9 %
10 SYRINGE (ML) INJECTION
Status: DISCONTINUED | OUTPATIENT
Start: 2021-12-07 | End: 2021-12-18 | Stop reason: HOSPADM

## 2021-12-07 RX ORDER — GLUCAGON 1 MG
1 KIT INJECTION
Status: DISCONTINUED | OUTPATIENT
Start: 2021-12-07 | End: 2021-12-15

## 2021-12-07 RX ORDER — SODIUM CHLORIDE 9 MG/ML
INJECTION, SOLUTION INTRAVENOUS CONTINUOUS
Status: DISCONTINUED | OUTPATIENT
Start: 2021-12-07 | End: 2021-12-07

## 2021-12-07 RX ORDER — PHYTONADIONE 1 MG/.5ML
10 INJECTION, EMULSION INTRAMUSCULAR; INTRAVENOUS; SUBCUTANEOUS ONCE
Status: DISCONTINUED | OUTPATIENT
Start: 2021-12-07 | End: 2021-12-07

## 2021-12-07 RX ORDER — ROCURONIUM BROMIDE 10 MG/ML
1 INJECTION, SOLUTION INTRAVENOUS ONCE
Status: COMPLETED | OUTPATIENT
Start: 2021-12-07 | End: 2021-12-07

## 2021-12-07 RX ORDER — HYDROCODONE BITARTRATE AND ACETAMINOPHEN 500; 5 MG/1; MG/1
TABLET ORAL
Status: DISCONTINUED | OUTPATIENT
Start: 2021-12-07 | End: 2021-12-09

## 2021-12-07 RX ORDER — PROPOFOL 10 MG/ML
INJECTION, EMULSION INTRAVENOUS
Status: COMPLETED
Start: 2021-12-07 | End: 2021-12-07

## 2021-12-07 RX ORDER — PROPOFOL 10 MG/ML
0-50 INJECTION, EMULSION INTRAVENOUS CONTINUOUS
Status: DISCONTINUED | OUTPATIENT
Start: 2021-12-07 | End: 2021-12-09

## 2021-12-07 RX ORDER — MAGNESIUM SULFATE HEPTAHYDRATE 40 MG/ML
2 INJECTION, SOLUTION INTRAVENOUS
Status: DISPENSED | OUTPATIENT
Start: 2021-12-07 | End: 2021-12-08

## 2021-12-07 RX ORDER — HYDROCODONE BITARTRATE AND ACETAMINOPHEN 5; 325 MG/1; MG/1
1 TABLET ORAL EVERY 4 HOURS PRN
Status: DISCONTINUED | OUTPATIENT
Start: 2021-12-07 | End: 2021-12-07

## 2021-12-07 RX ORDER — HYDROCODONE BITARTRATE AND ACETAMINOPHEN 500; 5 MG/1; MG/1
TABLET ORAL
Status: DISCONTINUED | OUTPATIENT
Start: 2021-12-07 | End: 2021-12-10 | Stop reason: SDUPTHER

## 2021-12-07 RX ORDER — HYDROCODONE BITARTRATE AND ACETAMINOPHEN 500; 5 MG/1; MG/1
TABLET ORAL CONTINUOUS
Status: ACTIVE | OUTPATIENT
Start: 2021-12-07 | End: 2021-12-08

## 2021-12-07 RX ORDER — INDOMETHACIN 25 MG/1
50 CAPSULE ORAL ONCE
Status: COMPLETED | OUTPATIENT
Start: 2021-12-07 | End: 2021-12-07

## 2021-12-07 RX ORDER — ONDANSETRON 4 MG/1
8 TABLET, ORALLY DISINTEGRATING ORAL EVERY 8 HOURS PRN
Status: DISCONTINUED | OUTPATIENT
Start: 2021-12-07 | End: 2021-12-18 | Stop reason: HOSPADM

## 2021-12-07 RX ORDER — NOREPINEPHRINE BITARTRATE/D5W 4MG/250ML
0-3 PLASTIC BAG, INJECTION (ML) INTRAVENOUS CONTINUOUS
Status: DISCONTINUED | OUTPATIENT
Start: 2021-12-07 | End: 2021-12-09

## 2021-12-07 RX ORDER — INSULIN ASPART 100 [IU]/ML
0-5 INJECTION, SOLUTION INTRAVENOUS; SUBCUTANEOUS EVERY 6 HOURS PRN
Status: DISCONTINUED | OUTPATIENT
Start: 2021-12-07 | End: 2021-12-15

## 2021-12-07 RX ORDER — SODIUM CHLORIDE 0.9 % (FLUSH) 0.9 %
10 SYRINGE (ML) INJECTION EVERY 6 HOURS
Status: DISCONTINUED | OUTPATIENT
Start: 2021-12-07 | End: 2021-12-18 | Stop reason: HOSPADM

## 2021-12-07 RX ORDER — CEFEPIME HYDROCHLORIDE 1 G/50ML
2 INJECTION, SOLUTION INTRAVENOUS
Status: DISCONTINUED | OUTPATIENT
Start: 2021-12-07 | End: 2021-12-10

## 2021-12-07 RX ADMIN — ETOMIDATE INJECTION 10 MG: 2 SOLUTION INTRAVENOUS at 01:12

## 2021-12-07 RX ADMIN — Medication 0.15 MCG/KG/MIN: at 09:12

## 2021-12-07 RX ADMIN — VASOPRESSIN 0.04 UNITS/MIN: 20 INJECTION INTRAVENOUS at 03:12

## 2021-12-07 RX ADMIN — SODIUM CHLORIDE 10 ML/HR: 0.9 INJECTION, SOLUTION INTRAVENOUS at 04:12

## 2021-12-07 RX ADMIN — ROCURONIUM BROMIDE 60 MG: 10 INJECTION, SOLUTION INTRAVENOUS at 01:12

## 2021-12-07 RX ADMIN — POTASSIUM BICARBONATE 25 MEQ: 978 TABLET, EFFERVESCENT ORAL at 09:12

## 2021-12-07 RX ADMIN — Medication 0.01 MCG/KG/MIN: at 04:12

## 2021-12-07 RX ADMIN — PHYTONADIONE 10 MG: 10 INJECTION, EMULSION INTRAMUSCULAR; INTRAVENOUS; SUBCUTANEOUS at 03:12

## 2021-12-07 RX ADMIN — PROPOFOL 27.64 MCG/KG/MIN: 10 INJECTION, EMULSION INTRAVENOUS at 10:12

## 2021-12-07 RX ADMIN — PROPOFOL 1000 MG: 10 INJECTION, EMULSION INTRAVENOUS at 02:12

## 2021-12-07 RX ADMIN — SODIUM BICARBONATE 50 MEQ: 84 INJECTION, SOLUTION INTRAVENOUS at 03:12

## 2021-12-07 RX ADMIN — MUPIROCIN: 20 OINTMENT TOPICAL at 08:12

## 2021-12-07 RX ADMIN — IPRATROPIUM BROMIDE 0.5 MG: 0.5 SOLUTION RESPIRATORY (INHALATION) at 07:12

## 2021-12-07 RX ADMIN — INSULIN ASPART 3 UNITS: 100 INJECTION, SOLUTION INTRAVENOUS; SUBCUTANEOUS at 05:12

## 2021-12-07 RX ADMIN — Medication 0.4 MCG/KG/MIN: at 03:12

## 2021-12-07 RX ADMIN — IPRATROPIUM BROMIDE 0.5 MG: 0.5 SOLUTION RESPIRATORY (INHALATION) at 12:12

## 2021-12-07 RX ADMIN — IPRATROPIUM BROMIDE 0.5 MG: 0.5 SOLUTION RESPIRATORY (INHALATION) at 01:12

## 2021-12-07 RX ADMIN — SODIUM CHLORIDE 500 ML: 0.9 INJECTION, SOLUTION INTRAVENOUS at 03:12

## 2021-12-07 RX ADMIN — SODIUM BICARBONATE 100 MEQ: 84 INJECTION, SOLUTION INTRAVENOUS at 02:12

## 2021-12-07 RX ADMIN — NOREPINEPHRINE BITARTRATE 0.1 MCG/KG/MIN: 1 INJECTION, SOLUTION, CONCENTRATE INTRAVENOUS at 05:12

## 2021-12-07 RX ADMIN — SODIUM BICARBONATE: 84 INJECTION, SOLUTION INTRAVENOUS at 10:12

## 2021-12-07 RX ADMIN — Medication 10 ML: at 11:12

## 2021-12-07 RX ADMIN — INSULIN ASPART 2 UNITS: 100 INJECTION, SOLUTION INTRAVENOUS; SUBCUTANEOUS at 08:12

## 2021-12-07 RX ADMIN — CEFEPIME HYDROCHLORIDE 2 G: 2 INJECTION, SOLUTION INTRAVENOUS at 04:12

## 2021-12-07 RX ADMIN — Medication 0.4 MCG/KG/MIN: at 01:12

## 2021-12-07 RX ADMIN — IPRATROPIUM BROMIDE 0.5 MG: 0.5 SOLUTION RESPIRATORY (INHALATION) at 08:12

## 2021-12-07 RX ADMIN — SODIUM BICARBONATE 100 MEQ: 84 INJECTION, SOLUTION INTRAVENOUS at 01:12

## 2021-12-07 RX ADMIN — PROPOFOL 27.64 MCG/KG/MIN: 10 INJECTION, EMULSION INTRAVENOUS at 02:12

## 2021-12-07 RX ADMIN — VANCOMYCIN HYDROCHLORIDE 1000 MG: 1 INJECTION, POWDER, LYOPHILIZED, FOR SOLUTION INTRAVENOUS at 05:12

## 2021-12-08 PROBLEM — J96.01 ACUTE RESPIRATORY FAILURE WITH HYPOXIA AND HYPERCARBIA: Status: ACTIVE | Noted: 2021-12-08

## 2021-12-08 PROBLEM — D68.9 COAGULOPATHY: Status: ACTIVE | Noted: 2021-12-08

## 2021-12-08 PROBLEM — J96.02 ACUTE RESPIRATORY FAILURE WITH HYPOXIA AND HYPERCARBIA: Status: ACTIVE | Noted: 2021-12-08

## 2021-12-08 PROBLEM — E87.20 LACTIC ACIDOSIS: Status: ACTIVE | Noted: 2021-12-08

## 2021-12-08 PROBLEM — R41.89 SEDATED: Status: ACTIVE | Noted: 2021-12-08

## 2021-12-08 LAB
ALBUMIN SERPL BCP-MCNC: 2.2 G/DL (ref 3.5–5.2)
ALBUMIN SERPL BCP-MCNC: 2.3 G/DL (ref 3.5–5.2)
ALP SERPL-CCNC: 149 U/L (ref 55–135)
ALT SERPL W/O P-5'-P-CCNC: 2640 U/L (ref 10–44)
ANION GAP SERPL CALC-SCNC: 11 MMOL/L (ref 8–16)
ANION GAP SERPL CALC-SCNC: 11 MMOL/L (ref 8–16)
ANION GAP SERPL CALC-SCNC: 5 MMOL/L (ref 8–16)
ANION GAP SERPL CALC-SCNC: 8 MMOL/L (ref 8–16)
AST SERPL-CCNC: 5465 U/L (ref 10–40)
BASOPHILS # BLD AUTO: 0.04 K/UL (ref 0–0.2)
BASOPHILS NFR BLD: 0.2 % (ref 0–1.9)
BILIRUB SERPL-MCNC: 1.4 MG/DL (ref 0.1–1)
BSA FOR ECHO PROCEDURE: 1.71 M2
BUN SERPL-MCNC: 20 MG/DL (ref 8–23)
BUN SERPL-MCNC: 25 MG/DL (ref 8–23)
BUN SERPL-MCNC: 30 MG/DL (ref 8–23)
BUN SERPL-MCNC: 31 MG/DL (ref 8–23)
CALCIUM SERPL-MCNC: 7.3 MG/DL (ref 8.7–10.5)
CALCIUM SERPL-MCNC: 7.5 MG/DL (ref 8.7–10.5)
CALCIUM SERPL-MCNC: 7.6 MG/DL (ref 8.7–10.5)
CALCIUM SERPL-MCNC: 7.9 MG/DL (ref 8.7–10.5)
CHLORIDE SERPL-SCNC: 98 MMOL/L (ref 95–110)
CHLORIDE SERPL-SCNC: 99 MMOL/L (ref 95–110)
CO2 SERPL-SCNC: 26 MMOL/L (ref 23–29)
CO2 SERPL-SCNC: 27 MMOL/L (ref 23–29)
CO2 SERPL-SCNC: 28 MMOL/L (ref 23–29)
CO2 SERPL-SCNC: 31 MMOL/L (ref 23–29)
CREAT SERPL-MCNC: 1.3 MG/DL (ref 0.5–1.4)
CREAT SERPL-MCNC: 1.6 MG/DL (ref 0.5–1.4)
CREAT SERPL-MCNC: 1.9 MG/DL (ref 0.5–1.4)
CREAT SERPL-MCNC: 1.9 MG/DL (ref 0.5–1.4)
DIFFERENTIAL METHOD: ABNORMAL
EOSINOPHIL # BLD AUTO: 0 K/UL (ref 0–0.5)
EOSINOPHIL NFR BLD: 0 % (ref 0–8)
ERYTHROCYTE [DISTWIDTH] IN BLOOD BY AUTOMATED COUNT: 14.5 % (ref 11.5–14.5)
EST. GFR  (AFRICAN AMERICAN): 30 ML/MIN/1.73 M^2
EST. GFR  (AFRICAN AMERICAN): 30 ML/MIN/1.73 M^2
EST. GFR  (AFRICAN AMERICAN): 37 ML/MIN/1.73 M^2
EST. GFR  (AFRICAN AMERICAN): 47 ML/MIN/1.73 M^2
EST. GFR  (NON AFRICAN AMERICAN): 26 ML/MIN/1.73 M^2
EST. GFR  (NON AFRICAN AMERICAN): 26 ML/MIN/1.73 M^2
EST. GFR  (NON AFRICAN AMERICAN): 32 ML/MIN/1.73 M^2
EST. GFR  (NON AFRICAN AMERICAN): 41 ML/MIN/1.73 M^2
GLUCOSE SERPL-MCNC: 104 MG/DL (ref 70–110)
GLUCOSE SERPL-MCNC: 120 MG/DL (ref 70–110)
GLUCOSE SERPL-MCNC: 121 MG/DL (ref 70–110)
GLUCOSE SERPL-MCNC: 129 MG/DL (ref 70–110)
HAPTOGLOB SERPL-MCNC: 164 MG/DL (ref 30–250)
HCT VFR BLD AUTO: 24.7 % (ref 37–48.5)
HGB BLD-MCNC: 8.1 G/DL (ref 12–16)
IMM GRANULOCYTES # BLD AUTO: 0.31 K/UL (ref 0–0.04)
IMM GRANULOCYTES NFR BLD AUTO: 1.4 % (ref 0–0.5)
LACTATE SERPL-SCNC: 2.2 MMOL/L (ref 0.5–2.2)
LACTATE SERPL-SCNC: 2.6 MMOL/L (ref 0.5–2.2)
LYMPHOCYTES # BLD AUTO: 1.2 K/UL (ref 1–4.8)
LYMPHOCYTES NFR BLD: 5.3 % (ref 18–48)
MAGNESIUM SERPL-MCNC: 1.7 MG/DL (ref 1.6–2.6)
MAGNESIUM SERPL-MCNC: 1.7 MG/DL (ref 1.6–2.6)
MAGNESIUM SERPL-MCNC: 2.4 MG/DL (ref 1.6–2.6)
MCH RBC QN AUTO: 27.8 PG (ref 27–31)
MCHC RBC AUTO-ENTMCNC: 32.8 G/DL (ref 32–36)
MCV RBC AUTO: 85 FL (ref 82–98)
MONOCYTES # BLD AUTO: 1.6 K/UL (ref 0.3–1)
MONOCYTES NFR BLD: 7.1 % (ref 4–15)
NEUTROPHILS # BLD AUTO: 19.7 K/UL (ref 1.8–7.7)
NEUTROPHILS NFR BLD: 86 % (ref 38–73)
NRBC BLD-RTO: 2 /100 WBC
PATH REV BLD -IMP: NORMAL
PHOSPHATE SERPL-MCNC: 2.6 MG/DL (ref 2.7–4.5)
PHOSPHATE SERPL-MCNC: 3.2 MG/DL (ref 2.7–4.5)
PHOSPHATE SERPL-MCNC: 3.3 MG/DL (ref 2.7–4.5)
PLATELET # BLD AUTO: 220 K/UL (ref 150–450)
PMV BLD AUTO: 9.5 FL (ref 9.2–12.9)
POCT GLUCOSE: 106 MG/DL (ref 70–110)
POCT GLUCOSE: 117 MG/DL (ref 70–110)
POCT GLUCOSE: 151 MG/DL (ref 70–110)
POCT GLUCOSE: 162 MG/DL (ref 70–110)
POTASSIUM SERPL-SCNC: 3.4 MMOL/L (ref 3.5–5.1)
POTASSIUM SERPL-SCNC: 3.5 MMOL/L (ref 3.5–5.1)
POTASSIUM SERPL-SCNC: 3.5 MMOL/L (ref 3.5–5.1)
POTASSIUM SERPL-SCNC: 3.6 MMOL/L (ref 3.5–5.1)
PROT SERPL-MCNC: 4.9 G/DL (ref 6–8.4)
RBC # BLD AUTO: 2.91 M/UL (ref 4–5.4)
SODIUM SERPL-SCNC: 134 MMOL/L (ref 136–145)
SODIUM SERPL-SCNC: 135 MMOL/L (ref 136–145)
SODIUM SERPL-SCNC: 135 MMOL/L (ref 136–145)
SODIUM SERPL-SCNC: 136 MMOL/L (ref 136–145)
VANCOMYCIN SERPL-MCNC: 9.4 UG/ML
WBC # BLD AUTO: 22.84 K/UL (ref 3.9–12.7)

## 2021-12-08 PROCEDURE — 80100016 HC MAINTENANCE HEMODIALYSIS

## 2021-12-08 PROCEDURE — 99900035 HC TECH TIME PER 15 MIN (STAT)

## 2021-12-08 PROCEDURE — 25000003 PHARM REV CODE 250: Performed by: HOSPITALIST

## 2021-12-08 PROCEDURE — 99233 PR SUBSEQUENT HOSPITAL CARE,LEVL III: ICD-10-PCS | Mod: ,,, | Performed by: INTERNAL MEDICINE

## 2021-12-08 PROCEDURE — 80069 RENAL FUNCTION PANEL: CPT | Mod: 91 | Performed by: STUDENT IN AN ORGANIZED HEALTH CARE EDUCATION/TRAINING PROGRAM

## 2021-12-08 PROCEDURE — 63600175 PHARM REV CODE 636 W HCPCS: Performed by: INTERNAL MEDICINE

## 2021-12-08 PROCEDURE — 83735 ASSAY OF MAGNESIUM: CPT | Performed by: STUDENT IN AN ORGANIZED HEALTH CARE EDUCATION/TRAINING PROGRAM

## 2021-12-08 PROCEDURE — 99291 PR CRITICAL CARE, E/M 30-74 MINUTES: ICD-10-PCS | Mod: ,,, | Performed by: STUDENT IN AN ORGANIZED HEALTH CARE EDUCATION/TRAINING PROGRAM

## 2021-12-08 PROCEDURE — 80202 ASSAY OF VANCOMYCIN: CPT | Performed by: STUDENT IN AN ORGANIZED HEALTH CARE EDUCATION/TRAINING PROGRAM

## 2021-12-08 PROCEDURE — 83605 ASSAY OF LACTIC ACID: CPT | Mod: 91 | Performed by: STUDENT IN AN ORGANIZED HEALTH CARE EDUCATION/TRAINING PROGRAM

## 2021-12-08 PROCEDURE — 99900026 HC AIRWAY MAINTENANCE (STAT)

## 2021-12-08 PROCEDURE — 99233 SBSQ HOSP IP/OBS HIGH 50: CPT | Mod: ,,, | Performed by: INTERNAL MEDICINE

## 2021-12-08 PROCEDURE — 80100008 HC CRRT DAILY MAINTENANCE

## 2021-12-08 PROCEDURE — 94640 AIRWAY INHALATION TREATMENT: CPT

## 2021-12-08 PROCEDURE — 94761 N-INVAS EAR/PLS OXIMETRY MLT: CPT

## 2021-12-08 PROCEDURE — 63600175 PHARM REV CODE 636 W HCPCS: Performed by: STUDENT IN AN ORGANIZED HEALTH CARE EDUCATION/TRAINING PROGRAM

## 2021-12-08 PROCEDURE — 80053 COMPREHEN METABOLIC PANEL: CPT | Performed by: STUDENT IN AN ORGANIZED HEALTH CARE EDUCATION/TRAINING PROGRAM

## 2021-12-08 PROCEDURE — 25000003 PHARM REV CODE 250: Performed by: STUDENT IN AN ORGANIZED HEALTH CARE EDUCATION/TRAINING PROGRAM

## 2021-12-08 PROCEDURE — 25000003 PHARM REV CODE 250: Performed by: INTERNAL MEDICINE

## 2021-12-08 PROCEDURE — 94003 VENT MGMT INPAT SUBQ DAY: CPT

## 2021-12-08 PROCEDURE — 63600175 PHARM REV CODE 636 W HCPCS: Performed by: NURSE PRACTITIONER

## 2021-12-08 PROCEDURE — A4216 STERILE WATER/SALINE, 10 ML: HCPCS | Performed by: STUDENT IN AN ORGANIZED HEALTH CARE EDUCATION/TRAINING PROGRAM

## 2021-12-08 PROCEDURE — 20000000 HC ICU ROOM

## 2021-12-08 PROCEDURE — 27000221 HC OXYGEN, UP TO 24 HOURS

## 2021-12-08 PROCEDURE — 85025 COMPLETE CBC W/AUTO DIFF WBC: CPT | Performed by: HOSPITALIST

## 2021-12-08 PROCEDURE — 25000242 PHARM REV CODE 250 ALT 637 W/ HCPCS: Performed by: HOSPITALIST

## 2021-12-08 PROCEDURE — 99291 CRITICAL CARE FIRST HOUR: CPT | Mod: ,,, | Performed by: STUDENT IN AN ORGANIZED HEALTH CARE EDUCATION/TRAINING PROGRAM

## 2021-12-08 RX ORDER — FENTANYL CITRATE 50 UG/ML
25 INJECTION, SOLUTION INTRAMUSCULAR; INTRAVENOUS EVERY 30 MIN PRN
Status: DISCONTINUED | OUTPATIENT
Start: 2021-12-08 | End: 2021-12-15

## 2021-12-08 RX ADMIN — Medication 10 ML: at 12:12

## 2021-12-08 RX ADMIN — IPRATROPIUM BROMIDE 0.5 MG: 0.5 SOLUTION RESPIRATORY (INHALATION) at 07:12

## 2021-12-08 RX ADMIN — FENTANYL CITRATE 25 MCG: 50 INJECTION INTRAMUSCULAR; INTRAVENOUS at 07:12

## 2021-12-08 RX ADMIN — IPRATROPIUM BROMIDE 0.5 MG: 0.5 SOLUTION RESPIRATORY (INHALATION) at 08:12

## 2021-12-08 RX ADMIN — Medication 10 ML: at 11:12

## 2021-12-08 RX ADMIN — CEFEPIME HYDROCHLORIDE 2 G: 2 INJECTION, SOLUTION INTRAVENOUS at 03:12

## 2021-12-08 RX ADMIN — Medication 0.14 MCG/KG/MIN: at 11:12

## 2021-12-08 RX ADMIN — SODIUM PHOSPHATE, MONOBASIC, MONOHYDRATE 20.01 MMOL: 276; 142 INJECTION, SOLUTION INTRAVENOUS at 10:12

## 2021-12-08 RX ADMIN — Medication 10 ML: at 05:12

## 2021-12-08 RX ADMIN — IPRATROPIUM BROMIDE 0.5 MG: 0.5 SOLUTION RESPIRATORY (INHALATION) at 01:12

## 2021-12-08 RX ADMIN — MAGNESIUM SULFATE 2 G: 2 INJECTION INTRAVENOUS at 09:12

## 2021-12-08 RX ADMIN — MUPIROCIN: 20 OINTMENT TOPICAL at 08:12

## 2021-12-08 RX ADMIN — Medication 0.09 MCG/KG/MIN: at 05:12

## 2021-12-08 RX ADMIN — MIRTAZAPINE 15 MG: 15 TABLET, FILM COATED ORAL at 08:12

## 2021-12-08 RX ADMIN — Medication 0.13 MCG/KG/MIN: at 05:12

## 2021-12-08 RX ADMIN — FAMOTIDINE 20 MG: 20 TABLET ORAL at 08:12

## 2021-12-08 RX ADMIN — VANCOMYCIN HYDROCHLORIDE 1250 MG: 1.25 INJECTION, POWDER, LYOPHILIZED, FOR SOLUTION INTRAVENOUS at 05:12

## 2021-12-08 RX ADMIN — PROPOFOL 25 MCG/KG/MIN: 10 INJECTION, EMULSION INTRAVENOUS at 08:12

## 2021-12-08 RX ADMIN — FENTANYL CITRATE 25 MCG: 50 INJECTION INTRAMUSCULAR; INTRAVENOUS at 04:12

## 2021-12-08 RX ADMIN — ESCITALOPRAM OXALATE 20 MG: 10 TABLET ORAL at 08:12

## 2021-12-09 ENCOUNTER — PES CALL (OUTPATIENT)
Dept: ADMINISTRATIVE | Facility: CLINIC | Age: 71
End: 2021-12-09
Payer: MEDICARE

## 2021-12-09 LAB
ALBUMIN SERPL BCP-MCNC: 1.9 G/DL (ref 3.5–5.2)
ALBUMIN SERPL BCP-MCNC: 2.2 G/DL (ref 3.5–5.2)
ALBUMIN SERPL BCP-MCNC: 2.2 G/DL (ref 3.5–5.2)
ALP SERPL-CCNC: 162 U/L (ref 55–135)
ALT SERPL W/O P-5'-P-CCNC: 1862 U/L (ref 10–44)
ANION GAP SERPL CALC-SCNC: 8 MMOL/L (ref 8–16)
ANION GAP SERPL CALC-SCNC: 9 MMOL/L (ref 8–16)
ANION GAP SERPL CALC-SCNC: 9 MMOL/L (ref 8–16)
AST SERPL-CCNC: 1822 U/L (ref 10–40)
BACTERIA BLD CULT: NORMAL
BACTERIA BLD CULT: NORMAL
BASOPHILS # BLD AUTO: 0.02 K/UL (ref 0–0.2)
BASOPHILS NFR BLD: 0.1 % (ref 0–1.9)
BILIRUB SERPL-MCNC: 1.2 MG/DL (ref 0.1–1)
BSA FOR ECHO PROCEDURE: 1.71 M2
BUN SERPL-MCNC: 15 MG/DL (ref 8–23)
BUN SERPL-MCNC: 15 MG/DL (ref 8–23)
BUN SERPL-MCNC: 17 MG/DL (ref 8–23)
CALCIUM SERPL-MCNC: 6.7 MG/DL (ref 8.7–10.5)
CALCIUM SERPL-MCNC: 7.2 MG/DL (ref 8.7–10.5)
CALCIUM SERPL-MCNC: 7.4 MG/DL (ref 8.7–10.5)
CHLORIDE SERPL-SCNC: 101 MMOL/L (ref 95–110)
CHLORIDE SERPL-SCNC: 101 MMOL/L (ref 95–110)
CHLORIDE SERPL-SCNC: 106 MMOL/L (ref 95–110)
CO2 SERPL-SCNC: 21 MMOL/L (ref 23–29)
CO2 SERPL-SCNC: 25 MMOL/L (ref 23–29)
CO2 SERPL-SCNC: 25 MMOL/L (ref 23–29)
CREAT SERPL-MCNC: 1 MG/DL (ref 0.5–1.4)
CREAT SERPL-MCNC: 1 MG/DL (ref 0.5–1.4)
CREAT SERPL-MCNC: 1.1 MG/DL (ref 0.5–1.4)
DIFFERENTIAL METHOD: ABNORMAL
EJECTION FRACTION: 65 %
EOSINOPHIL # BLD AUTO: 0 K/UL (ref 0–0.5)
EOSINOPHIL NFR BLD: 0 % (ref 0–8)
ERYTHROCYTE [DISTWIDTH] IN BLOOD BY AUTOMATED COUNT: 15.1 % (ref 11.5–14.5)
EST. GFR  (AFRICAN AMERICAN): 58 ML/MIN/1.73 M^2
EST. GFR  (AFRICAN AMERICAN): >60 ML/MIN/1.73 M^2
EST. GFR  (AFRICAN AMERICAN): >60 ML/MIN/1.73 M^2
EST. GFR  (NON AFRICAN AMERICAN): 50 ML/MIN/1.73 M^2
EST. GFR  (NON AFRICAN AMERICAN): 56 ML/MIN/1.73 M^2
EST. GFR  (NON AFRICAN AMERICAN): 56 ML/MIN/1.73 M^2
FACT VIII ACT/NOR PPP: 292 % (ref 60–170)
FINAL PATHOLOGIC DIAGNOSIS: ABNORMAL
GLUCOSE SERPL-MCNC: 114 MG/DL (ref 70–110)
GLUCOSE SERPL-MCNC: 117 MG/DL (ref 70–110)
GLUCOSE SERPL-MCNC: 123 MG/DL (ref 70–110)
HAV IGM SERPL QL IA: NEGATIVE
HBV CORE AB SERPL QL IA: NEGATIVE
HBV SURFACE AB SER-ACNC: NEGATIVE M[IU]/ML
HBV SURFACE AG SERPL QL IA: NEGATIVE
HCT VFR BLD AUTO: 25.4 % (ref 37–48.5)
HGB BLD-MCNC: 8.4 G/DL (ref 12–16)
IMM GRANULOCYTES # BLD AUTO: 0.2 K/UL (ref 0–0.04)
IMM GRANULOCYTES NFR BLD AUTO: 0.9 % (ref 0–0.5)
LYMPHOCYTES # BLD AUTO: 0.9 K/UL (ref 1–4.8)
LYMPHOCYTES NFR BLD: 4.3 % (ref 18–48)
Lab: ABNORMAL
MAGNESIUM SERPL-MCNC: 2.1 MG/DL (ref 1.6–2.6)
MAGNESIUM SERPL-MCNC: 2.3 MG/DL (ref 1.6–2.6)
MCH RBC QN AUTO: 28.1 PG (ref 27–31)
MCHC RBC AUTO-ENTMCNC: 33.1 G/DL (ref 32–36)
MCV RBC AUTO: 85 FL (ref 82–98)
MONOCYTES # BLD AUTO: 1 K/UL (ref 0.3–1)
MONOCYTES NFR BLD: 4.5 % (ref 4–15)
NEUTROPHILS # BLD AUTO: 19.6 K/UL (ref 1.8–7.7)
NEUTROPHILS NFR BLD: 90.2 % (ref 38–73)
NRBC BLD-RTO: 1 /100 WBC
PHOSPHATE SERPL-MCNC: 2.1 MG/DL (ref 2.7–4.5)
PHOSPHATE SERPL-MCNC: 2.1 MG/DL (ref 2.7–4.5)
PHOSPHATE SERPL-MCNC: 2.6 MG/DL (ref 2.7–4.5)
PLATELET # BLD AUTO: 111 K/UL (ref 150–450)
PLATELET BLD QL SMEAR: ABNORMAL
PMV BLD AUTO: 9.6 FL (ref 9.2–12.9)
POCT GLUCOSE: 126 MG/DL (ref 70–110)
POCT GLUCOSE: 133 MG/DL (ref 70–110)
POCT GLUCOSE: 155 MG/DL (ref 70–110)
POCT GLUCOSE: 155 MG/DL (ref 70–110)
POTASSIUM SERPL-SCNC: 3.8 MMOL/L (ref 3.5–5.1)
POTASSIUM SERPL-SCNC: 3.9 MMOL/L (ref 3.5–5.1)
POTASSIUM SERPL-SCNC: 4 MMOL/L (ref 3.5–5.1)
PROT SERPL-MCNC: 4.6 G/DL (ref 6–8.4)
RBC # BLD AUTO: 2.99 M/UL (ref 4–5.4)
SODIUM SERPL-SCNC: 135 MMOL/L (ref 136–145)
VANCOMYCIN SERPL-MCNC: 12.6 UG/ML
WBC # BLD AUTO: 21.7 K/UL (ref 3.9–12.7)

## 2021-12-09 PROCEDURE — 63600175 PHARM REV CODE 636 W HCPCS: Performed by: NURSE PRACTITIONER

## 2021-12-09 PROCEDURE — 83735 ASSAY OF MAGNESIUM: CPT | Performed by: STUDENT IN AN ORGANIZED HEALTH CARE EDUCATION/TRAINING PROGRAM

## 2021-12-09 PROCEDURE — 80069 RENAL FUNCTION PANEL: CPT | Performed by: STUDENT IN AN ORGANIZED HEALTH CARE EDUCATION/TRAINING PROGRAM

## 2021-12-09 PROCEDURE — 83735 ASSAY OF MAGNESIUM: CPT | Mod: 91 | Performed by: STUDENT IN AN ORGANIZED HEALTH CARE EDUCATION/TRAINING PROGRAM

## 2021-12-09 PROCEDURE — 27000221 HC OXYGEN, UP TO 24 HOURS

## 2021-12-09 PROCEDURE — 85025 COMPLETE CBC W/AUTO DIFF WBC: CPT | Performed by: HOSPITALIST

## 2021-12-09 PROCEDURE — 80053 COMPREHEN METABOLIC PANEL: CPT | Performed by: STUDENT IN AN ORGANIZED HEALTH CARE EDUCATION/TRAINING PROGRAM

## 2021-12-09 PROCEDURE — 94761 N-INVAS EAR/PLS OXIMETRY MLT: CPT

## 2021-12-09 PROCEDURE — 99233 PR SUBSEQUENT HOSPITAL CARE,LEVL III: ICD-10-PCS | Mod: ,,, | Performed by: INTERNAL MEDICINE

## 2021-12-09 PROCEDURE — 25000003 PHARM REV CODE 250: Performed by: STUDENT IN AN ORGANIZED HEALTH CARE EDUCATION/TRAINING PROGRAM

## 2021-12-09 PROCEDURE — 63600175 PHARM REV CODE 636 W HCPCS: Performed by: STUDENT IN AN ORGANIZED HEALTH CARE EDUCATION/TRAINING PROGRAM

## 2021-12-09 PROCEDURE — 25000242 PHARM REV CODE 250 ALT 637 W/ HCPCS: Performed by: HOSPITALIST

## 2021-12-09 PROCEDURE — 94003 VENT MGMT INPAT SUBQ DAY: CPT

## 2021-12-09 PROCEDURE — 20000000 HC ICU ROOM

## 2021-12-09 PROCEDURE — 80202 ASSAY OF VANCOMYCIN: CPT | Performed by: STUDENT IN AN ORGANIZED HEALTH CARE EDUCATION/TRAINING PROGRAM

## 2021-12-09 PROCEDURE — 63600175 PHARM REV CODE 636 W HCPCS: Performed by: INTERNAL MEDICINE

## 2021-12-09 PROCEDURE — 25000003 PHARM REV CODE 250: Performed by: HOSPITALIST

## 2021-12-09 PROCEDURE — A4216 STERILE WATER/SALINE, 10 ML: HCPCS | Performed by: STUDENT IN AN ORGANIZED HEALTH CARE EDUCATION/TRAINING PROGRAM

## 2021-12-09 PROCEDURE — 99900026 HC AIRWAY MAINTENANCE (STAT)

## 2021-12-09 PROCEDURE — 99233 SBSQ HOSP IP/OBS HIGH 50: CPT | Mod: ,,, | Performed by: INTERNAL MEDICINE

## 2021-12-09 PROCEDURE — 99291 PR CRITICAL CARE, E/M 30-74 MINUTES: ICD-10-PCS | Mod: ,,, | Performed by: STUDENT IN AN ORGANIZED HEALTH CARE EDUCATION/TRAINING PROGRAM

## 2021-12-09 PROCEDURE — 99291 CRITICAL CARE FIRST HOUR: CPT | Mod: ,,, | Performed by: STUDENT IN AN ORGANIZED HEALTH CARE EDUCATION/TRAINING PROGRAM

## 2021-12-09 PROCEDURE — 94640 AIRWAY INHALATION TREATMENT: CPT

## 2021-12-09 PROCEDURE — 80100008 HC CRRT DAILY MAINTENANCE

## 2021-12-09 PROCEDURE — 80069 RENAL FUNCTION PANEL: CPT | Mod: 91 | Performed by: STUDENT IN AN ORGANIZED HEALTH CARE EDUCATION/TRAINING PROGRAM

## 2021-12-09 PROCEDURE — 27202415 HC CARTRIDGE, CRRT

## 2021-12-09 PROCEDURE — 99900035 HC TECH TIME PER 15 MIN (STAT)

## 2021-12-09 RX ORDER — DIGOXIN 0.25 MG/ML
500 INJECTION INTRAMUSCULAR; INTRAVENOUS ONCE
Status: COMPLETED | OUTPATIENT
Start: 2021-12-09 | End: 2021-12-09

## 2021-12-09 RX ORDER — HEPARIN SODIUM 5000 [USP'U]/ML
5000 INJECTION, SOLUTION INTRAVENOUS; SUBCUTANEOUS
Status: DISCONTINUED | OUTPATIENT
Start: 2021-12-09 | End: 2021-12-18 | Stop reason: HOSPADM

## 2021-12-09 RX ORDER — PROPOFOL 10 MG/ML
0-50 INJECTION, EMULSION INTRAVENOUS CONTINUOUS
Status: DISCONTINUED | OUTPATIENT
Start: 2021-12-09 | End: 2021-12-14

## 2021-12-09 RX ORDER — NOREPINEPHRINE BITARTRATE/D5W 4MG/250ML
0-3 PLASTIC BAG, INJECTION (ML) INTRAVENOUS CONTINUOUS
Status: DISCONTINUED | OUTPATIENT
Start: 2021-12-09 | End: 2021-12-09

## 2021-12-09 RX ADMIN — IPRATROPIUM BROMIDE 0.5 MG: 0.5 SOLUTION RESPIRATORY (INHALATION) at 07:12

## 2021-12-09 RX ADMIN — IPRATROPIUM BROMIDE 0.5 MG: 0.5 SOLUTION RESPIRATORY (INHALATION) at 01:12

## 2021-12-09 RX ADMIN — SODIUM CHLORIDE 5 ML/HR: 0.9 INJECTION, SOLUTION INTRAVENOUS at 05:12

## 2021-12-09 RX ADMIN — PHENYLEPHRINE HYDROCHLORIDE 0.5 MCG/KG/MIN: 10 INJECTION INTRAVENOUS at 10:12

## 2021-12-09 RX ADMIN — DIGOXIN 500 MCG: 0.25 INJECTION INTRAMUSCULAR; INTRAVENOUS at 01:12

## 2021-12-09 RX ADMIN — MIRTAZAPINE 15 MG: 15 TABLET, FILM COATED ORAL at 09:12

## 2021-12-09 RX ADMIN — PROPOFOL 20 MCG/KG/MIN: 10 INJECTION, EMULSION INTRAVENOUS at 10:12

## 2021-12-09 RX ADMIN — MUPIROCIN: 20 OINTMENT TOPICAL at 08:12

## 2021-12-09 RX ADMIN — Medication 10 ML: at 06:12

## 2021-12-09 RX ADMIN — MUPIROCIN: 20 OINTMENT TOPICAL at 09:12

## 2021-12-09 RX ADMIN — FAMOTIDINE 20 MG: 20 TABLET ORAL at 10:12

## 2021-12-09 RX ADMIN — PHENYLEPHRINE HYDROCHLORIDE 4.5 MCG/KG/MIN: 10 INJECTION INTRAVENOUS at 10:12

## 2021-12-09 RX ADMIN — VASOPRESSIN 0.04 UNITS/MIN: 20 INJECTION INTRAVENOUS at 09:12

## 2021-12-09 RX ADMIN — PROPOFOL 20 MCG/KG/MIN: 10 INJECTION, EMULSION INTRAVENOUS at 01:12

## 2021-12-09 RX ADMIN — HEPARIN SODIUM 5000 UNITS: 5000 INJECTION INTRAVENOUS; SUBCUTANEOUS at 09:12

## 2021-12-09 RX ADMIN — PROPOFOL 20 MCG/KG/MIN: 10 INJECTION, EMULSION INTRAVENOUS at 02:12

## 2021-12-09 RX ADMIN — CEFEPIME HYDROCHLORIDE 2 G: 2 INJECTION, SOLUTION INTRAVENOUS at 03:12

## 2021-12-09 RX ADMIN — PHENYLEPHRINE HYDROCHLORIDE 2.5 MCG/KG/MIN: 10 INJECTION INTRAVENOUS at 02:12

## 2021-12-09 RX ADMIN — PHENYLEPHRINE HYDROCHLORIDE 2.5 MCG/KG/MIN: 10 INJECTION INTRAVENOUS at 12:12

## 2021-12-09 RX ADMIN — CEFEPIME HYDROCHLORIDE 2 G: 2 INJECTION, SOLUTION INTRAVENOUS at 04:12

## 2021-12-09 RX ADMIN — PHENYLEPHRINE HYDROCHLORIDE 3.5 MCG/KG/MIN: 10 INJECTION INTRAVENOUS at 05:12

## 2021-12-09 RX ADMIN — ESCITALOPRAM OXALATE 20 MG: 10 TABLET ORAL at 10:12

## 2021-12-09 RX ADMIN — HEPARIN SODIUM 5000 UNITS: 5000 INJECTION INTRAVENOUS; SUBCUTANEOUS at 05:12

## 2021-12-09 RX ADMIN — IPRATROPIUM BROMIDE 0.5 MG: 0.5 SOLUTION RESPIRATORY (INHALATION) at 12:12

## 2021-12-09 RX ADMIN — VANCOMYCIN HYDROCHLORIDE 1250 MG: 1.25 INJECTION, POWDER, LYOPHILIZED, FOR SOLUTION INTRAVENOUS at 12:12

## 2021-12-09 RX ADMIN — VASOPRESSIN 0.04 UNITS/MIN: 20 INJECTION INTRAVENOUS at 05:12

## 2021-12-09 RX ADMIN — Medication 0.14 MCG/KG/MIN: at 08:12

## 2021-12-09 RX ADMIN — PHENYLEPHRINE HYDROCHLORIDE 3.5 MCG/KG/MIN: 10 INJECTION INTRAVENOUS at 04:12

## 2021-12-10 LAB
ADENOSINE DEAMINASE PCAR-CCNC: 28 U/L (ref 0–40)
ALBUMIN SERPL BCP-MCNC: 1.8 G/DL (ref 3.5–5.2)
ALBUMIN SERPL BCP-MCNC: 2 G/DL (ref 3.5–5.2)
ALP SERPL-CCNC: 155 U/L (ref 55–135)
ALT SERPL W/O P-5'-P-CCNC: 1114 U/L (ref 10–44)
ANION GAP SERPL CALC-SCNC: 10 MMOL/L (ref 8–16)
ANION GAP SERPL CALC-SCNC: 11 MMOL/L (ref 8–16)
ANION GAP SERPL CALC-SCNC: 6 MMOL/L (ref 8–16)
ANION GAP SERPL CALC-SCNC: 8 MMOL/L (ref 8–16)
ANISOCYTOSIS BLD QL SMEAR: SLIGHT
APTT BLDCRRT: 45.8 SEC (ref 21–32)
AST SERPL-CCNC: 563 U/L (ref 10–40)
BASOPHILS # BLD AUTO: ABNORMAL K/UL (ref 0–0.2)
BASOPHILS NFR BLD: 0 % (ref 0–1.9)
BILIRUB SERPL-MCNC: 1.3 MG/DL (ref 0.1–1)
BLD PROD TYP BPU: NORMAL
BLOOD UNIT EXPIRATION DATE: NORMAL
BLOOD UNIT TYPE CODE: 6200
BLOOD UNIT TYPE: NORMAL
BSA FOR ECHO PROCEDURE: 1.71 M2
BUN SERPL-MCNC: 10 MG/DL (ref 8–23)
BUN SERPL-MCNC: 12 MG/DL (ref 8–23)
BUN SERPL-MCNC: 13 MG/DL (ref 8–23)
BUN SERPL-MCNC: 9 MG/DL (ref 8–23)
CALCIUM SERPL-MCNC: 6.8 MG/DL (ref 8.7–10.5)
CALCIUM SERPL-MCNC: 6.8 MG/DL (ref 8.7–10.5)
CALCIUM SERPL-MCNC: 7.1 MG/DL (ref 8.7–10.5)
CALCIUM SERPL-MCNC: 7.1 MG/DL (ref 8.7–10.5)
CHLORIDE SERPL-SCNC: 104 MMOL/L (ref 95–110)
CHLORIDE SERPL-SCNC: 105 MMOL/L (ref 95–110)
CO2 SERPL-SCNC: 20 MMOL/L (ref 23–29)
CO2 SERPL-SCNC: 20 MMOL/L (ref 23–29)
CO2 SERPL-SCNC: 22 MMOL/L (ref 23–29)
CO2 SERPL-SCNC: 25 MMOL/L (ref 23–29)
CODING SYSTEM: NORMAL
CREAT SERPL-MCNC: 0.7 MG/DL (ref 0.5–1.4)
CREAT SERPL-MCNC: 0.7 MG/DL (ref 0.5–1.4)
CREAT SERPL-MCNC: 0.8 MG/DL (ref 0.5–1.4)
CREAT SERPL-MCNC: 0.9 MG/DL (ref 0.5–1.4)
DACRYOCYTES BLD QL SMEAR: ABNORMAL
DIFFERENTIAL METHOD: ABNORMAL
DISPENSE STATUS: NORMAL
EJECTION FRACTION: 60 %
EOSINOPHIL # BLD AUTO: ABNORMAL K/UL (ref 0–0.5)
EOSINOPHIL NFR BLD: 0 % (ref 0–8)
ERYTHROCYTE [DISTWIDTH] IN BLOOD BY AUTOMATED COUNT: 15.5 % (ref 11.5–14.5)
EST. GFR  (AFRICAN AMERICAN): >60 ML/MIN/1.73 M^2
EST. GFR  (NON AFRICAN AMERICAN): >60 ML/MIN/1.73 M^2
GLUCOSE SERPL-MCNC: 114 MG/DL (ref 70–110)
GLUCOSE SERPL-MCNC: 115 MG/DL (ref 70–110)
GLUCOSE SERPL-MCNC: 139 MG/DL (ref 70–110)
GLUCOSE SERPL-MCNC: 142 MG/DL (ref 70–110)
HCT VFR BLD AUTO: 21.6 % (ref 37–48.5)
HCT VFR BLD AUTO: 27.4 % (ref 37–48.5)
HGB BLD-MCNC: 6.9 G/DL (ref 12–16)
HGB BLD-MCNC: 8.9 G/DL (ref 12–16)
HYPOCHROMIA BLD QL SMEAR: ABNORMAL
IMM GRANULOCYTES # BLD AUTO: ABNORMAL K/UL (ref 0–0.04)
IMM GRANULOCYTES NFR BLD AUTO: ABNORMAL % (ref 0–0.5)
INR PPP: 2 (ref 0.8–1.2)
L PNEUMO AG UR QL IA: NEGATIVE
LACTATE SERPL-SCNC: 2.9 MMOL/L (ref 0.5–2.2)
LYMPHOCYTES # BLD AUTO: ABNORMAL K/UL (ref 1–4.8)
LYMPHOCYTES NFR BLD: 10 % (ref 18–48)
MAGNESIUM SERPL-MCNC: 2.1 MG/DL (ref 1.6–2.6)
MAGNESIUM SERPL-MCNC: 2.1 MG/DL (ref 1.6–2.6)
MAGNESIUM SERPL-MCNC: 2.2 MG/DL (ref 1.6–2.6)
MAGNESIUM SERPL-MCNC: 2.2 MG/DL (ref 1.6–2.6)
MCH RBC QN AUTO: 27.8 PG (ref 27–31)
MCHC RBC AUTO-ENTMCNC: 31.9 G/DL (ref 32–36)
MCV RBC AUTO: 87 FL (ref 82–98)
MONOCYTES # BLD AUTO: ABNORMAL K/UL (ref 0.3–1)
MONOCYTES NFR BLD: 4 % (ref 4–15)
MPV, BLUE TOP: 10.7 FL (ref 9.2–12.9)
NEUTROPHILS NFR BLD: 82 % (ref 38–73)
NEUTS BAND NFR BLD MANUAL: 4 %
NRBC BLD-RTO: 1 /100 WBC
OVALOCYTES BLD QL SMEAR: ABNORMAL
PHOSPHATE SERPL-MCNC: 1.9 MG/DL (ref 2.7–4.5)
PHOSPHATE SERPL-MCNC: 1.9 MG/DL (ref 2.7–4.5)
PHOSPHATE SERPL-MCNC: 2.6 MG/DL (ref 2.7–4.5)
PHOSPHATE SERPL-MCNC: 2.6 MG/DL (ref 2.7–4.5)
PHOSPHATE SERPL-MCNC: 3.4 MG/DL (ref 2.7–4.5)
PHOSPHATE SERPL-MCNC: 3.4 MG/DL (ref 2.7–4.5)
PLATELET # BLD AUTO: 67 K/UL (ref 150–450)
PLATELET BLD QL SMEAR: ABNORMAL
PLATELET, BLUE TOP: 54 K/UL (ref 150–450)
PMV BLD AUTO: 9.8 FL (ref 9.2–12.9)
POCT GLUCOSE: 138 MG/DL (ref 70–110)
POCT GLUCOSE: 146 MG/DL (ref 70–110)
POCT GLUCOSE: 148 MG/DL (ref 70–110)
POCT GLUCOSE: 206 MG/DL (ref 70–110)
POLYCHROMASIA BLD QL SMEAR: ABNORMAL
POTASSIUM SERPL-SCNC: 3.7 MMOL/L (ref 3.5–5.1)
POTASSIUM SERPL-SCNC: 4.1 MMOL/L (ref 3.5–5.1)
PROT SERPL-MCNC: 4 G/DL (ref 6–8.4)
PROTHROMBIN TIME: 20.1 SEC (ref 9–12.5)
RA PRESSURE: 3 MMHG
RBC # BLD AUTO: 2.48 M/UL (ref 4–5.4)
SODIUM SERPL-SCNC: 134 MMOL/L (ref 136–145)
SODIUM SERPL-SCNC: 135 MMOL/L (ref 136–145)
SODIUM SERPL-SCNC: 136 MMOL/L (ref 136–145)
SODIUM SERPL-SCNC: 136 MMOL/L (ref 136–145)
SPHEROCYTES BLD QL SMEAR: ABNORMAL
STOMATOCYTES BLD QL SMEAR: PRESENT
TRANS ERYTHROCYTES VOL PATIENT: NORMAL ML
VANCOMYCIN SERPL-MCNC: 15.2 UG/ML
WBC # BLD AUTO: 24.18 K/UL (ref 3.9–12.7)

## 2021-12-10 PROCEDURE — 85014 HEMATOCRIT: CPT | Performed by: INTERNAL MEDICINE

## 2021-12-10 PROCEDURE — 80202 ASSAY OF VANCOMYCIN: CPT | Performed by: STUDENT IN AN ORGANIZED HEALTH CARE EDUCATION/TRAINING PROGRAM

## 2021-12-10 PROCEDURE — 85018 HEMOGLOBIN: CPT | Performed by: INTERNAL MEDICINE

## 2021-12-10 PROCEDURE — C9113 INJ PANTOPRAZOLE SODIUM, VIA: HCPCS | Performed by: INTERNAL MEDICINE

## 2021-12-10 PROCEDURE — 25000003 PHARM REV CODE 250: Performed by: INTERNAL MEDICINE

## 2021-12-10 PROCEDURE — 25000003 PHARM REV CODE 250: Performed by: STUDENT IN AN ORGANIZED HEALTH CARE EDUCATION/TRAINING PROGRAM

## 2021-12-10 PROCEDURE — 99291 PR CRITICAL CARE, E/M 30-74 MINUTES: ICD-10-PCS | Mod: ,,, | Performed by: INTERNAL MEDICINE

## 2021-12-10 PROCEDURE — 99291 CRITICAL CARE FIRST HOUR: CPT | Mod: ,,, | Performed by: INTERNAL MEDICINE

## 2021-12-10 PROCEDURE — 85027 COMPLETE CBC AUTOMATED: CPT | Performed by: HOSPITALIST

## 2021-12-10 PROCEDURE — 80053 COMPREHEN METABOLIC PANEL: CPT | Performed by: STUDENT IN AN ORGANIZED HEALTH CARE EDUCATION/TRAINING PROGRAM

## 2021-12-10 PROCEDURE — 94640 AIRWAY INHALATION TREATMENT: CPT

## 2021-12-10 PROCEDURE — 63600175 PHARM REV CODE 636 W HCPCS: Performed by: INTERNAL MEDICINE

## 2021-12-10 PROCEDURE — 83605 ASSAY OF LACTIC ACID: CPT | Performed by: INTERNAL MEDICINE

## 2021-12-10 PROCEDURE — P9021 RED BLOOD CELLS UNIT: HCPCS | Performed by: STUDENT IN AN ORGANIZED HEALTH CARE EDUCATION/TRAINING PROGRAM

## 2021-12-10 PROCEDURE — 85730 THROMBOPLASTIN TIME PARTIAL: CPT | Performed by: STUDENT IN AN ORGANIZED HEALTH CARE EDUCATION/TRAINING PROGRAM

## 2021-12-10 PROCEDURE — 25000242 PHARM REV CODE 250 ALT 637 W/ HCPCS: Performed by: HOSPITALIST

## 2021-12-10 PROCEDURE — 80100008 HC CRRT DAILY MAINTENANCE

## 2021-12-10 PROCEDURE — 85610 PROTHROMBIN TIME: CPT | Performed by: STUDENT IN AN ORGANIZED HEALTH CARE EDUCATION/TRAINING PROGRAM

## 2021-12-10 PROCEDURE — 25000003 PHARM REV CODE 250: Performed by: HOSPITALIST

## 2021-12-10 PROCEDURE — 85049 AUTOMATED PLATELET COUNT: CPT | Performed by: STUDENT IN AN ORGANIZED HEALTH CARE EDUCATION/TRAINING PROGRAM

## 2021-12-10 PROCEDURE — 85007 BL SMEAR W/DIFF WBC COUNT: CPT | Performed by: HOSPITALIST

## 2021-12-10 PROCEDURE — A4216 STERILE WATER/SALINE, 10 ML: HCPCS | Performed by: STUDENT IN AN ORGANIZED HEALTH CARE EDUCATION/TRAINING PROGRAM

## 2021-12-10 PROCEDURE — 99900035 HC TECH TIME PER 15 MIN (STAT)

## 2021-12-10 PROCEDURE — 80069 RENAL FUNCTION PANEL: CPT | Performed by: STUDENT IN AN ORGANIZED HEALTH CARE EDUCATION/TRAINING PROGRAM

## 2021-12-10 PROCEDURE — 83735 ASSAY OF MAGNESIUM: CPT | Mod: 91 | Performed by: STUDENT IN AN ORGANIZED HEALTH CARE EDUCATION/TRAINING PROGRAM

## 2021-12-10 PROCEDURE — 20000000 HC ICU ROOM

## 2021-12-10 PROCEDURE — 63600175 PHARM REV CODE 636 W HCPCS: Performed by: NURSE PRACTITIONER

## 2021-12-10 PROCEDURE — 27202415 HC CARTRIDGE, CRRT

## 2021-12-10 PROCEDURE — 63600175 PHARM REV CODE 636 W HCPCS: Performed by: STUDENT IN AN ORGANIZED HEALTH CARE EDUCATION/TRAINING PROGRAM

## 2021-12-10 PROCEDURE — 94761 N-INVAS EAR/PLS OXIMETRY MLT: CPT

## 2021-12-10 RX ORDER — PANTOPRAZOLE SODIUM 40 MG/10ML
40 INJECTION, POWDER, LYOPHILIZED, FOR SOLUTION INTRAVENOUS DAILY
Status: DISCONTINUED | OUTPATIENT
Start: 2021-12-10 | End: 2021-12-16

## 2021-12-10 RX ORDER — MAGNESIUM SULFATE HEPTAHYDRATE 40 MG/ML
2 INJECTION, SOLUTION INTRAVENOUS
Status: ACTIVE | OUTPATIENT
Start: 2021-12-10 | End: 2021-12-11

## 2021-12-10 RX ORDER — MEROPENEM AND SODIUM CHLORIDE 1 G/50ML
1 INJECTION, SOLUTION INTRAVENOUS
Status: DISCONTINUED | OUTPATIENT
Start: 2021-12-10 | End: 2021-12-15

## 2021-12-10 RX ORDER — HYDROCODONE BITARTRATE AND ACETAMINOPHEN 500; 5 MG/1; MG/1
TABLET ORAL
Status: DISCONTINUED | OUTPATIENT
Start: 2021-12-10 | End: 2021-12-18 | Stop reason: HOSPADM

## 2021-12-10 RX ORDER — SODIUM CHLORIDE 9 MG/ML
INJECTION, SOLUTION INTRAVENOUS
Status: DISCONTINUED | OUTPATIENT
Start: 2021-12-10 | End: 2021-12-18 | Stop reason: HOSPADM

## 2021-12-10 RX ADMIN — MIRTAZAPINE 15 MG: 15 TABLET, FILM COATED ORAL at 09:12

## 2021-12-10 RX ADMIN — IPRATROPIUM BROMIDE 0.5 MG: 0.5 SOLUTION RESPIRATORY (INHALATION) at 12:12

## 2021-12-10 RX ADMIN — INSULIN ASPART 2 UNITS: 100 INJECTION, SOLUTION INTRAVENOUS; SUBCUTANEOUS at 06:12

## 2021-12-10 RX ADMIN — VASOPRESSIN 0.04 UNITS/MIN: 20 INJECTION INTRAVENOUS at 05:12

## 2021-12-10 RX ADMIN — MEROPENEM AND SODIUM CHLORIDE 1 G: 1 INJECTION, SOLUTION INTRAVENOUS at 09:12

## 2021-12-10 RX ADMIN — SODIUM CHLORIDE 5 ML/HR: 0.9 INJECTION, SOLUTION INTRAVENOUS at 02:12

## 2021-12-10 RX ADMIN — PANTOPRAZOLE SODIUM 40 MG: 40 INJECTION, POWDER, FOR SOLUTION INTRAVENOUS at 06:12

## 2021-12-10 RX ADMIN — IPRATROPIUM BROMIDE 0.5 MG: 0.5 SOLUTION RESPIRATORY (INHALATION) at 08:12

## 2021-12-10 RX ADMIN — SODIUM PHOSPHATE, MONOBASIC, MONOHYDRATE 39.99 MMOL: 276; 142 INJECTION, SOLUTION INTRAVENOUS at 02:12

## 2021-12-10 RX ADMIN — PROPOFOL 15 MCG/KG/MIN: 10 INJECTION, EMULSION INTRAVENOUS at 04:12

## 2021-12-10 RX ADMIN — Medication 10 ML: at 12:12

## 2021-12-10 RX ADMIN — PHENYLEPHRINE HYDROCHLORIDE 5 MCG/KG/MIN: 10 INJECTION INTRAVENOUS at 09:12

## 2021-12-10 RX ADMIN — IPRATROPIUM BROMIDE 0.5 MG: 0.5 SOLUTION RESPIRATORY (INHALATION) at 07:12

## 2021-12-10 RX ADMIN — IPRATROPIUM BROMIDE 0.5 MG: 0.5 SOLUTION RESPIRATORY (INHALATION) at 01:12

## 2021-12-10 RX ADMIN — HEPARIN SODIUM 5000 UNITS: 5000 INJECTION INTRAVENOUS; SUBCUTANEOUS at 03:12

## 2021-12-10 RX ADMIN — ESCITALOPRAM OXALATE 20 MG: 10 TABLET ORAL at 09:12

## 2021-12-10 RX ADMIN — VASOPRESSIN 0.04 UNITS/MIN: 20 INJECTION INTRAVENOUS at 12:12

## 2021-12-10 RX ADMIN — SODIUM CHLORIDE 5 ML/HR: 0.9 INJECTION, SOLUTION INTRAVENOUS at 11:12

## 2021-12-10 RX ADMIN — VASOPRESSIN 0.04 UNITS/MIN: 20 INJECTION INTRAVENOUS at 11:12

## 2021-12-10 RX ADMIN — PHENYLEPHRINE HYDROCHLORIDE 5 MCG/KG/MIN: 10 INJECTION INTRAVENOUS at 04:12

## 2021-12-10 RX ADMIN — PHENYLEPHRINE HYDROCHLORIDE 3 MCG/KG/MIN: 10 INJECTION INTRAVENOUS at 12:12

## 2021-12-10 RX ADMIN — MUPIROCIN: 20 OINTMENT TOPICAL at 09:12

## 2021-12-10 RX ADMIN — PROPOFOL 15 MCG/KG/MIN: 10 INJECTION, EMULSION INTRAVENOUS at 09:12

## 2021-12-10 RX ADMIN — VANCOMYCIN HYDROCHLORIDE 1250 MG: 1.25 INJECTION, POWDER, LYOPHILIZED, FOR SOLUTION INTRAVENOUS at 06:12

## 2021-12-10 RX ADMIN — MEROPENEM AND SODIUM CHLORIDE 1 G: 1 INJECTION, SOLUTION INTRAVENOUS at 04:12

## 2021-12-10 RX ADMIN — CEFEPIME HYDROCHLORIDE 2 G: 2 INJECTION, SOLUTION INTRAVENOUS at 04:12

## 2021-12-10 RX ADMIN — Medication 10 ML: at 06:12

## 2021-12-11 LAB
ALBUMIN SERPL BCP-MCNC: 1.7 G/DL (ref 3.5–5.2)
ALBUMIN SERPL BCP-MCNC: 1.8 G/DL (ref 3.5–5.2)
ALBUMIN SERPL BCP-MCNC: 1.9 G/DL (ref 3.5–5.2)
ALBUMIN SERPL BCP-MCNC: 1.9 G/DL (ref 3.5–5.2)
ALP SERPL-CCNC: 167 U/L (ref 55–135)
ALT SERPL W/O P-5'-P-CCNC: 846 U/L (ref 10–44)
ANION GAP SERPL CALC-SCNC: 10 MMOL/L (ref 8–16)
ANION GAP SERPL CALC-SCNC: 5 MMOL/L (ref 8–16)
ANION GAP SERPL CALC-SCNC: 7 MMOL/L (ref 8–16)
ANION GAP SERPL CALC-SCNC: 9 MMOL/L (ref 8–16)
ANISOCYTOSIS BLD QL SMEAR: SLIGHT
AST SERPL-CCNC: 260 U/L (ref 10–40)
BACTERIA SPEC AEROBE CULT: ABNORMAL
BACTERIA SPEC AEROBE CULT: NO GROWTH
BACTERIA SPEC ANAEROBE CULT: NORMAL
BASOPHILS # BLD AUTO: ABNORMAL K/UL (ref 0–0.2)
BASOPHILS NFR BLD: 0 % (ref 0–1.9)
BILIRUB SERPL-MCNC: 1.9 MG/DL (ref 0.1–1)
BUN SERPL-MCNC: 8 MG/DL (ref 8–23)
BUN SERPL-MCNC: 9 MG/DL (ref 8–23)
CALCIUM SERPL-MCNC: 7 MG/DL (ref 8.7–10.5)
CALCIUM SERPL-MCNC: 7.1 MG/DL (ref 8.7–10.5)
CALCIUM SERPL-MCNC: 7.1 MG/DL (ref 8.7–10.5)
CALCIUM SERPL-MCNC: 7.3 MG/DL (ref 8.7–10.5)
CHLORIDE SERPL-SCNC: 104 MMOL/L (ref 95–110)
CHLORIDE SERPL-SCNC: 106 MMOL/L (ref 95–110)
CO2 SERPL-SCNC: 24 MMOL/L (ref 23–29)
CO2 SERPL-SCNC: 24 MMOL/L (ref 23–29)
CO2 SERPL-SCNC: 25 MMOL/L (ref 23–29)
CO2 SERPL-SCNC: 26 MMOL/L (ref 23–29)
CREAT SERPL-MCNC: 0.6 MG/DL (ref 0.5–1.4)
CREAT SERPL-MCNC: 0.7 MG/DL (ref 0.5–1.4)
DIFFERENTIAL METHOD: ABNORMAL
EOSINOPHIL # BLD AUTO: ABNORMAL K/UL (ref 0–0.5)
EOSINOPHIL NFR BLD: 4 % (ref 0–8)
ERYTHROCYTE [DISTWIDTH] IN BLOOD BY AUTOMATED COUNT: 15.6 % (ref 11.5–14.5)
EST. GFR  (AFRICAN AMERICAN): >60 ML/MIN/1.73 M^2
EST. GFR  (NON AFRICAN AMERICAN): >60 ML/MIN/1.73 M^2
GLUCOSE SERPL-MCNC: 113 MG/DL (ref 70–110)
GLUCOSE SERPL-MCNC: 114 MG/DL (ref 70–110)
GLUCOSE SERPL-MCNC: 121 MG/DL (ref 70–110)
GLUCOSE SERPL-MCNC: 127 MG/DL (ref 70–110)
GRAM STN SPEC: ABNORMAL
HCT VFR BLD AUTO: 25.3 % (ref 37–48.5)
HGB BLD-MCNC: 8.1 G/DL (ref 12–16)
IMM GRANULOCYTES # BLD AUTO: ABNORMAL K/UL (ref 0–0.04)
IMM GRANULOCYTES NFR BLD AUTO: ABNORMAL % (ref 0–0.5)
LACTATE SERPL-SCNC: 1.4 MMOL/L (ref 0.5–2.2)
LYMPHOCYTES # BLD AUTO: ABNORMAL K/UL (ref 1–4.8)
LYMPHOCYTES NFR BLD: 5 % (ref 18–48)
MAGNESIUM SERPL-MCNC: 2.2 MG/DL (ref 1.6–2.6)
MCH RBC QN AUTO: 27.6 PG (ref 27–31)
MCHC RBC AUTO-ENTMCNC: 32 G/DL (ref 32–36)
MCV RBC AUTO: 86 FL (ref 82–98)
MONOCYTES # BLD AUTO: ABNORMAL K/UL (ref 0.3–1)
MONOCYTES NFR BLD: 1 % (ref 4–15)
NEUTROPHILS NFR BLD: 74 % (ref 38–73)
NEUTS BAND NFR BLD MANUAL: 16 %
NRBC BLD-RTO: 2 /100 WBC
PHOSPHATE SERPL-MCNC: 2.2 MG/DL (ref 2.7–4.5)
PHOSPHATE SERPL-MCNC: 2.9 MG/DL (ref 2.7–4.5)
PHOSPHATE SERPL-MCNC: 2.9 MG/DL (ref 2.7–4.5)
PLATELET # BLD AUTO: 73 K/UL (ref 150–450)
PLATELET BLD QL SMEAR: ABNORMAL
PMV BLD AUTO: 11.5 FL (ref 9.2–12.9)
POCT GLUCOSE: 123 MG/DL (ref 70–110)
POCT GLUCOSE: 128 MG/DL (ref 70–110)
POCT GLUCOSE: 139 MG/DL (ref 70–110)
POCT GLUCOSE: 147 MG/DL (ref 70–110)
POLYCHROMASIA BLD QL SMEAR: ABNORMAL
POTASSIUM SERPL-SCNC: 3.8 MMOL/L (ref 3.5–5.1)
POTASSIUM SERPL-SCNC: 3.8 MMOL/L (ref 3.5–5.1)
POTASSIUM SERPL-SCNC: 3.9 MMOL/L (ref 3.5–5.1)
POTASSIUM SERPL-SCNC: 4.1 MMOL/L (ref 3.5–5.1)
PROT SERPL-MCNC: 4.7 G/DL (ref 6–8.4)
RBC # BLD AUTO: 2.94 M/UL (ref 4–5.4)
SODIUM SERPL-SCNC: 136 MMOL/L (ref 136–145)
SODIUM SERPL-SCNC: 137 MMOL/L (ref 136–145)
SODIUM SERPL-SCNC: 137 MMOL/L (ref 136–145)
SODIUM SERPL-SCNC: 138 MMOL/L (ref 136–145)
VANCOMYCIN SERPL-MCNC: 15.8 UG/ML
WBC # BLD AUTO: 23.29 K/UL (ref 3.9–12.7)

## 2021-12-11 PROCEDURE — 85027 COMPLETE CBC AUTOMATED: CPT | Performed by: HOSPITALIST

## 2021-12-11 PROCEDURE — 80053 COMPREHEN METABOLIC PANEL: CPT | Performed by: STUDENT IN AN ORGANIZED HEALTH CARE EDUCATION/TRAINING PROGRAM

## 2021-12-11 PROCEDURE — C9113 INJ PANTOPRAZOLE SODIUM, VIA: HCPCS | Performed by: INTERNAL MEDICINE

## 2021-12-11 PROCEDURE — 63600175 PHARM REV CODE 636 W HCPCS: Performed by: INTERNAL MEDICINE

## 2021-12-11 PROCEDURE — A4216 STERILE WATER/SALINE, 10 ML: HCPCS | Performed by: STUDENT IN AN ORGANIZED HEALTH CARE EDUCATION/TRAINING PROGRAM

## 2021-12-11 PROCEDURE — 25000003 PHARM REV CODE 250: Performed by: STUDENT IN AN ORGANIZED HEALTH CARE EDUCATION/TRAINING PROGRAM

## 2021-12-11 PROCEDURE — 80100008 HC CRRT DAILY MAINTENANCE

## 2021-12-11 PROCEDURE — 94640 AIRWAY INHALATION TREATMENT: CPT

## 2021-12-11 PROCEDURE — 25000003 PHARM REV CODE 250: Performed by: INTERNAL MEDICINE

## 2021-12-11 PROCEDURE — 99291 PR CRITICAL CARE, E/M 30-74 MINUTES: ICD-10-PCS | Mod: ,,, | Performed by: INTERNAL MEDICINE

## 2021-12-11 PROCEDURE — 83605 ASSAY OF LACTIC ACID: CPT | Performed by: STUDENT IN AN ORGANIZED HEALTH CARE EDUCATION/TRAINING PROGRAM

## 2021-12-11 PROCEDURE — 63600175 PHARM REV CODE 636 W HCPCS: Performed by: STUDENT IN AN ORGANIZED HEALTH CARE EDUCATION/TRAINING PROGRAM

## 2021-12-11 PROCEDURE — 99291 PR CRITICAL CARE, E/M 30-74 MINUTES: ICD-10-PCS | Mod: ,,, | Performed by: STUDENT IN AN ORGANIZED HEALTH CARE EDUCATION/TRAINING PROGRAM

## 2021-12-11 PROCEDURE — 20000000 HC ICU ROOM

## 2021-12-11 PROCEDURE — 85007 BL SMEAR W/DIFF WBC COUNT: CPT | Performed by: HOSPITALIST

## 2021-12-11 PROCEDURE — 80202 ASSAY OF VANCOMYCIN: CPT | Performed by: STUDENT IN AN ORGANIZED HEALTH CARE EDUCATION/TRAINING PROGRAM

## 2021-12-11 PROCEDURE — 83735 ASSAY OF MAGNESIUM: CPT | Performed by: STUDENT IN AN ORGANIZED HEALTH CARE EDUCATION/TRAINING PROGRAM

## 2021-12-11 PROCEDURE — 27200966 HC CLOSED SUCTION SYSTEM

## 2021-12-11 PROCEDURE — 25000003 PHARM REV CODE 250: Performed by: HOSPITALIST

## 2021-12-11 PROCEDURE — 80069 RENAL FUNCTION PANEL: CPT | Performed by: STUDENT IN AN ORGANIZED HEALTH CARE EDUCATION/TRAINING PROGRAM

## 2021-12-11 PROCEDURE — 99900035 HC TECH TIME PER 15 MIN (STAT)

## 2021-12-11 PROCEDURE — 27000221 HC OXYGEN, UP TO 24 HOURS

## 2021-12-11 PROCEDURE — 99291 CRITICAL CARE FIRST HOUR: CPT | Mod: ,,, | Performed by: INTERNAL MEDICINE

## 2021-12-11 PROCEDURE — 25000242 PHARM REV CODE 250 ALT 637 W/ HCPCS: Performed by: HOSPITALIST

## 2021-12-11 PROCEDURE — 99291 CRITICAL CARE FIRST HOUR: CPT | Mod: ,,, | Performed by: STUDENT IN AN ORGANIZED HEALTH CARE EDUCATION/TRAINING PROGRAM

## 2021-12-11 PROCEDURE — 94003 VENT MGMT INPAT SUBQ DAY: CPT

## 2021-12-11 PROCEDURE — 99900026 HC AIRWAY MAINTENANCE (STAT)

## 2021-12-11 PROCEDURE — 94761 N-INVAS EAR/PLS OXIMETRY MLT: CPT

## 2021-12-11 RX ADMIN — MIRTAZAPINE 15 MG: 15 TABLET, FILM COATED ORAL at 08:12

## 2021-12-11 RX ADMIN — MEROPENEM AND SODIUM CHLORIDE 1 G: 1 INJECTION, SOLUTION INTRAVENOUS at 12:12

## 2021-12-11 RX ADMIN — IPRATROPIUM BROMIDE 0.5 MG: 0.5 SOLUTION RESPIRATORY (INHALATION) at 08:12

## 2021-12-11 RX ADMIN — SODIUM CHLORIDE 5 ML/HR: 0.9 INJECTION, SOLUTION INTRAVENOUS at 05:12

## 2021-12-11 RX ADMIN — VANCOMYCIN HYDROCHLORIDE 1250 MG: 1.25 INJECTION, POWDER, LYOPHILIZED, FOR SOLUTION INTRAVENOUS at 05:12

## 2021-12-11 RX ADMIN — FENTANYL CITRATE 25 MCG: 50 INJECTION INTRAMUSCULAR; INTRAVENOUS at 03:12

## 2021-12-11 RX ADMIN — VASOPRESSIN 0.04 UNITS/MIN: 20 INJECTION INTRAVENOUS at 11:12

## 2021-12-11 RX ADMIN — ESCITALOPRAM OXALATE 20 MG: 10 TABLET ORAL at 08:12

## 2021-12-11 RX ADMIN — IPRATROPIUM BROMIDE 0.5 MG: 0.5 SOLUTION RESPIRATORY (INHALATION) at 12:12

## 2021-12-11 RX ADMIN — SODIUM PHOSPHATE, MONOBASIC, MONOHYDRATE 30 MMOL: 276; 142 INJECTION, SOLUTION INTRAVENOUS at 05:12

## 2021-12-11 RX ADMIN — MUPIROCIN: 20 OINTMENT TOPICAL at 08:12

## 2021-12-11 RX ADMIN — Medication 10 ML: at 05:12

## 2021-12-11 RX ADMIN — PANTOPRAZOLE SODIUM 40 MG: 40 INJECTION, POWDER, FOR SOLUTION INTRAVENOUS at 08:12

## 2021-12-11 RX ADMIN — VASOPRESSIN 0.04 UNITS/MIN: 20 INJECTION INTRAVENOUS at 05:12

## 2021-12-11 RX ADMIN — IPRATROPIUM BROMIDE 0.5 MG: 0.5 SOLUTION RESPIRATORY (INHALATION) at 07:12

## 2021-12-11 RX ADMIN — PHENYLEPHRINE HYDROCHLORIDE 3 MCG/KG/MIN: 10 INJECTION INTRAVENOUS at 03:12

## 2021-12-11 RX ADMIN — Medication 10 ML: at 11:12

## 2021-12-11 RX ADMIN — PHENYLEPHRINE HYDROCHLORIDE 2.5 MCG/KG/MIN: 10 INJECTION INTRAVENOUS at 11:12

## 2021-12-11 RX ADMIN — PROPOFOL 15 MCG/KG/MIN: 10 INJECTION, EMULSION INTRAVENOUS at 04:12

## 2021-12-11 RX ADMIN — IPRATROPIUM BROMIDE 0.5 MG: 0.5 SOLUTION RESPIRATORY (INHALATION) at 01:12

## 2021-12-11 RX ADMIN — MEROPENEM AND SODIUM CHLORIDE 1 G: 1 INJECTION, SOLUTION INTRAVENOUS at 03:12

## 2021-12-11 RX ADMIN — Medication 10 ML: at 12:12

## 2021-12-11 RX ADMIN — MEROPENEM AND SODIUM CHLORIDE 1 G: 1 INJECTION, SOLUTION INTRAVENOUS at 08:12

## 2021-12-12 ENCOUNTER — PATIENT MESSAGE (OUTPATIENT)
Dept: GASTROENTEROLOGY | Facility: CLINIC | Age: 71
End: 2021-12-12
Payer: MEDICARE

## 2021-12-12 PROBLEM — R11.0 NAUSEA: Chronic | Status: RESOLVED | Noted: 2020-02-17 | Resolved: 2021-12-12

## 2021-12-12 LAB
ALBUMIN SERPL BCP-MCNC: 1.9 G/DL (ref 3.5–5.2)
ALBUMIN SERPL BCP-MCNC: 1.9 G/DL (ref 3.5–5.2)
ALP SERPL-CCNC: 173 U/L (ref 55–135)
ALT SERPL W/O P-5'-P-CCNC: 575 U/L (ref 10–44)
ANION GAP SERPL CALC-SCNC: 8 MMOL/L (ref 8–16)
ANION GAP SERPL CALC-SCNC: 8 MMOL/L (ref 8–16)
ANISOCYTOSIS BLD QL SMEAR: SLIGHT
ANISOCYTOSIS BLD QL SMEAR: SLIGHT
APTT BLDCRRT: 40.6 SEC (ref 21–32)
AST SERPL-CCNC: 126 U/L (ref 10–40)
BASOPHILS # BLD AUTO: ABNORMAL K/UL (ref 0–0.2)
BASOPHILS NFR BLD: 0 % (ref 0–1.9)
BASOPHILS NFR BLD: 0 % (ref 0–1.9)
BILIRUB SERPL-MCNC: 1.2 MG/DL (ref 0.1–1)
BUN SERPL-MCNC: 8 MG/DL (ref 8–23)
BUN SERPL-MCNC: 8 MG/DL (ref 8–23)
CALCIUM SERPL-MCNC: 7.4 MG/DL (ref 8.7–10.5)
CALCIUM SERPL-MCNC: 7.4 MG/DL (ref 8.7–10.5)
CHLORIDE SERPL-SCNC: 105 MMOL/L (ref 95–110)
CHLORIDE SERPL-SCNC: 105 MMOL/L (ref 95–110)
CO2 SERPL-SCNC: 24 MMOL/L (ref 23–29)
CO2 SERPL-SCNC: 25 MMOL/L (ref 23–29)
CREAT SERPL-MCNC: 0.6 MG/DL (ref 0.5–1.4)
CREAT SERPL-MCNC: 0.7 MG/DL (ref 0.5–1.4)
D DIMER PPP IA.FEU-MCNC: 24.2 MG/L FEU
DIFFERENTIAL METHOD: ABNORMAL
DIFFERENTIAL METHOD: ABNORMAL
EOSINOPHIL # BLD AUTO: ABNORMAL K/UL (ref 0–0.5)
EOSINOPHIL NFR BLD: 1 % (ref 0–8)
EOSINOPHIL NFR BLD: 3 % (ref 0–8)
ERYTHROCYTE [DISTWIDTH] IN BLOOD BY AUTOMATED COUNT: 16.1 % (ref 11.5–14.5)
ERYTHROCYTE [DISTWIDTH] IN BLOOD BY AUTOMATED COUNT: 16.7 % (ref 11.5–14.5)
EST. GFR  (AFRICAN AMERICAN): >60 ML/MIN/1.73 M^2
EST. GFR  (AFRICAN AMERICAN): >60 ML/MIN/1.73 M^2
EST. GFR  (NON AFRICAN AMERICAN): >60 ML/MIN/1.73 M^2
EST. GFR  (NON AFRICAN AMERICAN): >60 ML/MIN/1.73 M^2
FIBRINOGEN PPP-MCNC: 420 MG/DL (ref 182–400)
GLUCOSE SERPL-MCNC: 115 MG/DL (ref 70–110)
GLUCOSE SERPL-MCNC: 116 MG/DL (ref 70–110)
HCT VFR BLD AUTO: 23.1 % (ref 37–48.5)
HCT VFR BLD AUTO: 23.8 % (ref 37–48.5)
HGB BLD-MCNC: 7.2 G/DL (ref 12–16)
HGB BLD-MCNC: 7.7 G/DL (ref 12–16)
HYPOCHROMIA BLD QL SMEAR: ABNORMAL
HYPOCHROMIA BLD QL SMEAR: ABNORMAL
IMM GRANULOCYTES # BLD AUTO: ABNORMAL K/UL (ref 0–0.04)
IMM GRANULOCYTES # BLD AUTO: ABNORMAL K/UL (ref 0–0.04)
IMM GRANULOCYTES NFR BLD AUTO: ABNORMAL % (ref 0–0.5)
IMM GRANULOCYTES NFR BLD AUTO: ABNORMAL % (ref 0–0.5)
INR PPP: 1.2 (ref 0.8–1.2)
LDH SERPL L TO P-CCNC: 639 U/L (ref 110–260)
LYMPHOCYTES # BLD AUTO: ABNORMAL K/UL (ref 1–4.8)
LYMPHOCYTES NFR BLD: 11 % (ref 18–48)
LYMPHOCYTES NFR BLD: 4 % (ref 18–48)
MAGNESIUM SERPL-MCNC: 2.2 MG/DL (ref 1.6–2.6)
MAGNESIUM SERPL-MCNC: 2.2 MG/DL (ref 1.6–2.6)
MCH RBC QN AUTO: 27.6 PG (ref 27–31)
MCH RBC QN AUTO: 27.9 PG (ref 27–31)
MCHC RBC AUTO-ENTMCNC: 31.2 G/DL (ref 32–36)
MCHC RBC AUTO-ENTMCNC: 32.4 G/DL (ref 32–36)
MCV RBC AUTO: 86 FL (ref 82–98)
MCV RBC AUTO: 89 FL (ref 82–98)
METAMYELOCYTES NFR BLD MANUAL: 1 %
MONOCYTES # BLD AUTO: ABNORMAL K/UL (ref 0.3–1)
MONOCYTES NFR BLD: 3 % (ref 4–15)
MONOCYTES NFR BLD: 8 % (ref 4–15)
NEUTROPHILS NFR BLD: 74 % (ref 38–73)
NEUTROPHILS NFR BLD: 75 % (ref 38–73)
NEUTS BAND NFR BLD MANUAL: 11 %
NEUTS BAND NFR BLD MANUAL: 9 %
NRBC BLD-RTO: 2 /100 WBC
NRBC BLD-RTO: 3 /100 WBC
PATH REV BLD -IMP: NORMAL
PHOSPHATE SERPL-MCNC: 1.7 MG/DL (ref 2.7–4.5)
PHOSPHATE SERPL-MCNC: 1.7 MG/DL (ref 2.7–4.5)
PLATELET # BLD AUTO: 53 K/UL (ref 150–450)
PLATELET # BLD AUTO: 59 K/UL (ref 150–450)
PLATELET BLD QL SMEAR: ABNORMAL
PLATELET BLD QL SMEAR: ABNORMAL
PMV BLD AUTO: 11.2 FL (ref 9.2–12.9)
PMV BLD AUTO: 11.7 FL (ref 9.2–12.9)
POCT GLUCOSE: 115 MG/DL (ref 70–110)
POCT GLUCOSE: 124 MG/DL (ref 70–110)
POCT GLUCOSE: 130 MG/DL (ref 70–110)
POCT GLUCOSE: 138 MG/DL (ref 70–110)
POLYCHROMASIA BLD QL SMEAR: ABNORMAL
POLYCHROMASIA BLD QL SMEAR: ABNORMAL
POTASSIUM SERPL-SCNC: 3.9 MMOL/L (ref 3.5–5.1)
POTASSIUM SERPL-SCNC: 4 MMOL/L (ref 3.5–5.1)
PROT SERPL-MCNC: 4.8 G/DL (ref 6–8.4)
PROTHROMBIN TIME: 13 SEC (ref 9–12.5)
RBC # BLD AUTO: 2.61 M/UL (ref 4–5.4)
RBC # BLD AUTO: 2.76 M/UL (ref 4–5.4)
SMUDGE CELLS BLD QL SMEAR: PRESENT
SODIUM SERPL-SCNC: 137 MMOL/L (ref 136–145)
SODIUM SERPL-SCNC: 138 MMOL/L (ref 136–145)
VANCOMYCIN SERPL-MCNC: 15.4 UG/ML
WBC # BLD AUTO: 19.05 K/UL (ref 3.9–12.7)
WBC # BLD AUTO: 19.73 K/UL (ref 3.9–12.7)

## 2021-12-12 PROCEDURE — 85384 FIBRINOGEN ACTIVITY: CPT | Performed by: STUDENT IN AN ORGANIZED HEALTH CARE EDUCATION/TRAINING PROGRAM

## 2021-12-12 PROCEDURE — 80202 ASSAY OF VANCOMYCIN: CPT | Performed by: STUDENT IN AN ORGANIZED HEALTH CARE EDUCATION/TRAINING PROGRAM

## 2021-12-12 PROCEDURE — 80069 RENAL FUNCTION PANEL: CPT | Performed by: STUDENT IN AN ORGANIZED HEALTH CARE EDUCATION/TRAINING PROGRAM

## 2021-12-12 PROCEDURE — 99291 PR CRITICAL CARE, E/M 30-74 MINUTES: ICD-10-PCS | Mod: ,,, | Performed by: INTERNAL MEDICINE

## 2021-12-12 PROCEDURE — 99291 PR CRITICAL CARE, E/M 30-74 MINUTES: ICD-10-PCS | Mod: ,,, | Performed by: STUDENT IN AN ORGANIZED HEALTH CARE EDUCATION/TRAINING PROGRAM

## 2021-12-12 PROCEDURE — 85007 BL SMEAR W/DIFF WBC COUNT: CPT | Mod: 91 | Performed by: HOSPITALIST

## 2021-12-12 PROCEDURE — 83615 LACTATE (LD) (LDH) ENZYME: CPT | Performed by: STUDENT IN AN ORGANIZED HEALTH CARE EDUCATION/TRAINING PROGRAM

## 2021-12-12 PROCEDURE — 99900035 HC TECH TIME PER 15 MIN (STAT)

## 2021-12-12 PROCEDURE — 25000003 PHARM REV CODE 250: Performed by: STUDENT IN AN ORGANIZED HEALTH CARE EDUCATION/TRAINING PROGRAM

## 2021-12-12 PROCEDURE — 63600175 PHARM REV CODE 636 W HCPCS: Performed by: INTERNAL MEDICINE

## 2021-12-12 PROCEDURE — 86022 PLATELET ANTIBODIES: CPT | Performed by: STUDENT IN AN ORGANIZED HEALTH CARE EDUCATION/TRAINING PROGRAM

## 2021-12-12 PROCEDURE — 94761 N-INVAS EAR/PLS OXIMETRY MLT: CPT

## 2021-12-12 PROCEDURE — 83010 ASSAY OF HAPTOGLOBIN QUANT: CPT | Performed by: STUDENT IN AN ORGANIZED HEALTH CARE EDUCATION/TRAINING PROGRAM

## 2021-12-12 PROCEDURE — 85027 COMPLETE CBC AUTOMATED: CPT | Mod: 91 | Performed by: HOSPITALIST

## 2021-12-12 PROCEDURE — 94640 AIRWAY INHALATION TREATMENT: CPT

## 2021-12-12 PROCEDURE — 27200966 HC CLOSED SUCTION SYSTEM

## 2021-12-12 PROCEDURE — 94003 VENT MGMT INPAT SUBQ DAY: CPT

## 2021-12-12 PROCEDURE — 63600175 PHARM REV CODE 636 W HCPCS: Performed by: STUDENT IN AN ORGANIZED HEALTH CARE EDUCATION/TRAINING PROGRAM

## 2021-12-12 PROCEDURE — 83735 ASSAY OF MAGNESIUM: CPT | Performed by: STUDENT IN AN ORGANIZED HEALTH CARE EDUCATION/TRAINING PROGRAM

## 2021-12-12 PROCEDURE — 99291 CRITICAL CARE FIRST HOUR: CPT | Mod: ,,, | Performed by: STUDENT IN AN ORGANIZED HEALTH CARE EDUCATION/TRAINING PROGRAM

## 2021-12-12 PROCEDURE — 85730 THROMBOPLASTIN TIME PARTIAL: CPT | Performed by: STUDENT IN AN ORGANIZED HEALTH CARE EDUCATION/TRAINING PROGRAM

## 2021-12-12 PROCEDURE — 27000221 HC OXYGEN, UP TO 24 HOURS

## 2021-12-12 PROCEDURE — 85007 BL SMEAR W/DIFF WBC COUNT: CPT | Performed by: STUDENT IN AN ORGANIZED HEALTH CARE EDUCATION/TRAINING PROGRAM

## 2021-12-12 PROCEDURE — 25000242 PHARM REV CODE 250 ALT 637 W/ HCPCS: Performed by: HOSPITALIST

## 2021-12-12 PROCEDURE — 85060 BLOOD SMEAR INTERPRETATION: CPT | Mod: ,,, | Performed by: PATHOLOGY

## 2021-12-12 PROCEDURE — 85379 FIBRIN DEGRADATION QUANT: CPT | Performed by: STUDENT IN AN ORGANIZED HEALTH CARE EDUCATION/TRAINING PROGRAM

## 2021-12-12 PROCEDURE — 80053 COMPREHEN METABOLIC PANEL: CPT | Performed by: STUDENT IN AN ORGANIZED HEALTH CARE EDUCATION/TRAINING PROGRAM

## 2021-12-12 PROCEDURE — 85027 COMPLETE CBC AUTOMATED: CPT | Performed by: STUDENT IN AN ORGANIZED HEALTH CARE EDUCATION/TRAINING PROGRAM

## 2021-12-12 PROCEDURE — 85610 PROTHROMBIN TIME: CPT | Performed by: STUDENT IN AN ORGANIZED HEALTH CARE EDUCATION/TRAINING PROGRAM

## 2021-12-12 PROCEDURE — 85060 PATHOLOGIST REVIEW: ICD-10-PCS | Mod: ,,, | Performed by: PATHOLOGY

## 2021-12-12 PROCEDURE — C9113 INJ PANTOPRAZOLE SODIUM, VIA: HCPCS | Performed by: INTERNAL MEDICINE

## 2021-12-12 PROCEDURE — 99291 CRITICAL CARE FIRST HOUR: CPT | Mod: ,,, | Performed by: INTERNAL MEDICINE

## 2021-12-12 PROCEDURE — A4216 STERILE WATER/SALINE, 10 ML: HCPCS | Performed by: STUDENT IN AN ORGANIZED HEALTH CARE EDUCATION/TRAINING PROGRAM

## 2021-12-12 PROCEDURE — 25000003 PHARM REV CODE 250: Performed by: INTERNAL MEDICINE

## 2021-12-12 PROCEDURE — 20000000 HC ICU ROOM

## 2021-12-12 PROCEDURE — 25000003 PHARM REV CODE 250: Performed by: HOSPITALIST

## 2021-12-12 PROCEDURE — 99900026 HC AIRWAY MAINTENANCE (STAT)

## 2021-12-12 RX ORDER — FENTANYL CITRATE-0.9 % NACL/PF 10 MCG/ML
0-250 PLASTIC BAG, INJECTION (ML) INTRAVENOUS CONTINUOUS
Status: DISCONTINUED | OUTPATIENT
Start: 2021-12-12 | End: 2021-12-14

## 2021-12-12 RX ADMIN — PROPOFOL 15 MCG/KG/MIN: 10 INJECTION, EMULSION INTRAVENOUS at 12:12

## 2021-12-12 RX ADMIN — PHENYLEPHRINE HYDROCHLORIDE 2.5 MCG/KG/MIN: 10 INJECTION INTRAVENOUS at 09:12

## 2021-12-12 RX ADMIN — IPRATROPIUM BROMIDE 0.5 MG: 0.5 SOLUTION RESPIRATORY (INHALATION) at 08:12

## 2021-12-12 RX ADMIN — VASOPRESSIN 0.04 UNITS/MIN: 20 INJECTION INTRAVENOUS at 09:12

## 2021-12-12 RX ADMIN — PROPOFOL 15 MCG/KG/MIN: 10 INJECTION, EMULSION INTRAVENOUS at 03:12

## 2021-12-12 RX ADMIN — SODIUM PHOSPHATE, MONOBASIC, MONOHYDRATE 39.99 MMOL: 276; 142 INJECTION, SOLUTION INTRAVENOUS at 05:12

## 2021-12-12 RX ADMIN — MIRTAZAPINE 15 MG: 15 TABLET, FILM COATED ORAL at 09:12

## 2021-12-12 RX ADMIN — HEPARIN SODIUM 5000 UNITS: 5000 INJECTION INTRAVENOUS; SUBCUTANEOUS at 09:12

## 2021-12-12 RX ADMIN — PANTOPRAZOLE SODIUM 40 MG: 40 INJECTION, POWDER, FOR SOLUTION INTRAVENOUS at 08:12

## 2021-12-12 RX ADMIN — FENTANYL CITRATE 25 MCG: 50 INJECTION INTRAMUSCULAR; INTRAVENOUS at 09:12

## 2021-12-12 RX ADMIN — MEROPENEM AND SODIUM CHLORIDE 1 G: 1 INJECTION, SOLUTION INTRAVENOUS at 09:12

## 2021-12-12 RX ADMIN — SODIUM CHLORIDE 5 ML/HR: 0.9 INJECTION, SOLUTION INTRAVENOUS at 12:12

## 2021-12-12 RX ADMIN — VANCOMYCIN HYDROCHLORIDE 1250 MG: 1.25 INJECTION, POWDER, LYOPHILIZED, FOR SOLUTION INTRAVENOUS at 05:12

## 2021-12-12 RX ADMIN — Medication 10 ML: at 05:12

## 2021-12-12 RX ADMIN — ESCITALOPRAM OXALATE 20 MG: 10 TABLET ORAL at 08:12

## 2021-12-12 RX ADMIN — SODIUM CHLORIDE 5 ML/HR: 0.9 INJECTION, SOLUTION INTRAVENOUS at 05:12

## 2021-12-12 RX ADMIN — MEROPENEM AND SODIUM CHLORIDE 1 G: 1 INJECTION, SOLUTION INTRAVENOUS at 12:12

## 2021-12-12 RX ADMIN — Medication 25 MCG/HR: at 12:12

## 2021-12-12 RX ADMIN — PHENYLEPHRINE HYDROCHLORIDE 1.5 MCG/KG/MIN: 10 INJECTION INTRAVENOUS at 09:12

## 2021-12-12 RX ADMIN — MEROPENEM AND SODIUM CHLORIDE 1 G: 1 INJECTION, SOLUTION INTRAVENOUS at 04:12

## 2021-12-12 RX ADMIN — IPRATROPIUM BROMIDE 0.5 MG: 0.5 SOLUTION RESPIRATORY (INHALATION) at 01:12

## 2021-12-13 LAB
ALBUMIN SERPL BCP-MCNC: 1.8 G/DL (ref 3.5–5.2)
ALLENS TEST: ABNORMAL
ALP SERPL-CCNC: 191 U/L (ref 55–135)
ALT SERPL W/O P-5'-P-CCNC: 374 U/L (ref 10–44)
ANION GAP SERPL CALC-SCNC: 9 MMOL/L (ref 8–16)
ANISOCYTOSIS BLD QL SMEAR: SLIGHT
AST SERPL-CCNC: 71 U/L (ref 10–40)
BASOPHILS # BLD AUTO: ABNORMAL K/UL (ref 0–0.2)
BASOPHILS NFR BLD: 0 % (ref 0–1.9)
BILIRUB SERPL-MCNC: 0.9 MG/DL (ref 0.1–1)
BUN SERPL-MCNC: 20 MG/DL (ref 8–23)
CALCIUM SERPL-MCNC: 7.6 MG/DL (ref 8.7–10.5)
CHLORIDE SERPL-SCNC: 105 MMOL/L (ref 95–110)
CO2 SERPL-SCNC: 25 MMOL/L (ref 23–29)
CREAT SERPL-MCNC: 1 MG/DL (ref 0.5–1.4)
DELSYS: ABNORMAL
DIFFERENTIAL METHOD: ABNORMAL
EOSINOPHIL # BLD AUTO: ABNORMAL K/UL (ref 0–0.5)
EOSINOPHIL NFR BLD: 2 % (ref 0–8)
ERYTHROCYTE [DISTWIDTH] IN BLOOD BY AUTOMATED COUNT: 17.4 % (ref 11.5–14.5)
ERYTHROCYTE [SEDIMENTATION RATE] IN BLOOD BY WESTERGREN METHOD: 26 MM/H
EST. GFR  (AFRICAN AMERICAN): >60 ML/MIN/1.73 M^2
EST. GFR  (NON AFRICAN AMERICAN): 56 ML/MIN/1.73 M^2
FIO2: 30
GLUCOSE SERPL-MCNC: 118 MG/DL (ref 70–110)
HAPTOGLOB SERPL-MCNC: 23 MG/DL (ref 30–250)
HCO3 UR-SCNC: 24.2 MMOL/L (ref 24–28)
HCT VFR BLD AUTO: 22.4 % (ref 37–48.5)
HGB BLD-MCNC: 7.1 G/DL (ref 12–16)
HYPOCHROMIA BLD QL SMEAR: ABNORMAL
IMM GRANULOCYTES # BLD AUTO: ABNORMAL K/UL (ref 0–0.04)
IMM GRANULOCYTES NFR BLD AUTO: ABNORMAL % (ref 0–0.5)
LYMPHOCYTES # BLD AUTO: ABNORMAL K/UL (ref 1–4.8)
LYMPHOCYTES NFR BLD: 10 % (ref 18–48)
MAGNESIUM SERPL-MCNC: 2.1 MG/DL (ref 1.6–2.6)
MCH RBC QN AUTO: 27.6 PG (ref 27–31)
MCHC RBC AUTO-ENTMCNC: 31.7 G/DL (ref 32–36)
MCV RBC AUTO: 87 FL (ref 82–98)
MIN VOL: 10.1
MODE: ABNORMAL
MONOCYTES # BLD AUTO: ABNORMAL K/UL (ref 0.3–1)
MONOCYTES NFR BLD: 15 % (ref 4–15)
NEUTROPHILS NFR BLD: 70 % (ref 38–73)
NEUTS BAND NFR BLD MANUAL: 3 %
NRBC BLD-RTO: 2 /100 WBC
OVALOCYTES BLD QL SMEAR: ABNORMAL
PATH REV BLD -IMP: NORMAL
PCO2 BLDA: 37.4 MMHG (ref 35–45)
PEEP: 5
PH SMN: 7.42 [PH] (ref 7.35–7.45)
PIP: 29
PLATELET # BLD AUTO: 63 K/UL (ref 150–450)
PLATELET BLD QL SMEAR: ABNORMAL
PMV BLD AUTO: 11.4 FL (ref 9.2–12.9)
PO2 BLDA: 72 MMHG (ref 80–100)
POC BE: 0 MMOL/L
POC SATURATED O2: 95 % (ref 95–100)
POC TCO2: 25 MMOL/L (ref 23–27)
POCT GLUCOSE: 128 MG/DL (ref 70–110)
POCT GLUCOSE: 144 MG/DL (ref 70–110)
POCT GLUCOSE: 161 MG/DL (ref 70–110)
POLYCHROMASIA BLD QL SMEAR: ABNORMAL
POTASSIUM SERPL-SCNC: 3.7 MMOL/L (ref 3.5–5.1)
PROT SERPL-MCNC: 4.8 G/DL (ref 6–8.4)
RBC # BLD AUTO: 2.57 M/UL (ref 4–5.4)
SAMPLE: ABNORMAL
SITE: ABNORMAL
SODIUM SERPL-SCNC: 139 MMOL/L (ref 136–145)
SP02: 94
STOMATOCYTES BLD QL SMEAR: PRESENT
VANCOMYCIN SERPL-MCNC: 26.9 UG/ML
VT: 400
WBC # BLD AUTO: 19.7 K/UL (ref 3.9–12.7)

## 2021-12-13 PROCEDURE — 25000242 PHARM REV CODE 250 ALT 637 W/ HCPCS: Performed by: HOSPITALIST

## 2021-12-13 PROCEDURE — 99900026 HC AIRWAY MAINTENANCE (STAT)

## 2021-12-13 PROCEDURE — 82803 BLOOD GASES ANY COMBINATION: CPT

## 2021-12-13 PROCEDURE — 99291 CRITICAL CARE FIRST HOUR: CPT | Mod: ,,, | Performed by: INTERNAL MEDICINE

## 2021-12-13 PROCEDURE — 85027 COMPLETE CBC AUTOMATED: CPT | Performed by: HOSPITALIST

## 2021-12-13 PROCEDURE — 80053 COMPREHEN METABOLIC PANEL: CPT | Performed by: STUDENT IN AN ORGANIZED HEALTH CARE EDUCATION/TRAINING PROGRAM

## 2021-12-13 PROCEDURE — C9113 INJ PANTOPRAZOLE SODIUM, VIA: HCPCS | Performed by: INTERNAL MEDICINE

## 2021-12-13 PROCEDURE — 25000003 PHARM REV CODE 250: Performed by: STUDENT IN AN ORGANIZED HEALTH CARE EDUCATION/TRAINING PROGRAM

## 2021-12-13 PROCEDURE — 99233 SBSQ HOSP IP/OBS HIGH 50: CPT | Mod: ,,, | Performed by: INTERNAL MEDICINE

## 2021-12-13 PROCEDURE — 94640 AIRWAY INHALATION TREATMENT: CPT

## 2021-12-13 PROCEDURE — 20000000 HC ICU ROOM

## 2021-12-13 PROCEDURE — 99900035 HC TECH TIME PER 15 MIN (STAT)

## 2021-12-13 PROCEDURE — 25000003 PHARM REV CODE 250: Performed by: HOSPITALIST

## 2021-12-13 PROCEDURE — 80202 ASSAY OF VANCOMYCIN: CPT | Performed by: STUDENT IN AN ORGANIZED HEALTH CARE EDUCATION/TRAINING PROGRAM

## 2021-12-13 PROCEDURE — 85007 BL SMEAR W/DIFF WBC COUNT: CPT | Performed by: HOSPITALIST

## 2021-12-13 PROCEDURE — 63600175 PHARM REV CODE 636 W HCPCS: Performed by: STUDENT IN AN ORGANIZED HEALTH CARE EDUCATION/TRAINING PROGRAM

## 2021-12-13 PROCEDURE — 27000221 HC OXYGEN, UP TO 24 HOURS

## 2021-12-13 PROCEDURE — 25000003 PHARM REV CODE 250: Performed by: INTERNAL MEDICINE

## 2021-12-13 PROCEDURE — 83735 ASSAY OF MAGNESIUM: CPT | Performed by: STUDENT IN AN ORGANIZED HEALTH CARE EDUCATION/TRAINING PROGRAM

## 2021-12-13 PROCEDURE — 99233 PR SUBSEQUENT HOSPITAL CARE,LEVL III: ICD-10-PCS | Mod: ,,, | Performed by: INTERNAL MEDICINE

## 2021-12-13 PROCEDURE — 99291 PR CRITICAL CARE, E/M 30-74 MINUTES: ICD-10-PCS | Mod: ,,, | Performed by: INTERNAL MEDICINE

## 2021-12-13 PROCEDURE — A4216 STERILE WATER/SALINE, 10 ML: HCPCS | Performed by: STUDENT IN AN ORGANIZED HEALTH CARE EDUCATION/TRAINING PROGRAM

## 2021-12-13 PROCEDURE — 63600175 PHARM REV CODE 636 W HCPCS: Performed by: INTERNAL MEDICINE

## 2021-12-13 PROCEDURE — 27200966 HC CLOSED SUCTION SYSTEM

## 2021-12-13 PROCEDURE — 37799 UNLISTED PX VASCULAR SURGERY: CPT

## 2021-12-13 RX ORDER — FLUDROCORTISONE ACETATE 0.1 MG/1
100 TABLET ORAL DAILY
Status: DISCONTINUED | OUTPATIENT
Start: 2021-12-13 | End: 2021-12-15

## 2021-12-13 RX ORDER — BALSAM PERU/CASTOR OIL
OINTMENT (GRAM) TOPICAL 2 TIMES DAILY
Status: DISCONTINUED | OUTPATIENT
Start: 2021-12-13 | End: 2021-12-18 | Stop reason: HOSPADM

## 2021-12-13 RX ADMIN — VASOPRESSIN 0.04 UNITS/MIN: 20 INJECTION INTRAVENOUS at 05:12

## 2021-12-13 RX ADMIN — Medication 10 ML: at 12:12

## 2021-12-13 RX ADMIN — Medication 10 ML: at 06:12

## 2021-12-13 RX ADMIN — ESCITALOPRAM OXALATE 20 MG: 10 TABLET ORAL at 08:12

## 2021-12-13 RX ADMIN — IPRATROPIUM BROMIDE 0.5 MG: 0.5 SOLUTION RESPIRATORY (INHALATION) at 07:12

## 2021-12-13 RX ADMIN — VASOPRESSIN 0.04 UNITS/MIN: 20 INJECTION INTRAVENOUS at 02:12

## 2021-12-13 RX ADMIN — MEROPENEM AND SODIUM CHLORIDE 1 G: 1 INJECTION, SOLUTION INTRAVENOUS at 08:12

## 2021-12-13 RX ADMIN — Medication 10 ML: at 05:12

## 2021-12-13 RX ADMIN — MEROPENEM AND SODIUM CHLORIDE 1 G: 1 INJECTION, SOLUTION INTRAVENOUS at 04:12

## 2021-12-13 RX ADMIN — HYDROCORTISONE SODIUM SUCCINATE 100 MG: 100 INJECTION, POWDER, FOR SOLUTION INTRAMUSCULAR; INTRAVENOUS at 09:12

## 2021-12-13 RX ADMIN — PROPOFOL 5 MCG/KG/MIN: 10 INJECTION, EMULSION INTRAVENOUS at 05:12

## 2021-12-13 RX ADMIN — IPRATROPIUM BROMIDE 0.5 MG: 0.5 SOLUTION RESPIRATORY (INHALATION) at 01:12

## 2021-12-13 RX ADMIN — MIRTAZAPINE 15 MG: 15 TABLET, FILM COATED ORAL at 09:12

## 2021-12-13 RX ADMIN — PANTOPRAZOLE SODIUM 40 MG: 40 INJECTION, POWDER, FOR SOLUTION INTRAVENOUS at 08:12

## 2021-12-13 RX ADMIN — FLUDROCORTISONE ACETATE 100 MCG: 0.1 TABLET ORAL at 12:12

## 2021-12-13 RX ADMIN — CASTOR OIL AND BALSAM, PERU: 788; 87 OINTMENT TOPICAL at 09:12

## 2021-12-13 RX ADMIN — HYDROCORTISONE SODIUM SUCCINATE 100 MG: 100 INJECTION, POWDER, FOR SOLUTION INTRAMUSCULAR; INTRAVENOUS at 01:12

## 2021-12-13 RX ADMIN — MEROPENEM AND SODIUM CHLORIDE 1 G: 1 INJECTION, SOLUTION INTRAVENOUS at 12:12

## 2021-12-14 LAB
ALBUMIN SERPL BCP-MCNC: 1.8 G/DL (ref 3.5–5.2)
ALP SERPL-CCNC: 169 U/L (ref 55–135)
ALT SERPL W/O P-5'-P-CCNC: 264 U/L (ref 10–44)
ANION GAP SERPL CALC-SCNC: 11 MMOL/L (ref 8–16)
ANISOCYTOSIS BLD QL SMEAR: SLIGHT
AST SERPL-CCNC: 49 U/L (ref 10–40)
BASOPHILS NFR BLD: 0 % (ref 0–1.9)
BILIRUB SERPL-MCNC: 0.8 MG/DL (ref 0.1–1)
BUN SERPL-MCNC: 32 MG/DL (ref 8–23)
CALCIUM SERPL-MCNC: 8.1 MG/DL (ref 8.7–10.5)
CHLORIDE SERPL-SCNC: 105 MMOL/L (ref 95–110)
CO2 SERPL-SCNC: 25 MMOL/L (ref 23–29)
CREAT SERPL-MCNC: 1.1 MG/DL (ref 0.5–1.4)
DACRYOCYTES BLD QL SMEAR: ABNORMAL
DIFFERENTIAL METHOD: ABNORMAL
EOSINOPHIL NFR BLD: 0 % (ref 0–8)
ERYTHROCYTE [DISTWIDTH] IN BLOOD BY AUTOMATED COUNT: 17.8 % (ref 11.5–14.5)
EST. GFR  (AFRICAN AMERICAN): 58 ML/MIN/1.73 M^2
EST. GFR  (NON AFRICAN AMERICAN): 50 ML/MIN/1.73 M^2
GIANT PLATELETS BLD QL SMEAR: PRESENT
GLUCOSE SERPL-MCNC: 126 MG/DL (ref 70–110)
HCT VFR BLD AUTO: 23.4 % (ref 37–48.5)
HGB BLD-MCNC: 7.3 G/DL (ref 12–16)
HYPOCHROMIA BLD QL SMEAR: ABNORMAL
IMM GRANULOCYTES # BLD AUTO: ABNORMAL K/UL (ref 0–0.04)
IMM GRANULOCYTES NFR BLD AUTO: ABNORMAL % (ref 0–0.5)
LYMPHOCYTES NFR BLD: 7 % (ref 18–48)
MAGNESIUM SERPL-MCNC: 2.1 MG/DL (ref 1.6–2.6)
MCH RBC QN AUTO: 27.1 PG (ref 27–31)
MCHC RBC AUTO-ENTMCNC: 31.2 G/DL (ref 32–36)
MCV RBC AUTO: 87 FL (ref 82–98)
METAMYELOCYTES NFR BLD MANUAL: 2 %
MONOCYTES NFR BLD: 5 % (ref 4–15)
NEUTROPHILS NFR BLD: 82 % (ref 38–73)
NEUTS BAND NFR BLD MANUAL: 4 %
NRBC BLD-RTO: 2 /100 WBC
OVALOCYTES BLD QL SMEAR: ABNORMAL
PLATELET # BLD AUTO: 81 K/UL (ref 150–450)
PLATELET BLD QL SMEAR: ABNORMAL
PMV BLD AUTO: 11.6 FL (ref 9.2–12.9)
POCT GLUCOSE: 139 MG/DL (ref 70–110)
POCT GLUCOSE: 150 MG/DL (ref 70–110)
POCT GLUCOSE: 154 MG/DL (ref 70–110)
POCT GLUCOSE: 164 MG/DL (ref 70–110)
POIKILOCYTOSIS BLD QL SMEAR: SLIGHT
POLYCHROMASIA BLD QL SMEAR: ABNORMAL
POTASSIUM SERPL-SCNC: 3.7 MMOL/L (ref 3.5–5.1)
PROT SERPL-MCNC: 5.2 G/DL (ref 6–8.4)
RBC # BLD AUTO: 2.69 M/UL (ref 4–5.4)
SODIUM SERPL-SCNC: 141 MMOL/L (ref 136–145)
TARGETS BLD QL SMEAR: ABNORMAL
VANCOMYCIN SERPL-MCNC: 19.7 UG/ML
WBC # BLD AUTO: 17.72 K/UL (ref 3.9–12.7)

## 2021-12-14 PROCEDURE — 85027 COMPLETE CBC AUTOMATED: CPT | Performed by: HOSPITALIST

## 2021-12-14 PROCEDURE — 25000003 PHARM REV CODE 250: Performed by: STUDENT IN AN ORGANIZED HEALTH CARE EDUCATION/TRAINING PROGRAM

## 2021-12-14 PROCEDURE — A4216 STERILE WATER/SALINE, 10 ML: HCPCS | Performed by: STUDENT IN AN ORGANIZED HEALTH CARE EDUCATION/TRAINING PROGRAM

## 2021-12-14 PROCEDURE — 99291 PR CRITICAL CARE, E/M 30-74 MINUTES: ICD-10-PCS | Mod: ,,, | Performed by: INTERNAL MEDICINE

## 2021-12-14 PROCEDURE — 99291 CRITICAL CARE FIRST HOUR: CPT | Mod: ,,, | Performed by: INTERNAL MEDICINE

## 2021-12-14 PROCEDURE — 25000003 PHARM REV CODE 250: Performed by: INTERNAL MEDICINE

## 2021-12-14 PROCEDURE — 94640 AIRWAY INHALATION TREATMENT: CPT

## 2021-12-14 PROCEDURE — 94761 N-INVAS EAR/PLS OXIMETRY MLT: CPT

## 2021-12-14 PROCEDURE — 80053 COMPREHEN METABOLIC PANEL: CPT | Performed by: STUDENT IN AN ORGANIZED HEALTH CARE EDUCATION/TRAINING PROGRAM

## 2021-12-14 PROCEDURE — 63600175 PHARM REV CODE 636 W HCPCS: Performed by: INTERNAL MEDICINE

## 2021-12-14 PROCEDURE — 63600175 PHARM REV CODE 636 W HCPCS: Performed by: STUDENT IN AN ORGANIZED HEALTH CARE EDUCATION/TRAINING PROGRAM

## 2021-12-14 PROCEDURE — 99900035 HC TECH TIME PER 15 MIN (STAT)

## 2021-12-14 PROCEDURE — 27000221 HC OXYGEN, UP TO 24 HOURS

## 2021-12-14 PROCEDURE — 83735 ASSAY OF MAGNESIUM: CPT | Performed by: STUDENT IN AN ORGANIZED HEALTH CARE EDUCATION/TRAINING PROGRAM

## 2021-12-14 PROCEDURE — 80202 ASSAY OF VANCOMYCIN: CPT | Performed by: STUDENT IN AN ORGANIZED HEALTH CARE EDUCATION/TRAINING PROGRAM

## 2021-12-14 PROCEDURE — 25000003 PHARM REV CODE 250: Performed by: HOSPITALIST

## 2021-12-14 PROCEDURE — 85007 BL SMEAR W/DIFF WBC COUNT: CPT | Performed by: HOSPITALIST

## 2021-12-14 PROCEDURE — 25000242 PHARM REV CODE 250 ALT 637 W/ HCPCS: Performed by: HOSPITALIST

## 2021-12-14 PROCEDURE — 20000000 HC ICU ROOM

## 2021-12-14 PROCEDURE — C9113 INJ PANTOPRAZOLE SODIUM, VIA: HCPCS | Performed by: INTERNAL MEDICINE

## 2021-12-14 PROCEDURE — 99900026 HC AIRWAY MAINTENANCE (STAT)

## 2021-12-14 PROCEDURE — 94003 VENT MGMT INPAT SUBQ DAY: CPT

## 2021-12-14 RX ORDER — METOPROLOL TARTRATE 1 MG/ML
5 INJECTION, SOLUTION INTRAVENOUS EVERY 5 MIN PRN
Status: COMPLETED | OUTPATIENT
Start: 2021-12-14 | End: 2021-12-14

## 2021-12-14 RX ORDER — METOPROLOL TARTRATE 25 MG/1
25 TABLET, FILM COATED ORAL 3 TIMES DAILY
Status: DISCONTINUED | OUTPATIENT
Start: 2021-12-14 | End: 2021-12-15

## 2021-12-14 RX ADMIN — PANTOPRAZOLE SODIUM 40 MG: 40 INJECTION, POWDER, FOR SOLUTION INTRAVENOUS at 09:12

## 2021-12-14 RX ADMIN — IPRATROPIUM BROMIDE 0.5 MG: 0.5 SOLUTION RESPIRATORY (INHALATION) at 12:12

## 2021-12-14 RX ADMIN — MEROPENEM AND SODIUM CHLORIDE 1 G: 1 INJECTION, SOLUTION INTRAVENOUS at 12:12

## 2021-12-14 RX ADMIN — IPRATROPIUM BROMIDE 0.5 MG: 0.5 SOLUTION RESPIRATORY (INHALATION) at 08:12

## 2021-12-14 RX ADMIN — HYDROCORTISONE SODIUM SUCCINATE 100 MG: 100 INJECTION, POWDER, FOR SOLUTION INTRAMUSCULAR; INTRAVENOUS at 06:12

## 2021-12-14 RX ADMIN — MIRTAZAPINE 15 MG: 15 TABLET, FILM COATED ORAL at 09:12

## 2021-12-14 RX ADMIN — METOROPROLOL TARTRATE 5 MG: 5 INJECTION, SOLUTION INTRAVENOUS at 10:12

## 2021-12-14 RX ADMIN — HYDROCORTISONE SODIUM SUCCINATE 100 MG: 100 INJECTION, POWDER, FOR SOLUTION INTRAMUSCULAR; INTRAVENOUS at 03:12

## 2021-12-14 RX ADMIN — Medication 10 ML: at 12:12

## 2021-12-14 RX ADMIN — HYDROCORTISONE SODIUM SUCCINATE 100 MG: 100 INJECTION, POWDER, FOR SOLUTION INTRAMUSCULAR; INTRAVENOUS at 09:12

## 2021-12-14 RX ADMIN — Medication 10 ML: at 06:12

## 2021-12-14 RX ADMIN — MEROPENEM AND SODIUM CHLORIDE 1 G: 1 INJECTION, SOLUTION INTRAVENOUS at 04:12

## 2021-12-14 RX ADMIN — IPRATROPIUM BROMIDE 0.5 MG: 0.5 SOLUTION RESPIRATORY (INHALATION) at 01:12

## 2021-12-14 RX ADMIN — FLUDROCORTISONE ACETATE 100 MCG: 0.1 TABLET ORAL at 09:12

## 2021-12-14 RX ADMIN — VANCOMYCIN HYDROCHLORIDE 500 MG: 500 INJECTION, POWDER, LYOPHILIZED, FOR SOLUTION INTRAVENOUS at 04:12

## 2021-12-14 RX ADMIN — METOROPROLOL TARTRATE 5 MG: 5 INJECTION, SOLUTION INTRAVENOUS at 03:12

## 2021-12-14 RX ADMIN — CASTOR OIL AND BALSAM, PERU: 788; 87 OINTMENT TOPICAL at 09:12

## 2021-12-14 RX ADMIN — METOPROLOL TARTRATE 25 MG: 25 TABLET, FILM COATED ORAL at 09:12

## 2021-12-14 RX ADMIN — MEROPENEM AND SODIUM CHLORIDE 1 G: 1 INJECTION, SOLUTION INTRAVENOUS at 09:12

## 2021-12-14 RX ADMIN — CASTOR OIL AND BALSAM, PERU: 788; 87 OINTMENT TOPICAL at 10:12

## 2021-12-14 RX ADMIN — ESCITALOPRAM OXALATE 20 MG: 10 TABLET ORAL at 09:12

## 2021-12-15 PROBLEM — R41.89 SEDATED: Status: RESOLVED | Noted: 2021-12-08 | Resolved: 2021-12-15

## 2021-12-15 PROBLEM — Z51.5 COMFORT MEASURES ONLY STATUS: Status: ACTIVE | Noted: 2021-12-15

## 2021-12-15 PROBLEM — G93.40 ENCEPHALOPATHY: Status: ACTIVE | Noted: 2021-12-15

## 2021-12-15 LAB
ALBUMIN SERPL BCP-MCNC: 1.9 G/DL (ref 3.5–5.2)
ALP SERPL-CCNC: 147 U/L (ref 55–135)
ALT SERPL W/O P-5'-P-CCNC: 207 U/L (ref 10–44)
ANION GAP SERPL CALC-SCNC: 11 MMOL/L (ref 8–16)
ANISOCYTOSIS BLD QL SMEAR: SLIGHT
AST SERPL-CCNC: 64 U/L (ref 10–40)
BASOPHILS # BLD AUTO: ABNORMAL K/UL (ref 0–0.2)
BASOPHILS NFR BLD: 0 % (ref 0–1.9)
BILIRUB SERPL-MCNC: 1 MG/DL (ref 0.1–1)
BUN SERPL-MCNC: 48 MG/DL (ref 8–23)
CALCIUM SERPL-MCNC: 8.5 MG/DL (ref 8.7–10.5)
CHLORIDE SERPL-SCNC: 106 MMOL/L (ref 95–110)
CO2 SERPL-SCNC: 25 MMOL/L (ref 23–29)
CREAT SERPL-MCNC: 1.2 MG/DL (ref 0.5–1.4)
DIFFERENTIAL METHOD: ABNORMAL
EOSINOPHIL # BLD AUTO: ABNORMAL K/UL (ref 0–0.5)
EOSINOPHIL NFR BLD: 0 % (ref 0–8)
ERYTHROCYTE [DISTWIDTH] IN BLOOD BY AUTOMATED COUNT: 18.3 % (ref 11.5–14.5)
EST. GFR  (AFRICAN AMERICAN): 52 ML/MIN/1.73 M^2
EST. GFR  (NON AFRICAN AMERICAN): 45 ML/MIN/1.73 M^2
GLUCOSE SERPL-MCNC: 117 MG/DL (ref 70–110)
HCT VFR BLD AUTO: 24 % (ref 37–48.5)
HGB BLD-MCNC: 7.3 G/DL (ref 12–16)
HYPOCHROMIA BLD QL SMEAR: ABNORMAL
IMM GRANULOCYTES # BLD AUTO: ABNORMAL K/UL (ref 0–0.04)
IMM GRANULOCYTES NFR BLD AUTO: ABNORMAL % (ref 0–0.5)
LYMPHOCYTES # BLD AUTO: ABNORMAL K/UL (ref 1–4.8)
LYMPHOCYTES NFR BLD: 12 % (ref 18–48)
MAGNESIUM SERPL-MCNC: 2.1 MG/DL (ref 1.6–2.6)
MCH RBC QN AUTO: 27 PG (ref 27–31)
MCHC RBC AUTO-ENTMCNC: 30.4 G/DL (ref 32–36)
MCV RBC AUTO: 89 FL (ref 82–98)
MONOCYTES # BLD AUTO: ABNORMAL K/UL (ref 0.3–1)
MONOCYTES NFR BLD: 8 % (ref 4–15)
MYELOCYTES NFR BLD MANUAL: 1 %
NEUTROPHILS NFR BLD: 78 % (ref 38–73)
NEUTS BAND NFR BLD MANUAL: 1 %
NRBC BLD-RTO: 2 /100 WBC
PF4 HEPARIN CMPLX AB SER QL: 0.28 OD (ref 0–0.4)
PLATELET # BLD AUTO: 96 K/UL (ref 150–450)
PLATELET BLD QL SMEAR: ABNORMAL
PMV BLD AUTO: 11.6 FL (ref 9.2–12.9)
POCT GLUCOSE: 126 MG/DL (ref 70–110)
POCT GLUCOSE: 142 MG/DL (ref 70–110)
POCT GLUCOSE: 146 MG/DL (ref 70–110)
POLYCHROMASIA BLD QL SMEAR: ABNORMAL
POTASSIUM SERPL-SCNC: 4.2 MMOL/L (ref 3.5–5.1)
PROT SERPL-MCNC: 5.3 G/DL (ref 6–8.4)
RBC # BLD AUTO: 2.7 M/UL (ref 4–5.4)
SODIUM SERPL-SCNC: 142 MMOL/L (ref 136–145)
STOMATOCYTES BLD QL SMEAR: PRESENT
VANCOMYCIN SERPL-MCNC: 20 UG/ML
WBC # BLD AUTO: 15.87 K/UL (ref 3.9–12.7)

## 2021-12-15 PROCEDURE — 94761 N-INVAS EAR/PLS OXIMETRY MLT: CPT

## 2021-12-15 PROCEDURE — 99900026 HC AIRWAY MAINTENANCE (STAT)

## 2021-12-15 PROCEDURE — 94640 AIRWAY INHALATION TREATMENT: CPT

## 2021-12-15 PROCEDURE — 80202 ASSAY OF VANCOMYCIN: CPT | Performed by: STUDENT IN AN ORGANIZED HEALTH CARE EDUCATION/TRAINING PROGRAM

## 2021-12-15 PROCEDURE — 99291 PR CRITICAL CARE, E/M 30-74 MINUTES: ICD-10-PCS | Mod: ,,, | Performed by: INTERNAL MEDICINE

## 2021-12-15 PROCEDURE — 63600175 PHARM REV CODE 636 W HCPCS: Performed by: STUDENT IN AN ORGANIZED HEALTH CARE EDUCATION/TRAINING PROGRAM

## 2021-12-15 PROCEDURE — 63600175 PHARM REV CODE 636 W HCPCS: Performed by: INTERNAL MEDICINE

## 2021-12-15 PROCEDURE — 25000003 PHARM REV CODE 250

## 2021-12-15 PROCEDURE — 20000000 HC ICU ROOM

## 2021-12-15 PROCEDURE — 25000003 PHARM REV CODE 250: Performed by: INTERNAL MEDICINE

## 2021-12-15 PROCEDURE — 85027 COMPLETE CBC AUTOMATED: CPT | Performed by: HOSPITALIST

## 2021-12-15 PROCEDURE — 83735 ASSAY OF MAGNESIUM: CPT | Performed by: STUDENT IN AN ORGANIZED HEALTH CARE EDUCATION/TRAINING PROGRAM

## 2021-12-15 PROCEDURE — 80053 COMPREHEN METABOLIC PANEL: CPT | Performed by: STUDENT IN AN ORGANIZED HEALTH CARE EDUCATION/TRAINING PROGRAM

## 2021-12-15 PROCEDURE — 99291 CRITICAL CARE FIRST HOUR: CPT | Mod: ,,, | Performed by: INTERNAL MEDICINE

## 2021-12-15 PROCEDURE — 85007 BL SMEAR W/DIFF WBC COUNT: CPT | Performed by: HOSPITALIST

## 2021-12-15 PROCEDURE — 25000242 PHARM REV CODE 250 ALT 637 W/ HCPCS: Performed by: HOSPITALIST

## 2021-12-15 PROCEDURE — 27000221 HC OXYGEN, UP TO 24 HOURS

## 2021-12-15 PROCEDURE — 25000003 PHARM REV CODE 250: Performed by: STUDENT IN AN ORGANIZED HEALTH CARE EDUCATION/TRAINING PROGRAM

## 2021-12-15 PROCEDURE — A4216 STERILE WATER/SALINE, 10 ML: HCPCS | Performed by: STUDENT IN AN ORGANIZED HEALTH CARE EDUCATION/TRAINING PROGRAM

## 2021-12-15 PROCEDURE — 99900035 HC TECH TIME PER 15 MIN (STAT)

## 2021-12-15 PROCEDURE — C9113 INJ PANTOPRAZOLE SODIUM, VIA: HCPCS | Performed by: INTERNAL MEDICINE

## 2021-12-15 PROCEDURE — 27000190 HC CPAP FULL FACE MASK W/VALVE

## 2021-12-15 RX ORDER — MORPHINE SULFATE 4 MG/ML
2 INJECTION, SOLUTION INTRAMUSCULAR; INTRAVENOUS
Status: DISCONTINUED | OUTPATIENT
Start: 2021-12-15 | End: 2021-12-18 | Stop reason: HOSPADM

## 2021-12-15 RX ORDER — METOPROLOL TARTRATE 1 MG/ML
INJECTION, SOLUTION INTRAVENOUS
Status: COMPLETED
Start: 2021-12-15 | End: 2021-12-15

## 2021-12-15 RX ORDER — MORPHINE SULFATE 4 MG/ML
2 INJECTION, SOLUTION INTRAMUSCULAR; INTRAVENOUS EVERY 4 HOURS PRN
Status: DISCONTINUED | OUTPATIENT
Start: 2021-12-15 | End: 2021-12-15

## 2021-12-15 RX ORDER — METOPROLOL TARTRATE 1 MG/ML
5 INJECTION, SOLUTION INTRAVENOUS EVERY 5 MIN PRN
Status: DISCONTINUED | OUTPATIENT
Start: 2021-12-15 | End: 2021-12-15

## 2021-12-15 RX ORDER — LORAZEPAM 2 MG/ML
0.5 INJECTION INTRAMUSCULAR EVERY 30 MIN PRN
Status: DISCONTINUED | OUTPATIENT
Start: 2021-12-15 | End: 2021-12-18 | Stop reason: HOSPADM

## 2021-12-15 RX ORDER — METOPROLOL TARTRATE 1 MG/ML
5 INJECTION, SOLUTION INTRAVENOUS EVERY 6 HOURS PRN
Status: DISCONTINUED | OUTPATIENT
Start: 2021-12-15 | End: 2021-12-18 | Stop reason: HOSPADM

## 2021-12-15 RX ADMIN — HYDROCORTISONE SODIUM SUCCINATE 100 MG: 100 INJECTION, POWDER, FOR SOLUTION INTRAMUSCULAR; INTRAVENOUS at 05:12

## 2021-12-15 RX ADMIN — MORPHINE SULFATE 2 MG: 4 INJECTION, SOLUTION INTRAMUSCULAR; INTRAVENOUS at 10:12

## 2021-12-15 RX ADMIN — MORPHINE SULFATE 2 MG: 4 INJECTION, SOLUTION INTRAMUSCULAR; INTRAVENOUS at 08:12

## 2021-12-15 RX ADMIN — LORAZEPAM 0.5 MG: 2 INJECTION INTRAMUSCULAR; INTRAVENOUS at 10:12

## 2021-12-15 RX ADMIN — IPRATROPIUM BROMIDE 0.5 MG: 0.5 SOLUTION RESPIRATORY (INHALATION) at 07:12

## 2021-12-15 RX ADMIN — METOROPROLOL TARTRATE 5 MG: 5 INJECTION, SOLUTION INTRAVENOUS at 03:12

## 2021-12-15 RX ADMIN — IPRATROPIUM BROMIDE 0.5 MG: 0.5 SOLUTION RESPIRATORY (INHALATION) at 01:12

## 2021-12-15 RX ADMIN — METOROPROLOL TARTRATE 5 MG: 5 INJECTION, SOLUTION INTRAVENOUS at 09:12

## 2021-12-15 RX ADMIN — IPRATROPIUM BROMIDE 0.5 MG: 0.5 SOLUTION RESPIRATORY (INHALATION) at 12:12

## 2021-12-15 RX ADMIN — CASTOR OIL AND BALSAM, PERU: 788; 87 OINTMENT TOPICAL at 09:12

## 2021-12-15 RX ADMIN — METOROPROLOL TARTRATE 5 MG: 5 INJECTION, SOLUTION INTRAVENOUS at 08:12

## 2021-12-15 RX ADMIN — MORPHINE SULFATE 2 MG: 4 INJECTION, SOLUTION INTRAMUSCULAR; INTRAVENOUS at 04:12

## 2021-12-15 RX ADMIN — MEROPENEM AND SODIUM CHLORIDE 1 G: 1 INJECTION, SOLUTION INTRAVENOUS at 09:12

## 2021-12-15 RX ADMIN — MORPHINE SULFATE 2 MG: 4 INJECTION, SOLUTION INTRAMUSCULAR; INTRAVENOUS at 05:12

## 2021-12-15 RX ADMIN — Medication 10 ML: at 12:12

## 2021-12-15 RX ADMIN — PANTOPRAZOLE SODIUM 40 MG: 40 INJECTION, POWDER, FOR SOLUTION INTRAVENOUS at 09:12

## 2021-12-15 RX ADMIN — HYDROCORTISONE SODIUM SUCCINATE 100 MG: 100 INJECTION, POWDER, FOR SOLUTION INTRAMUSCULAR; INTRAVENOUS at 03:12

## 2021-12-15 RX ADMIN — Medication 10 ML: at 05:12

## 2021-12-15 RX ADMIN — LORAZEPAM 0.5 MG: 2 INJECTION INTRAMUSCULAR; INTRAVENOUS at 09:12

## 2021-12-15 RX ADMIN — MEROPENEM AND SODIUM CHLORIDE 1 G: 1 INJECTION, SOLUTION INTRAVENOUS at 12:12

## 2021-12-16 PROBLEM — Z51.5 END OF LIFE CARE: Status: ACTIVE | Noted: 2021-12-16

## 2021-12-16 PROCEDURE — 20000000 HC ICU ROOM

## 2021-12-16 PROCEDURE — 27000221 HC OXYGEN, UP TO 24 HOURS

## 2021-12-16 PROCEDURE — 99900035 HC TECH TIME PER 15 MIN (STAT)

## 2021-12-16 PROCEDURE — 99291 CRITICAL CARE FIRST HOUR: CPT | Mod: ,,, | Performed by: INTERNAL MEDICINE

## 2021-12-16 PROCEDURE — A4216 STERILE WATER/SALINE, 10 ML: HCPCS | Performed by: STUDENT IN AN ORGANIZED HEALTH CARE EDUCATION/TRAINING PROGRAM

## 2021-12-16 PROCEDURE — 94761 N-INVAS EAR/PLS OXIMETRY MLT: CPT

## 2021-12-16 PROCEDURE — 99291 PR CRITICAL CARE, E/M 30-74 MINUTES: ICD-10-PCS | Mod: ,,, | Performed by: INTERNAL MEDICINE

## 2021-12-16 PROCEDURE — 99233 PR SUBSEQUENT HOSPITAL CARE,LEVL III: ICD-10-PCS | Mod: ,,, | Performed by: INTERNAL MEDICINE

## 2021-12-16 PROCEDURE — 99497 PR ADVNCD CARE PLAN 30 MIN: ICD-10-PCS | Mod: ,,, | Performed by: INTERNAL MEDICINE

## 2021-12-16 PROCEDURE — 99233 SBSQ HOSP IP/OBS HIGH 50: CPT | Mod: ,,, | Performed by: INTERNAL MEDICINE

## 2021-12-16 PROCEDURE — 25000003 PHARM REV CODE 250: Performed by: STUDENT IN AN ORGANIZED HEALTH CARE EDUCATION/TRAINING PROGRAM

## 2021-12-16 PROCEDURE — 63600175 PHARM REV CODE 636 W HCPCS: Performed by: STUDENT IN AN ORGANIZED HEALTH CARE EDUCATION/TRAINING PROGRAM

## 2021-12-16 PROCEDURE — 99497 ADVNCD CARE PLAN 30 MIN: CPT | Mod: ,,, | Performed by: INTERNAL MEDICINE

## 2021-12-16 RX ORDER — SCOLOPAMINE TRANSDERMAL SYSTEM 1 MG/1
1 PATCH, EXTENDED RELEASE TRANSDERMAL
Status: DISCONTINUED | OUTPATIENT
Start: 2021-12-16 | End: 2021-12-18 | Stop reason: HOSPADM

## 2021-12-16 RX ADMIN — SCOPOLAMINE 1 PATCH: 1.5 PATCH, EXTENDED RELEASE TRANSDERMAL at 09:12

## 2021-12-16 RX ADMIN — Medication 10 ML: at 12:12

## 2021-12-16 RX ADMIN — MORPHINE SULFATE 2 MG: 4 INJECTION, SOLUTION INTRAMUSCULAR; INTRAVENOUS at 03:12

## 2021-12-16 RX ADMIN — CASTOR OIL AND BALSAM, PERU: 788; 87 OINTMENT TOPICAL at 10:12

## 2021-12-16 RX ADMIN — MORPHINE SULFATE 2 MG: 4 INJECTION, SOLUTION INTRAMUSCULAR; INTRAVENOUS at 09:12

## 2021-12-16 RX ADMIN — Medication 10 ML: at 06:12

## 2021-12-16 RX ADMIN — LORAZEPAM 0.5 MG: 2 INJECTION INTRAMUSCULAR; INTRAVENOUS at 01:12

## 2021-12-16 RX ADMIN — CASTOR OIL AND BALSAM, PERU: 788; 87 OINTMENT TOPICAL at 09:12

## 2021-12-16 RX ADMIN — MORPHINE SULFATE 2 MG: 4 INJECTION, SOLUTION INTRAMUSCULAR; INTRAVENOUS at 12:12

## 2021-12-16 RX ADMIN — Medication 10 ML: at 11:12

## 2021-12-16 RX ADMIN — Medication 10 ML: at 03:12

## 2021-12-16 RX ADMIN — MORPHINE SULFATE 2 MG: 4 INJECTION, SOLUTION INTRAMUSCULAR; INTRAVENOUS at 11:12

## 2021-12-17 VITALS
HEART RATE: 106 BPM | BODY MASS INDEX: 25.04 KG/M2 | OXYGEN SATURATION: 97 % | TEMPERATURE: 98 F | RESPIRATION RATE: 13 BRPM | SYSTOLIC BLOOD PRESSURE: 69 MMHG | HEIGHT: 63 IN | DIASTOLIC BLOOD PRESSURE: 32 MMHG | WEIGHT: 141.31 LBS

## 2021-12-17 LAB — SARS-COV-2 RDRP RESP QL NAA+PROBE: NEGATIVE

## 2021-12-17 PROCEDURE — 63600175 PHARM REV CODE 636 W HCPCS: Performed by: STUDENT IN AN ORGANIZED HEALTH CARE EDUCATION/TRAINING PROGRAM

## 2021-12-17 PROCEDURE — 25000003 PHARM REV CODE 250: Performed by: STUDENT IN AN ORGANIZED HEALTH CARE EDUCATION/TRAINING PROGRAM

## 2021-12-17 PROCEDURE — U0002 COVID-19 LAB TEST NON-CDC: HCPCS | Performed by: STUDENT IN AN ORGANIZED HEALTH CARE EDUCATION/TRAINING PROGRAM

## 2021-12-17 PROCEDURE — A4216 STERILE WATER/SALINE, 10 ML: HCPCS | Performed by: STUDENT IN AN ORGANIZED HEALTH CARE EDUCATION/TRAINING PROGRAM

## 2021-12-17 PROCEDURE — 99900035 HC TECH TIME PER 15 MIN (STAT)

## 2021-12-17 RX ADMIN — Medication 10 ML: at 05:12

## 2021-12-17 RX ADMIN — MORPHINE SULFATE 2 MG: 4 INJECTION, SOLUTION INTRAMUSCULAR; INTRAVENOUS at 06:12

## 2021-12-17 RX ADMIN — MORPHINE SULFATE 2 MG: 4 INJECTION, SOLUTION INTRAMUSCULAR; INTRAVENOUS at 11:12

## 2021-12-17 RX ADMIN — Medication 10 ML: at 11:12

## 2021-12-20 ENCOUNTER — TELEPHONE (OUTPATIENT)
Dept: ENDOSCOPY | Facility: HOSPITAL | Age: 71
End: 2021-12-20
Payer: MEDICARE

## 2022-01-07 NOTE — TELEPHONE ENCOUNTER
----- Message from Dorys Horan sent at 4/19/2017 10:14 AM CDT -----  Contact: [facility]  REFILL: VITAMIN D2 50,000 unit capsule   Pt calling to speak with MEGAN Kemp.

## 2022-01-10 LAB — FUNGUS SPEC CULT: NORMAL

## 2022-01-25 LAB
ACID FAST MOD KINY STN SPEC: NORMAL
MYCOBACTERIUM SPEC QL CULT: NORMAL

## 2022-05-25 NOTE — PROGRESS NOTES
"HPI     9/22/15    Pt c/o near and distance vision have been blurry x 2 weeks ago after   starting new medications coumadin. Pt also states when she wakes up in the   AM looks like a "mirror flash" with HA and feels eyes blink to her   heartbeat. Pt states she has constant headaches (frontal radiating to   entire head). Pt also c/o floaters, horizontal diplopia especially while   reading up close. Pt only wears OTC readers. Pt has tremors so she gets   very forgetful. Pt states she never had her eye lid surgery due to health   issues.    Meds: Systane 3-4 x day ou           Last edited by Ad Domingo, OD on 4/5/2017  2:06 PM.         Assessment /Plan     For exam results, see Encounter Report.    Headache around the eyes  -Not Visually or Ocular related 2/2 duration and intensity  -Follow up Neuro as scheduled    Keratoconjunctivitis sicca, not specified as Sjogren's, bilateral  -     lifitegrast 5 % Dpet; Place 1 drop into both eyes 2 (two) times daily.  Dispense: 60 each; Refill: 6  -1mo trial Xiidra BID  -Previous allergic reaction to Restasis  -Cont'd Systane     Anterior basement membrane dystrophy  -visually significant, Xiidra and Systane    Glaucoma screening  -Monitor with annual eye exam and IOP check    Pseudophakia of both eyes  -clear, centered    Hyperopia with astigmatism, bilateral  Eyeglass Final Rx     Eyeglass Final Rx      Sphere Cylinder Axis Add   Right +1.00 Sphere  +2.50   Left +0.50 +1.50 165 +2.50       Type:  Bifocal    Expiration Date:  4/6/2018                  RTC 1 yr, sooner dry eye                 "
hard copy, drawn during this pregnancy

## 2022-08-15 NOTE — TELEPHONE ENCOUNTER
----- Message from Marlon Ballesteros MD sent at 7/26/2017 10:33 AM CDT -----  skip a day of verapamil and decrease verapamil to 120 qd (make sure it's the long acting form)  thanks  DPM    ----- Message -----  From: Marie Gaines RN  Sent: 7/26/2017  10:23 AM  To: Marlon Ballesteros MD    Pt had ablation on Monday. She was started on verapamil 180mg/qd in the hospital and today( 3rd day) her B/P is 109/48 HR44. She is SOB, she feels shakey inside and out, voice shakey and states I just feel weird. She thinks the verapamil may be too strong or she just isnt tolerating it??    
----- Message from Marlon Ballesteros MD sent at 7/26/2017 10:33 AM CDT -----  skip a day of verapamil and decrease verapamil to 120 qd (make sure it's the long acting form)  thanks  DPM    ----- Message -----  From: Marie Gaines RN  Sent: 7/26/2017  10:23 AM  To: Marlon Ballesteros MD    Pt had ablation on Monday. She was started on verapamil 180mg/qd in the hospital and today( 3rd day) her B/P is 109/48 HR44. She is SOB, she feels shakey inside and out, voice shakey and states I just feel weird. She thinks the verapamil may be too strong or she just isnt tolerating it??    
Called Pt and discussed recommendations of Dr Ballesteros. Pt voiced understanding. Advised her to check B/P and HR prior to restarting dose tomm.  
normal

## 2022-10-31 NOTE — TELEPHONE ENCOUNTER
----- Message from Suzanna Higgins sent at 11/27/2020 10:43 AM CST -----  Regarding: [facility] [phone]  .Type: Patient Call Back    Who called: [facility]     What is the request in detail: Requesting orders for refill on ergocalciferol (ERGOCALCIFEROL) 50,000 unit Cap    Can the clinic reply by [facility]? Call back     Would the patient rather a call back or a response via [facility]? Call back     Best call back number: [phone]     no

## 2023-09-15 NOTE — TELEPHONE ENCOUNTER
[facility] Hematology and Oncology Progress Note    Patient: Kristen Chapman  MRN: [id number]  Date of Service: Sep 15, 2023        Assessment and Plan:    Cancer Staging  Nasopharyngeal carcinoma (H)  Staging form: Pharynx - Nasopharynx, AJCC 8th Edition  - Clinical stage from 2/18/2020: Stage SAMANTHA (cT4, cN2, cM0) - Signed by Alan Frias MD on 2/18/2020     1.  Nasopharyngeal carcinoma: He was recently diagnosed with a recurrence involving a right submandibular gland.  He just had a PET scan and is here to review the results.  This does not show any evidence of active disease outside of this 1 node under the right mandible.  As such, I am going to refer him to radiation oncology for treatment.  His performance status I think would preclude chemotherapy or other systemic therapy at this time given his apparently worsening liver condition.  Would recommend a CT scan about 2 months after his radiation is complete.    2.  Pancytopenia: Overall his counts have been generally stable.  He is not needing transfusions at this point but his platelet count most recently was 39,000.  Continue to monitor.    He has chronic pancytopenia secondary to chronic hepatitis B infection as well as cirrhosis.  He also has splenomegaly measuring 17 cm on CT from Sophia 15, 2023.  He had a bone marrow biopsy in October 2021.  Next generation sequencing showed no abnormalities.  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     3.  Hypothyroidism: Most recent TSH was on September 8 and is improving at 31.  We will continue to monitor.      4.  Hepatitis B infection: Viral DNA in the serum was 54 million on April 4.  He had a second drug added to his regimen which she states he is taking.  He is following with infectious disease.    5.  Ascites: Having relatively rapid reaccumulation of fluid.  He had a paracentesis 3 days ago and 5 L were removed.  Cytology was negative for any malignant cells.  I am going to refer him to  Ok to hold eliquis for 3 days prior to each procedure   hepatology for treatment of his cirrhosis secondary to hepatitis B.    Medical decision Making:  Today's visit was centered around a cancer diagnosis in the setting of additional medical comorbidities. Complex medical decision making was required regarding his relapse/recurrent nasopharyngeal cancer.  He also has worsening liver failure and ascites. The risk of additional morbidity without further treatment is high.     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  Diagnosed January 2020. Imaging suggested bilateral abnormal lymphadenopathy in the neck.  Also asymmetric soft tissue thickening in the posterior right nasopharynx.  On imaging, tumor also appeared to extend down to the hypopharynx.    Recurrent disease involving the ethmoid sinus diagnosed September 2021.  Right neck lymph node positive for recurrent nasopharyngeal carcinoma.  PET scan at that time showed new hypermetabolic mediastinal lymphadenopathy (chronic, most likely reactive), mass in the right ethmoid sinus, hypermetabolic right level 1B and 2A lymph nodes.    Treatment:    Initially treated with concurrent chemotherapy and radiation.  He had weekly cisplatin starting March 3, 2020. Fifth and final dose given March 31, 2020.  Subsequent chemotherapy was held due to prolonged thrombocytopenia and renal insufficiency.  Radiation dose was 7000 cGy in 35 fractions given from March 2 through April 17, 2020.     Started cisplatin with infusional 5-FU on June 1, 2020.  Cycle 1 given at a 25% dose reduction.  He still had significant thrombocytopenia and neutropenia on day 28.  Cycle 2 was held.  At the same time his liver function tests elevated secondary to hepatitis B.   PET scan in September 2020 showed no good evidence of residual malignancy.    Recurrence:  Radiosurgery delivered to the right ethmoid tumor delivered November 8 through November 17, 2021.  Received 3500  "cGy in 5 fractions.  Pembrolizumab started 12/23/21.  Held after his July 1, 2022 dose.  PET scan in March 2022 showed a near complete response.    Interim History:    Kristen Chapman returns today for follow-up visit.  He recently had a PET scan and is here to review the results.  He notes that he is having reaccumulation of his abdominal fluid.  He is not having any issues with fevers.  No nausea or vomiting.  Denies shortness of breath.    Review of Systems:    As above in the history.     Review of Systems otherwise Negative for:  General: chills, fever or night sweats  Psychological: anxiety or depression  Ophthalmic: blurry vision, double vision or loss of vision, vision change  ENT: oral lesions, hearing changes  Hematological and Lymphatic: bruising, jaundice, swollen lymph nodes  Endocrine: hot flashes  Respiratory: cough, hemoptysis, orthopnea  Cardiovascular: chest pain, palpitations or PND  Gastrointestinal: blood in stools, change in bowel habits, constipation, diarrhea or nausea/vomiting  Genito-Urinary: change in urinary stream, incontinence, frequency/urgency  Musculoskeletal: joint swelling, muscle pain  Neurological: dizziness, headaches, numbness/tingling  Dermatological: lumps and rash    Past History:    Past Medical History:   Diagnosis Date    Anemia     Dysphagia     Hepatitis B chronic     Hypothyroidism     Nasopharyngeal cancer (H)     Severe protein-calorie malnutrition (H)     Thrombocytopenia (H)      Physical Exam:    /63 (BP Location: Left arm, Patient Position: Sitting, Cuff Size: Adult Regular)   Pulse 62   Temp 97.9  F (36.6  C) (Oral)   Resp 20   Ht 1.6 m (5' 2.99\")   Wt 60.2 kg (132 lb 11.2 oz)   SpO2 98%   BMI 23.51 kg/m      General: patient appears stated age of 68 year old. Nontoxic and in no distress.   HEENT: Head: atraumatic, normocephalic. Sclerae anicteric.  Chest:  Normal respiratory effort  Cardiac:  No edema.  Abdomen: abdomen is significantly distended with " fluid.  Extremities: normal tone and muscle bulk.  Skin: no lesions or rash on visible skin. Warm and dry.   CNS: alert and oriented. Grossly non-focal.   Psychiatric: normal mood and affect.     Lab Results:    Recent Results (from the past 168 hour(s))   Pathology Additional Testing: NeoTYPE head and neck; [facility]   Result Value Ref Range    Additional Test Status       Testing in Process.  Results to follow in approximately 14-21 business days.   Glucose by meter   Result Value Ref Range    GLUCOSE BY METER POCT 84 70 - 99 mg/dL   Cytology non gyn   Result Value Ref Range    Final Diagnosis       Specimen A     Interpretation:      Negative for malignancy    ABDOMINAL FLUID:        -  SMALL TO MODERATE NUMBERS OF CHRONIC INFLAMMATORY CELLS        -  OCCASIONAL REACTIVE MESOTHELIAL CELLS        -  NEGATIVE FOR ATYPICAL OR MALIGNANT CELLS     Other Findings:      Chronic inflammation present.     Adequacy:     Satisfactory for evaluation          Clinical Information       Ascites      Gross Description       A(A). Abdomen, Peritoneal Fluid:  Received 50 ml of clear, yellow fluid, processed as one hematoxylin and eosin stained cell block.       Microscopic Description       Material examined consists of cell block sections stained with H&E.  These demonstrate moderate numbers of lymphocytes, accompanied by macrophages and mesothelial cells in approximately equal numbers.  Rare neutrophils are noted.  Some of the mesothelial cells appear reactive, but there are no atypical or malignant features.  There is no evidence of purulent inflammation.      Performing Labs       The technical component of this testing was completed at [facility]        Imaging:    US Paracentesis without Albumin    Result Date: 9/12/2023  EXAM: 1. PARACENTESIS 2. ULTRASOUND GUIDANCE LOCATION: [facility] DATE: 9/12/2023 INDICATION: Ascites.  PROCEDURE: Informed consent obtained. Time out performed. The abdomen was prepped and draped in a sterile fashion. 10 mL of 1% lidocaine was infused into local soft tissues. A 5 Armenian catheter system was introduced into the abdominal ascites under ultrasound guidance. 5.0 liters of clear fluid were removed and sent to the lab. Patient tolerated procedure well. Ultrasound imaging was obtained and placed in the patient's permanent medical record.     IMPRESSION: 1.  Status post ultrasound-guided paracentesis. Reference CPT Code: 86245    PET Oncology Whole Body    Result Date: 9/11/2023  EXAM: PET ONCOLOGY WHOLE BODY, CT CHEST/ABDOMEN/PELVIS W CONTRAST LOCATION: [facility] DATE: 9/11/2023 INDICATION: Subsequent treatment planning and restaging for malignant neoplasm of overlapping sites of nasopharynx. Status post concurrent chemoradiation completed in April 2020, additional cycles of chemotherapy, radiosurgery to a right ethmoid tumor completed in November 2021, currently receiving immunotherapy. Monitor treatment response COMPARISON: FDG PET/CT dated 03/17/2022, brain MRI dated 09/06/2023, right submandibular gland biopsy dated 08/31/2023 CONTRAST: 70 mL Isovue-370 TECHNIQUE: Serum glucose level 84 mg/dL. One hour post intravenous administration of 10.1 mCi F-18 FDG, PET imaging was performed from the skull vertex to feet, utilizing attenuation correction with concurrent axial CT and PET/CT image fusion. Separate diagnostic CT of the chest, abdomen, and pelvis was performed. Dose reduction techniques were used. PET/CT FINDINGS: Isolated FDG avid right submandibular lymph node measuring 2.8 x 1.2 x 3.0 cm (max SUV 15.9) suspicious for progression of disease. Mildly FDG avid moderate paranasal sinus mucosal thickening likely inflammatory in nature. Redemonstrated mildly FDG avid bilateral hilar station 10 lymph nodes (Max SUV 6.0, previously 7.9 on the right) likely representing sequelae of  granulomatous/immunotherapy related inflammatory change. Diffuse esophagitis. Shoulder and hip synovitis. CT FINDINGS: Mild to moderate senescent intracranial changes. Moderate paranasal sinus mucosal thickening. Left chest port with tip terminating in the upper right atrium. Mild dilatation of the ascending aorta. Mild coronary artery calcium. Trace pleural  effusions. Cirrhotic liver with sequelae of portal hypertension including a marked volume of intra-abdominal ascites. Non-FDG avid lesion in the splenic parenchyma suggesting benignity. Multilevel degenerative changes of the spine. The lower extremities  are unremarkable.     IMPRESSION: Findings suspicious for isolated right submandibular lymph node recurrence.    CT Chest/Abdomen/Pelvis w Contrast    Result Date: 9/11/2023  EXAM: PET ONCOLOGY WHOLE BODY, CT CHEST/ABDOMEN/PELVIS W CONTRAST LOCATION: [facility] DATE: 9/11/2023 INDICATION: Subsequent treatment planning and restaging for malignant neoplasm of overlapping sites of nasopharynx. Status post concurrent chemoradiation completed in April 2020, additional cycles of chemotherapy, radiosurgery to a right ethmoid tumor completed in November 2021, currently receiving immunotherapy. Monitor treatment response COMPARISON: FDG PET/CT dated 03/17/2022, brain MRI dated 09/06/2023, right submandibular gland biopsy dated 08/31/2023 CONTRAST: 70 mL Isovue-370 TECHNIQUE: Serum glucose level 84 mg/dL. One hour post intravenous administration of 10.1 mCi F-18 FDG, PET imaging was performed from the skull vertex to feet, utilizing attenuation correction with concurrent axial CT and PET/CT image fusion. Separate diagnostic CT of the chest, abdomen, and pelvis was performed. Dose reduction techniques were used. PET/CT FINDINGS: Isolated FDG avid right submandibular lymph node measuring 2.8 x 1.2 x 3.0 cm (max SUV 15.9) suspicious for progression of disease. Mildly FDG avid moderate paranasal  sinus mucosal thickening likely inflammatory in nature. Redemonstrated mildly FDG avid bilateral hilar station 10 lymph nodes (Max SUV 6.0, previously 7.9 on the right) likely representing sequelae of granulomatous/immunotherapy related inflammatory change. Diffuse esophagitis. Shoulder and hip synovitis. CT FINDINGS: Mild to moderate senescent intracranial changes. Moderate paranasal sinus mucosal thickening. Left chest port with tip terminating in the upper right atrium. Mild dilatation of the ascending aorta. Mild coronary artery calcium. Trace pleural  effusions. Cirrhotic liver with sequelae of portal hypertension including a marked volume of intra-abdominal ascites. Non-FDG avid lesion in the splenic parenchyma suggesting benignity. Multilevel degenerative changes of the spine. The lower extremities  are unremarkable.     IMPRESSION: Findings suspicious for isolated right submandibular lymph node recurrence.    MR Brain w/o & w Contrast    Result Date: 9/6/2023  EXAM: MR BRAIN W/O and W CONTRAST LOCATION: [facility] DATE: 9/6/2023 INDICATION: f u Nasopharyngeal carcinoma COMPARISON: Soft tissue neck CT 06/15/2023 and MRI 06/07/2023. MRI 10/03/2022 CONTRAST: 6ml gadavist TECHNIQUE: Multiplanar multisequence head MRI without and with intravenous contrast including dedicated imaging of the skull base and nasopharynx. FINDINGS: SKULL BASE AND NASOPHARYNX: Dedicated imaging of the skull base extending to involve the adjacent upper aerodigestive tract demonstrates persistent post therapeutic changes involving the nasopharyngeal mucosa without evidence for locally recurrent mucosal mass. There is persistent mucosal thickening involving the paranasal sinuses with complete opacification of the right frontal sinus and anterior right ethmoid air cells, subtotal opacification of the left ethmoid air cells, and moderate circumferential mucosal thickening involving the bilateral maxillary sinuses  and right sphenoid sinus similar to findings on the previous exam. Some dependent secretions persists primarily within the left maxillary sinus as seen previously. These areas mucosal abnormality demonstrated persistent irregular enhancement without evidence for new or progressive masslike abnormality in the sinonasal region. Persistent patchy opacification of left mastoid air cells. INTRACRANIAL CONTENTS: No acute or subacute infarct. No definite intracranial mass or mass effect as visualized. Unchanged brain parenchymal signal on the provided sequences. Mild generalized cerebral atrophy. No hydrocephalus. Normal position of the cerebellar tonsils. No evidence for new or progressive enhancing abnormality intracranially. OTHER: Multilobular heterogeneously T2 hyperintense and heterogeneously enhancing mass in the right submandibular space presumably representing pathologic cervical lymph nodes consistent with biopsy-proven metastatic squamous cell carcinoma. This lesion measures approximately 2.1 x 2.2 x 2.5 cm in greatest transverse, AP, and craniocaudal dimensions respectively on postcontrast images. This is consistent with findings on the recent prior neck CT.     IMPRESSION: 1.  Unchanged post therapeutic appearance of the skull base and upper aerodigestive tract without evidence for new or locally recurrent mucosal neoplasm. 2.  Heterogeneously signaling, heterogeneously enhancing soft tissue mass in the right submandibular space consistent with pathologic cervical lymph nodes and biopsy-proven metastatic squamous cell carcinoma. This is similar to findings on the previous neck CT. 3.  Persistent inflammatory changes of the paranasal sinuses including dependent secretions within the left maxillary sinus. 4.  No evidence for acute intracranial process.     US Biopsy Lymph Node Core    Result Date: 8/31/2023  EXAM: 1. PERCUTANEOUS BIOPSY OF A RIGHT SUBMANDIBULAR MASS 2. ULTRASOUND GUIDANCE LOCATION: [facility]  [facility] DATE: 8/31/2023 INDICATION: Salivary gland mass, initial workup PROCEDURE: Informed consent obtained. Site marked. Prior images reviewed. Required items made available. Patient identity was confirmed verbally and with arm band. Patient reevaluated immediately before beginning the procedure. Universal protocol was followed. Time out performed. The site was prepped and draped in sterile fashion. 10 mL of 1 percent lidocaine was infused into the local soft tissues. Using standard technique and under direct ultrasound guidance, a total of 5 fine needle aspiration samples and two 18 gauge biopsy samples were obtained. Tissue was submitted to Pathology. The patient tolerated the procedure well. No complications.     IMPRESSION: Status post US-guided biopsy of a right submandibular mass. Reference CPT Code: 33922, 69404       Signed by: Alan Frias MD   Clothing

## 2024-11-20 NOTE — PLAN OF CARE
Pt out of room    Pt is a 70 y/o female with chronic bronchiectasis, GERD, hx of pseudomonas infections presents to ED +hemoptysis. She reports hemoptysis with exacerbation of her cough. She was on augmentin for her cough recently. CXR with findings including bronchiectasis, no large focal consolidation seen. Respiratory cxs w/ Pseudomonas. No fever chills or night sweats. She was given a dose of vancomycin and cefepime in ED.      Reports treatment for history of NTM in 1970s. Treated x 1 year and was seen by Dr. Williamson. 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 Last AFB cultures 7/2019 were negative. No homelessness, incarceration or recent travel. Reports having chronic hemoptysis for years per pt and .    Quant gold pending.  AFB stain x 3 w/o AFB       Recommendations  1. Continue cefepime 2gq8hr. Respiratory cultures + pan sensitive pseudomonas.   2.  GERD management per primary team.  3  Recommend continue treatment for 7 day course of pneumonia pt on day 6.  4.  If plans for discharge likely can transition to PO Cipro if no objections from pulm team.  5.  If AFBs or TB PCR positive will need ID f/u as an outptation  6. ID will sign off     Seen and discussed with ID staff and primary team.    [Annual Wellness Visit] : an annual wellness visit

## 2025-01-01 NOTE — TELEPHONE ENCOUNTER
----- Message from Catie Barbosa LPN sent at 4/13/2017 12:38 PM CDT -----  Contact: Yasmin   tel:  [phone]    Friday will have been a week that she has had her pic line in.  Did you want to pull it   ----- Message -----     From: Froylan Guardado MD     Sent: 4/12/2017   4:00 PM       To: Catie Barbosa LPN    OK with me. Thanks, CB  ----- Message -----     From: Catie Barbosa LPN     Sent: 4/12/2017   2:18 PM       To: Froylan Guardado MD        ----- Message -----     From: Catie Barbosa LPN     Sent: 4/12/2017   2:17 PM       To: Debby Martinez        ----- Message -----     From: Charlene Domingo     Sent: 4/12/2017   1:48 PM       To: BRYANT Langford Dr[facility]s pt./   Still have the picc line in , and is on coumadin. The name of the  Home health is [facility].   Asking if the picc line can come out yet?   Not on any antibiotics.     Pls call ref. This today.       
Ok to pull picc line if not already done.  
Ania

## (undated) DEVICE — PACK CATH LAB

## (undated) DEVICE — DRESSING LEUKOPLAST FLEX 1X3IN

## (undated) DEVICE — BANDAGE ADHESIVE

## (undated) DEVICE — Device